# Patient Record
Sex: FEMALE | Race: WHITE | Employment: OTHER | URBAN - METROPOLITAN AREA
[De-identification: names, ages, dates, MRNs, and addresses within clinical notes are randomized per-mention and may not be internally consistent; named-entity substitution may affect disease eponyms.]

---

## 2017-04-25 ENCOUNTER — NURSE ONLY (OUTPATIENT)
Dept: CARDIOLOGY CLINIC | Age: 77
End: 2017-04-25

## 2017-04-25 ENCOUNTER — OFFICE VISIT (OUTPATIENT)
Dept: CARDIOLOGY CLINIC | Age: 77
End: 2017-04-25

## 2017-04-25 VITALS
BODY MASS INDEX: 29.14 KG/M2 | DIASTOLIC BLOOD PRESSURE: 62 MMHG | WEIGHT: 174.9 LBS | SYSTOLIC BLOOD PRESSURE: 120 MMHG | HEART RATE: 66 BPM | HEIGHT: 65 IN

## 2017-04-25 DIAGNOSIS — I08.0 MITRAL AND AORTIC VALVE DISEASE: ICD-10-CM

## 2017-04-25 DIAGNOSIS — I48.0 PAROXYSMAL ATRIAL FIBRILLATION (HCC): Primary | ICD-10-CM

## 2017-04-25 DIAGNOSIS — I25.10 ATHEROSCLEROSIS OF NATIVE CORONARY ARTERY OF NATIVE HEART WITHOUT ANGINA PECTORIS: ICD-10-CM

## 2017-04-25 DIAGNOSIS — I48.0 AF (PAROXYSMAL ATRIAL FIBRILLATION) (HCC): ICD-10-CM

## 2017-04-25 DIAGNOSIS — I10 ESSENTIAL HYPERTENSION, BENIGN: Primary | ICD-10-CM

## 2017-04-25 PROCEDURE — 99214 OFFICE O/P EST MOD 30 MIN: CPT | Performed by: NURSE PRACTITIONER

## 2017-04-25 PROCEDURE — 93000 ELECTROCARDIOGRAM COMPLETE: CPT | Performed by: NURSE PRACTITIONER

## 2017-04-25 RX ORDER — CARVEDILOL 12.5 MG/1
25 TABLET ORAL 2 TIMES DAILY WITH MEALS
COMMUNITY
End: 2017-04-25

## 2017-04-25 RX ORDER — CARVEDILOL 25 MG/1
25 TABLET ORAL 2 TIMES DAILY WITH MEALS
Qty: 60 TABLET | Refills: 3 | Status: SHIPPED | OUTPATIENT
Start: 2017-04-25 | End: 2017-05-17

## 2017-04-25 RX ORDER — OXYBUTYNIN CHLORIDE 5 MG/1
5 TABLET ORAL 3 TIMES DAILY
COMMUNITY
End: 2018-11-30

## 2017-05-16 ENCOUNTER — OFFICE VISIT (OUTPATIENT)
Dept: CARDIOLOGY CLINIC | Age: 77
End: 2017-05-16

## 2017-05-16 VITALS
DIASTOLIC BLOOD PRESSURE: 80 MMHG | HEIGHT: 65 IN | BODY MASS INDEX: 29.16 KG/M2 | HEART RATE: 64 BPM | WEIGHT: 175 LBS | SYSTOLIC BLOOD PRESSURE: 150 MMHG

## 2017-05-16 DIAGNOSIS — I10 ESSENTIAL HYPERTENSION, BENIGN: ICD-10-CM

## 2017-05-16 DIAGNOSIS — I08.0 MITRAL AND AORTIC VALVE DISEASE: ICD-10-CM

## 2017-05-16 DIAGNOSIS — I48.0 AF (PAROXYSMAL ATRIAL FIBRILLATION) (HCC): Primary | ICD-10-CM

## 2017-05-16 DIAGNOSIS — I25.10 ATHEROSCLEROSIS OF NATIVE CORONARY ARTERY OF NATIVE HEART WITHOUT ANGINA PECTORIS: ICD-10-CM

## 2017-05-16 PROCEDURE — 99214 OFFICE O/P EST MOD 30 MIN: CPT | Performed by: NURSE PRACTITIONER

## 2017-05-16 RX ORDER — LOSARTAN POTASSIUM 100 MG/1
50 TABLET ORAL DAILY
Qty: 30 TABLET | Status: ON HOLD | COMMUNITY
Start: 2017-05-16 | End: 2019-08-04 | Stop reason: ALTCHOICE

## 2017-05-19 PROCEDURE — 93228 REMOTE 30 DAY ECG REV/REPORT: CPT | Performed by: INTERNAL MEDICINE

## 2017-05-22 ENCOUNTER — TELEPHONE (OUTPATIENT)
Dept: CARDIOLOGY CLINIC | Age: 77
End: 2017-05-22

## 2017-06-09 DIAGNOSIS — I10 ESSENTIAL HYPERTENSION, BENIGN: Primary | ICD-10-CM

## 2017-06-09 RX ORDER — CARVEDILOL 25 MG/1
25 TABLET ORAL 2 TIMES DAILY
Qty: 60 TABLET | Refills: 3 | Status: SHIPPED | OUTPATIENT
Start: 2017-06-09 | End: 2018-04-07 | Stop reason: SDUPTHER

## 2017-06-14 ENCOUNTER — OFFICE VISIT (OUTPATIENT)
Dept: PULMONOLOGY | Age: 77
End: 2017-06-14

## 2017-06-14 VITALS
SYSTOLIC BLOOD PRESSURE: 148 MMHG | DIASTOLIC BLOOD PRESSURE: 71 MMHG | HEART RATE: 72 BPM | OXYGEN SATURATION: 93 % | WEIGHT: 178 LBS | BODY MASS INDEX: 29.62 KG/M2

## 2017-06-14 DIAGNOSIS — I10 ESSENTIAL HYPERTENSION, BENIGN: ICD-10-CM

## 2017-06-14 DIAGNOSIS — J44.9 COPD, SEVERE (HCC): ICD-10-CM

## 2017-06-14 DIAGNOSIS — R06.02 SOB (SHORTNESS OF BREATH): Primary | ICD-10-CM

## 2017-06-14 DIAGNOSIS — R05.3 CHRONIC COUGH: ICD-10-CM

## 2017-06-14 PROCEDURE — 99214 OFFICE O/P EST MOD 30 MIN: CPT | Performed by: INTERNAL MEDICINE

## 2017-06-23 ENCOUNTER — TELEPHONE (OUTPATIENT)
Dept: PULMONOLOGY | Age: 77
End: 2017-06-23

## 2017-06-23 ENCOUNTER — HOSPITAL ENCOUNTER (OUTPATIENT)
Dept: CT IMAGING | Age: 77
Discharge: HOME | End: 2017-06-23
Admitting: INTERNAL MEDICINE

## 2017-06-23 VITALS — OXYGEN SATURATION: 95 % | HEART RATE: 64 BPM

## 2017-06-23 DIAGNOSIS — R05.3 CHRONIC COUGH: ICD-10-CM

## 2017-06-23 RX ORDER — ALBUTEROL SULFATE 90 UG/1
4 AEROSOL, METERED RESPIRATORY (INHALATION) ONCE
Status: COMPLETED | OUTPATIENT
Start: 2017-06-23 | End: 2017-06-23

## 2017-06-23 RX ADMIN — ALBUTEROL SULFATE 4 PUFF: 90 AEROSOL, METERED RESPIRATORY (INHALATION) at 08:21

## 2017-07-18 ENCOUNTER — OFFICE VISIT (OUTPATIENT)
Dept: PULMONOLOGY | Age: 77
End: 2017-07-18

## 2017-07-18 VITALS — HEART RATE: 72 BPM | SYSTOLIC BLOOD PRESSURE: 133 MMHG | DIASTOLIC BLOOD PRESSURE: 72 MMHG

## 2017-07-18 DIAGNOSIS — R06.02 SOB (SHORTNESS OF BREATH): Primary | ICD-10-CM

## 2017-07-18 DIAGNOSIS — R91.1 PULMONARY NODULE: ICD-10-CM

## 2017-07-18 DIAGNOSIS — R05.3 CHRONIC COUGH: ICD-10-CM

## 2017-07-18 DIAGNOSIS — J44.9 COPD, SEVERE (HCC): ICD-10-CM

## 2017-07-18 PROCEDURE — 99214 OFFICE O/P EST MOD 30 MIN: CPT | Performed by: INTERNAL MEDICINE

## 2017-08-16 ENCOUNTER — HOSPITAL ENCOUNTER (OUTPATIENT)
Dept: NON INVASIVE DIAGNOSTICS | Age: 77
Discharge: HOME | End: 2017-08-16
Attending: INTERNAL MEDICINE | Admitting: INTERNAL MEDICINE

## 2017-08-16 DIAGNOSIS — I25.10 ATHEROSCLEROTIC HEART DISEASE OF NATIVE CORONARY ARTERY WITHOUT ANGINA PECTORIS: ICD-10-CM

## 2017-08-16 LAB
LEFT VENTRICULAR EJECTION FRACTION HIGH VALUE: 60 %
LEFT VENTRICULAR EJECTION FRACTION MODE: NORMAL
LV EF: 55 %
LV EF: 58 %
LVEF MODALITY: NORMAL

## 2017-11-16 ENCOUNTER — OFFICE VISIT (OUTPATIENT)
Dept: CARDIOLOGY CLINIC | Age: 77
End: 2017-11-16

## 2017-11-16 VITALS
BODY MASS INDEX: 28.82 KG/M2 | SYSTOLIC BLOOD PRESSURE: 160 MMHG | HEIGHT: 65 IN | DIASTOLIC BLOOD PRESSURE: 58 MMHG | HEART RATE: 77 BPM | WEIGHT: 173 LBS

## 2017-11-16 DIAGNOSIS — I25.10 ATHEROSCLEROSIS OF NATIVE CORONARY ARTERY OF NATIVE HEART WITHOUT ANGINA PECTORIS: ICD-10-CM

## 2017-11-16 DIAGNOSIS — I48.0 AF (PAROXYSMAL ATRIAL FIBRILLATION) (HCC): ICD-10-CM

## 2017-11-16 DIAGNOSIS — I10 ESSENTIAL HYPERTENSION, BENIGN: Primary | ICD-10-CM

## 2017-11-16 PROCEDURE — 3017F COLORECTAL CA SCREEN DOC REV: CPT | Performed by: INTERNAL MEDICINE

## 2017-11-16 PROCEDURE — G8598 ASA/ANTIPLAT THER USED: HCPCS | Performed by: INTERNAL MEDICINE

## 2017-11-16 PROCEDURE — 1036F TOBACCO NON-USER: CPT | Performed by: INTERNAL MEDICINE

## 2017-11-16 PROCEDURE — 1123F ACP DISCUSS/DSCN MKR DOCD: CPT | Performed by: INTERNAL MEDICINE

## 2017-11-16 PROCEDURE — 4040F PNEUMOC VAC/ADMIN/RCVD: CPT | Performed by: INTERNAL MEDICINE

## 2017-11-16 PROCEDURE — G8484 FLU IMMUNIZE NO ADMIN: HCPCS | Performed by: INTERNAL MEDICINE

## 2017-11-16 PROCEDURE — 1090F PRES/ABSN URINE INCON ASSESS: CPT | Performed by: INTERNAL MEDICINE

## 2017-11-16 PROCEDURE — G8417 CALC BMI ABV UP PARAM F/U: HCPCS | Performed by: INTERNAL MEDICINE

## 2017-11-16 PROCEDURE — 3014F SCREEN MAMMO DOC REV: CPT | Performed by: INTERNAL MEDICINE

## 2017-11-16 PROCEDURE — G8399 PT W/DXA RESULTS DOCUMENT: HCPCS | Performed by: INTERNAL MEDICINE

## 2017-11-16 PROCEDURE — 99214 OFFICE O/P EST MOD 30 MIN: CPT | Performed by: INTERNAL MEDICINE

## 2017-11-16 PROCEDURE — G8427 DOCREV CUR MEDS BY ELIG CLIN: HCPCS | Performed by: INTERNAL MEDICINE

## 2017-11-16 RX ORDER — HYDROCHLOROTHIAZIDE 25 MG/1
12.5 TABLET ORAL DAILY
Qty: 30 TABLET | Refills: 11 | Status: SHIPPED | OUTPATIENT
Start: 2017-11-16 | End: 2018-12-18 | Stop reason: DRUGHIGH

## 2017-11-16 NOTE — PROGRESS NOTES
Jacob Joel     Outpatient Follow Up Note    Subjective:   CHIEF COMPLAINT / HPI:      Marleny Caro is 78 y.o. female is seen today for a 6 month follow up. She has a history of CAD, PAF, htn, hchol, MR, copd. She has venous insuff bilaterally, leading to varicosities. Today, she is doing about the same. She is under stress caring for her , whom has spinal stenosis/parkinsons  She has no chest pain, sob, palpitations or edema. When her blood pressure is 125/70, she feels as if she could \"pass out\" which she has never done. Past Medical History:    Past Medical History:   Diagnosis Date    Arthritis     CAD (coronary artery disease)     Carpal tunnel syndrome     Coronary stent     depression     Diabetes mellitus (HCC)     GERD (gastroesophageal reflux disease)     Hyperlipidemia     Hypertension     MI (myocardial infarction)     LIZETT (obstructive sleep apnea)     stress incontinence      Social History:    History   Smoking Status    Former Smoker    Quit date: 10/28/2001   Smokeless Tobacco    Never Used     Current Medications:  Current Outpatient Prescriptions on File Prior to Visit   Medication Sig Dispense Refill    umeclidinium-vilanterol (ANORO ELLIPTA) 62.5-25 MCG/INH AEPB inhaler Inhale 1 puff into the lungs daily 1 each 11    carvedilol (COREG) 25 MG tablet Take 1 tablet by mouth 2 times daily 60 tablet 3    losartan (COZAAR) 100 MG tablet Take 0.5 tablets by mouth daily 30 tablet     amLODIPine (NORVASC) 10 MG tablet Take 10 mg by mouth daily      b complex vitamins capsule Take 1 capsule by mouth daily      Cinnamon 500 MG CAPS Take  by mouth.  acetaminophen 650 MG TABS Take 650 mg by mouth every 4 hours as needed for Pain (For mild pain level 1-3 or for fever > 100.5). 120 tablet 0    hydrochlorothiazide (HYDRODIURIL) 25 MG tablet Take 25 mg by mouth daily.  calcium carbonate (OSCAL) 500 MG TABS tablet Take 500 mg by mouth daily.         displaced  JVP less than 8 cm H2O  Heart tones are crisp and normal  Cervical veins are not engorged  The carotid upstroke is normal in amplitude and contour without delay , positive  bruits  JVP is not elevated  ABDOMEN:  Normal bowel sounds, non-distended and non-tender to palpation  EXT: No edema, no calf tenderness. Pulses are present bilaterally. DATA:    Lab Results   Component Value Date    ALT 17 05/19/2016     Lab Results   Component Value Date    CREATININE 0.6 05/19/2016     Lab Results   Component Value Date    TSH 2.97 07/05/2013    E4YJIES 6.4 02/22/2011     Lab Results   Component Value Date    WBC 11.4 (H) 05/01/2015    HGB 11.8 (L) 05/01/2015    HCT 36.7 05/01/2015    MCV 93.0 05/01/2015     05/01/2015 8/16/17  Echo  -Normal left ventricle size and systolic function with an estimated ejection   fraction of 55-60%.  -No regional wall motion abnormalities are seen.   -Mild concentric left ventricular hypertrophy is present.   -There is reversal of E/A inflow velocities across the mitral valve.   -Diastolic filling parameters suggests grade I diastolic dysfunction . E/e'=   13.65 .   -Mild to moderate mitral regurgitation is present.   -Mitral annular calcification is present.   -Aortic valve appears sclerotic but opens adequately. Mild aortic   regurgitation is present.   -There is trivial tricuspid regurgitation with RVSP estimated at 28 mmHg. Assessment:     1. Coronary atherosclerosis of native coronary artery  --2006  RCA and circ stent patent, EF 45%,8/13/15  Stress echo--no ischemia-- -intolerant to ace due to cough, on cozaar   2. PAF--10/14  Event monitor showed 11 minutes of AF--less frequent AF since last visit, will continue asa daily   3. Essential hypertension, benign stable--BP (!) 160/58   Pulse 77   Ht 5' 5\" (1.651 m)   Wt 173 lb (78.5 kg)   Breastfeeding?  No   BMI 28.79 kg/m² mean blood pressure is 92, she is having dizziness with lower blood pressure, will cut hctz in 1/2   4. Other and unspecified hyperlipidemia   Managed and followed by Dr Marcos Levy to statins due to muscle aches, on red yeast rice   9/16  ldl 102  Tri 175   5. Unspecified mitral and aortic valve diseases 8/17-- Mild to moderate MR, nl EF   6. SOB--multifactorial including,  severe COPD --stable  7. Occlusion and stenosis of carotid arteries    Plan:   1. Cut hctz in 1/2 12.5 mg daily  2   Call for increasing symptoms or frequency of dizziness  3.   Follow up in six months        James Fitzgerald MD

## 2017-11-16 NOTE — LETTER
[address] [facility]  [address]  [address] [address]  Phone: [phone]  Fax: [phone]    Marlyn Fabian MD        November 16, 2017     Camacho Ramirez, [address] [facility] [place] [address] [facility] [address]    Patient: Trip Paulino  MR Number: [id number]  YOB: 1940  Date of Visit: 11/16/2017    Dear Dr. Camacho Ramirez: Thank you for the request for consultation for Jayme Ackerman to me for the evaluation of cad. Below are the relevant portions of my assessment and plan of care. [address]     Outpatient Follow Up Note    Subjective:   CHIEF COMPLAINT / HPI:      Trip Paulino is 78 y.o. female is seen today for a 6 month follow up. She has a history of CAD, PAF, htn, hchol, MR, copd. She has venous insuff bilaterally, leading to varicosities. Today, she is doing about the same. She is under stress caring for her , whom has spinal stenosis/parkinsons  She has no chest pain, sob, palpitations or edema. When her blood pressure is 125/70, she feels as if she could \"pass out\" which she has never done.       Past Medical History:    Past Medical History:   Diagnosis Date    Arthritis     CAD (coronary artery disease)     Carpal tunnel syndrome     Coronary stent     depression     Diabetes mellitus (HCC)     GERD (gastroesophageal reflux disease)     Hyperlipidemia     Hypertension     MI (myocardial infarction)     LIZETT (obstructive sleep apnea)     stress incontinence      Social History:    History   Smoking Status    Former Smoker    Quit date: 10/28/2001   Smokeless Tobacco    Never Used     Current Medications:  Current Outpatient Prescriptions on File Prior to Visit   Medication Sig Dispense Refill    umeclidinium-vilanterol (ANORO ELLIPTA) 62.5-25 MCG/INH AEPB inhaler Inhale 1 puff into the lungs daily 1 each 11    carvedilol (COREG) 25 MG tablet Take 1 tablet by mouth 2 times daily 60 tablet 3  -There is trivial tricuspid regurgitation with RVSP estimated at 28 mmHg. Assessment:     1. Coronary atherosclerosis of native coronary artery  --2006  RCA and circ stent patent, EF 45%,8/13/15  Stress echo--no ischemia-- -intolerant to ace due to cough, on cozaar   2. PAF--10/14  Event monitor showed 11 minutes of AF--less frequent AF since last visit, will continue asa daily   3. Essential hypertension, benign stable--BP (!) 160/58   Pulse 77   Ht 5' 5\" (1.651 m)   Wt 173 lb (78.5 kg)   Breastfeeding? No   BMI 28.79 kg/m²  mean blood pressure is 92, she is having dizziness with lower blood pressure, will cut hctz in 1/2   4. Other and unspecified hyperlipidemia   Managed and followed by Dr Fely Craft to statins due to muscle aches, on red yeast rice   9/16  ldl 102  Tri 175   5. Unspecified mitral and aortic valve diseases 8/17-- Mild to moderate MR, nl EF   6. SOB--multifactorial including,  severe COPD --stable  7. Occlusion and stenosis of carotid arteries    Plan:   1. Cut hctz in 1/2 12.5 mg daily  2   Call for increasing symptoms or frequency of dizziness  3. Follow up in six months        Divine Jean MD                  If you have questions, please do not hesitate to call me. I look forward to following Kamilla Davis along with you.     Sincerely,        Casandra Taylor MD

## 2017-11-16 NOTE — COMMUNICATION BODY
[address]     Outpatient Follow Up Note    Subjective:   CHIEF COMPLAINT / HPI:      Trip Paulino is 78 y.o. female is seen today for a 6 month follow up. She has a history of CAD, PAF, htn, hchol, MR, copd. She has venous insuff bilaterally, leading to varicosities. Today, she is doing about the same. She is under stress caring for her , whom has spinal stenosis/parkinsons  She has no chest pain, sob, palpitations or edema. When her blood pressure is 125/70, she feels as if she could \"pass out\" which she has never done. Past Medical History:    Past Medical History:   Diagnosis Date    Arthritis     CAD (coronary artery disease)     Carpal tunnel syndrome     Coronary stent     depression     Diabetes mellitus (HCC)     GERD (gastroesophageal reflux disease)     Hyperlipidemia     Hypertension     MI (myocardial infarction)     LIZETT (obstructive sleep apnea)     stress incontinence      Social History:    History   Smoking Status    Former Smoker    Quit date: 10/28/2001   Smokeless Tobacco    Never Used     Current Medications:  Current Outpatient Prescriptions on File Prior to Visit   Medication Sig Dispense Refill    umeclidinium-vilanterol (ANORO ELLIPTA) 62.5-25 MCG/INH AEPB inhaler Inhale 1 puff into the lungs daily 1 each 11    carvedilol (COREG) 25 MG tablet Take 1 tablet by mouth 2 times daily 60 tablet 3    losartan (COZAAR) 100 MG tablet Take 0.5 tablets by mouth daily 30 tablet     amLODIPine (NORVASC) 10 MG tablet Take 10 mg by mouth daily      b complex vitamins capsule Take 1 capsule by mouth daily      Cinnamon 500 MG CAPS Take  by mouth.  acetaminophen 650 MG TABS Take 650 mg by mouth every 4 hours as needed for Pain (For mild pain level 1-3 or for fever > 100.5). 120 tablet 0    hydrochlorothiazide (HYDRODIURIL) 25 MG tablet Take 25 mg by mouth daily.  calcium carbonate (OSCAL) 500 MG TABS tablet Take 500 mg by mouth daily.         Ascorbic Acid (VITAMIN C) 500 MG tablet Take 500 mg by mouth daily.  citalopram (CELEXA) 40 MG tablet Take 40 mg by mouth daily.  aspirin 81 MG tablet Take 81 mg by mouth daily.  metformin (GLUCOPHAGE) 500 MG tablet Take 500 mg by mouth 1 tab qm,2 tabs qpm      oxybutynin (DITROPAN) 5 MG tablet Take 5 mg by mouth 3 times daily      gabapentin (NEURONTIN) 100 MG capsule Take 100 mg by mouth 2 tabs qam,1tab afternoon,2 tabs qpm       No current facility-administered medications on file prior to visit. REVIEW OF SYSTEMS:    CONSTITUTIONAL: No major weight gain or loss, fatigue, weakness, night sweats or fever. HEENT: No new vision difficulties or ringing in the ears. RESPIRATORY: No new SOB, PND, orthopnea or cough. CARDIOVASCULAR: See HPI  GI: No nausea, vomiting, diarrhea, constipation, abdominal pain or changes in bowel habits. : No urinary frequency, urgency, incontinence hematuria or dysuria. SKIN: No cyanosis or skin lesions. MUSCULOSKELETAL: No new muscle or joint pain. NEUROLOGICAL: No syncope or TIA-like symptoms. PSYCHIATRIC: No anxiety, pain, insomnia or depression--positive for dizziness    Objective:   PHYSICAL EXAM:        VITALS:    /70 (Site: Left Arm, Position: Sitting, Cuff Size: Large Adult)   Pulse 77   Ht 5' 5\" (1.651 m)   Wt 173 lb (78.5 kg)   Breastfeeding? No   BMI 28.79 kg/m²    CONSTITUTIONAL: Cooperative, no apparent distress, and appears well nourished / developed  NEUROLOGIC:  Awake and orientated to person, place and time. PSYCH: Calm affect. SKIN: Warm and dry. HEENT: Sclera non-icteric, normocephalic, neck supple, no elevation of JVP, normal carotid pulses with  bruits and thyroid normal size. LUNGS:  No increased work of breathing and clear to auscultation, no crackles or wheezing  CARDIOVASCULAR:  Regular rate and rhythm with II/VI sys ej  murmur, gallops, rubs, or abnormal heart sounds, normal PMI. The apical impulses not displaced  JVP less than 8 cm H2O  Heart tones are crisp and normal  Cervical veins are not engorged  The carotid upstroke is normal in amplitude and contour without delay , positive  bruits  JVP is not elevated  ABDOMEN:  Normal bowel sounds, non-distended and non-tender to palpation  EXT: No edema, no calf tenderness. Pulses are present bilaterally. DATA:    Lab Results   Component Value Date    ALT 17 05/19/2016     Lab Results   Component Value Date    CREATININE 0.6 05/19/2016     Lab Results   Component Value Date    TSH 2.97 07/05/2013    [id number] 6.4 02/22/2011     Lab Results   Component Value Date    WBC 11.4 (H) 05/01/2015    HGB 11.8 (L) 05/01/2015    HCT 36.7 05/01/2015    MCV 93.0 05/01/2015     05/01/2015 8/16/17  Echo  -Normal left ventricle size and systolic function with an estimated ejection   fraction of 55-60%.  -No regional wall motion abnormalities are seen.   -Mild concentric left ventricular hypertrophy is present.   -There is reversal of E/A inflow velocities across the mitral valve.   -Diastolic filling parameters suggests grade I diastolic dysfunction . E/e'=   13.65 .   -Mild to moderate mitral regurgitation is present.   -Mitral annular calcification is present.   -Aortic valve appears sclerotic but opens adequately. Mild aortic   regurgitation is present.   -There is trivial tricuspid regurgitation with RVSP estimated at 28 mmHg. Assessment:     1. Coronary atherosclerosis of native coronary artery  --2006  RCA and circ stent patent, EF 45%,8/13/15  Stress echo--no ischemia-- -intolerant to ace due to cough, on cozaar   2. PAF--10/14  Event monitor showed 11 minutes of AF--less frequent AF since last visit, will continue asa daily   3. Essential hypertension, benign stable--BP (!) 160/58   Pulse 77   Ht 5' 5\" (1.651 m)   Wt 173 lb (78.5 kg)   Breastfeeding?  No   BMI 28.79 kg/m²  mean blood pressure is 92, she is having dizziness with lower blood pressure, will cut hctz in 1/2   4. Other and unspecified hyperlipidemia   Managed and followed by Dr Carroll Her to statins due to muscle aches, on red yeast rice   9/16  ldl 102  Tri 175   5. Unspecified mitral and aortic valve diseases 8/17-- Mild to moderate MR, nl EF   6. SOB--multifactorial including,  severe COPD --stable  7. Occlusion and stenosis of carotid arteries    Plan:   1. Cut hctz in 1/2 12.5 mg daily  2   Call for increasing symptoms or frequency of dizziness  3.   Follow up in six months        Jordin Tomlin MD

## 2017-12-21 ENCOUNTER — TELEPHONE (OUTPATIENT)
Dept: PULMONOLOGY | Age: 77
End: 2017-12-21

## 2017-12-21 DIAGNOSIS — R91.8 PULMONARY NODULES: Primary | ICD-10-CM

## 2017-12-29 ENCOUNTER — HOSPITAL ENCOUNTER (OUTPATIENT)
Dept: CT IMAGING | Age: 77
Discharge: HOME | End: 2017-12-29
Attending: INTERNAL MEDICINE | Admitting: INTERNAL MEDICINE

## 2017-12-29 DIAGNOSIS — R91.8 PULMONARY NODULES: ICD-10-CM

## 2017-12-29 DIAGNOSIS — R91.8 OTHER NONSPECIFIC ABNORMAL FINDING OF LUNG FIELD (CODE): ICD-10-CM

## 2018-01-19 ENCOUNTER — OFFICE VISIT (OUTPATIENT)
Dept: PULMONOLOGY | Age: 78
End: 2018-01-19

## 2018-01-19 VITALS
HEART RATE: 77 BPM | WEIGHT: 176 LBS | BODY MASS INDEX: 29.29 KG/M2 | SYSTOLIC BLOOD PRESSURE: 125 MMHG | DIASTOLIC BLOOD PRESSURE: 62 MMHG | OXYGEN SATURATION: 95 %

## 2018-01-19 DIAGNOSIS — J44.9 COPD, SEVERE (HCC): ICD-10-CM

## 2018-01-19 DIAGNOSIS — R91.1 PULMONARY NODULE: ICD-10-CM

## 2018-01-19 DIAGNOSIS — R05.3 CHRONIC COUGH: ICD-10-CM

## 2018-01-19 DIAGNOSIS — R06.02 SOB (SHORTNESS OF BREATH): Primary | ICD-10-CM

## 2018-01-19 PROCEDURE — 3017F COLORECTAL CA SCREEN DOC REV: CPT | Performed by: INTERNAL MEDICINE

## 2018-01-19 PROCEDURE — G8484 FLU IMMUNIZE NO ADMIN: HCPCS | Performed by: INTERNAL MEDICINE

## 2018-01-19 PROCEDURE — 3023F SPIROM DOC REV: CPT | Performed by: INTERNAL MEDICINE

## 2018-01-19 PROCEDURE — 1036F TOBACCO NON-USER: CPT | Performed by: INTERNAL MEDICINE

## 2018-01-19 PROCEDURE — G8399 PT W/DXA RESULTS DOCUMENT: HCPCS | Performed by: INTERNAL MEDICINE

## 2018-01-19 PROCEDURE — 4040F PNEUMOC VAC/ADMIN/RCVD: CPT | Performed by: INTERNAL MEDICINE

## 2018-01-19 PROCEDURE — G8417 CALC BMI ABV UP PARAM F/U: HCPCS | Performed by: INTERNAL MEDICINE

## 2018-01-19 PROCEDURE — G8926 SPIRO NO PERF OR DOC: HCPCS | Performed by: INTERNAL MEDICINE

## 2018-01-19 PROCEDURE — G8598 ASA/ANTIPLAT THER USED: HCPCS | Performed by: INTERNAL MEDICINE

## 2018-01-19 PROCEDURE — G8427 DOCREV CUR MEDS BY ELIG CLIN: HCPCS | Performed by: INTERNAL MEDICINE

## 2018-01-19 PROCEDURE — 1090F PRES/ABSN URINE INCON ASSESS: CPT | Performed by: INTERNAL MEDICINE

## 2018-01-19 PROCEDURE — 99214 OFFICE O/P EST MOD 30 MIN: CPT | Performed by: INTERNAL MEDICINE

## 2018-01-19 PROCEDURE — 1123F ACP DISCUSS/DSCN MKR DOCD: CPT | Performed by: INTERNAL MEDICINE

## 2018-01-19 NOTE — PROGRESS NOTES
documented in HPI     Physical Exam:  Well developed, well nourished  Alert and oriented  Sclera is clear  No cervical adenopathy  No JVD. Chest examination is clear. Cardiac examination reveals regular rate and rhythm without murmur, gallop or rub. The abdomen is soft, nontender and nondistended. There is no clubbing, cyanosis or edema of the extremities. There is no obvious skin rash. No focal neuro deficicts  Normal mood and affect    Allergies   Allergen Reactions    Cipro Xr     Lyrica [Pregabalin]      shaking    Penicillins     Sulfa Antibiotics      Prior to Visit Medications    Medication Sig Taking? Authorizing Provider   hydrochlorothiazide (HYDRODIURIL) 25 MG tablet Take 0.5 tablets by mouth daily  Abhijit Caldwell MD   umeclidinium-vilanterol (ANORO ELLIPTA) 62.5-25 MCG/INH AEPB inhaler Inhale 1 puff into the lungs daily  Stacey Johnson MD   carvedilol (COREG) 25 MG tablet Take 1 tablet by mouth 2 times daily  Aneta Hogue NP   losartan (COZAAR) 100 MG tablet Take 0.5 tablets by mouth daily  Aneta Hogue NP   oxybutynin (DITROPAN) 5 MG tablet Take 5 mg by mouth 3 times daily  Historical Provider, MD   gabapentin (NEURONTIN) 100 MG capsule Take 100 mg by mouth 2 tabs qam,1tab afternoon,2 tabs qpm  Historical Provider, MD   amLODIPine (NORVASC) 10 MG tablet Take 10 mg by mouth daily  Historical Provider, MD   b complex vitamins capsule Take 1 capsule by mouth daily  Historical Provider, MD   Cinnamon 500 MG CAPS Take  by mouth. Historical Provider, MD   acetaminophen 650 MG TABS Take 650 mg by mouth every 4 hours as needed for Pain (For mild pain level 1-3 or for fever > 100.5). Elias Mckeon MD   calcium carbonate (OSCAL) 500 MG TABS tablet Take 500 mg by mouth daily. Historical Provider, MD   Ascorbic Acid (VITAMIN C) 500 MG tablet Take 500 mg by mouth daily. Historical Provider, MD   citalopram (CELEXA) 40 MG tablet Take 40 mg by mouth daily.     Historical

## 2018-04-04 ENCOUNTER — HOSPITAL ENCOUNTER (OUTPATIENT)
Dept: ULTRASOUND IMAGING | Age: 78
Discharge: HOME | End: 2018-04-04
Attending: INTERNAL MEDICINE | Admitting: INTERNAL MEDICINE

## 2018-04-04 DIAGNOSIS — R11.0 NAUSEA: ICD-10-CM

## 2018-04-07 DIAGNOSIS — I10 ESSENTIAL HYPERTENSION, BENIGN: ICD-10-CM

## 2018-04-09 RX ORDER — CARVEDILOL 25 MG/1
TABLET ORAL
Qty: 60 TABLET | Refills: 2 | Status: SHIPPED | OUTPATIENT
Start: 2018-04-09 | End: 2018-06-11 | Stop reason: SDUPTHER

## 2018-04-16 ENCOUNTER — HOSPITAL ENCOUNTER (OUTPATIENT)
Dept: ENDOSCOPY | Age: 78
Discharge: HOME | End: 2018-04-16
Attending: INTERNAL MEDICINE | Admitting: INTERNAL MEDICINE

## 2018-04-16 LAB
GLUCOSE BLD-MCNC: 152 MG/DL (ref 70–99)
GLUCOSE BLD-MCNC: 156 MG/DL (ref 70–99)
PERFORMED ON: ABNORMAL
PERFORMED ON: ABNORMAL

## 2018-04-16 ASSESSMENT — COPD QUESTIONNAIRES: CAT_SEVERITY: MILD

## 2018-04-16 ASSESSMENT — ENCOUNTER SYMPTOMS: SHORTNESS OF BREATH: 1

## 2018-06-11 ENCOUNTER — OFFICE VISIT (OUTPATIENT)
Dept: CARDIOLOGY CLINIC | Age: 78
End: 2018-06-11

## 2018-06-11 VITALS
DIASTOLIC BLOOD PRESSURE: 68 MMHG | WEIGHT: 175.8 LBS | HEART RATE: 76 BPM | SYSTOLIC BLOOD PRESSURE: 130 MMHG | BODY MASS INDEX: 28.25 KG/M2 | HEIGHT: 66 IN

## 2018-06-11 DIAGNOSIS — I48.0 AF (PAROXYSMAL ATRIAL FIBRILLATION) (HCC): ICD-10-CM

## 2018-06-11 DIAGNOSIS — I25.10 ATHEROSCLEROSIS OF NATIVE CORONARY ARTERY OF NATIVE HEART WITHOUT ANGINA PECTORIS: Primary | ICD-10-CM

## 2018-06-11 DIAGNOSIS — I10 ESSENTIAL HYPERTENSION, BENIGN: ICD-10-CM

## 2018-06-11 DIAGNOSIS — E78.5 DYSLIPIDEMIA: ICD-10-CM

## 2018-06-11 DIAGNOSIS — I08.0 MITRAL AND AORTIC VALVE DISEASE: ICD-10-CM

## 2018-06-11 PROCEDURE — G8417 CALC BMI ABV UP PARAM F/U: HCPCS | Performed by: INTERNAL MEDICINE

## 2018-06-11 PROCEDURE — G8399 PT W/DXA RESULTS DOCUMENT: HCPCS | Performed by: INTERNAL MEDICINE

## 2018-06-11 PROCEDURE — 1123F ACP DISCUSS/DSCN MKR DOCD: CPT | Performed by: INTERNAL MEDICINE

## 2018-06-11 PROCEDURE — G8427 DOCREV CUR MEDS BY ELIG CLIN: HCPCS | Performed by: INTERNAL MEDICINE

## 2018-06-11 PROCEDURE — 4040F PNEUMOC VAC/ADMIN/RCVD: CPT | Performed by: INTERNAL MEDICINE

## 2018-06-11 PROCEDURE — 99214 OFFICE O/P EST MOD 30 MIN: CPT | Performed by: INTERNAL MEDICINE

## 2018-06-11 PROCEDURE — 1036F TOBACCO NON-USER: CPT | Performed by: INTERNAL MEDICINE

## 2018-06-11 PROCEDURE — 3017F COLORECTAL CA SCREEN DOC REV: CPT | Performed by: INTERNAL MEDICINE

## 2018-06-11 PROCEDURE — G8598 ASA/ANTIPLAT THER USED: HCPCS | Performed by: INTERNAL MEDICINE

## 2018-06-11 PROCEDURE — 1090F PRES/ABSN URINE INCON ASSESS: CPT | Performed by: INTERNAL MEDICINE

## 2018-06-12 RX ORDER — CARVEDILOL 25 MG/1
25 TABLET ORAL 2 TIMES DAILY
Qty: 60 TABLET | Refills: 11 | Status: SHIPPED | OUTPATIENT
Start: 2018-06-12 | End: 2018-12-13 | Stop reason: SDUPTHER

## 2018-07-23 ENCOUNTER — OFFICE VISIT (OUTPATIENT)
Dept: PULMONOLOGY | Age: 78
End: 2018-07-23

## 2018-07-23 VITALS
DIASTOLIC BLOOD PRESSURE: 63 MMHG | HEART RATE: 76 BPM | HEIGHT: 64 IN | WEIGHT: 176 LBS | BODY MASS INDEX: 30.05 KG/M2 | OXYGEN SATURATION: 95 % | SYSTOLIC BLOOD PRESSURE: 119 MMHG

## 2018-07-23 DIAGNOSIS — R91.1 PULMONARY NODULE: ICD-10-CM

## 2018-07-23 DIAGNOSIS — J44.9 COPD, SEVERE (HCC): ICD-10-CM

## 2018-07-23 DIAGNOSIS — R05.3 CHRONIC COUGH: ICD-10-CM

## 2018-07-23 DIAGNOSIS — R06.02 SOB (SHORTNESS OF BREATH): Primary | ICD-10-CM

## 2018-07-23 PROCEDURE — 1101F PT FALLS ASSESS-DOCD LE1/YR: CPT | Performed by: INTERNAL MEDICINE

## 2018-07-23 PROCEDURE — G8427 DOCREV CUR MEDS BY ELIG CLIN: HCPCS | Performed by: INTERNAL MEDICINE

## 2018-07-23 PROCEDURE — 1123F ACP DISCUSS/DSCN MKR DOCD: CPT | Performed by: INTERNAL MEDICINE

## 2018-07-23 PROCEDURE — 1090F PRES/ABSN URINE INCON ASSESS: CPT | Performed by: INTERNAL MEDICINE

## 2018-07-23 PROCEDURE — 99214 OFFICE O/P EST MOD 30 MIN: CPT | Performed by: INTERNAL MEDICINE

## 2018-07-23 PROCEDURE — 1036F TOBACCO NON-USER: CPT | Performed by: INTERNAL MEDICINE

## 2018-07-23 PROCEDURE — 3023F SPIROM DOC REV: CPT | Performed by: INTERNAL MEDICINE

## 2018-07-23 PROCEDURE — G8598 ASA/ANTIPLAT THER USED: HCPCS | Performed by: INTERNAL MEDICINE

## 2018-07-23 PROCEDURE — G8399 PT W/DXA RESULTS DOCUMENT: HCPCS | Performed by: INTERNAL MEDICINE

## 2018-07-23 PROCEDURE — 4040F PNEUMOC VAC/ADMIN/RCVD: CPT | Performed by: INTERNAL MEDICINE

## 2018-07-23 PROCEDURE — G8417 CALC BMI ABV UP PARAM F/U: HCPCS | Performed by: INTERNAL MEDICINE

## 2018-07-23 PROCEDURE — G8926 SPIRO NO PERF OR DOC: HCPCS | Performed by: INTERNAL MEDICINE

## 2018-07-23 PROCEDURE — 3017F COLORECTAL CA SCREEN DOC REV: CPT | Performed by: INTERNAL MEDICINE

## 2018-07-23 RX ORDER — BENZONATATE 100 MG/1
100 CAPSULE ORAL 3 TIMES DAILY PRN
Qty: 270 CAPSULE | Refills: 3 | Status: SHIPPED | OUTPATIENT
Start: 2018-07-23 | End: 2018-07-30

## 2018-07-23 NOTE — PROGRESS NOTES
and she believes this is helping her. She feels better. She continues to have exertional dyspnea with moderate activity. There is no complaint of chest pain, nausea or vomiting. Is also no complaint of fever, chills or night sweats. Review of Systems  As documented in HPI     Physical Exam:  Well developed, well nourished  Alert and oriented  Sclera is clear  No cervical adenopathy  No JVD. Chest examination is clear. Cardiac examination reveals regular rate and rhythm without murmur, gallop or rub. The abdomen is soft, nontender and nondistended. There is no clubbing, cyanosis or edema of the extremities. There is no obvious skin rash. No focal neuro deficicts  Normal mood and affect    Allergies   Allergen Reactions    Cipro Xr     Lyrica [Pregabalin]      shaking    Penicillins     Sulfa Antibiotics      Prior to Visit Medications    Medication Sig Taking? Authorizing Provider   carvedilol (COREG) 25 MG tablet Take 1 tablet by mouth 2 times daily Yes Omar Stevens MD   hydrochlorothiazide (HYDRODIURIL) 25 MG tablet Take 0.5 tablets by mouth daily  Patient taking differently: Take 25 mg by mouth daily  Yes Omar Stevens MD   umeclidinium-vilanterol (ANORO ELLIPTA) 62.5-25 MCG/INH AEPB inhaler Inhale 1 puff into the lungs daily Yes Scar Chen MD   losartan (COZAAR) 100 MG tablet Take 0.5 tablets by mouth daily Yes MARIA C Torres - CNP   oxybutynin (DITROPAN) 5 MG tablet Take 5 mg by mouth 3 times daily Yes Historical Provider, MD   gabapentin (NEURONTIN) 100 MG capsule Take 100 mg by mouth 2 tabs qam,1tab afternoon,2 tabs qpm Yes Historical Provider, MD   amLODIPine (NORVASC) 10 MG tablet Take 10 mg by mouth nightly  Yes Historical Provider, MD   b complex vitamins capsule Take 1 capsule by mouth daily Yes Historical Provider, MD   Cinnamon 500 MG CAPS Take  by mouth.  Yes Historical Provider, MD   acetaminophen 650 MG TABS Take 650 mg by mouth every 4 hours as needed for Pain

## 2018-07-26 ENCOUNTER — HOSPITAL ENCOUNTER (OUTPATIENT)
Dept: VASCULAR LAB | Age: 78
Discharge: HOME | End: 2018-07-26
Attending: INTERNAL MEDICINE | Admitting: INTERNAL MEDICINE

## 2018-07-26 DIAGNOSIS — R60.9 EDEMA, UNSPECIFIED TYPE: ICD-10-CM

## 2018-07-26 DIAGNOSIS — R60.9 EDEMA: ICD-10-CM

## 2018-08-24 ENCOUNTER — HOSPITAL ENCOUNTER (OUTPATIENT)
Dept: CT IMAGING | Age: 78
Discharge: HOME | End: 2018-08-24
Attending: INTERNAL MEDICINE | Admitting: INTERNAL MEDICINE

## 2018-08-24 DIAGNOSIS — R91.1 SOLITARY PULMONARY NODULE: ICD-10-CM

## 2018-08-24 DIAGNOSIS — R91.1 PULMONARY NODULE: ICD-10-CM

## 2018-09-07 ENCOUNTER — OFFICE VISIT (OUTPATIENT)
Dept: PULMONOLOGY | Age: 78
End: 2018-09-07

## 2018-09-07 VITALS
DIASTOLIC BLOOD PRESSURE: 59 MMHG | SYSTOLIC BLOOD PRESSURE: 103 MMHG | WEIGHT: 176 LBS | HEART RATE: 71 BPM | BODY MASS INDEX: 30.21 KG/M2

## 2018-09-07 DIAGNOSIS — R91.1 PULMONARY NODULE: ICD-10-CM

## 2018-09-07 DIAGNOSIS — R06.02 SOB (SHORTNESS OF BREATH): Primary | ICD-10-CM

## 2018-09-07 DIAGNOSIS — J11.1 INFLUENZA: ICD-10-CM

## 2018-09-07 DIAGNOSIS — R05.3 CHRONIC COUGH: ICD-10-CM

## 2018-09-07 PROCEDURE — 99214 OFFICE O/P EST MOD 30 MIN: CPT | Performed by: INTERNAL MEDICINE

## 2018-09-07 PROCEDURE — G8427 DOCREV CUR MEDS BY ELIG CLIN: HCPCS | Performed by: INTERNAL MEDICINE

## 2018-09-07 PROCEDURE — 1036F TOBACCO NON-USER: CPT | Performed by: INTERNAL MEDICINE

## 2018-09-07 PROCEDURE — G8417 CALC BMI ABV UP PARAM F/U: HCPCS | Performed by: INTERNAL MEDICINE

## 2018-09-07 PROCEDURE — 1101F PT FALLS ASSESS-DOCD LE1/YR: CPT | Performed by: INTERNAL MEDICINE

## 2018-09-07 PROCEDURE — G8399 PT W/DXA RESULTS DOCUMENT: HCPCS | Performed by: INTERNAL MEDICINE

## 2018-09-07 PROCEDURE — 1123F ACP DISCUSS/DSCN MKR DOCD: CPT | Performed by: INTERNAL MEDICINE

## 2018-09-07 PROCEDURE — 1090F PRES/ABSN URINE INCON ASSESS: CPT | Performed by: INTERNAL MEDICINE

## 2018-09-07 PROCEDURE — 4040F PNEUMOC VAC/ADMIN/RCVD: CPT | Performed by: INTERNAL MEDICINE

## 2018-09-07 PROCEDURE — G8598 ASA/ANTIPLAT THER USED: HCPCS | Performed by: INTERNAL MEDICINE

## 2018-09-07 PROCEDURE — 3017F COLORECTAL CA SCREEN DOC REV: CPT | Performed by: INTERNAL MEDICINE

## 2018-09-07 NOTE — PROGRESS NOTES
[facility]  Progress Note  MD Rosetta Auguste Sidhenry   YOB: 1940    Date of Visit:  9/7/2018    Assessment/Plan:  1. SOB (shortness of breath) & 2. Chronic cough  Actually better  Still some cough mostly at night  Tessalom Perles have helped the cough. Breathing is better on Anoro. I have independently reviewed radiographic images for this patient as part of this visit. CT is about the same as 12/17  Atypical infection/NTM is not excluded but I don't think she needs bronch at this time because she feels better. If symptoms worsen, we can adjust the plan. 3. COPD, severe (HCC)/Emphysema  PFT 6/17:  INTERPRETATION:  Spirometry showed decreased FVC of 1.85 liters, 62% predicted  and decreased FEV1 of 1.13 liters, 50% predicted. FEV1/FVC ratio was  decreased at 61%. No response to bronchodilators demonstrated on spirometry. Lung volumes showed normal total lung capacity of 86% predicted. Diffusion  capacity showed decreased DLCO of 65% of predicted.     IMPRESSION:  Moderate to severe obstructive defect on spirometry with no  response to bronchodilators. Normal lung volumes and moderate decrease in  diffusion capacity. In comparison to the test that was done in July 2013 no  significant change noted. Anoro  Albuterol    4. Pulmonary nodule  CT Chest 12/17:  Impression   No significant interval change in distribution of bilateral pulmonary nodules   as compared to the prior examination.       Tree-in-bud type nodular pattern is again demonstrated within the lingula and   right middle lobe, for which atypical infection such as NETTIE could be   considered.       Atherosclerosis, including coronary artery calcification.       Emphysema.      Repeat CT chest 8/19    FOLLOW UP: 6 months      Chief Complaint   Patient presents with    1 Month Follow-Up     had her CT done       HPI  The patient presents with a chief complaint of every 4 hours as needed for Pain (For mild pain level 1-3 or for fever > 100.5). Fabi Ramírez MD   calcium carbonate (OSCAL) 500 MG TABS tablet Take 500 mg by mouth daily. Historical Provider, MD   Ascorbic Acid (VITAMIN C) 500 MG tablet Take 500 mg by mouth daily. Historical Provider, MD   citalopram (CELEXA) 40 MG tablet Take 40 mg by mouth daily. Historical Provider, MD   aspirin 81 MG tablet Take 81 mg by mouth daily. Historical Provider, MD   metformin (GLUCOPHAGE) 500 MG tablet Take 500 mg by mouth 1 tab qm,2 tabs qpm  Historical Provider, MD       Vitals:    09/07/18 1353   BP: (!) 103/59   Pulse: 71   Weight: 176 lb (79.8 kg)     Body mass index is 30.21 kg/m².      Wt Readings from Last 3 Encounters:   09/07/18 176 lb (79.8 kg)   07/23/18 176 lb (79.8 kg)   06/11/18 175 lb 12.8 oz (79.7 kg)     BP Readings from Last 3 Encounters:   09/07/18 (!) 103/59   07/23/18 119/63   06/11/18 130/68        History   Smoking Status    Former Smoker    Quit date: 10/28/2001   Smokeless Tobacco    Never Used

## 2018-11-26 ENCOUNTER — OFFICE VISIT (OUTPATIENT)
Dept: PULMONOLOGY | Age: 78
End: 2018-11-26
Payer: MEDICARE

## 2018-11-26 ENCOUNTER — TELEPHONE (OUTPATIENT)
Dept: PULMONOLOGY | Age: 78
End: 2018-11-26

## 2018-11-26 VITALS
SYSTOLIC BLOOD PRESSURE: 125 MMHG | HEART RATE: 77 BPM | HEIGHT: 66 IN | WEIGHT: 177 LBS | BODY MASS INDEX: 28.45 KG/M2 | DIASTOLIC BLOOD PRESSURE: 66 MMHG | OXYGEN SATURATION: 94 %

## 2018-11-26 DIAGNOSIS — J44.9 COPD, SEVERE (HCC): ICD-10-CM

## 2018-11-26 DIAGNOSIS — R05.3 CHRONIC COUGH: ICD-10-CM

## 2018-11-26 DIAGNOSIS — R06.02 SOB (SHORTNESS OF BREATH): Primary | ICD-10-CM

## 2018-11-26 DIAGNOSIS — R91.1 PULMONARY NODULE: ICD-10-CM

## 2018-11-26 PROCEDURE — 1101F PT FALLS ASSESS-DOCD LE1/YR: CPT | Performed by: INTERNAL MEDICINE

## 2018-11-26 PROCEDURE — 1123F ACP DISCUSS/DSCN MKR DOCD: CPT | Performed by: INTERNAL MEDICINE

## 2018-11-26 PROCEDURE — G8926 SPIRO NO PERF OR DOC: HCPCS | Performed by: INTERNAL MEDICINE

## 2018-11-26 PROCEDURE — G8598 ASA/ANTIPLAT THER USED: HCPCS | Performed by: INTERNAL MEDICINE

## 2018-11-26 PROCEDURE — G8417 CALC BMI ABV UP PARAM F/U: HCPCS | Performed by: INTERNAL MEDICINE

## 2018-11-26 PROCEDURE — 4040F PNEUMOC VAC/ADMIN/RCVD: CPT | Performed by: INTERNAL MEDICINE

## 2018-11-26 PROCEDURE — G8399 PT W/DXA RESULTS DOCUMENT: HCPCS | Performed by: INTERNAL MEDICINE

## 2018-11-26 PROCEDURE — G8427 DOCREV CUR MEDS BY ELIG CLIN: HCPCS | Performed by: INTERNAL MEDICINE

## 2018-11-26 PROCEDURE — 3023F SPIROM DOC REV: CPT | Performed by: INTERNAL MEDICINE

## 2018-11-26 PROCEDURE — 1090F PRES/ABSN URINE INCON ASSESS: CPT | Performed by: INTERNAL MEDICINE

## 2018-11-26 PROCEDURE — 3017F COLORECTAL CA SCREEN DOC REV: CPT | Performed by: INTERNAL MEDICINE

## 2018-11-26 PROCEDURE — G8484 FLU IMMUNIZE NO ADMIN: HCPCS | Performed by: INTERNAL MEDICINE

## 2018-11-26 PROCEDURE — 1036F TOBACCO NON-USER: CPT | Performed by: INTERNAL MEDICINE

## 2018-11-26 PROCEDURE — 99214 OFFICE O/P EST MOD 30 MIN: CPT | Performed by: INTERNAL MEDICINE

## 2018-11-30 ENCOUNTER — ANESTHESIA EVENT (OUTPATIENT)
Dept: ENDOSCOPY | Age: 78
End: 2018-11-30
Payer: MEDICARE

## 2018-12-04 ENCOUNTER — ANESTHESIA (OUTPATIENT)
Dept: ENDOSCOPY | Age: 78
End: 2018-12-04
Payer: MEDICARE

## 2018-12-04 ENCOUNTER — HOSPITAL ENCOUNTER (OUTPATIENT)
Age: 78
Setting detail: OUTPATIENT SURGERY
Discharge: HOME | End: 2018-12-04
Attending: INTERNAL MEDICINE | Admitting: INTERNAL MEDICINE
Payer: MEDICARE

## 2018-12-04 VITALS
RESPIRATION RATE: 16 BRPM | SYSTOLIC BLOOD PRESSURE: 121 MMHG | WEIGHT: 174 LBS | DIASTOLIC BLOOD PRESSURE: 59 MMHG | BODY MASS INDEX: 27.97 KG/M2 | OXYGEN SATURATION: 93 % | HEART RATE: 75 BPM | HEIGHT: 66 IN | TEMPERATURE: 97.3 F

## 2018-12-04 VITALS — DIASTOLIC BLOOD PRESSURE: 56 MMHG | SYSTOLIC BLOOD PRESSURE: 118 MMHG | OXYGEN SATURATION: 90 %

## 2018-12-04 LAB
APPEARANCE BAL (LAVAGE): ABNORMAL
APTT: 30.5 SEC (ref 26–36)
BASOPHILS ABSOLUTE: 0.1 K/UL (ref 0–0.2)
BASOPHILS RELATIVE PERCENT: 0.9 %
CLOT EVALUATION BAL: ABNORMAL
COLOR LAVAGE: COLORLESS
EOSINOPHILS ABSOLUTE: 0.4 K/UL (ref 0–0.6)
EOSINOPHILS RELATIVE PERCENT: 4 %
GLUCOSE BLD-MCNC: 164 MG/DL (ref 70–99)
HCT VFR BLD CALC: 30.4 % (ref 36–48)
HEMOGLOBIN: 9.6 G/DL (ref 12–16)
INR BLD: 1.01 (ref 0.86–1.14)
LYMPHOCYTES ABSOLUTE: 1.4 K/UL (ref 1–5.1)
LYMPHOCYTES RELATIVE PERCENT: 14.9 %
LYMPHOCYTES, BAL: 20 % (ref 5–10)
MACROPHAGES, BAL: 68 % (ref 90–95)
MCH RBC QN AUTO: 27 PG (ref 26–34)
MCHC RBC AUTO-ENTMCNC: 31.6 G/DL (ref 31–36)
MCV RBC AUTO: 85.4 FL (ref 80–100)
MONOCYTES ABSOLUTE: 0.7 K/UL (ref 0–1.3)
MONOCYTES RELATIVE PERCENT: 7.8 %
MONOCYTES, BAL: 3 %
NEUTROPHILS ABSOLUTE: 6.8 K/UL (ref 1.7–7.7)
NEUTROPHILS RELATIVE PERCENT: 72.4 %
NUMBER OF CELLS COUNTED BAL (LAVAGE): 200
PATH REVIEW BAL (LAVAGE): NO
PDW BLD-RTO: 15.3 % (ref 12.4–15.4)
PERFORMED ON: ABNORMAL
PLATELET # BLD: 366 K/UL (ref 135–450)
PMV BLD AUTO: 7.4 FL (ref 5–10.5)
PROTHROMBIN TIME: 11.5 SEC (ref 9.8–13)
RBC # BLD: 3.56 M/UL (ref 4–5.2)
RBC, BAL: 1600 /CUMM
SEGMENTED NEUTROPHILS, BAL: 9 % (ref 5–10)
WBC # BLD: 9.4 K/UL (ref 4–11)
WBC/EPI CELLS BAL: 155 /CUMM

## 2018-12-04 PROCEDURE — 87116 MYCOBACTERIA CULTURE: CPT

## 2018-12-04 PROCEDURE — 7100000010 HC PHASE II RECOVERY - FIRST 15 MIN: Performed by: INTERNAL MEDICINE

## 2018-12-04 PROCEDURE — 3700000000 HC ANESTHESIA ATTENDED CARE: Performed by: INTERNAL MEDICINE

## 2018-12-04 PROCEDURE — 3609027000 HC BRONCHOSCOPY: Performed by: INTERNAL MEDICINE

## 2018-12-04 PROCEDURE — 31624 DX BRONCHOSCOPE/LAVAGE: CPT | Performed by: INTERNAL MEDICINE

## 2018-12-04 PROCEDURE — 2500000003 HC RX 250 WO HCPCS: Performed by: INTERNAL MEDICINE

## 2018-12-04 PROCEDURE — 2709999900 HC NON-CHARGEABLE SUPPLY: Performed by: INTERNAL MEDICINE

## 2018-12-04 PROCEDURE — 87015 SPECIMEN INFECT AGNT CONCNTJ: CPT

## 2018-12-04 PROCEDURE — 7100000001 HC PACU RECOVERY - ADDTL 15 MIN: Performed by: INTERNAL MEDICINE

## 2018-12-04 PROCEDURE — 87102 FUNGUS ISOLATION CULTURE: CPT

## 2018-12-04 PROCEDURE — 6360000002 HC RX W HCPCS: Performed by: NURSE ANESTHETIST, CERTIFIED REGISTERED

## 2018-12-04 PROCEDURE — 89051 BODY FLUID CELL COUNT: CPT

## 2018-12-04 PROCEDURE — 6360000002 HC RX W HCPCS: Performed by: ANESTHESIOLOGY

## 2018-12-04 PROCEDURE — 87070 CULTURE OTHR SPECIMN AEROBIC: CPT

## 2018-12-04 PROCEDURE — 6370000000 HC RX 637 (ALT 250 FOR IP): Performed by: ANESTHESIOLOGY

## 2018-12-04 PROCEDURE — 2580000003 HC RX 258: Performed by: FAMILY MEDICINE

## 2018-12-04 PROCEDURE — 87106 FUNGI IDENTIFICATION YEAST: CPT

## 2018-12-04 PROCEDURE — 7100000011 HC PHASE II RECOVERY - ADDTL 15 MIN: Performed by: INTERNAL MEDICINE

## 2018-12-04 PROCEDURE — 87205 SMEAR GRAM STAIN: CPT

## 2018-12-04 PROCEDURE — 87206 SMEAR FLUORESCENT/ACID STAI: CPT

## 2018-12-04 PROCEDURE — 7100000000 HC PACU RECOVERY - FIRST 15 MIN: Performed by: INTERNAL MEDICINE

## 2018-12-04 PROCEDURE — 2500000003 HC RX 250 WO HCPCS: Performed by: NURSE ANESTHETIST, CERTIFIED REGISTERED

## 2018-12-04 PROCEDURE — 85730 THROMBOPLASTIN TIME PARTIAL: CPT

## 2018-12-04 PROCEDURE — 88112 CYTOPATH CELL ENHANCE TECH: CPT

## 2018-12-04 PROCEDURE — 85610 PROTHROMBIN TIME: CPT

## 2018-12-04 PROCEDURE — 85025 COMPLETE CBC W/AUTO DIFF WBC: CPT

## 2018-12-04 PROCEDURE — 88305 TISSUE EXAM BY PATHOLOGIST: CPT

## 2018-12-04 RX ORDER — SODIUM CHLORIDE 0.9 % (FLUSH) 0.9 %
10 SYRINGE (ML) INJECTION EVERY 12 HOURS SCHEDULED
Status: DISCONTINUED | OUTPATIENT
Start: 2018-12-04 | End: 2018-12-04 | Stop reason: HOSPADM

## 2018-12-04 RX ORDER — PROPOFOL 10 MG/ML
INJECTION, EMULSION INTRAVENOUS PRN
Status: DISCONTINUED | OUTPATIENT
Start: 2018-12-04 | End: 2018-12-04 | Stop reason: SDUPTHER

## 2018-12-04 RX ORDER — IPRATROPIUM BROMIDE AND ALBUTEROL SULFATE 2.5; .5 MG/3ML; MG/3ML
1 SOLUTION RESPIRATORY (INHALATION) ONCE
Status: COMPLETED | OUTPATIENT
Start: 2018-12-04 | End: 2018-12-04

## 2018-12-04 RX ORDER — LIDOCAINE HYDROCHLORIDE 20 MG/ML
INJECTION, SOLUTION INFILTRATION; PERINEURAL PRN
Status: DISCONTINUED | OUTPATIENT
Start: 2018-12-04 | End: 2018-12-04 | Stop reason: SDUPTHER

## 2018-12-04 RX ORDER — SODIUM CHLORIDE 9 MG/ML
INJECTION, SOLUTION INTRAVENOUS CONTINUOUS
Status: DISCONTINUED | OUTPATIENT
Start: 2018-12-04 | End: 2018-12-04 | Stop reason: HOSPADM

## 2018-12-04 RX ORDER — LIDOCAINE HYDROCHLORIDE 40 MG/ML
INJECTION, SOLUTION RETROBULBAR; TOPICAL PRN
Status: DISCONTINUED | OUTPATIENT
Start: 2018-12-04 | End: 2018-12-04 | Stop reason: HOSPADM

## 2018-12-04 RX ORDER — FERROUS SULFATE 325(65) MG
325 TABLET ORAL
COMMUNITY
End: 2018-12-18 | Stop reason: DRUGHIGH

## 2018-12-04 RX ORDER — ONDANSETRON 2 MG/ML
4 INJECTION INTRAMUSCULAR; INTRAVENOUS ONCE
Status: COMPLETED | OUTPATIENT
Start: 2018-12-04 | End: 2018-12-04

## 2018-12-04 RX ORDER — SODIUM CHLORIDE 0.9 % (FLUSH) 0.9 %
10 SYRINGE (ML) INJECTION PRN
Status: DISCONTINUED | OUTPATIENT
Start: 2018-12-04 | End: 2018-12-04 | Stop reason: HOSPADM

## 2018-12-04 RX ORDER — EZETIMIBE 10 MG/1
10 TABLET ORAL DAILY
COMMUNITY
End: 2018-12-13

## 2018-12-04 RX ORDER — FENTANYL CITRATE 50 UG/ML
INJECTION, SOLUTION INTRAMUSCULAR; INTRAVENOUS PRN
Status: DISCONTINUED | OUTPATIENT
Start: 2018-12-04 | End: 2018-12-04 | Stop reason: SDUPTHER

## 2018-12-04 RX ADMIN — PROPOFOL 50 MG: 10 INJECTION, EMULSION INTRAVENOUS at 07:36

## 2018-12-04 RX ADMIN — ONDANSETRON 4 MG: 2 INJECTION INTRAMUSCULAR; INTRAVENOUS at 07:07

## 2018-12-04 RX ADMIN — IPRATROPIUM BROMIDE AND ALBUTEROL SULFATE 1 AMPULE: .5; 3 SOLUTION RESPIRATORY (INHALATION) at 07:07

## 2018-12-04 RX ADMIN — PROPOFOL 30 MG: 10 INJECTION, EMULSION INTRAVENOUS at 07:40

## 2018-12-04 RX ADMIN — SODIUM CHLORIDE: 9 INJECTION, SOLUTION INTRAVENOUS at 07:01

## 2018-12-04 RX ADMIN — LIDOCAINE HYDROCHLORIDE 100 MG: 20 INJECTION, SOLUTION INFILTRATION; PERINEURAL at 07:36

## 2018-12-04 RX ADMIN — FENTANYL CITRATE 25 MCG: 50 INJECTION, SOLUTION INTRAMUSCULAR; INTRAVENOUS at 07:36

## 2018-12-04 RX ADMIN — PROPOFOL 20 MG: 10 INJECTION, EMULSION INTRAVENOUS at 07:37

## 2018-12-04 ASSESSMENT — PULMONARY FUNCTION TESTS
PIF_VALUE: 11
PIF_VALUE: 1
PIF_VALUE: 1
PIF_VALUE: 15
PIF_VALUE: 1
PIF_VALUE: 1
PIF_VALUE: 0

## 2018-12-04 ASSESSMENT — PAIN - FUNCTIONAL ASSESSMENT: PAIN_FUNCTIONAL_ASSESSMENT: 0-10

## 2018-12-04 ASSESSMENT — COPD QUESTIONNAIRES: CAT_SEVERITY: MODERATE

## 2018-12-04 ASSESSMENT — LIFESTYLE VARIABLES: SMOKING_STATUS: 0

## 2018-12-04 ASSESSMENT — ENCOUNTER SYMPTOMS: SHORTNESS OF BREATH: 1

## 2018-12-04 NOTE — ANESTHESIA PRE PROCEDURE
Smoking status: Former Smoker     Quit date: 10/28/2001    Smokeless tobacco: Never Used    Alcohol use No                                Counseling given: Not Answered      Vital Signs (Current): There were no vitals filed for this visit. BP Readings from Last 3 Encounters:   11/26/18 125/66   09/07/18 (!) 103/59   07/23/18 119/63       NPO Status:                                                                                 BMI:   Wt Readings from Last 3 Encounters:   11/26/18 177 lb (80.3 kg)   09/07/18 176 lb (79.8 kg)   07/23/18 176 lb (79.8 kg)     There is no height or weight on file to calculate BMI.    CBC:   Lab Results   Component Value Date    WBC 11.4 05/01/2015    RBC 3.94 05/01/2015    HGB 11.8 05/01/2015    HCT 36.7 05/01/2015    MCV 93.0 05/01/2015    RDW 13.9 05/01/2015     05/01/2015       CMP:   Lab Results   Component Value Date     05/19/2016    K 3.6 05/19/2016    CL 98 05/19/2016    CO2 27 05/19/2016    BUN 15 05/19/2016    CREATININE 0.6 05/19/2016    GFRAA >60 05/19/2016    GFRAA >60 10/29/2012    AGRATIO 1.3 05/19/2016    LABGLOM >60 05/19/2016    GLUCOSE 138 05/19/2016    PROT 7.6 05/19/2016    CALCIUM 9.5 05/19/2016    BILITOT <0.2 05/19/2016    ALKPHOS 90 05/19/2016    AST 17 05/19/2016    ALT 17 05/19/2016       POC Tests: No results for input(s): POCGLU, POCNA, POCK, POCCL, POCBUN, POCHEMO, POCHCT in the last 72 hours. Coags: No results found for: PROTIME, INR, APTT    HCG (If Applicable): No results found for: PREGTESTUR, PREGSERUM, HCG, HCGQUANT     ABGs: No results found for: PHART, PO2ART, XRK6JRF, HCU6FFY, BEART, T8KSUABT     Type & Screen (If Applicable):  No results found for: LABABO, [address]    Anesthesia Evaluation  Patient summary reviewed and Nursing notes reviewed   history of anesthetic complications: PONV.   Airway: Mallampati: III  TM distance: >3 FB   Neck ROM: full  Mouth opening: > = 3 FB Dental:    (+) upper

## 2018-12-04 NOTE — PROGRESS NOTES
Patient present for bronchoscopy. Patient complaints of feeling dizzy this morning and very nauseous   with headache. Vital signs stable, patient has history of COPD and AFib. Dr. Kami Singh at bedside to speak with patient. Teaching / education initiated regarding perioperative experience, expectations, and pain management during stay. Patient verbalized understanding.

## 2018-12-04 NOTE — OP NOTE
Bronchoscopy Procedure Note    Date of Operation: 12/4/18  Pre-op Diagnosis: RML infiltrate  Post-op Diagnosis: same  Surgeon: Naldo Stewart  Anesthesia: Monitored Local Anesthesia with Sedation  Estimate Blood Loss: Minimal   Complications: None    Indications and History:  The patient is 78 y.o. female with RML infiltrate concerning for atypical infection. The risks, benefits, complications, treatment options and expected outcomes were discussed with the patient. The possibilities of reaction to medication, pulmonary aspiration, perforation of a viscus, bleeding, failure to diagnose a condition and creating a complication requiring transfusion or operation were discussed with the patient who freely signed the consent. Description of Procedure: The patient was taken to endoscopy suite, identified as Carmen Raymundo and the procedure verified as flexible fiberoptic bronchoscopy. A time out was held and the above information confirmed. After the induction of topical nasopharyngeal anesthesia, the patient was placed in appropriate position and the bronchoscope was passed through the OP. The vocal cords were visualized and total of 3 ml of 2% Lidocaine was topically placed onto the cords. The cords were normal. The scope was then passed into the trachea. Lidocaine 2% 3 ml was used topically on the madhu. Careful inspection of the tracheal lumen was accomplished. The scope was sequentially passed into the left main and then left upper and lower bronchi and segmental bronchi. The scope was then withdrawn and advanced into the right main bronchus and then into the RUL, RML, and RLL bronchi and segmental bronchi.      Endobronchial findings:   Trachea: Normal mucosa   Madhu: Normal mucosa   Right main bronchus: Normal mucosa   Right upper lobe bronchus: Normal mucosa   Right middle lobe bronchus: Normal mucosa   Right lower lobe bronchus: Normal mucosa   Left main bronchus: Normal mucosa   Left upper lobe

## 2018-12-04 NOTE — ANESTHESIA POSTPROCEDURE EVALUATION
Department of Anesthesiology  Postprocedure Note    Patient: Rajiv Willingham  MRN: [id number]  YOB: 1940  Date of evaluation: 12/4/2018  Time:  8:02 AM     Procedure Summary     Date:  12/04/18 Room / Location:  [facility] ENDO 05 / [facility] ENDOSCOPY    Anesthesia Start:  0730 Anesthesia Stop:  0992    Procedure:  BRONCHOSCOPY WITH LAVAGE (N/A ) Diagnosis:  (.)    Surgeon:  Rosalia Dover MD Responsible Provider:  Graham Busby MD    Anesthesia Type:  MAC ASA Status:  3          Anesthesia Type: MAC    Davon Phase I: Davon Score: 9    Davon Phase II:      Last vitals: Reviewed and per EMR flowsheets.        Anesthesia Post Evaluation    Patient location during evaluation: PACU  Patient participation: complete - patient participated  Level of consciousness: awake and alert  Airway patency: patent  Nausea & Vomiting: no vomiting and no nausea  Complications: no  Cardiovascular status: hemodynamically stable  Respiratory status: acceptable  Hydration status: stable

## 2018-12-06 LAB
CULTURE, RESPIRATORY: NORMAL
CULTURE, RESPIRATORY: NORMAL
GRAM STAIN RESULT: NORMAL
GRAM STAIN RESULT: NORMAL

## 2018-12-10 ENCOUNTER — TELEPHONE (OUTPATIENT)
Dept: PULMONOLOGY | Age: 78
End: 2018-12-10

## 2018-12-13 ENCOUNTER — OFFICE VISIT (OUTPATIENT)
Dept: CARDIOLOGY CLINIC | Age: 78
End: 2018-12-13
Payer: MEDICARE

## 2018-12-13 VITALS
DIASTOLIC BLOOD PRESSURE: 70 MMHG | SYSTOLIC BLOOD PRESSURE: 130 MMHG | BODY MASS INDEX: 27.82 KG/M2 | HEIGHT: 66 IN | WEIGHT: 173.1 LBS | HEART RATE: 76 BPM

## 2018-12-13 DIAGNOSIS — I10 ESSENTIAL HYPERTENSION, BENIGN: ICD-10-CM

## 2018-12-13 DIAGNOSIS — I08.0 MITRAL AND AORTIC VALVE DISEASE: ICD-10-CM

## 2018-12-13 DIAGNOSIS — E78.5 DYSLIPIDEMIA: ICD-10-CM

## 2018-12-13 DIAGNOSIS — I48.0 AF (PAROXYSMAL ATRIAL FIBRILLATION) (HCC): ICD-10-CM

## 2018-12-13 DIAGNOSIS — I25.10 ATHEROSCLEROSIS OF NATIVE CORONARY ARTERY OF NATIVE HEART WITHOUT ANGINA PECTORIS: Primary | ICD-10-CM

## 2018-12-13 PROCEDURE — 1123F ACP DISCUSS/DSCN MKR DOCD: CPT | Performed by: INTERNAL MEDICINE

## 2018-12-13 PROCEDURE — G8598 ASA/ANTIPLAT THER USED: HCPCS | Performed by: INTERNAL MEDICINE

## 2018-12-13 PROCEDURE — 1090F PRES/ABSN URINE INCON ASSESS: CPT | Performed by: INTERNAL MEDICINE

## 2018-12-13 PROCEDURE — G8417 CALC BMI ABV UP PARAM F/U: HCPCS | Performed by: INTERNAL MEDICINE

## 2018-12-13 PROCEDURE — 1101F PT FALLS ASSESS-DOCD LE1/YR: CPT | Performed by: INTERNAL MEDICINE

## 2018-12-13 PROCEDURE — 4040F PNEUMOC VAC/ADMIN/RCVD: CPT | Performed by: INTERNAL MEDICINE

## 2018-12-13 PROCEDURE — G8484 FLU IMMUNIZE NO ADMIN: HCPCS | Performed by: INTERNAL MEDICINE

## 2018-12-13 PROCEDURE — G8399 PT W/DXA RESULTS DOCUMENT: HCPCS | Performed by: INTERNAL MEDICINE

## 2018-12-13 PROCEDURE — 99214 OFFICE O/P EST MOD 30 MIN: CPT | Performed by: INTERNAL MEDICINE

## 2018-12-13 PROCEDURE — G8427 DOCREV CUR MEDS BY ELIG CLIN: HCPCS | Performed by: INTERNAL MEDICINE

## 2018-12-13 PROCEDURE — 1036F TOBACCO NON-USER: CPT | Performed by: INTERNAL MEDICINE

## 2018-12-13 RX ORDER — FLECAINIDE ACETATE 100 MG/1
100 TABLET ORAL 2 TIMES DAILY
Qty: 60 TABLET | Refills: 5 | Status: SHIPPED | OUTPATIENT
Start: 2018-12-13 | End: 2019-04-26 | Stop reason: SDUPTHER

## 2018-12-13 RX ORDER — CARVEDILOL 25 MG/1
25 TABLET ORAL 2 TIMES DAILY
Qty: 180 TABLET | Refills: 3 | Status: ON HOLD
Start: 2018-12-13 | End: 2020-06-09 | Stop reason: HOSPADM

## 2018-12-13 RX ORDER — HYDROCHLOROTHIAZIDE 25 MG/1
12.5 TABLET ORAL DAILY
Qty: 90 TABLET | Refills: 3 | Status: CANCELLED | OUTPATIENT
Start: 2018-12-13

## 2018-12-13 RX ORDER — EZETIMIBE 10 MG/1
10 TABLET ORAL DAILY
Status: ON HOLD | COMMUNITY
End: 2019-08-04 | Stop reason: ALTCHOICE

## 2018-12-13 NOTE — PROGRESS NOTES
and mother. Home Medications:  Prior to Admission medications    Medication Sig Start Date End Date Taking? Authorizing Provider   Fluticasone-Umeclidin-Vilant (TRELEGY ELLIPTA IN) Inhale 1 puff into the lungs daily   Yes Historical Provider, MD   Benzonatate (TESSALON PERLES PO) Take 100 mg by mouth 3 times daily   Yes Historical Provider, MD   ferrous sulfate 325 (65 Fe) MG tablet Take 324 mg by mouth daily (with breakfast)   Yes Historical Provider, MD   Mirabegron ER 25 MG TB24 Take by mouth daily   Yes Historical Provider, MD   umeclidinium-vilanterol (ANORO ELLIPTA) 62.5-25 MCG/INH AEPB inhaler Inhale 1 puff into the lungs daily 7/23/18  Yes Harmeet Brito MD   carvedilol (COREG) 25 MG tablet Take 1 tablet by mouth 2 times daily 6/12/18  Yes Deysi Seay MD   hydrochlorothiazide (HYDRODIURIL) 25 MG tablet Take 0.5 tablets by mouth daily  Patient taking differently: Take 25 mg by mouth daily  11/16/17  Yes Deysi Seay MD   losartan (COZAAR) 100 MG tablet Take 0.5 tablets by mouth daily 5/16/17  Yes Shaina Wyman APRN - CNP   gabapentin (NEURONTIN) 100 MG capsule Take 100 mg by mouth 2 tabs qam,1tab afternoon,2 tabs qpm   Yes Historical Provider, MD   amLODIPine (NORVASC) 10 MG tablet Take 10 mg by mouth daily    Yes Historical Provider, MD   b complex vitamins capsule Take 1 capsule by mouth daily   Yes Historical Provider, MD   Cinnamon 500 MG CAPS Take  by mouth. Yes Historical Provider, MD   acetaminophen 650 MG TABS Take 650 mg by mouth every 4 hours as needed for Pain (For mild pain level 1-3 or for fever > 100.5). 10/29/12  Yes Ren Epstein MD   calcium carbonate (OSCAL) 500 MG TABS tablet Take 500 mg by mouth daily. Yes Historical Provider, MD   Ascorbic Acid (VITAMIN C) 500 MG tablet Take 500 mg by mouth daily. Yes Historical Provider, MD   citalopram (CELEXA) 40 MG tablet Take 40 mg by mouth daily.      Yes Historical Provider, MD   aspirin 81 MG tablet Take 81 mg by dullness  Cardiovascular:  · The apical impulses not displaced  · Heart tones are crisp and normal  · Cervical veins are not engorged  · The carotid upstroke is normal in amplitude and contour without delay or bruit  · Regular rate and rhythm. S1S2 without murmur, gallop, or rub  · Peripheral pulses are symmetrical and full  · There is no clubbing, cyanosis of the extremities. · No edema  · Femoral Arteries: 2+ and equal  · Pedal Pulses: 2+ and equal   Abdomen:  · No masses or tenderness  · Liver/Spleen: No Abnormalities Noted  Neurological/Psychiatric:  · Alert and oriented in all spheres  · Moves all extremities well  · Exhibits normal gait balance and coordination  · No abnormalities of mood, affect, memory, mentation, or behavior are noted      8/16/17  Echo  -Normal left ventricle size and systolic function with an estimated ejection   fraction of 55-60%.  -No regional wall motion abnormalities are seen.   -Mild concentric left ventricular hypertrophy is present.   -There is reversal of E/A inflow velocities across the mitral valve.   -Diastolic filling parameters suggests grade I diastolic dysfunction . E/e'=   13.65 .   -Mild to moderate mitral regurgitation is present.   -Mitral annular calcification is present.   -Aortic valve appears sclerotic but opens adequately. Mild aortic regurgitation is present.   -There is trivial tricuspid regurgitation with RVSP estimated at 28 mmHg. Assessment/Plan:    1. Atherosclerosis of native coronary artery of native heart without angina pectoris    2. AF (paroxysmal atrial fibrillation) ([address]    3. Essential hypertension, benign    4. Dyslipidemia    5. Mitral and aortic valve disease        1. Coronary atherosclerosis of native coronary artery:  2006  LHC > RCA and circ stent patent, EF 45%,  10/31/16: Myoview GXT - normal perfusion, EF 49%.       Intolerant to ace-i due to cough; is on cozaar, BB  No exertional chest pain, but has chronic shortness of

## 2018-12-18 ENCOUNTER — ANESTHESIA EVENT (OUTPATIENT)
Dept: OPERATING ROOM | Age: 78
DRG: 417 | End: 2018-12-18
Payer: MEDICARE

## 2018-12-18 ENCOUNTER — APPOINTMENT (OUTPATIENT)
Dept: CT IMAGING | Age: 78
DRG: 417 | End: 2018-12-18
Payer: MEDICARE

## 2018-12-18 ENCOUNTER — APPOINTMENT (OUTPATIENT)
Dept: GENERAL RADIOLOGY | Age: 78
DRG: 417 | End: 2018-12-18
Payer: MEDICARE

## 2018-12-18 ENCOUNTER — APPOINTMENT (OUTPATIENT)
Dept: ULTRASOUND IMAGING | Age: 78
DRG: 417 | End: 2018-12-18
Payer: MEDICARE

## 2018-12-18 ENCOUNTER — HOSPITAL ENCOUNTER (INPATIENT)
Age: 78
LOS: 8 days | Discharge: HOME | DRG: 417 | End: 2018-12-26
Attending: EMERGENCY MEDICINE | Admitting: INTERNAL MEDICINE
Payer: MEDICARE

## 2018-12-18 DIAGNOSIS — K81.0 ACUTE CHOLECYSTITIS: ICD-10-CM

## 2018-12-18 DIAGNOSIS — R07.9 CHEST PAIN, UNSPECIFIED TYPE: ICD-10-CM

## 2018-12-18 DIAGNOSIS — I48.91 ATRIAL FIBRILLATION WITH RAPID VENTRICULAR RESPONSE (HCC): ICD-10-CM

## 2018-12-18 DIAGNOSIS — R91.8 HILAR MASS: ICD-10-CM

## 2018-12-18 DIAGNOSIS — K85.90 ACUTE PANCREATITIS, UNSPECIFIED COMPLICATION STATUS, UNSPECIFIED PANCREATITIS TYPE: ICD-10-CM

## 2018-12-18 DIAGNOSIS — M54.50 ACUTE RIGHT-SIDED LOW BACK PAIN WITHOUT SCIATICA: Primary | ICD-10-CM

## 2018-12-18 PROBLEM — K80.00 GALLSTONES AND INFLAMMATION OF GALLBLADDER WITHOUT OBSTRUCTION: Status: ACTIVE | Noted: 2018-12-18

## 2018-12-18 LAB
A/G RATIO: 1.1 (ref 1.1–2.2)
ALBUMIN SERPL-MCNC: 4.1 G/DL (ref 3.4–5)
ALP BLD-CCNC: 107 U/L (ref 40–129)
ALT SERPL-CCNC: 12 U/L (ref 10–40)
ANION GAP SERPL CALCULATED.3IONS-SCNC: 15 MMOL/L (ref 3–16)
AST SERPL-CCNC: 10 U/L (ref 15–37)
BASOPHILS ABSOLUTE: 0.1 K/UL (ref 0–0.2)
BASOPHILS RELATIVE PERCENT: 0.9 %
BILIRUB SERPL-MCNC: 0.3 MG/DL (ref 0–1)
BILIRUBIN URINE: NEGATIVE
BLOOD, URINE: NEGATIVE
BUN BLDV-MCNC: 13 MG/DL (ref 7–20)
CALCIUM SERPL-MCNC: 9.4 MG/DL (ref 8.3–10.6)
CHLORIDE BLD-SCNC: 95 MMOL/L (ref 99–110)
CLARITY: CLEAR
CO2: 24 MMOL/L (ref 21–32)
COLOR: YELLOW
CREAT SERPL-MCNC: 0.6 MG/DL (ref 0.6–1.2)
EKG ATRIAL RATE: 197 BPM
EKG DIAGNOSIS: NORMAL
EKG Q-T INTERVAL: 284 MS
EKG QRS DURATION: 90 MS
EKG QTC CALCULATION (BAZETT): 412 MS
EKG R AXIS: -2 DEGREES
EKG T AXIS: 37 DEGREES
EKG VENTRICULAR RATE: 127 BPM
EOSINOPHILS ABSOLUTE: 0.5 K/UL (ref 0–0.6)
EOSINOPHILS RELATIVE PERCENT: 3.3 %
GFR AFRICAN AMERICAN: >60
GFR NON-AFRICAN AMERICAN: >60
GLOBULIN: 3.8 G/DL
GLUCOSE BLD-MCNC: 167 MG/DL (ref 70–99)
GLUCOSE BLD-MCNC: 172 MG/DL (ref 70–99)
GLUCOSE BLD-MCNC: 175 MG/DL (ref 70–99)
GLUCOSE BLD-MCNC: 234 MG/DL (ref 70–99)
GLUCOSE URINE: 100 MG/DL
HCT VFR BLD CALC: 35 % (ref 36–48)
HEMOGLOBIN: 11 G/DL (ref 12–16)
INR BLD: 1.02 (ref 0.86–1.14)
KETONES, URINE: NEGATIVE MG/DL
LACTIC ACID, SEPSIS: 1.6 MMOL/L (ref 0.4–1.9)
LEUKOCYTE ESTERASE, URINE: NEGATIVE
LIPASE: 157 U/L (ref 13–60)
LYMPHOCYTES ABSOLUTE: 2 K/UL (ref 1–5.1)
LYMPHOCYTES RELATIVE PERCENT: 14.1 %
MCH RBC QN AUTO: 26.6 PG (ref 26–34)
MCHC RBC AUTO-ENTMCNC: 31.4 G/DL (ref 31–36)
MCV RBC AUTO: 84.7 FL (ref 80–100)
MICROSCOPIC EXAMINATION: ABNORMAL
MONOCYTES ABSOLUTE: 0.9 K/UL (ref 0–1.3)
MONOCYTES RELATIVE PERCENT: 6.4 %
NEUTROPHILS ABSOLUTE: 10.5 K/UL (ref 1.7–7.7)
NEUTROPHILS RELATIVE PERCENT: 75.3 %
NITRITE, URINE: NEGATIVE
PDW BLD-RTO: 15.2 % (ref 12.4–15.4)
PERFORMED ON: ABNORMAL
PH UA: 6.5
PLATELET # BLD: 519 K/UL (ref 135–450)
PMV BLD AUTO: 7.6 FL (ref 5–10.5)
POTASSIUM REFLEX MAGNESIUM: 3.9 MMOL/L (ref 3.5–5.1)
PROTEIN UA: NEGATIVE MG/DL
PROTHROMBIN TIME: 11.6 SEC (ref 9.8–13)
RBC # BLD: 4.13 M/UL (ref 4–5.2)
SODIUM BLD-SCNC: 134 MMOL/L (ref 136–145)
SPECIFIC GRAVITY UA: >1.03
TOTAL PROTEIN: 7.9 G/DL (ref 6.4–8.2)
TROPONIN: <0.01 NG/ML
URINE REFLEX TO CULTURE: ABNORMAL
URINE TYPE: ABNORMAL
UROBILINOGEN, URINE: 0.2 E.U./DL
WBC # BLD: 14 K/UL (ref 4–11)

## 2018-12-18 PROCEDURE — 96361 HYDRATE IV INFUSION ADD-ON: CPT

## 2018-12-18 PROCEDURE — 71045 X-RAY EXAM CHEST 1 VIEW: CPT

## 2018-12-18 PROCEDURE — 93010 ELECTROCARDIOGRAM REPORT: CPT | Performed by: INTERNAL MEDICINE

## 2018-12-18 PROCEDURE — 96375 TX/PRO/DX INJ NEW DRUG ADDON: CPT

## 2018-12-18 PROCEDURE — 71260 CT THORAX DX C+: CPT

## 2018-12-18 PROCEDURE — 6370000000 HC RX 637 (ALT 250 FOR IP): Performed by: INTERNAL MEDICINE

## 2018-12-18 PROCEDURE — APPNB30 APP NON BILLABLE TIME 0-30 MINS: Performed by: NURSE PRACTITIONER

## 2018-12-18 PROCEDURE — 2580000003 HC RX 258: Performed by: PHYSICIAN ASSISTANT

## 2018-12-18 PROCEDURE — 6360000002 HC RX W HCPCS: Performed by: INTERNAL MEDICINE

## 2018-12-18 PROCEDURE — 76705 ECHO EXAM OF ABDOMEN: CPT

## 2018-12-18 PROCEDURE — 6360000002 HC RX W HCPCS: Performed by: PHYSICIAN ASSISTANT

## 2018-12-18 PROCEDURE — 83690 ASSAY OF LIPASE: CPT

## 2018-12-18 PROCEDURE — 96374 THER/PROPH/DIAG INJ IV PUSH: CPT

## 2018-12-18 PROCEDURE — 83605 ASSAY OF LACTIC ACID: CPT

## 2018-12-18 PROCEDURE — 80053 COMPREHEN METABOLIC PANEL: CPT

## 2018-12-18 PROCEDURE — 93005 ELECTROCARDIOGRAM TRACING: CPT | Performed by: EMERGENCY MEDICINE

## 2018-12-18 PROCEDURE — 84484 ASSAY OF TROPONIN QUANT: CPT

## 2018-12-18 PROCEDURE — 2580000003 HC RX 258: Performed by: INTERNAL MEDICINE

## 2018-12-18 PROCEDURE — 87040 BLOOD CULTURE FOR BACTERIA: CPT

## 2018-12-18 PROCEDURE — 6360000004 HC RX CONTRAST MEDICATION: Performed by: EMERGENCY MEDICINE

## 2018-12-18 PROCEDURE — 99222 1ST HOSP IP/OBS MODERATE 55: CPT | Performed by: SURGERY

## 2018-12-18 PROCEDURE — 85025 COMPLETE CBC W/AUTO DIFF WBC: CPT

## 2018-12-18 PROCEDURE — 2580000003 HC RX 258: Performed by: EMERGENCY MEDICINE

## 2018-12-18 PROCEDURE — 74177 CT ABD & PELVIS W/CONTRAST: CPT

## 2018-12-18 PROCEDURE — 81003 URINALYSIS AUTO W/O SCOPE: CPT

## 2018-12-18 PROCEDURE — 6360000002 HC RX W HCPCS: Performed by: EMERGENCY MEDICINE

## 2018-12-18 PROCEDURE — 2700000000 HC OXYGEN THERAPY PER DAY

## 2018-12-18 PROCEDURE — 1200000000 HC SEMI PRIVATE

## 2018-12-18 PROCEDURE — 99285 EMERGENCY DEPT VISIT HI MDM: CPT

## 2018-12-18 PROCEDURE — 85610 PROTHROMBIN TIME: CPT

## 2018-12-18 RX ORDER — ONDANSETRON 2 MG/ML
4 INJECTION INTRAMUSCULAR; INTRAVENOUS EVERY 6 HOURS PRN
Status: DISCONTINUED | OUTPATIENT
Start: 2018-12-18 | End: 2018-12-26 | Stop reason: HOSPADM

## 2018-12-18 RX ORDER — FLECAINIDE ACETATE 50 MG/1
100 TABLET ORAL 2 TIMES DAILY
Status: DISCONTINUED | OUTPATIENT
Start: 2018-12-18 | End: 2018-12-26 | Stop reason: HOSPADM

## 2018-12-18 RX ORDER — CARVEDILOL 25 MG/1
25 TABLET ORAL 2 TIMES DAILY WITH MEALS
Status: DISCONTINUED | OUTPATIENT
Start: 2018-12-18 | End: 2018-12-26 | Stop reason: HOSPADM

## 2018-12-18 RX ORDER — ASCORBIC ACID 500 MG
500 TABLET ORAL DAILY
Status: DISCONTINUED | OUTPATIENT
Start: 2018-12-18 | End: 2018-12-26 | Stop reason: HOSPADM

## 2018-12-18 RX ORDER — DEXTROSE MONOHYDRATE 25 G/50ML
12.5 INJECTION, SOLUTION INTRAVENOUS PRN
Status: DISCONTINUED | OUTPATIENT
Start: 2018-12-18 | End: 2018-12-26 | Stop reason: HOSPADM

## 2018-12-18 RX ORDER — AMLODIPINE BESYLATE 5 MG/1
10 TABLET ORAL DAILY
Status: DISCONTINUED | OUTPATIENT
Start: 2018-12-18 | End: 2018-12-26 | Stop reason: HOSPADM

## 2018-12-18 RX ORDER — FAMOTIDINE 20 MG/1
20 TABLET, FILM COATED ORAL 2 TIMES DAILY
Status: DISCONTINUED | OUTPATIENT
Start: 2018-12-18 | End: 2018-12-26 | Stop reason: HOSPADM

## 2018-12-18 RX ORDER — FERROUS GLUCONATE 324(37.5)
324 TABLET ORAL
Status: DISCONTINUED | OUTPATIENT
Start: 2018-12-19 | End: 2018-12-23

## 2018-12-18 RX ORDER — MORPHINE SULFATE 2 MG/ML
2 INJECTION, SOLUTION INTRAMUSCULAR; INTRAVENOUS ONCE
Status: COMPLETED | OUTPATIENT
Start: 2018-12-18 | End: 2018-12-18

## 2018-12-18 RX ORDER — ASPIRIN 81 MG/1
81 TABLET ORAL DAILY
Status: DISCONTINUED | OUTPATIENT
Start: 2018-12-18 | End: 2018-12-18

## 2018-12-18 RX ORDER — DEXTROSE MONOHYDRATE 50 MG/ML
100 INJECTION, SOLUTION INTRAVENOUS PRN
Status: DISCONTINUED | OUTPATIENT
Start: 2018-12-18 | End: 2018-12-26 | Stop reason: HOSPADM

## 2018-12-18 RX ORDER — ONDANSETRON 2 MG/ML
4 INJECTION INTRAMUSCULAR; INTRAVENOUS ONCE
Status: COMPLETED | OUTPATIENT
Start: 2018-12-18 | End: 2018-12-18

## 2018-12-18 RX ORDER — SODIUM CHLORIDE 0.9 % (FLUSH) 0.9 %
10 SYRINGE (ML) INJECTION EVERY 12 HOURS SCHEDULED
Status: DISCONTINUED | OUTPATIENT
Start: 2018-12-18 | End: 2018-12-26 | Stop reason: HOSPADM

## 2018-12-18 RX ORDER — MORPHINE SULFATE 4 MG/ML
4 INJECTION, SOLUTION INTRAMUSCULAR; INTRAVENOUS EVERY 30 MIN PRN
Status: DISCONTINUED | OUTPATIENT
Start: 2018-12-18 | End: 2018-12-19 | Stop reason: HOSPADM

## 2018-12-18 RX ORDER — ACETAMINOPHEN 325 MG/1
650 TABLET ORAL EVERY 4 HOURS PRN
Status: DISCONTINUED | OUTPATIENT
Start: 2018-12-18 | End: 2018-12-26 | Stop reason: HOSPADM

## 2018-12-18 RX ORDER — ONDANSETRON 2 MG/ML
4 INJECTION INTRAMUSCULAR; INTRAVENOUS EVERY 30 MIN PRN
Status: DISCONTINUED | OUTPATIENT
Start: 2018-12-18 | End: 2018-12-26 | Stop reason: HOSPADM

## 2018-12-18 RX ORDER — HYDROCHLOROTHIAZIDE 25 MG/1
25 TABLET ORAL DAILY
Status: DISCONTINUED | OUTPATIENT
Start: 2018-12-18 | End: 2018-12-18

## 2018-12-18 RX ORDER — CITALOPRAM 20 MG/1
20 TABLET ORAL DAILY
Status: DISCONTINUED | OUTPATIENT
Start: 2018-12-19 | End: 2018-12-26 | Stop reason: HOSPADM

## 2018-12-18 RX ORDER — EZETIMIBE 10 MG/1
10 TABLET ORAL NIGHTLY
Status: DISCONTINUED | OUTPATIENT
Start: 2018-12-18 | End: 2018-12-18 | Stop reason: RX

## 2018-12-18 RX ORDER — SODIUM CHLORIDE 0.9 % (FLUSH) 0.9 %
10 SYRINGE (ML) INJECTION PRN
Status: DISCONTINUED | OUTPATIENT
Start: 2018-12-18 | End: 2018-12-26 | Stop reason: HOSPADM

## 2018-12-18 RX ORDER — BENZONATATE 100 MG/1
100 CAPSULE ORAL 3 TIMES DAILY PRN
Status: ON HOLD | COMMUNITY
End: 2018-12-23 | Stop reason: HOSPADM

## 2018-12-18 RX ORDER — DOXYCYCLINE HYCLATE 50 MG/1
324 CAPSULE, GELATIN COATED ORAL
Status: ON HOLD | COMMUNITY
End: 2018-12-23 | Stop reason: HOSPADM

## 2018-12-18 RX ORDER — HYDROCHLOROTHIAZIDE 25 MG/1
25 TABLET ORAL DAILY
Status: ON HOLD | COMMUNITY
End: 2018-12-23

## 2018-12-18 RX ORDER — NICOTINE POLACRILEX 4 MG
15 LOZENGE BUCCAL PRN
Status: DISCONTINUED | OUTPATIENT
Start: 2018-12-18 | End: 2018-12-26 | Stop reason: HOSPADM

## 2018-12-18 RX ORDER — LOSARTAN POTASSIUM 25 MG/1
50 TABLET ORAL DAILY
Status: DISCONTINUED | OUTPATIENT
Start: 2018-12-18 | End: 2018-12-26 | Stop reason: HOSPADM

## 2018-12-18 RX ORDER — CITALOPRAM 20 MG/1
40 TABLET ORAL DAILY
Status: DISCONTINUED | OUTPATIENT
Start: 2018-12-18 | End: 2018-12-18

## 2018-12-18 RX ORDER — 0.9 % SODIUM CHLORIDE 0.9 %
30 INTRAVENOUS SOLUTION INTRAVENOUS ONCE
Status: COMPLETED | OUTPATIENT
Start: 2018-12-18 | End: 2018-12-18

## 2018-12-18 RX ADMIN — CITALOPRAM HYDROBROMIDE 40 MG: 20 TABLET ORAL at 16:16

## 2018-12-18 RX ADMIN — INSULIN LISPRO 2 UNITS: 100 INJECTION, SOLUTION INTRAVENOUS; SUBCUTANEOUS at 18:24

## 2018-12-18 RX ADMIN — MORPHINE SULFATE 2 MG: 2 INJECTION, SOLUTION INTRAMUSCULAR; INTRAVENOUS at 06:02

## 2018-12-18 RX ADMIN — SODIUM CHLORIDE 1710 ML: 9 INJECTION, SOLUTION INTRAVENOUS at 05:59

## 2018-12-18 RX ADMIN — IOPAMIDOL 75 ML: 755 INJECTION, SOLUTION INTRAVENOUS at 06:44

## 2018-12-18 RX ADMIN — MORPHINE SULFATE 4 MG: 4 INJECTION INTRAVENOUS at 08:58

## 2018-12-18 RX ADMIN — Medication 10 ML: at 22:40

## 2018-12-18 RX ADMIN — ENOXAPARIN SODIUM 40 MG: 40 INJECTION SUBCUTANEOUS at 22:37

## 2018-12-18 RX ADMIN — MORPHINE SULFATE 2 MG: 2 INJECTION, SOLUTION INTRAMUSCULAR; INTRAVENOUS at 06:30

## 2018-12-18 RX ADMIN — AMLODIPINE BESYLATE 10 MG: 5 TABLET ORAL at 16:16

## 2018-12-18 RX ADMIN — HYDROCHLOROTHIAZIDE 25 MG: 25 TABLET ORAL at 16:17

## 2018-12-18 RX ADMIN — OXYCODONE HYDROCHLORIDE AND ACETAMINOPHEN 500 MG: 500 TABLET ORAL at 16:16

## 2018-12-18 RX ADMIN — CARVEDILOL 25 MG: 25 TABLET, FILM COATED ORAL at 16:17

## 2018-12-18 RX ADMIN — MEROPENEM 1 G: 1 INJECTION, POWDER, FOR SOLUTION INTRAVENOUS at 07:47

## 2018-12-18 RX ADMIN — ONDANSETRON 4 MG: 2 INJECTION INTRAMUSCULAR; INTRAVENOUS at 06:02

## 2018-12-18 RX ADMIN — MORPHINE SULFATE 4 MG: 4 INJECTION INTRAVENOUS at 12:08

## 2018-12-18 RX ADMIN — INSULIN LISPRO 1 UNITS: 100 INJECTION, SOLUTION INTRAVENOUS; SUBCUTANEOUS at 22:40

## 2018-12-18 RX ADMIN — POTASSIUM CHLORIDE: 2 INJECTION, SOLUTION, CONCENTRATE INTRAVENOUS at 16:17

## 2018-12-18 RX ADMIN — ONDANSETRON 4 MG: 2 INJECTION INTRAMUSCULAR; INTRAVENOUS at 16:16

## 2018-12-18 RX ADMIN — ONDANSETRON 4 MG: 2 INJECTION INTRAMUSCULAR; INTRAVENOUS at 08:57

## 2018-12-18 RX ADMIN — FLECAINIDE ACETATE 100 MG: 50 TABLET ORAL at 22:57

## 2018-12-18 RX ADMIN — LOSARTAN POTASSIUM 50 MG: 25 TABLET ORAL at 16:16

## 2018-12-18 ASSESSMENT — PAIN SCALES - GENERAL
PAINLEVEL_OUTOF10: 3
PAINLEVEL_OUTOF10: 3
PAINLEVEL_OUTOF10: 8
PAINLEVEL_OUTOF10: 8
PAINLEVEL_OUTOF10: 5
PAINLEVEL_OUTOF10: 8

## 2018-12-18 ASSESSMENT — ENCOUNTER SYMPTOMS
ABDOMINAL PAIN: 1
SORE THROAT: 0
DIARRHEA: 0
CONSTIPATION: 0
SHORTNESS OF BREATH: 0
WHEEZING: 0
SHORTNESS OF BREATH: 1
NAUSEA: 0
COUGH: 0
BACK PAIN: 0

## 2018-12-18 ASSESSMENT — PAIN DESCRIPTION - PAIN TYPE: TYPE: ACUTE PAIN

## 2018-12-18 ASSESSMENT — HEART SCORE: ECG: 1

## 2018-12-18 ASSESSMENT — PAIN SCALES - WONG BAKER: WONGBAKER_NUMERICALRESPONSE: 0

## 2018-12-18 NOTE — ED PROVIDER NOTES
[address] [facility]  e[facility]ENCY dEPARTMENT eNCOUnter        Pt Name: Milena Levin  MRN: [id number]  [place] 1940  Date of evaluation: 12/18/2018  Provider: CLOVIS Montesinos  PCP: Taran Lindsey MD    This patient was seen and evaluated by the attending physician Nora Posada       Chief Complaint   Patient presents with    Abdominal Pain     pt brought to ed by [place] [facility] for abd pain pt states has had N/V x2 wks started with pain in right flank than radiates up under right breast rates pain 8/10        HISTORY OF PRESENT ILLNESS   (Location/Symptom, Timing/Onset, Context/Setting, Quality, Duration, Modifying Factors, Severity)  Note limiting factors. Milena Levin is a 70 y.o. female who presents here to the emergency department, she states that she's been nauseated for the past 2 weeks, and today she had several episodes of vomiting right before she came to the emergency department. Over the last several days she's had pain to her right upper quadrant radiating around to her breast and into her back. Nothing seems to make her feel better or worse, she has had food aversion. She does state that she's had a little bit of some pressure-like symptoms to her chest.  Also she has a history of atrial fibrillation, and she's having a few runs of atrial fibrillation. Nothing seems to make her feel completely better or worse. She has a history of coronary artery disease, but states this feels very different. She rates pain level is 8/10. Nursing Notes were all reviewed and agreed with or any disagreements were addressed  in the HPI. REVIEW OF SYSTEMS    (2-9 systems for level 4, 10 or more for level 5)     Review of Systems    Positives and Pertinent negatives as per HPI. Except as noted abovein the ROS, all other systems were reviewed and negative.        PAST MEDICAL HISTORY     Past Medical History:   Diagnosis Date    ED Triage Vitals [12/18/18 0538]   BP Temp Temp Source Pulse Resp SpO2 Height Weight   (!) 146/71 98.2 °F (36.8 °C) Oral 132 20 95 % 5' 5\" (1.651 m) 172 lb (78 kg)       Physical Exam   Constitutional: She is oriented to person, place, and time. She appears well-developed and well-nourished. HENT:   Head: Normocephalic and atraumatic. Right Ear: External ear normal.   Left Ear: External ear normal.   Nose: Nose normal.   Eyes: Right eye exhibits no discharge. Left eye exhibits no discharge. Neck: Normal range of motion. Neck supple. Cardiovascular: Normal heart sounds and intact distal pulses. An irregularly irregular rhythm present. Tachycardia present. Exam reveals no gallop and no friction rub. No murmur heard. Pulmonary/Chest: Effort normal and breath sounds normal. No stridor. No respiratory distress. She has no wheezes. She has no rales. She exhibits no tenderness. Abdominal: Normal appearance. She exhibits no shifting dullness, no fluid wave, no abdominal bruit and no mass. Bowel sounds are increased. There is tenderness in the right upper quadrant. There is guarding and positive Real's sign. Musculoskeletal: Normal range of motion. Neurological: She is alert and oriented to person, place, and time. Skin: Skin is warm and dry. She is not diaphoretic. No pallor. Psychiatric: She has a normal mood and affect. Her behavior is normal.   Nursing note and vitals reviewed.       DIAGNOSTIC RESULTS   LABS:    Labs Reviewed   CBC WITH AUTO DIFFERENTIAL - Abnormal; Notable for the following:        Result Value    WBC 14.0 (*)     Hemoglobin 11.0 (*)     Hematocrit 35.0 (*)     Platelets 473 (*)     Neutrophils # 10.5 (*)     All other components within normal limits    Narrative:     Performed at:  [facility] [address], [place], [address]   Phone [phone]   COMPREHENSIVE METABOLIC PANEL W/ REFLEX TO MG FOR LOW K - Abnormal; Notable for the following:     Sodium 134 (*)     Chloride 95 (*)     Glucose 234 (*)     AST 10 (*)     All other components within normal limits    Narrative:     Performed at:  [facility] [address], [facility], [address]   Phone [phone]   LIPASE - Abnormal; Notable for the following:     Lipase 157.0 (*)     All other components within normal limits    Narrative:     Performed at:  [facility] [address], [facility], [address]   Phone [phone]   URINE RT REFLEX TO CULTURE - Abnormal; Notable for the following:     Glucose, Ur 100 (*)     All other components within normal limits    Narrative:     Performed at:  [facility] [address], [facility], [facility] [address]   Phone [phone]   CULTURE BLOOD #1   CULTURE BLOOD #2   TROPONIN    Narrative:     Performed at:  [facility] [address], [facility], [facility] [address]   Phone [phone]   PROTIME-INR    Narrative:     Performed at:  [facility] [address], [facility], [facility] [address]   Phone [phone]   LACTATE, SEPSIS    Narrative:     Performed at:  [facility] [address], [facility], [facility] [address] [employer]   Phone [phone]   LACTATE, SEPSIS       All other labs were within normal range or not returned as of this dictation. EKG: All EKG's are interpreted by the Emergency Department Physician who either signs orCo-signs this chart in the absence of a cardiologist.  Please see their note for interpretation of EKG. RADIOLOGY:   Non-plain film images such as CT, Ultrasound and MRI are read by the radiologist. Plain radiographic images are visualized andpreliminarily interpreted by the  ED Provider with the below findings:        Interpretation perthe Radiologist below, if available at the time of this note:    [address]   Final Result   1. Cholelithiasis with pericholecystic fluid and the positive sonographic   Real's sign. This is concerning for acute cholecystitis. 2. Gallbladder polyps. 3. Simple cyst at the midpole of the right kidney measuring 1.4 cm. 4. Common bile duct normal in caliber measuring 6 mm. CT CHEST PULMONARY EMBOLISM W CONTRAST   Final Result   Left hilar mass, with adjacent adenopathy in the mediastinum, suspicious for   central lung carcinoma until proven otherwise. There is postobstructive   change in the lingula. No central pulmonary embolus identified      Moderate inflammatory change surrounding the gallbladder suggesting early   cholecystitis         CT ABDOMEN PELVIS W IV CONTRAST Additional Contrast? None   Final Result   Left hilar mass, with adjacent adenopathy in the mediastinum, suspicious for   central lung carcinoma until proven otherwise. There is postobstructive   change in the lingula. No central pulmonary embolus identified      Moderate inflammatory change surrounding the gallbladder suggesting early   cholecystitis         XR CHEST PORTABLE   Final Result   Linear opacities at the lung bases, increased compared to prior, favored to   represent atelectasis or scarring rather than pneumonia given the bandlike   morphology      Mild cardiomegaly           Ct Abdomen Pelvis W Iv Contrast Additional Contrast? None    Result Date: 12/18/2018  EXAMINATION: CT OF THE ABDOMEN AND PELVIS WITH CONTRAST; CTA OF THE CHEST 12/18/2018 6:54 am TECHNIQUE: CT of the abdomen and pelvis was performed with the administration of intravenous contrast. Multiplanar reformatted images are provided for review. Dose modulation, iterative reconstruction, and/or weight based adjustment of the mA/kV was utilized to reduce the radiation dose to as low as reasonably achievable.; CTA of the chest was performed after the administration of intravenous contrast.  Multiplanar reformatted images are provided for review. marginating the esophagus measuring 2.4 x 1.2 cm. Left hilar mass mildly narrows the left upper lobe, and lingular airways. There is postobstructive collapse of the lingula. Spurring is seen in the spine. Remote left-sided rib fractures are seen Focal fatty nodule is seen in the musculature of the left axillary region, measuring 2.7 x 4.7 cm. Abdomen and pelvis: Bandlike opacity seen in the lingula and right middle lobe. There is subtle mosaic attenuation in the right lower lobe, suggesting small airways disease or air trapping. Coronary artery calcification is seen. No perisplenic fluid. Right adrenal gland is normal.  Left adrenal gland is normal.  Low attenuation seen throughout the liver, compatible with fatty infiltration. Circumscribed hypodense nodule seen in the right hepatic lobe measuring 10 mm, likely cyst.  There is moderate injection of the fat surrounding the gallbladder No peripancreatic inflammatory change There is lobular contour to left kidney. No hydro nephrosis. Rim calcified cyst projects superiorly off the left kidney measuring 1.5 cm There is lobular contour the right kidney. No hydronephrosis. Scattered hypodense nodule seen the right kidney measuring 1.4 cm in size or less No peripancreatic inflammatory change GI/Bowel: Scattered colonic diverticula are seen. Appendix is identified and normal in caliber. Pelvis: No free fluid in pelvis. No pelvic adenopathy Peritoneum/Retroperitoneum: Atherosclerotic change seen in the aorta. Moderate atherosclerotic changes seen in the SMA, similar to prior. Bones/Soft Tissues: Degenerative changes are seen in the spine and hips     Left hilar mass, with adjacent adenopathy in the mediastinum, suspicious for central lung carcinoma until proven otherwise. There is postobstructive change in the lingula.  No central pulmonary embolus identified Moderate inflammatory change surrounding the gallbladder suggesting early cholecystitis         PROCEDURES

## 2018-12-18 NOTE — H&P
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

## 2018-12-18 NOTE — ED PROVIDER NOTES
I personally evaluated and examined the patient in conjunction with the APC and agree with the assessment, treatment plan and disposition of the patient has recorded by the APC. I reviewed pertinent nurse's notes, triage notes, vital signs, past medical history, family and social history, medications, and allergies. Complete review of systems was conducted by the mid-level provider and/or myself. Review of systems is negative except as documented in the history of present illness. EKG: EKG interpretation by ED physician: Normal axis, atrial fibrillation with rapid ventricular response at 127 bpm with PVCs present. No obvious ischemic changes appreciated. 72-year-old female presents to the emergency Department chief complaint of right flank pain radiating around to the right abdomen. Earlier she reported it was radiating up to the center of the chest.  Described as sharp, constant abdominal/flank pain without alleviating or aggravating factors. No hematuria or dysuria. No fever. No history of similar. History of previous MI. No history of DVT, PE, calf pain or leg swelling or recent travel. She has had 2 periods MI since her previous cardiac stents remotely. She does have a history of paroxysmal atrial fibrillation. General: Patient is in no acute distress   Head: Normocephalic, atraumatic, pupils are equal and reactive to light. EOMI. Neck: Neck is supple. No JVD noted. Heart: Irregularly irregular rhythm with tachycardia. no murmurs, rubs, or gallops   Lungs: CTA BL   Abdomen: soft, non-tender, non-distended   Extremities: no lower extremity edema. Capillary refill is less than 2 seconds   Skin: no cyanosis or pallor; no rashes noted   Neuro: CN's 2-12 are grossly intact. No focal neurologic deficit appreciated. Sepsis panel including cardiac workup and abdominal labs, 30/kg of IV fluids, CT chest done and pelvis was ordered.   Gallbladder ultrasound was also ordered because of the lipase was up. CRITICAL CARE TIME: 35 minutes excluding billable procedure time: Aggressive fluid resuscitation in a patient with possible sepsis, complex MDM, multiple re-evaluations, potential for deterioration. Workup is currently pending at the time of this note. Advanced practitioner to follow-up on the results and final disposition per the advanced practitioner. Dr. Thiago Rutherford was notified of the pt if she has to intervene. FINAL IMPRESSION     1. Acute right-sided low back pain without sciatica    2. Acute pancreatitis, unspecified complication status, unspecified pancreatitis type    3. Chest pain, unspecified type    4. Atrial fibrillation with rapid ventricular response [facility])            Electronically signed by:   Bonnie Ordoñez,   12/18/18 1172

## 2018-12-18 NOTE — CONSULTS
Onset    Cancer Sister     Diabetes Sister     Diabetes Brother     Heart Disease Father     Heart Disease Mother     Cancer Maternal Uncle        REVIEW OF SYSTEMS:  CONSTITUTIONAL:  positive for  fatigue and malaise  HEENT:  negative  RESPIRATORY:  negative  CARDIOVASCULAR:  negative  GASTROINTESTINAL:  negative except for nausea, vomiting and abdominal pain  GENITOURINARY:  negative  HEMATOLOGIC/LYMPHATIC:  negative  NEUROLOGICAL:  negative    PHYSICAL EXAM:  VITALS:  BP (!) 130/46   Pulse 76   Temp 98.2 °F (36.8 °C) (Oral)   Resp 20   Ht 5' 5\" (1.651 m)   Wt 172 lb (78 kg)   SpO2 95%   BMI 28.62 kg/m²     CONSTITUTIONAL:  alert, mild distress and normal weight  EYES:  sclera clear  ENT:  normocepalic, without obvious abnormality  NECK:  supple, symmetrical, trachea midline  LUNGS:  clear to auscultation  CARDIOVASCULAR:  irregularly irregular rhythm  ABDOMEN:  no scars, hypoactive bowel sounds, soft, non-distended, tenderness noted in the right upper quadrant Real's sign is absent, voluntary guarding absent, no masses palpated, no hepatosplenomegally and hernia absent  MUSCULOSKELETAL:  0+ pitting edema lower extremities  NEUROLOGIC:  Mental Status Exam:  Level of Alertness:   awake  Orientation:   person, place, time  SKIN:  no bruising or bleeding, normal skin color, texture, turgor and no jaundice    DATA:    CBC:   Recent Labs      12/18/18   0548   WBC  14.0*   HGB  11.0*   HCT  35.0*   PLT  519*     BMP:    Recent Labs      12/18/18   0548   NA  134*   K  3.9   CL  95*   CO2  24   BUN  13   CREATININE  0.6   GLUCOSE  234*     Hepatic:   Recent Labs      12/18/18   0548   AST  10*   ALT  12   BILITOT  0.3   ALKPHOS  107     Mag:    No results for input(s): MG in the last 72 hours. Phos:   No results for input(s): PHOS in the last 72 hours.    INR:   Recent Labs      12/18/18   0548   INR  1.02     EXAMINATION:   RIGHT UPPER QUADRANT ULTRASOUND       12/18/2018 7:09 am       COMPARISON:   CT abdomen pelvis from 12/18/2018, gallbladder ultrasound from 04/04/2018       HISTORY:   ORDERING SYSTEM PROVIDED HISTORY: RUQ abdominal pain   TECHNOLOGIST PROVIDED HISTORY:   Ordering Physician Provided Reason for Exam: ruq pain f/u ct   Acuity: Acute   Type of Exam: Subsequent/Follow-up       53-year-old female with right upper quadrant abdominal pain; follow-up CT       FINDINGS:   LIVER:  The liver demonstrates normal echogenicity without evidence of   intrahepatic biliary ductal dilatation. BILIARY SYSTEM:  Gallstones are seen the gallbladder. There is   pericholecystic fluid. Gallbladder wall thickness measures 6 mm. Sonographic Real sign is positive. The hyperechoic foci without posterior   acoustic shadowing along the wall of the gallbladder consistent with   gallbladder polyps. Common bile duct is within normal limits measuring 6 mm. RIGHT KIDNEY: Right kidney measures 11.7 x 5.7 x 5.7 cm. Right renal   cortical thickness measures 1.9 cm. No gross right-sided hydronephrosis. Color flow projects over the right renal parenchyma. Anechoic lesion with   posterior through transmission at the mid pole of the right kidney measures   1.3 x 1.4 x 1.2 cm. PANCREAS:  Pancreas is suboptimally visualized. OTHER: No evidence of right upper quadrant ascites. Impression   1. Cholelithiasis with pericholecystic fluid and the positive sonographic   Real's sign. This is concerning for acute cholecystitis. 2. Gallbladder polyps. 3. Simple cyst at the midpole of the right kidney measuring 1.4 cm. 4. Common bile duct normal in caliber measuring 6 mm. EXAMINATION:   CT OF THE ABDOMEN AND PELVIS WITH CONTRAST; CTA OF THE CHEST 12/18/2018 6:54   am       TECHNIQUE:   CT of the abdomen and pelvis was performed with the administration of   intravenous contrast. Multiplanar reformatted images are provided for review.    Dose modulation, iterative

## 2018-12-18 NOTE — ED NOTES
Dr. Garrick Mohan and Edelmira Camacho., PA in room with patient. Family member x 1 at the bedside.      Edy Warren RN  12/18/18 [id number]

## 2018-12-18 NOTE — ANESTHESIA PRE PROCEDURE
Procedure Laterality Date    BACK SURGERY  2000, 2001    lumbar laminectomy    BRONCHOSCOPY  12/04/2018    CORONARY ANGIOPLASTY WITH STENT PLACEMENT      [place] [address] INCL FLUOR GDNCE DX W/CELL WASHG SPX N/A 12/4/2018    BRONCHOSCOPY WITH LAVAGE performed by Maeve Carey MD at [address]       Social History:    Social History   Substance Use Topics    Smoking status: Former Smoker     Quit date: 10/28/2001    Smokeless tobacco: Never Used    Alcohol use No                                Counseling given: Not Answered      Vital Signs (Current):   Vitals:    12/18/18 1130 12/18/18 1145 12/18/18 1200 12/18/18 1212   BP: (!) 136/50 (!) 135/54 134/61    Pulse: 79 71 75    Resp: 22 19 18    Temp:    97.8 °F (36.6 °C)   TempSrc:    Oral   SpO2: 98% 96%     Weight:       Height:                                                  BP Readings from Last 3 Encounters:   12/18/18 134/61   12/13/18 130/70   12/04/18 (!) 118/56       NPO Status:                                                                                 BMI:   Wt Readings from Last 3 Encounters:   12/18/18 172 lb (78 kg)   12/13/18 173 lb 1.6 oz (78.5 kg)   12/04/18 174 lb (78.9 kg)     Body mass index is 28.62 kg/m².     CBC:   Lab Results   Component Value Date    WBC 14.0 12/18/2018    RBC 4.13 12/18/2018    HGB 11.0 12/18/2018    HCT 35.0 12/18/2018    MCV 84.7 12/18/2018    RDW 15.2 12/18/2018     12/18/2018       CMP:   Lab Results   Component Value Date     12/18/2018    K 3.9 12/18/2018    CL 95 12/18/2018    CO2 24 12/18/2018    BUN 13 12/18/2018    CREATININE 0.6 12/18/2018    GFRAA >60 12/18/2018    GFRAA >60 10/29/2012    AGRATIO 1.1 12/18/2018    LABGLOM >60 12/18/2018    GLUCOSE 234 12/18/2018    PROT 7.9 12/18/2018    CALCIUM 9.4 12/18/2018    BILITOT 0.3 12/18/2018    ALKPHOS 107 12/18/2018    AST 10 12/18/2018    ALT 12 12/18/2018       POC Tests: No results for input(s): POCGLU, POCNA, POCK, POCCL, POCBUN, comment: Admitted w A Fib RVR. Rate Conrolled. Neuro/Psych:   (+) neuromuscular disease:,             GI/Hepatic/Renal:   (+) GERD:,           Endo/Other:    (+) Diabetes, . Abdominal:           Vascular:                                      Anesthesia Plan      general     ASA 3       Induction: intravenous and rapid sequence. Anesthetic plan and risks discussed with patient. Plan discussed with CRNA.           MEDICATIONS:  Current Facility-Administered Medications   Medication Dose Route Frequency Provider Last Rate Last Dose    sodium chloride 0.9 % infusion             ipratropium-albuterol (DUONEB) 0.5-2.5 (3) MG/3ML nebulizer solution             ipratropium-albuterol (DUONEB) nebulizer solution 1 ampule  1 ampule Inhalation Q4H WA Brittany Orlando MD        morphine (PF) injection 4 mg  4 mg Intravenous Q30 Min PRN Laney Wiggins   4 mg at 12/18/18 1208    ondansetron (ZOFRAN) injection 4 mg  4 mg Intravenous Q30 Min PRN Laney Wiggins   4 mg at 12/18/18 0857    acetaminophen (TYLENOL) tablet 650 mg  650 mg Oral Q4H PRN Kaci Vargas MD        carvedilol (COREG) tablet 25 mg  25 mg Oral BID WC Kaci Vargas MD   25 mg at 12/19/18 0914    flecainide (TAMBOCOR) tablet 100 mg  100 mg Oral BID Kaci Vargas MD   100 mg at 12/19/18 0914    amLODIPine (NORVASC) tablet 10 mg  10 mg Oral Daily Kaci Vargas MD   10 mg at 12/19/18 0914    vitamin C (ASCORBIC ACID) tablet 500 mg  500 mg Oral Daily Kaci Vargas MD   500 mg at 12/19/18 0914    [START ON 12/21/2018] metFORMIN (GLUCOPHAGE) tablet 500 mg  500 mg Oral Daily with breakfast Kaci Vargas MD        losartan (COZAAR) tablet 50 mg  50 mg Oral Daily Kaci Vargas MD   50 mg at 12/19/18 0915    ferrous gluconate 324 (37.5 Fe) MG tablet 324 mg  324 mg Oral Daily with breakfast Kaci Vargas MD        Fluticasone-Umeclidin-Vilant 100-62.5-25 MCG/INH AEPB 1 puff PATIENT SUPPLIED  1

## 2018-12-18 NOTE — ED NOTES
Patient placed on 2 L O2 due to sats dropping to 87% after morphine      Steve Figueroa RN  12/18/18 9938

## 2018-12-18 NOTE — PROGRESS NOTES
Admission assessment complete. VSS. Patient resting in bed. Respirations even and easy. Call light in reach. No needs expressed at this time. Will continue to monitor.

## 2018-12-19 ENCOUNTER — APPOINTMENT (OUTPATIENT)
Dept: GENERAL RADIOLOGY | Age: 78
DRG: 417 | End: 2018-12-19
Payer: MEDICARE

## 2018-12-19 ENCOUNTER — ANESTHESIA (OUTPATIENT)
Dept: OPERATING ROOM | Age: 78
DRG: 417 | End: 2018-12-19
Payer: MEDICARE

## 2018-12-19 VITALS
RESPIRATION RATE: 16 BRPM | DIASTOLIC BLOOD PRESSURE: 56 MMHG | SYSTOLIC BLOOD PRESSURE: 104 MMHG | TEMPERATURE: 96.8 F | OXYGEN SATURATION: 100 %

## 2018-12-19 LAB
A/G RATIO: 1 (ref 1.1–2.2)
ABO/RH: NORMAL
ALBUMIN SERPL-MCNC: 3.4 G/DL (ref 3.4–5)
ALP BLD-CCNC: 181 U/L (ref 40–129)
ALT SERPL-CCNC: 282 U/L (ref 10–40)
ANION GAP SERPL CALCULATED.3IONS-SCNC: 9 MMOL/L (ref 3–16)
ANTIBODY SCREEN: NORMAL
AST SERPL-CCNC: 367 U/L (ref 15–37)
BASOPHILS ABSOLUTE: 0.1 K/UL (ref 0–0.2)
BASOPHILS RELATIVE PERCENT: 1 %
BILIRUB SERPL-MCNC: 2.1 MG/DL (ref 0–1)
BUN BLDV-MCNC: 18 MG/DL (ref 7–20)
CALCIUM SERPL-MCNC: 8.7 MG/DL (ref 8.3–10.6)
CHLORIDE BLD-SCNC: 104 MMOL/L (ref 99–110)
CO2: 27 MMOL/L (ref 21–32)
CREAT SERPL-MCNC: 0.6 MG/DL (ref 0.6–1.2)
EOSINOPHILS ABSOLUTE: 0.3 K/UL (ref 0–0.6)
EOSINOPHILS RELATIVE PERCENT: 3.8 %
GFR AFRICAN AMERICAN: >60
GFR NON-AFRICAN AMERICAN: >60
GLOBULIN: 3.4 G/DL
GLUCOSE BLD-MCNC: 137 MG/DL (ref 70–99)
GLUCOSE BLD-MCNC: 142 MG/DL (ref 70–99)
GLUCOSE BLD-MCNC: 147 MG/DL (ref 70–99)
GLUCOSE BLD-MCNC: 156 MG/DL (ref 70–99)
GLUCOSE BLD-MCNC: 159 MG/DL (ref 70–99)
GLUCOSE BLD-MCNC: 167 MG/DL (ref 70–99)
HAV IGM SER IA-ACNC: NORMAL
HCT VFR BLD CALC: 28.1 % (ref 36–48)
HCT VFR BLD CALC: 28.3 % (ref 36–48)
HEMOGLOBIN: 8.7 G/DL (ref 12–16)
HEMOGLOBIN: 8.8 G/DL (ref 12–16)
HEPATITIS B CORE IGM ANTIBODY: NORMAL
HEPATITIS B SURFACE ANTIGEN INTERPRETATION: NORMAL
HEPATITIS C ANTIBODY INTERPRETATION: NORMAL
LYMPHOCYTES ABSOLUTE: 1.7 K/UL (ref 1–5.1)
LYMPHOCYTES RELATIVE PERCENT: 23.4 %
MCH RBC QN AUTO: 26.6 PG (ref 26–34)
MCHC RBC AUTO-ENTMCNC: 31 G/DL (ref 31–36)
MCV RBC AUTO: 85.6 FL (ref 80–100)
MONOCYTES ABSOLUTE: 0.6 K/UL (ref 0–1.3)
MONOCYTES RELATIVE PERCENT: 8.3 %
NEUTROPHILS ABSOLUTE: 4.7 K/UL (ref 1.7–7.7)
NEUTROPHILS RELATIVE PERCENT: 63.5 %
PDW BLD-RTO: 15.6 % (ref 12.4–15.4)
PERFORMED ON: ABNORMAL
PLATELET # BLD: 392 K/UL (ref 135–450)
PMV BLD AUTO: 7.4 FL (ref 5–10.5)
POTASSIUM REFLEX MAGNESIUM: 3.9 MMOL/L (ref 3.5–5.1)
RBC # BLD: 3.28 M/UL (ref 4–5.2)
SODIUM BLD-SCNC: 140 MMOL/L (ref 136–145)
TOTAL PROTEIN: 6.8 G/DL (ref 6.4–8.2)
WBC # BLD: 7.4 K/UL (ref 4–11)

## 2018-12-19 PROCEDURE — 2500000003 HC RX 250 WO HCPCS: Performed by: SURGERY

## 2018-12-19 PROCEDURE — 6360000002 HC RX W HCPCS: Performed by: NURSE ANESTHETIST, CERTIFIED REGISTERED

## 2018-12-19 PROCEDURE — APPSS15 APP SPLIT SHARED TIME 0-15 MINUTES: Performed by: NURSE PRACTITIONER

## 2018-12-19 PROCEDURE — 94760 N-INVAS EAR/PLS OXIMETRY 1: CPT

## 2018-12-19 PROCEDURE — 74300 X-RAY BILE DUCTS/PANCREAS: CPT

## 2018-12-19 PROCEDURE — 6360000004 HC RX CONTRAST MEDICATION: Performed by: SURGERY

## 2018-12-19 PROCEDURE — 6370000000 HC RX 637 (ALT 250 FOR IP): Performed by: INTERNAL MEDICINE

## 2018-12-19 PROCEDURE — APPNB30 APP NON BILLABLE TIME 0-30 MINS: Performed by: NURSE PRACTITIONER

## 2018-12-19 PROCEDURE — 94640 AIRWAY INHALATION TREATMENT: CPT

## 2018-12-19 PROCEDURE — 99232 SBSQ HOSP IP/OBS MODERATE 35: CPT | Performed by: SURGERY

## 2018-12-19 PROCEDURE — 6360000002 HC RX W HCPCS: Performed by: ANESTHESIOLOGY

## 2018-12-19 PROCEDURE — 80074 ACUTE HEPATITIS PANEL: CPT

## 2018-12-19 PROCEDURE — 2580000003 HC RX 258: Performed by: SURGERY

## 2018-12-19 PROCEDURE — BF131ZZ FLUOROSCOPY OF GALLBLADDER AND BILE DUCTS USING LOW OSMOLAR CONTRAST: ICD-10-PCS | Performed by: SURGERY

## 2018-12-19 PROCEDURE — 2580000003 HC RX 258: Performed by: INTERNAL MEDICINE

## 2018-12-19 PROCEDURE — 7100000000 HC PACU RECOVERY - FIRST 15 MIN: Performed by: SURGERY

## 2018-12-19 PROCEDURE — 85025 COMPLETE CBC W/AUTO DIFF WBC: CPT

## 2018-12-19 PROCEDURE — 6360000002 HC RX W HCPCS: Performed by: SURGERY

## 2018-12-19 PROCEDURE — 7100000001 HC PACU RECOVERY - ADDTL 15 MIN: Performed by: SURGERY

## 2018-12-19 PROCEDURE — 2700000000 HC OXYGEN THERAPY PER DAY

## 2018-12-19 PROCEDURE — 6360000002 HC RX W HCPCS: Performed by: INTERNAL MEDICINE

## 2018-12-19 PROCEDURE — 36415 COLL VENOUS BLD VENIPUNCTURE: CPT

## 2018-12-19 PROCEDURE — 2500000003 HC RX 250 WO HCPCS: Performed by: NURSE ANESTHETIST, CERTIFIED REGISTERED

## 2018-12-19 PROCEDURE — 3600000014 HC SURGERY LEVEL 4 ADDTL 15MIN: Performed by: SURGERY

## 2018-12-19 PROCEDURE — 2580000003 HC RX 258: Performed by: NURSE ANESTHETIST, CERTIFIED REGISTERED

## 2018-12-19 PROCEDURE — 3700000001 HC ADD 15 MINUTES (ANESTHESIA): Performed by: SURGERY

## 2018-12-19 PROCEDURE — 2720000010 HC SURG SUPPLY STERILE: Performed by: SURGERY

## 2018-12-19 PROCEDURE — 0FT44ZZ RESECTION OF GALLBLADDER, PERCUTANEOUS ENDOSCOPIC APPROACH: ICD-10-PCS | Performed by: SURGERY

## 2018-12-19 PROCEDURE — 6370000000 HC RX 637 (ALT 250 FOR IP): Performed by: ANESTHESIOLOGY

## 2018-12-19 PROCEDURE — 47563 LAPARO CHOLECYSTECTOMY/GRAPH: CPT | Performed by: SURGERY

## 2018-12-19 PROCEDURE — 99223 1ST HOSP IP/OBS HIGH 75: CPT | Performed by: INTERNAL MEDICINE

## 2018-12-19 PROCEDURE — 85018 HEMOGLOBIN: CPT

## 2018-12-19 PROCEDURE — 2709999900 HC NON-CHARGEABLE SUPPLY: Performed by: SURGERY

## 2018-12-19 PROCEDURE — 86900 BLOOD TYPING SEROLOGIC ABO: CPT

## 2018-12-19 PROCEDURE — 88304 TISSUE EXAM BY PATHOLOGIST: CPT

## 2018-12-19 PROCEDURE — 85014 HEMATOCRIT: CPT

## 2018-12-19 PROCEDURE — 1200000000 HC SEMI PRIVATE

## 2018-12-19 PROCEDURE — 94664 DEMO&/EVAL PT USE INHALER: CPT

## 2018-12-19 PROCEDURE — 86850 RBC ANTIBODY SCREEN: CPT

## 2018-12-19 PROCEDURE — 86901 BLOOD TYPING SEROLOGIC RH(D): CPT

## 2018-12-19 PROCEDURE — 80053 COMPREHEN METABOLIC PANEL: CPT

## 2018-12-19 PROCEDURE — 2580000003 HC RX 258

## 2018-12-19 PROCEDURE — 3700000000 HC ANESTHESIA ATTENDED CARE: Performed by: SURGERY

## 2018-12-19 PROCEDURE — 3600000004 HC SURGERY LEVEL 4 BASE: Performed by: SURGERY

## 2018-12-19 RX ORDER — ONDANSETRON 2 MG/ML
4 INJECTION INTRAMUSCULAR; INTRAVENOUS
Status: COMPLETED | OUTPATIENT
Start: 2018-12-19 | End: 2018-12-19

## 2018-12-19 RX ORDER — IPRATROPIUM BROMIDE AND ALBUTEROL SULFATE 2.5; .5 MG/3ML; MG/3ML
1 SOLUTION RESPIRATORY (INHALATION)
Status: DISCONTINUED | OUTPATIENT
Start: 2018-12-19 | End: 2018-12-19

## 2018-12-19 RX ORDER — IPRATROPIUM BROMIDE AND ALBUTEROL SULFATE 2.5; .5 MG/3ML; MG/3ML
1 SOLUTION RESPIRATORY (INHALATION) 2 TIMES DAILY
Status: DISCONTINUED | OUTPATIENT
Start: 2018-12-20 | End: 2018-12-21

## 2018-12-19 RX ORDER — SODIUM CHLORIDE 9 MG/ML
INJECTION, SOLUTION INTRAVENOUS CONTINUOUS
Status: DISCONTINUED | OUTPATIENT
Start: 2018-12-19 | End: 2018-12-19

## 2018-12-19 RX ORDER — SODIUM CHLORIDE 0.9 % (FLUSH) 0.9 %
10 SYRINGE (ML) INJECTION PRN
Status: DISCONTINUED | OUTPATIENT
Start: 2018-12-19 | End: 2018-12-19 | Stop reason: HOSPADM

## 2018-12-19 RX ORDER — LIDOCAINE HYDROCHLORIDE 20 MG/ML
INJECTION, SOLUTION INFILTRATION; PERINEURAL PRN
Status: DISCONTINUED | OUTPATIENT
Start: 2018-12-19 | End: 2018-12-19 | Stop reason: SDUPTHER

## 2018-12-19 RX ORDER — HYDROMORPHONE HCL 110MG/55ML
0.5 PATIENT CONTROLLED ANALGESIA SYRINGE INTRAVENOUS EVERY 5 MIN PRN
Status: DISCONTINUED | OUTPATIENT
Start: 2018-12-19 | End: 2018-12-19 | Stop reason: HOSPADM

## 2018-12-19 RX ORDER — HYDROCODONE BITARTRATE AND ACETAMINOPHEN 5; 325 MG/1; MG/1
2 TABLET ORAL EVERY 4 HOURS PRN
Status: DISCONTINUED | OUTPATIENT
Start: 2018-12-19 | End: 2018-12-21

## 2018-12-19 RX ORDER — FENTANYL CITRATE 50 UG/ML
25 INJECTION, SOLUTION INTRAMUSCULAR; INTRAVENOUS EVERY 5 MIN PRN
Status: DISCONTINUED | OUTPATIENT
Start: 2018-12-19 | End: 2018-12-19 | Stop reason: HOSPADM

## 2018-12-19 RX ORDER — HYDROCODONE BITARTRATE AND ACETAMINOPHEN 5; 325 MG/1; MG/1
1 TABLET ORAL EVERY 4 HOURS PRN
Status: DISCONTINUED | OUTPATIENT
Start: 2018-12-19 | End: 2018-12-21

## 2018-12-19 RX ORDER — HYDROMORPHONE HCL 110MG/55ML
0.25 PATIENT CONTROLLED ANALGESIA SYRINGE INTRAVENOUS EVERY 5 MIN PRN
Status: DISCONTINUED | OUTPATIENT
Start: 2018-12-19 | End: 2018-12-19 | Stop reason: HOSPADM

## 2018-12-19 RX ORDER — SODIUM CHLORIDE 0.9 % (FLUSH) 0.9 %
10 SYRINGE (ML) INJECTION EVERY 12 HOURS SCHEDULED
Status: DISCONTINUED | OUTPATIENT
Start: 2018-12-19 | End: 2018-12-19 | Stop reason: HOSPADM

## 2018-12-19 RX ORDER — FENTANYL CITRATE 50 UG/ML
INJECTION, SOLUTION INTRAMUSCULAR; INTRAVENOUS PRN
Status: DISCONTINUED | OUTPATIENT
Start: 2018-12-19 | End: 2018-12-19 | Stop reason: SDUPTHER

## 2018-12-19 RX ORDER — MAGNESIUM HYDROXIDE 1200 MG/15ML
LIQUID ORAL CONTINUOUS PRN
Status: COMPLETED | OUTPATIENT
Start: 2018-12-19 | End: 2018-12-19

## 2018-12-19 RX ORDER — SODIUM CHLORIDE 9 MG/ML
INJECTION, SOLUTION INTRAVENOUS
Status: COMPLETED
Start: 2018-12-19 | End: 2018-12-19

## 2018-12-19 RX ORDER — METOCLOPRAMIDE HYDROCHLORIDE 5 MG/ML
10 INJECTION INTRAMUSCULAR; INTRAVENOUS EVERY 6 HOURS
Status: COMPLETED | OUTPATIENT
Start: 2018-12-19 | End: 2018-12-20

## 2018-12-19 RX ORDER — ONDANSETRON 2 MG/ML
INJECTION INTRAMUSCULAR; INTRAVENOUS
Status: DISPENSED
Start: 2018-12-19 | End: 2018-12-20

## 2018-12-19 RX ORDER — PROPOFOL 10 MG/ML
INJECTION, EMULSION INTRAVENOUS PRN
Status: DISCONTINUED | OUTPATIENT
Start: 2018-12-19 | End: 2018-12-19 | Stop reason: SDUPTHER

## 2018-12-19 RX ORDER — ONDANSETRON 2 MG/ML
INJECTION INTRAMUSCULAR; INTRAVENOUS PRN
Status: DISCONTINUED | OUTPATIENT
Start: 2018-12-19 | End: 2018-12-19 | Stop reason: SDUPTHER

## 2018-12-19 RX ORDER — SODIUM CHLORIDE, SODIUM LACTATE, POTASSIUM CHLORIDE, AND CALCIUM CHLORIDE .6; .31; .03; .02 G/100ML; G/100ML; G/100ML; G/100ML
IRRIGANT IRRIGATION
Status: COMPLETED | OUTPATIENT
Start: 2018-12-19 | End: 2018-12-19

## 2018-12-19 RX ORDER — ROCURONIUM BROMIDE 10 MG/ML
INJECTION, SOLUTION INTRAVENOUS PRN
Status: DISCONTINUED | OUTPATIENT
Start: 2018-12-19 | End: 2018-12-19 | Stop reason: SDUPTHER

## 2018-12-19 RX ORDER — SODIUM CHLORIDE, SODIUM LACTATE, POTASSIUM CHLORIDE, CALCIUM CHLORIDE 600; 310; 30; 20 MG/100ML; MG/100ML; MG/100ML; MG/100ML
INJECTION, SOLUTION INTRAVENOUS CONTINUOUS
Status: DISCONTINUED | OUTPATIENT
Start: 2018-12-19 | End: 2018-12-19

## 2018-12-19 RX ORDER — SODIUM CHLORIDE 9 MG/ML
INJECTION, SOLUTION INTRAVENOUS CONTINUOUS PRN
Status: DISCONTINUED | OUTPATIENT
Start: 2018-12-19 | End: 2018-12-19 | Stop reason: SDUPTHER

## 2018-12-19 RX ORDER — IPRATROPIUM BROMIDE AND ALBUTEROL SULFATE 2.5; .5 MG/3ML; MG/3ML
SOLUTION RESPIRATORY (INHALATION)
Status: DISPENSED
Start: 2018-12-19 | End: 2018-12-20

## 2018-12-19 RX ORDER — BUPIVACAINE HYDROCHLORIDE AND EPINEPHRINE 5; 5 MG/ML; UG/ML
INJECTION, SOLUTION EPIDURAL; INTRACAUDAL; PERINEURAL
Status: COMPLETED | OUTPATIENT
Start: 2018-12-19 | End: 2018-12-19

## 2018-12-19 RX ORDER — SUCCINYLCHOLINE CHLORIDE 20 MG/ML
INJECTION INTRAMUSCULAR; INTRAVENOUS PRN
Status: DISCONTINUED | OUTPATIENT
Start: 2018-12-19 | End: 2018-12-19 | Stop reason: SDUPTHER

## 2018-12-19 RX ORDER — LABETALOL HYDROCHLORIDE 5 MG/ML
5 INJECTION, SOLUTION INTRAVENOUS EVERY 10 MIN PRN
Status: DISCONTINUED | OUTPATIENT
Start: 2018-12-19 | End: 2018-12-19 | Stop reason: HOSPADM

## 2018-12-19 RX ORDER — GLYCOPYRROLATE 0.2 MG/ML
INJECTION INTRAMUSCULAR; INTRAVENOUS PRN
Status: DISCONTINUED | OUTPATIENT
Start: 2018-12-19 | End: 2018-12-19 | Stop reason: SDUPTHER

## 2018-12-19 RX ORDER — NEOSTIGMINE METHYLSULFATE 5 MG/5 ML
SYRINGE (ML) INTRAVENOUS PRN
Status: DISCONTINUED | OUTPATIENT
Start: 2018-12-19 | End: 2018-12-19 | Stop reason: SDUPTHER

## 2018-12-19 RX ORDER — FENTANYL CITRATE 50 UG/ML
50 INJECTION, SOLUTION INTRAMUSCULAR; INTRAVENOUS EVERY 5 MIN PRN
Status: DISCONTINUED | OUTPATIENT
Start: 2018-12-19 | End: 2018-12-19 | Stop reason: HOSPADM

## 2018-12-19 RX ADMIN — ROCURONIUM BROMIDE 5 MG: 10 INJECTION, SOLUTION INTRAVENOUS at 14:50

## 2018-12-19 RX ADMIN — FAMOTIDINE 20 MG: 20 TABLET ORAL at 21:10

## 2018-12-19 RX ADMIN — INSULIN LISPRO 1 UNITS: 100 INJECTION, SOLUTION INTRAVENOUS; SUBCUTANEOUS at 21:09

## 2018-12-19 RX ADMIN — FLECAINIDE ACETATE 100 MG: 50 TABLET ORAL at 09:14

## 2018-12-19 RX ADMIN — CARVEDILOL 25 MG: 25 TABLET, FILM COATED ORAL at 17:25

## 2018-12-19 RX ADMIN — FLECAINIDE ACETATE 100 MG: 50 TABLET ORAL at 21:09

## 2018-12-19 RX ADMIN — SODIUM CHLORIDE: 9 INJECTION, SOLUTION INTRAVENOUS at 14:39

## 2018-12-19 RX ADMIN — ENOXAPARIN SODIUM 40 MG: 40 INJECTION SUBCUTANEOUS at 21:10

## 2018-12-19 RX ADMIN — IPRATROPIUM BROMIDE AND ALBUTEROL SULFATE 1 AMPULE: .5; 3 SOLUTION RESPIRATORY (INHALATION) at 14:30

## 2018-12-19 RX ADMIN — GLYCOPYRROLATE 0.4 MG: 0.2 INJECTION, SOLUTION INTRAMUSCULAR; INTRAVENOUS at 15:43

## 2018-12-19 RX ADMIN — HYDROMORPHONE HYDROCHLORIDE 0.5 MG: 1 INJECTION, SOLUTION INTRAMUSCULAR; INTRAVENOUS; SUBCUTANEOUS at 21:17

## 2018-12-19 RX ADMIN — PROPOFOL 120 MG: 10 INJECTION, EMULSION INTRAVENOUS at 14:50

## 2018-12-19 RX ADMIN — CITALOPRAM HYDROBROMIDE 20 MG: 20 TABLET ORAL at 09:15

## 2018-12-19 RX ADMIN — ROCURONIUM BROMIDE 15 MG: 10 INJECTION, SOLUTION INTRAVENOUS at 15:01

## 2018-12-19 RX ADMIN — OXYCODONE HYDROCHLORIDE AND ACETAMINOPHEN 500 MG: 500 TABLET ORAL at 09:14

## 2018-12-19 RX ADMIN — FAMOTIDINE 20 MG: 20 TABLET ORAL at 09:14

## 2018-12-19 RX ADMIN — FENTANYL CITRATE 25 MCG: 50 INJECTION, SOLUTION INTRAMUSCULAR; INTRAVENOUS at 14:50

## 2018-12-19 RX ADMIN — LIDOCAINE HYDROCHLORIDE 50 MG: 20 INJECTION, SOLUTION INFILTRATION; PERINEURAL at 14:50

## 2018-12-19 RX ADMIN — IPRATROPIUM BROMIDE AND ALBUTEROL SULFATE 1 AMPULE: .5; 3 SOLUTION RESPIRATORY (INHALATION) at 20:04

## 2018-12-19 RX ADMIN — SODIUM CHLORIDE: 9 INJECTION, SOLUTION INTRAVENOUS at 14:45

## 2018-12-19 RX ADMIN — ONDANSETRON 4 MG: 2 INJECTION INTRAMUSCULAR; INTRAVENOUS at 15:04

## 2018-12-19 RX ADMIN — Medication 10 ML: at 21:10

## 2018-12-19 RX ADMIN — Medication 2 MG: at 15:43

## 2018-12-19 RX ADMIN — AMLODIPINE BESYLATE 10 MG: 5 TABLET ORAL at 09:14

## 2018-12-19 RX ADMIN — SUCCINYLCHOLINE CHLORIDE 100 MG: 20 INJECTION, SOLUTION INTRAMUSCULAR; INTRAVENOUS at 14:50

## 2018-12-19 RX ADMIN — Medication 10 ML: at 09:15

## 2018-12-19 RX ADMIN — POTASSIUM CHLORIDE: 2 INJECTION, SOLUTION, CONCENTRATE INTRAVENOUS at 18:55

## 2018-12-19 RX ADMIN — FENTANYL CITRATE 25 MCG: 50 INJECTION, SOLUTION INTRAMUSCULAR; INTRAVENOUS at 15:39

## 2018-12-19 RX ADMIN — ONDANSETRON HYDROCHLORIDE 4 MG: 2 INJECTION, SOLUTION INTRAMUSCULAR; INTRAVENOUS at 16:34

## 2018-12-19 RX ADMIN — LOSARTAN POTASSIUM 50 MG: 25 TABLET ORAL at 09:15

## 2018-12-19 RX ADMIN — HYDROMORPHONE HYDROCHLORIDE 0.5 MG: 1 INJECTION, SOLUTION INTRAMUSCULAR; INTRAVENOUS; SUBCUTANEOUS at 17:35

## 2018-12-19 RX ADMIN — CARVEDILOL 25 MG: 25 TABLET, FILM COATED ORAL at 09:14

## 2018-12-19 RX ADMIN — INSULIN LISPRO 2 UNITS: 100 INJECTION, SOLUTION INTRAVENOUS; SUBCUTANEOUS at 17:25

## 2018-12-19 RX ADMIN — METOCLOPRAMIDE 10 MG: 5 INJECTION, SOLUTION INTRAMUSCULAR; INTRAVENOUS at 17:35

## 2018-12-19 RX ADMIN — POTASSIUM CHLORIDE: 2 INJECTION, SOLUTION, CONCENTRATE INTRAVENOUS at 02:31

## 2018-12-19 RX ADMIN — MEROPENEM 1 G: 1 INJECTION, POWDER, FOR SOLUTION INTRAVENOUS at 09:27

## 2018-12-19 ASSESSMENT — PULMONARY FUNCTION TESTS
PIF_VALUE: 22
PIF_VALUE: 21
PIF_VALUE: 1
PIF_VALUE: 25
PIF_VALUE: 29
PIF_VALUE: 21
PIF_VALUE: 23
PIF_VALUE: 21
PIF_VALUE: 31
PIF_VALUE: 22
PIF_VALUE: 23
PIF_VALUE: 23
PIF_VALUE: 29
PIF_VALUE: 30
PIF_VALUE: 24
PIF_VALUE: 24
PIF_VALUE: 28
PIF_VALUE: 24
PIF_VALUE: 25
PIF_VALUE: 7
PIF_VALUE: 28
PIF_VALUE: 24
PIF_VALUE: 24
PIF_VALUE: 30
PIF_VALUE: 29
PIF_VALUE: 1
PIF_VALUE: 30
PIF_VALUE: 24
PIF_VALUE: 1
PIF_VALUE: 22
PIF_VALUE: 1
PIF_VALUE: 30
PIF_VALUE: 24
PIF_VALUE: 29
PIF_VALUE: 29
PIF_VALUE: 24
PIF_VALUE: 23
PIF_VALUE: 26
PIF_VALUE: 23
PIF_VALUE: 28
PIF_VALUE: 22
PIF_VALUE: 29
PIF_VALUE: 19
PIF_VALUE: 1
PIF_VALUE: 24
PIF_VALUE: 29
PIF_VALUE: 1
PIF_VALUE: 24
PIF_VALUE: 29
PIF_VALUE: 21
PIF_VALUE: 30
PIF_VALUE: 23
PIF_VALUE: 30
PIF_VALUE: 24
PIF_VALUE: 19
PIF_VALUE: 23
PIF_VALUE: 29
PIF_VALUE: 24
PIF_VALUE: 28
PIF_VALUE: 24
PIF_VALUE: 29
PIF_VALUE: 24
PIF_VALUE: 22
PIF_VALUE: 23
PIF_VALUE: 25
PIF_VALUE: 23
PIF_VALUE: 29
PIF_VALUE: 29
PIF_VALUE: 1
PIF_VALUE: 23
PIF_VALUE: 1
PIF_VALUE: 32
PIF_VALUE: 24
PIF_VALUE: 21

## 2018-12-19 ASSESSMENT — PAIN SCALES - GENERAL
PAINLEVEL_OUTOF10: 4
PAINLEVEL_OUTOF10: 0
PAINLEVEL_OUTOF10: 9
PAINLEVEL_OUTOF10: 2
PAINLEVEL_OUTOF10: 5

## 2018-12-19 NOTE — PROGRESS NOTES
Ordering Physician Provided Reason for Exam: ruq pain f/u ct Acuity: Acute Type of Exam: Subsequent/Follow-up 72-year-old female with right upper quadrant abdominal pain; follow-up CT FINDINGS: LIVER:  The liver demonstrates normal echogenicity without evidence of intrahepatic biliary ductal dilatation. BILIARY SYSTEM:  Gallstones are seen the gallbladder. There is pericholecystic fluid. Gallbladder wall thickness measures 6 mm. Sonographic Real sign is positive. The hyperechoic foci without posterior acoustic shadowing along the wall of the gallbladder consistent with gallbladder polyps. Common bile duct is within normal limits measuring 6 mm. RIGHT KIDNEY: Right kidney measures 11.7 x 5.7 x 5.7 cm. Right renal cortical thickness measures 1.9 cm. No gross right-sided hydronephrosis. Color flow projects over the right renal parenchyma. Anechoic lesion with posterior through transmission at the mid pole of the right kidney measures 1.3 x 1.4 x 1.2 cm. PANCREAS:  Pancreas is suboptimally visualized. OTHER: No evidence of right upper quadrant ascites. 1. Cholelithiasis with pericholecystic fluid and the positive sonographic Real's sign. This is concerning for acute cholecystitis. 2. Gallbladder polyps. 3. Simple cyst at the midpole of the right kidney measuring 1.4 cm. 4. Common bile duct normal in caliber measuring 6 mm. Xr Chest Portable    Result Date: 12/18/2018  EXAMINATION: SINGLE XRAY VIEW OF THE CHEST 12/18/2018 6:14 am COMPARISON: 10/28/2012 HISTORY: ORDERING SYSTEM PROVIDED HISTORY: cp TECHNOLOGIST PROVIDED HISTORY: Reason for exam:->cp Ordering Physician Provided Reason for Exam: Abdominal Pain (pt brought to ed by [place] [facility] for abd pain pt states has had N/V x2 wks started with pain in right flank than radiates up under right breast rates pain 8/10 Acuity: Acute Type of Exam: Initial FINDINGS: Lung volumes are low accentuating heart size and bronchovascular markings at the lung bases. Heart is enlarged. Atherosclerotic change is seen in the aorta. Bandlike opacities are seen at the lung bases bilaterally, right greater than left. Linear opacities at the lung bases, increased compared to prior, favored to represent atelectasis or scarring rather than pneumonia given the bandlike morphology Mild cardiomegaly     Ct Chest Pulmonary Embolism W Contrast    Result Date: 12/18/2018  EXAMINATION: CT OF THE ABDOMEN AND PELVIS WITH CONTRAST; CTA OF THE CHEST 12/18/2018 6:54 am TECHNIQUE: CT of the abdomen and pelvis was performed with the administration of intravenous contrast. Multiplanar reformatted images are provided for review. Dose modulation, iterative reconstruction, and/or weight based adjustment of the mA/kV was utilized to reduce the radiation dose to as low as reasonably achievable.; CTA of the chest was performed after the administration of intravenous contrast.  Multiplanar reformatted images are provided for review. MIP images are provided for review. Dose modulation, iterative reconstruction, and/or weight based adjustment of the mA/kV was utilized to reduce the radiation dose to as low as reasonably achievable. COMPARISON: 05/07/2015 chest CT December 2017 HISTORY: ORDERING SYSTEM PROVIDED HISTORY: ABDOMINAL PAIN TECHNOLOGIST PROVIDED HISTORY: Additional Contrast?->None Ordering Physician Provided Reason for Exam: abdominal pain Acuity: Unknown Type of Exam: Unknown Relevant Medical/Surgical History: (pt brought to ed by [place] [facility] for abd pain pt states has had N/V x2 wks started with pain in right flank than radiates up under right breast rates pain 8/10 ) FINDINGS: Chest: Thyroid gland appears normal.  Atherosclerotic change is seen in the aorta. No intimal flap. Coronary artery calcification is seen. Caliber of ascending aorta is unchanged No embolus is seen in the central, right, or left main pulmonary artery.   No embolus is seen on the right or left, at least to the subsegmental level. There is mild emphysema. Small noncalcified pulmonary nodule is seen in the right middle lobe measuring 6 mm in size. There is mild volume loss and traction bronchiectasis in the right middle lobe. Appearance is similar. There is a focal left hilar mass seen, measuring 3.6 cm x 2.8 cm. There is adjacent adenopathy seen marginating the esophagus measuring 2.4 x 1.2 cm. Left hilar mass mildly narrows the left upper lobe, and lingular airways. There is postobstructive collapse of the lingula. Spurring is seen in the spine. Remote left-sided rib fractures are seen Focal fatty nodule is seen in the musculature of the left axillary region, measuring 2.7 x 4.7 cm. Abdomen and pelvis: Bandlike opacity seen in the lingula and right middle lobe. There is subtle mosaic attenuation in the right lower lobe, suggesting small airways disease or air trapping. Coronary artery calcification is seen. No perisplenic fluid. Right adrenal gland is normal.  Left adrenal gland is normal.  Low attenuation seen throughout the liver, compatible with fatty infiltration. Circumscribed hypodense nodule seen in the right hepatic lobe measuring 10 mm, likely cyst.  There is moderate injection of the fat surrounding the gallbladder No peripancreatic inflammatory change There is lobular contour to left kidney. No hydro nephrosis. Rim calcified cyst projects superiorly off the left kidney measuring 1.5 cm There is lobular contour the right kidney. No hydronephrosis. Scattered hypodense nodule seen the right kidney measuring 1.4 cm in size or less No peripancreatic inflammatory change GI/Bowel: Scattered colonic diverticula are seen. Appendix is identified and normal in caliber. Pelvis: No free fluid in pelvis. No pelvic adenopathy Peritoneum/Retroperitoneum: Atherosclerotic change seen in the aorta. Moderate atherosclerotic changes seen in the SMA, similar to prior.  Bones/Soft Tissues: Degenerative changes are seen in

## 2018-12-19 NOTE — PROGRESS NOTES
No cardiac contraindication to surgery deemed indicated per Dr Gisselle Garza    She has stable CAD and stable paroxysmal a. Fib.  She has been opposed to anticoagulation so she is on aspirin for paroxysmal a. fib

## 2018-12-19 NOTE — CONSULTS
daily for stroke prevention. The  patient is active. She is under a considerable stress with her . She has not been feeling well lately and now she presents with this  acute medical illness. She denies having any chest discomfort. She  denies any significant shortness of breath. She does feel  lightheadedness on occasion and she attributes that to return to atrial  fibrillation. PAST MEDICAL HISTORY:  Notable for longstanding coronary artery disease,  remote circumflex and right coronary artery stenting, most recent stress  testing in 2017 showed no evidence for stress-induced ischemia. Ejection fraction is known to be 50-55% by echocardiography. MEDICATIONS:  Include Tessalon p.r.n., iron daily, hydrochlorothiazide,  metformin, Trelegy Ellipta, carvedilol 25 mg b.i.d., Zetia 10 mg daily,  Tambocor was prescribed last week but she has not yet filled the  medication, Zetia 10 mg daily, losartan, Norvasc, cinnamon daily,  Tylenol as needed, Os-Caleb, ascorbic-acid, Celexa, and aspirin. SOCIAL HISTORY:  She lives independently. 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 FAMILY HISTORY:  Negative for premature coronary artery disease. She  had the onset of coronary artery disease in her early 62s. REVIEW OF SYSTEMS:  A 14-system review of systems is notable for the  findings in the current medical illness. PHYSICAL EXAMINATION:  GENERAL:  Currently, she was in atrial fibrillation on arrival to the  hospital.  She attributes that to pain she was in, now she is converted  to normal sinus rhythm. VITAL SIGNS:  Blood pressure is 123/54, pulse 74. HEENT:  Sclerae are clear and anicteric. Posterior pharynx is clear. NECK:  There are no carotid bruits. No jugular venous distention. LUNGS:  Clear. CARDIAC:  Sounds are regular. No murmur. ABDOMEN:  Tender right upper quadrant. EXTREMITIES:  Without edema. She has good peripheral pulses.     EKG on admission shows atrial fibrillation. Telemetry now shows normal  sinus rhythm. LABORATORY DATA:  Reviewed. IMPRESSION:  Acute cholecystitis with lipase slightly elevated, liver  enzymes elevated, paroxysmal atrial fibrillation, known coronary artery  disease with stable disease for quite some time, normal left ventricular  ejection fracture. Chest CT is reviewed and concerning about a new lung  nodule, but also of note is that the patient just had bronchoscopy with  negative cytology. RECOMMENDATIONS:  Certainly, the patient has stable cardiac status for  surgery if indicated. Certainly, if she has acute cholecystitis,  surgery is definitely indicated at some time. She is optimally managed  for this. Can use IV Cardizem should she have recurrent atrial  Fibrillation. Hope that tambocor helps her maintain sinus rhythm. Tambocor was prescribed at recent visit but not yet obtained by the patient        Bolivar Proctor MD    D: 12/19/2018 9:23:58       T: 12/19/2018 9:55:06     [email or web address]  Job#: [id number]     Doc#: [id number]    CC:

## 2018-12-19 NOTE — BRIEF OP NOTE
Brief Postoperative Note  ______________________________________________________________    Patient: Rommel Stearns  YOB: 1940  MRN: [id number]  Date of Procedure: 12/19/2018    Pre-Op Diagnosis: COLECYSTITIS     Post-Op Diagnosis: Same       Procedure(s):  LAPAROSCOPIC CHOLECYSTECTOMY WITH CHOLANGIOGRAM    Anesthesia: General    Surgeon(s):  Cliff Nolen MD    Assistant: NIK Mora    Estimated Blood Loss (mL): less than 50     Complications: None    Specimens:   ID Type Source Tests Collected by Time Destination   A : A) GALL BLADDER Tissue Gallbladder SURGICAL PATHOLOGY Cliff Nolen MD 12/19/2018 1517        Implants:  * No implants in log *      Drains:   [REMOVED] NG/OG/NJ/NE Tube Orogastric 18 fr Center mouth (Removed)       Findings: Severe cholecystitis with gangrenous change of the GB. IOC appeared clear.  Will ildefonso LFT tomorrow    Cliff Nolen MD  Date: 12/19/2018  Time: 4:12 PM

## 2018-12-20 LAB
A/G RATIO: 1.1 (ref 1.1–2.2)
ALBUMIN SERPL-MCNC: 3.4 G/DL (ref 3.4–5)
ALP BLD-CCNC: 182 U/L (ref 40–129)
ALT SERPL-CCNC: 278 U/L (ref 10–40)
ANION GAP SERPL CALCULATED.3IONS-SCNC: 12 MMOL/L (ref 3–16)
AST SERPL-CCNC: 192 U/L (ref 15–37)
BASOPHILS ABSOLUTE: 0.1 K/UL (ref 0–0.2)
BASOPHILS RELATIVE PERCENT: 1.1 %
BILIRUB SERPL-MCNC: 0.6 MG/DL (ref 0–1)
BUN BLDV-MCNC: 20 MG/DL (ref 7–20)
CALCIUM SERPL-MCNC: 8.5 MG/DL (ref 8.3–10.6)
CHLORIDE BLD-SCNC: 104 MMOL/L (ref 99–110)
CO2: 24 MMOL/L (ref 21–32)
CREAT SERPL-MCNC: 0.7 MG/DL (ref 0.6–1.2)
EOSINOPHILS ABSOLUTE: 0.2 K/UL (ref 0–0.6)
EOSINOPHILS RELATIVE PERCENT: 2.1 %
GFR AFRICAN AMERICAN: >60
GFR NON-AFRICAN AMERICAN: >60
GLOBULIN: 3.1 G/DL
GLUCOSE BLD-MCNC: 122 MG/DL (ref 70–99)
GLUCOSE BLD-MCNC: 130 MG/DL (ref 70–99)
GLUCOSE BLD-MCNC: 138 MG/DL (ref 70–99)
GLUCOSE BLD-MCNC: 153 MG/DL (ref 70–99)
GLUCOSE BLD-MCNC: 191 MG/DL (ref 70–99)
HCT VFR BLD CALC: 26.9 % (ref 36–48)
HCT VFR BLD CALC: 27.7 % (ref 36–48)
HEMOGLOBIN: 8.4 G/DL (ref 12–16)
IMMATURE RETIC FRACT: 0.41 (ref 0.21–0.37)
LIPASE: 26 U/L (ref 13–60)
LYMPHOCYTES ABSOLUTE: 1.6 K/UL (ref 1–5.1)
LYMPHOCYTES RELATIVE PERCENT: 18.3 %
MCH RBC QN AUTO: 27.1 PG (ref 26–34)
MCHC RBC AUTO-ENTMCNC: 31.2 G/DL (ref 31–36)
MCV RBC AUTO: 86.8 FL (ref 80–100)
MONOCYTES ABSOLUTE: 0.7 K/UL (ref 0–1.3)
MONOCYTES RELATIVE PERCENT: 7.6 %
NEUTROPHILS ABSOLUTE: 6.4 K/UL (ref 1.7–7.7)
NEUTROPHILS RELATIVE PERCENT: 70.9 %
PDW BLD-RTO: 15.7 % (ref 12.4–15.4)
PERFORMED ON: ABNORMAL
PLATELET # BLD: 383 K/UL (ref 135–450)
PMV BLD AUTO: 7.7 FL (ref 5–10.5)
POTASSIUM SERPL-SCNC: 4 MMOL/L (ref 3.5–5.1)
RBC # BLD: 3.09 M/UL (ref 4–5.2)
RETICULOCYTE ABSOLUTE COUNT: 0.06 M/UL (ref 0.02–0.1)
RETICULOCYTE COUNT PCT: 1.87 % (ref 0.5–2.18)
SODIUM BLD-SCNC: 140 MMOL/L (ref 136–145)
TOTAL PROTEIN: 6.5 G/DL (ref 6.4–8.2)
WBC # BLD: 9 K/UL (ref 4–11)

## 2018-12-20 PROCEDURE — APPNB30 APP NON BILLABLE TIME 0-30 MINS: Performed by: NURSE PRACTITIONER

## 2018-12-20 PROCEDURE — 6370000000 HC RX 637 (ALT 250 FOR IP): Performed by: INTERNAL MEDICINE

## 2018-12-20 PROCEDURE — 36415 COLL VENOUS BLD VENIPUNCTURE: CPT

## 2018-12-20 PROCEDURE — 1200000000 HC SEMI PRIVATE

## 2018-12-20 PROCEDURE — 85025 COMPLETE CBC W/AUTO DIFF WBC: CPT

## 2018-12-20 PROCEDURE — 83690 ASSAY OF LIPASE: CPT

## 2018-12-20 PROCEDURE — 6360000002 HC RX W HCPCS: Performed by: SURGERY

## 2018-12-20 PROCEDURE — 2580000003 HC RX 258: Performed by: INTERNAL MEDICINE

## 2018-12-20 PROCEDURE — 2700000000 HC OXYGEN THERAPY PER DAY

## 2018-12-20 PROCEDURE — 6360000002 HC RX W HCPCS: Performed by: INTERNAL MEDICINE

## 2018-12-20 PROCEDURE — 6370000000 HC RX 637 (ALT 250 FOR IP): Performed by: SURGERY

## 2018-12-20 PROCEDURE — 83540 ASSAY OF IRON: CPT

## 2018-12-20 PROCEDURE — 82728 ASSAY OF FERRITIN: CPT

## 2018-12-20 PROCEDURE — 80053 COMPREHEN METABOLIC PANEL: CPT

## 2018-12-20 PROCEDURE — 85045 AUTOMATED RETICULOCYTE COUNT: CPT

## 2018-12-20 PROCEDURE — APPSS15 APP SPLIT SHARED TIME 0-15 MINUTES: Performed by: NURSE PRACTITIONER

## 2018-12-20 PROCEDURE — 99233 SBSQ HOSP IP/OBS HIGH 50: CPT | Performed by: INTERNAL MEDICINE

## 2018-12-20 PROCEDURE — 94760 N-INVAS EAR/PLS OXIMETRY 1: CPT

## 2018-12-20 PROCEDURE — 83550 IRON BINDING TEST: CPT

## 2018-12-20 PROCEDURE — 99024 POSTOP FOLLOW-UP VISIT: CPT | Performed by: SURGERY

## 2018-12-20 PROCEDURE — 94640 AIRWAY INHALATION TREATMENT: CPT

## 2018-12-20 RX ORDER — HYDROCODONE BITARTRATE AND ACETAMINOPHEN 5; 325 MG/1; MG/1
1 TABLET ORAL EVERY 6 HOURS PRN
Qty: 20 TABLET | Refills: 0 | Status: SHIPPED | OUTPATIENT
Start: 2018-12-20 | End: 2018-12-25

## 2018-12-20 RX ORDER — IPRATROPIUM BROMIDE AND ALBUTEROL SULFATE 2.5; .5 MG/3ML; MG/3ML
1 SOLUTION RESPIRATORY (INHALATION) 4 TIMES DAILY
Status: ACTIVE | OUTPATIENT
Start: 2018-12-20 | End: 2018-12-21

## 2018-12-20 RX ADMIN — CITALOPRAM HYDROBROMIDE 20 MG: 20 TABLET ORAL at 09:49

## 2018-12-20 RX ADMIN — MEROPENEM 1 G: 1 INJECTION, POWDER, FOR SOLUTION INTRAVENOUS at 10:25

## 2018-12-20 RX ADMIN — Medication 10 ML: at 20:45

## 2018-12-20 RX ADMIN — FLECAINIDE ACETATE 100 MG: 50 TABLET ORAL at 09:49

## 2018-12-20 RX ADMIN — FAMOTIDINE 20 MG: 20 TABLET ORAL at 20:45

## 2018-12-20 RX ADMIN — Medication 10 ML: at 09:49

## 2018-12-20 RX ADMIN — FLECAINIDE ACETATE 100 MG: 50 TABLET ORAL at 20:45

## 2018-12-20 RX ADMIN — HYDROCODONE BITARTRATE AND ACETAMINOPHEN 2 TABLET: 5; 325 TABLET ORAL at 14:22

## 2018-12-20 RX ADMIN — IPRATROPIUM BROMIDE AND ALBUTEROL SULFATE 1 AMPULE: .5; 3 SOLUTION RESPIRATORY (INHALATION) at 20:16

## 2018-12-20 RX ADMIN — CARVEDILOL 25 MG: 25 TABLET, FILM COATED ORAL at 16:42

## 2018-12-20 RX ADMIN — FAMOTIDINE 20 MG: 20 TABLET ORAL at 09:49

## 2018-12-20 RX ADMIN — HYDROCODONE BITARTRATE AND ACETAMINOPHEN 2 TABLET: 5; 325 TABLET ORAL at 09:48

## 2018-12-20 RX ADMIN — AMLODIPINE BESYLATE 10 MG: 5 TABLET ORAL at 09:49

## 2018-12-20 RX ADMIN — INSULIN LISPRO 1 UNITS: 100 INJECTION, SOLUTION INTRAVENOUS; SUBCUTANEOUS at 20:46

## 2018-12-20 RX ADMIN — INSULIN LISPRO 2 UNITS: 100 INJECTION, SOLUTION INTRAVENOUS; SUBCUTANEOUS at 13:09

## 2018-12-20 RX ADMIN — LOSARTAN POTASSIUM 50 MG: 25 TABLET ORAL at 09:49

## 2018-12-20 RX ADMIN — FERROUS GLUCONATE TAB 324 MG (37.5 MG ELEMENTAL IRON) 324 MG: 324 (37.5 FE) TAB at 09:49

## 2018-12-20 RX ADMIN — METOCLOPRAMIDE 10 MG: 5 INJECTION, SOLUTION INTRAMUSCULAR; INTRAVENOUS at 01:08

## 2018-12-20 RX ADMIN — POTASSIUM CHLORIDE: 2 INJECTION, SOLUTION, CONCENTRATE INTRAVENOUS at 09:49

## 2018-12-20 RX ADMIN — OXYCODONE HYDROCHLORIDE AND ACETAMINOPHEN 500 MG: 500 TABLET ORAL at 09:49

## 2018-12-20 RX ADMIN — CARVEDILOL 25 MG: 25 TABLET, FILM COATED ORAL at 09:49

## 2018-12-20 RX ADMIN — IPRATROPIUM BROMIDE AND ALBUTEROL SULFATE 1 AMPULE: .5; 3 SOLUTION RESPIRATORY (INHALATION) at 08:00

## 2018-12-20 RX ADMIN — HYDROCODONE BITARTRATE AND ACETAMINOPHEN 1 TABLET: 5; 325 TABLET ORAL at 01:22

## 2018-12-20 RX ADMIN — ENOXAPARIN SODIUM 40 MG: 40 INJECTION SUBCUTANEOUS at 20:45

## 2018-12-20 ASSESSMENT — PAIN DESCRIPTION - ORIENTATION: ORIENTATION: LOWER;RIGHT

## 2018-12-20 ASSESSMENT — PAIN SCALES - GENERAL
PAINLEVEL_OUTOF10: 8
PAINLEVEL_OUTOF10: 0
PAINLEVEL_OUTOF10: 4
PAINLEVEL_OUTOF10: 4
PAINLEVEL_OUTOF10: 0
PAINLEVEL_OUTOF10: 7
PAINLEVEL_OUTOF10: 5
PAINLEVEL_OUTOF10: 5

## 2018-12-20 ASSESSMENT — PAIN DESCRIPTION - LOCATION: LOCATION: BACK

## 2018-12-20 ASSESSMENT — PAIN DESCRIPTION - PAIN TYPE: TYPE: ACUTE PAIN

## 2018-12-20 NOTE — OP NOTE
[facility]                     [address] [facility], [address] [facility]                                OPERATIVE REPORT    PATIENT NAME: Ann Harvey                     :        1940  MED REC NO:   [id number]                          ROOM:       [id number]  ACCOUNT NO:   [de-identified]                           ADMIT DATE: 2018  PROVIDER:     Kelvin Bhakta MD    DATE OF PROCEDURE:  2018    PREOPERATIVE DIAGNOSIS:  Acute cholecystitis. POSTOPERATIVE DIAGNOSIS:  Acute cholecystitis with hydrops and gangrene  of the gallbladder. OPERATION PERFORMED:  Laparoscopic cholecystectomy with intraoperative  cholangiogram.    SURGEON:  Kelvin Bhakta MD    ANESTHESIA:  General plus local at port sites as well. ESTIMATED BLOOD LOSS:  Minimal.    COMPLICATIONS:  None. SPECIMENS:  Gallbladder. INDICATIONS:  The patient is a 79-year-old female presenting with severe  right upper quadrant pain, nausea and concerns of cholecystitis on  imaging and clinically. The patient's white count is elevated. She is  given antibiotics and also had followup liver enzymes today. Bilirubin  and transaminases were elevated today. She is taken to the operating  room for cholecystectomy. FINDINGS:  In terms of findings of surgery, the patient had a markedly  distended, thickened and inflamed appearing gallbladder. It did have  gangrenous changes visible. The cystic duct appeared viable where it  was closed and clipped. The patient had a cholangiogram performed and  this appeared clear. Very likely elevated LFTs is from a stone which  passed or compression of the bile duct system from the distended  gallbladder consistent with the Mirizzi syndrome. We will repeat the  liver function test tomorrow and continue her antibiotics  postoperatively. ADDITIONAL DETAILS OF SURGERY:  The patient was seen in preoperative  holding bay along with her family.   All

## 2018-12-20 NOTE — PROGRESS NOTES
INTERNAL MEDICINE    SUBJECTIVE: less pain and nausea    OBJECTIVE  Looks more comfortable      Physical    VITALS:  /66   Pulse 65   Temp 97.6 °F (36.4 °C) (Oral)   Resp 16   Ht 5' 5\" (1.651 m)   Wt 172 lb (78 kg)   SpO2 90%   BMI 28.62 kg/m²   CONSTITUTIONAL:  awake, alert, cooperative, no apparent distress, and appears stated age  EYES:  Lids and lashes normal, pupils equal, round and reactive to light, extra ocular muscles intact, sclera clear, conjunctiva normal  ENT:  Normocephalic, without obvious abnormality, atraumatic, sinuses nontender on palpation, external ears without lesions, oral pharynx with moist mucus membranes, tonsils without erythema or exudates, gums normal and good dentition. NECK:  Supple, symmetrical, trachea midline, no adenopathy, thyroid symmetric, not enlarged and no tenderness, skin normal  BACK:  Symmetric, no curvature, spinous processes are non-tender on palpation, paraspinous muscles are non-tender on palpation, no costal vertebral tenderness  LUNGS:  No increased work of breathing, good air exchange, clear to auscultation bilaterally, no crackles or wheezing  CARDIOVASCULAR:  Normal apical impulse, regular rate and rhythm, normal S1 and S2, no S3 or S4, and no murmur noted  ABDOMEN:  ,decreased normal bowel sounds, mildly tender, post op abd no masses palpated, no hepatosplenomegally  MUSCULOSKELETAL:  there is no redness, warmth, or swelling of the joints  NEUROLOGIC:  Awake, alert, oriented to name, place and time.   Cranial nerves II-XII are grossly intact  SKIN:  no bruising or bleeding  Data    CBC with Differential:    Lab Results   Component Value Date    WBC 9.0 12/20/2018    RBC 3.09 12/20/2018    HGB 8.4 12/20/2018    HCT 26.9 12/20/2018     12/20/2018    MCV 86.8 12/20/2018    MCH 27.1 12/20/2018    MCHC 31.2 12/20/2018    RDW 15.7 12/20/2018    SEGSPCT 50.2 10/29/2012    LYMPHOPCT 18.3 12/20/2018    MONOPCT 7.6 12/20/2018    BASOPCT 1.1 12/20/2018

## 2018-12-20 NOTE — PROGRESS NOTES
Respiratory:  · Resp Assessment: Normal respiratory effort  · Resp Auscultation: Clear to auscultation bilaterally   Cardiovascular:  · Auscultation: regular rate and rhythm, normal S1S2, no murmur, rub or gallop  · Palpation:  Nl PMI  · JVP:  normal  · Extremities: No Edema  Abdomen:  · Soft, non-tender  · Normal bowel sounds  Extremities:  ·  No Cyanosis or Clubbing  Neurological/Psychiatric:  · Oriented to time, place, and person  · Non-anxious  Skin Warm and dry    Assessment:    Patient Active Hospital Problem List:   Gallstones and inflammation of gallbladder without obstruction (12/18/2018)    CAD-stable  Paroxysmal a. Fib -remains in sinus. Have previously had conversation with patient about anticoagulation and she did not want it.  Daughter with her in room has more concern about using anticoagulation        Plan:  1.  agree with excellent care

## 2018-12-20 NOTE — PROGRESS NOTES
Bedside report received from [facility]. Pt lying in bed with no s/s pain or distress. No needs at this time. Call light within reach and bed alarm engaged.   .Electronically signed by Carmen Cardona RN on 12/19/2018 at 7:42 PM

## 2018-12-20 NOTE — CONSULTS
rate 16, height 5' 5\" (1.651 m), weight 172 lb (78 kg), SpO2 90 %, not currently breastfeeding. General appearance: alert, cooperative, no distress, appears stated age  Eyes: Anicteric  Head: Normocephalic, without obvious abnormality  Lungs: clear to auscultation bilaterally, Normal Effort  Heart: regular rate and rhythm, normal S1 and S2, no murmurs or rubs  Abdomen: soft, non-tender. Bowel sounds normal. No masses,  no organomegaly. Extremities: atraumatic, no cyanosis or edema  Skin: warm and dry, no jaundice  Neuro: Grossly intact, A&OX3      Data Review:    Recent Labs      12/18/18   0548  12/19/18   0533  12/19/18   1009  12/20/18   0531   WBC  14.0*  7.4   --   9.0   HGB  11.0*  8.7*  8.8*  8.4*   HCT  35.0*  28.1*  28.3*  26.9*   MCV  84.7  85.6   --   86.8   PLT  519*  392   --   383     Recent Labs      12/18/18   0548  12/19/18   0533  12/20/18   0531   NA  134*  140  140   K  3.9  3.9  4.0   CL  95*  104  104   CO2  24  27  24   BUN  13  18  20   CREATININE  0.6  0.6  0.7     Recent Labs      12/18/18   0548  12/19/18   0533  12/20/18   0531   AST  10*  367*  192*   ALT  12  282*  278*   BILITOT  0.3  2.1*  0.6   ALKPHOS  107  181*  182*     Recent Labs      12/18/18   0548  12/20/18   0531   LIPASE  157.0*  26.0     Recent Labs      12/18/18   0548   PROTIME  11.6   INR  1.02     No results for input(s): PTT in the last 72 hours. No results for input(s): OCCULTBLD in the last 72 hours.     Imaging Studies:                CT-scan of abdomen and pelvis  Left hilar mass, with adjacent adenopathy in the mediastinum, suspicious for   central lung carcinoma until proven otherwise.  There is postobstructive   change in the lingula.       No central pulmonary embolus identified       Moderate inflammatory change surrounding the gallbladder suggesting early   cholecystitis                 Assessment:     Active Problems:    Gallstones and inflammation of gallbladder without obstruction  Resolved

## 2018-12-20 NOTE — PROGRESS NOTES
Pt VSS; pt O2 per NC decreased from 3L to 2L w/ O2 saturation holding at 94%. Pt. Denies other needs at this time. Fall precautions in place. Call light/table in reach. Will continue to monitor.

## 2018-12-21 ENCOUNTER — APPOINTMENT (OUTPATIENT)
Dept: GENERAL RADIOLOGY | Age: 78
DRG: 417 | End: 2018-12-21
Payer: MEDICARE

## 2018-12-21 LAB
A/G RATIO: 0.9 (ref 1.1–2.2)
ALBUMIN SERPL-MCNC: 3.5 G/DL (ref 3.4–5)
ALP BLD-CCNC: 187 U/L (ref 40–129)
ALT SERPL-CCNC: 206 U/L (ref 10–40)
ANION GAP SERPL CALCULATED.3IONS-SCNC: 9 MMOL/L (ref 3–16)
AST SERPL-CCNC: 77 U/L (ref 15–37)
BASOPHILS ABSOLUTE: 0.1 K/UL (ref 0–0.2)
BASOPHILS RELATIVE PERCENT: 0.7 %
BILIRUB SERPL-MCNC: 0.4 MG/DL (ref 0–1)
BUN BLDV-MCNC: 20 MG/DL (ref 7–20)
CALCIUM SERPL-MCNC: 9.1 MG/DL (ref 8.3–10.6)
CHLORIDE BLD-SCNC: 107 MMOL/L (ref 99–110)
CO2: 25 MMOL/L (ref 21–32)
CREAT SERPL-MCNC: 0.7 MG/DL (ref 0.6–1.2)
EKG ATRIAL RATE: 101 BPM
EKG DIAGNOSIS: NORMAL
EKG Q-T INTERVAL: 354 MS
EKG QRS DURATION: 108 MS
EKG QTC CALCULATION (BAZETT): 449 MS
EKG R AXIS: 37 DEGREES
EKG T AXIS: 4 DEGREES
EKG VENTRICULAR RATE: 97 BPM
EOSINOPHILS ABSOLUTE: 0.2 K/UL (ref 0–0.6)
EOSINOPHILS RELATIVE PERCENT: 2.4 %
FERRITIN: 60.7 NG/ML (ref 15–150)
FOLATE: 19.35 NG/ML (ref 4.78–24.2)
GFR AFRICAN AMERICAN: >60
GFR NON-AFRICAN AMERICAN: >60
GLOBULIN: 3.8 G/DL
GLUCOSE BLD-MCNC: 123 MG/DL (ref 70–99)
GLUCOSE BLD-MCNC: 128 MG/DL (ref 70–99)
GLUCOSE BLD-MCNC: 131 MG/DL (ref 70–99)
GLUCOSE BLD-MCNC: 163 MG/DL (ref 70–99)
GLUCOSE BLD-MCNC: 215 MG/DL (ref 70–99)
HCT VFR BLD CALC: 30 % (ref 36–48)
HEMOGLOBIN: 9.2 G/DL (ref 12–16)
IRON SATURATION: 9 % (ref 15–50)
IRON: 24 UG/DL (ref 37–145)
LEFT VENTRICULAR EJECTION FRACTION HIGH VALUE: 60 %
LEFT VENTRICULAR EJECTION FRACTION MODE: NORMAL
LV EF: 55 %
LV EF: 58 %
LVEF MODALITY: NORMAL
LYMPHOCYTES ABSOLUTE: 1.6 K/UL (ref 1–5.1)
LYMPHOCYTES RELATIVE PERCENT: 17.8 %
MAGNESIUM: 1.8 MG/DL (ref 1.8–2.4)
MCH RBC QN AUTO: 26.6 PG (ref 26–34)
MCHC RBC AUTO-ENTMCNC: 30.6 G/DL (ref 31–36)
MCV RBC AUTO: 86.6 FL (ref 80–100)
MONOCYTES ABSOLUTE: 0.7 K/UL (ref 0–1.3)
MONOCYTES RELATIVE PERCENT: 8.2 %
NEUTROPHILS ABSOLUTE: 6.3 K/UL (ref 1.7–7.7)
NEUTROPHILS RELATIVE PERCENT: 70.9 %
PDW BLD-RTO: 15.7 % (ref 12.4–15.4)
PERFORMED ON: ABNORMAL
PLATELET # BLD: 441 K/UL (ref 135–450)
PMV BLD AUTO: 7.6 FL (ref 5–10.5)
POTASSIUM SERPL-SCNC: 4 MMOL/L (ref 3.5–5.1)
RBC # BLD: 3.46 M/UL (ref 4–5.2)
SODIUM BLD-SCNC: 141 MMOL/L (ref 136–145)
TOTAL IRON BINDING CAPACITY: 278 UG/DL (ref 260–445)
TOTAL PROTEIN: 7.3 G/DL (ref 6.4–8.2)
VITAMIN B-12: >2000 PG/ML (ref 211–911)
WBC # BLD: 8.8 K/UL (ref 4–11)

## 2018-12-21 PROCEDURE — 94660 CPAP INITIATION&MGMT: CPT

## 2018-12-21 PROCEDURE — 85025 COMPLETE CBC W/AUTO DIFF WBC: CPT

## 2018-12-21 PROCEDURE — 99223 1ST HOSP IP/OBS HIGH 75: CPT | Performed by: INTERNAL MEDICINE

## 2018-12-21 PROCEDURE — 94762 N-INVAS EAR/PLS OXIMTRY CONT: CPT

## 2018-12-21 PROCEDURE — 51702 INSERT TEMP BLADDER CATH: CPT

## 2018-12-21 PROCEDURE — 6360000002 HC RX W HCPCS: Performed by: INTERNAL MEDICINE

## 2018-12-21 PROCEDURE — APPNB30 APP NON BILLABLE TIME 0-30 MINS: Performed by: NURSE PRACTITIONER

## 2018-12-21 PROCEDURE — 82607 VITAMIN B-12: CPT

## 2018-12-21 PROCEDURE — 2000000000 HC ICU R&B

## 2018-12-21 PROCEDURE — 82746 ASSAY OF FOLIC ACID SERUM: CPT

## 2018-12-21 PROCEDURE — 36415 COLL VENOUS BLD VENIPUNCTURE: CPT

## 2018-12-21 PROCEDURE — 99024 POSTOP FOLLOW-UP VISIT: CPT | Performed by: SURGERY

## 2018-12-21 PROCEDURE — 93010 ELECTROCARDIOGRAM REPORT: CPT | Performed by: INTERNAL MEDICINE

## 2018-12-21 PROCEDURE — 80053 COMPREHEN METABOLIC PANEL: CPT

## 2018-12-21 PROCEDURE — 6370000000 HC RX 637 (ALT 250 FOR IP): Performed by: INTERNAL MEDICINE

## 2018-12-21 PROCEDURE — 94760 N-INVAS EAR/PLS OXIMETRY 1: CPT

## 2018-12-21 PROCEDURE — 2580000003 HC RX 258: Performed by: INTERNAL MEDICINE

## 2018-12-21 PROCEDURE — 74018 RADEX ABDOMEN 1 VIEW: CPT

## 2018-12-21 PROCEDURE — 94640 AIRWAY INHALATION TREATMENT: CPT

## 2018-12-21 PROCEDURE — 93306 TTE W/DOPPLER COMPLETE: CPT

## 2018-12-21 PROCEDURE — 83735 ASSAY OF MAGNESIUM: CPT

## 2018-12-21 PROCEDURE — 93005 ELECTROCARDIOGRAM TRACING: CPT | Performed by: INTERNAL MEDICINE

## 2018-12-21 PROCEDURE — 71045 X-RAY EXAM CHEST 1 VIEW: CPT

## 2018-12-21 PROCEDURE — 2700000000 HC OXYGEN THERAPY PER DAY

## 2018-12-21 PROCEDURE — APPSS15 APP SPLIT SHARED TIME 0-15 MINUTES: Performed by: NURSE PRACTITIONER

## 2018-12-21 PROCEDURE — 99233 SBSQ HOSP IP/OBS HIGH 50: CPT | Performed by: INTERNAL MEDICINE

## 2018-12-21 RX ORDER — FUROSEMIDE 10 MG/ML
40 INJECTION INTRAMUSCULAR; INTRAVENOUS ONCE
Status: COMPLETED | OUTPATIENT
Start: 2018-12-21 | End: 2018-12-21

## 2018-12-21 RX ORDER — DIGOXIN 0.25 MG/ML
250 INJECTION INTRAMUSCULAR; INTRAVENOUS EVERY 6 HOURS
Status: COMPLETED | OUTPATIENT
Start: 2018-12-21 | End: 2018-12-21

## 2018-12-21 RX ORDER — DOCUSATE SODIUM 100 MG/1
100 CAPSULE, LIQUID FILLED ORAL DAILY
Status: DISCONTINUED | OUTPATIENT
Start: 2018-12-21 | End: 2018-12-23

## 2018-12-21 RX ORDER — DIGOXIN 0.25 MG/ML
250 INJECTION INTRAMUSCULAR; INTRAVENOUS DAILY
Status: DISCONTINUED | OUTPATIENT
Start: 2018-12-22 | End: 2018-12-22

## 2018-12-21 RX ORDER — FUROSEMIDE 10 MG/ML
20 INJECTION INTRAMUSCULAR; INTRAVENOUS 2 TIMES DAILY
Status: DISCONTINUED | OUTPATIENT
Start: 2018-12-21 | End: 2018-12-23

## 2018-12-21 RX ORDER — IPRATROPIUM BROMIDE AND ALBUTEROL SULFATE 2.5; .5 MG/3ML; MG/3ML
1 SOLUTION RESPIRATORY (INHALATION) 4 TIMES DAILY
Status: DISCONTINUED | OUTPATIENT
Start: 2018-12-21 | End: 2018-12-24

## 2018-12-21 RX ORDER — LACTULOSE 10 G/15ML
20 SOLUTION ORAL 2 TIMES DAILY
Status: DISCONTINUED | OUTPATIENT
Start: 2018-12-21 | End: 2018-12-23

## 2018-12-21 RX ORDER — OXYMETAZOLINE HYDROCHLORIDE 0.05 G/100ML
1 SPRAY NASAL ONCE
Status: COMPLETED | OUTPATIENT
Start: 2018-12-21 | End: 2018-12-21

## 2018-12-21 RX ADMIN — LIDOCAINE HYDROCHLORIDE: 20 JELLY TOPICAL at 16:52

## 2018-12-21 RX ADMIN — CARVEDILOL 25 MG: 25 TABLET, FILM COATED ORAL at 16:50

## 2018-12-21 RX ADMIN — MEROPENEM 1 G: 1 INJECTION, POWDER, FOR SOLUTION INTRAVENOUS at 11:02

## 2018-12-21 RX ADMIN — CARVEDILOL 25 MG: 25 TABLET, FILM COATED ORAL at 10:29

## 2018-12-21 RX ADMIN — LOSARTAN POTASSIUM 50 MG: 25 TABLET ORAL at 10:29

## 2018-12-21 RX ADMIN — OXYMETAZOLINE HYDROCHLORIDE 1 SPRAY: 5 SPRAY NASAL at 16:52

## 2018-12-21 RX ADMIN — FUROSEMIDE 40 MG: 10 INJECTION, SOLUTION INTRAMUSCULAR; INTRAVENOUS at 12:30

## 2018-12-21 RX ADMIN — FAMOTIDINE 20 MG: 20 TABLET ORAL at 10:28

## 2018-12-21 RX ADMIN — Medication 10 ML: at 20:21

## 2018-12-21 RX ADMIN — FLECAINIDE ACETATE 100 MG: 50 TABLET ORAL at 20:19

## 2018-12-21 RX ADMIN — POTASSIUM CHLORIDE: 2 INJECTION, SOLUTION, CONCENTRATE INTRAVENOUS at 10:29

## 2018-12-21 RX ADMIN — LACTULOSE 20 G: 20 SOLUTION ORAL at 20:21

## 2018-12-21 RX ADMIN — DOCUSATE SODIUM 100 MG: 100 CAPSULE, LIQUID FILLED ORAL at 16:51

## 2018-12-21 RX ADMIN — INSULIN LISPRO 1 UNITS: 100 INJECTION, SOLUTION INTRAVENOUS; SUBCUTANEOUS at 23:09

## 2018-12-21 RX ADMIN — IPRATROPIUM BROMIDE AND ALBUTEROL SULFATE 1 AMPULE: .5; 3 SOLUTION RESPIRATORY (INHALATION) at 07:39

## 2018-12-21 RX ADMIN — FAMOTIDINE 20 MG: 20 TABLET ORAL at 20:19

## 2018-12-21 RX ADMIN — BENZOCAINE, BUTAMBEN, AND TETRACAINE HYDROCHLORIDE 1 SPRAY: .028; .004; .004 AEROSOL, SPRAY TOPICAL at 17:23

## 2018-12-21 RX ADMIN — ENOXAPARIN SODIUM 40 MG: 40 INJECTION SUBCUTANEOUS at 20:19

## 2018-12-21 RX ADMIN — IPRATROPIUM BROMIDE AND ALBUTEROL SULFATE 1 AMPULE: .5; 3 SOLUTION RESPIRATORY (INHALATION) at 20:55

## 2018-12-21 RX ADMIN — FLECAINIDE ACETATE 100 MG: 50 TABLET ORAL at 10:28

## 2018-12-21 RX ADMIN — METFORMIN HYDROCHLORIDE 500 MG: 500 TABLET ORAL at 10:28

## 2018-12-21 RX ADMIN — FERROUS GLUCONATE TAB 324 MG (37.5 MG ELEMENTAL IRON) 324 MG: 324 (37.5 FE) TAB at 11:04

## 2018-12-21 RX ADMIN — POTASSIUM CHLORIDE: 2 INJECTION, SOLUTION, CONCENTRATE INTRAVENOUS at 01:53

## 2018-12-21 RX ADMIN — FUROSEMIDE 20 MG: 10 INJECTION, SOLUTION INTRAMUSCULAR; INTRAVENOUS at 18:11

## 2018-12-21 RX ADMIN — LACTULOSE 20 G: 20 SOLUTION ORAL at 16:51

## 2018-12-21 RX ADMIN — AMLODIPINE BESYLATE 10 MG: 5 TABLET ORAL at 10:28

## 2018-12-21 RX ADMIN — OXYCODONE HYDROCHLORIDE AND ACETAMINOPHEN 500 MG: 500 TABLET ORAL at 10:29

## 2018-12-21 RX ADMIN — Medication 10 ML: at 10:29

## 2018-12-21 RX ADMIN — DIGOXIN 250 MCG: 0.25 INJECTION INTRAMUSCULAR; INTRAVENOUS at 20:20

## 2018-12-21 RX ADMIN — IPRATROPIUM BROMIDE AND ALBUTEROL SULFATE 1 AMPULE: .5; 3 SOLUTION RESPIRATORY (INHALATION) at 15:39

## 2018-12-21 RX ADMIN — DIGOXIN 250 MCG: 0.25 INJECTION INTRAMUSCULAR; INTRAVENOUS at 16:51

## 2018-12-21 RX ADMIN — ACETAMINOPHEN 650 MG: 325 TABLET, FILM COATED ORAL at 11:04

## 2018-12-21 ASSESSMENT — PAIN SCALES - GENERAL
PAINLEVEL_OUTOF10: 0
PAINLEVEL_OUTOF10: 5
PAINLEVEL_OUTOF10: 4
PAINLEVEL_OUTOF10: 0

## 2018-12-21 NOTE — PROGRESS NOTES
Resp 20   Ht 5' 5\" (1.651 m)   Wt 172 lb (78 kg)   SpO2 90%   BMI 28.62 kg/m²     General appearance: No apparent distress, appears stated age and cooperative. HEENT: Pupils equal, round, and reactive to light. Conjunctivae/corneas clear. Neck: Supple, with full range of motion. No jugular venous distention. Trachea midline. Respiratory:  Rales at the bases wheeze noted. No rhonchi. Cardiovascular: Regular rate and rhythm with normal S1/S2 without murmurs, rubs or gallops. Abdomen: distended, mildly tender to palpation. Musculoskeletal: No clubbing, cyanosis or edema bilaterally. Full range of motion without deformity. Skin: Skin color, texture, turgor normal.  No rashes or lesions. Neurologic:  Neurovascularly intact without any focal sensory/motor deficits. Cranial nerves: II-XII intact, grossly non-focal.  Psychiatric: Alert and oriented, thought content appropriate, normal insight  Capillary Refill: Brisk,< 3 seconds   Peripheral Pulses: +2 palpable, equal bilaterally       Labs:   Recent Labs      12/19/18   0533  12/19/18   1009  12/20/18   0531  12/21/18   0601   WBC  7.4   --   9.0  8.8   HGB  8.7*  8.8*  8.4*  9.2*   HCT  28.1*  28.3*  27.7*  26.9*  30.0*   PLT  392   --   383  441     Recent Labs      12/19/18   0533  12/20/18   0531  12/21/18   0601   NA  140  140  141   K  3.9  4.0  4.0   CL  104  104  107   CO2  27  24  25   BUN  18  20  20   CREATININE  0.6  0.7  0.7   CALCIUM  8.7  8.5  9.1     Recent Labs      12/19/18   0533  12/20/18   0531  12/21/18   0601   AST  367*  192*  77*   ALT  282*  278*  206*   BILITOT  2.1*  0.6  0.4   ALKPHOS  181*  182*  187*     No results for input(s): INR in the last 72 hours. No results for input(s): Tw[place]lla Force in the last 72 hours.     Urinalysis:    Lab Results   Component Value Date    NITRU Negative 12/18/2018    BLOODU Negative 12/18/2018    SPECGRAV >1.030 12/18/2018    GLUCOSEU 100 12/18/2018       Radiology:  [place] CHOLANGIOGRAM OR

## 2018-12-21 NOTE — PROGRESS NOTES
RESPIRATORY THERAPY ASSESSMENT    Name:  [address] [place] Record Number:  [id number]  Age: 70 y.o. Gender: female  : 1940  Today's Date:  2018  Room:  [place] [id number]    Assessment   Patient Admission Diagnosis      Allergies  Allergies   Allergen Reactions    Cipro Xr     Lyrica [Pregabalin]      shaking    Penicillins     Sulfa Antibiotics        Minimum Predicted Vital Capacity:     ashleigh          Actual Vital Capacity:      ashleigh          Pulmonary History: COPD   Home Oxygen Therapy:  room air  Home Respiratory Therapy: disha minaya  Current Respiratory Therapy:  QID Duoneb   Treatment Type: HHN  Medications: Albuterol/Ipratropium    Respiratory Severity Index(RSI)   Patients with orders for inhalation medications, oxygen, or any therapeutic treatment modality will be placed on Respiratory Protocol. They will be assessed with the first treatment and at least every 72 hours thereafter. The following severity scale will be used to determine frequency of treatment intervention.     Smoking History: Severe Exacerbation = 4    Social History  Social History   Substance Use Topics    Smoking status: Former Smoker     Quit date: 10/28/2001    Smokeless tobacco: Never Used    Alcohol use No       Recent Surgical History: None = 0  Past Surgical History  Past Surgical History:   Procedure Laterality Date    BACK SURGERY  ,     lumbar laminectomy    BRONCHOSCOPY  2018    CHOLECYSTECTOMY, LAPAROSCOPIC N/A 2018    LAPAROSCOPIC CHOLECYSTECTOMY WITH CHOLANGIOGRAM performed by Rocio Alvarado MD at [facility]      [place] [address] INCL FLUOR GDNCE DX W/CELL WASHG [address] [facility] N/A 2018    BRONCHOSCOPY WITH LAVAGE performed by Alva Oliva MD at [address] [place] [address]       Level of Consciousness: Alert, Oriented, and Cooperative = 0    Level of Activity: Walking with assistance = 1    Respiratory Pattern: Use of accessory

## 2018-12-21 NOTE — CONSULTS
[facility] Pulmonary and Critical Care   Consult Note      Reason for Consult: Acute respiratory failure requiring high flow O2  Requesting Physician: Elzbieta Reason    Subjective:   CHIEF COMPLAINT:  Respiratory distress and hypoxia, abdominal distention/ileus     HPI: Patient was originally admitted with abdominal pain and vomiting, noted to have cholecystitis. Patient is status post lap cholecystectomy on 12/19 which revealed a gangrenous gallbladder. As per daughter patient has had abdominal pain/distention for over 2 weeks prior to presentation. Had severe constipation for several days. Patient noted to be increasingly dyspneic this a.m.-with increasing O2 requirements and hence transferred over to for use of Vapotherm for hypoxia. Is increased work of breathing and dyspnea, denies any chest pain. Has cough with mucoid phlegm. No fevers of note. Has abdominal distention and nausea, poor appetite. History of coronary artery disease it is post stenting, history of COPD, severe with FEV1 of 1.13 L [50% predicted]. Former smoker. Previously noted to have left hilar mass-slowly progressive over the last year, recent bronchoscopy done by Dr. Richmond Olivares on 12/4, BAL was negative for malignancy.        The patient is a 70 y.o. female with significant past medical history of:      Diagnosis Date    Arthritis     CAD (coronary artery disease)     Carpal tunnel syndrome     COPD (chronic obstructive pulmonary disease) ([address]     Coronary stent     depression     Diabetes mellitus ([address]     GERD (gastroesophageal reflux disease)     Hyperlipidemia     Hypertension     MI (myocardial infarction) ([address]     LIZETT (obstructive sleep apnea)     does not use CPAP    PONV (postoperative nausea and vomiting)     stress incontinence         Past Surgical History:        Procedure Laterality Date    BACK SURGERY  2000, 2001    lumbar laminectomy    BRONCHOSCOPY  12/04/2018    CHOLECYSTECTOMY, LAPAROSCOPIC N/A 12/19/2018    LAPAROSCOPIC CHOLECYSTECTOMY WITH CHOLANGIOGRAM performed by Mirella Baer MD at [address] [place] [address] [address] INCL FLUOR GDNCE DX W/CELL WASHG 100 [facility] N/A 12/4/2018    BRONCHOSCOPY WITH LAVAGE performed by Brayden Joshua MD at [address] [place] [address]     Current Medications:    Current Facility-Administered Medications: oxymetazoline (AFRIN) 0.05 % nasal spray 1 spray, 1 spray, Each Nare, Once  lidocaine (XYLOCAINE) 2 % jelly, , Topical, Once  docusate sodium (COLACE) capsule 100 mg, 100 mg, Oral, Daily  lactulose (CHRONULAC) 10 GM/15ML solution 20 g, 20 g, Oral, BID  ipratropium-albuterol (DUONEB) nebulizer solution 1 ampule, 1 ampule, Inhalation, BID  ondansetron (ZOFRAN) injection 4 mg, 4 mg, Intravenous, Q30 Min PRN  acetaminophen (TYLENOL) tablet 650 mg, 650 mg, Oral, Q4H PRN  carvedilol (COREG) tablet 25 mg, 25 mg, Oral, BID WC  flecainide (TAMBOCOR) tablet 100 mg, 100 mg, Oral, BID  amLODIPine (NORVASC) tablet 10 mg, 10 mg, Oral, Daily  vitamin C (ASCORBIC ACID) tablet 500 mg, 500 mg, Oral, Daily  metFORMIN (GLUCOPHAGE) tablet 500 mg, 500 mg, Oral, Daily with breakfast  losartan (COZAAR) tablet 50 mg, 50 mg, Oral, Daily  ferrous gluconate 324 (37.5 Fe) MG tablet 324 mg, 324 mg, Oral, Daily with breakfast  Fluticasone-Umeclidin-Vilant 100-62.5-25 MCG/INH AEPB 1 puff PATIENT SUPPLIED, 1 puff, Inhalation, Daily  sodium chloride flush 0.9 % injection 10 mL, 10 mL, Intravenous, 2 times per day  sodium chloride flush 0.9 % injection 10 mL, 10 mL, Intravenous, PRN  magnesium hydroxide (MILK OF MAGNESIA) 400 MG/5ML suspension 30 mL, 30 mL, Oral, Daily PRN  ondansetron (ZOFRAN) injection 4 mg, 4 mg, Intravenous, Q6H PRN  famotidine (PEPCID) tablet 20 mg, 20 mg, Oral, BID  enoxaparin (LOVENOX) injection 40 mg, 40 mg, Subcutaneous, Nightly  meropenem (MERREM) 1 g in sodium chloride 0.9 % 100 mL IVPB (mini-bag), 1 g, Intravenous, Q24H  citalopram (CELEXA)

## 2018-12-21 NOTE — PROGRESS NOTES
12/21/2018    EOSABS 0.2 12/21/2018    BASOSABS 0.1 12/21/2018    DIFFTYPE Auto 10/29/2012     CMP:    Lab Results   Component Value Date     12/21/2018    K 4.0 12/21/2018    K 3.9 12/19/2018     12/21/2018    CO2 25 12/21/2018    BUN 20 12/21/2018    CREATININE 0.7 12/21/2018    GFRAA >60 12/21/2018    GFRAA >60 10/29/2012    AGRATIO 0.9 12/21/2018    LABGLOM >60 12/21/2018    GLUCOSE 128 12/21/2018    PROT 7.3 12/21/2018    LABALBU 3.5 12/21/2018    CALCIUM 9.1 12/21/2018    BILITOT 0.4 12/21/2018    ALKPHOS 187 12/21/2018    AST 77 12/21/2018     12/21/2018     Hepatic Function Panel:    Lab Results   Component Value Date    ALKPHOS 187 12/21/2018     12/21/2018    AST 77 12/21/2018    PROT 7.3 12/21/2018    BILITOT 0.4 12/21/2018    LABALBU 3.5 12/21/2018       ASSESSMENT AND PLAN    Acute respiratory failure  ? ileus  afib with RVR  Acute cholecyctitis  Anemia- H/H down to 8.8 from 11 on admission    Possible lung CA with left hilar mass  Patient Active Problem List   Diagnosis Code    Diabetes mellitus ([address] E11.9    Dyslipidemia E78.5    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    Essential hypertension, benign I10    Coronary atherosclerosis of native coronary artery I25.10    Sleep apnea G47.30    Diabetes mellitus (HCC) E11.9    Chest pain R07.9    Carpal tunnel syndrome G56.00    SOB (shortness of breath) R06.02    COPD, 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    Influenza J11.1    Nausea R11.0    Paroxysmal atrial fibrillation (HCC) I48.0    Chronic cough R05    Pulmonary nodule R91.1    Acute cholecystitis K81.0    Gallstones and inflammation of gallbladder without obstruction K80.00     transfer to ICU      Dieudonne Morgan M.D.

## 2018-12-21 NOTE — PROGRESS NOTES
[facility] GI  Gastroenterology Progress Note    Carlos Lombardo is a 70 y.o. female patient. 1. Acute right-sided low back pain without sciatica    2. Acute pancreatitis, unspecified complication status, unspecified pancreatitis type    3. Chest pain, unspecified type    4. Atrial fibrillation with rapid ventricular response ([address]    5. Acute cholecystitis    6. Hilar mass        SUBJECTIVE:  Pt was seen earlier this morning and did not appear SOB at that time. She has since been transferred to the ICU for SOB    ROS:  Cardiovascular ROS: no chest pain or dyspnea on exertion  Gastrointestinal ROS: no abdominal pain, change in bowel habits, or black or bloody stools  Respiratory ROS: no cough, shortness of breath, or wheezing    Physical    VITALS:  BP (!) 123/57   Pulse 120   Temp 98.1 °F (36.7 °C) (Temporal)   Resp 30   Ht 5' 5\" (1.651 m)   Wt 182 lb 1.6 oz (82.6 kg)   SpO2 97%   BMI 30.30 kg/m²   TEMPERATURE:  Current - Temp: 98.1 °F (36.7 °C); Max - Temp  Av.7 °F (36.5 °C)  Min: 97.4 °F (36.3 °C)  Max: 98.1 °F (36.7 °C)    NAD  RRR, Nl s1s2  Lungs CTA Bilaterally, normal effort  Abdomen soft, mild distention, postop tenderness, no HSM, Bowel sounds normal  AAOx3, No asterixis     Data    CBC:   Lab Results   Component Value Date    WBC 8.8 2018    RBC 3.46 2018    HGB 9.2 2018    HCT 30.0 2018    MCV 86.6 2018    MCH 26.6 2018    MCHC 30.6 2018    RDW 15.7 2018     2018    MPV 7.6 2018     Hepatic Function Panel:    Lab Results   Component Value Date    ALKPHOS 187 2018     2018    AST 77 2018    PROT 7.3 2018    BILITOT 0.4 2018         ASSESSMENT     Anemia: current level is the same as earlier this month. Normocytic indices. No signs of GI blood loss. Fe studies more c/w anemia of chronic disease. Nl retic. B12 normal. EGD earlier this year unremarkable. Remote colon.      Abnl LFT: improving s/p surgery. IOC normal      PLAN    Nothing further to add this admission regarding the anemia. Could consider a colonoscopy at some point later once current med issues resolved.    Dr Nuno Schulte will be rounding this weekend if needed further    Teresita Mcwilliams MD

## 2018-12-22 LAB
ANION GAP SERPL CALCULATED.3IONS-SCNC: 12 MMOL/L (ref 3–16)
ANION GAP SERPL CALCULATED.3IONS-SCNC: 16 MMOL/L (ref 3–16)
BASOPHILS ABSOLUTE: 0 K/UL (ref 0–0.2)
BASOPHILS RELATIVE PERCENT: 0.2 %
BILIRUBIN URINE: NEGATIVE
BLOOD, URINE: ABNORMAL
BUN BLDV-MCNC: 13 MG/DL (ref 7–20)
BUN BLDV-MCNC: 13 MG/DL (ref 7–20)
CALCIUM SERPL-MCNC: 9.1 MG/DL (ref 8.3–10.6)
CALCIUM SERPL-MCNC: 9.2 MG/DL (ref 8.3–10.6)
CHLORIDE BLD-SCNC: 93 MMOL/L (ref 99–110)
CHLORIDE BLD-SCNC: 94 MMOL/L (ref 99–110)
CLARITY: CLEAR
CO2: 29 MMOL/L (ref 21–32)
CO2: 32 MMOL/L (ref 21–32)
COLOR: YELLOW
CREAT SERPL-MCNC: 0.7 MG/DL (ref 0.6–1.2)
CREAT SERPL-MCNC: <0.5 MG/DL (ref 0.6–1.2)
EOSINOPHILS ABSOLUTE: 0.1 K/UL (ref 0–0.6)
EOSINOPHILS RELATIVE PERCENT: 1.4 %
EPITHELIAL CELLS, UA: 0 /HPF (ref 0–5)
GFR AFRICAN AMERICAN: >60
GFR AFRICAN AMERICAN: >60
GFR NON-AFRICAN AMERICAN: >60
GFR NON-AFRICAN AMERICAN: >60
GLUCOSE BLD-MCNC: 138 MG/DL (ref 70–99)
GLUCOSE BLD-MCNC: 138 MG/DL (ref 70–99)
GLUCOSE BLD-MCNC: 147 MG/DL (ref 70–99)
GLUCOSE BLD-MCNC: 152 MG/DL (ref 70–99)
GLUCOSE BLD-MCNC: 176 MG/DL (ref 70–99)
GLUCOSE BLD-MCNC: 183 MG/DL (ref 70–99)
GLUCOSE BLD-MCNC: 298 MG/DL (ref 70–99)
GLUCOSE URINE: NEGATIVE MG/DL
HCT VFR BLD CALC: 32.6 % (ref 36–48)
HEMOGLOBIN: 10.2 G/DL (ref 12–16)
HYALINE CASTS: 2 /LPF (ref 0–8)
KETONES, URINE: 40 MG/DL
LEUKOCYTE ESTERASE, URINE: NEGATIVE
LYMPHOCYTES ABSOLUTE: 1.5 K/UL (ref 1–5.1)
LYMPHOCYTES RELATIVE PERCENT: 14.3 %
MAGNESIUM: 1.4 MG/DL (ref 1.8–2.4)
MAGNESIUM: 1.8 MG/DL (ref 1.8–2.4)
MCH RBC QN AUTO: 26.4 PG (ref 26–34)
MCHC RBC AUTO-ENTMCNC: 31.4 G/DL (ref 31–36)
MCV RBC AUTO: 84.3 FL (ref 80–100)
MICROSCOPIC EXAMINATION: YES
MONOCYTES ABSOLUTE: 0.7 K/UL (ref 0–1.3)
MONOCYTES RELATIVE PERCENT: 7.1 %
NEUTROPHILS ABSOLUTE: 8 K/UL (ref 1.7–7.7)
NEUTROPHILS RELATIVE PERCENT: 77 %
NITRITE, URINE: NEGATIVE
PDW BLD-RTO: 15.4 % (ref 12.4–15.4)
PERFORMED ON: ABNORMAL
PH UA: 6.5
PLATELET # BLD: 468 K/UL (ref 135–450)
PMV BLD AUTO: 8.6 FL (ref 5–10.5)
POTASSIUM REFLEX MAGNESIUM: 3.3 MMOL/L (ref 3.5–5.1)
POTASSIUM REFLEX MAGNESIUM: 3.3 MMOL/L (ref 3.5–5.1)
PROTEIN UA: NEGATIVE MG/DL
RBC # BLD: 3.87 M/UL (ref 4–5.2)
RBC UA: 4 /HPF (ref 0–4)
SODIUM BLD-SCNC: 137 MMOL/L (ref 136–145)
SODIUM BLD-SCNC: 139 MMOL/L (ref 136–145)
SPECIFIC GRAVITY UA: 1.01
TROPONIN: <0.01 NG/ML
URINE REFLEX TO CULTURE: ABNORMAL
URINE TYPE: ABNORMAL
UROBILINOGEN, URINE: 0.2 E.U./DL
WBC # BLD: 10.4 K/UL (ref 4–11)
WBC UA: 2 /HPF (ref 0–5)

## 2018-12-22 PROCEDURE — 6370000000 HC RX 637 (ALT 250 FOR IP): Performed by: SURGERY

## 2018-12-22 PROCEDURE — 6370000000 HC RX 637 (ALT 250 FOR IP): Performed by: INTERNAL MEDICINE

## 2018-12-22 PROCEDURE — 2580000003 HC RX 258: Performed by: INTERNAL MEDICINE

## 2018-12-22 PROCEDURE — 83735 ASSAY OF MAGNESIUM: CPT

## 2018-12-22 PROCEDURE — 85025 COMPLETE CBC W/AUTO DIFF WBC: CPT

## 2018-12-22 PROCEDURE — 36415 COLL VENOUS BLD VENIPUNCTURE: CPT

## 2018-12-22 PROCEDURE — 6360000002 HC RX W HCPCS: Performed by: INTERNAL MEDICINE

## 2018-12-22 PROCEDURE — 99024 POSTOP FOLLOW-UP VISIT: CPT | Performed by: SURGERY

## 2018-12-22 PROCEDURE — 2700000000 HC OXYGEN THERAPY PER DAY

## 2018-12-22 PROCEDURE — 94762 N-INVAS EAR/PLS OXIMTRY CONT: CPT

## 2018-12-22 PROCEDURE — 1200000000 HC SEMI PRIVATE

## 2018-12-22 PROCEDURE — 84484 ASSAY OF TROPONIN QUANT: CPT

## 2018-12-22 PROCEDURE — 80048 BASIC METABOLIC PNL TOTAL CA: CPT

## 2018-12-22 PROCEDURE — 94640 AIRWAY INHALATION TREATMENT: CPT

## 2018-12-22 PROCEDURE — 99233 SBSQ HOSP IP/OBS HIGH 50: CPT | Performed by: INTERNAL MEDICINE

## 2018-12-22 PROCEDURE — 99232 SBSQ HOSP IP/OBS MODERATE 35: CPT | Performed by: INTERNAL MEDICINE

## 2018-12-22 PROCEDURE — 6360000002 HC RX W HCPCS: Performed by: SURGERY

## 2018-12-22 PROCEDURE — 81001 URINALYSIS AUTO W/SCOPE: CPT

## 2018-12-22 RX ORDER — BISACODYL 10 MG
10 SUPPOSITORY, RECTAL RECTAL ONCE
Status: COMPLETED | OUTPATIENT
Start: 2018-12-22 | End: 2018-12-22

## 2018-12-22 RX ORDER — POTASSIUM CHLORIDE 7.45 MG/ML
10 INJECTION INTRAVENOUS PRN
Status: DISCONTINUED | OUTPATIENT
Start: 2018-12-22 | End: 2018-12-26 | Stop reason: HOSPADM

## 2018-12-22 RX ORDER — METOCLOPRAMIDE HYDROCHLORIDE 5 MG/ML
10 INJECTION INTRAMUSCULAR; INTRAVENOUS EVERY 6 HOURS
Status: COMPLETED | OUTPATIENT
Start: 2018-12-22 | End: 2018-12-23

## 2018-12-22 RX ORDER — DIGOXIN 125 MCG
125 TABLET ORAL DAILY
Status: DISCONTINUED | OUTPATIENT
Start: 2018-12-23 | End: 2018-12-26 | Stop reason: HOSPADM

## 2018-12-22 RX ORDER — HYDRALAZINE HYDROCHLORIDE 20 MG/ML
10 INJECTION INTRAMUSCULAR; INTRAVENOUS EVERY 8 HOURS PRN
Status: DISCONTINUED | OUTPATIENT
Start: 2018-12-22 | End: 2018-12-26 | Stop reason: HOSPADM

## 2018-12-22 RX ORDER — MAGNESIUM SULFATE IN WATER 40 MG/ML
2 INJECTION, SOLUTION INTRAVENOUS ONCE
Status: COMPLETED | OUTPATIENT
Start: 2018-12-22 | End: 2018-12-22

## 2018-12-22 RX ORDER — POTASSIUM CHLORIDE 20 MEQ/1
40 TABLET, EXTENDED RELEASE ORAL PRN
Status: DISCONTINUED | OUTPATIENT
Start: 2018-12-22 | End: 2018-12-26 | Stop reason: HOSPADM

## 2018-12-22 RX ADMIN — DIGOXIN 250 MCG: 0.25 INJECTION INTRAMUSCULAR; INTRAVENOUS at 09:08

## 2018-12-22 RX ADMIN — DOCUSATE SODIUM 100 MG: 100 CAPSULE, LIQUID FILLED ORAL at 09:07

## 2018-12-22 RX ADMIN — CARVEDILOL 25 MG: 25 TABLET, FILM COATED ORAL at 09:08

## 2018-12-22 RX ADMIN — FAMOTIDINE 20 MG: 20 TABLET ORAL at 09:08

## 2018-12-22 RX ADMIN — Medication 10 ML: at 22:40

## 2018-12-22 RX ADMIN — OXYCODONE HYDROCHLORIDE AND ACETAMINOPHEN 500 MG: 500 TABLET ORAL at 09:08

## 2018-12-22 RX ADMIN — MAGNESIUM SULFATE HEPTAHYDRATE 2 G: 40 INJECTION, SOLUTION INTRAVENOUS at 10:14

## 2018-12-22 RX ADMIN — IPRATROPIUM BROMIDE AND ALBUTEROL SULFATE 1 AMPULE: .5; 3 SOLUTION RESPIRATORY (INHALATION) at 08:49

## 2018-12-22 RX ADMIN — FAMOTIDINE 20 MG: 20 TABLET ORAL at 20:56

## 2018-12-22 RX ADMIN — FUROSEMIDE 20 MG: 10 INJECTION, SOLUTION INTRAMUSCULAR; INTRAVENOUS at 10:30

## 2018-12-22 RX ADMIN — MEROPENEM 1 G: 1 INJECTION, POWDER, FOR SOLUTION INTRAVENOUS at 09:17

## 2018-12-22 RX ADMIN — FLECAINIDE ACETATE 100 MG: 50 TABLET ORAL at 21:04

## 2018-12-22 RX ADMIN — IPRATROPIUM BROMIDE AND ALBUTEROL SULFATE 1 AMPULE: .5; 3 SOLUTION RESPIRATORY (INHALATION) at 13:27

## 2018-12-22 RX ADMIN — INSULIN LISPRO 1 UNITS: 100 INJECTION, SOLUTION INTRAVENOUS; SUBCUTANEOUS at 20:57

## 2018-12-22 RX ADMIN — BISACODYL 10 MG: 10 SUPPOSITORY RECTAL at 12:06

## 2018-12-22 RX ADMIN — Medication 10 ML: at 09:07

## 2018-12-22 RX ADMIN — FLECAINIDE ACETATE 100 MG: 50 TABLET ORAL at 09:07

## 2018-12-22 RX ADMIN — ENOXAPARIN SODIUM 40 MG: 40 INJECTION SUBCUTANEOUS at 20:56

## 2018-12-22 RX ADMIN — FERROUS GLUCONATE TAB 324 MG (37.5 MG ELEMENTAL IRON) 324 MG: 324 (37.5 FE) TAB at 09:08

## 2018-12-22 RX ADMIN — POTASSIUM CHLORIDE 40 MEQ: 1500 TABLET, EXTENDED RELEASE ORAL at 17:41

## 2018-12-22 RX ADMIN — IPRATROPIUM BROMIDE AND ALBUTEROL SULFATE 1 AMPULE: .5; 3 SOLUTION RESPIRATORY (INHALATION) at 19:52

## 2018-12-22 RX ADMIN — METOCLOPRAMIDE 10 MG: 5 INJECTION, SOLUTION INTRAMUSCULAR; INTRAVENOUS at 12:06

## 2018-12-22 RX ADMIN — IPRATROPIUM BROMIDE AND ALBUTEROL SULFATE 1 AMPULE: .5; 3 SOLUTION RESPIRATORY (INHALATION) at 16:48

## 2018-12-22 RX ADMIN — METOCLOPRAMIDE 10 MG: 5 INJECTION, SOLUTION INTRAMUSCULAR; INTRAVENOUS at 17:41

## 2018-12-22 RX ADMIN — LOSARTAN POTASSIUM 50 MG: 25 TABLET ORAL at 12:06

## 2018-12-22 RX ADMIN — CARVEDILOL 25 MG: 25 TABLET, FILM COATED ORAL at 17:41

## 2018-12-22 RX ADMIN — INSULIN LISPRO 2 UNITS: 100 INJECTION, SOLUTION INTRAVENOUS; SUBCUTANEOUS at 17:43

## 2018-12-22 RX ADMIN — CITALOPRAM HYDROBROMIDE 20 MG: 20 TABLET ORAL at 09:07

## 2018-12-22 RX ADMIN — POTASSIUM CHLORIDE 40 MEQ: 1500 TABLET, EXTENDED RELEASE ORAL at 10:30

## 2018-12-22 RX ADMIN — FUROSEMIDE 20 MG: 10 INJECTION, SOLUTION INTRAMUSCULAR; INTRAVENOUS at 17:41

## 2018-12-22 RX ADMIN — AMLODIPINE BESYLATE 10 MG: 5 TABLET ORAL at 09:08

## 2018-12-22 ASSESSMENT — PAIN SCALES - GENERAL
PAINLEVEL_OUTOF10: 0
PAINLEVEL_OUTOF10: 0
PAINLEVEL_OUTOF10: 3
PAINLEVEL_OUTOF10: 0
PAINLEVEL_OUTOF10: 3
PAINLEVEL_OUTOF10: 0
PAINLEVEL_OUTOF10: 3

## 2018-12-22 ASSESSMENT — PAIN DESCRIPTION - DESCRIPTORS
DESCRIPTORS: ACHING;SORE
DESCRIPTORS: ACHING;SORE
DESCRIPTORS: ACHING

## 2018-12-22 ASSESSMENT — PAIN DESCRIPTION - LOCATION
LOCATION: ABDOMEN

## 2018-12-22 ASSESSMENT — PAIN DESCRIPTION - PAIN TYPE
TYPE: SURGICAL PAIN

## 2018-12-22 ASSESSMENT — PAIN DESCRIPTION - ORIENTATION: ORIENTATION: UPPER

## 2018-12-22 NOTE — PROGRESS NOTES
for abdominal pain, constipation, diarrhea, jaundice, melena, odynophagia, reflux symptoms and vomiting  Hematologic/lymphatic: negative for bleeding, easy bruising, lymphadenopathy and petechiae  Musculoskeletal:negative for arthralgias, bone pain, muscle weakness, neck pain and stiff joints  Neurological: negative for dizziness, gait problems, headaches, seizures, speech problems, tremors and weakness  Behavioral/Psych: negative for anxiety, behavior problems, depression, fatigue and sleep disturbance  Endocrine: negative for diabetic symptoms including none, neuropathy, polyphagia, polyuria, polydipsia, vomiting and diarrhea and temperature intolerance  Allergic/Immunologic: negative for anaphylaxis, angioedema, hay fever and urticaria    Objective:     Patient Vitals for the past 8 hrs:   BP Temp Temp src Pulse Resp SpO2   12/22/18 1000 (!) 142/47 - - 91 17 96 %   12/22/18 0906 (!) 156/55 - - 81 23 96 %   12/22/18 0849 - - - - 20 99 %   12/22/18 0800 139/75 98.9 °F (37.2 °C) Temporal 108 22 98 %   12/22/18 0616 129/67 - - 98 22 100 %   12/22/18 0601 (!) 146/99 - - 99 21 98 %   12/22/18 0433 (!) 154/65 97.7 °F (36.5 °C) Temporal 79 21 99 %   12/22/18 0407 (!) 174/59 - - 104 23 99 %   12/22/18 0404 (!) 164/62 - - 85 25 99 %   12/22/18 0401 (!) 185/62 - - 101 25 98 %     I/O last 3 completed shifts: In: 240 [P.O.:240]  Out: 4085 [Urine:4085]  No intake/output data recorded.     General Appearance: alert and oriented to person, place and time, well developed and well- nourished, in no acute distress  Skin: warm and dry, no rash or erythema  Head: normocephalic and atraumatic  Eyes: pupils equal, round, and reactive to light, extraocular eye movements intact, conjunctivae normal  ENT: external ear and ear canal normal bilaterally, nose without deformity, nasal mucosa and turbinates normal  Neck: supple and non-tender without mass, no cervical lymphadenopathy  Pulmonary/Chest: clear to auscultation bilaterally- no wheezes, rales or rhonchi, normal air movement, no respiratory distress  Cardiovascular: normal rate, regular rhythm,  no murmurs, rubs, distal pulses intact, no carotid bruits  Abdomen: soft, non-tender, non-distended, normal bowel sounds, no masses or organomegaly  Lymph Nodes: Cervical, supraclavicular normal  Extremities: no cyanosis, clubbing or edema  Musculoskeletal: normal range of motion, no joint swelling, deformity or tenderness  Neurologic: alert, no focal neurologic deficits    Data Review:  CBC: Lab Results   Component Value Date    WBC 10.4 12/22/2018    RBC 3.87 12/22/2018     BMP: Lab Results   Component Value Date    GLUCOSE 152 12/22/2018    CO2 29 12/22/2018    BUN 13 12/22/2018    CREATININE <0.5 12/22/2018    CALCIUM 9.2 12/22/2018     ABG: No results found for: LHS2SCM, BEART, F9IVJTSJ, PHART, THGBART, INK5BNO, PO2ART, WHO2WAZ    Radiology: All pertinent images / reports were reviewed as a part of this visit. Problem List:     Acute gangrenous cholecystitis status post laparoscopic cholecystectomy  Constipation/ileus  Volume overload  Severe COPD    Assessment/Plan:     Acute hypoxic respiratory failure, improved. Possibly related to a combination of volume overload + abdominal distention. Had nearly 4 L of urine output yesterday, agree with continuing Lasix for 1 more day. Monitor electrolytes, replace potassium. Abdominal distention related to constipation/ileus. Patient now passing gas and had some bowel movement, laxatives started yesterday. NG tube output of 1.1 L, patient apparently drank a can of Coke after NG was placed! May be able to remove the NG tube today, since abdomen is soft, will await surgical review. Hopefully we should be able to start feeding the patient orally. Atrial fibrillation with [id number] episodes. Currently on IV digitoxin and Coreg. Left hilar mass, will need further evaluation with bronchoscopy/EBUS at a later date.     Discussed with

## 2018-12-22 NOTE — PLAN OF CARE
Problem: Falls - Risk of:  Goal: Will remain free from falls  Will remain free from falls    Outcome: Ongoing  Pt remains free of falls. Fall risk protocol in place. Bed locked in lowest position. Call light in reach. Pt instructed to call for assistance, verbalizes understanding. Will continue to monitor. Problem: Breathing Pattern - Ineffective: Intervention: Assess signs and symptoms of ineffective breathing pattern  Respiratory rate and effort will be within normal limits for the patient. Pt respiratory rate, effort and breathing pattern assessed every shift and with vitals. Activity tolerance is assessed and monitored throughout shift and when out of bed. Oxygen needs are assessed and adjusted accordingly. Problem: Bowel/Gastric:  Intervention: Assist to bedside commode  Pt able to communicate needs. Call light within reach. Pt up to the beside commode with minimal assistance. Pt able to pass stool effectively without difficulty. Two bowel movements noted this shift thus far. Will continue to monitor. Problem: Tissue Perfusion - Cardiopulmonary, Altered:  Goal: Hemodynamic stability will improve  Hemodynamic stability will improve  Outcome: Ongoing  Pulse rate and rhythm, peripheral pulses, and capillary refill assessed every shift with assessment. General color and body temperature monitored throughout shift and with vitals. Assess for edema with head to toe assessment. Administer treatments and medications as ordered. Monitor patient's weight.

## 2018-12-22 NOTE — PROGRESS NOTES
[facility]        Progress Note    Patient Name: Rajiv Willingham  MRN: [id number]  YOB: 1940  Date of Evaluation: 2018    Chief Complaint: Abdominal pain      Subjective:  No acute events overnight  Pain well controlled  Up and awake in bed      Post-Operative Day #3    Vital Signs:  Patient Vitals for the past 24 hrs:   BP Temp Temp src Pulse Resp SpO2 Height Weight   18 1000 (!) 142/47 - - 91 17 96 % - -   18 0906 (!) 156/55 - - 81 23 96 % - -   18 0849 - - - - 20 99 % - -   18 0800 139/75 98.9 °F (37.2 °C) Temporal 108 22 98 % - -   18 0616 129/67 - - 98 22 100 % - -   18 0601 (!) 146/99 - - 99 21 98 % - -   18 0433 (!) 154/65 97.7 °F (36.5 °C) Temporal 79 21 99 % - -   18 0407 (!) 174/59 - - 104 23 99 % - -   18 0404 (!) 164/62 - - 85 25 99 % - -   18 0401 (!) 185/62 - - 101 25 98 % - -   18 0200 (!) 161/61 - - 83 23 99 % - 172 lb 6.4 oz (78.2 kg)   18 2321 (!) 150/55 98.5 °F (36.9 °C) Temporal 79 20 97 % - -   18 2100 - - - - - 98 % - -   18 2004 (!) 158/68 98.7 °F (37.1 °C) Temporal 100 23 97 % - -   18 1900 - - - - - 90 % - -   18 1800 (!) 142/70 - - 91 23 95 % - -   18 1700 132/69 - - 105 22 94 % - -   18 1650 132/82 - - 104 - - - -   18 1544 - - - - 22 96 % - -   18 1543 - - - - 16 95 % - -   18 1400 121/66 - - 108 21 98 % - -   18 1324 (!) 123/57 - - - - 97 % 5' 5\" (1.651 m) 182 lb 1.6 oz (82.6 kg)   18 1300 (!) 146/84 98.1 °F (36.7 °C) Temporal 120 30 94 % - -      TEMPERATURE HISTORY 24H: Temp (24hrs), Av.4 °F (36.9 °C), Min:97.7 °F (36.5 °C), Max:98.9 °F (37.2 °C)    BLOOD PRESSURE HISTORY: Systolic (76AEQ), FHJ:166 , Min:121 , YCY:680    Diastolic (48UVG), TGT:33, Min:47, Max:99      Intake/Output:  I/O last 3 completed shifts:   In: 240 [P.O.:240]  Out: 4085 [Urine:4085]  No intake/output data recorded. Drain/tube Output:       Physical Exam:  General: awake, alert, oriented to  person, place, time, NG in place  Lungs: RRR  Abdomen: soft, bowel sounds present   Skin/Wound: healing well, no drainage, no erythema, well approximated    Labs:  CBC:    Recent Labs      12/20/18   0531  12/21/18   0601  12/22/18   0439   WBC  9.0  8.8  10.4   HGB  8.4*  9.2*  10.2*   HCT  27.7*  26.9*  30.0*  32.6*   PLT  383  441  468*     BMP:    Recent Labs      12/20/18   0531  12/21/18   0601  12/22/18   0439   NA  140  141  139   K  4.0  4.0  3.3*   CL  104  107  94*   CO2  24  25  29   BUN  20  20  13   CREATININE  0.7  0.7  <0.5*   GLUCOSE  138*  128*  152*     Hepatic:   Recent Labs      12/20/18   0531  12/21/18   0601   AST  192*  77*   ALT  278*  206*   BILITOT  0.6  0.4   ALKPHOS  182*  187*     Amylase: No results for input(s): AMYLASE in the last 72 hours. Lipase:   Recent Labs      12/20/18   0531   LIPASE  26.0     Mag:      Recent Labs      12/21/18   0601  12/22/18   0439   MG  1.80  1.40*      Phos:   No results for input(s): PHOS in the last 72 hours. Coags:   No results for input(s): INR, APTT in the last 72 hours. Cultures:  Anaerobic culture  No results for input(s): LABANAE in the last 72 hours. Blood culture  No results for input(s): BC in the last 72 hours. Blood culture 2  No results for input(s): BLOODCULT2 in the last 72 hours. Body fluid culture  No results for input(s): BLOODCULT2 in the last 72 hours. Surgical culture  No results for input(s): CXSURG in the last 72 hours. Fecal occult  No results for input(s): OCCULTBLDFEC in the last 72 hours. Gram stain  No results for input(s): LABGRAM in the last 72 hours. Stool culture 1  No results for input(s): CXST in the last 72 hours. Stool culture 2  No results for input(s): STOOLCULT2 in the last 72 hours. Urine culture  No results for input(s): LABURIN in the last 72 hours.     Wound abscess  No results for ondansetron, acetaminophen, sodium chloride flush, magnesium hydroxide, ondansetron, glucose, dextrose, glucagon (rDNA), dextrose      Assessment:  70 y.o. female admitted with   1. Acute right-sided low back pain without sciatica    2. Acute pancreatitis, unspecified complication status, unspecified pancreatitis type    3. Chest pain, unspecified type    4. Atrial fibrillation with rapid ventricular response ([address]    5. Acute cholecystitis    6. Hilar mass        Status-post laparoscopic cholecystectomy with intraoperative cholangiogram on 12/19/2018 for acute cholecystitis with hydrops and gangrene of the gallbladder  CAD with prior stents  Paroxysmal atrial fibrillation  COPD  Left hilar mass      Plan:    Cont CP care  DC NG, clears, Reglan and Dulcolax  Hope bernstein out soon  Check FU lft tomorrow    EDUCATION:  Educated patient on plan of care and disease process--all questions answered. Plans discussed with patient and nursing.   .      Signed:  Victoria Mendez

## 2018-12-22 NOTE — PLAN OF CARE
Problem: Falls - Risk of:  Goal: Will remain free from falls  Will remain free from falls    Outcome: Ongoing      Problem: Pain:  Goal: Pain level will decrease  Pain level will decrease   Outcome: Ongoing      Problem: Tissue Perfusion - Cardiopulmonary, Altered:  Goal: Hemodynamic stability will improve  Hemodynamic stability will improve   Outcome: Ongoing      Comments: Monitor blood sugars. Monitor bowel sounds, I/o'. Monitor daily weights. Monitor vital signs, rhythm, and heart rate.

## 2018-12-22 NOTE — PROGRESS NOTES
12/22/18   0439   TROPONINI  <0.01       Urinalysis:      Lab Results   Component Value Date    NITRU Negative 12/18/2018    BLOODU Negative 12/18/2018    SPECGRAV >1.030 12/18/2018    GLUCOSEU 100 12/18/2018       Radiology:  XR ABDOMEN (KUB) (SINGLE AP VIEW)   Final Result   Negative supine abdominal radiograph         XR CHEST PORTABLE   Final Result   Vascular congestion with suggestion of mild interstitial edema. Unchanged bibasilar atelectasis versus pneumonia. FL CHOLANGIOGRAM OR   Final Result   Unremarkable intraoperative cholangiogram.         US GALLBLADDER RUQ   Final Result   1. Cholelithiasis with pericholecystic fluid and the positive sonographic   Real's sign. This is concerning for acute cholecystitis. 2. Gallbladder polyps. 3. Simple cyst at the midpole of the right kidney measuring 1.4 cm. 4. Common bile duct normal in caliber measuring 6 mm. CT CHEST PULMONARY EMBOLISM W CONTRAST   Final Result   Left hilar mass, with adjacent adenopathy in the mediastinum, suspicious for   central lung carcinoma until proven otherwise. There is postobstructive   change in the lingula. No central pulmonary embolus identified      Moderate inflammatory change surrounding the gallbladder suggesting early   cholecystitis         CT ABDOMEN PELVIS W IV CONTRAST Additional Contrast? None   Final Result   Left hilar mass, with adjacent adenopathy in the mediastinum, suspicious for   central lung carcinoma until proven otherwise. There is postobstructive   change in the lingula.       No central pulmonary embolus identified      Moderate inflammatory change surrounding the gallbladder suggesting early   cholecystitis         XR CHEST PORTABLE   Final Result   Linear opacities at the lung bases, increased compared to prior, favored to   represent atelectasis or scarring rather than pneumonia given the bandlike   morphology      Mild cardiomegaly         XR CHEST PORTABLE    (Results

## 2018-12-23 ENCOUNTER — APPOINTMENT (OUTPATIENT)
Dept: GENERAL RADIOLOGY | Age: 78
DRG: 417 | End: 2018-12-23
Payer: MEDICARE

## 2018-12-23 LAB
A/G RATIO: 1 (ref 1.1–2.2)
ALBUMIN SERPL-MCNC: 3.4 G/DL (ref 3.4–5)
ALP BLD-CCNC: 147 U/L (ref 40–129)
ALT SERPL-CCNC: 78 U/L (ref 10–40)
ANION GAP SERPL CALCULATED.3IONS-SCNC: 10 MMOL/L (ref 3–16)
AST SERPL-CCNC: 14 U/L (ref 15–37)
BASOPHILS ABSOLUTE: 0.1 K/UL (ref 0–0.2)
BASOPHILS RELATIVE PERCENT: 0.6 %
BILIRUB SERPL-MCNC: 0.4 MG/DL (ref 0–1)
BILIRUBIN DIRECT: <0.2 MG/DL (ref 0–0.3)
BILIRUBIN, INDIRECT: NORMAL MG/DL (ref 0–1)
BLOOD CULTURE, ROUTINE: NORMAL
BUN BLDV-MCNC: 19 MG/DL (ref 7–20)
CALCIUM SERPL-MCNC: 9 MG/DL (ref 8.3–10.6)
CHLORIDE BLD-SCNC: 97 MMOL/L (ref 99–110)
CO2: 33 MMOL/L (ref 21–32)
CREAT SERPL-MCNC: 0.6 MG/DL (ref 0.6–1.2)
CULTURE, BLOOD 2: NORMAL
EOSINOPHILS ABSOLUTE: 0.2 K/UL (ref 0–0.6)
EOSINOPHILS RELATIVE PERCENT: 2.7 %
GFR AFRICAN AMERICAN: >60
GFR NON-AFRICAN AMERICAN: >60
GLOBULIN: 3.5 G/DL
GLUCOSE BLD-MCNC: 139 MG/DL (ref 70–99)
GLUCOSE BLD-MCNC: 151 MG/DL (ref 70–99)
GLUCOSE BLD-MCNC: 157 MG/DL (ref 70–99)
GLUCOSE BLD-MCNC: 173 MG/DL (ref 70–99)
HCT VFR BLD CALC: 30.4 % (ref 36–48)
HEMOGLOBIN: 9.8 G/DL (ref 12–16)
LYMPHOCYTES ABSOLUTE: 1.9 K/UL (ref 1–5.1)
LYMPHOCYTES RELATIVE PERCENT: 20.8 %
MAGNESIUM: 1.9 MG/DL (ref 1.8–2.4)
MCH RBC QN AUTO: 27.3 PG (ref 26–34)
MCHC RBC AUTO-ENTMCNC: 32.3 G/DL (ref 31–36)
MCV RBC AUTO: 84.7 FL (ref 80–100)
MONOCYTES ABSOLUTE: 0.9 K/UL (ref 0–1.3)
MONOCYTES RELATIVE PERCENT: 9.5 %
NEUTROPHILS ABSOLUTE: 6 K/UL (ref 1.7–7.7)
NEUTROPHILS RELATIVE PERCENT: 66.4 %
PDW BLD-RTO: 15.4 % (ref 12.4–15.4)
PERFORMED ON: ABNORMAL
PLATELET # BLD: 446 K/UL (ref 135–450)
PMV BLD AUTO: 8 FL (ref 5–10.5)
POTASSIUM SERPL-SCNC: 3.8 MMOL/L (ref 3.5–5.1)
RBC # BLD: 3.58 M/UL (ref 4–5.2)
SODIUM BLD-SCNC: 140 MMOL/L (ref 136–145)
TOTAL PROTEIN: 6.9 G/DL (ref 6.4–8.2)
WBC # BLD: 9.1 K/UL (ref 4–11)

## 2018-12-23 PROCEDURE — 94640 AIRWAY INHALATION TREATMENT: CPT

## 2018-12-23 PROCEDURE — 2580000003 HC RX 258

## 2018-12-23 PROCEDURE — 82248 BILIRUBIN DIRECT: CPT

## 2018-12-23 PROCEDURE — 6370000000 HC RX 637 (ALT 250 FOR IP): Performed by: INTERNAL MEDICINE

## 2018-12-23 PROCEDURE — 99232 SBSQ HOSP IP/OBS MODERATE 35: CPT | Performed by: INTERNAL MEDICINE

## 2018-12-23 PROCEDURE — 99233 SBSQ HOSP IP/OBS HIGH 50: CPT | Performed by: INTERNAL MEDICINE

## 2018-12-23 PROCEDURE — 80053 COMPREHEN METABOLIC PANEL: CPT

## 2018-12-23 PROCEDURE — 99024 POSTOP FOLLOW-UP VISIT: CPT | Performed by: SURGERY

## 2018-12-23 PROCEDURE — 94664 DEMO&/EVAL PT USE INHALER: CPT

## 2018-12-23 PROCEDURE — 6360000002 HC RX W HCPCS: Performed by: INTERNAL MEDICINE

## 2018-12-23 PROCEDURE — 36415 COLL VENOUS BLD VENIPUNCTURE: CPT

## 2018-12-23 PROCEDURE — 94762 N-INVAS EAR/PLS OXIMTRY CONT: CPT

## 2018-12-23 PROCEDURE — 71045 X-RAY EXAM CHEST 1 VIEW: CPT

## 2018-12-23 PROCEDURE — 2580000003 HC RX 258: Performed by: INTERNAL MEDICINE

## 2018-12-23 PROCEDURE — 85025 COMPLETE CBC W/AUTO DIFF WBC: CPT

## 2018-12-23 PROCEDURE — 6360000002 HC RX W HCPCS: Performed by: SURGERY

## 2018-12-23 PROCEDURE — 83735 ASSAY OF MAGNESIUM: CPT

## 2018-12-23 PROCEDURE — 1200000000 HC SEMI PRIVATE

## 2018-12-23 RX ORDER — CITALOPRAM 40 MG/1
20 TABLET ORAL EVERY MORNING
Qty: 30 TABLET | Refills: 3 | Status: SHIPPED | OUTPATIENT
Start: 2018-12-23 | End: 2019-09-27 | Stop reason: ALTCHOICE

## 2018-12-23 RX ORDER — SODIUM CHLORIDE 9 MG/ML
INJECTION, SOLUTION INTRAVENOUS
Status: COMPLETED
Start: 2018-12-23 | End: 2018-12-23

## 2018-12-23 RX ORDER — DIGOXIN 125 MCG
125 TABLET ORAL DAILY
Qty: 30 TABLET | Refills: 3 | Status: SHIPPED | OUTPATIENT
Start: 2018-12-24 | End: 2019-04-26 | Stop reason: SDUPTHER

## 2018-12-23 RX ORDER — HYDROCHLOROTHIAZIDE 25 MG/1
12.5 TABLET ORAL DAILY
Qty: 30 TABLET | Refills: 3 | Status: ON HOLD | OUTPATIENT
Start: 2018-12-23 | End: 2019-08-09 | Stop reason: HOSPADM

## 2018-12-23 RX ADMIN — FUROSEMIDE 20 MG: 10 INJECTION, SOLUTION INTRAMUSCULAR; INTRAVENOUS at 09:17

## 2018-12-23 RX ADMIN — CARVEDILOL 25 MG: 25 TABLET, FILM COATED ORAL at 17:00

## 2018-12-23 RX ADMIN — DIGOXIN 125 MCG: 125 TABLET ORAL at 09:17

## 2018-12-23 RX ADMIN — FLECAINIDE ACETATE 100 MG: 50 TABLET ORAL at 20:10

## 2018-12-23 RX ADMIN — OXYCODONE HYDROCHLORIDE AND ACETAMINOPHEN 500 MG: 500 TABLET ORAL at 09:17

## 2018-12-23 RX ADMIN — MEROPENEM 1 G: 1 INJECTION, POWDER, FOR SOLUTION INTRAVENOUS at 09:19

## 2018-12-23 RX ADMIN — IPRATROPIUM BROMIDE AND ALBUTEROL SULFATE 1 AMPULE: .5; 3 SOLUTION RESPIRATORY (INHALATION) at 12:39

## 2018-12-23 RX ADMIN — IPRATROPIUM BROMIDE AND ALBUTEROL SULFATE 1 AMPULE: .5; 3 SOLUTION RESPIRATORY (INHALATION) at 21:12

## 2018-12-23 RX ADMIN — Medication 2 PUFF: at 21:13

## 2018-12-23 RX ADMIN — FAMOTIDINE 20 MG: 20 TABLET ORAL at 09:17

## 2018-12-23 RX ADMIN — INSULIN LISPRO 1 UNITS: 100 INJECTION, SOLUTION INTRAVENOUS; SUBCUTANEOUS at 20:14

## 2018-12-23 RX ADMIN — Medication 10 ML: at 09:18

## 2018-12-23 RX ADMIN — INSULIN LISPRO 2 UNITS: 100 INJECTION, SOLUTION INTRAVENOUS; SUBCUTANEOUS at 16:59

## 2018-12-23 RX ADMIN — FERROUS GLUCONATE TAB 324 MG (37.5 MG ELEMENTAL IRON) 324 MG: 324 (37.5 FE) TAB at 09:17

## 2018-12-23 RX ADMIN — LOSARTAN POTASSIUM 50 MG: 25 TABLET ORAL at 09:17

## 2018-12-23 RX ADMIN — IPRATROPIUM BROMIDE AND ALBUTEROL SULFATE 1 AMPULE: .5; 3 SOLUTION RESPIRATORY (INHALATION) at 16:52

## 2018-12-23 RX ADMIN — FLECAINIDE ACETATE 100 MG: 50 TABLET ORAL at 09:16

## 2018-12-23 RX ADMIN — SODIUM CHLORIDE 250 ML: 9 INJECTION, SOLUTION INTRAVENOUS at 09:30

## 2018-12-23 RX ADMIN — METOCLOPRAMIDE 10 MG: 5 INJECTION, SOLUTION INTRAMUSCULAR; INTRAVENOUS at 06:25

## 2018-12-23 RX ADMIN — FAMOTIDINE 20 MG: 20 TABLET ORAL at 20:10

## 2018-12-23 RX ADMIN — AMLODIPINE BESYLATE 10 MG: 5 TABLET ORAL at 09:17

## 2018-12-23 RX ADMIN — TIOTROPIUM BROMIDE 18 MCG: 18 CAPSULE ORAL; RESPIRATORY (INHALATION) at 12:50

## 2018-12-23 RX ADMIN — CARVEDILOL 25 MG: 25 TABLET, FILM COATED ORAL at 09:17

## 2018-12-23 RX ADMIN — Medication 2 PUFF: at 10:02

## 2018-12-23 RX ADMIN — DOCUSATE SODIUM 100 MG: 100 CAPSULE, LIQUID FILLED ORAL at 09:17

## 2018-12-23 RX ADMIN — METFORMIN HYDROCHLORIDE 500 MG: 500 TABLET ORAL at 09:17

## 2018-12-23 RX ADMIN — METOCLOPRAMIDE 10 MG: 5 INJECTION, SOLUTION INTRAMUSCULAR; INTRAVENOUS at 00:48

## 2018-12-23 RX ADMIN — CITALOPRAM HYDROBROMIDE 20 MG: 20 TABLET ORAL at 09:29

## 2018-12-23 RX ADMIN — ENOXAPARIN SODIUM 40 MG: 40 INJECTION SUBCUTANEOUS at 20:10

## 2018-12-23 RX ADMIN — Medication 10 ML: at 20:10

## 2018-12-23 ASSESSMENT — PAIN SCALES - GENERAL
PAINLEVEL_OUTOF10: 0
PAINLEVEL_OUTOF10: 3
PAINLEVEL_OUTOF10: 0
PAINLEVEL_OUTOF10: 0

## 2018-12-23 ASSESSMENT — PAIN DESCRIPTION - LOCATION: LOCATION: HEAD

## 2018-12-23 ASSESSMENT — PAIN DESCRIPTION - PAIN TYPE: TYPE: ACUTE PAIN

## 2018-12-23 ASSESSMENT — PAIN DESCRIPTION - DESCRIPTORS: DESCRIPTORS: HEADACHE

## 2018-12-23 NOTE — PROGRESS NOTES
Shift assessment completed, see doc flowsheet for details. VSS. A&oX4. Denies pain. Fall and safety precautions in place.  Lance Aguirre, RN, BSN

## 2018-12-23 NOTE — PROGRESS NOTES
Acute pancreatitis, unspecified complication status, unspecified pancreatitis type    3. Chest pain, unspecified type    4. Atrial fibrillation with rapid ventricular response ([address]    5. Acute cholecystitis    6. Hilar mass        Status-post laparoscopic cholecystectomy with intraoperative cholangiogram on 12/19/2018 for acute cholecystitis with hydrops and gangrene of the gallbladder  CAD with prior stents  Paroxysmal atrial fibrillation  COPD  Left hilar mass      Plan:    Cont CP care  DC bernstein  Adv diet  FU lft look fine    EDUCATION:  Educated patient on plan of care and disease process--all questions answered. Plans discussed with patient and nursing.   .      Signed:  Kelvin Bhakta

## 2018-12-23 NOTE — PLAN OF CARE
Problem: Falls - Risk of:  Goal: Will remain free from falls  Will remain free from falls    Outcome: Ongoing    Goal: Absence of physical injury  Absence of physical injury    Patient armband verified and correct. Patient remained free from falls, bed in locked and low position, bed alarm armed, call light and bedside table in reach, lift sheet in place and utilized. Patient acknowledges the need to call before getting out of bed. Q2 monitoring, and toileting performed.

## 2018-12-23 NOTE — PROGRESS NOTES
Reassessment completed, see doc flowsheet for details. VSS. NSR. Up to chair. Family at bedside visiting. Pt stated needs met at this time. Fall and safety precautions in place.  Daniel Patton RN, BSN

## 2018-12-23 NOTE — PROGRESS NOTES
vomiting  Hematologic/lymphatic: negative for bleeding, easy bruising, lymphadenopathy and petechiae  Musculoskeletal:negative for arthralgias, bone pain, muscle weakness, neck pain and stiff joints  Neurological: negative for dizziness, gait problems, headaches, seizures, speech problems, tremors and weakness  Behavioral/Psych: negative for anxiety, behavior problems, depression, fatigue and sleep disturbance  Endocrine: negative for diabetic symptoms including none, neuropathy, polyphagia, polyuria, polydipsia, vomiting and diarrhea and temperature intolerance  Allergic/Immunologic: negative for anaphylaxis, angioedema, hay fever and urticaria    Objective:     Patient Vitals for the past 8 hrs:   BP Temp Temp src Pulse Resp SpO2 Weight   12/23/18 0800 (!) 161/60 97.2 °F (36.2 °C) Temporal 70 23 100 % -   12/23/18 0600 (!) 151/57 98 °F (36.7 °C) Temporal 68 21 99 % -   12/23/18 0400 (!) 151/59 98.4 °F (36.9 °C) Temporal 63 19 100 % 178 lb 5.6 oz (80.9 kg)     I/O last 3 completed shifts: In: 80 [P.O.:600; I.V.:150; NG/GT:30]  Out: 1500 [Urine:1500]  No intake/output data recorded.     General Appearance: alert and oriented to person, place and time, well developed and well- nourished, in no acute distress  Skin: warm and dry, no rash or erythema  Head: normocephalic and atraumatic  Eyes: pupils equal, round, and reactive to light, extraocular eye movements intact, conjunctivae normal  ENT: external ear and ear canal normal bilaterally, nose without deformity, nasal mucosa and turbinates normal  Neck: supple and non-tender without mass, no cervical lymphadenopathy  Pulmonary/Chest: clear to auscultation bilaterally- no wheezes, rales or rhonchi, normal air movement, no respiratory distress  Cardiovascular: normal rate, regular rhythm,  no murmurs, rubs, distal pulses intact, no carotid bruits  Abdomen: soft, non-tender, non-distended, normal bowel sounds, no masses or organomegaly  Lymph Nodes: Cervical,

## 2018-12-23 NOTE — PROGRESS NOTES
Hospitalist Progress Note      PCP: Dami Morales MD    Date of Admission: 12/18/2018    Chief Complaint: rapid response. Hospital Course: Patient has been having difficulty breathing and dropped oxygen sats. She was set to move to the ICU when rapid response was called, apparently to expedite the transfer. She recently had a lap jonathan due to gangrenous gallbladder. She is post op day 2. She was packed with stool on her previous CT scan and I suspect that she has an ileus. When her sats dropped she was transferred to ICU and placed on vapotherm. She has a suspicious lung mass. Subjective:   She is doing great today  Off of oxygen. Hasn't really moved much though.     Medications:  Reviewed    Infusion Medications    dextrose       Scheduled Medications    digoxin  125 mcg Oral Daily    mometasone-formoterol  2 puff Inhalation BID    tiotropium  18 mcg Inhalation Daily    ipratropium-albuterol  1 ampule Inhalation 4x daily    carvedilol  25 mg Oral BID WC    flecainide  100 mg Oral BID    amLODIPine  10 mg Oral Daily    vitamin C  500 mg Oral Daily    metFORMIN  500 mg Oral Daily with breakfast    losartan  50 mg Oral Daily    ferrous gluconate  324 mg Oral Daily with breakfast    Fluticasone-Umeclidin-Vilant  1 puff Inhalation Daily    sodium chloride flush  10 mL Intravenous 2 times per day    famotidine  20 mg Oral BID    enoxaparin  40 mg Subcutaneous Nightly    meropenem  1 g Intravenous Q24H    citalopram  20 mg Oral Daily    insulin lispro  0-12 Units Subcutaneous TID WC    insulin lispro  0-6 Units Subcutaneous Nightly     PRN Meds: hydrALAZINE, potassium chloride **OR** potassium bicarb-citric acid **OR** potassium chloride, butamben-tetracaine-benzocaine, ondansetron, acetaminophen, sodium chloride flush, magnesium hydroxide, ondansetron, glucose, dextrose, glucagon (rDNA), dextrose      Intake/Output Summary (Last 24 hours) at 12/23/18 110  Last data filed at

## 2018-12-24 ENCOUNTER — TELEPHONE (OUTPATIENT)
Dept: PULMONOLOGY | Age: 78
End: 2018-12-24

## 2018-12-24 ENCOUNTER — APPOINTMENT (OUTPATIENT)
Dept: GENERAL RADIOLOGY | Age: 78
DRG: 417 | End: 2018-12-24
Payer: MEDICARE

## 2018-12-24 LAB
GLUCOSE BLD-MCNC: 150 MG/DL (ref 70–99)
GLUCOSE BLD-MCNC: 166 MG/DL (ref 70–99)
GLUCOSE BLD-MCNC: 182 MG/DL (ref 70–99)
GLUCOSE BLD-MCNC: 188 MG/DL (ref 70–99)
PERFORMED ON: ABNORMAL

## 2018-12-24 PROCEDURE — 94760 N-INVAS EAR/PLS OXIMETRY 1: CPT

## 2018-12-24 PROCEDURE — 97116 GAIT TRAINING THERAPY: CPT

## 2018-12-24 PROCEDURE — G8978 MOBILITY CURRENT STATUS: HCPCS

## 2018-12-24 PROCEDURE — 6370000000 HC RX 637 (ALT 250 FOR IP): Performed by: INTERNAL MEDICINE

## 2018-12-24 PROCEDURE — G8988 SELF CARE GOAL STATUS: HCPCS

## 2018-12-24 PROCEDURE — G8987 SELF CARE CURRENT STATUS: HCPCS

## 2018-12-24 PROCEDURE — 97535 SELF CARE MNGMENT TRAINING: CPT

## 2018-12-24 PROCEDURE — 2580000003 HC RX 258: Performed by: INTERNAL MEDICINE

## 2018-12-24 PROCEDURE — 73630 X-RAY EXAM OF FOOT: CPT

## 2018-12-24 PROCEDURE — 6360000002 HC RX W HCPCS: Performed by: INTERNAL MEDICINE

## 2018-12-24 PROCEDURE — G8979 MOBILITY GOAL STATUS: HCPCS

## 2018-12-24 PROCEDURE — 97162 PT EVAL MOD COMPLEX 30 MIN: CPT

## 2018-12-24 PROCEDURE — 94640 AIRWAY INHALATION TREATMENT: CPT

## 2018-12-24 PROCEDURE — 97166 OT EVAL MOD COMPLEX 45 MIN: CPT

## 2018-12-24 PROCEDURE — 97530 THERAPEUTIC ACTIVITIES: CPT

## 2018-12-24 PROCEDURE — 1200000000 HC SEMI PRIVATE

## 2018-12-24 PROCEDURE — 99024 POSTOP FOLLOW-UP VISIT: CPT | Performed by: SURGERY

## 2018-12-24 RX ORDER — IPRATROPIUM BROMIDE AND ALBUTEROL SULFATE 2.5; .5 MG/3ML; MG/3ML
1 SOLUTION RESPIRATORY (INHALATION) EVERY 4 HOURS PRN
Status: DISCONTINUED | OUTPATIENT
Start: 2018-12-24 | End: 2018-12-26 | Stop reason: HOSPADM

## 2018-12-24 RX ADMIN — IPRATROPIUM BROMIDE AND ALBUTEROL SULFATE 1 AMPULE: .5; 3 SOLUTION RESPIRATORY (INHALATION) at 11:06

## 2018-12-24 RX ADMIN — INSULIN LISPRO 1 UNITS: 100 INJECTION, SOLUTION INTRAVENOUS; SUBCUTANEOUS at 22:38

## 2018-12-24 RX ADMIN — DIGOXIN 125 MCG: 125 TABLET ORAL at 09:12

## 2018-12-24 RX ADMIN — OXYCODONE HYDROCHLORIDE AND ACETAMINOPHEN 500 MG: 500 TABLET ORAL at 09:12

## 2018-12-24 RX ADMIN — AMLODIPINE BESYLATE 10 MG: 5 TABLET ORAL at 09:12

## 2018-12-24 RX ADMIN — LOSARTAN POTASSIUM 50 MG: 25 TABLET ORAL at 09:12

## 2018-12-24 RX ADMIN — INSULIN LISPRO 2 UNITS: 100 INJECTION, SOLUTION INTRAVENOUS; SUBCUTANEOUS at 14:06

## 2018-12-24 RX ADMIN — INSULIN LISPRO 2 UNITS: 100 INJECTION, SOLUTION INTRAVENOUS; SUBCUTANEOUS at 09:13

## 2018-12-24 RX ADMIN — MEROPENEM 1 G: 1 INJECTION, POWDER, FOR SOLUTION INTRAVENOUS at 09:07

## 2018-12-24 RX ADMIN — IPRATROPIUM BROMIDE AND ALBUTEROL SULFATE 1 AMPULE: .5; 3 SOLUTION RESPIRATORY (INHALATION) at 07:34

## 2018-12-24 RX ADMIN — FAMOTIDINE 20 MG: 20 TABLET ORAL at 22:37

## 2018-12-24 RX ADMIN — Medication 10 ML: at 09:12

## 2018-12-24 RX ADMIN — ACETAMINOPHEN 650 MG: 325 TABLET, FILM COATED ORAL at 09:37

## 2018-12-24 RX ADMIN — CARVEDILOL 25 MG: 25 TABLET, FILM COATED ORAL at 17:23

## 2018-12-24 RX ADMIN — Medication 10 ML: at 22:38

## 2018-12-24 RX ADMIN — TIOTROPIUM BROMIDE 18 MCG: 18 CAPSULE ORAL; RESPIRATORY (INHALATION) at 07:34

## 2018-12-24 RX ADMIN — FERRIC CARBOXYMALTOSE INJECTION 750 MG: 50 INJECTION, SOLUTION INTRAVENOUS at 09:14

## 2018-12-24 RX ADMIN — Medication 2 PUFF: at 07:34

## 2018-12-24 RX ADMIN — Medication 2 PUFF: at 20:58

## 2018-12-24 RX ADMIN — CITALOPRAM HYDROBROMIDE 20 MG: 20 TABLET ORAL at 09:12

## 2018-12-24 RX ADMIN — INSULIN LISPRO 2 UNITS: 100 INJECTION, SOLUTION INTRAVENOUS; SUBCUTANEOUS at 17:23

## 2018-12-24 RX ADMIN — CARVEDILOL 25 MG: 25 TABLET, FILM COATED ORAL at 09:12

## 2018-12-24 RX ADMIN — FLECAINIDE ACETATE 100 MG: 50 TABLET ORAL at 09:12

## 2018-12-24 RX ADMIN — ENOXAPARIN SODIUM 40 MG: 40 INJECTION SUBCUTANEOUS at 22:37

## 2018-12-24 RX ADMIN — FLECAINIDE ACETATE 100 MG: 50 TABLET ORAL at 22:37

## 2018-12-24 RX ADMIN — METFORMIN HYDROCHLORIDE 500 MG: 500 TABLET ORAL at 09:12

## 2018-12-24 RX ADMIN — FAMOTIDINE 20 MG: 20 TABLET ORAL at 09:12

## 2018-12-24 ASSESSMENT — PAIN DESCRIPTION - PAIN TYPE
TYPE: ACUTE PAIN
TYPE: ACUTE PAIN

## 2018-12-24 ASSESSMENT — PAIN DESCRIPTION - FREQUENCY
FREQUENCY: CONTINUOUS
FREQUENCY: CONTINUOUS

## 2018-12-24 ASSESSMENT — PAIN SCALES - GENERAL
PAINLEVEL_OUTOF10: 9
PAINLEVEL_OUTOF10: 0
PAINLEVEL_OUTOF10: 0
PAINLEVEL_OUTOF10: 5
PAINLEVEL_OUTOF10: 6
PAINLEVEL_OUTOF10: 0
PAINLEVEL_OUTOF10: 0

## 2018-12-24 ASSESSMENT — PAIN DESCRIPTION - DESCRIPTORS
DESCRIPTORS: SHARP
DESCRIPTORS: SHARP

## 2018-12-24 ASSESSMENT — PAIN DESCRIPTION - ORIENTATION
ORIENTATION: LEFT
ORIENTATION: LEFT

## 2018-12-24 ASSESSMENT — PAIN DESCRIPTION - LOCATION
LOCATION: FOOT
LOCATION: FOOT

## 2018-12-24 NOTE — PROGRESS NOTES
Complexity  History: medical co-morbidities. recent onset of severe L-foot pain of unknown etiology (? gout)  REQUIRES PT FOLLOW UP: Yes  Activity Tolerance  Activity Tolerance: Patient limited by pain;Treatment limited secondary to medical complications (free text) (pt nauseous with standing)         Plan   Plan  Times per week: 3-5  Times per day: Daily  Current Treatment Recommendations: Strengthening, Balance Training, Gait Training, Stair training, Safety Education & Training, Patient/Caregiver Education & Training  Safety Devices  Type of devices: All fall risk precautions in place, Call light within reach, Chair alarm in place, Gait belt, Nurse notified, Left in chair    G-Code  PT G-Codes  Functional Assessment Tool Used: PT AM-PAC  Score: 14  Functional Limitation: Mobility: Walking and moving around  Mobility: Walking and Moving Around Current Status (): At least 60 percent but less than 80 percent impaired, limited or restricted  Mobility: Walking and Moving Around Goal Status ():  At least 40 percent but less than 60 percent impaired, limited or restricted  OutComes Score                                                  AM-PAC Score  AM-PAC Inpatient Mobility Raw Score : 14  AM-PAC Inpatient T-Scale Score : 38.1  Mobility Inpatient CMS 0-100% Score: 61.29  Mobility Inpatient CMS G-Code Modifier : CL          Goals  Short term goals  Time Frame for Short term goals: by D/C  Short term goal 1: Bed mobility with supervision  Short term goal 2: Sit to/from stand with CGA with AAD  Short term goal 3: Pt able to ambulate at least 10 ft with AAD and min A and tolerate wt bearing thru LLE with pain </= to 4/10       Therapy Time   Individual Concurrent Group Co-treatment   Time In 1238         Time Out 1322         Minutes 44         Timed Code Treatment Minutes: [address], PT

## 2018-12-24 NOTE — PROGRESS NOTES
Occupational Therapy   Occupational Therapy Initial Assessment  Date: 2018   Patient Name: Wes Giraldo  MRN: [id number]     : 1940    Date of Service: 2018    Discharge Recommendations:Kenya Alvarez scored a 16/24 on the  [address] form. Current research shows that an AM-PAC score of 17 or less is typically not associated with a discharge to the patient's home setting. Based on the patients AM-PAC score and their current ADL deficits, it is recommended that the patient have 3-5 sessions per week of Occupational Therapy at d/c to increase the patients independence. OT Equipment Recommendations  Equipment Needed: No      Patient Diagnosis(es): The primary encounter diagnosis was Acute right-sided low back pain without sciatica. Diagnoses of Acute pancreatitis, unspecified complication status, unspecified pancreatitis type, Chest pain, unspecified type, Atrial fibrillation with rapid ventricular response ([address] Acute cholecystitis, and Hilar mass were also pertinent to this visit. has a past medical history of Arthritis; CAD (coronary artery disease); Carpal tunnel syndrome; COPD (chronic obstructive pulmonary disease) ([address] Coronary stent; depression; Diabetes mellitus ([address] GERD (gastroesophageal reflux disease); Hyperlipidemia; Hypertension; MI (myocardial infarction) ([address] LIZETT (obstructive sleep apnea); PONV (postoperative nausea and vomiting); and stress incontinence. has a past surgical history that includes Coronary angioplasty with stent; back surgery (, ); bronchoscopy (2018); pr [facility] incl fluor gdnce dx w/cell washg spx (N/A, 2018); and Cholecystectomy, laparoscopic (N/A, 2018).     Treatment Diagnosis: Decreased functional mobility, ADLs, endurance, safey, high level I ADLs associated with lap jonathan' and L foot pain (x-ray pending)      Restrictions  Restrictions/Precautions  Restrictions/Precautions: Fall Risk, Up as Tolerated (High fall risk)  Position Activity Restriction  Other position/activity restrictions: Itzel Zaragoza is a 70 y.o. female who presents here to the emergency department, she states that she's been nauseated for the past 2 weeks, and today she had several episodes of vomiting right before she came to the emergency department. Over the last several days she's had pain to her right upper quadrant radiating around to her breast and into her back. Mikie Leavitt' 12/19.  12/21 transfered to ICU with decreased O2 sats. Onset of L foot pain 12/23 without trauma per RN    Subjective   General  Chart Reviewed: Yes  Additional Pertinent Hx: COPD, CAD, HLD, HTN, MI  Family / Caregiver Present: Yes (RN Alina Hobson, Daughter Yesenia Rosenberg)  Diagnosis: s/p lap jonathan, L foot pain  Subjective  Subjective:  In chair, agreeable to therapy  Pain Assessment  Patient Currently in Pain: Yes  Pain Assessment: 0-10  Copeland-Baker Pain Rating: No hurt  Pain Level: 9  Pain Type: Acute pain  Pain Location: Foot  Pain Orientation: Left  Pain Descriptors: Sharp  Pain Frequency: Continuous  Patient's Stated Pain Goal: No pain  Pain Intervention(s): Environmental changes (Denied want/need for acetaminophen)  Response to Pain Intervention: Patient Satisfied     Social/Functional History  Social/Functional History  Lives With: Spouse (Pt is primary caregiver for her  who is currently in rehab elsewhere for a CVA)  Type of Home: House (Pt states  sleeps on hospital bed on main level and she sleeps on couch near him. )  Home Layout: Two level (laundry on upper level)  Home Access: Stairs to enter with rails (2 KATI thru garage)  Bathroom Shower/Tub: Walk-in shower  Bathroom Toilet: Handicap height  Bathroom Equipment: Grab bars in shower, Built-in shower seat, Hand-held shower  Home Equipment: Rolling walker, 4 wheeled walker, Cane (These are 's AD's)  ADL Assistance: Independent (pt helps her  with his ADLs)  Homemaking Assistance:

## 2018-12-25 LAB
GLUCOSE BLD-MCNC: 151 MG/DL (ref 70–99)
GLUCOSE BLD-MCNC: 169 MG/DL (ref 70–99)
GLUCOSE BLD-MCNC: 188 MG/DL (ref 70–99)
GLUCOSE BLD-MCNC: 214 MG/DL (ref 70–99)
PERFORMED ON: ABNORMAL
URIC ACID, SERUM: 4.1 MG/DL (ref 2.6–6)

## 2018-12-25 PROCEDURE — 94640 AIRWAY INHALATION TREATMENT: CPT

## 2018-12-25 PROCEDURE — 6360000002 HC RX W HCPCS: Performed by: INTERNAL MEDICINE

## 2018-12-25 PROCEDURE — 6370000000 HC RX 637 (ALT 250 FOR IP): Performed by: INTERNAL MEDICINE

## 2018-12-25 PROCEDURE — 84550 ASSAY OF BLOOD/URIC ACID: CPT

## 2018-12-25 PROCEDURE — 1200000000 HC SEMI PRIVATE

## 2018-12-25 PROCEDURE — 94760 N-INVAS EAR/PLS OXIMETRY 1: CPT

## 2018-12-25 PROCEDURE — 2580000003 HC RX 258: Performed by: INTERNAL MEDICINE

## 2018-12-25 PROCEDURE — 36415 COLL VENOUS BLD VENIPUNCTURE: CPT

## 2018-12-25 PROCEDURE — 2700000000 HC OXYGEN THERAPY PER DAY

## 2018-12-25 RX ORDER — PROMETHAZINE HYDROCHLORIDE 25 MG/ML
12.5 INJECTION, SOLUTION INTRAMUSCULAR; INTRAVENOUS EVERY 4 HOURS PRN
Status: DISCONTINUED | OUTPATIENT
Start: 2018-12-25 | End: 2018-12-26 | Stop reason: HOSPADM

## 2018-12-25 RX ADMIN — INSULIN LISPRO 2 UNITS: 100 INJECTION, SOLUTION INTRAVENOUS; SUBCUTANEOUS at 17:25

## 2018-12-25 RX ADMIN — ENOXAPARIN SODIUM 40 MG: 40 INJECTION SUBCUTANEOUS at 23:29

## 2018-12-25 RX ADMIN — DIGOXIN 125 MCG: 125 TABLET ORAL at 08:55

## 2018-12-25 RX ADMIN — FAMOTIDINE 20 MG: 20 TABLET ORAL at 08:55

## 2018-12-25 RX ADMIN — FLECAINIDE ACETATE 100 MG: 50 TABLET ORAL at 23:28

## 2018-12-25 RX ADMIN — TIOTROPIUM BROMIDE 18 MCG: 18 CAPSULE ORAL; RESPIRATORY (INHALATION) at 08:52

## 2018-12-25 RX ADMIN — OXYCODONE HYDROCHLORIDE AND ACETAMINOPHEN 500 MG: 500 TABLET ORAL at 08:55

## 2018-12-25 RX ADMIN — CITALOPRAM HYDROBROMIDE 20 MG: 20 TABLET ORAL at 08:55

## 2018-12-25 RX ADMIN — CARVEDILOL 25 MG: 25 TABLET, FILM COATED ORAL at 08:55

## 2018-12-25 RX ADMIN — LOSARTAN POTASSIUM 50 MG: 25 TABLET ORAL at 08:56

## 2018-12-25 RX ADMIN — FLECAINIDE ACETATE 100 MG: 50 TABLET ORAL at 09:58

## 2018-12-25 RX ADMIN — FAMOTIDINE 20 MG: 20 TABLET ORAL at 23:29

## 2018-12-25 RX ADMIN — INSULIN LISPRO 2 UNITS: 100 INJECTION, SOLUTION INTRAVENOUS; SUBCUTANEOUS at 12:15

## 2018-12-25 RX ADMIN — ACETAMINOPHEN 650 MG: 325 TABLET, FILM COATED ORAL at 10:01

## 2018-12-25 RX ADMIN — AMLODIPINE BESYLATE 10 MG: 5 TABLET ORAL at 08:55

## 2018-12-25 RX ADMIN — Medication 2 PUFF: at 08:52

## 2018-12-25 RX ADMIN — CARVEDILOL 25 MG: 25 TABLET, FILM COATED ORAL at 17:24

## 2018-12-25 RX ADMIN — Medication 10 ML: at 08:57

## 2018-12-25 RX ADMIN — METFORMIN HYDROCHLORIDE 500 MG: 500 TABLET ORAL at 08:56

## 2018-12-25 RX ADMIN — Medication 2 PUFF: at 21:39

## 2018-12-25 RX ADMIN — INSULIN LISPRO 2 UNITS: 100 INJECTION, SOLUTION INTRAVENOUS; SUBCUTANEOUS at 23:30

## 2018-12-25 ASSESSMENT — PAIN SCALES - GENERAL
PAINLEVEL_OUTOF10: 3
PAINLEVEL_OUTOF10: 4
PAINLEVEL_OUTOF10: 0
PAINLEVEL_OUTOF10: 3
PAINLEVEL_OUTOF10: 5
PAINLEVEL_OUTOF10: 3

## 2018-12-25 NOTE — PROGRESS NOTES
Shift assessment complete, see flowsheet. Patient in bed, no s/s of distress, respirations even and unlabored, VSS, denies pain. The care plan and education has been reviewed and mutually agreed upon with the patient. All needs attended. Fall precautions in place, call light within reach. Will continue to monitor.

## 2018-12-25 NOTE — PLAN OF CARE
Problem: Serum Glucose Level - Abnormal:  Goal: Ability to maintain appropriate glucose levels will improve  Ability to maintain appropriate glucose levels will improve  Outcome: Ongoing      Problem: Falls - Risk of:  Goal: Will remain free from falls  Will remain free from falls    Outcome: Ongoing      Problem: Pain:  Goal: Pain level will decrease  Pain level will decrease   Outcome: Ongoing      Problem: Breathing Pattern - Ineffective:  Goal: Ability to achieve and maintain a regular respiratory rate will improve  Ability to achieve and maintain a regular respiratory rate will improve  Outcome: Ongoing      Problem: Tissue Perfusion - Cardiopulmonary, Altered:  Goal: Hemodynamic stability will improve  Hemodynamic stability will improve   Outcome: Ongoing      Comments: Patient remains free from falls. All fall precautions in place. Bed and chair alarms being used. Bed in lowest position. Patient aware of medications, side effects and PRN schedule. Patient encouraged to reposition every 2 hours and is assisted to do so when unable to reposition independently to maintain skin integrity. IS use, coughing and deep breathing encouraged. Monitoring vitals and blood glucose levels and treating per orders, diet adherence encouraged. The care plan and education has been reviewed and mutually agreed upon with the patient.

## 2018-12-25 NOTE — PROGRESS NOTES
Shift assessment completed and documented at this time. Assisted up to BR to void and returned to bed. Pt able to bear full weight on left foot today with no c/o pain. Pt reports that the foot feels much better today than yesterday. Denies pain or needs at this time. Call light in reach. The care plan and education has been reviewed and mutually agreed upon with the patient. Will continue to monitor.

## 2018-12-26 VITALS
TEMPERATURE: 98 F | RESPIRATION RATE: 16 BRPM | HEART RATE: 66 BPM | HEIGHT: 65 IN | OXYGEN SATURATION: 93 % | DIASTOLIC BLOOD PRESSURE: 64 MMHG | SYSTOLIC BLOOD PRESSURE: 138 MMHG | WEIGHT: 177.47 LBS | BODY MASS INDEX: 29.57 KG/M2

## 2018-12-26 LAB
ANION GAP SERPL CALCULATED.3IONS-SCNC: 11 MMOL/L (ref 3–16)
BUN BLDV-MCNC: 12 MG/DL (ref 7–20)
CALCIUM SERPL-MCNC: 8.8 MG/DL (ref 8.3–10.6)
CHLORIDE BLD-SCNC: 99 MMOL/L (ref 99–110)
CO2: 31 MMOL/L (ref 21–32)
CREAT SERPL-MCNC: 0.5 MG/DL (ref 0.6–1.2)
GFR AFRICAN AMERICAN: >60
GFR NON-AFRICAN AMERICAN: >60
GLUCOSE BLD-MCNC: 140 MG/DL (ref 70–99)
GLUCOSE BLD-MCNC: 150 MG/DL (ref 70–99)
GLUCOSE BLD-MCNC: 184 MG/DL (ref 70–99)
HCT VFR BLD CALC: 28.6 % (ref 36–48)
HEMOGLOBIN: 9.1 G/DL (ref 12–16)
MCH RBC QN AUTO: 26.9 PG (ref 26–34)
MCHC RBC AUTO-ENTMCNC: 31.7 G/DL (ref 31–36)
MCV RBC AUTO: 84.7 FL (ref 80–100)
PDW BLD-RTO: 15.4 % (ref 12.4–15.4)
PERFORMED ON: ABNORMAL
PERFORMED ON: ABNORMAL
PLATELET # BLD: 410 K/UL (ref 135–450)
PMV BLD AUTO: 7.7 FL (ref 5–10.5)
POTASSIUM SERPL-SCNC: 3.7 MMOL/L (ref 3.5–5.1)
RBC # BLD: 3.38 M/UL (ref 4–5.2)
SODIUM BLD-SCNC: 141 MMOL/L (ref 136–145)
WBC # BLD: 9.1 K/UL (ref 4–11)

## 2018-12-26 PROCEDURE — 2580000003 HC RX 258: Performed by: INTERNAL MEDICINE

## 2018-12-26 PROCEDURE — 6370000000 HC RX 637 (ALT 250 FOR IP): Performed by: INTERNAL MEDICINE

## 2018-12-26 PROCEDURE — 97116 GAIT TRAINING THERAPY: CPT

## 2018-12-26 PROCEDURE — 36415 COLL VENOUS BLD VENIPUNCTURE: CPT

## 2018-12-26 PROCEDURE — 97530 THERAPEUTIC ACTIVITIES: CPT

## 2018-12-26 PROCEDURE — 2700000000 HC OXYGEN THERAPY PER DAY

## 2018-12-26 PROCEDURE — 97535 SELF CARE MNGMENT TRAINING: CPT

## 2018-12-26 PROCEDURE — 94640 AIRWAY INHALATION TREATMENT: CPT

## 2018-12-26 PROCEDURE — 85027 COMPLETE CBC AUTOMATED: CPT

## 2018-12-26 PROCEDURE — 94760 N-INVAS EAR/PLS OXIMETRY 1: CPT

## 2018-12-26 PROCEDURE — 80048 BASIC METABOLIC PNL TOTAL CA: CPT

## 2018-12-26 RX ADMIN — CITALOPRAM HYDROBROMIDE 20 MG: 20 TABLET ORAL at 08:55

## 2018-12-26 RX ADMIN — AMLODIPINE BESYLATE 10 MG: 5 TABLET ORAL at 08:54

## 2018-12-26 RX ADMIN — LOSARTAN POTASSIUM 50 MG: 25 TABLET ORAL at 08:54

## 2018-12-26 RX ADMIN — INSULIN LISPRO 2 UNITS: 100 INJECTION, SOLUTION INTRAVENOUS; SUBCUTANEOUS at 12:47

## 2018-12-26 RX ADMIN — METFORMIN HYDROCHLORIDE 500 MG: 500 TABLET ORAL at 08:25

## 2018-12-26 RX ADMIN — DIGOXIN 125 MCG: 125 TABLET ORAL at 08:55

## 2018-12-26 RX ADMIN — TIOTROPIUM BROMIDE 18 MCG: 18 CAPSULE ORAL; RESPIRATORY (INHALATION) at 08:49

## 2018-12-26 RX ADMIN — OXYCODONE HYDROCHLORIDE AND ACETAMINOPHEN 500 MG: 500 TABLET ORAL at 08:55

## 2018-12-26 RX ADMIN — CARVEDILOL 25 MG: 25 TABLET, FILM COATED ORAL at 08:26

## 2018-12-26 RX ADMIN — Medication 2 PUFF: at 08:49

## 2018-12-26 RX ADMIN — INSULIN LISPRO 2 UNITS: 100 INJECTION, SOLUTION INTRAVENOUS; SUBCUTANEOUS at 08:26

## 2018-12-26 RX ADMIN — Medication 10 ML: at 08:55

## 2018-12-26 RX ADMIN — FLECAINIDE ACETATE 100 MG: 50 TABLET ORAL at 08:55

## 2018-12-26 RX ADMIN — FAMOTIDINE 20 MG: 20 TABLET ORAL at 08:55

## 2018-12-26 ASSESSMENT — PAIN SCALES - GENERAL
PAINLEVEL_OUTOF10: 3

## 2018-12-26 NOTE — PROGRESS NOTES
PRN  ondansetron (ZOFRAN) injection 4 mg, 4 mg, Intravenous, Q30 Min PRN  acetaminophen (TYLENOL) tablet 650 mg, 650 mg, Oral, Q4H PRN  carvedilol (COREG) tablet 25 mg, 25 mg, Oral, BID WC  flecainide (TAMBOCOR) tablet 100 mg, 100 mg, Oral, BID  amLODIPine (NORVASC) tablet 10 mg, 10 mg, Oral, Daily  vitamin C (ASCORBIC ACID) tablet 500 mg, 500 mg, Oral, Daily  metFORMIN (GLUCOPHAGE) tablet 500 mg, 500 mg, Oral, Daily with breakfast  losartan (COZAAR) tablet 50 mg, 50 mg, Oral, Daily  Fluticasone-Umeclidin-Vilant 100-62.5-25 MCG/INH AEPB 1 puff PATIENT SUPPLIED, 1 puff, Inhalation, Daily  sodium chloride flush 0.9 % injection 10 mL, 10 mL, Intravenous, 2 times per day  sodium chloride flush 0.9 % injection 10 mL, 10 mL, Intravenous, PRN  magnesium hydroxide (MILK OF MAGNESIA) 400 MG/5ML suspension 30 mL, 30 mL, Oral, Daily PRN  ondansetron (ZOFRAN) injection 4 mg, 4 mg, Intravenous, Q6H PRN  famotidine (PEPCID) tablet 20 mg, 20 mg, Oral, BID  enoxaparin (LOVENOX) injection 40 mg, 40 mg, Subcutaneous, Nightly  citalopram (CELEXA) tablet 20 mg, 20 mg, Oral, Daily  insulin lispro (HUMALOG) injection pen 0-12 Units, 0-12 Units, Subcutaneous, TID WC  insulin lispro (HUMALOG) injection pen 0-6 Units, 0-6 Units, Subcutaneous, Nightly  glucose (GLUTOSE) 40 % oral gel 15 g, 15 g, Oral, PRN  dextrose 50 % solution 12.5 g, 12.5 g, Intravenous, PRN  glucagon (rDNA) injection 1 mg, 1 mg, Intramuscular, PRN  dextrose 5 % solution, 100 mL/hr, Intravenous, PRN    Physical      VITALS:  BP (!) 145/65   Pulse 68   Temp 97.8 °F (36.6 °C) (Oral)   Resp 16   Ht 5' 5\" (1.651 m)   Wt 177 lb 7.5 oz (80.5 kg)   SpO2 95%   BMI 29.53 kg/m²   TEMPERATURE:  Current - Temp: 97.8 °F (36.6 °C);  Max - Temp  Av.8 °F (36.6 °C)  Min: 97.6 °F (36.4 °C)  Max: 98.2 °F (36.8 °C)  RESPIRATIONS RANGE: Resp  Av.8  Min: 16  Max: 18  PULSE RANGE: Pulse  Av.8  Min: 68  Max: 74  BLOOD PRESSURE RANGE:  Systolic (13WLB), LSO:977 , Min:135 ,

## 2018-12-26 NOTE — PROGRESS NOTES
reach;Chair alarm in place; Left in chair;Patient at risk for falls;Gait belt;Nurse notified          Plan   Plan  Times per week: 2-5x  Times per day: Daily  Current Treatment Recommendations: Strengthening, Functional Mobility Training, Endurance Training, Self-Care / ADL, Home Management Training, Patient/Caregiver Education & Training, Equipment Evaluation, Education, & procurement  G-Code     OutComes Score                                                  AM-PAC Score        AM-PAC Inpatient Daily Activity Raw Score: 19  AM-PAC Inpatient ADL T-Scale Score : 40.22  ADL Inpatient CMS 0-100% Score: 42.8  ADL Inpatient CMS G-Code Modifier : CK    Goals  Short term goals  Time Frame for Short term goals: Discharge  Short term goal 1: Supervision with functional ADL transfers - CGA/SBA (12/26)  Short term goal 2: Supervision with functional ADL mobility - SBA (12/26)  Short term goal 3: Supervision with LB Dressing - Supervision (12/26) Goal Met  Short term goal 4: Supervision with bathing - Not addressed 12/26  Short term goal 5: Functional stance for ADL safety 5-10 min - ~2 minutes total 12/26  Long term goals  Time Frame for Long term goals : LTG=STG  Patient Goals   Patient goals : Go home       Therapy Time   Individual Concurrent Group Co-treatment   Time In 1311         Time Out 1349         Minutes 38            Timed Code Treatment Minutes:  23 minutes    Total Treatment Minutes:  38 minutes    Faythe Gosselin, MOT OTR/L [id number]    George Boothe OT

## 2018-12-26 NOTE — PROGRESS NOTES
Nutrition Assessment (Low Risk)    Type and Reason for Visit: Initial (LOS assessment )    Nutrition Recommendations:   No nutrition related recommendations at this time    Nutrition Assessment:  Pt is adequately nourished as evidenced by PO intake mostly % of meals on general diet. Pt s/p lap jonathan on 12/19. Pt denies any significant pain, nausea or vomiting with PO intake. Pt reports fairly normal PO intake prior to admission. Pt with no weight loss. Pt is not at risk for nutrition compromise at this time.     Malnutrition Assessment:  · Malnutrition Status: No malnutrition    Nutrition Risk Level   Risk Level: Low    Nutrition Diagnosis:   · Problem: No nutrition diagnosis at this time    Nutrition Intervention:  Food and/or Delivery: Continue current diet  Nutrition Education/Counseling/Coordination of Care:  Continued Inpatient Monitoring, Education Not Indicated      Electronically signed by Jazmine Thapa RD, LD on 12/26/18 at 9:28 AM    Contact Number: [phone]

## 2018-12-26 NOTE — PROGRESS NOTES
scissoring gait and mild neglet of L side despite no neuro diagnosis. 4 steps B HR SBA. Jene notified of neuro signs and symptoms. Prognosis: Good;Fair  REQUIRES PT FOLLOW UP: Yes  Activity Tolerance  Activity Tolerance: Treatment limited secondary to medical complications (free text); Patient Tolerated treatment well;Patient limited by endurance; Other  Activity Tolerance: signs and symptoms of neuro deficits, RN notified. O2 high 80's to low 90's     G-Code     OutComes Score                                                  AM-PAC Score  AM-PAC Inpatient Mobility Raw Score : 18  AM-PAC Inpatient T-Scale Score : 43.63  Mobility Inpatient CMS 0-100% Score: 46.58  Mobility Inpatient CMS G-Code Modifier : CK          Goals  Short term goals  Time Frame for Short term goals: by D/C  Short term goal 1: Bed mobility with supervision  Short term goal 2: Sit to/from stand with CGA with AAD  Short term goal 3: Pt able to ambulate at least 10 ft with AAD and min A and tolerate wt bearing thru LLE with pain </= to 4/10    Plan    Plan  Times per week: 2-3  Times per day: Daily  Current Treatment Recommendations: Strengthening, Balance Training, Gait Training, Stair training, Safety Education & Training, Patient/Caregiver Education & Training  Safety Devices  Type of devices: All fall risk precautions in place, Call light within reach, Chair alarm in place, Gait belt, Nurse notified, Left in chair, Patient at risk for falls     Therapy Time   Individual Concurrent Group Co-treatment   Time In 1357         Time Out 1435         Minutes 38         Timed Code Treatment Minutes: [address] [facility], [address]  I agree with the above note and have addressed this patient's goals.   Walter Hallman, PT, DPT, [id number]

## 2018-12-26 NOTE — PROGRESS NOTES
Discharge instructions and medications reviewed with patient. Patient voices understanding and denies further questions/ concerns at this time. Patient will make follow up appointment with Dr. Taylor Anaya. Patient discharged home per wheelchair with prescription, belongings and family.

## 2018-12-26 NOTE — PROGRESS NOTES
INTERNAL MEDICINE    SUBJECTIVE:  Wants to go home w/o home care    OBJECTIVE        Physical    VITALS:  BP (!) 149/68   Pulse 73   Temp 98.4 °F (36.9 °C) (Oral)   Resp 16   Ht 5' 5\" (1.651 m)   Wt 177 lb 7.5 oz (80.5 kg)   SpO2 94%   BMI 29.53 kg/m²   CONSTITUTIONAL:  awake, alert, cooperative, no apparent distress, and appears stated age  EYES:  Lids and lashes normal, pupils equal, round and reactive to light, extra ocular muscles intact, sclera clear, conjunctiva normal  ENT:  Normocephalic, without obvious abnormality, atraumatic, sinuses nontender on palpation, external ears without lesions, oral pharynx with moist mucus membranes, tonsils without erythema or exudates, gums normal and good dentition. NECK:  Supple, symmetrical, trachea midline, no adenopathy, thyroid symmetric, not enlarged and no tenderness, skin normal  BACK:  Symmetric, no curvature, spinous processes are non-tender on palpation, paraspinous muscles are non-tender on palpation, no costal vertebral tenderness  LUNGS:  No increased work of breathing,  air exchange, clear to auscultation bilaterally, no crackles or wheezing  CARDIOVASCULAR:  Normal apical impulse, regular rate and rhythm, normal S1 and S2, no S3 or S4, and no murmur noted  ABDOMEN:  + scars, normal bowel sounds, soft, non-distended, non-tender, no masses palpated, no hepatosplenomegally  MUSCULOSKELETAL:  there is no redness, warmth, or swelling of the joints  NEUROLOGIC:  Awake, alert, oriented to name, place and time. Cranial nerves II-XII are grossly intact. Motor is 5 out of 5 bilaterally. Cerebellar finger to nose, heel to shin intact. Sensory is intact.   Babinski down going, Romberg negative, and gait is normal.  SKIN:  no bruising or bleeding  Data    CBC with Differential:    Lab Results   Component Value Date    WBC 9.1 12/26/2018    RBC 3.38 12/26/2018    HGB 9.1 12/26/2018    HCT 28.6 12/26/2018     12/26/2018    MCV 84.7 12/26/2018    MCH 26.9

## 2018-12-28 NOTE — PROGRESS NOTES
Physical Therapy  Physical Therapy Discharge Summary    Name: Milena Levin  : 1940    The pt was evaluated by PT on  and seen for 1 treatment session prior to DC to home on  per MD order. The pt's acute therapy goals were:  Short term goals  Time Frame for Short term goals: by D/C  Short term goal 1: Bed mobility with supervision  Short term goal 2: Sit to/from stand with CGA with AAD  Short term goal 3: Pt able to ambulate at least 10 ft with AAD and min A and tolerate wt bearing thru LLE with pain </= to 4/10     Patient met 0 goals during stay. Number of Refusals:0  Number of Holds: 0  During this hospitalization, the patient was educated on:   POC, role of acute PT, discharge recommendations    DC pt from PT caseload at this time.   Thank you, Enrico Granados, PT, DPT, [id number]

## 2018-12-30 PROBLEM — K56.7 ILEUS FOLLOWING GASTROINTESTINAL SURGERY (HCC): Status: ACTIVE | Noted: 2018-12-30

## 2018-12-30 PROBLEM — K91.89 ILEUS FOLLOWING GASTROINTESTINAL SURGERY (HCC): Status: ACTIVE | Noted: 2018-12-30

## 2018-12-30 PROBLEM — R91.8 MASS OF LEFT LUNG: Status: ACTIVE | Noted: 2017-07-18

## 2018-12-30 NOTE — DISCHARGE SUMMARY
drained 1L and 40 mg IV lasix produced 3 L of urine. Seen by Cardiology who started digoxin and she converted to NSRas her acute CHF from fluid overload resolved. Developed left foot pain dorsum, no trauma, uric acid 4 and xray neg. Improved the next day and [facility]    Consults: cardiology, pulmonary/intensive care and general surgery    Significant Diagnostic Studies: radiology: CT scan:     Outstanding Order Results     Date and Time Order Name Status Description    12/4/2018 1052 Acid Fast Culture With Smear Preliminary     12/4/2018 0740 Acid Fast Culture With Smear Preliminary     12/4/2018 0740 Fungus Culture Preliminary     12/4/2018 0740 Fungus Culture Preliminary           Treatments: IV hydration, antibiotics: Meropenem, analgesia: Morphine, cardiac meds: digoxin and furosemide, anticoagulation: LMW heparin, insulin: Lantus, respiratory therapy: O2 and albuterol/atropine nebulizer Vapotherm, therapies: PT, OT and ST and surgery: lap choly    Discharge Exam  Healing wounds from lap choly/diminished ad pain, NSR    Disposition: [facility]   Tristan Jennie, [address] [place] [address] Medication Instructions LZY:[id number]    Printed on:12/30/18 0430   Medication Information                      acetaminophen 650 MG TABS  Take 650 mg by mouth every 4 hours as needed for Pain (For mild pain level 1-3 or for fever > 100.5). amLODIPine (NORVASC) 10 MG tablet  Take 10 mg by mouth daily              Ascorbic Acid (VITAMIN C) 500 MG tablet  Take 500 mg by mouth daily. aspirin 81 MG tablet  Take 1 tablet by mouth daily             calcium carbonate (OSCAL) 500 MG TABS tablet  Take 500 mg by mouth daily.                carvedilol (COREG) 25 MG tablet  Take 1 tablet by mouth 2 times daily             Cinnamon 500 MG CAPS  Take 1 capsule by mouth daily              citalopram (CELEXA) 40 MG tablet  Take 0.5 tablets by mouth every morning             digoxin (LANOXIN) 125 MCG tablet  Take 1 tablet by

## 2019-01-02 ENCOUNTER — OFFICE VISIT (OUTPATIENT)
Dept: PULMONOLOGY | Age: 79
End: 2019-01-02
Payer: MEDICARE

## 2019-01-02 VITALS — DIASTOLIC BLOOD PRESSURE: 58 MMHG | SYSTOLIC BLOOD PRESSURE: 106 MMHG | OXYGEN SATURATION: 95 % | HEART RATE: 68 BPM

## 2019-01-02 DIAGNOSIS — R91.8 HILAR MASS: Primary | ICD-10-CM

## 2019-01-02 DIAGNOSIS — J44.9 COPD, SEVERE (HCC): ICD-10-CM

## 2019-01-02 DIAGNOSIS — R59.0 MEDIASTINAL ADENOPATHY: ICD-10-CM

## 2019-01-02 DIAGNOSIS — R05.3 CHRONIC COUGH: ICD-10-CM

## 2019-01-02 PROCEDURE — G8399 PT W/DXA RESULTS DOCUMENT: HCPCS | Performed by: INTERNAL MEDICINE

## 2019-01-02 PROCEDURE — 1123F ACP DISCUSS/DSCN MKR DOCD: CPT | Performed by: INTERNAL MEDICINE

## 2019-01-02 PROCEDURE — G8417 CALC BMI ABV UP PARAM F/U: HCPCS | Performed by: INTERNAL MEDICINE

## 2019-01-02 PROCEDURE — 1036F TOBACCO NON-USER: CPT | Performed by: INTERNAL MEDICINE

## 2019-01-02 PROCEDURE — G8926 SPIRO NO PERF OR DOC: HCPCS | Performed by: INTERNAL MEDICINE

## 2019-01-02 PROCEDURE — 4040F PNEUMOC VAC/ADMIN/RCVD: CPT | Performed by: INTERNAL MEDICINE

## 2019-01-02 PROCEDURE — G8484 FLU IMMUNIZE NO ADMIN: HCPCS | Performed by: INTERNAL MEDICINE

## 2019-01-02 PROCEDURE — G8598 ASA/ANTIPLAT THER USED: HCPCS | Performed by: INTERNAL MEDICINE

## 2019-01-02 PROCEDURE — 1101F PT FALLS ASSESS-DOCD LE1/YR: CPT | Performed by: INTERNAL MEDICINE

## 2019-01-02 PROCEDURE — 1090F PRES/ABSN URINE INCON ASSESS: CPT | Performed by: INTERNAL MEDICINE

## 2019-01-02 PROCEDURE — 1111F DSCHRG MED/CURRENT MED MERGE: CPT | Performed by: INTERNAL MEDICINE

## 2019-01-02 PROCEDURE — G8427 DOCREV CUR MEDS BY ELIG CLIN: HCPCS | Performed by: INTERNAL MEDICINE

## 2019-01-02 PROCEDURE — 3023F SPIROM DOC REV: CPT | Performed by: INTERNAL MEDICINE

## 2019-01-02 PROCEDURE — 99214 OFFICE O/P EST MOD 30 MIN: CPT | Performed by: INTERNAL MEDICINE

## 2019-01-07 LAB
FUNGUS (MYCOLOGY) CULTURE: ABNORMAL
FUNGUS STAIN: ABNORMAL
FUNGUS STAIN: ABNORMAL
ORGANISM: ABNORMAL
ORGANISM: ABNORMAL

## 2019-01-10 ENCOUNTER — OFFICE VISIT (OUTPATIENT)
Dept: SURGERY | Age: 79
End: 2019-01-10

## 2019-01-10 VITALS — DIASTOLIC BLOOD PRESSURE: 54 MMHG | SYSTOLIC BLOOD PRESSURE: 140 MMHG | WEIGHT: 168 LBS | BODY MASS INDEX: 27.96 KG/M2

## 2019-01-10 DIAGNOSIS — K81.0 ACUTE CHOLECYSTITIS: Primary | ICD-10-CM

## 2019-01-10 PROCEDURE — 99024 POSTOP FOLLOW-UP VISIT: CPT | Performed by: SURGERY

## 2019-01-16 ENCOUNTER — HOSPITAL ENCOUNTER (OUTPATIENT)
Dept: PET IMAGING | Age: 79
Discharge: HOME | End: 2019-01-16
Payer: MEDICARE

## 2019-01-16 VITALS — BODY MASS INDEX: 27.32 KG/M2 | WEIGHT: 164 LBS | HEIGHT: 65 IN

## 2019-01-16 DIAGNOSIS — R91.8 LUNG MASS: ICD-10-CM

## 2019-01-16 PROCEDURE — 3430000000 HC RX DIAGNOSTIC RADIOPHARMACEUTICAL: Performed by: INTERNAL MEDICINE

## 2019-01-16 PROCEDURE — A9552 F18 FDG: HCPCS | Performed by: INTERNAL MEDICINE

## 2019-01-16 PROCEDURE — 78815 PET IMAGE W/CT SKULL-THIGH: CPT

## 2019-01-16 RX ORDER — FLUDEOXYGLUCOSE F 18 200 MCI/ML
12.01 INJECTION, SOLUTION INTRAVENOUS
Status: COMPLETED | OUTPATIENT
Start: 2019-01-16 | End: 2019-01-16

## 2019-01-16 RX ADMIN — FLUDEOXYGLUCOSE F 18 12.01 MILLICURIE: 200 INJECTION, SOLUTION INTRAVENOUS at 12:01

## 2019-01-18 ENCOUNTER — TELEPHONE (OUTPATIENT)
Dept: PULMONOLOGY | Age: 79
End: 2019-01-18

## 2019-01-22 ENCOUNTER — TELEPHONE (OUTPATIENT)
Dept: PULMONOLOGY | Age: 79
End: 2019-01-22

## 2019-01-22 LAB
AFB CULTURE (MYCOBACTERIA): NORMAL
AFB CULTURE (MYCOBACTERIA): NORMAL
AFB SMEAR: NORMAL
AFB SMEAR: NORMAL

## 2019-01-28 ENCOUNTER — HOSPITAL ENCOUNTER (OUTPATIENT)
Dept: GENERAL RADIOLOGY | Age: 79
Discharge: HOME | End: 2019-01-28
Payer: MEDICARE

## 2019-01-28 ENCOUNTER — HOSPITAL ENCOUNTER (OUTPATIENT)
Age: 79
Discharge: HOME | End: 2019-01-28
Payer: MEDICARE

## 2019-01-28 ENCOUNTER — TELEPHONE (OUTPATIENT)
Dept: PULMONOLOGY | Age: 79
End: 2019-01-28

## 2019-01-28 DIAGNOSIS — R06.02 SHORTNESS OF BREATH: ICD-10-CM

## 2019-01-28 DIAGNOSIS — R06.02 SHORTNESS OF BREATH: Primary | ICD-10-CM

## 2019-01-28 PROCEDURE — 71046 X-RAY EXAM CHEST 2 VIEWS: CPT

## 2019-01-29 ENCOUNTER — TELEPHONE (OUTPATIENT)
Dept: PULMONOLOGY | Age: 79
End: 2019-01-29

## 2019-02-22 ENCOUNTER — APPOINTMENT (OUTPATIENT)
Dept: CT IMAGING | Age: 79
DRG: 445 | End: 2019-02-22
Payer: MEDICARE

## 2019-02-22 ENCOUNTER — HOSPITAL ENCOUNTER (INPATIENT)
Age: 79
LOS: 7 days | Discharge: HOME | DRG: 445 | End: 2019-03-01
Attending: EMERGENCY MEDICINE | Admitting: INTERNAL MEDICINE
Payer: MEDICARE

## 2019-02-22 ENCOUNTER — APPOINTMENT (OUTPATIENT)
Dept: GENERAL RADIOLOGY | Age: 79
DRG: 445 | End: 2019-02-22
Payer: MEDICARE

## 2019-02-22 DIAGNOSIS — E87.6 ACUTE HYPOKALEMIA: Primary | ICD-10-CM

## 2019-02-22 DIAGNOSIS — I10 ESSENTIAL HYPERTENSION, BENIGN: ICD-10-CM

## 2019-02-22 DIAGNOSIS — J40 BRONCHITIS: ICD-10-CM

## 2019-02-22 DIAGNOSIS — R94.31 PROLONGED Q-T INTERVAL ON ECG: ICD-10-CM

## 2019-02-22 DIAGNOSIS — R11.2 NAUSEA AND VOMITING, INTRACTABILITY OF VOMITING NOT SPECIFIED, UNSPECIFIED VOMITING TYPE: ICD-10-CM

## 2019-02-22 DIAGNOSIS — N17.9 ACUTE KIDNEY INJURY (HCC): ICD-10-CM

## 2019-02-22 DIAGNOSIS — E11.65 TYPE 2 DIABETES MELLITUS WITH HYPERGLYCEMIA, WITHOUT LONG-TERM CURRENT USE OF INSULIN (HCC): ICD-10-CM

## 2019-02-22 DIAGNOSIS — E83.42 HYPOMAGNESEMIA: ICD-10-CM

## 2019-02-22 LAB
A/G RATIO: 1.3 (ref 1.1–2.2)
ALBUMIN SERPL-MCNC: 3.9 G/DL (ref 3.4–5)
ALP BLD-CCNC: 90 U/L (ref 40–129)
ALT SERPL-CCNC: 29 U/L (ref 10–40)
ANION GAP SERPL CALCULATED.3IONS-SCNC: 15 MMOL/L (ref 3–16)
AST SERPL-CCNC: 34 U/L (ref 15–37)
BASE EXCESS VENOUS: 9.7 MMOL/L (ref -3–3)
BASOPHILS ABSOLUTE: 0.1 K/UL (ref 0–0.2)
BASOPHILS RELATIVE PERCENT: 0.7 %
BILIRUB SERPL-MCNC: 0.3 MG/DL (ref 0–1)
BILIRUBIN URINE: NEGATIVE
BLOOD, URINE: NEGATIVE
BUN BLDV-MCNC: 12 MG/DL (ref 7–20)
CALCIUM SERPL-MCNC: 9.1 MG/DL (ref 8.3–10.6)
CARBOXYHEMOGLOBIN: 2.9 % (ref 0–1.5)
CHLORIDE BLD-SCNC: 89 MMOL/L (ref 99–110)
CLARITY: CLEAR
CO2: 33 MMOL/L (ref 21–32)
COLOR: YELLOW
CREAT SERPL-MCNC: 1.2 MG/DL (ref 0.6–1.2)
DIGOXIN LEVEL: 0.6 NG/ML (ref 0.8–2)
EKG ATRIAL RATE: 69 BPM
EKG DIAGNOSIS: NORMAL
EKG P AXIS: 48 DEGREES
EKG P-R INTERVAL: 208 MS
EKG Q-T INTERVAL: 548 MS
EKG QRS DURATION: 106 MS
EKG QTC CALCULATION (BAZETT): 587 MS
EKG R AXIS: 17 DEGREES
EKG T AXIS: 39 DEGREES
EKG VENTRICULAR RATE: 69 BPM
EOSINOPHILS ABSOLUTE: 0.2 K/UL (ref 0–0.6)
EOSINOPHILS RELATIVE PERCENT: 2 %
GFR AFRICAN AMERICAN: 53
GFR NON-AFRICAN AMERICAN: 44
GLOBULIN: 3 G/DL
GLUCOSE BLD-MCNC: 150 MG/DL (ref 70–99)
GLUCOSE BLD-MCNC: 164 MG/DL (ref 70–99)
GLUCOSE BLD-MCNC: 292 MG/DL (ref 70–99)
GLUCOSE URINE: 100 MG/DL
HCO3 VENOUS: 35.6 MMOL/L (ref 23–29)
HCT VFR BLD CALC: 38.2 % (ref 36–48)
HEMOGLOBIN, VEN, REDUCED: 6 %
HEMOGLOBIN: 12.4 G/DL (ref 12–16)
KETONES, URINE: NEGATIVE MG/DL
LACTIC ACID: 1.6 MMOL/L (ref 0.4–2)
LACTIC ACID: 3.7 MMOL/L (ref 0.4–2)
LEUKOCYTE ESTERASE, URINE: NEGATIVE
LIPASE: 26 U/L (ref 13–60)
LYMPHOCYTES ABSOLUTE: 2.1 K/UL (ref 1–5.1)
LYMPHOCYTES RELATIVE PERCENT: 21.2 %
MAGNESIUM: 1.5 MG/DL (ref 1.8–2.4)
MCH RBC QN AUTO: 29.3 PG (ref 26–34)
MCHC RBC AUTO-ENTMCNC: 32.5 G/DL (ref 31–36)
MCV RBC AUTO: 90.1 FL (ref 80–100)
METHEMOGLOBIN VENOUS: 0.2 %
MICROSCOPIC EXAMINATION: ABNORMAL
MONOCYTES ABSOLUTE: 0.7 K/UL (ref 0–1.3)
MONOCYTES RELATIVE PERCENT: 7 %
NEUTROPHILS ABSOLUTE: 6.8 K/UL (ref 1.7–7.7)
NEUTROPHILS RELATIVE PERCENT: 69.1 %
NITRITE, URINE: NEGATIVE
O2 CONTENT, VEN: 16 VOL %
O2 SAT, VEN: 94 %
O2 THERAPY: ABNORMAL
PCO2, VEN: 52.1 MMHG (ref 40–50)
PDW BLD-RTO: 18.9 % (ref 12.4–15.4)
PERFORMED ON: ABNORMAL
PERFORMED ON: ABNORMAL
PH UA: 7
PH VENOUS: 7.44 (ref 7.35–7.45)
PLATELET # BLD: 343 K/UL (ref 135–450)
PMV BLD AUTO: 7.6 FL (ref 5–10.5)
PO2, VEN: 65.1 MMHG (ref 25–40)
POTASSIUM SERPL-SCNC: 2.7 MMOL/L (ref 3.5–5.1)
PRO-BNP: 642 PG/ML (ref 0–449)
PROTEIN UA: NEGATIVE MG/DL
RBC # BLD: 4.24 M/UL (ref 4–5.2)
SODIUM BLD-SCNC: 137 MMOL/L (ref 136–145)
SPECIFIC GRAVITY UA: 1.01
TCO2 CALC VENOUS: 83 MMOL/L
TOTAL PROTEIN: 6.9 G/DL (ref 6.4–8.2)
TROPONIN: <0.01 NG/ML
URINE REFLEX TO CULTURE: ABNORMAL
URINE TYPE: ABNORMAL
UROBILINOGEN, URINE: 0.2 E.U./DL
WBC # BLD: 9.9 K/UL (ref 4–11)

## 2019-02-22 PROCEDURE — 83036 HEMOGLOBIN GLYCOSYLATED A1C: CPT

## 2019-02-22 PROCEDURE — 80053 COMPREHEN METABOLIC PANEL: CPT

## 2019-02-22 PROCEDURE — 93010 ELECTROCARDIOGRAM REPORT: CPT | Performed by: INTERNAL MEDICINE

## 2019-02-22 PROCEDURE — 82803 BLOOD GASES ANY COMBINATION: CPT

## 2019-02-22 PROCEDURE — 96366 THER/PROPH/DIAG IV INF ADDON: CPT

## 2019-02-22 PROCEDURE — 99285 EMERGENCY DEPT VISIT HI MDM: CPT

## 2019-02-22 PROCEDURE — 6370000000 HC RX 637 (ALT 250 FOR IP): Performed by: INTERNAL MEDICINE

## 2019-02-22 PROCEDURE — 85025 COMPLETE CBC W/AUTO DIFF WBC: CPT

## 2019-02-22 PROCEDURE — 1200000000 HC SEMI PRIVATE

## 2019-02-22 PROCEDURE — 71046 X-RAY EXAM CHEST 2 VIEWS: CPT

## 2019-02-22 PROCEDURE — 83690 ASSAY OF LIPASE: CPT

## 2019-02-22 PROCEDURE — 83880 ASSAY OF NATRIURETIC PEPTIDE: CPT

## 2019-02-22 PROCEDURE — 6360000002 HC RX W HCPCS: Performed by: EMERGENCY MEDICINE

## 2019-02-22 PROCEDURE — 6370000000 HC RX 637 (ALT 250 FOR IP): Performed by: EMERGENCY MEDICINE

## 2019-02-22 PROCEDURE — 81003 URINALYSIS AUTO W/O SCOPE: CPT

## 2019-02-22 PROCEDURE — 84484 ASSAY OF TROPONIN QUANT: CPT

## 2019-02-22 PROCEDURE — 83605 ASSAY OF LACTIC ACID: CPT

## 2019-02-22 PROCEDURE — 70450 CT HEAD/BRAIN W/O DYE: CPT

## 2019-02-22 PROCEDURE — 83735 ASSAY OF MAGNESIUM: CPT

## 2019-02-22 PROCEDURE — 96367 TX/PROPH/DG ADDL SEQ IV INF: CPT

## 2019-02-22 PROCEDURE — 2580000003 HC RX 258: Performed by: EMERGENCY MEDICINE

## 2019-02-22 PROCEDURE — 6360000002 HC RX W HCPCS: Performed by: INTERNAL MEDICINE

## 2019-02-22 PROCEDURE — 94760 N-INVAS EAR/PLS OXIMETRY 1: CPT

## 2019-02-22 PROCEDURE — 96365 THER/PROPH/DIAG IV INF INIT: CPT

## 2019-02-22 PROCEDURE — 80162 ASSAY OF DIGOXIN TOTAL: CPT

## 2019-02-22 PROCEDURE — 93005 ELECTROCARDIOGRAM TRACING: CPT | Performed by: EMERGENCY MEDICINE

## 2019-02-22 RX ORDER — AMLODIPINE BESYLATE 5 MG/1
10 TABLET ORAL DAILY
Status: DISCONTINUED | OUTPATIENT
Start: 2019-02-22 | End: 2019-02-23

## 2019-02-22 RX ORDER — SODIUM CHLORIDE 0.9 % (FLUSH) 0.9 %
10 SYRINGE (ML) INJECTION EVERY 12 HOURS SCHEDULED
Status: DISCONTINUED | OUTPATIENT
Start: 2019-02-22 | End: 2019-03-01 | Stop reason: HOSPADM

## 2019-02-22 RX ORDER — FAMOTIDINE 20 MG/1
20 TABLET, FILM COATED ORAL 2 TIMES DAILY
Status: DISCONTINUED | OUTPATIENT
Start: 2019-02-22 | End: 2019-03-01

## 2019-02-22 RX ORDER — ASCORBIC ACID 500 MG
500 TABLET ORAL DAILY
Status: DISCONTINUED | OUTPATIENT
Start: 2019-02-23 | End: 2019-03-01 | Stop reason: HOSPADM

## 2019-02-22 RX ORDER — POTASSIUM CHLORIDE 7.45 MG/ML
10 INJECTION INTRAVENOUS ONCE
Status: COMPLETED | OUTPATIENT
Start: 2019-02-22 | End: 2019-02-22

## 2019-02-22 RX ORDER — DIGOXIN 125 MCG
125 TABLET ORAL DAILY
Status: DISCONTINUED | OUTPATIENT
Start: 2019-02-22 | End: 2019-03-01 | Stop reason: HOSPADM

## 2019-02-22 RX ORDER — ONDANSETRON 2 MG/ML
4 INJECTION INTRAMUSCULAR; INTRAVENOUS ONCE
Status: DISCONTINUED | OUTPATIENT
Start: 2019-02-22 | End: 2019-02-22

## 2019-02-22 RX ORDER — NICOTINE POLACRILEX 4 MG
15 LOZENGE BUCCAL PRN
Status: DISCONTINUED | OUTPATIENT
Start: 2019-02-22 | End: 2019-03-01 | Stop reason: HOSPADM

## 2019-02-22 RX ORDER — 0.9 % SODIUM CHLORIDE 0.9 %
1000 INTRAVENOUS SOLUTION INTRAVENOUS ONCE
Status: COMPLETED | OUTPATIENT
Start: 2019-02-22 | End: 2019-02-22

## 2019-02-22 RX ORDER — SODIUM CHLORIDE AND POTASSIUM CHLORIDE .9; .15 G/100ML; G/100ML
SOLUTION INTRAVENOUS CONTINUOUS
Status: DISCONTINUED | OUTPATIENT
Start: 2019-02-22 | End: 2019-02-23

## 2019-02-22 RX ORDER — POTASSIUM CHLORIDE 20 MEQ/1
20 TABLET, EXTENDED RELEASE ORAL ONCE
Status: COMPLETED | OUTPATIENT
Start: 2019-02-22 | End: 2019-02-22

## 2019-02-22 RX ORDER — DEXTROSE MONOHYDRATE 25 G/50ML
12.5 INJECTION, SOLUTION INTRAVENOUS PRN
Status: DISCONTINUED | OUTPATIENT
Start: 2019-02-22 | End: 2019-03-01 | Stop reason: HOSPADM

## 2019-02-22 RX ORDER — CARVEDILOL 25 MG/1
25 TABLET ORAL 2 TIMES DAILY WITH MEALS
Status: DISCONTINUED | OUTPATIENT
Start: 2019-02-22 | End: 2019-03-01 | Stop reason: HOSPADM

## 2019-02-22 RX ORDER — ONDANSETRON 2 MG/ML
4 INJECTION INTRAMUSCULAR; INTRAVENOUS EVERY 6 HOURS PRN
Status: DISCONTINUED | OUTPATIENT
Start: 2019-02-22 | End: 2019-03-01 | Stop reason: HOSPADM

## 2019-02-22 RX ORDER — MAGNESIUM SULFATE 1 G/100ML
1 INJECTION INTRAVENOUS ONCE
Status: COMPLETED | OUTPATIENT
Start: 2019-02-22 | End: 2019-02-22

## 2019-02-22 RX ORDER — CALCIUM CARBONATE 500(1250)
500 TABLET ORAL DAILY
Status: DISCONTINUED | OUTPATIENT
Start: 2019-02-23 | End: 2019-03-01 | Stop reason: HOSPADM

## 2019-02-22 RX ORDER — DEXTROSE MONOHYDRATE 50 MG/ML
100 INJECTION, SOLUTION INTRAVENOUS PRN
Status: DISCONTINUED | OUTPATIENT
Start: 2019-02-22 | End: 2019-03-01 | Stop reason: HOSPADM

## 2019-02-22 RX ORDER — CITALOPRAM 20 MG/1
20 TABLET ORAL EVERY MORNING
Status: DISCONTINUED | OUTPATIENT
Start: 2019-02-23 | End: 2019-03-01 | Stop reason: HOSPADM

## 2019-02-22 RX ORDER — FLECAINIDE ACETATE 50 MG/1
100 TABLET ORAL 2 TIMES DAILY
Status: DISCONTINUED | OUTPATIENT
Start: 2019-02-22 | End: 2019-03-01 | Stop reason: HOSPADM

## 2019-02-22 RX ORDER — POTASSIUM CHLORIDE 20 MEQ/1
20 TABLET, EXTENDED RELEASE ORAL
Status: DISCONTINUED | OUTPATIENT
Start: 2019-02-22 | End: 2019-02-23

## 2019-02-22 RX ORDER — SODIUM CHLORIDE 0.9 % (FLUSH) 0.9 %
10 SYRINGE (ML) INJECTION PRN
Status: DISCONTINUED | OUTPATIENT
Start: 2019-02-22 | End: 2019-03-01 | Stop reason: HOSPADM

## 2019-02-22 RX ORDER — MAGNESIUM SULFATE IN WATER 40 MG/ML
4 INJECTION, SOLUTION INTRAVENOUS ONCE
Status: DISCONTINUED | OUTPATIENT
Start: 2019-02-22 | End: 2019-02-24 | Stop reason: SDUPTHER

## 2019-02-22 RX ORDER — ASPIRIN 81 MG/1
81 TABLET ORAL DAILY
Status: DISCONTINUED | OUTPATIENT
Start: 2019-02-22 | End: 2019-03-01 | Stop reason: HOSPADM

## 2019-02-22 RX ADMIN — POTASSIUM CHLORIDE AND SODIUM CHLORIDE: 900; 150 INJECTION, SOLUTION INTRAVENOUS at 18:00

## 2019-02-22 RX ADMIN — CARVEDILOL 25 MG: 25 TABLET, FILM COATED ORAL at 17:58

## 2019-02-22 RX ADMIN — ENOXAPARIN SODIUM 30 MG: 30 INJECTION SUBCUTANEOUS at 20:50

## 2019-02-22 RX ADMIN — FLECAINIDE ACETATE 100 MG: 50 TABLET ORAL at 20:49

## 2019-02-22 RX ADMIN — INSULIN LISPRO 2 UNITS: 100 INJECTION, SOLUTION INTRAVENOUS; SUBCUTANEOUS at 20:52

## 2019-02-22 RX ADMIN — ASPIRIN 81 MG: 81 TABLET, COATED ORAL at 17:58

## 2019-02-22 RX ADMIN — INSULIN LISPRO 3 UNITS: 100 INJECTION, SOLUTION INTRAVENOUS; SUBCUTANEOUS at 17:59

## 2019-02-22 RX ADMIN — AMLODIPINE BESYLATE 10 MG: 5 TABLET ORAL at 17:58

## 2019-02-22 RX ADMIN — SODIUM CHLORIDE 1000 ML: 9 INJECTION, SOLUTION INTRAVENOUS at 12:43

## 2019-02-22 RX ADMIN — POTASSIUM CHLORIDE 20 MEQ: 1500 TABLET, EXTENDED RELEASE ORAL at 17:58

## 2019-02-22 RX ADMIN — MAGNESIUM SULFATE HEPTAHYDRATE 1 G: 1 INJECTION, SOLUTION INTRAVENOUS at 15:11

## 2019-02-22 RX ADMIN — FAMOTIDINE 20 MG: 20 TABLET, FILM COATED ORAL at 20:49

## 2019-02-22 RX ADMIN — POTASSIUM CHLORIDE 10 MEQ: 7.46 INJECTION, SOLUTION INTRAVENOUS at 13:48

## 2019-02-22 RX ADMIN — DIGOXIN 125 MCG: 125 TABLET ORAL at 17:58

## 2019-02-22 RX ADMIN — POTASSIUM CHLORIDE 20 MEQ: 1500 TABLET, EXTENDED RELEASE ORAL at 13:38

## 2019-02-22 ASSESSMENT — ENCOUNTER SYMPTOMS
ABDOMINAL PAIN: 1
BACK PAIN: 0
CONSTIPATION: 0
DIARRHEA: 0
NAUSEA: 1
SORE THROAT: 0
COUGH: 0
WHEEZING: 0
SHORTNESS OF BREATH: 0

## 2019-02-22 ASSESSMENT — PAIN SCALES - GENERAL
PAINLEVEL_OUTOF10: 0
PAINLEVEL_OUTOF10: 0

## 2019-02-23 ENCOUNTER — APPOINTMENT (OUTPATIENT)
Dept: CT IMAGING | Age: 79
DRG: 445 | End: 2019-02-23
Payer: MEDICARE

## 2019-02-23 LAB
A/G RATIO: 1.2 (ref 1.1–2.2)
ALBUMIN SERPL-MCNC: 3.4 G/DL (ref 3.4–5)
ALP BLD-CCNC: 75 U/L (ref 40–129)
ALT SERPL-CCNC: 25 U/L (ref 10–40)
ANION GAP SERPL CALCULATED.3IONS-SCNC: 11 MMOL/L (ref 3–16)
AST SERPL-CCNC: 30 U/L (ref 15–37)
BASOPHILS ABSOLUTE: 0.1 K/UL (ref 0–0.2)
BASOPHILS RELATIVE PERCENT: 0.7 %
BILIRUB SERPL-MCNC: <0.2 MG/DL (ref 0–1)
BUN BLDV-MCNC: 8 MG/DL (ref 7–20)
C DIFFICILE TOXIN, EIA: NORMAL
CALCIUM SERPL-MCNC: 8.6 MG/DL (ref 8.3–10.6)
CHLORIDE BLD-SCNC: 99 MMOL/L (ref 99–110)
CO2: 31 MMOL/L (ref 21–32)
CREAT SERPL-MCNC: 0.9 MG/DL (ref 0.6–1.2)
EOSINOPHILS ABSOLUTE: 0.3 K/UL (ref 0–0.6)
EOSINOPHILS RELATIVE PERCENT: 3.8 %
ESTIMATED AVERAGE GLUCOSE: 231.7 MG/DL
GFR AFRICAN AMERICAN: >60
GFR NON-AFRICAN AMERICAN: >60
GLOBULIN: 2.8 G/DL
GLUCOSE BLD-MCNC: 149 MG/DL (ref 70–99)
GLUCOSE BLD-MCNC: 163 MG/DL (ref 70–99)
GLUCOSE BLD-MCNC: 178 MG/DL (ref 70–99)
GLUCOSE BLD-MCNC: 184 MG/DL (ref 70–99)
GLUCOSE BLD-MCNC: 201 MG/DL (ref 70–99)
HBA1C MFR BLD: 9.7 %
HCT VFR BLD CALC: 33.5 % (ref 36–48)
HEMOGLOBIN: 11 G/DL (ref 12–16)
LYMPHOCYTES ABSOLUTE: 3.3 K/UL (ref 1–5.1)
LYMPHOCYTES RELATIVE PERCENT: 37.5 %
MAGNESIUM: 1.8 MG/DL (ref 1.8–2.4)
MCH RBC QN AUTO: 29.7 PG (ref 26–34)
MCHC RBC AUTO-ENTMCNC: 32.7 G/DL (ref 31–36)
MCV RBC AUTO: 90.7 FL (ref 80–100)
MONOCYTES ABSOLUTE: 0.7 K/UL (ref 0–1.3)
MONOCYTES RELATIVE PERCENT: 8.3 %
NEUTROPHILS ABSOLUTE: 4.4 K/UL (ref 1.7–7.7)
NEUTROPHILS RELATIVE PERCENT: 49.7 %
PDW BLD-RTO: 19.4 % (ref 12.4–15.4)
PERFORMED ON: ABNORMAL
PLATELET # BLD: 326 K/UL (ref 135–450)
PMV BLD AUTO: 7.2 FL (ref 5–10.5)
POTASSIUM REFLEX MAGNESIUM: 3.5 MMOL/L (ref 3.5–5.1)
RBC # BLD: 3.69 M/UL (ref 4–5.2)
SODIUM BLD-SCNC: 141 MMOL/L (ref 136–145)
TOTAL PROTEIN: 6.2 G/DL (ref 6.4–8.2)
WBC # BLD: 8.8 K/UL (ref 4–11)

## 2019-02-23 PROCEDURE — 6370000000 HC RX 637 (ALT 250 FOR IP): Performed by: INTERNAL MEDICINE

## 2019-02-23 PROCEDURE — 87324 CLOSTRIDIUM AG IA: CPT

## 2019-02-23 PROCEDURE — 85025 COMPLETE CBC W/AUTO DIFF WBC: CPT

## 2019-02-23 PROCEDURE — 6360000004 HC RX CONTRAST MEDICATION: Performed by: INTERNAL MEDICINE

## 2019-02-23 PROCEDURE — 94664 DEMO&/EVAL PT USE INHALER: CPT

## 2019-02-23 PROCEDURE — 87449 NOS EACH ORGANISM AG IA: CPT

## 2019-02-23 PROCEDURE — 6360000004 HC RX CONTRAST MEDICATION: Performed by: SURGERY

## 2019-02-23 PROCEDURE — 74177 CT ABD & PELVIS W/CONTRAST: CPT

## 2019-02-23 PROCEDURE — 6360000002 HC RX W HCPCS: Performed by: INTERNAL MEDICINE

## 2019-02-23 PROCEDURE — 2700000000 HC OXYGEN THERAPY PER DAY

## 2019-02-23 PROCEDURE — 80053 COMPREHEN METABOLIC PANEL: CPT

## 2019-02-23 PROCEDURE — 1200000000 HC SEMI PRIVATE

## 2019-02-23 PROCEDURE — 2580000003 HC RX 258: Performed by: INTERNAL MEDICINE

## 2019-02-23 PROCEDURE — 94640 AIRWAY INHALATION TREATMENT: CPT

## 2019-02-23 PROCEDURE — 83735 ASSAY OF MAGNESIUM: CPT

## 2019-02-23 PROCEDURE — 36415 COLL VENOUS BLD VENIPUNCTURE: CPT

## 2019-02-23 PROCEDURE — 99233 SBSQ HOSP IP/OBS HIGH 50: CPT | Performed by: SURGERY

## 2019-02-23 PROCEDURE — 94760 N-INVAS EAR/PLS OXIMETRY 1: CPT

## 2019-02-23 RX ORDER — AMLODIPINE BESYLATE 5 MG/1
5 TABLET ORAL DAILY
Status: DISCONTINUED | OUTPATIENT
Start: 2019-02-24 | End: 2019-03-01 | Stop reason: HOSPADM

## 2019-02-23 RX ORDER — LOSARTAN POTASSIUM 25 MG/1
50 TABLET ORAL 2 TIMES DAILY
Status: DISCONTINUED | OUTPATIENT
Start: 2019-02-23 | End: 2019-03-01 | Stop reason: HOSPADM

## 2019-02-23 RX ORDER — IPRATROPIUM BROMIDE AND ALBUTEROL SULFATE 2.5; .5 MG/3ML; MG/3ML
1 SOLUTION RESPIRATORY (INHALATION)
Status: DISCONTINUED | OUTPATIENT
Start: 2019-02-23 | End: 2019-03-01 | Stop reason: HOSPADM

## 2019-02-23 RX ORDER — POTASSIUM CHLORIDE 20 MEQ/1
20 TABLET, EXTENDED RELEASE ORAL 2 TIMES DAILY WITH MEALS
Status: DISCONTINUED | OUTPATIENT
Start: 2019-02-23 | End: 2019-02-23

## 2019-02-23 RX ORDER — POTASSIUM CHLORIDE 20 MEQ/1
10 TABLET, EXTENDED RELEASE ORAL 2 TIMES DAILY WITH MEALS
Status: DISCONTINUED | OUTPATIENT
Start: 2019-02-23 | End: 2019-02-24

## 2019-02-23 RX ADMIN — CALCIUM 500 MG: 500 TABLET ORAL at 08:50

## 2019-02-23 RX ADMIN — ASPIRIN 81 MG: 81 TABLET, COATED ORAL at 08:51

## 2019-02-23 RX ADMIN — LOSARTAN POTASSIUM 50 MG: 25 TABLET, FILM COATED ORAL at 22:08

## 2019-02-23 RX ADMIN — INSULIN LISPRO 3 UNITS: 100 INJECTION, SOLUTION INTRAVENOUS; SUBCUTANEOUS at 12:50

## 2019-02-23 RX ADMIN — IPRATROPIUM BROMIDE AND ALBUTEROL SULFATE 1 AMPULE: .5; 3 SOLUTION RESPIRATORY (INHALATION) at 21:58

## 2019-02-23 RX ADMIN — FLECAINIDE ACETATE 100 MG: 50 TABLET ORAL at 22:09

## 2019-02-23 RX ADMIN — ENOXAPARIN SODIUM 30 MG: 30 INJECTION SUBCUTANEOUS at 22:10

## 2019-02-23 RX ADMIN — IOPAMIDOL 75 ML: 755 INJECTION, SOLUTION INTRAVENOUS at 13:38

## 2019-02-23 RX ADMIN — AMLODIPINE BESYLATE 10 MG: 5 TABLET ORAL at 08:51

## 2019-02-23 RX ADMIN — POTASSIUM CHLORIDE AND SODIUM CHLORIDE: 900; 150 INJECTION, SOLUTION INTRAVENOUS at 06:15

## 2019-02-23 RX ADMIN — FAMOTIDINE 20 MG: 20 TABLET, FILM COATED ORAL at 08:51

## 2019-02-23 RX ADMIN — ONDANSETRON 4 MG: 2 INJECTION INTRAMUSCULAR; INTRAVENOUS at 06:51

## 2019-02-23 RX ADMIN — Medication 10 ML: at 06:53

## 2019-02-23 RX ADMIN — IOHEXOL 50 ML: 240 INJECTION, SOLUTION INTRATHECAL; INTRAVASCULAR; INTRAVENOUS; ORAL at 11:25

## 2019-02-23 RX ADMIN — IPRATROPIUM BROMIDE AND ALBUTEROL SULFATE 1 AMPULE: .5; 3 SOLUTION RESPIRATORY (INHALATION) at 16:47

## 2019-02-23 RX ADMIN — CITALOPRAM HYDROBROMIDE 20 MG: 20 TABLET ORAL at 08:50

## 2019-02-23 RX ADMIN — INSULIN LISPRO 2 UNITS: 100 INJECTION, SOLUTION INTRAVENOUS; SUBCUTANEOUS at 22:10

## 2019-02-23 RX ADMIN — FAMOTIDINE 20 MG: 20 TABLET, FILM COATED ORAL at 22:08

## 2019-02-23 RX ADMIN — IPRATROPIUM BROMIDE AND ALBUTEROL SULFATE 1 AMPULE: .5; 3 SOLUTION RESPIRATORY (INHALATION) at 12:16

## 2019-02-23 RX ADMIN — INSULIN LISPRO 3 UNITS: 100 INJECTION, SOLUTION INTRAVENOUS; SUBCUTANEOUS at 08:51

## 2019-02-23 RX ADMIN — FLECAINIDE ACETATE 100 MG: 50 TABLET ORAL at 08:50

## 2019-02-23 RX ADMIN — CARVEDILOL 25 MG: 25 TABLET, FILM COATED ORAL at 08:49

## 2019-02-23 RX ADMIN — INSULIN LISPRO 6 UNITS: 100 INJECTION, SOLUTION INTRAVENOUS; SUBCUTANEOUS at 17:37

## 2019-02-23 RX ADMIN — OXYCODONE HYDROCHLORIDE AND ACETAMINOPHEN 500 MG: 500 TABLET ORAL at 08:50

## 2019-02-23 RX ADMIN — LINAGLIPTIN 5 MG: 5 TABLET, FILM COATED ORAL at 11:25

## 2019-02-23 RX ADMIN — DIGOXIN 125 MCG: 125 TABLET ORAL at 08:50

## 2019-02-23 RX ADMIN — POTASSIUM CHLORIDE 20 MEQ: 1500 TABLET, EXTENDED RELEASE ORAL at 08:50

## 2019-02-23 RX ADMIN — Medication 10 ML: at 22:10

## 2019-02-23 RX ADMIN — POTASSIUM CHLORIDE 10 MEQ: 1500 TABLET, EXTENDED RELEASE ORAL at 17:41

## 2019-02-23 RX ADMIN — CARVEDILOL 25 MG: 25 TABLET, FILM COATED ORAL at 17:41

## 2019-02-23 ASSESSMENT — PAIN SCALES - GENERAL
PAINLEVEL_OUTOF10: 0
PAINLEVEL_OUTOF10: 4

## 2019-02-23 ASSESSMENT — PAIN DESCRIPTION - PAIN TYPE: TYPE: ACUTE PAIN

## 2019-02-23 ASSESSMENT — PAIN DESCRIPTION - LOCATION: LOCATION: BACK;LEG

## 2019-02-24 LAB
ANION GAP SERPL CALCULATED.3IONS-SCNC: 10 MMOL/L (ref 3–16)
BASOPHILS ABSOLUTE: 0.1 K/UL (ref 0–0.2)
BASOPHILS RELATIVE PERCENT: 0.8 %
BUN BLDV-MCNC: 9 MG/DL (ref 7–20)
CALCIUM SERPL-MCNC: 8.3 MG/DL (ref 8.3–10.6)
CHLORIDE BLD-SCNC: 105 MMOL/L (ref 99–110)
CO2: 28 MMOL/L (ref 21–32)
CREAT SERPL-MCNC: 0.8 MG/DL (ref 0.6–1.2)
EOSINOPHILS ABSOLUTE: 0.3 K/UL (ref 0–0.6)
EOSINOPHILS RELATIVE PERCENT: 4.1 %
GFR AFRICAN AMERICAN: >60
GFR NON-AFRICAN AMERICAN: >60
GLUCOSE BLD-MCNC: 171 MG/DL (ref 70–99)
GLUCOSE BLD-MCNC: 172 MG/DL (ref 70–99)
GLUCOSE BLD-MCNC: 192 MG/DL (ref 70–99)
GLUCOSE BLD-MCNC: 286 MG/DL (ref 70–99)
GLUCOSE BLD-MCNC: 397 MG/DL (ref 70–99)
HCT VFR BLD CALC: 32.7 % (ref 36–48)
HEMOGLOBIN: 10.5 G/DL (ref 12–16)
LYMPHOCYTES ABSOLUTE: 2.8 K/UL (ref 1–5.1)
LYMPHOCYTES RELATIVE PERCENT: 33.1 %
MAGNESIUM: 1.7 MG/DL (ref 1.8–2.4)
MCH RBC QN AUTO: 29.6 PG (ref 26–34)
MCHC RBC AUTO-ENTMCNC: 32.1 G/DL (ref 31–36)
MCV RBC AUTO: 92 FL (ref 80–100)
MONOCYTES ABSOLUTE: 0.7 K/UL (ref 0–1.3)
MONOCYTES RELATIVE PERCENT: 8.6 %
NEUTROPHILS ABSOLUTE: 4.5 K/UL (ref 1.7–7.7)
NEUTROPHILS RELATIVE PERCENT: 53.4 %
PDW BLD-RTO: 19.2 % (ref 12.4–15.4)
PERFORMED ON: ABNORMAL
PLATELET # BLD: 285 K/UL (ref 135–450)
PMV BLD AUTO: 7.3 FL (ref 5–10.5)
POTASSIUM SERPL-SCNC: 3.6 MMOL/L (ref 3.5–5.1)
PRO-BNP: 590 PG/ML (ref 0–449)
RBC # BLD: 3.56 M/UL (ref 4–5.2)
SODIUM BLD-SCNC: 143 MMOL/L (ref 136–145)
WBC # BLD: 8.3 K/UL (ref 4–11)

## 2019-02-24 PROCEDURE — 6370000000 HC RX 637 (ALT 250 FOR IP): Performed by: INTERNAL MEDICINE

## 2019-02-24 PROCEDURE — 36415 COLL VENOUS BLD VENIPUNCTURE: CPT

## 2019-02-24 PROCEDURE — 88112 CYTOPATH CELL ENHANCE TECH: CPT

## 2019-02-24 PROCEDURE — 99222 1ST HOSP IP/OBS MODERATE 55: CPT | Performed by: INTERNAL MEDICINE

## 2019-02-24 PROCEDURE — 83735 ASSAY OF MAGNESIUM: CPT

## 2019-02-24 PROCEDURE — 6360000002 HC RX W HCPCS: Performed by: INTERNAL MEDICINE

## 2019-02-24 PROCEDURE — 94640 AIRWAY INHALATION TREATMENT: CPT

## 2019-02-24 PROCEDURE — 1200000000 HC SEMI PRIVATE

## 2019-02-24 PROCEDURE — 88177 CYTP FNA EVAL EA ADDL: CPT

## 2019-02-24 PROCEDURE — 94664 DEMO&/EVAL PT USE INHALER: CPT

## 2019-02-24 PROCEDURE — 88172 CYTP DX EVAL FNA 1ST EA SITE: CPT

## 2019-02-24 PROCEDURE — 2580000003 HC RX 258

## 2019-02-24 PROCEDURE — 83880 ASSAY OF NATRIURETIC PEPTIDE: CPT

## 2019-02-24 PROCEDURE — 2700000000 HC OXYGEN THERAPY PER DAY

## 2019-02-24 PROCEDURE — 92610 EVALUATE SWALLOWING FUNCTION: CPT

## 2019-02-24 PROCEDURE — 80048 BASIC METABOLIC PNL TOTAL CA: CPT

## 2019-02-24 PROCEDURE — 92526 ORAL FUNCTION THERAPY: CPT

## 2019-02-24 PROCEDURE — 88305 TISSUE EXAM BY PATHOLOGIST: CPT

## 2019-02-24 PROCEDURE — 99024 POSTOP FOLLOW-UP VISIT: CPT | Performed by: SURGERY

## 2019-02-24 PROCEDURE — 94760 N-INVAS EAR/PLS OXIMETRY 1: CPT

## 2019-02-24 PROCEDURE — 2580000003 HC RX 258: Performed by: INTERNAL MEDICINE

## 2019-02-24 PROCEDURE — 88173 CYTOPATH EVAL FNA REPORT: CPT

## 2019-02-24 PROCEDURE — 85025 COMPLETE CBC W/AUTO DIFF WBC: CPT

## 2019-02-24 RX ORDER — POTASSIUM CHLORIDE 20 MEQ/1
20 TABLET, EXTENDED RELEASE ORAL 2 TIMES DAILY WITH MEALS
Status: DISCONTINUED | OUTPATIENT
Start: 2019-02-24 | End: 2019-03-01 | Stop reason: HOSPADM

## 2019-02-24 RX ORDER — SODIUM CHLORIDE 9 MG/ML
INJECTION, SOLUTION INTRAVENOUS
Status: COMPLETED
Start: 2019-02-24 | End: 2019-02-24

## 2019-02-24 RX ORDER — MAGNESIUM SULFATE IN WATER 40 MG/ML
4 INJECTION, SOLUTION INTRAVENOUS ONCE
Status: COMPLETED | OUTPATIENT
Start: 2019-02-24 | End: 2019-02-24

## 2019-02-24 RX ORDER — METHYLPREDNISOLONE SODIUM SUCCINATE 40 MG/ML
40 INJECTION, POWDER, LYOPHILIZED, FOR SOLUTION INTRAMUSCULAR; INTRAVENOUS EVERY 6 HOURS
Status: DISCONTINUED | OUTPATIENT
Start: 2019-02-24 | End: 2019-02-26

## 2019-02-24 RX ADMIN — MAGNESIUM SULFATE HEPTAHYDRATE 4 G: 40 INJECTION, SOLUTION INTRAVENOUS at 10:19

## 2019-02-24 RX ADMIN — FAMOTIDINE 20 MG: 20 TABLET, FILM COATED ORAL at 21:08

## 2019-02-24 RX ADMIN — METHYLPREDNISOLONE SODIUM SUCCINATE 40 MG: 40 INJECTION, POWDER, FOR SOLUTION INTRAMUSCULAR; INTRAVENOUS at 21:08

## 2019-02-24 RX ADMIN — FAMOTIDINE 20 MG: 20 TABLET, FILM COATED ORAL at 08:11

## 2019-02-24 RX ADMIN — POLYETHYLENE GLYCOL-3350 AND ELECTROLYTES 4000 ML: 236; 6.74; 5.86; 2.97; 22.74 POWDER, FOR SOLUTION ORAL at 16:55

## 2019-02-24 RX ADMIN — METHYLPREDNISOLONE SODIUM SUCCINATE 40 MG: 40 INJECTION, POWDER, FOR SOLUTION INTRAMUSCULAR; INTRAVENOUS at 15:20

## 2019-02-24 RX ADMIN — IPRATROPIUM BROMIDE AND ALBUTEROL SULFATE 1 AMPULE: .5; 3 SOLUTION RESPIRATORY (INHALATION) at 13:38

## 2019-02-24 RX ADMIN — GLYCOPYRROLATE AND FORMOTEROL FUMARATE 2 PUFF: 9; 4.8 AEROSOL, METERED RESPIRATORY (INHALATION) at 22:07

## 2019-02-24 RX ADMIN — FLECAINIDE ACETATE 100 MG: 50 TABLET ORAL at 08:10

## 2019-02-24 RX ADMIN — INSULIN LISPRO 3 UNITS: 100 INJECTION, SOLUTION INTRAVENOUS; SUBCUTANEOUS at 08:12

## 2019-02-24 RX ADMIN — OXYCODONE HYDROCHLORIDE AND ACETAMINOPHEN 500 MG: 500 TABLET ORAL at 08:12

## 2019-02-24 RX ADMIN — INSULIN LISPRO 5 UNITS: 100 INJECTION, SOLUTION INTRAVENOUS; SUBCUTANEOUS at 21:14

## 2019-02-24 RX ADMIN — POTASSIUM CHLORIDE 20 MEQ: 1500 TABLET, EXTENDED RELEASE ORAL at 16:50

## 2019-02-24 RX ADMIN — LINAGLIPTIN 5 MG: 5 TABLET, FILM COATED ORAL at 08:09

## 2019-02-24 RX ADMIN — ASPIRIN 81 MG: 81 TABLET, COATED ORAL at 08:10

## 2019-02-24 RX ADMIN — DIGOXIN 125 MCG: 125 TABLET ORAL at 08:10

## 2019-02-24 RX ADMIN — SODIUM CHLORIDE: 9 INJECTION, SOLUTION INTRAVENOUS at 12:43

## 2019-02-24 RX ADMIN — NYSTATIN 500000 UNITS: 500000 SUSPENSION ORAL at 21:08

## 2019-02-24 RX ADMIN — CITALOPRAM HYDROBROMIDE 20 MG: 20 TABLET ORAL at 08:12

## 2019-02-24 RX ADMIN — CARVEDILOL 25 MG: 25 TABLET, FILM COATED ORAL at 16:50

## 2019-02-24 RX ADMIN — IPRATROPIUM BROMIDE AND ALBUTEROL SULFATE 1 AMPULE: .5; 3 SOLUTION RESPIRATORY (INHALATION) at 22:07

## 2019-02-24 RX ADMIN — CALCIUM 500 MG: 500 TABLET ORAL at 08:10

## 2019-02-24 RX ADMIN — AMLODIPINE BESYLATE 5 MG: 5 TABLET ORAL at 08:10

## 2019-02-24 RX ADMIN — INSULIN GLARGINE 10 UNITS: 100 INJECTION, SOLUTION SUBCUTANEOUS at 10:20

## 2019-02-24 RX ADMIN — INSULIN LISPRO 3 UNITS: 100 INJECTION, SOLUTION INTRAVENOUS; SUBCUTANEOUS at 12:44

## 2019-02-24 RX ADMIN — NYSTATIN 500000 UNITS: 500000 SUSPENSION ORAL at 16:50

## 2019-02-24 RX ADMIN — CARVEDILOL 25 MG: 25 TABLET, FILM COATED ORAL at 08:11

## 2019-02-24 RX ADMIN — METHYLPREDNISOLONE SODIUM SUCCINATE 40 MG: 40 INJECTION, POWDER, FOR SOLUTION INTRAMUSCULAR; INTRAVENOUS at 10:19

## 2019-02-24 RX ADMIN — FLECAINIDE ACETATE 100 MG: 50 TABLET ORAL at 21:13

## 2019-02-24 RX ADMIN — Medication 10 ML: at 21:25

## 2019-02-24 RX ADMIN — Medication 10 ML: at 08:22

## 2019-02-24 RX ADMIN — ENOXAPARIN SODIUM 30 MG: 30 INJECTION SUBCUTANEOUS at 21:08

## 2019-02-24 RX ADMIN — Medication 10 ML: at 15:20

## 2019-02-24 RX ADMIN — LOSARTAN POTASSIUM 50 MG: 25 TABLET, FILM COATED ORAL at 08:11

## 2019-02-24 RX ADMIN — NYSTATIN 500000 UNITS: 500000 SUSPENSION ORAL at 14:20

## 2019-02-24 RX ADMIN — IPRATROPIUM BROMIDE AND ALBUTEROL SULFATE 1 AMPULE: .5; 3 SOLUTION RESPIRATORY (INHALATION) at 09:24

## 2019-02-24 RX ADMIN — LOSARTAN POTASSIUM 50 MG: 25 TABLET, FILM COATED ORAL at 21:08

## 2019-02-24 RX ADMIN — INSULIN LISPRO 15 UNITS: 100 INJECTION, SOLUTION INTRAVENOUS; SUBCUTANEOUS at 16:54

## 2019-02-24 RX ADMIN — POTASSIUM CHLORIDE 10 MEQ: 1500 TABLET, EXTENDED RELEASE ORAL at 08:10

## 2019-02-24 ASSESSMENT — PAIN SCALES - GENERAL
PAINLEVEL_OUTOF10: 0

## 2019-02-25 ENCOUNTER — ANESTHESIA (OUTPATIENT)
Dept: ENDOSCOPY | Age: 79
DRG: 445 | End: 2019-02-25
Payer: MEDICARE

## 2019-02-25 ENCOUNTER — ANESTHESIA EVENT (OUTPATIENT)
Dept: ENDOSCOPY | Age: 79
DRG: 445 | End: 2019-02-25
Payer: MEDICARE

## 2019-02-25 VITALS
RESPIRATION RATE: 16 BRPM | SYSTOLIC BLOOD PRESSURE: 170 MMHG | OXYGEN SATURATION: 100 % | DIASTOLIC BLOOD PRESSURE: 77 MMHG

## 2019-02-25 LAB
GLUCOSE BLD-MCNC: 258 MG/DL (ref 70–99)
GLUCOSE BLD-MCNC: 279 MG/DL (ref 70–99)
GLUCOSE BLD-MCNC: 292 MG/DL (ref 70–99)
GLUCOSE BLD-MCNC: 294 MG/DL (ref 70–99)
GLUCOSE BLD-MCNC: 300 MG/DL (ref 70–99)
GLUCOSE BLD-MCNC: 381 MG/DL (ref 70–99)
GLUCOSE BLD-MCNC: 470 MG/DL (ref 70–99)
PERFORMED ON: ABNORMAL

## 2019-02-25 PROCEDURE — 3700000000 HC ANESTHESIA ATTENDED CARE: Performed by: INTERNAL MEDICINE

## 2019-02-25 PROCEDURE — 2700000000 HC OXYGEN THERAPY PER DAY

## 2019-02-25 PROCEDURE — 94760 N-INVAS EAR/PLS OXIMETRY 1: CPT

## 2019-02-25 PROCEDURE — 0DBL8ZX EXCISION OF TRANSVERSE COLON, VIA NATURAL OR ARTIFICIAL OPENING ENDOSCOPIC, DIAGNOSTIC: ICD-10-PCS | Performed by: INTERNAL MEDICINE

## 2019-02-25 PROCEDURE — 3609010600 HC COLONOSCOPY POLYPECTOMY SNARE/COLD BIOPSY: Performed by: INTERNAL MEDICINE

## 2019-02-25 PROCEDURE — 1200000000 HC SEMI PRIVATE

## 2019-02-25 PROCEDURE — 94640 AIRWAY INHALATION TREATMENT: CPT

## 2019-02-25 PROCEDURE — 99232 SBSQ HOSP IP/OBS MODERATE 35: CPT | Performed by: INTERNAL MEDICINE

## 2019-02-25 PROCEDURE — 99024 POSTOP FOLLOW-UP VISIT: CPT | Performed by: SURGERY

## 2019-02-25 PROCEDURE — 88305 TISSUE EXAM BY PATHOLOGIST: CPT

## 2019-02-25 PROCEDURE — 2580000003 HC RX 258: Performed by: ANESTHESIOLOGY

## 2019-02-25 PROCEDURE — 6370000000 HC RX 637 (ALT 250 FOR IP): Performed by: INTERNAL MEDICINE

## 2019-02-25 PROCEDURE — 0DB68ZX EXCISION OF STOMACH, VIA NATURAL OR ARTIFICIAL OPENING ENDOSCOPIC, DIAGNOSTIC: ICD-10-PCS | Performed by: INTERNAL MEDICINE

## 2019-02-25 PROCEDURE — 0DB98ZX EXCISION OF DUODENUM, VIA NATURAL OR ARTIFICIAL OPENING ENDOSCOPIC, DIAGNOSTIC: ICD-10-PCS | Performed by: INTERNAL MEDICINE

## 2019-02-25 PROCEDURE — 3700000001 HC ADD 15 MINUTES (ANESTHESIA): Performed by: INTERNAL MEDICINE

## 2019-02-25 PROCEDURE — 2580000003 HC RX 258: Performed by: NURSE ANESTHETIST, CERTIFIED REGISTERED

## 2019-02-25 PROCEDURE — 0DBE8ZX EXCISION OF LARGE INTESTINE, VIA NATURAL OR ARTIFICIAL OPENING ENDOSCOPIC, DIAGNOSTIC: ICD-10-PCS | Performed by: INTERNAL MEDICINE

## 2019-02-25 PROCEDURE — 6370000000 HC RX 637 (ALT 250 FOR IP): Performed by: ANESTHESIOLOGY

## 2019-02-25 PROCEDURE — 3609010300 HC COLONOSCOPY W/BIOPSY SINGLE/MULTIPLE: Performed by: INTERNAL MEDICINE

## 2019-02-25 PROCEDURE — 2500000003 HC RX 250 WO HCPCS: Performed by: NURSE ANESTHETIST, CERTIFIED REGISTERED

## 2019-02-25 PROCEDURE — 7100000001 HC PACU RECOVERY - ADDTL 15 MIN: Performed by: INTERNAL MEDICINE

## 2019-02-25 PROCEDURE — 7100000000 HC PACU RECOVERY - FIRST 15 MIN: Performed by: INTERNAL MEDICINE

## 2019-02-25 PROCEDURE — 2709999900 HC NON-CHARGEABLE SUPPLY: Performed by: INTERNAL MEDICINE

## 2019-02-25 PROCEDURE — APPNB30 APP NON BILLABLE TIME 0-30 MINS: Performed by: NURSE PRACTITIONER

## 2019-02-25 PROCEDURE — APPSS15 APP SPLIT SHARED TIME 0-15 MINUTES: Performed by: NURSE PRACTITIONER

## 2019-02-25 PROCEDURE — 6360000002 HC RX W HCPCS: Performed by: INTERNAL MEDICINE

## 2019-02-25 PROCEDURE — 6360000002 HC RX W HCPCS: Performed by: NURSE ANESTHETIST, CERTIFIED REGISTERED

## 2019-02-25 PROCEDURE — 2580000003 HC RX 258: Performed by: INTERNAL MEDICINE

## 2019-02-25 PROCEDURE — 3609012400 HC EGD TRANSORAL BIOPSY SINGLE/MULTIPLE: Performed by: INTERNAL MEDICINE

## 2019-02-25 RX ORDER — PROPOFOL 10 MG/ML
INJECTION, EMULSION INTRAVENOUS PRN
Status: DISCONTINUED | OUTPATIENT
Start: 2019-02-25 | End: 2019-02-25 | Stop reason: SDUPTHER

## 2019-02-25 RX ORDER — SODIUM CHLORIDE 9 MG/ML
INJECTION, SOLUTION INTRAVENOUS CONTINUOUS
Status: DISCONTINUED | OUTPATIENT
Start: 2019-02-25 | End: 2019-02-25

## 2019-02-25 RX ORDER — SODIUM CHLORIDE 9 MG/ML
INJECTION, SOLUTION INTRAVENOUS CONTINUOUS PRN
Status: DISCONTINUED | OUTPATIENT
Start: 2019-02-25 | End: 2019-02-25 | Stop reason: SDUPTHER

## 2019-02-25 RX ORDER — LIDOCAINE HYDROCHLORIDE 20 MG/ML
INJECTION, SOLUTION INFILTRATION; PERINEURAL PRN
Status: DISCONTINUED | OUTPATIENT
Start: 2019-02-25 | End: 2019-02-25 | Stop reason: SDUPTHER

## 2019-02-25 RX ADMIN — METHYLPREDNISOLONE SODIUM SUCCINATE 40 MG: 40 INJECTION, POWDER, FOR SOLUTION INTRAMUSCULAR; INTRAVENOUS at 21:30

## 2019-02-25 RX ADMIN — FAMOTIDINE 20 MG: 20 TABLET, FILM COATED ORAL at 10:59

## 2019-02-25 RX ADMIN — METHYLPREDNISOLONE SODIUM SUCCINATE 40 MG: 40 INJECTION, POWDER, FOR SOLUTION INTRAMUSCULAR; INTRAVENOUS at 08:12

## 2019-02-25 RX ADMIN — NYSTATIN 500000 UNITS: 500000 SUSPENSION ORAL at 17:14

## 2019-02-25 RX ADMIN — LINAGLIPTIN 5 MG: 5 TABLET, FILM COATED ORAL at 11:09

## 2019-02-25 RX ADMIN — CARVEDILOL 25 MG: 25 TABLET, FILM COATED ORAL at 17:14

## 2019-02-25 RX ADMIN — CARVEDILOL 25 MG: 25 TABLET, FILM COATED ORAL at 08:12

## 2019-02-25 RX ADMIN — CITALOPRAM HYDROBROMIDE 20 MG: 20 TABLET ORAL at 10:59

## 2019-02-25 RX ADMIN — ENOXAPARIN SODIUM 30 MG: 30 INJECTION SUBCUTANEOUS at 21:31

## 2019-02-25 RX ADMIN — LOSARTAN POTASSIUM 50 MG: 25 TABLET, FILM COATED ORAL at 21:30

## 2019-02-25 RX ADMIN — ASPIRIN 81 MG: 81 TABLET, COATED ORAL at 10:59

## 2019-02-25 RX ADMIN — LOSARTAN POTASSIUM 50 MG: 25 TABLET, FILM COATED ORAL at 10:58

## 2019-02-25 RX ADMIN — OXYCODONE HYDROCHLORIDE AND ACETAMINOPHEN 500 MG: 500 TABLET ORAL at 10:59

## 2019-02-25 RX ADMIN — INSULIN HUMAN 5 UNITS: 100 INJECTION, SOLUTION PARENTERAL at 09:11

## 2019-02-25 RX ADMIN — AMLODIPINE BESYLATE 5 MG: 5 TABLET ORAL at 10:58

## 2019-02-25 RX ADMIN — FLECAINIDE ACETATE 100 MG: 50 TABLET ORAL at 10:59

## 2019-02-25 RX ADMIN — POTASSIUM CHLORIDE 20 MEQ: 1500 TABLET, EXTENDED RELEASE ORAL at 17:14

## 2019-02-25 RX ADMIN — NYSTATIN 500000 UNITS: 500000 SUSPENSION ORAL at 21:30

## 2019-02-25 RX ADMIN — SODIUM CHLORIDE: 9 INJECTION, SOLUTION INTRAVENOUS at 09:15

## 2019-02-25 RX ADMIN — IPRATROPIUM BROMIDE AND ALBUTEROL SULFATE 1 AMPULE: .5; 3 SOLUTION RESPIRATORY (INHALATION) at 11:53

## 2019-02-25 RX ADMIN — INSULIN LISPRO 7 UNITS: 100 INJECTION, SOLUTION INTRAVENOUS; SUBCUTANEOUS at 21:32

## 2019-02-25 RX ADMIN — POTASSIUM CHLORIDE 20 MEQ: 1500 TABLET, EXTENDED RELEASE ORAL at 10:58

## 2019-02-25 RX ADMIN — INSULIN GLARGINE 10 UNITS: 100 INJECTION, SOLUTION SUBCUTANEOUS at 11:00

## 2019-02-25 RX ADMIN — NYSTATIN 500000 UNITS: 500000 SUSPENSION ORAL at 10:59

## 2019-02-25 RX ADMIN — METHYLPREDNISOLONE SODIUM SUCCINATE 40 MG: 40 INJECTION, POWDER, FOR SOLUTION INTRAMUSCULAR; INTRAVENOUS at 05:51

## 2019-02-25 RX ADMIN — DIGOXIN 125 MCG: 125 TABLET ORAL at 10:58

## 2019-02-25 RX ADMIN — IPRATROPIUM BROMIDE AND ALBUTEROL SULFATE 1 AMPULE: .5; 3 SOLUTION RESPIRATORY (INHALATION) at 08:17

## 2019-02-25 RX ADMIN — INSULIN LISPRO 9 UNITS: 100 INJECTION, SOLUTION INTRAVENOUS; SUBCUTANEOUS at 11:02

## 2019-02-25 RX ADMIN — LIDOCAINE HYDROCHLORIDE 100 MG: 20 INJECTION, SOLUTION INFILTRATION; PERINEURAL at 09:20

## 2019-02-25 RX ADMIN — PROPOFOL 60 MG: 10 INJECTION, EMULSION INTRAVENOUS at 09:20

## 2019-02-25 RX ADMIN — IPRATROPIUM BROMIDE AND ALBUTEROL SULFATE 1 AMPULE: .5; 3 SOLUTION RESPIRATORY (INHALATION) at 19:59

## 2019-02-25 RX ADMIN — FAMOTIDINE 20 MG: 20 TABLET, FILM COATED ORAL at 21:30

## 2019-02-25 RX ADMIN — FLECAINIDE ACETATE 100 MG: 50 TABLET ORAL at 21:31

## 2019-02-25 RX ADMIN — IPRATROPIUM BROMIDE AND ALBUTEROL SULFATE 1 AMPULE: .5; 3 SOLUTION RESPIRATORY (INHALATION) at 16:16

## 2019-02-25 RX ADMIN — PROPOFOL 60 MG: 10 INJECTION, EMULSION INTRAVENOUS at 09:35

## 2019-02-25 RX ADMIN — CALCIUM 500 MG: 500 TABLET ORAL at 10:59

## 2019-02-25 RX ADMIN — GLYCOPYRROLATE AND FORMOTEROL FUMARATE 2 PUFF: 9; 4.8 AEROSOL, METERED RESPIRATORY (INHALATION) at 19:59

## 2019-02-25 RX ADMIN — SODIUM CHLORIDE: 9 INJECTION, SOLUTION INTRAVENOUS at 09:00

## 2019-02-25 RX ADMIN — INSULIN LISPRO 18 UNITS: 100 INJECTION, SOLUTION INTRAVENOUS; SUBCUTANEOUS at 17:15

## 2019-02-25 RX ADMIN — PROPOFOL 40 MG: 10 INJECTION, EMULSION INTRAVENOUS at 09:40

## 2019-02-25 RX ADMIN — PROPOFOL 40 MG: 10 INJECTION, EMULSION INTRAVENOUS at 09:25

## 2019-02-25 RX ADMIN — PROPOFOL 40 MG: 10 INJECTION, EMULSION INTRAVENOUS at 09:30

## 2019-02-25 RX ADMIN — Medication 10 ML: at 08:18

## 2019-02-25 RX ADMIN — METHYLPREDNISOLONE SODIUM SUCCINATE 40 MG: 40 INJECTION, POWDER, FOR SOLUTION INTRAMUSCULAR; INTRAVENOUS at 15:24

## 2019-02-25 RX ADMIN — Medication 10 ML: at 21:30

## 2019-02-25 RX ADMIN — GLYCOPYRROLATE AND FORMOTEROL FUMARATE 2 PUFF: 9; 4.8 AEROSOL, METERED RESPIRATORY (INHALATION) at 08:19

## 2019-02-25 ASSESSMENT — PAIN SCALES - GENERAL
PAINLEVEL_OUTOF10: 0

## 2019-02-25 ASSESSMENT — ENCOUNTER SYMPTOMS: SHORTNESS OF BREATH: 1

## 2019-02-26 ENCOUNTER — ANESTHESIA EVENT (OUTPATIENT)
Dept: ENDOSCOPY | Age: 79
DRG: 445 | End: 2019-02-26
Payer: MEDICARE

## 2019-02-26 LAB
FOLATE: 14.41 NG/ML (ref 4.78–24.2)
GLUCOSE BLD-MCNC: 261 MG/DL (ref 70–99)
GLUCOSE BLD-MCNC: 270 MG/DL (ref 70–99)
GLUCOSE BLD-MCNC: 347 MG/DL (ref 70–99)
GLUCOSE BLD-MCNC: 360 MG/DL (ref 70–99)
PERFORMED ON: ABNORMAL
VITAMIN B-12: 1650 PG/ML (ref 211–911)

## 2019-02-26 PROCEDURE — 97530 THERAPEUTIC ACTIVITIES: CPT

## 2019-02-26 PROCEDURE — 36415 COLL VENOUS BLD VENIPUNCTURE: CPT

## 2019-02-26 PROCEDURE — 6360000002 HC RX W HCPCS: Performed by: INTERNAL MEDICINE

## 2019-02-26 PROCEDURE — 99232 SBSQ HOSP IP/OBS MODERATE 35: CPT | Performed by: INTERNAL MEDICINE

## 2019-02-26 PROCEDURE — 2700000000 HC OXYGEN THERAPY PER DAY

## 2019-02-26 PROCEDURE — 6370000000 HC RX 637 (ALT 250 FOR IP): Performed by: INTERNAL MEDICINE

## 2019-02-26 PROCEDURE — 2580000003 HC RX 258: Performed by: INTERNAL MEDICINE

## 2019-02-26 PROCEDURE — 97165 OT EVAL LOW COMPLEX 30 MIN: CPT

## 2019-02-26 PROCEDURE — 82746 ASSAY OF FOLIC ACID SERUM: CPT

## 2019-02-26 PROCEDURE — 94640 AIRWAY INHALATION TREATMENT: CPT

## 2019-02-26 PROCEDURE — 82607 VITAMIN B-12: CPT

## 2019-02-26 PROCEDURE — 97161 PT EVAL LOW COMPLEX 20 MIN: CPT

## 2019-02-26 PROCEDURE — 92526 ORAL FUNCTION THERAPY: CPT

## 2019-02-26 PROCEDURE — 94760 N-INVAS EAR/PLS OXIMETRY 1: CPT

## 2019-02-26 PROCEDURE — 1200000000 HC SEMI PRIVATE

## 2019-02-26 RX ORDER — METHYLPREDNISOLONE SODIUM SUCCINATE 40 MG/ML
40 INJECTION, POWDER, LYOPHILIZED, FOR SOLUTION INTRAMUSCULAR; INTRAVENOUS EVERY 8 HOURS
Status: DISCONTINUED | OUTPATIENT
Start: 2019-02-26 | End: 2019-02-27

## 2019-02-26 RX ADMIN — METHYLPREDNISOLONE SODIUM SUCCINATE 40 MG: 40 INJECTION, POWDER, FOR SOLUTION INTRAMUSCULAR; INTRAVENOUS at 03:01

## 2019-02-26 RX ADMIN — IPRATROPIUM BROMIDE AND ALBUTEROL SULFATE 1 AMPULE: .5; 3 SOLUTION RESPIRATORY (INHALATION) at 08:55

## 2019-02-26 RX ADMIN — Medication 10 ML: at 20:06

## 2019-02-26 RX ADMIN — CALCIUM 500 MG: 500 TABLET ORAL at 09:15

## 2019-02-26 RX ADMIN — CARVEDILOL 25 MG: 25 TABLET, FILM COATED ORAL at 18:31

## 2019-02-26 RX ADMIN — ASPIRIN 81 MG: 81 TABLET, COATED ORAL at 09:14

## 2019-02-26 RX ADMIN — FAMOTIDINE 20 MG: 20 TABLET, FILM COATED ORAL at 09:15

## 2019-02-26 RX ADMIN — POTASSIUM CHLORIDE 20 MEQ: 1500 TABLET, EXTENDED RELEASE ORAL at 18:31

## 2019-02-26 RX ADMIN — GLYCOPYRROLATE AND FORMOTEROL FUMARATE 2 PUFF: 9; 4.8 AEROSOL, METERED RESPIRATORY (INHALATION) at 08:55

## 2019-02-26 RX ADMIN — NYSTATIN 500000 UNITS: 500000 SUSPENSION ORAL at 18:31

## 2019-02-26 RX ADMIN — FAMOTIDINE 20 MG: 20 TABLET, FILM COATED ORAL at 20:04

## 2019-02-26 RX ADMIN — AMLODIPINE BESYLATE 5 MG: 5 TABLET ORAL at 09:14

## 2019-02-26 RX ADMIN — LOSARTAN POTASSIUM 50 MG: 25 TABLET, FILM COATED ORAL at 20:05

## 2019-02-26 RX ADMIN — INSULIN LISPRO 15 UNITS: 100 INJECTION, SOLUTION INTRAVENOUS; SUBCUTANEOUS at 09:16

## 2019-02-26 RX ADMIN — INSULIN LISPRO 9 UNITS: 100 INJECTION, SOLUTION INTRAVENOUS; SUBCUTANEOUS at 18:32

## 2019-02-26 RX ADMIN — NYSTATIN 500000 UNITS: 500000 SUSPENSION ORAL at 20:04

## 2019-02-26 RX ADMIN — METHYLPREDNISOLONE SODIUM SUCCINATE 40 MG: 40 INJECTION, POWDER, FOR SOLUTION INTRAMUSCULAR; INTRAVENOUS at 18:36

## 2019-02-26 RX ADMIN — INSULIN LISPRO 12 UNITS: 100 INJECTION, SOLUTION INTRAVENOUS; SUBCUTANEOUS at 12:19

## 2019-02-26 RX ADMIN — POTASSIUM CHLORIDE 20 MEQ: 1500 TABLET, EXTENDED RELEASE ORAL at 09:13

## 2019-02-26 RX ADMIN — METHYLPREDNISOLONE SODIUM SUCCINATE 40 MG: 40 INJECTION, POWDER, FOR SOLUTION INTRAMUSCULAR; INTRAVENOUS at 09:14

## 2019-02-26 RX ADMIN — IPRATROPIUM BROMIDE AND ALBUTEROL SULFATE 1 AMPULE: .5; 3 SOLUTION RESPIRATORY (INHALATION) at 12:14

## 2019-02-26 RX ADMIN — GLYCOPYRROLATE AND FORMOTEROL FUMARATE 2 PUFF: 9; 4.8 AEROSOL, METERED RESPIRATORY (INHALATION) at 20:37

## 2019-02-26 RX ADMIN — IPRATROPIUM BROMIDE AND ALBUTEROL SULFATE 1 AMPULE: .5; 3 SOLUTION RESPIRATORY (INHALATION) at 20:37

## 2019-02-26 RX ADMIN — INSULIN LISPRO 5 UNITS: 100 INJECTION, SOLUTION INTRAVENOUS; SUBCUTANEOUS at 20:16

## 2019-02-26 RX ADMIN — LOSARTAN POTASSIUM 50 MG: 25 TABLET, FILM COATED ORAL at 09:14

## 2019-02-26 RX ADMIN — CITALOPRAM HYDROBROMIDE 20 MG: 20 TABLET ORAL at 09:14

## 2019-02-26 RX ADMIN — LINAGLIPTIN 5 MG: 5 TABLET, FILM COATED ORAL at 09:15

## 2019-02-26 RX ADMIN — OXYCODONE HYDROCHLORIDE AND ACETAMINOPHEN 500 MG: 500 TABLET ORAL at 09:15

## 2019-02-26 RX ADMIN — CARVEDILOL 25 MG: 25 TABLET, FILM COATED ORAL at 09:14

## 2019-02-26 RX ADMIN — ENOXAPARIN SODIUM 40 MG: 40 INJECTION SUBCUTANEOUS at 20:05

## 2019-02-26 RX ADMIN — NYSTATIN 500000 UNITS: 500000 SUSPENSION ORAL at 12:22

## 2019-02-26 RX ADMIN — IPRATROPIUM BROMIDE AND ALBUTEROL SULFATE 1 AMPULE: .5; 3 SOLUTION RESPIRATORY (INHALATION) at 15:56

## 2019-02-26 RX ADMIN — FLECAINIDE ACETATE 100 MG: 50 TABLET ORAL at 09:12

## 2019-02-26 RX ADMIN — DIGOXIN 125 MCG: 125 TABLET ORAL at 09:13

## 2019-02-26 RX ADMIN — FLECAINIDE ACETATE 100 MG: 50 TABLET ORAL at 20:04

## 2019-02-26 RX ADMIN — NYSTATIN 500000 UNITS: 500000 SUSPENSION ORAL at 09:13

## 2019-02-26 RX ADMIN — Medication 10 ML: at 09:19

## 2019-02-26 ASSESSMENT — PAIN SCALES - GENERAL
PAINLEVEL_OUTOF10: 0
PAINLEVEL_OUTOF10: 4
PAINLEVEL_OUTOF10: 3

## 2019-02-26 ASSESSMENT — PAIN DESCRIPTION - PAIN TYPE
TYPE: ACUTE PAIN

## 2019-02-26 ASSESSMENT — PAIN DESCRIPTION - LOCATION
LOCATION: FOOT
LOCATION: CHEST

## 2019-02-26 ASSESSMENT — ENCOUNTER SYMPTOMS: SHORTNESS OF BREATH: 1

## 2019-02-27 ENCOUNTER — APPOINTMENT (OUTPATIENT)
Dept: GENERAL RADIOLOGY | Age: 79
DRG: 445 | End: 2019-02-27
Payer: MEDICARE

## 2019-02-27 ENCOUNTER — ANESTHESIA (OUTPATIENT)
Dept: ENDOSCOPY | Age: 79
DRG: 445 | End: 2019-02-27
Payer: MEDICARE

## 2019-02-27 VITALS
OXYGEN SATURATION: 98 % | TEMPERATURE: 98.6 F | DIASTOLIC BLOOD PRESSURE: 60 MMHG | SYSTOLIC BLOOD PRESSURE: 120 MMHG | RESPIRATION RATE: 10 BRPM

## 2019-02-27 LAB
ANION GAP SERPL CALCULATED.3IONS-SCNC: 9 MMOL/L (ref 3–16)
ANISOCYTOSIS: ABNORMAL
BASOPHILS ABSOLUTE: 0 K/UL (ref 0–0.2)
BASOPHILS RELATIVE PERCENT: 0 %
BUN BLDV-MCNC: 26 MG/DL (ref 7–20)
CALCIUM SERPL-MCNC: 9.5 MG/DL (ref 8.3–10.6)
CHLORIDE BLD-SCNC: 100 MMOL/L (ref 99–110)
CO2: 28 MMOL/L (ref 21–32)
CREAT SERPL-MCNC: 0.7 MG/DL (ref 0.6–1.2)
EOSINOPHILS ABSOLUTE: 0 K/UL (ref 0–0.6)
EOSINOPHILS RELATIVE PERCENT: 0 %
FERRITIN: 185.7 NG/ML (ref 15–150)
GFR AFRICAN AMERICAN: >60
GFR NON-AFRICAN AMERICAN: >60
GLUCOSE BLD-MCNC: 285 MG/DL (ref 70–99)
GLUCOSE BLD-MCNC: 286 MG/DL (ref 70–99)
GLUCOSE BLD-MCNC: 286 MG/DL (ref 70–99)
GLUCOSE BLD-MCNC: 300 MG/DL (ref 70–99)
GLUCOSE BLD-MCNC: 300 MG/DL (ref 70–99)
GLUCOSE BLD-MCNC: 308 MG/DL (ref 70–99)
GLUCOSE BLD-MCNC: 311 MG/DL (ref 70–99)
HCT VFR BLD CALC: 33.1 % (ref 36–48)
HEMOGLOBIN: 10.8 G/DL (ref 12–16)
INR BLD: 0.95 (ref 0.86–1.14)
IRON SATURATION: 34 % (ref 15–50)
IRON: 73 UG/DL (ref 37–145)
LYMPHOCYTES ABSOLUTE: 1.6 K/UL (ref 1–5.1)
LYMPHOCYTES RELATIVE PERCENT: 13 %
MACROCYTES: ABNORMAL
MAGNESIUM: 2 MG/DL (ref 1.8–2.4)
MCH RBC QN AUTO: 29.6 PG (ref 26–34)
MCHC RBC AUTO-ENTMCNC: 32.5 G/DL (ref 31–36)
MCV RBC AUTO: 90.9 FL (ref 80–100)
MICROCYTES: ABNORMAL
MONOCYTES ABSOLUTE: 0.2 K/UL (ref 0–1.3)
MONOCYTES RELATIVE PERCENT: 2 %
NEUTROPHILS ABSOLUTE: 10.4 K/UL (ref 1.7–7.7)
NEUTROPHILS RELATIVE PERCENT: 85 %
PDW BLD-RTO: 20.1 % (ref 12.4–15.4)
PERFORMED ON: ABNORMAL
PLATELET # BLD: 332 K/UL (ref 135–450)
PLATELET SLIDE REVIEW: ADEQUATE
PMV BLD AUTO: 7.5 FL (ref 5–10.5)
POTASSIUM SERPL-SCNC: 4.9 MMOL/L (ref 3.5–5.1)
PROTHROMBIN TIME: 10.8 SEC (ref 9.8–13)
RBC # BLD: 3.64 M/UL (ref 4–5.2)
SLIDE REVIEW: ABNORMAL
SODIUM BLD-SCNC: 137 MMOL/L (ref 136–145)
TOTAL IRON BINDING CAPACITY: 217 UG/DL (ref 260–445)
WBC # BLD: 12.2 K/UL (ref 4–11)

## 2019-02-27 PROCEDURE — 97535 SELF CARE MNGMENT TRAINING: CPT

## 2019-02-27 PROCEDURE — 85610 PROTHROMBIN TIME: CPT

## 2019-02-27 PROCEDURE — 6370000000 HC RX 637 (ALT 250 FOR IP): Performed by: INTERNAL MEDICINE

## 2019-02-27 PROCEDURE — 3700000000 HC ANESTHESIA ATTENDED CARE: Performed by: INTERNAL MEDICINE

## 2019-02-27 PROCEDURE — 83735 ASSAY OF MAGNESIUM: CPT

## 2019-02-27 PROCEDURE — 88305 TISSUE EXAM BY PATHOLOGIST: CPT

## 2019-02-27 PROCEDURE — 3609011900 HC BRONCHOSCOPY NEEDLE BX TRACHEA MAIN STEM&/BRON: Performed by: INTERNAL MEDICINE

## 2019-02-27 PROCEDURE — 80048 BASIC METABOLIC PNL TOTAL CA: CPT

## 2019-02-27 PROCEDURE — 31625 BRONCHOSCOPY W/BIOPSY(S): CPT | Performed by: INTERNAL MEDICINE

## 2019-02-27 PROCEDURE — 6360000002 HC RX W HCPCS: Performed by: INTERNAL MEDICINE

## 2019-02-27 PROCEDURE — 88342 IMHCHEM/IMCYTCHM 1ST ANTB: CPT

## 2019-02-27 PROCEDURE — 85025 COMPLETE CBC W/AUTO DIFF WBC: CPT

## 2019-02-27 PROCEDURE — 7100000001 HC PACU RECOVERY - ADDTL 15 MIN: Performed by: INTERNAL MEDICINE

## 2019-02-27 PROCEDURE — 87205 SMEAR GRAM STAIN: CPT

## 2019-02-27 PROCEDURE — 2580000003 HC RX 258: Performed by: INTERNAL MEDICINE

## 2019-02-27 PROCEDURE — 83540 ASSAY OF IRON: CPT

## 2019-02-27 PROCEDURE — 87070 CULTURE OTHR SPECIMN AEROBIC: CPT

## 2019-02-27 PROCEDURE — 83550 IRON BINDING TEST: CPT

## 2019-02-27 PROCEDURE — 31652 BRONCH EBUS SAMPLNG 1/2 NODE: CPT | Performed by: INTERNAL MEDICINE

## 2019-02-27 PROCEDURE — 36415 COLL VENOUS BLD VENIPUNCTURE: CPT

## 2019-02-27 PROCEDURE — 3700000001 HC ADD 15 MINUTES (ANESTHESIA): Performed by: INTERNAL MEDICINE

## 2019-02-27 PROCEDURE — 6360000002 HC RX W HCPCS: Performed by: NURSE ANESTHETIST, CERTIFIED REGISTERED

## 2019-02-27 PROCEDURE — 0BB88ZX EXCISION OF LEFT UPPER LOBE BRONCHUS, VIA NATURAL OR ARTIFICIAL OPENING ENDOSCOPIC, DIAGNOSTIC: ICD-10-PCS | Performed by: INTERNAL MEDICINE

## 2019-02-27 PROCEDURE — 2580000003 HC RX 258: Performed by: ANESTHESIOLOGY

## 2019-02-27 PROCEDURE — 99232 SBSQ HOSP IP/OBS MODERATE 35: CPT | Performed by: INTERNAL MEDICINE

## 2019-02-27 PROCEDURE — 94760 N-INVAS EAR/PLS OXIMETRY 1: CPT

## 2019-02-27 PROCEDURE — 71045 X-RAY EXAM CHEST 1 VIEW: CPT

## 2019-02-27 PROCEDURE — 94640 AIRWAY INHALATION TREATMENT: CPT

## 2019-02-27 PROCEDURE — 2500000003 HC RX 250 WO HCPCS: Performed by: NURSE ANESTHETIST, CERTIFIED REGISTERED

## 2019-02-27 PROCEDURE — 2700000000 HC OXYGEN THERAPY PER DAY

## 2019-02-27 PROCEDURE — 3609011300 HC BRONCHOSCOPY BRONCHIAL/ENDOBRNCL BX 1+ SITES: Performed by: INTERNAL MEDICINE

## 2019-02-27 PROCEDURE — 82728 ASSAY OF FERRITIN: CPT

## 2019-02-27 PROCEDURE — 1200000000 HC SEMI PRIVATE

## 2019-02-27 PROCEDURE — 2709999900 HC NON-CHARGEABLE SUPPLY: Performed by: INTERNAL MEDICINE

## 2019-02-27 PROCEDURE — 7100000000 HC PACU RECOVERY - FIRST 15 MIN: Performed by: INTERNAL MEDICINE

## 2019-02-27 PROCEDURE — C1725 CATH, TRANSLUMIN NON-LASER: HCPCS | Performed by: INTERNAL MEDICINE

## 2019-02-27 PROCEDURE — 2580000003 HC RX 258: Performed by: NURSE ANESTHETIST, CERTIFIED REGISTERED

## 2019-02-27 PROCEDURE — 88341 IMHCHEM/IMCYTCHM EA ADD ANTB: CPT

## 2019-02-27 PROCEDURE — 3603165200 HC BRNCHSC EBUS GUIDED SAMPL 1/2 NODE STATION/STRUX: Performed by: INTERNAL MEDICINE

## 2019-02-27 RX ORDER — TRAMADOL HYDROCHLORIDE 50 MG/1
50 TABLET ORAL
Status: ACTIVE | OUTPATIENT
Start: 2019-02-27 | End: 2019-02-27

## 2019-02-27 RX ORDER — LIDOCAINE HYDROCHLORIDE 40 MG/ML
SOLUTION TOPICAL PRN
Status: DISCONTINUED | OUTPATIENT
Start: 2019-02-27 | End: 2019-02-27 | Stop reason: ALTCHOICE

## 2019-02-27 RX ORDER — HYDRALAZINE HYDROCHLORIDE 25 MG/1
25 TABLET, FILM COATED ORAL 3 TIMES DAILY
Status: DISCONTINUED | OUTPATIENT
Start: 2019-02-28 | End: 2019-02-28

## 2019-02-27 RX ORDER — ACETAMINOPHEN 325 MG/1
650 TABLET ORAL EVERY 4 HOURS PRN
Status: DISCONTINUED | OUTPATIENT
Start: 2019-02-27 | End: 2019-03-01 | Stop reason: HOSPADM

## 2019-02-27 RX ORDER — PROPOFOL 10 MG/ML
INJECTION, EMULSION INTRAVENOUS PRN
Status: DISCONTINUED | OUTPATIENT
Start: 2019-02-27 | End: 2019-02-27 | Stop reason: SDUPTHER

## 2019-02-27 RX ORDER — FENTANYL CITRATE 50 UG/ML
INJECTION, SOLUTION INTRAMUSCULAR; INTRAVENOUS PRN
Status: DISCONTINUED | OUTPATIENT
Start: 2019-02-27 | End: 2019-02-27 | Stop reason: SDUPTHER

## 2019-02-27 RX ORDER — DEXAMETHASONE SODIUM PHOSPHATE 4 MG/ML
INJECTION, SOLUTION INTRA-ARTICULAR; INTRALESIONAL; INTRAMUSCULAR; INTRAVENOUS; SOFT TISSUE PRN
Status: DISCONTINUED | OUTPATIENT
Start: 2019-02-27 | End: 2019-02-27 | Stop reason: SDUPTHER

## 2019-02-27 RX ORDER — GLYCOPYRROLATE 0.2 MG/ML
INJECTION INTRAMUSCULAR; INTRAVENOUS PRN
Status: DISCONTINUED | OUTPATIENT
Start: 2019-02-27 | End: 2019-02-27 | Stop reason: SDUPTHER

## 2019-02-27 RX ORDER — METHYLPREDNISOLONE SODIUM SUCCINATE 125 MG/2ML
60 INJECTION, POWDER, LYOPHILIZED, FOR SOLUTION INTRAMUSCULAR; INTRAVENOUS EVERY 6 HOURS
Status: DISCONTINUED | OUTPATIENT
Start: 2019-02-27 | End: 2019-02-28

## 2019-02-27 RX ORDER — LIDOCAINE HYDROCHLORIDE 20 MG/ML
INJECTION, SOLUTION INFILTRATION; PERINEURAL PRN
Status: DISCONTINUED | OUTPATIENT
Start: 2019-02-27 | End: 2019-02-27 | Stop reason: SDUPTHER

## 2019-02-27 RX ORDER — SODIUM CHLORIDE 9 MG/ML
INJECTION, SOLUTION INTRAVENOUS CONTINUOUS
Status: DISCONTINUED | OUTPATIENT
Start: 2019-02-27 | End: 2019-02-27

## 2019-02-27 RX ORDER — HYDRALAZINE HYDROCHLORIDE 25 MG/1
25 TABLET, FILM COATED ORAL 2 TIMES DAILY
Status: DISCONTINUED | OUTPATIENT
Start: 2019-02-27 | End: 2019-02-27

## 2019-02-27 RX ORDER — SODIUM CHLORIDE 9 MG/ML
INJECTION, SOLUTION INTRAVENOUS CONTINUOUS PRN
Status: DISCONTINUED | OUTPATIENT
Start: 2019-02-27 | End: 2019-02-27 | Stop reason: SDUPTHER

## 2019-02-27 RX ADMIN — LOSARTAN POTASSIUM 50 MG: 25 TABLET, FILM COATED ORAL at 20:15

## 2019-02-27 RX ADMIN — POTASSIUM CHLORIDE 20 MEQ: 1500 TABLET, EXTENDED RELEASE ORAL at 17:24

## 2019-02-27 RX ADMIN — DIGOXIN 125 MCG: 125 TABLET ORAL at 10:21

## 2019-02-27 RX ADMIN — NYSTATIN 500000 UNITS: 500000 SUSPENSION ORAL at 20:15

## 2019-02-27 RX ADMIN — FAMOTIDINE 20 MG: 20 TABLET, FILM COATED ORAL at 10:21

## 2019-02-27 RX ADMIN — CARVEDILOL 25 MG: 25 TABLET, FILM COATED ORAL at 06:54

## 2019-02-27 RX ADMIN — GLYCOPYRROLATE AND FORMOTEROL FUMARATE 2 PUFF: 9; 4.8 AEROSOL, METERED RESPIRATORY (INHALATION) at 20:29

## 2019-02-27 RX ADMIN — PROPOFOL 25 MG: 10 INJECTION, EMULSION INTRAVENOUS at 07:50

## 2019-02-27 RX ADMIN — CALCIUM 500 MG: 500 TABLET ORAL at 10:21

## 2019-02-27 RX ADMIN — IPRATROPIUM BROMIDE AND ALBUTEROL SULFATE 1 AMPULE: .5; 3 SOLUTION RESPIRATORY (INHALATION) at 09:03

## 2019-02-27 RX ADMIN — DEXAMETHASONE SODIUM PHOSPHATE 4 MG: 4 INJECTION, SOLUTION INTRAMUSCULAR; INTRAVENOUS at 08:10

## 2019-02-27 RX ADMIN — LINAGLIPTIN 5 MG: 5 TABLET, FILM COATED ORAL at 10:21

## 2019-02-27 RX ADMIN — CITALOPRAM HYDROBROMIDE 20 MG: 20 TABLET ORAL at 10:21

## 2019-02-27 RX ADMIN — SODIUM CHLORIDE: 9 INJECTION, SOLUTION INTRAVENOUS at 07:35

## 2019-02-27 RX ADMIN — FAMOTIDINE 20 MG: 20 TABLET, FILM COATED ORAL at 20:15

## 2019-02-27 RX ADMIN — FLECAINIDE ACETATE 100 MG: 50 TABLET ORAL at 10:22

## 2019-02-27 RX ADMIN — OXYCODONE HYDROCHLORIDE AND ACETAMINOPHEN 500 MG: 500 TABLET ORAL at 10:22

## 2019-02-27 RX ADMIN — HYDRALAZINE HYDROCHLORIDE 25 MG: 25 TABLET, FILM COATED ORAL at 07:02

## 2019-02-27 RX ADMIN — ASPIRIN 81 MG: 81 TABLET, COATED ORAL at 10:21

## 2019-02-27 RX ADMIN — GLYCOPYRROLATE AND FORMOTEROL FUMARATE 2 PUFF: 9; 4.8 AEROSOL, METERED RESPIRATORY (INHALATION) at 13:25

## 2019-02-27 RX ADMIN — IPRATROPIUM BROMIDE AND ALBUTEROL SULFATE 1 AMPULE: .5; 3 SOLUTION RESPIRATORY (INHALATION) at 16:03

## 2019-02-27 RX ADMIN — INSULIN LISPRO 6 UNITS: 100 INJECTION, SOLUTION INTRAVENOUS; SUBCUTANEOUS at 20:28

## 2019-02-27 RX ADMIN — METHYLPREDNISOLONE SODIUM SUCCINATE 40 MG: 40 INJECTION, POWDER, FOR SOLUTION INTRAMUSCULAR; INTRAVENOUS at 10:22

## 2019-02-27 RX ADMIN — ONDANSETRON 4 MG: 2 INJECTION INTRAMUSCULAR; INTRAVENOUS at 07:55

## 2019-02-27 RX ADMIN — INSULIN LISPRO 12 UNITS: 100 INJECTION, SOLUTION INTRAVENOUS; SUBCUTANEOUS at 10:04

## 2019-02-27 RX ADMIN — NYSTATIN 500000 UNITS: 500000 SUSPENSION ORAL at 15:16

## 2019-02-27 RX ADMIN — LIDOCAINE HYDROCHLORIDE 60 MG: 20 INJECTION, SOLUTION INFILTRATION; PERINEURAL at 07:45

## 2019-02-27 RX ADMIN — PROPOFOL 25 MG: 10 INJECTION, EMULSION INTRAVENOUS at 07:55

## 2019-02-27 RX ADMIN — IPRATROPIUM BROMIDE AND ALBUTEROL SULFATE 1 AMPULE: .5; 3 SOLUTION RESPIRATORY (INHALATION) at 07:27

## 2019-02-27 RX ADMIN — ENOXAPARIN SODIUM 40 MG: 40 INJECTION SUBCUTANEOUS at 20:14

## 2019-02-27 RX ADMIN — HYDRALAZINE HYDROCHLORIDE 25 MG: 25 TABLET, FILM COATED ORAL at 20:19

## 2019-02-27 RX ADMIN — CARVEDILOL 25 MG: 25 TABLET, FILM COATED ORAL at 17:24

## 2019-02-27 RX ADMIN — Medication 10 ML: at 20:16

## 2019-02-27 RX ADMIN — POTASSIUM CHLORIDE 20 MEQ: 1500 TABLET, EXTENDED RELEASE ORAL at 10:21

## 2019-02-27 RX ADMIN — METHYLPREDNISOLONE SODIUM SUCCINATE 60 MG: 125 INJECTION, POWDER, FOR SOLUTION INTRAMUSCULAR; INTRAVENOUS at 23:39

## 2019-02-27 RX ADMIN — FENTANYL CITRATE 25 MCG: 50 INJECTION, SOLUTION INTRAMUSCULAR; INTRAVENOUS at 07:50

## 2019-02-27 RX ADMIN — PROPOFOL 100 MG: 10 INJECTION, EMULSION INTRAVENOUS at 07:45

## 2019-02-27 RX ADMIN — LOSARTAN POTASSIUM 50 MG: 25 TABLET, FILM COATED ORAL at 10:21

## 2019-02-27 RX ADMIN — PROPOFOL 25 MG: 10 INJECTION, EMULSION INTRAVENOUS at 08:35

## 2019-02-27 RX ADMIN — AMLODIPINE BESYLATE 5 MG: 5 TABLET ORAL at 10:21

## 2019-02-27 RX ADMIN — IPRATROPIUM BROMIDE AND ALBUTEROL SULFATE 1 AMPULE: .5; 3 SOLUTION RESPIRATORY (INHALATION) at 20:29

## 2019-02-27 RX ADMIN — NYSTATIN 500000 UNITS: 500000 SUSPENSION ORAL at 17:24

## 2019-02-27 RX ADMIN — METHYLPREDNISOLONE SODIUM SUCCINATE 60 MG: 125 INJECTION, POWDER, FOR SOLUTION INTRAMUSCULAR; INTRAVENOUS at 17:23

## 2019-02-27 RX ADMIN — NYSTATIN 500000 UNITS: 500000 SUSPENSION ORAL at 10:23

## 2019-02-27 RX ADMIN — Medication 10 ML: at 10:22

## 2019-02-27 RX ADMIN — FLECAINIDE ACETATE 100 MG: 50 TABLET ORAL at 20:15

## 2019-02-27 RX ADMIN — INSULIN LISPRO 12 UNITS: 100 INJECTION, SOLUTION INTRAVENOUS; SUBCUTANEOUS at 17:23

## 2019-02-27 RX ADMIN — GLYCOPYRROLATE 0.1 MG: 0.2 INJECTION, SOLUTION INTRAMUSCULAR; INTRAVENOUS at 07:50

## 2019-02-27 RX ADMIN — PROPOFOL 25 MG: 10 INJECTION, EMULSION INTRAVENOUS at 08:25

## 2019-02-27 RX ADMIN — PROPOFOL 50 MG: 10 INJECTION, EMULSION INTRAVENOUS at 08:18

## 2019-02-27 RX ADMIN — IPRATROPIUM BROMIDE AND ALBUTEROL SULFATE 1 AMPULE: .5; 3 SOLUTION RESPIRATORY (INHALATION) at 13:25

## 2019-02-27 RX ADMIN — METHYLPREDNISOLONE SODIUM SUCCINATE 40 MG: 40 INJECTION, POWDER, FOR SOLUTION INTRAMUSCULAR; INTRAVENOUS at 01:52

## 2019-02-27 ASSESSMENT — PULMONARY FUNCTION TESTS
PIF_VALUE: 23
PIF_VALUE: 23
PIF_VALUE: 21
PIF_VALUE: 1
PIF_VALUE: 11
PIF_VALUE: 26
PIF_VALUE: 2
PIF_VALUE: 23
PIF_VALUE: 10
PIF_VALUE: 23
PIF_VALUE: 21
PIF_VALUE: 22
PIF_VALUE: 10
PIF_VALUE: 21
PIF_VALUE: 23
PIF_VALUE: 18
PIF_VALUE: 22
PIF_VALUE: 1
PIF_VALUE: 5
PIF_VALUE: 25
PIF_VALUE: 11
PIF_VALUE: 4
PIF_VALUE: 20
PIF_VALUE: 22
PIF_VALUE: 21
PIF_VALUE: 24
PIF_VALUE: 23
PIF_VALUE: 23
PIF_VALUE: 17
PIF_VALUE: 21
PIF_VALUE: 20
PIF_VALUE: 5
PIF_VALUE: 21
PIF_VALUE: 22
PIF_VALUE: 26
PIF_VALUE: 11
PIF_VALUE: 20
PIF_VALUE: 23
PIF_VALUE: 22
PIF_VALUE: 5
PIF_VALUE: 27
PIF_VALUE: 22
PIF_VALUE: 11
PIF_VALUE: 4
PIF_VALUE: 20
PIF_VALUE: 21
PIF_VALUE: 2
PIF_VALUE: 23
PIF_VALUE: 10
PIF_VALUE: 22
PIF_VALUE: 22
PIF_VALUE: 8
PIF_VALUE: 5
PIF_VALUE: 19
PIF_VALUE: 6
PIF_VALUE: 17
PIF_VALUE: 24
PIF_VALUE: 22
PIF_VALUE: 21
PIF_VALUE: 2
PIF_VALUE: 23
PIF_VALUE: 20
PIF_VALUE: 21
PIF_VALUE: 28
PIF_VALUE: 22
PIF_VALUE: 7
PIF_VALUE: 23
PIF_VALUE: 25
PIF_VALUE: 23
PIF_VALUE: 24

## 2019-02-27 ASSESSMENT — PAIN SCALES - GENERAL
PAINLEVEL_OUTOF10: 0
PAINLEVEL_OUTOF10: 8
PAINLEVEL_OUTOF10: 5
PAINLEVEL_OUTOF10: 0

## 2019-02-27 ASSESSMENT — PAIN - FUNCTIONAL ASSESSMENT: PAIN_FUNCTIONAL_ASSESSMENT: 0-10

## 2019-02-27 ASSESSMENT — PAIN DESCRIPTION - PAIN TYPE: TYPE: ACUTE PAIN

## 2019-02-27 ASSESSMENT — ENCOUNTER SYMPTOMS: SHORTNESS OF BREATH: 1

## 2019-02-27 ASSESSMENT — PAIN DESCRIPTION - LOCATION: LOCATION: NECK

## 2019-02-28 ENCOUNTER — TELEPHONE (OUTPATIENT)
Dept: PULMONOLOGY | Age: 79
End: 2019-02-28

## 2019-02-28 LAB
GLUCOSE BLD-MCNC: 216 MG/DL (ref 70–99)
GLUCOSE BLD-MCNC: 221 MG/DL (ref 70–99)
GLUCOSE BLD-MCNC: 246 MG/DL (ref 70–99)
GLUCOSE BLD-MCNC: 270 MG/DL (ref 70–99)
GLUCOSE BLD-MCNC: 274 MG/DL (ref 70–99)
GLUCOSE BLD-MCNC: 283 MG/DL (ref 70–99)
GLUCOSE BLD-MCNC: 356 MG/DL (ref 70–99)
PERFORMED ON: ABNORMAL

## 2019-02-28 PROCEDURE — 94760 N-INVAS EAR/PLS OXIMETRY 1: CPT

## 2019-02-28 PROCEDURE — 85025 COMPLETE CBC W/AUTO DIFF WBC: CPT

## 2019-02-28 PROCEDURE — 6360000002 HC RX W HCPCS: Performed by: INTERNAL MEDICINE

## 2019-02-28 PROCEDURE — 6370000000 HC RX 637 (ALT 250 FOR IP): Performed by: INTERNAL MEDICINE

## 2019-02-28 PROCEDURE — 2700000000 HC OXYGEN THERAPY PER DAY

## 2019-02-28 PROCEDURE — 97116 GAIT TRAINING THERAPY: CPT

## 2019-02-28 PROCEDURE — 2580000003 HC RX 258: Performed by: INTERNAL MEDICINE

## 2019-02-28 PROCEDURE — 36415 COLL VENOUS BLD VENIPUNCTURE: CPT

## 2019-02-28 PROCEDURE — 80048 BASIC METABOLIC PNL TOTAL CA: CPT

## 2019-02-28 PROCEDURE — 1200000000 HC SEMI PRIVATE

## 2019-02-28 PROCEDURE — 94640 AIRWAY INHALATION TREATMENT: CPT

## 2019-02-28 PROCEDURE — 97530 THERAPEUTIC ACTIVITIES: CPT

## 2019-02-28 PROCEDURE — 99232 SBSQ HOSP IP/OBS MODERATE 35: CPT | Performed by: INTERNAL MEDICINE

## 2019-02-28 RX ORDER — HYDRALAZINE HYDROCHLORIDE 25 MG/1
25 TABLET, FILM COATED ORAL 3 TIMES DAILY
Qty: 90 TABLET | Refills: 1 | Status: SHIPPED | OUTPATIENT
Start: 2019-02-28 | End: 2019-03-28

## 2019-02-28 RX ORDER — METHYLPREDNISOLONE SODIUM SUCCINATE 40 MG/ML
40 INJECTION, POWDER, LYOPHILIZED, FOR SOLUTION INTRAMUSCULAR; INTRAVENOUS EVERY 8 HOURS
Status: DISCONTINUED | OUTPATIENT
Start: 2019-02-28 | End: 2019-03-01 | Stop reason: HOSPADM

## 2019-02-28 RX ORDER — PREDNISONE 20 MG/1
TABLET ORAL
Qty: 30 TABLET | Refills: 0 | Status: SHIPPED | OUTPATIENT
Start: 2019-02-28 | End: 2019-03-28 | Stop reason: ALTCHOICE

## 2019-02-28 RX ORDER — LORAZEPAM 0.5 MG/1
0.5 TABLET ORAL EVERY 8 HOURS PRN
Status: DISCONTINUED | OUTPATIENT
Start: 2019-02-28 | End: 2019-03-01 | Stop reason: HOSPADM

## 2019-02-28 RX ORDER — LORAZEPAM 0.5 MG/1
0.5 TABLET ORAL EVERY 8 HOURS PRN
Qty: 30 TABLET | Refills: 0 | Status: SHIPPED | OUTPATIENT
Start: 2019-02-28 | End: 2019-03-30

## 2019-02-28 RX ORDER — HYDRALAZINE HYDROCHLORIDE 25 MG/1
25 TABLET, FILM COATED ORAL EVERY 6 HOURS SCHEDULED
Status: DISCONTINUED | OUTPATIENT
Start: 2019-02-28 | End: 2019-03-01 | Stop reason: HOSPADM

## 2019-02-28 RX ORDER — POTASSIUM CHLORIDE 20 MEQ/1
20 TABLET, EXTENDED RELEASE ORAL DAILY
Qty: 60 TABLET | Refills: 1 | Status: SHIPPED | OUTPATIENT
Start: 2019-02-28 | End: 2019-09-27 | Stop reason: DRUGHIGH

## 2019-02-28 RX ORDER — IPRATROPIUM BROMIDE AND ALBUTEROL SULFATE 2.5; .5 MG/3ML; MG/3ML
3 SOLUTION RESPIRATORY (INHALATION)
Qty: 360 ML | Refills: 0
Start: 2019-02-28 | End: 2019-03-08 | Stop reason: SDUPTHER

## 2019-02-28 RX ADMIN — NYSTATIN 500000 UNITS: 500000 SUSPENSION ORAL at 21:04

## 2019-02-28 RX ADMIN — CALCIUM 500 MG: 500 TABLET ORAL at 10:14

## 2019-02-28 RX ADMIN — CARVEDILOL 25 MG: 25 TABLET, FILM COATED ORAL at 10:13

## 2019-02-28 RX ADMIN — DIGOXIN 125 MCG: 125 TABLET ORAL at 10:14

## 2019-02-28 RX ADMIN — CARVEDILOL 25 MG: 25 TABLET, FILM COATED ORAL at 17:22

## 2019-02-28 RX ADMIN — ENOXAPARIN SODIUM 40 MG: 40 INJECTION SUBCUTANEOUS at 21:03

## 2019-02-28 RX ADMIN — FLECAINIDE ACETATE 50 MG: 50 TABLET ORAL at 21:04

## 2019-02-28 RX ADMIN — ASPIRIN 81 MG: 81 TABLET, COATED ORAL at 10:14

## 2019-02-28 RX ADMIN — METHYLPREDNISOLONE SODIUM SUCCINATE 40 MG: 40 INJECTION, POWDER, FOR SOLUTION INTRAMUSCULAR; INTRAVENOUS at 17:23

## 2019-02-28 RX ADMIN — IPRATROPIUM BROMIDE AND ALBUTEROL SULFATE 1 AMPULE: .5; 3 SOLUTION RESPIRATORY (INHALATION) at 15:20

## 2019-02-28 RX ADMIN — FLECAINIDE ACETATE 100 MG: 50 TABLET ORAL at 10:13

## 2019-02-28 RX ADMIN — Medication 10 ML: at 10:12

## 2019-02-28 RX ADMIN — IPRATROPIUM BROMIDE AND ALBUTEROL SULFATE 1 AMPULE: .5; 3 SOLUTION RESPIRATORY (INHALATION) at 12:06

## 2019-02-28 RX ADMIN — INSULIN LISPRO 3 UNITS: 100 INJECTION, SOLUTION INTRAVENOUS; SUBCUTANEOUS at 21:06

## 2019-02-28 RX ADMIN — FAMOTIDINE 20 MG: 20 TABLET, FILM COATED ORAL at 21:04

## 2019-02-28 RX ADMIN — METHYLPREDNISOLONE SODIUM SUCCINATE 60 MG: 125 INJECTION, POWDER, FOR SOLUTION INTRAMUSCULAR; INTRAVENOUS at 10:12

## 2019-02-28 RX ADMIN — Medication 10 ML: at 21:06

## 2019-02-28 RX ADMIN — LOSARTAN POTASSIUM 50 MG: 25 TABLET, FILM COATED ORAL at 10:15

## 2019-02-28 RX ADMIN — CITALOPRAM HYDROBROMIDE 20 MG: 20 TABLET ORAL at 10:15

## 2019-02-28 RX ADMIN — POTASSIUM CHLORIDE 20 MEQ: 1500 TABLET, EXTENDED RELEASE ORAL at 17:23

## 2019-02-28 RX ADMIN — GLYCOPYRROLATE AND FORMOTEROL FUMARATE 2 PUFF: 9; 4.8 AEROSOL, METERED RESPIRATORY (INHALATION) at 20:29

## 2019-02-28 RX ADMIN — INSULIN LISPRO 9 UNITS: 100 INJECTION, SOLUTION INTRAVENOUS; SUBCUTANEOUS at 18:38

## 2019-02-28 RX ADMIN — AMLODIPINE BESYLATE 5 MG: 5 TABLET ORAL at 10:15

## 2019-02-28 RX ADMIN — LINAGLIPTIN 5 MG: 5 TABLET, FILM COATED ORAL at 10:13

## 2019-02-28 RX ADMIN — HYDRALAZINE HYDROCHLORIDE 25 MG: 25 TABLET, FILM COATED ORAL at 12:28

## 2019-02-28 RX ADMIN — METHYLPREDNISOLONE SODIUM SUCCINATE 60 MG: 125 INJECTION, POWDER, FOR SOLUTION INTRAMUSCULAR; INTRAVENOUS at 04:07

## 2019-02-28 RX ADMIN — NYSTATIN 500000 UNITS: 500000 SUSPENSION ORAL at 10:12

## 2019-02-28 RX ADMIN — LOSARTAN POTASSIUM 50 MG: 25 TABLET, FILM COATED ORAL at 21:05

## 2019-02-28 RX ADMIN — INSULIN LISPRO 9 UNITS: 100 INJECTION, SOLUTION INTRAVENOUS; SUBCUTANEOUS at 12:28

## 2019-02-28 RX ADMIN — OXYCODONE HYDROCHLORIDE AND ACETAMINOPHEN 500 MG: 500 TABLET ORAL at 10:15

## 2019-02-28 RX ADMIN — HYDRALAZINE HYDROCHLORIDE 25 MG: 25 TABLET, FILM COATED ORAL at 06:38

## 2019-02-28 RX ADMIN — NYSTATIN 500000 UNITS: 500000 SUSPENSION ORAL at 13:22

## 2019-02-28 RX ADMIN — NYSTATIN 500000 UNITS: 500000 SUSPENSION ORAL at 17:23

## 2019-02-28 RX ADMIN — HYDRALAZINE HYDROCHLORIDE 25 MG: 25 TABLET, FILM COATED ORAL at 00:50

## 2019-02-28 RX ADMIN — POTASSIUM CHLORIDE 20 MEQ: 1500 TABLET, EXTENDED RELEASE ORAL at 10:14

## 2019-02-28 RX ADMIN — IPRATROPIUM BROMIDE AND ALBUTEROL SULFATE 1 AMPULE: .5; 3 SOLUTION RESPIRATORY (INHALATION) at 20:29

## 2019-02-28 RX ADMIN — HYDRALAZINE HYDROCHLORIDE 25 MG: 25 TABLET, FILM COATED ORAL at 17:24

## 2019-02-28 RX ADMIN — FAMOTIDINE 20 MG: 20 TABLET, FILM COATED ORAL at 10:14

## 2019-02-28 RX ADMIN — INSULIN LISPRO 15 UNITS: 100 INJECTION, SOLUTION INTRAVENOUS; SUBCUTANEOUS at 10:23

## 2019-02-28 RX ADMIN — IPRATROPIUM BROMIDE AND ALBUTEROL SULFATE 1 AMPULE: .5; 3 SOLUTION RESPIRATORY (INHALATION) at 08:36

## 2019-02-28 RX ADMIN — GLYCOPYRROLATE AND FORMOTEROL FUMARATE 2 PUFF: 9; 4.8 AEROSOL, METERED RESPIRATORY (INHALATION) at 08:36

## 2019-02-28 ASSESSMENT — PAIN SCALES - GENERAL
PAINLEVEL_OUTOF10: 0

## 2019-03-01 VITALS
OXYGEN SATURATION: 97 % | BODY MASS INDEX: 27.84 KG/M2 | HEIGHT: 65 IN | SYSTOLIC BLOOD PRESSURE: 170 MMHG | HEART RATE: 59 BPM | TEMPERATURE: 97.4 F | RESPIRATION RATE: 18 BRPM | DIASTOLIC BLOOD PRESSURE: 72 MMHG | WEIGHT: 167.1 LBS

## 2019-03-01 LAB
ANION GAP SERPL CALCULATED.3IONS-SCNC: 9 MMOL/L (ref 3–16)
ANISOCYTOSIS: ABNORMAL
BANDED NEUTROPHILS RELATIVE PERCENT: 1 % (ref 0–7)
BASOPHILS ABSOLUTE: 0 K/UL (ref 0–0.2)
BASOPHILS RELATIVE PERCENT: 0 %
BUN BLDV-MCNC: 27 MG/DL (ref 7–20)
CALCIUM SERPL-MCNC: 9.1 MG/DL (ref 8.3–10.6)
CHLORIDE BLD-SCNC: 95 MMOL/L (ref 99–110)
CO2: 30 MMOL/L (ref 21–32)
CREAT SERPL-MCNC: 1 MG/DL (ref 0.6–1.2)
CULTURE, RESPIRATORY: NORMAL
EOSINOPHILS ABSOLUTE: 0 K/UL (ref 0–0.6)
EOSINOPHILS RELATIVE PERCENT: 0 %
GFR AFRICAN AMERICAN: >60
GFR NON-AFRICAN AMERICAN: 54
GLUCOSE BLD-MCNC: 221 MG/DL (ref 70–99)
GLUCOSE BLD-MCNC: 257 MG/DL (ref 70–99)
GLUCOSE BLD-MCNC: 265 MG/DL (ref 70–99)
GLUCOSE BLD-MCNC: 266 MG/DL (ref 70–99)
GRAM STAIN RESULT: NORMAL
HCT VFR BLD CALC: 35.7 % (ref 36–48)
HEMOGLOBIN: 11.4 G/DL (ref 12–16)
LYMPHOCYTES ABSOLUTE: 0.8 K/UL (ref 1–5.1)
LYMPHOCYTES RELATIVE PERCENT: 9 %
MCH RBC QN AUTO: 28.9 PG (ref 26–34)
MCHC RBC AUTO-ENTMCNC: 32 G/DL (ref 31–36)
MCV RBC AUTO: 90.5 FL (ref 80–100)
METAMYELOCYTES RELATIVE PERCENT: 2 %
MONOCYTES ABSOLUTE: 0.3 K/UL (ref 0–1.3)
MONOCYTES RELATIVE PERCENT: 3 %
NEUTROPHILS ABSOLUTE: 8.2 K/UL (ref 1.7–7.7)
NEUTROPHILS RELATIVE PERCENT: 85 %
PDW BLD-RTO: 19.6 % (ref 12.4–15.4)
PERFORMED ON: ABNORMAL
PLATELET # BLD: 331 K/UL (ref 135–450)
PLATELET SLIDE REVIEW: ADEQUATE
PMV BLD AUTO: 7.4 FL (ref 5–10.5)
POTASSIUM SERPL-SCNC: 4.8 MMOL/L (ref 3.5–5.1)
RBC # BLD: 3.94 M/UL (ref 4–5.2)
SLIDE REVIEW: ABNORMAL
SODIUM BLD-SCNC: 134 MMOL/L (ref 136–145)
WBC # BLD: 9.3 K/UL (ref 4–11)

## 2019-03-01 PROCEDURE — 94680 O2 UPTK RST&XERS DIR SIMPLE: CPT

## 2019-03-01 PROCEDURE — 2580000003 HC RX 258: Performed by: INTERNAL MEDICINE

## 2019-03-01 PROCEDURE — 6370000000 HC RX 637 (ALT 250 FOR IP): Performed by: INTERNAL MEDICINE

## 2019-03-01 PROCEDURE — 6360000002 HC RX W HCPCS: Performed by: INTERNAL MEDICINE

## 2019-03-01 PROCEDURE — 94640 AIRWAY INHALATION TREATMENT: CPT

## 2019-03-01 PROCEDURE — 94760 N-INVAS EAR/PLS OXIMETRY 1: CPT

## 2019-03-01 PROCEDURE — 2700000000 HC OXYGEN THERAPY PER DAY

## 2019-03-01 RX ORDER — FAMOTIDINE 20 MG/1
20 TABLET, FILM COATED ORAL DAILY
Status: DISCONTINUED | OUTPATIENT
Start: 2019-03-02 | End: 2019-03-01 | Stop reason: HOSPADM

## 2019-03-01 RX ADMIN — Medication 10 ML: at 08:39

## 2019-03-01 RX ADMIN — FLECAINIDE ACETATE 100 MG: 50 TABLET ORAL at 08:35

## 2019-03-01 RX ADMIN — ASPIRIN 81 MG: 81 TABLET, COATED ORAL at 08:35

## 2019-03-01 RX ADMIN — NYSTATIN 500000 UNITS: 500000 SUSPENSION ORAL at 08:34

## 2019-03-01 RX ADMIN — INSULIN LISPRO 9 UNITS: 100 INJECTION, SOLUTION INTRAVENOUS; SUBCUTANEOUS at 13:00

## 2019-03-01 RX ADMIN — HYDRALAZINE HYDROCHLORIDE 25 MG: 25 TABLET, FILM COATED ORAL at 00:28

## 2019-03-01 RX ADMIN — POTASSIUM CHLORIDE 20 MEQ: 1500 TABLET, EXTENDED RELEASE ORAL at 08:35

## 2019-03-01 RX ADMIN — LINAGLIPTIN 5 MG: 5 TABLET, FILM COATED ORAL at 08:35

## 2019-03-01 RX ADMIN — OXYCODONE HYDROCHLORIDE AND ACETAMINOPHEN 500 MG: 500 TABLET ORAL at 08:35

## 2019-03-01 RX ADMIN — NYSTATIN 500000 UNITS: 500000 SUSPENSION ORAL at 14:22

## 2019-03-01 RX ADMIN — HYDRALAZINE HYDROCHLORIDE 25 MG: 25 TABLET, FILM COATED ORAL at 06:55

## 2019-03-01 RX ADMIN — CALCIUM 500 MG: 500 TABLET ORAL at 08:35

## 2019-03-01 RX ADMIN — HYDRALAZINE HYDROCHLORIDE 25 MG: 25 TABLET, FILM COATED ORAL at 14:23

## 2019-03-01 RX ADMIN — METHYLPREDNISOLONE SODIUM SUCCINATE 40 MG: 40 INJECTION, POWDER, FOR SOLUTION INTRAMUSCULAR; INTRAVENOUS at 10:42

## 2019-03-01 RX ADMIN — FAMOTIDINE 20 MG: 20 TABLET, FILM COATED ORAL at 08:34

## 2019-03-01 RX ADMIN — DIGOXIN 125 MCG: 125 TABLET ORAL at 08:35

## 2019-03-01 RX ADMIN — GLYCOPYRROLATE AND FORMOTEROL FUMARATE 2 PUFF: 9; 4.8 AEROSOL, METERED RESPIRATORY (INHALATION) at 10:00

## 2019-03-01 RX ADMIN — METHYLPREDNISOLONE SODIUM SUCCINATE 40 MG: 40 INJECTION, POWDER, FOR SOLUTION INTRAMUSCULAR; INTRAVENOUS at 02:46

## 2019-03-01 RX ADMIN — AMLODIPINE BESYLATE 5 MG: 5 TABLET ORAL at 08:35

## 2019-03-01 RX ADMIN — IPRATROPIUM BROMIDE AND ALBUTEROL SULFATE 1 AMPULE: .5; 3 SOLUTION RESPIRATORY (INHALATION) at 09:13

## 2019-03-01 RX ADMIN — IPRATROPIUM BROMIDE AND ALBUTEROL SULFATE 1 AMPULE: .5; 3 SOLUTION RESPIRATORY (INHALATION) at 12:39

## 2019-03-01 RX ADMIN — CARVEDILOL 25 MG: 25 TABLET, FILM COATED ORAL at 08:35

## 2019-03-01 RX ADMIN — CITALOPRAM HYDROBROMIDE 20 MG: 20 TABLET ORAL at 08:35

## 2019-03-01 RX ADMIN — LOSARTAN POTASSIUM 50 MG: 25 TABLET, FILM COATED ORAL at 08:35

## 2019-03-01 RX ADMIN — INSULIN LISPRO 6 UNITS: 100 INJECTION, SOLUTION INTRAVENOUS; SUBCUTANEOUS at 08:36

## 2019-03-01 ASSESSMENT — PAIN SCALES - GENERAL
PAINLEVEL_OUTOF10: 0

## 2019-03-08 ENCOUNTER — OFFICE VISIT (OUTPATIENT)
Dept: PULMONOLOGY | Age: 79
End: 2019-03-08
Payer: MEDICARE

## 2019-03-08 VITALS
OXYGEN SATURATION: 97 % | HEART RATE: 71 BPM | WEIGHT: 172 LBS | SYSTOLIC BLOOD PRESSURE: 123 MMHG | BODY MASS INDEX: 28.62 KG/M2 | DIASTOLIC BLOOD PRESSURE: 65 MMHG

## 2019-03-08 DIAGNOSIS — J43.2 CENTRILOBULAR EMPHYSEMA (HCC): ICD-10-CM

## 2019-03-08 DIAGNOSIS — C34.91 ADENOCARCINOMA OF RIGHT LUNG (HCC): ICD-10-CM

## 2019-03-08 DIAGNOSIS — J44.9 COPD, SEVERE (HCC): Primary | ICD-10-CM

## 2019-03-08 PROCEDURE — 3023F SPIROM DOC REV: CPT | Performed by: INTERNAL MEDICINE

## 2019-03-08 PROCEDURE — 1111F DSCHRG MED/CURRENT MED MERGE: CPT | Performed by: INTERNAL MEDICINE

## 2019-03-08 PROCEDURE — 1123F ACP DISCUSS/DSCN MKR DOCD: CPT | Performed by: INTERNAL MEDICINE

## 2019-03-08 PROCEDURE — G8427 DOCREV CUR MEDS BY ELIG CLIN: HCPCS | Performed by: INTERNAL MEDICINE

## 2019-03-08 PROCEDURE — G8417 CALC BMI ABV UP PARAM F/U: HCPCS | Performed by: INTERNAL MEDICINE

## 2019-03-08 PROCEDURE — 1036F TOBACCO NON-USER: CPT | Performed by: INTERNAL MEDICINE

## 2019-03-08 PROCEDURE — 99214 OFFICE O/P EST MOD 30 MIN: CPT | Performed by: INTERNAL MEDICINE

## 2019-03-08 PROCEDURE — G8484 FLU IMMUNIZE NO ADMIN: HCPCS | Performed by: INTERNAL MEDICINE

## 2019-03-08 PROCEDURE — 4040F PNEUMOC VAC/ADMIN/RCVD: CPT | Performed by: INTERNAL MEDICINE

## 2019-03-08 PROCEDURE — G8399 PT W/DXA RESULTS DOCUMENT: HCPCS | Performed by: INTERNAL MEDICINE

## 2019-03-08 PROCEDURE — G8598 ASA/ANTIPLAT THER USED: HCPCS | Performed by: INTERNAL MEDICINE

## 2019-03-08 PROCEDURE — 1090F PRES/ABSN URINE INCON ASSESS: CPT | Performed by: INTERNAL MEDICINE

## 2019-03-08 PROCEDURE — 1101F PT FALLS ASSESS-DOCD LE1/YR: CPT | Performed by: INTERNAL MEDICINE

## 2019-03-08 PROCEDURE — G8926 SPIRO NO PERF OR DOC: HCPCS | Performed by: INTERNAL MEDICINE

## 2019-03-08 RX ORDER — IPRATROPIUM BROMIDE AND ALBUTEROL SULFATE 2.5; .5 MG/3ML; MG/3ML
3 SOLUTION RESPIRATORY (INHALATION)
Qty: 360 ML | Refills: 11 | Status: SHIPPED | OUTPATIENT
Start: 2019-03-08 | End: 2021-04-29 | Stop reason: SDUPTHER

## 2019-03-28 ENCOUNTER — HOSPITAL ENCOUNTER (OUTPATIENT)
Age: 79
Discharge: HOME | End: 2019-03-28
Payer: MEDICARE

## 2019-03-28 ENCOUNTER — OFFICE VISIT (OUTPATIENT)
Dept: CARDIOLOGY CLINIC | Age: 79
End: 2019-03-28
Payer: MEDICARE

## 2019-03-28 VITALS
HEIGHT: 65 IN | DIASTOLIC BLOOD PRESSURE: 58 MMHG | HEART RATE: 88 BPM | SYSTOLIC BLOOD PRESSURE: 100 MMHG | OXYGEN SATURATION: 97 % | WEIGHT: 168 LBS | BODY MASS INDEX: 27.99 KG/M2

## 2019-03-28 DIAGNOSIS — E78.5 DYSLIPIDEMIA: ICD-10-CM

## 2019-03-28 DIAGNOSIS — I48.91 ATRIAL FIBRILLATION WITH RAPID VENTRICULAR RESPONSE (HCC): ICD-10-CM

## 2019-03-28 DIAGNOSIS — I10 ESSENTIAL HYPERTENSION, BENIGN: ICD-10-CM

## 2019-03-28 DIAGNOSIS — I48.91 ATRIAL FIBRILLATION WITH RAPID VENTRICULAR RESPONSE (HCC): Primary | ICD-10-CM

## 2019-03-28 DIAGNOSIS — I08.0 MITRAL AND AORTIC VALVE DISEASE: ICD-10-CM

## 2019-03-28 LAB
A/G RATIO: 1.3 (ref 1.1–2.2)
ALBUMIN SERPL-MCNC: 3.9 G/DL (ref 3.4–5)
ALP BLD-CCNC: 86 U/L (ref 40–129)
ALT SERPL-CCNC: 22 U/L (ref 10–40)
ANION GAP SERPL CALCULATED.3IONS-SCNC: 16 MMOL/L (ref 3–16)
AST SERPL-CCNC: 16 U/L (ref 15–37)
BILIRUB SERPL-MCNC: 0.3 MG/DL (ref 0–1)
BUN BLDV-MCNC: 16 MG/DL (ref 7–20)
CALCIUM SERPL-MCNC: 9.6 MG/DL (ref 8.3–10.6)
CHLORIDE BLD-SCNC: 97 MMOL/L (ref 99–110)
CO2: 27 MMOL/L (ref 21–32)
CREAT SERPL-MCNC: 0.8 MG/DL (ref 0.6–1.2)
DIGOXIN LEVEL: 0.7 NG/ML (ref 0.8–2)
GFR AFRICAN AMERICAN: >60
GFR NON-AFRICAN AMERICAN: >60
GLOBULIN: 3 G/DL
GLUCOSE BLD-MCNC: 253 MG/DL (ref 70–99)
HCT VFR BLD CALC: 36.3 % (ref 36–48)
HEMOGLOBIN: 11.8 G/DL (ref 12–16)
MCH RBC QN AUTO: 30.6 PG (ref 26–34)
MCHC RBC AUTO-ENTMCNC: 32.3 G/DL (ref 31–36)
MCV RBC AUTO: 94.7 FL (ref 80–100)
PDW BLD-RTO: 16.8 % (ref 12.4–15.4)
PLATELET # BLD: 474 K/UL (ref 135–450)
PMV BLD AUTO: 8.1 FL (ref 5–10.5)
POTASSIUM SERPL-SCNC: 4.4 MMOL/L (ref 3.5–5.1)
RBC # BLD: 3.84 M/UL (ref 4–5.2)
SODIUM BLD-SCNC: 140 MMOL/L (ref 136–145)
TOTAL PROTEIN: 6.9 G/DL (ref 6.4–8.2)
WBC # BLD: 6.5 K/UL (ref 4–11)

## 2019-03-28 PROCEDURE — 1036F TOBACCO NON-USER: CPT | Performed by: NURSE PRACTITIONER

## 2019-03-28 PROCEDURE — 85027 COMPLETE CBC AUTOMATED: CPT

## 2019-03-28 PROCEDURE — G8427 DOCREV CUR MEDS BY ELIG CLIN: HCPCS | Performed by: NURSE PRACTITIONER

## 2019-03-28 PROCEDURE — 1090F PRES/ABSN URINE INCON ASSESS: CPT | Performed by: NURSE PRACTITIONER

## 2019-03-28 PROCEDURE — G8399 PT W/DXA RESULTS DOCUMENT: HCPCS | Performed by: NURSE PRACTITIONER

## 2019-03-28 PROCEDURE — 99214 OFFICE O/P EST MOD 30 MIN: CPT | Performed by: NURSE PRACTITIONER

## 2019-03-28 PROCEDURE — G8598 ASA/ANTIPLAT THER USED: HCPCS | Performed by: NURSE PRACTITIONER

## 2019-03-28 PROCEDURE — 4040F PNEUMOC VAC/ADMIN/RCVD: CPT | Performed by: NURSE PRACTITIONER

## 2019-03-28 PROCEDURE — G8484 FLU IMMUNIZE NO ADMIN: HCPCS | Performed by: NURSE PRACTITIONER

## 2019-03-28 PROCEDURE — 36415 COLL VENOUS BLD VENIPUNCTURE: CPT

## 2019-03-28 PROCEDURE — 80053 COMPREHEN METABOLIC PANEL: CPT

## 2019-03-28 PROCEDURE — 1123F ACP DISCUSS/DSCN MKR DOCD: CPT | Performed by: NURSE PRACTITIONER

## 2019-03-28 PROCEDURE — 80162 ASSAY OF DIGOXIN TOTAL: CPT

## 2019-03-28 PROCEDURE — 1111F DSCHRG MED/CURRENT MED MERGE: CPT | Performed by: NURSE PRACTITIONER

## 2019-03-28 PROCEDURE — 93000 ELECTROCARDIOGRAM COMPLETE: CPT | Performed by: NURSE PRACTITIONER

## 2019-03-28 PROCEDURE — G8417 CALC BMI ABV UP PARAM F/U: HCPCS | Performed by: NURSE PRACTITIONER

## 2019-03-29 ENCOUNTER — TELEPHONE (OUTPATIENT)
Dept: CARDIOLOGY CLINIC | Age: 79
End: 2019-03-29

## 2019-03-29 NOTE — TELEPHONE ENCOUNTER
Notified pt of lab results per Carl Yeung. The pt is asking if she should be taking a diuretic? She states she is still noticing edema in her ankles and feet. Pt also states, that her f/u OV with LES was made 6/12/19. Per your last OV note it states to f/u in 3 weeks. When would you like th OV moved to with LES? Notes recorded by MARIA C Hamm CNP on 3/28/2019 at 8:36 PM EDT  Lab results are fine.

## 2019-03-29 NOTE — TELEPHONE ENCOUNTER
She is taking HCTZ, with her low B/P's and weakness, I would not add Lasix. 6/2/19  OV with LES is ok.

## 2019-04-26 ENCOUNTER — OFFICE VISIT (OUTPATIENT)
Dept: CARDIOLOGY CLINIC | Age: 79
End: 2019-04-26
Payer: MEDICARE

## 2019-04-26 VITALS
BODY MASS INDEX: 27.46 KG/M2 | HEART RATE: 80 BPM | HEIGHT: 65 IN | WEIGHT: 164.8 LBS | DIASTOLIC BLOOD PRESSURE: 60 MMHG | SYSTOLIC BLOOD PRESSURE: 116 MMHG

## 2019-04-26 DIAGNOSIS — I08.0 MITRAL AND AORTIC VALVE DISEASE: ICD-10-CM

## 2019-04-26 DIAGNOSIS — I48.0 PAROXYSMAL ATRIAL FIBRILLATION (HCC): ICD-10-CM

## 2019-04-26 DIAGNOSIS — I48.91 ATRIAL FIBRILLATION WITH RAPID VENTRICULAR RESPONSE (HCC): ICD-10-CM

## 2019-04-26 DIAGNOSIS — I48.0 AF (PAROXYSMAL ATRIAL FIBRILLATION) (HCC): ICD-10-CM

## 2019-04-26 DIAGNOSIS — I10 ESSENTIAL HYPERTENSION, BENIGN: ICD-10-CM

## 2019-04-26 DIAGNOSIS — I25.10 ATHEROSCLEROSIS OF NATIVE CORONARY ARTERY OF NATIVE HEART WITHOUT ANGINA PECTORIS: Primary | ICD-10-CM

## 2019-04-26 DIAGNOSIS — E78.5 DYSLIPIDEMIA: ICD-10-CM

## 2019-04-26 PROCEDURE — 1036F TOBACCO NON-USER: CPT | Performed by: INTERNAL MEDICINE

## 2019-04-26 PROCEDURE — 1123F ACP DISCUSS/DSCN MKR DOCD: CPT | Performed by: INTERNAL MEDICINE

## 2019-04-26 PROCEDURE — 99214 OFFICE O/P EST MOD 30 MIN: CPT | Performed by: INTERNAL MEDICINE

## 2019-04-26 PROCEDURE — 93000 ELECTROCARDIOGRAM COMPLETE: CPT | Performed by: INTERNAL MEDICINE

## 2019-04-26 PROCEDURE — 1090F PRES/ABSN URINE INCON ASSESS: CPT | Performed by: INTERNAL MEDICINE

## 2019-04-26 PROCEDURE — 4040F PNEUMOC VAC/ADMIN/RCVD: CPT | Performed by: INTERNAL MEDICINE

## 2019-04-26 PROCEDURE — G8427 DOCREV CUR MEDS BY ELIG CLIN: HCPCS | Performed by: INTERNAL MEDICINE

## 2019-04-26 PROCEDURE — G8598 ASA/ANTIPLAT THER USED: HCPCS | Performed by: INTERNAL MEDICINE

## 2019-04-26 PROCEDURE — G8399 PT W/DXA RESULTS DOCUMENT: HCPCS | Performed by: INTERNAL MEDICINE

## 2019-04-26 PROCEDURE — G8417 CALC BMI ABV UP PARAM F/U: HCPCS | Performed by: INTERNAL MEDICINE

## 2019-04-26 RX ORDER — CARVEDILOL 25 MG/1
25 TABLET ORAL 2 TIMES DAILY
Qty: 180 TABLET | Refills: 3 | Status: CANCELLED | OUTPATIENT
Start: 2019-04-26

## 2019-04-26 RX ORDER — DIGOXIN 125 MCG
125 TABLET ORAL DAILY
Qty: 30 TABLET | Refills: 11 | Status: ON HOLD | OUTPATIENT
Start: 2019-04-26 | End: 2019-08-04 | Stop reason: ALTCHOICE

## 2019-04-26 RX ORDER — FLECAINIDE ACETATE 100 MG/1
100 TABLET ORAL 2 TIMES DAILY
Qty: 60 TABLET | Refills: 11 | Status: ON HOLD | OUTPATIENT
Start: 2019-04-26 | End: 2019-10-08 | Stop reason: HOSPADM

## 2019-04-26 RX ORDER — POTASSIUM CHLORIDE 20 MEQ/1
20 TABLET, EXTENDED RELEASE ORAL DAILY
Qty: 60 TABLET | Refills: 11 | Status: CANCELLED | OUTPATIENT
Start: 2019-04-26

## 2019-04-26 RX ORDER — HYDROCHLOROTHIAZIDE 25 MG/1
25 TABLET ORAL DAILY
Qty: 30 TABLET | Refills: 11 | Status: CANCELLED | OUTPATIENT
Start: 2019-04-26

## 2019-04-26 NOTE — PROGRESS NOTES
(myocardial infarction) ([address] LIZETT (obstructive sleep apnea), PONV (postoperative nausea and vomiting), and stress incontinence. Surgical History:   has a past surgical history that includes Coronary angioplasty with stent (2000, 2001); back surgery (2000, 2001); bronchoscopy (12/04/2018); pr [facility] incl fluor gdnce dx w/cell washg spx (N/A, 12/4/2018); Cholecystectomy, laparoscopic (N/A, 12/19/2018); Upper gastrointestinal endoscopy (N/A, 2/25/2019); Colonoscopy (N/A, 2/25/2019); Colonoscopy (2/25/2019); bronchoscopy (N/A, 2/27/2019); bronchoscopy (2/27/2019); and bronchoscopy (2/27/2019). Social History:   reports that she quit smoking about 17 years ago. She has never used smokeless tobacco. She reports that she does not drink alcohol or use drugs. Family History:  family history includes Cancer in her maternal uncle and sister; Diabetes in her brother and sister; Heart Disease in her father and mother. Home Medications:  Prior to Admission medications    Medication Sig Start Date End Date Taking?  Authorizing Provider   digoxin (LANOXIN) 125 MCG tablet Take 1 tablet by mouth daily 4/26/19  Yes Calhoun Mcardle, MD   flecainide (TAMBOCOR) 100 MG tablet Take 1 tablet by mouth 2 times daily 4/26/19  Yes Calhoun Mcardle, MD   ipratropium-albuterol (DUONEB) 0.5-2.5 (3) MG/3ML SOLN nebulizer solution Inhale 3 mLs into the lungs every 4 hours (while awake) DX:COPD J44.9 3/8/19  Yes Barrett Liu MD   insulin glargine (LANTUS) 100 UNIT/ML injection pen Inject 20 Units into the skin 2 times daily 2/28/19  Yes Genia Teran MD   potassium chloride (KLOR-CON M) 20 MEQ extended release tablet Take 1 tablet by mouth daily 2/28/19  Yes Genia Teran MD   omeprazole (PRILOSEC) 20 MG delayed release capsule Take 20 mg by mouth daily   Yes Historical Provider, MD   aspirin 81 MG tablet Take 1 tablet by mouth daily 12/23/18  Yes Genia Teran MD   citalopram (CELEXA) 40 MG tablet Take 0.5 tablets by mouth every morning 12/23/18  Yes Reinaldo Hall MD   metFORMIN (GLUCOPHAGE) 1000 MG tablet Take 0.5 tablets by mouth 2 times daily (with meals) 12/23/18  Yes Reinaldo Hall MD   hydrochlorothiazide (HYDRODIURIL) 25 MG tablet Take 0.5 tablets by mouth daily  Patient taking differently: Take 25 mg by mouth daily  12/23/18  Yes Reinaldo Hall MD   Fluticasone-Umeclidin-Vilant (TRELEGY ELLIPTA) 100-62.5-25 MCG/INH AEPB Inhale 1 puff into the lungs daily Currently not taking   Yes Historical Provider, MD   carvedilol (COREG) 25 MG tablet Take 1 tablet by mouth 2 times daily 12/13/18  Yes Karina Sauceda MD   ezetimibe (ZETIA) 10 MG tablet Take 10 mg by mouth daily   Yes Historical Provider, MD   amLODIPine (NORVASC) 10 MG tablet Take 10 mg by mouth daily    Yes Historical Provider, MD   acetaminophen 650 MG TABS Take 650 mg by mouth every 4 hours as needed for Pain (For mild pain level 1-3 or for fever > 100.5). 10/29/12  Yes Reinaldo Hall MD   calcium carbonate (OSCAL) 500 MG TABS tablet Take 500 mg by mouth daily. Yes Historical Provider, MD   Ascorbic Acid (VITAMIN C) 500 MG tablet Take 500 mg by mouth daily. Yes Historical Provider, MD   losartan (COZAAR) 100 MG tablet Take 0.5 tablets by mouth daily 5/16/17   MARIA C Arroyo - CNP        Allergies:  Cipro xr; Lyrica [pregabalin]; Penicillins; and Sulfa antibiotics     Review of Systems:   · Constitutional: there has been no unanticipated weight loss. There's been no change in , sleep pattern, or activity level.   +fatique   · Eyes: No visual changes or diplopia. No scleral icterus. · ENT: No Headaches, hearing loss or vertigo. No mouth sores or sore throat. · Cardiovascular: Reviewed in HPI  · Respiratory: No cough or wheezing, no sputum production. No hematemesis. Chronic shortness of breath  · Gastrointestinal: No abdominal pain, appetite loss, blood in stools. No change in bowel or bladder habits.   · Genitourinary: No dysuria, trouble voiding, or hematuria. · Musculoskeletal:  No gait disturbance, weakness or joint complaints. · Integumentary: No rash or pruritis. · Neurological: No headache, diplopia, change in muscle strength, numbness or tingling. No change in gait, balance, coordination, mood, affect, memory, mentation, behavior. · Psychiatric: No anxiety, no depression. + stress  · Endocrine: No malaise, fatigue or temperature intolerance. No excessive thirst, fluid intake, or urination. No tremor. · Hematologic/Lymphatic: No abnormal bruising or bleeding, blood clots or swollen lymph nodes. · Allergic/Immunologic: No nasal congestion or hives. Physical Examination:    Vitals:    04/26/19 1245   BP: 116/60   Pulse: 80        Constitutional and General Appearance: NAD, appears stated age, well developed. Skin:good turgor,intact without lesions  HEENT: EOMI ,normal  Neck:no JVD     Respiratory:  · Normal excursion and expansion without use of accessory muscles  · Resp Auscultation: Normal breath sounds without dullness  Cardiovascular:  · The apical impulses not displaced  · Heart tones are crisp and normal  · Cervical veins are not engorged  · The carotid upstroke is normal in amplitude and contour without delay or bruit  · Regular rate and rhythm. S1S2 without murmur, gallop, or rub  · Peripheral pulses are symmetrical and full  · There is no clubbing, cyanosis of the extremities. · No edema  · Femoral Arteries: 2+ and equal  · Pedal Pulses: 2+ and equal   Abdomen:  · No masses or tenderness  · Liver/Spleen: No Abnormalities Noted  Neurological/Psychiatric:  · Alert and oriented in all spheres  · Moves all extremities well  · Exhibits normal gait balance and coordination  · No abnormalities of mood, affect, memory, mentation, or behavior are noted  ECHO: 12/18  Summary   -Normal left ventricle size and systolic function with an estimated ejection   fraction of 55-60%. No regional wall motion abnormalities are seen. Mild   concentric left ventricular hypertrophy is present.   -Indeterminate diastolic function.   -Aortic valve appears sclerotic but opens adequately. Mild aortic   regurgitation is present.   -There is trivial tricuspid regurgitation with RVSP estimated at 37 mmHg.   -Mild to moderate mitral regurgitation is present. 8/16/17  Echo  -Normal left ventricle size and systolic function with an estimated ejection   fraction of 55-60%.  -No regional wall motion abnormalities are seen.   -Mild concentric left ventricular hypertrophy is present.   -There is reversal of E/A inflow velocities across the mitral valve.   -Diastolic filling parameters suggests grade I diastolic dysfunction . E/e'=   13.65 .   -Mild to moderate mitral regurgitation is present.   -Mitral annular calcification is present.   -Aortic valve appears sclerotic but opens adequately. Mild aortic regurgitation is present.   -There is trivial tricuspid regurgitation with RVSP estimated at 28 mmHg. Assessment/Plan:    1. Atherosclerosis of native coronary artery of native heart without angina pectoris    2. Paroxysmal atrial fibrillation (HCC)    3. Essential hypertension, benign    4. Atrial fibrillation with rapid ventricular response ([address]    5. Mitral and aortic valve disease    6. Dyslipidemia    7. AF (paroxysmal atrial fibrillation) ([address]        1. Coronary atherosclerosis of native coronary artery:  2006  LHC > RCA and circ stent patent, EF 45%,  10/31/16: Myoview GXT - normal perfusion, EF 49%. Intolerant to ace-i due to cough; is on cozaar, BB  No exertional chest pain, but has chronic shortness of breath  stable   2. PAF--  10/14  Event monitor showed 11 minutes of AF  April-May 2017 MCOT showed AF burden < 1% with longest episode lasting 9 minutes. On ASA 81 mg daily. Discussed starting stronger medication for anticoagulation as her risk of stroke is 4.8% per year. Flecainide 100 mg twice a day.  She has declined

## 2019-05-01 ENCOUNTER — HOSPITAL ENCOUNTER (OUTPATIENT)
Age: 79
Setting detail: OBSERVATION
Discharge: HOME | End: 2019-05-05
Attending: INTERNAL MEDICINE | Admitting: INTERNAL MEDICINE
Payer: MEDICARE

## 2019-05-01 PROBLEM — E86.0 DEHYDRATION: Status: ACTIVE | Noted: 2019-05-01

## 2019-05-01 LAB
ANION GAP SERPL CALCULATED.3IONS-SCNC: 14 MMOL/L (ref 3–16)
BUN BLDV-MCNC: 16 MG/DL (ref 7–20)
CALCIUM SERPL-MCNC: 8.8 MG/DL (ref 8.3–10.6)
CHLORIDE BLD-SCNC: 94 MMOL/L (ref 99–110)
CO2: 28 MMOL/L (ref 21–32)
CREAT SERPL-MCNC: 0.8 MG/DL (ref 0.6–1.2)
GFR AFRICAN AMERICAN: >60
GFR NON-AFRICAN AMERICAN: >60
GLUCOSE BLD-MCNC: 154 MG/DL (ref 70–99)
GLUCOSE BLD-MCNC: 206 MG/DL (ref 70–99)
PERFORMED ON: ABNORMAL
POTASSIUM REFLEX MAGNESIUM: 4 MMOL/L (ref 3.5–5.1)
SODIUM BLD-SCNC: 136 MMOL/L (ref 136–145)

## 2019-05-01 PROCEDURE — 6370000000 HC RX 637 (ALT 250 FOR IP): Performed by: NURSE PRACTITIONER

## 2019-05-01 PROCEDURE — G0378 HOSPITAL OBSERVATION PER HR: HCPCS

## 2019-05-01 PROCEDURE — 87449 NOS EACH ORGANISM AG IA: CPT

## 2019-05-01 PROCEDURE — 36415 COLL VENOUS BLD VENIPUNCTURE: CPT

## 2019-05-01 PROCEDURE — 96375 TX/PRO/DX INJ NEW DRUG ADDON: CPT

## 2019-05-01 PROCEDURE — 6360000002 HC RX W HCPCS: Performed by: NURSE PRACTITIONER

## 2019-05-01 PROCEDURE — 2580000003 HC RX 258: Performed by: NURSE PRACTITIONER

## 2019-05-01 PROCEDURE — 96372 THER/PROPH/DIAG INJ SC/IM: CPT

## 2019-05-01 PROCEDURE — 80048 BASIC METABOLIC PNL TOTAL CA: CPT

## 2019-05-01 PROCEDURE — 94760 N-INVAS EAR/PLS OXIMETRY 1: CPT

## 2019-05-01 PROCEDURE — 94640 AIRWAY INHALATION TREATMENT: CPT

## 2019-05-01 PROCEDURE — 1200000000 HC SEMI PRIVATE

## 2019-05-01 PROCEDURE — 87324 CLOSTRIDIUM AG IA: CPT

## 2019-05-01 PROCEDURE — 6360000002 HC RX W HCPCS: Performed by: INTERNAL MEDICINE

## 2019-05-01 RX ORDER — FLECAINIDE ACETATE 50 MG/1
100 TABLET ORAL 2 TIMES DAILY
Status: DISCONTINUED | OUTPATIENT
Start: 2019-05-01 | End: 2019-05-05 | Stop reason: HOSPADM

## 2019-05-01 RX ORDER — DEXTROSE MONOHYDRATE 50 MG/ML
100 INJECTION, SOLUTION INTRAVENOUS PRN
Status: DISCONTINUED | OUTPATIENT
Start: 2019-05-01 | End: 2019-05-05 | Stop reason: HOSPADM

## 2019-05-01 RX ORDER — POTASSIUM CHLORIDE 20 MEQ/1
40 TABLET, EXTENDED RELEASE ORAL PRN
Status: DISCONTINUED | OUTPATIENT
Start: 2019-05-01 | End: 2019-05-05 | Stop reason: HOSPADM

## 2019-05-01 RX ORDER — ASCORBIC ACID 500 MG
500 TABLET ORAL DAILY
Status: DISCONTINUED | OUTPATIENT
Start: 2019-05-01 | End: 2019-05-05 | Stop reason: HOSPADM

## 2019-05-01 RX ORDER — MAGNESIUM SULFATE 1 G/100ML
1 INJECTION INTRAVENOUS PRN
Status: DISCONTINUED | OUTPATIENT
Start: 2019-05-01 | End: 2019-05-05 | Stop reason: HOSPADM

## 2019-05-01 RX ORDER — IPRATROPIUM BROMIDE AND ALBUTEROL SULFATE 2.5; .5 MG/3ML; MG/3ML
3 SOLUTION RESPIRATORY (INHALATION)
Status: DISCONTINUED | OUTPATIENT
Start: 2019-05-01 | End: 2019-05-05 | Stop reason: HOSPADM

## 2019-05-01 RX ORDER — LOSARTAN POTASSIUM 25 MG/1
50 TABLET ORAL DAILY
Status: DISCONTINUED | OUTPATIENT
Start: 2019-05-01 | End: 2019-05-01

## 2019-05-01 RX ORDER — POTASSIUM CHLORIDE 7.45 MG/ML
10 INJECTION INTRAVENOUS PRN
Status: DISCONTINUED | OUTPATIENT
Start: 2019-05-01 | End: 2019-05-05 | Stop reason: HOSPADM

## 2019-05-01 RX ORDER — POTASSIUM CHLORIDE 20 MEQ/1
20 TABLET, EXTENDED RELEASE ORAL DAILY
Status: DISCONTINUED | OUTPATIENT
Start: 2019-05-02 | End: 2019-05-05 | Stop reason: HOSPADM

## 2019-05-01 RX ORDER — NICOTINE POLACRILEX 4 MG
15 LOZENGE BUCCAL PRN
Status: DISCONTINUED | OUTPATIENT
Start: 2019-05-01 | End: 2019-05-05 | Stop reason: HOSPADM

## 2019-05-01 RX ORDER — ONDANSETRON 2 MG/ML
4 INJECTION INTRAMUSCULAR; INTRAVENOUS EVERY 6 HOURS PRN
Status: DISCONTINUED | OUTPATIENT
Start: 2019-05-01 | End: 2019-05-05 | Stop reason: HOSPADM

## 2019-05-01 RX ORDER — HYDROCHLOROTHIAZIDE 25 MG/1
12.5 TABLET ORAL DAILY
Status: DISCONTINUED | OUTPATIENT
Start: 2019-05-02 | End: 2019-05-04

## 2019-05-01 RX ORDER — SODIUM CHLORIDE 0.9 % (FLUSH) 0.9 %
10 SYRINGE (ML) INJECTION EVERY 12 HOURS SCHEDULED
Status: DISCONTINUED | OUTPATIENT
Start: 2019-05-01 | End: 2019-05-05 | Stop reason: HOSPADM

## 2019-05-01 RX ORDER — DIGOXIN 125 MCG
125 TABLET ORAL DAILY
Status: DISCONTINUED | OUTPATIENT
Start: 2019-05-02 | End: 2019-05-05 | Stop reason: HOSPADM

## 2019-05-01 RX ORDER — AMLODIPINE BESYLATE 5 MG/1
10 TABLET ORAL DAILY
Status: DISCONTINUED | OUTPATIENT
Start: 2019-05-02 | End: 2019-05-05 | Stop reason: HOSPADM

## 2019-05-01 RX ORDER — PANTOPRAZOLE SODIUM 40 MG/1
40 TABLET, DELAYED RELEASE ORAL
Status: DISCONTINUED | OUTPATIENT
Start: 2019-05-02 | End: 2019-05-05 | Stop reason: HOSPADM

## 2019-05-01 RX ORDER — ASPIRIN 81 MG/1
81 TABLET ORAL DAILY
Status: DISCONTINUED | OUTPATIENT
Start: 2019-05-01 | End: 2019-05-05 | Stop reason: HOSPADM

## 2019-05-01 RX ORDER — ACETAMINOPHEN 325 MG/1
650 TABLET ORAL EVERY 4 HOURS PRN
Status: DISCONTINUED | OUTPATIENT
Start: 2019-05-01 | End: 2019-05-05 | Stop reason: HOSPADM

## 2019-05-01 RX ORDER — CITALOPRAM 20 MG/1
20 TABLET ORAL EVERY MORNING
Status: DISCONTINUED | OUTPATIENT
Start: 2019-05-02 | End: 2019-05-05 | Stop reason: HOSPADM

## 2019-05-01 RX ORDER — CALCIUM CARBONATE 500(1250)
500 TABLET ORAL DAILY
Status: DISCONTINUED | OUTPATIENT
Start: 2019-05-01 | End: 2019-05-01 | Stop reason: CLARIF

## 2019-05-01 RX ORDER — SODIUM CHLORIDE 9 MG/ML
INJECTION, SOLUTION INTRAVENOUS CONTINUOUS
Status: DISCONTINUED | OUTPATIENT
Start: 2019-05-01 | End: 2019-05-05

## 2019-05-01 RX ORDER — DEXTROSE MONOHYDRATE 25 G/50ML
12.5 INJECTION, SOLUTION INTRAVENOUS PRN
Status: DISCONTINUED | OUTPATIENT
Start: 2019-05-01 | End: 2019-05-05 | Stop reason: HOSPADM

## 2019-05-01 RX ORDER — SODIUM CHLORIDE 0.9 % (FLUSH) 0.9 %
10 SYRINGE (ML) INJECTION PRN
Status: DISCONTINUED | OUTPATIENT
Start: 2019-05-01 | End: 2019-05-05 | Stop reason: HOSPADM

## 2019-05-01 RX ORDER — CARVEDILOL 25 MG/1
25 TABLET ORAL 2 TIMES DAILY
Status: DISCONTINUED | OUTPATIENT
Start: 2019-05-01 | End: 2019-05-05 | Stop reason: HOSPADM

## 2019-05-01 RX ADMIN — SODIUM CHLORIDE: 9 INJECTION, SOLUTION INTRAVENOUS at 17:08

## 2019-05-01 RX ADMIN — Medication 10 ML: at 20:53

## 2019-05-01 RX ADMIN — IPRATROPIUM BROMIDE AND ALBUTEROL SULFATE 3 ML: .5; 3 SOLUTION RESPIRATORY (INHALATION) at 20:15

## 2019-05-01 RX ADMIN — ASPIRIN 81 MG: 81 TABLET, COATED ORAL at 17:08

## 2019-05-01 RX ADMIN — METFORMIN HYDROCHLORIDE 500 MG: 500 TABLET ORAL at 17:08

## 2019-05-01 RX ADMIN — CARVEDILOL 25 MG: 25 TABLET, FILM COATED ORAL at 20:52

## 2019-05-01 RX ADMIN — ONDANSETRON 8 MG: 2 INJECTION INTRAMUSCULAR; INTRAVENOUS at 17:08

## 2019-05-01 RX ADMIN — FLECAINIDE ACETATE 100 MG: 50 TABLET ORAL at 20:51

## 2019-05-01 RX ADMIN — INSULIN GLARGINE 20 UNITS: 100 INJECTION, SOLUTION SUBCUTANEOUS at 20:59

## 2019-05-01 RX ADMIN — ENOXAPARIN SODIUM 40 MG: 40 INJECTION SUBCUTANEOUS at 20:52

## 2019-05-01 ASSESSMENT — PAIN SCALES - GENERAL: PAINLEVEL_OUTOF10: 0

## 2019-05-01 NOTE — H&P
[facility]   History & Physical      5/1/2019    CHIEF COMPLAINT:  Weakness    History Obtained From:  The patient    HISTORY OF PRESENT ILLNESS:       Patient well-known to me. 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 Patient completed concurrent radiation (6000 CG Y (from 3/18/2019 to 4/29/2019 along with weekly carboplatin and Taxol x6. Patient tolerated treatments well. She has been having troubles with her poor p.o. intake, nausea and weakness. Patient received IV fluids and antiemetics in the office for last couple of days. Today she had come to the office with worsening symptoms. Therefore she is admitted in the hospital for dehydration. There is no history of fever chills night sweats. She has occasional cough no expectoration or hemoptysis. Her appetite is not that great. No abdominal pain. No diarrhea. She does have some swelling in the legs.     Past Medical History:    Past Medical History:   Diagnosis Date    Arthritis     CAD (coronary artery disease)     Carpal tunnel syndrome     COPD (chronic obstructive pulmonary disease) (HCC)     Coronary stent     depression     Diabetes mellitus (HCC)     GERD (gastroesophageal reflux disease)     Hyperlipidemia     Hypertension     MI (myocardial infarction) ([facility] [address]     LIZETT (obstructive sleep apnea)     does not use CPAP    PONV (postoperative nausea and vomiting)     stress incontinence       Past Surgical History:    Past Surgical History:   Procedure Laterality Date    BACK SURGERY  2000, 2001    lumbar laminectomy    BRONCHOSCOPY  12/04/2018    BRONCHOSCOPY N/A 2/27/2019    BRONCHOSCOPY BIOPSY BRONCHUS performed by Ritesh Mayorga MD at [address] [place] [address]  2/27/2019    BRONCHOSCOPY/TRANSBRONCHIAL NEEDLE BIOPSY performed by Ritesh Mayorga MD at [address] [place] [address]  2/27/2019    BRONCHOSCOPY ULTRASOUND performed by Jef Keene MD at [address] [facility], LAPAROSCOPIC N/A 12/19/2018    LAPAROSCOPIC CHOLECYSTECTOMY WITH CHOLANGIOGRAM performed by Steven Lennon MD at [address] COLONOSCOPY N/A 2/25/2019    COLONOSCOPY WITH BIOPSY performed by Jonatan Hayes MD at [address] [facility] COLONOSCOPY  2/25/2019    COLONOSCOPY POLYPECTOMY SNARE/COLD BIOPSY performed by Jonatan Hayes MD at [place]  2000, 2001    [place] [address] INCL FLUOR GDNCE DX W[facility]LL [facility] N/A 12/4/2018    BRONCHOSCOPY WITH LAVAGE performed by Mayo Iyer MD at [address] [facility] [facility] N/A 2/25/2019    EGD BIOPSY performed by Jonatan Hayes MD at [address] [facility] ENDOSCOPY      Current Medications:    Current Facility-Administered Medications   Medication Dose Route Frequency Provider Last Rate Last Dose    acetaminophen (TYLENOL) tablet 650 mg  650 mg Oral Q4H PRN MARIA C Whittaker CNP        [START ON 5/2/2019] amLODIPine (NORVASC) tablet 10 mg  10 mg Oral Daily MARIA C Whittaker - CNP        vitamin C (ASCORBIC ACID) tablet 500 mg  500 mg Oral Daily MARIA C Whittaker - CNP        aspirin EC tablet 81 mg  81 mg Oral Daily MARIA C Whittaker CNP   81 mg at 05/01/19 1708    carvedilol (COREG) tablet 25 mg  25 mg Oral BID MARIA C Whittaker CNP        [START ON 5/2/2019] citalopram (CELEXA) tablet 20 mg  20 mg Oral QAM MARIA C Whittaker CNP        [START ON 5/2/2019] digoxin (LANOXIN) tablet 125 mcg  125 mcg Oral Daily Lolita Jaramillo APRN - CNP        flecainide (TAMBOCOR) tablet 100 mg  100 mg Oral BID MARIA C Whittaker CNP        [START ON 5/2/2019] hydrochlorothiazide (HYDRODIURIL) tablet 12.5 mg  12.5 mg Oral Daily Lolita Jaramillo APRN - CNP        insulin glargine (LANTUS) injection pen 20 Units  20 Units Subcutaneous BID MARIA C Whittaker - CNP        ipratropium-albuterol (DUONEB) nebulizer solution 3 mL  3 mL Inhalation Q4H WA Jaison Labor, APRN - CNP        metFORMIN (GLUCOPHAGE) tablet 500 mg  500 mg Oral BID WC Jaison Labor, APRN - CNP   500 mg at 05/01/19 1708    [START ON 5/2/2019] pantoprazole (PROTONIX) tablet 40 mg  40 mg Oral QAM AC Jaison Labor, APRN - CNP        [START ON 5/2/2019] potassium chloride (KLOR-CON M) extended release tablet 20 mEq  20 mEq Oral Daily Jaison Labor, APRN - CNP        sodium chloride flush 0.9 % injection 10 mL  10 mL Intravenous 2 times per day Jaison Labor, APRN - CNP        sodium chloride flush 0.9 % injection 10 mL  10 mL Intravenous PRN Jaison Labor, APRN - CNP        magnesium hydroxide (MILK OF MAGNESIA) 400 MG/5ML suspension 30 mL  30 mL Oral Daily PRN Jaison Labor, APRN - CNP        ondansetron [facility]) injection 4 mg  4 mg Intravenous Q6H PRN Jaison Labor, APRN - CNP        0.9 % sodium chloride infusion   Intravenous Continuous Jaison Labor, APRN -  mL/hr at 05/01/19 1708      magnesium sulfate 1 g in dextrose 5% 100 mL IVPB  1 g Intravenous PRN Jaison Labor, APRN - CNP        potassium chloride (KLOR-CON M) extended release tablet 40 mEq  40 mEq Oral PRN Jaison Labor, APRN - CNP        Or    potassium bicarb-citric acid (EFFER-K) effervescent tablet 40 mEq  40 mEq Oral PRN Jaison Labor, APRN - CNP        Or    potassium chloride 10 mEq/100 mL IVPB (Peripheral Line)  10 mEq Intravenous PRN Jaison Labor, APRN - CNP        glucose (GLUTOSE) 40 % oral gel 15 g  15 g Oral PRN Jaison Labor, APRN - CNP        dextrose 50 % solution 12.5 g  12.5 g Intravenous PRN Jaison Labor, APRN - CNP        glucagon (rDNA) injection 1 mg  1 mg Intramuscular PRN Jaison Labor, APRN - CNP        dextrose 5 % solution  100 mL/hr Intravenous PRN Jaison Labor, APRN - CNP        enoxaparin (LOVENOX) injection 40 mg  40 mg Subcutaneous Daily Bess Perkins MD         Allergies:     Allergies   Allergen Reactions    Cipro Xr Swelling, rash    Lyrica [Pregabalin]      Shaking, swelling, rash    Penicillins      Swelling, rash    Sulfa Antibiotics      Swelling, rash      Social History:   Social History     Socioeconomic History    Marital status:      Spouse name: Not on file    Number of children: Not on file    Years of education: Not on file    Highest education level: Not on file   Occupational History    Not on file   Social Needs    Financial resource strain: Not on file    Food insecurity:     Worry: Not on file     Inability: Not on file    Transportation needs:     Medical: Not on file     Non-medical: Not on file   Tobacco Use    Smoking status: Former Smoker     Last attempt to quit: 10/28/2001     Years since quittin.5    Smokeless tobacco: Never Used   Substance and Sexual Activity    Alcohol use: No    Drug use: No    Sexual activity: Yes     Partners: Male   Lifestyle    Physical activity:     Days per week: Not on file     Minutes per session: Not on file    Stress: Not on file   Relationships    Social connections:     Talks on phone: Not on file     Gets together: Not on file     Attends Yazidism service: Not on file     Active member of club or organization: Not on file     Attends meetings of clubs or organizations: Not on file     Relationship status: Not on file    Intimate partner violence:     Fear of current or ex partner: Not on file     Emotionally abused: Not on file     Physically abused: Not on file     Forced sexual activity: Not on file   Other Topics Concern    Not on file   Social History Narrative    Not on file      Family History:     Family History   Problem Relation Age of Onset    Cancer Sister     Diabetes Sister     Diabetes Brother     Heart Disease Father     Heart Disease Mother     Cancer Maternal Uncle         ·       PHYSICAL EXAM:    Vitals:  BP (!) 161/74   Pulse 103   Temp 98 °F (36.7 °C) (Oral)   Resp 18   Ht 5' 5\" (1.651 m)   Wt 164 lb

## 2019-05-01 NOTE — PROGRESS NOTES
Direct admit received. Notified JAYMIE Richelle for orders/med review. Orientation to room performed, call light within reach, patient explained fall risk measures. IV inserted by charge MEGAN Tsang. Requested Telemetry monitoring as patient stated she has a history of Afib.

## 2019-05-02 PROBLEM — E44.1 MILD MALNUTRITION (HCC): Chronic | Status: ACTIVE | Noted: 2019-05-02

## 2019-05-02 LAB
ANION GAP SERPL CALCULATED.3IONS-SCNC: 11 MMOL/L (ref 3–16)
BANDED NEUTROPHILS RELATIVE PERCENT: 3 % (ref 0–7)
BASOPHILS ABSOLUTE: 0 K/UL (ref 0–0.2)
BASOPHILS RELATIVE PERCENT: 0 %
BUN BLDV-MCNC: 15 MG/DL (ref 7–20)
C DIFFICILE TOXIN, EIA: NORMAL
CALCIUM SERPL-MCNC: 8.5 MG/DL (ref 8.3–10.6)
CHLORIDE BLD-SCNC: 96 MMOL/L (ref 99–110)
CO2: 29 MMOL/L (ref 21–32)
CREAT SERPL-MCNC: 0.7 MG/DL (ref 0.6–1.2)
EOSINOPHILS ABSOLUTE: 0 K/UL (ref 0–0.6)
EOSINOPHILS RELATIVE PERCENT: 0 %
GFR AFRICAN AMERICAN: >60
GFR NON-AFRICAN AMERICAN: >60
GLUCOSE BLD-MCNC: 104 MG/DL (ref 70–99)
GLUCOSE BLD-MCNC: 146 MG/DL (ref 70–99)
GLUCOSE BLD-MCNC: 164 MG/DL (ref 70–99)
GLUCOSE BLD-MCNC: 179 MG/DL (ref 70–99)
GLUCOSE BLD-MCNC: 231 MG/DL (ref 70–99)
GLUCOSE BLD-MCNC: 98 MG/DL (ref 70–99)
HCT VFR BLD CALC: 26.6 % (ref 36–48)
HEMOGLOBIN: 8.9 G/DL (ref 12–16)
LYMPHOCYTES ABSOLUTE: 0.9 K/UL (ref 1–5.1)
LYMPHOCYTES RELATIVE PERCENT: 27 %
MACROCYTES: ABNORMAL
MAGNESIUM: 1.3 MG/DL (ref 1.8–2.4)
MCH RBC QN AUTO: 33.2 PG (ref 26–34)
MCHC RBC AUTO-ENTMCNC: 33.2 G/DL (ref 31–36)
MCV RBC AUTO: 99.9 FL (ref 80–100)
METAMYELOCYTES RELATIVE PERCENT: 1 %
MONOCYTES ABSOLUTE: 0.6 K/UL (ref 0–1.3)
MONOCYTES RELATIVE PERCENT: 19 %
MYELOCYTE PERCENT: 4 %
NEUTROPHILS ABSOLUTE: 1.8 K/UL (ref 1.7–7.7)
NEUTROPHILS RELATIVE PERCENT: 46 %
PDW BLD-RTO: 17.7 % (ref 12.4–15.4)
PERFORMED ON: ABNORMAL
PLATELET # BLD: 353 K/UL (ref 135–450)
PMV BLD AUTO: 6.9 FL (ref 5–10.5)
POLYCHROMASIA: ABNORMAL
POTASSIUM SERPL-SCNC: 3.9 MMOL/L (ref 3.5–5.1)
RBC # BLD: 2.67 M/UL (ref 4–5.2)
SODIUM BLD-SCNC: 136 MMOL/L (ref 136–145)
WBC # BLD: 3.4 K/UL (ref 4–11)

## 2019-05-02 PROCEDURE — 96372 THER/PROPH/DIAG INJ SC/IM: CPT

## 2019-05-02 PROCEDURE — G0378 HOSPITAL OBSERVATION PER HR: HCPCS

## 2019-05-02 PROCEDURE — 6370000000 HC RX 637 (ALT 250 FOR IP): Performed by: NURSE PRACTITIONER

## 2019-05-02 PROCEDURE — 2580000003 HC RX 258: Performed by: NURSE PRACTITIONER

## 2019-05-02 PROCEDURE — 94640 AIRWAY INHALATION TREATMENT: CPT

## 2019-05-02 PROCEDURE — 36415 COLL VENOUS BLD VENIPUNCTURE: CPT

## 2019-05-02 PROCEDURE — 97530 THERAPEUTIC ACTIVITIES: CPT

## 2019-05-02 PROCEDURE — 80048 BASIC METABOLIC PNL TOTAL CA: CPT

## 2019-05-02 PROCEDURE — 85025 COMPLETE CBC W/AUTO DIFF WBC: CPT

## 2019-05-02 PROCEDURE — 83735 ASSAY OF MAGNESIUM: CPT

## 2019-05-02 PROCEDURE — 97162 PT EVAL MOD COMPLEX 30 MIN: CPT

## 2019-05-02 PROCEDURE — 6370000000 HC RX 637 (ALT 250 FOR IP): Performed by: INTERNAL MEDICINE

## 2019-05-02 PROCEDURE — 6360000002 HC RX W HCPCS: Performed by: INTERNAL MEDICINE

## 2019-05-02 PROCEDURE — 94760 N-INVAS EAR/PLS OXIMETRY 1: CPT

## 2019-05-02 PROCEDURE — 96366 THER/PROPH/DIAG IV INF ADDON: CPT

## 2019-05-02 PROCEDURE — 97116 GAIT TRAINING THERAPY: CPT

## 2019-05-02 RX ORDER — MAGNESIUM SULFATE IN WATER 40 MG/ML
2 INJECTION, SOLUTION INTRAVENOUS ONCE
Status: COMPLETED | OUTPATIENT
Start: 2019-05-02 | End: 2019-05-02

## 2019-05-02 RX ADMIN — HYDROCHLOROTHIAZIDE 12.5 MG: 25 TABLET ORAL at 09:18

## 2019-05-02 RX ADMIN — Medication 10 ML: at 09:07

## 2019-05-02 RX ADMIN — METFORMIN HYDROCHLORIDE 500 MG: 500 TABLET ORAL at 09:17

## 2019-05-02 RX ADMIN — POTASSIUM CHLORIDE 20 MEQ: 1500 TABLET, EXTENDED RELEASE ORAL at 09:00

## 2019-05-02 RX ADMIN — MAGNESIUM SULFATE HEPTAHYDRATE 2 G: 40 INJECTION, SOLUTION INTRAVENOUS at 10:35

## 2019-05-02 RX ADMIN — IPRATROPIUM BROMIDE AND ALBUTEROL SULFATE 3 ML: .5; 3 SOLUTION RESPIRATORY (INHALATION) at 21:44

## 2019-05-02 RX ADMIN — ASPIRIN 81 MG: 81 TABLET, COATED ORAL at 09:07

## 2019-05-02 RX ADMIN — PANTOPRAZOLE SODIUM 40 MG: 40 TABLET, DELAYED RELEASE ORAL at 06:34

## 2019-05-02 RX ADMIN — CARVEDILOL 25 MG: 25 TABLET, FILM COATED ORAL at 09:06

## 2019-05-02 RX ADMIN — CARVEDILOL 25 MG: 25 TABLET, FILM COATED ORAL at 20:35

## 2019-05-02 RX ADMIN — FLECAINIDE ACETATE 100 MG: 50 TABLET ORAL at 20:35

## 2019-05-02 RX ADMIN — Medication 10 ML: at 20:35

## 2019-05-02 RX ADMIN — Medication 5 ML: at 13:04

## 2019-05-02 RX ADMIN — DIGOXIN 125 MCG: 125 TABLET ORAL at 09:06

## 2019-05-02 RX ADMIN — SODIUM CHLORIDE: 9 INJECTION, SOLUTION INTRAVENOUS at 05:08

## 2019-05-02 RX ADMIN — INSULIN GLARGINE 20 UNITS: 100 INJECTION, SOLUTION SUBCUTANEOUS at 20:35

## 2019-05-02 RX ADMIN — FLECAINIDE ACETATE 100 MG: 50 TABLET ORAL at 09:06

## 2019-05-02 RX ADMIN — CITALOPRAM HYDROBROMIDE 20 MG: 20 TABLET ORAL at 09:06

## 2019-05-02 RX ADMIN — INSULIN LISPRO 1 UNITS: 100 INJECTION, SOLUTION INTRAVENOUS; SUBCUTANEOUS at 23:10

## 2019-05-02 RX ADMIN — AMLODIPINE BESYLATE 10 MG: 5 TABLET ORAL at 09:06

## 2019-05-02 RX ADMIN — IPRATROPIUM BROMIDE AND ALBUTEROL SULFATE 3 ML: .5; 3 SOLUTION RESPIRATORY (INHALATION) at 07:55

## 2019-05-02 RX ADMIN — ENOXAPARIN SODIUM 40 MG: 40 INJECTION SUBCUTANEOUS at 09:07

## 2019-05-02 RX ADMIN — METFORMIN HYDROCHLORIDE 500 MG: 500 TABLET ORAL at 17:32

## 2019-05-02 RX ADMIN — Medication 5 ML: at 17:30

## 2019-05-02 RX ADMIN — IPRATROPIUM BROMIDE AND ALBUTEROL SULFATE 3 ML: .5; 3 SOLUTION RESPIRATORY (INHALATION) at 15:47

## 2019-05-02 RX ADMIN — INSULIN GLARGINE 20 UNITS: 100 INJECTION, SOLUTION SUBCUTANEOUS at 11:09

## 2019-05-02 RX ADMIN — OXYCODONE HYDROCHLORIDE AND ACETAMINOPHEN 500 MG: 500 TABLET ORAL at 09:06

## 2019-05-02 RX ADMIN — IPRATROPIUM BROMIDE AND ALBUTEROL SULFATE 3 ML: .5; 3 SOLUTION RESPIRATORY (INHALATION) at 12:00

## 2019-05-02 ASSESSMENT — PAIN SCALES - GENERAL: PAINLEVEL_OUTOF10: 0

## 2019-05-02 NOTE — CARE COORDINATION
Discharge Planning Assessment  RN/SW discharge planner met with patient/(and family member) to discuss reason for admission, current living situation, and potential needs at the time of discharge    Demographics/Insurance verified Yes    Current type of dwellin storyhome    Living arrangements: stated daughters check in on her    Level of function/Support: stated independent w/ADL's    PCP: Arely Joe    Last Visit to PCP:seeing pt on admission    DME: stated uses a cane    Active with any community resources/agencies/skilled home care: stated no services    Medication compliance issues: stated compliant    Financial issues that could impact healthcare: stated no issues    Transportation at the time of discharge: stated family    Tentative discharge plan: pt stated she does not anticipate any d/c needs at this time. Stated that she has used [facility] in the past, but does not feel she needs home care at this time. Sw following for any d/c needs.     Electronically signed by MADIE Roberts on 2019 at 9:53 AM

## 2019-05-02 NOTE — PROGRESS NOTES
Physical Therapy    Facility/Department: [address] ONCOLOGY  Initial Assessment    NAME: Marta Goodson  : 1940  MRN: [id number]    Date of Service: 2019    Discharge Recommendations:Kenya Castillo scored a 17/24 on the AM-PAC short mobility form. Current research shows that an AM-PAC score of 17 or less is typically not associated with a discharge to the patient's home setting. Based on the patients AM-PAC score and their current functional mobility deficits, it is recommended that the patient have 3-5 sessions per week of Physical Therapy at d/c to increase the patients independence. PT Equipment Recommendations  Equipment Needed: No    Assessment   Body structures, Functions, Activity limitations: Decreased functional mobility ; Decreased ADL status; Decreased ROM; Decreased strength;Decreased endurance;Decreased balance  Assessment: Pt presents as below her baseline function and would benefit from skilled PT services to promote safe return to PLOF. Prognosis: Good  Decision Making: Medium Complexity  Patient Education: POC, role of acute PT, discharge recommendations  REQUIRES PT FOLLOW UP: Yes  Activity Tolerance  Activity Tolerance: Patient Tolerated treatment well       Patient Diagnosis(es): There were no encounter diagnoses. has a past medical history of Arthritis, CAD (coronary artery disease), Carpal tunnel syndrome, COPD (chronic obstructive pulmonary disease) ([facility] [address] Coronary stent, depression, Diabetes mellitus ([facility] [address] GERD (gastroesophageal reflux disease), Hyperlipidemia, Hypertension, MI (myocardial infarction) ([facility] [address] LIZETT (obstructive sleep apnea), PONV (postoperative nausea and vomiting), and stress incontinence. has a past surgical history that includes Coronary angioplasty with stent (, ); back surgery (, ); bronchoscopy (2018); pr [facility] incl fluor gdnce dx w/cell washg spx (N/A, 2018); Cholecystectomy, laparoscopic (N/A, 2018);  Upper gastrointestinal endoscopy (N/A, 2/25/2019); Colonoscopy (N/A, 2/25/2019); Colonoscopy (2/25/2019); bronchoscopy (N/A, 2/27/2019); bronchoscopy (2/27/2019); and bronchoscopy (2/27/2019). Restrictions  Restrictions/Precautions  Restrictions/Precautions: Fall Risk(High)  Required Braces or Orthoses?: No  Vision/Hearing  Vision: Impaired  Vision Exceptions: Wears glasses for reading  Hearing: Within functional limits     Subjective  General  Chart Reviewed: Yes  Response To Previous Treatment: Not applicable  Family / Caregiver Present: Yes(Daughter arriving during evaluation)  Diagnosis: Dehydration  Follows Commands: Within Functional Limits  General Comment  Comments: Pt supine in bed upon arrival.  Subjective  Subjective: Pt agreeable to PT eval.  Pain Screening  Patient Currently in Pain: Denies  Vital Signs  Patient Currently in Pain: Denies       Orientation  Orientation  Overall Orientation Status: Within Functional Limits  Social/Functional History  Social/Functional History  Lives With: Alone  Type of Home: House  Home Layout: Two level, Bed/Bath upstairs, 1/2 bath on main level  Home Access: Stairs to enter with rails  Entrance Stairs - Number of Steps: 3 KATI; wide steps  Entrance Stairs - Rails: Both  Bathroom Shower/Tub: Walk-in shower, Tub/Shower unit, Shower chair without back(Pt uses walk-in shower)  Bathroom Toilet: Handicap height  Bathroom Equipment: Built-in shower seat, Shower chair, Grab bars in shower, Hand-held shower  Home Equipment: Cane, 4 wheeled walker, Rolling walker, Standard walker, Reacher, Crutches  ADL Assistance: Independent  Homemaking Assistance: Independent  Homemaking Responsibilities: Yes  Ambulation Assistance: Independent  Transfer Assistance: Independent  Active : Yes  Mode of Transportation: SUV  Leisure & Hobbies: Used to like to shop and play tennis  Additional Comments: Pt reports 3-4 falls per week since [employer].  Pt reports having a gallbladder surgery at that time and since, balance has been a concern for pt, limiting her ability to move well. Pt reports that last time, pt went home with HHPT but pt reports therapy and she agreed that she did not need therapy services at that time. Cognition        Objective     Observation/Palpation  Posture: Good    AROM RLE (degrees)  RLE AROM: WFL  AROM LLE (degrees)  LLE AROM : WFL  Strength RLE  Strength RLE: WFL  Strength LLE  Strength LLE: WFL  Tone RLE  RLE Tone: Normotonic  Tone LLE  LLE Tone: Normotonic  Motor Control  Gross Motor?: WFL  Sensation  Overall Sensation Status: Impaired(Pt reports neuropathy to BLE, primarily dorsum of feet up to mid-shin)  Bed mobility  Supine to Sit: Stand by assistance  Scooting: Stand by assistance  Comment: HOB elevated, use of bedrail  Transfers  Sit to Stand: Contact guard assistance  Stand to sit: Contact guard assistance  Ambulation  Ambulation?: Yes  Ambulation 1  Surface: level tile  Device: Rolling Walker  Assistance: Contact guard assistance  Quality of Gait: Pt ambulates with mildly variable FERDINAND, mild deviation from straight to L, decreased step length on L compared to R, narrow FERDINAND, no LOB. Distance: ~80 ft  Stairs/Curb  Stairs?: No     Balance  Posture: Good  Sitting - Static: Good  Sitting - Dynamic: Good  Standing - Static: Fair;+  Standing - Dynamic: [address]  Times per week: 3-5x  Times per day: Daily  Current Treatment Recommendations: Strengthening, ROM, Balance Training, Functional Mobility Training, Transfer Training, Endurance Training, Gait Training, Safety Education & Training, Patient/Caregiver Education & Training  Safety Devices  Type of devices:  All fall risk precautions in place, Call light within reach, Chair alarm in place, Gait belt, Patient at risk for falls, Left in chair, Nurse notified  Restraints  Initially in place: No    G-Code       OutComes Score                                                  AM-PAC Score  AM-PAC Inpatient Mobility Raw Score : 17  AM-PAC Inpatient T-Scale Score : 42.13  Mobility Inpatient CMS 0-100% Score: 50.57  Mobility Inpatient CMS G-Code Modifier : CK          Goals  Short term goals  Time Frame for Short term goals:  To be met prior to discharge  Short term goal 1: Pt will complete bed mobility with mod I  Short term goal 2: Pt will complete sit to/from stand with supervision  Short term goal 3: Pt will ambulate 100 ft with LRAD and CGA       Therapy Time   Individual Concurrent Group Co-treatment   Time In 1433         Time Out 1518         Minutes 45         Timed Code Treatment Minutes: 27 Minutes     Tate Small, PT   Tate Small, PT, DPT, [id number]

## 2019-05-02 NOTE — PROGRESS NOTES
Pt weaned off 02 sat 94 RA standby assist to restroom up to chair. C/o L hip allittle sore able to bear weight ambulated well with no complaints at time. Stated \"its just sore I didn't have far to fall yesterday\". Pain 4 on scale refused interventions. Up in chair eating breakfast call light in reach. Alarm on for pt safety.

## 2019-05-02 NOTE — PROGRESS NOTES
Nutrition Assessment    Type and Reason for Visit: Initial, Consult(unplanned wt loss/ MST-0)    Nutrition Recommendations:   1. Glucerna BID, latasha  2. Monitor for tolerance of solids/liquids d/t sore throat from XRT   3. Add CCC modifier as appropriate      Nutrition Assessment: Pt nutritionally compromised AEB decreased po intake related to radiation tx for lung CA. Pt states it is painful to swallow food & liquids d/t the pain from xrt. States wt has been stable recently, but hx reveals 13 lb wt loss in 4 months. Agrees to try Glucerna BID to help avoid further nutrition compromise. Malnutrition Assessment:  · Malnutrition Status: Mild Malnutrition  · Context: Acute illness or injury  · Findings of the 6 clinical characteristics of malnutrition (Minimum of 2 out of 6 clinical characteristics is required to make the diagnosis of moderate or severe Protein Calorie Malnutrition based on AND/ASPEN Guidelines):  1. Energy Intake-Less than or equal to 50% of estimated energy requirement, Greater than or equal to 5 days    2. Weight Loss-5% loss or greater, (4 months)  3. Fat Loss-No significant subcutaneous fat loss,    4. Muscle Loss-No significant muscle mass loss,    5. Fluid Accumulation-No significant fluid accumulation,    6.   Strength-Not measured    Nutrition Risk Level: High    Nutrient Needs:  · Estimated Daily Total Kcal: 1480- 1850  · Estimated Daily Protein (g): 74- 86(1.3-1.5)  · Estimated Daily Total Fluid (ml/day): 1 ml/kcal    Nutrition Diagnosis:   · Problem: Inadequate oral intake  · Etiology: related to Insufficient energy/nutrient consumption     Signs and symptoms:  as evidenced by Weight loss, Diet history of poor intake    Objective Information:  · Nutrition-Focused Physical Findings: N, Diarrhea & weakness upon admission, per MD.  No edema noted  · Wound Type: None  · Current Nutrition Therapies:  · Oral Diet Orders: General   · Oral Diet intake: Unable to assess  · Oral Nutrition Supplement (ONS) Orders: None  · ONS intake:  NA  · Anthropometric Measures:  · Ht: 5' 5\" (165.1 cm)   · Current Body Wt: 164 lb (74.4 kg)  · Usual Body Wt: 177 lb (80.3 kg)  · % Weight Change:  ,  13 lb/ 7% in 4 months  · Ideal Body Wt: 125 lb (56.7 kg), % Ideal Body 131%  · BMI Classification: BMI 25.0 - 29.9 Overweight    Nutrition Interventions:   Continue current diet, Start ONS  Continued Inpatient Monitoring, Education Not Indicated    Nutrition Evaluation:   · Evaluation: Goals set   · Goals: po intake greater than 50% of meals & supplements    · Monitoring: Meal Intake, Supplement Intake, Diet Tolerance, Chewing/Swallowing, Nausea or Vomiting, Diarrhea      Electronically signed by Jaime Shaikh RD, LD on 5/2/19 at 1:26 PM    Contact Number: [phone]

## 2019-05-02 NOTE — PROGRESS NOTES
>60 05/02/2019    GFRAA >60 10/29/2012    LABGLOM >60 05/02/2019    GLUCOSE 98 05/02/2019     Hepatic Function Panel:    Lab Results   Component Value Date    ALKPHOS 86 03/28/2019    ALT 22 03/28/2019    AST 16 03/28/2019    PROT 6.9 03/28/2019    BILITOT 0.3 03/28/2019    BILIDIR <0.2 12/23/2018    IBILI see below 12/23/2018    LABALBU 3.9 03/28/2019     LDH:  No results found for: LDH  PT/INR:    Lab Results   Component Value Date    PROTIME 10.8 02/27/2019    INR 0.95 02/27/2019     PTT:    Lab Results   Component Value Date    APTT 30.5 12/04/2018   [APTT    Current Medications  Current Facility-Administered Medications: miles mixture 5 mL, 5 mL, Swish & Swallow, 4x Daily PRN  acetaminophen (TYLENOL) tablet 650 mg, 650 mg, Oral, Q4H PRN  amLODIPine (NORVASC) tablet 10 mg, 10 mg, Oral, Daily  vitamin C (ASCORBIC ACID) tablet 500 mg, 500 mg, Oral, Daily  aspirin EC tablet 81 mg, 81 mg, Oral, Daily  carvedilol (COREG) tablet 25 mg, 25 mg, Oral, BID  citalopram (CELEXA) tablet 20 mg, 20 mg, Oral, QAM  digoxin (LANOXIN) tablet 125 mcg, 125 mcg, Oral, Daily  flecainide (TAMBOCOR) tablet 100 mg, 100 mg, Oral, BID  hydrochlorothiazide (HYDRODIURIL) tablet 12.5 mg, 12.5 mg, Oral, Daily  insulin glargine (LANTUS) injection pen 20 Units, 20 Units, Subcutaneous, BID  ipratropium-albuterol (DUONEB) nebulizer solution 3 mL, 3 mL, Inhalation, Q4H WA  metFORMIN (GLUCOPHAGE) tablet 500 mg, 500 mg, Oral, BID WC  pantoprazole (PROTONIX) tablet 40 mg, 40 mg, Oral, QAM AC  potassium chloride (KLOR-CON M) extended release tablet 20 mEq, 20 mEq, Oral, Daily  sodium chloride flush 0.9 % injection 10 mL, 10 mL, Intravenous, 2 times per day  sodium chloride flush 0.9 % injection 10 mL, 10 mL, Intravenous, PRN  magnesium hydroxide (MILK OF MAGNESIA) 400 MG/5ML suspension 30 mL, 30 mL, Oral, Daily PRN  ondansetron (ZOFRAN) injection 4 mg, 4 mg, Intravenous, Q6H PRN  0.9 % sodium chloride infusion, , Intravenous, Continuous  magnesium sulfate 1 g in dextrose 5% 100 mL IVPB, 1 g, Intravenous, PRN  potassium chloride (KLOR-CON M) extended release tablet 40 mEq, 40 mEq, Oral, PRN **OR** potassium bicarb-citric acid (EFFER-K) effervescent tablet 40 mEq, 40 mEq, Oral, PRN **OR** potassium chloride 10 mEq/100 mL IVPB (Peripheral Line), 10 mEq, Intravenous, PRN  glucose (GLUTOSE) 40 % oral gel 15 g, 15 g, Oral, PRN  dextrose 50 % solution 12.5 g, 12.5 g, Intravenous, PRN  glucagon (rDNA) injection 1 mg, 1 mg, Intramuscular, PRN  dextrose 5 % solution, 100 mL/hr, Intravenous, PRN  enoxaparin (LOVENOX) injection 40 mg, 40 mg, Subcutaneous, Daily    ASSESSMENT AND PLAN    19-year-old lady has     1. 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 Radiation was completed on 4/29/2019. Last chemotherapy with weekly carboplatin and Taxol was done on 4/22/2019.     2.  Dehydration, weakness, failure to thrive:     Feels better.       3. Diabetes insulin will be continued. Next     4. Coronary artery disease: Coreg, aspirin will be continued.     5. Hypertension Norvasc will be continued.     6. Diarrhea: better     7. DVT prophylaxis with Lovenox    Hopefully home tomorrow.     Marva Mcclendon MD

## 2019-05-02 NOTE — PLAN OF CARE
Nutrition Problem: Inadequate oral intake  Intervention: Food and/or Nutrient Delivery: Continue current diet, Start ONS  Nutritional Goals: po intake greater than 50% of meals & supplements

## 2019-05-02 NOTE — CARE COORDINATION
Aware of PT/OT recommendation for SNF. Provided pt and daughter w/[employer] SNF list and COA information.   Electronically signed by MADIE Franco on 5/2/2019 at 4:21 PM

## 2019-05-03 LAB
ANION GAP SERPL CALCULATED.3IONS-SCNC: 11 MMOL/L (ref 3–16)
ANISOCYTOSIS: ABNORMAL
BANDED NEUTROPHILS RELATIVE PERCENT: 2 % (ref 0–7)
BASOPHILS ABSOLUTE: 0 K/UL (ref 0–0.2)
BASOPHILS RELATIVE PERCENT: 1 %
BUN BLDV-MCNC: 13 MG/DL (ref 7–20)
CALCIUM SERPL-MCNC: 7.9 MG/DL (ref 8.3–10.6)
CHLORIDE BLD-SCNC: 100 MMOL/L (ref 99–110)
CO2: 28 MMOL/L (ref 21–32)
CREAT SERPL-MCNC: 0.8 MG/DL (ref 0.6–1.2)
EOSINOPHILS ABSOLUTE: 0 K/UL (ref 0–0.6)
EOSINOPHILS RELATIVE PERCENT: 0 %
GFR AFRICAN AMERICAN: >60
GFR NON-AFRICAN AMERICAN: >60
GLUCOSE BLD-MCNC: 141 MG/DL (ref 70–99)
GLUCOSE BLD-MCNC: 150 MG/DL (ref 70–99)
GLUCOSE BLD-MCNC: 163 MG/DL (ref 70–99)
GLUCOSE BLD-MCNC: 179 MG/DL (ref 70–99)
GLUCOSE BLD-MCNC: 90 MG/DL (ref 70–99)
GLUCOSE BLD-MCNC: 91 MG/DL (ref 70–99)
HCT VFR BLD CALC: 25.9 % (ref 36–48)
HEMOGLOBIN: 8.5 G/DL (ref 12–16)
LYMPHOCYTES ABSOLUTE: 0.5 K/UL (ref 1–5.1)
LYMPHOCYTES RELATIVE PERCENT: 14 %
MACROCYTES: ABNORMAL
MAGNESIUM: 1.7 MG/DL (ref 1.8–2.4)
MCH RBC QN AUTO: 32.3 PG (ref 26–34)
MCHC RBC AUTO-ENTMCNC: 32.7 G/DL (ref 31–36)
MCV RBC AUTO: 98.7 FL (ref 80–100)
MONOCYTES ABSOLUTE: 1.1 K/UL (ref 0–1.3)
MONOCYTES RELATIVE PERCENT: 29 %
NEUTROPHILS ABSOLUTE: 2.1 K/UL (ref 1.7–7.7)
NEUTROPHILS RELATIVE PERCENT: 54 %
PDW BLD-RTO: 19.6 % (ref 12.4–15.4)
PERFORMED ON: ABNORMAL
PERFORMED ON: NORMAL
PLATELET # BLD: 354 K/UL (ref 135–450)
PLATELET SLIDE REVIEW: ADEQUATE
PMV BLD AUTO: 6.7 FL (ref 5–10.5)
POLYCHROMASIA: ABNORMAL
POTASSIUM SERPL-SCNC: 3.7 MMOL/L (ref 3.5–5.1)
RBC # BLD: 2.63 M/UL (ref 4–5.2)
SLIDE REVIEW: ABNORMAL
SODIUM BLD-SCNC: 139 MMOL/L (ref 136–145)
WBC # BLD: 3.7 K/UL (ref 4–11)

## 2019-05-03 PROCEDURE — 2580000003 HC RX 258: Performed by: NURSE PRACTITIONER

## 2019-05-03 PROCEDURE — 97535 SELF CARE MNGMENT TRAINING: CPT

## 2019-05-03 PROCEDURE — 94760 N-INVAS EAR/PLS OXIMETRY 1: CPT

## 2019-05-03 PROCEDURE — 6370000000 HC RX 637 (ALT 250 FOR IP): Performed by: NURSE PRACTITIONER

## 2019-05-03 PROCEDURE — 96372 THER/PROPH/DIAG INJ SC/IM: CPT

## 2019-05-03 PROCEDURE — 85025 COMPLETE CBC W/AUTO DIFF WBC: CPT

## 2019-05-03 PROCEDURE — 36415 COLL VENOUS BLD VENIPUNCTURE: CPT

## 2019-05-03 PROCEDURE — 97530 THERAPEUTIC ACTIVITIES: CPT

## 2019-05-03 PROCEDURE — 6360000002 HC RX W HCPCS: Performed by: INTERNAL MEDICINE

## 2019-05-03 PROCEDURE — 97116 GAIT TRAINING THERAPY: CPT

## 2019-05-03 PROCEDURE — 94640 AIRWAY INHALATION TREATMENT: CPT

## 2019-05-03 PROCEDURE — 80048 BASIC METABOLIC PNL TOTAL CA: CPT

## 2019-05-03 PROCEDURE — G0378 HOSPITAL OBSERVATION PER HR: HCPCS

## 2019-05-03 PROCEDURE — 6370000000 HC RX 637 (ALT 250 FOR IP): Performed by: INTERNAL MEDICINE

## 2019-05-03 PROCEDURE — 83735 ASSAY OF MAGNESIUM: CPT

## 2019-05-03 PROCEDURE — 97165 OT EVAL LOW COMPLEX 30 MIN: CPT

## 2019-05-03 RX ADMIN — SODIUM CHLORIDE: 9 INJECTION, SOLUTION INTRAVENOUS at 15:55

## 2019-05-03 RX ADMIN — IPRATROPIUM BROMIDE AND ALBUTEROL SULFATE 3 ML: .5; 3 SOLUTION RESPIRATORY (INHALATION) at 11:20

## 2019-05-03 RX ADMIN — INSULIN LISPRO 2 UNITS: 100 INJECTION, SOLUTION INTRAVENOUS; SUBCUTANEOUS at 17:35

## 2019-05-03 RX ADMIN — ENOXAPARIN SODIUM 40 MG: 40 INJECTION SUBCUTANEOUS at 09:09

## 2019-05-03 RX ADMIN — Medication 5 ML: at 19:27

## 2019-05-03 RX ADMIN — METFORMIN HYDROCHLORIDE 500 MG: 500 TABLET ORAL at 17:36

## 2019-05-03 RX ADMIN — OXYCODONE HYDROCHLORIDE AND ACETAMINOPHEN 500 MG: 500 TABLET ORAL at 09:11

## 2019-05-03 RX ADMIN — DIGOXIN 125 MCG: 125 TABLET ORAL at 09:10

## 2019-05-03 RX ADMIN — INSULIN LISPRO 1 UNITS: 100 INJECTION, SOLUTION INTRAVENOUS; SUBCUTANEOUS at 22:45

## 2019-05-03 RX ADMIN — ASPIRIN 81 MG: 81 TABLET, COATED ORAL at 09:10

## 2019-05-03 RX ADMIN — ACETAMINOPHEN 650 MG: 325 TABLET, FILM COATED ORAL at 09:19

## 2019-05-03 RX ADMIN — Medication 5 ML: at 15:57

## 2019-05-03 RX ADMIN — FLECAINIDE ACETATE 100 MG: 50 TABLET ORAL at 09:10

## 2019-05-03 RX ADMIN — IPRATROPIUM BROMIDE AND ALBUTEROL SULFATE 3 ML: .5; 3 SOLUTION RESPIRATORY (INHALATION) at 07:53

## 2019-05-03 RX ADMIN — ACETAMINOPHEN 650 MG: 325 TABLET, FILM COATED ORAL at 19:27

## 2019-05-03 RX ADMIN — HYDROCHLOROTHIAZIDE 12.5 MG: 25 TABLET ORAL at 09:10

## 2019-05-03 RX ADMIN — Medication 10 ML: at 09:10

## 2019-05-03 RX ADMIN — Medication 5 ML: at 09:21

## 2019-05-03 RX ADMIN — CARVEDILOL 25 MG: 25 TABLET, FILM COATED ORAL at 23:01

## 2019-05-03 RX ADMIN — INSULIN LISPRO 2 UNITS: 100 INJECTION, SOLUTION INTRAVENOUS; SUBCUTANEOUS at 12:19

## 2019-05-03 RX ADMIN — CITALOPRAM HYDROBROMIDE 20 MG: 20 TABLET ORAL at 09:11

## 2019-05-03 RX ADMIN — IPRATROPIUM BROMIDE AND ALBUTEROL SULFATE 3 ML: .5; 3 SOLUTION RESPIRATORY (INHALATION) at 20:15

## 2019-05-03 RX ADMIN — METFORMIN HYDROCHLORIDE 500 MG: 500 TABLET ORAL at 09:11

## 2019-05-03 RX ADMIN — IPRATROPIUM BROMIDE AND ALBUTEROL SULFATE 3 ML: .5; 3 SOLUTION RESPIRATORY (INHALATION) at 15:46

## 2019-05-03 RX ADMIN — SODIUM CHLORIDE: 9 INJECTION, SOLUTION INTRAVENOUS at 05:52

## 2019-05-03 RX ADMIN — AMLODIPINE BESYLATE 10 MG: 5 TABLET ORAL at 09:10

## 2019-05-03 RX ADMIN — INSULIN GLARGINE 20 UNITS: 100 INJECTION, SOLUTION SUBCUTANEOUS at 09:27

## 2019-05-03 RX ADMIN — FLECAINIDE ACETATE 100 MG: 50 TABLET ORAL at 23:01

## 2019-05-03 RX ADMIN — INSULIN GLARGINE 20 UNITS: 100 INJECTION, SOLUTION SUBCUTANEOUS at 22:46

## 2019-05-03 RX ADMIN — POTASSIUM CHLORIDE 20 MEQ: 1500 TABLET, EXTENDED RELEASE ORAL at 09:10

## 2019-05-03 RX ADMIN — Medication 10 ML: at 23:06

## 2019-05-03 RX ADMIN — CARVEDILOL 25 MG: 25 TABLET, FILM COATED ORAL at 09:11

## 2019-05-03 RX ADMIN — PANTOPRAZOLE SODIUM 40 MG: 40 TABLET, DELAYED RELEASE ORAL at 06:09

## 2019-05-03 ASSESSMENT — PAIN DESCRIPTION - LOCATION
LOCATION: HEAD

## 2019-05-03 ASSESSMENT — PAIN DESCRIPTION - DIRECTION
RADIATING_TOWARDS: NECK
RADIATING_TOWARDS: NECK

## 2019-05-03 ASSESSMENT — PAIN SCALES - GENERAL
PAINLEVEL_OUTOF10: 8
PAINLEVEL_OUTOF10: 5
PAINLEVEL_OUTOF10: 2
PAINLEVEL_OUTOF10: 0
PAINLEVEL_OUTOF10: 2

## 2019-05-03 ASSESSMENT — PAIN DESCRIPTION - DESCRIPTORS
DESCRIPTORS: HEADACHE;THROBBING
DESCRIPTORS: THROBBING

## 2019-05-03 ASSESSMENT — PAIN DESCRIPTION - ORIENTATION: ORIENTATION: OTHER (COMMENT)

## 2019-05-03 ASSESSMENT — PAIN DESCRIPTION - PAIN TYPE
TYPE: ACUTE PAIN

## 2019-05-03 ASSESSMENT — PAIN DESCRIPTION - FREQUENCY
FREQUENCY: CONTINUOUS
FREQUENCY: CONTINUOUS

## 2019-05-03 NOTE — PROGRESS NOTES
Tylenol given for 5/10 headache pain. Will continue to monitor.  Miles mixture given at patient request.

## 2019-05-03 NOTE — PROGRESS NOTES
Physical Therapy  Facility/Department: [address] ONCOLOGY  Daily Treatment Note  NAME: Sedrick Parikh  : 1940  MRN: [id number]    Date of Service: 5/3/2019    Discharge Recommendations: Sedrick Parikh scored a 17/24 on the AM-PAC short mobility form. Current research shows that an AM-PAC score of 17 or less is typically not associated with a discharge to the patient's home setting. Based on the patients AM-PAC score and their current functional mobility deficits, it is recommended that the patient have 3-5 sessions per week of Physical Therapy at d/c to increase the patients independence. PT Equipment Recommendations  Equipment Needed: No    Patient Diagnosis(es): There were no encounter diagnoses. has a past medical history of Arthritis, CAD (coronary artery disease), Carpal tunnel syndrome, COPD (chronic obstructive pulmonary disease) ([facility] [address] Coronary stent, depression, Diabetes mellitus ([facility] [address] GERD (gastroesophageal reflux disease), Hyperlipidemia, Hypertension, MI (myocardial infarction) ([facility] [address] LIZETT (obstructive sleep apnea), PONV (postoperative nausea and vomiting), and stress incontinence. has a past surgical history that includes Coronary angioplasty with stent (, ); back surgery (, ); bronchoscopy (2018); pr [facility] incl fluor gdnce dx w/cell washg spx (N/A, 2018); Cholecystectomy, laparoscopic (N/A, 2018); Upper gastrointestinal endoscopy (N/A, 2019); Colonoscopy (N/A, 2019); Colonoscopy (2019); bronchoscopy (N/A, 2019); bronchoscopy (2019); and bronchoscopy (2019). Restrictions  Restrictions/Precautions  Restrictions/Precautions: Fall Risk(high fall risk )  Required Braces or Orthoses?: No  Position Activity Restriction  Other position/activity restrictions: Pt with h/o stage IIb adenocarcinoma of the left lung and patient is not a surgical candidate. Has completed concurrent radiation.  She has been having troubles with her poor p.o. intake, nausea and weakness. Has been admitted for dehydration  Subjective   General  Chart Reviewed: Yes  Response To Previous Treatment: Patient with no complaints from previous session. Family / Caregiver Present: Yes(daughter present throughout treatment)  Subjective  Subjective: Patient agreeable to PT tx. General Comment  Comments: Patient upright in chair upon arrival    Objective      Transfers  Sit to Stand: Stand by assistance  Stand to sit: Stand by assistance  Ambulation  Ambulation?: Yes  Ambulation 1  Surface: level tile  Device: Rolling Walker  Assistance: Contact guard assistance  Quality of Gait: Pt ambulates with mildly variable FERDINAND, mild deviation from straight to L, decreased step length on L compared to R, narrow FERDINAND, no LOB. Distance: 40ft + 50 ft + 30 ft   Comments: Patient required frequent seated restbreaks secondary to c/o dizziness. /84 upon return to room. The first rest break was not planned at patient sat in the [place] on a chair. Second break was planned. Stairs/Curb  Stairs?: No     Balance  Posture: Good  Sitting - Static: Good  Sitting - Dynamic: Good  Standing - Static: Fair;+  Standing - Dynamic: Fair  Exercises  Comments: 5 x sit<>stand; no c/o dizziness, functional speed, UE support. Strength RLE  Strength RLE: WFL  Strength LLE  Strength LLE: WFL          Assessment   Body structures, Functions, Activity limitations: Decreased functional mobility ; Decreased ADL status; Decreased ROM; Decreased strength;Decreased endurance;Decreased balance  Assessment: Pt presents as below her baseline function and would benefit from skilled PT services to promote safe return to PLOF. Extensive conversation with patient and daughter regarding PT recommendation of SNF due to frequent falls, two story home layout, lack of support at home, and decreased endurance but patient is declining SNF at this time. Patient is also currently declining home services.    Patient Education: POC, discharge recommendations  REQUIRES PT FOLLOW UP: Yes  Activity Tolerance  Activity Tolerance: Patient Tolerated treatment well;Patient limited by endurance     AM-PAC Score  AM-PAC Inpatient Mobility Raw Score : 17  AM-PAC Inpatient T-Scale Score : 42.13  Mobility Inpatient CMS 0-100% Score: 50.57  Mobility Inpatient CMS G-Code Modifier : CK          Goals  Short term goals  Time Frame for Short term goals: To be met prior to discharge  Short term goal 1: Pt will complete bed mobility with mod I  Short term goal 2: Pt will complete sit to/from stand with supervision  Short term goal 3: Pt will ambulate 100 ft with LRAD and CGA  *no goals met    Plan    Plan  Times per week: 3-5x  Times per day: Daily  Current Treatment Recommendations: Strengthening, ROM, Balance Training, Functional Mobility Training, Transfer Training, Endurance Training, Gait Training, Safety Education & Training, Patient/Caregiver Education & Training  Safety Devices  Type of devices: All fall risk precautions in place, Call light within reach, Chair alarm in place, Gait belt, Patient at risk for falls, Left in chair  Restraints  Initially in place: No     Therapy Time   Individual Concurrent Group Co-treatment   Time In 1333         Time Out 1400         Minutes 27          Timed minutes: 27  Total minutes: [facility], [facility]   Hobbs Console, PT   I agree with the above note. PT directly observed the SPT with the patient.   Earl Matias DPT [id number]

## 2019-05-03 NOTE — PROGRESS NOTES
Occupational Therapy   Occupational Therapy Initial Assessment  Date: 5/3/2019   Patient Name: Ian Rajput  MRN: [id number]     : 1940    Date of Service: 5/3/2019    Discharge Recommendations: Ian Rajput scored a 17/24 on the AM-PAC ADL Inpatient form. Current research shows that an AM-PAC score of 17 or less is typically not associated with a discharge to the patient's home setting. Based on the patients AM-PAC score and their current ADL deficits, it is recommended that the patient have 3-5 sessions per week of Occupational Therapy at d/c to increase the patients independence. OT Equipment Recommendations  Equipment Needed: Yes  Mobility Devices: ADL Assistive Devices  ADL Assistive Devices: Emergency Alert System    Assessment   Performance deficits / Impairments: Decreased functional mobility ; Decreased ADL status; Decreased endurance;Decreased balance;Decreased high-level IADLs  Assessment: Pt is not at baseline level of function and is currently limited d/t the above deficits. Recommend continued OT to address these above deficits. Treatment Diagnosis: Decreased functional mobility, ADL/IADL status, functional activity tolerance, balance related to dehydration  Prognosis: Good  Decision Making: Low Complexity  Patient Education: Role of OT, OT evaluation, safe functional mobility, POC, d/c recommendations, concerns for safety if d/c'd home  REQUIRES OT FOLLOW UP: Yes  Activity Tolerance  Activity Tolerance: Patient Tolerated treatment well;Patient limited by fatigue  Safety Devices  Safety Devices in place: Yes  Type of devices: All fall risk precautions in place; Patient at risk for falls; Left in chair;Chair alarm in place;Nurse notified;Call light within reach           Patient Diagnosis(es): There were no encounter diagnoses.      has a past medical history of Arthritis, CAD (coronary artery disease), Carpal tunnel syndrome, COPD (chronic obstructive pulmonary disease) ([facility] [address] Coronary stent, depression, Diabetes mellitus ([facility] [address] GERD (gastroesophageal reflux disease), Hyperlipidemia, Hypertension, MI (myocardial infarction) ([facility] [address] LIZETT (obstructive sleep apnea), PONV (postoperative nausea and vomiting), and stress incontinence. has a past surgical history that includes Coronary angioplasty with stent (2000, 2001); back surgery (2000, 2001); bronchoscopy (12/04/2018); pr brncc incl fluor gdnce dx w/cell washg spx (N/A, 12/4/2018); Cholecystectomy, laparoscopic (N/A, 12/19/2018); Upper gastrointestinal endoscopy (N/A, 2/25/2019); Colonoscopy (N/A, 2/25/2019); Colonoscopy (2/25/2019); bronchoscopy (N/A, 2/27/2019); bronchoscopy (2/27/2019); and bronchoscopy (2/27/2019). Treatment Diagnosis: Decreased functional mobility, ADL/IADL status, functional activity tolerance, balance related to dehydration      Restrictions  Restrictions/Precautions  Restrictions/Precautions: Fall Risk(High)  Required Braces or Orthoses?: No  Position Activity Restriction  Other position/activity restrictions: Pt with h/o stage IIb adenocarcinoma of the left lung and patient is not a surgical candidate. Has completed concurrent radiation. She has been having troubles with her poor p.o. intake, nausea and weakness. Has been admitted for dehydration    Subjective   General  Chart Reviewed: Yes  Additional Pertinent Hx: COPD, DM, MI, CAD  Family / Caregiver Present: No  Diagnosis: dehydration  Subjective  Subjective: Pt seated on toilet with PCA upon arrival, agreeable to OT evaluation. Pain Assessment  Pain Assessment: 0-10  Pain Level: 2  Pain Type: Acute pain  Pain Location: Head  Pain Orientation: Other (Comment)(top of head)  Pain Radiating Towards: neck  Pain Descriptors: Headache; Throbbing  Pt received pain medication prior to tx.      Social/Functional History  Social/Functional History  Lives With: Alone  Type of Home: House  Home Layout: Two level, Bed/Bath upstairs, 1/2 bath on main level  Home Access: Stairs to enter with rails  Entrance Stairs - Number of Steps: 3 KATI; wide steps  Entrance Stairs - Rails: Both  Bathroom Shower/Tub: Walk-in shower, Tub/Shower unit, Shower chair without back(Pt uses walk-in shower)  Bathroom Toilet: Handicap height  Bathroom Equipment: Built-in shower seat, Shower chair, Grab bars in shower, Hand-held shower  Bathroom Accessibility: Walker accessible  Home Equipment: Cane, 4 wheeled walker, Rolling walker, Standard walker, Reacher, Crutches  ADL Assistance: Independent  Homemaking Assistance: Independent  Homemaking Responsibilities: Yes  Ambulation Assistance: Independent  Transfer Assistance: Independent  Active : Yes  Mode of Transportation: SUV  Leisure & Hobbies: Used to like to shop and play tennis  Additional Comments: Pt reports 3-4 falls per week since [employer]. Pt reports having a gallbladder surgery at that time and since, balance has been a concern for pt, limiting her ability to move well. Pt reports that last time, pt went home with HHPT but pt reports therapy and she agreed that she did not need therapy services at that time.        Objective   Vision: Impaired  Vision Exceptions: Wears glasses for reading  Hearing: Within functional limits    Orientation  Overall Orientation Status: Within Normal Limits  Observation/Palpation  Posture: Good  Balance  Sitting Balance: Supervision  Standing Balance: Contact guard assistance  Standing Balance  Time: x5-6 min  Activity: LB bathing/dressing, toileting, grooming, mobility  Comment: with RW, no LOB  Functional Mobility  Functional - Mobility Device: Rolling Walker  Activity: To/from bathroom(from bathroom (pt already seated on toilet with PCA present upon arrival))  Assist Level: Contact guard assistance  Functional Mobility Comments: x~5'  Toilet Transfers  Toilet - Technique: Ambulating  Equipment Used: Standard toilet(R grab bar)  Toilet Transfer: Contact guard assistance  Toilet Transfers Comments: CGA for sit>stand  ADL  Grooming: Contact guard assistance(in stance at sink to wash hands, complete oral care, and comb hair)  UE Bathing: Stand by assistance;Setup(seated on toilet)  LE Bathing: Contact guard assistance(seated on toilet and in stance; pt washed to above ankles)  UE Dressing: Setup(gown, IV management)  LE Dressing: Moderate assistance(to thread depends)  Toileting: Contact guard assistance  Additional Comments: Pt complete sponge bath seated on toilet and in stance using RW for unilateral support. Pt stepped over to sink using RW with cues for safe RW management. Pt reporting fatigue during ADL tasks - ambulated from sink to recliner (~5') with RW, CGA. Tone RUE  RUE Tone: Normotonic  Tone LUE  LUE Tone: Normotonic  Coordination  Movements Are Fluid And Coordinated: Yes     Bed mobility  Comment: REBA - pt already in bathroom upon arrival  Transfers  Sit to stand: Contact guard assistance  Stand to sit: Contact guard assistance  Vision - Basic Assessment  Prior Vision: Wears glasses only for reading  Visual History: No significant visual history  Patient Visual Report: No visual complaint reported.   Cognition  Overall Cognitive Status: WFL        Sensation  Overall Sensation Status: Impaired(Pt reports neuropathy to BLE, primarily dorsum of feet up to mid-shin; UEs WFL)        LUE AROM (degrees)  LUE AROM : WFL  RUE AROM (degrees)  RUE AROM : WFL  LUE Strength  Gross LUE Strength: WFL  LUE Strength Comment: 4/5 shoulder flexion  RUE Strength  Gross RUE Strength: WFL  RUE Strength Comment: 4/5 shoulder flexion                   Plan   Plan  Times per week: 3-5x  Times per day: Daily  Current Treatment Recommendations: Balance Training, Functional Mobility Training, Endurance Training, Safety Education & Training, Self-Care / ADL, Equipment Evaluation, Education, & procurement    AM-PAC Score        AM-PAC Inpatient Daily Activity Raw Score: 17  AM-PAC Inpatient ADL T-Scale Score : 37.26  ADL Inpatient

## 2019-05-03 NOTE — PROGRESS NOTES
[facility]   Progress Note      SUBJECTIVE:  She states she is feeling better, remains weak. Appetite improving. OBJECTIVE    Physical  VITALS:  BP (!) 198/65   Pulse 83   Temp 98 °F (36.7 °C) (Oral)   Resp 16   Ht 5' 5\" (1.651 m)   Wt 164 lb (74.4 kg)   SpO2 90%   BMI 27.29 kg/m²   TEMPERATURE:  Current - Temp: 98 °F (36.7 °C); Max - Temp  Av.1 °F (36.7 °C)  Min: 97.9 °F (36.6 °C)  Max: 98.3 °F (36.8 °C)  BLOOD PRESSURE RANGE:  Systolic (20XHU), PPF:994 , Min:132 , UFC:666   ; Diastolic (42MHI), HFZ:10, Min:63, Max:71    24HR INTAKE/OUTPUT:      Intake/Output Summary (Last 24 hours) at 5/3/2019 0917  Last data filed at 5/3/2019 0552  Gross per 24 hour   Intake 1053.47 ml   Output --   Net 1053.47 ml       Conscious alert oriented  HEENT: Mild Pallor   Neck: Supple. No lymphadenopathy  Lungs: Left basal crackles + Respiratory efforts normal.  CVS: S1S2 normal. No murmurs or gallops. Abdomen: Soft BS +. No organomegaly. Extremities: No edema  Neuro: No focal deficits.   Skin: No Rash Petechiae      Data  Labs:  General Labs:  CBC with Differential:    Lab Results   Component Value Date    WBC 3.7 2019    RBC 2.63 2019    HGB 8.5 2019    HCT 25.9 2019     2019    MCV 98.7 2019    MCH 32.3 2019    MCHC 32.7 2019    RDW 19.6 2019    SEGSPCT 50.2 10/29/2012    BANDSPCT 2 2019    METASPCT 1 2019    LYMPHOPCT 14.0 2019    MONOPCT 29.0 2019    MYELOPCT 4 2019    BASOPCT 1.0 2019    MONOSABS 1.1 2019    LYMPHSABS 0.5 2019    EOSABS 0.0 2019    BASOSABS 0.0 2019    DIFFTYPE Auto 10/29/2012     BMP:    Lab Results   Component Value Date     2019    K 3.7 2019    K 4.0 2019     2019    CO2 28 2019    BUN 13 2019    LABALBU 3.9 2019    CREATININE 0.8 2019    CALCIUM 7.9 2019    GFRAA >60 2019    GFRAA >60 10/29/2012    LABGLOM >60 05/03/2019    GLUCOSE 90 05/03/2019     Hepatic Function Panel:    Lab Results   Component Value Date    ALKPHOS 86 03/28/2019    ALT 22 03/28/2019    AST 16 03/28/2019    PROT 6.9 03/28/2019    BILITOT 0.3 03/28/2019    BILIDIR <0.2 12/23/2018    IBILI see below 12/23/2018    LABALBU 3.9 03/28/2019     LDH:  No results found for: LDH  PT/INR:    Lab Results   Component Value Date    PROTIME 10.8 02/27/2019    INR 0.95 02/27/2019     PTT:    Lab Results   Component Value Date    APTT 30.5 12/04/2018   [APTT    Current Medications  Current Facility-Administered Medications: miles mixture 5 mL, 5 mL, Swish & Swallow, 4x Daily PRN  insulin lispro (HUMALOG) injection pen 0-12 Units, 0-12 Units, Subcutaneous, TID WC  insulin lispro (HUMALOG) injection pen 0-6 Units, 0-6 Units, Subcutaneous, Nightly  acetaminophen (TYLENOL) tablet 650 mg, 650 mg, Oral, Q4H PRN  amLODIPine (NORVASC) tablet 10 mg, 10 mg, Oral, Daily  vitamin C (ASCORBIC ACID) tablet 500 mg, 500 mg, Oral, Daily  aspirin EC tablet 81 mg, 81 mg, Oral, Daily  carvedilol (COREG) tablet 25 mg, 25 mg, Oral, BID  citalopram (CELEXA) tablet 20 mg, 20 mg, Oral, QAM  digoxin (LANOXIN) tablet 125 mcg, 125 mcg, Oral, Daily  flecainide (TAMBOCOR) tablet 100 mg, 100 mg, Oral, BID  hydrochlorothiazide (HYDRODIURIL) tablet 12.5 mg, 12.5 mg, Oral, Daily  insulin glargine (LANTUS) injection pen 20 Units, 20 Units, Subcutaneous, BID  ipratropium-albuterol (DUONEB) nebulizer solution 3 mL, 3 mL, Inhalation, Q4H WA  metFORMIN (GLUCOPHAGE) tablet 500 mg, 500 mg, Oral, BID WC  pantoprazole (PROTONIX) tablet 40 mg, 40 mg, Oral, QAM AC  potassium chloride (KLOR-CON M) extended release tablet 20 mEq, 20 mEq, Oral, Daily  sodium chloride flush 0.9 % injection 10 mL, 10 mL, Intravenous, 2 times per day  sodium chloride flush 0.9 % injection 10 mL, 10 mL, Intravenous, PRN  magnesium hydroxide (MILK OF MAGNESIA) 400 MG/5ML suspension 30 mL, 30 mL, Oral, Daily

## 2019-05-03 NOTE — PROGRESS NOTES
Patient given tylenol for 8/10 headache pain. Night shift RN will continue to monitor.  Miles mixture given per patient request.

## 2019-05-04 ENCOUNTER — APPOINTMENT (OUTPATIENT)
Dept: GENERAL RADIOLOGY | Age: 79
End: 2019-05-04
Attending: INTERNAL MEDICINE
Payer: MEDICARE

## 2019-05-04 LAB
EKG ATRIAL RATE: 277 BPM
EKG DIAGNOSIS: NORMAL
EKG Q-T INTERVAL: 300 MS
EKG QRS DURATION: 106 MS
EKG QTC CALCULATION (BAZETT): 391 MS
EKG R AXIS: 35 DEGREES
EKG T AXIS: -50 DEGREES
EKG VENTRICULAR RATE: 102 BPM
GLUCOSE BLD-MCNC: 108 MG/DL (ref 70–99)
GLUCOSE BLD-MCNC: 121 MG/DL (ref 70–99)
GLUCOSE BLD-MCNC: 174 MG/DL (ref 70–99)
GLUCOSE BLD-MCNC: 211 MG/DL (ref 70–99)
GLUCOSE BLD-MCNC: 67 MG/DL (ref 70–99)
PERFORMED ON: ABNORMAL

## 2019-05-04 PROCEDURE — 93005 ELECTROCARDIOGRAM TRACING: CPT | Performed by: INTERNAL MEDICINE

## 2019-05-04 PROCEDURE — 6370000000 HC RX 637 (ALT 250 FOR IP): Performed by: INTERNAL MEDICINE

## 2019-05-04 PROCEDURE — 6360000002 HC RX W HCPCS: Performed by: INTERNAL MEDICINE

## 2019-05-04 PROCEDURE — 96372 THER/PROPH/DIAG INJ SC/IM: CPT

## 2019-05-04 PROCEDURE — 6370000000 HC RX 637 (ALT 250 FOR IP): Performed by: NURSE PRACTITIONER

## 2019-05-04 PROCEDURE — 2580000003 HC RX 258: Performed by: NURSE PRACTITIONER

## 2019-05-04 PROCEDURE — G0378 HOSPITAL OBSERVATION PER HR: HCPCS

## 2019-05-04 PROCEDURE — 71045 X-RAY EXAM CHEST 1 VIEW: CPT

## 2019-05-04 PROCEDURE — 94640 AIRWAY INHALATION TREATMENT: CPT

## 2019-05-04 PROCEDURE — 94760 N-INVAS EAR/PLS OXIMETRY 1: CPT

## 2019-05-04 RX ORDER — HYDROCHLOROTHIAZIDE 25 MG/1
25 TABLET ORAL DAILY
Status: DISCONTINUED | OUTPATIENT
Start: 2019-05-05 | End: 2019-05-05 | Stop reason: HOSPADM

## 2019-05-04 RX ORDER — LORAZEPAM 0.5 MG/1
0.5 TABLET ORAL ONCE
Status: COMPLETED | OUTPATIENT
Start: 2019-05-04 | End: 2019-05-04

## 2019-05-04 RX ADMIN — AMLODIPINE BESYLATE 10 MG: 5 TABLET ORAL at 09:38

## 2019-05-04 RX ADMIN — IPRATROPIUM BROMIDE AND ALBUTEROL SULFATE 3 ML: .5; 3 SOLUTION RESPIRATORY (INHALATION) at 08:44

## 2019-05-04 RX ADMIN — IPRATROPIUM BROMIDE AND ALBUTEROL SULFATE 3 ML: .5; 3 SOLUTION RESPIRATORY (INHALATION) at 12:36

## 2019-05-04 RX ADMIN — HYDROCHLOROTHIAZIDE 12.5 MG: 25 TABLET ORAL at 09:39

## 2019-05-04 RX ADMIN — PANTOPRAZOLE SODIUM 40 MG: 40 TABLET, DELAYED RELEASE ORAL at 06:30

## 2019-05-04 RX ADMIN — METFORMIN HYDROCHLORIDE 500 MG: 500 TABLET ORAL at 09:39

## 2019-05-04 RX ADMIN — Medication 5 ML: at 09:39

## 2019-05-04 RX ADMIN — POTASSIUM CHLORIDE 20 MEQ: 1500 TABLET, EXTENDED RELEASE ORAL at 09:38

## 2019-05-04 RX ADMIN — IPRATROPIUM BROMIDE AND ALBUTEROL SULFATE 3 ML: .5; 3 SOLUTION RESPIRATORY (INHALATION) at 16:36

## 2019-05-04 RX ADMIN — FLECAINIDE ACETATE 100 MG: 50 TABLET ORAL at 09:38

## 2019-05-04 RX ADMIN — METFORMIN HYDROCHLORIDE 500 MG: 500 TABLET ORAL at 17:12

## 2019-05-04 RX ADMIN — CARVEDILOL 25 MG: 25 TABLET, FILM COATED ORAL at 20:43

## 2019-05-04 RX ADMIN — CARVEDILOL 25 MG: 25 TABLET, FILM COATED ORAL at 09:39

## 2019-05-04 RX ADMIN — ASPIRIN 81 MG: 81 TABLET, COATED ORAL at 09:39

## 2019-05-04 RX ADMIN — ENOXAPARIN SODIUM 40 MG: 40 INJECTION SUBCUTANEOUS at 09:38

## 2019-05-04 RX ADMIN — INSULIN GLARGINE 20 UNITS: 100 INJECTION, SOLUTION SUBCUTANEOUS at 20:39

## 2019-05-04 RX ADMIN — LORAZEPAM 0.5 MG: 0.5 TABLET ORAL at 03:51

## 2019-05-04 RX ADMIN — Medication 5 ML: at 17:07

## 2019-05-04 RX ADMIN — SODIUM CHLORIDE: 9 INJECTION, SOLUTION INTRAVENOUS at 04:55

## 2019-05-04 RX ADMIN — OXYCODONE HYDROCHLORIDE AND ACETAMINOPHEN 500 MG: 500 TABLET ORAL at 09:39

## 2019-05-04 RX ADMIN — CITALOPRAM HYDROBROMIDE 20 MG: 20 TABLET ORAL at 09:39

## 2019-05-04 RX ADMIN — INSULIN LISPRO 2 UNITS: 100 INJECTION, SOLUTION INTRAVENOUS; SUBCUTANEOUS at 17:13

## 2019-05-04 RX ADMIN — Medication 10 ML: at 09:39

## 2019-05-04 RX ADMIN — DIGOXIN 125 MCG: 125 TABLET ORAL at 09:39

## 2019-05-04 RX ADMIN — INSULIN LISPRO 2 UNITS: 100 INJECTION, SOLUTION INTRAVENOUS; SUBCUTANEOUS at 20:40

## 2019-05-04 RX ADMIN — IPRATROPIUM BROMIDE AND ALBUTEROL SULFATE 3 ML: .5; 3 SOLUTION RESPIRATORY (INHALATION) at 20:58

## 2019-05-04 RX ADMIN — SODIUM CHLORIDE: 9 INJECTION, SOLUTION INTRAVENOUS at 17:12

## 2019-05-04 RX ADMIN — Medication 10 ML: at 20:44

## 2019-05-04 RX ADMIN — FLECAINIDE ACETATE 100 MG: 50 TABLET ORAL at 20:43

## 2019-05-04 ASSESSMENT — PAIN DESCRIPTION - FREQUENCY
FREQUENCY: CONTINUOUS
FREQUENCY: INTERMITTENT
FREQUENCY: CONTINUOUS

## 2019-05-04 ASSESSMENT — PAIN SCALES - GENERAL
PAINLEVEL_OUTOF10: 0

## 2019-05-04 ASSESSMENT — PAIN DESCRIPTION - PAIN TYPE
TYPE: ACUTE PAIN

## 2019-05-04 ASSESSMENT — PAIN DESCRIPTION - DESCRIPTORS
DESCRIPTORS: THROBBING

## 2019-05-04 ASSESSMENT — PAIN DESCRIPTION - DIRECTION
RADIATING_TOWARDS: NECK

## 2019-05-04 ASSESSMENT — PAIN DESCRIPTION - LOCATION
LOCATION: HEAD

## 2019-05-04 NOTE — PROGRESS NOTES
4.0 05/01/2019     05/03/2019    CO2 28 05/03/2019    BUN 13 05/03/2019    LABALBU 3.9 03/28/2019    CREATININE 0.8 05/03/2019    CALCIUM 7.9 05/03/2019    GFRAA >60 05/03/2019    GFRAA >60 10/29/2012    LABGLOM >60 05/03/2019    GLUCOSE 90 05/03/2019     Hepatic Function Panel:    Lab Results   Component Value Date    ALKPHOS 86 03/28/2019    ALT 22 03/28/2019    AST 16 03/28/2019    PROT 6.9 03/28/2019    BILITOT 0.3 03/28/2019    BILIDIR <0.2 12/23/2018    IBILI see below 12/23/2018    LABALBU 3.9 03/28/2019     LDH:  No results found for: LDH  PT/INR:    Lab Results   Component Value Date    PROTIME 10.8 02/27/2019    INR 0.95 02/27/2019     PTT:    Lab Results   Component Value Date    APTT 30.5 12/04/2018   [APTT    Current Medications  Current Facility-Administered Medications: [START ON 5/5/2019] hydrochlorothiazide (HYDRODIURIL) tablet 25 mg, 25 mg, Oral, Daily  miles mixture 5 mL, 5 mL, Swish & Swallow, 4x Daily PRN  insulin lispro (HUMALOG) injection pen 0-12 Units, 0-12 Units, Subcutaneous, TID WC  insulin lispro (HUMALOG) injection pen 0-6 Units, 0-6 Units, Subcutaneous, Nightly  acetaminophen (TYLENOL) tablet 650 mg, 650 mg, Oral, Q4H PRN  amLODIPine (NORVASC) tablet 10 mg, 10 mg, Oral, Daily  vitamin C (ASCORBIC ACID) tablet 500 mg, 500 mg, Oral, Daily  aspirin EC tablet 81 mg, 81 mg, Oral, Daily  carvedilol (COREG) tablet 25 mg, 25 mg, Oral, BID  citalopram (CELEXA) tablet 20 mg, 20 mg, Oral, QAM  digoxin (LANOXIN) tablet 125 mcg, 125 mcg, Oral, Daily  flecainide (TAMBOCOR) tablet 100 mg, 100 mg, Oral, BID  insulin glargine (LANTUS) injection pen 20 Units, 20 Units, Subcutaneous, BID  ipratropium-albuterol (DUONEB) nebulizer solution 3 mL, 3 mL, Inhalation, Q4H WA  metFORMIN (GLUCOPHAGE) tablet 500 mg, 500 mg, Oral, BID WC  pantoprazole (PROTONIX) tablet 40 mg, 40 mg, Oral, QAM AC  potassium chloride (KLOR-CON M) extended release tablet 20 mEq, 20 mEq, Oral, Daily  sodium chloride flush 0.9 % injection 10 mL, 10 mL, Intravenous, 2 times per day  sodium chloride flush 0.9 % injection 10 mL, 10 mL, Intravenous, PRN  magnesium hydroxide (MILK OF MAGNESIA) 400 MG/5ML suspension 30 mL, 30 mL, Oral, Daily PRN  ondansetron (ZOFRAN) injection 4 mg, 4 mg, Intravenous, Q6H PRN  0.9 % sodium chloride infusion, , Intravenous, Continuous  magnesium sulfate 1 g in dextrose 5% 100 mL IVPB, 1 g, Intravenous, PRN  potassium chloride (KLOR-CON M) extended release tablet 40 mEq, 40 mEq, Oral, PRN **OR** potassium bicarb-citric acid (EFFER-K) effervescent tablet 40 mEq, 40 mEq, Oral, PRN **OR** potassium chloride 10 mEq/100 mL IVPB (Peripheral Line), 10 mEq, Intravenous, PRN  glucose (GLUTOSE) 40 % oral gel 15 g, 15 g, Oral, PRN  dextrose 50 % solution 12.5 g, 12.5 g, Intravenous, PRN  glucagon (rDNA) injection 1 mg, 1 mg, Intramuscular, PRN  dextrose 5 % solution, 100 mL/hr, Intravenous, PRN  enoxaparin (LOVENOX) injection 40 mg, 40 mg, Subcutaneous, Daily    ASSESSMENT AND PLAN    56-year-old lady has     1. 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 Radiation was completed on 4/29/2019. Last chemotherapy with weekly carboplatin and Taxol was done on 4/22/2019.     2.  Dehydration, weakness, failure to thrive  - improving     3. Diabetes insulin will be continued.       4.  Coronary artery disease: Coreg, aspirin will be continued.     5. Hypertension Norvasc will be continued.     6. Diarrhea: better     7. DVT prophylaxis with Lovenox    PT evaluation suggests rehab facility.  working with family for SNF placement. Patient wants to go home.   Possible discharge in [place] Pablo MD

## 2019-05-04 NOTE — PLAN OF CARE
Problem: Falls - Risk of:  Goal: Will remain free from falls  Description Pt will remain free from falls, bed alarm engaged, anisa light within reach.   Will remain free from falls  Outcome: Ongoing  Goal: Absence of physical injury  Description  Absence of physical injury  Outcome: Ongoing

## 2019-05-04 NOTE — PROGRESS NOTES
Ash Simental regarding pt waking up very confused, pulled out IV, refused to allow me to place another IV.     Per Dr. Tiera Hoover, give pt a one time dose of Ativan 0.5 mg.

## 2019-05-04 NOTE — PROGRESS NOTES
Sent message to Dr. Gera Armenta about patient's EKG that showed normal sinus rhythm with an inferior infarct, however this was on a prior EKG. Also recheck of patient's blood sugar was 121 after orange juice.

## 2019-05-05 VITALS
TEMPERATURE: 98.3 F | OXYGEN SATURATION: 91 % | DIASTOLIC BLOOD PRESSURE: 74 MMHG | HEIGHT: 65 IN | BODY MASS INDEX: 28.29 KG/M2 | SYSTOLIC BLOOD PRESSURE: 149 MMHG | HEART RATE: 70 BPM | WEIGHT: 169.8 LBS | RESPIRATION RATE: 18 BRPM

## 2019-05-05 LAB
ANION GAP SERPL CALCULATED.3IONS-SCNC: 13 MMOL/L (ref 3–16)
BUN BLDV-MCNC: 9 MG/DL (ref 7–20)
CALCIUM SERPL-MCNC: 9.1 MG/DL (ref 8.3–10.6)
CHLORIDE BLD-SCNC: 98 MMOL/L (ref 99–110)
CO2: 28 MMOL/L (ref 21–32)
CREAT SERPL-MCNC: 0.7 MG/DL (ref 0.6–1.2)
EKG ATRIAL RATE: 83 BPM
EKG DIAGNOSIS: NORMAL
EKG P AXIS: 52 DEGREES
EKG P-R INTERVAL: 208 MS
EKG Q-T INTERVAL: 346 MS
EKG QRS DURATION: 108 MS
EKG QTC CALCULATION (BAZETT): 406 MS
EKG R AXIS: 30 DEGREES
EKG T AXIS: 37 DEGREES
EKG VENTRICULAR RATE: 83 BPM
GFR AFRICAN AMERICAN: >60
GFR NON-AFRICAN AMERICAN: >60
GLUCOSE BLD-MCNC: 133 MG/DL (ref 70–99)
GLUCOSE BLD-MCNC: 171 MG/DL (ref 70–99)
GLUCOSE BLD-MCNC: 202 MG/DL (ref 70–99)
GLUCOSE BLD-MCNC: 239 MG/DL (ref 70–99)
HCT VFR BLD CALC: 28.8 % (ref 36–48)
HEMOGLOBIN: 9.6 G/DL (ref 12–16)
MAGNESIUM: 1.4 MG/DL (ref 1.8–2.4)
MCH RBC QN AUTO: 33.1 PG (ref 26–34)
MCHC RBC AUTO-ENTMCNC: 33.4 G/DL (ref 31–36)
MCV RBC AUTO: 99.2 FL (ref 80–100)
PDW BLD-RTO: 20.6 % (ref 12.4–15.4)
PERFORMED ON: ABNORMAL
PLATELET # BLD: 369 K/UL (ref 135–450)
PMV BLD AUTO: 7 FL (ref 5–10.5)
POTASSIUM SERPL-SCNC: 4.4 MMOL/L (ref 3.5–5.1)
RBC # BLD: 2.9 M/UL (ref 4–5.2)
SODIUM BLD-SCNC: 139 MMOL/L (ref 136–145)
WBC # BLD: 4.8 K/UL (ref 4–11)

## 2019-05-05 PROCEDURE — 85027 COMPLETE CBC AUTOMATED: CPT

## 2019-05-05 PROCEDURE — 6370000000 HC RX 637 (ALT 250 FOR IP): Performed by: INTERNAL MEDICINE

## 2019-05-05 PROCEDURE — 96366 THER/PROPH/DIAG IV INF ADDON: CPT

## 2019-05-05 PROCEDURE — 6360000002 HC RX W HCPCS: Performed by: NURSE PRACTITIONER

## 2019-05-05 PROCEDURE — 36415 COLL VENOUS BLD VENIPUNCTURE: CPT

## 2019-05-05 PROCEDURE — 83735 ASSAY OF MAGNESIUM: CPT

## 2019-05-05 PROCEDURE — 6370000000 HC RX 637 (ALT 250 FOR IP): Performed by: NURSE PRACTITIONER

## 2019-05-05 PROCEDURE — 2580000003 HC RX 258: Performed by: NURSE PRACTITIONER

## 2019-05-05 PROCEDURE — 94760 N-INVAS EAR/PLS OXIMETRY 1: CPT

## 2019-05-05 PROCEDURE — 80048 BASIC METABOLIC PNL TOTAL CA: CPT

## 2019-05-05 PROCEDURE — 96372 THER/PROPH/DIAG INJ SC/IM: CPT

## 2019-05-05 PROCEDURE — 94640 AIRWAY INHALATION TREATMENT: CPT

## 2019-05-05 PROCEDURE — 93010 ELECTROCARDIOGRAM REPORT: CPT | Performed by: INTERNAL MEDICINE

## 2019-05-05 PROCEDURE — G0378 HOSPITAL OBSERVATION PER HR: HCPCS

## 2019-05-05 PROCEDURE — 96375 TX/PRO/DX INJ NEW DRUG ADDON: CPT

## 2019-05-05 PROCEDURE — 96365 THER/PROPH/DIAG IV INF INIT: CPT

## 2019-05-05 PROCEDURE — 6360000002 HC RX W HCPCS: Performed by: INTERNAL MEDICINE

## 2019-05-05 RX ORDER — FUROSEMIDE 10 MG/ML
20 INJECTION INTRAMUSCULAR; INTRAVENOUS ONCE
Status: COMPLETED | OUTPATIENT
Start: 2019-05-05 | End: 2019-05-05

## 2019-05-05 RX ADMIN — POTASSIUM CHLORIDE 20 MEQ: 1500 TABLET, EXTENDED RELEASE ORAL at 09:07

## 2019-05-05 RX ADMIN — MAGNESIUM SULFATE HEPTAHYDRATE 1 G: 1 INJECTION, SOLUTION INTRAVENOUS at 09:05

## 2019-05-05 RX ADMIN — IPRATROPIUM BROMIDE AND ALBUTEROL SULFATE 3 ML: .5; 3 SOLUTION RESPIRATORY (INHALATION) at 07:58

## 2019-05-05 RX ADMIN — MAGNESIUM SULFATE HEPTAHYDRATE 1 G: 1 INJECTION, SOLUTION INTRAVENOUS at 10:22

## 2019-05-05 RX ADMIN — ASPIRIN 81 MG: 81 TABLET, COATED ORAL at 09:07

## 2019-05-05 RX ADMIN — HYDROCHLOROTHIAZIDE 25 MG: 25 TABLET ORAL at 09:06

## 2019-05-05 RX ADMIN — METFORMIN HYDROCHLORIDE 500 MG: 500 TABLET ORAL at 09:06

## 2019-05-05 RX ADMIN — FUROSEMIDE 20 MG: 10 INJECTION, SOLUTION INTRAMUSCULAR; INTRAVENOUS at 05:21

## 2019-05-05 RX ADMIN — CITALOPRAM HYDROBROMIDE 20 MG: 20 TABLET ORAL at 09:07

## 2019-05-05 RX ADMIN — Medication 10 ML: at 09:07

## 2019-05-05 RX ADMIN — IPRATROPIUM BROMIDE AND ALBUTEROL SULFATE 3 ML: .5; 3 SOLUTION RESPIRATORY (INHALATION) at 11:42

## 2019-05-05 RX ADMIN — INSULIN GLARGINE 20 UNITS: 100 INJECTION, SOLUTION SUBCUTANEOUS at 09:05

## 2019-05-05 RX ADMIN — AMLODIPINE BESYLATE 10 MG: 5 TABLET ORAL at 09:06

## 2019-05-05 RX ADMIN — PANTOPRAZOLE SODIUM 40 MG: 40 TABLET, DELAYED RELEASE ORAL at 07:04

## 2019-05-05 RX ADMIN — DIGOXIN 125 MCG: 125 TABLET ORAL at 09:07

## 2019-05-05 RX ADMIN — INSULIN LISPRO 4 UNITS: 100 INJECTION, SOLUTION INTRAVENOUS; SUBCUTANEOUS at 12:46

## 2019-05-05 RX ADMIN — OXYCODONE HYDROCHLORIDE AND ACETAMINOPHEN 500 MG: 500 TABLET ORAL at 09:07

## 2019-05-05 RX ADMIN — ENOXAPARIN SODIUM 40 MG: 40 INJECTION SUBCUTANEOUS at 09:06

## 2019-05-05 RX ADMIN — CARVEDILOL 25 MG: 25 TABLET, FILM COATED ORAL at 09:07

## 2019-05-05 RX ADMIN — FLECAINIDE ACETATE 100 MG: 50 TABLET ORAL at 09:06

## 2019-05-05 ASSESSMENT — PAIN DESCRIPTION - DESCRIPTORS
DESCRIPTORS: THROBBING
DESCRIPTORS: THROBBING

## 2019-05-05 ASSESSMENT — PAIN DESCRIPTION - DIRECTION
RADIATING_TOWARDS: NECK
RADIATING_TOWARDS: NECK

## 2019-05-05 ASSESSMENT — PAIN DESCRIPTION - LOCATION
LOCATION: HEAD
LOCATION: HEAD

## 2019-05-05 ASSESSMENT — PAIN DESCRIPTION - PAIN TYPE
TYPE: ACUTE PAIN
TYPE: ACUTE PAIN

## 2019-05-05 ASSESSMENT — PAIN DESCRIPTION - FREQUENCY
FREQUENCY: INTERMITTENT
FREQUENCY: INTERMITTENT

## 2019-05-05 ASSESSMENT — PAIN SCALES - GENERAL
PAINLEVEL_OUTOF10: 0
PAINLEVEL_OUTOF10: 0

## 2019-05-05 NOTE — PROGRESS NOTES
Sent a Message to Dr. Bertina Apgar regarding pt's elevated BP. Orders to monitor pt's BP after she received her evening Coreg, and D/C IVF.

## 2019-05-05 NOTE — PROGRESS NOTES
Vital signs taken. Scheduled medications given. Patient up to bathroom and then to chair. Fall precautions in place. Filled patient's water jug with fresh ice water. Call light within reach. Patient eating breakfast. 20 units of Lantus given. First bag of mag hung and is infusing.

## 2019-05-05 NOTE — PROGRESS NOTES
Discharge instructions reviewed with patient, who verbalized understanding. IV removed, no complications. Belongings in hand.

## 2019-05-05 NOTE — PROGRESS NOTES
Messaged Dr. Deisy Lloyd again regarding pt's BP remaining elevated. received orders to give a one time dose of IV Lasix 20 mg.

## 2019-05-05 NOTE — PROGRESS NOTES
Sent message to Dr. Boss Number with Mag level and that mag was already requested from pharmacy per patient's mag protocol.

## 2019-05-05 NOTE — PROGRESS NOTES
Tele monitor removed from patient. 3A called to notify of discharge. Tele monitor given to  and checked back in at the .

## 2019-05-05 NOTE — DISCHARGE SUMMARY
DISCHARGE SUMMARY NOTE    Patient ID: Audnrea Holcomb 70 y.o. 1940     Admission Date: 5/1/2019     Discharge Date: 5/5/2019     Admission Diagnoses: Dehydration [E86.0]  Dehydration [E86.0]    Discharge Diagnoses:   Patient Active Problem List   Diagnosis    Diabetes mellitus ([facility] [address]    Dyslipidemia    Mitral and aortic valve disease    Essential hypertension, benign    Coronary atherosclerosis of native coronary artery    Sleep apnea    Chest pain    Carpal tunnel syndrome    SOB (shortness of breath)    COPD, severe (HCC)    Influenza    Paroxysmal atrial fibrillation (HCC)    Chronic cough    Hilar mass    Acute cholecystitis    Gallstones and inflammation of gallbladder without obstruction    Atrial fibrillation with rapid ventricular response (HCC)    Acute respiratory failure with hypoxia (HCC)    Centrilobular emphysema (HCC)    Ileus following gastrointestinal surgery    Mediastinal adenopathy    COPD exacerbation (HCC)    Adenocarcinoma of right lung (HCC)    Dehydration    Mild malnutrition ([facility] [address]        Procedures:  None    Consultations: None    Allergies: Cipro xr; Lyrica [pregabalin]; Penicillins; and Sulfa antibiotics    Discharge Medications:     Vital Signs: BP (!) 149/74   Pulse 70   Temp 98.3 °F (36.8 °C) (Oral)   Resp 18   Ht 5' 5\" (1.651 m)   Wt 169 lb 12.8 oz (77 kg)   SpO2 91%   BMI 28.26 kg/m²   CONSTITUTIONAL:  Awake, alert & oriented x3  HEENT: PERRL, Neck: soft, supple, no cervical, supraclavicular or axillary adenopathy  RESPIRATORY:  No increased work of breathing, breath sounds decreased.   CARDIOVASCULAR:  Cardiac S1/S2, RRR  GASTROINTESTINAL:  abdomen soft +BS x4, no hepatosplenomegaly  SKIN:  negative for rash and skin lesion(s)  MUSCULOSKELETAL:  negative for pain and muscle weakness  EXTREMITIES: Negative for Lower extremity edema    Brief Summary of Patient's Course:    Patient with history of stage II non-small cell lung cancer who received concurrent radiation and chemotherapy with weekly carboplatin and Taxol. Lat radiation was done on 4/29/19  She had come to the office on 5/1/2019 with weakness, inability to walk, poor p.o. intake. She was admitted in the hospital for dehydration and failure to thrive. She was given IV fluids, antiemetics. Physical therapy was consulted. There was improvement in patient's overall condition. Physical therapy had recommended inpatient rehab but patient declined it. Her blood pressure was running on the higher side and antihypertensives were adjusted. Overall patient was doing well and therefore was discharged home on 5/5/2019. Discharged Condition: good    Disposition: home    Discharge Instructions:    Activity: activity as tolerated  Diet: diabetic diet  Wound Care: none needed  Follow up in the office in [facility] [address], [place], 5/5/2019, 4:47 PM

## 2019-05-06 NOTE — PROGRESS NOTES
Physical Therapy Discharge Summary    Name: Ramy Kang YOB: 1940    The pt was evaluated by PT on 19 and seen for 1 treatment sessions prior to DC to home on 19 per MD order. The pt's acute therapy goals were:  Short term goals  Time Frame for Short term goals: To be met prior to discharge  Short term goal 1: Pt will complete bed mobility with mod I  Short term goal 2: Pt will complete sit to/from stand with supervision  Short term goal 3: Pt will ambulate 100 ft with LRAD and CGA       Patient met 0 goals during stay. Number of Refusals:0  Number of Holds: 0  During this hospitalization, the patient was educated on:  Patient Education: POC, discharge recommendations    DC pt from PT caseload at this time. Thank you!     [address] [place], DPT [id number]

## 2019-05-16 ENCOUNTER — APPOINTMENT (OUTPATIENT)
Dept: CT IMAGING | Age: 79
DRG: 312 | End: 2019-05-16
Payer: MEDICARE

## 2019-05-16 ENCOUNTER — HOSPITAL ENCOUNTER (INPATIENT)
Age: 79
LOS: 7 days | Discharge: NURSING, REHAB OR LONG-TERM CARE | DRG: 312 | End: 2019-05-23
Attending: EMERGENCY MEDICINE | Admitting: INTERNAL MEDICINE
Payer: MEDICARE

## 2019-05-16 ENCOUNTER — APPOINTMENT (OUTPATIENT)
Dept: GENERAL RADIOLOGY | Age: 79
DRG: 312 | End: 2019-05-16
Payer: MEDICARE

## 2019-05-16 DIAGNOSIS — S22.019A CLOSED FRACTURE OF FIRST THORACIC VERTEBRA, UNSPECIFIED FRACTURE MORPHOLOGY, INITIAL ENCOUNTER (HCC): ICD-10-CM

## 2019-05-16 DIAGNOSIS — R73.9 HYPERGLYCEMIA: ICD-10-CM

## 2019-05-16 DIAGNOSIS — R55 SYNCOPE AND COLLAPSE: Primary | ICD-10-CM

## 2019-05-16 DIAGNOSIS — S00.83XA CONTUSION OF FOREHEAD, INITIAL ENCOUNTER: ICD-10-CM

## 2019-05-16 DIAGNOSIS — I26.99 SUBACUTE PULMONARY EMBOLISM (HCC): ICD-10-CM

## 2019-05-16 PROBLEM — R40.20 LOSS OF CONSCIOUSNESS (HCC): Status: ACTIVE | Noted: 2019-05-16

## 2019-05-16 LAB
A/G RATIO: 1.2 (ref 1.1–2.2)
ALBUMIN SERPL-MCNC: 3.6 G/DL (ref 3.4–5)
ALP BLD-CCNC: 88 U/L (ref 40–129)
ALT SERPL-CCNC: 16 U/L (ref 10–40)
ANION GAP SERPL CALCULATED.3IONS-SCNC: 13 MMOL/L (ref 3–16)
AST SERPL-CCNC: 15 U/L (ref 15–37)
BASOPHILS ABSOLUTE: 0.1 K/UL (ref 0–0.2)
BASOPHILS RELATIVE PERCENT: 0.9 %
BILIRUB SERPL-MCNC: 0.3 MG/DL (ref 0–1)
BILIRUBIN URINE: NEGATIVE
BLOOD, URINE: NEGATIVE
BUN BLDV-MCNC: 25 MG/DL (ref 7–20)
CALCIUM SERPL-MCNC: 8.7 MG/DL (ref 8.3–10.6)
CHLORIDE BLD-SCNC: 92 MMOL/L (ref 99–110)
CLARITY: CLEAR
CO2: 29 MMOL/L (ref 21–32)
COLOR: YELLOW
CREAT SERPL-MCNC: 1 MG/DL (ref 0.6–1.2)
EOSINOPHILS ABSOLUTE: 0.2 K/UL (ref 0–0.6)
EOSINOPHILS RELATIVE PERCENT: 1.9 %
GFR AFRICAN AMERICAN: >60
GFR NON-AFRICAN AMERICAN: 54
GLOBULIN: 2.9 G/DL
GLUCOSE BLD-MCNC: 346 MG/DL (ref 70–99)
GLUCOSE URINE: 500 MG/DL
HCT VFR BLD CALC: 30 % (ref 36–48)
HEMOGLOBIN: 9.9 G/DL (ref 12–16)
KETONES, URINE: NEGATIVE MG/DL
LEUKOCYTE ESTERASE, URINE: NEGATIVE
LYMPHOCYTES ABSOLUTE: 1.2 K/UL (ref 1–5.1)
LYMPHOCYTES RELATIVE PERCENT: 13.9 %
MCH RBC QN AUTO: 34 PG (ref 26–34)
MCHC RBC AUTO-ENTMCNC: 33 G/DL (ref 31–36)
MCV RBC AUTO: 103.1 FL (ref 80–100)
MICROSCOPIC EXAMINATION: ABNORMAL
MONOCYTES ABSOLUTE: 0.9 K/UL (ref 0–1.3)
MONOCYTES RELATIVE PERCENT: 11.1 %
NEUTROPHILS ABSOLUTE: 6.2 K/UL (ref 1.7–7.7)
NEUTROPHILS RELATIVE PERCENT: 72.2 %
NITRITE, URINE: NEGATIVE
PDW BLD-RTO: 20.5 % (ref 12.4–15.4)
PH UA: 7 (ref 5–8)
PLATELET # BLD: 213 K/UL (ref 135–450)
PMV BLD AUTO: 7 FL (ref 5–10.5)
POTASSIUM REFLEX MAGNESIUM: 3.9 MMOL/L (ref 3.5–5.1)
PROTEIN UA: NEGATIVE MG/DL
RBC # BLD: 2.91 M/UL (ref 4–5.2)
SODIUM BLD-SCNC: 134 MMOL/L (ref 136–145)
SPECIFIC GRAVITY UA: >1.03 (ref 1–1.03)
TOTAL CK: 26 U/L (ref 26–192)
TOTAL PROTEIN: 6.5 G/DL (ref 6.4–8.2)
TROPONIN: <0.01 NG/ML
URINE REFLEX TO CULTURE: ABNORMAL
URINE TYPE: ABNORMAL
UROBILINOGEN, URINE: 0.2 E.U./DL
WBC # BLD: 8.5 K/UL (ref 4–11)

## 2019-05-16 PROCEDURE — 82550 ASSAY OF CK (CPK): CPT

## 2019-05-16 PROCEDURE — 72125 CT NECK SPINE W/O DYE: CPT

## 2019-05-16 PROCEDURE — 96360 HYDRATION IV INFUSION INIT: CPT

## 2019-05-16 PROCEDURE — 2580000003 HC RX 258: Performed by: PHYSICIAN ASSISTANT

## 2019-05-16 PROCEDURE — 80053 COMPREHEN METABOLIC PANEL: CPT

## 2019-05-16 PROCEDURE — 1200000000 HC SEMI PRIVATE

## 2019-05-16 PROCEDURE — 85025 COMPLETE CBC W/AUTO DIFF WBC: CPT

## 2019-05-16 PROCEDURE — 70450 CT HEAD/BRAIN W/O DYE: CPT

## 2019-05-16 PROCEDURE — 36415 COLL VENOUS BLD VENIPUNCTURE: CPT

## 2019-05-16 PROCEDURE — 84484 ASSAY OF TROPONIN QUANT: CPT

## 2019-05-16 PROCEDURE — 81003 URINALYSIS AUTO W/O SCOPE: CPT

## 2019-05-16 PROCEDURE — 71260 CT THORAX DX C+: CPT

## 2019-05-16 PROCEDURE — 96361 HYDRATE IV INFUSION ADD-ON: CPT

## 2019-05-16 PROCEDURE — 93005 ELECTROCARDIOGRAM TRACING: CPT | Performed by: PHYSICIAN ASSISTANT

## 2019-05-16 PROCEDURE — 71045 X-RAY EXAM CHEST 1 VIEW: CPT

## 2019-05-16 PROCEDURE — 6360000004 HC RX CONTRAST MEDICATION: Performed by: EMERGENCY MEDICINE

## 2019-05-16 PROCEDURE — 99285 EMERGENCY DEPT VISIT HI MDM: CPT

## 2019-05-16 RX ORDER — EZETIMIBE 10 MG/1
10 TABLET ORAL DAILY
Status: DISCONTINUED | OUTPATIENT
Start: 2019-05-17 | End: 2019-05-23 | Stop reason: HOSPADM

## 2019-05-16 RX ORDER — IPRATROPIUM BROMIDE AND ALBUTEROL SULFATE 2.5; .5 MG/3ML; MG/3ML
3 SOLUTION RESPIRATORY (INHALATION)
Status: DISCONTINUED | OUTPATIENT
Start: 2019-05-17 | End: 2019-05-23 | Stop reason: HOSPADM

## 2019-05-16 RX ORDER — AMLODIPINE BESYLATE 5 MG/1
10 TABLET ORAL DAILY
Status: DISCONTINUED | OUTPATIENT
Start: 2019-05-17 | End: 2019-05-23 | Stop reason: HOSPADM

## 2019-05-16 RX ORDER — CITALOPRAM 20 MG/1
20 TABLET ORAL EVERY MORNING
Status: DISCONTINUED | OUTPATIENT
Start: 2019-05-17 | End: 2019-05-22

## 2019-05-16 RX ORDER — CARVEDILOL 25 MG/1
25 TABLET ORAL 2 TIMES DAILY WITH MEALS
Status: DISCONTINUED | OUTPATIENT
Start: 2019-05-17 | End: 2019-05-23 | Stop reason: HOSPADM

## 2019-05-16 RX ORDER — HYDROCHLOROTHIAZIDE 25 MG/1
12.5 TABLET ORAL DAILY
Status: DISCONTINUED | OUTPATIENT
Start: 2019-05-17 | End: 2019-05-21

## 2019-05-16 RX ORDER — DIGOXIN 125 MCG
125 TABLET ORAL DAILY
Status: DISCONTINUED | OUTPATIENT
Start: 2019-05-17 | End: 2019-05-23 | Stop reason: HOSPADM

## 2019-05-16 RX ORDER — ASCORBIC ACID 500 MG
500 TABLET ORAL DAILY
Status: DISCONTINUED | OUTPATIENT
Start: 2019-05-17 | End: 2019-05-23 | Stop reason: HOSPADM

## 2019-05-16 RX ORDER — LOSARTAN POTASSIUM 100 MG/1
50 TABLET ORAL DAILY
Status: DISCONTINUED | OUTPATIENT
Start: 2019-05-17 | End: 2019-05-23 | Stop reason: HOSPADM

## 2019-05-16 RX ORDER — PANTOPRAZOLE SODIUM 40 MG/1
40 TABLET, DELAYED RELEASE ORAL
Status: DISCONTINUED | OUTPATIENT
Start: 2019-05-17 | End: 2019-05-23 | Stop reason: HOSPADM

## 2019-05-16 RX ORDER — ASPIRIN 81 MG/1
81 TABLET ORAL DAILY
Status: DISCONTINUED | OUTPATIENT
Start: 2019-05-17 | End: 2019-05-20

## 2019-05-16 RX ORDER — POTASSIUM CHLORIDE 20 MEQ/1
20 TABLET, EXTENDED RELEASE ORAL DAILY
Status: DISCONTINUED | OUTPATIENT
Start: 2019-05-17 | End: 2019-05-21

## 2019-05-16 RX ORDER — ACETAMINOPHEN 325 MG/1
650 TABLET ORAL EVERY 4 HOURS PRN
Status: DISCONTINUED | OUTPATIENT
Start: 2019-05-16 | End: 2019-05-23 | Stop reason: HOSPADM

## 2019-05-16 RX ORDER — FLECAINIDE ACETATE 100 MG/1
100 TABLET ORAL 2 TIMES DAILY
Status: DISCONTINUED | OUTPATIENT
Start: 2019-05-17 | End: 2019-05-23 | Stop reason: HOSPADM

## 2019-05-16 RX ORDER — CALCIUM CARBONATE 500(1250)
500 TABLET ORAL DAILY
Status: DISCONTINUED | OUTPATIENT
Start: 2019-05-17 | End: 2019-05-23 | Stop reason: HOSPADM

## 2019-05-16 RX ORDER — CARVEDILOL 25 MG/1
25 TABLET ORAL 2 TIMES DAILY
Status: DISCONTINUED | OUTPATIENT
Start: 2019-05-17 | End: 2019-05-16 | Stop reason: SDUPTHER

## 2019-05-16 RX ORDER — 0.9 % SODIUM CHLORIDE 0.9 %
1000 INTRAVENOUS SOLUTION INTRAVENOUS ONCE
Status: COMPLETED | OUTPATIENT
Start: 2019-05-16 | End: 2019-05-16

## 2019-05-16 RX ADMIN — SODIUM CHLORIDE 1000 ML: 9 INJECTION, SOLUTION INTRAVENOUS at 17:15

## 2019-05-16 RX ADMIN — IOPAMIDOL 75 ML: 755 INJECTION, SOLUTION INTRAVENOUS at 17:33

## 2019-05-16 ASSESSMENT — ENCOUNTER SYMPTOMS
ABDOMINAL PAIN: 0
VOMITING: 0
DIARRHEA: 0
BACK PAIN: 0
WHEEZING: 0
SHORTNESS OF BREATH: 0
NAUSEA: 0
COLOR CHANGE: 0
SORE THROAT: 1

## 2019-05-16 NOTE — ED NOTES
Report received from [facility]. Pt resting in bed. Pt denies any needs at this time. Pt A/Ox4 hematoma above right eye noted. Pt with no obvious neuro deficits. Per prior RN pt was able to safely ambulate to BR with steady gait. Pt SR on tele, lungs clear skin warm dry intact trace BLE edema noted- per pt this is not new. Pt reports she feels OK to go home and has a daughter near by should she need assistance. Water provided to patient and patient assisted to sitting position in bed. Call light in reach bed in low position side rails in place x 2 for safety bed alarm on. Will monitor.       Salina Georges RN  05/16/19 0827

## 2019-05-16 NOTE — ED PROVIDER NOTES
[facility]        Pt Name: Marlena Mcghee  MRN: [id number]  [place] 1940  Date of evaluation: 5/16/2019  Provider: Helena Tsang PA-C  PCP: Blair Monsivais MD    This patient was seen and evaluated by the attending physician No att. providers found. CHIEF COMPLAINT       Chief Complaint   Patient presents with    Fall     Patient arrived via FF squad for c/o fall at home around noon. Woke up at 3 pm on the floor. Contusion noted to right forehead, unsure if LOC.  per squad. HISTORY OF PRESENT ILLNESS   (Location/Symptom, Timing/Onset, Context/Setting, Quality, Duration, Modifying Factors, Severity)  Note limiting factors. Marlena Mcghee is a 70 y.o. female patient presents emergency department for evaluation of possible syncope. Patient was watching the news on her couch at home around noon. Patient then woke up on the floor around 3 PM. Patient has no knowledge of falling or passing out. Patient has developing bruising to the right sided forehead. Patient does not take any blood thinners. Patient has a history of A. fib that she does not take any medications for. Patient had a blood sugar of 406 per squad. She states she felt well this morning. Patient recently completed chemo and radiation therapy for lung cancer 2 weeks ago. Patient states she still feels tired and has a sore throat. Patient has been eating and drinking as much as possible but has been drinking and eating less than normal due to her sore throat. Nursing Notes were all reviewed and agreed with or any disagreements were addressed  in the HPI. REVIEW OF SYSTEMS    (2-9 systems for level 4, 10 or more for level 5)     Review of Systems   Constitutional: Negative for fatigue and fever. HENT: Positive for sore throat. Eyes: Negative for visual disturbance. Respiratory: Negative for shortness of breath and wheezing. Cardiovascular: Negative for chest pain and palpitations. Gastrointestinal: Negative for abdominal pain, diarrhea, nausea and vomiting. Genitourinary: Negative for dysuria. Musculoskeletal: Negative for arthralgias, back pain, gait problem, joint swelling, myalgias, neck pain and neck stiffness. Skin: Negative for color change, pallor, rash and wound. Neurological: Positive for syncope. Negative for dizziness, light-headedness and headaches. Positives and Pertinent negatives as per HPI. Except as noted abovein the ROS, all other systems were reviewed and negative.        PAST MEDICAL HISTORY     Past Medical History:   Diagnosis Date    Arthritis     CAD (coronary artery disease)     Carpal tunnel syndrome     [facility]) [facility] [address] [facility]     Coronary stent     depression     Diabetes mellitus (HCC)     GERD (gastroesophageal reflux disease)     Hyperlipidemia     Hypertension     MI (myocardial infarction) ([facility] [address]     LIZETT (obstructive sleep apnea)     does not use CPAP    PONV (postoperative nausea and vomiting)     stress incontinence          SURGICAL HISTORY     Past Surgical History:   Procedure Laterality Date    BACK SURGERY  2000, 2001    lumbar laminectomy    BRONCHOSCOPY  12/04/2018    BRONCHOSCOPY N/A 2/27/2019    BRONCHOSCOPY BIOPSY BRONCHUS performed by Alyssa Jean MD at [address] [place] [address]  2/27/2019    BRONCHOSCOPY/TRANSBRONCHIAL NEEDLE BIOPSY performed by Alyssa Jean MD at [address] [place] [address]  2/27/2019    BRONCHOSCOPY ULTRASOUND performed by Alyssa Jean MD at [address] [facility] N/A 12/19/2018    LAPAROSCOPIC CHOLECYSTECTOMY WITH CHOLANGIOGRAM performed by Phylicia Martinez MD at [address] [address] COLONOSCOPY N/A 2/25/2019    COLONOSCOPY WITH BIOPSY performed by Raad Pineda MD at [address] COLONOSCOPY  2/25/2019    COLONOSCOPY daily    POTASSIUM CHLORIDE (KLOR-CON M) 20 MEQ EXTENDED RELEASE TABLET    Take 1 tablet by mouth daily         ALLERGIES     Cipro xr; Lyrica [pregabalin];  Penicillins; and Sulfa antibiotics    FAMILYHISTORY       Family History   Problem Relation Age of Onset    Cancer Sister     Diabetes Sister     Diabetes Brother     Heart Disease Father     Heart Disease Mother     Cancer Maternal Uncle           SOCIAL HISTORY       Social History     Socioeconomic History    Marital status:      Spouse name: None    Number of children: None    Years of education: None    Highest education level: None   Occupational History    None   Social Needs    Financial resource strain: None    Food insecurity:     Worry: None     Inability: None    Transportation needs:     Medical: None     Non-medical: None   Tobacco Use    Smoking status: Former Smoker     Last attempt to quit: 10/28/2001     Years since quittin.5    Smokeless tobacco: Never Used   Substance and Sexual Activity    Alcohol use: No    Drug use: No    Sexual activity: Yes     Partners: Male   Lifestyle    Physical activity:     Days per week: None     Minutes per session: None    Stress: None   Relationships    Social connections:     Talks on phone: None     Gets together: None     Attends Congregation service: None     Active member of club or organization: None     Attends meetings of clubs or organizations: None     Relationship status: None    Intimate partner violence:     Fear of current or ex partner: None     Emotionally abused: None     Physically abused: None     Forced sexual activity: None   Other Topics Concern    None   Social History Narrative    None       SCREENINGS             PHYSICAL EXAM    (up to 7 for level 4, 8 or more for level 5)     ED Triage Vitals [19 1607]   BP Temp Temp Source Pulse Resp SpO2 Height Weight   (!) 144/60 97.8 °F (36.6 °C) Oral 68 16 97 % 5' 5\" (1.651 m) 162 lb (73.5 kg) Physical Exam   Constitutional: She is oriented to person, place, and time. She appears well-developed and well-nourished. No distress. Patient appears tired. HENT:   Head: Normocephalic. Head is without raccoon's eyes, without Echeverria's sign, without right periorbital erythema and without left periorbital erythema. Nose: Nose normal.   Mouth/Throat: Oropharynx is clear and moist.   Eyes: Pupils are equal, round, and reactive to light. Conjunctivae and EOM are normal. Right eye exhibits no discharge. Left eye exhibits no discharge. Neck: Normal range of motion. Neck supple. Cardiovascular: Normal rate, regular rhythm and intact distal pulses. Murmur heard. 2+ systolic murmur. Pulmonary/Chest: Effort normal. No respiratory distress. Abdominal: Soft. There is no tenderness. Musculoskeletal: Normal range of motion. She exhibits no edema, tenderness or deformity. Neurological: She is alert and oriented to person, place, and time. Skin: Skin is warm and dry. Capillary refill takes less than 2 seconds. No rash noted. She is not diaphoretic. No erythema. No pallor. Psychiatric: She has a normal mood and affect. Her behavior is normal.   Nursing note and vitals reviewed.       DIAGNOSTIC RESULTS   LABS:    Labs Reviewed   CBC WITH AUTO DIFFERENTIAL - Abnormal; Notable for the following components:       Result Value    RBC 2.91 (*)     Hemoglobin 9.9 (*)     Hematocrit 30.0 (*)     .1 (*)     RDW 20.5 (*)     All other components within normal limits    Narrative:     Performed at:  [facility] [address], [place], [address]   Phone [phone]   COMPREHENSIVE METABOLIC PANEL W/ REFLEX TO MG FOR LOW K - Abnormal; Notable for the following components:    Sodium 134 (*)     Chloride 92 (*)     Glucose 346 (*)     BUN 25 (*)     GFR Non- 54 (*)     All other components within normal limits    Narrative:     Performed at:  [facility],  [facility], [address]   Phone [phone]   URINE RT REFLEX TO CULTURE - Abnormal; Notable for the following components:    Glucose, Ur 500 (*)     All other components within normal limits    Narrative:     Performed at:  [facility] [address],  [facility], [address]   Phone [phone]   TROPONIN    Narrative:     Performed at:  [facility] [address],  [facility], [address]   Phone [phone]   CK    Narrative:     Performed at:  [facility] [address],  [facility], [address]   Phone [phone]       All other labs were within normal range or not returned as of this dictation. EKG: All EKG's are interpreted by the Emergency Department Physician who either signs orCo-signs this chart in the absence of a cardiologist.  Please see their note for interpretation of EKG. RADIOLOGY:   Non-plain film images such as CT, Ultrasound and MRI are read by the radiologist. Shahida Ozuna radiographic images are visualized andpreliminarily interpreted by the  ED Provider with the below findings:        Interpretation [facility] Radiologist below, if available at the time of this note:    CT Chest Pulmonary Embolism W Contrast   Final Result   Linear intraluminal filling defect within a segmental branch to the right   middle lobe is compatible with embolism although possibly subacute/chronic   given its web-like appearance. No additional emboli. No right heart strain   or pulmonary infarction. Left hilar mass and adjacent periesophageal lymphadenopathy. New subtle compression fracture involving the superior endplate of T1. MRI   can be considered as clinically indicated. Stable appearing 5-6 mm right middle lobe pulmonary nodule.          CT Cervical Spine WO Contrast   Final Result   Subtle anterior wedging of T1 vertebral body with superior endplate   sclerosis. The finding can be due to progressive degenerative change or in   the appropriate clinical setting, post-traumatic compression. Correlate with   point tenderness. If patient is focally symptomatic, MR could be considered   for further characterization. Multifocal degenerative changes are otherwise favored as outlined above. CT Head WO Contrast   Preliminary Result   No acute intracranial abnormality. Cerebral atrophy. Chronic small vessel ischemic change. XR CHEST PORTABLE   Final Result   Left lower lobe subsegmental atelectasis. No results found. PROCEDURES   Unless otherwise noted below, none     Procedures    CRITICAL CARE TIME   N/A    CONSULTS:  IP CONSULT TO INTERNAL MEDICINE      EMERGENCY DEPARTMENT COURSE and DIFFERENTIALDIAGNOSIS/MDM:   Vitals:    Vitals:    05/16/19 1900 05/16/19 1930 05/16/19 2000 05/16/19 2030   BP: (!) 173/65 (!) 161/75 (!) 161/63 (!) 157/60   Pulse: 74 73 70 71   Resp: 19 22 22 23   Temp:       TempSrc:       SpO2: 96% 92% 91% 93%   Weight:       Height:           Patient was given thefollowing medications:  Medications   0.9 % sodium chloride bolus (0 mLs Intravenous Stopped 5/16/19 1853)   iopamidol (ISOVUE-370) 76 % injection 75 mL (75 mLs Intravenous Given 5/16/19 1733)       Patient presents emergency department for evaluation of syncope and collapse. Patient was unconscious for approximately 3 hours. Patient has no memory of this event and does not remember passing out. On evaluation patient is alert and oriented but appears sleepy. Vitals are stable and she is afebrile. Patient's blood sugar was 406 by squad the patient was given 1 L of normal saline. Lungs are clear to auscultation bilaterally abdomen is nontender to palpation. Heart rate is regular she has a 2+ murmur. Patient has developing hematoma to the left frontal scalp. CT of the patient's head and neck are unremarkable. Laboratory evaluation.  Patient has questionable PE on CT evaluation. Patient also has a questionable T1 compression fracture of unknown age. Troponin was negative. Patient has some chronic anemia. Patient will be admitted to hospitalist [facility] for further management of her questionable PE, questionable T1 compression fracture and syncope. FINAL IMPRESSION      1. Syncope and collapse    2. Subacute pulmonary embolism (HCC)    3. Closed fracture of first thoracic vertebra, unspecified fracture morphology, initial encounter ([facility] [address]    4. Hyperglycemia    5. Contusion of forehead, initial encounter          DISPOSITION/PLAN   DISPOSITION Decision To Admit 05/16/2019 08:27:06 PM      PATIENT REFERREDTO:  No follow-up provider specified.     DISCHARGE MEDICATIONS:  New Prescriptions    No medications on file       DISCONTINUED MEDICATIONS:  Discontinued Medications    No medications on file              (Please note that portions ofthis note were completed with a voice recognition program.  Efforts were made to edit the dictations but occasionally words are mis-transcribed.)    Carl uLis PA-C (electronically signed)            Carl Luis PA-C  05/16/19 [id number]

## 2019-05-17 ENCOUNTER — APPOINTMENT (OUTPATIENT)
Dept: MRI IMAGING | Age: 79
DRG: 312 | End: 2019-05-17
Payer: MEDICARE

## 2019-05-17 LAB
EKG ATRIAL RATE: 67 BPM
EKG DIAGNOSIS: NORMAL
EKG P AXIS: 44 DEGREES
EKG P-R INTERVAL: 226 MS
EKG Q-T INTERVAL: 410 MS
EKG QRS DURATION: 112 MS
EKG QTC CALCULATION (BAZETT): 433 MS
EKG R AXIS: 33 DEGREES
EKG T AXIS: 35 DEGREES
EKG VENTRICULAR RATE: 67 BPM
GLUCOSE BLD-MCNC: 186 MG/DL (ref 70–99)
GLUCOSE BLD-MCNC: 191 MG/DL (ref 70–99)
GLUCOSE BLD-MCNC: 208 MG/DL (ref 70–99)
GLUCOSE BLD-MCNC: 214 MG/DL (ref 70–99)
GLUCOSE BLD-MCNC: 328 MG/DL (ref 70–99)
LEFT VENTRICULAR EJECTION FRACTION HIGH VALUE: 60 %
LEFT VENTRICULAR EJECTION FRACTION MODE: NORMAL
LV EF: 60 %
LVEF MODALITY: NORMAL
PERFORMED ON: ABNORMAL

## 2019-05-17 PROCEDURE — 93306 TTE W/DOPPLER COMPLETE: CPT

## 2019-05-17 PROCEDURE — A9579 GAD-BASE MR CONTRAST NOS,1ML: HCPCS | Performed by: NURSE PRACTITIONER

## 2019-05-17 PROCEDURE — 2580000003 HC RX 258: Performed by: NURSE PRACTITIONER

## 2019-05-17 PROCEDURE — 94760 N-INVAS EAR/PLS OXIMETRY 1: CPT

## 2019-05-17 PROCEDURE — 6370000000 HC RX 637 (ALT 250 FOR IP): Performed by: INTERNAL MEDICINE

## 2019-05-17 PROCEDURE — 93010 ELECTROCARDIOGRAM REPORT: CPT | Performed by: INTERNAL MEDICINE

## 2019-05-17 PROCEDURE — 94640 AIRWAY INHALATION TREATMENT: CPT

## 2019-05-17 PROCEDURE — 72157 MRI CHEST SPINE W/O & W/DYE: CPT

## 2019-05-17 PROCEDURE — 93970 EXTREMITY STUDY: CPT

## 2019-05-17 PROCEDURE — 2580000003 HC RX 258

## 2019-05-17 PROCEDURE — 2700000000 HC OXYGEN THERAPY PER DAY

## 2019-05-17 PROCEDURE — 6360000004 HC RX CONTRAST MEDICATION: Performed by: NURSE PRACTITIONER

## 2019-05-17 PROCEDURE — 1200000000 HC SEMI PRIVATE

## 2019-05-17 RX ORDER — DEXTROSE MONOHYDRATE 50 MG/ML
100 INJECTION, SOLUTION INTRAVENOUS PRN
Status: DISCONTINUED | OUTPATIENT
Start: 2019-05-17 | End: 2019-05-23 | Stop reason: HOSPADM

## 2019-05-17 RX ORDER — SODIUM CHLORIDE 0.9 % (FLUSH) 0.9 %
10 SYRINGE (ML) INJECTION ONCE
Status: COMPLETED | OUTPATIENT
Start: 2019-05-17 | End: 2019-05-17

## 2019-05-17 RX ORDER — ACETAMINOPHEN 80 MG
TABLET,CHEWABLE ORAL
Status: COMPLETED
Start: 2019-05-17 | End: 2019-05-17

## 2019-05-17 RX ORDER — NICOTINE POLACRILEX 4 MG
15 LOZENGE BUCCAL PRN
Status: DISCONTINUED | OUTPATIENT
Start: 2019-05-17 | End: 2019-05-23 | Stop reason: HOSPADM

## 2019-05-17 RX ORDER — TRAMADOL HYDROCHLORIDE 50 MG/1
50 TABLET ORAL EVERY 4 HOURS PRN
Status: DISCONTINUED | OUTPATIENT
Start: 2019-05-17 | End: 2019-05-23 | Stop reason: HOSPADM

## 2019-05-17 RX ORDER — DEXTROSE MONOHYDRATE 25 G/50ML
12.5 INJECTION, SOLUTION INTRAVENOUS PRN
Status: DISCONTINUED | OUTPATIENT
Start: 2019-05-17 | End: 2019-05-23 | Stop reason: HOSPADM

## 2019-05-17 RX ORDER — SODIUM CHLORIDE 0.9 % (FLUSH) 0.9 %
SYRINGE (ML) INJECTION
Status: COMPLETED
Start: 2019-05-17 | End: 2019-05-17

## 2019-05-17 RX ADMIN — HYDROCHLOROTHIAZIDE 12.5 MG: 25 TABLET ORAL at 11:08

## 2019-05-17 RX ADMIN — FLECAINIDE ACETATE 100 MG: 100 TABLET ORAL at 20:37

## 2019-05-17 RX ADMIN — FLECAINIDE ACETATE 100 MG: 100 TABLET ORAL at 11:09

## 2019-05-17 RX ADMIN — INSULIN LISPRO 2 UNITS: 100 INJECTION, SOLUTION INTRAVENOUS; SUBCUTANEOUS at 21:38

## 2019-05-17 RX ADMIN — GADOTERIDOL 15 ML: 279.3 INJECTION, SOLUTION INTRAVENOUS at 17:55

## 2019-05-17 RX ADMIN — Medication 10 ML: at 20:37

## 2019-05-17 RX ADMIN — INSULIN GLARGINE 25 UNITS: 100 INJECTION, SOLUTION SUBCUTANEOUS at 21:39

## 2019-05-17 RX ADMIN — ASPIRIN 81 MG: 81 TABLET, COATED ORAL at 11:09

## 2019-05-17 RX ADMIN — CITALOPRAM HYDROBROMIDE 20 MG: 20 TABLET ORAL at 11:08

## 2019-05-17 RX ADMIN — Medication: at 11:52

## 2019-05-17 RX ADMIN — DIGOXIN 125 MCG: 125 TABLET ORAL at 11:09

## 2019-05-17 RX ADMIN — AMLODIPINE BESYLATE 10 MG: 5 TABLET ORAL at 11:09

## 2019-05-17 RX ADMIN — IPRATROPIUM BROMIDE AND ALBUTEROL SULFATE 3 ML: .5; 3 SOLUTION RESPIRATORY (INHALATION) at 12:33

## 2019-05-17 RX ADMIN — APIXABAN 5 MG: 5 TABLET, FILM COATED ORAL at 20:37

## 2019-05-17 RX ADMIN — CARVEDILOL 25 MG: 25 TABLET, FILM COATED ORAL at 11:08

## 2019-05-17 RX ADMIN — ACETAMINOPHEN 650 MG: 325 TABLET, FILM COATED ORAL at 20:48

## 2019-05-17 RX ADMIN — PANTOPRAZOLE SODIUM 40 MG: 40 TABLET, DELAYED RELEASE ORAL at 11:10

## 2019-05-17 RX ADMIN — IPRATROPIUM BROMIDE AND ALBUTEROL SULFATE 3 ML: .5; 3 SOLUTION RESPIRATORY (INHALATION) at 08:29

## 2019-05-17 RX ADMIN — INSULIN LISPRO 6 UNITS: 100 INJECTION, SOLUTION INTRAVENOUS; SUBCUTANEOUS at 18:51

## 2019-05-17 RX ADMIN — LOSARTAN POTASSIUM 50 MG: 100 TABLET, FILM COATED ORAL at 11:08

## 2019-05-17 RX ADMIN — INSULIN LISPRO 12 UNITS: 100 INJECTION, SOLUTION INTRAVENOUS; SUBCUTANEOUS at 12:39

## 2019-05-17 RX ADMIN — INSULIN LISPRO 2 UNITS: 100 INJECTION, SOLUTION INTRAVENOUS; SUBCUTANEOUS at 01:49

## 2019-05-17 RX ADMIN — CALCIUM 500 MG: 500 TABLET ORAL at 11:09

## 2019-05-17 RX ADMIN — ACETAMINOPHEN 650 MG: 325 TABLET, FILM COATED ORAL at 11:08

## 2019-05-17 RX ADMIN — Medication 10 ML: at 17:55

## 2019-05-17 RX ADMIN — CARVEDILOL 25 MG: 25 TABLET, FILM COATED ORAL at 18:09

## 2019-05-17 RX ADMIN — OXYCODONE HYDROCHLORIDE AND ACETAMINOPHEN 500 MG: 500 TABLET ORAL at 11:10

## 2019-05-17 RX ADMIN — POTASSIUM CHLORIDE 20 MEQ: 1500 TABLET, EXTENDED RELEASE ORAL at 11:09

## 2019-05-17 RX ADMIN — INSULIN LISPRO 6 UNITS: 100 INJECTION, SOLUTION INTRAVENOUS; SUBCUTANEOUS at 11:10

## 2019-05-17 RX ADMIN — IPRATROPIUM BROMIDE AND ALBUTEROL SULFATE 3 ML: .5; 3 SOLUTION RESPIRATORY (INHALATION) at 20:00

## 2019-05-17 ASSESSMENT — PAIN DESCRIPTION - LOCATION
LOCATION: HEAD
LOCATION: HEAD

## 2019-05-17 ASSESSMENT — ENCOUNTER SYMPTOMS
DIARRHEA: 0
BACK PAIN: 0
SHORTNESS OF BREATH: 0
WHEEZING: 0
CONSTIPATION: 0
COUGH: 0
ABDOMINAL PAIN: 0
SORE THROAT: 0
NAUSEA: 0

## 2019-05-17 ASSESSMENT — PAIN DESCRIPTION - PAIN TYPE
TYPE: ACUTE PAIN
TYPE: ACUTE PAIN

## 2019-05-17 ASSESSMENT — PAIN DESCRIPTION - PROGRESSION: CLINICAL_PROGRESSION: GRADUALLY WORSENING

## 2019-05-17 ASSESSMENT — PAIN SCALES - GENERAL
PAINLEVEL_OUTOF10: 7
PAINLEVEL_OUTOF10: 2
PAINLEVEL_OUTOF10: 4
PAINLEVEL_OUTOF10: 3
PAINLEVEL_OUTOF10: 7

## 2019-05-17 ASSESSMENT — PAIN DESCRIPTION - DESCRIPTORS
DESCRIPTORS: HEADACHE
DESCRIPTORS: HEADACHE

## 2019-05-17 ASSESSMENT — PAIN DESCRIPTION - ORIENTATION: ORIENTATION: ANTERIOR;MID

## 2019-05-17 ASSESSMENT — PAIN - FUNCTIONAL ASSESSMENT: PAIN_FUNCTIONAL_ASSESSMENT: ACTIVITIES ARE NOT PREVENTED

## 2019-05-17 ASSESSMENT — PAIN DESCRIPTION - FREQUENCY: FREQUENCY: CONTINUOUS

## 2019-05-17 ASSESSMENT — PAIN DESCRIPTION - ONSET: ONSET: PROGRESSIVE

## 2019-05-17 NOTE — H&P
Osman Mak is an 70 y.o.  female. She was recently hospitalized about 6-8 weeks ago for severe cough with pneumonia, a fib (PAF), and diagnosed with non small cell lung CA. On the day of admission she fell at home and had limited consciousness for 3 hr then called the EMS. She had no palpitations, chest pain, seizure behavior on the day of event and had not been acutely ill. She is admitted for syncope    Past Medical History:   Diagnosis Date    Arthritis     CAD (coronary artery disease)     Carpal tunnel syndrome     COPD (chronic obstructive pulmonary disease) (HCC)     Coronary stent     depression     Diabetes mellitus ([facility] [address]     GERD (gastroesophageal reflux disease)     Hyperlipidemia     Hypertension     MI (myocardial infarction) ([facility] [address]     LIZETT (obstructive sleep apnea)     does not use CPAP    PONV (postoperative nausea and vomiting)     stress incontinence        Allergies:    Allergies   Allergen Reactions    Cipro Xr      Swelling, rash    Lyrica [Pregabalin]      Shaking, swelling, rash    Penicillins      Swelling, rash    Sulfa Antibiotics      Swelling, rash         Past Surgical History:   Procedure Laterality Date    BACK SURGERY  2000, 2001    lumbar laminectomy    BRONCHOSCOPY  12/04/2018    BRONCHOSCOPY N/A 2/27/2019    BRONCHOSCOPY BIOPSY BRONCHUS performed by Nathalie Ortega MD at [place] [address]  2/27/2019    BRONCHOSCOPY/TRANSBRONCHIAL NEEDLE BIOPSY performed by Nathalie Ortega MD at [facility]  2/27/2019    BRONCHOSCOPY ULTRASOUND performed by Nathalie Ortega MD at [facility], LAPAROSCOPIC N/A 12/19/2018    LAPAROSCOPIC CHOLECYSTECTOMY WITH CHOLANGIOGRAM performed by Chloe Rao MD at [address] [address] COLONOSCOPY N/A 2/25/2019    COLONOSCOPY WITH BIOPSY performed by Mery Spangler MD at [address] [place] [address] COLONOSCOPY  2/25/2019    COLONOSCOPY POLYPECTOMY SNARE/COLD BIOPSY performed by Eppie Cogan, MD at [facility]  ,     [place] [address] INCL FLUOR GDNCE DX W/CELL [facility] N/A 2018    BRONCHOSCOPY WITH LAVAGE performed by Bianka Fabian MD at [address] [facility] N/A 2019    EGD BIOPSY performed by Eppie Cogan, MD at [address] Marital status:      Spouse name: Not on file    Number of children: Not on file    Years of education: Not on file    Highest education level: Not on file   Occupational History    Not on file   Social Needs    Financial resource strain: Not on file    Food insecurity:     Worry: Not on file     Inability: Not on file    Transportation needs:     Medical: Not on file     Non-medical: Not on file   Tobacco Use    Smoking status: Former Smoker     Last attempt to quit: 10/28/2001     Years since quittin.5    Smokeless tobacco: Never Used   Substance and Sexual Activity    Alcohol use: No    Drug use: No    Sexual activity: Yes     Partners: Male   Lifestyle    Physical activity:     Days per week: Not on file     Minutes per session: Not on file    Stress: Not on file   Relationships    Social connections:     Talks on phone: Not on file     Gets together: Not on file     Attends Yazidism service: Not on file     Active member of club or organization: Not on file     Attends meetings of clubs or organizations: Not on file     Relationship status: Not on file    Intimate partner violence:     Fear of current or ex partner: Not on file     Emotionally abused: Not on file     Physically abused: Not on file     Forced sexual activity: Not on file   Other Topics Concern    Not on file   Social History Narrative    Not on file       Family History   Problem Relation Age of Onset    Cancer Sister     Diabetes Sister     Diabetes Brother     Heart Disease Father     Heart Disease Mother     Cancer Maternal Uncle      Prior to Admission medications    Medication Sig Start Date End Date Taking? Authorizing Provider   digoxin (LANOXIN) 125 MCG tablet Take 1 tablet by mouth daily 4/26/19  Yes Maynor Garcia MD   flecainide (TAMBOCOR) 100 MG tablet Take 1 tablet by mouth 2 times daily 4/26/19  Yes Maynor Garcia MD   insulin glargine (LANTUS) 100 UNIT/ML injection pen Inject 20 Units into the skin 2 times daily 2/28/19  Yes Silvina Lewis MD   potassium chloride (KLOR-CON M) 20 MEQ extended release tablet Take 1 tablet by mouth daily 2/28/19  Yes Silvina Lewis MD   omeprazole (PRILOSEC) 20 MG delayed release capsule Take 20 mg by mouth daily   Yes Historical Provider, MD   aspirin 81 MG tablet Take 1 tablet by mouth daily 12/23/18  Yes Silvina Lewis MD   citalopram (CELEXA) 40 MG tablet Take 0.5 tablets by mouth every morning 12/23/18  Yes Silvina Lewis MD   metFORMIN (GLUCOPHAGE) 1000 MG tablet Take 0.5 tablets by mouth 2 times daily (with meals) 12/23/18  Yes Silvina Lewis MD   hydrochlorothiazide (HYDRODIURIL) 25 MG tablet Take 0.5 tablets by mouth daily  Patient taking differently: Take 25 mg by mouth daily  12/23/18  Yes Silvina Lewis MD   carvedilol (COREG) 25 MG tablet Take 1 tablet by mouth 2 times daily 12/13/18  Yes Maynor Garcia MD   ezetimibe (ZETIA) 10 MG tablet Take 10 mg by mouth daily   Yes Historical Provider, MD   losartan (COZAAR) 100 MG tablet Take 0.5 tablets by mouth daily 5/16/17  Yes MARIA C Brannon CNP   amLODIPine (NORVASC) 10 MG tablet Take 10 mg by mouth daily    Yes Historical Provider, MD   acetaminophen 650 MG TABS Take 650 mg by mouth every 4 hours as needed for Pain (For mild pain level 1-3 or for fever > 100.5). 10/29/12  Yes Silvina Lewis MD   calcium carbonate (OSCAL) 500 MG TABS tablet Take 500 mg by mouth daily.      Yes Historical Provider, MD   Ascorbic Acid (VITAMIN C) 500 MG tablet Take 500 mg by mouth daily. Yes Historical Provider, MD   ipratropium-albuterol (DUONEB) 0.5-2.5 (3) MG/3ML SOLN nebulizer solution Inhale 3 mLs into the lungs every 4 hours (while awake) DX:COPD J44.9 3/8/19   Adrianna Brothers MD   Fluticasone-Umeclidin-Vilant (TRELEGY ELLIPTA) 269-07.7-82 MCG/INH AEPB Inhale 1 puff into the lungs daily Currently not taking    Historical Provider, MD         Review of Systems   Constitutional: Negative for chills and fever. HENT: Negative for congestion and sore throat. Respiratory: Negative for cough, shortness of breath and wheezing. Cardiovascular: Negative for chest pain, palpitations and leg swelling. Gastrointestinal: Negative for abdominal pain, constipation, diarrhea and nausea. Genitourinary: Negative for dysuria and frequency. Musculoskeletal: Negative for back pain. Skin: Negative for rash. Allergic/Immunologic: Negative for environmental allergies. Neurological: Positive for weakness. Negative for dizziness and seizures. Hematological: Does not bruise/bleed easily. Psychiatric/Behavioral: The patient is not nervous/anxious. BP (!) 153/60   Pulse 88   Temp 98.5 °F (36.9 °C) (Tympanic)   Resp 18   Ht 5' 5\" (1.651 m)   Wt 164 lb 12.8 oz (74.8 kg)   SpO2 92%   BMI 27.42 kg/m²      Physical Exam   Constitutional: She is oriented to person, place, and time. She appears well-developed and well-nourished. No distress. HENT:   Head: Normocephalic and atraumatic. Nose: Nose normal.   Mouth/Throat: Oropharynx is clear and moist. No oropharyngeal exudate. Eyes: Pupils are equal, round, and reactive to light. Conjunctivae are normal. Right eye exhibits no discharge. Left eye exhibits no discharge. No scleral icterus. Neck: Normal range of motion. Neck supple. No tracheal deviation present. No thyromegaly present. Cardiovascular: Regular rhythm and normal heart sounds. No murmur heard.   Increased HR   Pulmonary/Chest: Effort

## 2019-05-17 NOTE — CONSULTS
[facility]   CONSULT NOTE    5/17/2019 1:22 PM    Patient Information: Scott Palacios   Date of Admit:  5/16/2019  Primary Care Physician:  Alberta Quezada MD  Requesting Physician:  Alberta Quezada MD    Reason for consult:   Evaluation and recommendations for NSCLC    Chief complaint:    Chief Complaint   Patient presents with    Fall     Patient arrived via [employer] squad for c/o fall at home around noon. Woke up at 3 pm on the floor. Contusion noted to right forehead, unsure if LOC.  per squad. History of Present Illness:  Scott Palacios is a 70 y.o. female on Alberta Quezada MD service who was admitted on 5/16/2019 for syncope. She is a patient of Ruddy Bowieon with stage II non-small cell lung cancer. She completed 6 cycles chemotherapy with carboplatin and taxol on 4/22/2019. Radiation was completed on 4/29/2019. Plan is to start her on immunotherapy with Imfinzi if insurance authorizes it. Patient was hospitalized in early May 2019 with dehydration and weakness. Was treated with IV fluids and IV antiemetics. CTA chest showed stable disease in the lungs, possible subacute/chronic pulmonary embolism. She has hypoxia and is requiring oxygen at 2 LPM. She reports mid upper back pain. Past Medical History:     has a past medical history of Arthritis, CAD (coronary artery disease), Carpal tunnel syndrome, COPD (chronic obstructive pulmonary disease) ([address] Coronary stent, depression, Diabetes mellitus ([address] GERD (gastroesophageal reflux disease), Hyperlipidemia, Hypertension, MI (myocardial infarction) ([address] LIZETT (obstructive sleep apnea), PONV (postoperative nausea and vomiting), and stress incontinence.      Past Surgical History:    Past Surgical History:   Procedure Laterality Date    BACK SURGERY  2000, 2001    lumbar laminectomy    BRONCHOSCOPY  12/04/2018    BRONCHOSCOPY N/A 2/27/2019    BRONCHOSCOPY BIOPSY BRONCHUS performed by Chuck Crowder MD at [facility] ENDOSCOPY    BRONCHOSCOPY  2/27/2019    BRONCHOSCOPY/[facility]    BRONCHOSCOPY ULTRASOUND performed by Patrick Lamb MD at [facility], LAPAROSCOPIC N/A 12/19/2018    LAPAROSCOPIC CHOLECYSTECTOMY WITH CHOLANGIOGRAM performed by Migdalia Burger MD at [address] COLONOSCOPY N/A 2/25/2019    COLONOSCOPY WITH BIOPSY performed by Maria Luisa Laguna MD at [address] COLONOSCOPY  2/25/2019    COLONOSCOPY POLYPECTOMY SNARE/COLD BIOPSY performed by Maria Luisa Laguna MD at [place]  2000, 2001    [place] [address] INCL FLUOR GDNCE DX W/CELL WASHG SPX N/A 12/4/2018    BRONCHOSCOPY WITH LAVAGE performed by Leslie Washington MD at [address] [facility] N/A 2/25/2019    EGD BIOPSY performed by Maria Luisa Laguna MD at [address] ENDOSCOPY        Current Medications:     insulin glargine  25 Units Subcutaneous BID    insulin lispro  0-18 Units Subcutaneous TID WC    insulin lispro  0-9 Units Subcutaneous Nightly    amLODIPine  10 mg Oral Daily    vitamin C  500 mg Oral Daily    aspirin  81 mg Oral Daily    calcium elemental  500 mg Oral Daily    citalopram  20 mg Oral QAM    digoxin  125 mcg Oral Daily    ezetimibe  10 mg Oral Daily    flecainide  100 mg Oral BID    Fluticasone-Umeclidin-Vilant  1 puff Inhalation Daily    hydrochlorothiazide  12.5 mg Oral Daily    ipratropium-albuterol  3 mL Inhalation Q4H WA    losartan  50 mg Oral Daily    [START ON 5/18/2019] metFORMIN  500 mg Oral BID WC    pantoprazole  40 mg Oral QAM AC    potassium chloride  20 mEq Oral Daily    carvedilol  25 mg Oral BID WC       Allergies:     Allergies   Allergen Reactions    Cipro Xr      Swelling, rash    Lyrica [Pregabalin]      Shaking, swelling, rash    Penicillins      Swelling, rash    Sulfa Antibiotics      Swelling, rash        Social History:    reports that she quit smoking about 17 years ago. She has never used smokeless tobacco. She reports that she does not drink alcohol or use drugs. Family History:     family history includes Cancer in her maternal uncle and sister; Diabetes in her brother and sister; Heart Disease in her father and mother. ROS:      Per HPI; otherwise 10 point ROS is negative    PHYSICAL EXAM:    Vitals:  Vitals:    05/17/19 0834   BP:    Pulse:    Resp:    Temp:    SpO2: 92%      No intake or output data in the 24 hours ending 05/17/19 1322   Wt Readings from Last 3 Encounters:   05/17/19 164 lb 12.8 oz (74.8 kg)   05/05/19 169 lb 12.8 oz (77 kg)   04/26/19 164 lb 12.8 oz (74.8 kg)        General appearance: Appears comfortable. Eyes: Sclera clear, pupils equal  ENT: Moist mucus membranes, no thrush  Neck: Trachea midline, symmetrical  Cardiovascular: Regular rhythm, normal S1, S2. No murmur, gallop, rub. No edema in  lower extremities. Respiratory: Clear to auscultation bilaterally. No wheeze. Good inspiratory effort  Gastrointestinal: Abdomen soft, not tender, not distended, normal bowel sounds  Musculoskeletal: No cyanosis in digits, warm extremities  Neurologic: Cranial nerves grossly intact, no motor or speech deficits. Psychiatric: Normal affect. Alert and oriented to time, place and person. Skin: Warm, dry, normal turgor, no rash.  Has     DATA:  CBC:   Lab Results   Component Value Date    WBC 8.5 05/16/2019    RBC 2.91 05/16/2019    HGB 9.9 05/16/2019    HCT 30.0 05/16/2019    .1 05/16/2019    MCH 34.0 05/16/2019    MCHC 33.0 05/16/2019    RDW 20.5 05/16/2019     05/16/2019    MPV 7.0 05/16/2019     BMP:  Lab Results   Component Value Date     05/16/2019    K 3.9 05/16/2019    CL 92 05/16/2019    CO2 29 05/16/2019    BUN 25 05/16/2019    CREATININE 1.0 05/16/2019    CALCIUM 8.7 05/16/2019    GFRAA >60 05/16/2019    GFRAA >60 10/29/2012    LABGLOM 54 05/16/2019    GLUCOSE 346 05/16/2019     Magnesium:   Lab Results   Component Value Date    MG 1.40 05/05/2019    MG 1.70 05/03/2019    MG 1.30 05/02/2019     LIVER PROFILE:   Recent Labs     05/16/19  1647   AST 15   ALT 16   BILITOT 0.3   ALKPHOS 88     PT/INR:    Lab Results   Component Value Date    PROTIME 10.8 02/27/2019    PROTIME 11.6 12/18/2018    PROTIME 11.5 12/04/2018    INR 0.95 02/27/2019    INR 1.02 12/18/2018    INR 1.01 12/04/2018     Narrative   EXAMINATION:   CTA OF THE CHEST 5/16/2019 5:23 pm       TECHNIQUE:   CTA of the chest was performed after the administration of intravenous   contrast.  Multiplanar reformatted images are provided for review.  MIP   images are provided for review. Dose modulation, iterative reconstruction,   and/or weight based adjustment of the mA/kV was utilized to reduce the   radiation dose to as low as reasonably achievable.       COMPARISON:   None.       HISTORY:   ORDERING SYSTEM PROVIDED HISTORY: SHORTNESS OF BREATH   TECHNOLOGIST PROVIDED HISTORY:   Ordering Physician Provided Reason for Exam: Shortness of breath; suspect PE   Acuity: Acute   Type of Exam: Initial       FINDINGS:   Pulmonary Arteries: Pulmonary arteries are adequately opacified for   evaluation. Esperanza Dine is a subtle intraluminal filling defect within a segmental   branch to the right middle lobe best seen on series 5 images 168-172,   compatible with embolism although possibly subacute/chronic.  No additional   embolism identified.  No evidence for right heart strain or pulmonary   infarction.       Mediastinum: No evidence of mediastinal lymphadenopathy.  The heart and   pericardium demonstrate no acute abnormality.  There is no acute abnormality   of the thoracic aorta.       Lungs/pleura: Left hilar mass with postobstructive atelectasis in the   lingula.  Findings are similar as compared to multiple previous studies.    Parasagittal enlarged lymph node measures 1.2 cm in short axis stable or   slightly decreased as compared to previous study. .  5 mm right middle lobe   pulmonary nodule unchanged from previous study. Caty Cloud scarring within the   left lower lobe is also similar to previous examination.       Upper Abdomen: Peripherally calcified cystic lesion arising from the upper   pole the left kidney, similar to previous examination.       Soft Tissues/Bones: New subtle compression deformity involving the superior   endplate of T1.  Vertebral body hemangioma at T8.           Impression   Linear intraluminal filling defect within a segmental branch to the right   middle lobe is compatible with embolism although possibly subacute/chronic   given its web-like appearance.  No additional emboli.  No right heart strain   or pulmonary infarction.       Left hilar mass and adjacent periesophageal lymphadenopathy.       New subtle compression fracture involving the superior endplate of T1.  MRI   can be considered as clinically indicated.       Stable appearing 5-6 mm right middle lobe pulmonary nodule. Narrative   EXAMINATION:   CT OF THE HEAD WITHOUT CONTRAST  5/16/2019 5:19 pm       TECHNIQUE:   CT of the head was performed without the administration of intravenous   contrast. Dose modulation, iterative reconstruction, and/or weight based   adjustment of the mA/kV was utilized to reduce the radiation dose to as low   as reasonably achievable.       COMPARISON:   2/22/2019       HISTORY:   ORDERING SYSTEM PROVIDED HISTORY: syncope/fall   TECHNOLOGIST PROVIDED HISTORY:   Has a \"code stroke\" or \"stroke alert\" been called? ->No   Ordering Physician Provided Reason for Exam: syncope/fall   Acuity: Acute   Type of Exam: Initial       Initial evaluation.       FINDINGS:   BRAIN/VENTRICLES: The ventricles and cisternal spaces are prominent   consistent with cerebral atrophy.  There is atherosclerotic calcification of   the cavernous carotids.   There are  areas of hypoattenuation in

## 2019-05-17 NOTE — ED PROVIDER NOTES
I independently performed a history and physical on Arya Michaels. All diagnostic, treatment, and disposition decisions were made by myself in conjunction with the advanced practice provider. I have participated in the medical decision making and directed the treatment plan and disposition of the patient. For further details of Chandana DECKER [facility] emergency department encounter, please see the advanced practice provider's documentation. CHIEF COMPLAINT  Chief Complaint   Patient presents with    Fall     Patient arrived via [employer] squad for c/o fall at home around noon. Woke up at 3 pm on the floor. Contusion noted to right forehead, unsure if LOC.  per squad. Briefly, Arya Michaels is a 70 y.o. female  who presents to the ED complaining of frequent falls. She reports feeling worse in general since starting chemo and radiation. She is also hyperglycemic. She reports LOC for maybe a few hours. She says she woke up around 3pm after last seeing the clock at noon. She also c/o some R sided upper neck pain. No fevers or recent illnesses. She is not acutely SOB or having CP. FOCUSED PHYSICAL EXAMINATION  BP (!) 157/60   Pulse 71   Temp 97.8 °F (36.6 °C) (Oral)   Resp 23   Ht 5' 5\" (1.651 m)   Wt 162 lb (73.5 kg)   SpO2 93%   BMI 26.96 kg/m²    Focused physical examination notable for no acute distress, well-appearing, well-nourished, normal speech and mentation without obvious facial droop, no obvious rash. No obvious cranial nerve deficits on my initial exam. RRR CTAB. Some ttp noted over the R paraspinal and trapezius musculature but no midline C/T/L spine ttp anywhere at all. No stepoff or deformity, no ttp in extremities. Small hematoma to R forehead noted, no abrasion/laceration.     The 12 lead EKG was interpreted by me as follows:  Rate: normal with a rate of 67  Rhythm: sinus  Axis: normal  Intervals: first degree AVB, narrow QRS  ST segments: no ST elevations or depressions  T waves: no abnormal inversions  Non-specific T wave changes: present  Prior EKG comparison: EKG dated 5/4/19 is not significantly different    MDM:  Diagnostic considerations included seizure, stroke, TIA, intracranial bleed, trauma, vasovagal reaction, orthostatic reaction/dehydration, medication side effect, intoxication, metabolic/electrolyte disturbance, blood sugar disturbance, cardiac dysrhythmia    ED course was notable for syncopal events, etiology unclear, she has a forehead contusion but her head CT is otherwise negative. She has a questionable T1 fracture regarding its acuity, which is not focally tender in this area so I suspected subacute or chronic. I consulted and spoke with Dr. Elif Durant from the patient's primary care office about the patient's ED history, physical, workup, and course so far. Recommendations from this consultation included OK with admission. CAT scan also questions chronic versus subacute pulmonary embolism. She has no active chest pain or shortness of breath, no signs of infarct or heart strain, no hypoxia and a history of lung cancer. Holding off on anticoagulation for now after my discussion with Dr. Elif Durant. CK is normal to r/o rhabdo. Found to have high blood sugar. During the patient's ED course, the patient was given:  Medications   0.9 % sodium chloride bolus (0 mLs Intravenous Stopped 5/16/19 1853)   iopamidol (ISOVUE-370) 76 % injection 75 mL (75 mLs Intravenous Given 5/16/19 1733)        CLINICAL IMPRESSION  1. Syncope and collapse    2. Subacute pulmonary embolism (HCC)    3. Closed fracture of first thoracic vertebra, unspecified fracture morphology, initial encounter ([facility]    4. Hyperglycemia    5. Contusion of forehead, initial encounter        Haja Almodovar was admitted in fair condition. I have discussed the findings of today's workup with the patient and addressed the patient's questions and concerns.   The plan is to admit to the hospital at this time under the PCP's service. I spoke with Dr. Zoe Quinteros who accepted the patient and will take over the patient's care. The patient is agreeable with this plan. The total critical care time spent while evaluating and treating this patient was at least 40 minutes. This excludes time spent doing separately billable procedures. This includes time at the bedside, data interpretation, medication management, obtaining critical history from collateral sources if the patient is unable to provide it directly, and physician consultation. Specifics of interventions taken and potentially life-threatening diagnostic considerations are listed above in the medical decision making. This chart was created using Dragon dictation software. Efforts were made by me to ensure accuracy, however some errors may be present due to limitations of this technology.             Jocelin Gaston MD  05/16/19 7834

## 2019-05-18 LAB
GLUCOSE BLD-MCNC: 111 MG/DL (ref 70–99)
GLUCOSE BLD-MCNC: 197 MG/DL (ref 70–99)
GLUCOSE BLD-MCNC: 273 MG/DL (ref 70–99)
GLUCOSE BLD-MCNC: 93 MG/DL (ref 70–99)
PERFORMED ON: ABNORMAL
PERFORMED ON: NORMAL

## 2019-05-18 PROCEDURE — 6370000000 HC RX 637 (ALT 250 FOR IP): Performed by: INTERNAL MEDICINE

## 2019-05-18 PROCEDURE — 2580000003 HC RX 258

## 2019-05-18 PROCEDURE — 94760 N-INVAS EAR/PLS OXIMETRY 1: CPT

## 2019-05-18 PROCEDURE — 2700000000 HC OXYGEN THERAPY PER DAY

## 2019-05-18 PROCEDURE — 94645 CONT INHLJ TX EACH ADDL HOUR: CPT

## 2019-05-18 PROCEDURE — 1200000000 HC SEMI PRIVATE

## 2019-05-18 PROCEDURE — 94640 AIRWAY INHALATION TREATMENT: CPT

## 2019-05-18 PROCEDURE — 99223 1ST HOSP IP/OBS HIGH 75: CPT | Performed by: INTERNAL MEDICINE

## 2019-05-18 RX ORDER — SODIUM CHLORIDE 0.9 % (FLUSH) 0.9 %
SYRINGE (ML) INJECTION
Status: COMPLETED
Start: 2019-05-18 | End: 2019-05-18

## 2019-05-18 RX ADMIN — IPRATROPIUM BROMIDE AND ALBUTEROL SULFATE 3 ML: .5; 3 SOLUTION RESPIRATORY (INHALATION) at 09:06

## 2019-05-18 RX ADMIN — APIXABAN 5 MG: 5 TABLET, FILM COATED ORAL at 20:10

## 2019-05-18 RX ADMIN — INSULIN GLARGINE 25 UNITS: 100 INJECTION, SOLUTION SUBCUTANEOUS at 09:48

## 2019-05-18 RX ADMIN — LOSARTAN POTASSIUM 50 MG: 100 TABLET, FILM COATED ORAL at 09:46

## 2019-05-18 RX ADMIN — PANTOPRAZOLE SODIUM 40 MG: 40 TABLET, DELAYED RELEASE ORAL at 06:24

## 2019-05-18 RX ADMIN — APIXABAN 5 MG: 5 TABLET, FILM COATED ORAL at 09:48

## 2019-05-18 RX ADMIN — IPRATROPIUM BROMIDE AND ALBUTEROL SULFATE 3 ML: .5; 3 SOLUTION RESPIRATORY (INHALATION) at 16:07

## 2019-05-18 RX ADMIN — IPRATROPIUM BROMIDE AND ALBUTEROL SULFATE 3 ML: .5; 3 SOLUTION RESPIRATORY (INHALATION) at 21:24

## 2019-05-18 RX ADMIN — TRAMADOL HYDROCHLORIDE 50 MG: 50 TABLET, FILM COATED ORAL at 09:46

## 2019-05-18 RX ADMIN — CITALOPRAM HYDROBROMIDE 20 MG: 20 TABLET ORAL at 09:48

## 2019-05-18 RX ADMIN — INSULIN LISPRO 9 UNITS: 100 INJECTION, SOLUTION INTRAVENOUS; SUBCUTANEOUS at 16:40

## 2019-05-18 RX ADMIN — ASPIRIN 81 MG: 81 TABLET, COATED ORAL at 09:48

## 2019-05-18 RX ADMIN — CALCIUM 500 MG: 500 TABLET ORAL at 09:48

## 2019-05-18 RX ADMIN — Medication 10 ML: at 20:11

## 2019-05-18 RX ADMIN — ACETAMINOPHEN 650 MG: 325 TABLET, FILM COATED ORAL at 16:41

## 2019-05-18 RX ADMIN — FLECAINIDE ACETATE 100 MG: 100 TABLET ORAL at 20:11

## 2019-05-18 RX ADMIN — CARVEDILOL 25 MG: 25 TABLET, FILM COATED ORAL at 09:47

## 2019-05-18 RX ADMIN — OXYCODONE HYDROCHLORIDE AND ACETAMINOPHEN 500 MG: 500 TABLET ORAL at 09:48

## 2019-05-18 RX ADMIN — DIGOXIN 125 MCG: 125 TABLET ORAL at 09:46

## 2019-05-18 RX ADMIN — CARVEDILOL 25 MG: 25 TABLET, FILM COATED ORAL at 16:40

## 2019-05-18 RX ADMIN — AMLODIPINE BESYLATE 10 MG: 5 TABLET ORAL at 09:48

## 2019-05-18 RX ADMIN — POTASSIUM CHLORIDE 20 MEQ: 1500 TABLET, EXTENDED RELEASE ORAL at 09:47

## 2019-05-18 RX ADMIN — HYDROCHLOROTHIAZIDE 12.5 MG: 25 TABLET ORAL at 09:47

## 2019-05-18 RX ADMIN — FLECAINIDE ACETATE 100 MG: 100 TABLET ORAL at 09:48

## 2019-05-18 RX ADMIN — INSULIN GLARGINE 25 UNITS: 100 INJECTION, SOLUTION SUBCUTANEOUS at 21:10

## 2019-05-18 RX ADMIN — METFORMIN HYDROCHLORIDE 500 MG: 500 TABLET ORAL at 16:40

## 2019-05-18 RX ADMIN — IPRATROPIUM BROMIDE AND ALBUTEROL SULFATE 3 ML: .5; 3 SOLUTION RESPIRATORY (INHALATION) at 12:27

## 2019-05-18 RX ADMIN — INSULIN LISPRO 3 UNITS: 100 INJECTION, SOLUTION INTRAVENOUS; SUBCUTANEOUS at 13:06

## 2019-05-18 ASSESSMENT — PAIN SCALES - GENERAL
PAINLEVEL_OUTOF10: 0
PAINLEVEL_OUTOF10: 8
PAINLEVEL_OUTOF10: 5
PAINLEVEL_OUTOF10: 7

## 2019-05-18 NOTE — CONSULTS
[facility]   Progress Note    5/18/2019 9:33 AM    Patient Information: Yi Hernandez   Date of Admit:  5/16/2019  Primary Care Physician:  Reinaldo Hall MD  Requesting Physician:  Reinaldo Hall MD    Reason for consult:   Evaluation and recommendations for NSCLC    Chief complaint:    Chief Complaint   Patient presents with    Fall     Patient arrived via [facility] squad for c/o fall at home around noon. Woke up at 3 pm on the floor. Contusion noted to right forehead, unsure if LOC.  per squad. History of Present Illness:  Yi Hernandez is a 70 y.o. female on Reinaldo Hall MD service who was admitted on 5/16/2019 for syncope. She is a patient of Halley Landeros with stage II non-small cell lung cancer. She completed 6 cycles chemotherapy with carboplatin and taxol on 4/22/2019. Radiation was completed on 4/29/2019. Plan is to start her on immunotherapy with Imfinzi if insurance authorizes it. Patient was hospitalized in early May 2019 with dehydration and weakness. Was treated with IV fluids and IV antiemetics. CTA chest showed stable disease in the lungs, possible subacute/chronic pulmonary embolism. She has hypoxia and is requiring oxygen at 2 LPM. She reports mid upper back pain. Doppler shows chronic superficial and deep venous thrombosis. She has been switched to Eliquis. MRI T-spine does not suggest vertebral mets    She is experiencing cough with clear sputum this morning. Past Medical History:     has a past medical history of Arthritis, CAD (coronary artery disease), Carpal tunnel syndrome, COPD (chronic obstructive pulmonary disease) ([address] Coronary stent, depression, Diabetes mellitus ([address] GERD (gastroesophageal reflux disease), Hyperlipidemia, Hypertension, MI (myocardial infarction) ([address] LIZETT (obstructive sleep apnea), PONV (postoperative nausea and vomiting), and stress incontinence.      Past Surgical History:    Past Surgical History:   Procedure Laterality Date    BACK SURGERY  2000, 2001    lumbar laminectomy    BRONCHOSCOPY  12/04/2018    BRONCHOSCOPY N/A 2/27/2019    BRONCHOSCOPY BIOPSY [facility]ON[facility]US [facility]  2/27/2019    BRONCHOSCOPY/TRANSBRON[facility]IAL NEEDLE [facility]  2/27[facility]    BRONCHOSCOPY ULTRASOUND performed by Bolivar Stevenson MD at [address] [facility], LAPAROSCOPIC N/A 12/19/2018    LAPAROSCOPIC CHOLECYSTECTOMY WITH CHOLANGIOGRAM performed by Christin Beaulieu MD at [address] COLONOSCOPY N/A 2/25/2019    COLONOSCOPY WITH BIOPSY [facility]ON[facility]  2/25/2019    COLONOSCOPY POLYPECTOMY SNARE/COLD BIOPSY performed by Damian Goode MD at [facility]  2000, 2001    [place] [address] INCL FLUOR GDNCE DX W/CELL WASHG SPX N/A 12/4/2018    BRONCHOSCOPY WITH LAVAGE performed by Marifer Vázquez MD at [place] [address] N/A 2/25/2019    EGD BIOPSY performed by Damian Goode MD at [address] ENDOSCOPY        Current Medications:     insulin glargine  25 Units Subcutaneous BID    apixaban  5 mg Oral BID    insulin lispro  0-18 Units Subcutaneous TID WC    insulin lispro  0-9 Units Subcutaneous Nightly    amLODIPine  10 mg Oral Daily    vitamin C  500 mg Oral Daily    aspirin  81 mg Oral Daily    calcium elemental  500 mg Oral Daily    citalopram  20 mg Oral QAM    digoxin  125 mcg Oral Daily    ezetimibe  10 mg Oral Daily    flecainide  100 mg Oral BID    Fluticasone-Umeclidin-Vilant  1 puff Inhalation Daily    hydrochlorothiazide  12.5 mg Oral Daily    ipratropium-albuterol  3 mL Inhalation Q4H WA    losartan  50 mg Oral Daily    metFORMIN  500 mg Oral BID WC    pantoprazole  40 mg Oral QAM AC    potassium chloride  20 mEq Oral Daily    carvedilol  25 mg Oral BID WC       Allergies: Allergies   Allergen Reactions    Cipro Xr      Swelling, rash    Lyrica [Pregabalin]      Shaking, swelling, rash    Penicillins      Swelling, rash    Sulfa Antibiotics      Swelling, rash        Social History:    reports that she quit smoking about 17 years ago. She has never used smokeless tobacco. She reports that she does not drink alcohol or use drugs. Family History:     family history includes Cancer in her maternal uncle and sister; Diabetes in her brother and sister; Heart Disease in her father and mother. ROS:      Per HPI; otherwise 10 point ROS is negative    PHYSICAL EXAM:    Vitals:  Vitals:    05/18/19 0909   BP:    Pulse:    Resp: 16   Temp:    SpO2: 96%      No intake or output data in the 24 hours ending 05/18/19 0933   Wt Readings from Last 3 Encounters:   05/18/19 161 lb 11.2 oz (73.3 kg)   05/05/19 169 lb 12.8 oz (77 kg)   04/26/19 164 lb 12.8 oz (74.8 kg)        General appearance: Appears comfortable. Eyes: Sclera clear, pupils equal  ENT: Moist mucus membranes, no thrush  Neck: Trachea midline, symmetrical  Cardiovascular: Regular rhythm, normal S1, S2. No murmur, gallop, rub. No edema in  lower extremities. Respiratory: Clear to auscultation bilaterally. No wheeze. Good inspiratory effort  Gastrointestinal: Abdomen soft, not tender, not distended, normal bowel sounds  Musculoskeletal: No cyanosis in digits, warm extremities  Neurologic: Cranial nerves grossly intact, no motor or speech deficits. Psychiatric: Normal affect. Alert and oriented to time, place and person. Skin: Warm, dry, normal turgor, no rash.  Has     DATA:  CBC:   Lab Results   Component Value Date    WBC 8.5 05/16/2019    RBC 2.91 05/16/2019    HGB 9.9 05/16/2019    HCT 30.0 05/16/2019    .1 05/16/2019    MCH 34.0 05/16/2019    MCHC 33.0 05/16/2019    RDW 20.5 05/16/2019     05/16/2019    MPV 7.0 05/16/2019     BMP:  Lab Results   Component     Lungs/pleura: Left hilar mass with postobstructive atelectasis in the   lingula.  Findings are similar as compared to multiple previous studies. Parasagittal enlarged lymph node measures 1.2 cm in short axis stable or   slightly decreased as compared to previous study. .  5 mm right middle lobe   pulmonary nodule unchanged from previous study. Alannah Human scarring within the   left lower lobe is also similar to previous examination.       Upper Abdomen: Peripherally calcified cystic lesion arising from the upper   pole the left kidney, similar to previous examination.       Soft Tissues/Bones: New subtle compression deformity involving the superior   endplate of T1.  Vertebral body hemangioma at T8.           Impression   Linear intraluminal filling defect within a segmental branch to the right   middle lobe is compatible with embolism although possibly subacute/chronic   given its web-like appearance.  No additional emboli.  No right heart strain   or pulmonary infarction.       Left hilar mass and adjacent periesophageal lymphadenopathy.       New subtle compression fracture involving the superior endplate of T1.  MRI   can be considered as clinically indicated.       Stable appearing 5-6 mm right middle lobe pulmonary nodule. Narrative   EXAMINATION:   CT OF THE HEAD WITHOUT CONTRAST  5/16/2019 5:19 pm       TECHNIQUE:   CT of the head was performed without the administration of intravenous   contrast. Dose modulation, iterative reconstruction, and/or weight based   adjustment of the mA/kV was utilized to reduce the radiation dose to as low   as reasonably achievable.       COMPARISON:   2/22/2019       HISTORY:   ORDERING SYSTEM PROVIDED HISTORY: syncope/fall   TECHNOLOGIST PROVIDED HISTORY:   Has a \"code stroke\" or \"stroke alert\" been called? ->No   Ordering Physician Provided Reason for Exam: syncope/fall   Acuity: Acute   Type of Exam: Initial       Initial evaluation.       FINDINGS: BRAIN/VENTRICLES: The ventricles and cisternal spaces are prominent   consistent with cerebral atrophy.  There is atherosclerotic calcification of   the cavernous carotids.   There are  areas of hypoattenuation in the   periventricular white matter and centrum semiovale that are likely related to   chronic small vessel ischemic disease.  There is no midline shift or mass   effect. No hemorrhage is identified in the brain parenchyma.       ORBITS: The visualized portion of the orbits demonstrate no acute abnormality.       SINUSES:  The visualized paranasal sinuses and mastoid air cells are clear.       SOFT TISSUES/SKULL:  No acute abnormality of the visualized skull or soft   tissues.           Impression   No acute intracranial abnormality.       Cerebral atrophy.  Chronic small vessel ischemic change.           IMPRESSION/RECOMMENDATIONS:    Active Problems:    Loss of consciousness (HCC)  Resolved Problems:    * No resolved hospital problems. *       1. Stage IIb left-sided adenocarcinoma of the lung:  Status post concurrent radiation and chemotherapy. Radiation was completed on 4/29/2019. Last chemotherapy with weekly carboplatin and Taxol was done on 4/22/2019. Planning to start maintenance Imfinzi soon as an outpt. 2. Syncope:  Management per primary team. CT head yesterday normal. ECHO stable with EF 60%. Has history of A. Fib. May need loop or event monitor to check rhythm. 3. Subacute vs. Chronic PE  Chronic thrombus by BLE dopplers. Switched from Lovenox to Eliquis. 4. Back pain  Has new T1 compression fracture. MRI thoracic spine does not show vertebral metastasis.        Kwasi Cox MD

## 2019-05-18 NOTE — PROGRESS NOTES
Clinical Pharmacy Note    Patient started on Eliquis for thrombus. Counseled patient on new medication including (but not limited to) dosing, what to look out for, what to do about missed doses, and when to call the doctor. Patient curious about copay - unable to check today with [facility] pharmacy since they are closed. If patient going home tomorrow, will call her pharmacy and if not going home until Monday, will follow up then. Patient had no further questions at this time.     Jory Santo, PharmD, [address]  PGY1 Pharmacy Resident  [id number]

## 2019-05-18 NOTE — PROGRESS NOTES
Assessment and med pass complete. Resting in bed watching TV. Offered assistance to bathroom but didn't need to go. Denies needs, call light in reach, bed alarm engaged.

## 2019-05-18 NOTE — CONSULTS
daily 4/26/19  Yes Ruth Farrar MD   flecainide (TAMBOCOR) 100 MG tablet Take 1 tablet by mouth 2 times daily 4/26/19  Yes Ruth Farrar MD   insulin glargine (LANTUS) 100 UNIT/ML injection pen Inject 20 Units into the skin 2 times daily 2/28/19  Yes Pepito Gomez MD   potassium chloride (KLOR-CON M) 20 MEQ extended release tablet Take 1 tablet by mouth daily 2/28/19  Yes Pepito Gomez MD   omeprazole (PRILOSEC) 20 MG delayed release capsule Take 20 mg by mouth daily   Yes Historical Provider, MD   aspirin 81 MG tablet Take 1 tablet by mouth daily 12/23/18  Yes Pepito Gomez MD   citalopram (CELEXA) 40 MG tablet Take 0.5 tablets by mouth every morning 12/23/18  Yes Pepito Gomez MD   metFORMIN (GLUCOPHAGE) 1000 MG tablet Take 0.5 tablets by mouth 2 times daily (with meals) 12/23/18  Yes Pepito Goemz MD   hydrochlorothiazide (HYDRODIURIL) 25 MG tablet Take 0.5 tablets by mouth daily  Patient taking differently: Take 25 mg by mouth daily  12/23/18  Yes Pepito Gomez MD   carvedilol (COREG) 25 MG tablet Take 1 tablet by mouth 2 times daily 12/13/18  Yes Ruth Farrar MD   ezetimibe (ZETIA) 10 MG tablet Take 10 mg by mouth daily   Yes Historical Provider, MD   losartan (COZAAR) 100 MG tablet Take 0.5 tablets by mouth daily 5/16/17  Yes MARIA C Moreno - CNP   amLODIPine (NORVASC) 10 MG tablet Take 10 mg by mouth daily    Yes Historical Provider, MD   acetaminophen 650 MG TABS Take 650 mg by mouth every 4 hours as needed for Pain (For mild pain level 1-3 or for fever > 100.5). 10/29/12  Yes Pepito Gomez MD   calcium carbonate (OSCAL) 500 MG TABS tablet Take 500 mg by mouth daily. Yes Historical Provider, MD   Ascorbic Acid (VITAMIN C) 500 MG tablet Take 500 mg by mouth daily.      Yes Historical Provider, MD   ipratropium-albuterol (DUONEB) 0.5-2.5 (3) MG/3ML SOLN nebulizer solution Inhale 3 mLs into the lungs every 4 hours (while awake) DX:COPD J44.9 3/8/19   [place] Relic MD Mimi   Fluticasone-Umeclidin-Vilant (TRELEGY ELLIPTA) 100-62.5-25 MCG/INH AEPB Inhale 1 puff into the lungs daily Currently not taking    Historical Provider, MD       PHYSICAL EXAM        Vitals:    05/18/19 0909   BP:    Pulse:    Resp: 16   Temp:    SpO2: 96%    Weight: 161 lb 11.2 oz (73.3 kg)     Gen Alert, coop, no distress Heart  RRR, no MRG, nl apical impulse   Head NC, AT, no abnorm Abd  Soft, NT, +BS, no mass, no OM   Eyes PERRLA, conj/corn clear Ext  Ext nl, AT, no C/C/E   Nose Nares nl, no drain, NT Pulse 2+ and symmetric   Throat Lips, mucosa, tongue nl Skin Color/text/turg nl, no rash/lesions   Neck S/S, TM, NT, no bruit/JVD Psych Nl mood and affect   Lung CTA-B, unlabored, no DTP Lymph   No cervical or axillary LA   Ch wall NT, no deform Neuro  Nl gross M/S exam     LABS     CBC:   Lab Results   Component Value Date    WBC 8.5 05/16/2019    RBC 2.91 05/16/2019    HGB 9.9 05/16/2019    HCT 30.0 05/16/2019    .1 05/16/2019    RDW 20.5 05/16/2019     05/16/2019     CMP:  Lab Results   Component Value Date     05/16/2019    K 3.9 05/16/2019    CL 92 05/16/2019    CO2 29 05/16/2019    BUN 25 05/16/2019    CREATININE 1.0 05/16/2019    GFRAA >60 05/16/2019    GFRAA >60 10/29/2012    AGRATIO 1.2 05/16/2019    LABGLOM 54 05/16/2019    GLUCOSE 346 05/16/2019    PROT 6.5 05/16/2019    CALCIUM 8.7 05/16/2019    BILITOT 0.3 05/16/2019    ALKPHOS 88 05/16/2019    AST 15 05/16/2019    ALT 16 05/16/2019     PT/INR:  No results found for: PTINR  Lab Results   Component Value Date    CKTOTAL 26 05/16/2019    TROPONINI <0.01 05/16/2019       ASSESSMENT AND PLAN     ~Syncope  Etiology unclear, related to PE?   Echo with normal EF, no significant valve pathology  EKG sinus with 1st AVB, no ischemic changes  Plan naayaan myoview   Outpatient monitor  ~CAD   Date EF Results   Sx   Syncope, no cp, sob   Hx   MV PCI   LHC 06  Patent RCA and Cx stent   MPI 10/16 49% Normal perfusion   TTE 5/19 60%

## 2019-05-19 LAB
EKG ATRIAL RATE: 250 BPM
EKG DIAGNOSIS: NORMAL
EKG Q-T INTERVAL: 398 MS
EKG QRS DURATION: 110 MS
EKG QTC CALCULATION (BAZETT): 464 MS
EKG R AXIS: 48 DEGREES
EKG T AXIS: 50 DEGREES
EKG VENTRICULAR RATE: 82 BPM
GLUCOSE BLD-MCNC: 117 MG/DL (ref 70–99)
GLUCOSE BLD-MCNC: 138 MG/DL (ref 70–99)
GLUCOSE BLD-MCNC: 145 MG/DL (ref 70–99)
GLUCOSE BLD-MCNC: 177 MG/DL (ref 70–99)
GLUCOSE BLD-MCNC: 179 MG/DL (ref 70–99)
PERFORMED ON: ABNORMAL

## 2019-05-19 PROCEDURE — 99221 1ST HOSP IP/OBS SF/LOW 40: CPT | Performed by: INTERNAL MEDICINE

## 2019-05-19 PROCEDURE — 97166 OT EVAL MOD COMPLEX 45 MIN: CPT

## 2019-05-19 PROCEDURE — 97116 GAIT TRAINING THERAPY: CPT

## 2019-05-19 PROCEDURE — 6370000000 HC RX 637 (ALT 250 FOR IP): Performed by: INTERNAL MEDICINE

## 2019-05-19 PROCEDURE — 94760 N-INVAS EAR/PLS OXIMETRY 1: CPT

## 2019-05-19 PROCEDURE — 93005 ELECTROCARDIOGRAM TRACING: CPT | Performed by: INTERNAL MEDICINE

## 2019-05-19 PROCEDURE — 97530 THERAPEUTIC ACTIVITIES: CPT

## 2019-05-19 PROCEDURE — 93010 ELECTROCARDIOGRAM REPORT: CPT | Performed by: INTERNAL MEDICINE

## 2019-05-19 PROCEDURE — 94640 AIRWAY INHALATION TREATMENT: CPT

## 2019-05-19 PROCEDURE — 1200000000 HC SEMI PRIVATE

## 2019-05-19 PROCEDURE — 2700000000 HC OXYGEN THERAPY PER DAY

## 2019-05-19 PROCEDURE — 99232 SBSQ HOSP IP/OBS MODERATE 35: CPT | Performed by: INTERNAL MEDICINE

## 2019-05-19 PROCEDURE — 97162 PT EVAL MOD COMPLEX 30 MIN: CPT

## 2019-05-19 RX ADMIN — APIXABAN 5 MG: 5 TABLET, FILM COATED ORAL at 10:18

## 2019-05-19 RX ADMIN — INSULIN LISPRO 3 UNITS: 100 INJECTION, SOLUTION INTRAVENOUS; SUBCUTANEOUS at 11:38

## 2019-05-19 RX ADMIN — CITALOPRAM HYDROBROMIDE 20 MG: 20 TABLET ORAL at 10:18

## 2019-05-19 RX ADMIN — IPRATROPIUM BROMIDE AND ALBUTEROL SULFATE 3 ML: .5; 3 SOLUTION RESPIRATORY (INHALATION) at 20:25

## 2019-05-19 RX ADMIN — POTASSIUM CHLORIDE 20 MEQ: 1500 TABLET, EXTENDED RELEASE ORAL at 10:18

## 2019-05-19 RX ADMIN — IPRATROPIUM BROMIDE AND ALBUTEROL SULFATE 3 ML: .5; 3 SOLUTION RESPIRATORY (INHALATION) at 07:24

## 2019-05-19 RX ADMIN — METFORMIN HYDROCHLORIDE 500 MG: 500 TABLET ORAL at 16:28

## 2019-05-19 RX ADMIN — TRAMADOL HYDROCHLORIDE 50 MG: 50 TABLET, FILM COATED ORAL at 16:29

## 2019-05-19 RX ADMIN — IPRATROPIUM BROMIDE AND ALBUTEROL SULFATE 3 ML: .5; 3 SOLUTION RESPIRATORY (INHALATION) at 15:13

## 2019-05-19 RX ADMIN — DIGOXIN 125 MCG: 125 TABLET ORAL at 10:17

## 2019-05-19 RX ADMIN — INSULIN GLARGINE 25 UNITS: 100 INJECTION, SOLUTION SUBCUTANEOUS at 20:04

## 2019-05-19 RX ADMIN — HYDROCHLOROTHIAZIDE 12.5 MG: 25 TABLET ORAL at 10:17

## 2019-05-19 RX ADMIN — IPRATROPIUM BROMIDE AND ALBUTEROL SULFATE 3 ML: .5; 3 SOLUTION RESPIRATORY (INHALATION) at 11:46

## 2019-05-19 RX ADMIN — INSULIN LISPRO 3 UNITS: 100 INJECTION, SOLUTION INTRAVENOUS; SUBCUTANEOUS at 16:29

## 2019-05-19 RX ADMIN — FLECAINIDE ACETATE 100 MG: 100 TABLET ORAL at 20:03

## 2019-05-19 RX ADMIN — PANTOPRAZOLE SODIUM 40 MG: 40 TABLET, DELAYED RELEASE ORAL at 06:16

## 2019-05-19 RX ADMIN — ASPIRIN 81 MG: 81 TABLET, COATED ORAL at 10:18

## 2019-05-19 RX ADMIN — CALCIUM 500 MG: 500 TABLET ORAL at 10:17

## 2019-05-19 RX ADMIN — OXYCODONE HYDROCHLORIDE AND ACETAMINOPHEN 500 MG: 500 TABLET ORAL at 10:18

## 2019-05-19 RX ADMIN — INSULIN GLARGINE 25 UNITS: 100 INJECTION, SOLUTION SUBCUTANEOUS at 10:16

## 2019-05-19 RX ADMIN — CARVEDILOL 25 MG: 25 TABLET, FILM COATED ORAL at 10:18

## 2019-05-19 RX ADMIN — CARVEDILOL 25 MG: 25 TABLET, FILM COATED ORAL at 16:28

## 2019-05-19 RX ADMIN — METFORMIN HYDROCHLORIDE 500 MG: 500 TABLET ORAL at 10:17

## 2019-05-19 RX ADMIN — FLECAINIDE ACETATE 100 MG: 100 TABLET ORAL at 10:17

## 2019-05-19 RX ADMIN — AMLODIPINE BESYLATE 10 MG: 5 TABLET ORAL at 10:17

## 2019-05-19 RX ADMIN — ACETAMINOPHEN 650 MG: 325 TABLET, FILM COATED ORAL at 10:17

## 2019-05-19 RX ADMIN — APIXABAN 10 MG: 5 TABLET, FILM COATED ORAL at 20:03

## 2019-05-19 ASSESSMENT — PAIN SCALES - GENERAL
PAINLEVEL_OUTOF10: 0
PAINLEVEL_OUTOF10: 4
PAINLEVEL_OUTOF10: 4
PAINLEVEL_OUTOF10: 0
PAINLEVEL_OUTOF10: 4
PAINLEVEL_OUTOF10: 0
PAINLEVEL_OUTOF10: 6

## 2019-05-19 ASSESSMENT — PAIN DESCRIPTION - PAIN TYPE
TYPE: ACUTE PAIN
TYPE: ACUTE PAIN

## 2019-05-19 ASSESSMENT — PAIN DESCRIPTION - LOCATION
LOCATION: HEAD
LOCATION: HEAD

## 2019-05-19 NOTE — CONSULTS
Pulmonary & Critical Care Medicine Consultation    ASSESSMENT:  · Small Right Middle Lobe Pulmonary Embolism  · Source was right lower leg DVT  · Related to lung cancer  · Too small to have caused syncope especially with normal RV and PA pressure on Echo  · Right Lower Extremity Deep Vein Thrombosis - due to lung cancer  · Left Lower Extremity Superficial Vein Thrombosis  · Left Lung Adenocarcinoma - treating with chemotherapy and radiation therapy  · COPD    PLAN:  · Anticoagulation is indicated  · Placement of IVC filter without anticoagulation would increase risk of IVC thrombosis with cancer  · Recommend Oncology consult to determine best anticoagulant in patient with lung cancer  · Continue Trelegy and Duoneb for COPD     Will sign off. REASON FOR CONSULTATION/CC: pulmonary embolism    CONSULTING PHYSICIAN: Dr. Joya Dance:  shortness of breath     HISTORY OF PRESENT ILLNESS:  She has persistent dyspnea. She was diagnosed with left lung adenocarcinoma recently. She has been treated with chemotherapy and radiation therapy. She was admitted after a syncopal episode. She was diagnosed with PE and DVT. She now is on Eliquis.      PAST MEDICAL HISTORY:  Past Medical History:   Diagnosis Date    Arthritis     CAD (coronary artery disease)     Carpal tunnel syndrome     COPD (chronic obstructive pulmonary disease) ([address]     Coronary stent     depression     Diabetes mellitus ([address]     GERD (gastroesophageal reflux disease)     Hyperlipidemia     Hypertension     Lung cancer ([address]     Adenocarcinoma of Left Lung    MI (myocardial infarction) ([address]     LIZETT (obstructive sleep apnea)     does not use CPAP    PONV (postoperative nausea and vomiting)     stress incontinence      PAST SURGICAL HISTORY:  Past Surgical History:   Procedure Laterality Date    BACK SURGERY  2000, 2001    lumbar laminectomy    BRONCHOSCOPY  12/04/2018    BRONCHOSCOPY N/A 2/27/2019    BRONCHOSCOPY BIOPSY BRONCHUS performed by Jef Keene MD at [facility]  2/27/2019    BRONCHOSCOPY/TRANSBRONCHIAL NEEDLE BIOPSY performed by Jef Keene MD at [facility]  2/27/2019    BRONCHOSCOPY ULTRASOUND performed by Jef Keene MD at [address] [facility], LAPAROSCOPIC N/A 12/19/2018    LAPAROSCOPIC CHOLECYSTECTOMY WITH CHOLANGIOGRAM performed by Steven Lennon MD at [facility] COLONOSCOPY N/A 2/25/2019    COLONOSCOPY WITH BIOPSY performed by Jonatan Hayes MD at [facility] COLONOSCOPY  2/25/2019    COLONOSCOPY POLYPECTOMY SNARE/COLD BIOPSY performed by Jonatan Hayes MD at [facility]  2000, 2001    [place] [address] INCL FLUOR GDNCE DX W/CELL WASHG SPX N/A 12/4/2018    BRONCHOSCOPY WITH LAVAGE performed by Mayo Iyer MD at [facility] ENDOSCOPY N/A 2/25/2019    EGD BIOPSY performed by Jonatan Hayes MD at [facility]:  family history includes Cancer in her maternal uncle and sister; Diabetes in her brother and sister; Heart Disease in her father and mother. SOCIAL HISTORY:   reports that she quit smoking about 17 years ago.  She has never used smokeless tobacco.    MEDICATIONS:  Scheduled Meds:   apixaban  10 mg Oral BID    insulin glargine  25 Units Subcutaneous BID    insulin lispro  0-18 Units Subcutaneous TID WC    insulin lispro  0-9 Units Subcutaneous Nightly    amLODIPine  10 mg Oral Daily    vitamin C  500 mg Oral Daily    aspirin  81 mg Oral Daily    calcium elemental  500 mg Oral Daily    citalopram  20 mg Oral QAM    digoxin  125 mcg Oral Daily    ezetimibe  10 mg Oral Daily    flecainide  100 mg Oral BID    Fluticasone-Umeclidin-Vilant  1 puff Inhalation Daily    hydrochlorothiazide  12.5 mg Oral Daily    ipratropium-albuterol  3 mL Inhalation Q4H WA    losartan  50 mg

## 2019-05-19 NOTE — PLAN OF CARE
Problem: Cardiac:  Goal: Cardiovascular alteration will improve  Description  Cardiovascular alteration will improve  Outcome: Ongoing  Intervention: Monitor cardiac arrhythmias  Note:   Night shift nurse stated pt had EKG showing afib over night. Pt in NSR today. Orthostatic BP appears positive. Fall precautions in place, bed alarm in place.

## 2019-05-19 NOTE — PROGRESS NOTES
INTERNAL MEDICINE    SUBJECTIVE:  Alert no complaints but mild swelling of ankles and feet right more than left, no back pain, but still c/o general LE weakness    OBJECTIVE alert       Physical    VITALS:  BP (!) 153/62   Pulse 71   Temp 97.7 °F (36.5 °C) (Temporal)   Resp 16   Ht 5' 5\" (1.651 m)   Wt 161 lb 11.2 oz (73.3 kg)   SpO2 96%   BMI 26.91 kg/m²   CONSTITUTIONAL:  awake, alert, cooperative, no distress, and appears stated age/alopecia starting from chemo- seems depressed  EYES:  Lids and lashes normal, pupils equal, round and reactive to light, extra ocular muscles intact, sclera clear, conjunctiva Pale  ENT:  Normocephalic, without obvious abnormality, atraumatic, sinuses nontender on palpation, external ears without lesions, oral pharynx with moist mucus membranes, tonsils without erythema or exudates, gums normal and good dentition. NECK:  Supple, symmetrical, trachea midline, no adenopathy, thyroid symmetric, not enlarged and no tenderness, skin normal  BACK:  Symmetric, no curvature, spinous processes are non-tender on palpation, paraspinous muscles are non-tender on palpation, no costal vertebral tenderness  LUNGS:  No increased work of breathing, fair air exchange, clear to auscultation bilaterally, no crackles or wheezing  CARDIOVASCULAR:  Normal apical impulse, regular rate and rhythm, normal S1 and S2, no S3 or S4, and no murmur noted  ABDOMEN:  + scars, normal bowel sounds, soft, non-distended, non-tender, no masses palpated, no hepatosplenomegally  MUSCULOSKELETAL:  there is no redness, warmth, or swelling of the joints. Mild swelling of LE  NEUROLOGIC:  Awake, alert, oriented to name, place and time. Cranial nerves II-XII are grossly intact.      Lab Results   Component Value Date    WBC 8.5 05/16/2019    RBC 2.91 05/16/2019    HGB 9.9 05/16/2019    HCT 30.0 05/16/2019     05/16/2019    .1 05/16/2019    MCH 34.0 05/16/2019    MCHC 33.0 05/16/2019    RDW 20.5 05/16/2019 SEGSPCT 50.2 10/29/2012    BANDSPCT 2 05/03/2019    METASPCT 1 05/02/2019    LYMPHOPCT 13.9 05/16/2019    MONOPCT 11.1 05/16/2019    MYELOPCT 4 05/02/2019    BASOPCT 0.9 05/16/2019    MONOSABS 0.9 05/16/2019    LYMPHSABS 1.2 05/16/2019    EOSABS 0.2 05/16/2019    BASOSABS 0.1 05/16/2019    DIFFTYPE Auto 10/29/2012     BMP:    Lab Results   Component Value Date     05/16/2019    K 3.9 05/16/2019    CL 92 05/16/2019    CO2 29 05/16/2019    BUN 25 05/16/2019    LABALBU 3.6 05/16/2019    CREATININE 1.0 05/16/2019    CALCIUM 8.7 05/16/2019    GFRAA >60 05/16/2019    GFRAA >60 10/29/2012    LABGLOM 54 05/16/2019    GLUCOSE 346 05/16/2019       ASSESSMENT AND PLAN  Syncope etiology unclear  Hx PAF but no arrythmia since admission  doppler show subacute to  chronic  DVT  lung ca    Patient Active Problem List   Diagnosis    [facility] mellitus [facility]    Dyslipidemia    Mitral and aortic valve disease    Essential hypertension, benign    Coronary atherosclerosis of native coronary artery    Sleep apnea    Chest pain    Carpal tunnel syndrome    SOB (shortness of breath)    COPD, severe (HCC)    Influenza    Paroxysmal atrial fibrillation (HCC)    Chronic cough    Hilar mass    Acute cholecystitis    Gallstones and inflammation of gallbladder without obstruction    Atrial fibrillation with rapid ventricular response (HCC)    Acute respiratory failure with hypoxia (HCC)    Centrilobular emphysema (HCC)    Ileus following gastrointestinal surgery    Mediastinal adenopathy    COPD exacerbation (HCC)    Adenocarcinoma of right lung (HCC)    Dehydration    Mild malnutrition (HCC)    Loss of consciousness ([address]    Syncope and collapse    Subacute pulmonary embolism (HCC)       Will need anticoagulation but I have SERIOUS CONCERNS in this woman who has had multiple falls in the past few months, lives alone with limited help undergoing tx for lung ca    Possible that rhythm caused syncope but seems that constellation of other factors from poorly controlled sugar, effects of ca tx, depression from her own illness and spouse currently in Hospice with resultant weakness are real culprit.  She cannot safely stay alone but will not consider other options due to cost and wish to be in her home    Have discussed this with daughter in recent past      Theanson Landa M.D.

## 2019-05-19 NOTE — PROGRESS NOTES
gait  Prognosis: Fair  Decision Making: Medium Complexity  Clinical Presentation: evolving  Patient Education: educated on role of acute PT, POC, d/c recommendations  Barriers to Learning: None  REQUIRES PT FOLLOW UP: Yes  Activity Tolerance  Activity Tolerance: Patient limited by fatigue  Activity Tolerance: limited by dizziness       Patient Diagnosis(es): The primary encounter diagnosis was Syncope and collapse. Diagnoses of Subacute pulmonary embolism ([address] Closed fracture of first thoracic vertebra, unspecified fracture morphology, initial encounter ([facility] Hyperglycemia, and Contusion of forehead, initial encounter were also pertinent to this visit. has a past medical history of Arthritis, CAD (coronary artery disease), Carpal tunnel syndrome, COPD (chronic obstructive pulmonary disease) ([address] Coronary stent, depression, Diabetes mellitus ([address] GERD (gastroesophageal reflux disease), Hyperlipidemia, Hypertension, MI (myocardial infarction) ([address] LIZETT (obstructive sleep apnea), PONV (postoperative nausea and vomiting), and stress incontinence. has a past surgical history that includes Coronary angioplasty with stent (2000, 2001); back surgery (2000, 2001); bronchoscopy (12/04/2018); pr ncc incl fluor gdnce dx w/cell washg spx (N/A, 12/4/2018); Cholecystectomy, laparoscopic (N/A, 12/19/2018); Upper gastrointestinal endoscopy (N/A, 2/25/2019); Colonoscopy (N/A, 2/25/2019); Colonoscopy (2/25/2019); bronchoscopy (N/A, 2/27/2019); bronchoscopy (2/27/2019); and bronchoscopy (2/27/2019). Restrictions  Restrictions/Precautions  Restrictions/Precautions: (HIGH FALL RISK)  Required Braces or Orthoses?: No  Position Activity Restriction  Other position/activity restrictions: Briefly, Marta Goodson is a 70 y.o. female  who presents to the ED complaining of frequent falls. She reports feeling worse in general since starting chemo and radiation. She is also hyperglycemic.  She reports LOC for maybe a few hours.  She says she woke up around 3pm after last seeing the clock at noon. She also c/o some R sided upper neck pain. No fevers or recent illnesses. She is not acutely SOB or having CP. Found to have acute PE and indeterminate age DVT in RLE  Vision/Hearing  Vision: Impaired  Vision Exceptions: Wears glasses for reading  Hearing: Within functional limits     Subjective  General  Chart Reviewed: Yes  Family / Caregiver Present: No  Diagnosis: LOC  Follows Commands: Within Functional Limits  General Comment  Comments: supine in bed upon arrival with bed alarm on  Subjective  Subjective: Reported headache following session and requesting medication - alerted nursing. Agreeable to therapy. Pain Screening  Patient Currently in Pain: Denies          Orientation  Orientation  Overall Orientation Status: Within Functional Limits  Social/Functional History  Social/Functional History  Lives With: Alone  Type of Home: House  Home Layout: Two level, Bed/Bath upstairs(13 steps between levels with HR)  Home Access: Stairs to enter with rails  Entrance Stairs - Number of Steps: 3 KATI  Bathroom Shower/Tub: Tub/Shower unit, Walk-in shower(prefers walkin shower)  Bathroom Equipment: Grab bars in shower, Built-in shower seat  Home Equipment: Cane, Crutches, Standard walker, 4 wheeled walker  Receives Help From: Family  ADL Assistance: Independent  Homemaking Assistance: Independent  Ambulation Assistance: Independent(uses 4WW occasionally)  Transfer Assistance: Independent  Active : Yes  Mode of Transportation: Car  Additional Comments: PLOF per pt report: Independent with ADL's, ambulates using 4WW as needed, Independent with IADL's, reports 2 falls/week in last 6 months. No home O2.      Objective          AROM RLE (degrees)  RLE AROM: WFL  AROM LLE (degrees)  LLE AROM : WFL  Strength RLE  Strength RLE: WFL  Strength LLE  Strength LLE: WFL        Bed mobility  Supine to Sit: Supervision  Sit to Supine: Supervision  Transfers  Sit to Stand: Stand by assistance(from bed and chair)  Stand to sit: Stand by assistance  Ambulation 1  Surface: level tile  Other Apparatus: O2(2L O2)  Assistance: Contact guard assistance  Quality of Gait: steady gait, decreased catalina, CGA provided due to report of dizziness  Distance: 100 feet x 2  Comments: reported dizziness with ambulation. 1 seated rest break needed. BP following ambulation 107/51 - discussed with nursing     Balance  Sitting - Static: Good  Sitting - Dynamic: Good  Standing - Static: Fair  Standing - Dynamic: [address]  Times per week: 3-5  Times per day: Daily  Current Treatment Recommendations: Strengthening, Balance Training, Functional Mobility Training, Transfer Training, Gait Training, Stair training, Endurance Training, Home Exercise Program, Safety Education & Training  Safety Devices  Type of devices: All fall risk precautions in place, Call light within reach, Bed alarm in place, Nurse notified, Patient at risk for falls, Left in bed, Gait belt  Restraints  Initially in place: No                        AM-PAC Score  AM-PAC Inpatient Mobility Raw Score : 21  AM-PAC Inpatient T-Scale Score : 50.25  Mobility Inpatient CMS 0-100% Score: 28.97  Mobility Inpatient CMS G-Code Modifier : CJ          Goals  Short term goals  Time Frame for Short term goals: To be met prior to discharge  Short term goal 1: Bed mobility with mod I  Short term goal 2: Sit to/from stand with mod I  Short term goal 3: Ambulate 200 feet with AAD and mod I  Short term goal 4: Navigate up/down 1 flight of steps with rail and mod I  Patient Goals   Patient goals :  To go home       Therapy Time   Individual Concurrent Group Co-treatment   Time In 3197         Time Out 1329         Minutes 23         Timed Code Treatment Minutes: 8 Minutes       Moody Sellers, PT    Thanks, Moody Sellers, PT, DPT [id number]

## 2019-05-19 NOTE — PROGRESS NOTES
Assessment and med pass complete. Takes meds whole with water. Orthostatic vitals charted. Assisted to bathroom and back to bed with standby assist. Denies other needs, call light in reach, bed alarm engaged.

## 2019-05-19 NOTE — CONSULTS
results for input(s): BNP in the last 72 hours. Plan:  ~Syncope  Etiology unclear, related to PE?   Echo with normal EF, no significant valve pathology  EKG sinus with 1st AVB, no ischemic changes  Plan     Reba Summers tomorrow              Outpatient monitor  ~CAD    Date EF Results   Sx     Syncope, no cp, sob   Hx     MV PCI   [facility] 06   Patent RCA and Cx stent   MPI 10/16 49% Normal perfusion   TTE 5/19 60%     Plan     No evidence for ACS  Lexiscan MPI    ~Afib    Date Detail   Type   paroxysmal   R/R   regular   R/RM   Reviewed   CVS   >1   AC/AP   None due to patient declining per Dr. River Rizvi notes   Plan   · Patient historically refused AC  · On eliquis and agreeable now  · While patient does possess fall risk, she has afib and malignancy associated SVT/DVT/PE and benefits likely outweigh the risks   ~HTN  Today BP Controlled   Counseling Counseled on diet/salt, exercise and ideal body weight   Plan Continue current meds at doses above   ~NSCLC  S/p radiation and chemo  Per hemonc  ~DVT/SVT  Eliquis 10bid  Currently on 5bid, consider 10bid for 7 days given acute SVT/DVT/PE

## 2019-05-19 NOTE — PROGRESS NOTES
Attempted to wean oxygen from 2 Lpm with SpO2  97%. Patient SpO2 dropped to 89%  On 1 Lpm. Turned back up to 2 Lpm with SpO2 94%.

## 2019-05-19 NOTE — PROGRESS NOTES
Occupational Therapy   Occupational Therapy Initial Assessment  Date: 2019   Patient Name: Deangelo Guo  MRN: [id number]     : 1940    Date of Service: 2019    Discharge Recommendations:Kenya Bolaños scored a 18/24 on the AM-PAC ADL Inpatient form. Current research shows that an AM-PAC score of 18 or greater is typically associated with a discharge to the patient's home setting. Based on the patients AM-PAC score and their current ADL deficits, it is recommended that the patient have 2-3 sessions per week of Occupational Therapy at d/c to increase the patients independence. HOME HEALTH CARE: LEVEL 2 SOCIAL     - Initial home health evaluation to occur within 24-48 hours, in patient home   - Therapy to evaluate with goal of regaining prior level of functioning   - Therapy to evaluate if patient has [address] needs for personal care   -  evaluation within 24-48 hours, includes evaluation of resources and insurance to determine AL/IL/LTC/Medicaid options   - [place] [facility] Referral   - Delfino Marie to discuss home support and care needs post discharge within two weeks of discharge. OT Equipment Recommendations  Equipment Needed: Yes  Mobility Devices: ADL Assistive Devices  ADL Assistive Devices: Emergency Alert System    Assessment   Performance deficits / Impairments: Decreased functional mobility ; Decreased balance;Decreased ADL status; Decreased endurance;Decreased high-level IADLs  Assessment: Pt presents with the above deficits impacting daily occupational performance and would benefit from continued skilled OT services. Pt does not seem open to idea of continued skilled OT services. Pt's report of multiple falls concerns OT. Pt resistant to idea of DC to SNF. Per chart review,  in hospice care. Treatment Diagnosis: Decreased mobility, ADL status, ADL transfers, balance, endurance, high level IADL's associated with LOC.    Prognosis: Good  Decision Making: Low Complexity  Patient Education: OT Eval, POC, safe transfers, safe mobility, fall precautions, DC plans/recommendations  REQUIRES OT FOLLOW UP: Yes  Activity Tolerance  Activity Tolerance: Patient Tolerated treatment well  Safety Devices  Safety Devices in place: Yes  Type of devices: All fall risk precautions in place;Gait belt;Bed alarm in place; Patient at risk for falls; Left in bed;Call light within reach;Nurse notified  Restraints  Initially in place: No           Patient Diagnosis(es): The primary encounter diagnosis was Syncope and collapse. Diagnoses of Subacute pulmonary embolism ([facility] Closed fracture of first thoracic vertebra, unspecified fracture morphology, initial encounter ([facility] Hyperglycemia, and Contusion of forehead, initial encounter were also pertinent to this visit. has a past medical history of Arthritis, CAD (coronary artery disease), Carpal tunnel syndrome, COPD (chronic obstructive pulmonary disease) ([facility] Coronary stent, depression, Diabetes mellitus ([facility] GERD (gastroesophageal reflux disease), Hyperlipidemia, Hypertension, MI (myocardial infarction) ([facility] LIZETT (obstructive sleep apnea), PONV (postoperative nausea and vomiting), and stress incontinence. has a past surgical history that includes Coronary angioplasty with stent (2000, 2001); back surgery (2000, 2001); bronchoscopy (12/04/2018); pr [facility] incl fluor gdnce dx w/cell washg spx (N/A, 12/4/2018); Cholecystectomy, laparoscopic (N/A, 12/19/2018); Upper gastrointestinal endoscopy (N/A, 2/25/2019); Colonoscopy (N/A, 2/25/2019); Colonoscopy (2/25/2019); bronchoscopy (N/A, 2/27/2019); bronchoscopy (2/27/2019); and bronchoscopy (2/27/2019). Treatment Diagnosis: Decreased mobility, ADL status, ADL transfers, balance, endurance, high level IADL's associated with LOC.        Restrictions  Restrictions/Precautions  Restrictions/Precautions: (HIGH FALL RISK)  Required Braces or Orthoses?: No  Position Activity Restriction  Other position/activity restrictions: Briefly, Marta Goodson is a 70 y.o. female  who presents to the ED complaining of frequent falls. She reports feeling worse in general since starting chemo and radiation. She is also hyperglycemic. She reports LOC for maybe a few hours. She says she woke up around 3pm after last seeing the clock at noon. She also c/o some R sided upper neck pain. No fevers or recent illnesses. She is not acutely SOB or having CP. Found to have acute PE and indeterminate age DVT in RLE    Subjective   General  Chart Reviewed: Yes  Family / Caregiver Present: No  Diagnosis: Loss of Consciousness  Subjective  Subjective: Pt supine in bed on arrival and agreeable for session. Pain Report: Denied     Social/Functional History  Social/Functional History  Lives With: Alone  Type of Home: House  Home Layout: Two level, Bed/Bath upstairs(13 steps between levels with HR)  Home Access: Stairs to enter with rails  Entrance Stairs - Number of Steps: 3 KATI  Bathroom Shower/Tub: Tub/Shower unit, Walk-in shower(prefers walkin shower)  Bathroom Equipment: Grab bars in shower, Built-in shower seat  Home Equipment: Cane, Crutches, Standard walker, 4 wheeled walker  Receives Help From: Family  ADL Assistance: Independent  Homemaking Assistance: Independent  Ambulation Assistance: Independent(uses 4WW occasionally)  Transfer Assistance: Independent  Active : Yes  Mode of Transportation: Car  Additional Comments: PLOF per pt report: Independent with ADL's, ambulates using 4WW as needed, Independent with IADL's, reports 2 falls/week in last 6 months. No home O2.         Objective   Vision: Impaired  Vision Exceptions: Wears glasses for reading  Hearing: Within functional limits    Orientation  Overall Orientation Status: Within Normal Limits  Observation/Palpation  Posture: Good  Balance  Sitting Balance: Supervision  Standing Balance: Stand by assistance  ADL  Feeding: using RW for ADL's/functional mobility   Short term goal 5: Mod Independent UB/LB ADL's  Long term goals  Time Frame for Long term goals : LTG=STG       Therapy Time   Individual Concurrent Group Co-treatment   Time In 1305         Time Out 1328         Minutes 23              Timed Code Treatment Minutes:   8 minutes    Total Treatment Minutes:  23 minutes      HAL JESSICA [address] , [address] [place] Sam Yung, OTR/L [id number]

## 2019-05-19 NOTE — PROGRESS NOTES
Patient appears to be going in and out of NS 1st degree AVB and A-flutter/fib, stat EKG ordered. Patient asymptomatic, already on Eliquis.

## 2019-05-20 PROBLEM — S22.000A CLOSED COMPRESSION FRACTURE OF THORACIC VERTEBRA (HCC): Status: ACTIVE | Noted: 2019-05-20

## 2019-05-20 PROBLEM — I82.503 CHRONIC DEEP VEIN THROMBOSIS (DVT) OF BOTH LOWER EXTREMITIES (HCC): Status: ACTIVE | Noted: 2019-05-20

## 2019-05-20 LAB
GLUCOSE BLD-MCNC: 103 MG/DL (ref 70–99)
GLUCOSE BLD-MCNC: 123 MG/DL (ref 70–99)
GLUCOSE BLD-MCNC: 158 MG/DL (ref 70–99)
GLUCOSE BLD-MCNC: 170 MG/DL (ref 70–99)
LV EF: 58 %
LVEF MODALITY: NORMAL
PERFORMED ON: ABNORMAL

## 2019-05-20 PROCEDURE — 97535 SELF CARE MNGMENT TRAINING: CPT

## 2019-05-20 PROCEDURE — 97530 THERAPEUTIC ACTIVITIES: CPT

## 2019-05-20 PROCEDURE — 93017 CV STRESS TEST TRACING ONLY: CPT | Performed by: INTERNAL MEDICINE

## 2019-05-20 PROCEDURE — 6370000000 HC RX 637 (ALT 250 FOR IP): Performed by: INTERNAL MEDICINE

## 2019-05-20 PROCEDURE — 3430000000 HC RX DIAGNOSTIC RADIOPHARMACEUTICAL: Performed by: INTERNAL MEDICINE

## 2019-05-20 PROCEDURE — 94760 N-INVAS EAR/PLS OXIMETRY 1: CPT

## 2019-05-20 PROCEDURE — 99232 SBSQ HOSP IP/OBS MODERATE 35: CPT | Performed by: INTERNAL MEDICINE

## 2019-05-20 PROCEDURE — 6360000002 HC RX W HCPCS: Performed by: INTERNAL MEDICINE

## 2019-05-20 PROCEDURE — 99223 1ST HOSP IP/OBS HIGH 75: CPT | Performed by: PSYCHIATRY & NEUROLOGY

## 2019-05-20 PROCEDURE — 1200000000 HC SEMI PRIVATE

## 2019-05-20 PROCEDURE — A9502 TC99M TETROFOSMIN: HCPCS | Performed by: INTERNAL MEDICINE

## 2019-05-20 PROCEDURE — 94640 AIRWAY INHALATION TREATMENT: CPT

## 2019-05-20 PROCEDURE — 78452 HT MUSCLE IMAGE SPECT MULT: CPT | Performed by: INTERNAL MEDICINE

## 2019-05-20 RX ORDER — AMINOPHYLLINE DIHYDRATE 25 MG/ML
100 INJECTION, SOLUTION INTRAVENOUS ONCE
Status: COMPLETED | OUTPATIENT
Start: 2019-05-20 | End: 2019-05-20

## 2019-05-20 RX ADMIN — INSULIN GLARGINE 25 UNITS: 100 INJECTION, SOLUTION SUBCUTANEOUS at 11:01

## 2019-05-20 RX ADMIN — TETROFOSMIN 10 MILLICURIE: 1.38 INJECTION, POWDER, LYOPHILIZED, FOR SOLUTION INTRAVENOUS at 07:33

## 2019-05-20 RX ADMIN — FLECAINIDE ACETATE 100 MG: 100 TABLET ORAL at 10:58

## 2019-05-20 RX ADMIN — CARVEDILOL 25 MG: 25 TABLET, FILM COATED ORAL at 17:49

## 2019-05-20 RX ADMIN — INSULIN GLARGINE 25 UNITS: 100 INJECTION, SOLUTION SUBCUTANEOUS at 20:03

## 2019-05-20 RX ADMIN — CITALOPRAM HYDROBROMIDE 20 MG: 20 TABLET ORAL at 10:59

## 2019-05-20 RX ADMIN — IPRATROPIUM BROMIDE AND ALBUTEROL SULFATE 3 ML: .5; 3 SOLUTION RESPIRATORY (INHALATION) at 12:41

## 2019-05-20 RX ADMIN — INSULIN LISPRO 3 UNITS: 100 INJECTION, SOLUTION INTRAVENOUS; SUBCUTANEOUS at 11:01

## 2019-05-20 RX ADMIN — IPRATROPIUM BROMIDE AND ALBUTEROL SULFATE 3 ML: .5; 3 SOLUTION RESPIRATORY (INHALATION) at 20:24

## 2019-05-20 RX ADMIN — AMLODIPINE BESYLATE 10 MG: 5 TABLET ORAL at 10:58

## 2019-05-20 RX ADMIN — ACETAMINOPHEN 650 MG: 325 TABLET, FILM COATED ORAL at 10:57

## 2019-05-20 RX ADMIN — POTASSIUM CHLORIDE 20 MEQ: 1500 TABLET, EXTENDED RELEASE ORAL at 12:52

## 2019-05-20 RX ADMIN — APIXABAN 10 MG: 5 TABLET, FILM COATED ORAL at 20:03

## 2019-05-20 RX ADMIN — METFORMIN HYDROCHLORIDE 500 MG: 500 TABLET ORAL at 17:49

## 2019-05-20 RX ADMIN — AMINOPHYLLINE 100 MG: 25 INJECTION, SOLUTION INTRAVENOUS at 08:57

## 2019-05-20 RX ADMIN — TETROFOSMIN 30 MILLICURIE: 1.38 INJECTION, POWDER, LYOPHILIZED, FOR SOLUTION INTRAVENOUS at 09:00

## 2019-05-20 RX ADMIN — REGADENOSON 0.4 MG: 0.08 INJECTION, SOLUTION INTRAVENOUS at 08:54

## 2019-05-20 RX ADMIN — FLECAINIDE ACETATE 100 MG: 100 TABLET ORAL at 20:02

## 2019-05-20 RX ADMIN — IPRATROPIUM BROMIDE AND ALBUTEROL SULFATE 3 ML: .5; 3 SOLUTION RESPIRATORY (INHALATION) at 16:18

## 2019-05-20 RX ADMIN — OXYCODONE HYDROCHLORIDE AND ACETAMINOPHEN 500 MG: 500 TABLET ORAL at 10:59

## 2019-05-20 RX ADMIN — CALCIUM 500 MG: 500 TABLET ORAL at 10:58

## 2019-05-20 RX ADMIN — CARVEDILOL 25 MG: 25 TABLET, FILM COATED ORAL at 10:58

## 2019-05-20 RX ADMIN — APIXABAN 10 MG: 5 TABLET, FILM COATED ORAL at 12:52

## 2019-05-20 RX ADMIN — METFORMIN HYDROCHLORIDE 500 MG: 500 TABLET ORAL at 10:57

## 2019-05-20 RX ADMIN — DIGOXIN 125 MCG: 125 TABLET ORAL at 10:58

## 2019-05-20 RX ADMIN — INSULIN LISPRO 3 UNITS: 100 INJECTION, SOLUTION INTRAVENOUS; SUBCUTANEOUS at 12:39

## 2019-05-20 RX ADMIN — PANTOPRAZOLE SODIUM 40 MG: 40 TABLET, DELAYED RELEASE ORAL at 05:22

## 2019-05-20 ASSESSMENT — PAIN DESCRIPTION - PAIN TYPE
TYPE: ACUTE PAIN
TYPE: ACUTE PAIN

## 2019-05-20 ASSESSMENT — PAIN SCALES - GENERAL
PAINLEVEL_OUTOF10: 0
PAINLEVEL_OUTOF10: 3
PAINLEVEL_OUTOF10: 0
PAINLEVEL_OUTOF10: 3
PAINLEVEL_OUTOF10: 0
PAINLEVEL_OUTOF10: 3
PAINLEVEL_OUTOF10: 0
PAINLEVEL_OUTOF10: 3

## 2019-05-20 ASSESSMENT — PAIN DESCRIPTION - DESCRIPTORS: DESCRIPTORS: HEADACHE

## 2019-05-20 ASSESSMENT — PAIN DESCRIPTION - LOCATION
LOCATION: HEAD
LOCATION: HEAD

## 2019-05-20 NOTE — CONSULTS
OF SYSTEMS:   CONSTITUTIONAL: negative for fevers, chills, diaphoresis, appetite change, fatigue, night sweats and unexpected weight change. HEENT: negative for hearing loss, tinnitus, ear drainage, sinus pressure, nasal congestion, epistaxis and snoring. RESPIRATORY: Negative for hemoptysis, cough, sputum production. CARDIOVASCULAR: negative for chest pain, palpitations, exertional chest pressure/discomfort, edema, syncope. GASTROINTESTINAL: negative for nausea, vomiting, diarrhea, constipation, blood in stool and abdominal pain. GENITOURINARY: negative for frequency, dysuria, urinary incontinence, decreased urine volume, and hematuria. HEMATOLOGIC/LYMPHATIC: negative for easy bruising, bleeding and lymphadenopathy. ALLERGIC/IMMUNOLOGIC: negative for recurrent infections, angioedema, anaphylaxis and drug reactions. ENDOCRINE: negative for weight changes and diabetic symptoms including polyuria, polydipsia and polyphagia. MUSCULOSKELETAL: negative for pain, joint swelling, decreased range of motion and muscle weakness. NEUROLOGICAL: negative for headaches, slurred speech, unilateral weakness. PSYCHIATRIC/BEHAVIORAL: negative for hallucinations, behavioral problems, confusion and agitation. All pertinent positives are noted in the HPI. Physical Examination:  Vitals:   Patient Vitals for the past 24 hrs:   BP Temp Temp src Pulse Resp SpO2 Weight   05/20/19 1030 (!) 173/67 96.8 °F (36 °C) Temporal 79 18 97 % --   05/20/19 0315 (!) 124/51 96.8 °F (36 °C) Temporal 69 18 97 % 163 lb 3.2 oz (74 kg)   05/20/19 0000 (!) 144/66 98.4 °F (36.9 °C) Oral 71 18 95 % --   05/19/19 2230 -- -- -- 68 -- -- --   05/19/19 2028 -- -- -- -- 20 93 % --   05/19/19 1945 131/68 98 °F (36.7 °C) Temporal 68 18 94 % --   05/19/19 1600 (!) 139/53 98 °F (36.7 °C) Oral 71 18 95 % --   05/19/19 1516 -- -- -- -- 20 94 % --   05/19/19 1149 -- -- -- -- 18 95 % --     Psych: Stable mood, normal judgement, normal affect. Const: No distress  Eyes: Conjunctiva noninjected, no icterus noted; pupils equal, round. HENT: Atraumatic, normocephalic; Oral mucosa moist  Neck: Trachea midline, neck supple. No thyromegaly noted. CV: No audible murmurs  Resp: No increased WOB, no audible wheezing   GI: Nondistended   Neuro: Alert, oriented, appropriate. Skin: No visible abnormalities  MSK: No joint abnormalities noted. Ext: No significant edema appreciated. No varicosities. Lab Results   Component Value Date    WBC 8.5 05/16/2019    HGB 9.9 (L) 05/16/2019    HCT 30.0 (L) 05/16/2019    .1 (H) 05/16/2019     05/16/2019     Lab Results   Component Value Date    INR 0.95 02/27/2019    INR 1.02 12/18/2018    INR 1.01 12/04/2018    PROTIME 10.8 02/27/2019    PROTIME 11.6 12/18/2018    PROTIME 11.5 12/04/2018     Lab Results   Component Value Date    CREATININE 1.0 05/16/2019    BUN 25 (H) 05/16/2019     (L) 05/16/2019    K 3.9 05/16/2019    CL 92 (L) 05/16/2019    CO2 29 05/16/2019     Lab Results   Component Value Date    ALT 16 05/16/2019    AST 15 05/16/2019    ALKPHOS 88 05/16/2019    BILITOT 0.3 05/16/2019       Most recent imaging studies revealed   EXAMINATION:   CTA OF THE CHEST 5/16/2019 5:23 pm       TECHNIQUE:   CTA of the chest was performed after the administration of intravenous   contrast.  Multiplanar reformatted images are provided for review.  MIP   images are provided for review.  Dose modulation, iterative reconstruction,   and/or weight based adjustment of the mA/kV was utilized to reduce the   radiation dose to as low as reasonably achievable.       COMPARISON:   None.       HISTORY:   ORDERING SYSTEM PROVIDED HISTORY: SHORTNESS OF BREATH   TECHNOLOGIST PROVIDED HISTORY:   Ordering Physician Provided Reason for Exam: Shortness of breath; suspect PE   Acuity: Acute   Type of Exam: Initial       FINDINGS:   Pulmonary Arteries: Pulmonary arteries are adequately opacified for   evaluation. Juana lewis a Indeterminate age totally occluding deep vein thrombosis involving the right   posterior tibial vein zone 6. No other evidence of deep vein or superficial vein thrombosis involving the   right lower extremity. Spoke with Elissa GUZMAN on 3A. Left Impression   Indeterminate partially occluding superficial venous thrombosis involving the   left sapheno-femoral junction. Indeterminate totally occluding superficial venous thrombosis involving the   left GSV zone 1-5. No other evidence of deep vein or superficial vein thrombosis involving the   left lower extremity.       Conclusions        Summary        -Indeterminate age totally occluding deep vein thrombosis involving the    right posterior tibial vein zone 6.    -Indeterminate partially occluding superficial venous thrombosis involving    the left sapheno-femoral junction.    -Indeterminate totally occluding superficial venous thrombosis involving the    left GSV zone 1-5.             The above laboratory data have been reviewed. The above imaging data have been reviewed. The above medical testing have been reviewed. Body mass index is 27.16 kg/m². Assessment and Plan:  Ataxia: PT/OT  Syncope: Cardiac workup  Paroxysmal A. Fib  Bilateral DVT and PE  Non-small cell lung cancer  CAD  COPD    Dispo: Patient is too functional to anticipate [facility] approval for ARU. May be appropriate for SNF but would require insurance approval for that as well. Discussed with patient's nurse. We will sign off. Thank you for the consultation. Crissy Lea MD 5/20/2019, 11:21 AM     * This document was created using dictation software. While all precautions were taken to ensure accuracy, errors may have occurred. Please disregard any typographical errors.

## 2019-05-20 NOTE — DISCHARGE INSTR - COC
Continuity of Care Form    Patient Name: Letha Mann   :  1940  MRN:  [id number]    Admit date:  2019  Discharge date:  19    Code Status Order: Full Code   Advance Directives:   [facility] Documentation     Date/Time Healthcare Directive Type of Healthcare Directive Copy in [address] Agent's Name Healthcare Agent's Phone Number    19 0040  Yes, patient has an advance directive for healthcare treatment  Durable power of  for health care  No, copy requested from medical records  Adult Children  melly/ otilio  [phone]          Admitting Physician:  Kostas Love MD  PCP: Kostas Love MD    Discharging Nurse: [facility] Unit/Room#: 8KI-5621/1207-84  Discharging Unit Phone Number: [phone]    Emergency Contact:   Extended Emergency Contact Information  Primary Emergency Contact: David Sood [address] Phone: [phone]  Relation: Child  Secondary Emergency Contact: Vincenzo Soto [address] Phone: [phone]  Relation: Child    Past Surgical History:  Past Surgical History:   Procedure Laterality Date    BACK SURGERY  ,     lumbar laminectomy    BRONCHOSCOPY  2018    BRONCHOSCOPY N/A 2019    BRONCHOSCOPY BIOPSY BRONCHUS performed by Jericho Molina MD at [place] [address]  2019    BRONCHOSCOPY/TRANSBRONCHIAL NEEDLE BIOPSY performed by Jericho Molina MD at [place] [address]  2019    BRONCHOSCOPY ULTRASOUND performed by Jericho Molina MD at [facility] N/A 2018    LAPAROSCOPIC CHOLECYSTECTOMY WITH CHOLANGIOGRAM performed by Andrea Laguna MD at [facility] COLONOSCOPY N/A 2019    COLONOSCOPY WITH BIOPSY performed by Brian Barragan MD at [facility]  2019 COLONOSCOPY POLYPECTOMY SNARE/COLD BIOPSY performed by Ada Bhakta MD at [facility]  2000, 2001    SD [address] INCL FLUOR GDNCE DX W/CELL [facility] N/A 12/4/2018    BRONCHOSCOPY WITH LAVAGE performed by Ana Lopez MD at [facility] N/A 2/25/2019    EGD BIOPSY performed by Ada Bhakta MD at [address] [facility] ENDOSCOPY       Immunization History:   Immunization History   Administered Date(s) Administered    Influenza Vaccine, unspecified formulation 10/02/2018    Influenza Virus Vaccine 10/30/2013, 10/15/2014, 04/20/2017    Pneumococcal 13-valent Conjugate ([id number]) 06/05/2015    Pneumococcal Polysaccharide ([id number]) 08/30/2013       Active Problems:  Patient Active Problem List   Diagnosis Code    Diabetes mellitus ([facility] E11.9    Dyslipidemia E78.5    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    Essential hypertension, benign I10    Coronary atherosclerosis of native coronary artery I25.10    Sleep apnea G47.30    Chest pain R07.9    Carpal tunnel syndrome G56.00    SOB (shortness of breath) R06.02    COPD, 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    Influenza J11.1    Paroxysmal atrial fibrillation (HCC) I48.0    Chronic cough R05    Hilar mass R91.8    Acute cholecystitis K81.0    Gallstones and inflammation of gallbladder without obstruction K80.00    Atrial fibrillation with rapid ventricular response (HCC) I48.91    Acute respiratory failure with hypoxia (HCC) J96.01    Centrilobular emphysema (HCC) J43.2    Ileus following gastrointestinal surgery K91.30    Mediastinal adenopathy R59.0    COPD exacerbation (HCC) J44.1    Adenocarcinoma of right lung (HCC) C34.91    Dehydration E86.0    Mild malnutrition (HCC) E44.1    Loss of consciousness ([facility]) R40.20    Syncope and collapse R55    Subacute pulmonary embolism (HCC) I26.99    Other pulmonary embolism without acute cor pulmonale (HCC) I26.99    Closed compression fracture of thoracic vertebra (HCC) S22.000A    Chronic deep vein thrombosis (DVT) of both lower extremities ([facility]) I82.503       Isolation/Infection:   Isolation          No Isolation            Nurse Assessment:  Last Vital Signs: BP (!) 97/55   Pulse 68   Temp 96.8 °F (36 °C) (Temporal)   Resp 18   Ht 5' 5\" (1.651 m)   Wt 163 lb 3.2 oz (74 kg)   SpO2 90%   BMI 27.16 kg/m²     Last documented pain score (0-10 scale): Pain Level: 3  Last Weight:   Wt Readings from Last 1 Encounters:   05/20/19 163 lb 3.2 oz (74 kg)     Mental Status:  oriented    IV Access:  - None    Nursing Mobility/ADLs:  Walking   Assisted  Transfer  Independent  Bathing  Assisted  Dressing  Independent  Toileting  Assisted  Feeding  [address]  Independent  Med Delivery   whole    Wound Care Documentation and Therapy:        Elimination:  Continence:   · Bowel: Yes  · Bladder: Yes  Urinary Catheter: None   Colostomy/Ileostomy/Ileal Conduit: No       Date of Last BM: 5/22/19  No intake or output data in the 24 hours ending 05/20/19 1314  I/O last 3 completed shifts: In: 480 [P.O.:480]  Out: 200 [Urine:200]    Safety Concerns: At Risk for Falls    Impairments/Disabilities:      None    Nutrition Therapy:  Current Nutrition Therapy:         Routes of Feeding: Oral  Liquids: No Restrictions  Daily Fluid Restriction: no  Last Modified Barium Swallow with Video (Video Swallowing Test): not done    Treatments at the Time of Hospital Discharge:   Respiratory Treatments:   Oxygen Therapy:  is on oxygen at 2 L/min per nasal cannula.   Ventilator:    - No ventilator support    Rehab Therapies:SN,PT,OT,HHA,MSW  Weight Bearing Status/Restrictions: No weight bearing restirctions  Other Medical Equipment (for information only, NOT a DME order):  walker  Other Treatments: HOME HEALTH CARE: LEVEL 3 SAFETY        -Initial home health evaluation to occur within 24-48 hours, in patient home    -Home health agency to establish plan of care for patient over 60 day period    -Medication Reconciliation    -PT/OT/Speech evaluations in home within 24-48 hours of discharge; including  -DME and home safety    -Frontload therapy 5 days, then 3x a week    -OT to evaluate if patient has [address] needs for personal care    - evaluation within 24-48 hours, includes evaluation of resources   and insurance to determine AL, [place], LTC, and Medicaid options    -PCP Visit scheduled within three to seven days of discharge               Patient's personal belongings (please select all that are sent with patient):  Glasses, Hearing Aides bilateral, Dentures upper and lower    RN SIGNATURE:  Electronically signed by Heather Moody RN on 5/23/19 at 9:40 AM    CASE MANAGEMENT/SOCIAL WORK SECTION    Inpatient Status Date:       Readmission Risk Assessment Score:  Readmission Risk              Risk of Unplanned Readmission:        23           Discharging to Facility/ Agency   Name: Sara Branch will call for Appointment  Phone: [phone]  Fax: [phone]    Discharging to Facility/ [address] [place] [address] [phone]  [phone]      / signature: Diane RAMACHANDRAN, [address]    PHYSICIAN SECTION    Prognosis: Guarded    Condition at Discharge: Stable    Rehab Potential (if transferring to Rehab): Guarded    Recommended Labs or Other Treatments After Discharge: ***    Physician Certification: I certify the above information and transfer of Letha Mann  is necessary for the continuing treatment of the diagnosis listed and that she requires Home Care for greater 30 days.      Update Admission H&P: alert orient BS controlled    PHYSICIAN SIGNATURE:  Kostas Love MD on 05/22/19

## 2019-05-20 NOTE — PROGRESS NOTES
Physical Therapy  Facility/Department: [facility] 3A NURSING  Daily Treatment Note  NAME: Patti Horan  : 1940  MRN: [id number]    Date of Service: 2019    Discharge Recommendations: Patti Horan scored a 20/24 on the AM-PAC short mobility form. Current research shows that an AM-PAC score of 18 or greater is typically associated with a discharge to the patient's home setting. Based on the patients AM-PAC score and their current functional mobility deficits, it is recommended that the patient have 2-3 sessions per week of Physical Therapy at d/c to increase the patients independence. HOME HEALTH CARE: LEVEL 3 SAFETY     - Initial home health evaluation to occur within 24-48 hours, in patient home   - Therapy evaluations in home within 24-48 hours of discharge; including DME and home safety   - Frontload therapy 5 days, then 3x a week   - Therapy to evaluate if patient has [address] needs for personal care   -  evaluation within 24-48 hours, includes evaluation of resources and insurance to determine [place], [place], LTC, and Medicaid options       PT Equipment Recommendations  Equipment Needed: No    Patient Diagnosis(es): The primary encounter diagnosis was Syncope and collapse. Diagnoses of Subacute pulmonary embolism ([address] Closed fracture of first thoracic vertebra, unspecified fracture morphology, initial encounter ([address] Hyperglycemia, and Contusion of forehead, initial encounter were also pertinent to this visit. has a past medical history of Arthritis, CAD (coronary artery disease), Carpal tunnel syndrome, COPD (chronic obstructive pulmonary disease) ([address] Coronary stent, depression, Diabetes mellitus ([address] GERD (gastroesophageal reflux disease), Hyperlipidemia, Hypertension, Lung cancer ([address] MI (myocardial infarction) ([address] LIZETT (obstructive sleep apnea), PONV (postoperative nausea and vomiting), and stress incontinence.    has a past surgical history that includes Coronary angioplasty with stent (2000, 2001); back surgery (2000, 2001); bronchoscopy (12/04/2018); pr [facility] incl fluor gdnce dx w/cell washg spx (N/A, 12/4/2018); Cholecystectomy, laparoscopic (N/A, 12/19/2018); Upper gastrointestinal endoscopy (N/A, 2/25/2019); Colonoscopy (N/A, 2/25/2019); Colonoscopy (2/25/2019); bronchoscopy (N/A, 2/27/2019); bronchoscopy (2/27/2019); and bronchoscopy (2/27/2019). Restrictions  Restrictions/Precautions  Restrictions/Precautions: Fall Risk(high )  Required Braces or Orthoses?: No  Position Activity Restriction  Other position/activity restrictions: Briefly, Olive Pacheco is a 70 y.o. female  who presents to the ED complaining of frequent falls. She reports feeling worse in general since starting chemo and radiation. She is also hyperglycemic. She reports LOC for maybe a few hours. She says she woke up around 3pm after last seeing the clock at noon. She also c/o some R sided upper neck pain. No fevers or recent illnesses. She is not acutely SOB or having CP. Found to have acute PE and indeterminate age DVT in RLE  Subjective   General  Chart Reviewed: Yes  Response To Previous Treatment: Patient with no complaints from previous session.   Family / Caregiver Present: Yes(daughter at beginning of session )  Subjective  Subjective: pt. agreeable to PT on this date, originally apprehensive because of nausea   General Comment  Comments: pt. supine in bed upon arrival   Pain Screening  Patient Currently in Pain: No  Pain Assessment  Pain Assessment: 0-10  Pain Level: 0  Vital Signs  Patient Currently in Pain: No       Orientation  Orientation  Overall Orientation Status: Within Normal Limits  Objective   Bed mobility  Supine to Sit: Stand by assistance  Sit to Supine: Supervision  Scooting: Supervision  Comment: HOB slightly raised   Transfers  Sit to Stand: Supervision  Stand to sit: Supervision  Ambulation  Ambulation?: Yes  Ambulation 1  Surface: level tile  Device: [address]: Contact guard assistance;Stand by assistance(CGA progressing to SBA )  Quality of Gait: steady gait, decreased catalina, CGA provided due to report of dizziness/ nausea   Distance: 100ft   Comments: reported nausea with ambulation and requested to turn back-- experiencing the last few days, nursing notified   Stairs/Curb  Stairs?: No     Balance  Posture: Good  Sitting - Static: Good  Sitting - Dynamic: Good  Standing - Static: Fair  Standing - Dynamic: Fair      AROM RLE (degrees)  RLE AROM: WFL  AROM LLE (degrees)  LLE AROM : WFL  Strength RLE  Strength RLE: WFL  Strength LLE  Strength LLE: WFL                 Assessment   Body structures, Functions, Activity limitations: Decreased functional mobility ; Decreased endurance;Decreased balance  Assessment: Patient not at baseline function and would benefit from skilled PT to address above deficits and facilitate return to baseline function. Concern regarding patient's safety alone at home as she has a history of multiple falls and full flight of steps to get to bedroom. However, patient resistant to skilled care. Ideally she would have 24 hour superivsion, however this is not available to her at this time and patient does not require SNF level care as she is physically mobile.  Unsure of cause of falls  Treatment Diagnosis: decreased functional mobility, impaired gait  Prognosis: Good  Decision Making: Medium Complexity  History: CAD, COPD, lives alone   Exam: functional mobility, balance, AMPAC   Clinical Presentation: evolving   Patient Education: educated on role of acute PT, POC, d/c recommendations  Barriers to Learning: none  REQUIRES PT FOLLOW UP: Yes  Activity Tolerance  Activity Tolerance: Other  Activity Tolerance: limited by nausea/ dizziness      AM-PAC Score  AM-PAC Inpatient Mobility Raw Score : 20  AM-PAC Inpatient T-Scale Score : 47.67  Mobility Inpatient CMS 0-100% Score: 35.83  Mobility Inpatient CMS G-Code Modifier : CJ

## 2019-05-20 NOTE — CARE COORDINATION
Discharge Planning Assessment  RN/SW discharge planner met with patient to discuss reason for admission, current living situation, and potential needs at the time of discharge    Demographics/Insurance verified Yes    Current type of dwellin story home, second floor BR w/ 3 step entry    Patient from ECF/SW confirmed with: N/A    Living arrangements: lives alone    Level of function/Support: Independent with ADL    PCP: yes    Last Visit to PCP:1-2 weeks ago    DME: cane, rollator    Active with any community resources/agencies/skilled home care: Pt is unclear if she has any HHC, per chart review pt declined services 5/3/19. Medication compliance issues: none    Financial issues that could impact healthcare: none    Transportation at the time of discharge: daughter    Tentative discharge plan: Home. Patient inquired about SNF, discussed admission criteria and therapy recommendations. Discussed [place] on aging, is not active w/ [facility] states does not meet for services with [facility]. Referral to [facility].     GATO Mejía, CCM, RN

## 2019-05-20 NOTE — CONSULTS
NEUROLOGY CONSULTATION     Patient's Name :   Elena Damon        YOB: 1940                    Date of Consultation : 5/20/2019 4:41 PM    Referring Physician :  Pepito Gomez MD    Reason for Consultation :    Syncope, history of recurrent falls    HISTORY OF PRESENT ILLNESS :    Kimberly Tabares is a 70 y.o. female   History was obtained from patient and from dictations in the chart. Additional history was obtained from the patient's daughter at the bedside. Patient states that she has had recurrent falls in the last several months. She states that many times she feels like she is going to fall and then ends up on the floor. On the day of admission she actually lost consciousness and thinks that she was unconscious for a couple of hours. The physical therapist on her on the floor and called the paramedics and she was brought to the hospital.  Patient has history of paroxysmal atrial fibrillation as well as deep vein thrombosis. Patient has been diagnosed with non-small cell cancer of the lung for which she had chemotherapy as well as radiation therapy. Other comorbidities include coronary active disease COPD and diabetes mellitus    REVIEW OF SYSTEMS  Denies any chest pain palpitations recklessness abdominal pain fever or weight loss.   Rest of the 14 system review was reviewed and was negative except for the symptoms noted above    Past Medical History:   Diagnosis Date    Arthritis     CAD (coronary artery disease)     Carpal tunnel syndrome     COPD (chronic obstructive pulmonary disease) ([address]     Coronary stent     depression     Diabetes mellitus ([address]     GERD (gastroesophageal reflux disease)     Hyperlipidemia     Hypertension     Lung cancer ([address]     Adenocarcinoma of Left Lung    MI (myocardial infarction) ([address]     LIZETT (obstructive sleep apnea)     does not use CPAP    PONV (postoperative nausea and vomiting)     stress incontinence      Family History   Problem Relation Age of Onset    Cancer Sister     Diabetes Sister     Diabetes Brother     Heart Disease Father     Heart Disease Mother     Cancer Maternal Uncle      Social History     Socioeconomic History    Marital status:      Spouse name: None    Number of children: None    Years of education: None    Highest education level: None   Occupational History    None   Social Needs    Financial resource strain: None    Food insecurity:     Worry: None     Inability: None    Transportation needs:     Medical: None     Non-medical: None   Tobacco Use    Smoking status: Former Smoker     Last attempt to quit: 10/28/2001     Years since quittin.5    Smokeless tobacco: Never Used   Substance and Sexual Activity    Alcohol use: No    Drug use: No    Sexual activity: Yes     Partners: Male   Lifestyle    Physical activity:     Days per week: None     Minutes per session: None    Stress: None   Relationships    Social connections:     Talks on phone: None     Gets together: None     Attends [employer] service: None     Active member of club or organization: None     Attends meetings of clubs or organizations: None     Relationship status: None    Intimate partner violence:     Fear of current or ex partner: None     Emotionally abused: None     Physically abused: None     Forced sexual activity: None   Other Topics Concern    None   Social History Narrative    None     Current Facility-Administered Medications   Medication Dose Route Frequency Provider Last Rate Last Dose    apixaban (ELIQUIS) tablet 10 mg  10 mg Oral BID Theresa Vogt MD   10 mg at 19 1252    insulin glargine (LANTUS) injection pen 25 Units  25 Units Subcutaneous BID Theresa Vogt MD   25 Units at 19 1101    glucose (GLUTOSE) 40 % oral gel 15 g  15 g Oral PRN Theresa Vogt MD        dextrose 50 % solution 12.5 g  12.5 g Intravenous PRN Nelsy Heath MD        glucagon (rDNA) injection 1 mg  1 mg Intramuscular PRN Nelsy Heath MD        dextrose 5 % solution  100 mL/hr Intravenous PRN Nelsy Heath MD        traMADol Alisa Leatha) tablet 50 mg  50 mg Oral Q4H PRN eNlsy Heath MD   50 mg at 05/19/19 1629    perflutren lipid microspheres (DEFINITY) injection 1.65 mg  1.5 mL Intravenous ONCE PRN Nelsy Heath MD        insulin lispro (HUMALOG) injection pen 0-18 Units  0-18 Units Subcutaneous TID WC Nelsy Heath MD   3 Units at 05/20/19 1239    insulin lispro (HUMALOG) injection pen 0-9 Units  0-9 Units Subcutaneous Nightly Nelsy Heath MD   2 Units at 05/17/19 2138    acetaminophen (TYLENOL) tablet 650 mg  650 mg Oral Q4H PRN Nelsy Heath MD   650 mg at 05/20/19 1057    amLODIPine (NORVASC) tablet 10 mg  10 mg Oral Daily Nelsy Heath MD   10 mg at 05/20/19 1058    vitamin C (ASCORBIC ACID) tablet 500 mg  500 mg Oral Daily Nelsy Heath MD   500 mg at 05/20/19 1059    calcium elemental (OSCAL) tablet 500 mg  500 mg Oral Daily Nelsy Heath MD   500 mg at 05/20/19 1058    citalopram (CELEXA) tablet 20 mg  20 mg Oral QAM Nelsy Heath MD   20 mg at 05/20/19 1059    digoxin (LANOXIN) tablet 125 mcg  125 mcg Oral Daily Nelsy Heath MD   125 mcg at 05/20/19 1058    ezetimibe (ZETIA) tablet 10 mg - PATIENT SUPPLIED  10 mg Oral Daily Nelsy Heath MD        flecainide (TAMBOCOR) tablet 100 mg  100 mg Oral BID Nelsy Heath MD   100 mg at 05/20/19 1058    Fluticasone-Umeclidin-Vilant (TRELEGY ELLIPTA) 100-62.5-25 MCG/INH AEPB - PATIENT SUPPLIED  1 puff Inhalation Daily Nelsy Heath MD   Stopped at 05/18/19 0907    hydrochlorothiazide (HYDRODIURIL) tablet 12.5 mg  12.5 mg Oral Daily Nelsy Heath MD   Stopped at 05/20/19 1257    ipratropium-albuterol (DUONEB) nebulizer solution 3 mL  3 mL Inhalation Q4H WA Nelsy Heath MD   3 mL at 05/20/19 1618    losartan (COZAAR) tablet 50 mg  50 mg Oral Daily Lavonne De La Rosa MD   50 mg at 05/18/19 0946    metFORMIN (GLUCOPHAGE) tablet 500 mg  500 mg Oral BID  Lavonne De La Rosa MD   500 mg at 05/20/19 1057    pantoprazole (PROTONIX) tablet 40 mg  40 mg Oral QAM  Lavonne De La Rosa MD   40 mg at 05/20/19 0522    potassium chloride (KLOR-CON M) extended release tablet 20 mEq  20 mEq Oral Daily Lavonne De La Rosa MD   20 mEq at 05/20/19 1252    carvedilol (COREG) tablet 25 mg  25 mg Oral BID  Lavonne De La Rosa MD   25 mg at 05/20/19 1058     Allergies   Allergen Reactions    Cipro Xr      Swelling, rash    Lyrica [Pregabalin]      Shaking, swelling, rash    Penicillins      Swelling, rash    Sulfa Antibiotics      Swelling, rash       PHYSICAL EXAMINATION:  BP (!) 128/59   Pulse 76   Temp 96.9 °F (36.1 °C) (Temporal)   Resp 18   Ht 5' 5\" (1.651 m)   Wt 163 lb 3.2 oz (74 kg)   SpO2 90%   BMI 27.16 kg/m²   Appearance: Well appearing, well nourished and in no distress  Mental Status Exam: Patient is alert, oriented to person, place and time. Recent and remote memory is normal  Fund of Knowledge is normal  Attention/concentration is normal.   Speech : No dysarthria  Language : No aphasia  Funduscopic Exam: sharp disc margins  Cranial Nerves:   II: Visual fields:  Full to confrontation  III: Pupils:  equal, round, reactive to light  III,IV,VI: Extra Ocular Movements are intact. No nystagmus  V: Facial sensation is intact to pin prick and light touch  VII: Facial strength and movements: intact and symmetric smile,cheek puffing and eyebrow elevation  VIII: Hearing:  Intact to finger rub bilaterally  IX: Palate  elevation is symmetric  XI: Shoulder shrug is intact  XII: Tongue movements are normal  Motor:  Muscle tone and bulk are normal.   Strength is symmetrical 4+ /5 in all four extremities.   Sensory: Decreased to light touch and  pin prick in bilateral distal lower extremities  Coordination:  Normal  Finger to Nose and Heel to Ernesto Del bilaterally    . Reflexes:  DTR 1 in the upper extremities 1 at the knees and absent at the ankles  Plantar response: Flexor bilaterally  Gait:  Unsteady  Romberg: negative  Vascular: No carotid bruit bilaterally        DATA:  LABS:  General Labs:    CBC:   Lab Results   Component Value Date    WBC 8.5 05/16/2019    RBC 2.91 05/16/2019    HGB 9.9 05/16/2019    HCT 30.0 05/16/2019    .1 05/16/2019    MCH 34.0 05/16/2019    MCHC 33.0 05/16/2019    RDW 20.5 05/16/2019     05/16/2019    MPV 7.0 05/16/2019     BMP:    Lab Results   Component Value Date     05/16/2019    K 3.9 05/16/2019    CL 92 05/16/2019    CO2 29 05/16/2019    BUN 25 05/16/2019    LABALBU 3.6 05/16/2019    CREATININE 1.0 05/16/2019    CALCIUM 8.7 05/16/2019    GFRAA >60 05/16/2019    GFRAA >60 10/29/2012    LABGLOM 54 05/16/2019    GLUCOSE 346 05/16/2019     RADIOLOGY REVIEW:  I have reviewed radiology image(s) and reports(s) of:  CT scan of the head    IMPRESSION :  Ataxia  Recurrent falls  This is probably due to a combination of diabetic peripheral neuropathy as well as chronic white matter disease in the brain seen on the CT head. Syncope, unclear etiology.   Consider and rule out seizures although felt to be less likely  Non-small cell cancer of the lung  Patient Active Problem List   Diagnosis    Diabetes mellitus ([facility]    Dyslipidemia    Mitral and aortic valve disease    Essential hypertension, benign    Coronary atherosclerosis of native coronary artery    Sleep apnea    Chest pain    Carpal tunnel syndrome    SOB (shortness of breath)    COPD, severe (HCC)    Influenza    Paroxysmal atrial fibrillation (HCC)    Chronic cough    Hilar mass    Acute cholecystitis    Gallstones and inflammation of gallbladder without obstruction    Atrial fibrillation with rapid ventricular response (HCC)    Acute respiratory failure with hypoxia (HCC)    Centrilobular emphysema (HCC)    Ileus following gastrointestinal surgery    Mediastinal adenopathy    COPD exacerbation (HCC)    Adenocarcinoma of right lung (HCC)    Dehydration    Mild malnutrition (HCC)    Loss of consciousness (HCC)    Syncope and collapse    Subacute pulmonary embolism (HCC)    Other pulmonary embolism without acute cor pulmonale (HCC)    Closed compression fracture of thoracic vertebra (HCC)    Chronic deep vein thrombosis (DVT) of both lower extremities (HCC)     RECOMMENDATIONS :  Discussed with patient  Discussed with patient's daughter  Strongly recommended the patient uses a walker at all times to prevent falls and to help with ambulation  Agree with plans to start patient on anticoagulation with Eliquis. Patient is aware of the increased risk because of falls  I will get an EEG to complete the neurological workup  Continue with strict control of diabetes  Thank you for this consultation        Please note a portion of this chart was generated using dragon dictation software. Although every effort was made to ensure the accuracy of this automated transcription, some errors in transcription may have occurred.

## 2019-05-20 NOTE — PLAN OF CARE
Problem: Falls - Risk of:  Goal: Will remain free from falls  Description  Will remain free from falls  Outcome: Ongoing  Note:   Pt is wearing the fall bracelet, S.A.F.E. sign is posted outside door, and yellow blanket is on the bed. Pt informed of fall risks, verbalizes understanding, and agrees to ask for help to ambulate. Will monitor. Problem: Serum Glucose Level - Abnormal:  Goal: Ability to maintain appropriate glucose levels will improve  Description  Ability to maintain appropriate glucose levels will improve  Outcome: Ongoing  Note:   Checking blood sugars ACHS. Supplemental insulin being given PRN. Will monitor.

## 2019-05-20 NOTE — PROGRESS NOTES
Instructed on Lexiscan Stress Test Procedure including possible side effects/ adverse reactions. Patient verbalizes  understanding and denies having any questions . See [address] [place] [address] Cardiology

## 2019-05-20 NOTE — PROGRESS NOTES
Occupational Therapy  Facility/Department: [facility] 3A NURSING  Daily Treatment Note  NAME: Christopher Barrientos  : 1940  MRN: [id number]    Date of Service: 2019    Discharge Recommendations:  Christopher Barrientos scored a 19/24 on the AM-PAC ADL Inpatient form. Current research shows that an AM-PAC score of 18 or greater is typically associated with a discharge to the patient's home setting. Based on the patients AM-PAC score and their current ADL deficits, it is recommended that the patient have 2-3 sessions per week of Occupational Therapy at d/c to increase the patients independence. HOME HEALTH CARE: LEVEL 3 SAFETY     - Initial home health evaluation to occur within 24-48 hours, in patient home   - Therapy evaluations in home within 24-48 hours of discharge; including DME and home safety   - Frontload therapy 5 days, then 3x a week   - Therapy to evaluate if patient has [address] needs for personal care   -  evaluation within 24-48 hours, includes evaluation of resources and insurance to determine [place], [place], LTC, and Medicaid options     Pt will benefit from COA services upon discharge, above stated recommendations d/t to frequency of falls and education needed for use of RW     OT Equipment Recommendations  Equipment Needed: Yes  ADL Assistive Devices: Emergency Alert System    Assessment   Performance deficits / Impairments: Decreased functional mobility ; Decreased balance;Decreased ADL status; Decreased endurance;Decreased high-level IADLs  Assessment: Pt presents with the above deficits impacting daily occupational performance and would benefit from continued skilled OT services. Pt's report of multiple falls in the last 6 months. Falls occurred without use of RW. Pt reports some concerns with returning home. based on AMPAC score, pt likely to not be approved for SNF.  pt agreeable to OT home health   Treatment Diagnosis: Decreased mobility, ADL status, ADL transfers, balance, endurance, high level IADL's associated with LOC. Prognosis: Good  Patient Education: OT Eval, POC, safe transfers, safe mobility, fall precautions, DC plans/recommendations, RW use  REQUIRES OT FOLLOW UP: Yes  Activity Tolerance  Activity Tolerance: Patient Tolerated treatment well  Safety Devices  Safety Devices in place: Yes  Type of devices: All fall risk precautions in place;Gait belt;Patient at risk for falls; Left in bed;Call light within reach;Nurse notified; Bed alarm in place         Patient Diagnosis(es): The primary encounter diagnosis was Syncope and collapse. Diagnoses of Subacute pulmonary embolism ([address] Closed fracture of first thoracic vertebra, unspecified fracture morphology, initial encounter ([facility] Hyperglycemia, and Contusion of forehead, initial encounter were also pertinent to this visit. has a past medical history of Arthritis, CAD (coronary artery disease), Carpal tunnel syndrome, COPD (chronic obstructive pulmonary disease) ([address] Coronary stent, depression, Diabetes mellitus ([facility] GERD (gastroesophageal reflux disease), Hyperlipidemia, Hypertension, Lung cancer ([facility] MI (myocardial infarction) ([address] LIZETT (obstructive sleep apnea), PONV (postoperative nausea and vomiting), and stress incontinence. has a past surgical history that includes Coronary angioplasty with stent (2000, 2001); back surgery (2000, 2001); bronchoscopy (12/04/2018); pr [facility] incl fluor gdnce dx w/cell washg spx (N/A, 12/4/2018); Cholecystectomy, laparoscopic (N/A, 12/19/2018); Upper gastrointestinal endoscopy (N/A, 2/25/2019); Colonoscopy (N/A, 2/25/2019); Colonoscopy (2/25/2019); bronchoscopy (N/A, 2/27/2019); bronchoscopy (2/27/2019); and bronchoscopy (2/27/2019).     Restrictions  Restrictions/Precautions  Restrictions/Precautions: Fall Risk  Required Braces or Orthoses?: No  Position Activity Restriction  Other position/activity restrictions: Briefly, Christopher Barrientos is a 70 y.o. female  who presents to the ED complaining of frequent falls. She reports feeling worse in general since starting chemo and radiation. She is also hyperglycemic. She reports LOC for maybe a few hours. She says she woke up around 3pm after last seeing the clock at noon. She also c/o some R sided upper neck pain. No fevers or recent illnesses. She is not acutely SOB or having CP. Found to have acute PE and indeterminate age DVT in RLE  Subjective   General  Chart Reviewed: Yes  Patient assessed for rehabilitation services?: Yes  Response to previous treatment: Patient with no complaints from previous session  Family / Caregiver Present: No  Diagnosis: Loss of Consciousness  Subjective  Subjective: pt up in bathroom upon arrival and agreeable to OT treatment. pt had just returned from Cath lab and reporting some nausea. headache reported. pt had just administered pain meds  Pain Assessment  Pain Assessment: 0-10  Pain Level: 3  Pain Type: Acute pain  Pain Location: Head  Vital Signs  Patient Currently in Pain: Yes  Pt reports she had just had pain meds.  Reporting some nausea  Orientation  Orientation  Overall Orientation Status: Within Normal Limits  Objective    ADL  Grooming: Stand by assistance(at sink for hand washing, cues for positioning of RW at sink)  LE Dressing: Supervision(docaleb brief and donning new brief)  Toileting: Supervision(seated on toilet for gulshan care)        Balance  Sitting Balance: Supervision  Standing Balance: Stand by assistance  Standing Balance  Time: ~6 minutes total  Activity: from bathroom, mobility in room with RW, stance at sink for hand washing  Comment: RW used and cues for keeping RW with pt  Functional Mobility  Functional - Mobility Device: Rolling Walker  Activity: (from bathroom)  Assist Level: Stand by assistance  Functional Mobility Comments: CGA without RW, SBA with RW, pt reporting all her previous falls happened when she was not using her RW. pt educated on RW management for safety   Toilet Transfers  Toilet - Technique: Ambulating  Equipment Used: Raised toilet seat with rails  Toilet Transfer: Stand by assistance  Toilet Transfers Comments: grab bar used  Bed mobility  Sit to Supine: Supervision  Scooting: Supervision  Comment: HOB slightly raised  Transfers  Sit to stand: Supervision  Stand to sit: Supervision  Transfer Comments: no dizziness reported this date, pt negative for orthostatic hypotension this date.  toilet>EOB                       Cognition  Overall Cognitive Status: WFL     Perception  Overall Perceptual Status: WFL                                   Plan   Plan  Times per week: 3-5x/week  Times per day: Daily  Current Treatment Recommendations: Patient/Caregiver Education & Training, Equipment Evaluation, Education, & procurement, Self-Care / ADL, Pain Management, Safety Education & Training, Functional Mobility Training, Balance Training  G-Code     OutComes Score                                                  AM-PAC Score        AM-[facility] Inpatient Daily Activity Raw Score: 19  AM-PAC Inpatient ADL T-Scale Score : 40.22  ADL Inpatient CMS 0-100% Score: 42.8  ADL Inpatient CMS G-Code Modifier : CK    Goals  Short term goals  Time Frame for Short term goals: Discharge  Short term goal 1: Mod Independent bed mobility --supervision 5/20  Short term goal 2: Mod Independent toileting--supervision 5/20  Short term goal 3: Mod Independent ADL transfers to/from bed, chair and toilet---supervision 5/20  Short term goal 4: Mod Independent functional mobility using RW for ADL's/functional mobility --SBA with RW 5/20  Short term goal 5: Mod Independent UB/LB ADL's---pt declined 5/20  Long term goals  Time Frame for Long term goals : LTG=STG       Therapy Time   Individual Concurrent Group Co-treatment   Time In 1117         Time Out 1126         Minutes 9           Timed Code Treatment Minutes:  9 Minutes    Total Treatment Minutes:  [address], OTR/L [address] [facility] [address], OT

## 2019-05-20 NOTE — PROGRESS NOTES
[address]   Progress Note  Cardiology      Cristian Hull   Admission date:  5/16/2019  CC-fatigued  Subjective:  She feels ok. She denies any symptoms.  We discussed her fear of anticoagulation and now is willing to take eliquis    Objective:  Medications/Labs all Reviewed     apixaban  10 mg Oral BID    insulin glargine  25 Units Subcutaneous BID    insulin lispro  0-18 Units Subcutaneous TID WC    insulin lispro  0-9 Units Subcutaneous Nightly    amLODIPine  10 mg Oral Daily    vitamin C  500 mg Oral Daily    calcium elemental  500 mg Oral Daily    citalopram  20 mg Oral QAM    digoxin  125 mcg Oral Daily    ezetimibe  10 mg Oral Daily    flecainide  100 mg Oral BID    Fluticasone-Umeclidin-Vilant  1 puff Inhalation Daily    hydrochlorothiazide  12.5 mg Oral Daily    ipratropium-albuterol  3 mL Inhalation Q4H WA    losartan  50 mg Oral Daily    metFORMIN  500 mg Oral BID WC    pantoprazole  40 mg Oral QAM AC    potassium chloride  20 mEq Oral Daily    carvedilol  25 mg Oral BID WC       BMP: Lab Results   Component Value Date     05/16/2019    K 3.9 05/16/2019    CL 92 05/16/2019    CO2 29 05/16/2019    BUN 25 05/16/2019    CREATININE 1.0 05/16/2019    MG 1.40 05/05/2019     CBC:  Lab Results   Component Value Date    WBC 8.5 05/16/2019    RBC 2.91 05/16/2019    HGB 9.9 05/16/2019    HCT 30.0 05/16/2019    .1 05/16/2019    RDW 20.5 05/16/2019     05/16/2019      PT/INR:    Lab Results   Component Value Date    INR 0.95 02/27/2019    PROTIME 10.8 02/27/2019     Cardiac Enzymes:  Lab Results   Component Value Date    CKTOTAL 26 05/16/2019     Lab Results   Component Value Date    TROPONINI <0.01 05/16/2019    TROPONINI <0.01 02/22/2019    TROPONINI <0.01 12/22/2018     BNP:  No results found for: BNP  FASTING LIPID PANEL:  Lab Results   Component Value Date    CHOL 224 05/19/2016    HDL 69 05/19/2016    TRIG 123 05/19/2016       Physical Examination:    BP (!) 173/67   Pulse 79   Temp 96.8 °F (36 °C) (Temporal)   Resp 18   Ht 5' 5\" (1.651 m)   Wt 163 lb 3.2 oz (74 kg)   SpO2 97%   BMI 27.16 kg/m²    Not orthostatic checked by me  Respiratory:  · Resp Assessment: Normal respiratory effort  · Resp Auscultation: Clear to auscultation bilaterally   Cardiovascular:  · Auscultation: regular rate and rhythm, normal S1S2, no murmur, rub or gallop  · Palpation:  Nl PMI  · JVP:  normal  · Extremities: No Edema  Abdomen:  · Soft, non-tender  · Normal bowel sounds  Extremities:  ·  No Cyanosis or Clubbing  Neurological/Psychiatric:  · Oriented to time, place, and person  · Non-anxious  Skin Warm and dry    Assessment:    Patient Active Hospital Problem List:   [facility] (5/16/2019)    Assessment: exact etiology uncertain with numerous comorbidities that contribute     Syncope and collapse ()     Subacute pulmonary embolism (HCC) ()         Chronic deep vein thrombosis (DVT) of both lower extremities ([facility] (5/20/2019)          Plan:  1.  she has refused to take anticoagulation in spite of recommendations. Longstanding paroxysmal a. Fib. Numerous comorbities that contribute to vasovagal episode with newly diagnosed thromboembolic disease,inactivity with poor appetite with extreme stress with  in hospice, nonsmall cell lung CA, underlying CAD.   2. She is now willing to take eliquis,will review stress test but not a good candidate for invasive evaluation with DVT/PE in early treatment phase

## 2019-05-20 NOTE — PROGRESS NOTES
Shift assessment complete. Evening medications given, see MAR for details. Pt alert and oriented x4. Pt denies any pain or needs at this time. Bed alarm activated. Non-skid socks on. Pt resting in bed with no signs of distress. Will continue to monitor. Call light within reach.

## 2019-05-20 NOTE — PLAN OF CARE
Problem: Falls - Risk of:  Goal: Will remain free from falls  Description  Will remain free from falls  Note:   Pt has remained free from any falls so far this shift. Bed alarm activated. Non-skid socks on. Belongings within reach. Bed in lowest and locked position. Pt up x1 assist. Will continue to monitor. Call light within reach. Problem: Pain:  Description  Pain management should include both nonpharmacologic and pharmacologic interventions. Goal: Pain level will decrease  Description  Pain level will decrease  Note:   Pt has denied having any pain so far this shift. Instructed pt to call out if any pain arises. Pt V/U. Will continue to assess pain level. Call light within reach. Problem: Sensory Perception - Impaired:  Goal: Ability to maintain a stable neurologic state will improve  Description  Ability to maintain a stable neurologic state will improve  Note:   Pt blood glucose WNL. See results for details. Will continue to monitor. Call light within reach.

## 2019-05-20 NOTE — PROGRESS NOTES
INTERNAL MEDICINE    SUBJECTIVE:  Weak but alert    OBJECTIVE  Difficulty standing, some decrease in ortho BP to sys 100      Physical    VITALS:  /68   Pulse 68   Temp 98 °F (36.7 °C) (Temporal)   Resp 20   Ht 5' 5\" (1.651 m)   Wt 160 lb 12.8 oz (72.9 kg)   SpO2 93%   BMI 26.76 kg/m²   CONSTITUTIONAL:  awake, alert, cooperative, no apparent distress, and appears weak and depressed  EYES:  Lids and lashes normal, pupils equal, round and reactive to light, extra ocular muscles intact, sclera clear, conjunctiva normal  ENT:  Normocephalic, without obvious abnormality, atraumatic, sinuses nontender on palpation, external ears without lesions, oral pharynx with moist mucus membranes, tonsils without erythema or exudates, gums normal and good dentition. NECK:  Supple, symmetrical, trachea midline, no adenopathy, thyroid symmetric, not enlarged and no tenderness, skin normal  BACK:  Symmetric, no curvature, spinous processes are non-tender on palpation, paraspinous muscles are non-tender on palpation, NO vertebral tenderness  LUNGS:no increase work of breathing, decreased air movement  CARDIOVASCULAR:  Normal apical impulse, regular rate and rhythm, normal S1 and S2, no S3 or S4, and no murmur noted  ABDOMEN:  + scars, normal bowel sounds, soft, non-distended, non-tender, no masses palpated, no hepatosplenomegally  MUSCULOSKELETAL:  there is no redness, warmth, or swelling of the joints. Mild LE swelling non tender  NEUROLOGIC:  Awake, alert, oriented to name, place and time. Cranial nerves II-XII are grossly intact.     Lab Results   Component Value Date    WBC 8.5 05/16/2019    RBC 2.91 05/16/2019    HGB 9.9 05/16/2019    HCT 30.0 05/16/2019     05/16/2019    .1 05/16/2019    MCH 34.0 05/16/2019    MCHC 33.0 05/16/2019    RDW 20.5 05/16/2019    SEGSPCT 50.2 10/29/2012    BANDSPCT 2 05/03/2019    METASPCT 1 05/02/2019    LYMPHOPCT 13.9 05/16/2019    MONOPCT 11.1 05/16/2019    MYELOPCT 4 05/02/2019    BASOPCT 0.9 05/16/2019    MONOSABS 0.9 05/16/2019    LYMPHSABS 1.2 05/16/2019    EOSABS 0.2 05/16/2019    BASOSABS 0.1 05/16/2019    DIFFTYPE Auto 10/29/2012     BMP:    Lab Results   Component Value Date     05/16/2019    K 3.9 05/16/2019    CL 92 05/16/2019    CO2 29 05/16/2019    BUN 25 05/16/2019    LABALBU 3.6 05/16/2019    CREATININE 1.0 05/16/2019    CALCIUM 8.7 05/16/2019    GFRAA >60 05/16/2019    GFRAA >60 10/29/2012    LABGLOM 54 05/16/2019    GLUCOSE 346 05/16/2019       ASSESSMENT AND PLAN    Syncope  PAF  Subacute DVT with small PE of unknown age  Orthostatic BP sys drop  LE weakness  Headache  DM type 2 poorly controlled  Patient Active Problem List   Diagnosis Code    [facility] E11.9    Dyslipidemia E78.5    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    Essential hypertension, benign I10    Coronary atherosclerosis of native coronary artery I25.10    Sleep apnea G47.30    Chest pain R07.9    Carpal tunnel syndrome G56.00    SOB (shortness of breath) R06.02    COPD, 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    Influenza J11.1    Paroxysmal atrial fibrillation (HCC) I48.0    Chronic cough R05    Hilar mass R91.8    Acute cholecystitis K81.0    Gallstones and inflammation of gallbladder without obstruction K80.00    Atrial fibrillation with rapid ventricular response (HCC) I48.91    Acute respiratory failure with hypoxia (HCC) J96.01    Centrilobular emphysema (HCC) J43.2    Ileus following gastrointestinal surgery K91.30    Mediastinal adenopathy R59.0    COPD exacerbation (HCC) J44.1    Adenocarcinoma of right lung (HCC) C34.91    Dehydration E86.0    Mild malnutrition (HCC) E44.1    Loss of consciousness (HCC) R40.20    Syncope and collapse R55    Subacute pulmonary embolism (HCC) I26.99    Other pulmonary embolism without acute cor pulmonale (HCC) I26.99     Difficult medical and social situation. adenoca of lung with recent chemo & rx, COPD, CAD,PAF.  Poorly controlled BS withpoor nutrition, lives alone and will not consider other arrangements such as asst or full care as spouse currently inpt Hospice (past 2 mos)    Needs anticoagulation DVT PE & PAF) but is at sig risk due to multiple falls, lives alone, and concern for how accurately she takes meds and eats. She is to have Bernadene Cleveland tomorrow but I do not see how that will impact tx or prognosis. Family request Neurology consult.     Will see if she is a candidate for Breana Elena M.D.

## 2019-05-21 LAB
A/G RATIO: 1.1 (ref 1.1–2.2)
ALBUMIN SERPL-MCNC: 3.2 G/DL (ref 3.4–5)
ALP BLD-CCNC: 88 U/L (ref 40–129)
ALT SERPL-CCNC: 11 U/L (ref 10–40)
ANION GAP SERPL CALCULATED.3IONS-SCNC: 13 MMOL/L (ref 3–16)
AST SERPL-CCNC: 11 U/L (ref 15–37)
BASOPHILS ABSOLUTE: 0.1 K/UL (ref 0–0.2)
BASOPHILS RELATIVE PERCENT: 0.7 %
BILIRUB SERPL-MCNC: 0.3 MG/DL (ref 0–1)
BUN BLDV-MCNC: 16 MG/DL (ref 7–20)
CALCIUM SERPL-MCNC: 9.1 MG/DL (ref 8.3–10.6)
CHLORIDE BLD-SCNC: 96 MMOL/L (ref 99–110)
CO2: 27 MMOL/L (ref 21–32)
CREAT SERPL-MCNC: 0.6 MG/DL (ref 0.6–1.2)
EOSINOPHILS ABSOLUTE: 0.5 K/UL (ref 0–0.6)
EOSINOPHILS RELATIVE PERCENT: 6.2 %
GFR AFRICAN AMERICAN: >60
GFR NON-AFRICAN AMERICAN: >60
GLOBULIN: 3 G/DL
GLUCOSE BLD-MCNC: 116 MG/DL (ref 70–99)
GLUCOSE BLD-MCNC: 123 MG/DL (ref 70–99)
GLUCOSE BLD-MCNC: 133 MG/DL (ref 70–99)
GLUCOSE BLD-MCNC: 133 MG/DL (ref 70–99)
GLUCOSE BLD-MCNC: 54 MG/DL (ref 70–99)
GLUCOSE BLD-MCNC: 62 MG/DL (ref 70–99)
HCT VFR BLD CALC: 30.9 % (ref 36–48)
HEMOGLOBIN: 10.1 G/DL (ref 12–16)
LYMPHOCYTES ABSOLUTE: 1.6 K/UL (ref 1–5.1)
LYMPHOCYTES RELATIVE PERCENT: 19.2 %
MAGNESIUM: 1.5 MG/DL (ref 1.8–2.4)
MCH RBC QN AUTO: 33.2 PG (ref 26–34)
MCHC RBC AUTO-ENTMCNC: 32.6 G/DL (ref 31–36)
MCV RBC AUTO: 101.8 FL (ref 80–100)
MONOCYTES ABSOLUTE: 1 K/UL (ref 0–1.3)
MONOCYTES RELATIVE PERCENT: 12.3 %
NEUTROPHILS ABSOLUTE: 5.2 K/UL (ref 1.7–7.7)
NEUTROPHILS RELATIVE PERCENT: 61.6 %
PDW BLD-RTO: 18.8 % (ref 12.4–15.4)
PERFORMED ON: ABNORMAL
PLATELET # BLD: 158 K/UL (ref 135–450)
PMV BLD AUTO: 6.9 FL (ref 5–10.5)
POTASSIUM SERPL-SCNC: 3.7 MMOL/L (ref 3.5–5.1)
RBC # BLD: 3.04 M/UL (ref 4–5.2)
SODIUM BLD-SCNC: 136 MMOL/L (ref 136–145)
TOTAL PROTEIN: 6.2 G/DL (ref 6.4–8.2)
WBC # BLD: 8.5 K/UL (ref 4–11)

## 2019-05-21 PROCEDURE — 6370000000 HC RX 637 (ALT 250 FOR IP): Performed by: INTERNAL MEDICINE

## 2019-05-21 PROCEDURE — 2700000000 HC OXYGEN THERAPY PER DAY

## 2019-05-21 PROCEDURE — 1200000000 HC SEMI PRIVATE

## 2019-05-21 PROCEDURE — 80053 COMPREHEN METABOLIC PANEL: CPT

## 2019-05-21 PROCEDURE — 97535 SELF CARE MNGMENT TRAINING: CPT

## 2019-05-21 PROCEDURE — 6370000000 HC RX 637 (ALT 250 FOR IP): Performed by: FAMILY MEDICINE

## 2019-05-21 PROCEDURE — 95819 EEG AWAKE AND ASLEEP: CPT

## 2019-05-21 PROCEDURE — 36415 COLL VENOUS BLD VENIPUNCTURE: CPT

## 2019-05-21 PROCEDURE — 94760 N-INVAS EAR/PLS OXIMETRY 1: CPT

## 2019-05-21 PROCEDURE — 2580000003 HC RX 258

## 2019-05-21 PROCEDURE — 94640 AIRWAY INHALATION TREATMENT: CPT

## 2019-05-21 PROCEDURE — 2580000003 HC RX 258: Performed by: INTERNAL MEDICINE

## 2019-05-21 PROCEDURE — 97116 GAIT TRAINING THERAPY: CPT

## 2019-05-21 PROCEDURE — 99232 SBSQ HOSP IP/OBS MODERATE 35: CPT | Performed by: INTERNAL MEDICINE

## 2019-05-21 PROCEDURE — 6360000002 HC RX W HCPCS: Performed by: INTERNAL MEDICINE

## 2019-05-21 PROCEDURE — 83735 ASSAY OF MAGNESIUM: CPT

## 2019-05-21 PROCEDURE — 97530 THERAPEUTIC ACTIVITIES: CPT

## 2019-05-21 PROCEDURE — 95819 EEG AWAKE AND ASLEEP: CPT | Performed by: PSYCHIATRY & NEUROLOGY

## 2019-05-21 PROCEDURE — 85025 COMPLETE CBC W/AUTO DIFF WBC: CPT

## 2019-05-21 PROCEDURE — 94761 N-INVAS EAR/PLS OXIMETRY MLT: CPT

## 2019-05-21 PROCEDURE — 99232 SBSQ HOSP IP/OBS MODERATE 35: CPT | Performed by: PSYCHIATRY & NEUROLOGY

## 2019-05-21 RX ORDER — SODIUM CHLORIDE 0.9 % (FLUSH) 0.9 %
SYRINGE (ML) INJECTION
Status: COMPLETED
Start: 2019-05-21 | End: 2019-05-21

## 2019-05-21 RX ORDER — AMLODIPINE BESYLATE 10 MG/1
5 TABLET ORAL DAILY
Qty: 30 TABLET | Refills: 0 | Status: ON HOLD | OUTPATIENT
Start: 2019-05-21 | End: 2019-08-04 | Stop reason: ALTCHOICE

## 2019-05-21 RX ORDER — TRAMADOL HYDROCHLORIDE 50 MG/1
50 TABLET ORAL EVERY 6 HOURS PRN
Qty: 20 TABLET | Refills: 0 | Status: SHIPPED | OUTPATIENT
Start: 2019-05-21 | End: 2019-05-24

## 2019-05-21 RX ORDER — MAGNESIUM SULFATE IN WATER 40 MG/ML
2 INJECTION, SOLUTION INTRAVENOUS ONCE
Status: COMPLETED | OUTPATIENT
Start: 2019-05-21 | End: 2019-05-21

## 2019-05-21 RX ORDER — ONDANSETRON 4 MG/1
4 TABLET, ORALLY DISINTEGRATING ORAL ONCE
Status: COMPLETED | OUTPATIENT
Start: 2019-05-21 | End: 2019-05-21

## 2019-05-21 RX ADMIN — IPRATROPIUM BROMIDE AND ALBUTEROL SULFATE 3 ML: .5; 3 SOLUTION RESPIRATORY (INHALATION) at 15:44

## 2019-05-21 RX ADMIN — DIGOXIN 125 MCG: 125 TABLET ORAL at 08:09

## 2019-05-21 RX ADMIN — IPRATROPIUM BROMIDE AND ALBUTEROL SULFATE 3 ML: .5; 3 SOLUTION RESPIRATORY (INHALATION) at 08:32

## 2019-05-21 RX ADMIN — APIXABAN 10 MG: 5 TABLET, FILM COATED ORAL at 21:03

## 2019-05-21 RX ADMIN — INSULIN GLARGINE 25 UNITS: 100 INJECTION, SOLUTION SUBCUTANEOUS at 08:10

## 2019-05-21 RX ADMIN — MAGNESIUM SULFATE HEPTAHYDRATE 2 G: 40 INJECTION, SOLUTION INTRAVENOUS at 15:29

## 2019-05-21 RX ADMIN — METFORMIN HYDROCHLORIDE 500 MG: 500 TABLET ORAL at 16:36

## 2019-05-21 RX ADMIN — OXYCODONE HYDROCHLORIDE AND ACETAMINOPHEN 500 MG: 500 TABLET ORAL at 08:09

## 2019-05-21 RX ADMIN — POTASSIUM CHLORIDE 20 MEQ: 1500 TABLET, EXTENDED RELEASE ORAL at 08:09

## 2019-05-21 RX ADMIN — FLECAINIDE ACETATE 100 MG: 100 TABLET ORAL at 08:09

## 2019-05-21 RX ADMIN — FLECAINIDE ACETATE 100 MG: 100 TABLET ORAL at 21:03

## 2019-05-21 RX ADMIN — CARVEDILOL 25 MG: 25 TABLET, FILM COATED ORAL at 16:36

## 2019-05-21 RX ADMIN — APIXABAN 10 MG: 5 TABLET, FILM COATED ORAL at 08:09

## 2019-05-21 RX ADMIN — AMLODIPINE BESYLATE 10 MG: 5 TABLET ORAL at 08:09

## 2019-05-21 RX ADMIN — CALCIUM 500 MG: 500 TABLET ORAL at 08:09

## 2019-05-21 RX ADMIN — PANTOPRAZOLE SODIUM 40 MG: 40 TABLET, DELAYED RELEASE ORAL at 05:50

## 2019-05-21 RX ADMIN — Medication 10 ML: at 07:42

## 2019-05-21 RX ADMIN — CARVEDILOL 25 MG: 25 TABLET, FILM COATED ORAL at 08:10

## 2019-05-21 RX ADMIN — CITALOPRAM HYDROBROMIDE 20 MG: 20 TABLET ORAL at 08:09

## 2019-05-21 RX ADMIN — ONDANSETRON 4 MG: 4 TABLET, ORALLY DISINTEGRATING ORAL at 16:36

## 2019-05-21 RX ADMIN — METFORMIN HYDROCHLORIDE 500 MG: 500 TABLET ORAL at 08:10

## 2019-05-21 RX ADMIN — DEXTROSE MONOHYDRATE 12.5 G: 500 INJECTION PARENTERAL at 07:41

## 2019-05-21 RX ADMIN — IPRATROPIUM BROMIDE AND ALBUTEROL SULFATE 3 ML: .5; 3 SOLUTION RESPIRATORY (INHALATION) at 20:21

## 2019-05-21 ASSESSMENT — PAIN SCALES - GENERAL
PAINLEVEL_OUTOF10: 0

## 2019-05-21 NOTE — PROGRESS NOTES
Pt and family no longer want Dr. Camilla Kaplan on pts case. Hospitalist consult placed. Dr. Camilla Kaplan stated she would call on-call hospitalist and make them aware. This RN also paged on-call MD and let them know the situation.

## 2019-05-21 NOTE — PROGRESS NOTES
treatment well;Treatment limited secondary to medical complications (free text)  Activity Tolerance: Pt demo'd s/s of orthostatic hypotension; 120/63 seated on BSC, with drop to 94/55 once in stance; continued to remain at 91/52 after ~2 min in static stance, 97/58 after ~4 min in static stance. Once pt seated in recliner, BP read at 126/66. RN notified  Safety Devices  Safety Devices in place: Yes  Type of devices: All fall risk precautions in place;Gait belt;Patient at risk for falls;Call light within reach;Nurse notified; Left in chair(PT to go in room)         Patient Diagnosis(es): The primary encounter diagnosis was Syncope and collapse. Diagnoses of Subacute pulmonary embolism ([address] Closed fracture of first thoracic vertebra, unspecified fracture morphology, initial encounter ([address] Hyperglycemia, and Contusion of forehead, initial encounter were also pertinent to this visit. has a past medical history of Arthritis, CAD (coronary artery disease), Carpal tunnel syndrome, COPD (chronic obstructive pulmonary disease) ([address] Coronary stent, depression, Diabetes mellitus ([address] GERD (gastroesophageal reflux disease), Hyperlipidemia, Hypertension, Lung cancer ([address] MI (myocardial infarction) ([address] LIZETT (obstructive sleep apnea), PONV (postoperative nausea and vomiting), and stress incontinence. has a past surgical history that includes Coronary angioplasty with stent (2000, 2001); back surgery (2000, 2001); bronchoscopy (12/04/2018[facility]/cell washg spx (N/A, 12/4/2018); Cholecystectomy, laparoscopic (N/A, 12/19/2018); Upper gastrointestinal endoscopy (N/A, 2/25/2019); Colonoscopy (N/A, 2/25/2019); Colonoscopy (2/25/2019); bronchoscopy (N/A, 2/27/2019); bronchoscopy (2/27/2019); and bronchoscopy (2/27/2019).     Restrictions  Restrictions/Precautions  Restrictions/Precautions: Fall Risk(high )  Required Braces or Orthoses?: No  Position Activity Restriction  Other position/activity restrictions: Briefly, Allegra Stallings is a 70 y.o. female  who presents to the ED complaining of frequent falls. She reports feeling worse in general since starting chemo and radiation. She is also hyperglycemic. She reports LOC for maybe a few hours. She says she woke up around 3pm after last seeing the clock at noon. She also c/o some R sided upper neck pain. No fevers or recent illnesses. She is not acutely SOB or having CP. Found to have acute PE and indeterminate age DVT in RLE     Subjective   General  Chart Reviewed: Yes  Patient assessed for rehabilitation services?: Yes  Response to previous treatment: Patient with no complaints from previous session  Family / Caregiver Present: Yes(daughters)  Diagnosis: Loss of Consciousness  Subjective  Subjective: Pt in recliner at time of therapist arrival. Agreeable to tx. Vital Signs  Patient Currently in Pain: Denies     Orientation  Orientation  Overall Orientation Status: Within Normal Limits     Objective    ADL  Feeding: Setup  LE Dressing: Minimal assistance(Pt with decreased endurance, winded after doffing B socks. Pt able to thread pants seated, pull to waist CGA.  Pt able to don R sock, A to don L sock)  Additional Comments: Pt declined additional ADL        Balance  Sitting Balance: Supervision  Standing Balance: Contact guard assistance(pt c/o dizziness throughout)  Standing Balance  Time: ~5-6 min  Activity: fxl mob to/from bathroom, stance at RW for BP readings, LB clothing mgmt  Comment: RW used and cues for keeping RW with pt  Functional Mobility  Functional - Mobility Device: Rolling Walker  Activity: To/from bathroom  Assist Level: Contact guard assistance  Functional Mobility Comments: CGA with RW for stability, pt c/o nausea throughout  Toilet Transfers  Toilet - Technique: Ambulating  Equipment Used: Standard bedside commode(over toilet for increased height)  Toilet Transfer: Minimal assistance(SBA with BSC to raise toilet, mod A without BSC)  Toilet Transfers Comments: Completed x2 DRY toilet transfers this date. Patient able to complete t/f with use of BSC for elevated height SBA. However, once removed pt requires mod A to complete sit>stand transition of toilet transfer. Discussed with daughters height of toilet at home, as compared to height of toilet in hospital room. Per daughter report, pt has difficulty with toilet transfers at home where she lives alone. Transfers  Sit to stand: Stand by assistance(x1, from recliner)  Stand to sit: Stand by assistance(x1, to recliner)      Cognition  Overall Cognitive Status: Exceptions  Arousal/Alertness: Appropriate responses to stimuli  Following Commands: Follows one step commands with repetition  Attention Span: Difficulty dividing attention; Attends with cues to redirect  Memory: Decreased short term memory  Safety Judgement: Decreased awareness of need for safety  Initiation: Requires cues for some  Sequencing: Requires cues for some         Plan   Plan  Times per week: 3-5x/week  Times per day: Daily  Current Treatment Recommendations: Patient/Caregiver Education & Training, Equipment Evaluation, Education, & procurement, Self-Care / ADL, Pain Management, Safety Education & Training, Functional Mobility Training, Balance Training  G-Code     OutComes Score                                                  AM-PAC Score        AM-PAC Inpatient Daily Activity Raw Score: 18  AM-PAC Inpatient ADL T-Scale Score : 38.66  ADL Inpatient CMS 0-100% Score: 46.65  ADL Inpatient CMS G-Code Modifier : CK    Goals  Short term goals  Time Frame for Short term goals: Discharge  Short term goal 1: Mod Independent bed mobility --supervision 5/20; not addressed 5/21  Short term goal 2: Mod Independent toileting--supervision 5/20; not addressed 5/21  Short term goal 3: Mod Independent ADL transfers to/from bed, chair and toilet---SBA/mod A 5/21  Short term goal 4: Mod Independent functional mobility using RW for ADL's/functional mobility --CGA with RW 5/21  Short term goal 5: Mod Independent UB/LB ADL's---pt declined 5/21  Long term goals  Time Frame for Long term goals : LTG=STG       Therapy Time   Individual Concurrent Group Co-treatment   Time In 1344         Time Out 1426         Minutes 42            Timed Code Treatment Minutes:  42 minutes    Total Treatment Minutes:  42 minutes    HARLEY Epps OTR/L [id number]    Nicolasa Toledo OT

## 2019-05-21 NOTE — PROCEDURES
[facility] [place] [facility]                     [facility], [address]                          ELECTROENCEPHALOGRAM REPORT    PATIENT NAME: Magdalena Arellano                     :        1940  MED REC NO:   [id number]                          ROOM:       3317  ACCOUNT NO:   [de-identified]                           ADMIT DATE: 2019  PROVIDER:     Boubacar Car MD    DATE OF EE2019    CLINICAL INTERPRETATION:  This is a very limited EEG recording because  of the extensive muscle artifacts in bilateral frontotemporal leads. Only the end of the recording where the patient went to sleep was fairly  interpretable. During this time, no focal abnormalities, sharp waves,  or spikes were noted. The background rhythm was in the frequency range  of 8 to 9 Hz frequency. Hyperventilation was not performed. Photic  stimulation did not produce any photic drive. A limited sleep recording  at the end of the study was obtained and was normal.    IMPRESSION:  This limited EEG study obtained during the awake state and  sleep is within normal limits.         Efren Millan MD    D: 2019 14:36:39       T: 2019 16:08:48     [email or web address]  Job#: [id number]     Doc#: [id number]    CC:  <>

## 2019-05-21 NOTE — PROGRESS NOTES
Dr. Ventura Goodman paged again for orders for magnesium replacement. Awaiting new orders. Will monitor.

## 2019-05-21 NOTE — PROGRESS NOTES
Physical Therapy  Facility/Department: [facility] 3A NURSING  Daily Treatment Note  NAME: Vinnie Jean  : 1940  MRN: [id number]    Date of Service: 2019    Discharge Recommendations: Vinnie Jean scored a 16/24 on the AM-PAC short mobility form. Current research shows that an AM-PAC score of 17 or less is typically not associated with a discharge to the patient's home setting. Based on the patients AM-PAC score and their current functional mobility deficits, it is recommended that the patient have 3-5 sessions per week of Physical Therapy at d/c to increase the patients independence. PT Equipment Recommendations  Equipment Needed: No    Patient Diagnosis(es): The primary encounter diagnosis was Syncope and collapse. Diagnoses of Subacute pulmonary embolism ([address] Closed fracture of first thoracic vertebra, unspecified fracture morphology, initial encounter ([address] Hyperglycemia, and Contusion of forehead, initial encounter were also pertinent to this visit. has a past medical history of Arthritis, CAD (coronary artery disease), Carpal tunnel syndrome, COPD (chronic obstructive pulmonary disease) ([address] Coronary stent, depression, Diabetes mellitus ([address] GERD (gastroesophageal reflux disease), Hyperlipidemia, Hypertension, Lung cancer ([address] MI (myocardial infarction) ([address] LIZETT (obstructive sleep apnea), PONV (postoperative nausea and vomiting), and stress incontinence. has a past surgical history that includes Coronary angioplasty with stent (, ); back surgery (, ); bronchoscopy (2018); pr [facility] incl fluor gdnce dx w/cell washg spx (N/A, 2018); Cholecystectomy, laparoscopic (N/A, 2018); Upper gastrointestinal endoscopy (N/A, 2019); Colonoscopy (N/A, 2019); Colonoscopy (2019); bronchoscopy (N/A, 2019); bronchoscopy (2019); and bronchoscopy (2019).     Restrictions  Restrictions/Precautions  Restrictions/Precautions: Fall Dynamic: Good  Standing - Static: Fair  Standing - Dynamic: Fair  Comments: performed standing balance on one leg with 1 hand UE support for 30 seconds on R LE, was unable to keep one leg stance on LLE for more than a couple seconds       AROM RLE (degrees)  RLE AROM: WFL  AROM LLE (degrees)  LLE AROM : WFL  Strength RLE  Strength RLE: WFL  Strength LLE  Strength LLE: WFL                 Assessment   Body structures, Functions, Activity limitations: Decreased functional mobility ; Decreased endurance;Decreased balance;Decreased strength  Assessment: Patient not at baseline function and would benefit from skilled PT to address above deficits and facilitate return to baseline function. Concern regarding patient's safety alone at home as she has a history of multiple falls and full flight of steps to get to bedroom. However, patient resistant to skilled care. Ideally she would have 24 hour superivsion, however this is not available to her. Unsure of cause of falls  Treatment Diagnosis: decreased functional mobility, impaired gait  Prognosis: Good  Decision Making: Medium Complexity  History: CAD, COPD, lives alone   Exam: functional mobility, balance, AMPAC   Clinical Presentation: evolving   Patient Education: educated on role of acute PT, POC, d/c recommendations  Barriers to Learning: none   REQUIRES PT FOLLOW UP: Yes  Activity Tolerance  Activity Tolerance: Other;Patient limited by endurance; Patient limited by fatigue  Activity Tolerance: limited by nausea/ dizziness      AM-PAC Score  AM-PAC Inpatient Mobility Raw Score : 16  AM-PAC Inpatient T-Scale Score : 40.78  Mobility Inpatient CMS 0-100% Score: 54.16  Mobility Inpatient CMS G-Code Modifier : CK          Goals  Short term goals  Time Frame for Short term goals:  To be met prior to discharge  Short term goal 1: Bed mobility with mod I  Short term goal 2: Sit to/from stand with mod I  Short term goal 3: Ambulate 200 feet with AAD and mod I  Short term goal 4:

## 2019-05-21 NOTE — CONSULTS
Palliative Care:     70 her female with history of CAD, COPD, diabetes, non-small cell lung cancer, and recent pneumonia who was admitted on 5/17 after an episode of loss of consciousness. She reports having pain had multiple similar episodes in the past. Neurology is working up possible seizures and cardiology suggest initiating anticoagulation with Eliquis as she has bilateral DVT and PE. Patient has history of paroxysmal atrial fibrillation. Patient has been seen by Neurology for falls, felt to be multifactoral including dehydration, small vessel disease of brain, and diabetic neuropathy. EEG pending. Not accepted by ARU as patient is too high functioning. 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 Patient completed concurrent radiation (6000 CG Y (from 3/18/2019 to 4/29/2019 along with weekly carboplatin and Taxol x6. EEG 5/21/19: IMPRESSION:  This limited EEG study obtained during the awake state and  sleep is within normal limits.         CTA OF THE CHEST 5/16/2019 5:23 pm   FINDINGS:   Pulmonary Arteries: Pulmonary arteries are adequately opacified for   evaluation. Armando Skeens is a subtle intraluminal filling defect within a segmental   branch to the right middle lobe best seen on series 5 images 168-172,   compatible with embolism although possibly subacute/chronic.  No additional   embolism identified.  No evidence for right heart strain or pulmonary   infarction.       Mediastinum: No evidence of mediastinal lymphadenopathy.  The heart and   pericardium demonstrate no acute abnormality.  There is no acute abnormality   of the thoracic aorta.       Lungs/pleura: Left hilar mass with postobstructive atelectasis in the   lingula.  Findings are similar as compared to multiple previous studies. Parasagittal enlarged lymph node measures 1.2 cm in short axis stable or   slightly decreased as compared to previous study. .  5 mm right middle lobe   pulmonary nodule unchanged from previous study. Aarti Jordan scarring within the   left lower lobe is also similar to previous examination.       Upper Abdomen: Peripherally calcified cystic lesion arising from the upper   pole the left kidney, similar to previous examination.       Soft Tissues/Bones: New subtle compression deformity involving the superior   endplate of T1.  Vertebral body hemangioma at T8.        Impression   Linear intraluminal filling defect within a segmental branch to the right   middle lobe is compatible with embolism although possibly subacute/chronic   given its web-like appearance.  No additional emboli.  No right heart strain   or pulmonary infarction.       Left hilar mass and adjacent periesophageal lymphadenopathy.       New subtle compression fracture involving the superior endplate of T1.  MRI   can be considered as clinically indicated.       Stable appearing 5-6 mm right middle lobe pulmonary nodule.           Veins:Lower Extremities DVT Study, VASC EXTREMITY VENOUS DUPLEX BILATERAL.        Vascular Sonographer Report       Additional Indications:PE       Impressions   Right Impression   Indeterminate age totally occluding deep vein thrombosis involving the right   posterior tibial vein zone 6. No other evidence of deep vein or superficial vein thrombosis involving the   right lower extremity. Spoke with Elissa GUZMAN on 3A. Left Impression   Indeterminate partially occluding superficial venous thrombosis involving the   left sapheno-femoral junction. Indeterminate totally occluding superficial venous thrombosis involving the   left GSV zone 1-5.    No other evidence of deep vein or superficial vein thrombosis involving the   left lower extremity.       Conclusions        Summary        -Indeterminate age totally occluding deep vein thrombosis involving the    right posterior tibial vein zone 6.    -Indeterminate partially occluding superficial venous thrombosis involving    the left sapheno-femoral junction.    -Indeterminate totally occluding superficial venous thrombosis involving the    left GSV zone 1-5. Echocardiogram 5/17/19:   Summary   -Normal left ventricle size and systolic function with an estimated ejection fraction of 60%. No regional wall motion abnormalities are seen. Mild   concentric left ventricular hypertrophy is present.   -Indeterminate diastolic function. E/e\"=11.35   -Aortic valve appears sclerotic but opens adequately. Mild aortic regurgitation is present.   -Mild mitral regurgitation is present.   -There is trivial tricuspid regurgitation with RVSP estimated at 33 mmHg. Past Medical History:   has a past medical history of Arthritis, CAD (coronary artery disease), Carpal tunnel syndrome, COPD (chronic obstructive pulmonary disease) ([facility] Coronary stent, depression, Diabetes mellitus ([facility] GERD (gastroesophageal reflux disease), Hyperlipidemia, Hypertension, Lung cancer ([facility] MI (myocardial infarction) ([facility] LIZETT (obstructive sleep apnea), PONV (postoperative nausea and vomiting), and stress incontinence. Past Surgical History:   has a past surgical history that includes Coronary angioplasty with stent (2000, 2001); back surgery (2000, 2001); bronchoscopy (12/04/2018); pr [facility] incl fluor gdnce dx w/cell washg spx (N/A, 12/4/2018); Cholecystectomy, laparoscopic (N/A, 12/19/2018); Upper gastrointestinal endoscopy (N/A, 2/25/2019); Colonoscopy (N/A, 2/25/2019); Colonoscopy (2/25/2019); bronchoscopy (N/A, 2/27/2019); bronchoscopy (2/27/2019); and bronchoscopy (2/27/2019). Advance Directives:      Code status is full    Problem Severity: Pain/Other Symptoms:     Patient has Ultram available for pain as needed     Bed Mobility/Toileting/Transfer:      Patient has a Home health aide, nurse and Physical Therapy with speech therapy. Performance Status:      Patient states that she has had recurrent falls in the last several months.   She states that many times she feels like she is going to fall and then ends up on the floor. Has been using walker. Symptom Assessment: Appetite/Nausea/Bowels/Fatigue:      Has occasional nausea. BMI is 26.9. Patient has meals on wheels and takes Glucerna at home. She has been on Megace at home. Takes Zofran at home as needed for nausea. Has Zofran available for persistent nausea. Social History:   reports that she quit smoking about 17 years ago. She has never used smokeless tobacco. She reports that she does not drink alcohol or use drugs. Family History:  family history includes Cancer in her maternal uncle and sister; Diabetes in her brother and sister; Heart Disease in her father and mother. Psychological/Spiritual:       Patient lives alone. She has two  Daughters living locally. Spouse is in residential care at [facility] following a CVA. He also has Parkinson's Disease and Lewy Body Dementia. Family Discussion:      Met with patient and her daughter Maile Mcgowan who is a nurse. Patient states she has had persistent nausea since her gallbladder was removed in December 2018. Patient had a cardiac arrest during this surgery. Daughter states she has had some cognitive impairment since this time. Spouse is in residential care at [facility] at this time. Patient has had persistent nausea since cholecystectomy and orthostatic hypotension. Patient has been reassessed by PT and now plans on [facility] for rehab. Daughter states family has been overwhelmed with both parents being ill at the same time. Patient states she misses spouse and wants to see him. Daughters are very supportive. Patient has fatigue since completing radiation treatments. Patient states \"I am just hoping for the best and hope to make it back home. \" Offered support.

## 2019-05-21 NOTE — PROGRESS NOTES
NEUROLOGY FOLLOWUP    HISTORY OF PRESENT ILLNESS :    Isak Watson is a 70 y.o. female   History was obtained from the patient and dictations in the chart. Patient states that she has had some nausea and has not been able to eat much. Her blood sugar was in the low 50s this morning. She feels somewhat dizzy and lightheaded. Detailed history:  Patient states that she has had recurrent falls in the last several months. She states that many times she feels like she is going to fall and then ends up on the floor. On the day of admission she actually lost consciousness and thinks that she was unconscious for a couple of hours. The physical therapist on her on the floor and called the paramedics and she was brought to the hospital.  Patient has history of paroxysmal atrial fibrillation as well as deep vein thrombosis. Patient has been diagnosed with non-small cell cancer of the lung for which she had chemotherapy as well as radiation therapy.   Other comorbidities include coronary active disease COPD and diabetes mellitus      REVIEW OF SYSTEMS    Constitutional:  []   Chills   [x]  Fatigue   []  Fevers   []  Malaise   []  Weight loss     [] Denies all of the above    Respiratory:   []  Cough    []  Shortness of breath         [x] Denies all of the above     Cardiovascular:   []  Chest pain    []  Exertional chest pressure/discomfort           [] Palpitations    []  Syncope     [x] Denies all of the above    Past Medical History:   Diagnosis Date    Arthritis     CAD (coronary artery disease)     Carpal tunnel syndrome     COPD (chronic obstructive [facility] disease) [facility]     Coronary stent     depression     Diabetes mellitus ([facility]     GERD (gastroesophageal reflux disease)     Hyperlipidemia     Hypertension     Lung cancer ([facility]     Adenocarcinoma of Left Lung    MI (myocardial infarction) ([facility]     LIZETT (obstructive sleep apnea)     does not use CPAP    PONV (postoperative nausea and vomiting)     stress incontinence      Family History   Problem Relation Age of Onset    Cancer Sister     Diabetes Sister     Diabetes Brother     Heart Disease Father     Heart Disease Mother     Cancer Maternal Uncle      Social History     Socioeconomic History    Marital status:      Spouse name: None    Number of children: None    Years of education: None    Highest education level: None   Occupational History    None   Social Needs    Financial resource strain: None    Food insecurity:     Worry: None     Inability: None    Transportation needs:     Medical: None     Non-medical: None   Tobacco Use    Smoking status: Former Smoker     Last attempt to quit: 10/28/2001     Years since quittin.5    Smokeless tobacco: Never Used   Substance and Sexual Activity    Alcohol use: No    Drug use: No    Sexual activity: Yes     Partners: Male   Lifestyle    Physical activity:     Days per week: None     Minutes per session: None    Stress: None   Relationships    Social connections:     Talks on phone: None     Gets together: None     Attends Jainism service: None     Active member of club or organization: None     Attends meetings of clubs or organizations: None     Relationship status: None    Intimate partner violence:     Fear of current or ex partner: None     Emotionally abused: None     Physically abused: None     Forced sexual activity: None   Other Topics Concern    None   Social History Narrative    None        PHYSICAL EXAMINATION     /62   Pulse 80   Temp 96.4 °F (35.8 °C) (Temporal)   Resp 16   Ht 5' 5\" (1.651 m)   Wt 161 lb 9.6 oz (73.3 kg)   SpO2 92%   BMI 26.89 kg/m²   This is a well-nourished patient in no acute distress  Awake alert and oriented ×3. Speech is normal.  Pupils equal round reacting to light. Visual fields full. External ocular movements intact.   Face symmetrical.  Tongue midline. Strength is 4+ over 5 all over. Sensory exam shows distal peripheral neuropathy. Deep tendon reflexes diminished in the legs. Gait unsteady. No carotid bruit. No neck stiffness.     DATA :  LABS:  General Labs:    CBC:   Lab Results   Component Value Date    WBC 8.5 05/21/2019    RBC 3.04 05/21/2019    HGB 10.1 05/21/2019    HCT 30.9 05/21/2019    .8 05/21/2019    MCH 33.2 05/21/2019    MCHC 32.6 05/21/2019    RDW 18.8 05/21/2019     05/21/2019    MPV 6.9 05/21/2019     BMP:    Lab Results   Component Value Date     05/21/2019    K 3.7 05/21/2019    K 3.9 05/16/2019    CL 96 05/21/2019    CO2 27 05/21/2019    BUN 16 05/21/2019    LABALBU 3.2 05/21/2019    CREATININE 0.6 05/21/2019    CALCIUM 9.1 05/21/2019    GFRAA >60 05/21/2019    GFRAA >60 10/29/2012    LABGLOM >60 05/21/2019    GLUCOSE 62 05/21/2019     RADIOLOGY REVIEW:  I have reviewed radiology image(s) and reports(s) of:  CT scan of the head      IMPRESSION :  Syncope, unclear etiology  Probable autonomic dysfunction and hypotension  Diabetic peripheral neuropathy  Chronic white matter disease on the brain seen on CT head  Ataxia  Recurrent falls  Atrial fibrillation  Non-small cell cancer of the lung  Patient Active Problem List   Diagnosis    Diabetes mellitus ([facility]    Dyslipidemia    Mitral and aortic valve disease    Essential hypertension, benign    Coronary atherosclerosis of native coronary artery    Sleep apnea    Chest pain    Carpal tunnel syndrome    SOB (shortness of breath)    COPD, severe (HCC)    Influenza    Paroxysmal atrial fibrillation (HCC)    Chronic cough    Hilar mass    Acute cholecystitis    Gallstones and inflammation of gallbladder without obstruction    Atrial fibrillation with rapid ventricular response (HCC)    Acute respiratory failure with hypoxia (HCC)    Centrilobular emphysema (HCC)    Ileus following gastrointestinal surgery    Mediastinal adenopathy    COPD exacerbation (HCC)    Adenocarcinoma of right lung (HCC)    Dehydration    Mild malnutrition (HCC)    Loss of consciousness (HCC)    Syncope and collapse    Subacute pulmonary embolism (HCC)    Other pulmonary embolism without acute cor pulmonale (HCC)    Closed compression fracture of thoracic vertebra (HCC)    Chronic deep vein thrombosis (DVT) of both lower extremities (HCC)    Ataxia    Recurrent falls    Diabetic peripheral neuropathy ([facility]       RECOMMENDATIONS :  Discussed with patient  Discussed with Dr. Davon Harden  EEG today  Continue to adjust diabetes medications to maintain stable blood sugar  Physical therapy  Recommended that she use a walker when she ambulates to prevent falls  Patient is now on anticoagulation with Eliquis          Please note a portion of this chart was generated using dragon dictation software. Although every effort was made to ensure the accuracy of this automated transcription, some errors in transcription may have occurred.          Natalia Morris M.D.

## 2019-05-21 NOTE — PROGRESS NOTES
Attempted to do orthostatics on pt and get pt up to chair but pt refused at this time stating she \"is dizzy and just wants to go back to sleep\". Will attempt later.

## 2019-05-21 NOTE — CARE COORDINATION
PT/OT recs indicate SNF LOC, now. [facility] made aware of updated notes for pre-cert. SW discussed that pt may need to consider AL for long term planning. Pt states she could not afford AL. SW explained how to spend money down for LTC or AL waiver. SW explained private pay services. SW facilitated phone call to [employer] with pt's daughter to contact intake.     Tori Sun MSW, [address]

## 2019-05-21 NOTE — PROGRESS NOTES
05/16/2019    GLUCOSE 346 05/16/2019     Hepatic Function Panel:    Lab Results   Component Value Date    ALKPHOS 88 05/16/2019    ALT 16 05/16/2019    AST 15 05/16/2019    PROT 6.5 05/16/2019    BILITOT 0.3 05/16/2019    BILIDIR <0.2 12/23/2018    IBILI see below 12/23/2018    LABALBU 3.6 05/16/2019     LDH:  No results found for: LDH  PT/INR:    Lab Results   Component Value Date    PROTIME 10.8 02/27/2019    INR 0.95 02/27/2019     PTT:    Lab Results   Component Value Date    APTT 30.5 12/04/2018   [APTT    Current Medications  Current Facility-Administered Medications: apixaban (ELIQUIS) tablet 10 mg, 10 mg, Oral, BID  insulin glargine (LANTUS) injection pen 25 Units, 25 Units, Subcutaneous, BID  glucose (GLUTOSE) 40 % oral gel 15 g, 15 g, Oral, PRN  dextrose 50 % solution 12.5 g, 12.5 g, Intravenous, PRN  glucagon (rDNA) injection 1 mg, 1 mg, Intramuscular, PRN  dextrose 5 % solution, 100 mL/hr, Intravenous, PRN  traMADol (ULTRAM) tablet 50 mg, 50 mg, Oral, Q4H PRN  perflutren lipid microspheres (DEFINITY) injection 1.65 mg, 1.5 mL, Intravenous, ONCE PRN  insulin lispro (HUMALOG) injection pen 0-18 Units, 0-18 Units, Subcutaneous, TID WC  insulin lispro (HUMALOG) injection pen 0-9 Units, 0-9 Units, Subcutaneous, Nightly  acetaminophen (TYLENOL) tablet 650 mg, 650 mg, Oral, Q4H PRN  amLODIPine (NORVASC) tablet 10 mg, 10 mg, Oral, Daily  vitamin C (ASCORBIC ACID) tablet 500 mg, 500 mg, Oral, Daily  calcium elemental (OSCAL) tablet 500 mg, 500 mg, Oral, Daily  citalopram (CELEXA) tablet 20 mg, 20 mg, Oral, QAM  digoxin (LANOXIN) tablet 125 mcg, 125 mcg, Oral, Daily  ezetimibe (ZETIA) tablet 10 mg - PATIENT SUPPLIED, 10 mg, Oral, Daily  flecainide (TAMBOCOR) tablet 100 mg, 100 mg, Oral, BID  Fluticasone-Umeclidin-Vilant (TRELEGY ELLIPTA) 100-62.5-25 MCG/INH AEPB - PATIENT SUPPLIED, 1 puff, Inhalation, Daily  hydrochlorothiazide (HYDRODIURIL) tablet 12.5 mg, 12.5 mg, Oral, Daily  ipratropium-albuterol (DUONEB) nebulizer solution 3 mL, 3 mL, Inhalation, Q4H WA  losartan (COZAAR) tablet 50 mg, 50 mg, Oral, Daily  metFORMIN (GLUCOPHAGE) tablet 500 mg, 500 mg, Oral, BID WC  pantoprazole (PROTONIX) tablet 40 mg, 40 mg, Oral, QAM AC  potassium chloride (KLOR-CON M) extended release tablet 20 mEq, 20 mEq, Oral, Daily  carvedilol (COREG) tablet 25 mg, 25 mg, Oral, BID WC    ASSESSMENT AND PLAN    1. Stage II non-small cell lung cancer: Status post chemoradiation completed in late April 2019.    2.  Syncopal episode: Being evaluated by neurology and cardiology. Will be getting EEG tomorrow. 3. Pulmonary embolism: Eliquis will be continued.       Jonathan Torres MD

## 2019-05-21 NOTE — FLOWSHEET NOTE
05/21/19 0930   Vital Signs   Orthostatic B/P and Pulse? Yes   Blood Pressure Lying 139/68   Pulse Lying 74 PER MINUTE   Blood Pressure Sitting 112/54   Pulse Sitting 74 PER MINUTE   Blood Pressure Standing 106/59   Pulse Standing 76 PER MINUTE   Orthostatics done. Dr. Aayush Sheppard aware. Will monitor.

## 2019-05-21 NOTE — PROGRESS NOTES
[address]   Progress Note  Cardiology      Ian Rajput   Admission date:  5/16/2019  CC-fatigued  Subjective:  She remains fatigued. Blood sugar 54 this am. Complains of nausea and headache.  Everything she eats or drinks makes her nauseated    Objective:  Medications/Labs all Reviewed     apixaban  10 mg Oral BID    insulin glargine  25 Units Subcutaneous BID    insulin lispro  0-18 Units Subcutaneous TID WC    insulin lispro  0-9 Units Subcutaneous Nightly    amLODIPine  10 mg Oral Daily    vitamin C  500 mg Oral Daily    calcium elemental  500 mg Oral Daily    citalopram  20 mg Oral QAM    digoxin  125 mcg Oral Daily    ezetimibe  10 mg Oral Daily    flecainide  100 mg Oral BID    Fluticasone-Umeclidin-Vilant  1 puff Inhalation Daily    hydrochlorothiazide  12.5 mg Oral Daily    ipratropium-albuterol  3 mL Inhalation Q4H WA    losartan  50 mg Oral Daily    metFORMIN  500 mg Oral BID WC    pantoprazole  40 mg Oral QAM AC    potassium chloride  20 mEq Oral Daily    carvedilol  25 mg Oral BID WC       BMP:   Lab Results   Component Value Date     05/21/2019    K 3.7 05/21/2019    K 3.9 05/16/2019    CL 96 05/21/2019    CO2 27 05/21/2019    BUN 16 05/21/2019    CREATININE 0.6 05/21/2019    MG 1.50 05/21/2019     CBC:    Lab Results   Component Value Date    WBC 8.5 05/21/2019    RBC 3.04 05/21/2019    HGB 10.1 05/21/2019    HCT 30.9 05/21/2019    .8 05/21/2019    RDW 18.8 05/21/2019     05/21/2019      PT/INR:    Lab Results   Component Value Date    INR 0.95 02/27/2019    PROTIME 10.8 02/27/2019     Cardiac Enzymes:    Lab Results   Component Value Date    CKTOTAL 26 05/16/2019     Lab Results   Component Value Date    TROPONINI <0.01 05/16/2019    TROPONINI <0.01 02/22/2019    TROPONINI <0.01 12/22/2018     BNP:  No results found for: BNP  FASTING LIPID PANEL:    Lab Results   Component Value Date    CHOL 224 05/19/2016    HDL 69 05/19/2016    TRIG 123 05/19/2016   blood sugar only 54 this am  Physical Examination:    /62   Pulse 80   Temp 96.4 °F (35.8 °C) (Temporal)   Resp 16   Ht 5' 5\" (1.651 m)   Wt 161 lb 9.6 oz (73.3 kg)   SpO2 97%   BMI 26.89 kg/m²    Not orthostatic again today  Respiratory:  · Resp Assessment: Normal respiratory effort  · Resp Auscultation: Clear to auscultation bilaterally   Cardiovascular:  · Auscultation: regular rate and rhythm, normal S1S2, no murmur, rub or gallop  · Palpation:  Nl PMI  · JVP:  normal  · Extremities: No Edema  Abdomen:  · Soft, non-tender  · Normal bowel sounds  Extremities:  ·  No Cyanosis or Clubbing  Neurological/Psychiatric:  · Oriented to time, place, and person  · Non-anxious  Skin Warm and dry    Assessment:    Patient Active Hospital Problem List:   Loss of consciousness ([facility] (5/16/2019)    Assessment: exact etiology uncertain with numerous comorbidities that contribute     Syncope and collapse (), hypoglycemia likely playing a role. Symptoms have been present for quite some time. Also extremely depressed     Subacute pulmonary embolism (HCC) ()         Chronic deep vein thrombosis (DVT) of both lower extremities ([facility] [address] (5/20/2019)          Plan:  1.  she has refused to take anticoagulation in spite of recommendations in past. Longstanding paroxysmal a. Fib. Numerous comorbities that contribute to vasovagal episode with newly diagnosed thromboembolic disease,inactivity with poor appetite with extreme stress with  in hospice, nonsmall cell lung CA, underlying CAD. 2. She is now willing to take eliquis,will review stress test but not a good candidate for invasive evaluation with DVT/PE in early treatment phase  3. Will stop HCTZand KDur.  Dr Alexander Prudent to address [facility] management

## 2019-05-21 NOTE — PROGRESS NOTES
Pt with h/o Type 2 DM , HTN, adenocarcinoma of right lung, Afib , cOPD admitted s/p  Fall and hyperglycemia. Found to have DVT and PE now on Eliquis. PT / OT has evaluated pt and rehab is recommended. Social work is on the case for placement  Pt is requesting hospitalist service to care for her instead of Dr Elif Durant  See Dr. Miryam Oviedo summary datedd 05/21 for details.

## 2019-05-21 NOTE — PROGRESS NOTES
Occupational Therapy  Vinnie Jean    Attempted to see patient for OT tx this date. Pt ANDREW for ECHO. Daughter in room, discussed patient's hospital stay and potential need for rehab upon d/c. Emphasized therapy will follow up to assist with determining appropriate discharge disposition for pt, as time and schedule allows.     Matt Dimas [place] OTR/L [id number]

## 2019-05-21 NOTE — PROGRESS NOTES
CLINICAL PHARMACY NOTE: MEDS TO [address] Select Patient?: No  Total # of Prescriptions Filled: 1   The following medications were delivered to the patient:  · eliquis 5mg  Total # of Interventions Completed: 0  Time Spent (min): 30    Additional Documentation:  Informed patient of the $43.50 copay for refill. Added free trial eliquis card. Medication delivered- patient signed.   [employer] [facility]

## 2019-05-21 NOTE — DISCHARGE SUMMARY
Physician Discharge Summary     Patient ID:  Frandy Barnett  [id number]  77 y.o.  1940    Admit date: 5/16/2019    Discharge date and time: 5/21/2017    Admitting Physician: Theresa Vogt MD     Discharge Physician: Theresa Vogt     discharge Diagnoses:   Syncope  PAF  DM TYPE 2 uncontrolled  Subacute DVT bilat  Subacute PE  Hypomagnesemia  CAD  Frequent falls  DM type 2 with neuropathy   Patient Active Problem List   Diagnosis Code    Diabetes mellitus ([facility] [address] E11.9    Dyslipidemia E78.5    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    Essential hypertension, benign I10    Coronary atherosclerosis of native coronary artery I25.10    Sleep apnea G47.30    Chest pain R07.9    Carpal tunnel syndrome G56.00    SOB (shortness of breath) R06.02    COPD, severe ([facility]) J44.9    Influenza J11.1    Paroxysmal atrial fibrillation (HCC) I48.0    Chronic cough R05    Hilar mass R91.8    Acute cholecystitis K81.0    Gallstones and inflammation of gallbladder without obstruction K80.00    Atrial fibrillation with rapid ventricular response (HCC) I48.91    Acute respiratory failure with hypoxia ([facility]) J96.01    Centrilobular emphysema (HCC) J43.2    Ileus following gastrointestinal surgery K91.30    Mediastinal adenopathy R59.0    COPD exacerbation (HCC) J44.1    Adenocarcinoma of right lung (HCC) C34.91    Dehydration E86.0    Mild malnutrition ([facility]) E44.1    Loss of consciousness ([facility]) R40.20    Syncope and collapse R55    Subacute pulmonary embolism (HCC) I26.99    Other pulmonary embolism without acute cor pulmonale ([facility]) I26.99    Closed compression fracture of thoracic vertebra ([facility]) S22.000A    Chronic deep vein thrombosis (DVT) of both lower extremities ([facility]) I82.503    Ataxia R27.0    Recurrent falls R29.6    Diabetic peripheral neuropathy ([facility]) E11.42        Admission dx  syncope    Admission Condition: poor    Discharged Condition: fair    Indication for Admission: tablet  Take 1 tablet by mouth daily             ezetimibe (ZETIA) 10 MG tablet  Take 10 mg by mouth daily             flecainide (TAMBOCOR) 100 MG tablet  Take 1 tablet by mouth 2 times daily             Fluticasone-Umeclidin-Vilant (TRELEGY ELLIPTA) 100-62.5-25 MCG/INH AEPB  Inhale 1 puff into the lungs daily Currently not taking             hydrochlorothiazide (HYDRODIURIL) 25 MG tablet  Take 0.5 tablets by mouth daily             insulin glargine (LANTUS) 100 UNIT/ML injection pen  Inject 20 Units into the skin 2 times daily             ipratropium-albuterol (DUONEB) 0.5-2.5 (3) MG/3ML SOLN nebulizer solution  Inhale 3 mLs into the lungs every 4 hours (while awake) DX:COPD J44.9             losartan (COZAAR) 100 MG tablet  Take 0.5 tablets by mouth daily             metFORMIN (GLUCOPHAGE) 1000 MG tablet  Take 0.5 tablets by mouth 2 times daily (with meals)             omeprazole (PRILOSEC) 20 MG delayed release capsule  Take 20 mg by mouth daily             potassium chloride (KLOR-CON M) 20 MEQ extended release tablet  Take 1 tablet by mouth daily             traMADol (ULTRAM) 50 MG tablet  Take 1 tablet by mouth every 6 hours as needed for Pain for up to 3 days. Pt decided on day of DC to be admitted to SNF which will require pre cert. I will ask Oncology to cover her medical care if there is significant delay    Patient Instructions:   [unfilled]  Activity: activity as tolerated  Diet: diabetic diet  Wound Care: none needed    Follow-up with Dr Naomy Brian in 7 days.     Signed:  Maria Isabel Davalos  5/21/2019  1:30 PM

## 2019-05-21 NOTE — PROGRESS NOTES
BANDSPCT 2 05/03/2019    METASPCT 1 05/02/2019    LYMPHOPCT 19.2 05/21/2019    MONOPCT 12.3 05/21/2019    MYELOPCT 4 05/02/2019    BASOPCT 0.7 05/21/2019    MONOSABS 1.0 05/21/2019    LYMPHSABS 1.6 05/21/2019    EOSABS 0.5 05/21/2019    BASOSABS 0.1 05/21/2019    DIFFTYPE Auto 10/29/2012     BMP:    Lab Results   Component Value Date     05/21/2019    K 3.7 05/21/2019    K 3.9 05/16/2019    CL 96 05/21/2019    CO2 27 05/21/2019    BUN 16 05/21/2019    LABALBU 3.2 05/21/2019    CREATININE 0.6 05/21/2019    CALCIUM 9.1 05/21/2019    GFRAA >60 05/21/2019    GFRAA >60 10/29/2012    LABGLOM >60 05/21/2019    GLUCOSE 62 05/21/2019       ASSESSMENT AND PLAN    Syncope  Subacute PE and DVT  Diabetic neuropathy  DM uncontrolled  Small vessel disease of brain PAF  CAD    Patient Active Problem List   Diagnosis Code    Diabetes mellitus ([facility] [address] E11.9    Dyslipidemia E78.5    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    Essential hypertension, benign I10    Coronary atherosclerosis of native coronary artery I25.10    Sleep apnea G47.30    Chest pain R07.9    Carpal tunnel syndrome G56.00    SOB (shortness of breath) R06.02    COPD, 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    Influenza J11.1    Paroxysmal atrial fibrillation (HCC) I48.0    Chronic cough R05    Hilar mass R91.8    Acute cholecystitis K81.0    Gallstones and inflammation of gallbladder without obstruction K80.00    Atrial fibrillation with rapid ventricular response (HCC) I48.91    Acute respiratory failure with hypoxia ([facility]) J96.01    Centrilobular emphysema (HCC) J43.2    Ileus following gastrointestinal surgery K91.30    Mediastinal adenopathy R59.0    COPD exacerbation (HCC) J44.1    Adenocarcinoma of right lung (HCC) C34.91    Dehydration E86.0    Mild malnutrition (HCC) E44.1    Loss of consciousness (HCC) R40.20    Syncope and collapse R55    Subacute pulmonary embolism (HCC) I26.99    Other pulmonary embolism without acute cor pulmonale (HCC) I26.99  Closed compression fracture of thoracic vertebra ([facility]) S22.000A    Chronic deep vein thrombosis (DVT) of both lower extremities ([facility]) I82.503    Ataxia R27.0    Recurrent falls R29.6    Diabetic peripheral neuropathy ([facility]) E11.42       Discussed with Dr Francois Ballesteros dilemma of pt care. She has had many falls but has DVT with PE AND PAF so I feel she must be anticoagulated. Will start Eliquis at 5 mg BID (reduced) after DC to home. I have ordered Home Care. PT OT and have discussed family hiring private aide with daughter for safety. She will be DC to home today with Home Care. I have also recommended PILL PACK or pill minder filled by RN for medication safety.     She was given name of Dr Michelle Brar to f/u in 1 week as she has decided to leave my practice      Arnaldo Alston M.D.

## 2019-05-22 LAB
ANION GAP SERPL CALCULATED.3IONS-SCNC: 15 MMOL/L (ref 3–16)
BUN BLDV-MCNC: 18 MG/DL (ref 7–20)
CALCIUM SERPL-MCNC: 9.3 MG/DL (ref 8.3–10.6)
CHLORIDE BLD-SCNC: 97 MMOL/L (ref 99–110)
CO2: 23 MMOL/L (ref 21–32)
CREAT SERPL-MCNC: 0.8 MG/DL (ref 0.6–1.2)
GFR AFRICAN AMERICAN: >60
GFR NON-AFRICAN AMERICAN: >60
GLUCOSE BLD-MCNC: 116 MG/DL (ref 70–99)
GLUCOSE BLD-MCNC: 120 MG/DL (ref 70–99)
GLUCOSE BLD-MCNC: 176 MG/DL (ref 70–99)
GLUCOSE BLD-MCNC: 89 MG/DL (ref 70–99)
GLUCOSE BLD-MCNC: 93 MG/DL (ref 70–99)
HCT VFR BLD CALC: 33.1 % (ref 36–48)
HEMOGLOBIN: 10.9 G/DL (ref 12–16)
MAGNESIUM: 2.1 MG/DL (ref 1.8–2.4)
MCH RBC QN AUTO: 33.5 PG (ref 26–34)
MCHC RBC AUTO-ENTMCNC: 32.8 G/DL (ref 31–36)
MCV RBC AUTO: 102.1 FL (ref 80–100)
PDW BLD-RTO: 18.9 % (ref 12.4–15.4)
PERFORMED ON: ABNORMAL
PERFORMED ON: NORMAL
PLATELET # BLD: 177 K/UL (ref 135–450)
PMV BLD AUTO: 7.1 FL (ref 5–10.5)
POTASSIUM SERPL-SCNC: 4.2 MMOL/L (ref 3.5–5.1)
RBC # BLD: 3.24 M/UL (ref 4–5.2)
REASON FOR REJECTION: NORMAL
REJECTED TEST: NORMAL
SODIUM BLD-SCNC: 135 MMOL/L (ref 136–145)
WBC # BLD: 8.7 K/UL (ref 4–11)

## 2019-05-22 PROCEDURE — 6370000000 HC RX 637 (ALT 250 FOR IP): Performed by: INTERNAL MEDICINE

## 2019-05-22 PROCEDURE — 6370000000 HC RX 637 (ALT 250 FOR IP): Performed by: FAMILY MEDICINE

## 2019-05-22 PROCEDURE — 85027 COMPLETE CBC AUTOMATED: CPT

## 2019-05-22 PROCEDURE — 83735 ASSAY OF MAGNESIUM: CPT

## 2019-05-22 PROCEDURE — 99231 SBSQ HOSP IP/OBS SF/LOW 25: CPT | Performed by: INTERNAL MEDICINE

## 2019-05-22 PROCEDURE — 94761 N-INVAS EAR/PLS OXIMETRY MLT: CPT

## 2019-05-22 PROCEDURE — 1200000000 HC SEMI PRIVATE

## 2019-05-22 PROCEDURE — 36415 COLL VENOUS BLD VENIPUNCTURE: CPT

## 2019-05-22 PROCEDURE — 99232 SBSQ HOSP IP/OBS MODERATE 35: CPT | Performed by: PSYCHIATRY & NEUROLOGY

## 2019-05-22 PROCEDURE — 80048 BASIC METABOLIC PNL TOTAL CA: CPT

## 2019-05-22 PROCEDURE — 94640 AIRWAY INHALATION TREATMENT: CPT

## 2019-05-22 PROCEDURE — 2700000000 HC OXYGEN THERAPY PER DAY

## 2019-05-22 RX ORDER — CITALOPRAM 20 MG/1
30 TABLET ORAL EVERY MORNING
Status: DISCONTINUED | OUTPATIENT
Start: 2019-05-23 | End: 2019-05-23 | Stop reason: HOSPADM

## 2019-05-22 RX ADMIN — ACETAMINOPHEN 650 MG: 325 TABLET, FILM COATED ORAL at 11:44

## 2019-05-22 RX ADMIN — CARVEDILOL 25 MG: 25 TABLET, FILM COATED ORAL at 17:24

## 2019-05-22 RX ADMIN — APIXABAN 5 MG: 5 TABLET, FILM COATED ORAL at 21:33

## 2019-05-22 RX ADMIN — IPRATROPIUM BROMIDE AND ALBUTEROL SULFATE 3 ML: .5; 3 SOLUTION RESPIRATORY (INHALATION) at 07:46

## 2019-05-22 RX ADMIN — METFORMIN HYDROCHLORIDE 500 MG: 500 TABLET ORAL at 08:29

## 2019-05-22 RX ADMIN — INSULIN GLARGINE 12 UNITS: 100 INJECTION, SOLUTION SUBCUTANEOUS at 21:34

## 2019-05-22 RX ADMIN — CARVEDILOL 25 MG: 25 TABLET, FILM COATED ORAL at 08:29

## 2019-05-22 RX ADMIN — DIGOXIN 125 MCG: 125 TABLET ORAL at 08:29

## 2019-05-22 RX ADMIN — ACETAMINOPHEN 650 MG: 325 TABLET, FILM COATED ORAL at 23:54

## 2019-05-22 RX ADMIN — IPRATROPIUM BROMIDE AND ALBUTEROL SULFATE 3 ML: .5; 3 SOLUTION RESPIRATORY (INHALATION) at 11:58

## 2019-05-22 RX ADMIN — OXYCODONE HYDROCHLORIDE AND ACETAMINOPHEN 500 MG: 500 TABLET ORAL at 08:29

## 2019-05-22 RX ADMIN — AMLODIPINE BESYLATE 10 MG: 5 TABLET ORAL at 08:29

## 2019-05-22 RX ADMIN — PANTOPRAZOLE SODIUM 40 MG: 40 TABLET, DELAYED RELEASE ORAL at 06:37

## 2019-05-22 RX ADMIN — FLECAINIDE ACETATE 100 MG: 100 TABLET ORAL at 21:33

## 2019-05-22 RX ADMIN — APIXABAN 10 MG: 5 TABLET, FILM COATED ORAL at 08:29

## 2019-05-22 RX ADMIN — FLECAINIDE ACETATE 100 MG: 100 TABLET ORAL at 08:29

## 2019-05-22 RX ADMIN — IPRATROPIUM BROMIDE AND ALBUTEROL SULFATE 3 ML: .5; 3 SOLUTION RESPIRATORY (INHALATION) at 21:41

## 2019-05-22 RX ADMIN — CITALOPRAM HYDROBROMIDE 20 MG: 20 TABLET ORAL at 08:29

## 2019-05-22 RX ADMIN — IPRATROPIUM BROMIDE AND ALBUTEROL SULFATE 3 ML: .5; 3 SOLUTION RESPIRATORY (INHALATION) at 15:49

## 2019-05-22 RX ADMIN — CALCIUM 500 MG: 500 TABLET ORAL at 08:29

## 2019-05-22 ASSESSMENT — PAIN SCALES - GENERAL
PAINLEVEL_OUTOF10: 0
PAINLEVEL_OUTOF10: 0
PAINLEVEL_OUTOF10: 4
PAINLEVEL_OUTOF10: 0
PAINLEVEL_OUTOF10: 0
PAINLEVEL_OUTOF10: 3
PAINLEVEL_OUTOF10: 0
PAINLEVEL_OUTOF10: 3
PAINLEVEL_OUTOF10: 0

## 2019-05-22 ASSESSMENT — PAIN DESCRIPTION - LOCATION: LOCATION: GENERALIZED

## 2019-05-22 ASSESSMENT — PAIN DESCRIPTION - PAIN TYPE
TYPE: ACUTE PAIN
TYPE: CHRONIC PAIN

## 2019-05-22 ASSESSMENT — PAIN DESCRIPTION - DESCRIPTORS: DESCRIPTORS: HEADACHE

## 2019-05-22 NOTE — PROGRESS NOTES
NEUROLOGY FOLLOWUP    HISTORY OF PRESENT ILLNESS :    Hu Shepard is a 70 y.o. female   History was obtained from the patient and dictations in the chart. Patient states that she is feeling better today. She denies any significant nausea headaches or dizziness. Detailed history:  Patient states that she has had recurrent falls in the last several months. She states that many times she feels like she is going to fall and then ends up on the floor. On the day of admission she actually lost consciousness and thinks that she was unconscious for a couple of hours. The physical therapist on her on the floor and called the paramedics and she was brought to the hospital.  Patient has history of paroxysmal atrial fibrillation as well as deep vein thrombosis. Patient has been diagnosed with non-small cell cancer of the lung for which she had chemotherapy as well as radiation therapy.   Other comorbidities include coronary active disease COPD and diabetes mellitus      REVIEW OF SYSTEMS    Constitutional:  []   Chills   [x]  Fatigue   []  Fevers   []  Malaise   []  Weight loss     [] Denies all of the above    Respiratory:   []  Cough    []  Shortness of breath         [x] Denies all of the above     Cardiovascular:   []  Chest pain    []  Exertional chest pressure/discomfort           [] Palpitations    []  Syncope     [x] Denies all of the above    Past Medical History:   Diagnosis Date    Arthritis     CAD (coronary artery disease)     Carpal tunnel syndrome     COPD [facility]chronic obstructive pulmonary disease) ([facility] [address]     Coronary stent     depression     Diabetes mellitus ([facility] [address]     GERD (gastroesophageal reflux disease)     Hyperlipidemia     Hypertension     Lung cancer ([facility] [address]     Adenocarcinoma of Left Lung    MI (myocardial infarction) ([facility] [address]     LIZETT (obstructive sleep apnea)     does not use CPAP    PONV (postoperative Sensory exam shows distal peripheral neuropathy. Deep tendon reflexes diminished in the legs. Gait unsteady. No carotid bruit. No neck stiffness.     DATA :  LABS:  General Labs:    CBC:   Lab Results   Component Value Date    WBC 8.7 05/22/2019    RBC 3.24 05/22/2019    HGB 10.9 05/22/2019    HCT 33.1 05/22/2019    .1 05/22/2019    MCH 33.5 05/22/2019    MCHC 32.8 05/22/2019    RDW 18.9 05/22/2019     05/22/2019    MPV 7.1 05/22/2019     BMP:    Lab Results   Component Value Date     05/22/2019    K 4.2 05/22/2019    K 3.9 05/16/2019    CL 97 05/22/2019    CO2 23 05/22/2019    BUN 18 05/22/2019    LABALBU 3.2 05/21/2019    CREATININE 0.8 05/22/2019    CALCIUM 9.3 05/22/2019    GFRAA >60 05/22/2019    GFRAA >60 10/29/2012    LABGLOM >60 05/22/2019    GLUCOSE 89 05/22/2019     RADIOLOGY REVIEW:  I have reviewed radiology image(s) and reports(s) of:  CT scan of the head      IMPRESSION :  Syncope, unclear etiology  Probable autonomic dysfunction and hypotension  Diabetic peripheral neuropathy  Chronic white matter disease on the brain seen on CT head  Ataxia  Recurrent falls  Atrial fibrillation  Non-small cell cancer of the lung  EEG did not show any epileptiform abnormalities  Patient Active Problem List   Diagnosis    Diabetes mellitus ([facility] [address]    Dyslipidemia    Mitral and aortic valve disease    Essential hypertension, benign    Coronary atherosclerosis of native coronary artery    Sleep apnea    Chest pain    Carpal tunnel syndrome    SOB (shortness of breath)    COPD, severe (HCC)    Influenza    Paroxysmal atrial fibrillation (HCC)    Chronic cough    Hilar mass    Acute cholecystitis    Gallstones and inflammation of gallbladder without obstruction    Atrial fibrillation with rapid ventricular response (HCC)    Acute respiratory failure with hypoxia (HCC)    Centrilobular emphysema (HCC)    Ileus following gastrointestinal surgery    Mediastinal adenopathy    COPD exacerbation ([facility] [address]    Adenocarcinoma of right lung (HCC)    Dehydration    Mild malnutrition (HCC)    Loss of consciousness ([facility] [address]    Syncope and collapse    Subacute pulmonary embolism (HCC)    Other pulmonary embolism without acute cor pulmonale (HCC)    Closed compression fracture of thoracic vertebra (HCC)    Chronic deep vein thrombosis (DVT) of both lower extremities (HCC)    Ataxia    Recurrent falls    Diabetic peripheral neuropathy (HCC)       RECOMMENDATIONS :  Discussed with patient  Discussed with Dr. Renate Johnson yesterday over the telephone  Discussed with patient's nurse at the bedside  There was some question that the patient's family had requested an MRA to be done. I explained to the patient and the nurse that if we can look for a stroke MRI brain would be more appropriate but even if she had a small stroke it would not make any change in her treatment. Patient's nurse we will try to talk to the family when they arrive to the hospital   Recommended that she use a walker when she ambulates to prevent falls  Patient is now on anticoagulation with Eliquis          Please note a portion of this chart was generated using dragon dictation software. Although every effort was made to ensure the accuracy of this automated transcription, some errors in transcription may have occurred.          Radha Adams M.D.

## 2019-05-22 NOTE — PROGRESS NOTES
[address]   Progress Note  Cardiology      Frandy Barnett   Admission date:  5/16/2019  CC-feels better  Subjective: No longer with nausea.  Feels good today    Objective:  Medications/Labs all Reviewed     insulin glargine  15 Units Subcutaneous BID    [START ON 5/23/2019] citalopram  30 mg Oral QAM    apixaban  5 mg Oral BID    amLODIPine  10 mg Oral Daily    vitamin C  500 mg Oral Daily    calcium elemental  500 mg Oral Daily    digoxin  125 mcg Oral Daily    ezetimibe  10 mg Oral Daily    flecainide  100 mg Oral BID    Fluticasone-Umeclidin-Vilant  1 puff Inhalation Daily    ipratropium-albuterol  3 mL Inhalation Q4H WA    losartan  50 mg Oral Daily    metFORMIN  500 mg Oral BID WC    pantoprazole  40 mg Oral QAM AC    carvedilol  25 mg Oral BID WC       BMP:   Lab Results   Component Value Date     05/22/2019    K 4.2 05/22/2019    K 3.9 05/16/2019    CL 97 05/22/2019    CO2 23 05/22/2019    BUN 18 05/22/2019    CREATININE 0.8 05/22/2019    MG 2.10 05/22/2019     CBC:    Lab Results   Component Value Date    WBC 8.7 05/22/2019    RBC 3.24 05/22/2019    HGB 10.9 05/22/2019    HCT 33.1 05/22/2019    .1 05/22/2019    RDW 18.9 05/22/2019     05/22/2019      PT/INR:    Lab Results   Component Value Date    INR 0.95 02/27/2019    PROTIME 10.8 02/27/2019     Cardiac Enzymes:    Lab Results   Component Value Date    CKTOTAL 26 05/16/2019     Lab Results   Component Value Date    TROPONINI <0.01 05/16/2019    TROPONINI <0.01 02/22/2019    TROPONINI <0.01 12/22/2018     BNP:  No results found for: BNP  FASTING LIPID PANEL:    Lab Results   Component Value Date    CHOL 224 05/19/2016    HDL 69 05/19/2016    TRIG 123 05/19/2016   blood sugar only 54 this am  Physical Examination:    BP (!) 111/58   Pulse 79   Temp 96.9 °F (36.1 °C) (Temporal)   Resp 16   Ht 5' 5\" (1.651 m)   Wt 161 lb 9.6 oz (73.3 kg)   SpO2 92%   BMI 26.89 kg/m²    Not orthostatic again today  Respiratory:  · Resp Assessment: Normal respiratory effort  · Resp Auscultation: Clear to auscultation bilaterally   Cardiovascular:  · Auscultation: regular rate and rhythm, normal S1S2, no murmur, rub or gallop  · Palpation:  Nl PMI  · JVP:  normal  · Extremities: No Edema  Abdomen:  · Soft, non-tender  · Normal bowel sounds  Extremities:  ·  No Cyanosis or Clubbing  Neurological/Psychiatric:  · Oriented to time, place, and person  · Non-anxious  Skin Warm and dry    Assessment:    Patient Active Hospital Problem List:   [facility] (5/16/2019)    Assessment: exact etiology uncertain with numerous comorbidities that contribute     Syncope and collapse (), hypoglycemia likely playing a role. Symptoms have been present for quite some time. Also extremely depressed     Subacute pulmonary embolism (HCC) ()         Chronic deep vein thrombosis (DVT) of both lower extremities ([facility] (5/20/2019)          Plan:  1.  she has refused to take anticoagulation in spite of recommendations in past. Longstanding paroxysmal a. Fib. Numerous comorbities that contribute to vasovagal episode with newly diagnosed thromboembolic disease,inactivity with poor appetite with extreme stress with  in hospice, nonsmall cell lung CA, underlying CAD. 2. She is now willing to take eliquis,will review stress test but not a good candidate for invasive evaluation with DVT/PE in early treatment phase  3. Will stop HCTZand KDur. Blood sugars overly controlled  4. [id number] Michelle Lieberman for discharge to rehab. Will continue to follow. Eliquis reduced to 5 mg bid  5.  Will continue to follow in office

## 2019-05-22 NOTE — PROGRESS NOTES
BASOPCT 0.7 05/21/2019    MONOSABS 1.0 05/21/2019    LYMPHSABS 1.6 05/21/2019    EOSABS 0.5 05/21/2019    BASOSABS 0.1 05/21/2019    DIFFTYPE Auto 10/29/2012     BMP:    Lab Results   Component Value Date     05/22/2019    K 4.2 05/22/2019    K 3.9 05/16/2019    CL 97 05/22/2019    CO2 23 05/22/2019    BUN 18 05/22/2019    LABALBU 3.2 05/21/2019    CREATININE 0.8 05/22/2019    CALCIUM 9.3 05/22/2019    GFRAA >60 05/22/2019    GFRAA >60 10/29/2012    LABGLOM >60 05/22/2019    GLUCOSE 89 05/22/2019       ASSESSMENT AND PLAN      Patient Active Problem List   Diagnosis Code    Diabetes mellitus ([facility] [address] E11.9    Dyslipidemia E78.5    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    Essential hypertension, benign I10    Coronary atherosclerosis of native coronary artery I25.10    Sleep apnea G47.30    Chest pain R07.9    Carpal tunnel syndrome G56.00    SOB (shortness of breath) R06.02    COPD, severe ([facility]) J44.9    Influenza J11.1    Paroxysmal atrial fibrillation ([facility]) I48.0    Chronic cough R05    Hilar mass R91.8    Acute cholecystitis K81.0    Gallstones and inflammation of gallbladder without obstruction K80.00    Atrial fibrillation with rapid ventricular response ([facility]) I48.91    Acute respiratory failure with hypoxia ([facility]) J96.01    Centrilobular emphysema ([facility]) J43.2    Ileus following gastrointestinal surgery K91.30    Mediastinal adenopathy R59.0    COPD exacerbation (HCC) J44.1    Adenocarcinoma of right lung ([facility]) C34.91    Dehydration E86.0    Mild malnutrition ([facility]) E44.1    Loss of consciousness ([facility]) R40.20    Syncope and collapse R55    Subacute pulmonary embolism ([facility]) I26.99    Other pulmonary embolism without acute cor pulmonale ([facility]) I26.99    Closed compression fracture of thoracic vertebra ([facility]) S22.000A    Chronic deep vein thrombosis (DVT) of both lower extremities ([facility]) I82.503    Ataxia R27.0    Recurrent falls R29.6    Diabetic peripheral neuropathy ([facility]) E11.42 Re-evaluated by Phy Therapy and makes criteria to rec ECF which she says she is now willing to transfer to    As pt wishes to change physicians about 4-6 weeks ago, I will defer to Hospitalists for further tx pending ECF.   Prognosis guarded with so many serious diagnoses      Ammy Pichardo M.D.

## 2019-05-22 NOTE — PLAN OF CARE
Problem: Falls - Risk of:  Goal: Will remain free from falls  Description  Will remain free from falls  Outcome: Ongoing  Note:   Pt is wearing the fall bracelet, S.A.F.E. sign is posted outside door, and yellow blanket is on the bed. Pt informed of fall risks, verbalizes understanding, and agrees to ask for help to ambulate. Will monitor. Problem: SKIN INTEGRITY  Goal: Skin integrity is maintained or improved  Outcome: Ongoing  Note:   No evidence of skin breakdown on this shift. Will monitor. Problem: Serum Glucose Level - Abnormal:  Goal: Ability to maintain appropriate glucose levels will improve  Description  Ability to maintain appropriate glucose levels will improve  Outcome: Ongoing  Note:   Checking sugars ACHS. Pt receiving Lantus BID. Will monitor.

## 2019-05-22 NOTE — PROGRESS NOTES
on statin as on zetia    HTN  - stable, CPM, CTM      Dispo: Awaiting precert to SNF, DC orders already placed. Diet: DIET CARB CONTROL;     Activity: Up with assist  Prophylaxis: eliquis    Code status: Full Code     ----------        Lisandro Ortiz MD  -------------------------------  Rounding hospitalist

## 2019-05-23 VITALS
TEMPERATURE: 96.8 F | OXYGEN SATURATION: 96 % | BODY MASS INDEX: 26.59 KG/M2 | WEIGHT: 159.6 LBS | HEART RATE: 78 BPM | DIASTOLIC BLOOD PRESSURE: 54 MMHG | SYSTOLIC BLOOD PRESSURE: 123 MMHG | HEIGHT: 65 IN | RESPIRATION RATE: 16 BRPM

## 2019-05-23 LAB
GLUCOSE BLD-MCNC: 216 MG/DL (ref 70–99)
GLUCOSE BLD-MCNC: 75 MG/DL (ref 70–99)
PERFORMED ON: ABNORMAL
PERFORMED ON: NORMAL

## 2019-05-23 PROCEDURE — 94640 AIRWAY INHALATION TREATMENT: CPT

## 2019-05-23 PROCEDURE — 6370000000 HC RX 637 (ALT 250 FOR IP): Performed by: INTERNAL MEDICINE

## 2019-05-23 PROCEDURE — 94761 N-INVAS EAR/PLS OXIMETRY MLT: CPT

## 2019-05-23 PROCEDURE — 2700000000 HC OXYGEN THERAPY PER DAY

## 2019-05-23 RX ADMIN — LOSARTAN POTASSIUM 50 MG: 100 TABLET, FILM COATED ORAL at 08:03

## 2019-05-23 RX ADMIN — INSULIN GLARGINE 12 UNITS: 100 INJECTION, SOLUTION SUBCUTANEOUS at 08:01

## 2019-05-23 RX ADMIN — CARVEDILOL 25 MG: 25 TABLET, FILM COATED ORAL at 08:03

## 2019-05-23 RX ADMIN — OXYCODONE HYDROCHLORIDE AND ACETAMINOPHEN 500 MG: 500 TABLET ORAL at 08:01

## 2019-05-23 RX ADMIN — APIXABAN 5 MG: 5 TABLET, FILM COATED ORAL at 08:01

## 2019-05-23 RX ADMIN — IPRATROPIUM BROMIDE AND ALBUTEROL SULFATE 3 ML: .5; 3 SOLUTION RESPIRATORY (INHALATION) at 08:33

## 2019-05-23 RX ADMIN — FLECAINIDE ACETATE 100 MG: 100 TABLET ORAL at 08:01

## 2019-05-23 RX ADMIN — PANTOPRAZOLE SODIUM 40 MG: 40 TABLET, DELAYED RELEASE ORAL at 06:25

## 2019-05-23 RX ADMIN — CALCIUM 500 MG: 500 TABLET ORAL at 08:03

## 2019-05-23 RX ADMIN — AMLODIPINE BESYLATE 10 MG: 5 TABLET ORAL at 08:03

## 2019-05-23 RX ADMIN — CITALOPRAM HYDROBROMIDE 30 MG: 20 TABLET ORAL at 08:02

## 2019-05-23 RX ADMIN — DIGOXIN 125 MCG: 125 TABLET ORAL at 08:03

## 2019-05-23 ASSESSMENT — PAIN SCALES - GENERAL
PAINLEVEL_OUTOF10: 0

## 2019-05-23 NOTE — PLAN OF CARE
Problem: Falls - Risk of:  Goal: Will remain free from falls  Description  Will remain free from falls  5/22/2019 2325 by Hannah Smart RN  Outcome: Ongoing  Note:   Pt assessed for fall. Pt is a high fall risk. Fall precaution in place. Bed is locked. Bed is in lowest position. Nonskid socks applied. Pt has been calling RN appropriately in needs. Personal belongings and call light within reach. Will continue to monitor. Problem: Discharge Planning:  Goal: Discharged to appropriate level of care  Description  Discharged to appropriate level of care  Outcome: Ongoing  Note:   Pt verbalized she would like to go home rather then skilled nursing facility. Will pass this information to day shift nurse.

## 2019-05-23 NOTE — CARE COORDINATION
Discharge Plan:     Patient discharged to:  Discharging to Facility/ [address]  [phone]  SW/DC Planner faxed, [address] and AVS   Narcotic Prescriptions faxed were: tramadol  RN: Analilia Das will call report   Medical Transport with: [address] [facility]  [phone]   time: 1pm  Family advised of discharge?: yes  HENS Submitted?: yes   All discharge needs met per case management.     Makayla العلي MSW, [address]

## 2019-05-23 NOTE — PROGRESS NOTES
Data- discharge order received, pt verbalized agreement to discharge, disposition to [facility] #[phone], [address] reviewed and signed by physician. Action- AVS prepared, LUBNA completed/ reported faxed by case management/. Discharge instruction summary: Diet- General , Activity- As tolerated, Transfer code status: Full Code, LDAs to remain with discharge: non . Response- Bedside RN to call report to receiving facility. Pt belongings gathered, peripheral IV and cardiac monitoring removed. Disposition to Discharged via cart/stretcher to intermediate care facility by EMS transportation, no complications reported.

## 2019-05-23 NOTE — DISCHARGE SUMMARY
Hospital Discharge Summary    Patient's PCP: Juan Jimyovany Date: 5/16/2019   Discharge Date: 5/23/2019    Admitting Physician: Dr. Reinaldo Hall MD  Discharge Physician: Dr. Corrinne Husain:   IP CONSULT TO INTERNAL MEDICINE  IP CONSULT TO ONCOLOGY  IP CONSULT TO CARDIOLOGY  IP CONSULT TO PULMONOLOGY  IP CONSULT TO NEUROLOGY  IP CONSULT TO PHYSICAL MEDICINE REHAB  IP CONSULT TO PALLIATIVE CARE  IP CONSULT TO HOSPITALIST  IP CONSULT TO HOME CARE NEEDS    Brief HPI: and Brief hospital course:   Admitted after ED eval for 3 hr\" found down\" at home, called EMS. Admitted with high BS over 350. During evaluation found to have subacute ilat DVT with PE, PAF on monitor, fluctuating BS abd abnormal stress test. Seen by Cardiology ( no further w/u suggested due to many chronic illnesses, Pulmonary ( no acute decline and PE felt to be too small for syncope). Found to have bilat DVT with small PE- subacute, old T1 fracture,seen by Neurology for falls, felt to be multifactoral including dehydration, small vessel disease of brain, and diabetic neuropathy. Pt asked us to take over as she fired Dr. Parnell Led. Subacute Pulmonary embolism  - on eliquis        Chronic B/L LE DVT  - on eliquis        Syncope and Collapse  - cleared by CArdio, felt likely 2/2 weakness, depression        IDDM  - DC metformin, DC lantus to 12U BID, CTM        PAF  - on BB, digoxin, flexainide, eliquis for AC        NSCLC  - f/u w/ Hem/Onc outpt        CAD  - on BB, no asp as on eliquis, not on statin as on zetia     HTN  - stable, CPM, CTM             Discharge Diagnoses:    Active Problems:    Loss of consciousness (HCC)    Syncope and collapse    Subacute pulmonary embolism ([address]    Other pulmonary embolism without acute cor pulmonale (HCC)    Closed compression fracture of thoracic vertebra (HCC)    Chronic deep vein thrombosis (DVT) of both lower extremities (HCC)    Ataxia    Recurrent falls    Diabetic SINUSES:  The visualized paranasal sinuses and mastoid air cells are clear. SOFT TISSUES/SKULL:  No acute abnormality of the visualized skull or soft tissues. No acute intracranial abnormality. Cerebral atrophy. Chronic small vessel ischemic change. Ct Cervical Spine Wo Contrast    Result Date: 5/16/2019  EXAMINATION: CT OF THE CERVICAL SPINE WITHOUT CONTRAST 5/16/2019 5:19 pm TECHNIQUE: CT of the cervical spine was performed without the administration of intravenous contrast. Multiplanar reformatted images are provided for review. Dose modulation, iterative reconstruction, and/or weight based adjustment of the mA/kV was utilized to reduce the radiation dose to as low as reasonably achievable. COMPARISON: Limited CT from PET dated 01/16/2019, chest CT dated 12/18/2018 HISTORY: ORDERING SYSTEM PROVIDED HISTORY: syncope/fall TECHNOLOGIST PROVIDED HISTORY: If patient is on cardiac monitor and/or pulse ox, they may be taken off cardiac monitor and pulse ox, left on O2 if currently on. All monitors reattached when patient returns to room. Ordering Physician Provided Reason for Exam: syncope/fall Acuity: Acute Type of Exam: Initial FINDINGS: BONES/ALIGNMENT: Multiple contiguous axial images were obtained of the cervical spine from the skull base through superior endplate of T3. Sagittal and coronal reformats were obtained. Multilevel disc space narrowing is seen with endplate osteophytosis, compatible with degenerative disc disease. Air-filled cyst at the inferior endplate of C5 is again seen, likely degenerative. Sclerotic focus at the C2 spinous process is unchanged. There is subtle anterior wedging of T1 with sclerosis of the superior endplate as compared to prior exam dated 12/18/2018. The facets are aligned with multilevel facet spurring. Ossific densities adjacent to the C7 spinous process are similar to prior, likely reflecting sequela of remote trauma or degenerative change.  SOFT TISSUES: No airspace disease at the lung apices. Bilateral carotid artery calcification. Subtle anterior wedging of T1 vertebral body with superior endplate sclerosis. The finding can be due to progressive degenerative change or in the appropriate clinical setting, post-traumatic compression. Correlate with point tenderness. If patient is focally symptomatic, MR could be considered for further characterization. Multifocal degenerative changes are otherwise favored as outlined above. Mri Thoracic Spine W Wo Contrast    Result Date: 5/17/2019  EXAMINATION: MRI OF THE THORACIC SPINE WITHOUT AND WITH CONTRAST  5/17/2019 4:55 pm TECHNIQUE: Multiplanar multisequence MRI of the thoracic spine was performed without and with the administration of intravenous contrast. COMPARISON: None HISTORY: ORDERING SYSTEM PROVIDED HISTORY: T1 compression fracture TECHNOLOGIST PROVIDED HISTORY: Ordering Physician Provided Reason for Exam: Pt had abnormal CT. Recent fall. Headaches, dizziness, numbness in lower back. Acuity: Acute Type of Exam: Initial FINDINGS: BONES/ALIGNMENT: There is a normal thoracic curvature. A chronic superior endplate fracture of T1 is present. There is no acute fracture or traumatic subluxation. A T1 and T2 bright lesion is present within the T8 vertebral body demonstrating partial suppression on STIR as well as mild patchy enhancement postcontrast administration, consistent with an osseous hemangioma. SPINAL CORD: No abnormal cord signal is seen. SOFT TISSUES:  There is a partially visualized left hilar mass with bilateral peribronchial infiltrates. DEGENERATIVE CHANGES: No significant spinal canal stenosis or neural foraminal narrowing of the thoracic spine. Chronic superior endplate fracture of T1. Osseous hemangioma of T8. Partially visualized left hilar mass, worrisome for lung cancer, with bilateral peribronchial infiltrates.      Xr Chest Portable    Result Date: 5/16/2019  EXAMINATION: ONE XRAY VIEW OF THE CHEST 5/16/2019 4:20 pm COMPARISON: May 4, 2019 HISTORY: ORDERING SYSTEM PROVIDED HISTORY: syncope TECHNOLOGIST PROVIDED HISTORY: Reason for exam:->syncope Ordering Physician Provided Reason for Exam: Fall (Patient arrived via FF squad for c/o fall at home around noon. Woke up at 3 pm on the floor. Contusion noted to right forehead, unsure if LOC.  per squad. ) Acuity: Unknown Type of Exam: Unknown FINDINGS: Calcified plaque in the aortic arch. Heart and mediastinum otherwise unremarkable. Subsegmental atelectasis left lower lobe unchanged. Right lung is clear. Bony thorax intact. Left lower lobe subsegmental atelectasis. Xr Chest Portable    Result Date: 5/4/2019  EXAMINATION: SINGLE XRAY VIEW OF THE CHEST 5/4/2019 11:28 am COMPARISON: Chest 02/27/2018. HISTORY: ORDERING SYSTEM PROVIDED HISTORY: wheezes TECHNOLOGIST PROVIDED HISTORY: Reason for exam:->wheezes Ordering Physician Provided Reason for Exam: COUGH AND WHEEZING. FORMER SMOKER. HISTORY OF CAD, COPD, DIABETES, GERD, MI AND HTN Acuity: Acute Type of Exam: Initial FINDINGS: The cardiac silhouette is enlarged. Calcifications involving the aorta reflect atherosclerosis. The mediastinal and hilar silhouettes appear unremarkable. Lateral lingular infiltrate noted with partial obscuration left heart margin. There is blunting left lateral costophrenic sulcus. The right lung appears clear. Wires and cardiac leads overlying the chest could obscure an underlying finding. No pneumothorax is seen. No acute osseous abnormality is identified. 1. Lingula left upper lobe and lateral left basilar infiltrate or atelectasis; pneumonitis is a consideration. 2. Evidence of small volume left pleural effusion. 3. Calcific atherosclerosis aorta. 4. Cardiomegaly.      Ct Chest Pulmonary Embolism W Contrast    Result Date: 5/16/2019  EXAMINATION: CTA OF THE CHEST 5/16/2019 5:23 pm TECHNIQUE: CTA of the chest was performed after the administration of intravenous contrast.  Multiplanar reformatted images are provided for review. MIP images are provided for review. Dose modulation, iterative reconstruction, and/or weight based adjustment of the mA/kV was utilized to reduce the radiation dose to as low as reasonably achievable. COMPARISON: None. HISTORY: ORDERING SYSTEM PROVIDED HISTORY: SHORTNESS OF BREATH TECHNOLOGIST PROVIDED HISTORY: Ordering Physician Provided Reason for Exam: Shortness of breath; suspect PE Acuity: Acute Type of Exam: Initial FINDINGS: Pulmonary Arteries: Pulmonary arteries are adequately opacified for evaluation. There is a subtle intraluminal filling defect within a segmental branch to the right middle lobe best seen on series 5 images 168-172, compatible with embolism although possibly subacute/chronic. No additional embolism identified. No evidence for right heart strain or pulmonary infarction. Mediastinum: No evidence of mediastinal lymphadenopathy. The heart and pericardium demonstrate no acute abnormality. There is no acute abnormality of the thoracic aorta. Lungs/pleura: Left hilar mass with postobstructive atelectasis in the lingula. Findings are similar as compared to multiple previous studies. Parasagittal enlarged lymph node measures 1.2 cm in short axis stable or slightly decreased as compared to previous study. .  5 mm right middle lobe pulmonary nodule unchanged from previous study. .  Linear scarring within the left lower lobe is also similar to previous examination. Upper Abdomen: Peripherally calcified cystic lesion arising from the upper pole the left kidney, similar to previous examination. Soft Tissues/Bones: New subtle compression deformity involving the superior endplate of T1. Vertebral body hemangioma at T8. Linear intraluminal filling defect within a segmental branch to the right middle lobe is compatible with embolism although possibly subacute/chronic given its web-like appearance. No additional emboli. No right heart strain or pulmonary infarction. Left hilar mass and adjacent periesophageal lymphadenopathy. New subtle compression fracture involving the superior endplate of T1. MRI can be considered as clinically indicated. Stable appearing 5-6 mm right middle lobe pulmonary nodule. Vl Extremity Venous Bilateral    Result Date: 5/17/2019  Lower Extremities DVT Study  Demographics   Patient Name       Chante Harvey   Date of Study      05/17/2019         Gender              Female   Patient Number     [id number]         Date of Birth       1940   Visit Number       [id number]          Age                 70 year(s)   Accession Number   [id number]          Room Number         0626   Corporate ID       [id number]           Sonographer         Jay Talavera                                                            RVT, RDMS, AB,                                                            OB/GYN   Ordering Physician Gordon Desouza., Interpreting        [facility]                     CNP                Physician           Roosevelt Sauceda MD,                                                            [facility]  Procedure Type of Study:   Veins:Lower Extremities DVT Study, VASC EXTREMITY VENOUS DUPLEX BILATERAL. Vascular Sonographer Report  Additional Indications:PE Impressions Right Impression Indeterminate age totally occluding deep vein thrombosis involving the right posterior tibial vein zone 6. No other evidence of deep vein or superficial vein thrombosis involving the right lower extremity. Spoke with Elissa GUZMAN on 3A. Left Impression Indeterminate partially occluding superficial venous thrombosis involving the left sapheno-femoral junction. Indeterminate totally occluding superficial venous thrombosis involving the left GSV zone 1-5. No other evidence of deep vein or superficial vein thrombosis involving the left lower extremity.  Conclusions   Summary   -Indeterminate age totally occluding deep vein +------------------------+----------+---------------+----------+ ! Mid Femoral             !Yes       ! Yes            ! None      ! +------------------------+----------+---------------+----------+ ! Dist Femoral            !Yes       ! Yes            ! None      ! +------------------------+----------+---------------+----------+ ! Deep Femoral            !Yes       ! Yes            ! None      ! +------------------------+----------+---------------+----------+ ! Popliteal               !Yes       ! Yes            ! None      ! +------------------------+----------+---------------+----------+ ! GSV Below Knee          ! Yes       ! Yes            ! None      ! +------------------------+----------+---------------+----------+ ! Gastroc                 ! Yes       ! Yes            ! None      ! +------------------------+----------+---------------+----------+ ! PTV                     ! Yes       ! No             !AI        ! +------------------------+----------+---------------+----------+ ! Peroneal                !Yes       ! Yes            ! None      ! +------------------------+----------+---------------+----------+ ! GSV Calf                ! Yes       ! Yes            ! None      ! +------------------------+----------+---------------+----------+ ! SSV                     ! Yes       ! Yes            ! None      ! +------------------------+----------+---------------+----------+ Right Doppler Measurements +------------------------+------+------+------------+ ! Location                ! Signal!Reflux! Reflux (sec)! +------------------------+------+------+------------+ ! Sapheno Femoral Junction! Phasic!      !            ! +------------------------+------+------+------------+ ! Common Femoral          !Phasic!      !            ! +------------------------+------+------+------------+ ! Femoral                 !Phasic!      !            ! +------------------------+------+------+------------+ ! Deep Femoral            !Phasic!      !            ! +------------------------+----------+---------------+----------+ ! GSV Calf                ! Yes       ! Yes            ! None      ! +------------------------+----------+---------------+----------+ ! SSV                     ! Yes       ! Yes            ! None      ! +------------------------+----------+---------------+----------+ Left Doppler Measurements +------------------------+------+------+------------+ ! Location                ! Signal!Reflux! Reflux (sec)! +------------------------+------+------+------------+ ! Sapheno Femoral Junction! Phasic!      !            ! +------------------------+------+------+------------+ ! Common Femoral          !Phasic!      !            ! +------------------------+------+------+------------+ ! Femoral                 !Phasic!      !            ! +------------------------+------+------+------------+ ! Deep Femoral            !Phasic!      !            ! +------------------------+------+------+------------+ ! Popliteal               !Phasic!      !            ! +------------------------+------+------+------------+    Nm Myocardial Spect Rest Exercise Or Rx    Result Date: 5/20/2019  Cardiac Perfusion Imaging  Demographics   Patient Name      Eddy Dang   Date of Study     05/20/2019          Gender              Female   Patient Number    [id number]          Date of Birth       1940   Visit Number      [id number]           Age                 70 year(s)   Accession Number  [id number]           Room Number         7171   Corporate ID      [id number]            NM Technician       Idelle Lennox   Nurse             Brigette Osei RN    Interpreting        Meli Guallpa                                        Physician           Fred Mota DO, [facility]   Ordering          Amina Espinal MD  Physician   The procedure was explained in detail to the patient. Risks,  complications and alternative treatments were reviewed. Written consent  was obtained.   Procedure Procedure Type:   Nuclear Stress Isotope dose:30.9 mCi  Administration Route:I.V. Administration Route:I.V.  Date:05/20/2019 07:30         Date:05/20/2019 09:00                                 Technique:      Gated  Imaging Results    Stress ejection    Ejection fraction:58 %    EDV :115 ml    ESV :48 ml    Stroke volume :67 ml    LV mass :145 gr  Medical History   Additional Medical History   PCI 01/01/2000   Signatures   ------------------------------------------------------------------  Electronically signed by Tomasz Greer DO [facility]  (Interpreting physician) on 05/20/2019 at 12:04  ------------------------------------------------------------------              Treatments: As above. Discharge Medications:     Medication List      START taking these medications    apixaban 5 MG Tabs tablet  Commonly known as:  ELIQUIS  Take 1 tablet by mouth 2 times daily  Notes to patient:  Use:  Reduces risk of blood clots  Side effects:  Unusual bleeding, bruising, stomach irritation     traMADol 50 MG tablet  Commonly known as:  ULTRAM  Take 1 tablet by mouth every 6 hours as needed for Pain for up to 3 days. Notes to patient:  Use: treatment of pain  Side effects: constipation, fatigue, dry mouth, insomnia        CHANGE how you take these medications    amLODIPine 10 MG tablet  Commonly known as:  NORVASC  Take 0.5 tablets by mouth daily  What changed:  how much to take  Notes to patient:  Use:  Relax blood vessels and increase the supply of blood and oxygen to the heart while also reducing the heart's workload   Side effects:  Lightheadedness,low blood pressure,slower heart rate, drowsiness and  swelling of feet ankles and legs     hydrochlorothiazide 25 MG tablet  Commonly known as:  HYDRODIURIL  Take 0.5 tablets by mouth daily  What changed:  how much to take  Notes to patient:  Use: treat heart failure, fluid retention, lower blood pressure.       Side effects: frequent urination, weakness, muscle cramps, increased sensitivity to treats diabetes or high blood sugar.  Long acting insulin  Side effects: Low blood sugar, irritation at the injection site     ipratropium-albuterol 0.5-2.5 (3) MG/3ML Soln nebulizer solution  Commonly known as:  DUONEB  Inhale 3 mLs into the lungs every 4 hours (while awake) DX:COPD J44.9  Notes to patient:  Use: Treatment of asthma, bronchospasm  Side effects: Back pain, headache, nasal or throat irritation     metFORMIN 1000 MG tablet  Commonly known as:  GLUCOPHAGE  Take 0.5 tablets by mouth 2 times daily (with meals)  Notes to patient:  Use: treats diabetes or high blood sugar  Side effects: upset stomach, low blood sugar     omeprazole 20 MG delayed release capsule  Commonly known as:  PRILOSEC  Notes to patient:  Use: prevention and treatment of gastric ulcers and/or heartburn  Side effects: headache, fatigue, constipation, dry mouth      potassium chloride 20 MEQ extended release tablet  Commonly known as:  KLOR-CON M  Take 1 tablet by mouth daily  Notes to patient:  Use: increase potassium electrolytes  Side effects: high potassium     TRELEGY ELLIPTA 100-62.5-25 MCG/INH Aepb  Generic drug:  Fluticasone-Umeclidin-Vilant     vitamin C 500 MG tablet  Commonly known as:  ASCORBIC ACID  Notes to patient:  Use: dietary supplement  Side effects: diarrhea, nausea           Where to Get Your Medications      You can get these medications from any pharmacy    Bring a paper prescription for each of these medications  · amLODIPine 10 MG tablet  · apixaban 5 MG Tabs tablet  · traMADol 50 MG tablet         Activity: activity as tolerated  Diet: DIET CARB CONTROL;      Disposition: SNF  Discharged Condition: Stable  Follow Up:   Lavonne De La Rosa MD  [address]  [phone]              Code status:  Full Code         Total time spent on discharge, finalizing medications, referrals and arranging outpatient follow up was more than 1 hour      Thank you Dr. Janis Viera for the

## 2019-05-24 NOTE — PROGRESS NOTES
Physical Therapy Discharge Summary    Name: Fauzia Hudson  : 1940    The pt was evaluated by PT on 19 and seen for 2 treatment sessions prior to DC to ECF on 19 per MD order. The pt's acute therapy goals were:  Short term goals  Time Frame for Short term goals: To be met prior to discharge  Short term goal 1: Bed mobility with mod I  Short term goal 2: Sit to/from stand with mod I  Short term goal 3: Ambulate 200 feet with AAD and mod I  Short term goal 4: Navigate up/down 1 flight of steps with rail and mod I  Long term goals  Time Frame for Long term goals : STG=LTG    Patient met 0 goals during stay. Number of Refusals:0  Number of Holds: 0  During this hospitalization, the patient was educated on:  Patient Education: educated on role of acute PT, POC, d/c recommendations    DC pt from PT caseload at this time. Thank you!     [address], DPT [id number]

## 2019-05-24 NOTE — PROGRESS NOTES
Occupational Therapy  Occupational Therapy Discharge Summary    Name: Olive Pacheco  : 1940    The pt was evaluated by OT on  and seen for 1 treatment sessions prior to DC to [facility]    on  per MD order. The pt's acute therapy goals were:  Short term goals  Time Frame for Short term goals: Discharge  Short term goal 1: Mod Independent bed mobility --supervision ; not addressed   Short term goal 2: Mod Independent toileting--supervision ; not addressed   Short term goal 3: Mod Independent ADL transfers to/from bed, chair and toilet---SBA/mod A   Short term goal 4: Mod Independent functional mobility using RW for ADL's/functional mobility --CGA with RW   Short term goal 5: Mod Independent UB/LB ADL's---pt declined   Long term goals  Time Frame for Long term goals : LTG=STG     Patient met 0 goals during stay. Number of Refusals:0  Number of Holds: 0  During this hospitalization, the patient was educated on:  Patient Education: OT Eval, POC, safe transfers, safe mobility, fall precautions, DC plans/recommendations, RW use    DC pt from OT caseload at this time. Thank you!

## 2019-06-04 PROBLEM — E86.0 DEHYDRATION: Status: RESOLVED | Noted: 2019-05-01 | Resolved: 2019-06-04

## 2019-06-12 ENCOUNTER — TELEPHONE (OUTPATIENT)
Dept: CARDIOLOGY CLINIC | Age: 79
End: 2019-06-12

## 2019-06-12 NOTE — TELEPHONE ENCOUNTER
She thinks VIVEK told her to make an appt in the office but she doesn't remember when he told her to make the appt . She would prefer to see LES instead of NP. Please call patient with appt .

## 2019-06-12 NOTE — TELEPHONE ENCOUNTER
Medication Refill    When was your last appointment with cardiology?      (if  it's been more than 1 year, please go ahead and schedule an appointment with the physician while you have the patient on the phone)      Medication needing refilled:   eliquis     Doseage of the medication: 5mg     How are you taking this medication (QD, BID, TID, QID, PRN):  BID    Patient want a 30 or 90 day supply called in:    Which Pharmacy are we sending the medication to      [facility] [place]      Medication Question/Concern    (if  it's been more than 1 year, please go ahead and schedule an appointment with the physician while you have the patient on the phone)    What is the name of the medication you need to speak with someone about? Doseage of the medication:    How are you taking this medication:    What issues/concerns are you having with this medication:      Medication Samples    (if  it's been more than 1 year, please go ahead and schedule an appointment with the physician while you have the patient on the phone)    Medication:    Doseage of the medication:    How are you taking this medication (QD, BID, TID, QID, PRN):     in the office or Mail to your home?

## 2019-07-17 ENCOUNTER — HOSPITAL ENCOUNTER (OUTPATIENT)
Dept: PET IMAGING | Age: 79
Discharge: HOME | End: 2019-07-17
Payer: MEDICARE

## 2019-07-17 ENCOUNTER — HOSPITAL ENCOUNTER (OUTPATIENT)
Age: 79
End: 2019-07-17
Payer: MEDICARE

## 2019-07-17 VITALS — WEIGHT: 154 LBS | HEIGHT: 65 IN | BODY MASS INDEX: 25.66 KG/M2

## 2019-07-17 DIAGNOSIS — C34.12 CANCER OF BRONCHUS OF LEFT UPPER LOBE (HCC): ICD-10-CM

## 2019-07-17 PROCEDURE — A9552 F18 FDG: HCPCS | Performed by: INTERNAL MEDICINE

## 2019-07-17 PROCEDURE — 78815 PET IMAGE W/CT SKULL-THIGH: CPT

## 2019-07-17 PROCEDURE — 3430000000 HC RX DIAGNOSTIC RADIOPHARMACEUTICAL: Performed by: INTERNAL MEDICINE

## 2019-07-17 RX ORDER — FLUDEOXYGLUCOSE F 18 200 MCI/ML
14.48 INJECTION, SOLUTION INTRAVENOUS
Status: COMPLETED | OUTPATIENT
Start: 2019-07-17 | End: 2019-07-17

## 2019-07-17 RX ADMIN — FLUDEOXYGLUCOSE F 18 14.48 MILLICURIE: 200 INJECTION, SOLUTION INTRAVENOUS at 14:01

## 2019-07-19 ENCOUNTER — HOSPITAL ENCOUNTER (OUTPATIENT)
Dept: MRI IMAGING | Age: 79
Discharge: HOME | End: 2019-07-19
Payer: MEDICARE

## 2019-07-19 DIAGNOSIS — C34.12 SQUAMOUS CELL CARCINOMA OF BRONCHUS IN LEFT UPPER LOBE (HCC): ICD-10-CM

## 2019-07-19 PROCEDURE — 70553 MRI BRAIN STEM W/O & W/DYE: CPT

## 2019-07-19 PROCEDURE — 6360000004 HC RX CONTRAST MEDICATION: Performed by: INTERNAL MEDICINE

## 2019-07-19 PROCEDURE — 2580000003 HC RX 258: Performed by: INTERNAL MEDICINE

## 2019-07-19 PROCEDURE — A9579 GAD-BASE MR CONTRAST NOS,1ML: HCPCS | Performed by: INTERNAL MEDICINE

## 2019-07-19 RX ORDER — SODIUM CHLORIDE 0.9 % (FLUSH) 0.9 %
10 SYRINGE (ML) INJECTION ONCE
Status: COMPLETED | OUTPATIENT
Start: 2019-07-19 | End: 2019-07-19

## 2019-07-19 RX ADMIN — GADOTERIDOL 14 ML: 279.3 INJECTION, SOLUTION INTRAVENOUS at 14:35

## 2019-07-19 RX ADMIN — Medication 10 ML: at 14:37

## 2019-08-04 ENCOUNTER — APPOINTMENT (OUTPATIENT)
Dept: CT IMAGING | Age: 79
DRG: 205 | End: 2019-08-04
Payer: MEDICARE

## 2019-08-04 ENCOUNTER — HOSPITAL ENCOUNTER (OUTPATIENT)
Dept: GENERAL RADIOLOGY | Age: 79
Discharge: HOME | DRG: 205 | End: 2019-08-04
Payer: MEDICARE

## 2019-08-04 ENCOUNTER — APPOINTMENT (OUTPATIENT)
Dept: GENERAL RADIOLOGY | Age: 79
DRG: 205 | End: 2019-08-04
Payer: MEDICARE

## 2019-08-04 ENCOUNTER — HOSPITAL ENCOUNTER (INPATIENT)
Age: 79
LOS: 5 days | Discharge: HOME HEALTH | DRG: 205 | End: 2019-08-09
Attending: EMERGENCY MEDICINE | Admitting: FAMILY MEDICINE
Payer: MEDICARE

## 2019-08-04 ENCOUNTER — HOSPITAL ENCOUNTER (OUTPATIENT)
Age: 79
Discharge: HOME | DRG: 205 | End: 2019-08-04
Payer: MEDICARE

## 2019-08-04 DIAGNOSIS — J18.9 PNEUMONIA DUE TO ORGANISM: Primary | ICD-10-CM

## 2019-08-04 DIAGNOSIS — J18.1 UNRESOLVED LOBAR PNEUMONIA (HCC): ICD-10-CM

## 2019-08-04 DIAGNOSIS — R93.89 ABNORMAL CT OF THE CHEST: ICD-10-CM

## 2019-08-04 DIAGNOSIS — R53.83 OTHER FATIGUE: ICD-10-CM

## 2019-08-04 LAB
A/G RATIO: 0.9 (ref 1.1–2.2)
ALBUMIN SERPL-MCNC: 3.4 G/DL (ref 3.4–5)
ALP BLD-CCNC: 113 U/L (ref 40–129)
ALT SERPL-CCNC: 20 U/L (ref 10–40)
ANION GAP SERPL CALCULATED.3IONS-SCNC: 13 MMOL/L (ref 3–16)
AST SERPL-CCNC: 23 U/L (ref 15–37)
BASE EXCESS ARTERIAL: 6.1 MMOL/L (ref -3–3)
BASOPHILS ABSOLUTE: 0.1 K/UL (ref 0–0.2)
BASOPHILS RELATIVE PERCENT: 0.6 %
BILIRUB SERPL-MCNC: <0.2 MG/DL (ref 0–1)
BILIRUBIN URINE: NEGATIVE
BLOOD, URINE: NEGATIVE
BUN BLDV-MCNC: 14 MG/DL (ref 7–20)
CALCIUM SERPL-MCNC: 9.1 MG/DL (ref 8.3–10.6)
CARBOXYHEMOGLOBIN ARTERIAL: 1.6 % (ref 0–1.5)
CHLORIDE BLD-SCNC: 95 MMOL/L (ref 99–110)
CLARITY: ABNORMAL
CO2: 29 MMOL/L (ref 21–32)
COLOR: YELLOW
COMMENT UA: ABNORMAL
CREAT SERPL-MCNC: 0.7 MG/DL (ref 0.6–1.2)
EOSINOPHILS ABSOLUTE: 0.3 K/UL (ref 0–0.6)
EOSINOPHILS RELATIVE PERCENT: 3.2 %
EPITHELIAL CELLS, UA: 15 /HPF (ref 0–5)
GFR AFRICAN AMERICAN: >60
GFR NON-AFRICAN AMERICAN: >60
GLOBULIN: 3.7 G/DL
GLUCOSE BLD-MCNC: 136 MG/DL (ref 70–99)
GLUCOSE BLD-MCNC: 141 MG/DL (ref 70–99)
GLUCOSE URINE: NEGATIVE MG/DL
HCO3 ARTERIAL: 30.8 MMOL/L (ref 21–29)
HCT VFR BLD CALC: 28.8 % (ref 36–48)
HEMOGLOBIN, ART, EXTENDED: 8.5 G/DL (ref 12–16)
HEMOGLOBIN: 9.1 G/DL (ref 12–16)
HYALINE CASTS: 4 /LPF (ref 0–8)
INR BLD: 1.93 (ref 0.86–1.14)
KETONES, URINE: NEGATIVE MG/DL
LACTIC ACID, SEPSIS: 1.3 MMOL/L (ref 0.4–1.9)
LEUKOCYTE ESTERASE, URINE: ABNORMAL
LYMPHOCYTES ABSOLUTE: 1.3 K/UL (ref 1–5.1)
LYMPHOCYTES RELATIVE PERCENT: 11.5 %
MCH RBC QN AUTO: 30 PG (ref 26–34)
MCHC RBC AUTO-ENTMCNC: 31.6 G/DL (ref 31–36)
MCV RBC AUTO: 95 FL (ref 80–100)
METHEMOGLOBIN ARTERIAL: 0.3 %
MICROSCOPIC EXAMINATION: YES
MONOCYTES ABSOLUTE: 0.9 K/UL (ref 0–1.3)
MONOCYTES RELATIVE PERCENT: 8.5 %
NEUTROPHILS ABSOLUTE: 8.3 K/UL (ref 1.7–7.7)
NEUTROPHILS RELATIVE PERCENT: 76.2 %
NITRITE, URINE: NEGATIVE
O2 CONTENT ARTERIAL: 11 ML/DL
O2 SAT, ARTERIAL: 94.5 %
O2 THERAPY: ABNORMAL
PCO2 ARTERIAL: 44.9 MMHG (ref 35–45)
PDW BLD-RTO: 17.4 % (ref 12.4–15.4)
PERFORMED ON: ABNORMAL
PH ARTERIAL: 7.45 (ref 7.35–7.45)
PH UA: 6.5 (ref 5–8)
PLATELET # BLD: 307 K/UL (ref 135–450)
PMV BLD AUTO: 8 FL (ref 5–10.5)
PO2 ARTERIAL: 69.1 MMHG (ref 75–108)
POTASSIUM REFLEX MAGNESIUM: 4 MMOL/L (ref 3.5–5.1)
PRO-BNP: ABNORMAL PG/ML (ref 0–449)
PROCALCITONIN: 0.09 NG/ML (ref 0–0.15)
PROTEIN UA: NEGATIVE MG/DL
PROTHROMBIN TIME: 22 SEC (ref 9.8–13)
RBC # BLD: 3.04 M/UL (ref 4–5.2)
RBC UA: 3 /HPF (ref 0–4)
SODIUM BLD-SCNC: 137 MMOL/L (ref 136–145)
SPECIFIC GRAVITY UA: 1.03 (ref 1–1.03)
TCO2 ARTERIAL: 72.1 MMOL/L
TOTAL PROTEIN: 7.1 G/DL (ref 6.4–8.2)
TROPONIN: <0.01 NG/ML
TROPONIN: <0.01 NG/ML
URINE REFLEX TO CULTURE: YES
URINE TYPE: ABNORMAL
UROBILINOGEN, URINE: 0.2 E.U./DL
WBC # BLD: 10.8 K/UL (ref 4–11)
WBC UA: 10 /HPF (ref 0–5)
YEAST: PRESENT /HPF

## 2019-08-04 PROCEDURE — 6370000000 HC RX 637 (ALT 250 FOR IP): Performed by: FAMILY MEDICINE

## 2019-08-04 PROCEDURE — 83605 ASSAY OF LACTIC ACID: CPT

## 2019-08-04 PROCEDURE — 6360000004 HC RX CONTRAST MEDICATION: Performed by: PHYSICIAN ASSISTANT

## 2019-08-04 PROCEDURE — 71260 CT THORAX DX C+: CPT

## 2019-08-04 PROCEDURE — 85025 COMPLETE CBC W/AUTO DIFF WBC: CPT

## 2019-08-04 PROCEDURE — 70450 CT HEAD/BRAIN W/O DYE: CPT

## 2019-08-04 PROCEDURE — 96365 THER/PROPH/DIAG IV INF INIT: CPT

## 2019-08-04 PROCEDURE — 2580000003 HC RX 258: Performed by: EMERGENCY MEDICINE

## 2019-08-04 PROCEDURE — 2060000000 HC ICU INTERMEDIATE R&B

## 2019-08-04 PROCEDURE — 81001 URINALYSIS AUTO W/SCOPE: CPT

## 2019-08-04 PROCEDURE — 74177 CT ABD & PELVIS W/CONTRAST: CPT

## 2019-08-04 PROCEDURE — 36415 COLL VENOUS BLD VENIPUNCTURE: CPT

## 2019-08-04 PROCEDURE — 85610 PROTHROMBIN TIME: CPT

## 2019-08-04 PROCEDURE — 6370000000 HC RX 637 (ALT 250 FOR IP): Performed by: PHYSICIAN ASSISTANT

## 2019-08-04 PROCEDURE — 87449 NOS EACH ORGANISM AG IA: CPT

## 2019-08-04 PROCEDURE — 96367 TX/PROPH/DG ADDL SEQ IV INF: CPT

## 2019-08-04 PROCEDURE — 83880 ASSAY OF NATRIURETIC PEPTIDE: CPT

## 2019-08-04 PROCEDURE — 94640 AIRWAY INHALATION TREATMENT: CPT

## 2019-08-04 PROCEDURE — 96375 TX/PRO/DX INJ NEW DRUG ADDON: CPT

## 2019-08-04 PROCEDURE — 93005 ELECTROCARDIOGRAM TRACING: CPT | Performed by: PHYSICIAN ASSISTANT

## 2019-08-04 PROCEDURE — 6360000002 HC RX W HCPCS: Performed by: PHYSICIAN ASSISTANT

## 2019-08-04 PROCEDURE — 84145 PROCALCITONIN (PCT): CPT

## 2019-08-04 PROCEDURE — 96361 HYDRATE IV INFUSION ADD-ON: CPT

## 2019-08-04 PROCEDURE — 2580000003 HC RX 258: Performed by: PHYSICIAN ASSISTANT

## 2019-08-04 PROCEDURE — 2700000000 HC OXYGEN THERAPY PER DAY

## 2019-08-04 PROCEDURE — 84484 ASSAY OF TROPONIN QUANT: CPT

## 2019-08-04 PROCEDURE — 99285 EMERGENCY DEPT VISIT HI MDM: CPT

## 2019-08-04 PROCEDURE — 87040 BLOOD CULTURE FOR BACTERIA: CPT

## 2019-08-04 PROCEDURE — 87086 URINE CULTURE/COLONY COUNT: CPT

## 2019-08-04 PROCEDURE — 71046 X-RAY EXAM CHEST 2 VIEWS: CPT

## 2019-08-04 PROCEDURE — 2580000003 HC RX 258

## 2019-08-04 PROCEDURE — 82803 BLOOD GASES ANY COMBINATION: CPT

## 2019-08-04 PROCEDURE — 94761 N-INVAS EAR/PLS OXIMETRY MLT: CPT

## 2019-08-04 PROCEDURE — 0DJ08ZZ INSPECTION OF UPPER INTESTINAL TRACT, VIA NATURAL OR ARTIFICIAL OPENING ENDOSCOPIC: ICD-10-PCS | Performed by: INTERNAL MEDICINE

## 2019-08-04 PROCEDURE — 87081 CULTURE SCREEN ONLY: CPT

## 2019-08-04 PROCEDURE — 6360000002 HC RX W HCPCS: Performed by: EMERGENCY MEDICINE

## 2019-08-04 PROCEDURE — 80053 COMPREHEN METABOLIC PANEL: CPT

## 2019-08-04 RX ORDER — ASPIRIN 81 MG/1
81 TABLET ORAL DAILY
Status: DISCONTINUED | OUTPATIENT
Start: 2019-08-04 | End: 2019-08-09 | Stop reason: HOSPADM

## 2019-08-04 RX ORDER — NICOTINE POLACRILEX 4 MG
15 LOZENGE BUCCAL PRN
Status: DISCONTINUED | OUTPATIENT
Start: 2019-08-04 | End: 2019-08-09 | Stop reason: HOSPADM

## 2019-08-04 RX ORDER — PROMETHAZINE HYDROCHLORIDE 25 MG/ML
25 INJECTION, SOLUTION INTRAMUSCULAR; INTRAVENOUS ONCE
Status: COMPLETED | OUTPATIENT
Start: 2019-08-04 | End: 2019-08-04

## 2019-08-04 RX ORDER — METOCLOPRAMIDE 10 MG/1
10 TABLET ORAL
Status: DISCONTINUED | OUTPATIENT
Start: 2019-08-04 | End: 2019-08-04

## 2019-08-04 RX ORDER — FLECAINIDE ACETATE 50 MG/1
50 TABLET ORAL 2 TIMES DAILY
Status: DISCONTINUED | OUTPATIENT
Start: 2019-08-04 | End: 2019-08-09 | Stop reason: HOSPADM

## 2019-08-04 RX ORDER — FLECAINIDE ACETATE 50 MG/1
50 TABLET ORAL 2 TIMES DAILY
Status: DISCONTINUED | OUTPATIENT
Start: 2019-08-05 | End: 2019-08-04

## 2019-08-04 RX ORDER — SODIUM CHLORIDE 0.9 % (FLUSH) 0.9 %
10 SYRINGE (ML) INJECTION EVERY 12 HOURS SCHEDULED
Status: DISCONTINUED | OUTPATIENT
Start: 2019-08-04 | End: 2019-08-09 | Stop reason: HOSPADM

## 2019-08-04 RX ORDER — 0.9 % SODIUM CHLORIDE 0.9 %
500 INTRAVENOUS SOLUTION INTRAVENOUS ONCE
Status: COMPLETED | OUTPATIENT
Start: 2019-08-04 | End: 2019-08-04

## 2019-08-04 RX ORDER — PANTOPRAZOLE SODIUM 40 MG/1
40 TABLET, DELAYED RELEASE ORAL
Status: DISCONTINUED | OUTPATIENT
Start: 2019-08-05 | End: 2019-08-09 | Stop reason: HOSPADM

## 2019-08-04 RX ORDER — SODIUM CHLORIDE 0.9 % (FLUSH) 0.9 %
10 SYRINGE (ML) INJECTION PRN
Status: DISCONTINUED | OUTPATIENT
Start: 2019-08-04 | End: 2019-08-09 | Stop reason: HOSPADM

## 2019-08-04 RX ORDER — CARVEDILOL 6.25 MG/1
12.5 TABLET ORAL 2 TIMES DAILY
Status: DISCONTINUED | OUTPATIENT
Start: 2019-08-04 | End: 2019-08-09 | Stop reason: HOSPADM

## 2019-08-04 RX ORDER — CITALOPRAM 20 MG/1
20 TABLET ORAL EVERY MORNING
Status: DISCONTINUED | OUTPATIENT
Start: 2019-08-05 | End: 2019-08-04

## 2019-08-04 RX ORDER — ONDANSETRON 4 MG/1
8 TABLET, ORALLY DISINTEGRATING ORAL EVERY 8 HOURS PRN
Status: DISCONTINUED | OUTPATIENT
Start: 2019-08-04 | End: 2019-08-09 | Stop reason: HOSPADM

## 2019-08-04 RX ORDER — IPRATROPIUM BROMIDE AND ALBUTEROL SULFATE 2.5; .5 MG/3ML; MG/3ML
1 SOLUTION RESPIRATORY (INHALATION)
Status: DISCONTINUED | OUTPATIENT
Start: 2019-08-05 | End: 2019-08-09 | Stop reason: HOSPADM

## 2019-08-04 RX ORDER — METOCLOPRAMIDE 10 MG/1
5 TABLET ORAL 3 TIMES DAILY
Status: DISCONTINUED | OUTPATIENT
Start: 2019-08-05 | End: 2019-08-04

## 2019-08-04 RX ORDER — DIGOXIN 125 MCG
125 TABLET ORAL DAILY
Status: DISCONTINUED | OUTPATIENT
Start: 2019-08-04 | End: 2019-08-04 | Stop reason: ALTCHOICE

## 2019-08-04 RX ORDER — METOCLOPRAMIDE 5 MG/1
5 TABLET ORAL 3 TIMES DAILY
Status: ON HOLD | COMMUNITY
End: 2019-08-09 | Stop reason: SDUPTHER

## 2019-08-04 RX ORDER — VANCOMYCIN HYDROCHLORIDE 1 G/200ML
1000 INJECTION, SOLUTION INTRAVENOUS EVERY 12 HOURS
Status: DISCONTINUED | OUTPATIENT
Start: 2019-08-05 | End: 2019-08-07

## 2019-08-04 RX ORDER — DRONABINOL 2.5 MG/1
2.5 CAPSULE ORAL
Status: ON HOLD | COMMUNITY
End: 2020-05-31

## 2019-08-04 RX ORDER — FLECAINIDE ACETATE 100 MG/1
100 TABLET ORAL 2 TIMES DAILY
Status: DISCONTINUED | OUTPATIENT
Start: 2019-08-04 | End: 2019-08-04

## 2019-08-04 RX ORDER — DEXTROSE MONOHYDRATE 50 MG/ML
100 INJECTION, SOLUTION INTRAVENOUS PRN
Status: DISCONTINUED | OUTPATIENT
Start: 2019-08-04 | End: 2019-08-09 | Stop reason: HOSPADM

## 2019-08-04 RX ORDER — CARVEDILOL 6.25 MG/1
12.5 TABLET ORAL 2 TIMES DAILY
Status: DISCONTINUED | OUTPATIENT
Start: 2019-08-05 | End: 2019-08-04

## 2019-08-04 RX ORDER — LACTOBACILLUS RHAMNOSUS GG 10B CELL
1 CAPSULE ORAL 2 TIMES DAILY WITH MEALS
Status: DISCONTINUED | OUTPATIENT
Start: 2019-08-04 | End: 2019-08-04 | Stop reason: ALTCHOICE

## 2019-08-04 RX ORDER — ACETAMINOPHEN 325 MG/1
650 TABLET ORAL EVERY 4 HOURS PRN
Status: DISCONTINUED | OUTPATIENT
Start: 2019-08-04 | End: 2019-08-09 | Stop reason: HOSPADM

## 2019-08-04 RX ORDER — EZETIMIBE 10 MG/1
10 TABLET ORAL DAILY
Status: DISCONTINUED | OUTPATIENT
Start: 2019-08-04 | End: 2019-08-04 | Stop reason: CLARIF

## 2019-08-04 RX ORDER — CITALOPRAM 20 MG/1
40 TABLET ORAL EVERY MORNING
Status: DISCONTINUED | OUTPATIENT
Start: 2019-08-05 | End: 2019-08-09 | Stop reason: HOSPADM

## 2019-08-04 RX ORDER — PROMETHAZINE HYDROCHLORIDE 25 MG/ML
25 INJECTION, SOLUTION INTRAMUSCULAR; INTRAVENOUS EVERY 6 HOURS PRN
Status: DISCONTINUED | OUTPATIENT
Start: 2019-08-04 | End: 2019-08-05

## 2019-08-04 RX ORDER — AMLODIPINE BESYLATE 5 MG/1
5 TABLET ORAL DAILY
Status: DISCONTINUED | OUTPATIENT
Start: 2019-08-04 | End: 2019-08-04 | Stop reason: ALTCHOICE

## 2019-08-04 RX ORDER — IPRATROPIUM BROMIDE AND ALBUTEROL SULFATE 2.5; .5 MG/3ML; MG/3ML
1 SOLUTION RESPIRATORY (INHALATION) ONCE
Status: COMPLETED | OUTPATIENT
Start: 2019-08-04 | End: 2019-08-04

## 2019-08-04 RX ORDER — ONDANSETRON 4 MG/1
4 TABLET, FILM COATED ORAL EVERY 8 HOURS PRN
COMMUNITY
End: 2019-09-27 | Stop reason: ALTCHOICE

## 2019-08-04 RX ORDER — FUROSEMIDE 10 MG/ML
40 INJECTION INTRAMUSCULAR; INTRAVENOUS 2 TIMES DAILY
Status: DISCONTINUED | OUTPATIENT
Start: 2019-08-04 | End: 2019-08-07

## 2019-08-04 RX ORDER — METOCLOPRAMIDE 10 MG/1
5 TABLET ORAL 3 TIMES DAILY
Status: DISCONTINUED | OUTPATIENT
Start: 2019-08-04 | End: 2019-08-05

## 2019-08-04 RX ORDER — VANCOMYCIN HYDROCHLORIDE 1 G/200ML
15 INJECTION, SOLUTION INTRAVENOUS ONCE
Status: COMPLETED | OUTPATIENT
Start: 2019-08-04 | End: 2019-08-04

## 2019-08-04 RX ORDER — HYDROCHLOROTHIAZIDE 25 MG/1
25 TABLET ORAL DAILY
Status: CANCELLED | OUTPATIENT
Start: 2019-08-04

## 2019-08-04 RX ORDER — LOSARTAN POTASSIUM 25 MG/1
50 TABLET ORAL DAILY
Status: DISCONTINUED | OUTPATIENT
Start: 2019-08-04 | End: 2019-08-04 | Stop reason: ALTCHOICE

## 2019-08-04 RX ORDER — DEXTROSE MONOHYDRATE 25 G/50ML
12.5 INJECTION, SOLUTION INTRAVENOUS PRN
Status: DISCONTINUED | OUTPATIENT
Start: 2019-08-04 | End: 2019-08-09 | Stop reason: HOSPADM

## 2019-08-04 RX ORDER — CARVEDILOL 25 MG/1
25 TABLET ORAL 2 TIMES DAILY
Status: DISCONTINUED | OUTPATIENT
Start: 2019-08-04 | End: 2019-08-04

## 2019-08-04 RX ORDER — SODIUM CHLORIDE 9 MG/ML
INJECTION, SOLUTION INTRAVENOUS
Status: COMPLETED
Start: 2019-08-04 | End: 2019-08-04

## 2019-08-04 RX ORDER — ZINC GLUCONATE 50 MG
50 TABLET ORAL DAILY
COMMUNITY
End: 2019-09-27 | Stop reason: ALTCHOICE

## 2019-08-04 RX ORDER — UBIDECARENONE 75 MG
50 CAPSULE ORAL DAILY
COMMUNITY
End: 2019-09-27 | Stop reason: ALTCHOICE

## 2019-08-04 RX ORDER — ALBUTEROL SULFATE 2.5 MG/3ML
2.5 SOLUTION RESPIRATORY (INHALATION)
Status: DISCONTINUED | OUTPATIENT
Start: 2019-08-04 | End: 2019-08-09 | Stop reason: HOSPADM

## 2019-08-04 RX ADMIN — FUROSEMIDE 40 MG: 10 INJECTION, SOLUTION INTRAMUSCULAR; INTRAVENOUS at 22:12

## 2019-08-04 RX ADMIN — VANCOMYCIN HYDROCHLORIDE 1000 MG: 1 INJECTION, SOLUTION INTRAVENOUS at 19:04

## 2019-08-04 RX ADMIN — ACETAMINOPHEN 650 MG: 325 TABLET, FILM COATED ORAL at 22:18

## 2019-08-04 RX ADMIN — METOCLOPRAMIDE HYDROCHLORIDE 5 MG: 10 TABLET ORAL at 22:13

## 2019-08-04 RX ADMIN — APIXABAN 5 MG: 5 TABLET, FILM COATED ORAL at 22:13

## 2019-08-04 RX ADMIN — IPRATROPIUM BROMIDE AND ALBUTEROL SULFATE 1 AMPULE: .5; 3 SOLUTION RESPIRATORY (INHALATION) at 17:16

## 2019-08-04 RX ADMIN — CEFEPIME HYDROCHLORIDE 2 G: 2 INJECTION, POWDER, FOR SOLUTION INTRAVENOUS at 18:33

## 2019-08-04 RX ADMIN — CARVEDILOL 12.5 MG: 6.25 TABLET, FILM COATED ORAL at 22:13

## 2019-08-04 RX ADMIN — FLECAINIDE ACETATE 50 MG: 50 TABLET ORAL at 22:13

## 2019-08-04 RX ADMIN — IOPAMIDOL 75 ML: 755 INJECTION, SOLUTION INTRAVENOUS at 18:17

## 2019-08-04 RX ADMIN — SODIUM CHLORIDE 500 ML: 9 INJECTION, SOLUTION INTRAVENOUS at 17:14

## 2019-08-04 RX ADMIN — SODIUM CHLORIDE 250 ML: 9 INJECTION, SOLUTION INTRAVENOUS at 23:59

## 2019-08-04 RX ADMIN — PROMETHAZINE HYDROCHLORIDE 25 MG: 25 INJECTION INTRAMUSCULAR; INTRAVENOUS at 17:14

## 2019-08-04 RX ADMIN — AZITHROMYCIN MONOHYDRATE 500 MG: 500 INJECTION, POWDER, LYOPHILIZED, FOR SOLUTION INTRAVENOUS at 23:59

## 2019-08-04 RX ADMIN — Medication 10 ML: at 22:13

## 2019-08-04 ASSESSMENT — ENCOUNTER SYMPTOMS
DIARRHEA: 0
ABDOMINAL PAIN: 0
NAUSEA: 1
CONSTIPATION: 0
STRIDOR: 0
BACK PAIN: 0
SHORTNESS OF BREATH: 1
WHEEZING: 1
APNEA: 0
COLOR CHANGE: 0
CHEST TIGHTNESS: 1
CHOKING: 0
VOMITING: 0
COUGH: 1
PHOTOPHOBIA: 0

## 2019-08-04 ASSESSMENT — PAIN SCALES - GENERAL
PAINLEVEL_OUTOF10: 0
PAINLEVEL_OUTOF10: 7

## 2019-08-04 NOTE — ED PROVIDER NOTES
follow-up provider specified. Discharge Medications:  New Prescriptions    No medications on file       FINAL IMPRESSION  1. Pneumonia due to organism    2. Other fatigue        Blood pressure 133/69, pulse 107, temperature 98.5 °F (36.9 °C), temperature source Oral, resp. rate (!) 35, height 5' 5\" (1.651 m), weight 154 lb (69.9 kg), SpO2 94 %, not currently breastfeeding.      For further details of [facility] emergency department encounter, please see documentation by advanced practice provider, Michael Toscano MD  08/04/19 [id number]

## 2019-08-04 NOTE — ED PROVIDER NOTES
[place] [address]  [place], [facility] [employer]   Phone [phone]   BRAIN NATRIURETIC PEPTIDE - Abnormal; Notable for the following components:    Pro-BNP 13,110 (*)     All other components within normal limits    Narrative:     Performed at:  [facility] [address]  [place], [address]   Phone [phone]   PROTIME-INR - Abnormal; Notable for the following components:    Protime 22.0 (*)     INR 1.93 (*)     All other components within normal limits    Narrative:     Performed at:  [facility] [address],  [place], [facility] [employer]   Phone [phone]   BLOOD GAS, ARTERIAL - Abnormal; Notable for the following components:    pO2, Arterial 69.1 (*)     HCO3, Arterial 30.8 (*)     Base Excess, Arterial 6.1 (*)     Hemoglobin, Art, Extended 8.5 (*)     Carboxyhgb, Arterial 1.6 (*)     All other components within normal limits    Narrative:     Performed at:  [facility] [address],  [place], [facility] [employer]   Phone [phone]   URINE RT REFLEX TO CULTURE - Abnormal; Notable for the following components:    Clarity, UA CLOUDY (*)     Leukocyte Esterase, Urine MODERATE (*)     All other components within normal limits    Narrative:     Performed at:  [facility] [address],  [place], [facility] [employer]   Phone [phone]   MICROSCOPIC URINALYSIS - Abnormal; Notable for the following components:    Yeast, UA Present (*)     WBC, UA 10 (*)     Epi Cells 15 (*)     All other components within normal limits    Narrative:     Performed at:  [facility] [address],  [place], [facility] [employer]   Phone [phone]   CULTURE BLOOD #1   CULTURE BLOOD #2   URINE CULTURE   TROPONIN    Narrative:     Performed at:  [facility] [address]  [place], [facility] [employer]   Phone [phone] fluid. Atherosclerosis. Coronary   artery calcifications. CT ABDOMEN/PELVIS:      1.  No acute abnormality in the abdomen or pelvis. No evidence of metastatic   disease. 2.  No bowel obstruction. Normal appendix. Colonic diverticulosis without   acute diverticulitis. CT Head WO Contrast   Final Result   No acute intracranial abnormality. Xr Chest Standard (2 Vw)    Result Date: 8/4/2019  EXAMINATION: TWO XRAY VIEWS OF THE CHEST 8/4/2019 2:22 pm COMPARISON: 05/16/2019. HISTORY: ORDERING SYSTEM PROVIDED HISTORY: Unresolved lobar pneumonia [facility]) TECHNOLOGIST PROVIDED HISTORY: Reason for Exam: Low stats. Follow up xrays for pneumonia in left lung. History of left lung cancer. Acuity: Unknown Type of Exam: Unknown FINDINGS: Dense consolidation primarily within the superior segment of the left lower lobe. Given the history of malignancy, this may represent a postobstructive pneumonia. Mild dependent left basilar volume loss with possible small effusion. Right lung is clear. Cardiac silhouette is stable. Dense aortic vascular calcification. Dense consolidation primarily within the superior segment of the left lower lobe, possibly a postobstructive pneumonia. Continued plain film follow-up recommended.            PROCEDURES   Unless otherwise noted below, none     Procedures    CRITICAL CARE TIME   N/A    CONSULTS:  PHARMACY TO DOSE VANCOMYCIN  IP CONSULT TO HOSPITALIST  IP CONSULT TO INTERNAL MEDICINE  IP CONSULT TO CARDIOLOGY  IP CONSULT TO PULMONOLOGY  IP CONSULT TO PHARMACY      EMERGENCY DEPARTMENT COURSE and DIFFERENTIAL DIAGNOSIS/MDM:   Vitals:    Vitals:    08/04/19 1915 08/04/19 1930 08/04/19 1945 08/04/19 2000   BP: 86/64 (!) 135/58 (!) 150/64 139/75   Pulse: 110 108 104 106   Resp: (!) 32 25 (!) 31 28   Temp:       TempSrc:       SpO2: 92% 94% 95% 95%   Weight:       Height:           Patient was given thefollowing medications:  Medications   ipratropium-albuterol

## 2019-08-04 NOTE — ED NOTES
Bed: 16  Expected date:   Expected time:   Means of arrival:   Comments:  [address] Krish Poe RN  08/04/19 3893

## 2019-08-05 PROBLEM — E43 SEVERE MALNUTRITION (HCC): Status: ACTIVE | Noted: 2019-08-05

## 2019-08-05 PROBLEM — J96.21 ACUTE ON CHRONIC RESPIRATORY FAILURE WITH HYPOXIA (HCC): Status: ACTIVE | Noted: 2019-08-05

## 2019-08-05 PROBLEM — C34.92 ADENOCARCINOMA OF LEFT LUNG (HCC): Status: ACTIVE | Noted: 2019-03-08

## 2019-08-05 LAB
AMMONIA: 25 UMOL/L (ref 11–51)
ANION GAP SERPL CALCULATED.3IONS-SCNC: 12 MMOL/L (ref 3–16)
BUN BLDV-MCNC: 12 MG/DL (ref 7–20)
CALCIUM SERPL-MCNC: 8.8 MG/DL (ref 8.3–10.6)
CHLORIDE BLD-SCNC: 99 MMOL/L (ref 99–110)
CO2: 29 MMOL/L (ref 21–32)
CREAT SERPL-MCNC: 0.6 MG/DL (ref 0.6–1.2)
EKG ATRIAL RATE: 300 BPM
EKG ATRIAL RATE: 72 BPM
EKG DIAGNOSIS: NORMAL
EKG DIAGNOSIS: NORMAL
EKG P AXIS: 49 DEGREES
EKG P-R INTERVAL: 192 MS
EKG Q-T INTERVAL: 326 MS
EKG Q-T INTERVAL: 432 MS
EKG QRS DURATION: 106 MS
EKG QRS DURATION: 110 MS
EKG QTC CALCULATION (BAZETT): 441 MS
EKG QTC CALCULATION (BAZETT): 473 MS
EKG R AXIS: 42 DEGREES
EKG R AXIS: 86 DEGREES
EKG T AXIS: -74 DEGREES
EKG T AXIS: 121 DEGREES
EKG VENTRICULAR RATE: 110 BPM
EKG VENTRICULAR RATE: 72 BPM
GFR AFRICAN AMERICAN: >60
GFR NON-AFRICAN AMERICAN: >60
GLUCOSE BLD-MCNC: 179 MG/DL (ref 70–99)
GLUCOSE BLD-MCNC: 279 MG/DL (ref 70–99)
GLUCOSE BLD-MCNC: 312 MG/DL (ref 70–99)
GLUCOSE BLD-MCNC: 317 MG/DL (ref 70–99)
GLUCOSE BLD-MCNC: 342 MG/DL (ref 70–99)
HCT VFR BLD CALC: 28.5 % (ref 36–48)
HEMOGLOBIN: 8.9 G/DL (ref 12–16)
L. PNEUMOPHILA SEROGP 1 UR AG: NORMAL
MCH RBC QN AUTO: 30.1 PG (ref 26–34)
MCHC RBC AUTO-ENTMCNC: 31.4 G/DL (ref 31–36)
MCV RBC AUTO: 95.7 FL (ref 80–100)
PDW BLD-RTO: 17.4 % (ref 12.4–15.4)
PERFORMED ON: ABNORMAL
PLATELET # BLD: 285 K/UL (ref 135–450)
PMV BLD AUTO: 7.9 FL (ref 5–10.5)
POTASSIUM REFLEX MAGNESIUM: 3.7 MMOL/L (ref 3.5–5.1)
RBC # BLD: 2.97 M/UL (ref 4–5.2)
REPORT: NORMAL
RESPIRATORY PANEL PCR: NORMAL
SODIUM BLD-SCNC: 140 MMOL/L (ref 136–145)
STREP PNEUMONIAE ANTIGEN, URINE: NORMAL
TROPONIN: <0.01 NG/ML
TSH REFLEX: 1.27 UIU/ML (ref 0.27–4.2)
WBC # BLD: 9.1 K/UL (ref 4–11)

## 2019-08-05 PROCEDURE — 94640 AIRWAY INHALATION TREATMENT: CPT

## 2019-08-05 PROCEDURE — 97530 THERAPEUTIC ACTIVITIES: CPT

## 2019-08-05 PROCEDURE — 85027 COMPLETE CBC AUTOMATED: CPT

## 2019-08-05 PROCEDURE — 99223 1ST HOSP IP/OBS HIGH 75: CPT | Performed by: INTERNAL MEDICINE

## 2019-08-05 PROCEDURE — 80048 BASIC METABOLIC PNL TOTAL CA: CPT

## 2019-08-05 PROCEDURE — 84443 ASSAY THYROID STIM HORMONE: CPT

## 2019-08-05 PROCEDURE — 6370000000 HC RX 637 (ALT 250 FOR IP): Performed by: PHYSICIAN ASSISTANT

## 2019-08-05 PROCEDURE — 92526 ORAL FUNCTION THERAPY: CPT

## 2019-08-05 PROCEDURE — 93005 ELECTROCARDIOGRAM TRACING: CPT | Performed by: INTERNAL MEDICINE

## 2019-08-05 PROCEDURE — 2060000000 HC ICU INTERMEDIATE R&B

## 2019-08-05 PROCEDURE — 97161 PT EVAL LOW COMPLEX 20 MIN: CPT

## 2019-08-05 PROCEDURE — 87633 RESP VIRUS 12-25 TARGETS: CPT

## 2019-08-05 PROCEDURE — 93010 ELECTROCARDIOGRAM REPORT: CPT | Performed by: INTERNAL MEDICINE

## 2019-08-05 PROCEDURE — 6360000002 HC RX W HCPCS: Performed by: PHYSICIAN ASSISTANT

## 2019-08-05 PROCEDURE — 87486 CHLMYD PNEUM DNA AMP PROBE: CPT

## 2019-08-05 PROCEDURE — 87581 M.PNEUMON DNA AMP PROBE: CPT

## 2019-08-05 PROCEDURE — 97165 OT EVAL LOW COMPLEX 30 MIN: CPT

## 2019-08-05 PROCEDURE — 84484 ASSAY OF TROPONIN QUANT: CPT

## 2019-08-05 PROCEDURE — 87798 DETECT AGENT NOS DNA AMP: CPT

## 2019-08-05 PROCEDURE — 2580000003 HC RX 258: Performed by: PHYSICIAN ASSISTANT

## 2019-08-05 PROCEDURE — 97116 GAIT TRAINING THERAPY: CPT

## 2019-08-05 PROCEDURE — 82140 ASSAY OF AMMONIA: CPT

## 2019-08-05 PROCEDURE — 94761 N-INVAS EAR/PLS OXIMETRY MLT: CPT

## 2019-08-05 PROCEDURE — 92610 EVALUATE SWALLOWING FUNCTION: CPT

## 2019-08-05 PROCEDURE — 2700000000 HC OXYGEN THERAPY PER DAY

## 2019-08-05 PROCEDURE — 6370000000 HC RX 637 (ALT 250 FOR IP): Performed by: INTERNAL MEDICINE

## 2019-08-05 PROCEDURE — 36415 COLL VENOUS BLD VENIPUNCTURE: CPT

## 2019-08-05 PROCEDURE — 6360000002 HC RX W HCPCS: Performed by: FAMILY MEDICINE

## 2019-08-05 PROCEDURE — 99222 1ST HOSP IP/OBS MODERATE 55: CPT | Performed by: INTERNAL MEDICINE

## 2019-08-05 PROCEDURE — 6370000000 HC RX 637 (ALT 250 FOR IP): Performed by: FAMILY MEDICINE

## 2019-08-05 RX ORDER — LACTOBACILLUS RHAMNOSUS GG 10B CELL
1 CAPSULE ORAL 2 TIMES DAILY WITH MEALS
Status: DISCONTINUED | OUTPATIENT
Start: 2019-08-05 | End: 2019-08-09 | Stop reason: HOSPADM

## 2019-08-05 RX ORDER — METHYLPREDNISOLONE SODIUM SUCCINATE 40 MG/ML
40 INJECTION, POWDER, LYOPHILIZED, FOR SOLUTION INTRAMUSCULAR; INTRAVENOUS EVERY 8 HOURS
Status: COMPLETED | OUTPATIENT
Start: 2019-08-05 | End: 2019-08-06

## 2019-08-05 RX ORDER — PREDNISONE 20 MG/1
40 TABLET ORAL DAILY
Status: DISCONTINUED | OUTPATIENT
Start: 2019-08-07 | End: 2019-08-09 | Stop reason: HOSPADM

## 2019-08-05 RX ORDER — LISINOPRIL 10 MG/1
10 TABLET ORAL DAILY
Status: DISCONTINUED | OUTPATIENT
Start: 2019-08-05 | End: 2019-08-09 | Stop reason: HOSPADM

## 2019-08-05 RX ADMIN — FUROSEMIDE 40 MG: 10 INJECTION, SOLUTION INTRAMUSCULAR; INTRAVENOUS at 16:48

## 2019-08-05 RX ADMIN — CITALOPRAM HYDROBROMIDE 40 MG: 20 TABLET ORAL at 09:25

## 2019-08-05 RX ADMIN — CARVEDILOL 12.5 MG: 6.25 TABLET, FILM COATED ORAL at 20:32

## 2019-08-05 RX ADMIN — FUROSEMIDE 40 MG: 10 INJECTION, SOLUTION INTRAMUSCULAR; INTRAVENOUS at 09:26

## 2019-08-05 RX ADMIN — INSULIN LISPRO 8 UNITS: 100 INJECTION, SOLUTION INTRAVENOUS; SUBCUTANEOUS at 12:11

## 2019-08-05 RX ADMIN — Medication 10 ML: at 16:51

## 2019-08-05 RX ADMIN — METHYLPREDNISOLONE SODIUM SUCCINATE 40 MG: 40 INJECTION, POWDER, FOR SOLUTION INTRAMUSCULAR; INTRAVENOUS at 09:26

## 2019-08-05 RX ADMIN — IPRATROPIUM BROMIDE AND ALBUTEROL SULFATE 1 AMPULE: .5; 3 SOLUTION RESPIRATORY (INHALATION) at 17:18

## 2019-08-05 RX ADMIN — CEFEPIME HYDROCHLORIDE 2 G: 2 INJECTION, POWDER, FOR SOLUTION INTRAVENOUS at 09:51

## 2019-08-05 RX ADMIN — VANCOMYCIN HYDROCHLORIDE 1000 MG: 1 INJECTION, SOLUTION INTRAVENOUS at 06:30

## 2019-08-05 RX ADMIN — Medication 1 CAPSULE: at 09:25

## 2019-08-05 RX ADMIN — FLECAINIDE ACETATE 50 MG: 50 TABLET ORAL at 09:25

## 2019-08-05 RX ADMIN — CEFEPIME HYDROCHLORIDE 2 G: 2 INJECTION, POWDER, FOR SOLUTION INTRAVENOUS at 19:14

## 2019-08-05 RX ADMIN — FLECAINIDE ACETATE 50 MG: 50 TABLET ORAL at 20:31

## 2019-08-05 RX ADMIN — INSULIN LISPRO 4 UNITS: 100 INJECTION, SOLUTION INTRAVENOUS; SUBCUTANEOUS at 20:43

## 2019-08-05 RX ADMIN — PANTOPRAZOLE SODIUM 40 MG: 40 TABLET, DELAYED RELEASE ORAL at 06:42

## 2019-08-05 RX ADMIN — LISINOPRIL 10 MG: 10 TABLET ORAL at 17:47

## 2019-08-05 RX ADMIN — Medication 2 PUFF: at 20:45

## 2019-08-05 RX ADMIN — METHYLPREDNISOLONE SODIUM SUCCINATE 40 MG: 40 INJECTION, POWDER, FOR SOLUTION INTRAMUSCULAR; INTRAVENOUS at 16:48

## 2019-08-05 RX ADMIN — IPRATROPIUM BROMIDE AND ALBUTEROL SULFATE 1 AMPULE: .5; 3 SOLUTION RESPIRATORY (INHALATION) at 20:45

## 2019-08-05 RX ADMIN — CEFEPIME HYDROCHLORIDE 2 G: 2 INJECTION, POWDER, FOR SOLUTION INTRAVENOUS at 02:56

## 2019-08-05 RX ADMIN — IPRATROPIUM BROMIDE AND ALBUTEROL SULFATE 1 AMPULE: .5; 3 SOLUTION RESPIRATORY (INHALATION) at 09:03

## 2019-08-05 RX ADMIN — Medication 1 CAPSULE: at 16:47

## 2019-08-05 RX ADMIN — INSULIN LISPRO 6 UNITS: 100 INJECTION, SOLUTION INTRAVENOUS; SUBCUTANEOUS at 09:23

## 2019-08-05 RX ADMIN — METHYLPREDNISOLONE SODIUM SUCCINATE 40 MG: 40 INJECTION, POWDER, FOR SOLUTION INTRAMUSCULAR; INTRAVENOUS at 02:56

## 2019-08-05 RX ADMIN — ASPIRIN 81 MG: 81 TABLET ORAL at 09:25

## 2019-08-05 RX ADMIN — VANCOMYCIN HYDROCHLORIDE 1000 MG: 1 INJECTION, SOLUTION INTRAVENOUS at 20:58

## 2019-08-05 RX ADMIN — METOCLOPRAMIDE HYDROCHLORIDE 5 MG: 10 TABLET ORAL at 09:25

## 2019-08-05 RX ADMIN — INSULIN LISPRO 8 UNITS: 100 INJECTION, SOLUTION INTRAVENOUS; SUBCUTANEOUS at 17:48

## 2019-08-05 RX ADMIN — CARVEDILOL 12.5 MG: 6.25 TABLET, FILM COATED ORAL at 09:25

## 2019-08-05 RX ADMIN — APIXABAN 5 MG: 5 TABLET, FILM COATED ORAL at 20:32

## 2019-08-05 RX ADMIN — Medication 10 ML: at 19:15

## 2019-08-05 RX ADMIN — APIXABAN 5 MG: 5 TABLET, FILM COATED ORAL at 09:25

## 2019-08-05 RX ADMIN — Medication 2 PUFF: at 09:03

## 2019-08-05 ASSESSMENT — PAIN SCALES - GENERAL
PAINLEVEL_OUTOF10: 0

## 2019-08-05 NOTE — DISCHARGE INSTR - COC
POLYPECTOMY SNARE/COLD BIOPSY performed by Brandi Adair MD at [facility]  2000, 2001    ID [address] INCL FLUOR GDNCE DX W/CELL [address] N/A 12/4/2018    BRONCHOSCOPY WITH LAVAGE performed by Simon Orona MD at [place] [address] N/A 2/25/2019    EGD BIOPSY performed by Brandi Adair MD at [address] [facility] ENDOSCOPY       Immunization History:   Immunization History   Administered Date(s) Administered    Influenza Vaccine, unspecified formulation 10/02/2018    Influenza Virus Vaccine 10/30/2013, 10/15/2014, 04/20/2017    Pneumococcal Conjugate 13-valent (Cielo Noemy) 06/05/2015    Pneumococcal Polysaccharide ([id number]) 08/30/2013       Active Problems:  Patient Active Problem List   Diagnosis Code    Diabetes mellitus ([facility] [address] E11.9    Dyslipidemia E78.5    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    Essential hypertension, benign I10    Coronary atherosclerosis of native coronary artery I25.10    Sleep apnea G47.30    Chest pain R07.9    Carpal tunnel syndrome G56.00    SOB (shortness of breath) R06.02    COPD, 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    Influenza J11.1    Paroxysmal atrial fibrillation (HCC) I48.0    Chronic cough R05    Hilar mass R91.8    Acute cholecystitis K81.0    Gallstones and inflammation of gallbladder without obstruction K80.00    Atrial fibrillation with rapid ventricular response (HCC) I48.91    Acute respiratory failure with hypoxia (HCC) J96.01    Centrilobular emphysema (HCC) J43.2    Ileus following gastrointestinal surgery (HCC) K91.89, K56.7    Mediastinal adenopathy R59.0    COPD exacerbation (HCC) J44.1    Adenocarcinoma of right lung (HCC) C34.91    Mild malnutrition (HCC) E44.1    Loss of consciousness (HCC) R40.20    Syncope and collapse R55    Subacute pulmonary embolism (HCC) I26.99    Other pulmonary embolism without acute cor pulmonale (HCC) I26.99    Closed compression fracture of Fair    Condition at Discharge: Stable    Rehab Potential (if transferring to Rehab): Fair    Recommended Labs or Other Treatments After Discharge: PT, OT, Aide, RN, SW/CHW, Home palliative care    Physician Certification: I certify the above information and transfer of Krunal Marinelli  is necessary for the continuing treatment of the diagnosis listed and that she requires Home Care for greater 30 days.      Update Admission H&P: No change in H&P    PHYSICIAN SIGNATURE:  Electronically signed by Liliya Merchant MD on 8/9/19 at 10:57 AM

## 2019-08-05 NOTE — PROGRESS NOTES
Discussed with Dr Venancio Giraldo diagnosed with Diastolic HF. Heart failure nurse consulted. At this time due to patient not drinking a lot-no need to place patient on fluid restriction per .     If she does start to will reconsider adding to diet.

## 2019-08-05 NOTE — ED NOTES
Pt seen on monitor by Carmela Rendon in A-fib 120's. Pt name and box number confirmed. Report given to [facility] [employer]. Pt alert and oriented and shows no signs of distress at time of transfer to [facility].        Igor Rizvi RN  08/04/19 2022

## 2019-08-05 NOTE — CARE COORDINATION
Discharge Planning Assessment  RN/SW discharge planner met with patient to discuss reason for admission, current living situation, and potential needs at the time of discharge     Demographics/Insurance verified Yes     Current type of dwellin story home, second floor BR w/ 3 step entry     Living arrangements: lives alone     Level of function/Support: Independent with ADL     PCP: Garrett Benavides     DME: cane, rollator     Active with any community resources/agencies/skilled home care: Active with [facility] to resume pending a home care order. Medication compliance issues: none     Financial issues that could impact healthcare: none     Transportation at the time of discharge: daughter     Tentative discharge plan: Home. Patient had a rehab stay at [facility] on May 23rd. Active with [facility] to resume pending a home care order. Chip Adhikari, Case Management    .

## 2019-08-05 NOTE — PROGRESS NOTES
Advanced Care Planning Note. Purpose of Encounter: Advanced care planning in light of left lung adenocarcinoma  Parties In Attendance: Patient,  daughters  Decisional Capacity: Limited  Subjective: Patient with SOB and persistent nausea  Objective: Cr 0.6  Goals of Care Determination: Patient wants limited support (NO CPR, no vent, no surgery, no HD, no trach, no PEG)  Plan:  GI, Oncology, Pulmonary, Neurology, Cardiology consults. IV Lasix, IV Solumedrol, IV Abx, palliative care consult  Code Status: Full code (I recommended DNR-CCA)   Time spent on Advanced care Plannin minutes  Advanced Care Planning Documents: Completed advanced directives on chart, daughter is the POA.     Adela Quick MD  2019 12:20 PM

## 2019-08-05 NOTE — H&P
ondansetron (ZOFRAN) 4 MG tablet Take 4 mg by mouth every 8 hours as needed for Nausea or Vomiting   Yes Historical Provider, MD   vitamin B-12 (CYANOCOBALAMIN) 100 MCG tablet Take 50 mcg by mouth daily   Yes Historical Provider, MD   apixaban (ELIQUIS) 5 MG TABS tablet Take 1 tablet by mouth 2 times daily 6/12/19  Yes Sabas Abreu MD   flecainide (TAMBOCOR) 100 MG tablet Take 1 tablet by mouth 2 times daily 4/26/19  Yes Sabas Abreu MD   ipratropium-albuterol (DUONEB) 0.5-2.5 (3) MG/3ML SOLN nebulizer solution Inhale 3 mLs into the lungs every 4 hours (while awake) DX:COPD J44.9 3/8/19  Yes Amanda Wheeler MD   potassium chloride (KLOR-CON M) 20 MEQ extended release tablet Take 1 tablet by mouth daily 2/28/19  Yes Daniel Garcia MD   omeprazole (PRILOSEC) 20 MG delayed release capsule Take 20 mg by mouth daily   Yes Historical Provider, MD   aspirin 81 MG tablet Take 1 tablet by mouth daily 12/23/18  Yes Daniel Garcia MD   citalopram (CELEXA) 40 MG tablet Take 0.5 tablets by mouth every morning 12/23/18  Yes Daniel Garcia MD   metFORMIN (GLUCOPHAGE) 1000 MG tablet Take 0.5 tablets by mouth 2 times daily (with meals) 12/23/18  Yes Daniel Garcia MD   Fluticasone-Umeclidin-Vilant (TRELEGY ELLIPTA) 100-62.5-25 MCG/INH AEPB Inhale 1 puff into the lungs daily Currently not taking   Yes Historical Provider, MD   carvedilol (COREG) 25 MG tablet Take 1 tablet by mouth 2 times daily  Patient taking differently: Take 12.5 mg by mouth daily  12/13/18  Yes Sabas Abreu MD   calcium carbonate (OSCAL) 500 MG TABS tablet Take 500 mg by mouth daily. Yes Historical Provider, MD   Ascorbic Acid (VITAMIN C) 500 MG tablet Take 500 mg by mouth daily.      Yes Historical Provider, MD   hydrochlorothiazide (HYDRODIURIL) 25 MG tablet Take 0.5 tablets by mouth daily  Patient taking differently: Take 12.5 mg by mouth as needed  12/23/18   Daniel Garcia MD   acetaminophen 650 MG TABS Take 650 mg by mouth every HGB 9.1*   HCT 28.8*           RENAL  Recent Labs     08/04/19  1711      K 4.0   CL 95*   CO2 29   BUN 14   CREATININE 0.7       LFTS  Recent Labs     08/04/19  1711   AST 23   ALT 20   BILITOT <0.2   ALKPHOS 113       COAG  Recent Labs     08/04/19  1711   INR 1.93*       CARDIAC ENZYMES  Recent Labs     08/04/19  1711   TROPONINI <0.01       LIPIDS  Cholesterol, Total   Date/Time Value Ref Range Status   05/19/2016 10:53  (H) 0 - 199 mg/dL Final     Triglycerides   Date/Time Value Ref Range Status   05/19/2016 10:53  0 - 150 mg/dL Final     HDL   Date/Time Value Ref Range Status   05/19/2016 10:53 AM 69 (H) 40 - 60 mg/dL Final     LDL Calculated   Date/Time Value Ref Range Status   05/19/2016 10:53  (H) <100 mg/dL Final         Radiology:     CT Chest Pulmonary Embolism W Contrast   Final Result   CT CHEST:      1.  Suboptimal evaluation of the subsegmental and more peripheral pulmonary   arteries in the right lung due to motion artifact. Given this, no obvious   acute pulmonary thromboembolic disease. Mildly prominent main pulmonary   artery suggests pulmonary hypertension. No right ventricular strain. 2.  Increased large consolidation in the left upper and lower lobes. Previously noted left hilar/perihilar mass is not well distinguished from the   areas of consolidation. Findings may relate to postobstructive pneumonia   secondary to the left hilar/perihilar mass. Findings could also relate to   post radiation therapy changes. Small left pleural effusion. 3.  Focal 6.1 x 2.5 cm mixed fat and soft tissue attenuation mass in the   region of the left subscapularis muscle is increased in size from chest CT   done May 16, 2019. Underlying malignancy is not excluded. 4.  Cardiomegaly. Trace pericardial fluid. Atherosclerosis. Coronary   artery calcifications. CT ABDOMEN/PELVIS:      1.  No acute abnormality in the abdomen or pelvis.   No evidence of opportunity to be involved in this patient's care. If you have any questions or concerns please feel free to contact me at [phone].

## 2019-08-05 NOTE — PROGRESS NOTES
Everywhere and there is no mention in the discharge summary as to why it was not performed. She had an abnormal stress test while inpatient here in May and cath was not performed at that time because no evidence of reversible ischemia was seen. Patient had just been diagnosed with PE and it was felt that risk outweighed benefit of procedure. Patient did have an ECHO on 7/28/19 which noted an EF of 55-60%, moderate to severe MR, and findings suspicious for moderate pulmonary HTN. \"    CXR: 8/4/19  Impression   Dense consolidation primarily within the superior segment of the left lower   lobe, possibly a postobstructive pneumonia.  Continued plain film follow-up   recommended. \"SLP eval and treat\" and \"SLP video swallow\" orders received this date. Pt has previously been seen by this speech department in 2/2019. She was discharged on a regular texture diet/thin liquids. Pt currently seen sitting upright in chair with daughter present at bedside. She is wearing 2L O2 via nasal cannula, which she indicated is her baseline. Pt and daughter reported no difficulty with swallowing. Pt did report that the beef at lunch today was very tough and dry. Daughter reports pt has poor appetite. Treatment Diagnosis:  Dysphagia    Impressions: Pt presents with minimal oropharyngeal dysphagia. Oral phase is characterized by mildly prolonged time for mastication and occasional need for liquid rinse to clear oral residue with dry textures. Pharyngeal phase is characterized by very mildly delayed initiation of swallow and suspected decreased laryngeal elevation upon palpation. Pt wears upper/lower dentures. Lingual/labial strength/ROM/coordination are WFL at this time. Pt was given various texture trials to assess swallow function, including thin liquids (via cup and straw), pureed solids, soft solids, regular solids, and mixed consistencies.  Pt demonstrated good bolus manipulation with pureed and soft solids with complete oral

## 2019-08-05 NOTE — CONSULTS
Ryder Mariano MD at [place]  2/27/2019    BRONCHOSCOPY/TRANSBRONCHIAL NEEDLE BIOPSY performed by Ryder Mariano MD at [facility]  2/27/2019    BRONCHOSCOPY ULTRASOUND performed by Ryder Mariano MD at [facility], LAPAROSCOPIC N/A 12/19/2018    LAPAROSCOPIC CHOLECYSTECTOMY WITH CHOLANGIOGRAM performed by Jose Ramírez MD at [address] COLONOSCOPY N/A 2/25/2019    COLONOSCOPY WITH BIOPSY performed by Brandi Adair MD at [address] [facility] COLONOSCOPY  2/25/2019    COLONOSCOPY POLYPECTOMY SNARE/COLD BIOPSY performed by Brandi Adair MD at [place]  2000, 2001    [place] [address] INCL FLUOR GDNCE DX W/CELL WASHG [facility] N/A 12/4/2018    BRONCHOSCOPY WITH LAVAGE performed by Simon Orona MD at [facility] N/A 2/25/2019    EGD BIOPSY performed by Brandi Adair MD at [address] ENDOSCOPY     Current Medications:    Current Facility-Administered Medications: lactobacillus (CULTURELLE) capsule 1 capsule, 1 capsule, Oral, BID WC  methylPREDNISolone sodium (SOLU-MEDROL) injection 40 mg, 40 mg, Intravenous, Q8H **FOLLOWED BY** [START ON 8/7/2019] predniSONE (DELTASONE) tablet 40 mg, 40 mg, Oral, Daily  apixaban (ELIQUIS) tablet 5 mg, 5 mg, Oral, BID  aspirin EC tablet 81 mg, 81 mg, Oral, Daily  ondansetron (ZOFRAN-ODT) disintegrating tablet 8 mg, 8 mg, Oral, Q8H PRN  pantoprazole (PROTONIX) tablet 40 mg, 40 mg, Oral, QAM AC  sodium chloride flush 0.9 % injection 10 mL, 10 mL, Intravenous, 2 times per day  sodium chloride flush 0.9 % injection 10 mL, 10 mL, Intravenous, PRN  magnesium hydroxide (MILK OF MAGNESIA) 400 MG/5ML suspension 30 mL, 30 mL, Oral, Daily PRN  acetaminophen (TYLENOL) tablet 650 mg, 650 mg, Oral, Q4H PRN  albuterol (PROVENTIL) nebulizer solution 2.5 mg, 2.5 mg, Nebulization, Q2H hoarseness, nasal congestion, sore throat and voice change  Respiratory: negative except for shortness of breath  Cardiovascular: negative for chest pain, chest pressure/discomfort, irregular heart beat, lower extremity edema and palpitations  Gastrointestinal: negative for abdominal pain, constipation, diarrhea, jaundice, melena, odynophagia, reflux symptoms and vomiting  Hematologic/lymphatic: negative for bleeding, easy bruising, lymphadenopathy and petechiae  Musculoskeletal:negative for arthralgias, bone pain, muscle weakness, neck pain and stiff joints  Neurological: negative for dizziness, gait problems, headaches, seizures, speech problems, tremors and weakness  Behavioral/Psych: negative for anxiety, behavior problems, depression, fatigue and sleep disturbance  Endocrine: negative for diabetic symptoms including none, neuropathy, polyphagia, polyuria, polydipsia, vomiting and diarrhea and temperature intolerance  Allergic/Immunologic: negative for anaphylaxis, angioedema, hay fever and urticaria      Objective:     Patient Vitals for the past 8 hrs:   BP Temp Temp src Pulse Resp SpO2 Weight   08/05/19 0742 -- -- -- -- -- -- 156 lb 14.4 oz (71.2 kg)   08/05/19 0625 115/71 98.2 °F (36.8 °C) Oral 83 18 98 % --   08/05/19 0137 (!) 100/58 98 °F (36.7 °C) Oral 93 18 91 % --     No intake/output data recorded. No intake/output data recorded.     Physical Exam:  General Appearance: alert and oriented to person, place and time, well developed and well- nourished, in no acute distress  Skin: warm and dry, no rash or erythema  Head: normocephalic and atraumatic  Eyes: pupils equal, round, and reactive to light, extraocular eye movements intact, conjunctivae normal  ENT: external ear and ear canal normal bilaterally, nose without deformity, nasal mucosa and turbinates normal  Neck: supple and non-tender without mass, no cervical lymphadenopathy  Pulmonary/Chest: Scattered bilateral crackles with poor air entry at the subscapularis muscle is increased in size from chest CT done May 16,   2019.       CT ABDOMEN/PELVIS:       Liver: Normal.       Gallbladder and Bile Ducts: Prior cholecystectomy.       Spleen: Normal.       Adrenal Glands: Normal.       Pancreas: Normal.       Genitourinary: Small bilateral renal cortical hypodensities likely represent   benign cysts, some of which are too small to characterize.  No urinary   calculi or hydronephrosis.  The urinary bladder is unremarkable.       Bowel: No bowel obstruction.  Colonic diverticulosis without acute   diverticulitis.  No evidence of acute appendicitis.       Vasculature: Atherosclerosis.  No abdominal aortic aneurysm.  Patent portal   vein.       Bones and Soft Tissues: Multilevel degenerative disease in the visualized   spine and pelvis.  Postsurgical changes of L5-S1 posterior decompression with   laminectomies.       Retroperitoneum/Mesentery: No intraperitoneal free air, ascites or fluid   collection. No lymphadenopathy in the abdomen or pelvis.           Impression   CT CHEST:       1.  Suboptimal evaluation of the subsegmental and more peripheral pulmonary   arteries in the right lung due to motion artifact.  Given this, no obvious   acute pulmonary thromboembolic disease. Wilfred Pacer prominent main pulmonary   artery suggests pulmonary hypertension.  No right ventricular strain.       2.  Increased large consolidation in the left upper and lower lobes. Previously noted left hilar/perihilar mass is not well distinguished from the   areas of consolidation.  Findings may relate to postobstructive pneumonia   secondary to the left hilar/perihilar mass.  Findings could also relate to   post radiation therapy changes.  Small left pleural effusion.       3.  Focal 6.1 x 2.5 cm mixed fat and soft tissue attenuation mass in the   region of the left subscapularis muscle is increased in size from chest CT   done May 16, 2019.  Underlying malignancy is not excluded.       4.

## 2019-08-05 NOTE — CONSULTS
file   Occupational History    Not on file   Social Needs    Financial resource strain: Not on file    Food insecurity:     Worry: Not on file     Inability: Not on file    Transportation needs:     Medical: Not on file     Non-medical: Not on file   Tobacco Use    Smoking status: Former Smoker     Last attempt to quit: 10/28/2001     Years since quittin.7    Smokeless tobacco: Never Used   Substance and Sexual Activity    Alcohol use: No    Drug use: No    Sexual activity: Yes     Partners: Male   Lifestyle    Physical activity:     Days per week: Not on file     Minutes per session: Not on file    Stress: Not on file   Relationships    Social connections:     Talks on phone: Not on file     Gets together: Not on file     Attends Bahai service: Not on file     Active member of club or organization: Not on file     Attends meetings of clubs or organizations: Not on file     Relationship status: Not on file    Intimate partner violence:     Fear of current or ex partner: Not on file     Emotionally abused: Not on file     Physically abused: Not on file     Forced sexual activity: Not on file   Other Topics Concern    Not on file   Social History Narrative    Not on file       Review of Systems:   · Constitutional: there has been no unanticipated weight loss. There's been no change in energy level, sleep pattern, or activity level. · Eyes: No visual changes or diplopia. No scleral icterus. · ENT: No Headaches, hearing loss or vertigo. No mouth sores or sore throat. · Cardiovascular: Reviewed in HPI  · Respiratory: No cough or wheezing, no sputum production. No hematemesis. · Gastrointestinal: No abdominal pain, appetite loss, blood in stools. No change in bowel or bladder habits. · Genitourinary: No dysuria, trouble voiding, or hematuria. · Musculoskeletal:  No gait disturbance, weakness or joint complaints. · Integumentary: No rash or pruritis.   · Neurological: No headache,

## 2019-08-05 NOTE — CONSULTS
Palliative Care:    70 y.o. female with history of CAD, COPD, diabetes, GERD, hypertension, hyperlipidemia, previous lung cancer status post radiation and chemotherapy, obstructive sleep apnea who presents to the ED with complaint of shortness of breath and fatigue/weakness. Patient has been having falls at home. CT imaging suggestive of dense left lower lobe infiltrate more consistent with postradiation changes rather than acute pneumonia. Started on empiric course of antibiotics and steroids. Patient is on  Bronchodilators. Patient is followed by Dr Aliya Montero. GI and pulmonary are following. Uses 2 liters on oxygen at home. She desaturates with activity. 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 Treated with chemoradiation which patient completed in April. Patient had 6 treatments. Patient also gives prior history of severe COPD-follows with Dr. Lizz Garcia, on trilogy and DuoNeb breathing treatments. Uses 2 L O2 at home. Also has history of gangrenous cholecystitis status post surgery in December 2018. CT OF THE ABDOMEN AND PELVIS WITH CONTRAST; CTA OF THE CHEST 8/4/2019 6:16   FINDINGS:   CT CHEST:       Pulmonary Arteries: Suboptimal evaluation of the subsegmental and more   peripheral pulmonary arteries in the right lung secondary to motion artifact.    Given this, no obvious acute pulmonary thromboembolic disease.  Previously   noted linear intraluminal filling defect in the right lower lobe is not   definitely seen. Aldon Arthur prominent main pulmonary artery measures up to 3.3   cm in diameter, suggesting pulmonary hypertension.  No right ventricular   strain.       Mediastinum: Cardiomegaly.  Trace pericardial fluid.  Coronary artery   calcifications.  Atherosclerosis of the thoracic aorta.  No thoracic aortic   aneurysm.  Normal thyroid gland.  Normal esophagus.  No intrathoracic   lymphadenopathy.  Multiple small mediastinal lymph nodes are seen but do not   meet CT criteria for pathologic enlargement.       Lungs/pleura: Respiratory motion.  Progressed large consolidation in the left   upper and lower lobes.  Previously noted left perihilar mass is not well   seen.  Scattered areas of atelectasis in the right lung.  Stable 5 mm   pulmonary nodule in the right middle lobe.  No endoluminal masslike lesion. Small left pleural effusion.  No pneumothorax.  Mild centrilobular emphysema.       Soft Tissues/Bones: Stable mild T1 superior endplate height loss. Redemonstration of an intraosseous hemangioma in the T8 vertebral body. Focal   6.1 x 2.5 cm mixed fat and soft tissue attenuation mass in the region of the   left subscapularis muscle is increased in size from chest CT done May 16,   2019.       CT ABDOMEN/PELVIS:       Liver: Normal.       Gallbladder and Bile Ducts: Prior cholecystectomy.       Spleen: Normal.       Adrenal Glands: Normal.       Pancreas: Normal.       Genitourinary: Small bilateral renal cortical hypodensities likely represent   benign cysts, some of which are too small to characterize.  No urinary   calculi or hydronephrosis.  The urinary bladder is unremarkable.       Bowel: No bowel obstruction.  Colonic diverticulosis without acute   diverticulitis.  No evidence of acute appendicitis.       Vasculature: Atherosclerosis.  No abdominal aortic aneurysm.  Patent portal   vein.       Bones and Soft Tissues: Multilevel degenerative disease in the visualized   spine and pelvis.  Postsurgical changes of L5-S1 posterior decompression with   laminectomies.       Retroperitoneum/Mesentery: No intraperitoneal free air, ascites or fluid   collection.  No lymphadenopathy in the abdomen or pelvis.        Impression   CT CHEST:       1.  Suboptimal evaluation of the subsegmental and more peripheral pulmonary   arteries in the right lung due to motion artifact.  Given this, no obvious   acute pulmonary thromboembolic disease. Monico Camacho stress incontinence. Past Surgical History:   has a past surgical history that includes Coronary angioplasty with stent (2000, 2001); back surgery (2000, 2001); bronchoscopy (12/04/2018); pr [facility] incl fluor gdnce dx w/cell washg spx (N/A, 12/4/2018); Cholecystectomy, laparoscopic (N/A, 12/19/2018); Upper gastrointestinal endoscopy (N/A, 2/25/2019); Colonoscopy (N/A, 2/25/2019); Colonoscopy (2/25/2019); bronchoscopy (N/A, 2/27/2019); bronchoscopy (2/27/2019); and bronchoscopy (2/27/2019). Advance Directives:      Code status is full     Problem Severity: Pain/Other Symptoms:     Patient denies pain at this time. Bed Mobility/Toileting/Transfer:      Daughter assists with transportation. Patient has been independent with ADLs. Performance Status:  Using walker to ambulate. History of falls. Symptom Assessment: Appetite/Nausea/Bowels/Fatigue:      Has been on Marinol. Has occasional nausea and has Phenergan available. Social History:   reports that she quit smoking about 17 years ago. She has never used smokeless tobacco. She reports that she does not drink alcohol or use drugs. Family History:  family history includes Cancer in her maternal uncle and sister; Diabetes in her brother and sister; Heart Disease in her father and mother. Psychological/Spiritual:       Patient lives alone in a two story home. Patient has two daughters living locally. Spouse is a long term resident of [place] [address]. Family Discussion:   Met with patient and her two daughters. Reviewed clinical status. Patient has severe COPD and radiation pneumonitis and is being treated with steroids and antibiotics. Patient known to palliative care from May 2019 hospital admission. Patient states she has had persistent nausea and orthostatic hypotension since her gallbladder was removed in December 2018. Patient had a cardiac arrest during this surgery.  Daughter states she has had some cognitive impairment since

## 2019-08-05 NOTE — PROGRESS NOTES
with HR)  Home Access: Stairs to enter with rails(3 KATI)  Bathroom Shower/Tub: Tub/Shower unit, Walk-in shower(pt uses walk in )  [employer]: Handicap height  Bathroom Equipment: Grab bars around toilet, Grab bars in shower, Hand-held shower  Bathroom Accessibility: Accessible  Home Equipment: Rolling walker, 4 wheeled walker, [address] [facility] Help From: Family  ADL Assistance: Independent  Homemaking Assistance: (daughter assists with IADLs, brings meals, does laundry)  Ambulation Assistance: Independent(uses 9KB and RW)  Transfer Assistance: Independent  Active : No  Leisure & Hobbies: watching tv  Additional Comments: pt reports she has been staying with her daughter on the weekends. pt on 2 L O2 at home for the past several months. pt denies falls but per chart pt has had a few falls in the past 6 months  Cognition        Objective  AROM RLE (degrees)  RLE AROM: WFL  AROM LLE (degrees)  LLE AROM : WFL  Strength RLE  Strength RLE: WFL  Comment: grossly 4-/5  Strength LLE  Strength LLE: WFL  Comment: grossly 4-/5  Tone RLE  RLE Tone: Normotonic  Tone LLE  LLE Tone: Normotonic  Motor Control  Gross Motor?: WFL  Sensation  Overall Sensation Status: WFL  Bed mobility  Supine to Sit: Stand by assistance(HOB raised)  Scooting: Stand by assistance  Transfers  Sit to Stand: Stand by assistance(x1 EOB, x1 toilet, x1 recliner)  Stand to sit: Stand by assistance  Comment: VC for safe hand placement when transferring with RW. Ambulation  Ambulation?: Yes  Ambulation 1  Surface: level tile  Device: Rolling Walker  Other Apparatus: O2(2L)  Assistance: CGA>>SBA  Quality of Gait: narrow Magdy, decreased catalina  Distance: 10'+20'+125'+125'  Comments: Pt able to negotiate obstacles without assist. No LOB. Pt SOB long term through ambulation with SpO2 registering at 84%. Pt required ~2 minute standing rest break and educated on PLB to increase SpO2. SpO2 increasing to 94%.    Stairs/Curb  Stairs?: Yes  Stairs  #

## 2019-08-05 NOTE — PROGRESS NOTES
Occupational Therapy   Occupational Therapy Initial Assessment  Date: 2019   Patient Name: Yanely Stewart  MRN: [id number]     : 1940    Date of Service: 2019    Discharge Recommendations:  Yanely Stewart scored a 18/24 on the AM-PAC ADL Inpatient form. Current research shows that an AM-PAC score of 18 or greater is typically associated with a discharge to the patient's home setting. Based on the patients AM-PAC score and their current ADL deficits, it is recommended that the patient have 2-3 sessions per week of Occupational Therapy at d/c to increase the patients independence. HOME HEALTH CARE: LEVEL 1 STANDARD    - Initial home health evaluation to occur within 24-48 hours, in patient home   - Therapy to evaluate with goal of regaining prior level of functioning   - Therapy to evaluate if patient has [address] needs for personal care    Pt would benefit from COA referral      OT Equipment Recommendations  Equipment Needed: No  Other: pt has needed DME, would benefit from use of her BSC at nights    Assessment   Performance deficits / Impairments: Decreased functional mobility ; Decreased endurance;Decreased high-level IADLs;Decreased ADL status  Assessment: pt not at baseline and would benefit from skilled OT services at this time in order to return to PLOF  Treatment Diagnosis: PNA  Prognosis: Good  Decision Making: Low Complexity  History: pt lives alone in 2 story home. pt has assist with IADL tasks and completes IADL tasks with assist from daughter  Assistance / Modification: SBA with RW, rest breaks  OT Education: Energy Conservation;Plan of Care;ADL Adaptive Strategies  Patient Education: use of BSC at night, COA  REQUIRES OT FOLLOW UP: Yes  Activity Tolerance  Activity Tolerance: Patient Tolerated treatment well  Safety Devices  Safety Devices in place: Yes  Type of devices: All fall risk precautions in place; Left in chair;Nurse notified; Patient at risk for falls;Call light within

## 2019-08-05 NOTE — CONSULTS
would stop home dennis seltzer as this contains ASA    Discussed with Dr. Beltran Perdomo, PA-C  [facility]    I have personally performed a face to face diagnostic evaluation on this patient. I have interviewed and examined the patient and I agree with the findings and recommended plan of care. In summary, my findings and plan are the followin-year-old female who has non-small cell lung cancer status post chemo and radiation therapy. Patient started to have chronic nausea since developing gangrenous cholecystitis in 2018. This was treated with a cholecystectomy. I saw her in 2019 with persistent nausea and did an EGD and colonoscopy. The endoscopic evaluations were largely unremarkable (see details above). MRI of the brain was negative as well. Patient gets extremely dizzy on standing up. She is known to have severe orthostatic hypotension. She cannot stand up, move, take a bath without assist.  She gets dizzy, lightheaded and nauseous. Her nausea is not worsened by eating. She denies any abdominal pain, diarrhea or constipation. At home, patient would only take PPI as needed but will take [place] religiously. Impression and plan  Chronic nausea, associated with dizziness and orthostatic hypotension. Aside from gastroparesis, patient could have inner ear problems or autonomic dysfunction. Daily PPI. Agree with scopolamine patch, Zofran as needed  We will  order a gastric emptying study to rule out gastroparesis. Follow-up with inpatient neurology.    If negative, consider outpatient ENT evaluation    Fareed Etienne MD, MSc  [facility]

## 2019-08-06 ENCOUNTER — ANESTHESIA (OUTPATIENT)
Dept: ENDOSCOPY | Age: 79
DRG: 205 | End: 2019-08-06
Payer: MEDICARE

## 2019-08-06 ENCOUNTER — ANESTHESIA EVENT (OUTPATIENT)
Dept: ENDOSCOPY | Age: 79
DRG: 205 | End: 2019-08-06
Payer: MEDICARE

## 2019-08-06 VITALS
OXYGEN SATURATION: 97 % | DIASTOLIC BLOOD PRESSURE: 56 MMHG | RESPIRATION RATE: 18 BRPM | SYSTOLIC BLOOD PRESSURE: 126 MMHG

## 2019-08-06 LAB
ANION GAP SERPL CALCULATED.3IONS-SCNC: 11 MMOL/L (ref 3–16)
APPEARANCE BAL (LAVAGE): CLEAR
BUN BLDV-MCNC: 23 MG/DL (ref 7–20)
CALCIUM SERPL-MCNC: 9.1 MG/DL (ref 8.3–10.6)
CHLORIDE BLD-SCNC: 96 MMOL/L (ref 99–110)
CLOT EVALUATION BAL: NORMAL
CO2: 32 MMOL/L (ref 21–32)
COLOR LAVAGE: COLORLESS
CREAT SERPL-MCNC: 0.7 MG/DL (ref 0.6–1.2)
GFR AFRICAN AMERICAN: >60
GFR NON-AFRICAN AMERICAN: >60
GLUCOSE BLD-MCNC: 316 MG/DL (ref 70–99)
GLUCOSE BLD-MCNC: 321 MG/DL (ref 70–99)
GLUCOSE BLD-MCNC: 323 MG/DL (ref 70–99)
GLUCOSE BLD-MCNC: 336 MG/DL (ref 70–99)
GLUCOSE BLD-MCNC: 337 MG/DL (ref 70–99)
GLUCOSE BLD-MCNC: 361 MG/DL (ref 70–99)
INR BLD: 1.68 (ref 0.86–1.14)
NUMBER OF CELLS COUNTED BAL (LAVAGE): 200
PERFORMED ON: ABNORMAL
POTASSIUM REFLEX MAGNESIUM: 3.7 MMOL/L (ref 3.5–5.1)
PRO-BNP: 5885 PG/ML (ref 0–449)
PROTHROMBIN TIME: 19.1 SEC (ref 9.8–13)
RBC, BAL: 389 /CUMM
SODIUM BLD-SCNC: 139 MMOL/L (ref 136–145)
URINE CULTURE, ROUTINE: NORMAL
VANCOMYCIN TROUGH: 19.1 UG/ML (ref 10–20)
VOLUME LAVAGE: 3 ML
WBC/EPI CELLS BAL: 8 /CUMM

## 2019-08-06 PROCEDURE — 6360000002 HC RX W HCPCS: Performed by: NURSE ANESTHETIST, CERTIFIED REGISTERED

## 2019-08-06 PROCEDURE — 87106 FUNGI IDENTIFICATION YEAST: CPT

## 2019-08-06 PROCEDURE — 88305 TISSUE EXAM BY PATHOLOGIST: CPT

## 2019-08-06 PROCEDURE — 87102 FUNGUS ISOLATION CULTURE: CPT

## 2019-08-06 PROCEDURE — 36415 COLL VENOUS BLD VENIPUNCTURE: CPT

## 2019-08-06 PROCEDURE — 89051 BODY FLUID CELL COUNT: CPT

## 2019-08-06 PROCEDURE — 3700000001 HC ADD 15 MINUTES (ANESTHESIA): Performed by: INTERNAL MEDICINE

## 2019-08-06 PROCEDURE — 2700000000 HC OXYGEN THERAPY PER DAY

## 2019-08-06 PROCEDURE — 2500000003 HC RX 250 WO HCPCS: Performed by: NURSE ANESTHETIST, CERTIFIED REGISTERED

## 2019-08-06 PROCEDURE — 6370000000 HC RX 637 (ALT 250 FOR IP): Performed by: INTERNAL MEDICINE

## 2019-08-06 PROCEDURE — 94761 N-INVAS EAR/PLS OXIMETRY MLT: CPT

## 2019-08-06 PROCEDURE — 6370000000 HC RX 637 (ALT 250 FOR IP): Performed by: PHYSICIAN ASSISTANT

## 2019-08-06 PROCEDURE — 2580000003 HC RX 258: Performed by: ANESTHESIOLOGY

## 2019-08-06 PROCEDURE — 97110 THERAPEUTIC EXERCISES: CPT

## 2019-08-06 PROCEDURE — 6360000002 HC RX W HCPCS: Performed by: FAMILY MEDICINE

## 2019-08-06 PROCEDURE — 3700000000 HC ANESTHESIA ATTENDED CARE: Performed by: INTERNAL MEDICINE

## 2019-08-06 PROCEDURE — 87015 SPECIMEN INFECT AGNT CONCNTJ: CPT

## 2019-08-06 PROCEDURE — 2709999900 HC NON-CHARGEABLE SUPPLY: Performed by: INTERNAL MEDICINE

## 2019-08-06 PROCEDURE — 97116 GAIT TRAINING THERAPY: CPT

## 2019-08-06 PROCEDURE — 99233 SBSQ HOSP IP/OBS HIGH 50: CPT | Performed by: INTERNAL MEDICINE

## 2019-08-06 PROCEDURE — 99222 1ST HOSP IP/OBS MODERATE 55: CPT | Performed by: PSYCHIATRY & NEUROLOGY

## 2019-08-06 PROCEDURE — 83880 ASSAY OF NATRIURETIC PEPTIDE: CPT

## 2019-08-06 PROCEDURE — 87070 CULTURE OTHR SPECIMN AEROBIC: CPT

## 2019-08-06 PROCEDURE — 31624 DX BRONCHOSCOPE/LAVAGE: CPT | Performed by: INTERNAL MEDICINE

## 2019-08-06 PROCEDURE — 3609010800 HC BRONCHOSCOPY ALVEOLAR LAVAGE: Performed by: INTERNAL MEDICINE

## 2019-08-06 PROCEDURE — 80202 ASSAY OF VANCOMYCIN: CPT

## 2019-08-06 PROCEDURE — 6370000000 HC RX 637 (ALT 250 FOR IP): Performed by: FAMILY MEDICINE

## 2019-08-06 PROCEDURE — 2580000003 HC RX 258: Performed by: PHYSICIAN ASSISTANT

## 2019-08-06 PROCEDURE — 7100000001 HC PACU RECOVERY - ADDTL 15 MIN: Performed by: INTERNAL MEDICINE

## 2019-08-06 PROCEDURE — 2580000003 HC RX 258: Performed by: NURSE ANESTHETIST, CERTIFIED REGISTERED

## 2019-08-06 PROCEDURE — 0BDB8ZX EXTRACTION OF LEFT LOWER LOBE BRONCHUS, VIA NATURAL OR ARTIFICIAL OPENING ENDOSCOPIC, DIAGNOSTIC: ICD-10-PCS | Performed by: INTERNAL MEDICINE

## 2019-08-06 PROCEDURE — 87116 MYCOBACTERIA CULTURE: CPT

## 2019-08-06 PROCEDURE — 87205 SMEAR GRAM STAIN: CPT

## 2019-08-06 PROCEDURE — 6360000002 HC RX W HCPCS: Performed by: PHYSICIAN ASSISTANT

## 2019-08-06 PROCEDURE — 87206 SMEAR FLUORESCENT/ACID STAI: CPT

## 2019-08-06 PROCEDURE — 7100000000 HC PACU RECOVERY - FIRST 15 MIN: Performed by: INTERNAL MEDICINE

## 2019-08-06 PROCEDURE — 99232 SBSQ HOSP IP/OBS MODERATE 35: CPT | Performed by: INTERNAL MEDICINE

## 2019-08-06 PROCEDURE — 0B9J8ZX DRAINAGE OF LEFT LOWER LUNG LOBE, VIA NATURAL OR ARTIFICIAL OPENING ENDOSCOPIC, DIAGNOSTIC: ICD-10-PCS | Performed by: INTERNAL MEDICINE

## 2019-08-06 PROCEDURE — 94640 AIRWAY INHALATION TREATMENT: CPT

## 2019-08-06 PROCEDURE — 80048 BASIC METABOLIC PNL TOTAL CA: CPT

## 2019-08-06 PROCEDURE — 85610 PROTHROMBIN TIME: CPT

## 2019-08-06 PROCEDURE — 88185 FLOWCYTOMETRY/TC ADD-ON: CPT

## 2019-08-06 PROCEDURE — 2060000000 HC ICU INTERMEDIATE R&B

## 2019-08-06 PROCEDURE — 88112 CYTOPATH CELL ENHANCE TECH: CPT

## 2019-08-06 PROCEDURE — 88184 FLOWCYTOMETRY/ TC 1 MARKER: CPT

## 2019-08-06 RX ORDER — SODIUM CHLORIDE 9 MG/ML
INJECTION, SOLUTION INTRAVENOUS CONTINUOUS PRN
Status: DISCONTINUED | OUTPATIENT
Start: 2019-08-06 | End: 2019-08-06 | Stop reason: SDUPTHER

## 2019-08-06 RX ORDER — LIDOCAINE HYDROCHLORIDE 40 MG/ML
SOLUTION TOPICAL PRN
Status: DISCONTINUED | OUTPATIENT
Start: 2019-08-06 | End: 2019-08-06 | Stop reason: HOSPADM

## 2019-08-06 RX ORDER — KETAMINE HCL IN NACL, ISO-OSM 100MG/10ML
SYRINGE (ML) INJECTION PRN
Status: DISCONTINUED | OUTPATIENT
Start: 2019-08-06 | End: 2019-08-06 | Stop reason: SDUPTHER

## 2019-08-06 RX ORDER — SODIUM CHLORIDE 9 MG/ML
INJECTION, SOLUTION INTRAVENOUS CONTINUOUS
Status: DISCONTINUED | OUTPATIENT
Start: 2019-08-06 | End: 2019-08-06

## 2019-08-06 RX ORDER — LIDOCAINE HYDROCHLORIDE 20 MG/ML
INJECTION, SOLUTION EPIDURAL; INFILTRATION; INTRACAUDAL; PERINEURAL PRN
Status: DISCONTINUED | OUTPATIENT
Start: 2019-08-06 | End: 2019-08-06 | Stop reason: SDUPTHER

## 2019-08-06 RX ORDER — PROPOFOL 10 MG/ML
INJECTION, EMULSION INTRAVENOUS PRN
Status: DISCONTINUED | OUTPATIENT
Start: 2019-08-06 | End: 2019-08-06 | Stop reason: SDUPTHER

## 2019-08-06 RX ADMIN — INSULIN LISPRO 12 UNITS: 100 INJECTION, SOLUTION INTRAVENOUS; SUBCUTANEOUS at 15:43

## 2019-08-06 RX ADMIN — Medication 1 CAPSULE: at 18:54

## 2019-08-06 RX ADMIN — VANCOMYCIN HYDROCHLORIDE 1000 MG: 1 INJECTION, SOLUTION INTRAVENOUS at 18:54

## 2019-08-06 RX ADMIN — IPRATROPIUM BROMIDE AND ALBUTEROL SULFATE 1 AMPULE: .5; 3 SOLUTION RESPIRATORY (INHALATION) at 08:56

## 2019-08-06 RX ADMIN — Medication 10 ML: at 21:12

## 2019-08-06 RX ADMIN — INSULIN LISPRO 7 UNITS: 100 INJECTION, SOLUTION INTRAVENOUS; SUBCUTANEOUS at 21:15

## 2019-08-06 RX ADMIN — SODIUM CHLORIDE: 9 INJECTION, SOLUTION INTRAVENOUS at 12:21

## 2019-08-06 RX ADMIN — METHYLPREDNISOLONE SODIUM SUCCINATE 40 MG: 40 INJECTION, POWDER, FOR SOLUTION INTRAMUSCULAR; INTRAVENOUS at 18:54

## 2019-08-06 RX ADMIN — FLECAINIDE ACETATE 50 MG: 50 TABLET ORAL at 09:45

## 2019-08-06 RX ADMIN — METHYLPREDNISOLONE SODIUM SUCCINATE 40 MG: 40 INJECTION, POWDER, FOR SOLUTION INTRAMUSCULAR; INTRAVENOUS at 03:33

## 2019-08-06 RX ADMIN — SODIUM CHLORIDE: 9 INJECTION, SOLUTION INTRAVENOUS at 12:33

## 2019-08-06 RX ADMIN — CARVEDILOL 12.5 MG: 6.25 TABLET, FILM COATED ORAL at 21:11

## 2019-08-06 RX ADMIN — IPRATROPIUM BROMIDE AND ALBUTEROL SULFATE 1 AMPULE: .5; 3 SOLUTION RESPIRATORY (INHALATION) at 15:51

## 2019-08-06 RX ADMIN — SODIUM CHLORIDE: 9 INJECTION, SOLUTION INTRAVENOUS at 11:49

## 2019-08-06 RX ADMIN — Medication 5 MG: at 12:23

## 2019-08-06 RX ADMIN — FUROSEMIDE 40 MG: 10 INJECTION, SOLUTION INTRAMUSCULAR; INTRAVENOUS at 18:54

## 2019-08-06 RX ADMIN — Medication 2 PUFF: at 08:56

## 2019-08-06 RX ADMIN — LISINOPRIL 10 MG: 10 TABLET ORAL at 09:45

## 2019-08-06 RX ADMIN — FLECAINIDE ACETATE 50 MG: 50 TABLET ORAL at 21:11

## 2019-08-06 RX ADMIN — Medication 2 PUFF: at 20:56

## 2019-08-06 RX ADMIN — CARVEDILOL 12.5 MG: 6.25 TABLET, FILM COATED ORAL at 09:46

## 2019-08-06 RX ADMIN — IPRATROPIUM BROMIDE AND ALBUTEROL SULFATE 1 AMPULE: .5; 3 SOLUTION RESPIRATORY (INHALATION) at 20:56

## 2019-08-06 RX ADMIN — CEFEPIME HYDROCHLORIDE 2 G: 2 INJECTION, POWDER, FOR SOLUTION INTRAVENOUS at 03:34

## 2019-08-06 RX ADMIN — CITALOPRAM HYDROBROMIDE 40 MG: 20 TABLET ORAL at 09:45

## 2019-08-06 RX ADMIN — LIDOCAINE HYDROCHLORIDE 100 MG: 20 INJECTION, SOLUTION EPIDURAL; INFILTRATION; INTRACAUDAL; PERINEURAL at 12:22

## 2019-08-06 RX ADMIN — PROPOFOL 50 MG: 10 INJECTION, EMULSION INTRAVENOUS at 12:23

## 2019-08-06 RX ADMIN — Medication 10 ML: at 09:46

## 2019-08-06 RX ADMIN — CEFEPIME HYDROCHLORIDE 2 G: 2 INJECTION, POWDER, FOR SOLUTION INTRAVENOUS at 23:04

## 2019-08-06 RX ADMIN — PANTOPRAZOLE SODIUM 40 MG: 40 TABLET, DELAYED RELEASE ORAL at 06:57

## 2019-08-06 RX ADMIN — VANCOMYCIN HYDROCHLORIDE 1000 MG: 1 INJECTION, SOLUTION INTRAVENOUS at 06:57

## 2019-08-06 RX ADMIN — METHYLPREDNISOLONE SODIUM SUCCINATE 40 MG: 40 INJECTION, POWDER, FOR SOLUTION INTRAMUSCULAR; INTRAVENOUS at 09:46

## 2019-08-06 RX ADMIN — CEFEPIME HYDROCHLORIDE 2 G: 2 INJECTION, POWDER, FOR SOLUTION INTRAVENOUS at 15:42

## 2019-08-06 ASSESSMENT — PULMONARY FUNCTION TESTS
PIF_VALUE: 1
PIF_VALUE: 8
PIF_VALUE: 1
PIF_VALUE: 17
PIF_VALUE: 15
PIF_VALUE: 0
PIF_VALUE: 15
PIF_VALUE: 1
PIF_VALUE: 8
PIF_VALUE: 7
PIF_VALUE: 16
PIF_VALUE: 1

## 2019-08-06 ASSESSMENT — PAIN SCALES - GENERAL
PAINLEVEL_OUTOF10: 0

## 2019-08-06 ASSESSMENT — ENCOUNTER SYMPTOMS: SHORTNESS OF BREATH: 1

## 2019-08-06 ASSESSMENT — PAIN - FUNCTIONAL ASSESSMENT: PAIN_FUNCTIONAL_ASSESSMENT: 0-10

## 2019-08-06 NOTE — PROGRESS NOTES
Patient tolerated swallow eval post procedure-will plan for meal-ordered for her at this time. Discussed with vascular lab that pt has now an EGD scheduled for Morning. So if possible plan to have patient go and get gastric emptying study down by 11am. If not it will need to be done on Thurs. Discussed with Barry Paz plan of care (daughter POA)   Will get consent signed.

## 2019-08-06 NOTE — PROGRESS NOTES
[facility] Pulmonary/CCM Progress note      Admit Date: 8/4/2019    Chief Complaint: Shortness of breath and abnormal CT scan    Subjective: Interval History: Currently appears comfortable, no phlegm with the cough. On 2 L O2 which is her baseline. No wheezing. Discussed with daughter over the telephone about performing bronchoscopy in order to rule out pneumonia.     Scheduled Meds:   lactobacillus  1 capsule Oral BID WC    methylPREDNISolone  40 mg Intravenous Q8H    Followed by   Caridad Salcedo ON 8/7/2019] predniSONE  40 mg Oral Daily    lisinopril  10 mg Oral Daily    apixaban  5 mg Oral BID    aspirin  81 mg Oral Daily    pantoprazole  40 mg Oral QAM AC    sodium chloride flush  10 mL Intravenous 2 times per day    ipratropium-albuterol  1 ampule Inhalation Q4H WA    cefepime  2 g Intravenous Q8H    insulin lispro  0-12 Units Subcutaneous TID WC    insulin lispro  0-6 Units Subcutaneous Nightly    mometasone-formoterol  2 puff Inhalation BID    furosemide  40 mg Intravenous BID    vancomycin  1,000 mg Intravenous Q12H    citalopram  40 mg Oral QAM    carvedilol  12.5 mg Oral BID    flecainide  50 mg Oral BID     Continuous Infusions:   dextrose       PRN Meds:ondansetron, sodium chloride flush, magnesium hydroxide, acetaminophen, albuterol, glucose, dextrose, glucagon (rDNA), dextrose    Review of Systems  Constitutional: negative for fatigue, fevers, malaise and weight loss  Ears, nose, mouth, throat: negative for ear drainage, epistaxis, hoarseness, nasal congestion, sore throat and voice change  Respiratory: negative except for cough and shortness of breath  Cardiovascular: negative for chest pain, chest pressure/discomfort, irregular heart beat, lower extremity edema and palpitations  Gastrointestinal: negative for abdominal pain, constipation, diarrhea, jaundice, melena, odynophagia, reflux symptoms and vomiting  Hematologic/lymphatic: negative for bleeding, easy bruising, lymphadenopathy and

## 2019-08-06 NOTE — PROGRESS NOTES
Component Value Date    PROBNP 5,885 (H) 08/06/2019    PROBNP 13,110 (H) 08/04/2019    PROBNP 590 (H) 02/24/2019     Lab Results   Component Value Date    ALT 20 08/04/2019    ALT 11 05/21/2019    AST 23 08/04/2019    AST 11 (L) 05/21/2019     Lab Results   Component Value Date    HGB 8.9 08/05/2019    HGB 9.1 08/04/2019    HCT 28.5 08/05/2019    HCT 28.8 08/04/2019     08/05/2019     08/04/2019     Lab Results   Component Value Date    TRIG 123 05/19/2016    TRIG 94 05/01/2015    HDL 69 05/19/2016    HDL 77 05/01/2015    LDLCALC 130 05/19/2016    LDLCALC 131 05/01/2015     Labs were reviewed including labs from other hospital systems through [facility]. Cardiac testing was reviewed including echos, nuclear scans, cardiac catheterization, including from other hospital systems through [facility]. Assessment:    1. Pneumonia due to organism    2. Other fatigue    3. Abnormal CT of the chest     4. Chronic diastolic HF, elevated bnp level  5. Chronic nausea  6. Failure to thrive     Plan:  1.  continue treatment for chronic diastolic HF  2. Agree with continuing IV lasix  3. Getting bronchoscopy today to evaluate pneumonia  4.  continue lisinopril 10 mg po qd  5. Agree with gastric emptying study to evaluate chronic nausea  6. No reason for further cardiac testing. Echo and stress Myoview in May, 2019 were unremarkable except for chronic diastolic HF  7. CHF education reinforced.   ~salt restriction  ~fluid restriction  ~medication compliance  ~daily weights and notify of any significant weight gain/loss  ~establish with CHF nurse  ~outpatient follow-up with our CHF team         NYHA Class: 3    Susie Abbott MD, 8/6/2019 9:32 AM

## 2019-08-06 NOTE — ANESTHESIA PRE PROCEDURE
Department of Anesthesiology  Preprocedure Note       Name:  Tg Che   Age:  70 y.o.  :  1940                                          MRN:  [id number]         Date:  2019      Surgeon: Nesha Werner):  Sergey Rivera MD    Procedure: BRONCHOSCOPY DIAGNOSTIC OR CELL [address] ONLY (N/A )    Medications prior to admission:   Prior to Admission medications    Medication Sig Start Date End Date Taking? Authorizing Provider   metoclopramide (REGLAN) 5 MG tablet Take 5 mg by mouth 3 times daily    Historical Provider, MD   dronabinol (MARINOL) 2.5 MG capsule Take 2.5 mg by mouth 2 times daily (before meals).     Historical Provider, MD   zinc gluconate 50 MG tablet Take 50 mg by mouth daily    Historical Provider, MD   ondansetron (ZOFRAN) 4 MG tablet Take 4 mg by mouth every 8 hours as needed for Nausea or Vomiting    Historical Provider, MD   vitamin B-12 (CYANOCOBALAMIN) 100 MCG tablet Take 50 mcg by mouth daily    Historical Provider, MD   apixaban (ELIQUIS) 5 MG TABS tablet Take 1 tablet by mouth 2 times daily 19   Sabas Abreu MD   flecainide (TAMBOCOR) 100 MG tablet Take 1 tablet by mouth 2 times daily 19   Sabas Abreu MD   ipratropium-albuterol (DUONEB) 0.5-2.5 (3) MG/3ML SOLN nebulizer solution Inhale 3 mLs into the lungs every 4 hours (while awake) DX:COPD J44.9 3/8/19   Amanda Wheeler MD   potassium chloride (KLOR-CON M) 20 MEQ extended release tablet Take 1 tablet by mouth daily 19   Daniel Garcia MD   omeprazole (PRILOSEC) 20 MG delayed release capsule Take 20 mg by mouth daily    Historical Provider, MD   aspirin 81 MG tablet Take 1 tablet by mouth daily 18   Daniel Garcia MD   citalopram (CELEXA) 40 MG tablet Take 0.5 tablets by mouth every morning 18   Daniel Garcia MD   metFORMIN (GLUCOPHAGE) 1000 MG tablet Take 0.5 tablets by mouth 2 times daily (with meals) 18   Daniel Garcia MD   hydrochlorothiazide (HYDRODIURIL) 25 MG Heidy Graves MD   Stopped at 08/06/19 0757    citalopram (CELEXA) tablet 40 mg  40 mg Oral QAM Alessio Sheppard MD   40 mg at 08/06/19 0945    carvedilol (COREG) tablet 12.5 mg  12.5 mg Oral BID Alessio Sheppard MD   12.5 mg at 08/06/19 0946    flecainide (TAMBOCOR) tablet 50 mg  50 mg Oral BID Alessio Sheppard MD   50 mg at 08/06/19 0945       Allergies:     Allergies   Allergen Reactions    Statins Other (See Comments)     Other reaction(s): Myalgias (Muscle Pain)      Cipro Xr      Swelling, rash    Lyrica [Pregabalin]      Shaking, swelling, rash    Penicillins      Swelling, rash    Sulfa Antibiotics      Swelling, rash       Problem List:    Patient Active Problem List   Diagnosis Code    Diabetes mellitus ([facility] [address] E11.9    Dyslipidemia E78.5    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    Essential hypertension, benign I10    Coronary atherosclerosis of native coronary artery I25.10    Sleep apnea G47.30    Chest pain R07.9    Carpal tunnel syndrome G56.00    SOB (shortness of breath) R06.02    COPD, severe ([facility]) J44.9    Influenza J11.1    Paroxysmal atrial fibrillation ([facility]) I48.0    Chronic cough R05    Hilar mass R91.8    Acute cholecystitis K81.0    Gallstones and inflammation of gallbladder without obstruction K80.00    Atrial fibrillation with rapid ventricular response ([facility]) I48.91    Acute respiratory failure with hypoxia ([facility]) J96.01    Centrilobular emphysema (HCC) J43.2    Ileus following gastrointestinal surgery ([facility]) K91.89, K56.7    Mediastinal adenopathy R59.0    COPD exacerbation ([facility]) J44.1    Adenocarcinoma of left lung ([facility]) C34.92    Mild malnutrition ([facility]) E44.1    Loss of consciousness ([facility]) R40.20    Syncope and collapse R55    Subacute pulmonary embolism ([facility]) I26.99    Other pulmonary embolism without acute cor pulmonale ([facility]) I26.99    Closed compression fracture of thoracic vertebra ([facility]) S22.000A    Chronic deep vein thrombosis (DVT) of both lower extremities ([facility]) calcifications.     CT ABDOMEN/PELVIS:     1.  No acute abnormality in the abdomen or pelvis.  No evidence of metastatic  disease.     2.  No bowel obstruction.  Normal appendix.  Colonic diverticulosis without  acute diverticulitis. Cardiovascular:  Exercise tolerance: poor (<4 METS),   (+) hypertension:, past MI:, CAD:, dysrhythmias: atrial fibrillation, CHF:,         Rhythm: regular  Rate: normal                 ROS comment: 5/19    Summary   -Normal left ventricle size and systolic function with an estimated ejection   fraction of 60%. No regional wall motion abnormalities are seen. Mild   concentric left ventricular hypertrophy is present.   -Indeterminate diastolic function. E/e\"=11.35   -Aortic valve appears sclerotic but opens adequately. Mild aortic   regurgitation is present.   -Mild mitral regurgitation is present.   -There is trivial tricuspid regurgitation with RVSP estimated at 33 mmHg. Neuro/Psych:      (-) seizures, TIA and CVA           GI/Hepatic/Renal:   (+) GERD: well controlled,          ROS comment: No n/v today. Endo/Other:    (+) Diabetes, . Abdominal:           Vascular:                                          Anesthesia Plan      MAC     ASA 3       Induction: intravenous. Anesthetic plan and risks discussed with patient. Plan discussed with CRNA.                   Ponce Avila MD   8/6/2019

## 2019-08-06 NOTE — PROGRESS NOTES
Output 1025 ml   Net -1025 ml    Wt Readings from Last 3 Encounters:   08/06/19 152 lb 6.4 oz (69.1 kg)   07/17/19 154 lb (69.9 kg)   05/23/19 159 lb 9.6 oz (72.4 kg)       General appearance:  Appears comfortable  Eyes: Sclera clear. Pupils equal.  ENT: Moist oral mucosa. Trachea midline, no adenopathy. Cardiovascular: Regular rhythm, normal S1, S2. No murmur. No edema in lower extremities  Respiratory: Not using accessory muscles. Good inspiratory effort. + LLL rhonchi  GI: Abdomen soft, no tenderness, not distended  Musculoskeletal: No cyanosis in digits, neck supple  Neurology: CN 2-12 grossly intact. No speech or motor deficits  Psych: Normal affect.  Alert and oriented in time, place and person  Skin: Warm, dry, normal turgor    Labs and Tests:  CBC:   Recent Labs     08/04/19  1711 08/05/19  0541   WBC 10.8 9.1   HGB 9.1* 8.9*    285     BMP:  Recent Labs     08/04/19  1711 08/05/19  0541 08/06/19  0642    140 139   K 4.0 3.7 3.7   CL 95* 99 96*   CO2 29 29 32   BUN 14 12 23*   CREATININE 0.7 0.6 0.7   GLUCOSE 141* 179* 316*     Hepatic: Recent Labs     08/04/19  1711   AST 23   ALT 20   BILITOT <0.2   ALKPHOS 113       ASSESSMENT AND PLAN    Principal Problem:    Pneumonia due to organism  Active Problems:    Essential hypertension, benign    Coronary atherosclerosis of native coronary artery    Diabetes mellitus (HCC)    Sleep apnea    COPD, severe (HCC)    Centrilobular emphysema (HCC)    COPD exacerbation ([address]    Adenocarcinoma of left lung ([address]    Subacute pulmonary embolism (HCC)    Chronic deep vein thrombosis (DVT) of both lower extremities (HCC)    Recurrent falls    Diabetic peripheral neuropathy (HCC)    Pneumonia    Severe malnutrition (HCC)    Abnormal CT of the chest    Acute on chronic diastolic CHF (congestive heart failure), NYHA class 3 (HCC)    Failure to thrive (0-17)    Chronic atrial fibrillation (HCC)    Acute on chronic respiratory failure with hypoxia (HCC)    Chronic

## 2019-08-06 NOTE — PROGRESS NOTES
Palliative Care:     Patient having bronchoscopy today. Gastric emptying study planned for tomorrow. Patient has been accepted at [facility] where her spouse resides. Spoke with daughter Maxine Kenney. She states patient agreed to DNR CCA yesterday with Dr Jeff Templeton. States it is her mother's decision. Reports she would like to meet with Dr Jeff Templeton at palliative care at 1300 tomorrow.

## 2019-08-06 NOTE — PROGRESS NOTES
Pt awake in chair watching TV. VSS and pt denies any needs. Call light in reach and chair alarm engaged.

## 2019-08-06 NOTE — PROGRESS NOTES
This RN spoke to Dr. Artur Conde re: blood sugar of 323- no new orders    Electronically signed by Ibis Mock RN on 8/6/2019 at 12:55 PM

## 2019-08-06 NOTE — PROGRESS NOTES
Speech Language Pathology    Attempted to see patient for dysphagia therapy, Pt is currently off of floor for procedure. Will attempt to follow-up as schedule allows.     Rabia Simon M.A., [address] [place] [address]  Speech-Language Pathologist

## 2019-08-06 NOTE — PROGRESS NOTES
cefepime (MAXIPIME) 2 g IVPB minibag Q8H   glucose (GLUTOSE) 40 % oral gel 15 g PRN   dextrose 50 % IV solution PRN   glucagon (rDNA) injection 1 mg PRN   dextrose 5 % solution PRN   insulin lispro (HUMALOG) injection pen 0-12 Units TID WC   insulin lispro (HUMALOG) injection pen 0-6 Units Nightly   mometasone-formoterol (DULERA) 200-5 MCG/ACT inhaler 2 puff BID   furosemide (LASIX) injection 40 mg BID   vancomycin (VANCOCIN) 1000 mg in dextrose 5% 200 mL IVPB Q12H   citalopram (CELEXA) tablet 40 mg QAM   carvedilol (COREG) tablet 12.5 mg BID   flecainide (TAMBOCOR) tablet 50 mg BID       Objective:  /66   Pulse 77   Temp 97.8 °F (36.6 °C) (Oral)   Resp 16   Ht 5' 5\" (1.651 m)   Wt 152 lb 6.4 oz (69.1 kg)   SpO2 95%   BMI 25.36 kg/m²     Intake/Output Summary (Last 24 hours) at 8/6/2019 0859  Last data filed at 8/6/2019 0849  Gross per 24 hour   Intake 240 ml   Output 1025 ml   Net -785 ml      Wt Readings from Last 3 Encounters:   08/06/19 152 lb 6.4 oz (69.1 kg)   07/17/19 154 lb (69.9 kg)   05/23/19 159 lb 9.6 oz (72.4 kg)       General appearance:  Appears comfortable, sitting in bed, pleasant  Eyes: Sclera clear. Pupils equal.  ENT: Moist oral mucosa. Trachea midline, no adenopathy. Cardiovascular: Regular rhythm, normal S1, S2. No murmur. No edema in lower extremities  Respiratory: Very poor AE BL.  L sided rhonchi. No wheezing or rales appreciated  GI: Abdomen soft, no tenderness, not distended, normal bowel sounds  Musculoskeletal: No cyanosis in digits, neck supple  Neurology: Grossly intact. No speech or motor deficits  Psych: Depressed affect. Alert and oriented in time, place and person  Skin: Warm, dry, normal turgor  Extremity exam shows brisk capillary refill.   Peripheral pulses are palpable in lower extremities     Labs and Tests:  CBC:   Recent Labs     08/04/19  1711 08/05/19  0541   WBC 10.8 9.1   HGB 9.1* 8.9*    285     BMP:    Recent Labs     08/04/19  8035

## 2019-08-06 NOTE — PROGRESS NOTES
lower lung and was sent back to the ED for concerns for pneumonia. Daughter  has been receiving multiple breathing medications at home but still having shortness of breath, dyspnea upon exertion, fatigue and weakness. Had a couple falls today. Is anticoagulated on Eliquis for previous atrial fibrillation. Patient denies chest pain, hemoptysis, productive cough, fever/chills, rashes/lesions, abdominal pain, urinary symptoms or changes in bowel movements. Denies pedal edema, pleuritic pain, orthopnea or calf tenderness. Has chronic nausea and is on multiple antiemetics at home. Stacey gonzalez is been ongoing for greater than a year ever since she had gangrenous gallbladder and cholecystectomy. Is supposed to be following up with GI outpatient Jackie Hong but has not been able to follow-up due to other health problems with with shortness of breath and concern for pneumonia. Subjective   General  Chart Reviewed: Yes  Response To Previous Treatment: Patient with no complaints from previous session. Family / Caregiver Present: No  Subjective  Subjective: Pt denies pain at this time and willing to partipate. General Comment  Comments: Pt supine in bed upon arrival, per nursing just returned from test ok to see. Nursing states family wants to have a re-eval due to being able to go to SNF. Pain Screening  Patient Currently in Pain: Denies  Vital Signs  Patient Currently in Pain: Denies       Orientation  Orientation  Overall Orientation Status: Impaired  Orientation Level: Oriented to place;Oriented to situation;Oriented to person;Disoriented to time  Cognition      Objective   Bed mobility  Supine to Sit: Stand by assistance  Sit to Supine: Unable to assess  Scooting: Stand by assistance(in chair)  Transfers  Sit to Stand: Stand by assistance(x1 EOB)  Stand to sit: Stand by assistance  Comment: Pt used proper handplacement this session.   Ambulation  Ambulation?: Yes  Ambulation 1  Surface: level tile  Device:

## 2019-08-06 NOTE — PROGRESS NOTES
99 96*   CO2 29 29 32   BUN 14 12 23*   CREATININE 0.7 0.6 0.7     Recent Labs     08/04/19  1711   AST 23   ALT 20   BILITOT <0.2   ALKPHOS 113     No results for input(s): LIPASE, AMYLASE in the last 72 hours. Recent Labs     08/04/19  1711 08/06/19  0938   PROTIME 22.0* 19.1*   INR 1.93* 1.68*     No results for input(s): PTT in the last 72 hours. ASSESSMENT :    Chronic nausea - prior negative EGD, colonoscopy, CT, s/p jonathan. Consider gastroparesis although nausea is constant rather than all postprandial.  Nausea is associated with dizziness concerning for central cause or inner ear source. MRI brain 7/19/19 unrevealing. TSH normal. Not felt to be central per neurology eval.   NSCLC    PLAN :  - antiemetics, consider trial of scopolamine. States did not improve with OTC meclizine.   - NPO after midnight  - EGD tomorrow for nausea and prior PET scan findings  - GES  - consider outpatient ENT consult   - PPI  - would stop home dennis rochanina as this contains ASA     Discussed with Dr. Rylie Palacios, [address]    I have personally performed a face to face diagnostic evaluation on this patient. I have interviewed and examined the patient and I agree with the findings and recommended plan of care. In summary, my findings and plan are the following: less nausea, abd soft, had planned hold off egd since neg earlier this year but with newer pet scan will repeat egd, then plan GES.        Telma Wan MD  [address]

## 2019-08-06 NOTE — CONSULTS
BRONCHOSCOPY  2019    BRONCHOSCOPY/TRANSBRONCHIAL NEEDLE BIOPSY performed by Jennifer Small MD at [address]  2019    BRONCHOSCOPY ULTRASOUND performed by Jennifer Small MD at [address] [facility], LAPAROSCOPIC N/A 2018    LAPAROSCOPIC CHOLECYSTECTOMY WITH CHOLANGIOGRAM performed by Adam Mata MD at [address] [facility] [address] COLONOSCOPY N/A 2019    COLONOSCOPY WITH BIOPSY performed by Jayro Ortiz MD at [address] [facility] COLONOSCOPY  2019    COLONOSCOPY POLYPECTOMY SNARE/COLD BIOPSY performed by Jayro Ortiz MD at [facility]  ,     [place] [address] INCL FLUOR GDNCE DX W/CELL [facility] SPX N/A 2018    BRONCHOSCOPY WITH LAVAGE performed by Ady Fall MD at [address] N/A 2019    EGD BIOPSY performed by Jayro Ortiz MD at [address]     Family History   Problem Relation Age of Onset    Cancer Sister     Diabetes Sister     Diabetes Brother     Heart Disease Father     Heart Disease Mother     Cancer Maternal Uncle      Social History     Tobacco Use    Smoking status: Former Smoker     Last attempt to quit: 10/28/2001     Years since quittin.7    Smokeless tobacco: Never Used   Substance Use Topics    Alcohol use: No    Drug use: No     Allergies   Allergen Reactions    Statins Other (See Comments)     Other reaction(s): Myalgias (Muscle Pain)      Cipro Xr      Swelling, rash    Lyrica [Pregabalin]      Shaking, swelling, rash    Penicillins      Swelling, rash    Sulfa Antibiotics      Swelling, rash     Current Facility-Administered Medications   Medication Dose Route Frequency Provider Last Rate Last Dose    0.9 % sodium chloride infusion   Intravenous Continuous Jose Ferreira  mL/hr at 19 1149      lidocaine (XYLOCAINE) 4 % external solution    PRN Pierce H PA-PAUL        glucagon (rDNA) injection 1 mg  1 mg Intramuscular PRN Fredie Favorlulu, PA-PAUL        dextrose 5 % solution  100 mL/hr Intravenous PRN Chrisitz Marques, SHAVONNE        insulin lispro (HUMALOG) injection pen 0-12 Units  0-12 Units Subcutaneous TID WC Fredie Jerald, PA-C   Stopped at 08/06/19 7883    insulin lispro (HUMALOG) injection pen 0-6 Units  0-6 Units Subcutaneous Nightly Dylan Marques, SHAVONNE   4 Units at 08/05/19 2043    mometasone-formoterol (DULERA) 200-5 MCG/ACT inhaler 2 puff  2 puff Inhalation BID Chrisdie Favorlulu, SHAVONNE   2 puff at 08/06/19 0856    furosemide (LASIX) injection 40 mg  40 mg Intravenous BID [facility] F[facility], SHAVONNE   Stopped at 08/06/19 2075    vancomycin (VANCOCIN) 1000 mg in dextrose 5% 200 mL IVPB  1,000 mg Intravenous Q12H Kavin Almanzar MD   Stopped at 08/06/19 0757    citalopram (CELEXA) tablet 40 mg  40 mg Oral QAM Kavin Almanzar MD   40 mg at 08/06/19 0945    carvedilol (COREG) tablet 12.5 mg  12.5 mg Oral BID Kavin Almanzar MD   12.5 mg at 08/06/19 0946    flecainide (TAMBOCOR) tablet 50 mg  50 mg Oral BID Kavin Almanzar MD   50 mg at 08/06/19 0945       ROS : A 10-12 system review of constitutional, cardiovascular, respiratory, musculoskeletal, endocrine, skin, hematological, SHEENT, genitourinary, psychiatric and neurologic systems was obtained and updated today and is unremarkable except as mentioned in my HPI      Exam:     Constitutional:   Vitals:    08/06/19 1237 08/06/19 1240 08/06/19 1245 08/06/19 1250   BP: (!) 121/52 (!) 107/53 (!) 129/54 133/61   Pulse: 73 71 72 72   Resp: 25 18 23 26   Temp: 97.1 °F (36.2 °C)   97.3 °F (36.3 °C)   TempSrc: Temporal   Temporal   SpO2: 98% 97% 97% 98%   Weight:       Height:           General appearance:  Normal development and appear in no acute distress. Eye: No icterus. Fundus: No blurring of optic disc. Neck: supple  Cardiovascular: No carotid bruit. No lower leg edema with good pulsation. Mental Status:   Oriented to person, place, problem, and time. Memory: Aware of recent and remote event. Good immediate recall. Intact remote memory  Normal attention span and concentration. Language: intact naming, repeating and fluency   Good fund of Knowledge. Aware of current events and vocabulary   Cranial Nerves:   II: Visual fields: Full to confrontation and nl [facility]. Pupils: equal, round, reactive to light  III,IV,VI: Extra Ocular Movements are intact. No nystagmus  V: Facial sensation is intact to pin prick and light touch  VII: Facial strength and movements: intact and symmetric  VIII: Hearing: Intact to finger rub bilaterally  IX: Palate elevation is symmetric  XI: Shoulder shrug is intact  XII: Tongue movements are normal  Musculoskeletal: 5/5 in all 4 extremities. Tone: Normal tone. Reflexes: Bilateral biceps 2/4, triceps 2/4, brachial radialis 2/4, knee 2/4 and ankle 1/4. Planters: flexor bilaterally. Coordination: no pronator drift, no dysmetria with FNF in upper extremities. Normal REM. Sensation: normal to all modalities in both arms and less to vibration in her knees and legs. Gait/Posture: steady gait   Data:  LABS:   Lab Results   Component Value Date     08/06/2019    K 3.7 08/06/2019    CL 96 08/06/2019    CO2 32 08/06/2019    BUN 23 08/06/2019    CREATININE 0.7 08/06/2019    GFRAA >60 08/06/2019    GFRAA >60 10/29/2012    LABGLOM >60 08/06/2019    GLUCOSE 316 08/06/2019    MG 2.10 05/22/2019    CALCIUM 9.1 08/06/2019     Lab Results   Component Value Date    WBC 9.1 08/05/2019    RBC 2.97 08/05/2019    HGB 8.9 08/05/2019    HCT 28.5 08/05/2019    MCV 95.7 08/05/2019    RDW 17.4 08/05/2019     08/05/2019     Lab Results   Component Value Date    INR 1.68 (H) 08/06/2019    PROTIME 19.1 (H) 08/06/2019       Neuroimaging and/or  labs reviewed by me and discussed results with the patient    Impression:  Generalized weakness, fatigue and dizziness.   Exam today showed no

## 2019-08-07 ENCOUNTER — ANESTHESIA EVENT (OUTPATIENT)
Dept: ENDOSCOPY | Age: 79
DRG: 205 | End: 2019-08-07
Payer: MEDICARE

## 2019-08-07 ENCOUNTER — ANESTHESIA (OUTPATIENT)
Dept: ENDOSCOPY | Age: 79
DRG: 205 | End: 2019-08-07
Payer: MEDICARE

## 2019-08-07 VITALS
OXYGEN SATURATION: 95 % | DIASTOLIC BLOOD PRESSURE: 64 MMHG | RESPIRATION RATE: 1 BRPM | SYSTOLIC BLOOD PRESSURE: 140 MMHG

## 2019-08-07 LAB
ANION GAP SERPL CALCULATED.3IONS-SCNC: 11 MMOL/L (ref 3–16)
BUN BLDV-MCNC: 34 MG/DL (ref 7–20)
CALCIUM SERPL-MCNC: 9 MG/DL (ref 8.3–10.6)
CHLORIDE BLD-SCNC: 94 MMOL/L (ref 99–110)
CO2: 32 MMOL/L (ref 21–32)
CREAT SERPL-MCNC: 0.8 MG/DL (ref 0.6–1.2)
GFR AFRICAN AMERICAN: >60
GFR NON-AFRICAN AMERICAN: >60
GLUCOSE BLD-MCNC: 230 MG/DL (ref 70–99)
GLUCOSE BLD-MCNC: 313 MG/DL (ref 70–99)
GLUCOSE BLD-MCNC: 343 MG/DL (ref 70–99)
GLUCOSE BLD-MCNC: 352 MG/DL (ref 70–99)
GLUCOSE BLD-MCNC: 88 MG/DL (ref 70–99)
MRSA CULTURE ONLY: NORMAL
PERFORMED ON: ABNORMAL
PERFORMED ON: NORMAL
POTASSIUM REFLEX MAGNESIUM: 4 MMOL/L (ref 3.5–5.1)
SODIUM BLD-SCNC: 137 MMOL/L (ref 136–145)
VANCOMYCIN TROUGH: 21 UG/ML (ref 10–20)

## 2019-08-07 PROCEDURE — 6370000000 HC RX 637 (ALT 250 FOR IP): Performed by: FAMILY MEDICINE

## 2019-08-07 PROCEDURE — 2700000000 HC OXYGEN THERAPY PER DAY

## 2019-08-07 PROCEDURE — 2060000000 HC ICU INTERMEDIATE R&B

## 2019-08-07 PROCEDURE — 7100000001 HC PACU RECOVERY - ADDTL 15 MIN: Performed by: INTERNAL MEDICINE

## 2019-08-07 PROCEDURE — 6370000000 HC RX 637 (ALT 250 FOR IP): Performed by: INTERNAL MEDICINE

## 2019-08-07 PROCEDURE — 92526 ORAL FUNCTION THERAPY: CPT

## 2019-08-07 PROCEDURE — 3609012800 HC EGD DIAGNOSTIC ONLY: Performed by: INTERNAL MEDICINE

## 2019-08-07 PROCEDURE — 2709999900 HC NON-CHARGEABLE SUPPLY: Performed by: INTERNAL MEDICINE

## 2019-08-07 PROCEDURE — 6370000000 HC RX 637 (ALT 250 FOR IP): Performed by: PHYSICIAN ASSISTANT

## 2019-08-07 PROCEDURE — 7100000000 HC PACU RECOVERY - FIRST 15 MIN: Performed by: INTERNAL MEDICINE

## 2019-08-07 PROCEDURE — 6360000002 HC RX W HCPCS: Performed by: PHYSICIAN ASSISTANT

## 2019-08-07 PROCEDURE — 97530 THERAPEUTIC ACTIVITIES: CPT

## 2019-08-07 PROCEDURE — 2500000003 HC RX 250 WO HCPCS: Performed by: NURSE ANESTHETIST, CERTIFIED REGISTERED

## 2019-08-07 PROCEDURE — 94761 N-INVAS EAR/PLS OXIMETRY MLT: CPT

## 2019-08-07 PROCEDURE — 2580000003 HC RX 258: Performed by: FAMILY MEDICINE

## 2019-08-07 PROCEDURE — 99231 SBSQ HOSP IP/OBS SF/LOW 25: CPT | Performed by: INTERNAL MEDICINE

## 2019-08-07 PROCEDURE — 94640 AIRWAY INHALATION TREATMENT: CPT

## 2019-08-07 PROCEDURE — 80048 BASIC METABOLIC PNL TOTAL CA: CPT

## 2019-08-07 PROCEDURE — 80202 ASSAY OF VANCOMYCIN: CPT

## 2019-08-07 PROCEDURE — 97535 SELF CARE MNGMENT TRAINING: CPT

## 2019-08-07 PROCEDURE — 3700000000 HC ANESTHESIA ATTENDED CARE: Performed by: INTERNAL MEDICINE

## 2019-08-07 PROCEDURE — 3700000001 HC ADD 15 MINUTES (ANESTHESIA): Performed by: INTERNAL MEDICINE

## 2019-08-07 PROCEDURE — 2580000003 HC RX 258: Performed by: PHYSICIAN ASSISTANT

## 2019-08-07 PROCEDURE — 6360000002 HC RX W HCPCS: Performed by: NURSE ANESTHETIST, CERTIFIED REGISTERED

## 2019-08-07 PROCEDURE — 99233 SBSQ HOSP IP/OBS HIGH 50: CPT | Performed by: INTERNAL MEDICINE

## 2019-08-07 PROCEDURE — 36415 COLL VENOUS BLD VENIPUNCTURE: CPT

## 2019-08-07 RX ORDER — IPRATROPIUM BROMIDE AND ALBUTEROL SULFATE 2.5; .5 MG/3ML; MG/3ML
1 SOLUTION RESPIRATORY (INHALATION) ONCE
Status: COMPLETED | OUTPATIENT
Start: 2019-08-07 | End: 2019-08-07

## 2019-08-07 RX ORDER — KETAMINE HCL IN NACL, ISO-OSM 100MG/10ML
SYRINGE (ML) INJECTION PRN
Status: DISCONTINUED | OUTPATIENT
Start: 2019-08-07 | End: 2019-08-07 | Stop reason: SDUPTHER

## 2019-08-07 RX ORDER — SODIUM CHLORIDE 9 MG/ML
INJECTION, SOLUTION INTRAVENOUS CONTINUOUS
Status: DISCONTINUED | OUTPATIENT
Start: 2019-08-07 | End: 2019-08-07

## 2019-08-07 RX ORDER — SODIUM CHLORIDE 9 MG/ML
INJECTION, SOLUTION INTRAVENOUS CONTINUOUS
Status: CANCELLED | OUTPATIENT
Start: 2019-08-07

## 2019-08-07 RX ORDER — SODIUM CHLORIDE 0.9 % (FLUSH) 0.9 %
10 SYRINGE (ML) INJECTION PRN
Status: CANCELLED | OUTPATIENT
Start: 2019-08-07

## 2019-08-07 RX ORDER — FUROSEMIDE 40 MG/1
40 TABLET ORAL 2 TIMES DAILY
Status: DISCONTINUED | OUTPATIENT
Start: 2019-08-07 | End: 2019-08-09 | Stop reason: HOSPADM

## 2019-08-07 RX ORDER — PROPOFOL 10 MG/ML
INJECTION, EMULSION INTRAVENOUS PRN
Status: DISCONTINUED | OUTPATIENT
Start: 2019-08-07 | End: 2019-08-07 | Stop reason: SDUPTHER

## 2019-08-07 RX ORDER — SODIUM CHLORIDE 0.9 % (FLUSH) 0.9 %
10 SYRINGE (ML) INJECTION EVERY 12 HOURS SCHEDULED
Status: CANCELLED | OUTPATIENT
Start: 2019-08-07

## 2019-08-07 RX ORDER — LIDOCAINE HYDROCHLORIDE 20 MG/ML
INJECTION, SOLUTION EPIDURAL; INFILTRATION; INTRACAUDAL; PERINEURAL PRN
Status: DISCONTINUED | OUTPATIENT
Start: 2019-08-07 | End: 2019-08-07 | Stop reason: SDUPTHER

## 2019-08-07 RX ADMIN — Medication 10 ML: at 10:54

## 2019-08-07 RX ADMIN — IPRATROPIUM BROMIDE AND ALBUTEROL SULFATE 1 AMPULE: .5; 3 SOLUTION RESPIRATORY (INHALATION) at 07:47

## 2019-08-07 RX ADMIN — FUROSEMIDE 40 MG: 40 TABLET ORAL at 17:45

## 2019-08-07 RX ADMIN — INSULIN LISPRO 12 UNITS: 100 INJECTION, SOLUTION INTRAVENOUS; SUBCUTANEOUS at 12:41

## 2019-08-07 RX ADMIN — CITALOPRAM HYDROBROMIDE 40 MG: 20 TABLET ORAL at 10:52

## 2019-08-07 RX ADMIN — LIDOCAINE HYDROCHLORIDE 50 MG: 20 INJECTION, SOLUTION EPIDURAL; INFILTRATION; INTRACAUDAL; PERINEURAL at 09:22

## 2019-08-07 RX ADMIN — IPRATROPIUM BROMIDE AND ALBUTEROL SULFATE 1 AMPULE: .5; 3 SOLUTION RESPIRATORY (INHALATION) at 15:47

## 2019-08-07 RX ADMIN — APIXABAN 5 MG: 5 TABLET, FILM COATED ORAL at 10:52

## 2019-08-07 RX ADMIN — Medication 2 PUFF: at 20:32

## 2019-08-07 RX ADMIN — IPRATROPIUM BROMIDE AND ALBUTEROL SULFATE 1 AMPULE: .5; 3 SOLUTION RESPIRATORY (INHALATION) at 20:32

## 2019-08-07 RX ADMIN — Medication 5 MG: at 09:22

## 2019-08-07 RX ADMIN — FLECAINIDE ACETATE 50 MG: 50 TABLET ORAL at 10:53

## 2019-08-07 RX ADMIN — APIXABAN 5 MG: 5 TABLET, FILM COATED ORAL at 20:21

## 2019-08-07 RX ADMIN — INSULIN LISPRO 3 UNITS: 100 INJECTION, SOLUTION INTRAVENOUS; SUBCUTANEOUS at 20:25

## 2019-08-07 RX ADMIN — PANTOPRAZOLE SODIUM 40 MG: 40 TABLET, DELAYED RELEASE ORAL at 06:28

## 2019-08-07 RX ADMIN — CEFEPIME HYDROCHLORIDE 2 G: 2 INJECTION, POWDER, FOR SOLUTION INTRAVENOUS at 06:29

## 2019-08-07 RX ADMIN — FUROSEMIDE 40 MG: 10 INJECTION, SOLUTION INTRAMUSCULAR; INTRAVENOUS at 10:54

## 2019-08-07 RX ADMIN — CARVEDILOL 12.5 MG: 6.25 TABLET, FILM COATED ORAL at 20:21

## 2019-08-07 RX ADMIN — ASPIRIN 81 MG: 81 TABLET ORAL at 10:53

## 2019-08-07 RX ADMIN — FLECAINIDE ACETATE 50 MG: 50 TABLET ORAL at 20:21

## 2019-08-07 RX ADMIN — Medication 1 CAPSULE: at 10:53

## 2019-08-07 RX ADMIN — PROPOFOL 50 MG: 10 INJECTION, EMULSION INTRAVENOUS at 09:21

## 2019-08-07 RX ADMIN — INSULIN LISPRO 12 UNITS: 100 INJECTION, SOLUTION INTRAVENOUS; SUBCUTANEOUS at 10:57

## 2019-08-07 RX ADMIN — SODIUM CHLORIDE: 9 INJECTION, SOLUTION INTRAVENOUS at 07:46

## 2019-08-07 RX ADMIN — Medication 10 ML: at 20:26

## 2019-08-07 RX ADMIN — LISINOPRIL 10 MG: 10 TABLET ORAL at 10:52

## 2019-08-07 RX ADMIN — Medication 1 CAPSULE: at 17:45

## 2019-08-07 RX ADMIN — INSULIN GLARGINE 15 UNITS: 100 INJECTION, SOLUTION SUBCUTANEOUS at 20:24

## 2019-08-07 RX ADMIN — PREDNISONE 40 MG: 20 TABLET ORAL at 12:10

## 2019-08-07 RX ADMIN — CARVEDILOL 12.5 MG: 6.25 TABLET, FILM COATED ORAL at 10:53

## 2019-08-07 ASSESSMENT — PULMONARY FUNCTION TESTS
PIF_VALUE: 1
PIF_VALUE: 2
PIF_VALUE: 5
PIF_VALUE: 4
PIF_VALUE: 3
PIF_VALUE: 7
PIF_VALUE: 1
PIF_VALUE: 7
PIF_VALUE: 4
PIF_VALUE: 1
PIF_VALUE: 2
PIF_VALUE: 1
PIF_VALUE: 2
PIF_VALUE: 2
PIF_VALUE: 5

## 2019-08-07 ASSESSMENT — PAIN SCALES - GENERAL
PAINLEVEL_OUTOF10: 1
PAINLEVEL_OUTOF10: 0
PAINLEVEL_OUTOF10: 0

## 2019-08-07 ASSESSMENT — ENCOUNTER SYMPTOMS: SHORTNESS OF BREATH: 1

## 2019-08-07 ASSESSMENT — PAIN - FUNCTIONAL ASSESSMENT: PAIN_FUNCTIONAL_ASSESSMENT: 0-10

## 2019-08-07 NOTE — PROGRESS NOTES
adequately. Mild aortic   regurgitation is present.   -Mild mitral regurgitation is present.   -There is trivial tricuspid regurgitation with RVSP estimated at 33 mmHg.       ROS:  She has diuresed well. 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 Likely has issues with gastroparesis. Getting gastric emptying study tomorrow. Breathing better. Blood pressure up and down.   Had some shortness of breath this morning before the bronchoscopy    Medications/Labs all Reviewed    Lab Results   Component Value Date    WBC 9.1 08/05/2019    HGB 8.9 (L) 08/05/2019    HCT 28.5 (L) 08/05/2019    MCV 95.7 08/05/2019     08/05/2019     Lab Results   Component Value Date    CREATININE 0.8 08/07/2019    BUN 34 (H) 08/07/2019     08/07/2019    K 4.0 08/07/2019    CL 94 (L) 08/07/2019    CO2 32 08/07/2019     Lab Results   Component Value Date    INR 1.68 (H) 08/06/2019    PROTIME 19.1 (H) 08/06/2019        Physical Examination:    BP (!) 148/59   Pulse 82   Temp 97.2 °F (36.2 °C) (Temporal)   Resp 18   Ht 5' 5\" (1.651 m)   Wt 152 lb 6.4 oz (69.1 kg)   SpO2 100%   BMI 25.36 kg/m²      Chronically ill appearing  HEENT:  NC/AT  Respiratory:  · Resp Assessment: Normal respiratory effort  · Resp Auscultation: Clear to auscultation bilaterally   Cardiovascular:  · Auscultation: regular rate and rhythm, normal S1S2, no murmur, rub or gallop  · Palpation:  Nl PMI  · JVP:  normal  · Extremities: No Edema  Abdomen:  · Soft, non-tender  · Normal bowel sounds  Extremities:  ·  No Cyanosis or Clubbing  Neurological/Psychiatric:  · Oriented to time, place, and person  · Non-anxious  Skin Warm and dry    Lab Results   Component Value Date     08/07/2019     08/06/2019     08/05/2019    K 4.0 08/07/2019    K 3.7 08/06/2019    K 3.7 08/05/2019    BUN 34 08/07/2019    BUN 23 08/06/2019    BUN 12 08/05/2019    CREATININE 0.8 08/07/2019    CREATININE 0.7 08/06/2019    CREATININE 0.6 08/05/2019 GLUCOSE 352 08/07/2019    GLUCOSE 316 08/06/2019     Lab Results   Component Value Date    PROBNP 5,885 (H) 08/06/2019    PROBNP 13,110 (H) 08/04/2019    PROBNP 590 (H) 02/24/2019     Lab Results   Component Value Date    ALT 20 08/04/2019    ALT 11 05/21/2019    AST 23 08/04/2019    AST 11 (L) 05/21/2019     Lab Results   Component Value Date    HGB 8.9 08/05/2019    HGB 9.1 08/04/2019    HCT 28.5 08/05/2019    HCT 28.8 08/04/2019     08/05/2019     08/04/2019     Lab Results   Component Value Date    TRIG 123 05/19/2016    TRIG 94 05/01/2015    HDL 69 05/19/2016    HDL 77 05/01/2015    LDLCALC 130 05/19/2016    LDLCALC 131 05/01/2015     Labs were reviewed including labs from other hospital systems through [facility]. Cardiac testing was reviewed including echos, nuclear scans, cardiac catheterization, including from other hospital systems through [facility]. Assessment:    1. Pneumonia due to organism    2. Other fatigue    3. Abnormal CT of the chest     4. Chronic diastolic HF, elevated bnp level  5. Chronic nausea  6. Failure to thrive     Plan:  1.  continue treatment for chronic diastolic HF  2.  agree with po lasix  4.  continue lisinopril 10 mg po qd  5. Agree with gastric emptying study to evaluate chronic nausea  6. No reason for further cardiac testing. Echo and stress Myoview in May, 2019 were unremarkable except for chronic diastolic HF  7. CHF education reinforced. ~salt restriction  ~fluid restriction  ~medication compliance  ~daily weights and notify of any significant weight gain/loss  ~establish with CHF nurse  ~outpatient follow-up with our CHF team     Repeat bnp level tomorrow. Ready for discharge from our perspective.     NYHA Class: 3    Soham Giordano MD, 8/7/2019 3:27 PM

## 2019-08-07 NOTE — PROGRESS NOTES
diagnosis was Pneumonia due to organism. Diagnoses of Other fatigue and Abnormal CT of the chest were also pertinent to this visit. has a past medical history of Arthritis, CAD (coronary artery disease), Carpal tunnel syndrome, COPD (chronic obstructive pulmonary disease) ([address] Coronary stent, depression, Diabetes mellitus ([address] Diastolic heart failure ([address] GERD (gastroesophageal reflux disease), Hyperlipidemia, Hypertension, Lung cancer ([address] MI (myocardial infarction) ([address] LIZETT (obstructive sleep apnea), PONV (postoperative nausea and vomiting), and stress incontinence. has a past surgical history that includes Coronary angioplasty with stent (2000, 2001); back surgery (2000, 2001); bronchoscopy (12/04/2018); pr [facility] incl fluor gdnce dx w/cell washg spx (N/A, 12/4/2018); Cholecystectomy, laparoscopic (N/A, 12/19/2018); Upper gastrointestinal endoscopy (N/A, 2/25/2019); Colonoscopy (N/A, 2/25/2019); Colonoscopy (2/25/2019); bronchoscopy (N/A, 2/27/2019); bronchoscopy (2/27/2019); bronchoscopy (2/27/2019); and bronchoscopy (N/A, 8/6/2019). Restrictions  Restrictions/Precautions  Restrictions/Precautions: Fall Risk(HIGH FALL RISK)  Position Activity Restriction  Other position/activity restrictions: Emily Siegel is a 70 y.o. female with past medical history of CAD, COPD, diabetes, GERD, hypertension, hyperlipidemia, previous lung cancer status post radiation and chemotherapy, obstructive sleep apnea who presents to the ED with complaint of shortness of breath and fatigue/weakness. Daughter states patient had chemotherapy for small cell lung cancer to the left lower lobe 8 weeks ago. States that since had PET scan which has been unremarkable. Patient's daughter states no longer on chemotherapy or radiation. States for the past couple of weeks has had increasing fatigue, weakness, shortness of breath and cough.   Was seen at the [facility] and admitted due to elevated troponin and

## 2019-08-07 NOTE — PROGRESS NOTES
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 She is much better this afternoon. PULMONARY CHIEF COMPLAINT:  shortness of breath     HISTORY:  She has dyspnea on exertion. Her cough is better. REVIEW OF SYSTEMS:  No chest pain or palpitations. MEDICATIONS:  Scheduled Meds:   furosemide  40 mg Oral BID    insulin glargine  15 Units Subcutaneous Nightly    insulin lispro  0-18 Units Subcutaneous TID WC    insulin lispro  0-9 Units Subcutaneous Nightly    lactobacillus  1 capsule Oral BID WC    predniSONE  40 mg Oral Daily    lisinopril  10 mg Oral Daily    apixaban  5 mg Oral BID    aspirin  81 mg Oral Daily    pantoprazole  40 mg Oral QAM AC    sodium chloride flush  10 mL Intravenous 2 times per day    ipratropium-albuterol  1 ampule Inhalation Q4H WA    mometasone-formoterol  2 puff Inhalation BID    citalopram  40 mg Oral QAM    carvedilol  12.5 mg Oral BID    flecainide  50 mg Oral BID         PHYSICAL EXAM:   Vital Signs: BP (!) 144/62   Pulse 85   Temp 98.1 °F (36.7 °C) (Temporal)   Resp 14   Ht 5' 5\" (1.651 m)   Wt 152 lb 6.4 oz (69.1 kg)   SpO2 100%   BMI 25.36 kg/m²     Gen:   No distress. Breathing comfortably at rest. Nasal cannula in place. Resp:   Rales over left chest. No accessory muscle use.        LAB RESULTS:  CBC:   Recent Labs     08/04/19  1711 08/05/19  0541   WBC 10.8 9.1   HGB 9.1* 8.9*   HCT 28.8* 28.5*   MCV 95.0 95.7    285     BMP:   Recent Labs     08/05/19  0541 08/06/19  0642 08/07/19  0636    139 137   K 3.7 3.7 4.0   CL 99 96* 94*   CO2 29 32 32   BUN 12 23* 34*   CREATININE 0.6 0.7 0.8       ABG:   Recent Labs     08/04/19  1723   PHART 7.445   APS5IMC 44.9   PO2ART 69.1*

## 2019-08-07 NOTE — PROGRESS NOTES
08/04/19  1711 08/05/19  0541   WBC 10.8 9.1   HGB 9.1* 8.9*    285     BMP:    Recent Labs     08/05/19  0541 08/06/19  0642 08/07/19  0636    139 137   K 3.7 3.7 4.0   CL 99 96* 94*   CO2 29 32 32   BUN 12 23* 34*   CREATININE 0.6 0.7 0.8   GLUCOSE 179* 316* 352*     Hepatic:   Recent Labs     08/04/19  1711   AST 23   ALT 20   BILITOT <0.2   ALKPHOS 113     CT Chest Pulmonary Embolism W Contrast   Final Result   CT CHEST:      1.  Suboptimal evaluation of the subsegmental and more peripheral pulmonary   arteries in the right lung due to motion artifact. Given this, no obvious   acute pulmonary thromboembolic disease. Mildly prominent main pulmonary   artery suggests pulmonary hypertension. No right ventricular strain. 2.  Increased large consolidation in the left upper and lower lobes. Previously noted left hilar/perihilar mass is not well distinguished from the   areas of consolidation. Findings may relate to postobstructive pneumonia   secondary to the left hilar/perihilar mass. Findings could also relate to   post radiation therapy changes. Small left pleural effusion. 3.  Focal 6.1 x 2.5 cm mixed fat and soft tissue attenuation mass in the   region of the left subscapularis muscle is increased in size from chest CT   done May 16, 2019. Underlying malignancy is not excluded. 4.  Cardiomegaly. Trace pericardial fluid. Atherosclerosis. Coronary   artery calcifications. CT ABDOMEN/PELVIS:      1.  No acute abnormality in the abdomen or pelvis. No evidence of metastatic   disease. 2.  No bowel obstruction. Normal appendix. Colonic diverticulosis without   acute diverticulitis. CT ABDOMEN PELVIS W IV CONTRAST Additional Contrast? None   Final Result   CT CHEST:      1.  Suboptimal evaluation of the subsegmental and more peripheral pulmonary   arteries in the right lung due to motion artifact.   Given this, no obvious   acute pulmonary thromboembolic disease. Mildly prominent main pulmonary   artery suggests pulmonary hypertension. No right ventricular strain. 2.  Increased large consolidation in the left upper and lower lobes. Previously noted left hilar/perihilar mass is not well distinguished from the   areas of consolidation. Findings may relate to postobstructive pneumonia   secondary to the left hilar/perihilar mass. Findings could also relate to   post radiation therapy changes. Small left pleural effusion. 3.  Focal 6.1 x 2.5 cm mixed fat and soft tissue attenuation mass in the   region of the left subscapularis muscle is increased in size from chest CT   done May 16, 2019. Underlying malignancy is not excluded. 4.  Cardiomegaly. Trace pericardial fluid. Atherosclerosis. Coronary   artery calcifications. CT ABDOMEN/PELVIS:      1.  No acute abnormality in the abdomen or pelvis. No evidence of metastatic   disease. 2.  No bowel obstruction. Normal appendix. Colonic diverticulosis without   acute diverticulitis. CT Head WO Contrast   Final Result   No acute intracranial abnormality.          NM GASTRIC EMPTYING    (Results Pending)         Problem List  Principal Problem:    Pneumonia due to organism  Active Problems:    HTN (hypertension), benign    Coronary atherosclerosis of native coronary artery    DM (diabetes mellitus), secondary, uncontrolled, w/neurologic complic (HCC)    Sleep apnea    COPD, severe ([address]    Centrilobular emphysema (HCC)    COPD exacerbation ([address]    Adenocarcinoma of left lung ([address]    Subacute pulmonary embolism (HCC)    Chronic deep vein thrombosis (DVT) of both lower extremities (HCC)    Recurrent falls    Diabetic peripheral neuropathy (HCC)    Pneumonia    Severe malnutrition (HCC)    Abnormal CT of the chest    Acute on chronic diastolic CHF (congestive heart failure), NYHA class 3 (HCC)    Failure to thrive (0-17)    Chronic atrial fibrillation (HCC)    Acute on chronic respiratory failure with hypoxia (HCC)    Chronic respiratory failure with hypoxia (HCC)    Lung infiltrate on CT    Non-small cell cancer of left lung (HCC)    Acute encephalopathy    Weakness  Resolved Problems:    * No resolved hospital problems. *       Assessment & Plan:   1. Change Lasix 40 mg IV bid to 40 mg PO bid for A on C D CHF NYHA 3  2. S/P Solumedrol 40 mg IV q8h, now on Prednisone for AECOPD per Pulm  3. NPO p MN for GES  4. EGD with retained food on 8/7, suspect diabetic gastroparesis. Do not start Reglan yet for GES tomorrow. 5. Oncology consult for L lung adenocarcinoma appreciated  6. Pulmonary input appreciated  7. DC Vancomycin and Cefepime IV for PNA as procalcitonin negative. 8. Cardiology consult for A on C D CHF, recent elevated troponin appreciated  9. Palliative care consult appreciated  10. PT/OT/SLP eval appreciated  11. Neurology consult for recurrent falls, worsening confusion appreciated  12. Normal TSH, Ammonia (B12 was normal, HgA1C > 9% recently)    IV Access:  Peripheral  Means: No  Diet: DIET CLEAR LIQUID;  Diet NPO, After Midnight  Code:Full Code  DVT PPX Eliquis  Disposition SNF    Discussed with patient, CM and nursing. GES tomorrow. I think SNF is safest.  I think comfort care is indicated. Daughter to discuss with other daughter and patient. Family wants SNF upon DC. PT/OT to re-evaluate today.       Angelia Hemphill MD   8/7/2019 1:33 PM

## 2019-08-07 NOTE — PROGRESS NOTES
egd dictated. jordan peters. Stomach full of old food. Rec:  - clears today, gastric emtpying study tomorrow, avoid narcotics/etc if able, tight glycemic control.

## 2019-08-08 ENCOUNTER — APPOINTMENT (OUTPATIENT)
Dept: NUCLEAR MEDICINE | Age: 79
DRG: 205 | End: 2019-08-08
Payer: MEDICARE

## 2019-08-08 LAB
ANION GAP SERPL CALCULATED.3IONS-SCNC: 11 MMOL/L (ref 3–16)
BUN BLDV-MCNC: 32 MG/DL (ref 7–20)
CALCIUM SERPL-MCNC: 9.3 MG/DL (ref 8.3–10.6)
CHLORIDE BLD-SCNC: 97 MMOL/L (ref 99–110)
CO2: 36 MMOL/L (ref 21–32)
CREAT SERPL-MCNC: 0.7 MG/DL (ref 0.6–1.2)
CULTURE, RESPIRATORY: NORMAL
GFR AFRICAN AMERICAN: >60
GFR NON-AFRICAN AMERICAN: >60
GLUCOSE BLD-MCNC: 147 MG/DL (ref 70–99)
GLUCOSE BLD-MCNC: 157 MG/DL (ref 70–99)
GLUCOSE BLD-MCNC: 167 MG/DL (ref 70–99)
GLUCOSE BLD-MCNC: 227 MG/DL (ref 70–99)
GLUCOSE BLD-MCNC: 444 MG/DL (ref 70–99)
GRAM STAIN RESULT: NORMAL
PERFORMED ON: ABNORMAL
POTASSIUM REFLEX MAGNESIUM: 3.6 MMOL/L (ref 3.5–5.1)
PRO-BNP: ABNORMAL PG/ML (ref 0–449)
SODIUM BLD-SCNC: 144 MMOL/L (ref 136–145)

## 2019-08-08 PROCEDURE — A9541 TC99M SULFUR COLLOID: HCPCS | Performed by: PHYSICIAN ASSISTANT

## 2019-08-08 PROCEDURE — 2060000000 HC ICU INTERMEDIATE R&B

## 2019-08-08 PROCEDURE — 3430000000 HC RX DIAGNOSTIC RADIOPHARMACEUTICAL: Performed by: PHYSICIAN ASSISTANT

## 2019-08-08 PROCEDURE — 6370000000 HC RX 637 (ALT 250 FOR IP): Performed by: INTERNAL MEDICINE

## 2019-08-08 PROCEDURE — 2700000000 HC OXYGEN THERAPY PER DAY

## 2019-08-08 PROCEDURE — 6370000000 HC RX 637 (ALT 250 FOR IP): Performed by: PHYSICIAN ASSISTANT

## 2019-08-08 PROCEDURE — 2580000003 HC RX 258: Performed by: PHYSICIAN ASSISTANT

## 2019-08-08 PROCEDURE — 6370000000 HC RX 637 (ALT 250 FOR IP): Performed by: FAMILY MEDICINE

## 2019-08-08 PROCEDURE — 94640 AIRWAY INHALATION TREATMENT: CPT

## 2019-08-08 PROCEDURE — 94761 N-INVAS EAR/PLS OXIMETRY MLT: CPT

## 2019-08-08 PROCEDURE — 83880 ASSAY OF NATRIURETIC PEPTIDE: CPT

## 2019-08-08 PROCEDURE — 36415 COLL VENOUS BLD VENIPUNCTURE: CPT

## 2019-08-08 PROCEDURE — 78264 GASTRIC EMPTYING IMG STUDY: CPT

## 2019-08-08 PROCEDURE — 99233 SBSQ HOSP IP/OBS HIGH 50: CPT | Performed by: NURSE PRACTITIONER

## 2019-08-08 PROCEDURE — 97535 SELF CARE MNGMENT TRAINING: CPT

## 2019-08-08 PROCEDURE — 80048 BASIC METABOLIC PNL TOTAL CA: CPT

## 2019-08-08 RX ADMIN — INSULIN LISPRO 3 UNITS: 100 INJECTION, SOLUTION INTRAVENOUS; SUBCUTANEOUS at 12:55

## 2019-08-08 RX ADMIN — Medication 10 ML: at 12:59

## 2019-08-08 RX ADMIN — CARVEDILOL 12.5 MG: 6.25 TABLET, FILM COATED ORAL at 12:55

## 2019-08-08 RX ADMIN — IPRATROPIUM BROMIDE AND ALBUTEROL SULFATE 1 AMPULE: .5; 3 SOLUTION RESPIRATORY (INHALATION) at 20:48

## 2019-08-08 RX ADMIN — FUROSEMIDE 40 MG: 40 TABLET ORAL at 17:17

## 2019-08-08 RX ADMIN — Medication 1 CAPSULE: at 17:17

## 2019-08-08 RX ADMIN — Medication 2 PUFF: at 06:59

## 2019-08-08 RX ADMIN — ASPIRIN 81 MG: 81 TABLET ORAL at 12:55

## 2019-08-08 RX ADMIN — FLECAINIDE ACETATE 50 MG: 50 TABLET ORAL at 20:31

## 2019-08-08 RX ADMIN — Medication 1 CAPSULE: at 12:53

## 2019-08-08 RX ADMIN — Medication 10 ML: at 20:31

## 2019-08-08 RX ADMIN — INSULIN LISPRO 9 UNITS: 100 INJECTION, SOLUTION INTRAVENOUS; SUBCUTANEOUS at 20:23

## 2019-08-08 RX ADMIN — CITALOPRAM HYDROBROMIDE 40 MG: 20 TABLET ORAL at 12:54

## 2019-08-08 RX ADMIN — CARVEDILOL 12.5 MG: 6.25 TABLET, FILM COATED ORAL at 20:31

## 2019-08-08 RX ADMIN — Medication 2 PUFF: at 20:47

## 2019-08-08 RX ADMIN — INSULIN GLARGINE 15 UNITS: 100 INJECTION, SOLUTION SUBCUTANEOUS at 20:23

## 2019-08-08 RX ADMIN — FLECAINIDE ACETATE 50 MG: 50 TABLET ORAL at 12:54

## 2019-08-08 RX ADMIN — LISINOPRIL 10 MG: 10 TABLET ORAL at 12:54

## 2019-08-08 RX ADMIN — IPRATROPIUM BROMIDE AND ALBUTEROL SULFATE 1 AMPULE: .5; 3 SOLUTION RESPIRATORY (INHALATION) at 15:59

## 2019-08-08 RX ADMIN — Medication 0.8 MILLICURIE: at 07:29

## 2019-08-08 RX ADMIN — IPRATROPIUM BROMIDE AND ALBUTEROL SULFATE 1 AMPULE: .5; 3 SOLUTION RESPIRATORY (INHALATION) at 06:59

## 2019-08-08 RX ADMIN — PREDNISONE 40 MG: 20 TABLET ORAL at 12:54

## 2019-08-08 RX ADMIN — INSULIN LISPRO 6 UNITS: 100 INJECTION, SOLUTION INTRAVENOUS; SUBCUTANEOUS at 17:17

## 2019-08-08 RX ADMIN — APIXABAN 5 MG: 5 TABLET, FILM COATED ORAL at 20:31

## 2019-08-08 RX ADMIN — APIXABAN 5 MG: 5 TABLET, FILM COATED ORAL at 12:54

## 2019-08-08 RX ADMIN — FUROSEMIDE 40 MG: 40 TABLET ORAL at 12:55

## 2019-08-08 ASSESSMENT — PAIN SCALES - GENERAL
PAINLEVEL_OUTOF10: 0

## 2019-08-08 NOTE — PROGRESS NOTES
[address]  HEART FAILURE  Progress Note      Admit Date 8/4/2019         Reason for Consult:      Reason for Consultation/Chief Complaint: dyspnea    HPI:    Richelle Suero is a 70 y.o. female with PMH CAD, COPD, DM, GERD, HTN, HLD, lung ca, LIZETT, HFpEF, and AF admitted with fatigue and weakness. Patient Symptoms on Admission:  fatigue    Subjective:  Patient is being seen for chronic HFpEF. There were no acute overnight cardiac events. Today Ms. Aries Hernadez denies chest pain,  Palpitations, states SOB about the same    Chronic HF  EF: 60%     NYHA:  II  Readmission: No          Objective:   BP (!) 173/70   Pulse 75   Temp 98 °F (36.7 °C) (Temporal)   Resp 16   Ht 5' 5\" (1.651 m)   Wt 152 lb 6.4 oz (69.1 kg)   SpO2 94%   BMI 25.36 kg/m²       Intake/Output Summary (Last 24 hours) at 8/8/2019 1008  Last data filed at 8/7/2019 2222  Gross per 24 hour   Intake --   Output 1400 ml   Net -1400 ml      Wt Readings from Last 3 Encounters:   08/06/19 152 lb 6.4 oz (69.1 kg)   07/17/19 154 lb (69.9 kg)   05/23/19 159 lb 9.6 oz (72.4 kg)      In: -   Out: 1400       Physical Exam:  General Appearance:  Non-obese/Well Nourished  Respiratory:  · Resp Assessment: Rhonchi L base  · Resp Auscultation: Normal breath sounds without dullness  Cardiovascular:  · Auscultation: Regular rate and rhythm, normal S1S2, no m/g/r/c  · Palpation: Normal    · JVD: none  · Pedal Pulses: 2+ and equal   Abdomen:  · Soft, NT, ND, + bs  Extremities:  · No Cyanosis or Clubbing  · Extremities: negative  Neurological/Psychiatric:  · Oriented to time, place, and person  · Non-anxious    MEDICATIONS:   Scheduled Meds:   Scheduled Meds:   furosemide  40 mg Oral BID    insulin glargine  15 Units Subcutaneous Nightly    insulin lispro  0-18 Units Subcutaneous TID WC    insulin lispro  0-9 Units Subcutaneous Nightly    lactobacillus  1 capsule Oral BID WC    predniSONE  40 mg Oral Daily    lisinopril  10 mg Oral Daily    apixaban

## 2019-08-08 NOTE — PLAN OF CARE
Problem: Falls - Risk of:  Goal: Will remain free from falls  Description  Will remain free from falls  8/7/2019 2334 by Jonn Cardenas RN  Note:   Pt remains free from falls. Safety precautions in place. Bed in lowest position, bed wheels locked, call light with in reach, bed alarm on, yellow blanket in place, fall risk wrist band on, camera monitor in place. Responds to call/alarm promptly. Problem: Discharge Planning:  Goal: Participates in care planning  Description  Participates in care planning  8/7/2019 2334 by Abril Avalos RN  Outcome: Met This Shift  Note:   Pt actively participates with family, , Rns, and MD in her care after discharge. Currently pt plans to go to [facility]. Problem: Airway Clearance - Ineffective:  Goal: Clear lung sounds  Description  Clear lung sounds  8/7/2019 2334 by Jonn Cardenas RN  Outcome: Ongoing  Note:   Lung sounds are still not clear to auscultation. Crackles and expiratory wheezes present. Currently on [facility], her baseline oxygen level at home. Problem: Fluid Volume - Deficit:  Goal: Achieves intake and output within specified parameters  Description  Achieves intake and output within specified parameters  8/7/2019 2334 by Jonn Cardenas RN  Outcome: Met This Shift  Note:   Good urine output this shift. NPO after midnight for GES test tomorrow. Problem: Hyperthermia:  Goal: Ability to maintain a body temperature in the normal range will improve  Description  Ability to maintain a body temperature in the normal range will improve  8/7/2019 2334 by Jonn Cardenas RN  Outcome: Met This Shift  Note:   Pt is afebrile, body Tm is WNL and stable during the shift.

## 2019-08-08 NOTE — PROGRESS NOTES
my findings and plan are the following: ate solid lunch and denies nausea, abd soft, GES +gastroparesis. Agree small frequent meals/gastroparesis diet and tight glycemic control-- she wants try this first; if nausea recurs despite this then reasonable for trial reglan; dawna discussed risks/benefits reglan with pt including but not limited to neuro side effects. Otherwise tolerated solid po.   Will sign off, call if needed    Ashleigh Glover MD  [id number] [facility]

## 2019-08-08 NOTE — PROGRESS NOTES
07/17/19 154 lb (69.9 kg)   05/23/19 159 lb 9.6 oz (72.4 kg)       General appearance:  Appears comfortable  Eyes: Sclera clear. Pupils equal.  ENT: Moist oral mucosa. Trachea midline, no adenopathy. Cardiovascular: Regular rhythm, normal S1, S2. No murmur. No edema in lower extremities  Respiratory: Not using accessory muscles. Good inspiratory effort. Clear to auscultation bilaterally, no wheeze or crackles. GI: Abdomen soft, no tenderness, not distended  Musculoskeletal: No cyanosis in digits, neck supple  Neurology: CN 2-12 grossly intact. No speech or motor deficits  Psych: Normal affect. Alert and oriented in time, place and person  Skin: Warm, dry, normal turgor    Labs and Tests:  CBC: No results for input(s): WBC, HGB, PLT in the last 72 hours. BMP:  Recent Labs     08/06/19  0642 08/07/19  0636 08/08/19  0442    137 144   K 3.7 4.0 3.6   CL 96* 94* 97*   CO2 32 32 36*   BUN 23* 34* 32*   CREATININE 0.7 0.8 0.7   GLUCOSE 316* 352* 147*     Hepatic: No results for input(s): AST, ALT, ALB, BILITOT, ALKPHOS in the last 72 hours.     ASSESSMENT AND PLAN    Principal Problem:    Pneumonia due to organism  Active Problems:    HTN (hypertension), benign    Coronary atherosclerosis of native coronary artery    DM (diabetes mellitus), secondary, uncontrolled, w/neurologic complic (HCC)    Sleep apnea    COPD, severe (HCC)    Centrilobular emphysema (HCC)    COPD exacerbation ([address]    Adenocarcinoma of left lung ([address]    Subacute pulmonary embolism (HCC)    Chronic deep vein thrombosis (DVT) of both lower extremities (HCC)    Recurrent falls    Diabetic peripheral neuropathy (HCC)    Pneumonia    Severe malnutrition (HCC)    Abnormal CT of the chest    Acute on chronic diastolic CHF (congestive heart failure), NYHA class 3 (HCC)    Failure to thrive (0-17)    Chronic atrial fibrillation (HCC)    Acute on chronic respiratory failure with hypoxia (HCC)    Chronic respiratory failure with hypoxia ([address]

## 2019-08-08 NOTE — PROGRESS NOTES
Therapy Time   Individual Concurrent Group Co-treatment   Time In 7252         Time Out 1553         Minutes 31           Timed Code Treatment Minutes:   31 minutes    Total Treatment Minutes:  31 minutes      Leatha Styles, OTR/L [id number]      Leatha Styles, OT

## 2019-08-09 VITALS
OXYGEN SATURATION: 95 % | DIASTOLIC BLOOD PRESSURE: 65 MMHG | WEIGHT: 152.8 LBS | HEIGHT: 65 IN | HEART RATE: 76 BPM | RESPIRATION RATE: 16 BRPM | TEMPERATURE: 97.7 F | BODY MASS INDEX: 25.46 KG/M2 | SYSTOLIC BLOOD PRESSURE: 134 MMHG

## 2019-08-09 PROBLEM — K31.84 GASTROPARESIS: Status: ACTIVE | Noted: 2019-08-09

## 2019-08-09 PROBLEM — J18.9 PNEUMONIA: Status: RESOLVED | Noted: 2019-08-04 | Resolved: 2019-08-09

## 2019-08-09 PROBLEM — E87.6 HYPOKALEMIA: Status: ACTIVE | Noted: 2019-08-09

## 2019-08-09 LAB
ANION GAP SERPL CALCULATED.3IONS-SCNC: 12 MMOL/L (ref 3–16)
BLOOD CULTURE, ROUTINE: NORMAL
BUN BLDV-MCNC: 33 MG/DL (ref 7–20)
CALCIUM SERPL-MCNC: 9.1 MG/DL (ref 8.3–10.6)
CHLORIDE BLD-SCNC: 92 MMOL/L (ref 99–110)
CO2: 34 MMOL/L (ref 21–32)
CREAT SERPL-MCNC: 0.7 MG/DL (ref 0.6–1.2)
CULTURE, BLOOD 2: NORMAL
CULTURE, RESPIRATORY: NORMAL
GFR AFRICAN AMERICAN: >60
GFR NON-AFRICAN AMERICAN: >60
GLUCOSE BLD-MCNC: 133 MG/DL (ref 70–99)
GLUCOSE BLD-MCNC: 161 MG/DL (ref 70–99)
GLUCOSE BLD-MCNC: 204 MG/DL (ref 70–99)
GLUCOSE BLD-MCNC: 251 MG/DL (ref 70–99)
GRAM STAIN RESULT: NORMAL
MAGNESIUM: 1.9 MG/DL (ref 1.8–2.4)
PERFORMED ON: ABNORMAL
POTASSIUM REFLEX MAGNESIUM: 3.3 MMOL/L (ref 3.5–5.1)
SODIUM BLD-SCNC: 138 MMOL/L (ref 136–145)

## 2019-08-09 PROCEDURE — 83735 ASSAY OF MAGNESIUM: CPT

## 2019-08-09 PROCEDURE — 2700000000 HC OXYGEN THERAPY PER DAY

## 2019-08-09 PROCEDURE — 6370000000 HC RX 637 (ALT 250 FOR IP): Performed by: FAMILY MEDICINE

## 2019-08-09 PROCEDURE — 6370000000 HC RX 637 (ALT 250 FOR IP): Performed by: PHYSICIAN ASSISTANT

## 2019-08-09 PROCEDURE — 99233 SBSQ HOSP IP/OBS HIGH 50: CPT | Performed by: INTERNAL MEDICINE

## 2019-08-09 PROCEDURE — 97530 THERAPEUTIC ACTIVITIES: CPT

## 2019-08-09 PROCEDURE — 6370000000 HC RX 637 (ALT 250 FOR IP): Performed by: INTERNAL MEDICINE

## 2019-08-09 PROCEDURE — 97116 GAIT TRAINING THERAPY: CPT

## 2019-08-09 PROCEDURE — 99232 SBSQ HOSP IP/OBS MODERATE 35: CPT | Performed by: INTERNAL MEDICINE

## 2019-08-09 PROCEDURE — 94640 AIRWAY INHALATION TREATMENT: CPT

## 2019-08-09 PROCEDURE — 80048 BASIC METABOLIC PNL TOTAL CA: CPT

## 2019-08-09 PROCEDURE — 36415 COLL VENOUS BLD VENIPUNCTURE: CPT

## 2019-08-09 PROCEDURE — 94761 N-INVAS EAR/PLS OXIMETRY MLT: CPT

## 2019-08-09 RX ORDER — METOCLOPRAMIDE 5 MG/1
5 TABLET ORAL 4 TIMES DAILY
Qty: 120 TABLET | Refills: 3 | Status: SHIPPED | OUTPATIENT
Start: 2019-08-09 | End: 2019-09-27 | Stop reason: DRUGHIGH

## 2019-08-09 RX ORDER — LISINOPRIL 10 MG/1
10 TABLET ORAL DAILY
Qty: 30 TABLET | Refills: 3 | Status: SHIPPED | OUTPATIENT
Start: 2019-08-10 | End: 2019-09-27 | Stop reason: ALTCHOICE

## 2019-08-09 RX ORDER — FUROSEMIDE 40 MG/1
40 TABLET ORAL 2 TIMES DAILY
Qty: 60 TABLET | Refills: 3 | Status: SHIPPED | OUTPATIENT
Start: 2019-08-09 | End: 2019-09-27 | Stop reason: DRUGHIGH

## 2019-08-09 RX ORDER — METOCLOPRAMIDE 10 MG/1
5 TABLET ORAL
Status: DISCONTINUED | OUTPATIENT
Start: 2019-08-09 | End: 2019-08-09 | Stop reason: HOSPADM

## 2019-08-09 RX ORDER — PREDNISONE 20 MG/1
40 TABLET ORAL DAILY
Qty: 20 TABLET | Refills: 0 | Status: SHIPPED | OUTPATIENT
Start: 2019-08-10 | End: 2019-08-20

## 2019-08-09 RX ORDER — POTASSIUM CHLORIDE 20 MEQ/1
40 TABLET, EXTENDED RELEASE ORAL PRN
Status: DISCONTINUED | OUTPATIENT
Start: 2019-08-09 | End: 2019-08-09 | Stop reason: HOSPADM

## 2019-08-09 RX ORDER — POTASSIUM CHLORIDE 7.45 MG/ML
10 INJECTION INTRAVENOUS PRN
Status: DISCONTINUED | OUTPATIENT
Start: 2019-08-09 | End: 2019-08-09 | Stop reason: HOSPADM

## 2019-08-09 RX ADMIN — ASPIRIN 81 MG: 81 TABLET ORAL at 10:00

## 2019-08-09 RX ADMIN — APIXABAN 5 MG: 5 TABLET, FILM COATED ORAL at 09:59

## 2019-08-09 RX ADMIN — CARVEDILOL 12.5 MG: 6.25 TABLET, FILM COATED ORAL at 10:00

## 2019-08-09 RX ADMIN — IPRATROPIUM BROMIDE AND ALBUTEROL SULFATE 1 AMPULE: .5; 3 SOLUTION RESPIRATORY (INHALATION) at 08:18

## 2019-08-09 RX ADMIN — LISINOPRIL 10 MG: 10 TABLET ORAL at 09:59

## 2019-08-09 RX ADMIN — FLECAINIDE ACETATE 50 MG: 50 TABLET ORAL at 09:59

## 2019-08-09 RX ADMIN — PREDNISONE 40 MG: 20 TABLET ORAL at 09:59

## 2019-08-09 RX ADMIN — Medication 1 CAPSULE: at 09:59

## 2019-08-09 RX ADMIN — IPRATROPIUM BROMIDE AND ALBUTEROL SULFATE 1 AMPULE: .5; 3 SOLUTION RESPIRATORY (INHALATION) at 11:41

## 2019-08-09 RX ADMIN — Medication 2 PUFF: at 08:18

## 2019-08-09 RX ADMIN — METOCLOPRAMIDE HYDROCHLORIDE 5 MG: 10 TABLET ORAL at 13:19

## 2019-08-09 RX ADMIN — PANTOPRAZOLE SODIUM 40 MG: 40 TABLET, DELAYED RELEASE ORAL at 09:59

## 2019-08-09 RX ADMIN — CITALOPRAM HYDROBROMIDE 40 MG: 20 TABLET ORAL at 09:59

## 2019-08-09 RX ADMIN — FUROSEMIDE 40 MG: 40 TABLET ORAL at 09:58

## 2019-08-09 RX ADMIN — INSULIN LISPRO 9 UNITS: 100 INJECTION, SOLUTION INTRAVENOUS; SUBCUTANEOUS at 13:18

## 2019-08-09 ASSESSMENT — PAIN SCALES - GENERAL
PAINLEVEL_OUTOF10: 3
PAINLEVEL_OUTOF10: 0
PAINLEVEL_OUTOF10: 0

## 2019-08-09 NOTE — PROGRESS NOTES
Oncology and Hematology Care   Progress Note      8/9/2019 9:58 AM        Name: Brandi Brice . Admitted: 8/4/2019    SUBJECTIVE:  She is doing ok,  Breathing easy on 2L O2, denies SOB, has occasional cough, no chest pain.       Reviewed interval ancillary notes    Current Medications    potassium chloride (KLOR-CON M) extended release tablet 40 mEq PRN   Or    potassium bicarb-citric acid (EFFER-K) effervescent tablet 40 mEq PRN   Or    potassium chloride 10 mEq/100 mL IVPB (Peripheral Line) PRN   furosemide (LASIX) tablet 40 mg BID   insulin glargine (LANTUS) injection pen 15 Units Nightly   insulin lispro (HUMALOG) injection pen 0-18 Units TID WC   insulin lispro (HUMALOG) injection pen 0-9 Units Nightly   lactobacillus (CULTURELLE) capsule 1 capsule BID WC   predniSONE (DELTASONE) tablet 40 mg Daily   lisinopril (PRINIVIL;ZESTRIL) tablet 10 mg Daily   apixaban (ELIQUIS) tablet 5 mg BID   aspirin EC tablet 81 mg Daily   ondansetron (ZOFRAN-ODT) disintegrating tablet 8 mg Q8H PRN   pantoprazole (PROTONIX) tablet 40 mg QAM AC   sodium chloride flush 0.9 % injection 10 mL 2 times per day   sodium chloride flush 0.9 % injection 10 mL PRN   magnesium hydroxide (MILK OF MAGNESIA) 400 MG/5ML suspension 30 mL Daily PRN   acetaminophen (TYLENOL) tablet 650 mg Q4H PRN   albuterol (PROVENTIL) nebulizer solution 2.5 mg Q2H PRN   ipratropium-albuterol (DUONEB) nebulizer solution 1 ampule Q4H WA   glucose (GLUTOSE) 40 % oral gel 15 g PRN   dextrose 50 % IV solution PRN   glucagon (rDNA) injection 1 mg PRN   dextrose 5 % solution PRN   mometasone-formoterol (DULERA) 200-5 MCG/ACT inhaler 2 puff BID   citalopram (CELEXA) tablet 40 mg QAM   carvedilol (COREG) tablet 12.5 mg BID   flecainide (TAMBOCOR) tablet 50 mg BID       Objective:  /65   Pulse 66   Temp 97.8 °F (36.6 °C) (Oral)   Resp 18   Ht 5' 5\" (1.651 m)   Wt 152 lb 12.8 oz (69.3 kg)   SpO2 97%   BMI 25.43 kg/m²     Intake/Output Summary (Last 24 hours) at 8/9/2019 0958  Last data filed at 8/9/2019 0714  Gross per 24 hour   Intake 680 ml   Output 2975 ml   Net -2295 ml      Wt Readings from Last 3 Encounters:   08/09/19 152 lb 12.8 oz (69.3 kg)   07/17/19 154 lb (69.9 kg)   05/23/19 159 lb 9.6 oz (72.4 kg)       General appearance:  Appears comfortable  Eyes: Sclera clear. Pupils equal.  ENT: Moist oral mucosa. Trachea midline, no adenopathy. Cardiovascular: Regular rhythm, normal S1, S2. No murmur. No edema in lower extremities  Respiratory: Not using accessory muscles. Good inspiratory effort. Clear to auscultation bilaterally, no wheeze or crackles. GI: Abdomen soft, no tenderness, not distended  Musculoskeletal: No cyanosis in digits, neck supple  Neurology: CN 2-12 grossly intact. No speech or motor deficits  Psych: Normal affect. Alert and oriented in time, place and person  Skin: Warm, dry, normal turgor    Labs and Tests:  CBC: No results for input(s): WBC, HGB, PLT in the last 72 hours. BMP:    Recent Labs     08/07/19  0636 08/08/19  0442 08/09/19  0448    144 138   K 4.0 3.6 3.3*   CL 94* 97* 92*   CO2 32 36* 34*   BUN 34* 32* 33*   CREATININE 0.8 0.7 0.7   GLUCOSE 352* 147* 161*     Hepatic: No results for input(s): AST, ALT, ALB, BILITOT, ALKPHOS in the last 72 hours.     ASSESSMENT AND PLAN    Principal Problem:    Pneumonia due to organism  Active Problems:    HTN (hypertension), benign    Coronary atherosclerosis of native coronary artery    DM (diabetes mellitus), secondary, uncontrolled, w/neurologic complic (HCC)    Sleep apnea    COPD, severe (HCC)    Centrilobular emphysema (HCC)    COPD exacerbation ([address]    Adenocarcinoma of left lung ([address]    Subacute pulmonary embolism (HCC)    Chronic deep vein thrombosis (DVT) of both lower extremities (HCC)    Recurrent falls    Diabetic peripheral neuropathy (HCC)    Pneumonia    Severe malnutrition (HCC)    Abnormal CT of the chest    Acute on chronic diastolic CHF (congestive heart

## 2019-08-09 NOTE — PROGRESS NOTES
had repeat x-ray earlier today which showed increasing consolidation to the left lower lung and was sent back to the ED for concerns for pneumonia. Daughter  has been receiving multiple breathing medications at home but still having shortness of breath, dyspnea upon exertion, fatigue and weakness. Had a couple falls today. Is anticoagulated on Eliquis for previous atrial fibrillation. Patient denies chest pain, hemoptysis, productive cough, fever/chills, rashes/lesions, abdominal pain, urinary symptoms or changes in bowel movements. Denies pedal edema, pleuritic pain, orthopnea or calf tenderness. Has chronic nausea and is on multiple antiemetics at home. Daughter  is been ongoing for greater than a year ever since she had gangrenous gallbladder and cholecystectomy. Is supposed to be following up with GI outpatient Greg Washington but has not been able to follow-up due to other health problems with with shortness of breath and concern for pneumonia. concerns of gastroparesis. per oncology No evidence of recurrent or metastatic disease  Subjective   General  Chart Reviewed: Yes  Response To Previous Treatment: Patient with no complaints from previous session. Subjective  Subjective: Pt denies pain at this time and willing to partciipate. General Comment  Comments: Pt supine in bed upon arrival.  Pain Screening  Patient Currently in Pain: Denies  Vital Signs  Patient Currently in Pain: Denies       Orientation  Orientation  Overall Orientation Status: Within Functional Limits(initially difficult to remember year pt was born)  Cognition      Objective   Bed mobility  Supine to Sit: Supervision  Scooting: Supervision  Comment: HOB flat; use of bed rail  Transfers  Sit to Stand: Supervision(x3)  Stand to sit: Supervision(x3)  Comment: Pt used proper handplacement this session.   Ambulation  Ambulation?: Yes  Ambulation 1  Surface: level tile  Device: Rolling Walker  Other Apparatus: O2(2L)  Assistance:

## 2019-08-09 NOTE — PROGRESS NOTES
[address]   Progress Note  CHF/Pulmonary Hypertension Cardiology    Chief complaint: We are following this patient for chronic diastolic HF  HPI:  Lili Matos is a 69 yo female with a PMH of CAD, COPD, diabetes, GERD, hypertension, hyperlipidemia, previous lung cancer s/p radiation and chemotherapy, obstructive sleep apnea who presents to the ED with worsening SOB and fatigue/weakness. Daughter is at the bedside and gives most of the history. She had chemo for small cell lung cancer to the left lower lobe 8 weeks ago. PET scan subsequently has been unremarkable. She is no longer receiving chemo or radiation. For the past couple of weeks, she has had increasing fatigue, weakness, SOB, and cough. She was admitted to [facility] due to elevated troponin and concerns for pneumonia to the left lower lung. She was on antibiotics. She saw her PCP earlier this week and antibiotics were restarted with cephalosporins.       She is anticoagulated for atrial fib with Eliquis. She has had chronic nausea since gangrenous gallbladder and cholecystectomy about 6 months ago. She denies chest pain. At [facility], there was discussion about possible cardiac cath due to elevated troponin, but since stress test had been negative recently for ischemia, she was not cathed.     We are consulted for elevated BNP level and to rule out HF. She has seen Dr. Nemo Berg in the past.  She has not been diagnosed with HF. She has HCTZ at home, but does not take it with any regularity. Denies chest pain. Daughter states that she has not been herself mentally since the gallbladder surgery.     Echo:  5/17/19:  -Normal left ventricle size and systolic function with an estimated ejection   fraction of 60%. No regional wall motion abnormalities are seen. Mild   concentric left ventricular hypertrophy is present.   -Indeterminate diastolic function.  E/e\"=11.35   -Aortic valve appears sclerotic but opens Results   Component Value Date    PROBNP 11,412 (H) 08/08/2019    PROBNP 5,885 (H) 08/06/2019    PROBNP 13,110 (H) 08/04/2019     Lab Results   Component Value Date    ALT 20 08/04/2019    ALT 11 05/21/2019    AST 23 08/04/2019    AST 11 (L) 05/21/2019     Lab Results   Component Value Date    HGB 8.9 08/05/2019    HGB 9.1 08/04/2019    HCT 28.5 08/05/2019    HCT 28.8 08/04/2019     08/05/2019     08/04/2019     Lab Results   Component Value Date    TRIG 123 05/19/2016    TRIG 94 05/01/2015    HDL 69 05/19/2016    HDL 77 05/01/2015    LDLCALC 130 05/19/2016    LDLCALC 131 05/01/2015     Labs were reviewed including labs from other hospital systems through [facility]. Cardiac testing was reviewed including echos, nuclear scans, cardiac catheterization, including from other hospital systems through [facility]. Assessment:    1. Pneumonia due to organism    2. Other fatigue    3. Abnormal CT of the chest     4. Chronic diastolic HF, elevated bnp level  5. Chronic nausea  6. Failure to thrive     Plan:  1.  continue treatment for chronic diastolic HF  2.  agree with po lasix  4.  continue lisinopril 10 mg po qd    7. CHF education reinforced. ~salt restriction  ~fluid restriction  ~medication compliance  ~daily weights and notify of any significant weight gain/loss  ~establish with CHF nurse  ~outpatient follow-up with our CHF team    Okay for discharge home today on current regimen  Follow up has been arranged.     NYHA Class: 3    Will Shields MD, 8/9/2019 2:24 PM

## 2019-08-09 NOTE — PROGRESS NOTES
Lab Results   Component Value Date    WBC 9.1 08/05/2019    RBC 2.97 08/05/2019     BMP:   Lab Results   Component Value Date    GLUCOSE 161 08/09/2019    CO2 34 08/09/2019    BUN 33 08/09/2019    CREATININE 0.7 08/09/2019    CALCIUM 9.1 08/09/2019     ABG:   Lab Results   Component Value Date    JSA3EAI 30.8 08/04/2019    BEART 6.1 08/04/2019    G4OWCWRE 94.5 08/04/2019    PHART 7.445 08/04/2019    ZES3PJG 44.9 08/04/2019    PO2ART 69.1 08/04/2019    WZB5CQO 72.1 08/04/2019       Radiology: All pertinent images / reports were reviewed as a part of this visit. Narrative   EXAMINATION:   CT OF THE ABDOMEN AND PELVIS WITH CONTRAST; CTA OF THE CHEST 8/4/2019 6:16   pm; 8/4/2019 6:15 pm       TECHNIQUE:   CT of the abdomen and pelvis was performed with the administration of   intravenous contrast. Multiplanar reformatted images are provided for review. Dose modulation, iterative reconstruction, and/or weight based adjustment of   the mA/kV was utilized to reduce the radiation dose to as low as reasonably   achievable.; CTA of the chest was performed after the administration of   intravenous contrast.  Multiplanar reformatted images are provided for   review.  MIP images are provided for review. Dose modulation, iterative   reconstruction, and/or weight based adjustment of the mA/kV was utilized to   reduce the radiation dose to as low as reasonably achievable.       COMPARISON:   CT chest done may 16, 2019. CT abdomen pelvis done February 23, 2019.       HISTORY:   ORDERING SYSTEM PROVIDED HISTORY: NEOPLASM - OTHER ABDOMINAL PRIMARY   TECHNOLOGIST PROVIDED HISTORY:   Additional Contrast?->None; ORDERING SYSTEM PROVIDED HISTORY: ACUTE RESP   ILLNESS, <36YEARS OLD, NEGATIVE EXAM, NO OTHER SYMPTOMS OR RISK FACTORS   TECHNOLOGIST PROVIDED HISTORY:   Reason for Exam: Shortness of Breath (Pt from home. Pt daughter reports   recent chemo for lung cancer about 2 months ago. hx gallbladder surgery this   past december. failure  COPD, severe  Nausea related to gastroparesis    Assessment/Plan:     Radiation pneumonitis, bronchoscopy cultures negative. No indication for antibiotics. Continue with prednisone 40 mg daily upon discharge, will possibly require slow taper over weeks to months. Respiratory status has improved. Continue home inhalers at time of discharge. O2 requirements at baseline. Nausea and vomiting related to gastroparesis/diabetes and narcotic use. Pulmonary will sign off. Will make follow-up appointment to see Dr. Guy Winslow in 1 week.     Kishore Cedeño MD

## 2019-08-22 ENCOUNTER — FOLLOWUP TELEPHONE ENCOUNTER (OUTPATIENT)
Dept: INPATIENT UNIT | Age: 79
End: 2019-08-22

## 2019-09-08 LAB
FUNGUS (MYCOLOGY) CULTURE: ABNORMAL
FUNGUS (MYCOLOGY) CULTURE: ABNORMAL
FUNGUS STAIN: ABNORMAL
FUNGUS STAIN: ABNORMAL
ORGANISM: ABNORMAL
ORGANISM: ABNORMAL

## 2019-09-10 ENCOUNTER — TELEPHONE (OUTPATIENT)
Dept: PULMONOLOGY | Age: 79
End: 2019-09-10

## 2019-09-10 DIAGNOSIS — R05.3 CHRONIC COUGH: Primary | ICD-10-CM

## 2019-09-13 ENCOUNTER — TELEPHONE (OUTPATIENT)
Dept: PULMONOLOGY | Age: 79
End: 2019-09-13

## 2019-09-13 NOTE — TELEPHONE ENCOUNTER
Patient daughter called requesting a call back from [facility]. No other information was left.     Liza Kelley [phone]

## 2019-09-24 LAB
AFB CULTURE (MYCOBACTERIA): NORMAL
AFB SMEAR: NORMAL

## 2019-09-26 ENCOUNTER — TELEPHONE (OUTPATIENT)
Dept: INFECTIOUS DISEASES | Age: 79
End: 2019-09-26

## 2019-09-26 ENCOUNTER — HOSPITAL ENCOUNTER (OUTPATIENT)
Age: 79
Discharge: HOME | DRG: 853 | End: 2019-09-26
Payer: MEDICARE

## 2019-09-26 DIAGNOSIS — R05.3 CHRONIC COUGH: ICD-10-CM

## 2019-09-26 DIAGNOSIS — R05.3 CHRONIC COUGH: Primary | ICD-10-CM

## 2019-09-26 DIAGNOSIS — R59.9 ENLARGED LYMPH NODES: ICD-10-CM

## 2019-09-26 PROCEDURE — 86702 HIV-2 ANTIBODY: CPT

## 2019-09-26 PROCEDURE — 87390 HIV-1 AG IA: CPT

## 2019-09-26 PROCEDURE — 87327 CRYPTOCOCCUS NEOFORM AG IA: CPT

## 2019-09-26 PROCEDURE — 86612 BLASTOMYCES ANTIBODY: CPT

## 2019-09-26 PROCEDURE — 86606 ASPERGILLUS ANTIBODY: CPT

## 2019-09-26 PROCEDURE — 86635 COCCIDIOIDES ANTIBODY: CPT

## 2019-09-26 PROCEDURE — 36415 COLL VENOUS BLD VENIPUNCTURE: CPT

## 2019-09-26 PROCEDURE — 86480 TB TEST CELL IMMUN MEASURE: CPT

## 2019-09-26 PROCEDURE — 86701 HIV-1ANTIBODY: CPT

## 2019-09-26 PROCEDURE — 86698 HISTOPLASMA ANTIBODY: CPT

## 2019-09-26 PROCEDURE — 87385 HISTOPLASMA CAPSUL AG IA: CPT

## 2019-09-26 NOTE — TELEPHONE ENCOUNTER
Spoke with patient and patients daughter advising them per Dr. Milind Jimenez to go to hospital to get more lab work done then will reschedule after the results are back.

## 2019-09-27 ENCOUNTER — ANESTHESIA (OUTPATIENT)
Dept: ENDOSCOPY | Age: 79
DRG: 853 | End: 2019-09-27
Payer: MEDICARE

## 2019-09-27 ENCOUNTER — ANESTHESIA EVENT (OUTPATIENT)
Dept: ENDOSCOPY | Age: 79
DRG: 853 | End: 2019-09-27
Payer: MEDICARE

## 2019-09-27 ENCOUNTER — APPOINTMENT (OUTPATIENT)
Dept: CT IMAGING | Age: 79
DRG: 853 | End: 2019-09-27
Payer: MEDICARE

## 2019-09-27 ENCOUNTER — APPOINTMENT (OUTPATIENT)
Dept: GENERAL RADIOLOGY | Age: 79
DRG: 853 | End: 2019-09-27
Payer: MEDICARE

## 2019-09-27 ENCOUNTER — HOSPITAL ENCOUNTER (INPATIENT)
Age: 79
LOS: 11 days | Discharge: NURSING, REHAB OR LONG-TERM CARE | DRG: 853 | End: 2019-10-08
Attending: EMERGENCY MEDICINE | Admitting: INTERNAL MEDICINE
Payer: MEDICARE

## 2019-09-27 VITALS — SYSTOLIC BLOOD PRESSURE: 175 MMHG | OXYGEN SATURATION: 100 % | DIASTOLIC BLOOD PRESSURE: 78 MMHG

## 2019-09-27 DIAGNOSIS — J18.9 HCAP (HEALTHCARE-ASSOCIATED PNEUMONIA): Primary | ICD-10-CM

## 2019-09-27 DIAGNOSIS — C34.92 MALIGNANT NEOPLASM OF LEFT LUNG, UNSPECIFIED PART OF LUNG (HCC): ICD-10-CM

## 2019-09-27 DIAGNOSIS — J44.0 CHRONIC OBSTRUCTIVE PULMONARY DISEASE WITH ACUTE LOWER RESPIRATORY INFECTION (HCC): ICD-10-CM

## 2019-09-27 DIAGNOSIS — J96.21 ACUTE ON CHRONIC RESPIRATORY FAILURE WITH HYPOXIA (HCC): ICD-10-CM

## 2019-09-27 DIAGNOSIS — A41.9 SEPTICEMIA (HCC): ICD-10-CM

## 2019-09-27 PROBLEM — B99.9 INFECTION REQUIRING AIRBORNE ISOLATION PRECAUTIONS: Status: ACTIVE | Noted: 2019-09-27

## 2019-09-27 LAB
A/G RATIO: 0.8 (ref 1.1–2.2)
ALBUMIN SERPL-MCNC: 3.1 G/DL (ref 3.4–5)
ALP BLD-CCNC: 94 U/L (ref 40–129)
ALT SERPL-CCNC: 15 U/L (ref 10–40)
ANION GAP SERPL CALCULATED.3IONS-SCNC: 12 MMOL/L (ref 3–16)
AST SERPL-CCNC: 17 U/L (ref 15–37)
BASOPHILS ABSOLUTE: 0.1 K/UL (ref 0–0.2)
BASOPHILS RELATIVE PERCENT: 0.6 %
BILIRUB SERPL-MCNC: <0.2 MG/DL (ref 0–1)
BUN BLDV-MCNC: 20 MG/DL (ref 7–20)
CALCIUM SERPL-MCNC: 9.1 MG/DL (ref 8.3–10.6)
CHLORIDE BLD-SCNC: 97 MMOL/L (ref 99–110)
CO2: 28 MMOL/L (ref 21–32)
CREAT SERPL-MCNC: 0.7 MG/DL (ref 0.6–1.2)
EKG ATRIAL RATE: 104 BPM
EKG DIAGNOSIS: NORMAL
EKG P AXIS: 46 DEGREES
EKG P-R INTERVAL: 184 MS
EKG Q-T INTERVAL: 360 MS
EKG QRS DURATION: 104 MS
EKG QTC CALCULATION (BAZETT): 473 MS
EKG R AXIS: 101 DEGREES
EKG T AXIS: 40 DEGREES
EKG VENTRICULAR RATE: 104 BPM
EOSINOPHILS ABSOLUTE: 0.8 K/UL (ref 0–0.6)
EOSINOPHILS RELATIVE PERCENT: 6.4 %
GFR AFRICAN AMERICAN: >60
GFR NON-AFRICAN AMERICAN: >60
GLOBULIN: 3.7 G/DL
GLUCOSE BLD-MCNC: 283 MG/DL (ref 70–99)
GLUCOSE BLD-MCNC: 285 MG/DL (ref 70–99)
GLUCOSE BLD-MCNC: 296 MG/DL (ref 70–99)
GLUCOSE BLD-MCNC: 324 MG/DL (ref 70–99)
HCT VFR BLD CALC: 25.6 % (ref 36–48)
HEMOGLOBIN: 7.8 G/DL (ref 12–16)
HIV AG/AB: NORMAL
HIV ANTIGEN: NORMAL
HIV-1 ANTIBODY: NORMAL
HIV-2 AB: NORMAL
INR BLD: 1.58 (ref 0.86–1.14)
LACTIC ACID, SEPSIS: 1.3 MMOL/L (ref 0.4–1.9)
LACTIC ACID, SEPSIS: 1.7 MMOL/L (ref 0.4–1.9)
LACTIC ACID: 1 MMOL/L (ref 0.4–2)
LYMPHOCYTES ABSOLUTE: 1.2 K/UL (ref 1–5.1)
LYMPHOCYTES RELATIVE PERCENT: 9.8 %
MCH RBC QN AUTO: 27 PG (ref 26–34)
MCHC RBC AUTO-ENTMCNC: 30.4 G/DL (ref 31–36)
MCV RBC AUTO: 88.7 FL (ref 80–100)
MONOCYTES ABSOLUTE: 1.1 K/UL (ref 0–1.3)
MONOCYTES RELATIVE PERCENT: 8.9 %
NEUTROPHILS ABSOLUTE: 9.3 K/UL (ref 1.7–7.7)
NEUTROPHILS RELATIVE PERCENT: 74.3 %
PDW BLD-RTO: 18.1 % (ref 12.4–15.4)
PERFORMED ON: ABNORMAL
PLATELET # BLD: 417 K/UL (ref 135–450)
PMV BLD AUTO: 7.5 FL (ref 5–10.5)
POTASSIUM REFLEX MAGNESIUM: 3.7 MMOL/L (ref 3.5–5.1)
PRO-BNP: ABNORMAL PG/ML (ref 0–449)
PROCALCITONIN: 0.15 NG/ML (ref 0–0.15)
PROTHROMBIN TIME: 18 SEC (ref 9.8–13)
RBC # BLD: 2.89 M/UL (ref 4–5.2)
REPORT: NORMAL
RESPIRATORY PANEL PCR: NORMAL
SODIUM BLD-SCNC: 137 MMOL/L (ref 136–145)
TOTAL PROTEIN: 6.8 G/DL (ref 6.4–8.2)
TROPONIN: <0.01 NG/ML
WBC # BLD: 12.6 K/UL (ref 4–11)

## 2019-09-27 PROCEDURE — 87449 NOS EACH ORGANISM AG IA: CPT

## 2019-09-27 PROCEDURE — 6360000002 HC RX W HCPCS: Performed by: PHYSICIAN ASSISTANT

## 2019-09-27 PROCEDURE — 2500000003 HC RX 250 WO HCPCS: Performed by: NURSE ANESTHETIST, CERTIFIED REGISTERED

## 2019-09-27 PROCEDURE — 31624 DX BRONCHOSCOPE/LAVAGE: CPT | Performed by: INTERNAL MEDICINE

## 2019-09-27 PROCEDURE — 87798 DETECT AGENT NOS DNA AMP: CPT

## 2019-09-27 PROCEDURE — 7100000001 HC PACU RECOVERY - ADDTL 15 MIN: Performed by: INTERNAL MEDICINE

## 2019-09-27 PROCEDURE — 88305 TISSUE EXAM BY PATHOLOGIST: CPT

## 2019-09-27 PROCEDURE — 83880 ASSAY OF NATRIURETIC PEPTIDE: CPT

## 2019-09-27 PROCEDURE — 87556 M.TUBERCULO DNA AMP PROBE: CPT

## 2019-09-27 PROCEDURE — 99291 CRITICAL CARE FIRST HOUR: CPT | Performed by: INTERNAL MEDICINE

## 2019-09-27 PROCEDURE — 0B9H8ZX DRAINAGE OF LUNG LINGULA, VIA NATURAL OR ARTIFICIAL OPENING ENDOSCOPIC, DIAGNOSTIC: ICD-10-PCS | Performed by: INTERNAL MEDICINE

## 2019-09-27 PROCEDURE — 6370000000 HC RX 637 (ALT 250 FOR IP): Performed by: INTERNAL MEDICINE

## 2019-09-27 PROCEDURE — 3700000001 HC ADD 15 MINUTES (ANESTHESIA): Performed by: INTERNAL MEDICINE

## 2019-09-27 PROCEDURE — 87505 NFCT AGENT DETECTION GI: CPT

## 2019-09-27 PROCEDURE — 87206 SMEAR FLUORESCENT/ACID STAI: CPT

## 2019-09-27 PROCEDURE — 2580000003 HC RX 258: Performed by: INTERNAL MEDICINE

## 2019-09-27 PROCEDURE — 6360000002 HC RX W HCPCS: Performed by: INTERNAL MEDICINE

## 2019-09-27 PROCEDURE — 87581 M.PNEUMON DNA AMP PROBE: CPT

## 2019-09-27 PROCEDURE — 6360000002 HC RX W HCPCS: Performed by: NURSE ANESTHETIST, CERTIFIED REGISTERED

## 2019-09-27 PROCEDURE — 87299 ANTIBODY DETECTION NOS IF: CPT

## 2019-09-27 PROCEDURE — 88112 CYTOPATH CELL ENHANCE TECH: CPT

## 2019-09-27 PROCEDURE — 3609011100 HC BRONCHOSCOPY BRUSHINGS: Performed by: INTERNAL MEDICINE

## 2019-09-27 PROCEDURE — 87632 RESP VIRUS 6-11 TARGETS: CPT

## 2019-09-27 PROCEDURE — 85025 COMPLETE CBC W/AUTO DIFF WBC: CPT

## 2019-09-27 PROCEDURE — 87633 RESP VIRUS 12-25 TARGETS: CPT

## 2019-09-27 PROCEDURE — 87324 CLOSTRIDIUM AG IA: CPT

## 2019-09-27 PROCEDURE — 31645 BRNCHSC W/THER ASPIR 1ST: CPT | Performed by: INTERNAL MEDICINE

## 2019-09-27 PROCEDURE — 87040 BLOOD CULTURE FOR BACTERIA: CPT

## 2019-09-27 PROCEDURE — 94761 N-INVAS EAR/PLS OXIMETRY MLT: CPT

## 2019-09-27 PROCEDURE — 7100000000 HC PACU RECOVERY - FIRST 15 MIN: Performed by: INTERNAL MEDICINE

## 2019-09-27 PROCEDURE — 96365 THER/PROPH/DIAG IV INF INIT: CPT

## 2019-09-27 PROCEDURE — 93010 ELECTROCARDIOGRAM REPORT: CPT | Performed by: INTERNAL MEDICINE

## 2019-09-27 PROCEDURE — 2580000003 HC RX 258: Performed by: PHYSICIAN ASSISTANT

## 2019-09-27 PROCEDURE — 87046 STOOL CULTR AEROBIC BACT EA: CPT

## 2019-09-27 PROCEDURE — 87205 SMEAR GRAM STAIN: CPT

## 2019-09-27 PROCEDURE — 2580000003 HC RX 258: Performed by: NURSE ANESTHETIST, CERTIFIED REGISTERED

## 2019-09-27 PROCEDURE — 3609010800 HC BRONCHOSCOPY ALVEOLAR LAVAGE: Performed by: INTERNAL MEDICINE

## 2019-09-27 PROCEDURE — 94640 AIRWAY INHALATION TREATMENT: CPT

## 2019-09-27 PROCEDURE — 87102 FUNGUS ISOLATION CULTURE: CPT

## 2019-09-27 PROCEDURE — 94660 CPAP INITIATION&MGMT: CPT

## 2019-09-27 PROCEDURE — 71045 X-RAY EXAM CHEST 1 VIEW: CPT

## 2019-09-27 PROCEDURE — 88312 SPECIAL STAINS GROUP 1: CPT

## 2019-09-27 PROCEDURE — 31623 DX BRONCHOSCOPE/BRUSH: CPT | Performed by: INTERNAL MEDICINE

## 2019-09-27 PROCEDURE — 85610 PROTHROMBIN TIME: CPT

## 2019-09-27 PROCEDURE — 87116 MYCOBACTERIA CULTURE: CPT

## 2019-09-27 PROCEDURE — 2500000003 HC RX 250 WO HCPCS: Performed by: INTERNAL MEDICINE

## 2019-09-27 PROCEDURE — 87150 DNA/RNA AMPLIFIED PROBE: CPT

## 2019-09-27 PROCEDURE — 84484 ASSAY OF TROPONIN QUANT: CPT

## 2019-09-27 PROCEDURE — 87641 MR-STAPH DNA AMP PROBE: CPT

## 2019-09-27 PROCEDURE — 2709999900 HC NON-CHARGEABLE SUPPLY: Performed by: INTERNAL MEDICINE

## 2019-09-27 PROCEDURE — 87305 ASPERGILLUS AG IA: CPT

## 2019-09-27 PROCEDURE — 31628 BRONCHOSCOPY/LUNG BX EACH: CPT | Performed by: INTERNAL MEDICINE

## 2019-09-27 PROCEDURE — 3700000000 HC ANESTHESIA ATTENDED CARE: Performed by: INTERNAL MEDICINE

## 2019-09-27 PROCEDURE — 2060000000 HC ICU INTERMEDIATE R&B

## 2019-09-27 PROCEDURE — 96367 TX/PROPH/DG ADDL SEQ IV INF: CPT

## 2019-09-27 PROCEDURE — 0BBJ8ZX EXCISION OF LEFT LOWER LUNG LOBE, VIA NATURAL OR ARTIFICIAL OPENING ENDOSCOPIC, DIAGNOSTIC: ICD-10-PCS | Performed by: INTERNAL MEDICINE

## 2019-09-27 PROCEDURE — 87486 CHLMYD PNEUM DNA AMP PROBE: CPT

## 2019-09-27 PROCEDURE — 84145 PROCALCITONIN (PCT): CPT

## 2019-09-27 PROCEDURE — 93005 ELECTROCARDIOGRAM TRACING: CPT | Performed by: EMERGENCY MEDICINE

## 2019-09-27 PROCEDURE — 87070 CULTURE OTHR SPECIMN AEROBIC: CPT

## 2019-09-27 PROCEDURE — 99285 EMERGENCY DEPT VISIT HI MDM: CPT

## 2019-09-27 PROCEDURE — 96375 TX/PRO/DX INJ NEW DRUG ADDON: CPT

## 2019-09-27 PROCEDURE — 2700000000 HC OXYGEN THERAPY PER DAY

## 2019-09-27 PROCEDURE — 83605 ASSAY OF LACTIC ACID: CPT

## 2019-09-27 PROCEDURE — 3609011300 HC BRONCHOSCOPY BRONCHIAL/ENDOBRNCL BX 1+ SITES: Performed by: INTERNAL MEDICINE

## 2019-09-27 PROCEDURE — 80053 COMPREHEN METABOLIC PANEL: CPT

## 2019-09-27 PROCEDURE — 36415 COLL VENOUS BLD VENIPUNCTURE: CPT

## 2019-09-27 PROCEDURE — 87015 SPECIMEN INFECT AGNT CONCNTJ: CPT

## 2019-09-27 RX ORDER — HALOPERIDOL 2 MG/ML
1 SOLUTION ORAL EVERY 6 HOURS PRN
Status: ON HOLD | COMMUNITY
End: 2019-10-08 | Stop reason: HOSPADM

## 2019-09-27 RX ORDER — LABETALOL HYDROCHLORIDE 5 MG/ML
INJECTION, SOLUTION INTRAVENOUS PRN
Status: DISCONTINUED | OUTPATIENT
Start: 2019-09-27 | End: 2019-09-27 | Stop reason: SDUPTHER

## 2019-09-27 RX ORDER — FLECAINIDE ACETATE 50 MG/1
50 TABLET ORAL 2 TIMES DAILY
Status: DISCONTINUED | OUTPATIENT
Start: 2019-09-27 | End: 2019-09-27 | Stop reason: ALTCHOICE

## 2019-09-27 RX ORDER — CARVEDILOL 6.25 MG/1
12.5 TABLET ORAL 2 TIMES DAILY
Status: DISCONTINUED | OUTPATIENT
Start: 2019-09-27 | End: 2019-10-02

## 2019-09-27 RX ORDER — UBIDECARENONE 75 MG
50 CAPSULE ORAL DAILY
Status: DISCONTINUED | OUTPATIENT
Start: 2019-09-27 | End: 2019-09-27

## 2019-09-27 RX ORDER — METOCLOPRAMIDE 10 MG/1
10 TABLET ORAL 4 TIMES DAILY
Status: ON HOLD | COMMUNITY
End: 2020-05-31

## 2019-09-27 RX ORDER — FLECAINIDE ACETATE 100 MG/1
100 TABLET ORAL 2 TIMES DAILY
Status: DISCONTINUED | OUTPATIENT
Start: 2019-09-27 | End: 2019-09-27 | Stop reason: SDUPTHER

## 2019-09-27 RX ORDER — POTASSIUM CHLORIDE 20 MEQ/1
20 TABLET, EXTENDED RELEASE ORAL DAILY
Status: DISCONTINUED | OUTPATIENT
Start: 2019-09-27 | End: 2019-10-08 | Stop reason: HOSPADM

## 2019-09-27 RX ORDER — FUROSEMIDE 40 MG/1
40 TABLET ORAL 2 TIMES DAILY
Status: DISCONTINUED | OUTPATIENT
Start: 2019-09-27 | End: 2019-10-08 | Stop reason: HOSPADM

## 2019-09-27 RX ORDER — LORATADINE 10 MG/1
10 TABLET ORAL DAILY
Status: ON HOLD | COMMUNITY
End: 2020-05-31

## 2019-09-27 RX ORDER — LORAZEPAM 0.5 MG/1
.5-1 TABLET ORAL EVERY 6 HOURS PRN
Status: ON HOLD | COMMUNITY
End: 2019-10-08 | Stop reason: HOSPADM

## 2019-09-27 RX ORDER — CITALOPRAM 20 MG/1
20 TABLET ORAL EVERY MORNING
Status: DISCONTINUED | OUTPATIENT
Start: 2019-09-27 | End: 2019-09-27

## 2019-09-27 RX ORDER — MAGNESIUM SULFATE IN WATER 40 MG/ML
2 INJECTION, SOLUTION INTRAVENOUS ONCE
Status: COMPLETED | OUTPATIENT
Start: 2019-09-27 | End: 2019-09-27

## 2019-09-27 RX ORDER — ACETAMINOPHEN 325 MG/1
650 TABLET ORAL EVERY 4 HOURS PRN
Status: DISCONTINUED | OUTPATIENT
Start: 2019-09-27 | End: 2019-10-08 | Stop reason: HOSPADM

## 2019-09-27 RX ORDER — HYOSCYAMINE SULFATE 0.125 MG
125 TABLET ORAL EVERY 4 HOURS PRN
Status: ON HOLD | COMMUNITY
End: 2020-05-31

## 2019-09-27 RX ORDER — SODIUM CHLORIDE 0.9 % (FLUSH) 0.9 %
10 SYRINGE (ML) INJECTION PRN
Status: DISCONTINUED | OUTPATIENT
Start: 2019-09-27 | End: 2019-10-08 | Stop reason: HOSPADM

## 2019-09-27 RX ORDER — LOPERAMIDE HYDROCHLORIDE 2 MG/1
2 CAPSULE ORAL PRN
Status: ON HOLD | COMMUNITY
End: 2020-06-09 | Stop reason: HOSPADM

## 2019-09-27 RX ORDER — SODIUM CHLORIDE 0.9 % (FLUSH) 0.9 %
10 SYRINGE (ML) INJECTION EVERY 12 HOURS SCHEDULED
Status: DISCONTINUED | OUTPATIENT
Start: 2019-09-27 | End: 2019-10-08 | Stop reason: HOSPADM

## 2019-09-27 RX ORDER — PROPOFOL 10 MG/ML
INJECTION, EMULSION INTRAVENOUS PRN
Status: DISCONTINUED | OUTPATIENT
Start: 2019-09-27 | End: 2019-09-27 | Stop reason: SDUPTHER

## 2019-09-27 RX ORDER — SODIUM CHLORIDE 0.9 % (FLUSH) 0.9 %
10 SYRINGE (ML) INJECTION EVERY 12 HOURS SCHEDULED
Status: DISCONTINUED | OUTPATIENT
Start: 2019-09-27 | End: 2019-09-27

## 2019-09-27 RX ORDER — VANCOMYCIN HYDROCHLORIDE 1 G/200ML
15 INJECTION, SOLUTION INTRAVENOUS ONCE
Status: DISCONTINUED | OUTPATIENT
Start: 2019-09-27 | End: 2019-09-27

## 2019-09-27 RX ORDER — SODIUM CHLORIDE 9 MG/ML
INJECTION, SOLUTION INTRAVENOUS CONTINUOUS
Status: DISCONTINUED | OUTPATIENT
Start: 2019-09-27 | End: 2019-09-27

## 2019-09-27 RX ORDER — ALBUTEROL SULFATE 2.5 MG/3ML
5 SOLUTION RESPIRATORY (INHALATION) ONCE
Status: COMPLETED | OUTPATIENT
Start: 2019-09-27 | End: 2019-09-27

## 2019-09-27 RX ORDER — DEXTROSE MONOHYDRATE 50 MG/ML
100 INJECTION, SOLUTION INTRAVENOUS PRN
Status: DISCONTINUED | OUTPATIENT
Start: 2019-09-27 | End: 2019-10-08 | Stop reason: HOSPADM

## 2019-09-27 RX ORDER — METOCLOPRAMIDE 10 MG/1
10 TABLET ORAL 4 TIMES DAILY
Status: DISCONTINUED | OUTPATIENT
Start: 2019-09-27 | End: 2019-10-08 | Stop reason: HOSPADM

## 2019-09-27 RX ORDER — DEXTROSE MONOHYDRATE 25 G/50ML
12.5 INJECTION, SOLUTION INTRAVENOUS PRN
Status: DISCONTINUED | OUTPATIENT
Start: 2019-09-27 | End: 2019-10-08 | Stop reason: HOSPADM

## 2019-09-27 RX ORDER — LORAZEPAM 2 MG/ML
1 INJECTION INTRAMUSCULAR EVERY 4 HOURS PRN
Status: DISCONTINUED | OUTPATIENT
Start: 2019-09-27 | End: 2019-10-02

## 2019-09-27 RX ORDER — DRONABINOL 2.5 MG/1
2.5 CAPSULE ORAL
Status: DISCONTINUED | OUTPATIENT
Start: 2019-09-27 | End: 2019-10-08 | Stop reason: HOSPADM

## 2019-09-27 RX ORDER — LIDOCAINE HYDROCHLORIDE 20 MG/ML
INJECTION, SOLUTION EPIDURAL; INFILTRATION; INTRACAUDAL; PERINEURAL PRN
Status: DISCONTINUED | OUTPATIENT
Start: 2019-09-27 | End: 2019-09-27 | Stop reason: SDUPTHER

## 2019-09-27 RX ORDER — NICOTINE POLACRILEX 4 MG
15 LOZENGE BUCCAL PRN
Status: DISCONTINUED | OUTPATIENT
Start: 2019-09-27 | End: 2019-10-08 | Stop reason: HOSPADM

## 2019-09-27 RX ORDER — SODIUM CHLORIDE 9 MG/ML
INJECTION, SOLUTION INTRAVENOUS CONTINUOUS PRN
Status: DISCONTINUED | OUTPATIENT
Start: 2019-09-27 | End: 2019-09-27 | Stop reason: SDUPTHER

## 2019-09-27 RX ORDER — MORPHINE SULFATE 100 MG/5ML
5 SOLUTION ORAL
Status: ON HOLD | COMMUNITY
End: 2019-10-08 | Stop reason: HOSPADM

## 2019-09-27 RX ORDER — PROCHLORPERAZINE MALEATE 10 MG
10 TABLET ORAL EVERY 6 HOURS PRN
Status: ON HOLD | COMMUNITY
End: 2019-10-08 | Stop reason: HOSPADM

## 2019-09-27 RX ORDER — CALCIUM CARBONATE 200(500)MG
500 TABLET,CHEWABLE ORAL DAILY
Status: DISCONTINUED | OUTPATIENT
Start: 2019-09-27 | End: 2019-09-27

## 2019-09-27 RX ORDER — METHYLPREDNISOLONE SODIUM SUCCINATE 40 MG/ML
40 INJECTION, POWDER, LYOPHILIZED, FOR SOLUTION INTRAMUSCULAR; INTRAVENOUS EVERY 8 HOURS
Status: DISCONTINUED | OUTPATIENT
Start: 2019-09-27 | End: 2019-09-28

## 2019-09-27 RX ORDER — KETAMINE HCL IN NACL, ISO-OSM 100MG/10ML
SYRINGE (ML) INJECTION PRN
Status: DISCONTINUED | OUTPATIENT
Start: 2019-09-27 | End: 2019-09-27 | Stop reason: SDUPTHER

## 2019-09-27 RX ORDER — IPRATROPIUM BROMIDE AND ALBUTEROL SULFATE 2.5; .5 MG/3ML; MG/3ML
3 SOLUTION RESPIRATORY (INHALATION)
Status: DISCONTINUED | OUTPATIENT
Start: 2019-09-27 | End: 2019-10-08 | Stop reason: HOSPADM

## 2019-09-27 RX ORDER — ASCORBIC ACID 500 MG
500 TABLET ORAL DAILY
Status: DISCONTINUED | OUTPATIENT
Start: 2019-09-27 | End: 2019-09-27

## 2019-09-27 RX ORDER — VANCOMYCIN HYDROCHLORIDE 1 G/200ML
15 INJECTION, SOLUTION INTRAVENOUS EVERY 12 HOURS
Status: DISCONTINUED | OUTPATIENT
Start: 2019-09-27 | End: 2019-09-30

## 2019-09-27 RX ORDER — FLECAINIDE ACETATE 50 MG/1
50 TABLET ORAL 2 TIMES DAILY
Status: DISCONTINUED | OUTPATIENT
Start: 2019-09-27 | End: 2019-10-08 | Stop reason: HOSPADM

## 2019-09-27 RX ORDER — POTASSIUM CHLORIDE 20 MEQ/1
20 TABLET, EXTENDED RELEASE ORAL PRN
Status: ON HOLD | COMMUNITY
End: 2020-05-31

## 2019-09-27 RX ORDER — ONDANSETRON 4 MG/1
4 TABLET, ORALLY DISINTEGRATING ORAL EVERY 8 HOURS PRN
Status: DISCONTINUED | OUTPATIENT
Start: 2019-09-27 | End: 2019-10-08 | Stop reason: HOSPADM

## 2019-09-27 RX ORDER — ZINC GLUCONATE 50 MG
50 TABLET ORAL DAILY
Status: DISCONTINUED | OUTPATIENT
Start: 2019-09-27 | End: 2019-09-27

## 2019-09-27 RX ORDER — MECLIZINE HYDROCHLORIDE 25 MG/1
25 TABLET ORAL DAILY
Status: ON HOLD | COMMUNITY
End: 2020-05-31

## 2019-09-27 RX ORDER — VANCOMYCIN HYDROCHLORIDE 1 G/200ML
15 INJECTION, SOLUTION INTRAVENOUS ONCE
Status: COMPLETED | OUTPATIENT
Start: 2019-09-27 | End: 2019-09-27

## 2019-09-27 RX ORDER — PREDNISONE 10 MG/1
20 TABLET ORAL DAILY
Status: ON HOLD | COMMUNITY
Start: 2019-09-26 | End: 2019-10-08 | Stop reason: HOSPADM

## 2019-09-27 RX ORDER — PANTOPRAZOLE SODIUM 40 MG/1
40 TABLET, DELAYED RELEASE ORAL
Status: DISCONTINUED | OUTPATIENT
Start: 2019-09-28 | End: 2019-10-08 | Stop reason: HOSPADM

## 2019-09-27 RX ORDER — FUROSEMIDE 20 MG/1
20 TABLET ORAL PRN
Status: ON HOLD | COMMUNITY
End: 2019-10-08 | Stop reason: HOSPADM

## 2019-09-27 RX ORDER — ACETAMINOPHEN 650 MG/1
650 SUPPOSITORY RECTAL EVERY 6 HOURS PRN
Status: ON HOLD | COMMUNITY
End: 2020-06-09 | Stop reason: HOSPADM

## 2019-09-27 RX ORDER — METHYLPREDNISOLONE SODIUM SUCCINATE 125 MG/2ML
125 INJECTION, POWDER, LYOPHILIZED, FOR SOLUTION INTRAMUSCULAR; INTRAVENOUS ONCE
Status: COMPLETED | OUTPATIENT
Start: 2019-09-27 | End: 2019-09-27

## 2019-09-27 RX ORDER — ASPIRIN 81 MG/1
81 TABLET ORAL DAILY
Status: DISCONTINUED | OUTPATIENT
Start: 2019-09-27 | End: 2019-10-08 | Stop reason: HOSPADM

## 2019-09-27 RX ORDER — SODIUM CHLORIDE 0.9 % (FLUSH) 0.9 %
10 SYRINGE (ML) INJECTION PRN
Status: DISCONTINUED | OUTPATIENT
Start: 2019-09-27 | End: 2019-09-27

## 2019-09-27 RX ADMIN — Medication 5 MG: at 12:46

## 2019-09-27 RX ADMIN — INSULIN LISPRO 4 UNITS: 100 INJECTION, SOLUTION INTRAVENOUS; SUBCUTANEOUS at 21:45

## 2019-09-27 RX ADMIN — CARVEDILOL 12.5 MG: 6.25 TABLET, FILM COATED ORAL at 21:37

## 2019-09-27 RX ADMIN — IPRATROPIUM BROMIDE AND ALBUTEROL SULFATE 3 ML: .5; 3 SOLUTION RESPIRATORY (INHALATION) at 12:11

## 2019-09-27 RX ADMIN — METRONIDAZOLE 500 MG: 500 INJECTION, SOLUTION INTRAVENOUS at 14:45

## 2019-09-27 RX ADMIN — CEFEPIME HYDROCHLORIDE 2 G: 2 INJECTION, POWDER, FOR SOLUTION INTRAVENOUS at 08:44

## 2019-09-27 RX ADMIN — MAGNESIUM SULFATE HEPTAHYDRATE 2 G: 40 INJECTION, SOLUTION INTRAVENOUS at 07:11

## 2019-09-27 RX ADMIN — APIXABAN 5 MG: 5 TABLET, FILM COATED ORAL at 21:37

## 2019-09-27 RX ADMIN — SODIUM CHLORIDE: 9 INJECTION, SOLUTION INTRAVENOUS at 08:44

## 2019-09-27 RX ADMIN — Medication 10 ML: at 22:19

## 2019-09-27 RX ADMIN — FLECAINIDE ACETATE 50 MG: 50 TABLET ORAL at 21:37

## 2019-09-27 RX ADMIN — IPRATROPIUM BROMIDE AND ALBUTEROL SULFATE 3 ML: .5; 3 SOLUTION RESPIRATORY (INHALATION) at 16:34

## 2019-09-27 RX ADMIN — METHYLPREDNISOLONE SODIUM SUCCINATE 125 MG: 125 INJECTION, POWDER, FOR SOLUTION INTRAMUSCULAR; INTRAVENOUS at 07:08

## 2019-09-27 RX ADMIN — VANCOMYCIN HYDROCHLORIDE 1000 MG: 1 INJECTION, SOLUTION INTRAVENOUS at 22:18

## 2019-09-27 RX ADMIN — LIDOCAINE HYDROCHLORIDE 100 MG: 20 INJECTION, SOLUTION EPIDURAL; INFILTRATION; INTRACAUDAL; PERINEURAL at 12:41

## 2019-09-27 RX ADMIN — ALBUTEROL SULFATE 5 MG: 2.5 SOLUTION RESPIRATORY (INHALATION) at 06:47

## 2019-09-27 RX ADMIN — Medication 10 ML: at 14:46

## 2019-09-27 RX ADMIN — LABETALOL HYDROCHLORIDE 5 MG: 5 INJECTION, SOLUTION INTRAVENOUS at 13:05

## 2019-09-27 RX ADMIN — IPRATROPIUM BROMIDE AND ALBUTEROL SULFATE 3 ML: .5; 3 SOLUTION RESPIRATORY (INHALATION) at 20:18

## 2019-09-27 RX ADMIN — Medication 5 MG: at 12:41

## 2019-09-27 RX ADMIN — METOCLOPRAMIDE 10 MG: 10 TABLET ORAL at 21:37

## 2019-09-27 RX ADMIN — PROPOFOL 40 MG: 10 INJECTION, EMULSION INTRAVENOUS at 12:41

## 2019-09-27 RX ADMIN — LORAZEPAM 1 MG: 2 INJECTION INTRAMUSCULAR; INTRAVENOUS at 14:46

## 2019-09-27 RX ADMIN — PROPOFOL 10 MG: 10 INJECTION, EMULSION INTRAVENOUS at 12:45

## 2019-09-27 RX ADMIN — METHYLPREDNISOLONE SODIUM SUCCINATE 40 MG: 40 INJECTION, POWDER, FOR SOLUTION INTRAMUSCULAR; INTRAVENOUS at 14:45

## 2019-09-27 RX ADMIN — VANCOMYCIN HYDROCHLORIDE 1000 MG: 1 INJECTION, SOLUTION INTRAVENOUS at 07:43

## 2019-09-27 RX ADMIN — PROPOFOL 10 MG: 10 INJECTION, EMULSION INTRAVENOUS at 12:49

## 2019-09-27 RX ADMIN — SODIUM CHLORIDE: 9 INJECTION, SOLUTION INTRAVENOUS at 12:36

## 2019-09-27 RX ADMIN — CEFEPIME HYDROCHLORIDE 2 G: 2 INJECTION, POWDER, FOR SOLUTION INTRAVENOUS at 17:20

## 2019-09-27 ASSESSMENT — ENCOUNTER SYMPTOMS
VOICE CHANGE: 0
EYE ITCHING: 0
NAUSEA: 0
CONSTIPATION: 0
ABDOMINAL PAIN: 0
EYE DISCHARGE: 0
DIARRHEA: 0
COUGH: 1
CHOKING: 0
CHEST TIGHTNESS: 0
SORE THROAT: 0
EYE PAIN: 0
WHEEZING: 1
SHORTNESS OF BREATH: 1
SHORTNESS OF BREATH: 1
VOMITING: 0
STRIDOR: 0

## 2019-09-27 ASSESSMENT — PAIN SCALES - GENERAL
PAINLEVEL_OUTOF10: 0

## 2019-09-27 NOTE — ANESTHESIA PRE PROCEDURE
Department of Anesthesiology  Preprocedure Note       Name:  Salma Ji   Age:  70 y.o.  :  1940                                          MRN:  [id number]         Date:  2019      Surgeon: Danae Canchola):  Sebastián Jang MD    Procedure: BRONCHOSCOPY DIAGNOSTIC OR CELL [address] ONLY (N/A )    Medications prior to admission:   Prior to Admission medications    Medication Sig Start Date End Date Taking? Authorizing Provider   acetaminophen (TYLENOL) 650 MG suppository Place 650 mg rectally every 6 hours as needed for Fever    Historical Provider, MD   loratadine (LORADAMED) 10 MG tablet Take 10 mg by mouth daily    Historical Provider, MD   furosemide (LASIX) 20 MG tablet Take 20 mg by mouth as needed For weight increase of >5lbs in 3-4 days and feet/ankle edema    Historical Provider, MD   haloperidol (HALDOL) 2 MG/ML solution Take 1 mg by mouth every 6 hours as needed    Historical Provider, MD   hyoscyamine (ANASPAZ;LEVSIN) 125 MCG tablet Take 125 mcg by mouth every 4 hours as needed for Cramping    Historical Provider, MD   loperamide (IMODIUM) 2 MG capsule Take 2 mg by mouth as needed for Diarrhea    Historical Provider, MD   LORazepam (ATIVAN) 0.5 MG tablet Take 0.5-1 mg by mouth every 6 hours as needed for Anxiety. Historical Provider, MD   meclizine (ANTIVERT) 25 MG tablet Take 25 mg by mouth daily    Historical Provider, MD   metFORMIN (GLUCOPHAGE) 500 MG tablet Take 1,000 mg by mouth 2 times daily (with meals)    Historical Provider, MD   morphine sulfate 20 MG/ML concentrated oral solution Take 5 mg by mouth every 3 hours as needed for Pain.     Historical Provider, MD   potassium chloride (KLOR-CON M) 20 MEQ extended release tablet Take 20 mEq by mouth as needed When taking furosemide    Historical Provider, MD   predniSONE (DELTASONE) 10 MG tablet Take 20 mg by mouth daily 19  Historical Provider, MD   prochlorperazine (COMPAZINE) 10 MG tablet Take 10 mg by mouth every 6 hours as I25.10    Obstructive sleep apnea G47.33    Chest pain R07.9    Carpal tunnel syndrome G56.00    SOB (shortness of breath) R06.02    COPD, 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    Influenza J11.1    Paroxysmal atrial fibrillation (HCC) I48.0    Chronic cough R05    Hilar mass R91.8    Acute cholecystitis K81.0    Gallstones and inflammation of gallbladder without obstruction K80.00    Atrial fibrillation with rapid ventricular response ([facility]) I48.91    Acute respiratory failure with hypoxia (HCC) J96.01    Centrilobular emphysema (HCC) J43.2    Ileus following gastrointestinal surgery ([facility]) K91.89, K56.7    Mediastinal adenopathy R59.0    COPD exacerbation (HCC) J44.1    Adenocarcinoma of left lung (HCC) C34.92    Mild malnutrition (HCC) E44.1    Loss of consciousness (HCC) R40.20    Syncope and collapse R55    Subacute pulmonary embolism (HCC) I26.99    Other pulmonary embolism without acute cor pulmonale ([facility]) I26.99    Closed compression fracture of thoracic vertebra ([facility]) S22.000A    Chronic deep vein thrombosis (DVT) of both lower extremities ([facility]) I82.503    Ataxia R27.0    Recurrent falls R29.6    Diabetic peripheral neuropathy (HCC) E11.42    Severe malnutrition (HCC) E43    Pneumonia of left lower lobe due to infectious organism (HCC) J18.1    Abnormal CT of the chest R93.89    Chronic diastolic heart failure (HCC) I50.32    Failure to thrive (0-17) R62.51    Chronic atrial fibrillation (HCC) I48.2    Acute on chronic respiratory failure with hypoxia (HCC) J96.21    Chronic respiratory failure with hypoxia (HCC) J96.11    Lung infiltrate on CT R91.8    Non-small cell cancer of left lung (HCC) C34.92    Acute encephalopathy G93.40    Weakness R53.1    Radiation pneumonitis (HCC) J70.0    Hypokalemia E87.6    Gastroparesis K31.84    Infection requiring airborne isolation precautions B99.9    Malignant neoplasm of left lung (HCC) C34.92    Chronic obstructive pulmonary disease with Tashi Cobb, POCHCT in the last 72 hours. Coags:   Lab Results   Component Value Date    PROTIME 18.0 09/27/2019    INR 1.58 09/27/2019    APTT 30.5 12/04/2018       HCG (If Applicable): No results found for: PREGTESTUR, PREGSERUM, HCG, HCGQUANT     ABGs:   Lab Results   Component Value Date    PHART 7.445 08/04/2019    PO2ART 69.1 08/04/2019    TCG3NOZ 44.9 08/04/2019    BCK6QGX 30.8 08/04/2019    BEART 6.1 08/04/2019    A0KKGBXK 94.5 08/04/2019        Type & Screen (If Applicable):  No results found for: LABABO, LABRH    Anesthesia Evaluation   history of anesthetic complications:   Airway: Mallampati: II  TM distance: >3 FB   Neck ROM: full  Mouth opening: > = 3 FB Dental:          Pulmonary:   (+) COPD:  shortness of breath: new, recurrent and chronic,  sleep apnea:                             Cardiovascular:  Exercise tolerance: poor (<4 METS),   (+) hypertension:, past MI:, CAD:, CHF:,         Rhythm: regular  Rate: normal                    Neuro/Psych:   (+) neuromuscular disease:, psychiatric history:            GI/Hepatic/Renal:   (+) GERD:,           Endo/Other:    (+) DiabetesType II DM, , .                 Abdominal:           Vascular:                                          Anesthesia Plan      general     ASA 3       Induction: intravenous. Anesthetic plan and risks discussed with patient. Plan discussed with CRNA.                   Tati Yuen MD   9/27/2019

## 2019-09-28 ENCOUNTER — APPOINTMENT (OUTPATIENT)
Dept: GENERAL RADIOLOGY | Age: 79
DRG: 853 | End: 2019-09-28
Payer: MEDICARE

## 2019-09-28 LAB
ABO/RH: NORMAL
ANION GAP SERPL CALCULATED.3IONS-SCNC: 7 MMOL/L (ref 3–16)
ANTIBODY SCREEN: NORMAL
BASOPHILS ABSOLUTE: 0 K/UL (ref 0–0.2)
BASOPHILS RELATIVE PERCENT: 0 %
BLOOD BANK DISPENSE STATUS: NORMAL
BLOOD BANK PRODUCT CODE: NORMAL
BPU ID: NORMAL
BUN BLDV-MCNC: 20 MG/DL (ref 7–20)
C DIFF TOXIN/ANTIGEN: NORMAL
CALCIUM SERPL-MCNC: 8.5 MG/DL (ref 8.3–10.6)
CHLORIDE BLD-SCNC: 101 MMOL/L (ref 99–110)
CO2: 30 MMOL/L (ref 21–32)
CREAT SERPL-MCNC: 0.6 MG/DL (ref 0.6–1.2)
CRYPTOCOCCAL ANTIGEN: NEGATIVE
DESCRIPTION BLOOD BANK: NORMAL
EOSINOPHILS ABSOLUTE: 0 K/UL (ref 0–0.6)
EOSINOPHILS RELATIVE PERCENT: 0 %
FERRITIN: 251.7 NG/ML (ref 15–150)
GFR AFRICAN AMERICAN: >60
GFR NON-AFRICAN AMERICAN: >60
GLUCOSE BLD-MCNC: 144 MG/DL (ref 70–99)
GLUCOSE BLD-MCNC: 316 MG/DL (ref 70–99)
GLUCOSE BLD-MCNC: 318 MG/DL (ref 70–99)
GLUCOSE BLD-MCNC: 327 MG/DL (ref 70–99)
GLUCOSE BLD-MCNC: 328 MG/DL (ref 70–99)
HCT VFR BLD CALC: 21.5 % (ref 36–48)
HCT VFR BLD CALC: 21.6 % (ref 36–48)
HCT VFR BLD CALC: 25.8 % (ref 36–48)
HEMOGLOBIN: 6.7 G/DL (ref 12–16)
HEMOGLOBIN: 8 G/DL (ref 12–16)
HISTOPLASMA ANTIGEN URINE INTERP: NOT DETECTED
HISTOPLASMA ANTIGEN URINE: NOT DETECTED NG/ML
IMMATURE RETIC FRACT: 0.56 (ref 0.21–0.37)
IRON SATURATION: 15 % (ref 15–50)
IRON: 24 UG/DL (ref 37–145)
LYMPHOCYTES ABSOLUTE: 0.5 K/UL (ref 1–5.1)
LYMPHOCYTES RELATIVE PERCENT: 10.2 %
MCH RBC QN AUTO: 27.2 PG (ref 26–34)
MCHC RBC AUTO-ENTMCNC: 31 G/DL (ref 31–36)
MCV RBC AUTO: 87.5 FL (ref 80–100)
MONOCYTES ABSOLUTE: 0.3 K/UL (ref 0–1.3)
MONOCYTES RELATIVE PERCENT: 6.1 %
MRSA SCREEN RT-PCR: NORMAL
NEUTROPHILS ABSOLUTE: 4.4 K/UL (ref 1.7–7.7)
NEUTROPHILS RELATIVE PERCENT: 83.7 %
PDW BLD-RTO: 18 % (ref 12.4–15.4)
PERFORMED ON: ABNORMAL
PLATELET # BLD: 336 K/UL (ref 135–450)
PMV BLD AUTO: 7.3 FL (ref 5–10.5)
POTASSIUM REFLEX MAGNESIUM: 4.1 MMOL/L (ref 3.5–5.1)
RBC # BLD: 2.45 M/UL (ref 4–5.2)
REPORT: NORMAL
RETICULOCYTE ABSOLUTE COUNT: 0.06 M/UL (ref 0.02–0.1)
RETICULOCYTE COUNT PCT: 2.59 % (ref 0.5–2.18)
SODIUM BLD-SCNC: 138 MMOL/L (ref 136–145)
TOTAL IRON BINDING CAPACITY: 163 UG/DL (ref 260–445)
WBC # BLD: 5.3 K/UL (ref 4–11)

## 2019-09-28 PROCEDURE — 86901 BLOOD TYPING SEROLOGIC RH(D): CPT

## 2019-09-28 PROCEDURE — 83036 HEMOGLOBIN GLYCOSYLATED A1C: CPT

## 2019-09-28 PROCEDURE — P9016 RBC LEUKOCYTES REDUCED: HCPCS

## 2019-09-28 PROCEDURE — 6360000002 HC RX W HCPCS: Performed by: INTERNAL MEDICINE

## 2019-09-28 PROCEDURE — 71045 X-RAY EXAM CHEST 1 VIEW: CPT

## 2019-09-28 PROCEDURE — 94761 N-INVAS EAR/PLS OXIMETRY MLT: CPT

## 2019-09-28 PROCEDURE — 83540 ASSAY OF IRON: CPT

## 2019-09-28 PROCEDURE — 80048 BASIC METABOLIC PNL TOTAL CA: CPT

## 2019-09-28 PROCEDURE — 2060000000 HC ICU INTERMEDIATE R&B

## 2019-09-28 PROCEDURE — 86923 COMPATIBILITY TEST ELECTRIC: CPT

## 2019-09-28 PROCEDURE — 2580000003 HC RX 258: Performed by: INTERNAL MEDICINE

## 2019-09-28 PROCEDURE — 2500000003 HC RX 250 WO HCPCS: Performed by: INTERNAL MEDICINE

## 2019-09-28 PROCEDURE — 94660 CPAP INITIATION&MGMT: CPT

## 2019-09-28 PROCEDURE — 85045 AUTOMATED RETICULOCYTE COUNT: CPT

## 2019-09-28 PROCEDURE — 6370000000 HC RX 637 (ALT 250 FOR IP): Performed by: INTERNAL MEDICINE

## 2019-09-28 PROCEDURE — 99233 SBSQ HOSP IP/OBS HIGH 50: CPT | Performed by: INTERNAL MEDICINE

## 2019-09-28 PROCEDURE — 86850 RBC ANTIBODY SCREEN: CPT

## 2019-09-28 PROCEDURE — 86900 BLOOD TYPING SEROLOGIC ABO: CPT

## 2019-09-28 PROCEDURE — 92526 ORAL FUNCTION THERAPY: CPT

## 2019-09-28 PROCEDURE — 87449 NOS EACH ORGANISM AG IA: CPT

## 2019-09-28 PROCEDURE — 82728 ASSAY OF FERRITIN: CPT

## 2019-09-28 PROCEDURE — 85014 HEMATOCRIT: CPT

## 2019-09-28 PROCEDURE — 85025 COMPLETE CBC W/AUTO DIFF WBC: CPT

## 2019-09-28 PROCEDURE — 85018 HEMOGLOBIN: CPT

## 2019-09-28 PROCEDURE — 92610 EVALUATE SWALLOWING FUNCTION: CPT

## 2019-09-28 PROCEDURE — 36430 TRANSFUSION BLD/BLD COMPNT: CPT

## 2019-09-28 PROCEDURE — 83550 IRON BINDING TEST: CPT

## 2019-09-28 PROCEDURE — 94640 AIRWAY INHALATION TREATMENT: CPT

## 2019-09-28 PROCEDURE — 36415 COLL VENOUS BLD VENIPUNCTURE: CPT

## 2019-09-28 RX ORDER — 0.9 % SODIUM CHLORIDE 0.9 %
250 INTRAVENOUS SOLUTION INTRAVENOUS ONCE
Status: COMPLETED | OUTPATIENT
Start: 2019-09-28 | End: 2019-09-28

## 2019-09-28 RX ORDER — METHYLPREDNISOLONE SODIUM SUCCINATE 40 MG/ML
40 INJECTION, POWDER, LYOPHILIZED, FOR SOLUTION INTRAMUSCULAR; INTRAVENOUS DAILY
Status: DISCONTINUED | OUTPATIENT
Start: 2019-09-29 | End: 2019-09-29

## 2019-09-28 RX ADMIN — SODIUM CHLORIDE 250 ML: 9 INJECTION, SOLUTION INTRAVENOUS at 08:12

## 2019-09-28 RX ADMIN — VANCOMYCIN HYDROCHLORIDE 1000 MG: 1 INJECTION, SOLUTION INTRAVENOUS at 21:23

## 2019-09-28 RX ADMIN — METOCLOPRAMIDE 10 MG: 10 TABLET ORAL at 08:18

## 2019-09-28 RX ADMIN — METOCLOPRAMIDE 10 MG: 10 TABLET ORAL at 14:33

## 2019-09-28 RX ADMIN — METOCLOPRAMIDE 10 MG: 10 TABLET ORAL at 16:16

## 2019-09-28 RX ADMIN — INSULIN LISPRO 12 UNITS: 100 INJECTION, SOLUTION INTRAVENOUS; SUBCUTANEOUS at 08:31

## 2019-09-28 RX ADMIN — METRONIDAZOLE 500 MG: 500 INJECTION, SOLUTION INTRAVENOUS at 22:57

## 2019-09-28 RX ADMIN — Medication 10 ML: at 02:10

## 2019-09-28 RX ADMIN — Medication 10 ML: at 21:23

## 2019-09-28 RX ADMIN — FLECAINIDE ACETATE 50 MG: 50 TABLET ORAL at 21:23

## 2019-09-28 RX ADMIN — LORAZEPAM 1 MG: 2 INJECTION INTRAMUSCULAR; INTRAVENOUS at 21:23

## 2019-09-28 RX ADMIN — ASPIRIN 81 MG: 81 TABLET, COATED ORAL at 08:18

## 2019-09-28 RX ADMIN — METHYLPREDNISOLONE SODIUM SUCCINATE 40 MG: 40 INJECTION, POWDER, FOR SOLUTION INTRAMUSCULAR; INTRAVENOUS at 06:38

## 2019-09-28 RX ADMIN — VANCOMYCIN HYDROCHLORIDE 1000 MG: 1 INJECTION, SOLUTION INTRAVENOUS at 08:43

## 2019-09-28 RX ADMIN — IPRATROPIUM BROMIDE AND ALBUTEROL SULFATE 3 ML: .5; 3 SOLUTION RESPIRATORY (INHALATION) at 11:43

## 2019-09-28 RX ADMIN — INSULIN LISPRO 12 UNITS: 100 INJECTION, SOLUTION INTRAVENOUS; SUBCUTANEOUS at 17:24

## 2019-09-28 RX ADMIN — Medication 10 ML: at 08:24

## 2019-09-28 RX ADMIN — IPRATROPIUM BROMIDE AND ALBUTEROL SULFATE 3 ML: .5; 3 SOLUTION RESPIRATORY (INHALATION) at 15:16

## 2019-09-28 RX ADMIN — IPRATROPIUM BROMIDE AND ALBUTEROL SULFATE 3 ML: .5; 3 SOLUTION RESPIRATORY (INHALATION) at 07:48

## 2019-09-28 RX ADMIN — METOCLOPRAMIDE 10 MG: 10 TABLET ORAL at 21:23

## 2019-09-28 RX ADMIN — INSULIN LISPRO 12 UNITS: 100 INJECTION, SOLUTION INTRAVENOUS; SUBCUTANEOUS at 13:15

## 2019-09-28 RX ADMIN — APIXABAN 5 MG: 5 TABLET, FILM COATED ORAL at 21:23

## 2019-09-28 RX ADMIN — FUROSEMIDE 40 MG: 40 TABLET ORAL at 16:16

## 2019-09-28 RX ADMIN — CEFEPIME HYDROCHLORIDE 2 G: 2 INJECTION, POWDER, FOR SOLUTION INTRAVENOUS at 16:15

## 2019-09-28 RX ADMIN — LORAZEPAM 1 MG: 2 INJECTION INTRAMUSCULAR; INTRAVENOUS at 14:34

## 2019-09-28 RX ADMIN — METRONIDAZOLE 500 MG: 500 INJECTION, SOLUTION INTRAVENOUS at 00:06

## 2019-09-28 RX ADMIN — CARVEDILOL 12.5 MG: 6.25 TABLET, FILM COATED ORAL at 21:23

## 2019-09-28 RX ADMIN — METRONIDAZOLE 500 MG: 500 INJECTION, SOLUTION INTRAVENOUS at 14:47

## 2019-09-28 RX ADMIN — INSULIN LISPRO 2 UNITS: 100 INJECTION, SOLUTION INTRAVENOUS; SUBCUTANEOUS at 22:57

## 2019-09-28 RX ADMIN — PANTOPRAZOLE SODIUM 40 MG: 40 TABLET, DELAYED RELEASE ORAL at 06:37

## 2019-09-28 RX ADMIN — LORAZEPAM 1 MG: 2 INJECTION INTRAMUSCULAR; INTRAVENOUS at 09:42

## 2019-09-28 RX ADMIN — FUROSEMIDE 40 MG: 40 TABLET ORAL at 08:18

## 2019-09-28 RX ADMIN — CEFEPIME HYDROCHLORIDE 2 G: 2 INJECTION, POWDER, FOR SOLUTION INTRAVENOUS at 08:13

## 2019-09-28 RX ADMIN — FLECAINIDE ACETATE 50 MG: 50 TABLET ORAL at 08:18

## 2019-09-28 RX ADMIN — METRONIDAZOLE 500 MG: 500 INJECTION, SOLUTION INTRAVENOUS at 06:38

## 2019-09-28 RX ADMIN — POTASSIUM CHLORIDE 20 MEQ: 20 TABLET, EXTENDED RELEASE ORAL at 08:18

## 2019-09-28 RX ADMIN — CEFEPIME HYDROCHLORIDE 2 G: 2 INJECTION, POWDER, FOR SOLUTION INTRAVENOUS at 01:40

## 2019-09-28 RX ADMIN — DRONABINOL 2.5 MG: 2.5 CAPSULE ORAL at 06:38

## 2019-09-28 RX ADMIN — METHYLPREDNISOLONE SODIUM SUCCINATE 40 MG: 40 INJECTION, POWDER, FOR SOLUTION INTRAMUSCULAR; INTRAVENOUS at 00:06

## 2019-09-28 RX ADMIN — IPRATROPIUM BROMIDE AND ALBUTEROL SULFATE 3 ML: .5; 3 SOLUTION RESPIRATORY (INHALATION) at 21:41

## 2019-09-28 RX ADMIN — CARVEDILOL 12.5 MG: 6.25 TABLET, FILM COATED ORAL at 08:18

## 2019-09-28 RX ADMIN — DRONABINOL 2.5 MG: 2.5 CAPSULE ORAL at 16:16

## 2019-09-28 ASSESSMENT — PAIN SCALES - GENERAL
PAINLEVEL_OUTOF10: 0

## 2019-09-29 LAB
ANION GAP SERPL CALCULATED.3IONS-SCNC: 11 MMOL/L (ref 3–16)
ASPERGILLUS GALACTO AG: NEGATIVE
ASPERGILLUS GALACTO AG: NEGATIVE
ASPERGILLUS GALACTO INDEX: 0.05
ASPERGILLUS GALACTO INDEX: 0.06
BASE EXCESS ARTERIAL: 11.7 MMOL/L (ref -3–3)
BASOPHILS ABSOLUTE: 0 K/UL (ref 0–0.2)
BASOPHILS RELATIVE PERCENT: 0.3 %
BUN BLDV-MCNC: 20 MG/DL (ref 7–20)
CALCIUM SERPL-MCNC: 9.2 MG/DL (ref 8.3–10.6)
CHLORIDE BLD-SCNC: 92 MMOL/L (ref 99–110)
CO2: 35 MMOL/L (ref 21–32)
CREAT SERPL-MCNC: 0.7 MG/DL (ref 0.6–1.2)
CULTURE, RESPIRATORY: NORMAL
EOSINOPHILS ABSOLUTE: 0.1 K/UL (ref 0–0.6)
EOSINOPHILS RELATIVE PERCENT: 0.8 %
ESTIMATED AVERAGE GLUCOSE: 185.8 MG/DL
GFR AFRICAN AMERICAN: >60
GFR NON-AFRICAN AMERICAN: >60
GI BACTERIAL PATHOGENS BY PCR: NORMAL
GLUCOSE BLD-MCNC: 153 MG/DL (ref 70–99)
GLUCOSE BLD-MCNC: 236 MG/DL (ref 70–99)
GLUCOSE BLD-MCNC: 288 MG/DL (ref 70–99)
GLUCOSE BLD-MCNC: 301 MG/DL (ref 70–99)
GLUCOSE BLD-MCNC: 304 MG/DL (ref 70–99)
GRAM STAIN RESULT: NORMAL
HBA1C MFR BLD: 8.1 %
HCO3 ARTERIAL: 36.9 MMOL/L (ref 21–29)
HCT VFR BLD CALC: 30.1 % (ref 36–48)
HEMOGLOBIN, ART, EXTENDED: 9.4 G/DL (ref 12–16)
HEMOGLOBIN: 9.7 G/DL (ref 12–16)
L. PNEUMOPHILA SEROGP 1 UR AG: NORMAL
LYMPHOCYTES ABSOLUTE: 0.7 K/UL (ref 1–5.1)
LYMPHOCYTES RELATIVE PERCENT: 7.5 %
MCH RBC QN AUTO: 28.2 PG (ref 26–34)
MCHC RBC AUTO-ENTMCNC: 32.2 G/DL (ref 31–36)
MCV RBC AUTO: 87.6 FL (ref 80–100)
METHEMOGLOBIN ARTERIAL: 0.5 %
MONOCYTES ABSOLUTE: 0.4 K/UL (ref 0–1.3)
MONOCYTES RELATIVE PERCENT: 3.7 %
NEUTROPHILS ABSOLUTE: 8.5 K/UL (ref 1.7–7.7)
NEUTROPHILS RELATIVE PERCENT: 87.7 %
O2 CONTENT ARTERIAL: 13 ML/DL
O2 SAT, ARTERIAL: 95.5 %
O2 THERAPY: ABNORMAL
PCO2 ARTERIAL: 50.9 MMHG (ref 35–45)
PDW BLD-RTO: 17.6 % (ref 12.4–15.4)
PERFORMED ON: ABNORMAL
PH ARTERIAL: 7.47 (ref 7.35–7.45)
PLATELET # BLD: 390 K/UL (ref 135–450)
PMV BLD AUTO: 7.5 FL (ref 5–10.5)
PNEUMOCYSTIS JIROVECI DFA: NEGATIVE
PNEUMOCYSTIS SOURCE: NORMAL
PO2 ARTERIAL: 88.2 MMHG (ref 75–108)
POTASSIUM REFLEX MAGNESIUM: 3.9 MMOL/L (ref 3.5–5.1)
QUANTI TB GOLD PLUS: NORMAL
QUANTI TB1 MINUS NIL: 0 IU/ML (ref 0–0.34)
QUANTI TB2 MINUS NIL: 0 IU/ML (ref 0–0.34)
QUANTIFERON MITOGEN: 0.15 IU/ML
QUANTIFERON NIL: 0.01 IU/ML
RBC # BLD: 3.44 M/UL (ref 4–5.2)
SODIUM BLD-SCNC: 138 MMOL/L (ref 136–145)
STREP PNEUMONIAE ANTIGEN, URINE: NORMAL
TCO2 ARTERIAL: 86.1 MMOL/L
VANCOMYCIN TROUGH: 40.3 UG/ML (ref 10–20)
WBC # BLD: 9.7 K/UL (ref 4–11)

## 2019-09-29 PROCEDURE — 2060000000 HC ICU INTERMEDIATE R&B

## 2019-09-29 PROCEDURE — 80202 ASSAY OF VANCOMYCIN: CPT

## 2019-09-29 PROCEDURE — 36600 WITHDRAWAL OF ARTERIAL BLOOD: CPT

## 2019-09-29 PROCEDURE — 6370000000 HC RX 637 (ALT 250 FOR IP): Performed by: INTERNAL MEDICINE

## 2019-09-29 PROCEDURE — 99233 SBSQ HOSP IP/OBS HIGH 50: CPT | Performed by: INTERNAL MEDICINE

## 2019-09-29 PROCEDURE — 94761 N-INVAS EAR/PLS OXIMETRY MLT: CPT

## 2019-09-29 PROCEDURE — 6360000002 HC RX W HCPCS: Performed by: INTERNAL MEDICINE

## 2019-09-29 PROCEDURE — 85025 COMPLETE CBC W/AUTO DIFF WBC: CPT

## 2019-09-29 PROCEDURE — 2580000003 HC RX 258: Performed by: INTERNAL MEDICINE

## 2019-09-29 PROCEDURE — 80048 BASIC METABOLIC PNL TOTAL CA: CPT

## 2019-09-29 PROCEDURE — 2500000003 HC RX 250 WO HCPCS: Performed by: INTERNAL MEDICINE

## 2019-09-29 PROCEDURE — 94640 AIRWAY INHALATION TREATMENT: CPT

## 2019-09-29 PROCEDURE — 2700000000 HC OXYGEN THERAPY PER DAY

## 2019-09-29 PROCEDURE — 36415 COLL VENOUS BLD VENIPUNCTURE: CPT

## 2019-09-29 PROCEDURE — 82803 BLOOD GASES ANY COMBINATION: CPT

## 2019-09-29 PROCEDURE — 94660 CPAP INITIATION&MGMT: CPT

## 2019-09-29 RX ORDER — METOPROLOL TARTRATE 5 MG/5ML
5 INJECTION INTRAVENOUS ONCE
Status: COMPLETED | OUTPATIENT
Start: 2019-09-29 | End: 2019-09-29

## 2019-09-29 RX ORDER — DILTIAZEM HYDROCHLORIDE 5 MG/ML
10 INJECTION INTRAVENOUS ONCE
Status: COMPLETED | OUTPATIENT
Start: 2019-09-29 | End: 2019-09-29

## 2019-09-29 RX ADMIN — LORAZEPAM 1 MG: 2 INJECTION INTRAMUSCULAR; INTRAVENOUS at 08:35

## 2019-09-29 RX ADMIN — METOCLOPRAMIDE 10 MG: 10 TABLET ORAL at 08:31

## 2019-09-29 RX ADMIN — METOCLOPRAMIDE 10 MG: 10 TABLET ORAL at 16:07

## 2019-09-29 RX ADMIN — INSULIN LISPRO 3 UNITS: 100 INJECTION, SOLUTION INTRAVENOUS; SUBCUTANEOUS at 08:35

## 2019-09-29 RX ADMIN — APIXABAN 5 MG: 5 TABLET, FILM COATED ORAL at 20:50

## 2019-09-29 RX ADMIN — METRONIDAZOLE 500 MG: 500 INJECTION, SOLUTION INTRAVENOUS at 23:48

## 2019-09-29 RX ADMIN — VANCOMYCIN HYDROCHLORIDE 1000 MG: 1 INJECTION, SOLUTION INTRAVENOUS at 09:04

## 2019-09-29 RX ADMIN — CEFEPIME HYDROCHLORIDE 2 G: 2 INJECTION, POWDER, FOR SOLUTION INTRAVENOUS at 08:19

## 2019-09-29 RX ADMIN — ASPIRIN 81 MG: 81 TABLET, COATED ORAL at 08:31

## 2019-09-29 RX ADMIN — IPRATROPIUM BROMIDE AND ALBUTEROL SULFATE 3 ML: .5; 3 SOLUTION RESPIRATORY (INHALATION) at 19:37

## 2019-09-29 RX ADMIN — IPRATROPIUM BROMIDE AND ALBUTEROL SULFATE 3 ML: .5; 3 SOLUTION RESPIRATORY (INHALATION) at 11:35

## 2019-09-29 RX ADMIN — POTASSIUM CHLORIDE 20 MEQ: 20 TABLET, EXTENDED RELEASE ORAL at 08:31

## 2019-09-29 RX ADMIN — METRONIDAZOLE 500 MG: 500 INJECTION, SOLUTION INTRAVENOUS at 06:36

## 2019-09-29 RX ADMIN — METOCLOPRAMIDE 10 MG: 10 TABLET ORAL at 20:50

## 2019-09-29 RX ADMIN — CARVEDILOL 12.5 MG: 6.25 TABLET, FILM COATED ORAL at 08:31

## 2019-09-29 RX ADMIN — FUROSEMIDE 40 MG: 40 TABLET ORAL at 08:30

## 2019-09-29 RX ADMIN — DRONABINOL 2.5 MG: 2.5 CAPSULE ORAL at 16:07

## 2019-09-29 RX ADMIN — METOPROLOL TARTRATE 5 MG: 1 INJECTION, SOLUTION INTRAVENOUS at 04:59

## 2019-09-29 RX ADMIN — DILTIAZEM HYDROCHLORIDE 10 MG: 5 INJECTION INTRAVENOUS at 06:53

## 2019-09-29 RX ADMIN — FLECAINIDE ACETATE 50 MG: 50 TABLET ORAL at 08:31

## 2019-09-29 RX ADMIN — VANCOMYCIN HYDROCHLORIDE 1000 MG: 1 INJECTION, SOLUTION INTRAVENOUS at 20:51

## 2019-09-29 RX ADMIN — APIXABAN 5 MG: 5 TABLET, FILM COATED ORAL at 08:31

## 2019-09-29 RX ADMIN — CEFEPIME HYDROCHLORIDE 2 G: 2 INJECTION, POWDER, FOR SOLUTION INTRAVENOUS at 16:06

## 2019-09-29 RX ADMIN — IPRATROPIUM BROMIDE AND ALBUTEROL SULFATE 3 ML: .5; 3 SOLUTION RESPIRATORY (INHALATION) at 07:56

## 2019-09-29 RX ADMIN — DILTIAZEM HYDROCHLORIDE 5 MG/HR: 5 INJECTION INTRAVENOUS at 06:54

## 2019-09-29 RX ADMIN — INSULIN LISPRO 6 UNITS: 100 INJECTION, SOLUTION INTRAVENOUS; SUBCUTANEOUS at 17:11

## 2019-09-29 RX ADMIN — Medication 10 ML: at 08:31

## 2019-09-29 RX ADMIN — METHYLPREDNISOLONE SODIUM SUCCINATE 40 MG: 40 INJECTION, POWDER, FOR SOLUTION INTRAMUSCULAR; INTRAVENOUS at 08:21

## 2019-09-29 RX ADMIN — IPRATROPIUM BROMIDE AND ALBUTEROL SULFATE 3 ML: .5; 3 SOLUTION RESPIRATORY (INHALATION) at 16:10

## 2019-09-29 RX ADMIN — Medication 10 ML: at 20:51

## 2019-09-29 RX ADMIN — CEFEPIME HYDROCHLORIDE 2 G: 2 INJECTION, POWDER, FOR SOLUTION INTRAVENOUS at 01:24

## 2019-09-29 RX ADMIN — CARVEDILOL 12.5 MG: 6.25 TABLET, FILM COATED ORAL at 20:51

## 2019-09-29 RX ADMIN — INSULIN LISPRO 12 UNITS: 100 INJECTION, SOLUTION INTRAVENOUS; SUBCUTANEOUS at 12:43

## 2019-09-29 RX ADMIN — FLECAINIDE ACETATE 50 MG: 50 TABLET ORAL at 20:50

## 2019-09-29 RX ADMIN — LORAZEPAM 1 MG: 2 INJECTION INTRAMUSCULAR; INTRAVENOUS at 17:11

## 2019-09-29 RX ADMIN — METRONIDAZOLE 500 MG: 500 INJECTION, SOLUTION INTRAVENOUS at 15:22

## 2019-09-29 RX ADMIN — ACETAMINOPHEN 650 MG: 325 TABLET, FILM COATED ORAL at 16:06

## 2019-09-29 RX ADMIN — ACETAMINOPHEN 650 MG: 325 TABLET, FILM COATED ORAL at 20:49

## 2019-09-29 RX ADMIN — PANTOPRAZOLE SODIUM 40 MG: 40 TABLET, DELAYED RELEASE ORAL at 06:36

## 2019-09-29 RX ADMIN — INSULIN LISPRO 5 UNITS: 100 INJECTION, SOLUTION INTRAVENOUS; SUBCUTANEOUS at 22:41

## 2019-09-29 ASSESSMENT — PAIN DESCRIPTION - PAIN TYPE
TYPE: ACUTE PAIN

## 2019-09-29 ASSESSMENT — PAIN DESCRIPTION - LOCATION
LOCATION: HEAD

## 2019-09-29 ASSESSMENT — PAIN SCALES - GENERAL
PAINLEVEL_OUTOF10: 0
PAINLEVEL_OUTOF10: 7
PAINLEVEL_OUTOF10: 0
PAINLEVEL_OUTOF10: 2
PAINLEVEL_OUTOF10: 4
PAINLEVEL_OUTOF10: 0
PAINLEVEL_OUTOF10: 7

## 2019-09-29 ASSESSMENT — PAIN DESCRIPTION - DESCRIPTORS
DESCRIPTORS: HEADACHE

## 2019-09-30 ENCOUNTER — APPOINTMENT (OUTPATIENT)
Dept: GENERAL RADIOLOGY | Age: 79
DRG: 853 | End: 2019-09-30
Payer: MEDICARE

## 2019-09-30 LAB
(1,3)-BETA-D-GLUCAN (FUNGITELL) INTERPRETATION: NEGATIVE
(1,3)-BETA-D-GLUCAN (FUNGITELL): 36 PG/ML
ANION GAP SERPL CALCULATED.3IONS-SCNC: 12 MMOL/L (ref 3–16)
ASPERGILLUS ANTIBODY CF: NORMAL
BASOPHILS ABSOLUTE: 0 K/UL (ref 0–0.2)
BASOPHILS RELATIVE PERCENT: 0.5 %
BLASTOMYCES AB BY EIA, SERUM: 0.1 IV
BUN BLDV-MCNC: 20 MG/DL (ref 7–20)
CALCIUM SERPL-MCNC: 9.1 MG/DL (ref 8.3–10.6)
CHLORIDE BLD-SCNC: 92 MMOL/L (ref 99–110)
CO2: 35 MMOL/L (ref 21–32)
COCCIDIOIDES ANTIBODY CF: NORMAL
CREAT SERPL-MCNC: 0.7 MG/DL (ref 0.6–1.2)
CULTURE, BLOOD 2: ABNORMAL
CULTURE, BLOOD 2: ABNORMAL
EOSINOPHILS ABSOLUTE: 0.4 K/UL (ref 0–0.6)
EOSINOPHILS RELATIVE PERCENT: 4.9 %
GFR AFRICAN AMERICAN: >60
GFR NON-AFRICAN AMERICAN: >60
GLUCOSE BLD-MCNC: 151 MG/DL (ref 70–99)
GLUCOSE BLD-MCNC: 153 MG/DL (ref 70–99)
GLUCOSE BLD-MCNC: 158 MG/DL (ref 70–99)
GLUCOSE BLD-MCNC: 198 MG/DL (ref 70–99)
GLUCOSE BLD-MCNC: 253 MG/DL (ref 70–99)
HCT VFR BLD CALC: 30.2 % (ref 36–48)
HEMOGLOBIN: 9.5 G/DL (ref 12–16)
HISTOPLASMA ANTIBODY MYCELIAL CF: NORMAL
HISTOPLASMA ANTIBODY YEAST CF: NORMAL
HISTOPLASMA ANTIGEN, SERUM: NOT DETECTED
HISTOPLASMA INTERPETATION: NOT DETECTED
LYMPHOCYTES ABSOLUTE: 1.2 K/UL (ref 1–5.1)
LYMPHOCYTES RELATIVE PERCENT: 12.9 %
MAGNESIUM: 1.5 MG/DL (ref 1.8–2.4)
MCH RBC QN AUTO: 27.7 PG (ref 26–34)
MCHC RBC AUTO-ENTMCNC: 31.5 G/DL (ref 31–36)
MCV RBC AUTO: 88 FL (ref 80–100)
MONOCYTES ABSOLUTE: 0.7 K/UL (ref 0–1.3)
MONOCYTES RELATIVE PERCENT: 7.9 %
MTB COMPLEX INTERP: NOT DETECTED
MTB RIFAMPIN BY PCR: NORMAL
MTBRIF SOURCE: NORMAL
MYCOBACTERIUM TUBERCULOSIS PCR: NOT DETECTED
NEUTROPHILS ABSOLUTE: 6.6 K/UL (ref 1.7–7.7)
NEUTROPHILS RELATIVE PERCENT: 73.8 %
ORGANISM: ABNORMAL
ORGANISM: ABNORMAL
PDW BLD-RTO: 17.3 % (ref 12.4–15.4)
PERFORMED ON: ABNORMAL
PLATELET # BLD: 378 K/UL (ref 135–450)
PMV BLD AUTO: 7.6 FL (ref 5–10.5)
POTASSIUM REFLEX MAGNESIUM: 3.3 MMOL/L (ref 3.5–5.1)
RBC # BLD: 3.44 M/UL (ref 4–5.2)
SODIUM BLD-SCNC: 139 MMOL/L (ref 136–145)
VANCOMYCIN TROUGH: 24.4 UG/ML (ref 10–20)
WBC # BLD: 8.9 K/UL (ref 4–11)

## 2019-09-30 PROCEDURE — 2500000003 HC RX 250 WO HCPCS: Performed by: INTERNAL MEDICINE

## 2019-09-30 PROCEDURE — 85025 COMPLETE CBC W/AUTO DIFF WBC: CPT

## 2019-09-30 PROCEDURE — 94660 CPAP INITIATION&MGMT: CPT

## 2019-09-30 PROCEDURE — 6370000000 HC RX 637 (ALT 250 FOR IP): Performed by: INTERNAL MEDICINE

## 2019-09-30 PROCEDURE — 94761 N-INVAS EAR/PLS OXIMETRY MLT: CPT

## 2019-09-30 PROCEDURE — 2580000003 HC RX 258: Performed by: INTERNAL MEDICINE

## 2019-09-30 PROCEDURE — 99233 SBSQ HOSP IP/OBS HIGH 50: CPT | Performed by: INTERNAL MEDICINE

## 2019-09-30 PROCEDURE — 92526 ORAL FUNCTION THERAPY: CPT

## 2019-09-30 PROCEDURE — 80048 BASIC METABOLIC PNL TOTAL CA: CPT

## 2019-09-30 PROCEDURE — 2060000000 HC ICU INTERMEDIATE R&B

## 2019-09-30 PROCEDURE — 6360000002 HC RX W HCPCS: Performed by: INTERNAL MEDICINE

## 2019-09-30 PROCEDURE — 2700000000 HC OXYGEN THERAPY PER DAY

## 2019-09-30 PROCEDURE — 71045 X-RAY EXAM CHEST 1 VIEW: CPT

## 2019-09-30 PROCEDURE — 80202 ASSAY OF VANCOMYCIN: CPT

## 2019-09-30 PROCEDURE — 94640 AIRWAY INHALATION TREATMENT: CPT

## 2019-09-30 PROCEDURE — 83735 ASSAY OF MAGNESIUM: CPT

## 2019-09-30 PROCEDURE — 36415 COLL VENOUS BLD VENIPUNCTURE: CPT

## 2019-09-30 RX ORDER — POTASSIUM CHLORIDE 20 MEQ/1
40 TABLET, EXTENDED RELEASE ORAL PRN
Status: DISCONTINUED | OUTPATIENT
Start: 2019-09-30 | End: 2019-10-08 | Stop reason: HOSPADM

## 2019-09-30 RX ORDER — LACTOBACILLUS RHAMNOSUS GG 10B CELL
1 CAPSULE ORAL 2 TIMES DAILY WITH MEALS
Status: DISCONTINUED | OUTPATIENT
Start: 2019-09-30 | End: 2019-10-08 | Stop reason: HOSPADM

## 2019-09-30 RX ORDER — CLINDAMYCIN HYDROCHLORIDE 150 MG/1
150 CAPSULE ORAL EVERY 6 HOURS SCHEDULED
Status: DISCONTINUED | OUTPATIENT
Start: 2019-09-30 | End: 2019-10-03

## 2019-09-30 RX ORDER — MAGNESIUM SULFATE IN WATER 40 MG/ML
2 INJECTION, SOLUTION INTRAVENOUS ONCE
Status: COMPLETED | OUTPATIENT
Start: 2019-09-30 | End: 2019-09-30

## 2019-09-30 RX ORDER — MAGNESIUM SULFATE 1 G/100ML
1 INJECTION INTRAVENOUS PRN
Status: DISCONTINUED | OUTPATIENT
Start: 2019-09-30 | End: 2019-10-08 | Stop reason: HOSPADM

## 2019-09-30 RX ORDER — POTASSIUM CHLORIDE 7.45 MG/ML
10 INJECTION INTRAVENOUS PRN
Status: DISCONTINUED | OUTPATIENT
Start: 2019-09-30 | End: 2019-10-08 | Stop reason: HOSPADM

## 2019-09-30 RX ADMIN — METOCLOPRAMIDE 10 MG: 10 TABLET ORAL at 10:18

## 2019-09-30 RX ADMIN — LORAZEPAM 1 MG: 2 INJECTION INTRAMUSCULAR; INTRAVENOUS at 03:20

## 2019-09-30 RX ADMIN — INSULIN LISPRO 3 UNITS: 100 INJECTION, SOLUTION INTRAVENOUS; SUBCUTANEOUS at 10:21

## 2019-09-30 RX ADMIN — INSULIN LISPRO 3 UNITS: 100 INJECTION, SOLUTION INTRAVENOUS; SUBCUTANEOUS at 17:40

## 2019-09-30 RX ADMIN — APIXABAN 5 MG: 5 TABLET, FILM COATED ORAL at 22:07

## 2019-09-30 RX ADMIN — DRONABINOL 2.5 MG: 2.5 CAPSULE ORAL at 14:43

## 2019-09-30 RX ADMIN — FUROSEMIDE 40 MG: 40 TABLET ORAL at 17:39

## 2019-09-30 RX ADMIN — IPRATROPIUM BROMIDE AND ALBUTEROL SULFATE 3 ML: .5; 3 SOLUTION RESPIRATORY (INHALATION) at 16:36

## 2019-09-30 RX ADMIN — METOCLOPRAMIDE 10 MG: 10 TABLET ORAL at 17:39

## 2019-09-30 RX ADMIN — CEFEPIME HYDROCHLORIDE 2 G: 2 INJECTION, POWDER, FOR SOLUTION INTRAVENOUS at 01:19

## 2019-09-30 RX ADMIN — METOCLOPRAMIDE 10 MG: 10 TABLET ORAL at 22:07

## 2019-09-30 RX ADMIN — INSULIN LISPRO 2 UNITS: 100 INJECTION, SOLUTION INTRAVENOUS; SUBCUTANEOUS at 22:15

## 2019-09-30 RX ADMIN — CLINDAMYCIN HYDROCHLORIDE 150 MG: 150 CAPSULE ORAL at 17:39

## 2019-09-30 RX ADMIN — IPRATROPIUM BROMIDE AND ALBUTEROL SULFATE 3 ML: .5; 3 SOLUTION RESPIRATORY (INHALATION) at 08:30

## 2019-09-30 RX ADMIN — FLECAINIDE ACETATE 50 MG: 50 TABLET ORAL at 10:18

## 2019-09-30 RX ADMIN — LORAZEPAM 1 MG: 2 INJECTION INTRAMUSCULAR; INTRAVENOUS at 10:33

## 2019-09-30 RX ADMIN — DILTIAZEM HYDROCHLORIDE 30 MG: 30 TABLET, FILM COATED ORAL at 11:58

## 2019-09-30 RX ADMIN — DILTIAZEM HYDROCHLORIDE 5 MG/HR: 5 INJECTION INTRAVENOUS at 06:54

## 2019-09-30 RX ADMIN — METOCLOPRAMIDE 10 MG: 10 TABLET ORAL at 13:18

## 2019-09-30 RX ADMIN — CARVEDILOL 12.5 MG: 6.25 TABLET, FILM COATED ORAL at 10:18

## 2019-09-30 RX ADMIN — DRONABINOL 2.5 MG: 2.5 CAPSULE ORAL at 06:54

## 2019-09-30 RX ADMIN — Medication 1 CAPSULE: at 17:39

## 2019-09-30 RX ADMIN — Medication 10 ML: at 10:19

## 2019-09-30 RX ADMIN — IPRATROPIUM BROMIDE AND ALBUTEROL SULFATE 3 ML: .5; 3 SOLUTION RESPIRATORY (INHALATION) at 11:48

## 2019-09-30 RX ADMIN — LORAZEPAM 1 MG: 2 INJECTION INTRAMUSCULAR; INTRAVENOUS at 14:43

## 2019-09-30 RX ADMIN — APIXABAN 5 MG: 5 TABLET, FILM COATED ORAL at 10:19

## 2019-09-30 RX ADMIN — Medication 10 ML: at 22:09

## 2019-09-30 RX ADMIN — IPRATROPIUM BROMIDE AND ALBUTEROL SULFATE 3 ML: .5; 3 SOLUTION RESPIRATORY (INHALATION) at 21:27

## 2019-09-30 RX ADMIN — FLECAINIDE ACETATE 50 MG: 50 TABLET ORAL at 22:08

## 2019-09-30 RX ADMIN — INSULIN LISPRO 9 UNITS: 100 INJECTION, SOLUTION INTRAVENOUS; SUBCUTANEOUS at 12:02

## 2019-09-30 RX ADMIN — MAGNESIUM SULFATE HEPTAHYDRATE 2 G: 40 INJECTION, SOLUTION INTRAVENOUS at 10:21

## 2019-09-30 RX ADMIN — DILTIAZEM HYDROCHLORIDE 30 MG: 30 TABLET, FILM COATED ORAL at 17:39

## 2019-09-30 RX ADMIN — CLINDAMYCIN HYDROCHLORIDE 150 MG: 150 CAPSULE ORAL at 13:25

## 2019-09-30 RX ADMIN — CARVEDILOL 12.5 MG: 6.25 TABLET, FILM COATED ORAL at 22:07

## 2019-09-30 RX ADMIN — ASPIRIN 81 MG: 81 TABLET, COATED ORAL at 10:18

## 2019-09-30 RX ADMIN — METRONIDAZOLE 500 MG: 500 INJECTION, SOLUTION INTRAVENOUS at 06:56

## 2019-09-30 RX ADMIN — PANTOPRAZOLE SODIUM 40 MG: 40 TABLET, DELAYED RELEASE ORAL at 06:54

## 2019-09-30 RX ADMIN — ACETAMINOPHEN 650 MG: 325 TABLET, FILM COATED ORAL at 10:33

## 2019-09-30 RX ADMIN — POTASSIUM CHLORIDE 20 MEQ: 20 TABLET, EXTENDED RELEASE ORAL at 10:18

## 2019-09-30 RX ADMIN — VANCOMYCIN HYDROCHLORIDE 750 MG: 750 INJECTION, POWDER, LYOPHILIZED, FOR SOLUTION INTRAVENOUS at 11:31

## 2019-09-30 RX ADMIN — FUROSEMIDE 40 MG: 40 TABLET ORAL at 10:19

## 2019-09-30 ASSESSMENT — PAIN SCALES - GENERAL
PAINLEVEL_OUTOF10: 0
PAINLEVEL_OUTOF10: 2
PAINLEVEL_OUTOF10: 0
PAINLEVEL_OUTOF10: 8
PAINLEVEL_OUTOF10: 0

## 2019-09-30 ASSESSMENT — ENCOUNTER SYMPTOMS
APNEA: 0
COUGH: 0
EYE REDNESS: 0
EYE DISCHARGE: 0
RHINORRHEA: 0
FACIAL SWELLING: 0
NAUSEA: 0
SHORTNESS OF BREATH: 1
BLOOD IN STOOL: 0
PHOTOPHOBIA: 0
CHEST TIGHTNESS: 0
COLOR CHANGE: 0
CHOKING: 0
STRIDOR: 0
ABDOMINAL PAIN: 0
TROUBLE SWALLOWING: 0
DIARRHEA: 0

## 2019-09-30 ASSESSMENT — PAIN DESCRIPTION - LOCATION
LOCATION: HEAD
LOCATION: HEAD

## 2019-09-30 ASSESSMENT — PAIN DESCRIPTION - PAIN TYPE
TYPE: ACUTE PAIN
TYPE: ACUTE PAIN

## 2019-10-01 ENCOUNTER — APPOINTMENT (OUTPATIENT)
Dept: CT IMAGING | Age: 79
DRG: 853 | End: 2019-10-01
Payer: MEDICARE

## 2019-10-01 LAB
AFB CULTURE (MYCOBACTERIA): ABNORMAL
AFB SMEAR: ABNORMAL
ANION GAP SERPL CALCULATED.3IONS-SCNC: 9 MMOL/L (ref 3–16)
ANISOCYTOSIS: ABNORMAL
BASOPHILS ABSOLUTE: 0 K/UL (ref 0–0.2)
BASOPHILS RELATIVE PERCENT: 0 %
BUN BLDV-MCNC: 18 MG/DL (ref 7–20)
CALCIUM SERPL-MCNC: 9.1 MG/DL (ref 8.3–10.6)
CHLORIDE BLD-SCNC: 92 MMOL/L (ref 99–110)
CO2: 39 MMOL/L (ref 21–32)
CREAT SERPL-MCNC: 0.9 MG/DL (ref 0.6–1.2)
EOSINOPHILS ABSOLUTE: 0 K/UL (ref 0–0.6)
EOSINOPHILS RELATIVE PERCENT: 0 %
GFR AFRICAN AMERICAN: >60
GFR NON-AFRICAN AMERICAN: >60
GLUCOSE BLD-MCNC: 181 MG/DL (ref 70–99)
GLUCOSE BLD-MCNC: 195 MG/DL (ref 70–99)
GLUCOSE BLD-MCNC: 195 MG/DL (ref 70–99)
GLUCOSE BLD-MCNC: 211 MG/DL (ref 70–99)
GLUCOSE BLD-MCNC: 237 MG/DL (ref 70–99)
HCT VFR BLD CALC: 35 % (ref 36–48)
HEMOGLOBIN: 10.7 G/DL (ref 12–16)
LYMPHOCYTES ABSOLUTE: 1.1 K/UL (ref 1–5.1)
LYMPHOCYTES RELATIVE PERCENT: 9 %
MAGNESIUM: 1.6 MG/DL (ref 1.8–2.4)
MCH RBC QN AUTO: 27.3 PG (ref 26–34)
MCHC RBC AUTO-ENTMCNC: 30.6 G/DL (ref 31–36)
MCV RBC AUTO: 89.1 FL (ref 80–100)
MONOCYTES ABSOLUTE: 0.6 K/UL (ref 0–1.3)
MONOCYTES RELATIVE PERCENT: 5 %
MYELOCYTE PERCENT: 3 %
NEUTROPHILS ABSOLUTE: 10.8 K/UL (ref 1.7–7.7)
NEUTROPHILS RELATIVE PERCENT: 83 %
ORGANISM: ABNORMAL
OVALOCYTES: ABNORMAL
PDW BLD-RTO: 17.5 % (ref 12.4–15.4)
PERFORMED ON: ABNORMAL
PLATELET # BLD: 399 K/UL (ref 135–450)
PLATELET SLIDE REVIEW: ADEQUATE
PMV BLD AUTO: 7.5 FL (ref 5–10.5)
POLYCHROMASIA: ABNORMAL
POTASSIUM SERPL-SCNC: 3.8 MMOL/L (ref 3.5–5.1)
RBC # BLD: 3.92 M/UL (ref 4–5.2)
SLIDE REVIEW: ABNORMAL
SMUDGE CELLS: PRESENT
SODIUM BLD-SCNC: 140 MMOL/L (ref 136–145)
TOXIC GRANULATION: PRESENT
WBC # BLD: 12.6 K/UL (ref 4–11)

## 2019-10-01 PROCEDURE — 6370000000 HC RX 637 (ALT 250 FOR IP): Performed by: INTERNAL MEDICINE

## 2019-10-01 PROCEDURE — 85025 COMPLETE CBC W/AUTO DIFF WBC: CPT

## 2019-10-01 PROCEDURE — 99233 SBSQ HOSP IP/OBS HIGH 50: CPT | Performed by: INTERNAL MEDICINE

## 2019-10-01 PROCEDURE — 2700000000 HC OXYGEN THERAPY PER DAY

## 2019-10-01 PROCEDURE — 6360000002 HC RX W HCPCS: Performed by: INTERNAL MEDICINE

## 2019-10-01 PROCEDURE — 2060000000 HC ICU INTERMEDIATE R&B

## 2019-10-01 PROCEDURE — 71250 CT THORAX DX C-: CPT

## 2019-10-01 PROCEDURE — 94761 N-INVAS EAR/PLS OXIMETRY MLT: CPT

## 2019-10-01 PROCEDURE — 2580000003 HC RX 258: Performed by: INTERNAL MEDICINE

## 2019-10-01 PROCEDURE — 36415 COLL VENOUS BLD VENIPUNCTURE: CPT

## 2019-10-01 PROCEDURE — 80048 BASIC METABOLIC PNL TOTAL CA: CPT

## 2019-10-01 PROCEDURE — 94640 AIRWAY INHALATION TREATMENT: CPT

## 2019-10-01 PROCEDURE — 83735 ASSAY OF MAGNESIUM: CPT

## 2019-10-01 PROCEDURE — 90686 IIV4 VACC NO PRSV 0.5 ML IM: CPT | Performed by: INTERNAL MEDICINE

## 2019-10-01 PROCEDURE — G0008 ADMIN INFLUENZA VIRUS VAC: HCPCS | Performed by: INTERNAL MEDICINE

## 2019-10-01 PROCEDURE — 94660 CPAP INITIATION&MGMT: CPT

## 2019-10-01 RX ORDER — MAGNESIUM SULFATE IN WATER 40 MG/ML
2 INJECTION, SOLUTION INTRAVENOUS ONCE
Status: COMPLETED | OUTPATIENT
Start: 2019-10-01 | End: 2019-10-01

## 2019-10-01 RX ADMIN — CLINDAMYCIN HYDROCHLORIDE 150 MG: 150 CAPSULE ORAL at 05:47

## 2019-10-01 RX ADMIN — DILTIAZEM HYDROCHLORIDE 30 MG: 30 TABLET, FILM COATED ORAL at 05:47

## 2019-10-01 RX ADMIN — LORAZEPAM 1 MG: 2 INJECTION INTRAMUSCULAR; INTRAVENOUS at 15:57

## 2019-10-01 RX ADMIN — ASPIRIN 81 MG: 81 TABLET, COATED ORAL at 08:50

## 2019-10-01 RX ADMIN — METOCLOPRAMIDE 10 MG: 10 TABLET ORAL at 08:50

## 2019-10-01 RX ADMIN — INSULIN LISPRO 3 UNITS: 100 INJECTION, SOLUTION INTRAVENOUS; SUBCUTANEOUS at 17:08

## 2019-10-01 RX ADMIN — DILTIAZEM HYDROCHLORIDE 30 MG: 30 TABLET, FILM COATED ORAL at 23:58

## 2019-10-01 RX ADMIN — FLECAINIDE ACETATE 50 MG: 50 TABLET ORAL at 22:01

## 2019-10-01 RX ADMIN — FUROSEMIDE 40 MG: 40 TABLET ORAL at 17:06

## 2019-10-01 RX ADMIN — CLINDAMYCIN HYDROCHLORIDE 150 MG: 150 CAPSULE ORAL at 23:58

## 2019-10-01 RX ADMIN — CARVEDILOL 12.5 MG: 6.25 TABLET, FILM COATED ORAL at 08:54

## 2019-10-01 RX ADMIN — FUROSEMIDE 40 MG: 40 TABLET ORAL at 08:50

## 2019-10-01 RX ADMIN — MAGNESIUM SULFATE HEPTAHYDRATE 2 G: 40 INJECTION, SOLUTION INTRAVENOUS at 08:57

## 2019-10-01 RX ADMIN — LORAZEPAM 1 MG: 2 INJECTION INTRAMUSCULAR; INTRAVENOUS at 08:53

## 2019-10-01 RX ADMIN — IPRATROPIUM BROMIDE AND ALBUTEROL SULFATE 3 ML: .5; 3 SOLUTION RESPIRATORY (INHALATION) at 20:06

## 2019-10-01 RX ADMIN — Medication 1 CAPSULE: at 08:50

## 2019-10-01 RX ADMIN — APIXABAN 5 MG: 5 TABLET, FILM COATED ORAL at 21:38

## 2019-10-01 RX ADMIN — FLECAINIDE ACETATE 50 MG: 50 TABLET ORAL at 08:52

## 2019-10-01 RX ADMIN — IPRATROPIUM BROMIDE AND ALBUTEROL SULFATE 3 ML: .5; 3 SOLUTION RESPIRATORY (INHALATION) at 07:26

## 2019-10-01 RX ADMIN — METOCLOPRAMIDE 10 MG: 10 TABLET ORAL at 21:38

## 2019-10-01 RX ADMIN — CLINDAMYCIN HYDROCHLORIDE 150 MG: 150 CAPSULE ORAL at 17:06

## 2019-10-01 RX ADMIN — APIXABAN 5 MG: 5 TABLET, FILM COATED ORAL at 08:50

## 2019-10-01 RX ADMIN — CLINDAMYCIN HYDROCHLORIDE 150 MG: 150 CAPSULE ORAL at 13:36

## 2019-10-01 RX ADMIN — INSULIN LISPRO 3 UNITS: 100 INJECTION, SOLUTION INTRAVENOUS; SUBCUTANEOUS at 21:39

## 2019-10-01 RX ADMIN — METOCLOPRAMIDE 10 MG: 10 TABLET ORAL at 13:36

## 2019-10-01 RX ADMIN — INFLUENZA A VIRUS A/BRISBANE/02/2018 IVR-190 (H1N1) ANTIGEN (PROPIOLACTONE INACTIVATED), INFLUENZA A VIRUS A/KANSAS/14/2017 X-327 (H3N2) ANTIGEN (PROPIOLACTONE INACTIVATED), INFLUENZA B VIRUS B/MARYLAND/15/2016 ANTIGEN (PROPIOLACTONE INACTIVATED), INFLUENZA B VIRUS B/PHUKET/3073/2013 BVR-1B ANTIGEN (PROPIOLACTONE INACTIVATED) 0.5 ML: 15; 15; 15; 15 INJECTION, SUSPENSION INTRAMUSCULAR at 09:02

## 2019-10-01 RX ADMIN — METOCLOPRAMIDE 10 MG: 10 TABLET ORAL at 17:06

## 2019-10-01 RX ADMIN — DRONABINOL 2.5 MG: 2.5 CAPSULE ORAL at 17:06

## 2019-10-01 RX ADMIN — PANTOPRAZOLE SODIUM 40 MG: 40 TABLET, DELAYED RELEASE ORAL at 05:47

## 2019-10-01 RX ADMIN — POTASSIUM CHLORIDE 20 MEQ: 20 TABLET, EXTENDED RELEASE ORAL at 08:50

## 2019-10-01 RX ADMIN — Medication 1 CAPSULE: at 17:06

## 2019-10-01 RX ADMIN — DRONABINOL 2.5 MG: 2.5 CAPSULE ORAL at 05:47

## 2019-10-01 RX ADMIN — DILTIAZEM HYDROCHLORIDE 30 MG: 30 TABLET, FILM COATED ORAL at 17:06

## 2019-10-01 RX ADMIN — IPRATROPIUM BROMIDE AND ALBUTEROL SULFATE 3 ML: .5; 3 SOLUTION RESPIRATORY (INHALATION) at 16:33

## 2019-10-01 RX ADMIN — Medication 10 ML: at 08:55

## 2019-10-01 RX ADMIN — DILTIAZEM HYDROCHLORIDE 30 MG: 30 TABLET, FILM COATED ORAL at 00:54

## 2019-10-01 RX ADMIN — Medication 10 ML: at 21:39

## 2019-10-01 RX ADMIN — CLINDAMYCIN HYDROCHLORIDE 150 MG: 150 CAPSULE ORAL at 00:55

## 2019-10-01 RX ADMIN — CARVEDILOL 12.5 MG: 6.25 TABLET, FILM COATED ORAL at 21:38

## 2019-10-01 RX ADMIN — DILTIAZEM HYDROCHLORIDE 30 MG: 30 TABLET, FILM COATED ORAL at 13:36

## 2019-10-01 ASSESSMENT — ENCOUNTER SYMPTOMS
COLOR CHANGE: 0
NAUSEA: 0
BLOOD IN STOOL: 0
EYE DISCHARGE: 0
SHORTNESS OF BREATH: 0
TROUBLE SWALLOWING: 0
DIARRHEA: 0
PHOTOPHOBIA: 0
FACIAL SWELLING: 0
EYE REDNESS: 0
APNEA: 0
ABDOMINAL PAIN: 0
RHINORRHEA: 0
COUGH: 0
CHEST TIGHTNESS: 0
STRIDOR: 0
CHOKING: 0

## 2019-10-01 ASSESSMENT — PAIN SCALES - GENERAL: PAINLEVEL_OUTOF10: 0

## 2019-10-02 LAB
ADENOVIRUS PCR: NOT DETECTED
ANION GAP SERPL CALCULATED.3IONS-SCNC: 12 MMOL/L (ref 3–16)
BASOPHILS ABSOLUTE: 0 K/UL (ref 0–0.2)
BASOPHILS RELATIVE PERCENT: 0.3 %
BILIRUBIN URINE: NEGATIVE
BLOOD CULTURE, ROUTINE: NORMAL
BLOOD, URINE: NEGATIVE
BUN BLDV-MCNC: 26 MG/DL (ref 7–20)
CALCIUM SERPL-MCNC: 9.3 MG/DL (ref 8.3–10.6)
CHLORIDE BLD-SCNC: 89 MMOL/L (ref 99–110)
CLARITY: CLEAR
CO2: 37 MMOL/L (ref 21–32)
COLOR: YELLOW
CREAT SERPL-MCNC: 1 MG/DL (ref 0.6–1.2)
EOSINOPHILS ABSOLUTE: 0.8 K/UL (ref 0–0.6)
EOSINOPHILS RELATIVE PERCENT: 7 %
EPITHELIAL CELLS, UA: 2 /HPF (ref 0–5)
GFR AFRICAN AMERICAN: >60
GFR NON-AFRICAN AMERICAN: 54
GLUCOSE BLD-MCNC: 123 MG/DL (ref 70–99)
GLUCOSE BLD-MCNC: 185 MG/DL (ref 70–99)
GLUCOSE BLD-MCNC: 194 MG/DL (ref 70–99)
GLUCOSE BLD-MCNC: 245 MG/DL (ref 70–99)
GLUCOSE BLD-MCNC: 296 MG/DL (ref 70–99)
GLUCOSE URINE: NEGATIVE MG/DL
HCT VFR BLD CALC: 34.8 % (ref 36–48)
HEMOGLOBIN: 11 G/DL (ref 12–16)
HUMAN METAPNEUMOVIRUS PCR: NOT DETECTED
HYALINE CASTS: 4 /LPF (ref 0–8)
INFLUENZA A: NOT DETECTED
INFLUENZA B: NOT DETECTED
KETONES, URINE: NEGATIVE MG/DL
LEUKOCYTE ESTERASE, URINE: ABNORMAL
LYMPHOCYTES ABSOLUTE: 1.6 K/UL (ref 1–5.1)
LYMPHOCYTES RELATIVE PERCENT: 14.5 %
MAGNESIUM: 2 MG/DL (ref 1.8–2.4)
MCH RBC QN AUTO: 27.8 PG (ref 26–34)
MCHC RBC AUTO-ENTMCNC: 31.5 G/DL (ref 31–36)
MCV RBC AUTO: 88.3 FL (ref 80–100)
MICROSCOPIC EXAMINATION: YES
MONOCYTES ABSOLUTE: 0.7 K/UL (ref 0–1.3)
MONOCYTES RELATIVE PERCENT: 6.6 %
NEUTROPHILS ABSOLUTE: 7.7 K/UL (ref 1.7–7.7)
NEUTROPHILS RELATIVE PERCENT: 71.6 %
NITRITE, URINE: NEGATIVE
PARAINFLUENZA 1 PCR: NOT DETECTED
PARAINFLUENZA 2 PCR: NOT DETECTED
PARAINFLUENZA 3 PCR: NOT DETECTED
PARAINFLUENZA 4 PCR: NOT DETECTED
PDW BLD-RTO: 18.3 % (ref 12.4–15.4)
PERFORMED ON: ABNORMAL
PH UA: 5.5 (ref 5–8)
PLATELET # BLD: 412 K/UL (ref 135–450)
PMV BLD AUTO: 7.7 FL (ref 5–10.5)
POTASSIUM REFLEX MAGNESIUM: 3.4 MMOL/L (ref 3.5–5.1)
PROTEIN UA: NEGATIVE MG/DL
RBC # BLD: 3.95 M/UL (ref 4–5.2)
RBC UA: 1 /HPF (ref 0–4)
RHINO/ENTEROVIRUS PCR: NOT DETECTED
RSV BY PCR: NOT DETECTED
RSV SOURCE: NORMAL
SODIUM BLD-SCNC: 138 MMOL/L (ref 136–145)
SPECIFIC GRAVITY UA: 1.02 (ref 1–1.03)
URINE REFLEX TO CULTURE: YES
URINE TYPE: ABNORMAL
UROBILINOGEN, URINE: 0.2 E.U./DL
WBC # BLD: 10.8 K/UL (ref 4–11)
WBC UA: 8 /HPF (ref 0–5)

## 2019-10-02 PROCEDURE — 6370000000 HC RX 637 (ALT 250 FOR IP): Performed by: INTERNAL MEDICINE

## 2019-10-02 PROCEDURE — 6360000002 HC RX W HCPCS: Performed by: INTERNAL MEDICINE

## 2019-10-02 PROCEDURE — 2580000003 HC RX 258: Performed by: INTERNAL MEDICINE

## 2019-10-02 PROCEDURE — 83735 ASSAY OF MAGNESIUM: CPT

## 2019-10-02 PROCEDURE — 94761 N-INVAS EAR/PLS OXIMETRY MLT: CPT

## 2019-10-02 PROCEDURE — 2060000000 HC ICU INTERMEDIATE R&B

## 2019-10-02 PROCEDURE — 97161 PT EVAL LOW COMPLEX 20 MIN: CPT

## 2019-10-02 PROCEDURE — 94640 AIRWAY INHALATION TREATMENT: CPT

## 2019-10-02 PROCEDURE — 81001 URINALYSIS AUTO W/SCOPE: CPT

## 2019-10-02 PROCEDURE — 36415 COLL VENOUS BLD VENIPUNCTURE: CPT

## 2019-10-02 PROCEDURE — 99232 SBSQ HOSP IP/OBS MODERATE 35: CPT | Performed by: INTERNAL MEDICINE

## 2019-10-02 PROCEDURE — 99221 1ST HOSP IP/OBS SF/LOW 40: CPT | Performed by: PSYCHIATRY & NEUROLOGY

## 2019-10-02 PROCEDURE — 97530 THERAPEUTIC ACTIVITIES: CPT

## 2019-10-02 PROCEDURE — 85025 COMPLETE CBC W/AUTO DIFF WBC: CPT

## 2019-10-02 PROCEDURE — 87086 URINE CULTURE/COLONY COUNT: CPT

## 2019-10-02 PROCEDURE — 6370000000 HC RX 637 (ALT 250 FOR IP): Performed by: PSYCHIATRY & NEUROLOGY

## 2019-10-02 PROCEDURE — 2700000000 HC OXYGEN THERAPY PER DAY

## 2019-10-02 PROCEDURE — 80048 BASIC METABOLIC PNL TOTAL CA: CPT

## 2019-10-02 PROCEDURE — 97165 OT EVAL LOW COMPLEX 30 MIN: CPT

## 2019-10-02 RX ORDER — CARVEDILOL 25 MG/1
25 TABLET ORAL 2 TIMES DAILY
Status: DISCONTINUED | OUTPATIENT
Start: 2019-10-02 | End: 2019-10-08 | Stop reason: HOSPADM

## 2019-10-02 RX ORDER — MIRTAZAPINE 15 MG/1
7.5 TABLET, FILM COATED ORAL NIGHTLY
Status: DISCONTINUED | OUTPATIENT
Start: 2019-10-02 | End: 2019-10-04

## 2019-10-02 RX ORDER — LORAZEPAM 0.5 MG/1
0.5 TABLET ORAL 3 TIMES DAILY PRN
Status: DISCONTINUED | OUTPATIENT
Start: 2019-10-02 | End: 2019-10-04

## 2019-10-02 RX ORDER — LORAZEPAM 0.5 MG/1
0.5 TABLET ORAL 2 TIMES DAILY PRN
Status: DISCONTINUED | OUTPATIENT
Start: 2019-10-04 | End: 2019-10-04

## 2019-10-02 RX ORDER — BUSPIRONE HYDROCHLORIDE 5 MG/1
5 TABLET ORAL 2 TIMES DAILY
Status: DISCONTINUED | OUTPATIENT
Start: 2019-10-02 | End: 2019-10-08 | Stop reason: HOSPADM

## 2019-10-02 RX ADMIN — INSULIN LISPRO 3 UNITS: 100 INJECTION, SOLUTION INTRAVENOUS; SUBCUTANEOUS at 09:54

## 2019-10-02 RX ADMIN — Medication 1 CAPSULE: at 09:49

## 2019-10-02 RX ADMIN — INSULIN LISPRO 3 UNITS: 100 INJECTION, SOLUTION INTRAVENOUS; SUBCUTANEOUS at 20:48

## 2019-10-02 RX ADMIN — INSULIN LISPRO 9 UNITS: 100 INJECTION, SOLUTION INTRAVENOUS; SUBCUTANEOUS at 12:31

## 2019-10-02 RX ADMIN — LORAZEPAM 1 MG: 2 INJECTION INTRAMUSCULAR; INTRAVENOUS at 09:54

## 2019-10-02 RX ADMIN — Medication 10 ML: at 20:46

## 2019-10-02 RX ADMIN — POTASSIUM CHLORIDE 40 MEQ: 20 TABLET, EXTENDED RELEASE ORAL at 09:52

## 2019-10-02 RX ADMIN — CLINDAMYCIN HYDROCHLORIDE 150 MG: 150 CAPSULE ORAL at 17:55

## 2019-10-02 RX ADMIN — MIRTAZAPINE 7.5 MG: 15 TABLET, FILM COATED ORAL at 20:45

## 2019-10-02 RX ADMIN — Medication 1 CAPSULE: at 17:55

## 2019-10-02 RX ADMIN — CLINDAMYCIN HYDROCHLORIDE 150 MG: 150 CAPSULE ORAL at 12:31

## 2019-10-02 RX ADMIN — FLECAINIDE ACETATE 50 MG: 50 TABLET ORAL at 09:51

## 2019-10-02 RX ADMIN — Medication 10 ML: at 09:53

## 2019-10-02 RX ADMIN — APIXABAN 5 MG: 5 TABLET, FILM COATED ORAL at 20:45

## 2019-10-02 RX ADMIN — POTASSIUM CHLORIDE 20 MEQ: 20 TABLET, EXTENDED RELEASE ORAL at 09:52

## 2019-10-02 RX ADMIN — BUSPIRONE HYDROCHLORIDE 5 MG: 5 TABLET ORAL at 20:44

## 2019-10-02 RX ADMIN — CARVEDILOL 12.5 MG: 6.25 TABLET, FILM COATED ORAL at 09:51

## 2019-10-02 RX ADMIN — ASPIRIN 81 MG: 81 TABLET, COATED ORAL at 09:52

## 2019-10-02 RX ADMIN — IPRATROPIUM BROMIDE AND ALBUTEROL SULFATE 3 ML: .5; 3 SOLUTION RESPIRATORY (INHALATION) at 08:06

## 2019-10-02 RX ADMIN — DILTIAZEM HYDROCHLORIDE 30 MG: 30 TABLET, FILM COATED ORAL at 23:32

## 2019-10-02 RX ADMIN — CARVEDILOL 25 MG: 25 TABLET, FILM COATED ORAL at 20:44

## 2019-10-02 RX ADMIN — CLINDAMYCIN HYDROCHLORIDE 150 MG: 150 CAPSULE ORAL at 06:34

## 2019-10-02 RX ADMIN — FUROSEMIDE 40 MG: 40 TABLET ORAL at 09:51

## 2019-10-02 RX ADMIN — IPRATROPIUM BROMIDE AND ALBUTEROL SULFATE 3 ML: .5; 3 SOLUTION RESPIRATORY (INHALATION) at 19:54

## 2019-10-02 RX ADMIN — DRONABINOL 2.5 MG: 2.5 CAPSULE ORAL at 06:34

## 2019-10-02 RX ADMIN — METOCLOPRAMIDE 10 MG: 10 TABLET ORAL at 09:52

## 2019-10-02 RX ADMIN — METOCLOPRAMIDE 10 MG: 10 TABLET ORAL at 12:31

## 2019-10-02 RX ADMIN — IPRATROPIUM BROMIDE AND ALBUTEROL SULFATE 3 ML: .5; 3 SOLUTION RESPIRATORY (INHALATION) at 11:30

## 2019-10-02 RX ADMIN — METOCLOPRAMIDE 10 MG: 10 TABLET ORAL at 17:55

## 2019-10-02 RX ADMIN — APIXABAN 5 MG: 5 TABLET, FILM COATED ORAL at 09:51

## 2019-10-02 RX ADMIN — FLECAINIDE ACETATE 50 MG: 50 TABLET ORAL at 20:44

## 2019-10-02 RX ADMIN — PANTOPRAZOLE SODIUM 40 MG: 40 TABLET, DELAYED RELEASE ORAL at 06:34

## 2019-10-02 RX ADMIN — METOCLOPRAMIDE 10 MG: 10 TABLET ORAL at 20:45

## 2019-10-02 RX ADMIN — DRONABINOL 2.5 MG: 2.5 CAPSULE ORAL at 17:55

## 2019-10-02 RX ADMIN — DILTIAZEM HYDROCHLORIDE 30 MG: 30 TABLET, FILM COATED ORAL at 12:31

## 2019-10-02 RX ADMIN — CLINDAMYCIN HYDROCHLORIDE 150 MG: 150 CAPSULE ORAL at 23:32

## 2019-10-02 RX ADMIN — FUROSEMIDE 40 MG: 40 TABLET ORAL at 17:55

## 2019-10-02 RX ADMIN — IPRATROPIUM BROMIDE AND ALBUTEROL SULFATE 3 ML: .5; 3 SOLUTION RESPIRATORY (INHALATION) at 15:27

## 2019-10-02 RX ADMIN — DILTIAZEM HYDROCHLORIDE 30 MG: 30 TABLET, FILM COATED ORAL at 06:34

## 2019-10-02 RX ADMIN — DILTIAZEM HYDROCHLORIDE 30 MG: 30 TABLET, FILM COATED ORAL at 17:55

## 2019-10-02 ASSESSMENT — PAIN SCALES - GENERAL
PAINLEVEL_OUTOF10: 6
PAINLEVEL_OUTOF10: 0

## 2019-10-02 ASSESSMENT — ENCOUNTER SYMPTOMS
BLOOD IN STOOL: 0
ABDOMINAL PAIN: 0
PHOTOPHOBIA: 0
TROUBLE SWALLOWING: 0
APNEA: 0
EYE REDNESS: 0
COLOR CHANGE: 0
COUGH: 0
FACIAL SWELLING: 0
NAUSEA: 0
CHOKING: 0
DIARRHEA: 0
CHEST TIGHTNESS: 0
SHORTNESS OF BREATH: 0
RHINORRHEA: 0
STRIDOR: 0
EYE DISCHARGE: 0

## 2019-10-02 ASSESSMENT — PAIN DESCRIPTION - PAIN TYPE: TYPE: ACUTE PAIN

## 2019-10-02 ASSESSMENT — PAIN DESCRIPTION - LOCATION: LOCATION: NECK

## 2019-10-03 LAB
ANION GAP SERPL CALCULATED.3IONS-SCNC: 10 MMOL/L (ref 3–16)
ANISOCYTOSIS: ABNORMAL
ATYPICAL LYMPHOCYTE RELATIVE PERCENT: 1 % (ref 0–6)
BANDED NEUTROPHILS RELATIVE PERCENT: 2 % (ref 0–7)
BASOPHILS ABSOLUTE: 0 K/UL (ref 0–0.2)
BASOPHILS RELATIVE PERCENT: 0 %
BUN BLDV-MCNC: 20 MG/DL (ref 7–20)
CALCIUM SERPL-MCNC: 9.1 MG/DL (ref 8.3–10.6)
CHLORIDE BLD-SCNC: 92 MMOL/L (ref 99–110)
CO2: 37 MMOL/L (ref 21–32)
CREAT SERPL-MCNC: 0.9 MG/DL (ref 0.6–1.2)
EOSINOPHILS ABSOLUTE: 0.7 K/UL (ref 0–0.6)
EOSINOPHILS RELATIVE PERCENT: 8 %
GFR AFRICAN AMERICAN: >60
GFR NON-AFRICAN AMERICAN: >60
GLUCOSE BLD-MCNC: 124 MG/DL (ref 70–99)
GLUCOSE BLD-MCNC: 162 MG/DL (ref 70–99)
GLUCOSE BLD-MCNC: 186 MG/DL (ref 70–99)
GLUCOSE BLD-MCNC: 190 MG/DL (ref 70–99)
GLUCOSE BLD-MCNC: 199 MG/DL (ref 70–99)
HCT VFR BLD CALC: 31.9 % (ref 36–48)
HEMOGLOBIN: 10.2 G/DL (ref 12–16)
HYPOCHROMIA: ABNORMAL
LYMPHOCYTES ABSOLUTE: 1.1 K/UL (ref 1–5.1)
LYMPHOCYTES RELATIVE PERCENT: 11 %
MAGNESIUM: 1.6 MG/DL (ref 1.8–2.4)
MCH RBC QN AUTO: 28.1 PG (ref 26–34)
MCHC RBC AUTO-ENTMCNC: 32 G/DL (ref 31–36)
MCV RBC AUTO: 87.9 FL (ref 80–100)
MONOCYTES ABSOLUTE: 0.6 K/UL (ref 0–1.3)
MONOCYTES RELATIVE PERCENT: 7 %
NEUTROPHILS ABSOLUTE: 6.6 K/UL (ref 1.7–7.7)
NEUTROPHILS RELATIVE PERCENT: 71 %
OVALOCYTES: ABNORMAL
PDW BLD-RTO: 17.7 % (ref 12.4–15.4)
PERFORMED ON: ABNORMAL
PLATELET # BLD: 352 K/UL (ref 135–450)
PLATELET SLIDE REVIEW: ADEQUATE
PMV BLD AUTO: 7.3 FL (ref 5–10.5)
POLYCHROMASIA: ABNORMAL
POTASSIUM REFLEX MAGNESIUM: 3.4 MMOL/L (ref 3.5–5.1)
RBC # BLD: 3.63 M/UL (ref 4–5.2)
SLIDE REVIEW: ABNORMAL
SODIUM BLD-SCNC: 139 MMOL/L (ref 136–145)
TOXIC GRANULATION: PRESENT
WBC # BLD: 9.1 K/UL (ref 4–11)

## 2019-10-03 PROCEDURE — 99231 SBSQ HOSP IP/OBS SF/LOW 25: CPT | Performed by: INTERNAL MEDICINE

## 2019-10-03 PROCEDURE — 99231 SBSQ HOSP IP/OBS SF/LOW 25: CPT | Performed by: PSYCHIATRY & NEUROLOGY

## 2019-10-03 PROCEDURE — 2580000003 HC RX 258: Performed by: INTERNAL MEDICINE

## 2019-10-03 PROCEDURE — 94761 N-INVAS EAR/PLS OXIMETRY MLT: CPT

## 2019-10-03 PROCEDURE — 6370000000 HC RX 637 (ALT 250 FOR IP): Performed by: PSYCHIATRY & NEUROLOGY

## 2019-10-03 PROCEDURE — 94640 AIRWAY INHALATION TREATMENT: CPT

## 2019-10-03 PROCEDURE — 2700000000 HC OXYGEN THERAPY PER DAY

## 2019-10-03 PROCEDURE — 97530 THERAPEUTIC ACTIVITIES: CPT

## 2019-10-03 PROCEDURE — 6370000000 HC RX 637 (ALT 250 FOR IP): Performed by: INTERNAL MEDICINE

## 2019-10-03 PROCEDURE — 85025 COMPLETE CBC W/AUTO DIFF WBC: CPT

## 2019-10-03 PROCEDURE — 6360000002 HC RX W HCPCS: Performed by: INTERNAL MEDICINE

## 2019-10-03 PROCEDURE — 83735 ASSAY OF MAGNESIUM: CPT

## 2019-10-03 PROCEDURE — 2060000000 HC ICU INTERMEDIATE R&B

## 2019-10-03 PROCEDURE — 36415 COLL VENOUS BLD VENIPUNCTURE: CPT

## 2019-10-03 PROCEDURE — 80048 BASIC METABOLIC PNL TOTAL CA: CPT

## 2019-10-03 RX ORDER — CLINDAMYCIN HYDROCHLORIDE 150 MG/1
150 CAPSULE ORAL EVERY 6 HOURS SCHEDULED
Status: COMPLETED | OUTPATIENT
Start: 2019-10-03 | End: 2019-10-07

## 2019-10-03 RX ORDER — MAGNESIUM SULFATE IN WATER 40 MG/ML
2 INJECTION, SOLUTION INTRAVENOUS ONCE
Status: COMPLETED | OUTPATIENT
Start: 2019-10-03 | End: 2019-10-03

## 2019-10-03 RX ADMIN — IPRATROPIUM BROMIDE AND ALBUTEROL SULFATE 3 ML: .5; 3 SOLUTION RESPIRATORY (INHALATION) at 21:27

## 2019-10-03 RX ADMIN — BUSPIRONE HYDROCHLORIDE 5 MG: 5 TABLET ORAL at 08:10

## 2019-10-03 RX ADMIN — INSULIN LISPRO 3 UNITS: 100 INJECTION, SOLUTION INTRAVENOUS; SUBCUTANEOUS at 12:19

## 2019-10-03 RX ADMIN — POTASSIUM CHLORIDE 20 MEQ: 20 TABLET, EXTENDED RELEASE ORAL at 08:11

## 2019-10-03 RX ADMIN — DILTIAZEM HYDROCHLORIDE 30 MG: 30 TABLET, FILM COATED ORAL at 12:18

## 2019-10-03 RX ADMIN — METOCLOPRAMIDE 10 MG: 10 TABLET ORAL at 20:22

## 2019-10-03 RX ADMIN — FLECAINIDE ACETATE 50 MG: 50 TABLET ORAL at 20:21

## 2019-10-03 RX ADMIN — IPRATROPIUM BROMIDE AND ALBUTEROL SULFATE 3 ML: .5; 3 SOLUTION RESPIRATORY (INHALATION) at 12:20

## 2019-10-03 RX ADMIN — PANTOPRAZOLE SODIUM 40 MG: 40 TABLET, DELAYED RELEASE ORAL at 07:42

## 2019-10-03 RX ADMIN — DRONABINOL 2.5 MG: 2.5 CAPSULE ORAL at 17:13

## 2019-10-03 RX ADMIN — APIXABAN 5 MG: 5 TABLET, FILM COATED ORAL at 20:22

## 2019-10-03 RX ADMIN — FLECAINIDE ACETATE 50 MG: 50 TABLET ORAL at 08:10

## 2019-10-03 RX ADMIN — INSULIN LISPRO 3 UNITS: 100 INJECTION, SOLUTION INTRAVENOUS; SUBCUTANEOUS at 08:15

## 2019-10-03 RX ADMIN — IPRATROPIUM BROMIDE AND ALBUTEROL SULFATE 3 ML: .5; 3 SOLUTION RESPIRATORY (INHALATION) at 08:45

## 2019-10-03 RX ADMIN — CARVEDILOL 25 MG: 25 TABLET, FILM COATED ORAL at 20:23

## 2019-10-03 RX ADMIN — IPRATROPIUM BROMIDE AND ALBUTEROL SULFATE 3 ML: .5; 3 SOLUTION RESPIRATORY (INHALATION) at 16:48

## 2019-10-03 RX ADMIN — METOCLOPRAMIDE 10 MG: 10 TABLET ORAL at 17:13

## 2019-10-03 RX ADMIN — CLINDAMYCIN HYDROCHLORIDE 150 MG: 150 CAPSULE ORAL at 12:18

## 2019-10-03 RX ADMIN — POTASSIUM CHLORIDE 40 MEQ: 20 TABLET, EXTENDED RELEASE ORAL at 08:10

## 2019-10-03 RX ADMIN — Medication 1 CAPSULE: at 17:13

## 2019-10-03 RX ADMIN — LORAZEPAM 0.5 MG: 0.5 TABLET ORAL at 17:13

## 2019-10-03 RX ADMIN — DILTIAZEM HYDROCHLORIDE 30 MG: 30 TABLET, FILM COATED ORAL at 17:13

## 2019-10-03 RX ADMIN — Medication 1 CAPSULE: at 08:11

## 2019-10-03 RX ADMIN — FUROSEMIDE 40 MG: 40 TABLET ORAL at 08:10

## 2019-10-03 RX ADMIN — APIXABAN 5 MG: 5 TABLET, FILM COATED ORAL at 08:10

## 2019-10-03 RX ADMIN — METOCLOPRAMIDE 10 MG: 10 TABLET ORAL at 12:18

## 2019-10-03 RX ADMIN — INSULIN LISPRO 3 UNITS: 100 INJECTION, SOLUTION INTRAVENOUS; SUBCUTANEOUS at 17:16

## 2019-10-03 RX ADMIN — METOCLOPRAMIDE 10 MG: 10 TABLET ORAL at 08:11

## 2019-10-03 RX ADMIN — CLINDAMYCIN HYDROCHLORIDE 150 MG: 150 CAPSULE ORAL at 07:51

## 2019-10-03 RX ADMIN — DILTIAZEM HYDROCHLORIDE 30 MG: 30 TABLET, FILM COATED ORAL at 07:42

## 2019-10-03 RX ADMIN — FUROSEMIDE 40 MG: 40 TABLET ORAL at 17:13

## 2019-10-03 RX ADMIN — Medication 10 ML: at 20:23

## 2019-10-03 RX ADMIN — BUSPIRONE HYDROCHLORIDE 5 MG: 5 TABLET ORAL at 20:21

## 2019-10-03 RX ADMIN — MIRTAZAPINE 7.5 MG: 15 TABLET, FILM COATED ORAL at 20:23

## 2019-10-03 RX ADMIN — CARVEDILOL 25 MG: 25 TABLET, FILM COATED ORAL at 08:11

## 2019-10-03 RX ADMIN — CLINDAMYCIN HYDROCHLORIDE 150 MG: 150 CAPSULE ORAL at 17:14

## 2019-10-03 RX ADMIN — Medication 10 ML: at 08:16

## 2019-10-03 RX ADMIN — MAGNESIUM SULFATE HEPTAHYDRATE 2 G: 40 INJECTION, SOLUTION INTRAVENOUS at 08:16

## 2019-10-03 RX ADMIN — DRONABINOL 2.5 MG: 2.5 CAPSULE ORAL at 07:42

## 2019-10-03 RX ADMIN — ASPIRIN 81 MG: 81 TABLET, COATED ORAL at 08:10

## 2019-10-03 ASSESSMENT — ENCOUNTER SYMPTOMS
CHOKING: 0
PHOTOPHOBIA: 0
DIARRHEA: 0
COUGH: 0
BLOOD IN STOOL: 0
SHORTNESS OF BREATH: 0
COLOR CHANGE: 0
CHEST TIGHTNESS: 0
FACIAL SWELLING: 0
RHINORRHEA: 0
ABDOMINAL PAIN: 0
APNEA: 0
TROUBLE SWALLOWING: 0
EYE DISCHARGE: 0
NAUSEA: 0
EYE REDNESS: 0
STRIDOR: 0

## 2019-10-03 ASSESSMENT — PAIN SCALES - GENERAL
PAINLEVEL_OUTOF10: 3
PAINLEVEL_OUTOF10: 0

## 2019-10-03 ASSESSMENT — PAIN DESCRIPTION - LOCATION: LOCATION: GENERALIZED

## 2019-10-03 ASSESSMENT — PAIN DESCRIPTION - PAIN TYPE: TYPE: ACUTE PAIN

## 2019-10-04 LAB
ANION GAP SERPL CALCULATED.3IONS-SCNC: 14 MMOL/L (ref 3–16)
BASOPHILS ABSOLUTE: 0 K/UL (ref 0–0.2)
BASOPHILS RELATIVE PERCENT: 0.3 %
BUN BLDV-MCNC: 18 MG/DL (ref 7–20)
CALCIUM SERPL-MCNC: 9 MG/DL (ref 8.3–10.6)
CHLORIDE BLD-SCNC: 91 MMOL/L (ref 99–110)
CO2: 32 MMOL/L (ref 21–32)
CREAT SERPL-MCNC: 1 MG/DL (ref 0.6–1.2)
EOSINOPHILS ABSOLUTE: 0.7 K/UL (ref 0–0.6)
EOSINOPHILS RELATIVE PERCENT: 7.5 %
GFR AFRICAN AMERICAN: >60
GFR NON-AFRICAN AMERICAN: 54
GLUCOSE BLD-MCNC: 169 MG/DL (ref 70–99)
GLUCOSE BLD-MCNC: 179 MG/DL (ref 70–99)
GLUCOSE BLD-MCNC: 180 MG/DL (ref 70–99)
GLUCOSE BLD-MCNC: 198 MG/DL (ref 70–99)
GLUCOSE BLD-MCNC: 230 MG/DL (ref 70–99)
HCT VFR BLD CALC: 31.2 % (ref 36–48)
HEMOGLOBIN: 9.9 G/DL (ref 12–16)
LYMPHOCYTES ABSOLUTE: 1.3 K/UL (ref 1–5.1)
LYMPHOCYTES RELATIVE PERCENT: 13.8 %
MAGNESIUM: 1.9 MG/DL (ref 1.8–2.4)
MCH RBC QN AUTO: 27.9 PG (ref 26–34)
MCHC RBC AUTO-ENTMCNC: 31.8 G/DL (ref 31–36)
MCV RBC AUTO: 87.9 FL (ref 80–100)
MONOCYTES ABSOLUTE: 0.9 K/UL (ref 0–1.3)
MONOCYTES RELATIVE PERCENT: 9.6 %
NEUTROPHILS ABSOLUTE: 6.7 K/UL (ref 1.7–7.7)
NEUTROPHILS RELATIVE PERCENT: 68.8 %
ORGANISM: ABNORMAL
PDW BLD-RTO: 18.2 % (ref 12.4–15.4)
PERFORMED ON: ABNORMAL
PLATELET # BLD: 322 K/UL (ref 135–450)
PMV BLD AUTO: 7.5 FL (ref 5–10.5)
POTASSIUM REFLEX MAGNESIUM: 3.7 MMOL/L (ref 3.5–5.1)
POTASSIUM SERPL-SCNC: 3.7 MMOL/L (ref 3.5–5.1)
RBC # BLD: 3.55 M/UL (ref 4–5.2)
SODIUM BLD-SCNC: 137 MMOL/L (ref 136–145)
URINE CULTURE, ROUTINE: ABNORMAL
WBC # BLD: 9.7 K/UL (ref 4–11)

## 2019-10-04 PROCEDURE — 2580000003 HC RX 258: Performed by: INTERNAL MEDICINE

## 2019-10-04 PROCEDURE — 2060000000 HC ICU INTERMEDIATE R&B

## 2019-10-04 PROCEDURE — 6370000000 HC RX 637 (ALT 250 FOR IP): Performed by: INTERNAL MEDICINE

## 2019-10-04 PROCEDURE — 83735 ASSAY OF MAGNESIUM: CPT

## 2019-10-04 PROCEDURE — 94640 AIRWAY INHALATION TREATMENT: CPT

## 2019-10-04 PROCEDURE — 85025 COMPLETE CBC W/AUTO DIFF WBC: CPT

## 2019-10-04 PROCEDURE — 99231 SBSQ HOSP IP/OBS SF/LOW 25: CPT | Performed by: PSYCHIATRY & NEUROLOGY

## 2019-10-04 PROCEDURE — 6370000000 HC RX 637 (ALT 250 FOR IP): Performed by: PSYCHIATRY & NEUROLOGY

## 2019-10-04 PROCEDURE — 2700000000 HC OXYGEN THERAPY PER DAY

## 2019-10-04 PROCEDURE — 97530 THERAPEUTIC ACTIVITIES: CPT

## 2019-10-04 PROCEDURE — 80048 BASIC METABOLIC PNL TOTAL CA: CPT

## 2019-10-04 PROCEDURE — 94761 N-INVAS EAR/PLS OXIMETRY MLT: CPT

## 2019-10-04 PROCEDURE — 36415 COLL VENOUS BLD VENIPUNCTURE: CPT

## 2019-10-04 RX ORDER — MIRTAZAPINE 15 MG/1
15 TABLET, FILM COATED ORAL NIGHTLY
Status: DISCONTINUED | OUTPATIENT
Start: 2019-10-04 | End: 2019-10-08 | Stop reason: HOSPADM

## 2019-10-04 RX ADMIN — DILTIAZEM HYDROCHLORIDE 30 MG: 30 TABLET, FILM COATED ORAL at 16:14

## 2019-10-04 RX ADMIN — METOCLOPRAMIDE 10 MG: 10 TABLET ORAL at 08:55

## 2019-10-04 RX ADMIN — CLINDAMYCIN HYDROCHLORIDE 150 MG: 150 CAPSULE ORAL at 16:14

## 2019-10-04 RX ADMIN — FLECAINIDE ACETATE 50 MG: 50 TABLET ORAL at 20:55

## 2019-10-04 RX ADMIN — Medication 1 CAPSULE: at 16:15

## 2019-10-04 RX ADMIN — CLINDAMYCIN HYDROCHLORIDE 150 MG: 150 CAPSULE ORAL at 00:27

## 2019-10-04 RX ADMIN — DILTIAZEM HYDROCHLORIDE 30 MG: 30 TABLET, FILM COATED ORAL at 00:27

## 2019-10-04 RX ADMIN — APIXABAN 5 MG: 5 TABLET, FILM COATED ORAL at 08:55

## 2019-10-04 RX ADMIN — DILTIAZEM HYDROCHLORIDE 30 MG: 30 TABLET, FILM COATED ORAL at 06:03

## 2019-10-04 RX ADMIN — INSULIN LISPRO 3 UNITS: 100 INJECTION, SOLUTION INTRAVENOUS; SUBCUTANEOUS at 17:16

## 2019-10-04 RX ADMIN — PANTOPRAZOLE SODIUM 40 MG: 40 TABLET, DELAYED RELEASE ORAL at 06:03

## 2019-10-04 RX ADMIN — IPRATROPIUM BROMIDE AND ALBUTEROL SULFATE 3 ML: .5; 3 SOLUTION RESPIRATORY (INHALATION) at 20:48

## 2019-10-04 RX ADMIN — Medication 10 ML: at 21:04

## 2019-10-04 RX ADMIN — LORAZEPAM 0.5 MG: 0.5 TABLET ORAL at 00:34

## 2019-10-04 RX ADMIN — METOCLOPRAMIDE 10 MG: 10 TABLET ORAL at 20:55

## 2019-10-04 RX ADMIN — FUROSEMIDE 40 MG: 40 TABLET ORAL at 08:55

## 2019-10-04 RX ADMIN — Medication 10 ML: at 21:03

## 2019-10-04 RX ADMIN — LORAZEPAM 0.5 MG: 0.5 TABLET ORAL at 12:00

## 2019-10-04 RX ADMIN — INSULIN LISPRO 3 UNITS: 100 INJECTION, SOLUTION INTRAVENOUS; SUBCUTANEOUS at 08:57

## 2019-10-04 RX ADMIN — BUSPIRONE HYDROCHLORIDE 5 MG: 5 TABLET ORAL at 20:55

## 2019-10-04 RX ADMIN — FLECAINIDE ACETATE 50 MG: 50 TABLET ORAL at 08:55

## 2019-10-04 RX ADMIN — IPRATROPIUM BROMIDE AND ALBUTEROL SULFATE 3 ML: .5; 3 SOLUTION RESPIRATORY (INHALATION) at 15:09

## 2019-10-04 RX ADMIN — DRONABINOL 2.5 MG: 2.5 CAPSULE ORAL at 16:14

## 2019-10-04 RX ADMIN — MIRTAZAPINE 15 MG: 15 TABLET, FILM COATED ORAL at 20:56

## 2019-10-04 RX ADMIN — CARVEDILOL 25 MG: 25 TABLET, FILM COATED ORAL at 08:55

## 2019-10-04 RX ADMIN — DILTIAZEM HYDROCHLORIDE 30 MG: 30 TABLET, FILM COATED ORAL at 11:57

## 2019-10-04 RX ADMIN — DRONABINOL 2.5 MG: 2.5 CAPSULE ORAL at 06:03

## 2019-10-04 RX ADMIN — POTASSIUM CHLORIDE 20 MEQ: 20 TABLET, EXTENDED RELEASE ORAL at 08:55

## 2019-10-04 RX ADMIN — Medication 1 CAPSULE: at 08:55

## 2019-10-04 RX ADMIN — IPRATROPIUM BROMIDE AND ALBUTEROL SULFATE 3 ML: .5; 3 SOLUTION RESPIRATORY (INHALATION) at 12:35

## 2019-10-04 RX ADMIN — Medication 10 ML: at 08:56

## 2019-10-04 RX ADMIN — FUROSEMIDE 40 MG: 40 TABLET ORAL at 16:14

## 2019-10-04 RX ADMIN — CLINDAMYCIN HYDROCHLORIDE 150 MG: 150 CAPSULE ORAL at 06:03

## 2019-10-04 RX ADMIN — APIXABAN 5 MG: 5 TABLET, FILM COATED ORAL at 20:56

## 2019-10-04 RX ADMIN — INSULIN LISPRO 3 UNITS: 100 INJECTION, SOLUTION INTRAVENOUS; SUBCUTANEOUS at 12:22

## 2019-10-04 RX ADMIN — INSULIN LISPRO 3 UNITS: 100 INJECTION, SOLUTION INTRAVENOUS; SUBCUTANEOUS at 20:57

## 2019-10-04 RX ADMIN — BUSPIRONE HYDROCHLORIDE 5 MG: 5 TABLET ORAL at 08:55

## 2019-10-04 RX ADMIN — IPRATROPIUM BROMIDE AND ALBUTEROL SULFATE 3 ML: .5; 3 SOLUTION RESPIRATORY (INHALATION) at 07:19

## 2019-10-04 RX ADMIN — METOCLOPRAMIDE 10 MG: 10 TABLET ORAL at 16:15

## 2019-10-04 RX ADMIN — ASPIRIN 81 MG: 81 TABLET, COATED ORAL at 08:55

## 2019-10-04 RX ADMIN — CARVEDILOL 25 MG: 25 TABLET, FILM COATED ORAL at 20:56

## 2019-10-04 RX ADMIN — METOCLOPRAMIDE 10 MG: 10 TABLET ORAL at 11:56

## 2019-10-04 RX ADMIN — CLINDAMYCIN HYDROCHLORIDE 150 MG: 150 CAPSULE ORAL at 11:57

## 2019-10-04 ASSESSMENT — PAIN SCALES - GENERAL
PAINLEVEL_OUTOF10: 0

## 2019-10-05 LAB
ANION GAP SERPL CALCULATED.3IONS-SCNC: 13 MMOL/L (ref 3–16)
ASPERGILLUS ANTIBODY ID: NORMAL
BASOPHILS ABSOLUTE: 0 K/UL (ref 0–0.2)
BASOPHILS RELATIVE PERCENT: 0.4 %
BLASTOMYCES ANTIBODY ID: NORMAL
BUN BLDV-MCNC: 21 MG/DL (ref 7–20)
CALCIUM SERPL-MCNC: 9 MG/DL (ref 8.3–10.6)
CHLORIDE BLD-SCNC: 93 MMOL/L (ref 99–110)
CO2: 34 MMOL/L (ref 21–32)
COCCIDIOIDES ANTIBODY ID: NORMAL
CREAT SERPL-MCNC: 1.2 MG/DL (ref 0.6–1.2)
EOSINOPHILS ABSOLUTE: 0.8 K/UL (ref 0–0.6)
EOSINOPHILS RELATIVE PERCENT: 6.8 %
GFR AFRICAN AMERICAN: 53
GFR NON-AFRICAN AMERICAN: 44
GLUCOSE BLD-MCNC: 143 MG/DL (ref 70–99)
GLUCOSE BLD-MCNC: 152 MG/DL (ref 70–99)
GLUCOSE BLD-MCNC: 155 MG/DL (ref 70–99)
GLUCOSE BLD-MCNC: 202 MG/DL (ref 70–99)
GLUCOSE BLD-MCNC: 223 MG/DL (ref 70–99)
HCT VFR BLD CALC: 30.5 % (ref 36–48)
HEMOGLOBIN: 9.4 G/DL (ref 12–16)
HISTOPLASMA ABS, ID: NORMAL
LYMPHOCYTES ABSOLUTE: 1.6 K/UL (ref 1–5.1)
LYMPHOCYTES RELATIVE PERCENT: 13.8 %
MCH RBC QN AUTO: 27 PG (ref 26–34)
MCHC RBC AUTO-ENTMCNC: 30.7 G/DL (ref 31–36)
MCV RBC AUTO: 88 FL (ref 80–100)
MONOCYTES ABSOLUTE: 1.2 K/UL (ref 0–1.3)
MONOCYTES RELATIVE PERCENT: 10.4 %
NEUTROPHILS ABSOLUTE: 8.2 K/UL (ref 1.7–7.7)
NEUTROPHILS RELATIVE PERCENT: 68.6 %
PDW BLD-RTO: 18.2 % (ref 12.4–15.4)
PERFORMED ON: ABNORMAL
PLATELET # BLD: 332 K/UL (ref 135–450)
PMV BLD AUTO: 7.8 FL (ref 5–10.5)
POTASSIUM REFLEX MAGNESIUM: 3.7 MMOL/L (ref 3.5–5.1)
RBC # BLD: 3.47 M/UL (ref 4–5.2)
SODIUM BLD-SCNC: 140 MMOL/L (ref 136–145)
WBC # BLD: 11.9 K/UL (ref 4–11)

## 2019-10-05 PROCEDURE — 6370000000 HC RX 637 (ALT 250 FOR IP): Performed by: INTERNAL MEDICINE

## 2019-10-05 PROCEDURE — 2060000000 HC ICU INTERMEDIATE R&B

## 2019-10-05 PROCEDURE — 36415 COLL VENOUS BLD VENIPUNCTURE: CPT

## 2019-10-05 PROCEDURE — 94640 AIRWAY INHALATION TREATMENT: CPT

## 2019-10-05 PROCEDURE — 6370000000 HC RX 637 (ALT 250 FOR IP): Performed by: PSYCHIATRY & NEUROLOGY

## 2019-10-05 PROCEDURE — 2700000000 HC OXYGEN THERAPY PER DAY

## 2019-10-05 PROCEDURE — 2580000003 HC RX 258: Performed by: INTERNAL MEDICINE

## 2019-10-05 PROCEDURE — 85025 COMPLETE CBC W/AUTO DIFF WBC: CPT

## 2019-10-05 PROCEDURE — 94761 N-INVAS EAR/PLS OXIMETRY MLT: CPT

## 2019-10-05 PROCEDURE — 80048 BASIC METABOLIC PNL TOTAL CA: CPT

## 2019-10-05 RX ADMIN — CLINDAMYCIN HYDROCHLORIDE 150 MG: 150 CAPSULE ORAL at 00:39

## 2019-10-05 RX ADMIN — METOCLOPRAMIDE 10 MG: 10 TABLET ORAL at 16:33

## 2019-10-05 RX ADMIN — CLINDAMYCIN HYDROCHLORIDE 150 MG: 150 CAPSULE ORAL at 05:58

## 2019-10-05 RX ADMIN — IPRATROPIUM BROMIDE AND ALBUTEROL SULFATE 3 ML: .5; 3 SOLUTION RESPIRATORY (INHALATION) at 11:41

## 2019-10-05 RX ADMIN — CARVEDILOL 25 MG: 25 TABLET, FILM COATED ORAL at 09:00

## 2019-10-05 RX ADMIN — IPRATROPIUM BROMIDE AND ALBUTEROL SULFATE 3 ML: .5; 3 SOLUTION RESPIRATORY (INHALATION) at 15:58

## 2019-10-05 RX ADMIN — INSULIN LISPRO 3 UNITS: 100 INJECTION, SOLUTION INTRAVENOUS; SUBCUTANEOUS at 13:24

## 2019-10-05 RX ADMIN — APIXABAN 5 MG: 5 TABLET, FILM COATED ORAL at 21:21

## 2019-10-05 RX ADMIN — DILTIAZEM HYDROCHLORIDE 30 MG: 30 TABLET, FILM COATED ORAL at 00:39

## 2019-10-05 RX ADMIN — INSULIN LISPRO 2 UNITS: 100 INJECTION, SOLUTION INTRAVENOUS; SUBCUTANEOUS at 20:23

## 2019-10-05 RX ADMIN — DILTIAZEM HYDROCHLORIDE 30 MG: 30 TABLET, FILM COATED ORAL at 05:58

## 2019-10-05 RX ADMIN — BUSPIRONE HYDROCHLORIDE 5 MG: 5 TABLET ORAL at 21:21

## 2019-10-05 RX ADMIN — FLECAINIDE ACETATE 50 MG: 50 TABLET ORAL at 21:21

## 2019-10-05 RX ADMIN — METOCLOPRAMIDE 10 MG: 10 TABLET ORAL at 09:00

## 2019-10-05 RX ADMIN — PANTOPRAZOLE SODIUM 40 MG: 40 TABLET, DELAYED RELEASE ORAL at 05:58

## 2019-10-05 RX ADMIN — FUROSEMIDE 40 MG: 40 TABLET ORAL at 09:00

## 2019-10-05 RX ADMIN — Medication 1 CAPSULE: at 16:33

## 2019-10-05 RX ADMIN — ASPIRIN 81 MG: 81 TABLET, COATED ORAL at 09:00

## 2019-10-05 RX ADMIN — MIRTAZAPINE 15 MG: 15 TABLET, FILM COATED ORAL at 21:21

## 2019-10-05 RX ADMIN — APIXABAN 5 MG: 5 TABLET, FILM COATED ORAL at 09:00

## 2019-10-05 RX ADMIN — Medication 10 ML: at 09:01

## 2019-10-05 RX ADMIN — Medication 1 CAPSULE: at 09:00

## 2019-10-05 RX ADMIN — METOCLOPRAMIDE 10 MG: 10 TABLET ORAL at 21:21

## 2019-10-05 RX ADMIN — METOCLOPRAMIDE 10 MG: 10 TABLET ORAL at 14:33

## 2019-10-05 RX ADMIN — FLECAINIDE ACETATE 50 MG: 50 TABLET ORAL at 09:00

## 2019-10-05 RX ADMIN — IPRATROPIUM BROMIDE AND ALBUTEROL SULFATE 3 ML: .5; 3 SOLUTION RESPIRATORY (INHALATION) at 19:32

## 2019-10-05 RX ADMIN — IPRATROPIUM BROMIDE AND ALBUTEROL SULFATE 3 ML: .5; 3 SOLUTION RESPIRATORY (INHALATION) at 08:12

## 2019-10-05 RX ADMIN — INSULIN LISPRO 3 UNITS: 100 INJECTION, SOLUTION INTRAVENOUS; SUBCUTANEOUS at 09:03

## 2019-10-05 RX ADMIN — FUROSEMIDE 40 MG: 40 TABLET ORAL at 16:33

## 2019-10-05 RX ADMIN — BUSPIRONE HYDROCHLORIDE 5 MG: 5 TABLET ORAL at 09:00

## 2019-10-05 RX ADMIN — Medication 10 ML: at 21:21

## 2019-10-05 RX ADMIN — CARVEDILOL 25 MG: 25 TABLET, FILM COATED ORAL at 21:21

## 2019-10-05 RX ADMIN — CLINDAMYCIN HYDROCHLORIDE 150 MG: 150 CAPSULE ORAL at 14:32

## 2019-10-05 RX ADMIN — POTASSIUM CHLORIDE 20 MEQ: 20 TABLET, EXTENDED RELEASE ORAL at 09:00

## 2019-10-05 RX ADMIN — CLINDAMYCIN HYDROCHLORIDE 150 MG: 150 CAPSULE ORAL at 16:33

## 2019-10-05 RX ADMIN — DILTIAZEM HYDROCHLORIDE 30 MG: 30 TABLET, FILM COATED ORAL at 14:35

## 2019-10-05 RX ADMIN — INSULIN LISPRO 6 UNITS: 100 INJECTION, SOLUTION INTRAVENOUS; SUBCUTANEOUS at 16:41

## 2019-10-05 RX ADMIN — DILTIAZEM HYDROCHLORIDE 30 MG: 30 TABLET, FILM COATED ORAL at 16:39

## 2019-10-05 ASSESSMENT — PAIN SCALES - GENERAL
PAINLEVEL_OUTOF10: 0

## 2019-10-06 LAB
ANION GAP SERPL CALCULATED.3IONS-SCNC: 13 MMOL/L (ref 3–16)
BASOPHILS ABSOLUTE: 0.1 K/UL (ref 0–0.2)
BASOPHILS RELATIVE PERCENT: 0.5 %
BUN BLDV-MCNC: 22 MG/DL (ref 7–20)
CALCIUM SERPL-MCNC: 8.9 MG/DL (ref 8.3–10.6)
CHLORIDE BLD-SCNC: 93 MMOL/L (ref 99–110)
CO2: 33 MMOL/L (ref 21–32)
CREAT SERPL-MCNC: 1.2 MG/DL (ref 0.6–1.2)
EOSINOPHILS ABSOLUTE: 1 K/UL (ref 0–0.6)
EOSINOPHILS RELATIVE PERCENT: 9.7 %
GFR AFRICAN AMERICAN: 53
GFR NON-AFRICAN AMERICAN: 44
GLUCOSE BLD-MCNC: 186 MG/DL (ref 70–99)
GLUCOSE BLD-MCNC: 193 MG/DL (ref 70–99)
GLUCOSE BLD-MCNC: 196 MG/DL (ref 70–99)
GLUCOSE BLD-MCNC: 199 MG/DL (ref 70–99)
GLUCOSE BLD-MCNC: 210 MG/DL (ref 70–99)
GLUCOSE BLD-MCNC: 216 MG/DL (ref 70–99)
HCT VFR BLD CALC: 28.3 % (ref 36–48)
HEMOGLOBIN: 8.8 G/DL (ref 12–16)
LYMPHOCYTES ABSOLUTE: 1.3 K/UL (ref 1–5.1)
LYMPHOCYTES RELATIVE PERCENT: 13.5 %
MCH RBC QN AUTO: 27.5 PG (ref 26–34)
MCHC RBC AUTO-ENTMCNC: 31.2 G/DL (ref 31–36)
MCV RBC AUTO: 88 FL (ref 80–100)
MONOCYTES ABSOLUTE: 1 K/UL (ref 0–1.3)
MONOCYTES RELATIVE PERCENT: 10.4 %
NEUTROPHILS ABSOLUTE: 6.5 K/UL (ref 1.7–7.7)
NEUTROPHILS RELATIVE PERCENT: 65.9 %
PDW BLD-RTO: 18.1 % (ref 12.4–15.4)
PERFORMED ON: ABNORMAL
PLATELET # BLD: 309 K/UL (ref 135–450)
PMV BLD AUTO: 7.7 FL (ref 5–10.5)
POTASSIUM REFLEX MAGNESIUM: 3.7 MMOL/L (ref 3.5–5.1)
RBC # BLD: 3.22 M/UL (ref 4–5.2)
SODIUM BLD-SCNC: 139 MMOL/L (ref 136–145)
WBC # BLD: 9.8 K/UL (ref 4–11)

## 2019-10-06 PROCEDURE — 6370000000 HC RX 637 (ALT 250 FOR IP): Performed by: INTERNAL MEDICINE

## 2019-10-06 PROCEDURE — 6370000000 HC RX 637 (ALT 250 FOR IP): Performed by: PSYCHIATRY & NEUROLOGY

## 2019-10-06 PROCEDURE — 94640 AIRWAY INHALATION TREATMENT: CPT

## 2019-10-06 PROCEDURE — 2700000000 HC OXYGEN THERAPY PER DAY

## 2019-10-06 PROCEDURE — 2060000000 HC ICU INTERMEDIATE R&B

## 2019-10-06 PROCEDURE — 2580000003 HC RX 258: Performed by: INTERNAL MEDICINE

## 2019-10-06 PROCEDURE — 36415 COLL VENOUS BLD VENIPUNCTURE: CPT

## 2019-10-06 PROCEDURE — 94761 N-INVAS EAR/PLS OXIMETRY MLT: CPT

## 2019-10-06 PROCEDURE — 85025 COMPLETE CBC W/AUTO DIFF WBC: CPT

## 2019-10-06 PROCEDURE — 80048 BASIC METABOLIC PNL TOTAL CA: CPT

## 2019-10-06 RX ADMIN — CLINDAMYCIN HYDROCHLORIDE 150 MG: 150 CAPSULE ORAL at 23:50

## 2019-10-06 RX ADMIN — IPRATROPIUM BROMIDE AND ALBUTEROL SULFATE 3 ML: .5; 3 SOLUTION RESPIRATORY (INHALATION) at 08:29

## 2019-10-06 RX ADMIN — FUROSEMIDE 40 MG: 40 TABLET ORAL at 08:21

## 2019-10-06 RX ADMIN — POTASSIUM CHLORIDE 20 MEQ: 20 TABLET, EXTENDED RELEASE ORAL at 08:21

## 2019-10-06 RX ADMIN — CLINDAMYCIN HYDROCHLORIDE 150 MG: 150 CAPSULE ORAL at 13:07

## 2019-10-06 RX ADMIN — Medication 1 CAPSULE: at 17:18

## 2019-10-06 RX ADMIN — INSULIN LISPRO 3 UNITS: 100 INJECTION, SOLUTION INTRAVENOUS; SUBCUTANEOUS at 08:27

## 2019-10-06 RX ADMIN — INSULIN LISPRO 3 UNITS: 100 INJECTION, SOLUTION INTRAVENOUS; SUBCUTANEOUS at 17:07

## 2019-10-06 RX ADMIN — ASPIRIN 81 MG: 81 TABLET, COATED ORAL at 08:21

## 2019-10-06 RX ADMIN — Medication 10 ML: at 08:22

## 2019-10-06 RX ADMIN — DILTIAZEM HYDROCHLORIDE 30 MG: 30 TABLET, FILM COATED ORAL at 06:37

## 2019-10-06 RX ADMIN — APIXABAN 5 MG: 5 TABLET, FILM COATED ORAL at 08:21

## 2019-10-06 RX ADMIN — INSULIN LISPRO 6 UNITS: 100 INJECTION, SOLUTION INTRAVENOUS; SUBCUTANEOUS at 13:07

## 2019-10-06 RX ADMIN — METOCLOPRAMIDE 10 MG: 10 TABLET ORAL at 23:57

## 2019-10-06 RX ADMIN — PANTOPRAZOLE SODIUM 40 MG: 40 TABLET, DELAYED RELEASE ORAL at 06:37

## 2019-10-06 RX ADMIN — DILTIAZEM HYDROCHLORIDE 30 MG: 30 TABLET, FILM COATED ORAL at 00:30

## 2019-10-06 RX ADMIN — METOCLOPRAMIDE 10 MG: 10 TABLET ORAL at 08:21

## 2019-10-06 RX ADMIN — CLINDAMYCIN HYDROCHLORIDE 150 MG: 150 CAPSULE ORAL at 00:30

## 2019-10-06 RX ADMIN — BUSPIRONE HYDROCHLORIDE 5 MG: 5 TABLET ORAL at 08:21

## 2019-10-06 RX ADMIN — FLECAINIDE ACETATE 50 MG: 50 TABLET ORAL at 08:21

## 2019-10-06 RX ADMIN — IPRATROPIUM BROMIDE AND ALBUTEROL SULFATE 3 ML: .5; 3 SOLUTION RESPIRATORY (INHALATION) at 20:05

## 2019-10-06 RX ADMIN — BUSPIRONE HYDROCHLORIDE 5 MG: 5 TABLET ORAL at 23:50

## 2019-10-06 RX ADMIN — FLECAINIDE ACETATE 50 MG: 50 TABLET ORAL at 23:56

## 2019-10-06 RX ADMIN — MIRTAZAPINE 15 MG: 15 TABLET, FILM COATED ORAL at 23:50

## 2019-10-06 RX ADMIN — CLINDAMYCIN HYDROCHLORIDE 150 MG: 150 CAPSULE ORAL at 06:37

## 2019-10-06 RX ADMIN — DILTIAZEM HYDROCHLORIDE 30 MG: 30 TABLET, FILM COATED ORAL at 17:18

## 2019-10-06 RX ADMIN — DILTIAZEM HYDROCHLORIDE 30 MG: 30 TABLET, FILM COATED ORAL at 13:07

## 2019-10-06 RX ADMIN — INSULIN LISPRO 3 UNITS: 100 INJECTION, SOLUTION INTRAVENOUS; SUBCUTANEOUS at 23:52

## 2019-10-06 RX ADMIN — IPRATROPIUM BROMIDE AND ALBUTEROL SULFATE 3 ML: .5; 3 SOLUTION RESPIRATORY (INHALATION) at 16:10

## 2019-10-06 RX ADMIN — FUROSEMIDE 40 MG: 40 TABLET ORAL at 17:18

## 2019-10-06 RX ADMIN — METOCLOPRAMIDE 10 MG: 10 TABLET ORAL at 13:07

## 2019-10-06 RX ADMIN — APIXABAN 5 MG: 5 TABLET, FILM COATED ORAL at 23:50

## 2019-10-06 RX ADMIN — Medication 10 ML: at 23:53

## 2019-10-06 RX ADMIN — CARVEDILOL 25 MG: 25 TABLET, FILM COATED ORAL at 08:21

## 2019-10-06 RX ADMIN — CARVEDILOL 25 MG: 25 TABLET, FILM COATED ORAL at 23:51

## 2019-10-06 RX ADMIN — Medication 1 CAPSULE: at 08:21

## 2019-10-06 RX ADMIN — CLINDAMYCIN HYDROCHLORIDE 150 MG: 150 CAPSULE ORAL at 17:18

## 2019-10-06 RX ADMIN — IPRATROPIUM BROMIDE AND ALBUTEROL SULFATE 3 ML: .5; 3 SOLUTION RESPIRATORY (INHALATION) at 13:25

## 2019-10-06 RX ADMIN — METOCLOPRAMIDE 10 MG: 10 TABLET ORAL at 17:18

## 2019-10-06 ASSESSMENT — PAIN SCALES - GENERAL
PAINLEVEL_OUTOF10: 0

## 2019-10-07 LAB
ANION GAP SERPL CALCULATED.3IONS-SCNC: 14 MMOL/L (ref 3–16)
BASOPHILS ABSOLUTE: 0.1 K/UL (ref 0–0.2)
BASOPHILS RELATIVE PERCENT: 0.8 %
BUN BLDV-MCNC: 23 MG/DL (ref 7–20)
CALCIUM SERPL-MCNC: 8.7 MG/DL (ref 8.3–10.6)
CHLORIDE BLD-SCNC: 95 MMOL/L (ref 99–110)
CO2: 30 MMOL/L (ref 21–32)
CREAT SERPL-MCNC: 1.3 MG/DL (ref 0.6–1.2)
EOSINOPHILS ABSOLUTE: 1.1 K/UL (ref 0–0.6)
EOSINOPHILS RELATIVE PERCENT: 9.7 %
GFR AFRICAN AMERICAN: 48
GFR NON-AFRICAN AMERICAN: 40
GLUCOSE BLD-MCNC: 189 MG/DL (ref 70–99)
GLUCOSE BLD-MCNC: 193 MG/DL (ref 70–99)
GLUCOSE BLD-MCNC: 210 MG/DL (ref 70–99)
GLUCOSE BLD-MCNC: 223 MG/DL (ref 70–99)
GLUCOSE BLD-MCNC: 328 MG/DL (ref 70–99)
HCT VFR BLD CALC: 26.7 % (ref 36–48)
HEMOGLOBIN: 8.5 G/DL (ref 12–16)
LYMPHOCYTES ABSOLUTE: 1.3 K/UL (ref 1–5.1)
LYMPHOCYTES RELATIVE PERCENT: 11.5 %
MAGNESIUM: 1.6 MG/DL (ref 1.8–2.4)
MCH RBC QN AUTO: 27.8 PG (ref 26–34)
MCHC RBC AUTO-ENTMCNC: 31.8 G/DL (ref 31–36)
MCV RBC AUTO: 87.3 FL (ref 80–100)
MONOCYTES ABSOLUTE: 1 K/UL (ref 0–1.3)
MONOCYTES RELATIVE PERCENT: 8.9 %
NEUTROPHILS ABSOLUTE: 7.8 K/UL (ref 1.7–7.7)
NEUTROPHILS RELATIVE PERCENT: 69.1 %
PDW BLD-RTO: 18.2 % (ref 12.4–15.4)
PERFORMED ON: ABNORMAL
PLATELET # BLD: 313 K/UL (ref 135–450)
PMV BLD AUTO: 7.5 FL (ref 5–10.5)
POTASSIUM REFLEX MAGNESIUM: 3.5 MMOL/L (ref 3.5–5.1)
POTASSIUM SERPL-SCNC: 3.5 MMOL/L (ref 3.5–5.1)
RBC # BLD: 3.06 M/UL (ref 4–5.2)
SODIUM BLD-SCNC: 139 MMOL/L (ref 136–145)
WBC # BLD: 11.3 K/UL (ref 4–11)

## 2019-10-07 PROCEDURE — 6370000000 HC RX 637 (ALT 250 FOR IP): Performed by: INTERNAL MEDICINE

## 2019-10-07 PROCEDURE — 2060000000 HC ICU INTERMEDIATE R&B

## 2019-10-07 PROCEDURE — 97110 THERAPEUTIC EXERCISES: CPT

## 2019-10-07 PROCEDURE — 97535 SELF CARE MNGMENT TRAINING: CPT

## 2019-10-07 PROCEDURE — 2700000000 HC OXYGEN THERAPY PER DAY

## 2019-10-07 PROCEDURE — 36415 COLL VENOUS BLD VENIPUNCTURE: CPT

## 2019-10-07 PROCEDURE — 6370000000 HC RX 637 (ALT 250 FOR IP): Performed by: PSYCHIATRY & NEUROLOGY

## 2019-10-07 PROCEDURE — 94640 AIRWAY INHALATION TREATMENT: CPT

## 2019-10-07 PROCEDURE — 85025 COMPLETE CBC W/AUTO DIFF WBC: CPT

## 2019-10-07 PROCEDURE — 2580000003 HC RX 258: Performed by: INTERNAL MEDICINE

## 2019-10-07 PROCEDURE — 6360000002 HC RX W HCPCS: Performed by: INTERNAL MEDICINE

## 2019-10-07 PROCEDURE — 83735 ASSAY OF MAGNESIUM: CPT

## 2019-10-07 PROCEDURE — 80048 BASIC METABOLIC PNL TOTAL CA: CPT

## 2019-10-07 PROCEDURE — 94761 N-INVAS EAR/PLS OXIMETRY MLT: CPT

## 2019-10-07 RX ORDER — MAGNESIUM SULFATE IN WATER 40 MG/ML
2 INJECTION, SOLUTION INTRAVENOUS ONCE
Status: COMPLETED | OUTPATIENT
Start: 2019-10-07 | End: 2019-10-07

## 2019-10-07 RX ADMIN — FUROSEMIDE 40 MG: 40 TABLET ORAL at 16:43

## 2019-10-07 RX ADMIN — APIXABAN 5 MG: 5 TABLET, FILM COATED ORAL at 10:23

## 2019-10-07 RX ADMIN — DRONABINOL 2.5 MG: 2.5 CAPSULE ORAL at 10:23

## 2019-10-07 RX ADMIN — Medication 1 CAPSULE: at 16:43

## 2019-10-07 RX ADMIN — METOCLOPRAMIDE 10 MG: 10 TABLET ORAL at 16:43

## 2019-10-07 RX ADMIN — METOCLOPRAMIDE 10 MG: 10 TABLET ORAL at 21:12

## 2019-10-07 RX ADMIN — CLINDAMYCIN HYDROCHLORIDE 150 MG: 150 CAPSULE ORAL at 13:02

## 2019-10-07 RX ADMIN — IPRATROPIUM BROMIDE AND ALBUTEROL SULFATE 3 ML: .5; 3 SOLUTION RESPIRATORY (INHALATION) at 11:14

## 2019-10-07 RX ADMIN — CLINDAMYCIN HYDROCHLORIDE 150 MG: 150 CAPSULE ORAL at 17:57

## 2019-10-07 RX ADMIN — METOCLOPRAMIDE 10 MG: 10 TABLET ORAL at 10:22

## 2019-10-07 RX ADMIN — Medication 10 ML: at 05:40

## 2019-10-07 RX ADMIN — CARVEDILOL 25 MG: 25 TABLET, FILM COATED ORAL at 21:12

## 2019-10-07 RX ADMIN — INSULIN LISPRO 12 UNITS: 100 INJECTION, SOLUTION INTRAVENOUS; SUBCUTANEOUS at 13:03

## 2019-10-07 RX ADMIN — FLECAINIDE ACETATE 50 MG: 50 TABLET ORAL at 21:12

## 2019-10-07 RX ADMIN — DILTIAZEM HYDROCHLORIDE 30 MG: 30 TABLET, FILM COATED ORAL at 13:02

## 2019-10-07 RX ADMIN — APIXABAN 5 MG: 5 TABLET, FILM COATED ORAL at 21:12

## 2019-10-07 RX ADMIN — DILTIAZEM HYDROCHLORIDE 30 MG: 30 TABLET, FILM COATED ORAL at 01:28

## 2019-10-07 RX ADMIN — ASPIRIN 81 MG: 81 TABLET, COATED ORAL at 13:18

## 2019-10-07 RX ADMIN — CLINDAMYCIN HYDROCHLORIDE 150 MG: 150 CAPSULE ORAL at 05:38

## 2019-10-07 RX ADMIN — DRONABINOL 2.5 MG: 2.5 CAPSULE ORAL at 16:43

## 2019-10-07 RX ADMIN — PANTOPRAZOLE SODIUM 40 MG: 40 TABLET, DELAYED RELEASE ORAL at 05:38

## 2019-10-07 RX ADMIN — MAGNESIUM SULFATE HEPTAHYDRATE 2 G: 40 INJECTION, SOLUTION INTRAVENOUS at 05:39

## 2019-10-07 RX ADMIN — Medication 10 ML: at 21:13

## 2019-10-07 RX ADMIN — IPRATROPIUM BROMIDE AND ALBUTEROL SULFATE 3 ML: .5; 3 SOLUTION RESPIRATORY (INHALATION) at 16:32

## 2019-10-07 RX ADMIN — DILTIAZEM HYDROCHLORIDE 30 MG: 30 TABLET, FILM COATED ORAL at 06:54

## 2019-10-07 RX ADMIN — BUSPIRONE HYDROCHLORIDE 5 MG: 5 TABLET ORAL at 10:23

## 2019-10-07 RX ADMIN — METOCLOPRAMIDE 10 MG: 10 TABLET ORAL at 13:02

## 2019-10-07 RX ADMIN — DILTIAZEM HYDROCHLORIDE 30 MG: 30 TABLET, FILM COATED ORAL at 17:57

## 2019-10-07 RX ADMIN — Medication 10 ML: at 10:28

## 2019-10-07 RX ADMIN — CARVEDILOL 25 MG: 25 TABLET, FILM COATED ORAL at 10:23

## 2019-10-07 RX ADMIN — MIRTAZAPINE 15 MG: 15 TABLET, FILM COATED ORAL at 21:12

## 2019-10-07 RX ADMIN — Medication 1 CAPSULE: at 10:23

## 2019-10-07 RX ADMIN — FUROSEMIDE 40 MG: 40 TABLET ORAL at 10:23

## 2019-10-07 RX ADMIN — POTASSIUM CHLORIDE 20 MEQ: 20 TABLET, EXTENDED RELEASE ORAL at 10:22

## 2019-10-07 RX ADMIN — BUSPIRONE HYDROCHLORIDE 5 MG: 5 TABLET ORAL at 21:28

## 2019-10-07 RX ADMIN — IPRATROPIUM BROMIDE AND ALBUTEROL SULFATE 3 ML: .5; 3 SOLUTION RESPIRATORY (INHALATION) at 20:39

## 2019-10-07 RX ADMIN — INSULIN LISPRO 6 UNITS: 100 INJECTION, SOLUTION INTRAVENOUS; SUBCUTANEOUS at 16:53

## 2019-10-07 RX ADMIN — FLECAINIDE ACETATE 50 MG: 50 TABLET ORAL at 10:22

## 2019-10-07 RX ADMIN — INSULIN LISPRO 2 UNITS: 100 INJECTION, SOLUTION INTRAVENOUS; SUBCUTANEOUS at 21:14

## 2019-10-07 ASSESSMENT — PAIN SCALES - GENERAL
PAINLEVEL_OUTOF10: 0

## 2019-10-07 ASSESSMENT — PAIN SCALES - WONG BAKER: WONGBAKER_NUMERICALRESPONSE: 0

## 2019-10-08 VITALS
WEIGHT: 142 LBS | DIASTOLIC BLOOD PRESSURE: 72 MMHG | OXYGEN SATURATION: 95 % | TEMPERATURE: 99.3 F | RESPIRATION RATE: 16 BRPM | HEART RATE: 84 BPM | BODY MASS INDEX: 23.66 KG/M2 | SYSTOLIC BLOOD PRESSURE: 145 MMHG | HEIGHT: 65 IN

## 2019-10-08 LAB
GLUCOSE BLD-MCNC: 189 MG/DL (ref 70–99)
GLUCOSE BLD-MCNC: 213 MG/DL (ref 70–99)
GLUCOSE BLD-MCNC: 261 MG/DL (ref 70–99)
PERFORMED ON: ABNORMAL

## 2019-10-08 PROCEDURE — 94761 N-INVAS EAR/PLS OXIMETRY MLT: CPT

## 2019-10-08 PROCEDURE — 94640 AIRWAY INHALATION TREATMENT: CPT

## 2019-10-08 PROCEDURE — 2580000003 HC RX 258: Performed by: INTERNAL MEDICINE

## 2019-10-08 PROCEDURE — 6370000000 HC RX 637 (ALT 250 FOR IP): Performed by: INTERNAL MEDICINE

## 2019-10-08 PROCEDURE — 6370000000 HC RX 637 (ALT 250 FOR IP): Performed by: PSYCHIATRY & NEUROLOGY

## 2019-10-08 PROCEDURE — 97530 THERAPEUTIC ACTIVITIES: CPT

## 2019-10-08 PROCEDURE — 97116 GAIT TRAINING THERAPY: CPT

## 2019-10-08 PROCEDURE — 2700000000 HC OXYGEN THERAPY PER DAY

## 2019-10-08 RX ORDER — BUSPIRONE HYDROCHLORIDE 5 MG/1
5 TABLET ORAL 2 TIMES DAILY
Qty: 30 TABLET | Refills: 0 | Status: ON HOLD | OUTPATIENT
Start: 2019-10-08 | End: 2020-05-31

## 2019-10-08 RX ORDER — POTASSIUM CHLORIDE 20 MEQ/1
20 TABLET, EXTENDED RELEASE ORAL DAILY
Qty: 60 TABLET | Refills: 3 | Status: ON HOLD | OUTPATIENT
Start: 2019-10-09 | End: 2020-06-09 | Stop reason: HOSPADM

## 2019-10-08 RX ORDER — FUROSEMIDE 40 MG/1
40 TABLET ORAL 2 TIMES DAILY
Qty: 60 TABLET | Refills: 3 | Status: ON HOLD | OUTPATIENT
Start: 2019-10-08 | End: 2020-06-09 | Stop reason: HOSPADM

## 2019-10-08 RX ORDER — MIRTAZAPINE 15 MG/1
15 TABLET, FILM COATED ORAL NIGHTLY
Qty: 30 TABLET | Refills: 3 | Status: SHIPPED | OUTPATIENT
Start: 2019-10-08

## 2019-10-08 RX ORDER — ASPIRIN 81 MG/1
81 TABLET ORAL DAILY
Qty: 30 TABLET | Refills: 3 | Status: ON HOLD | OUTPATIENT
Start: 2019-10-09 | End: 2020-05-31

## 2019-10-08 RX ORDER — FLECAINIDE ACETATE 50 MG/1
50 TABLET ORAL 2 TIMES DAILY
Qty: 60 TABLET | Refills: 3 | Status: ON HOLD | OUTPATIENT
Start: 2019-10-08 | End: 2020-05-31

## 2019-10-08 RX ADMIN — Medication 10 ML: at 09:34

## 2019-10-08 RX ADMIN — DRONABINOL 2.5 MG: 2.5 CAPSULE ORAL at 17:16

## 2019-10-08 RX ADMIN — METOCLOPRAMIDE 10 MG: 10 TABLET ORAL at 09:33

## 2019-10-08 RX ADMIN — IPRATROPIUM BROMIDE AND ALBUTEROL SULFATE 3 ML: .5; 3 SOLUTION RESPIRATORY (INHALATION) at 12:57

## 2019-10-08 RX ADMIN — CARVEDILOL 25 MG: 25 TABLET, FILM COATED ORAL at 09:33

## 2019-10-08 RX ADMIN — INSULIN LISPRO 9 UNITS: 100 INJECTION, SOLUTION INTRAVENOUS; SUBCUTANEOUS at 12:49

## 2019-10-08 RX ADMIN — POTASSIUM CHLORIDE 20 MEQ: 20 TABLET, EXTENDED RELEASE ORAL at 09:32

## 2019-10-08 RX ADMIN — DILTIAZEM HYDROCHLORIDE 30 MG: 30 TABLET, FILM COATED ORAL at 06:29

## 2019-10-08 RX ADMIN — PANTOPRAZOLE SODIUM 40 MG: 40 TABLET, DELAYED RELEASE ORAL at 09:32

## 2019-10-08 RX ADMIN — FUROSEMIDE 40 MG: 40 TABLET ORAL at 17:13

## 2019-10-08 RX ADMIN — DILTIAZEM HYDROCHLORIDE 30 MG: 30 TABLET, FILM COATED ORAL at 17:13

## 2019-10-08 RX ADMIN — Medication 1 CAPSULE: at 17:13

## 2019-10-08 RX ADMIN — INSULIN LISPRO 3 UNITS: 100 INJECTION, SOLUTION INTRAVENOUS; SUBCUTANEOUS at 09:27

## 2019-10-08 RX ADMIN — FUROSEMIDE 40 MG: 40 TABLET ORAL at 09:33

## 2019-10-08 RX ADMIN — METOCLOPRAMIDE 10 MG: 10 TABLET ORAL at 12:45

## 2019-10-08 RX ADMIN — BUSPIRONE HYDROCHLORIDE 5 MG: 5 TABLET ORAL at 09:40

## 2019-10-08 RX ADMIN — METOCLOPRAMIDE 10 MG: 10 TABLET ORAL at 17:13

## 2019-10-08 RX ADMIN — Medication 1 CAPSULE: at 09:40

## 2019-10-08 RX ADMIN — APIXABAN 5 MG: 5 TABLET, FILM COATED ORAL at 09:33

## 2019-10-08 RX ADMIN — FLECAINIDE ACETATE 50 MG: 50 TABLET ORAL at 09:32

## 2019-10-08 RX ADMIN — DILTIAZEM HYDROCHLORIDE 30 MG: 30 TABLET, FILM COATED ORAL at 12:45

## 2019-10-08 RX ADMIN — ASPIRIN 81 MG: 81 TABLET, COATED ORAL at 09:32

## 2019-10-08 RX ADMIN — INSULIN LISPRO 6 UNITS: 100 INJECTION, SOLUTION INTRAVENOUS; SUBCUTANEOUS at 17:17

## 2019-10-08 RX ADMIN — IPRATROPIUM BROMIDE AND ALBUTEROL SULFATE 3 ML: .5; 3 SOLUTION RESPIRATORY (INHALATION) at 17:02

## 2019-10-08 ASSESSMENT — PAIN SCALES - WONG BAKER
WONGBAKER_NUMERICALRESPONSE: 0

## 2019-10-08 ASSESSMENT — PAIN SCALES - GENERAL: PAINLEVEL_OUTOF10: 0

## 2019-10-12 LAB — MISCELLANEOUS LAB TEST ORDER: NORMAL

## 2019-10-28 LAB
FUNGUS (MYCOLOGY) CULTURE: NORMAL
FUNGUS STAIN: NORMAL

## 2019-11-05 LAB
AFB CULTURE (MYCOBACTERIA): ABNORMAL
AFB SMEAR: ABNORMAL
ORGANISM: ABNORMAL

## 2019-11-12 LAB
AFB CULTURE (MYCOBACTERIA): NORMAL
AFB SMEAR: NORMAL

## 2020-05-31 ENCOUNTER — APPOINTMENT (OUTPATIENT)
Dept: CT IMAGING | Age: 80
DRG: 683 | End: 2020-05-31
Payer: MEDICARE

## 2020-05-31 ENCOUNTER — APPOINTMENT (OUTPATIENT)
Dept: GENERAL RADIOLOGY | Age: 80
DRG: 683 | End: 2020-05-31
Payer: MEDICARE

## 2020-05-31 ENCOUNTER — HOSPITAL ENCOUNTER (INPATIENT)
Age: 80
LOS: 15 days | Discharge: HOME HEALTH | DRG: 683 | End: 2020-06-15
Attending: EMERGENCY MEDICINE | Admitting: FAMILY MEDICINE
Payer: MEDICARE

## 2020-05-31 PROBLEM — N19 RENAL FAILURE: Status: ACTIVE | Noted: 2020-05-31

## 2020-05-31 LAB
A/G RATIO: 1.1 (ref 1.1–2.2)
ABO/RH: NORMAL
ALBUMIN SERPL-MCNC: 3.6 G/DL (ref 3.4–5)
ALP BLD-CCNC: 74 U/L (ref 40–129)
ALT SERPL-CCNC: 7 U/L (ref 10–40)
ANION GAP SERPL CALCULATED.3IONS-SCNC: 17 MMOL/L (ref 3–16)
ANION GAP SERPL CALCULATED.3IONS-SCNC: 19 MMOL/L (ref 3–16)
ANTIBODY SCREEN: NORMAL
AST SERPL-CCNC: 12 U/L (ref 15–37)
BACTERIA: ABNORMAL /HPF
BASOPHILS ABSOLUTE: 0.1 K/UL (ref 0–0.2)
BASOPHILS RELATIVE PERCENT: 0.6 %
BILIRUB SERPL-MCNC: <0.2 MG/DL (ref 0–1)
BILIRUBIN URINE: NEGATIVE
BLOOD BANK DISPENSE STATUS: NORMAL
BLOOD BANK PRODUCT CODE: NORMAL
BLOOD, URINE: NEGATIVE
BPU ID: NORMAL
BUN BLDV-MCNC: 66 MG/DL (ref 7–20)
BUN BLDV-MCNC: 75 MG/DL (ref 7–20)
CALCIUM SERPL-MCNC: 8.4 MG/DL (ref 8.3–10.6)
CALCIUM SERPL-MCNC: 9.4 MG/DL (ref 8.3–10.6)
CHLORIDE BLD-SCNC: 96 MMOL/L (ref 99–110)
CHLORIDE BLD-SCNC: 96 MMOL/L (ref 99–110)
CLARITY: ABNORMAL
CO2: 19 MMOL/L (ref 21–32)
CO2: 21 MMOL/L (ref 21–32)
COLOR: YELLOW
COMMENT UA: ABNORMAL
CREAT SERPL-MCNC: 5.4 MG/DL (ref 0.6–1.2)
CREAT SERPL-MCNC: 6.2 MG/DL (ref 0.6–1.2)
DESCRIPTION BLOOD BANK: NORMAL
EOSINOPHILS ABSOLUTE: 0.3 K/UL (ref 0–0.6)
EOSINOPHILS RELATIVE PERCENT: 2.8 %
EPITHELIAL CELLS, UA: 0 /HPF (ref 0–5)
GFR AFRICAN AMERICAN: 8
GFR AFRICAN AMERICAN: 9
GFR NON-AFRICAN AMERICAN: 7
GFR NON-AFRICAN AMERICAN: 8
GLOBULIN: 3.2 G/DL
GLUCOSE BLD-MCNC: 138 MG/DL (ref 70–99)
GLUCOSE BLD-MCNC: 198 MG/DL (ref 70–99)
GLUCOSE BLD-MCNC: 210 MG/DL (ref 70–99)
GLUCOSE BLD-MCNC: 216 MG/DL (ref 70–99)
GLUCOSE BLD-MCNC: 286 MG/DL (ref 70–99)
GLUCOSE URINE: NEGATIVE MG/DL
HCT VFR BLD CALC: 21.7 % (ref 36–48)
HCT VFR BLD CALC: 23.4 % (ref 36–48)
HEMOGLOBIN: 6.6 G/DL (ref 12–16)
HEMOGLOBIN: 7 G/DL (ref 12–16)
HYALINE CASTS: 0 /LPF (ref 0–8)
INR BLD: 1.72 (ref 0.86–1.14)
KETONES, URINE: NEGATIVE MG/DL
LACTIC ACID, SEPSIS: 3.2 MMOL/L (ref 0.4–1.9)
LACTIC ACID, SEPSIS: 4.3 MMOL/L (ref 0.4–1.9)
LEUKOCYTE ESTERASE, URINE: ABNORMAL
LYMPHOCYTES ABSOLUTE: 1.2 K/UL (ref 1–5.1)
LYMPHOCYTES RELATIVE PERCENT: 11.9 %
MCH RBC QN AUTO: 23.9 PG (ref 26–34)
MCHC RBC AUTO-ENTMCNC: 30.1 G/DL (ref 31–36)
MCV RBC AUTO: 79.4 FL (ref 80–100)
MICROSCOPIC EXAMINATION: YES
MONOCYTES ABSOLUTE: 0.3 K/UL (ref 0–1.3)
MONOCYTES RELATIVE PERCENT: 3.5 %
NEUTROPHILS ABSOLUTE: 8 K/UL (ref 1.7–7.7)
NEUTROPHILS RELATIVE PERCENT: 81.2 %
NITRITE, URINE: NEGATIVE
OCCULT BLOOD DIAGNOSTIC: ABNORMAL
PDW BLD-RTO: 18.6 % (ref 12.4–15.4)
PERFORMED ON: ABNORMAL
PH UA: 5 (ref 5–8)
PLATELET # BLD: 402 K/UL (ref 135–450)
PMV BLD AUTO: 6.8 FL (ref 5–10.5)
POTASSIUM REFLEX MAGNESIUM: 5.7 MMOL/L (ref 3.5–5.1)
POTASSIUM SERPL-SCNC: 5.3 MMOL/L (ref 3.5–5.1)
POTASSIUM SERPL-SCNC: 6 MMOL/L (ref 3.5–5.1)
PRO-BNP: 4098 PG/ML (ref 0–449)
PROTEIN UA: NEGATIVE MG/DL
PROTHROMBIN TIME: 20.1 SEC (ref 10–13.2)
RBC # BLD: 2.94 M/UL (ref 4–5.2)
RBC UA: 4 /HPF (ref 0–4)
SODIUM BLD-SCNC: 134 MMOL/L (ref 136–145)
SODIUM BLD-SCNC: 134 MMOL/L (ref 136–145)
SPECIFIC GRAVITY UA: 1.01 (ref 1–1.03)
TOTAL CK: 26 U/L (ref 26–192)
TOTAL PROTEIN: 6.8 G/DL (ref 6.4–8.2)
TROPONIN: <0.01 NG/ML
URINE REFLEX TO CULTURE: ABNORMAL
URINE TYPE: ABNORMAL
UROBILINOGEN, URINE: 0.2 E.U./DL
WBC # BLD: 9.9 K/UL (ref 4–11)
WBC UA: 3 /HPF (ref 0–5)

## 2020-05-31 PROCEDURE — 6370000000 HC RX 637 (ALT 250 FOR IP): Performed by: FAMILY MEDICINE

## 2020-05-31 PROCEDURE — 70450 CT HEAD/BRAIN W/O DYE: CPT

## 2020-05-31 PROCEDURE — 85018 HEMOGLOBIN: CPT

## 2020-05-31 PROCEDURE — 6370000000 HC RX 637 (ALT 250 FOR IP): Performed by: INTERNAL MEDICINE

## 2020-05-31 PROCEDURE — 96375 TX/PRO/DX INJ NEW DRUG ADDON: CPT

## 2020-05-31 PROCEDURE — 94761 N-INVAS EAR/PLS OXIMETRY MLT: CPT

## 2020-05-31 PROCEDURE — G0328 FECAL BLOOD SCRN IMMUNOASSAY: HCPCS

## 2020-05-31 PROCEDURE — 2580000003 HC RX 258: Performed by: INTERNAL MEDICINE

## 2020-05-31 PROCEDURE — 96365 THER/PROPH/DIAG IV INF INIT: CPT

## 2020-05-31 PROCEDURE — 99285 EMERGENCY DEPT VISIT HI MDM: CPT

## 2020-05-31 PROCEDURE — 93005 ELECTROCARDIOGRAM TRACING: CPT | Performed by: PHYSICIAN ASSISTANT

## 2020-05-31 PROCEDURE — 84484 ASSAY OF TROPONIN QUANT: CPT

## 2020-05-31 PROCEDURE — 36430 TRANSFUSION BLD/BLD COMPNT: CPT

## 2020-05-31 PROCEDURE — 80053 COMPREHEN METABOLIC PANEL: CPT

## 2020-05-31 PROCEDURE — 81001 URINALYSIS AUTO W/SCOPE: CPT

## 2020-05-31 PROCEDURE — 71045 X-RAY EXAM CHEST 1 VIEW: CPT

## 2020-05-31 PROCEDURE — 36415 COLL VENOUS BLD VENIPUNCTURE: CPT

## 2020-05-31 PROCEDURE — 2500000003 HC RX 250 WO HCPCS: Performed by: INTERNAL MEDICINE

## 2020-05-31 PROCEDURE — 83880 ASSAY OF NATRIURETIC PEPTIDE: CPT

## 2020-05-31 PROCEDURE — 70496 CT ANGIOGRAPHY HEAD: CPT

## 2020-05-31 PROCEDURE — 85025 COMPLETE CBC W/AUTO DIFF WBC: CPT

## 2020-05-31 PROCEDURE — 2580000003 HC RX 258: Performed by: FAMILY MEDICINE

## 2020-05-31 PROCEDURE — C9113 INJ PANTOPRAZOLE SODIUM, VIA: HCPCS | Performed by: FAMILY MEDICINE

## 2020-05-31 PROCEDURE — 6360000004 HC RX CONTRAST MEDICATION: Performed by: PHYSICIAN ASSISTANT

## 2020-05-31 PROCEDURE — 86901 BLOOD TYPING SEROLOGIC RH(D): CPT

## 2020-05-31 PROCEDURE — P9016 RBC LEUKOCYTES REDUCED: HCPCS

## 2020-05-31 PROCEDURE — 2060000000 HC ICU INTERMEDIATE R&B

## 2020-05-31 PROCEDURE — 85610 PROTHROMBIN TIME: CPT

## 2020-05-31 PROCEDURE — C9113 INJ PANTOPRAZOLE SODIUM, VIA: HCPCS | Performed by: PHYSICIAN ASSISTANT

## 2020-05-31 PROCEDURE — 82550 ASSAY OF CK (CPK): CPT

## 2020-05-31 PROCEDURE — 2580000003 HC RX 258: Performed by: PHYSICIAN ASSISTANT

## 2020-05-31 PROCEDURE — 2700000000 HC OXYGEN THERAPY PER DAY

## 2020-05-31 PROCEDURE — 86900 BLOOD TYPING SEROLOGIC ABO: CPT

## 2020-05-31 PROCEDURE — 2500000003 HC RX 250 WO HCPCS: Performed by: PHYSICIAN ASSISTANT

## 2020-05-31 PROCEDURE — 6360000002 HC RX W HCPCS: Performed by: FAMILY MEDICINE

## 2020-05-31 PROCEDURE — 6360000002 HC RX W HCPCS: Performed by: PHYSICIAN ASSISTANT

## 2020-05-31 PROCEDURE — 83605 ASSAY OF LACTIC ACID: CPT

## 2020-05-31 PROCEDURE — 84132 ASSAY OF SERUM POTASSIUM: CPT

## 2020-05-31 PROCEDURE — 83036 HEMOGLOBIN GLYCOSYLATED A1C: CPT

## 2020-05-31 PROCEDURE — 86923 COMPATIBILITY TEST ELECTRIC: CPT

## 2020-05-31 PROCEDURE — 86850 RBC ANTIBODY SCREEN: CPT

## 2020-05-31 PROCEDURE — 85014 HEMATOCRIT: CPT

## 2020-05-31 PROCEDURE — 87040 BLOOD CULTURE FOR BACTERIA: CPT

## 2020-05-31 PROCEDURE — 94640 AIRWAY INHALATION TREATMENT: CPT

## 2020-05-31 RX ORDER — PREDNISONE 20 MG/1
10 TABLET ORAL DAILY
Status: ON HOLD | COMMUNITY
End: 2020-06-09 | Stop reason: HOSPADM

## 2020-05-31 RX ORDER — ONDANSETRON 2 MG/ML
4 INJECTION INTRAMUSCULAR; INTRAVENOUS EVERY 6 HOURS PRN
Status: DISCONTINUED | OUTPATIENT
Start: 2020-05-31 | End: 2020-06-15 | Stop reason: HOSPADM

## 2020-05-31 RX ORDER — DEXTROSE AND SODIUM CHLORIDE 5; .9 G/100ML; G/100ML
INJECTION, SOLUTION INTRAVENOUS CONTINUOUS
Status: DISCONTINUED | OUTPATIENT
Start: 2020-05-31 | End: 2020-05-31

## 2020-05-31 RX ORDER — PROMETHAZINE HYDROCHLORIDE 25 MG/1
12.5 TABLET ORAL EVERY 6 HOURS PRN
Status: DISCONTINUED | OUTPATIENT
Start: 2020-05-31 | End: 2020-06-15 | Stop reason: HOSPADM

## 2020-05-31 RX ORDER — INSULIN LISPRO 100 [IU]/ML
0-6 INJECTION, SOLUTION INTRAVENOUS; SUBCUTANEOUS
Status: DISCONTINUED | OUTPATIENT
Start: 2020-05-31 | End: 2020-06-02

## 2020-05-31 RX ORDER — DEXTROSE MONOHYDRATE 25 G/50ML
12.5 INJECTION, SOLUTION INTRAVENOUS PRN
Status: DISCONTINUED | OUTPATIENT
Start: 2020-05-31 | End: 2020-06-04 | Stop reason: SDUPTHER

## 2020-05-31 RX ORDER — DEXTROSE MONOHYDRATE 25 G/50ML
12.5 INJECTION, SOLUTION INTRAVENOUS PRN
Status: DISCONTINUED | OUTPATIENT
Start: 2020-05-31 | End: 2020-06-15 | Stop reason: HOSPADM

## 2020-05-31 RX ORDER — SODIUM CHLORIDE 9 MG/ML
INJECTION, SOLUTION INTRAVENOUS CONTINUOUS
Status: DISCONTINUED | OUTPATIENT
Start: 2020-05-31 | End: 2020-05-31

## 2020-05-31 RX ORDER — 0.9 % SODIUM CHLORIDE 0.9 %
1000 INTRAVENOUS SOLUTION INTRAVENOUS ONCE
Status: COMPLETED | OUTPATIENT
Start: 2020-05-31 | End: 2020-05-31

## 2020-05-31 RX ORDER — ACETAMINOPHEN 650 MG/1
650 SUPPOSITORY RECTAL EVERY 6 HOURS PRN
Status: DISCONTINUED | OUTPATIENT
Start: 2020-05-31 | End: 2020-06-15 | Stop reason: HOSPADM

## 2020-05-31 RX ORDER — SODIUM CHLORIDE 0.9 % (FLUSH) 0.9 %
10 SYRINGE (ML) INJECTION PRN
Status: DISCONTINUED | OUTPATIENT
Start: 2020-05-31 | End: 2020-06-15 | Stop reason: HOSPADM

## 2020-05-31 RX ORDER — PREDNISONE 10 MG/1
10 TABLET ORAL DAILY
Status: DISCONTINUED | OUTPATIENT
Start: 2020-06-01 | End: 2020-06-15 | Stop reason: HOSPADM

## 2020-05-31 RX ORDER — POLYETHYLENE GLYCOL 3350 17 G/17G
17 POWDER, FOR SOLUTION ORAL DAILY PRN
Status: DISCONTINUED | OUTPATIENT
Start: 2020-05-31 | End: 2020-06-15 | Stop reason: HOSPADM

## 2020-05-31 RX ORDER — SODIUM CHLORIDE 0.9 % (FLUSH) 0.9 %
10 SYRINGE (ML) INJECTION EVERY 12 HOURS SCHEDULED
Status: DISCONTINUED | OUTPATIENT
Start: 2020-05-31 | End: 2020-06-15 | Stop reason: HOSPADM

## 2020-05-31 RX ORDER — INSULIN LISPRO 100 [IU]/ML
0-3 INJECTION, SOLUTION INTRAVENOUS; SUBCUTANEOUS NIGHTLY
Status: DISCONTINUED | OUTPATIENT
Start: 2020-05-31 | End: 2020-06-02

## 2020-05-31 RX ORDER — DEXTROSE MONOHYDRATE 50 MG/ML
100 INJECTION, SOLUTION INTRAVENOUS PRN
Status: DISCONTINUED | OUTPATIENT
Start: 2020-05-31 | End: 2020-06-15 | Stop reason: HOSPADM

## 2020-05-31 RX ORDER — 0.9 % SODIUM CHLORIDE 0.9 %
250 INTRAVENOUS SOLUTION INTRAVENOUS ONCE
Status: DISCONTINUED | OUTPATIENT
Start: 2020-05-31 | End: 2020-06-04 | Stop reason: ALTCHOICE

## 2020-05-31 RX ORDER — NICOTINE POLACRILEX 4 MG
15 LOZENGE BUCCAL PRN
Status: DISCONTINUED | OUTPATIENT
Start: 2020-05-31 | End: 2020-06-04 | Stop reason: SDUPTHER

## 2020-05-31 RX ORDER — MIRTAZAPINE 15 MG/1
15 TABLET, FILM COATED ORAL NIGHTLY
Status: DISCONTINUED | OUTPATIENT
Start: 2020-05-31 | End: 2020-06-15 | Stop reason: HOSPADM

## 2020-05-31 RX ORDER — DEXTROSE MONOHYDRATE 50 MG/ML
100 INJECTION, SOLUTION INTRAVENOUS PRN
Status: DISCONTINUED | OUTPATIENT
Start: 2020-05-31 | End: 2020-06-04 | Stop reason: SDUPTHER

## 2020-05-31 RX ORDER — IPRATROPIUM BROMIDE AND ALBUTEROL SULFATE 2.5; .5 MG/3ML; MG/3ML
3 SOLUTION RESPIRATORY (INHALATION)
Status: DISCONTINUED | OUTPATIENT
Start: 2020-05-31 | End: 2020-06-15 | Stop reason: HOSPADM

## 2020-05-31 RX ORDER — ACETAMINOPHEN 325 MG/1
650 TABLET ORAL EVERY 6 HOURS PRN
Status: DISCONTINUED | OUTPATIENT
Start: 2020-05-31 | End: 2020-06-15 | Stop reason: HOSPADM

## 2020-05-31 RX ORDER — SODIUM CHLORIDE 9 MG/ML
INJECTION, SOLUTION INTRAVENOUS
Status: DISPENSED
Start: 2020-05-31 | End: 2020-06-01

## 2020-05-31 RX ORDER — DEXTROSE MONOHYDRATE 25 G/50ML
25 INJECTION, SOLUTION INTRAVENOUS ONCE
Status: COMPLETED | OUTPATIENT
Start: 2020-05-31 | End: 2020-05-31

## 2020-05-31 RX ORDER — NICOTINE POLACRILEX 4 MG
15 LOZENGE BUCCAL PRN
Status: DISCONTINUED | OUTPATIENT
Start: 2020-05-31 | End: 2020-06-15 | Stop reason: HOSPADM

## 2020-05-31 RX ADMIN — IPRATROPIUM BROMIDE AND ALBUTEROL SULFATE 3 ML: .5; 3 SOLUTION RESPIRATORY (INHALATION) at 19:54

## 2020-05-31 RX ADMIN — Medication 1 G: at 16:35

## 2020-05-31 RX ADMIN — SODIUM CHLORIDE 1000 ML: 9 INJECTION, SOLUTION INTRAVENOUS at 16:05

## 2020-05-31 RX ADMIN — DEXTROSE MONOHYDRATE 25 G: 25 INJECTION, SOLUTION INTRAVENOUS at 20:48

## 2020-05-31 RX ADMIN — AZITHROMYCIN MONOHYDRATE 500 MG: 500 INJECTION, POWDER, LYOPHILIZED, FOR SOLUTION INTRAVENOUS at 16:39

## 2020-05-31 RX ADMIN — INSULIN HUMAN 10 UNITS: 100 INJECTION, SOLUTION PARENTERAL at 20:47

## 2020-05-31 RX ADMIN — SODIUM BICARBONATE 50 MEQ: 84 INJECTION, SOLUTION INTRAVENOUS at 17:45

## 2020-05-31 RX ADMIN — SODIUM BICARBONATE: 84 INJECTION, SOLUTION INTRAVENOUS at 17:47

## 2020-05-31 RX ADMIN — SODIUM BICARBONATE 50 MEQ: 84 INJECTION INTRAVENOUS at 22:30

## 2020-05-31 RX ADMIN — SODIUM CHLORIDE 8 MG/HR: 9 INJECTION, SOLUTION INTRAVENOUS at 19:14

## 2020-05-31 RX ADMIN — SODIUM CHLORIDE 80 MG: 9 INJECTION, SOLUTION INTRAVENOUS at 17:49

## 2020-05-31 RX ADMIN — SODIUM ZIRCONIUM CYCLOSILICATE 10 G: 10 POWDER, FOR SUSPENSION ORAL at 23:33

## 2020-05-31 RX ADMIN — IOPAMIDOL 75 ML: 755 INJECTION, SOLUTION INTRAVENOUS at 15:04

## 2020-05-31 RX ADMIN — Medication 10 ML: at 20:48

## 2020-05-31 ASSESSMENT — PAIN SCALES - GENERAL: PAINLEVEL_OUTOF10: 0

## 2020-05-31 ASSESSMENT — ENCOUNTER SYMPTOMS
COLOR CHANGE: 0
RESPIRATORY NEGATIVE: 1
BACK PAIN: 0
SHORTNESS OF BREATH: 0
WHEEZING: 0
NAUSEA: 0
VOMITING: 0
PHOTOPHOBIA: 0
ABDOMINAL PAIN: 0
CHEST TIGHTNESS: 0
CONSTIPATION: 0
COUGH: 0
DIARRHEA: 0
STRIDOR: 0

## 2020-05-31 NOTE — ED NOTES
Report given to [facility] at bedside. Pt alert and oriented and shows no signs of distress at time of transfer to [address] [place] [address]. Pt taken to room by [facility] and [facility] ED Tech in bed. Sodium Bicarb and Protonix infusing at time of transfer.        Martin Cuevas RN  05/31/20 0019

## 2020-05-31 NOTE — ED PROVIDER NOTES
[address] [facility]        Pt Name: Mauro Beck  MRN: [id number]  [place] 1940  Date of evaluation: 5/31/2020  Provider: CLOVIS Stafford  PCP: Anders Parks     I have seen and evaluated this patient with my supervising physician Jessica Olguin MD.    [facility]       Chief Complaint   Patient presents with    Fatigue     Pt in by EMS from daughter's house. Normal is a/ox4 and independent. Pt reports nausea and diarrhea x 2 days and currently feeling weak. Per EMS pt was in the garage when she became suddenly weak and collapased to the ground. U/a, right arm and leg drift noted. hx of afib +blood thinners       HISTORY OF PRESENT ILLNESS   (Location, Timing/Onset, Context/Setting, Quality, Duration, Modifying Factors, Severity, Associated Signs and Symptoms)  Note limiting factors. Mauro Beck is a 70 y.o. female with past medical history of CAD, COPD, depression, diabetes, CHF, hyperlipidemia, hypertension, lung cancer, previous MI, obstructive sleep apnea and atrial fibrillation on Eliquis and aspirin who presents to the ED with complaint of weakness and fatigue. Was brought in by EMS from daughter's house. Patient apparently independent and lives on her own been normally ANO x4. Apparently has had nausea and diarrhea for the past several days. Has been feeling weak today. Apparently she was in the garage when she became weak and apparently had to be lowered to the ground by family members. They brought her into the house and apparently improved with a called EMS and brought her to the ED for further evaluation and treatment. Patient denies any specific injury or trauma from the fall. Denies any complaints other than weakness. Denies chest pain, shortness of breath, cough, abdominal pain, nausea/vomiting, urinary symptoms or changes in bowel movements. Denies fever chills. Denies rashes or lesions. Denies headache, visual changes, speech disturbances or numbness/tingling. Nursing Notes were all reviewed and agreed with or any disagreements were addressed in the HPI. REVIEW OF SYSTEMS    (2-9 systems for level 4, 10 or more for level 5)     Review of Systems   Constitutional: Negative for activity change, appetite change, chills, diaphoresis, fatigue and fever. Eyes: Negative for photophobia and visual disturbance. Respiratory: Negative. Negative for cough, chest tightness, shortness of breath, wheezing and stridor. Cardiovascular: Negative. Negative for chest pain, palpitations and leg swelling. Gastrointestinal: Negative for abdominal pain, constipation, diarrhea, nausea and vomiting. Genitourinary: Negative for decreased urine volume, difficulty urinating, dysuria, flank pain, frequency, hematuria and urgency. Musculoskeletal: Negative for arthralgias, back pain, myalgias, neck pain and neck stiffness. Skin: Negative for color change, pallor, rash and wound. Neurological: Positive for weakness. Negative for dizziness, tremors, seizures, syncope, facial asymmetry, speech difficulty, light-headedness, numbness and headaches. Positives and Pertinent negatives as per HPI. Except as noted above in the ROS, all other systems were reviewed and negative.          PAST MEDICAL HISTORY     Past Medical History:   Diagnosis Date    Arthritis     CAD (coronary artery disease)     Carpal tunnel syndrome     COPD (chronic obstructive pulmonary disease) ([address]     Coronary stent     depression     Diabetes mellitus ([address]     Diastolic heart failure ([address]     Per Dr Ivory Petit 8/5/19    GERD (gastroesophageal reflux disease)     Hyperlipidemia     Hypertension     Lung cancer ([address]     Adenocarcinoma of Left Lung    MI (myocardial infarction) ([address]     LIZETT (obstructive sleep apnea)     does not use CPAP    PONV (postoperative nausea and vomiting)     stress incontinence SURGICAL HISTORY     Past Surgical History:   Procedure Laterality Date    BACK SURGERY  2000, 2001    lumbar laminectomy    BRONCHOSCOPY  12/04/2018    BRONCHOSCOPY N/A 2/27/2019    BRONCHOSCOPY BIOPSY BRONCHUS performed by Lonnie Manriquez MD at [facility]  2/27/2019    BRONCHOSCOPY/TRANSBRONCHIAL NEEDLE BIOPSY performed by Lonnie Manriquez MD at [facility]  2/27/2019    BRONCHOSCOPY ULTRASOUND performed by Lonnie Manriquez MD at [facility] N/A 8/6/2019    BRONCHOSCOPY ALVEOLAR LAVAGE performed by Lonnie Manriquez MD at [facility] N/A 9/27/2019    BRONCHOSCOPY ALVEOLAR LAVAGE performed by Eze Fried MD at [facility]  9/27/2019    BRONCHOSCOPY BIOPSY BRONCHUS performed by zEe Fried MD at [facility]  9/27/2019    BRONCHOSCOPY BRUSHINGS performed by Eze Fried MD at [facility], LAPAROSCOPIC N/A 12/19/2018    LAPAROSCOPIC CHOLECYSTECTOMY WITH CHOLANGIOGRAM performed by Kaci Levy MD at [facility] COLONOSCOPY N/A 2/25/2019    COLONOSCOPY WITH BIOPSY performed by Precious Ivey MD at [address] COLONOSCOPY  2/25/2019    COLONOSCOPY POLYPECTOMY SNARE/COLD BIOPSY performed by Precious Ivey MD at [place]  2000, 2001    [place] [address] INCL FLUOR GDNCE DX W/CELL [facility] SPX N/A 12/4/2018    BRONCHOSCOPY WITH LAVAGE performed by Alvis Severin, MD at [facility] 2/25/2019    EGD BIOPSY performed by Precious Ivey MD at [facility] N/A 8/7/2019    EGD DIAGNOSTIC ONLY performed by Jennifer Chicas MD at [address]       Previous Medications    ACETAMINOPHEN (TYLENOL) 650 MG SUPPOSITORY    Place 650 mg rectally every 6 hours as needed for Fever    ACETAMINOPHEN 650 MG TABS    Take 650 mg by mouth every 4 hours as needed for Pain (For mild pain level 1-3 or for fever > 100.5). APIXABAN (ELIQUIS) 5 MG TABS TABLET    Take 1 tablet by mouth 2 times daily    ASPIRIN 81 MG EC TABLET    Take 1 tablet by mouth daily    BUSPIRONE (BUSPAR) 5 MG TABLET    Take 1 tablet by mouth 2 times daily    CARVEDILOL (COREG) 25 MG TABLET    Take 1 tablet by mouth 2 times daily    DILTIAZEM (CARDIZEM) 30 MG TABLET    Take 1 tablet by mouth 4 times daily    DRONABINOL (MARINOL) 2.5 MG CAPSULE    Take 2.5 mg by mouth 2 times daily (before meals).     FLECAINIDE (TAMBOCOR) 50 MG TABLET    Take 1 tablet by mouth 2 times daily    FLUTICASONE-UMECLIDIN-VILANT (TRELEGY ELLIPTA) 100-62.5-25 MCG/INH AEPB    Inhale 1 puff into the lungs daily     FUROSEMIDE (LASIX) 40 MG TABLET    Take 1 tablet by mouth 2 times daily    HYOSCYAMINE (ANASPAZ;LEVSIN) 125 MCG TABLET    Take 125 mcg by mouth every 4 hours as needed for Cramping    IPRATROPIUM-ALBUTEROL (DUONEB) 0.5-2.5 (3) MG/3ML SOLN NEBULIZER SOLUTION    Inhale 3 mLs into the lungs every 4 hours (while awake) DX:COPD J44.9    LOPERAMIDE (IMODIUM) 2 MG CAPSULE    Take 2 mg by mouth as needed for Diarrhea    LORATADINE (LORADAMED) 10 MG TABLET    Take 10 mg by mouth daily    MECLIZINE (ANTIVERT) 25 MG TABLET    Take 25 mg by mouth daily    METFORMIN (GLUCOPHAGE) 500 MG TABLET    Take 1,000 mg by mouth 2 times daily (with meals)    METOCLOPRAMIDE (REGLAN) 10 MG TABLET    Take 10 mg by mouth 4 times daily    MIRTAZAPINE (REMERON) 15 MG TABLET    Take 1 tablet by mouth nightly    OMEPRAZOLE (PRILOSEC) 20 MG DELAYED RELEASE CAPSULE    Take 20 mg by mouth daily    POTASSIUM CHLORIDE (KLOR-CON M) 20 MEQ EXTENDED RELEASE TABLET    Take 20 mEq by mouth as needed When taking furosemide    POTASSIUM CHLORIDE (KLOR-CON M) 20 MEQ EXTENDED RELEASE TABLET    Take 1 tablet by mouth daily         ALLERGIES Statins; Lyrica [pregabalin]; Cipro xr; Penicillins; and Sulfa antibiotics    FAMILYHISTORY       Family History   Problem Relation Age of Onset    Cancer Sister     Diabetes Sister     Diabetes Brother     Heart Disease Father     Heart Disease Mother     Cancer Maternal Uncle           SOCIAL HISTORY       Social History     Tobacco Use    Smoking status: Former Smoker     Last attempt to quit: 10/28/2001     Years since quittin.6    Smokeless tobacco: Never Used   Substance Use Topics    Alcohol use: No    Drug use: No       SCREENINGS   NIH Stroke Scale  Interval: Baseline  Level of Consciousness (1a. ): Alert  LOC Questions (1b. ): Answers one correctly  LOC Commands (1c. ): Performs both tasks correctly  Motor Arm, Left (5a. ): No drift  Motor Arm, Right (5b. ): Drift, but does not hit bed  Motor Leg, Left (6a. ): No drift  Motor Leg, Right (6b. ): Some effort against gravity  Best Language (9. ): No aphasia  Dysarthria (10. ): NormalGlasgow Coma Scale  Eye Opening: Spontaneous  Best Verbal Response: Confused  Best Motor Response: Obeys commands  Jess Coma Scale Score: 14        PHYSICAL EXAM    (up to 7 for level 4, 8 or more for level 5)     ED Triage Vitals [20 1504]   BP Temp Temp Source Pulse Resp SpO2 Height Weight   (!) 137/47 97.4 °F (36.3 °C) Oral 59 16 97 % 5' 5\" (1.651 m) 120 lb (54.4 kg)       Physical Exam  Constitutional:       General: She is not in acute distress. Appearance: Normal appearance. She is well-developed. She is not ill-appearing, toxic-appearing or diaphoretic. HENT:      Head: Normocephalic and atraumatic. Comments: Atraumatic. No raccoon eyes or adams sign. Right Ear: External ear normal.      Left Ear: External ear normal.   Eyes:      General:         Right eye: No discharge. Left eye: No discharge. Extraocular Movements: Extraocular movements intact.       Conjunctiva/sclera: Conjunctivae normal.      Pupils: Pupils are equal, round, and reactive to light. Neck:      Musculoskeletal: Normal range of motion and neck supple. No neck rigidity or muscular tenderness. Cardiovascular:      Rate and Rhythm: Normal rate and regular rhythm. Pulses: Normal pulses. Heart sounds: Normal heart sounds. No murmur. No friction rub. No gallop. Comments: 2+ radial pulse bilaterally. No pedal edema. No calf tenderness. No JVD. Pulmonary:      Effort: Pulmonary effort is normal. No respiratory distress. Breath sounds: Normal breath sounds. No stridor. No wheezing, rhonchi or rales. Chest:      Chest wall: No tenderness. Abdominal:      General: Abdomen is flat. Bowel sounds are normal. There is no distension. Palpations: Abdomen is soft. There is no mass. Tenderness: There is no abdominal tenderness. There is no right CVA tenderness, left CVA tenderness, guarding or rebound. Hernia: No hernia is present. Genitourinary:     Comments: Rectal exam performed by myself with female chaperone at bedside. Brown non-melenic stool noted. No bright red blood. Musculoskeletal: Normal range of motion. Lymphadenopathy:      Cervical: No cervical adenopathy. Skin:     General: Skin is warm and dry. Coloration: Skin is not pale. Findings: No erythema or rash. Neurological:      General: No focal deficit present. Mental Status: She is alert. GCS: GCS eye subscore is 4. GCS verbal subscore is 5. GCS motor subscore is 6. Cranial Nerves: Cranial nerves are intact. No cranial nerve deficit. Sensory: No sensory deficit. Motor: Weakness present. Comments: Gait deferred at this time. Cranial nerves II through XII intact. Speech clear. No facial droop. Does have what appears to be a faint drift to the right upper extremity with comparison to the left. Patient complaint of decreased sensation to the right upper extremity with comparison to the left.   No weakness noted to the bilateral lower extremities. Alert and oriented x2 at this time. Patient not oriented to time. Alert to person and place.    Psychiatric:         Behavior: Behavior normal.         DIAGNOSTIC RESULTS   LABS:    Labs Reviewed   CBC WITH AUTO DIFFERENTIAL - Abnormal; Notable for the following components:       Result Value    RBC 2.94 (*)     Hemoglobin 7.0 (*)     Hematocrit 23.4 (*)     MCV 79.4 (*)     MCH 23.9 (*)     MCHC 30.1 (*)     RDW 18.6 (*)     Neutrophils Absolute 8.0 (*)     All other components within normal limits    Narrative:     Performed at:  [facility] [address], [facility], [address] [employer]   Phone [phone]   COMPREHENSIVE METABOLIC PANEL W/ REFLEX TO MG FOR LOW K - Abnormal; Notable for the following components:    Sodium 134 (*)     Potassium reflex Magnesium 5.7 (*)     Chloride 96 (*)     CO2 19 (*)     Anion Gap 19 (*)     Glucose 210 (*)     BUN 75 (*)     CREATININE 6.2 (*)     GFR Non- 7 (*)     GFR  8 (*)     ALT 7 (*)     AST 12 (*)     All other components within normal limits    Narrative:     CALL  [place]  [facility] tel. [phone],  Chemistry results called to and read back by MEGAN Cuevas, 05/31/2020  15:36, by [facility]  Performed at:  [facility] [address] [employer]   Phone [phone] - Abnormal; Notable for the following components:    Pro-BNP 4,098 (*)     All other components within normal limits    Narrative:     Leeanne Vega  [facility] tel. [phone],  Chemistry results called to and read back by MEGAN Cuevas, 05/31/2020  15:36, by [facility]  Performed at:  [facility] [address] [facility], [address] [employer]   Phone [phone]   PROTIME-INR - Abnormal; Notable for the following components:    Protime 20.1 (*)     INR 1.72 (*)     All other components within normal limits    Narrative:     Performed at:  [facility] [address], [place], [address] [employer]   Phone [phone]   LACTATE, SEPSIS - Abnormal; Notable for the following components:    Lactic Acid, Sepsis 4.3 (*)     All other components within normal limits    Narrative:     Marcello Wilson  [facility] tel. [phone],  Chemistry results called to and read back by MEGAN Mauricio, 05/31/2020  16:08, by [facility]  Performed at:  [facility] [address], [place], [address]   Phone [phone]   BLOOD OCCULT STOOL DIAGNOSTIC - Abnormal; Notable for the following components:    Occult Blood Diagnostic   (*)     Value: Result: POSITIVE  Normal range: Negative      All other components within normal limits    Narrative:     ORDER#: [id number]                          ORDERED BY: Rosalind Love  SOURCE: Stool                              COLLECTED:  05/31/20 16:20  ANTIBIOTICS AT MAX.:                      RECEIVED :  05/31/20 16:26  Performed at:  [facility] [address], [place], [facility] [employer]   Phone [phone]   POCT GLUCOSE - Abnormal; Notable for the following components:    POC Glucose 216 (*)     All other components within normal limits    Narrative:     Performed at:  [facility] [address], [place], [facility] [employer]   Phone [phone]   CULTURE, BLOOD 1   CULTURE, BLOOD 2   TROPONIN    Narrative:     Marcello Wilson  [facility] tel. [phone],  Chemistry results called to and read back by MEGAN Heller, 05/31/2020  15:36, by [facility]  Performed at:  [facility] [address], [place], [address]   Phone [phone]   CK    Narrative:     Performed at:  [facility] [address], [place], [facility] [employer]   Phone [phone]   URINE RT REFLEX TO CULTURE   LACTATE, SEPSIS   BASIC METABOLIC PANEL   TYPE AND SCREEN    Narrative:     Performed at:  [place] [address],  [place], [address]   Phone [phone]       All other labs were within normal range or not returned as of this dictation. EKG: All EKG's are interpreted by the Emergency Department Physician in the absence of a cardiologist.  Please see their note for interpretation of EKG. RADIOLOGY:   Non-plain film images such as CT, Ultrasound and MRI are read by the radiologist. Plain radiographic images are visualized and preliminarily interpreted by the ED Provider with the below findings:        Interpretation per the Radiologist below, if available at the time of this note:    XR CHEST PORTABLE   Final Result   1. Mass density left hilum similar appearance to previous exam   2. New pulmonary opacity right upper lobe could represent an infectious   process. However, follow-up suggested to ensure resolution   3. Previously noted left midlung opacity has organized into a linear density   most likely representing scarring. CTA HEAD NECK W CONTRAST   Final Result   50% stenosis of the proximal right cervical ICA. Otherwise, no flow limiting stenosis or large vessel occlusion visualized   within the head or neck. Incidentally noted chest findings as detailed above. Would consider   dedicated CT of the chest for further evaluation. CT Head WO Contrast   Final Result   No acute intracranial abnormality. Chronic microvascular ischemic changes and global cerebral atrophy. Findings were discussed with Diamond Rizzo at 3:23 pm on 5/31/2020. No results found.         PROCEDURES   Unless otherwise noted below, none     Procedures    CRITICAL CARE TIME   N/A    CONSULTS:  IP CONSULT TO NEPHROLOGY  IP CONSULT TO HOSPITALIST      EMERGENCY DEPARTMENT COURSE and DIFFERENTIAL DIAGNOSIS/MDM:   Vitals:    Vitals:    05/31/20 1504 05/31/20 1530 05/31/20 1545 05/31/20 1600   BP: (!) 137/47 (!) 132/39 (!) 125/40 (!) 116/43 Prescriptions    No medications on file       DISCONTINUED MEDICATIONS:  Discontinued Medications    No medications on file              (Please note that portions of this note were completed with a voice recognition program.  Efforts were made to edit the dictations but occasionally words are mis-transcribed.)    CLOVIS Pires (electronically signed)          CLOVIS Hernandez  05/31/20 [phone]

## 2020-05-31 NOTE — ED NOTES
Stroke alert called by Kellen BRANNON. Daughter at bedside at this time who is reporting pt has been lethargic for the last two days. Today states her neighbors came to help pick her up today and in the process reports pt went completely unresponsive, reports pt was gurgling at that time. Daughter also reported that she had trouble finding a pulse, but when she sternal rubbed her, pt was able to respond. Pt sitting in bed awake at this time, lethargy noted, pt denies any complaints.      Oc Aranda RN  05/31/20 [id number]

## 2020-05-31 NOTE — PROGRESS NOTES
Consult received    Notes and labs reviewed  Full note to follow  LORRAINE with metab acidosis and Hyperkalemia  1L NSS given  Use HCO3 as maintenance IVF  Place bernstein  Discussed with ED NP  Repeat BMP at 9pm    Thank you

## 2020-05-31 NOTE — ED NOTES
Bed: 25  Expected date:   Expected time:   Means of arrival: [facility]  Comments:  Dewey Copeland, [facility]  05/31/20 8317

## 2020-05-31 NOTE — ED NOTES
Pt refusing straight cath at this time. Purewick placed in proper position.      Inga Arellano RN  05/31/20 [id number]

## 2020-05-31 NOTE — ED NOTES
Pt more alert at this time, sitting upright in bed, looking around room and answering questions appropriately. Pt resting comfortably with no signs of distress. Denies any needs at this time. Bed locked and in lowest position with both side rails raise. Call light within reach. Daughter at bedside.      Jesus Styles RN  05/31/20 [phone]

## 2020-06-01 LAB
ALBUMIN SERPL-MCNC: 3.5 G/DL (ref 3.4–5)
ANION GAP SERPL CALCULATED.3IONS-SCNC: 15 MMOL/L (ref 3–16)
BUN BLDV-MCNC: 61 MG/DL (ref 7–20)
CALCIUM SERPL-MCNC: 9 MG/DL (ref 8.3–10.6)
CHLORIDE BLD-SCNC: 93 MMOL/L (ref 99–110)
CO2: 29 MMOL/L (ref 21–32)
CREAT SERPL-MCNC: 4.7 MG/DL (ref 0.6–1.2)
EKG ATRIAL RATE: 59 BPM
EKG DIAGNOSIS: NORMAL
EKG P AXIS: 69 DEGREES
EKG P-R INTERVAL: 280 MS
EKG Q-T INTERVAL: 486 MS
EKG QRS DURATION: 122 MS
EKG QTC CALCULATION (BAZETT): 481 MS
EKG R AXIS: 82 DEGREES
EKG T AXIS: 92 DEGREES
EKG VENTRICULAR RATE: 59 BPM
ESTIMATED AVERAGE GLUCOSE: 180 MG/DL
FERRITIN: 40 NG/ML (ref 15–150)
FOLATE: 15.18 NG/ML (ref 4.78–24.2)
GFR AFRICAN AMERICAN: 11
GFR NON-AFRICAN AMERICAN: 9
GLUCOSE BLD-MCNC: 114 MG/DL (ref 70–99)
GLUCOSE BLD-MCNC: 121 MG/DL (ref 70–99)
GLUCOSE BLD-MCNC: 127 MG/DL (ref 70–99)
GLUCOSE BLD-MCNC: 147 MG/DL (ref 70–99)
GLUCOSE BLD-MCNC: 304 MG/DL (ref 70–99)
GLUCOSE BLD-MCNC: 344 MG/DL (ref 70–99)
GLUCOSE BLD-MCNC: 363 MG/DL (ref 70–99)
HBA1C MFR BLD: 7.9 %
HCT VFR BLD CALC: 23.8 % (ref 36–48)
HCT VFR BLD CALC: 24.8 % (ref 36–48)
HCT VFR BLD CALC: 25.4 % (ref 36–48)
HEMOGLOBIN: 7.6 G/DL (ref 12–16)
HEMOGLOBIN: 8 G/DL (ref 12–16)
HEMOGLOBIN: 8.1 G/DL (ref 12–16)
IRON SATURATION: 31 % (ref 15–50)
IRON: 101 UG/DL (ref 37–145)
LACTIC ACID: 3.6 MMOL/L (ref 0.4–2)
PERFORMED ON: ABNORMAL
PHOSPHORUS: 4.5 MG/DL (ref 2.5–4.9)
POTASSIUM SERPL-SCNC: 4.7 MMOL/L (ref 3.5–5.1)
SODIUM BLD-SCNC: 137 MMOL/L (ref 136–145)
TOTAL IRON BINDING CAPACITY: 327 UG/DL (ref 260–445)
VITAMIN B-12: 711 PG/ML (ref 211–911)

## 2020-06-01 PROCEDURE — 83605 ASSAY OF LACTIC ACID: CPT

## 2020-06-01 PROCEDURE — 93010 ELECTROCARDIOGRAM REPORT: CPT | Performed by: INTERNAL MEDICINE

## 2020-06-01 PROCEDURE — 2060000000 HC ICU INTERMEDIATE R&B

## 2020-06-01 PROCEDURE — 2580000003 HC RX 258: Performed by: FAMILY MEDICINE

## 2020-06-01 PROCEDURE — 82728 ASSAY OF FERRITIN: CPT

## 2020-06-01 PROCEDURE — 82607 VITAMIN B-12: CPT

## 2020-06-01 PROCEDURE — 83540 ASSAY OF IRON: CPT

## 2020-06-01 PROCEDURE — U0003 INFECTIOUS AGENT DETECTION BY NUCLEIC ACID (DNA OR RNA); SEVERE ACUTE RESPIRATORY SYNDROME CORONAVIRUS 2 (SARS-COV-2) (CORONAVIRUS DISEASE [COVID-19]), AMPLIFIED PROBE TECHNIQUE, MAKING USE OF HIGH THROUGHPUT TECHNOLOGIES AS DESCRIBED BY CMS-2020-01-R: HCPCS

## 2020-06-01 PROCEDURE — 6370000000 HC RX 637 (ALT 250 FOR IP): Performed by: INTERNAL MEDICINE

## 2020-06-01 PROCEDURE — 6370000000 HC RX 637 (ALT 250 FOR IP): Performed by: FAMILY MEDICINE

## 2020-06-01 PROCEDURE — 94761 N-INVAS EAR/PLS OXIMETRY MLT: CPT

## 2020-06-01 PROCEDURE — 83550 IRON BINDING TEST: CPT

## 2020-06-01 PROCEDURE — 6360000002 HC RX W HCPCS: Performed by: INTERNAL MEDICINE

## 2020-06-01 PROCEDURE — 36415 COLL VENOUS BLD VENIPUNCTURE: CPT

## 2020-06-01 PROCEDURE — 85014 HEMATOCRIT: CPT

## 2020-06-01 PROCEDURE — 82746 ASSAY OF FOLIC ACID SERUM: CPT

## 2020-06-01 PROCEDURE — 80069 RENAL FUNCTION PANEL: CPT

## 2020-06-01 PROCEDURE — 85018 HEMOGLOBIN: CPT

## 2020-06-01 PROCEDURE — 94640 AIRWAY INHALATION TREATMENT: CPT

## 2020-06-01 PROCEDURE — 2700000000 HC OXYGEN THERAPY PER DAY

## 2020-06-01 RX ORDER — PANTOPRAZOLE SODIUM 40 MG/1
40 TABLET, DELAYED RELEASE ORAL
Status: DISCONTINUED | OUTPATIENT
Start: 2020-06-01 | End: 2020-06-15 | Stop reason: HOSPADM

## 2020-06-01 RX ORDER — FUROSEMIDE 10 MG/ML
40 INJECTION INTRAMUSCULAR; INTRAVENOUS ONCE
Status: COMPLETED | OUTPATIENT
Start: 2020-06-01 | End: 2020-06-01

## 2020-06-01 RX ADMIN — INSULIN LISPRO 4 UNITS: 100 INJECTION, SOLUTION INTRAVENOUS; SUBCUTANEOUS at 18:41

## 2020-06-01 RX ADMIN — IPRATROPIUM BROMIDE AND ALBUTEROL SULFATE 3 ML: .5; 3 SOLUTION RESPIRATORY (INHALATION) at 12:16

## 2020-06-01 RX ADMIN — MIRTAZAPINE 15 MG: 15 TABLET, FILM COATED ORAL at 20:55

## 2020-06-01 RX ADMIN — INSULIN LISPRO 3 UNITS: 100 INJECTION, SOLUTION INTRAVENOUS; SUBCUTANEOUS at 20:21

## 2020-06-01 RX ADMIN — SODIUM ZIRCONIUM CYCLOSILICATE 10 G: 10 POWDER, FOR SUSPENSION ORAL at 18:41

## 2020-06-01 RX ADMIN — FUROSEMIDE 40 MG: 10 INJECTION, SOLUTION INTRAMUSCULAR; INTRAVENOUS at 02:03

## 2020-06-01 RX ADMIN — SODIUM ZIRCONIUM CYCLOSILICATE 10 G: 10 POWDER, FOR SUSPENSION ORAL at 11:34

## 2020-06-01 RX ADMIN — Medication 10 ML: at 20:56

## 2020-06-01 RX ADMIN — IPRATROPIUM BROMIDE AND ALBUTEROL SULFATE 3 ML: .5; 3 SOLUTION RESPIRATORY (INHALATION) at 21:20

## 2020-06-01 RX ADMIN — IPRATROPIUM BROMIDE AND ALBUTEROL SULFATE 3 ML: .5; 3 SOLUTION RESPIRATORY (INHALATION) at 08:10

## 2020-06-01 RX ADMIN — PREDNISONE 10 MG: 10 TABLET ORAL at 11:33

## 2020-06-01 ASSESSMENT — PAIN SCALES - GENERAL
PAINLEVEL_OUTOF10: 0

## 2020-06-01 NOTE — PROGRESS NOTES
[facility] PROGRESS NOTE    6/1/2020 7:16 AM        Name: Cady Cortez . Admitted: 5/31/2020  Primary Care Provider: Jaky Zaldivar (Tel: [phone])      Subjective:  Patient is a 71 yo female with hx adenocarcinoma of the lung, CAD/stent, COPD, chronic anemia, chronic dCHF, DM, HTN, HLD, LIZETT (untreated), chronic AC (on Eliquis). She presented to hospital with ongoing fatigue and weakness. Found to have worsening anemia, Hgb 6.6 and she was transfused. Also evidence of acute renal failure with creatinine 6.2. Presently resting in bed, eyes closed, daughter visiting. Opens eyes to stimuli, states she is feeling better then closes eyes again. Daughter states that is pretty much all she does at home, rarely gets up out of bed. Patient lives in own home, has home health aide 2 hours a day and visiting nurse provided by [facility]. Patient denies shortness of breath, chest pain, nausea, abdominal pain. Daughter reports she believes her mom has given up since  passed away in March, he was in SNF. Per daughter, her mom has not followed up with oncologist and expressed wishes for non-aggressive treatment, she has had multiple falls at home. Patient has refused consideration of SNF. Her and sister are POAs, both work.      Reviewed interval ancillary notes    Current Medications  pantoprazole (PROTONIX) 80 mg in sodium chloride 0.9 % 100 mL infusion, Continuous  ipratropium-albuterol (DUONEB) nebulizer solution 3 mL, Q4H WA  mirtazapine (REMERON) tablet 15 mg, Nightly  sodium chloride flush 0.9 % injection 10 mL, 2 times per day  sodium chloride flush 0.9 % injection 10 mL, PRN  acetaminophen (TYLENOL) tablet 650 mg, Q6H PRN    Or  acetaminophen (TYLENOL) suppository 650 mg, Q6H PRN  polyethylene glycol (GLYCOLAX) packet 17 g, Daily PRN  promethazine (PHENERGAN) tablet 12.5 mg, Q6H PRN Or  ondansetron (ZOFRAN) injection 4 mg, Q6H PRN  glucose (GLUTOSE) 40 % oral gel 15 g, PRN  dextrose 50 % IV solution, PRN  glucagon (rDNA) injection 1 mg, PRN  dextrose 5 % solution, PRN  insulin lispro (1 Unit Dial) 0-6 Units, TID WC  insulin lispro (1 Unit Dial) 0-3 Units, Nightly  0.9 % sodium chloride infusion 250 mL, Once  glucose (GLUTOSE) 40 % oral gel 15 g, PRN  dextrose 50 % IV solution, PRN  glucagon (rDNA) injection 1 mg, PRN  dextrose 5 % solution, PRN  sodium zirconium cyclosilicate (LOKELMA) oral suspension 10 g, TID  predniSONE (DELTASONE) tablet 10 mg, Daily        Objective:  BP (!) 169/76   Pulse 64   Temp 96.8 °F (36 °C) (Temporal)   Resp 18   Ht 5' 5\" (1.651 m)   Wt 120 lb (54.4 kg)   SpO2 100%   BMI 19.97 kg/m²     Intake/Output Summary (Last 24 hours) at 6/1/2020 0716  Last data filed at 6/1/2020 0415  Gross per 24 hour   Intake 1656.13 ml   Output 1350 ml   Net 306.13 ml      Wt Readings from Last 3 Encounters:   05/31/20 120 lb (54.4 kg)   10/08/19 142 lb (64.4 kg)   08/09/19 152 lb 12.8 oz (69.3 kg)       General appearance:  Appears comfortable  Eyes: Sclera clear. Pupils equal.  ENT: Moist oral mucosa. Trachea midline, no adenopathy. Cardiovascular: Regular rhythm, normal S1, S2. No murmur. No edema in lower extremities  Respiratory: Not using accessory muscles. Good inspiratory effort. Clear to auscultation bilaterally, no wheeze or crackles. GI: Abdomen soft, no tenderness, not distended, normal bowel sounds  Musculoskeletal: No cyanosis in digits, neck supple  Neurology: CN 2-12 grossly intact. No speech or motor deficits  Psych: Normal affect.  Alert and oriented in time, place and person  Skin: Warm, dry, color pale    Labs and Tests:  CBC:   Recent Labs     05/31/20  1506 05/31/20  1835 06/01/20  0117   WBC 9.9  --   --    HGB 7.0* 6.6* 7.6*     --   --      BMP:    Recent Labs     05/31/20  1506 05/31/20  1835 05/31/20  2155   *  --  134*   K 5.7* 6.0* 5.3* CL 96*  --  96*   CO2 19*  --  21   BUN 75*  --  66*   CREATININE 6.2*  --  5.4*   GLUCOSE 210*  --  198*     Hepatic:   Recent Labs     05/31/20  1506   AST 12*   ALT 7*   BILITOT <0.2   ALKPHOS 74     Results for Cindy Ga (MRN [id number]) as of 6/1/2020 19:14   Ref. Range 6/1/2020 02:40 6/1/2020 07:11 6/1/2020 11:34   POC Glucose Latest Ref Range: 70 - 99 mg/dl 127 (H) 114 (H) 121 (H)       CT head 5/31/2020:  No acute intracranial abnormality.       Chronic microvascular ischemic changes and global cerebral atrophy.         CTA head/neck 5/31/2020:  CTA NECK:       AORTIC ARCH/ARCH VESSELS: No dissection or arterial injury.  No significant   stenosis of the brachiocephalic or subclavian arteries.       CAROTID ARTERIES: There is 50% stenosis of the proximal right cervical   internal carotid artery due to calcified and noncalcified plaque.       VERTEBRAL ARTERIES: No dissection, arterial injury, or significant stenosis.       SOFT TISSUES: There is pulmonary emphysema with scarring or atelectasis   within the left suprahilar region. Druscilla Jagdeep are nonspecific consolidative   changes within the right upper lobe, which may be due in part to atelectasis   and scar.  Thyroid nodules are noted.       BONES: No acute osseous abnormality.           CTA HEAD:       ANTERIOR CIRCULATION: No significant stenosis of the intracranial internal   carotid, anterior cerebral, or middle cerebral arteries. No aneurysm.       POSTERIOR CIRCULATION: No significant stenosis of the vertebral, basilar, or   posterior cerebral arteries.  No aneurysm.       OTHER: No dural venous sinus thrombosis on this non-dedicated study.       BRAIN: See separately dictated noncontrast head CT report.           Impression   50% stenosis of the proximal right cervical ICA.       Otherwise, no flow limiting stenosis or large vessel occlusion visualized   within the head or neck.       Incidentally noted chest findings as detailed above.  Would consider   dedicated CT of the chest for further evaluation.         CXR 5/31/2020:  1. Mass density left hilum similar appearance to previous exam   2. New pulmonary opacity right upper lobe could represent an infectious   process.  However, follow-up suggested to ensure resolution   3. Previously noted left midlung opacity has organized into a linear density   most likely representing scarring. Problem List  Active Problems:    Renal failure  Resolved Problems:    * No resolved hospital problems. *       Assessment & Plan:   1. Acute renal failure. Admission creatinine 6.2, baseline closer to 1.2. Likely secondary to poor po intake and diuretics. Trending down, creatinine 4.7 today. Nephrology on board. Continue to hold diuretics. 2. Chronic anemia. Hgb 6.6 on admission, she has been transfused, presently 8.0, iron studies WNL. Stool guaiac positive. Prior EGD and colonoscopy last year were unremarkable. GI has seen, no plan for scope unless active bleeding. Continue PPI. Continue to follow H&H.   3.  Non-small cell lung cancer. Status post chemo and radiation (completed 4/2019). Patient indicates she wants no further workup or treatment for lung cancer. 4. Hx COPD. No evidence of acute exacerbation. Entered hospice care secondary to COPD (7/2019). 5.  Abnormal CXR. New pulmonary opacity RUL on CXR. CT scan with nonspecific consolidative changes within RUL possibly secondary to atelectasis and scar. Patient afebrile, no leukocytosis. Blood cultures no growth to date. O2 sats stable on 1 liter for high 90s. Add on procalcitonin. WBC in am.   6. DM2. A1c 7.9. On metformin at home. Continue low dose correction mealtime and bedtime insulin. 7. HTN. Reasonably controlled. Continue to follow. 8. Chronic AC. On Eliquis at home. Hold secondary profound anemia. DC planning: Anticipate patient will likely require SNF on DC if agreeable. Will check for COVID-19.     Diet: DIET CARB CONTROL;  Code:Limited  DVT PPX: scds (anemia)      Agusto Davis, MARIA C - CNP   6/1/2020 3p

## 2020-06-01 NOTE — CONSULTS
Patient is currently active with [facility]. [facility] Rep,  Jasmin Avendano, [phone] to request an increase in  post acute care at home. Met with patient's daughter to provide additional community resources on non-medical home health care, [employer], and [facility] to supplement Hospice HC. Patient does not want a SNF/NH. Daughters could consider alternating a Leave of Absence ?

## 2020-06-01 NOTE — CONSULTS
colon  Random colon biopsies   Enlarged internal and external hemorrhoids      FINAL DIAGNOSIS:    A. Duodenum, biopsy:     - Small intestinal mucosa with no significant pathologic change.     -     No diagnostic features of celiac disease. B. Stomach, biopsy:     - Antral mucosa with mild reactive change.     - No Helicobacter-like organisms identified on H&E-stained sections. C. Colon, random, biopsy:     - Colonic mucosa with no significant pathologic change.     - No diagnostic features of microscopic colitis. D. Colon, transverse, polyp, biopsy:     - No colonic mucosa present.     -     Minute fragment of food.       Admission Meds  No current facility-administered medications on file prior to encounter.       Current Outpatient Medications on File Prior to Encounter   Medication Sig Dispense Refill    predniSONE (DELTASONE) 20 MG tablet Take 10 mg by mouth daily      mirtazapine (REMERON) 15 MG tablet Take 1 tablet by mouth nightly 30 tablet 3    diltiazem (CARDIZEM) 30 MG tablet Take 1 tablet by mouth 4 times daily (Patient taking differently: Take 30 mg by mouth 2 times daily ) 120 tablet 3    furosemide (LASIX) 40 MG tablet Take 1 tablet by mouth 2 times daily (Patient taking differently: Take 20 mg by mouth daily ) 60 tablet 3    potassium chloride (KLOR-CON M) 20 MEQ extended release tablet Take 1 tablet by mouth daily 60 tablet 3    loperamide (IMODIUM) 2 MG capsule Take 2 mg by mouth as needed for Diarrhea      metFORMIN (GLUCOPHAGE) 500 MG tablet Take 1,000 mg by mouth 2 times daily (with meals)      apixaban (ELIQUIS) 5 MG TABS tablet Take 1 tablet by mouth 2 times daily 180 tablet 1    omeprazole (PRILOSEC) 20 MG delayed release capsule Take 20 mg by mouth daily      carvedilol (COREG) 25 MG tablet Take 1 tablet by mouth 2 times daily 180 tablet 3    acetaminophen (TYLENOL) 650 MG suppository Place 650 mg rectally every 6 hours as needed for Fever      ipratropium-albuterol (DUONEB) 0.5-2.5 (3) MG/3ML SOLN nebulizer solution Inhale 3 mLs into the lungs every 4 hours (while awake) DX:COPD J44.9 (Patient taking differently: Inhale 3 mLs into the lungs every 4 hours as needed DX:COPD J44.9) 360 mL 11    Fluticasone-Umeclidin-Vilant (TRELEGY ELLIPTA) 100-62.5-25 MCG/INH AEPB Inhale 1 puff into the lungs daily as needed       acetaminophen 650 MG TABS Take 650 mg by mouth every 4 hours as needed for Pain (For mild pain level 1-3 or for fever > 100.5). 120 tablet 0            Allergies  Allergies   Allergen Reactions    Statins Other (See Comments)     Other reaction(s): Myalgias (Muscle Pain)      Lyrica [Pregabalin] Other (See Comments)     Shaking, swelling, rash    Cipro Xr Rash     Swelling, rash    Penicillins Rash     Swelling, rash    Sulfa Antibiotics Rash     Swelling, rash      Social   Social History     Tobacco Use    Smoking status: Former Smoker     Last attempt to quit: 10/28/2001     Years since quittin.6    Smokeless tobacco: Never Used   Substance Use Topics    Alcohol use: No        Family History   Problem Relation Age of Onset    Cancer Sister     Diabetes Sister     Diabetes Brother     Heart Disease Father     Heart Disease Mother     Cancer Maternal Uncle          Review of Systems  Constitutional: negative for fevers, chills, sweats    Ears, nose, mouth, throat, and face: negative for nasal congestion and sore throat   Respiratory: negative for cough and shortness of breath   Cardiovascular: negative for chest pain and dyspnea   Gastrointestinal: see hpi   Genitourinary:negative for dysuria and frequency   Integument/breast: negative for pruritus and rash   Hematologic/lymphatic: negative for bleeding and easy bruising   Musculoskeletal:negative for arthralgias and myalgias   Neurological: negative for dizziness + weakness         Physical Exam  Blood pressure (!) 169/76, pulse 64, temperature 96.8 °F (36 °C), temperature source Temporal, resp. PRBC. Stool grossly negative but guaiac positive. She is on PPI at home. EGD 8/2019 and colonoscopy 2/2019 negative. Diarrhea   LORRAINE - Cr 6.2 at admission. Hyperkalemia - improved. Elevated INR -due to eliquis     Recommendations:   - Check stool for C.diff, GI bacterial pathogens PCR, and EIA for parasites if has diarrhea  - monitor hgb  - PPI  - ok to resume diet from GI perspective  - given weakness, diarrhea and new infiltrate on CXR, consider covid19 testing. D/w hospitalist.     Discussed with Dr. Fidencio Feng, SHAVONNE  [address] [employer]  I have personally performed a face to face diagnostic evaluation on this patient. I have interviewed and examined the patient and I agree with the findings and recommended plan of care. In summary, my findings and plan are the following: As above, chronically ill elderly woman being treated for lung cancer, but was apparently in home Hospice prior to calling EMS for weakness and malaise. Found in ED to be anemic as above, and stool tested Guaiac (+). She has no gross GI bleeding and no new GI c/o. She has some nausea and some diarrhea prior to admission, but not presently -- she denies to me. She has new pulm infiltrate on CXR. On exam abd is soft and NT. Lungs reveal inspiratory rales and exp rhonchi anteriorly. At this point, I do not see advantage of repeat EGD or colonoscopy, especially in the chronically ill Hospice patient. Would treat with po Protonix, resume diet, and transfuse PRBC's to get Hb>8.5. If there is gross GI Hemorrhage, we may reconsider, but if she is Hospice, would be prudent to review goals of care with patient and family prior. I discussed above with patient who is in agreement. Given new lung infiltrate, would get COVID testing.     Lucero Motley MD  [address]  6/1/2020

## 2020-06-01 NOTE — PROGRESS NOTES
10 units of IV insulin given one time per [facility] and one time dose of D50% IV 25ml per MAR per protocol, Potassium ordered to be checked per Dr. Jessie Zelaya verbal order at 2150.

## 2020-06-01 NOTE — CONSULTS
[facility]   Consult Note      6/1/2020 9:19 AM        Name: Sanya Sweet . Admitted: 5/31/2020    HPI:  Patient of Dr. Marly Jamil, with history of stage III non small cell lung cancer, s/p 6 cycles of carboplatin/taxol and radiation therapy, treatment completed April 2019. In late July 2019 patient was enrolled in hospice for the diagnosis of COPD. CT scan of the chest done in October 2019 did not show any evidence of recurrence of her malignancy. Patient does not want any further work-up for cancer. States that if she has recurrence, she will not undergo any kind of treatment. She has been admitted with renal failure and was noted to be anemic. Creatinine on admission was 6.2  Hgb was 7.0 on admission, dropped to 6.6, she has received one unit of pRBC's. Stool for occult blood was positive.       Reviewed interval ancillary notes    Current Medications  pantoprazole (PROTONIX) tablet 40 mg, QAM AC  ipratropium-albuterol (DUONEB) nebulizer solution 3 mL, Q4H WA  mirtazapine (REMERON) tablet 15 mg, Nightly  sodium chloride flush 0.9 % injection 10 mL, 2 times per day  sodium chloride flush 0.9 % injection 10 mL, PRN  acetaminophen (TYLENOL) tablet 650 mg, Q6H PRN    Or  acetaminophen (TYLENOL) suppository 650 mg, Q6H PRN  polyethylene glycol (GLYCOLAX) packet 17 g, Daily PRN  promethazine (PHENERGAN) tablet 12.5 mg, Q6H PRN    Or  ondansetron (ZOFRAN) injection 4 mg, Q6H PRN  glucose (GLUTOSE) 40 % oral gel 15 g, PRN  dextrose 50 % IV solution, PRN  glucagon (rDNA) injection 1 mg, PRN  dextrose 5 % solution, PRN  insulin lispro (1 Unit Dial) 0-6 Units, TID WC  insulin lispro (1 Unit Dial) 0-3 Units, Nightly  0.9 % sodium chloride infusion 250 mL, Once  glucose (GLUTOSE) 40 % oral gel 15 g, PRN  dextrose 50 % IV solution, PRN  glucagon (rDNA) injection 1 mg, PRN  dextrose 5 % solution, PRN  sodium zirconium cyclosilicate (LOKELMA) oral suspension 10 g, TID  predniSONE (DELTASONE) tablet 10 mg, Daily        Objective:  BP (!) 155/65   Pulse 63   Temp 97.6 °F (36.4 °C) (Temporal)   Resp 17   Ht 5' 5\" (1.651 m)   Wt 128 lb 1.6 oz (58.1 kg)   SpO2 99%   BMI 21.32 kg/m²     Intake/Output Summary (Last 24 hours) at 6/1/2020 0919  Last data filed at 6/1/2020 0415  Gross per 24 hour   Intake 1656.13 ml   Output 1350 ml   Net 306.13 ml      Wt Readings from Last 3 Encounters:   06/01/20 128 lb 1.6 oz (58.1 kg)   10/08/19 142 lb (64.4 kg)   08/09/19 152 lb 12.8 oz (69.3 kg)       General appearance:  Appears comfortable  Eyes: Sclera clear. Pupils equal.  ENT: Moist oral mucosa. Trachea midline, no adenopathy. Cardiovascular: Regular rhythm, normal S1, S2. No murmur. No edema in lower extremities  Respiratory: Not using accessory muscles. Good inspiratory effort. Clear to auscultation bilaterally, no wheeze or crackles. GI: Abdomen soft, no tenderness, not distended  Musculoskeletal: No cyanosis in digits, neck supple  Neurology: CN 2-12 grossly intact. No speech or motor deficits  Psych: Normal affect. Alert and oriented in time, place and person  Skin: Warm, dry, normal turgor    Labs and Tests:  CBC:   Recent Labs     05/31/20  1506 05/31/20  1835 06/01/20  0117   WBC 9.9  --   --    HGB 7.0* 6.6* 7.6*     --   --      BMP:    Recent Labs     05/31/20  1506 05/31/20  1835 05/31/20  2155   *  --  134*   K 5.7* 6.0* 5.3*   CL 96*  --  96*   CO2 19*  --  21   BUN 75*  --  66*   CREATININE 6.2*  --  5.4*   GLUCOSE 210*  --  198*     Hepatic:   Recent Labs     05/31/20  1506   AST 12*   ALT 7*   BILITOT <0.2   ALKPHOS 74       ASSESSMENT AND PLAN    Active Problems:    Renal failure  Resolved Problems:    * No resolved hospital problems.  *      NSCLC  - s/p chemo and radiation, completed April 2019  - last scan in Oct 2019 showed no evidence of recurrent disease  - entered hospice care July 2019 d/t COPD  - she confirms that she does not want work up or treatment for her lung cancer. Anemia  - hgb 7.6 after transfusion 5/31/2020  - GI plans to treat conservatively, started PPI. - check iron studies, vit b12/folate      Kaylah Alston CNP  [facility]    Patient was seen and examined. Agree with above. Continue current care. GI is following.     Deloris Busch MD

## 2020-06-01 NOTE — PROGRESS NOTES
Pt's daughter Keven Jett gave verbal consent for blood. Sent a secure message to Nephrology about Potassium of 6.0; awaiting a response.

## 2020-06-01 NOTE — CONSULTS
MD Constantino Narayanan MD Georgana Counter, MD                                  Office: [phone]                 Fax: [phone]          [facility]                     NEPHROLOGY CONSULTATION NOTE:     PATIENT NAME: Durga Yusuf  : 1940  MRN: [id number]      Name:  Durga Yusuf Date/Time of Admission: 2020  2:39 PM    CSN: [id number] Attending Provider: Kimberly Solis MD   Room/Bed: [address] [place] [address] : 1940 Age: 70 y.o. Reason for Nephrology consult :  Evaluation of patient with worsening renal failure. History of Presenting complaint:       Durga Yusuf 70 y.o. and has been admitted to the hospital with generalized weakness and fatigue. Patient is found to have severe renal failure on admission. BUN is 66 and a creatinine of 5.4. Multiple electrolyte imbalance including serum sodium of 134, potassium of 5.3, chloride of 96. At the time of admission patient was given IV fluids. Was also noted to have a hemoglobin of 6.6. Was given blood transfusion. Patient became very short of breath. She was given IV Lasix. A Means catheter was placed. Increasing urine output. Patient has a history of lung cancer being treated by Dr Ermias Zarate  Apparently patient has discontinued her treatment for several months. Apparently patient is also enrolled in hospice. No family members present at bedside to verify. There is no history of nausea or vomiting. No history of diarrhea. Medications reviewed. Not on any ACE inhibitor's. Medications reviewed. Medical records reviewed.           Past Medical History :         Past Medical History:   Diagnosis Date    Arthritis     CAD (coronary artery disease)     Carpal tunnel syndrome     COPD (chronic obstructive pulmonary disease) (HCC)     Coronary stent     depression     Diabetes mellitus (HCC)     Diastolic heart failure ([facility] [address]     Per Dr Saintclair Blue 19    GERD (gastroesophageal reflux disease)     Hyperlipidemia     Hypertension     Lung cancer ([facility] [address]     Adenocarcinoma of Left Lung    MI (myocardial infarction) ([facility] [address]     LIZETT (obstructive sleep apnea)     does not use CPAP    PONV (postoperative nausea and vomiting)     stress incontinence        Medications  Which i have  Reviewed, also have reviewed home medications  Prior to Admission medications    Medication Sig Start Date End Date Taking?  Authorizing Provider   predniSONE (DELTASONE) 20 MG tablet Take 10 mg by mouth daily   Yes Historical Provider, MD   mirtazapine (REMERON) 15 MG tablet Take 1 tablet by mouth nightly 10/8/19  Yes Thiago Scott MD   diltiazem (CARDIZEM) 30 MG tablet Take 1 tablet by mouth 4 times daily  Patient taking differently: Take 30 mg by mouth 2 times daily  10/8/19  Yes Thiago Scott MD   furosemide (LASIX) 40 MG tablet Take 1 tablet by mouth 2 times daily  Patient taking differently: Take 20 mg by mouth daily  10/8/19  Yes Thiago Scott MD   potassium chloride (KLOR-CON M) 20 MEQ extended release tablet Take 1 tablet by mouth daily 10/9/19  Yes Thiago Scott MD   loperamide (IMODIUM) 2 MG capsule Take 2 mg by mouth as needed for Diarrhea   Yes Historical Provider, MD   metFORMIN (GLUCOPHAGE) 500 MG tablet Take 1,000 mg by mouth 2 times daily (with meals)   Yes Historical Provider, MD   apixaban (ELIQUIS) 5 MG TABS tablet Take 1 tablet by mouth 2 times daily 6/12/19  Yes Rick Knowles MD   omeprazole (PRILOSEC) 20 MG delayed release capsule Take 20 mg by mouth daily   Yes Historical Provider, MD   carvedilol (COREG) 25 MG tablet Take 1 tablet by mouth 2 times daily 12/13/18  Yes Rick Knowles MD   acetaminophen (TYLENOL) 650 MG suppository Place 650 mg rectally every 6 hours as needed for Fever    Historical Provider, MD   ipratropium-albuterol (DUONEB) 0.5-2.5 (3) MG/3ML SOLN nebulizer solution Inhale 3 mLs into the lungs every 4 hours (while awake) DX:COPD J44.9  Patient taking differently: Inhale 3 mLs into the lungs every 4 hours as needed DX:COPD J44.9 3/8/19   Cuba Ivory MD   Fluticasone-Umeclidin-Vilant (TRELEGY ELLIPTA) 100-62.5-25 MCG/INH AEPB Inhale 1 puff into the lungs daily as needed     Historical Provider, MD   acetaminophen 650 MG TABS Take 650 mg by mouth every 4 hours as needed for Pain (For mild pain level 1-3 or for fever > 100.5).  10/29/12   Jimmy Phalen, MD     Current Facility-Administered Medications: pantoprazole (PROTONIX) tablet 40 mg, 40 mg, Oral, QAM AC  ipratropium-albuterol (DUONEB) nebulizer solution 3 mL, 3 mL, Inhalation, Q4H WA  mirtazapine (REMERON) tablet 15 mg, 15 mg, Oral, Nightly  sodium chloride flush 0.9 % injection 10 mL, 10 mL, Intravenous, 2 times per day  sodium chloride flush 0.9 % injection 10 mL, 10 mL, Intravenous, PRN  acetaminophen (TYLENOL) tablet 650 mg, 650 mg, Oral, Q6H PRN **OR** acetaminophen (TYLENOL) suppository 650 mg, 650 mg, Rectal, Q6H PRN  polyethylene glycol (GLYCOLAX) packet 17 g, 17 g, Oral, Daily PRN  promethazine (PHENERGAN) tablet 12.5 mg, 12.5 mg, Oral, Q6H PRN **OR** ondansetron (ZOFRAN) injection 4 mg, 4 mg, Intravenous, Q6H PRN  glucose (GLUTOSE) 40 % oral gel 15 g, 15 g, Oral, PRN  dextrose 50 % IV solution, 12.5 g, Intravenous, PRN  glucagon (rDNA) injection 1 mg, 1 mg, Intramuscular, PRN  dextrose 5 % solution, 100 mL/hr, Intravenous, PRN  insulin lispro (1 Unit Dial) 0-6 Units, 0-6 Units, Subcutaneous, TID WC  insulin lispro (1 Unit Dial) 0-3 Units, 0-3 Units, Subcutaneous, Nightly  0.9 % sodium chloride infusion 250 mL, 250 mL, Intravenous, Once  glucose (GLUTOSE) 40 % oral gel 15 g, 15 g, Oral, PRN  dextrose 50 % IV solution, 12.5 g, Intravenous, PRN  glucagon (rDNA) injection 1 mg, 1 mg, Intramuscular, PRN  dextrose 5 % solution, 100 mL/hr, Intravenous, PRN  sodium zirconium cyclosilicate (LOKELMA) oral suspension 10 g, 10 g, Oral, TID  predniSONE (DELTASONE) tablet 10 mg, 10 66*   CREATININE 6.2*  --  5.4*     Magnesium:   Lab Results   Component Value Date    MG 1.60 10/07/2019    MG 1.90 10/04/2019    MG 1.60 10/03/2019                    ASSESSMENT         Acute renal failure-severe-worsening-oliguric. Multiple electrolyte imbalance. Hyponatremia. Hypokalemia. Lethargy. Worsening  metabolic acidosis. Severe anemia. History of lung cancer. COPD. History of coronary artery disease. PLAN       Patient has developed severe renal failure on admission. BUN is 66 and a creatinine of 5.4. Etiology of severe renal failure likely multifactorial.  Probable prerenal due to volume depletion (has been on diuretics). May have developed ATN    Need to exclude any bladder outlet obstruction. Obtain a renal ultrasound scan to rule out any hydronephrosis. Patient is found to have severe anemia. Endoscopy has been canceled, due to patient hospice evaluation. Hemoglobin is 6.6 on admission. The platelet count is stable. Patient has not had chemotherapy for several months. Her magnesium level is pending. CK level is also normal.      Repeat a chest x-ray today to exclude any fluid overload. Hold IV fluids for now. Patient is critically ill. No immediate indications for dialysis, however patient is very close to requiring dialysis treatments. Palliative/hospice care is appropriate at this time. Will await family decision on further level of care. Work up for acute renal failure has been ordered which include:  Renal Panel study  CBC  Urine Analysis   Urine Electrolytes   U Protein : creatinine ratio  Urine for eosinophil smear exam.    Renal Ultrasound Scan         Thanks for the consult             Electronically Signed:  Jesus Manuel Pamla MD 6/1/2020          Arterial Blood Gasses  No results for input(s): PH, PCO2, PO2 in the last 72 hours.     Invalid input(s): Karin Hoffman    UA:  Recent Labs     05/31/20  8 [facility] [employer] PHUR 5.0   WBCUA 3   RBCUA 4   BACTERIA 2+*   CLARITYU CLOUDY*   SPECGRAV 1.012   LEUKOCYTESUR TRACE*   UROBILINOGEN 0.2   BILIRUBINUR Negative   BLOODU Negative   GLUCOSEU Negative       LIVER PROFILE:   Recent Labs     05/31/20  1506   AST 12*   ALT 7*   BILITOT <0.2   ALKPHOS 74     PT/INR:    Lab Results   Component Value Date    PROTIME 20.1 05/31/2020    PROTIME 18.0 09/27/2019    PROTIME 19.1 08/06/2019    INR 1.72 05/31/2020    INR 1.58 09/27/2019    INR 1.68 08/06/2019     PTT:    Lab Results   Component Value Date    APTT 30.5 12/04/2018     ROXANNE:  No results found for: ANATITER, ROXANNE        RADIOLOGY:    Ct Head Wo Contrast    Result Date: 5/31/2020  EXAMINATION: CT OF THE HEAD WITHOUT CONTRAST  5/31/2020 3:04 pm TECHNIQUE: CT of the head was performed without the administration of intravenous contrast. Dose modulation, iterative reconstruction, and/or weight based adjustment of the mA/kV was utilized to reduce the radiation dose to as low as reasonably achievable. COMPARISON: CT head August 4, 2019 HISTORY: ORDERING SYSTEM PROVIDED HISTORY: weakness TECHNOLOGIST PROVIDED HISTORY: Has a \"code stroke\" or \"stroke alert\" been called? ->Yes Reason for exam:->weakness Reason for Exam: weakness Acuity: Acute Type of Exam: Initial FINDINGS: BRAIN/VENTRICLES: There is no acute intracranial hemorrhage, mass effect or midline shift. No abnormal extra-axial fluid collection. The gray-white differentiation is maintained without evidence of an acute infarct. There is no evidence of hydrocephalus. Atherosclerotic changes of the intracranial vasculature. There is moderate to severe periventricular and subcortical white matter hypoattenuation most consistent with microvascular ischemic changes. There is mild age appropriate global cerebral atrophy. ORBITS: The visualized portion of the orbits demonstrate no acute abnormality.  SINUSES: The visualized paranasal sinuses and mastoid air cells demonstrate no acute abnormality. SOFT TISSUES/SKULL:  No acute abnormality of the visualized skull or soft tissues. No acute intracranial abnormality. Chronic microvascular ischemic changes and global cerebral atrophy. Findings were discussed with Claudine Aleman at 3:23 pm on 5/31/2020. Xr Chest Portable    Result Date: 5/31/2020  EXAMINATION: ONE XRAY VIEW OF THE CHEST 5/31/2020 3:04 pm COMPARISON: 09/30/2019 HISTORY: ORDERING SYSTEM PROVIDED HISTORY: weakness TECHNOLOGIST PROVIDED HISTORY: Reason for exam:->weakness Reason for Exam: [address]. WEAKNESS, FATIGUE AND DIARRHEA FOR THE LAST FEW DAYS. PATIENT COLLAPSED IN HER GARAGE AND IS UNABLE TO USE RIGHT SIDE OF BODY. FORMER SMOKER. HISTORY OF LUNG CANCER, HTN, GERD, DIABETES, CAD, COPD AND DIASTOLIC HEART FAILURE. Acuity: Acute Type of Exam: Initial FINDINGS: The heart size is normal.  Mass density left hilum. New opacity right upper lobe. No pulmonary vascular congestion or edema. No pneumothorax. 1. Mass density left hilum similar appearance to previous exam 2. New pulmonary opacity right upper lobe could represent an infectious process. However, follow-up suggested to ensure resolution 3. Previously noted left midlung opacity has organized into a linear density most likely representing scarring. Cta Head Neck W Contrast    Addendum Date: 5/31/2020    ADDENDUM: 3D reconstructed images were performed on a separate workstation and provided for review. Result Date: 5/31/2020  EXAMINATION: CTA OF THE HEAD AND NECK WITH CONTRAST 5/31/2020 3:04 pm: TECHNIQUE: CTA of the head and neck was performed with the administration of intravenous contrast. Multiplanar reformatted images are provided for review. MIP images are provided for review. Stenosis of the internal carotid arteries measured using NASCET criteria.  Dose modulation, iterative reconstruction, and/or weight based adjustment of the mA/kV was utilized to reduce the radiation dose to as low as reasonably achievable. COMPARISON: None. HISTORY: ORDERING SYSTEM PROVIDED HISTORY: stroke TECHNOLOGIST PROVIDED HISTORY: Reason for exam:->stroke Reason for Exam: stroke Acuity: Acute Type of Exam: Initial FINDINGS: CTA NECK: AORTIC ARCH/ARCH VESSELS: No dissection or arterial injury. No significant stenosis of the brachiocephalic or subclavian arteries. CAROTID ARTERIES: There is 50% stenosis of the proximal right cervical internal carotid artery due to calcified and noncalcified plaque. VERTEBRAL ARTERIES: No dissection, arterial injury, or significant stenosis. SOFT TISSUES: There is pulmonary emphysema with scarring or atelectasis within the left suprahilar region. They are nonspecific consolidative changes within the right upper lobe, which may be due in part to atelectasis and scar. Thyroid nodules are noted. BONES: No acute osseous abnormality. CTA HEAD: ANTERIOR CIRCULATION: No significant stenosis of the intracranial internal carotid, anterior cerebral, or middle cerebral arteries. No aneurysm. POSTERIOR CIRCULATION: No significant stenosis of the vertebral, basilar, or posterior cerebral arteries. No aneurysm. OTHER: No dural venous sinus thrombosis on this non-dedicated study. BRAIN: See separately dictated noncontrast head CT report. 50% stenosis of the proximal right cervical ICA. Otherwise, no flow limiting stenosis or large vessel occlusion visualized within the head or neck. Incidentally noted chest findings as detailed above. Would consider dedicated CT of the chest for further evaluation. Imaging Results.   Chest X Ray reviwed by me          Electronically Signed:  Beth Agrawal MD 6/1/2020

## 2020-06-01 NOTE — H&P
HOSPITALISTS HISTORY AND PHYSICAL    5/31/2020 10:09 PM    Patient Information:  Alex Dodson is a 70 y.o. female [id number]  PCP:  Aileen Bennett (Tel: [phone] )    Chief complaint:    Chief Complaint   Patient presents with    Fatigue     Pt in by EMS from daughter's house. Normal is a/ox4 and independent. Pt reports nausea and diarrhea x 2 days and currently feeling weak. Per EMS pt was in the garage when she became suddenly weak and collapased to the ground. U/a, right arm and leg drift noted. hx of afib +blood thinners        History of Present Illness:  Yosi Mckee is a 70 y.o. female wth h/o  Adenocarcinoma of the lung ( on chemo last round in April), chronic resp failure. Chronic anemia, chronic anticoagulated. Presents with c/o fatigue and weakness on going for couple of days. The pt has worsening anemia with positive occult blood. Hb dropped from 8.5 to 7.0. She is also found to be in renal failure with cr  6.2 and K 5.7. She has no h/o renal failure. The pt underwent EGD and colonoscopy in 2019 after she was diagnosed with lung cancer. That did not show any gastrits or ulcer. The pt takes pepcid. Denies any melena or hematochezia , no chest pain , abdominal pain . No fever chills    REVIEW OF SYSTEMS:   Constitutional: Negative for fever,chills or night sweats  ENT: Negative for rhinorrhea, epistaxis, hoarseness, sore throat. Respiratory: Negative for shortness of breath,wheezing  Cardiovascular: Negative for chest pain, palpitations   Gastrointestinal: Negative for nausea, vomiting, diarrhea  Genitourinary: Negative for polyuria, dysuria   Hematologic/Lymphatic: Negative for bleeding tendency, easy bruising  Musculoskeletal: Negative for myalgias and arthralgias  Neurologic: Negative for confusion,dysarthria. Skin: Negative for itching,rash  Psychiatric: Negative for depression,anxiety, agitation.   Endocrine: Negative for polydipsia,polyuria,heat /cold intolerance. Past Medical History:   has a past medical history of Arthritis, CAD (coronary artery disease), Carpal tunnel syndrome, COPD (chronic obstructive pulmonary disease) ([facility] [address] Coronary stent, depression, Diabetes mellitus ([facility] [address] Diastolic heart failure ([facility] [address] GERD (gastroesophageal reflux disease), Hyperlipidemia, Hypertension, Lung cancer ([facility] [address] MI (myocardial infarction) ([facility] [address] LIZETT (obstructive sleep apnea), PONV (postoperative nausea and vomiting), and stress incontinence. Past Surgical History:   has a past surgical history that includes Coronary angioplasty with stent (2000, 2001); back surgery (2000, 2001); bronchoscopy (12/04/2018); pr [facility] incl fluor gdnce dx w/cell washg spx (N/A, 12/4/2018); Cholecystectomy, laparoscopic (N/A, 12/19/2018); Upper gastrointestinal endoscopy (N/A, 2/25/2019); Colonoscopy (N/A, 2/25/2019); Colonoscopy (2/25/2019); bronchoscopy (N/A, 2/27/2019); bronchoscopy (2/27/2019); bronchoscopy (2/27/2019); bronchoscopy (N/A, 8/6/2019); Upper gastrointestinal endoscopy (N/A, 8/7/2019); bronchoscopy (N/A, 9/27/2019); bronchoscopy (9/27/2019); and bronchoscopy (9/27/2019). Medications:  No current facility-administered medications on file prior to encounter.       Current Outpatient Medications on File Prior to Encounter   Medication Sig Dispense Refill    predniSONE (DELTASONE) 20 MG tablet Take 10 mg by mouth daily      mirtazapine (REMERON) 15 MG tablet Take 1 tablet by mouth nightly 30 tablet 3    diltiazem (CARDIZEM) 30 MG tablet Take 1 tablet by mouth 4 times daily (Patient taking differently: Take 30 mg by mouth 2 times daily ) 120 tablet 3    furosemide (LASIX) 40 MG tablet Take 1 tablet by mouth 2 times daily (Patient taking differently: Take 20 mg by mouth daily ) 60 tablet 3    potassium chloride (KLOR-CON M) 20 MEQ extended release tablet Take 1 tablet by mouth daily 60 tablet 3    loperamide (IMODIUM) 2 MG capsule Take 2 mg by mouth as needed for Diarrhea      metFORMIN (GLUCOPHAGE) 500 MG tablet Take 1,000 mg by mouth 2 times daily (with meals)      apixaban (ELIQUIS) 5 MG TABS tablet Take 1 tablet by mouth 2 times daily 180 tablet 1    omeprazole (PRILOSEC) 20 MG delayed release capsule Take 20 mg by mouth daily      carvedilol (COREG) 25 MG tablet Take 1 tablet by mouth 2 times daily 180 tablet 3    acetaminophen (TYLENOL) 650 MG suppository Place 650 mg rectally every 6 hours as needed for Fever      ipratropium-albuterol (DUONEB) 0.5-2.5 (3) MG/3ML SOLN nebulizer solution Inhale 3 mLs into the lungs every 4 hours (while awake) DX:COPD J44.9 (Patient taking differently: Inhale 3 mLs into the lungs every 4 hours as needed DX:COPD J44.9) 360 mL 11    Fluticasone-Umeclidin-Vilant (TRELEGY ELLIPTA) 100-62.5-25 MCG/INH AEPB Inhale 1 puff into the lungs daily as needed       acetaminophen 650 MG TABS Take 650 mg by mouth every 4 hours as needed for Pain (For mild pain level 1-3 or for fever > 100.5).  120 tablet 0     Current Facility-Administered Medications   Medication Dose Route Frequency Provider Last Rate Last Dose    pantoprazole (PROTONIX) 80 mg in sodium chloride 0.9 % 100 mL infusion  8 mg/hr Intravenous Continuous Cherie Lozano MD   Stopped at 05/31/20 2135    ipratropium-albuterol (DUONEB) nebulizer solution 3 mL  3 mL Inhalation Q4H WA Cherie Lozano MD   3 mL at 05/31/20 1954    mirtazapine (REMERON) tablet 15 mg  15 mg Oral Nightly Cherie Lozano MD        sodium chloride flush 0.9 % injection 10 mL  10 mL Intravenous 2 times per day Cherie Lozano MD   10 mL at 05/31/20 2048    sodium chloride flush 0.9 % injection 10 mL  10 mL Intravenous PRN Cherie Lozano MD        acetaminophen (TYLENOL) tablet 650 mg  650 mg Oral Q6H PRN Cherie Lozano MD        Or   [facility] acetaminophen (TYLENOL) suppository 650 mg  650 mg Rectal Q6H PRN Cherie Lozano MD        polyethylene glycol (GLYCOLAX) packet 17 g  17 g Oral Daily PRN Luis Martínez MD        promethazine (PHENERGAN) tablet 12.5 mg  12.5 mg Oral Q6H PRN Luis Martínez MD        Or    ondansetron [facility]) injection 4 mg  4 mg Intravenous Q6H PRN Luis Martínez MD        glucose (GLUTOSE) 40 % oral gel 15 g  15 g Oral PRN Luis Martínez MD        dextrose 50 % IV solution  12.5 g Intravenous PRN Luis Martínez MD        glucagon (rDNA) injection 1 mg  1 mg Intramuscular PRN Luis Martínez MD        dextrose 5 % solution  100 mL/hr Intravenous PRN Luis Martínez MD        insulin lispro (1 Unit Dial) 0-6 Units  0-6 Units Subcutaneous TID WC Luis Martínez MD        insulin lispro (1 Unit Dial) 0-3 Units  0-3 Units Subcutaneous Nightly Luis Martínez MD        sodium bicarbonate 150 mEq in dextrose 5 % 1,000 mL infusion   Intravenous Continuous Carola Dumas  mL/hr at 05/31/20 1747      0.9 % sodium chloride infusion 250 mL  250 mL Intravenous Once Luis Martínez MD        sodium chloride 0.9 % infusion             glucose (GLUTOSE) 40 % oral gel 15 g  15 g Oral PRN Luis Martínez MD        dextrose 50 % IV solution  12.5 g Intravenous PRN Luis Martínez MD        glucagon (rDNA) injection 1 mg  1 mg Intramuscular PRN Luis Martínez MD        dextrose 5 % solution  100 mL/hr Intravenous PRN Luis Martínez MD       Doss [START ON 6/1/2020] sodium zirconium cyclosilicate (LOKELMA) oral suspension 10 g  10 g Oral TID Carola Dumas MD        sodium zirconium cyclosilicate (LOKELMA) oral suspension 10 g  10 g Oral Once Carola Dumas MD        sodium bicarbonate 8.4 % injection 50 mEq  50 mEq Intravenous Q30 Min Carola Dumas MD   50 mEq at 05/31/20 2230    [START ON 6/1/2020] predniSONE (DELTASONE) tablet 10 mg  10 mg Oral Daily Luis Martínez MD         Allergies:   Allergies   Allergen Reactions    Statins Other (See Comments)     Other reaction(s): Myalgias (Muscle Pain)      Lyrica [Pregabalin] Other (See Comments)     Shaking, swelling, rash    Cipro Xr Rash     Swelling, rash    Penicillins Rash     Swelling, rash    Sulfa Antibiotics Rash     Swelling, rash        Social History:   reports that she quit smoking about 18 years ago. She has never used smokeless tobacco. She reports that she does not drink alcohol or use drugs. Family History:  family history includes Cancer in her maternal uncle and sister; Diabetes in her brother and sister; Heart Disease in her father and mother. , **    Physical Exam:  BP (!) 153/65   Pulse 63   Temp 96.4 °F (35.8 °C) (Temporal)   Resp 19   Ht 5' 5\" (1.651 m)   Wt 120 lb (54.4 kg)   SpO2 100%   BMI 19.97 kg/m²     General appearance:  Appears comfortable. Well nourished  Eyes: Sclera clear, pupils equal  ENT: Moist mucus membranes, no thrush. Trachea midline. Cardiovascular: Regular rhythm, normal S1, S2. No murmur, gallop, rub. No edema in lower extremities  Respiratory: Clear to auscultation bilaterally, no wheeze, good inspiratory effort  Gastrointestinal: Abdomen soft, non-tender, not distended, normal bowel sounds  Musculoskeletal: No cyanosis in digits, neck supple  Neurology: Cranial nerves grossly intact. Alert and oriented in time, place and person. No speech or motor deficits  Psychiatry: Appropriate affect.  Not agitated  Skin: Warm, dry, normal turgor, no rash    Labs:  CBC:   Lab Results   Component Value Date    WBC 9.9 05/31/2020    RBC 2.94 05/31/2020    HGB 6.6 05/31/2020    HCT 21.7 05/31/2020    MCV 79.4 05/31/2020    MCH 23.9 05/31/2020    MCHC 30.1 05/31/2020    RDW 18.6 05/31/2020     05/31/2020    MPV 6.8 05/31/2020     BMP:    Lab Results   Component Value Date     05/31/2020    K 6.0 05/31/2020    K 5.7 05/31/2020    CL 96 05/31/2020    CO2 19 05/31/2020    BUN 75 05/31/2020    CREATININE 6.2 05/31/2020    CALCIUM 9.4 05/31/2020    GFRAA 8 05/31/2020    GFRAA >60 10/29/2012    LABGLOM 7 05/31/2020    GLUCOSE 210 05/31/2020       Chest Xray:   EKG:        Problem List  Active Problems:    Renal failure  Resolved Problems:    * No resolved hospital problems. *        Assessment/Plan:         1. LORRAINE and hyperkalemia K 5.7 --> 6  Cr 6.2 baseline level is around 1.2  Bicarb infusion   Hydration  Holding lasix  Insulin and albuterol  Nephrology recommendations appreciated    GI hemorrhage  PP BID  Hb dropped from 8.5--> 7.0 --> 6.6  trasfuse one unit PRBC  Cont to monitor h/h  Holding anticoagulation  NPO after midnight  GI consulted    Lung cancer on chemo  Oncologist consulted  Admit as inpatient I anticipate hospitalization spanning more than two midnights for investigation and treatment of the above medically necessary diagnoses.       Fannie Marques MD    5/31/2020 10:09 PM

## 2020-06-02 ENCOUNTER — APPOINTMENT (OUTPATIENT)
Dept: ULTRASOUND IMAGING | Age: 80
DRG: 683 | End: 2020-06-02
Payer: MEDICARE

## 2020-06-02 LAB
ANION GAP SERPL CALCULATED.3IONS-SCNC: 15 MMOL/L (ref 3–16)
BUN BLDV-MCNC: 52 MG/DL (ref 7–20)
CALCIUM SERPL-MCNC: 8.6 MG/DL (ref 8.3–10.6)
CHLORIDE BLD-SCNC: 93 MMOL/L (ref 99–110)
CO2: 29 MMOL/L (ref 21–32)
CREAT SERPL-MCNC: 3.6 MG/DL (ref 0.6–1.2)
GFR AFRICAN AMERICAN: 15
GFR NON-AFRICAN AMERICAN: 12
GLUCOSE BLD-MCNC: 101 MG/DL (ref 70–99)
GLUCOSE BLD-MCNC: 114 MG/DL (ref 70–99)
GLUCOSE BLD-MCNC: 204 MG/DL (ref 70–99)
GLUCOSE BLD-MCNC: 290 MG/DL (ref 70–99)
GLUCOSE BLD-MCNC: 346 MG/DL (ref 70–99)
GLUCOSE BLD-MCNC: 94 MG/DL (ref 70–99)
HCT VFR BLD CALC: 24.8 % (ref 36–48)
HCT VFR BLD CALC: 24.9 % (ref 36–48)
HCT VFR BLD CALC: 25.1 % (ref 36–48)
HCT VFR BLD CALC: 29.7 % (ref 36–48)
HEMOGLOBIN: 7.8 G/DL (ref 12–16)
HEMOGLOBIN: 7.9 G/DL (ref 12–16)
HEMOGLOBIN: 7.9 G/DL (ref 12–16)
HEMOGLOBIN: 9.3 G/DL (ref 12–16)
MCH RBC QN AUTO: 24.8 PG (ref 26–34)
MCHC RBC AUTO-ENTMCNC: 31.6 G/DL (ref 31–36)
MCV RBC AUTO: 78.4 FL (ref 80–100)
PDW BLD-RTO: 18.2 % (ref 12.4–15.4)
PERFORMED ON: ABNORMAL
PERFORMED ON: NORMAL
PLATELET # BLD: 322 K/UL (ref 135–450)
PMV BLD AUTO: 6.7 FL (ref 5–10.5)
POTASSIUM SERPL-SCNC: 3.8 MMOL/L (ref 3.5–5.1)
PROCALCITONIN: 0.22 NG/ML (ref 0–0.15)
RBC # BLD: 3.18 M/UL (ref 4–5.2)
SODIUM BLD-SCNC: 137 MMOL/L (ref 136–145)
WBC # BLD: 7.6 K/UL (ref 4–11)

## 2020-06-02 PROCEDURE — 80048 BASIC METABOLIC PNL TOTAL CA: CPT

## 2020-06-02 PROCEDURE — 6370000000 HC RX 637 (ALT 250 FOR IP): Performed by: PHYSICIAN ASSISTANT

## 2020-06-02 PROCEDURE — 6370000000 HC RX 637 (ALT 250 FOR IP): Performed by: FAMILY MEDICINE

## 2020-06-02 PROCEDURE — 85014 HEMATOCRIT: CPT

## 2020-06-02 PROCEDURE — 6370000000 HC RX 637 (ALT 250 FOR IP): Performed by: INTERNAL MEDICINE

## 2020-06-02 PROCEDURE — 94640 AIRWAY INHALATION TREATMENT: CPT

## 2020-06-02 PROCEDURE — 2580000003 HC RX 258: Performed by: FAMILY MEDICINE

## 2020-06-02 PROCEDURE — 84145 PROCALCITONIN (PCT): CPT

## 2020-06-02 PROCEDURE — 85018 HEMOGLOBIN: CPT

## 2020-06-02 PROCEDURE — 2700000000 HC OXYGEN THERAPY PER DAY

## 2020-06-02 PROCEDURE — 2060000000 HC ICU INTERMEDIATE R&B

## 2020-06-02 PROCEDURE — 6370000000 HC RX 637 (ALT 250 FOR IP): Performed by: NURSE PRACTITIONER

## 2020-06-02 PROCEDURE — 94761 N-INVAS EAR/PLS OXIMETRY MLT: CPT

## 2020-06-02 PROCEDURE — 85027 COMPLETE CBC AUTOMATED: CPT

## 2020-06-02 PROCEDURE — 76770 US EXAM ABDO BACK WALL COMP: CPT

## 2020-06-02 PROCEDURE — 36415 COLL VENOUS BLD VENIPUNCTURE: CPT

## 2020-06-02 RX ORDER — INSULIN LISPRO 100 [IU]/ML
0-6 INJECTION, SOLUTION INTRAVENOUS; SUBCUTANEOUS NIGHTLY
Status: DISCONTINUED | OUTPATIENT
Start: 2020-06-02 | End: 2020-06-05

## 2020-06-02 RX ORDER — INSULIN LISPRO 100 [IU]/ML
0-12 INJECTION, SOLUTION INTRAVENOUS; SUBCUTANEOUS
Status: DISCONTINUED | OUTPATIENT
Start: 2020-06-02 | End: 2020-06-05

## 2020-06-02 RX ADMIN — IPRATROPIUM BROMIDE AND ALBUTEROL SULFATE 3 ML: .5; 3 SOLUTION RESPIRATORY (INHALATION) at 10:58

## 2020-06-02 RX ADMIN — Medication 10 ML: at 21:19

## 2020-06-02 RX ADMIN — IPRATROPIUM BROMIDE AND ALBUTEROL SULFATE 3 ML: .5; 3 SOLUTION RESPIRATORY (INHALATION) at 19:19

## 2020-06-02 RX ADMIN — IPRATROPIUM BROMIDE AND ALBUTEROL SULFATE 3 ML: .5; 3 SOLUTION RESPIRATORY (INHALATION) at 14:51

## 2020-06-02 RX ADMIN — SODIUM ZIRCONIUM CYCLOSILICATE 10 G: 10 POWDER, FOR SUSPENSION ORAL at 08:27

## 2020-06-02 RX ADMIN — INSULIN LISPRO 2 UNITS: 100 INJECTION, SOLUTION INTRAVENOUS; SUBCUTANEOUS at 21:19

## 2020-06-02 RX ADMIN — SODIUM ZIRCONIUM CYCLOSILICATE 10 G: 10 POWDER, FOR SUSPENSION ORAL at 13:14

## 2020-06-02 RX ADMIN — INSULIN LISPRO 3 UNITS: 100 INJECTION, SOLUTION INTRAVENOUS; SUBCUTANEOUS at 12:57

## 2020-06-02 RX ADMIN — MIRTAZAPINE 15 MG: 15 TABLET, FILM COATED ORAL at 21:19

## 2020-06-02 RX ADMIN — PANTOPRAZOLE SODIUM 40 MG: 40 TABLET, DELAYED RELEASE ORAL at 06:27

## 2020-06-02 RX ADMIN — SODIUM ZIRCONIUM CYCLOSILICATE 10 G: 10 POWDER, FOR SUSPENSION ORAL at 04:06

## 2020-06-02 RX ADMIN — IPRATROPIUM BROMIDE AND ALBUTEROL SULFATE 3 ML: .5; 3 SOLUTION RESPIRATORY (INHALATION) at 07:51

## 2020-06-02 RX ADMIN — INSULIN LISPRO 8 UNITS: 100 INJECTION, SOLUTION INTRAVENOUS; SUBCUTANEOUS at 17:30

## 2020-06-02 RX ADMIN — PREDNISONE 10 MG: 10 TABLET ORAL at 08:20

## 2020-06-02 RX ADMIN — Medication 10 ML: at 08:21

## 2020-06-02 ASSESSMENT — PAIN SCALES - GENERAL
PAINLEVEL_OUTOF10: 0
PAINLEVEL_OUTOF10: 1

## 2020-06-02 NOTE — PROGRESS NOTES
[facility]  Gastroenterology Progress Note  Charlie Ramirez is a 70 y.o. female patient. 1. General weakness    2. LORRAINE (acute kidney injury) ([address]    3. Hyperkalemia    4. Anemia, unspecified type    5. Pneumonia due to organism    6. Upper GI bleed        SUBJECTIVE:      Physical    VITALS:  BP (!) 150/67   Pulse 66   Temp 98.4 °F (36.9 °C) (Oral)   Resp 16   Ht 5' 5\" (1.651 m)   Wt 130 lb 3.2 oz (59.1 kg)   SpO2 95%   BMI 21.67 kg/m²   TEMPERATURE:  Current - Temp: 98.4 °F (36.9 °C); Max - Temp  Av °F (36.7 °C)  Min: 97.4 °F (36.3 °C)  Max: 98.4 °F (36.9 °C)    Abdomen soft, ND, NT, no HSM, Bowel sounds normal     Data      Recent Labs     20  1506  20  0119 20  0424 20  0907   WBC 9.9  --   --  7.6  --    HGB 7.0*   < > 7.9* 7.9* 9.3*   HCT 23.4*   < > 24.8* 24.9* 29.7*   MCV 79.4*  --   --  78.4*  --      --   --  322  --     < > = values in this interval not displayed. Recent Labs     20  2155 20  1310 20  0424   * 137 137   K 5.3* 4.7 3.8   CL 96* 93* 93*   CO2 21 29 29   PHOS  --  4.5  --    BUN 66* 61* 52*   CREATININE 5.4* 4.7* 3.6*     Recent Labs     20  1506   AST 12*   ALT 7*   BILITOT <0.2   ALKPHOS 74     No results for input(s): LIPASE, AMYLASE in the last 72 hours. ASSESSMENT :    Anemia - chronic anemia with prior labs c/w anemia of chronic disease. Hgb was 8.5 eight months ago. Hgb 6.6 here and incremented to 7.6 with 1 U PRBC. Now 7.9 to 9.2. Stool grossly negative but guaiac positive. She is on PPI at home. EGD 2019 and colonoscopy 2019 negative. Diarrhea   LORRAINE - Cr 6.2 at admission. Hyperkalemia - improved. Elevated INR -due to eliquis           PLAN   :  1) Cancel stool studies since no diarrhea  2) Continue empiric Protonix daily  3) Diet as tolerated.   4) Transfuse PRBC's as needed to keep Hb >8.  5) Given functional status, fact that anemia of chronic disease, and she is NOT interested in endoscopic evaluation, we will defer further GI eval.  6) Hospice appropriate. We will sign off. Please call with questions.     Prachi Craig MD  [address]  6/2/2020

## 2020-06-02 NOTE — PROGRESS NOTES
[facility] PROGRESS NOTE    6/2/2020 2:32 PM        Name: Yosi Mckee . Admitted: 5/31/2020  Primary Care Provider: Aileen Bennett (Tel: [phone])      Subjective:  Patient is a 71 yo female with hx adenocarcinoma of the lung, CAD/stent, COPD, chronic anemia, chronic dCHF, DM, HTN, HLD, LIZETT (untreated), chronic AC (on Eliquis). She presented to hospital with ongoing fatigue and weakness. Found to have worsening anemia, Hgb 6.6 and she was transfused. Also evidence of acute renal failure with creatinine 6.2. Presently resting in bed. States she is feeling a little better today. Reports she has been eating and drinking. Remains weak, PT/OT consults pending COVID results. Patient reports she would be willing to consider short stay at a facility for rehab. Patient lives in own home, has home health aide 2 hours a day and visiting nurse provided by [facility]. Patient denies shortness of breath, chest pain, nausea, abdominal pain.      Reviewed interval ancillary notes    Current Medications  pantoprazole (PROTONIX) tablet 40 mg, QAM AC  ipratropium-albuterol (DUONEB) nebulizer solution 3 mL, Q4H WA  mirtazapine (REMERON) tablet 15 mg, Nightly  sodium chloride flush 0.9 % injection 10 mL, 2 times per day  sodium chloride flush 0.9 % injection 10 mL, PRN  acetaminophen (TYLENOL) tablet 650 mg, Q6H PRN    Or  acetaminophen (TYLENOL) suppository 650 mg, Q6H PRN  polyethylene glycol (GLYCOLAX) packet 17 g, Daily PRN  promethazine (PHENERGAN) tablet 12.5 mg, Q6H PRN    Or  ondansetron (ZOFRAN) injection 4 mg, Q6H PRN  glucose (GLUTOSE) 40 % oral gel 15 g, PRN  dextrose 50 % IV solution, PRN  glucagon (rDNA) injection 1 mg, PRN  dextrose 5 % solution, PRN  insulin lispro (1 Unit Dial) 0-6 Units, TID WC  insulin lispro (1 Unit Dial) 0-3 Units, Nightly  0.9 % sodium chloride infusion 250 mL, Once  glucose (GLUTOSE) 40 % oral gel 15 g, PRN  dextrose 50 % IV solution, PRN  glucagon (rDNA) injection 1 mg, PRN  dextrose 5 % solution, PRN  predniSONE (DELTASONE) tablet 10 mg, Daily        Objective:  BP (!) 157/80   Pulse 71   Temp 98.2 °F (36.8 °C) (Oral)   Resp 18   Ht 5' 5\" (1.651 m)   Wt 130 lb 3.2 oz (59.1 kg)   SpO2 96%   BMI 21.67 kg/m²     Intake/Output Summary (Last 24 hours) at 6/2/2020 1432  Last data filed at 6/2/2020 1213  Gross per 24 hour   Intake --   Output 1350 ml   Net -1350 ml      Wt Readings from Last 3 Encounters:   06/02/20 130 lb 3.2 oz (59.1 kg)   10/08/19 142 lb (64.4 kg)   08/09/19 152 lb 12.8 oz (69.3 kg)     General:  Awake, alert, oriented in NAD  Skin:  Warm and dry. No unusual bruising or rash  Neck:  Supple. No JVD appreciated  Chest:  Normal effort. Clear to auscultation  Cardiovascular:  RRR, normal S1/S2, no murmur/gallop/rub  Abdomen:  Soft, nontender, +bowel sounds  Extremities:  No edema  Neurological: No focal deficits  Psychological: Normal mood and affect    Labs and Tests:  CBC:   Recent Labs     05/31/20  1506  06/02/20  0119 06/02/20  0424 06/02/20  0907   WBC 9.9  --   --  7.6  --    HGB 7.0*   < > 7.9* 7.9* 9.3*     --   --  322  --     < > = values in this interval not displayed. BMP:    Recent Labs     05/31/20  2155 06/01/20  1310 06/02/20  0424   * 137 137   K 5.3* 4.7 3.8   CL 96* 93* 93*   CO2 21 29 29   BUN 66* 61* 52*   CREATININE 5.4* 4.7* 3.6*   GLUCOSE 198* 147* 101*     Hepatic:   Recent Labs     05/31/20  1506   AST 12*   ALT 7*   BILITOT <0.2   ALKPHOS 74       Results for Fabi Deal (MRN [id number]) as of 6/2/2020 14:33   Ref.  Range 6/1/2020 18:44 6/1/2020 20:34 6/2/2020 02:12 6/2/2020 07:25 6/2/2020 11:28   POC Glucose Latest Ref Range: 70 - 99 mg/dl 344 (H) 304 (H) 94 114 (H) 290 (H)       CT head 5/31/2020:  No acute intracranial abnormality.       Chronic microvascular ischemic changes and global cerebral atrophy.     Acute renal failure. Admission creatinine 6.2, baseline closer to 1.2. Likely secondary to poor po intake and diuretics. Trending down, creatinine 3.6 today. Nephrology on board. Renal ultrasound pending. 2. Chronic anemia. Likely combination of chronic disease and possible GI blood loss considering positive stool guaiac. Hgb 6.6 on admission, she has been transfused, Hgb trending up, 9.3 today. Prior EGD and colonoscopy last year were unremarkable. GI has seen, no plan for scope. Iron studies (6/1) WNL. Recommend continue daily PPI. GI has signed off. 3.  Non-small cell lung cancer. Status post chemo and radiation (completed 4/2019). Patient indicates she wants no further workup or treatment for lung cancer. 4. Hx COPD with chronic respiratory failure. No evidence of acute exacerbation. Entered hospice care secondary to COPD (7/2019). O2 sats stable on 1 liter for mid 90s, typically uses 3 liters at home. 5.  Abnormal CXR. New pulmonary opacity RUL on CXR. CT scan with nonspecific consolidative changes within RUL possibly secondary to atelectasis and scar. Patient afebrile, no leukocytosis. Blood cultures no growth to date. Procalcitonin 0.22, low likelihood for bacterial infection, no leukocytosis. No indication for antibiotics. O2 sats stable on 1 liter for high 90s. 6. DM2. A1c 7.9. On metformin at home, discontinued secondary renal failure. Blood glucose variable, ranging . Increase to medium correction mealtime and bedtime insulin. 7. HTN. Controlled. Continue to hold carvedilol. 8. Chronic AC. On Eliquis at home. Hold secondary profound anemia. Attempted to call daughter Sedrick Miramontes to update without success. Will attempt to reach tomorrow. DC planning: Anticipate patient will likely require 24/7 care or short term SNF on DC if agreeable. Awaiting PT/OT consults.      Diet: DIET CARB CONTROL;  Code:DNR-CC  DVT PPX: scds (anemia)      Ricardo Leong, APRN - CNP   6/2/2020 2:12p

## 2020-06-02 NOTE — PROGRESS NOTES
Physical/Occupational Therapy  Coye Goltz      Orders received for PT/OT evaluation. Chart reviewed. Patient currently being ruled out for COVID-19. Will hold therapy at this time per infection control and PPE conservation effort and will follow up with pt pending results of testing.  Thanks, Tyra Lara, PT, DPT [id number], Kattie Castleman, [place], OTR/L [id number]

## 2020-06-02 NOTE — PROGRESS NOTES
Resting quietly in bed, eyes closed, respirations even, responded to tactile stimuli, voicing no complaints. VSS. Assessment completed, see flow charts. No distress noted. dejuan

## 2020-06-02 NOTE — CARE COORDINATION
Gordon Nelson is here now to give support and services to patient's daughters. When will she be ready for discharge?

## 2020-06-02 NOTE — PROGRESS NOTES
MD Clifton Portillo MD Jannett Lewandowsky, MD                                  Office: [phone]                 Fax: [phone]          [employer]                     NEPHROLOGY IN PATIENT PROGRESS NOTE:     PATIENT NAME: Willy Caro  : 1940  MRN: [id number]            Subjective:       Generalized weakness. Less hypotension. Less shortness of breath. Has Means catheter. Improving urine output after IV Lasix. Received blood transfusion for GI bleed      Assessment and Plan    Assessment:      acute kidney injury-severe-slow improvement-nonoliguric  Anemia of GI bleed. Hyperkalemia. Hypovolemia. Hypotension-improving. History of lung cancer. Generalized weakness. Plan:       Patient presented with multiple complaints of severe anemia from GI blood loss requiring blood transfusion, severe renal failure with a creatinine of 5.6 and a potassium of 5.7. Patient was hypotensive and hypovolemia but developed shortness of breath after giving IV fluids/blood transfusion requiring Lasix. Acute kidney injury: Severe-  Slow improvement. Repeat creatinine is 3.6. Improved urine output after IV Lasix. Potassium levels have improved. Acidosis improving. No indications for dialysis today. Etiology of acute kidney injury likely multifactorial.  Patient may have developed ATN due to hypovolemia/GI blood loss/nephrotoxic medication. Patient also received contrast imaging on admission. Hemoglobin levels have improved to 9.3. Continue to monitor urine output. Continue Means catheter in place and and monitoring. Difficult to accurately estimate fluid status. Patient has history of lung carcinoma, and chest x-ray findings are normal conclusive for fluid overload. May require low-dose Lasix IV 20 mg as needed if needed. Patient is very weak and deconditioned. She may not be an ideal candidate for dialysis.     Patient has been enrolled in 7. 9* 7.9* 9.3*   HCT 23.4*   < > 24.8* 24.9* 29.7*   MCV 79.4*  --   --  78.4*  --      --   --  322  --     < > = values in this interval not displayed. BMP:   Recent Labs     05/31/20  2155 06/01/20  1310 06/02/20  0424   * 137 137   K 5.3* 4.7 3.8   CL 96* 93* 93*   CO2 21 29 29   PHOS  --  4.5  --    BUN 66* 61* 52*   CREATININE 5.4* 4.7* 3.6*     Magnesium:   Lab Results   Component Value Date    MG 1.60 10/07/2019    MG 1.90 10/04/2019    MG 1.60 10/03/2019     Lab Results   Component Value Date    CREATININE 3.6 06/02/2020       Arterial Blood Gasses  No results for input(s): PH, PCO2, PO2 in the last 72 hours. Invalid input(s): [id number], INSPIREDO2    UA:  Recent Labs     05/31/20  1645   COLORU YELLOW   PHUR 5.0   WBCUA 3   RBCUA 4   BACTERIA 2+*   CLARITYU CLOUDY*   SPECGRAV 1.012   LEUKOCYTESUR TRACE*   UROBILINOGEN 0.2   BILIRUBINUR Negative   BLOODU Negative   GLUCOSEU Negative       LIVER PROFILE:   Recent Labs     05/31/20  1506   AST 12*   ALT 7*   BILITOT <0.2   ALKPHOS 74     PT/INR:    Lab Results   Component Value Date    PROTIME 20.1 05/31/2020    PROTIME 18.0 09/27/2019    PROTIME 19.1 08/06/2019    INR 1.72 05/31/2020    INR 1.58 09/27/2019    INR 1.68 08/06/2019     PTT:    Lab Results   Component Value Date    APTT 30.5 12/04/2018     ROXANNE:  No results found for: ANATITER, ROXANNE  CHEMISTRY COMMON GROUP :   Lab Results   Component Value Date    GLUCOSE 101 06/02/2020    TSH 2.97 07/05/2013     Recent Labs     05/31/20  1506 05/31/20  2155 06/01/20  1310 06/02/20  0424   GLUCOSE 210* 198* 147* 101*   CALCIUM 9.4 8.4 9.0 8.6         RADIOLOGY:        Imaging Results. Chest X Ray reviwed by me    Chest Xray Reviewed by me  Renal Ultrasound Reviewed by me    EKG reviewed by me.                 Electronically Signed: Carolyn Caballero MD 6/2/2020 10:45 AM

## 2020-06-02 NOTE — CONSULTS
Palliative Care:    70 y.o. female with history of CAD, COPD, diabetes, GERD, hypertension, hyperlipidemia, previous lung cancer status post radiation and chemotherapy, obstructive sleep apnea who presents to the ED with fatigue/weakness. Patient collapsed in the garage. Patient found to have worsening anemia with positive occult blood. Hb dropped from 8.5 to 7.0. She is also found to be in renal failure with cr  6.2 and K 5.7. She has no h/o renal failure. The pt underwent EGD and colonoscopy in 2019 after she was diagnosed with lung cancer. That did not show any gastritis or ulcer. She is on pepcid. Oncology is following. In late July 2019 patient was enrolled in hospice for the diagnosis of COPD. CT scan of the chest done in October 2019 did not show any evidence of recurrence of her malignancy. Patient does not want any further work-up for cancer. States that if she has recurrence, she will not undergo any kind of treatment.        She has been admitted with renal failure and was noted to be anemic. Creatinine on admission was 6.2  Hgb was 7.0 on admission, dropped to 6.6, she has received one unit of pRBC's. Stool for occult blood was positive. Patient is followed by Dr Hank Modi. GI and pulmonary are following. Neprhology is following. Uses 2 liters on oxygen at home. Patient has history of chronic nausea, orthostatic hypotension, gastoparesis.        Patient was diagnosed with stage IIb adenocarcinoma of the left lung-had bronchoscopy/EBUS on 2/27.  Treated with chemoradiation which patient completed in April. Patient had 6 treatments.   Patient also gives prior history of severe COPD-follows with Dr. Estephania Vera, on trilogy and DuoNeb breathing treatments.  Uses 1 L O2 at home. Zoë Shields has history of gangrenous cholecystitis status post surgery in December 2018. Patient has been active at home with [facility].  .        Imaging Studies: CXR: 5/31/20  Impression   1. Mass density left hilum similar appearance to previous exam   2. New pulmonary opacity right upper lobe could represent an infectious   process.  However, follow-up suggested to ensure resolution   3. Previously noted left midlung opacity has organized into a linear density   most likely representing scarring.                 Echocardiogram 5/17/19:   Summary   -Normal left ventricle size and systolic function with an estimated ejection fraction of 60%. No regional wall motion abnormalities are seen. Mild   concentric left ventricular hypertrophy is present.   -Indeterminate diastolic function. E/e\"=11.35   -Aortic valve appears sclerotic but opens adequately. Mild aortic regurgitation is present.   -Mild mitral regurgitation is present.   -There is trivial tricuspid regurgitation with RVSP estimated at 33 mmHg.        Past Medical History:   has a past medical history of Arthritis, CAD (coronary artery disease), Carpal tunnel syndrome, COPD (chronic obstructive pulmonary disease) ([facility] [address] Coronary stent, depression, Diabetes mellitus ([facility] [address] GERD (gastroesophageal reflux disease), Hyperlipidemia, Hypertension, Lung cancer ([facility] [address] MI (myocardial infarction) ([facility] [address] LIZETT (obstructive sleep apnea), PONV (postoperative nausea and vomiting), and stress incontinence.     Past Surgical History:   has a past surgical history that includes Coronary angioplasty with stent (2000, 2001); back surgery (2000, 2001); bronchoscopy (12/04/2018); pr [facility] incl fluor gdnce dx w/cell washg spx (N/A, 12/4/2018); Cholecystectomy, laparoscopic (N/A, 12/19/2018); Upper gastrointestinal endoscopy (N/A, 2/25/2019); Colonoscopy (N/A, 2/25/2019); Colonoscopy (2/25/2019); bronchoscopy (N/A, 2/27/2019); bronchoscopy (2/27/2019); and bronchoscopy (2/27/2019).    Advance Directives:      Code status is limited, CPR and Medications only.  Daughter Leonora Macdonald is DPOA.      Problem Severity: Pain/Other Symptoms:     Patient denies pain at this time. States she feels mild aches from her recent fall.      Bed Mobility/Toileting/Transfer:      Daughter assists with transportation. Patient has been independent with ADLs.    Performance Status:  Using walker to ambulate. History of falls.        Symptom Assessment: Appetite/Nausea/Bowels/Fatigue:  Appetite fair. Has had    Social History:   reports that she quit smoking about 17 years ago. She has never used smokeless tobacco. She reports that she does not drink alcohol or use drugs.     Family History:  family history includes Cancer in her maternal uncle and sister; Diabetes in her brother and sister; Heart Disease in her father and mother.     Psychological/Spiritual:       Patient lives alone in a two story home. Patient has two daughters living locally.   Patient lives in own home, has home health aide 2 hours a day and visiting nurse provided by [facility]. Spouse passed away in March 2020. Daughter is a nurse. Family Discussion:   Met with patient, she is known to palliative care from previous hospital admission. Reviewed clinical status. Patient acknowledges that she does plan home with hospice upon discharge. She continues to live alone. Her daughters are very supportive. Patient states she has had persistent nausea and orthostatic hypotension since her gallbladder was removed in December 2018. Patient had a cardiac arrest during this surgery. Daughter Gentry Isbell is a nurse. States she missed her spouse who passed away in March of this year. She states her goal of care is to be in her own home with caregivers. States she does not plan on any more cancer treatment. She affirms her code status was \"No code\" in hospice and states her wishes are for DNR CC. Call placed to [facility], RN does affirm this code status. Will send message to MD. Gogo Mauricio with daughter Bear Merritt at length. Reviewed clinical status at length.  Daughter states she has concerns as patient has frequent falls at home. She is asking for PT to evaluate patient. Spoke with Andrea Christopher RN --need negative COVID 19 before PT will see. Result may be back tomorrow.

## 2020-06-03 LAB
ANION GAP SERPL CALCULATED.3IONS-SCNC: 13 MMOL/L (ref 3–16)
BUN BLDV-MCNC: 42 MG/DL (ref 7–20)
CALCIUM SERPL-MCNC: 8.7 MG/DL (ref 8.3–10.6)
CHLORIDE BLD-SCNC: 95 MMOL/L (ref 99–110)
CO2: 30 MMOL/L (ref 21–32)
CREAT SERPL-MCNC: 2.3 MG/DL (ref 0.6–1.2)
GFR AFRICAN AMERICAN: 25
GFR NON-AFRICAN AMERICAN: 21
GLUCOSE BLD-MCNC: 143 MG/DL (ref 70–99)
GLUCOSE BLD-MCNC: 153 MG/DL (ref 70–99)
GLUCOSE BLD-MCNC: 205 MG/DL (ref 70–99)
GLUCOSE BLD-MCNC: 261 MG/DL (ref 70–99)
GLUCOSE BLD-MCNC: 329 MG/DL (ref 70–99)
HCT VFR BLD CALC: 23.7 % (ref 36–48)
HCT VFR BLD CALC: 26.1 % (ref 36–48)
HCT VFR BLD CALC: 27 % (ref 36–48)
HEMOGLOBIN: 7.6 G/DL (ref 12–16)
HEMOGLOBIN: 8.2 G/DL (ref 12–16)
HEMOGLOBIN: 8.5 G/DL (ref 12–16)
MAGNESIUM: 1.5 MG/DL (ref 1.8–2.4)
PERFORMED ON: ABNORMAL
POTASSIUM SERPL-SCNC: 3.3 MMOL/L (ref 3.5–5.1)
REPORT: NORMAL
SARS-COV-2: NOT DETECTED
SODIUM BLD-SCNC: 138 MMOL/L (ref 136–145)
THIS TEST SENT TO: NORMAL

## 2020-06-03 PROCEDURE — 6370000000 HC RX 637 (ALT 250 FOR IP): Performed by: FAMILY MEDICINE

## 2020-06-03 PROCEDURE — 97530 THERAPEUTIC ACTIVITIES: CPT

## 2020-06-03 PROCEDURE — 97535 SELF CARE MNGMENT TRAINING: CPT

## 2020-06-03 PROCEDURE — 94761 N-INVAS EAR/PLS OXIMETRY MLT: CPT

## 2020-06-03 PROCEDURE — 83735 ASSAY OF MAGNESIUM: CPT

## 2020-06-03 PROCEDURE — 80048 BASIC METABOLIC PNL TOTAL CA: CPT

## 2020-06-03 PROCEDURE — 94640 AIRWAY INHALATION TREATMENT: CPT

## 2020-06-03 PROCEDURE — 97162 PT EVAL MOD COMPLEX 30 MIN: CPT

## 2020-06-03 PROCEDURE — 36415 COLL VENOUS BLD VENIPUNCTURE: CPT

## 2020-06-03 PROCEDURE — 2580000003 HC RX 258: Performed by: FAMILY MEDICINE

## 2020-06-03 PROCEDURE — 85018 HEMOGLOBIN: CPT

## 2020-06-03 PROCEDURE — 85014 HEMATOCRIT: CPT

## 2020-06-03 PROCEDURE — 6370000000 HC RX 637 (ALT 250 FOR IP): Performed by: NURSE PRACTITIONER

## 2020-06-03 PROCEDURE — 97166 OT EVAL MOD COMPLEX 45 MIN: CPT

## 2020-06-03 PROCEDURE — 6370000000 HC RX 637 (ALT 250 FOR IP): Performed by: PHYSICIAN ASSISTANT

## 2020-06-03 PROCEDURE — 2060000000 HC ICU INTERMEDIATE R&B

## 2020-06-03 PROCEDURE — 2700000000 HC OXYGEN THERAPY PER DAY

## 2020-06-03 PROCEDURE — 6360000002 HC RX W HCPCS: Performed by: NURSE PRACTITIONER

## 2020-06-03 RX ORDER — POTASSIUM CHLORIDE 20 MEQ/1
40 TABLET, EXTENDED RELEASE ORAL ONCE
Status: COMPLETED | OUTPATIENT
Start: 2020-06-03 | End: 2020-06-03

## 2020-06-03 RX ORDER — MAGNESIUM SULFATE IN WATER 40 MG/ML
2 INJECTION, SOLUTION INTRAVENOUS ONCE
Status: COMPLETED | OUTPATIENT
Start: 2020-06-03 | End: 2020-06-03

## 2020-06-03 RX ADMIN — PREDNISONE 10 MG: 10 TABLET ORAL at 09:47

## 2020-06-03 RX ADMIN — INSULIN LISPRO 4 UNITS: 100 INJECTION, SOLUTION INTRAVENOUS; SUBCUTANEOUS at 15:02

## 2020-06-03 RX ADMIN — MAGNESIUM SULFATE HEPTAHYDRATE 2 G: 40 INJECTION, SOLUTION INTRAVENOUS at 09:47

## 2020-06-03 RX ADMIN — Medication 10 ML: at 09:32

## 2020-06-03 RX ADMIN — INSULIN LISPRO 3 UNITS: 100 INJECTION, SOLUTION INTRAVENOUS; SUBCUTANEOUS at 21:56

## 2020-06-03 RX ADMIN — PANTOPRAZOLE SODIUM 40 MG: 40 TABLET, DELAYED RELEASE ORAL at 05:18

## 2020-06-03 RX ADMIN — MIRTAZAPINE 15 MG: 15 TABLET, FILM COATED ORAL at 22:06

## 2020-06-03 RX ADMIN — POTASSIUM CHLORIDE 40 MEQ: 1500 TABLET, EXTENDED RELEASE ORAL at 09:47

## 2020-06-03 RX ADMIN — IPRATROPIUM BROMIDE AND ALBUTEROL SULFATE 3 ML: .5; 3 SOLUTION RESPIRATORY (INHALATION) at 19:56

## 2020-06-03 RX ADMIN — Medication 10 ML: at 22:07

## 2020-06-03 RX ADMIN — IPRATROPIUM BROMIDE AND ALBUTEROL SULFATE 3 ML: .5; 3 SOLUTION RESPIRATORY (INHALATION) at 16:20

## 2020-06-03 RX ADMIN — IPRATROPIUM BROMIDE AND ALBUTEROL SULFATE 3 ML: .5; 3 SOLUTION RESPIRATORY (INHALATION) at 07:47

## 2020-06-03 RX ADMIN — IPRATROPIUM BROMIDE AND ALBUTEROL SULFATE 3 ML: .5; 3 SOLUTION RESPIRATORY (INHALATION) at 11:49

## 2020-06-03 RX ADMIN — INSULIN LISPRO 8 UNITS: 100 INJECTION, SOLUTION INTRAVENOUS; SUBCUTANEOUS at 17:16

## 2020-06-03 ASSESSMENT — PAIN SCALES - GENERAL
PAINLEVEL_OUTOF10: 0

## 2020-06-03 NOTE — PROGRESS NOTES
Physical Therapy    Facility/Department: [address]  Initial Assessment    NAME: Swati West  : 1940  MRN: [id number]    Date of Service: 6/3/2020    Discharge Recommendations:  Swati West scored a 15/24 on the AM-PAC short mobility form. Current research shows that an AM-PAC score of 17 or less is typically not associated with a discharge to the patient's home setting. Based on the patient's AM-PAC score and their current functional mobility deficits, it is recommended that the patient have 3-5 sessions per week of Physical Therapy at d/c to increase the patient's independence. At this time, this patient lacks the endurance, and/or tolerance for 3 hours of therapy/day, 5-7x/wk and would benefit most from a follow up treatment frequency of 3-5x/wk. Please see assessment section for further patient specific details. If patient discharges prior to next session this note will serve as a discharge summary. Please see below for the latest assessment towards goals. 3-5 sessions per week   PT Equipment Recommendations  Equipment Needed: No    Assessment   Body structures, Functions, Activity limitations: Decreased functional mobility ; Decreased strength;Decreased endurance;Decreased safe awareness;Decreased balance  Assessment: Patient not at baseline function and would benefit from skilled PT to address above deficits and facilitate return to baseline function. Patient presents at significant risk of falling and decreased strength/endurance.  Not safe to return to current home environment at this time  Treatment Diagnosis: decreased functional mobility, impaired gait, decreased balance  Prognosis: Good  Decision Making: Medium Complexity  Clinical Presentation: evolving  PT Education: Goals;PT Role;Plan of Care  Patient Education: d/c recommendations - daughter and patient verbalized understanding  Barriers to Learning: none; cognitive  REQUIRES PT FOLLOW UP: Yes  Activity Tolerance  Activity Tolerance: Patient limited by fatigue  Activity Tolerance: limtied by nausea       Patient Diagnosis(es): The primary encounter diagnosis was General weakness. Diagnoses of LORRAINE (acute kidney injury) ([address] Hyperkalemia, Anemia, unspecified type, Pneumonia due to organism, and Upper GI bleed were also pertinent to this visit. has a past medical history of Arthritis, CAD (coronary artery disease), Carpal tunnel syndrome, COPD (chronic obstructive pulmonary disease) ([address] Coronary stent, depression, Diabetes mellitus ([address] Diastolic heart failure ([address] GERD (gastroesophageal reflux disease), Hyperlipidemia, Hypertension, Lung cancer ([address] MI (myocardial infarction) ([address] LIZETT (obstructive sleep apnea), PONV (postoperative nausea and vomiting), and stress incontinence. has a past surgical history that includes Coronary angioplasty with stent (2000, 2001); back surgery (2000, 2001); bronchoscopy (12/04/2018); pr [facility] incl fluor gdnce dx w/cell washg spx (N/A, 12/4/2018); Cholecystectomy, laparoscopic (N/A, 12/19/2018); Upper gastrointestinal endoscopy (N/A, 2/25/2019); Colonoscopy (N/A, 2/25/2019); Colonoscopy (2/25/2019); bronchoscopy (N/A, 2/27/2019); bronchoscopy (2/27/2019); bronchoscopy (2/27/2019); bronchoscopy (N/A, 8/6/2019); Upper gastrointestinal endoscopy (N/A, 8/7/2019); bronchoscopy (N/A, 9/27/2019); bronchoscopy (9/27/2019); and bronchoscopy (9/27/2019). Restrictions  Restrictions/Precautions  Restrictions/Precautions: Modified Diet, Fall Risk(high fall risk)  Required Braces or Orthoses?: No  Position Activity Restriction  Other position/activity restrictions: Willy Caro is a 70 y.o. female with past medical history of CAD, COPD, depression, diabetes, CHF, hyperlipidemia, hypertension, lung cancer, previous MI, obstructive sleep apnea and atrial fibrillation on Eliquis and aspirin who presents to the ED with complaint of weakness and fatigue.   Was brought in by EMS (RN and aide for a few hours a day 3days/week))  ADL Assistance: (aide assisting with sponge baths, independent with dressing )  Homemaking Responsibilities: No(daughter provides meals, pt does light cooking )  Ambulation Assistance: Independent(4WW in home, w/c in community (daughter states she hasn't wanted to leave house \"in a long time\")  Transfer Assistance: Independent  Leisure & Hobbies: \"nothing anymore\"  Additional Comments: daughter reports significant decline in 2-3 weeks, reports 6+ falls in last 6 months, daughter reports 4 falls last week. Daughter reports she has been spending most of her time in bed. Daughter reports patient wants family to do things for her even with hospice care is present. Objective          AROM RLE (degrees)  RLE AROM: WFL  AROM LLE (degrees)  LLE AROM : WFL  Strength RLE  Strength RLE: WFL  Strength LLE  Strength LLE: WFL        Bed mobility  Supine to Sit: Stand by assistance  Sit to Supine: Stand by assistance  Scooting: Stand by assistance  Transfers  Sit to Stand: Contact guard assistance(from bed x 2)  Stand to sit: Contact guard assistance  Ambulation  Ambulation?: No(patient reported she was unable to ambulate this date. Tolerated standing x 1-2 minutes)     Balance  Sitting - Static: Good  Sitting - Dynamic: Good  Standing - Static: Fair(CGA for static standing at walker x 1-2 minutes)  Standing - Dynamic: Fair  Comments: Sat EOB 10-12 minutes with report of progressive lightheadedness and nausea. /62. Plan   Plan  Times per week: 3-5  Times per day: Daily  Current Treatment Recommendations: Balance Training, Strengthening, Functional Mobility Training, Transfer Training, Gait Training, Stair training, Safety Education & Training  Safety Devices  Type of devices:  All fall risk precautions in place, Call light within reach, Bed alarm in place, Gait belt, Patient at risk for falls, Left in bed, Nurse notified  Restraints  Initially in place: No      AM-PAC Score  AM-PAC Inpatient Mobility Raw Score : 15 (06/03/20 1455)  AM-PAC Inpatient T-Scale Score : 39.45 (06/03/20 1455)  Mobility Inpatient CMS 0-100% Score: 57.7 (06/03/20 1455)  Mobility Inpatient CMS G-Code Modifier : CK (06/03/20 1455)          Goals  Short term goals  Time Frame for Short term goals:  To be met prior to discharge  Short term goal 1: Bed mobility with supervision  Short term goal 2: Sit to/from stand with supervision  Short term goal 3: Ambulate 50 feet with RW/4WW and CGA  Short term goal 4: Navigate up/down 4 steps with rail and min A  Patient Goals   Patient goals : to be able to walk       Therapy Time   Individual Concurrent Group Co-treatment   Time In 1352         Time Out 1445         Minutes 53         Timed Code Treatment Minutes: 821 Jamin Yates, PT    Thanks, Khushi Matthew, PT, DPT [id number]

## 2020-06-03 NOTE — CARE COORDINATION
Family requesting referral to [facility] and [facility]. Referrals sent and both facilities unable to accept ([facility] not taking residents and [facility] does not have bed availability.).     SW spoke with Daughter Epi Conway and left SNF list at pt's bedside per requst.    Raeann Stephen MSW, [address]

## 2020-06-03 NOTE — PROGRESS NOTES
in place: Yes  Type of devices: All fall risk precautions in place;Call light within reach; Bed alarm in place; Left in bed;Patient at risk for falls           Patient Diagnosis(es): The primary encounter diagnosis was General weakness. Diagnoses of LORRAINE (acute kidney injury) ([address] Hyperkalemia, Anemia, unspecified type, Pneumonia due to organism, and Upper GI bleed were also pertinent to this visit. has a past medical history of Arthritis, CAD (coronary artery disease), Carpal tunnel syndrome, COPD (chronic obstructive pulmonary disease) ([address] Coronary stent, depression, Diabetes mellitus ([address] Diastolic heart failure ([address] GERD (gastroesophageal reflux disease), Hyperlipidemia, Hypertension, Lung cancer ([address] MI (myocardial infarction) ([address] LIZETT (obstructive sleep apnea), PONV (postoperative nausea and vomiting), and stress incontinence. has a past surgical history that includes Coronary angioplasty with stent (2000, 2001); back surgery (2000, 2001); bronchoscopy (12/04/2018); pr [facility] incl fluor gdnce dx w/cell washg spx (N/A, 12/4/2018); Cholecystectomy, laparoscopic (N/A, 12/19/2018); Upper gastrointestinal endoscopy (N/A, 2/25/2019); Colonoscopy (N/A, 2/25/2019); Colonoscopy (2/25/2019); bronchoscopy (N/A, 2/27/2019); bronchoscopy (2/27/2019); bronchoscopy (2/27/2019); bronchoscopy (N/A, 8/6/2019); Upper gastrointestinal endoscopy (N/A, 8/7/2019); bronchoscopy (N/A, 9/27/2019); bronchoscopy (9/27/2019); and bronchoscopy (9/27/2019).            Restrictions  Restrictions/Precautions  Restrictions/Precautions: Modified Diet, Fall Risk(high fall risk)  Required Braces or Orthoses?: No  Position Activity Restriction  Other position/activity restrictions: Luan Ellington is a 70 y.o. female with past medical history of CAD, COPD, depression, diabetes, CHF, hyperlipidemia, hypertension, lung cancer, previous MI, obstructive sleep apnea and atrial fibrillation on Eliquis and aspirin who presents to the ED with complaint of weakness and fatigue. Was brought in by EMS from daughter's house. Patient apparently independent and lives on her own been normally ANO x4. Apparently has had nausea and diarrhea for the past several days. Has been feeling weak today. Apparently she was in the garage when she became weak and apparently had to be lowered to the ground by family members. They brought her into the house and apparently improved with a called EMS and brought her to the ED for further evaluation and treatment. Patient denies any specific injury or trauma from the fall. Subjective   General  Chart Reviewed: Yes, Orders, History and Physical  Patient assessed for rehabilitation services?: Yes  Additional Pertinent Hx: adenocarcinoma of the lung, CAD/stent, COPD, chronic anemia, chronic dCHF, DM, HTN, HLD, LIZETT (untreated), chronic AC (on Eliquis). Family / Caregiver Present: Yes(daughter)  Referring Practitioner: MARIA C Boggs CNP  Diagnosis: Fatigue  Subjective  Subjective: Patient pleasant and agreeable to OT.    General Comment  Comments: RN okay for therapy      Social/Functional History  Social/Functional History  Lives With: Alone(daughters check in a couple time per week)  Type of Home: House  Home Layout: Two level, Bed/Bath upstairs(has been living on main level recently due to weakness, has been home with hospice services))  Home Access: Stairs to enter with rails  Entrance Stairs - Number of Steps: 3 KATI  Entrance Stairs - Rails: Both  Bathroom Shower/Tub: Walk-in shower  Bathroom Toilet: Handicap height  Bathroom Equipment: Shower chair, Hand-held shower, Grab bars in [address]: 4 wheeled walker, Hospital bed, Wheelchair-manual(BSC next to bed)  Receives Help From: (Hospice care (RN and aide for a few hours a day 3days/week))  ADL Assistance: (aide assisting with sponge baths, independent with dressing )  Homemaking Responsibilities: No(daughter provides meals, pt does light cooking )  Ambulation Assistance: Independent(4WW in home, w/c in community (daughter states she hasn't wanted to leave house \"in a long time\")  Transfer Assistance: Independent  Leisure & Hobbies: \"nothing anymore\"  Additional Comments: daughter reports significant decline in 2-3 weeks, reports 6+ falls in last 6 months, daughter reports 4 falls last week. Daughter reports she has been spending most of her time in bed. Objective        Orientation  Overall Orientation Status: Within Functional Limits     Balance  Sitting Balance: Supervision  Standing Balance: Contact guard assistance  Standing Balance  Time: 1 min  Activity: Standing ADLs (managing brief)  Functional Mobility  Functional Mobility Comments: Defered;  Patient declining this date due to upset stomach   Toilet Transfers  Toilet Transfers Comments: declined the need   ADL  LE Dressing: Maximum assistance(Donning briefs)  Toileting: Other (Comment)([place] )  Tone RUE  RUE Tone: Normotonic  Tone LUE  LUE Tone: Normotonic  Coordination  Movements Are Fluid And Coordinated: Yes     Bed mobility  Supine to Sit: Stand by assistance  Sit to Supine: Stand by assistance  Scooting: Stand by assistance  Transfers  Sit to stand: Stand by assistance  Stand to sit: Stand by assistance     Cognition  Overall Cognitive Status: Exceptions(flat affect; Daughter noted to answer most questions for patient)  Arousal/Alertness: Delayed responses to stimuli  Initiation: Requires cues for some                 LUE AROM (degrees)  LUE AROM : WFL  RUE PROM (degrees)  RUE PROM: WFL  LUE Strength  Gross LUE Strength: Exceptions to [facility]  RUE Strength  Gross RUE Strength: Exceptions to [facility]     Hand Dominance  Hand Dominance: Right             Plan   Plan  Times per week: 3-5x/wk   Current Treatment Recommendations: Strengthening, Safety Education & Training, Balance Training, Self-Care / ADL, Patient/Caregiver Education & Training, Functional Mobility Training, Endurance

## 2020-06-03 NOTE — PROGRESS NOTES
MD Sree Acosta MD Gerhard Formica, MD                                  Office: [phone]                 Fax: [phone]          [facility]                     NEPHROLOGY IN PATIENT PROGRESS NOTE:     PATIENT NAME: Shanika Ty  : 1940  MRN: [id number]            Subjective:       More awake and alert. No hypotension. Improving urine output. Medications reviewed. Assessment and Plan    Assessment:     Acute kidney injury-slow improvement-nonoliguric  Anemia of GI bleed. Hyperkalemia.-Improved    Hypovolemia. Hypotension-improving. History of lung cancer. Generalized weakness. Plan:       Renal functions improving daily. Repeat creatinine is 2.3    Etiology of acute kidney injury likely multifactorial.  Patient may have developed ATN due to hypovolemia/GI blood loss/nephrotoxic medication. Patient also received contrast imaging on admission. Hemoglobin levels have improved And stable at 8.5    Continue to monitor urine output. Continue Means catheter in place and and monitoring. Hypovolemia seems improved. Medications reviewed. Electrolytes reviewed. Hyperkalemia has improved. Noted to have hypokalemia. Replace potassium. Check magnesium levels. No immediate indications for dialysis. Generalized weakness. Improved nutritional status. Likely discharge in the next 24-48 hours if continued improvement    High complexity        EXAM  Vitals:    20 1215   BP: 128/77   Pulse: 68   Resp: 18   Temp: 98.6 °F (37 °C)   SpO2: 97%       Intake/Output Summary (Last 24 hours) at 6/3/2020 1447  Last data filed at 6/3/2020 0500  Gross per 24 hour   Intake 240 ml   Output 1000 ml   Net -760 ml         External exam of the ears and nose are normal  HENT: exam is normal  Eyes: Pupils are equal, round, and reactive to light. Lymph Nodes. No axillary or cervical lymph nodes are palpable. Neck. JVD not visible.  No lymph nodes palpable. CVS.  Heart sounds are normal.Palpation of the heart is normal. No murmurs. No pericardial rub.  RS.dullness on percussion of the lower chest wall. Bilateral Basal rales. PA soft , bowel sounds are normal no distension and no tenderness to palpation. Skin No rash , No palpable nodules  Musculoskeletal: Normal range of motion. 1+ edema and no tenderness. CNS  No focal    Edematrace  More awake and alert. All pulses are well felt      Active Problems:    Renal failure  Resolved Problems:    * No resolved hospital problems. *        Medications Reviewed by me  Romario insulin lispro  0-12 Units Subcutaneous TID WC    insulin lispro  0-6 Units Subcutaneous Nightly    pantoprazole  40 mg Oral QAM AC    ipratropium-albuterol  3 mL Inhalation Q4H WA    mirtazapine  15 mg Oral Nightly    sodium chloride flush  10 mL Intravenous 2 times per day    0.9 % sodium chloride  250 mL Intravenous Once    predniSONE  10 mg Oral Daily      dextrose      dextrose         Data Review. Labs reviewed by me       CBC:   Recent Labs     05/31/20  1506  06/02/20  0424  06/02/20  1706 06/03/20  0101 06/03/20  0911   WBC 9.9  --  7.6  --   --   --   --    HGB 7.0*   < > 7.9*   < > 7.8* 7.6* 8.5*   HCT 23.4*   < > 24.9*   < > 25.1* 23.7* 27.0*   MCV 79.4*  --  78.4*  --   --   --   --      --  322  --   --   --   --     < > = values in this interval not displayed. BMP:   Recent Labs     06/01/20  1310 06/02/20  0424 06/03/20  0426    137 138   K 4.7 3.8 3.3*   CL 93* 93* 95*   CO2 29 29 30   PHOS 4.5  --   --    BUN 61* 52* 42*   CREATININE 4.7* 3.6* 2.3*     Magnesium:   Lab Results   Component Value Date    MG 1.50 06/03/2020    MG 1.60 10/07/2019    MG 1.90 10/04/2019     Lab Results   Component Value Date    CREATININE 2.3 06/03/2020       Arterial Blood Gasses  No results for input(s): PH, PCO2, PO2 in the last 72 hours.     Invalid input(s): [id number], INSPIREDO2    UA:  Recent Labs 05/31/20  1645   COLORU YELLOW   PHUR 5.0   WBCUA 3   RBCUA 4   BACTERIA 2+*   CLARITYU CLOUDY*   SPECGRAV 1.012   LEUKOCYTESUR TRACE*   UROBILINOGEN 0.2   BILIRUBINUR Negative   BLOODU Negative   GLUCOSEU Negative       LIVER PROFILE:   Recent Labs     05/31/20  1506   AST 12*   ALT 7*   BILITOT <0.2   ALKPHOS 74     PT/INR:    Lab Results   Component Value Date    PROTIME 20.1 05/31/2020    PROTIME 18.0 09/27/2019    PROTIME 19.1 08/06/2019    INR 1.72 05/31/2020    INR 1.58 09/27/2019    INR 1.68 08/06/2019     PTT:    Lab Results   Component Value Date    APTT 30.5 12/04/2018     ROXANNE:  No results found for: ANATITER, ROXANNE  CHEMISTRY COMMON GROUP :   Lab Results   Component Value Date    GLUCOSE 153 06/03/2020    TSH 2.97 07/05/2013     Recent Labs     05/31/20  1506 05/31/20  2155 06/01/20  1310 06/02/20  0424 06/03/20  0426   GLUCOSE 210* 198* 147* 101* 153*   CALCIUM 9.4 8.4 9.0 8.6 8.7         RADIOLOGY:        Imaging Results. Chest X Ray reviwed by me    Chest Xray Reviewed by me  Renal Ultrasound Reviewed by me    EKG reviewed by me.                 Electronically Signed: Dustin Shah MD 6/3/2020 2:47 PM

## 2020-06-03 NOTE — PROGRESS NOTES
adenopathy. Cardiovascular: Regular rhythm, normal S1, S2. No murmur. No edema in lower extremities  Respiratory: Not using accessory muscles. Good inspiratory effort. Clear to auscultation bilaterally, no wheeze or crackles. GI: Abdomen soft, no tenderness, not distended  Musculoskeletal: No cyanosis in digits, neck supple  Neurology: CN 2-12 grossly intact. No speech or motor deficits  Psych: Normal affect. Alert and oriented in time, place and person  Skin: Warm, dry, normal turgor         Labs and Tests:  CBC:   Recent Labs     05/31/20  1506  06/02/20  0424 06/02/20  0907 06/02/20  1706 06/03/20  0101   WBC 9.9  --  7.6  --   --   --    HGB 7.0*   < > 7.9* 9.3* 7.8* 7.6*     --  322  --   --   --     < > = values in this interval not displayed. BMP:    Recent Labs     06/01/20  1310 06/02/20  0424 06/03/20  0426    137 138   K 4.7 3.8 3.3*   CL 93* 93* 95*   CO2 29 29 30   BUN 61* 52* 42*   CREATININE 4.7* 3.6* 2.3*   GLUCOSE 147* 101* 153*     Hepatic:   Recent Labs     05/31/20  1506   AST 12*   ALT 7*   BILITOT <0.2   ALKPHOS 74       ASSESSMENT AND PLAN    Active Problems:    Renal failure  Resolved Problems:    * No resolved hospital problems. *      NSCLC  - s/p chemo and radiation, completed April 2019  - last scan in Oct 2019 showed no evidence of recurrent disease  - entered hospice care July 2019 d/t COPD  - she confirms that she does not want work up or treatment for her lung cancer.      Anemia  - hgb 7.6 after transfusion 5/31/2020  - GI plans to treat conservatively, started PPI. - Normal iron studies, vit b12/folate        [facility], [address] [facility]  Oncology Hematology [address] [facility].  [phone]    Patient was seen and examined. Agree with above. Continue supportive care.     Kimberly Mendoza MD

## 2020-06-03 NOTE — PROGRESS NOTES
[facility] PROGRESS NOTE    6/3/2020 1:19 PM        Name: Charlie Ramirez . Admitted: 5/31/2020  Primary Care Provider: Dmitry Power (Tel: [phone])      Subjective:  Patient is a 71 yo female with hx adenocarcinoma of the lung, CAD/stent, COPD, chronic anemia, chronic dCHF, DM, HTN, HLD, LIZETT (untreated), chronic AC (on Eliquis). She presented to hospital with ongoing fatigue and weakness. Found to have worsening anemia, Hgb 6.6 and she was transfused. Also evidence of acute renal failure with creatinine 6.2. Presently resting in bed, daughter Milvia Pike visiting. Patient more conversant today than she has been. Offers no new complaints. Denies pain, shortness of breath, nausea, diarrhea. Has been eating well and taking fluids.      Reviewed interval ancillary notes    Current Medications  insulin lispro (1 Unit Dial) 0-12 Units, TID WC  insulin lispro (1 Unit Dial) 0-6 Units, Nightly  pantoprazole (PROTONIX) tablet 40 mg, QAM AC  ipratropium-albuterol (DUONEB) nebulizer solution 3 mL, Q4H WA  mirtazapine (REMERON) tablet 15 mg, Nightly  sodium chloride flush 0.9 % injection 10 mL, 2 times per day  sodium chloride flush 0.9 % injection 10 mL, PRN  acetaminophen (TYLENOL) tablet 650 mg, Q6H PRN    Or  acetaminophen (TYLENOL) suppository 650 mg, Q6H PRN  polyethylene glycol (GLYCOLAX) packet 17 g, Daily PRN  promethazine (PHENERGAN) tablet 12.5 mg, Q6H PRN    Or  ondansetron (ZOFRAN) injection 4 mg, Q6H PRN  glucose (GLUTOSE) 40 % oral gel 15 g, PRN  dextrose 50 % IV solution, PRN  glucagon (rDNA) injection 1 mg, PRN  dextrose 5 % solution, PRN  0.9 % sodium chloride infusion 250 mL, Once  glucose (GLUTOSE) 40 % oral gel 15 g, PRN  dextrose 50 % IV solution, PRN  glucagon (rDNA) injection 1 mg, PRN  dextrose 5 % solution, PRN  predniSONE (DELTASONE) tablet 10 mg, Daily        Objective:  /72   Pulse 69 Temp 98.2 °F (36.8 °C) (Oral)   Resp 16   Ht 5' 5\" (1.651 m)   Wt 130 lb 3.2 oz (59.1 kg)   SpO2 96%   BMI 21.67 kg/m²     Intake/Output Summary (Last 24 hours) at 6/3/2020 1319  Last data filed at 6/3/2020 0500  Gross per 24 hour   Intake 240 ml   Output 1000 ml   Net -760 ml      Wt Readings from Last 3 Encounters:   06/02/20 130 lb 3.2 oz (59.1 kg)   10/08/19 142 lb (64.4 kg)   08/09/19 152 lb 12.8 oz (69.3 kg)     General:  Awake, alert, oriented in NAD  Skin:  Warm and dry. No unusual bruising or rash  Neck:  Supple. No JVD  appreciated  Chest:  Normal effort. Clear to auscultation  Cardiovascular:  RRR, normal S1/S2, no murmur/gallop/rub  Abdomen:  Soft, nontender, +bowel sounds  Extremities:  No edema  Neurological: No focal deficits  Psychological: Normal mood and affect      Labs and Tests:  CBC:   Recent Labs     05/31/20  1506  06/02/20  0424  06/02/20  1706 06/03/20  0101 06/03/20  0911   WBC 9.9  --  7.6  --   --   --   --    HGB 7.0*   < > 7.9*   < > 7.8* 7.6* 8.5*     --  322  --   --   --   --     < > = values in this interval not displayed. BMP:    Recent Labs     06/01/20  1310 06/02/20  0424 06/03/20  0426    137 138   K 4.7 3.8 3.3*   CL 93* 93* 95*   CO2 29 29 30   BUN 61* 52* 42*   CREATININE 4.7* 3.6* 2.3*   GLUCOSE 147* 101* 153*     Hepatic:   Recent Labs     05/31/20  1506   AST 12*   ALT 7*   BILITOT <0.2   ALKPHOS 74     Results for William Rucker (MRN [id number]) as of 6/3/2020 13:34   Ref.  Range 6/2/2020 11:28 6/2/2020 16:22 6/2/2020 20:33 6/3/2020 07:17 6/3/2020 11:21   POC Glucose Latest Ref Range: 70 - 99 mg/dl 290 (H) 346 (H) 204 (H) 143 (H) 205 (H)       CT head 5/31/2020:  No acute intracranial abnormality.       Chronic microvascular ischemic changes and global cerebral atrophy.         CTA head/neck 5/31/2020:  CTA NECK:       AORTIC ARCH/ARCH VESSELS: No dissection or arterial injury.  No significant   stenosis of the brachiocephalic or subclavian arteries.       CAROTID ARTERIES: There is 50% stenosis of the proximal right cervical   internal carotid artery due to calcified and noncalcified plaque.       VERTEBRAL ARTERIES: No dissection, arterial injury, or significant stenosis.       SOFT TISSUES: There is pulmonary emphysema with scarring or atelectasis   within the left suprahilar region. [place] are nonspecific consolidative   changes within the right upper lobe, which may be due in part to atelectasis   and scar.  Thyroid nodules are noted.       BONES: No acute osseous abnormality.           CTA HEAD:       ANTERIOR CIRCULATION: No significant stenosis of the intracranial internal   carotid, anterior cerebral, or middle cerebral arteries. No aneurysm.       POSTERIOR CIRCULATION: No significant stenosis of the vertebral, basilar, or   posterior cerebral arteries. No aneurysm.       OTHER: No dural venous sinus thrombosis on this non-dedicated study.       BRAIN: See separately dictated noncontrast head CT report.           Impression   50% stenosis of the proximal right cervical ICA.       Otherwise, no flow limiting stenosis or large vessel occlusion visualized   within the head or neck.       Incidentally noted chest findings as detailed above.  Would consider   dedicated CT of the chest for further evaluation.         CXR 5/31/2020:  1. Mass density left hilum similar appearance to previous exam   2. New pulmonary opacity right upper lobe could represent an infectious   process.  However, follow-up suggested to ensure resolution   3. Previously noted left midlung opacity has organized into a linear density   most likely representing scarring. Problem List  Active Problems:    Renal failure  Resolved Problems:    * No resolved hospital problems. *       Assessment & Plan:   1. Acute renal failure. Admission creatinine 6.2, baseline closer to 1.2. Renal ultrasound with simple bilateral cysts otherwise unremarkable.  Suspected secondary to hypovolemia/GI blood loss/nephrotoxic medication/contrast. Creatinine continues to improve. Nephrology on board. 2. Chronic anemia. Likely combination of chronic disease and possible GI blood loss considering positive stool guaiac. Hgb 6.6 on admission, she has been transfused, Hgb stable, 8.5 today. Prior EGD and colonoscopy last year were unremarkable. GI has seen, no plan for scope. Iron studies (6/1) WNL. Recommend continue daily PPI. GI has signed off. 3.  Non-small cell lung cancer. Status post chemo and radiation (completed 4/2019). Patient indicates she wants no further workup or treatment for lung cancer. Hem/onc on board. 4. Hx COPD with chronic respiratory failure. No evidence of acute exacerbation. Entered hospice care secondary to COPD (7/2019). O2 sats stable on 1 liter for mid 90s, typically uses 3 liters at home. 5.  Abnormal CXR. New pulmonary opacity RUL on CXR. CT scan with nonspecific consolidative changes within RUL possibly secondary to atelectasis and scar. Patient afebrile, no leukocytosis. Blood cultures no growth to date. Procalcitonin 0.22, low likelihood for bacterial infection, no leukocytosis. No indication for antibiotics. O2 sats stable on 1 liter for high 90s. 6. DM2. A1c 7.9. On metformin at home, discontinued secondary renal failure. Blood glucose variable, ranging 143-346. Increase to high correction mealtime and bedtime insulin. 7. HTN. Controlled. Continue to hold carvedilol. 8. Chronic AC for PAF. On Eliquis at home, held here. Patient has remained in sinus rhythm. Considered poor candidate to resume Eliquis given anemia, unknown source GI blood loss, and multiple fall risk. Daughter Derek Co at bedside and conferenced with daughter Teri Oakley per phone. Patient and daughters agreeable to stay in facility for rehab. Their first choice in [address], second choice [facility].  Daughters requesting one of them to be present for PT/OT evaluation, case management notified of SNF preferences and request for daughter to be present. She passed on to PT/OT.         Diet: DIET CARB CONTROL; Carb Control: 4 carb choices (60 gms)/meal  Code:[id number]-CC  DVT PPX: scds (anemia)      MARIA C Arciniega - CNP   6/3/2020 1:20p

## 2020-06-03 NOTE — CARE COORDINATION
Therapy to work with pt. PT is not in iso for COVID, as she had a negitive test.  Family would like referral to [facility] (Not taking new patients) and [facility].     Izabella Zacarias MSW, [address]

## 2020-06-03 NOTE — PROGRESS NOTES
Physical/Occupational Therapy  Lencho Sesay      Orders received for PT/OT evaluation. Chart reviewed. Patient with initial COVID-19 negative results, however remains in droplet plus precautions in chart. Discussed with nursing. Per nursing, patient requiring 2nd test and patient should remain in droplet plus precautions at this time. Will hold therapy at this time per infection control and PPE conservation effort and will follow up with pt pending results of 2nd testing.  Thanks, Khushi Matthew, [address] [facility], DPT [id number]

## 2020-06-04 LAB
ANION GAP SERPL CALCULATED.3IONS-SCNC: 12 MMOL/L (ref 3–16)
BLOOD CULTURE, ROUTINE: NORMAL
BUN BLDV-MCNC: 38 MG/DL (ref 7–20)
CALCIUM SERPL-MCNC: 8.9 MG/DL (ref 8.3–10.6)
CHLORIDE BLD-SCNC: 97 MMOL/L (ref 99–110)
CO2: 27 MMOL/L (ref 21–32)
CREAT SERPL-MCNC: 2.2 MG/DL (ref 0.6–1.2)
CULTURE, BLOOD 2: NORMAL
EKG ATRIAL RATE: 141 BPM
EKG DIAGNOSIS: NORMAL
EKG Q-T INTERVAL: 332 MS
EKG QRS DURATION: 102 MS
EKG QTC CALCULATION (BAZETT): 480 MS
EKG R AXIS: -8 DEGREES
EKG T AXIS: 170 DEGREES
EKG VENTRICULAR RATE: 126 BPM
GFR AFRICAN AMERICAN: 26
GFR NON-AFRICAN AMERICAN: 22
GLUCOSE BLD-MCNC: 137 MG/DL (ref 70–99)
GLUCOSE BLD-MCNC: 205 MG/DL (ref 70–99)
GLUCOSE BLD-MCNC: 233 MG/DL (ref 70–99)
GLUCOSE BLD-MCNC: 364 MG/DL (ref 70–99)
HCT VFR BLD CALC: 23.8 % (ref 36–48)
HCT VFR BLD CALC: 24.2 % (ref 36–48)
HCT VFR BLD CALC: 25.3 % (ref 36–48)
HEMOGLOBIN: 7.5 G/DL (ref 12–16)
HEMOGLOBIN: 7.6 G/DL (ref 12–16)
HEMOGLOBIN: 7.9 G/DL (ref 12–16)
PERFORMED ON: ABNORMAL
POTASSIUM SERPL-SCNC: 3.5 MMOL/L (ref 3.5–5.1)
SODIUM BLD-SCNC: 136 MMOL/L (ref 136–145)
TROPONIN: <0.01 NG/ML

## 2020-06-04 PROCEDURE — 93005 ELECTROCARDIOGRAM TRACING: CPT | Performed by: INTERNAL MEDICINE

## 2020-06-04 PROCEDURE — 80048 BASIC METABOLIC PNL TOTAL CA: CPT

## 2020-06-04 PROCEDURE — 6370000000 HC RX 637 (ALT 250 FOR IP): Performed by: NURSE PRACTITIONER

## 2020-06-04 PROCEDURE — 2060000000 HC ICU INTERMEDIATE R&B

## 2020-06-04 PROCEDURE — 6370000000 HC RX 637 (ALT 250 FOR IP): Performed by: FAMILY MEDICINE

## 2020-06-04 PROCEDURE — 84484 ASSAY OF TROPONIN QUANT: CPT

## 2020-06-04 PROCEDURE — 85018 HEMOGLOBIN: CPT

## 2020-06-04 PROCEDURE — 2500000003 HC RX 250 WO HCPCS: Performed by: NURSE PRACTITIONER

## 2020-06-04 PROCEDURE — 2700000000 HC OXYGEN THERAPY PER DAY

## 2020-06-04 PROCEDURE — 94640 AIRWAY INHALATION TREATMENT: CPT

## 2020-06-04 PROCEDURE — 36415 COLL VENOUS BLD VENIPUNCTURE: CPT

## 2020-06-04 PROCEDURE — 85014 HEMATOCRIT: CPT

## 2020-06-04 PROCEDURE — 6370000000 HC RX 637 (ALT 250 FOR IP): Performed by: PHYSICIAN ASSISTANT

## 2020-06-04 PROCEDURE — 99223 1ST HOSP IP/OBS HIGH 75: CPT | Performed by: NURSE PRACTITIONER

## 2020-06-04 PROCEDURE — 2580000003 HC RX 258: Performed by: FAMILY MEDICINE

## 2020-06-04 PROCEDURE — 94761 N-INVAS EAR/PLS OXIMETRY MLT: CPT

## 2020-06-04 PROCEDURE — 93010 ELECTROCARDIOGRAM REPORT: CPT | Performed by: INTERNAL MEDICINE

## 2020-06-04 RX ORDER — METOPROLOL SUCCINATE 25 MG/1
25 TABLET, EXTENDED RELEASE ORAL DAILY
Status: DISCONTINUED | OUTPATIENT
Start: 2020-06-04 | End: 2020-06-15 | Stop reason: HOSPADM

## 2020-06-04 RX ORDER — DILTIAZEM HYDROCHLORIDE 5 MG/ML
10 INJECTION INTRAVENOUS ONCE
Status: COMPLETED | OUTPATIENT
Start: 2020-06-04 | End: 2020-06-04

## 2020-06-04 RX ADMIN — INSULIN LISPRO 2 UNITS: 100 INJECTION, SOLUTION INTRAVENOUS; SUBCUTANEOUS at 22:50

## 2020-06-04 RX ADMIN — MIRTAZAPINE 15 MG: 15 TABLET, FILM COATED ORAL at 22:50

## 2020-06-04 RX ADMIN — PREDNISONE 10 MG: 10 TABLET ORAL at 10:06

## 2020-06-04 RX ADMIN — IPRATROPIUM BROMIDE AND ALBUTEROL SULFATE 3 ML: .5; 3 SOLUTION RESPIRATORY (INHALATION) at 15:48

## 2020-06-04 RX ADMIN — IPRATROPIUM BROMIDE AND ALBUTEROL SULFATE 3 ML: .5; 3 SOLUTION RESPIRATORY (INHALATION) at 11:51

## 2020-06-04 RX ADMIN — DILTIAZEM HYDROCHLORIDE 30 MG: 30 TABLET, FILM COATED ORAL at 18:29

## 2020-06-04 RX ADMIN — Medication 10 ML: at 22:49

## 2020-06-04 RX ADMIN — IPRATROPIUM BROMIDE AND ALBUTEROL SULFATE 3 ML: .5; 3 SOLUTION RESPIRATORY (INHALATION) at 22:00

## 2020-06-04 RX ADMIN — IPRATROPIUM BROMIDE AND ALBUTEROL SULFATE 3 ML: .5; 3 SOLUTION RESPIRATORY (INHALATION) at 08:10

## 2020-06-04 RX ADMIN — METOPROLOL SUCCINATE 25 MG: 25 TABLET, FILM COATED, EXTENDED RELEASE ORAL at 13:18

## 2020-06-04 RX ADMIN — INSULIN LISPRO 10 UNITS: 100 INJECTION, SOLUTION INTRAVENOUS; SUBCUTANEOUS at 18:31

## 2020-06-04 RX ADMIN — PANTOPRAZOLE SODIUM 40 MG: 40 TABLET, DELAYED RELEASE ORAL at 06:17

## 2020-06-04 RX ADMIN — INSULIN LISPRO 4 UNITS: 100 INJECTION, SOLUTION INTRAVENOUS; SUBCUTANEOUS at 12:47

## 2020-06-04 RX ADMIN — Medication 10 ML: at 10:05

## 2020-06-04 RX ADMIN — DILTIAZEM HYDROCHLORIDE 30 MG: 30 TABLET, FILM COATED ORAL at 11:21

## 2020-06-04 RX ADMIN — DILTIAZEM HYDROCHLORIDE 10 MG: 5 INJECTION INTRAVENOUS at 13:18

## 2020-06-04 RX ADMIN — APIXABAN 2.5 MG: 5 TABLET, FILM COATED ORAL at 18:30

## 2020-06-04 ASSESSMENT — PAIN SCALES - GENERAL
PAINLEVEL_OUTOF10: 0

## 2020-06-04 NOTE — CARE COORDINATION
MARLIN called pt's dtr Alex Olmos who gave more facilities to try. ..    [facility]  2. [place]  3. [facility]  4. [facility]    Dtr asked that whatever facility pt goes to that they do not have any COVID cases. MARLIN explained this would be very difficult to find since most facilities do have positive cases. SW stated I could speak with the facility regarding their PPE policy and how they are preventing the spread if they have cases. Dtr agreeable to this. MARLIN contacted [facility] who stated they are in network and have beds available. Provided pt information. Nataly De Santiago will get back to me soon.     Electronically signed by MADIE Granger, LSW on 6/4/2020 at 12:08 PM

## 2020-06-04 NOTE — PROGRESS NOTES
MD Varinder Stewart MD Christiana Haggard, MD                                  Office: [phone]                 Fax: [phone]          [facility]                     NEPHROLOGY IN PATIENT PROGRESS NOTE:     PATIENT NAME: Jana Garduno  : 1940  MRN: [id number]            Subjective:       More awake and alert. No hypotension. Improving urine output. Medications reviewed. Assessment and Plan    Assessment:     Acute kidney injury-slow improvement-nonoliguric  Anemia   Hyperkalemia.-Improved    Hypovolemia.-Improved  Hypotension-improving. History of lung cancer. Generalized weakness. Plan:       Repeat creatinine is 2.2. Good recovery from severe renal failure. Etiology of acute kidney injury likely multifactorial.  Patient may have developed ATN due to hypovolemia/GI blood loss/nephrotoxic medication. Patient also received contrast imaging on admission. Hemoglobin levels have improved And stable at 8.5    Continue to monitor urine output. Continue Means catheter in place and and monitoring. Hypovolemia seems improved. Medications reviewed. Electrolytes reviewed. Hyperkalemia has improved. Noted to have hypokalemia. Replace potassium. Check magnesium levels. No immediate indications for dialysis. Generalized weakness. Improved nutritional status. Likely discharge in the next 24-48 hours if continued improvement    High complexity        EXAM  Vitals:    20 1600   BP: 128/78   Pulse: 82   Resp: 16   Temp: 98.1 °F (36.7 °C)   SpO2: 96%       Intake/Output Summary (Last 24 hours) at 2020 1748  Last data filed at 2020 1632  Gross per 24 hour   Intake --   Output 1550 ml   Net -1550 ml         External exam of the ears and nose are normal  HENT: exam is normal  Eyes: Pupils are equal, round, and reactive to light. Lymph Nodes. No axillary or cervical lymph nodes are palpable. Neck. JVD not visible.  No lymph nodes palpable. CVS.  Heart sounds are normal.Palpation of the heart is normal. No murmurs. No pericardial rub.  RS.dullness on percussion of the lower chest wall. Bilateral Basal rales. PA soft , bowel sounds are normal no distension and no tenderness to palpation. Skin No rash , No palpable nodules  Musculoskeletal: Normal range of motion. 1+ edema and no tenderness. CNS  No focal    Edematrace  More awake and alert. All pulses are well felt      Active Problems:    Renal failure  Resolved Problems:    * No resolved hospital problems. *        Medications Reviewed by me   dilTIAZem  30 mg Oral 4 times per day    metoprolol succinate  25 mg Oral Daily    apixaban  2.5 mg Oral BID    insulin lispro  0-12 Units Subcutaneous TID WC    insulin lispro  0-6 Units Subcutaneous Nightly    pantoprazole  40 mg Oral QAM AC    ipratropium-albuterol  3 mL Inhalation Q4H WA    mirtazapine  15 mg Oral Nightly    sodium chloride flush  10 mL Intravenous 2 times per day    predniSONE  10 mg Oral Daily      dextrose         Data Review. Labs reviewed by me       CBC:   Recent Labs     06/02/20  0424  06/03/20  1705 06/04/20  0134 06/04/20  0915   WBC 7.6  --   --   --   --    HGB 7.9*   < > 8.2* 7.5* 7.9*   HCT 24.9*   < > 26.1* 23.8* 25.3*   MCV 78.4*  --   --   --   --      --   --   --   --     < > = values in this interval not displayed. BMP:   Recent Labs     06/02/20  0424 06/03/20  0426 06/04/20  0420    138 136   K 3.8 3.3* 3.5   CL 93* 95* 97*   CO2 29 30 27   BUN 52* 42* 38*   CREATININE 3.6* 2.3* 2.2*     Magnesium:   Lab Results   Component Value Date    MG 1.50 06/03/2020    MG 1.60 10/07/2019    MG 1.90 10/04/2019     Lab Results   Component Value Date    CREATININE 2.2 06/04/2020       Arterial Blood Gasses  No results for input(s): PH, PCO2, PO2 in the last 72 hours.     Invalid input(s): Amaya Wong    UA:  No results for input(s): NITRITE, [address], PHUR, LABCAST, [address],

## 2020-06-04 NOTE — PLAN OF CARE
Problem: Falls - Risk of:  Goal: Will remain free from falls  Description: Will remain free from falls  6/3/2020 1554 by Perri Sevilla RN  Outcome: Ongoing  Goal: Absence of physical injury  Description: Absence of physical injury  6/3/2020 1554 by Perri Sevilla RN  Outcome: Ongoing   Patient alert and oriented x4, denies pain, denies SOB, on 1LNC sats >90%, vss  No acute distress at this time, all safety precautions in place.   Vitals:    06/04/20 0035   BP: 134/77   Pulse: 58   Resp: 18   Temp: 96.4 °F (35.8 °C)   SpO2: 97%

## 2020-06-04 NOTE — PROGRESS NOTES
(DELTASONE) tablet 10 mg, Daily        Objective:  /77   Pulse 68   Temp 98.3 °F (36.8 °C) (Temporal)   Resp 18   Ht 5' 5\" (1.651 m)   Wt 130 lb 4.8 oz (59.1 kg)   SpO2 95%   BMI 21.68 kg/m²     Intake/Output Summary (Last 24 hours) at 6/4/2020 1206  Last data filed at 6/4/2020 0400  Gross per 24 hour   Intake --   Output 950 ml   Net -950 ml      Wt Readings from Last 3 Encounters:   06/04/20 130 lb 4.8 oz (59.1 kg)   10/08/19 142 lb (64.4 kg)   08/09/19 152 lb 12.8 oz (69.3 kg)     General:  Awake, alert, oriented in NAD  Skin:  Warm and dry. No unusual bruising or rash  Neck:  Supple. No JVD appreciated  Chest:  Normal effort. Diminished, no wheezes  Cardiovascular:  Irregular, normal S1/S2, no murmur/gallop/rub  Abdomen:  Soft, nontender, +bowel sounds  Extremities:  No edema  Neurological: No focal deficits  Psychological: Normal mood and affect      Labs and Tests:  CBC:   Recent Labs     06/02/20  0424  06/03/20  1705 06/04/20  0134 06/04/20  0915   WBC 7.6  --   --   --   --    HGB 7.9*   < > 8.2* 7.5* 7.9*     --   --   --   --     < > = values in this interval not displayed. BMP:    Recent Labs     06/02/20  0424 06/03/20  0426 06/04/20  0420    138 136   K 3.8 3.3* 3.5   CL 93* 95* 97*   CO2 29 30 27   BUN 52* 42* 38*   CREATININE 3.6* 2.3* 2.2*   GLUCOSE 101* 153* 137*     Hepatic:   No results for input(s): AST, ALT, ALB, BILITOT, ALKPHOS in the last 72 hours. Results for Fabi Lincoln (MRN [id number]) as of 6/4/2020 12:08   Ref.  Range 6/3/2020 07:17 6/3/2020 11:21 6/3/2020 16:09 6/3/2020 20:55 6/4/2020 11:52   POC Glucose Latest Ref Range: 70 - 99 mg/dl 143 (H) 205 (H) 329 (H) 261 (H) 233 (H)       CT head 5/31/2020:  No acute intracranial abnormality.       Chronic microvascular ischemic changes and global cerebral atrophy.         CTA head/neck 5/31/2020:  CTA NECK:       AORTIC ARCH/ARCH VESSELS: No dissection or arterial injury.  No significant   stenosis of the brachiocephalic or subclavian arteries.       CAROTID ARTERIES: There is 50% stenosis of the proximal right cervical   internal carotid artery due to calcified and noncalcified plaque.       VERTEBRAL ARTERIES: No dissection, arterial injury, or significant stenosis.       SOFT TISSUES: There is pulmonary emphysema with scarring or atelectasis   within the left suprahilar region. Naomie Daughters are nonspecific consolidative   changes within the right upper lobe, which may be due in part to atelectasis   and scar.  Thyroid nodules are noted.       BONES: No acute osseous abnormality.           CTA HEAD:       ANTERIOR CIRCULATION: No significant stenosis of the intracranial internal   carotid, anterior cerebral, or middle cerebral arteries. No aneurysm.       POSTERIOR CIRCULATION: No significant stenosis of the vertebral, basilar, or   posterior cerebral arteries. No aneurysm.       OTHER: No dural venous sinus thrombosis on this non-dedicated study.       BRAIN: See separately dictated noncontrast head CT report.           Impression   50% stenosis of the proximal right cervical ICA.       Otherwise, no flow limiting stenosis or large vessel occlusion visualized   within the head or neck.       Incidentally noted chest findings as detailed above.  Would consider   dedicated CT of the chest for further evaluation.         CXR 5/31/2020:  1. Mass density left hilum similar appearance to previous exam   2. New pulmonary opacity right upper lobe could represent an infectious   process.  However, follow-up suggested to ensure resolution   3. Previously noted left midlung opacity has organized into a linear density   most likely representing scarring. Problem List  Active Problems:    Renal failure  Resolved Problems:    * No resolved hospital problems. *       Assessment & Plan:   1. Acute renal failure. Admission creatinine 6.2, baseline closer to 1.2.  Renal ultrasound with simple bilateral cysts otherwise unremarkable. Suspected secondary to hypovolemia/GI blood loss/nephrotoxic medication/contrast. Creatinine continues to improve, 2.2 today. Nephrology on board. 2. Chronic anemia. Likely combination of chronic disease and possible GI blood loss considering positive stool guaiac. Hgb 6.6 on admission, she has been transfused, Hgb stable, 7.9 today. Prior EGD and colonoscopy last year were unremarkable. GI has seen, no plan for scope. Iron studies (6/1) WNL. Recommend continue daily PPI. GI has signed off. 3.  Non-small cell lung cancer. Status post chemo and radiation (completed 4/2019). Patient indicates she wants no further workup or treatment for lung cancer. Hem/onc on board. 4. Hx COPD with chronic respiratory failure. No evidence of acute exacerbation. Entered hospice care secondary to COPD (7/2019). O2 sats stable on 1 liter for mid 90s, typically uses 3 liters at home. 5.  Abnormal CXR. New pulmonary opacity RUL on CXR. CT scan with nonspecific consolidative changes within RUL possibly secondary to atelectasis and scar. Remains afebrile. Blood cultures no growth to date. Procalcitonin 0.22, low likelihood for bacterial infection, no leukocytosis. No indication for antibiotics. O2 sats stable on 1 liter for high 90s. 6. DM2. A1c 7.9. On metformin at home, discontinued secondary renal failure. Blood glucose improving. Continue high correction mealtime and bedtime insulin. 7. HTN. Controlled. Continue to hold carvedilol. 8. PAF. Eliquis on hold secondary anemia. Patient developed atrial fib this morning with RVR to 130s. She is mildly symptomatic. Was on diltiazem and cardizem at home but were held here for HR in 46s. Will resume low dose diltiazem. At one time she was on Flecainide but was not taking on admission. Will consult cardiology for atrial fib and recs regarding anticoagulation considering anemia, unknown source GI blood loss, and fall risk.       Diet: DIET CARB CONTROL; Carb Control: 4 carb choices (60 gms)/meal  Code:DNR-CC  DVT PPX: scds (anemia)      MARIA C Boggs - CNP   6/4/2020 12:20p

## 2020-06-04 NOTE — PROGRESS NOTES
SHANNON Guillaume Prim notified  afib rvr. Pt complaining of chest discomfort.   12 lead ordered Troponin ordered Diltiazem ordered and admin   Will continue to [place]

## 2020-06-04 NOTE — CONSULTS
[address]   Electrophysiology Nurse Practitioner  EP Consult    Date: 6/4/2020  Date of admission: 5/31/2020  2:39 PM  Reason for Admission: Renal failure [N19]  Renal failure [N19]    Consult Requesting Physician: Eleanor Bean MD    -Reason for Consultation: Atrial fibrillation    Chief Complaint   Patient presents with    Fatigue     Pt in by EMS from daughter's house. Normal is a/ox4 and independent. Pt reports nausea and diarrhea x 2 days and currently feeling weak. Per EMS pt was in the garage when she became suddenly weak and collapased to the ground. U/a, right arm and leg drift noted. hx of afib +blood thinners       HISTORY OF PRESENT ILLNESS: History obtained from patient and medical record. Kenya Flores is a 70 y.o. female with a past medical history of HTN, HLD, LIZETT, CAD, CHF, DM, COPD, and lung cancer. She is seen by Dr. Kayden Oliva from our office, last seen in April of 2019. Pt presented to the hospital with fatigue and weakness. She was found to have worsening anemia, Hgb 6.6, and acute renal failure with creatinine of 6.2. Her Eliquis was held. She was noted to be bradycardic with 1st degree AV block and her CCB/BB were initially stopped on admission. Unfortunately, they patient went into atrial fibrillation with RVR and EP was consulted. Her COVID testing was negative. She is a DNR-CC prior to admission due to her pulmonary issues. Interval Hx: Today, she is being seen for atrial fibrillation with RVR. Pt admits she does feel heart racing and chest fluttering with mild worsening in her SOB since she went into atrial fibrillation this morning. She was started on PO cardizem and has only received one dose thus far. Long discussion with her daughters regarding the plan of care. They are very concerned about her being off her eliquis and the risk of stroke    Patient seen and examined. Clinical notes reviewed. Telemetry reviewed.   Denies having chest pain, orthopnea, cough, or dizziness at the time of this visit. Allergies: Allergies   Allergen Reactions    Statins Other (See Comments)     Other reaction(s): Myalgias (Muscle Pain)      Lyrica [Pregabalin] Other (See Comments)     Shaking, swelling, rash    Cipro Xr Rash     Swelling, rash    Penicillins Rash     Swelling, rash    Sulfa Antibiotics Rash     Swelling, rash     Home Meds:  Prior to Visit Medications    Medication Sig Taking?  Authorizing Provider   predniSONE (DELTASONE) 20 MG tablet Take 10 mg by mouth daily Yes Historical Provider, MD   mirtazapine (REMERON) 15 MG tablet Take 1 tablet by mouth nightly Yes Aletha Rodriguez MD   diltiazem (CARDIZEM) 30 MG tablet Take 1 tablet by mouth 4 times daily  Patient taking differently: Take 30 mg by mouth 2 times daily  Yes Aletha Rodriguez MD   furosemide (LASIX) 40 MG tablet Take 1 tablet by mouth 2 times daily  Patient taking differently: Take 20 mg by mouth daily  Yes Aletha Rodriguez MD   potassium chloride (KLOR-CON M) 20 MEQ extended release tablet Take 1 tablet by mouth daily Yes Aletha Rodriguez MD   loperamide (IMODIUM) 2 MG capsule Take 2 mg by mouth as needed for Diarrhea Yes Historical Provider, MD   metFORMIN (GLUCOPHAGE) 500 MG tablet Take 1,000 mg by mouth 2 times daily (with meals) Yes Historical Provider, MD   apixaban (ELIQUIS) 5 MG TABS tablet Take 1 tablet by mouth 2 times daily Yes Gilmer Mandujano MD   omeprazole (PRILOSEC) 20 MG delayed release capsule Take 20 mg by mouth daily Yes Historical Provider, MD   carvedilol (COREG) 25 MG tablet Take 1 tablet by mouth 2 times daily Yes Gilmer Mandujano MD   acetaminophen (TYLENOL) 650 MG suppository Place 650 mg rectally every 6 hours as needed for Fever  Historical Provider, MD   ipratropium-albuterol (DUONEB) 0.5-2.5 (3) MG/3ML SOLN nebulizer solution Inhale 3 mLs into the lungs every 4 hours (while awake) DX:COPD J44.9  Patient taking differently: Inhale 3 mLs into the lungs every 4 hours as needed mother. Review of System:  · Constitutional: Positive for fatigue. Negative for fever, night sweats, chills, weight changes, or weakness  · Skin: Negative for rash, dry skin, pruritus, bruising, bleeding, blood clots, or changes in skin pigment  · HEENT: Negative for vision changes, ringing in the ears, sore throat, dysphagia, or swollen lymph nodes  · Respiratory: Reviewed in HPI  · Cardiovascular: Reviewed in HPI  · Gastrointestinal: Negative for abdominal pain, N/V/D, constipation, or black/tarry stools  · Genito-Urinary: Negative for dysuria, incontinence, urgency, or hematuria  · Musculoskeletal: Positive for weakness. Negative for joint swelling, muscle pain, or injuries  · Neurological/Psych: Negative for confusion, seizures, headaches, balance issues or TIA-like symptoms. No anxiety, depression, or insomnia    Physical Examination:  Vitals:    06/04/20 1151   BP:    Pulse:    Resp: 18   Temp:    SpO2: 95%      In: -   Out: 950    Wt Readings from Last 3 Encounters:   06/04/20 130 lb 4.8 oz (59.1 kg)   10/08/19 142 lb (64.4 kg)   08/09/19 152 lb 12.8 oz (69.3 kg)       Telemetry: Personally Reviewed  - Atrial fibrillation with RVR  · Constitutional: Cooperative and in no apparent distress, and appears chronically ill  · Skin: Warm and pink; no pallor, cyanosis, bruising, or clubbing  · HEENT: Symmetric and normocephalic. PERRL, EOM intact. Conjunctiva pink with clear sclera. Mucus membranes pink and moist. Teeth intact. Thyroid smooth without nodules or goiter. · Cardiovascular: Tachycardic rate and irregular rhythm. S1/S2 present without murmurs, rubs, or gallops. Peripheral pulses 2+, capillary refill < 3 seconds. Trace BLE edema  · Respiratory: Respirations symmetric and unlabored. Lungs diminished to auscultation bilaterally, no wheezing, crackles, or rhonchi. On 1L of oxygen  · Gastrointestinal: Abdomen soft and rotund. Bowel sounds normoactive in all quadrants without tenderness or masses.  + Means catheter in place  · Musculoskeletal: + Generalized weakness  · Neurologic/Psych: Awake and orientated to person, place and time. Calm affect, appropriate mood    Pertinent labs, diagnostic, device, and imaging results reviewed as a part of this visit    Labs:  BMP:   Recent Labs     20  1310 20  0424 20  0426 20  0420    137 138 136   K 4.7 3.8 3.3* 3.5   CL 93* 93* 95* 97*   CO2 29 29 30 27   PHOS 4.5  --   --   --    BUN 61* 52* 42* 38*   CREATININE 4.7* 3.6* 2.3* 2.2*   MG  --   --  1.50*  --      Estimated Creatinine Clearance: 19 mL/min (A) (based on SCr of 2.2 mg/dL (H)). CBC:   Recent Labs     20  0424  20  1705 20  0134 20  0915   WBC 7.6  --   --   --   --    HGB 7.9*   < > 8.2* 7.5* 7.9*   HCT 24.9*   < > 26.1* 23.8* 25.3*   MCV 78.4*  --   --   --   --      --   --   --   --     < > = values in this interval not displayed. Thyroid:   Lab Results   Component Value Date    TSH 2.97 2013    [id number] 6.4 2011     Lipids:  Lab Results   Component Value Date    CHOL 224 2016    HDL 69 2016    TRIG 123 2016     LFTS:   Lab Results   Component Value Date    ALT 7 2020    AST 12 2020    ALKPHOS 74 2020    PROT 6.8 2020    AGRATIO 1.1 2020    BILITOT <0.2 2020     Cardiac Enzymes:   Lab Results   Component Value Date    CKTOTAL 26 2020    TROPONINI <0.01 2020    TROPONINI <0.01 2020    TROPONINI <0.01 2019     Coags:   Lab Results   Component Value Date    PROTIME 20.1 2020    INR 1.72 2020     EK20  Sinus bradycardia with 1st degree AV block    EC20  Atrial fibrillation with RVR    ECHO:    -Normal left ventricle size and systolic function with an estimated ejection fraction of 60%. No regional wall motion abnormalities are seen. Mild concentric left ventricular hypertrophy is present.   -Indeterminate diastolic function. deep vein thrombosis (DVT) of both lower extremities ([address] 05/20/2019    Ataxia     Recurrent falls     Diabetic peripheral neuropathy ([address]     Other pulmonary embolism without acute cor pulmonale (HCC)     Syncope and collapse     Subacute pulmonary embolism (HCC)     Loss of consciousness ([address] 05/16/2019    Mild malnutrition ([address] 05/02/2019    Adenocarcinoma of left lung ([address] 03/08/2019    COPD exacerbation (HCC)     Mediastinal adenopathy 01/02/2019    Ileus following gastrointestinal surgery ([address] 12/30/2018    Atrial fibrillation with rapid ventricular response (HCC)     Acute respiratory failure with hypoxia (HCC)     Centrilobular emphysema ([address]     Gallstones and inflammation of gallbladder without obstruction 12/18/2018    Acute cholecystitis     Hilar mass 07/18/2017    Chronic cough 06/14/2017    Paroxysmal atrial fibrillation ([address] 10/17/2016    Influenza 11/18/2013    COPD, severe ([address] 08/30/2013    SOB (shortness of breath) 07/05/2013    Chest pain 10/28/2012    Carpal tunnel syndrome 10/28/2012    DM (diabetes mellitus), secondary, uncontrolled, w/neurologic complic ([address] 95/41/2917    Mitral and aortic valve disease 08/08/2011    Obstructive sleep apnea 08/08/2011        Assessment and Plan:     1. Paroxysmal Atrial Fibrillation  - Currently in atrial fibrillation with RVR   ~ Symptomatic    - Continue cardizem 30 mg QID (started by hospitalist this AM)   ~ Give 1x dose of IV cardizem now  - Will add BB: Toprol XL 25 mg QD  - Limited anti-arrhythmic options given her history   ~ Poor candidate for amiodarone due to her pulmonary issues/COPD    - [id number] score: 7 (Age x2, Gender, HTN, CAD, CHF, DM) ; [id number] Vasc score and anticoagulation discussed. High risk for stroke and thromboembolism. Anticoagulation is recommended. Risk of bleeding was discussed.  ~ Eliquis on hold due to anemia of unknown origin.  Difficult decision given anemia, fall risk, and pt's per oncology note   - Remains on oxygen (wears 2-3L at home)    All pertinent information and plan of care discussed with the EP physician. All questions and concerns were addressed to the patient/family. Alternatives to my treatment were discussed. I have discussed the above stated plan and the patient verbalized understanding and agreed with the plan. Discussed plan with patient and nurse. Thank you for allowing to us to participate in the care of Ryder Gilliam.     Abel Quigley, APRN-CNP  [address]   Office: [phone]

## 2020-06-04 NOTE — PROGRESS NOTES
[facility]  Gastroenterology Progress Note  Vasyl Nguyen is a 70 y.o. female patient. 1. General weakness    2. LORRAINE (acute kidney injury) ([address]    3. Hyperkalemia    4. Anemia, unspecified type    5. Pneumonia due to organism    6. Upper GI bleed        SUBJECTIVE:  Called back to see pt for thoughts on resuming eliquis in setting of anemia. Had a soft, brown stool today. Physical    VITALS:  /78   Pulse 133   Temp 98.3 °F (36.8 °C) (Oral)   Resp 18   Ht 5' 5\" (1.651 m)   Wt 130 lb 4.8 oz (59.1 kg)   SpO2 95%   BMI 21.68 kg/m²   TEMPERATURE:  Current - Temp: 98.3 °F (36.8 °C); Max - Temp  Av.8 °F (36.6 °C)  Min: 96.4 °F (35.8 °C)  Max: 98.3 °F (36.8 °C)    Abdomen soft, ND, NT, no HSM, Bowel sounds normal     Data      Recent Labs     20  0424  20  1705 20  0134 20  0915   WBC 7.6  --   --   --   --    HGB 7.9*   < > 8.2* 7.5* 7.9*   HCT 24.9*   < > 26.1* 23.8* 25.3*   MCV 78.4*  --   --   --   --      --   --   --   --     < > = values in this interval not displayed. Recent Labs     20  0424 20  0426 20  0420    138 136   K 3.8 3.3* 3.5   CL 93* 95* 97*   CO2 29 30 27   BUN 52* 42* 38*   CREATININE 3.6* 2.3* 2.2*     No results for input(s): AST, ALT, ALB, BILIDIR, BILITOT, ALKPHOS in the last 72 hours. No results for input(s): LIPASE, AMYLASE in the last 72 hours. ASSESSMENT :    Anemia - chronic anemia with prior labs c/w anemia of chronic disease. Hgb was 8.5 eight months ago. Hgb 6.6 here and incremented to 7.6 with 1 U PRBC. Now 7.9. Stool grossly negative but guaiac positive. She is on PPI at home. EGD 2019 and colonoscopy 2019 negative. Diarrhea - none here. LORRAINE - Cr 6.2 at admission. Improved. Hyperkalemia - improved. Elevated INR -due to eliquis which she was on for afib. PLAN   :  1)  protonix 40 mg daily - continue full dose daily PPI at discharge. 2) No gross GI bleeding.  ok to resume Eliquis from GI standpoint. If there is gross GI bleeding then would proceed with endoscopic workup.    3) rec checking h/h weekly x2 and then periodically after d/c    Discussed with Dr. Augusto Oneill, [address] and [address]

## 2020-06-05 LAB
ANION GAP SERPL CALCULATED.3IONS-SCNC: 12 MMOL/L (ref 3–16)
BUN BLDV-MCNC: 37 MG/DL (ref 7–20)
CALCIUM SERPL-MCNC: 9.1 MG/DL (ref 8.3–10.6)
CHLORIDE BLD-SCNC: 99 MMOL/L (ref 99–110)
CO2: 26 MMOL/L (ref 21–32)
CREAT SERPL-MCNC: 1.7 MG/DL (ref 0.6–1.2)
GFR AFRICAN AMERICAN: 35
GFR NON-AFRICAN AMERICAN: 29
GLUCOSE BLD-MCNC: 139 MG/DL (ref 70–99)
GLUCOSE BLD-MCNC: 159 MG/DL (ref 70–99)
GLUCOSE BLD-MCNC: 255 MG/DL (ref 70–99)
GLUCOSE BLD-MCNC: 284 MG/DL (ref 70–99)
HCT VFR BLD CALC: 23.8 % (ref 36–48)
HCT VFR BLD CALC: 25.9 % (ref 36–48)
HCT VFR BLD CALC: 26.6 % (ref 36–48)
HEMOGLOBIN: 7.7 G/DL (ref 12–16)
HEMOGLOBIN: 7.8 G/DL (ref 12–16)
HEMOGLOBIN: 8.3 G/DL (ref 12–16)
MCH RBC QN AUTO: 25.4 PG (ref 26–34)
MCHC RBC AUTO-ENTMCNC: 32.1 G/DL (ref 31–36)
MCV RBC AUTO: 79 FL (ref 80–100)
PDW BLD-RTO: 18.4 % (ref 12.4–15.4)
PERFORMED ON: ABNORMAL
PLATELET # BLD: 303 K/UL (ref 135–450)
PMV BLD AUTO: 6.8 FL (ref 5–10.5)
POTASSIUM SERPL-SCNC: 3.6 MMOL/L (ref 3.5–5.1)
RBC # BLD: 3.02 M/UL (ref 4–5.2)
SODIUM BLD-SCNC: 137 MMOL/L (ref 136–145)
WBC # BLD: 7.8 K/UL (ref 4–11)

## 2020-06-05 PROCEDURE — 36415 COLL VENOUS BLD VENIPUNCTURE: CPT

## 2020-06-05 PROCEDURE — 85018 HEMOGLOBIN: CPT

## 2020-06-05 PROCEDURE — 6370000000 HC RX 637 (ALT 250 FOR IP): Performed by: FAMILY MEDICINE

## 2020-06-05 PROCEDURE — 85027 COMPLETE CBC AUTOMATED: CPT

## 2020-06-05 PROCEDURE — 97530 THERAPEUTIC ACTIVITIES: CPT

## 2020-06-05 PROCEDURE — 2580000003 HC RX 258: Performed by: FAMILY MEDICINE

## 2020-06-05 PROCEDURE — 99233 SBSQ HOSP IP/OBS HIGH 50: CPT | Performed by: NURSE PRACTITIONER

## 2020-06-05 PROCEDURE — 6370000000 HC RX 637 (ALT 250 FOR IP): Performed by: NURSE PRACTITIONER

## 2020-06-05 PROCEDURE — 94761 N-INVAS EAR/PLS OXIMETRY MLT: CPT

## 2020-06-05 PROCEDURE — 2060000000 HC ICU INTERMEDIATE R&B

## 2020-06-05 PROCEDURE — 6370000000 HC RX 637 (ALT 250 FOR IP): Performed by: PHYSICIAN ASSISTANT

## 2020-06-05 PROCEDURE — 85014 HEMATOCRIT: CPT

## 2020-06-05 PROCEDURE — 2700000000 HC OXYGEN THERAPY PER DAY

## 2020-06-05 PROCEDURE — 94640 AIRWAY INHALATION TREATMENT: CPT

## 2020-06-05 PROCEDURE — 80048 BASIC METABOLIC PNL TOTAL CA: CPT

## 2020-06-05 PROCEDURE — 97116 GAIT TRAINING THERAPY: CPT

## 2020-06-05 RX ORDER — DILTIAZEM HYDROCHLORIDE 60 MG/1
60 CAPSULE, EXTENDED RELEASE ORAL 2 TIMES DAILY
Status: DISCONTINUED | OUTPATIENT
Start: 2020-06-05 | End: 2020-06-15 | Stop reason: HOSPADM

## 2020-06-05 RX ORDER — INSULIN LISPRO 100 [IU]/ML
0-3 INJECTION, SOLUTION INTRAVENOUS; SUBCUTANEOUS NIGHTLY
Status: DISCONTINUED | OUTPATIENT
Start: 2020-06-05 | End: 2020-06-14

## 2020-06-05 RX ORDER — INSULIN LISPRO 100 [IU]/ML
5 INJECTION, SOLUTION INTRAVENOUS; SUBCUTANEOUS
Status: DISCONTINUED | OUTPATIENT
Start: 2020-06-05 | End: 2020-06-06

## 2020-06-05 RX ORDER — INSULIN LISPRO 100 [IU]/ML
0-6 INJECTION, SOLUTION INTRAVENOUS; SUBCUTANEOUS
Status: DISCONTINUED | OUTPATIENT
Start: 2020-06-05 | End: 2020-06-14

## 2020-06-05 RX ADMIN — INSULIN LISPRO 5 UNITS: 100 INJECTION, SOLUTION INTRAVENOUS; SUBCUTANEOUS at 11:47

## 2020-06-05 RX ADMIN — Medication 10 ML: at 21:52

## 2020-06-05 RX ADMIN — PREDNISONE 10 MG: 10 TABLET ORAL at 08:41

## 2020-06-05 RX ADMIN — IPRATROPIUM BROMIDE AND ALBUTEROL SULFATE 3 ML: .5; 3 SOLUTION RESPIRATORY (INHALATION) at 19:50

## 2020-06-05 RX ADMIN — DILTIAZEM HYDROCHLORIDE 60 MG: 60 CAPSULE, EXTENDED RELEASE ORAL at 14:48

## 2020-06-05 RX ADMIN — INSULIN LISPRO 2 UNITS: 100 INJECTION, SOLUTION INTRAVENOUS; SUBCUTANEOUS at 21:43

## 2020-06-05 RX ADMIN — PANTOPRAZOLE SODIUM 40 MG: 40 TABLET, DELAYED RELEASE ORAL at 06:03

## 2020-06-05 RX ADMIN — IPRATROPIUM BROMIDE AND ALBUTEROL SULFATE 3 ML: .5; 3 SOLUTION RESPIRATORY (INHALATION) at 16:19

## 2020-06-05 RX ADMIN — IPRATROPIUM BROMIDE AND ALBUTEROL SULFATE 3 ML: .5; 3 SOLUTION RESPIRATORY (INHALATION) at 07:46

## 2020-06-05 RX ADMIN — APIXABAN 2.5 MG: 5 TABLET, FILM COATED ORAL at 08:41

## 2020-06-05 RX ADMIN — METOPROLOL SUCCINATE 25 MG: 25 TABLET, FILM COATED, EXTENDED RELEASE ORAL at 08:41

## 2020-06-05 RX ADMIN — DILTIAZEM HYDROCHLORIDE 60 MG: 60 CAPSULE, EXTENDED RELEASE ORAL at 21:52

## 2020-06-05 RX ADMIN — IPRATROPIUM BROMIDE AND ALBUTEROL SULFATE 3 ML: .5; 3 SOLUTION RESPIRATORY (INHALATION) at 12:21

## 2020-06-05 RX ADMIN — MIRTAZAPINE 15 MG: 15 TABLET, FILM COATED ORAL at 21:45

## 2020-06-05 RX ADMIN — Medication 10 ML: at 08:43

## 2020-06-05 RX ADMIN — APIXABAN 2.5 MG: 5 TABLET, FILM COATED ORAL at 21:45

## 2020-06-05 RX ADMIN — INSULIN LISPRO 6 UNITS: 100 INJECTION, SOLUTION INTRAVENOUS; SUBCUTANEOUS at 11:44

## 2020-06-05 RX ADMIN — DILTIAZEM HYDROCHLORIDE 30 MG: 30 TABLET, FILM COATED ORAL at 00:00

## 2020-06-05 RX ADMIN — DILTIAZEM HYDROCHLORIDE 30 MG: 30 TABLET, FILM COATED ORAL at 06:03

## 2020-06-05 ASSESSMENT — PAIN SCALES - GENERAL
PAINLEVEL_OUTOF10: 0

## 2020-06-05 NOTE — PROGRESS NOTES
[facility] PROGRESS NOTE    6/5/2020 10:59 AM        Name: Jameson Sen . Admitted: 5/31/2020  Primary Care Provider: Ailyn Borrero (Tel: [phone])      Subjective:  Patient is a 69 yo female with hx adenocarcinoma of the lung, CAD/stent, COPD, chronic anemia, chronic dCHF, DM, HTN, HLD, LIZETT (untreated), chronic AC (on Eliquis). She presented to hospital with ongoing fatigue and weakness. Found to have worsening anemia, Hgb 6.6 and she was transfused. Also evidence of acute renal failure with creatinine 6.2. Presently dozing, awakens easily. Offers no new complaints. Remains in sinus rhythm. Denies chest pain, shortness of breath, palpitations. No active bleeding. Remains weak.      Reviewed interval ancillary notes    Current Medications  dilTIAZem (CARDIZEM 12 HR) extended release capsule 60 mg, BID  metoprolol succinate (TOPROL XL) extended release tablet 25 mg, Daily  apixaban (ELIQUIS) tablet 2.5 mg, BID  insulin lispro (1 Unit Dial) 0-12 Units, TID WC  insulin lispro (1 Unit Dial) 0-6 Units, Nightly  pantoprazole (PROTONIX) tablet 40 mg, QAM AC  ipratropium-albuterol (DUONEB) nebulizer solution 3 mL, Q4H WA  mirtazapine (REMERON) tablet 15 mg, Nightly  sodium chloride flush 0.9 % injection 10 mL, 2 times per day  sodium chloride flush 0.9 % injection 10 mL, PRN  acetaminophen (TYLENOL) tablet 650 mg, Q6H PRN    Or  acetaminophen (TYLENOL) suppository 650 mg, Q6H PRN  polyethylene glycol (GLYCOLAX) packet 17 g, Daily PRN  promethazine (PHENERGAN) tablet 12.5 mg, Q6H PRN    Or  ondansetron (ZOFRAN) injection 4 mg, Q6H PRN  glucose (GLUTOSE) 40 % oral gel 15 g, PRN  dextrose 50 % IV solution, PRN  glucagon (rDNA) injection 1 mg, PRN  dextrose 5 % solution, PRN  predniSONE (DELTASONE) tablet 10 mg, Daily        Objective:  /79   Pulse 57   Temp 98.1 °F (36.7 °C) (Oral)   Resp 16   Ht 5' 5\" (1.651 m)   Wt 141 lb 6 oz (64.1 kg)   SpO2 95%   BMI 23.53 kg/m²     Intake/Output Summary (Last 24 hours) at 6/5/2020 1059  Last data filed at 6/5/2020 0605  Gross per 24 hour   Intake --   Output 1765 ml   Net -1765 ml      Wt Readings from Last 3 Encounters:   06/05/20 141 lb 6 oz (64.1 kg)   10/08/19 142 lb (64.4 kg)   08/09/19 152 lb 12.8 oz (69.3 kg)     General:  Awake, alert, oriented in NAD  Skin:  Warm and dry. No unusual bruising or rash  Neck:  Supple. No JVD appreciated  Chest:  Normal effort. Clear to auscultation  Cardiovascular:  RRR, normal S1/S2, no murmur/gallop/rub  Abdomen:  Soft, nontender, +bowel sounds  Extremities:  No edema  Neurological: No focal deficits  Psychological: Normal mood and affect    Labs and Tests:  CBC:   Recent Labs     06/04/20  2105 06/05/20  0425 06/05/20  0921   WBC  --  7.8  --    HGB 7.6* 7.7* 8.3*   PLT  --  303  --      BMP:    Recent Labs     06/03/20  0426 06/04/20  0420 06/05/20  0425    136 137   K 3.3* 3.5 3.6   CL 95* 97* 99   CO2 30 27 26   BUN 42* 38* 37*   CREATININE 2.3* 2.2* 1.7*   GLUCOSE 153* 137* 159*     Hepatic:   No results for input(s): AST, ALT, ALB, BILITOT, ALKPHOS in the last 72 hours. Results for Paresh Lind (MRN [id number]) as of 6/5/2020 11:25   Ref.  Range 6/3/2020 20:55 6/4/2020 11:52 6/4/2020 16:30 6/4/2020 21:57 6/5/2020 08:31   POC Glucose Latest Ref Range: 70 - 99 mg/dl 261 (H) 233 (H) 364 (H) 205 (H) 139 (H)       CT head 5/31/2020:  No acute intracranial abnormality.       Chronic microvascular ischemic changes and global cerebral atrophy.         CTA head/neck 5/31/2020:  CTA NECK:       AORTIC ARCH/ARCH VESSELS: No dissection or arterial injury.  No significant   stenosis of the brachiocephalic or subclavian arteries.       CAROTID ARTERIES: There is 50% stenosis of the proximal right cervical   internal carotid artery due to calcified and noncalcified plaque.       VERTEBRAL ARTERIES: No dissection, arterial stool guaiac. Hgb 6.6 on admission, she has been transfused, Hgb stable, 8.3 today despite resumption of AC. Iron studies (6/1) WNL. GI has signed off, recommend continue daily PPI. If gross GI bleeding will proceed with endoscopic workup. 3.  Non-small cell lung cancer. Status post chemo and radiation (completed 4/2019). Patient indicates she wants no further workup or treatment for lung cancer. Hem/onc on board. 4. Hx COPD with chronic respiratory failure. No evidence of acute exacerbation. Entered hospice care secondary to COPD (7/2019). O2 sats stable on 1 liter for mid 90s, typically uses 3 liters at home. 5.  Abnormal CXR (5/31). New pulmonary opacity RUL on CXR. CT scan with nonspecific consolidative changes within RUL possibly secondary to atelectasis and scar. Remains afebrile. Blood cultures no growth to date. Procalcitonin 0.22, low likelihood for bacterial infection, no leukocytosis. No indication for antibiotics. O2 sats stable on 1 liter for high 90s. 6. DM2. A1c 7.9. On metformin at home, discontinued secondary renal failure. Blood glucose still running high 200s-300s, fasting though 139. Will add mealtime insulin and decrease sliding scale to low dose correction. Continue to follow. 7. HTN. Controlled. Continue to hold carvedilol. 8. PAF. Patient developed atrial fib yesterday, converted after IV cardizem. Eliquis has been resumed. CCB and BB have been resumed. Continue to monitor HR.        Diet: DIET CARB CONTROL; Carb Control: 4 carb choices (60 gms)/meal  Code:DNR-CC  DVT PPX: apixaban      Lewis aHssan, APRN - CNP   6/5/2020 11:45am

## 2020-06-05 NOTE — CARE COORDINATION
[facility] Afshin Gaviria called  [phone] and feels they can meet pt's needs at SNF pending pre-cert. Additional clinicals faxed and Afshin Gaviria notified. Updated PT. OT requested.     Braxton Dewitt MSW, [address]

## 2020-06-05 NOTE — PROGRESS NOTES
Introduced self to patient. Initial shift assessment completed, see complex assessment in doc flowsheet. Questions answered. Call light within reach. Bed in low position with wheels locked. Encouraged to call for nurse as needed throughout the shift.

## 2020-06-05 NOTE — PROGRESS NOTES
Physical Therapy  Facility/Department: [address]  Daily Treatment Note  NAME: Meggan Romero  : 1940  MRN: [id number]    Date of Service: 2020    Discharge Recommendations: Meggan Romero scored a 20/24 on the AM-PAC short mobility form. Current research shows that an AM-PAC score of 18 or greater is typically associated with a discharge to the patient's home setting. Based on the patient's AM-PAC score and their current functional mobility deficits, it is recommended that the patient have 2-3 sessions per week of Physical Therapy at d/c to increase the patient's independence. At this time, this patient demonstrates the endurance and safety to discharge home with home health PT and a follow up treatment frequency of 2-3x/wk. Please see assessment section for further patient specific details. If patient discharges prior to next session this note will serve as a discharge summary. Please see below for the latest assessment towards goals. HOME HEALTH CARE: LEVEL 3 SAFETY  - Initial home health evaluation to occur within 24-48 hours, in patient home   - Therapy evaluations in home within 24-48 hours of discharge; including DME and home safety   - Frontload therapy 5 days, then 3x a week   - Therapy to evaluate if patient has [address] needs for personal care   -  evaluation within 24-48 hours, includes evaluation of resources and insurance to determine [place], [place], LTC, and Medicaid options     3-5 sessions per week   PT Equipment Recommendations  Equipment Needed: No    Assessment   Body structures, Functions, Activity limitations: Decreased functional mobility ; Decreased strength;Decreased endurance;Decreased safe awareness;Decreased balance  Assessment: Patient with improved mobility today, able to ambulate, but still somewhat self-limiting. Seems depressed. Recommend 24 hour assist w/ continued therapy at d/c.   Treatment Diagnosis: decreased functional mobility, Braces or Orthoses?: No  Position Activity Restriction  Other position/activity restrictions: Lencho Sesay is a 70 y.o. female with past medical history of CAD, COPD, depression, diabetes, CHF, hyperlipidemia, hypertension, lung cancer, previous MI, obstructive sleep apnea and atrial fibrillation on Eliquis and aspirin who presents to the ED with complaint of weakness and fatigue. Was brought in by EMS from daughter's house. Patient apparently independent and lives on her own been normally ANO x4. Apparently has had nausea and diarrhea for the past several days. Has been feeling weak today. Apparently she was in the garage when she became weak and apparently had to be lowered to the ground by family members. They brought her into the house and apparently improved with a called EMS and brought her to the ED for further evaluation and treatment. Patient denies any specific injury or trauma from the fall. Subjective   General  Chart Reviewed: Yes  Family / Caregiver Present: No  Subjective  Subjective: Patient agreeable to attempt ambulation, daughter states patient unable to ambulate yesterday. General Comment  Comments: Patient supine in bed w/ daughter present.   Pain Screening  Patient Currently in Pain: Denies  Vital Signs  Patient Currently in Pain: Denies       Orientation  Orientation  Overall Orientation Status: Within Functional Limits     Objective   Bed mobility  Supine to Sit: Modified independent  Scooting: Modified independent  Transfers  Sit to Stand: Contact guard assistance  Stand to sit: Contact guard assistance  Bed to Chair: Contact guard assistance  Stand Pivot Transfers: Contact guard assistance  Ambulation  Ambulation?: Yes  More Ambulation?: No  Ambulation 1  Surface: level tile  Device: Rolling Walker  Other Apparatus: O2  Assistance: Contact guard assistance  Gait Deviations: Slow Liliam;Decreased step length  Distance: 54'  Comments: Patient required encouragement to ambulate beyond 20'. Balance  Posture: Good  Sitting - Static: Good  Sitting - Dynamic: Good  Standing - Static: Good  Standing - Dynamic: Fair(requires rolling walker for ambulation)      AROM RLE (degrees)  RLE AROM: WNL  AROM LLE (degrees)  LLE AROM : WNL  Strength RLE  Strength RLE: WFL  Strength LLE  Strength LLE: [facility]     AM-PAC Score  AM-PAC Inpatient Mobility Raw Score : 20 (06/05/20 1536)  AM-PAC Inpatient T-Scale Score : 47.67 (06/05/20 1536)  Mobility Inpatient CMS 0-100% Score: 35.83 (06/05/20 1536)  Mobility Inpatient CMS G-Code Modifier : CJ (06/05/20 1536)     Goals  Short term goals  Time Frame for Short term goals: To be met prior to discharge  Short term goal 1: Bed mobility with supervision. (Goal met 6/5/2020)  Short term goal 2: Sit to/from stand with supervision. (Not met)  Short term goal 3: Ambulate 50 feet with RW/4WW and CGA.  (Not met)  Short term goal 4: Navigate up/down 4 steps with rail and min A.  (Not met)  Patient Goals   Patient goals : to be able to walk    Plan    Plan  Times per week: 3-5  Times per day: Daily  Current Treatment Recommendations: Balance Training, Strengthening, Functional Mobility Training, Transfer Training, Gait Training, Stair training, Safety Education & Training  Safety Devices  Type of devices:  All fall risk precautions in place, Call light within reach, Gait belt, Patient at risk for falls, Nurse notified, Chair alarm in place, Left in chair  Restraints  Initially in place: No     Therapy Time   Individual Concurrent Group Co-treatment   Time In 1301         Time Out 1345         Minutes 44         Timed Code Treatment Minutes: [address] [facility], [address] [facility], DPT, ATC-R [id number]

## 2020-06-05 NOTE — PROGRESS NOTES
focal deficits. Labs and Tests:  CBC:   Recent Labs     06/04/20  2105 06/05/20  0425 06/05/20  0921   WBC  --  7.8  --    HGB 7.6* 7.7* 8.3*   PLT  --  303  --      BMP:    Recent Labs     06/03/20  0426 06/04/20  0420 06/05/20  0425    136 137   K 3.3* 3.5 3.6   CL 95* 97* 99   CO2 30 27 26   BUN 42* 38* 37*   CREATININE 2.3* 2.2* 1.7*   GLUCOSE 153* 137* 159*     Hepatic:   No results for input(s): AST, ALT, ALB, BILITOT, ALKPHOS in the last 72 hours. ASSESSMENT AND PLAN    Active Problems:    Renal failure  Resolved Problems:    * No resolved hospital problems. *      NSCLC  - s/p chemo and radiation, completed April 2019  - last scan in Oct 2019 showed no evidence of recurrent disease  - entered hospice care July 2019 d/t COPD  - she confirms that she does not want work up or treatment for her lung cancer.      Anemia  - hgb 7.7   - after transfusion 5/31/2020  - GI plans to treat conservatively, started PPI.   - Normal iron studies, vit b12/folate    OK for anticoagulation    Okay for discharge     Madison Garay MD

## 2020-06-05 NOTE — PROGRESS NOTES
Assessment completed, alert and oriented x 4, denies complaints of pain or discomfort, oxygen on at 1L/NC, no distress present.

## 2020-06-05 NOTE — PROGRESS NOTES
Ultrasound Reviewed by me    EKG reviewed by me.                 Electronically Signed: Bhavik Vazquez MD 6/5/2020 3:05 PM

## 2020-06-05 NOTE — PROGRESS NOTES
[address] [place] [address] Dr CHRISTINA  Gastroenterology Progress Note    Desi Angulo is a 70 y.o. female patient. 1. General weakness    2. LORRAINE (acute kidney injury) ([address]    3. Hyperkalemia    4. Anemia, unspecified type    5. Pneumonia due to organism    6. Upper GI bleed        SUBJECTIVE:  Feels fine. No c/o. No melena, hematochezia nor hematemesis overnight. Physical    VITALS:  /77   Pulse 59   Temp 98.3 °F (36.8 °C) (Oral)   Resp 18   Ht 5' 5\" (1.651 m)   Wt 141 lb 6 oz (64.1 kg)   SpO2 95%   BMI 23.53 kg/m²   TEMPERATURE:  Current - Temp: 98.3 °F (36.8 °C); Max - Temp  Av.2 °F (36.8 °C)  Min: 98 °F (36.7 °C)  Max: 98.3 °F (36.8 °C)    Abdomen soft, ND, NT, no HSM, Bowel sounds normal     Data      Recent Labs     20  0915 20  2105 20  0425   WBC  --   --  7.8   HGB 7.9* 7.6* 7.7*   HCT 25.3* 24.2* 23.8*   MCV  --   --  79.0*   PLT  --   --  303     Recent Labs     20  0426 20  0420 20  0425    136 137   K 3.3* 3.5 3.6   CL 95* 97* 99   CO2 30 27 26   BUN 42* 38* 37*   CREATININE 2.3* 2.2* 1.7*     No results for input(s): AST, ALT, ALB, BILIDIR, BILITOT, ALKPHOS in the last 72 hours. No results for input(s): LIPASE, AMYLASE in the last 72 hours.         ASSESSMENT :     Anemia - chronic anemia with prior labs c/w anemia of chronic disease. Hgb was 8.5 eight months ago. Hgb 6.6 here and incremented to 7.6 with 1 U PRBC. Now 7.9. Stool grossly negative but guaiac positive. She is on PPI at home. EGD 2019 and colonoscopy 2019 negative. Diarrhea - none here. LORRAINE - Cr 6.2 at admission. Improved. Hyperkalemia - improved. Elevated INR -due to Eliquis which she was on for afib.             PLAN   :  1)  Protonix 40 mg daily - continue full dose daily PPI at discharge. 2) No gross GI bleeding. ok to resume Eliquis from GI standpoint. If there is gross GI bleeding then would proceed with endoscopic workup.    3) rec checking h/h weekly x2 and then periodically after d/c. Will sign off. Please call with questions.     Chaka Myers MD  [address]  6/5/2020

## 2020-06-05 NOTE — PROGRESS NOTES
Comments)     Other reaction(s): Myalgias (Muscle Pain)      Lyrica [Pregabalin] Other (See Comments)     Shaking, swelling, rash    Cipro Xr Rash     Swelling, rash    Penicillins Rash     Swelling, rash    Sulfa Antibiotics Rash     Swelling, rash     Home Meds:  Prior to Visit Medications    Medication Sig Taking?  Authorizing Provider   predniSONE (DELTASONE) 20 MG tablet Take 10 mg by mouth daily Yes Historical Provider, MD   mirtazapine (REMERON) 15 MG tablet Take 1 tablet by mouth nightly Yes Joanna Craig MD   diltiazem (CARDIZEM) 30 MG tablet Take 1 tablet by mouth 4 times daily  Patient taking differently: Take 30 mg by mouth 2 times daily  Yes Joanna Craig MD   furosemide (LASIX) 40 MG tablet Take 1 tablet by mouth 2 times daily  Patient taking differently: Take 20 mg by mouth daily  Yes Joanna Craig MD   potassium chloride (KLOR-CON M) 20 MEQ extended release tablet Take 1 tablet by mouth daily Yes Joanna Craig MD   loperamide (IMODIUM) 2 MG capsule Take 2 mg by mouth as needed for Diarrhea Yes Historical Provider, MD   metFORMIN (GLUCOPHAGE) 500 MG tablet Take 1,000 mg by mouth 2 times daily (with meals) Yes Historical Provider, MD   apixaban (ELIQUIS) 5 MG TABS tablet Take 1 tablet by mouth 2 times daily Yes Eddi Moreno MD   omeprazole (PRILOSEC) 20 MG delayed release capsule Take 20 mg by mouth daily Yes Historical Provider, MD   carvedilol (COREG) 25 MG tablet Take 1 tablet by mouth 2 times daily Yes Eddi Moreno MD   acetaminophen (TYLENOL) 650 MG suppository Place 650 mg rectally every 6 hours as needed for Fever  Historical Provider, MD   ipratropium-albuterol (DUONEB) 0.5-2.5 (3) MG/3ML SOLN nebulizer solution Inhale 3 mLs into the lungs every 4 hours (while awake) DX:COPD J44.9  Patient taking differently: Inhale 3 mLs into the lungs every 4 hours as needed DX:COPD J44.9  Buffy Kirkpatrick MD   Fluticasone-Umeclidin-Vilant ([place]) 151-55.3-77 MCG/INH AEPB weight changes, or weakness  · Skin: Negative for rash, dry skin, pruritus, bruising, bleeding, blood clots, or changes in skin pigment  · HEENT: Negative for vision changes, ringing in the ears, sore throat, dysphagia, or swollen lymph nodes  · Respiratory: Reviewed in HPI  · Cardiovascular: Reviewed in HPI  · Gastrointestinal: Negative for abdominal pain, N/V/D, constipation, or black/tarry stools  · Genito-Urinary: Negative for dysuria, incontinence, urgency, or hematuria  · Musculoskeletal: Positive for weakness. Negative for joint swelling, muscle pain, or injuries  · Neurological/Psych: Negative for confusion, seizures, headaches, balance issues or TIA-like symptoms. No anxiety, depression, or insomnia    Physical Examination:  Vitals:    06/05/20 0746   BP: 138/79   Pulse: 57   Resp: 16   Temp: 98.1 °F (36.7 °C)   SpO2: 95%      In: -   Out: 1765    Wt Readings from Last 3 Encounters:   06/05/20 141 lb 6 oz (64.1 kg)   10/08/19 142 lb (64.4 kg)   08/09/19 152 lb 12.8 oz (69.3 kg)       Telemetry: Personally Reviewed  - NSR  · Constitutional: Cooperative and in no apparent distress, and appears chronically ill  · Skin: Warm and pink; no pallor, cyanosis, bruising, or clubbing  · HEENT: Symmetric and normocephalic. PERRL, EOM intact. Conjunctiva pink with clear sclera. Mucus membranes pink and moist. Teeth intact. Thyroid smooth without nodules or goiter. · Cardiovascular: Regular rate and rhythm. S1/S2 present without murmurs, rubs, or gallops. Peripheral pulses 2+, capillary refill < 3 seconds. Trace BLE edema  · Respiratory: Respirations symmetric and unlabored. Lungs diminished to auscultation bilaterally, no wheezing, crackles, or rhonchi. On 1L of oxygen  · Gastrointestinal: Abdomen soft and rotund. Bowel sounds normoactive in all quadrants without tenderness or masses.  + Means catheter in place  · Musculoskeletal: + Generalized weakness  · Neurologic/Psych: Awake and orientated to person, place and time. Calm affect, appropriate mood    Pertinent labs, diagnostic, device, and imaging results reviewed as a part of this visit    Labs:  BMP:   Recent Labs     20  0426 20  0420 20  0425    136 137   K 3.3* 3.5 3.6   CL 95* 97* 99   CO2 30 27 26   BUN 42* 38* 37*   CREATININE 2.3* 2.2* 1.7*   MG 1.50*  --   --      Estimated Creatinine Clearance: 25 mL/min (A) (based on SCr of 1.7 mg/dL (H)). CBC:   Recent Labs     20  2105 20  04220  0921   WBC  --  7.8  --    HGB 7.6* 7.7* 8.3*   HCT 24.2* 23.8* 26.6*   MCV  --  79.0*  --    PLT  --  303  --      Thyroid:   Lab Results   Component Value Date    TSH 2.97 2013    K1KJGWV 6.4 2011     Lipids:  Lab Results   Component Value Date    CHOL 224 2016    HDL 69 2016    TRIG 123 2016     LFTS:   Lab Results   Component Value Date    ALT 7 2020    AST 12 2020    ALKPHOS 74 2020    PROT 6.8 2020    AGRATIO 1.1 2020    BILITOT <0.2 2020     Cardiac Enzymes:   Lab Results   Component Value Date    CKTOTAL 26 2020    TROPONINI <0.01 2020    TROPONINI <0.01 2020    TROPONINI <0.01 2019     Coags:   Lab Results   Component Value Date    PROTIME 20.1 2020    INR 1.72 2020     EK20  Sinus bradycardia with 1st degree AV block    EC20  Atrial fibrillation with RVR    ECHO:    -Normal left ventricle size and systolic function with an estimated ejection fraction of 60%. No regional wall motion abnormalities are seen. Mild concentric left ventricular hypertrophy is present.   -Indeterminate diastolic function. E/e\"=11.35   -Aortic valve appears sclerotic but opens adequately. Mild aortic regurgitation is present. -Mild mitral regurgitation is present.   -There is trivial tricuspid regurgitation with RVSP estimated at 33 mmHg.      Stress Test:    Normal LV size and global systolic function.    Left ventricular ejection fraction of 58%.   Bett[place] Lighter is a medium sized, moderate intensity, completely fixed defect in the    mid to basilar inferolateral segments, with associated hypokinesis-    consistent with scar.    There are no significant reversible coronary flow abnormalities.         Problem List:   Patient Active Problem List    Diagnosis Date Noted    Dyslipidemia 08/08/2011     Priority: High    HTN (hypertension), benign 08/08/2011     Priority: High    Coronary atherosclerosis of native coronary artery 08/08/2011     Priority: High    Renal failure 05/31/2020    Diabetes education, encounter for     Depression     Anxiety disorder     Delirium     SIRS (systemic inflammatory response syndrome) ([address]     Infection requiring airborne isolation precautions 09/27/2019    Malignant neoplasm of left lung (HCC)     Chronic obstructive pulmonary disease with acute lower respiratory infection (HCC)     Acid fast bacillus     Poorly controlled type 2 diabetes mellitus ([address]     History of depression     Hypokalemia 08/09/2019    Gastroparesis 08/09/2019    Radiation pneumonitis (HCC)     Chronic respiratory failure with hypoxia (HCC)     Lung infiltrate on CT     Non-small cell cancer of left lung (HCC)     Acute encephalopathy     Weakness     Severe malnutrition ([address] 08/05/2019    Acute on chronic respiratory failure with hypoxia ([address] 08/05/2019    Pneumonia of left lower lobe due to infectious organism ([address]     Abnormal CT of the chest     Chronic diastolic heart failure (HCC)     Failure to thrive (0-17)     Closed compression fracture of thoracic vertebra (HCC) 05/20/2019    Chronic deep vein thrombosis (DVT) of both lower extremities ([address] 05/20/2019    Ataxia     Recurrent falls     Diabetic peripheral neuropathy ([address]     Other pulmonary embolism without acute cor pulmonale (HCC)     Syncope and collapse     Subacute pulmonary embolism (HCC)     Loss of consciousness ([address] 05/16/2019    Mild malnutrition ([address] 05/02/2019    Adenocarcinoma of left lung ([address] 03/08/2019    COPD exacerbation (HCC)     Mediastinal adenopathy 01/02/2019    Ileus following gastrointestinal surgery ([address] 12/30/2018    Atrial fibrillation with rapid ventricular response (HCC)     Acute respiratory failure with hypoxia ([address]     Centrilobular emphysema ([address]     Gallstones and inflammation of gallbladder without obstruction 12/18/2018    Acute cholecystitis     Hilar mass 07/18/2017    Chronic cough 06/14/2017    Paroxysmal atrial fibrillation ([address] 10/17/2016    Influenza 11/18/2013    COPD, severe ([address] 08/30/2013    SOB (shortness of breath) 07/05/2013    Chest pain 10/28/2012    Carpal tunnel syndrome 10/28/2012    DM (diabetes mellitus), secondary, uncontrolled, w/neurologic complic ([facility] 39/74/7686    Mitral and aortic valve disease 08/08/2011    Obstructive sleep apnea 08/08/2011        Assessment and Plan:     1. Paroxysmal Atrial Fibrillation  - Currently in NSR   ~ Converted last evening    - Continue cardizem 12H ER 60 mg BID,  Toprol XL 25 mg QD  - Limited anti-arrhythmic options given her history   ~ Poor candidate for amiodarone due to her pulmonary issues/COPD    - [id number] score: 7 (Age x2, Gender, HTN, CAD, CHF, DM) ; [id number] Vasc score and anticoagulation discussed. High risk for stroke and thromboembolism. Anticoagulation is recommended. Risk of bleeding was discussed.  ~ Ok'd to resume per GI. Family/pt insistent on resuming despite risks of bleeding/worsening anemia. Resume on low dose eliquis 2.5 mg BID (creatinine >1.5 and weight ~ 60 kg)  ~ Will need close monitoring of H/H as outpatient    - Afib risk factors including age, HTN, obesity, inactivity and LIZETT were discussed with patient.  Risk factor modification recommended   ~ TSH 1.27 (8/19)      - Treatment options including cardioversion, rate control strategy, antiarrhythmics, anticoagulation and possible ablation were discussed with patient. Risks, benefits and alternative of each treatment options were explained. All questions answered    ~ Poor candidate for ablation given code status and co-morbidities     - Poor candidate for RUTHANN closure given her co-morbidities and code status    2. Bradycardia   - HR in 50s on admission    ~ CCB, BB initially held   - Resolved   - Now sinus rhythm in 60/70s    3. Acute on chronic Anemia   - S/p PRBC transfusion (5/31/20)   - Stable,     ~ 8.3/26.6 this AM   - Iron studies unremarkable; + blood in stool   - Will need close monitoring of H/H as outpatient     - On PPI   - No plans for scope per GI; ok to resume [address] per GI   - Pt/family aware to notify GI if gross GI bleeding occurs    4. LORRAINE   - Likely multifactorial given medications, hypovolemia, and anemia on admit   - Improving, creatinine down to 1.7    ~ Baseline 1.2   - Monitor I&Os   - Nephrology following    5. Chronic diastolic heart failure (NYHA Class III)  - Appears compensated   ~ EF 60% per echo; DD (5/19)  - Resume BB  ~ ACEi and lasix on hold due to LORRAINE  - Monitor I&Os, daily weight    6. CAD  - Stable  - No complaints of angina  - Continue ASA, BB and statin    7. Hx of Lung cancer, COPD   - S/p chemo and radiation (Completed in April of 2019)   - Declined further treatment per oncology note   - Remains on oxygen (wears 2-3L at home)    Will schedule for follow up in office in 1-2 weeks with general cardiology NP. EP will sign off. Please call if there are any questions. Thank you for consult. All pertinent information and plan of care discussed with the EP physician. All questions and concerns were addressed to the patient/family. Alternatives to my treatment were discussed. I have discussed the above stated plan and the patient verbalized understanding and agreed with the plan. Discussed plan with patient and nurse. The patient was seen for >35 minutes.  I reviewed interval history, physical exam, review of

## 2020-06-05 NOTE — PROGRESS NOTES
Reassessment completed. No changes from previous assessment, uneventful evening.  Call light within reach vss

## 2020-06-05 NOTE — CARE COORDINATION
VM to Siria at [facility], regarding ref, awaiting call back. Message left for Magaly at [facility], awaiting call back.     Lois  MSW, [address]

## 2020-06-05 NOTE — CARE COORDINATION
VM to admissions to confirm information on pt reviewed and precert is started, pending PT/OT updates   Joshua RAMACHANDRAN, [address]

## 2020-06-06 LAB
ANION GAP SERPL CALCULATED.3IONS-SCNC: 12 MMOL/L (ref 3–16)
BUN BLDV-MCNC: 33 MG/DL (ref 7–20)
CALCIUM SERPL-MCNC: 8.7 MG/DL (ref 8.3–10.6)
CHLORIDE BLD-SCNC: 101 MMOL/L (ref 99–110)
CO2: 25 MMOL/L (ref 21–32)
CREAT SERPL-MCNC: 1.4 MG/DL (ref 0.6–1.2)
GFR AFRICAN AMERICAN: 44
GFR NON-AFRICAN AMERICAN: 36
GLUCOSE BLD-MCNC: 132 MG/DL (ref 70–99)
GLUCOSE BLD-MCNC: 147 MG/DL (ref 70–99)
GLUCOSE BLD-MCNC: 154 MG/DL (ref 70–99)
GLUCOSE BLD-MCNC: 375 MG/DL (ref 70–99)
GLUCOSE BLD-MCNC: 414 MG/DL (ref 70–99)
HCT VFR BLD CALC: 26.1 % (ref 36–48)
HEMOGLOBIN: 7.9 G/DL (ref 12–16)
PERFORMED ON: ABNORMAL
POTASSIUM SERPL-SCNC: 4 MMOL/L (ref 3.5–5.1)
SODIUM BLD-SCNC: 138 MMOL/L (ref 136–145)

## 2020-06-06 PROCEDURE — 97530 THERAPEUTIC ACTIVITIES: CPT

## 2020-06-06 PROCEDURE — 97110 THERAPEUTIC EXERCISES: CPT

## 2020-06-06 PROCEDURE — 2060000000 HC ICU INTERMEDIATE R&B

## 2020-06-06 PROCEDURE — 6370000000 HC RX 637 (ALT 250 FOR IP): Performed by: NURSE PRACTITIONER

## 2020-06-06 PROCEDURE — 97535 SELF CARE MNGMENT TRAINING: CPT

## 2020-06-06 PROCEDURE — 2700000000 HC OXYGEN THERAPY PER DAY

## 2020-06-06 PROCEDURE — 2580000003 HC RX 258: Performed by: FAMILY MEDICINE

## 2020-06-06 PROCEDURE — 6370000000 HC RX 637 (ALT 250 FOR IP): Performed by: FAMILY MEDICINE

## 2020-06-06 PROCEDURE — 36415 COLL VENOUS BLD VENIPUNCTURE: CPT

## 2020-06-06 PROCEDURE — 94761 N-INVAS EAR/PLS OXIMETRY MLT: CPT

## 2020-06-06 PROCEDURE — 85014 HEMATOCRIT: CPT

## 2020-06-06 PROCEDURE — 80048 BASIC METABOLIC PNL TOTAL CA: CPT

## 2020-06-06 PROCEDURE — 94640 AIRWAY INHALATION TREATMENT: CPT

## 2020-06-06 PROCEDURE — 6370000000 HC RX 637 (ALT 250 FOR IP): Performed by: PHYSICIAN ASSISTANT

## 2020-06-06 PROCEDURE — 85018 HEMOGLOBIN: CPT

## 2020-06-06 RX ADMIN — INSULIN LISPRO 3 UNITS: 100 INJECTION, SOLUTION INTRAVENOUS; SUBCUTANEOUS at 21:41

## 2020-06-06 RX ADMIN — MIRTAZAPINE 15 MG: 15 TABLET, FILM COATED ORAL at 21:37

## 2020-06-06 RX ADMIN — Medication 10 ML: at 09:56

## 2020-06-06 RX ADMIN — IPRATROPIUM BROMIDE AND ALBUTEROL SULFATE 3 ML: .5; 3 SOLUTION RESPIRATORY (INHALATION) at 20:11

## 2020-06-06 RX ADMIN — INSULIN LISPRO 5 UNITS: 100 INJECTION, SOLUTION INTRAVENOUS; SUBCUTANEOUS at 17:24

## 2020-06-06 RX ADMIN — METOPROLOL SUCCINATE 25 MG: 25 TABLET, FILM COATED, EXTENDED RELEASE ORAL at 09:56

## 2020-06-06 RX ADMIN — DILTIAZEM HYDROCHLORIDE 60 MG: 60 CAPSULE, EXTENDED RELEASE ORAL at 21:37

## 2020-06-06 RX ADMIN — Medication 10 ML: at 21:38

## 2020-06-06 RX ADMIN — APIXABAN 2.5 MG: 5 TABLET, FILM COATED ORAL at 21:37

## 2020-06-06 RX ADMIN — LINAGLIPTIN 5 MG: 5 TABLET, FILM COATED ORAL at 13:45

## 2020-06-06 RX ADMIN — IPRATROPIUM BROMIDE AND ALBUTEROL SULFATE 3 ML: .5; 3 SOLUTION RESPIRATORY (INHALATION) at 16:10

## 2020-06-06 RX ADMIN — DILTIAZEM HYDROCHLORIDE 60 MG: 60 CAPSULE, EXTENDED RELEASE ORAL at 09:56

## 2020-06-06 RX ADMIN — PANTOPRAZOLE SODIUM 40 MG: 40 TABLET, DELAYED RELEASE ORAL at 05:04

## 2020-06-06 RX ADMIN — APIXABAN 2.5 MG: 5 TABLET, FILM COATED ORAL at 09:56

## 2020-06-06 RX ADMIN — INSULIN LISPRO 1 UNITS: 100 INJECTION, SOLUTION INTRAVENOUS; SUBCUTANEOUS at 13:46

## 2020-06-06 RX ADMIN — IPRATROPIUM BROMIDE AND ALBUTEROL SULFATE 3 ML: .5; 3 SOLUTION RESPIRATORY (INHALATION) at 11:12

## 2020-06-06 RX ADMIN — PREDNISONE 10 MG: 10 TABLET ORAL at 09:56

## 2020-06-06 RX ADMIN — IPRATROPIUM BROMIDE AND ALBUTEROL SULFATE 3 ML: .5; 3 SOLUTION RESPIRATORY (INHALATION) at 07:42

## 2020-06-06 RX ADMIN — INSULIN LISPRO 1 UNITS: 100 INJECTION, SOLUTION INTRAVENOUS; SUBCUTANEOUS at 09:58

## 2020-06-06 ASSESSMENT — PAIN SCALES - GENERAL: PAINLEVEL_OUTOF10: 0

## 2020-06-06 NOTE — CARE COORDINATION
Requested to have OT and PT work with patient while the daughter is present. Therapy met with patient's daughter today and therapy session was provided. The daughter is aware that therapy is recommending Discharge to home with home care today pending a discharge, and home care order. Updated PT/OT notes routed to [facility] for [employer] precert. Non-medical home care agency information  was provided to supplement family assistance. Patient/family given IMM.   MD, Please complete & sign the e-LUBNA under the discharge instructions, AVS/Prescriptions, and or medical necessity note for assistance with discharge planning, Thank you, Buddy Arrieta MSW, [phone]

## 2020-06-06 NOTE — PROGRESS NOTES
Physical Therapy  Facility/Department: [address]  Daily Treatment Note  NAME: John Adan  : 1940  MRN: [id number]    Date of Service: 2020    Discharge Recommendations:  John Adan scored a 20/24 on the AM-PAC short mobility form. Current research shows that an AM-PAC score of 18 or greater is typically associated with a discharge to the patient's home setting. Based on the patient's AM-PAC score and their current functional mobility deficits, it is recommended that the patient have 2-3 sessions per week of Physical Therapy at d/c to increase the patient's independence. At this time, this patient demonstrates the endurance and safety to discharge home with home health PT and a follow up treatment frequency of 2-3x/wk. Please see assessment section for further patient specific details. Pt's daughter reports she has fallen multiple times at home and lives alone. Pt was on hospice for 3 weeks prior to admission causing increased weakness. Family is concerned for pt's safety and would like her to go to SNF or rehab. If patient discharges prior to next session this note will serve as a discharge summary. Please see below for the latest assessment towards goals. HOME HEALTH CARE: LEVEL 3 SAFETY  - Initial home health evaluation to occur within 24-48 hours, in patient home   - Therapy evaluations in home within 24-48 hours of discharge; including DME and home safety   - Frontload therapy 5 days, then 3x a week   - Therapy to evaluate if patient has [address] needs for personal care   -  evaluation within 24-48 hours, includes evaluation of resources and insurance to determine AL, [place], LTC, and Medicaid options       3-5 sessions per week   PT Equipment Recommendations  Equipment Needed: No    Assessment   Body structures, Functions, Activity limitations: Decreased functional mobility ; Decreased strength;Decreased endurance;Decreased safe awareness;Decreased balance  Assessment: Pt agreeable to PT today and able to perform all therEx in supine and seated at EOB with mod VC's to continue with exercises. Pt able to ambulate slightly increased distance today but reports feeling fatigued and unsteady. Pt would benefit from 24hr assist and continued therapy at d/c. Treatment Diagnosis: decreased functional mobility, impaired gait, decreased balance  Prognosis: Good  Decision Making: Medium Complexity  PT Education: PT Role;Transfer Training;Gait Training;Home Exercise Program  Patient Education: Pt expressed understanding of therEx and demonstrated correctly  Barriers to Learning: None   REQUIRES PT FOLLOW UP: Yes  Activity Tolerance  Activity Tolerance: Patient limited by fatigue;Patient limited by endurance  Activity Tolerance: Pt limited by fatigue and states she feels unsteady during ambulation. Patient Diagnosis(es): The primary encounter diagnosis was General weakness. Diagnoses of LORRAINE (acute kidney injury) ([address] Hyperkalemia, Anemia, unspecified type, Pneumonia due to organism, and Upper GI bleed were also pertinent to this visit. has a past medical history of Arthritis, CAD (coronary artery disease), Carpal tunnel syndrome, COPD (chronic obstructive pulmonary disease) ([address] Coronary stent, depression, Diabetes mellitus ([address] Diastolic heart failure ([address] GERD (gastroesophageal reflux disease), Hyperlipidemia, Hypertension, Lung cancer ([address] MI (myocardial infarction) ([address] LIZETT (obstructive sleep apnea), PONV (postoperative nausea and vomiting), and stress incontinence. has a past surgical history that includes Coronary angioplasty with stent (2000, 2001); back surgery (2000, 2001); bronchoscopy (12/04/2018); pr [facility] incl fluor gdnce dx w/cell washg spx (N/A, 12/4/2018); Cholecystectomy, laparoscopic (N/A, 12/19/2018); Upper gastrointestinal endoscopy (N/A, 2/25/2019); Colonoscopy (N/A, 2/25/2019);  Colonoscopy (2/25/2019); bronchoscopy (N/A, 2/27/2019); bronchoscopy (2/27/2019); bronchoscopy (2/27/2019); bronchoscopy (N/A, 8/6/2019); Upper gastrointestinal endoscopy (N/A, 8/7/2019); bronchoscopy (N/A, 9/27/2019); bronchoscopy (9/27/2019); and bronchoscopy (9/27/2019). Restrictions  Restrictions/Precautions  Restrictions/Precautions: Fall Risk  Required Braces or Orthoses?: No  Position Activity Restriction  Other position/activity restrictions: Coye Goltz is a 70 y.o. female with past medical history of CAD, COPD, depression, diabetes, CHF, hyperlipidemia, hypertension, lung cancer, previous MI, obstructive sleep apnea and atrial fibrillation on Eliquis and aspirin who presents to the ED with complaint of weakness and fatigue. Was brought in by EMS from daughter's house. Patient apparently independent and lives on her own been normally ANO x4. Apparently has had nausea and diarrhea for the past several days. Has been feeling weak today. Apparently she was in the garage when she became weak and apparently had to be lowered to the ground by family members. They brought her into the house and apparently improved with a called EMS and brought her to the ED for further evaluation and treatment. Patient denies any specific injury or trauma from the fall. Subjective   General  Chart Reviewed: Yes  Family / Caregiver Present: No  Subjective  Subjective: Pt reports no pain or discomfort today. Pt is agreeable to PT.   General Comment  Comments: Patient supine in bed   Pain Screening  Patient Currently in Pain: Denies  Vital Signs  Patient Currently in Pain: Denies       Objective   Bed mobility  Rolling to Right: Modified independent  Supine to Sit: Modified independent  Sit to Supine: Stand by assistance  Transfers  Sit to Stand: Contact guard assistance  Stand to sit: Contact guard assistance  Bed to Chair: Contact guard assistance  Ambulation  Ambulation?: Yes  More Ambulation?: No  Ambulation 1  Surface: level tile  Device: Rolling Walker  Other Apparatus: O2  Assistance: Contact guard assistance  Gait Deviations: Slow Liliam;Decreased step length  Distance: 55ft  Comments: Pt reports fatigue by end of ambulation today. Pt also states she feels unsteady when ambulating. Pt's SaO2 was 97% after ambulation. Balance  Posture: Good  Sitting - Static: Good  Sitting - Dynamic: Good  Standing - Static: Good  Standing - Dynamic: Fair  Exercises  Heelslides: 2 x10 BLE supine  Hip Flexion: seated 2 x10 BLE  Hip Abduction: 2 x10 BLE supine  Knee Long Arc Quad: 2 x10 BLE seated  Knee Active Range of Motion: heel slides 2 x10 BLE supine  Ankle Pumps: 2 x10 supine                                                                     AM-PAC Score  AM-PAC Inpatient Mobility Raw Score : 20 (06/06/20 0955)  AM-PAC Inpatient T-Scale Score : 47.67 (06/06/20 0955)  Mobility Inpatient CMS 0-100% Score: 35.83 (06/06/20 0955)  Mobility Inpatient CMS G-Code Modifier : CJ (06/06/20 1049)          Goals  Short term goals  Time Frame for Short term goals: To be met prior to discharge  Short term goal 1: Bed mobility with supervision. (Goal met 6/5/2020)  Short term goal 2: Sit to/from stand with supervision. (Not met)  Short term goal 3: Ambulate 50 feet with RW/4WW and CGA. (Goal met with 2WW 6/6/20)  Short term goal 4: Navigate up/down 4 steps with rail and min A.  (Not assessed today)  Short term goal 5: Pt will ambulate 60ft with least restrictive device and CGA  Patient Goals   Patient goals : to be able to walk    Plan    Plan  Times per week: 3-5  Times per day: Daily  Current Treatment Recommendations: Balance Training, Strengthening, Functional Mobility Training, Transfer Training, Gait Training, Stair training, Safety Education & Training  Safety Devices  Type of devices:  All fall risk precautions in place, Call light within reach, Gait belt, Patient at risk for falls, Nurse notified, Left in chair  Restraints  Initially in place: No     Therapy Time Individual Concurrent Group Co-treatment   Time In 0920         Time Out 0945         Minutes 25         Timed Code Treatment Minutes: [facility], [place], [address] [place] [address]

## 2020-06-06 NOTE — PROGRESS NOTES
Pt's daughter at bedside and asked many questions on lab results, therapy results, discharge plan and pt's status. The daughter did not feel safe for her mother to go home and wanted her to go to a ECF. The daughter expressed same concern about PT/OT recommendations even after this staff called the therapist.  The daughter spoke with , Marilin Maradiaga NP, PT/OT.

## 2020-06-06 NOTE — PROGRESS NOTES
Occupational Therapy  Facility/Department: [address]  Daily Treatment Note  NAME: Katja Rossi  : 1940  MRN: [id number]    Date of Service: 2020    Discharge Recommendations:Kenya Gates scored a 18/24 on the AM-PAC ADL Inpatient form. Current research shows that an AM-PAC score of 18 or greater is typically associated with a discharge to the patient's home setting. Based on the patient's AM-PAC score, and their current ADL deficits, it is recommended that the patient have 2-3 sessions per week of Occupational Therapy at d/c to increase the patient's independence. At this time, this patient demonstrates the endurance and safety to discharge home with [facility] services and a follow up treatment frequency of 2-3x/wk. Please see assessment section for further patient specific details. If patient discharges prior to next session this note will serve as a discharge summary. Please see below for the latest assessment towards goals. HOME HEALTH CARE: LEVEL 3 SAFETY     - Initial home health evaluation to occur within 24-48 hours, in patient home   - Therapy evaluations in home within 24-48 hours of discharge; including DME and home safety   - Frontload therapy 5 days, then 3x a week   - Therapy to evaluate if patient has [address] needs for personal care   -  evaluation within 24-48 hours, includes evaluation of resources and insurance to determine AL, [place], LTC, and Medicaid options          Assessment   Performance deficits / Impairments: Decreased functional mobility ; Decreased endurance;Decreased ADL status; Decreased strength;Decreased balance  Assessment: Pt presents with the above deficits impacting daily occupational performance and would benefit from continued skilled OT services. Daughter is concerned about present DC recommendations for home.   Daughter present during OT tx session this date and witnessed pt's functional performance but still has concerns regarding pt's \"stamina\" and \"core strength\" and is concerned for pt's overall safety upon DC to home as pt has had multiple falls in recent months. It was noted during tx session that pt owns and wears an emergency alert bracelet; however, when OT inquired about this to the present daughter, she answered that the pt was wearing this bracelet during recent fall but did not know to activate it. It was also noted that pt appeared apathetic and did not participate in the conversation between OT, PT and daughter, despite being asked questions regarding if she was okay with the present POC. Prognosis: [address]  OT Education: OT Role;Energy Conservation;Plan of Care;Family Education;Precautions; ADL Adaptive Strategies;Transfer Training  REQUIRES OT FOLLOW UP: Yes  Activity Tolerance  Activity Tolerance: Patient Tolerated treatment well  Safety Devices  Safety Devices in place: Yes  Type of devices: All fall risk precautions in place;Call light within reach; Bed alarm in place; Left in bed;Patient at risk for falls;Gait belt;Nurse notified         Patient Diagnosis(es): The primary encounter diagnosis was General weakness. Diagnoses of LORRAINE (acute kidney injury) ([address] Hyperkalemia, Anemia, unspecified type, Pneumonia due to organism, and Upper GI bleed were also pertinent to this visit. has a past medical history of Arthritis, CAD (coronary artery disease), Carpal tunnel syndrome, COPD (chronic obstructive pulmonary disease) ([address] Coronary stent, depression, Diabetes mellitus ([address] Diastolic heart failure ([address] GERD (gastroesophageal reflux disease), Hyperlipidemia, Hypertension, Lung cancer ([address] MI (myocardial infarction) ([address] LIZETT (obstructive sleep apnea), PONV (postoperative nausea and vomiting), and stress incontinence.    has a past surgical history that includes Coronary angioplasty with stent (2000, 2001); back surgery (2000, 2001); bronchoscopy (12/04/2018); pr [facility] incl fluor gdnce dx w/cell washg spx concerns regarding DC recommendations to home. General Comment  Comments: RN okay for therapy   Vital Signs  Patient Currently in Pain: Denies   Orientation  Orientation  Overall Orientation Status: Within Functional Limits  Objective    ADL  Feeding: Setup  Grooming: Supervision  LE Dressing: Setup;Stand by assistance(don new brief)  Toileting: Supervision  Additional Comments: Pt ambulated in room using RW to/from BR with CGA. Pt completed toileting on standard toilet using grab bar on R side with Min Assist.  Setup/SBA for donning brief. No SOB noted. Balance  Sitting Balance: Supervision  Standing Balance: Contact guard assistance  Standing Balance  Time: 5 minutes  Activity: Functional mobility, stand tolerance, ADL transfers, bed mobility, grooming and toileting  Functional Mobility  Functional - Mobility Device: Rolling Walker  Activity: To/from bathroom  Assist Level: Contact guard assistance  Toilet Transfers  Toilet - Technique: Ambulating  Equipment Used: Standard toilet(grab bar on R side)  Toilet Transfer: Minimal assistance     Transfers  Stand Step Transfers: Contact guard assistance  Sit to stand: Contact guard assistance  Stand to sit: Contact guard assistance        Vision  Patient Visual Report: No visual complaint reported. Cognition  Overall Cognitive Status: WFL  Cognition Comment: Pt presents this date with flat affect but follows commands, shows improved initiation and arousal since evaluation.      Perception  Overall Perceptual Status: WFL                 LUE AROM (degrees)  LUE AROM : WFL  Left Hand AROM (degrees)  Left Hand AROM: WFL  RUE AROM (degrees)  RUE AROM : WFL  Right Hand AROM (degrees)  Right Hand AROM: WFL        Plan   Plan  Times per week: 3-5x/wk   Times per day: Daily  Current Treatment Recommendations: Strengthening, Safety Education & Training, Balance Training, Self-Care / ADL, Patient/Caregiver Education & Training, Functional Mobility Training, Endurance

## 2020-06-06 NOTE — PLAN OF CARE
Problem: Falls - Risk of:  Goal: Will remain free from falls  Description: Will remain free from falls  6/6/2020 1830 by Precious Ca RN  Outcome: Ongoing     Problem:  Activity:  Goal: Fatigue will decrease  Description: Fatigue will decrease  6/6/2020 1830 by Precious Ca RN  Outcome: Ongoing     Problem: Cardiac:  Goal: Ability to maintain an adequate cardiac output will improve  Description: Ability to maintain an adequate cardiac output will improve  6/6/2020 1830 by Precious Ca RN  Outcome: Ongoing

## 2020-06-06 NOTE — PROGRESS NOTES
[facility] PROGRESS NOTE    6/6/2020 9:42 AM        Name: Cory Lundberg . Admitted: 5/31/2020  Primary Care Provider: Ruthie Garrett (Tel: [phone])      Subjective:  Patient is a 69 yo female with hx adenocarcinoma of the lung, CAD/stent, COPD, chronic anemia, chronic dCHF, DM, HTN, HLD, LIZETT (untreated), chronic AC (on Eliquis). She presented to hospital with ongoing fatigue and weakness. Found to have worsening anemia, Hgb 6.6 and she was transfused. Also evidence of acute renal failure with creatinine 6.2. Presently up in bedside chair. States she feels better, still generally weak. Plan was for short term stay at SNF on DC for rehab but PT reassessed yesterday and now recommending home health, OT consult pending. Offers no new complaints. Remains in sinus rhythm. Denies chest pain, shortness of breath, palpitations. No active bleeding.       Reviewed interval ancillary notes    Current Medications  dilTIAZem (CARDIZEM 12 HR) extended release capsule 60 mg, BID  insulin lispro (1 Unit Dial) 5 Units, TID WC  insulin lispro (1 Unit Dial) 0-6 Units, TID WC  insulin lispro (1 Unit Dial) 0-3 Units, Nightly  metoprolol succinate (TOPROL XL) extended release tablet 25 mg, Daily  apixaban (ELIQUIS) tablet 2.5 mg, BID  pantoprazole (PROTONIX) tablet 40 mg, QAM AC  ipratropium-albuterol (DUONEB) nebulizer solution 3 mL, Q4H WA  mirtazapine (REMERON) tablet 15 mg, Nightly  sodium chloride flush 0.9 % injection 10 mL, 2 times per day  sodium chloride flush 0.9 % injection 10 mL, PRN  acetaminophen (TYLENOL) tablet 650 mg, Q6H PRN    Or  acetaminophen (TYLENOL) suppository 650 mg, Q6H PRN  polyethylene glycol (GLYCOLAX) packet 17 g, Daily PRN  promethazine (PHENERGAN) tablet 12.5 mg, Q6H PRN    Or  ondansetron (ZOFRAN) injection 4 mg, Q6H PRN  glucose (GLUTOSE) 40 % oral gel 15 g, PRN  dextrose 50 % IV solution, PRN  glucagon (rDNA) injection 1 mg, PRN  dextrose 5 % solution, PRN  predniSONE (DELTASONE) tablet 10 mg, Daily      Objective:  BP (!) 146/69   Pulse 70   Temp 97.2 °F (36.2 °C) (Oral)   Resp 16   Ht 5' 5\" (1.651 m)   Wt 141 lb 6 oz (64.1 kg)   SpO2 97%   BMI 23.53 kg/m²     Intake/Output Summary (Last 24 hours) at 6/6/2020 0942  Last data filed at 6/6/2020 0509  Gross per 24 hour   Intake --   Output 1100 ml   Net -1100 ml      Wt Readings from Last 3 Encounters:   06/05/20 141 lb 6 oz (64.1 kg)   10/08/19 142 lb (64.4 kg)   08/09/19 152 lb 12.8 oz (69.3 kg)     General:  Awake, alert, oriented in NAD  Skin:  Warm and dry. No unusual bruising or rash  Neck:  Supple. No JVD appreciated  Chest:  Normal effort. Clear to auscultation  Cardiovascular:  RRR, normal S1/S2, no murmur/gallop/rub  Abdomen:  Soft, nontender, +bowel sounds  : Means draining pale yellow urine  Extremities:  No edema  Neurological: No focal deficits  Psychological: Normal mood and affect    Labs and Tests:  CBC:   Recent Labs     06/05/20 0425 06/05/20  0921 06/05/20 2127 06/06/20  0920   WBC 7.8  --   --   --    HGB 7.7* 8.3* 7.8* 7.9*     --   --   --      BMP:    Recent Labs     06/04/20  0420 06/05/20  0425 06/06/20  0436    137 138   K 3.5 3.6 4.0   CL 97* 99 101   CO2 27 26 25   BUN 38* 37* 33*   CREATININE 2.2* 1.7* 1.4*   GLUCOSE 137* 159* 132*     Hepatic:   No results for input(s): AST, ALT, ALB, BILITOT, ALKPHOS in the last 72 hours. Results for Dino Giraldo (MRN [id number]) as of 6/6/2020 10:00   Ref.  Range 6/5/2020 08:31 6/5/2020 11:38 6/5/2020 20:14 6/6/2020 02:16 6/6/2020 07:14   POC Glucose Latest Ref Range: 70 - 99 mg/dl 139 (H) 284 (H) 255 (H) 154 (H) 147 (H)       CT head 5/31/2020:  No acute intracranial abnormality.       Chronic microvascular ischemic changes and global cerebral atrophy.         CTA head/neck 5/31/2020:  CTA NECK:       AORTIC ARCH/ARCH VESSELS: No dissection or arterial otherwise unremarkable. Suspected secondary to hypovolemia/GI blood loss/nephrotoxic medication/contrast. Creatinine continues to improve, 1.4 today. Nephrology on board, stable for DC. 2. Chronic anemia. Likely combination of chronic disease and possible GI blood loss considering positive stool guaiac. Hgb 6.6 on admission, she has been transfused, Hgb stable, 7.9 today, on AC. Iron studies (6/1) WNL. GI has signed off, recommend continue daily PPI. If gross GI bleeding will proceed with endoscopic workup. 3.  Non-small cell lung cancer. Status post chemo and radiation (completed 4/2019). Patient indicates she wants no further workup or treatment for lung cancer. Hem/onc on board. 4. Hx COPD with chronic respiratory failure. No evidence of acute exacerbation. Entered hospice care secondary to COPD (7/2019). O2 sats stable on 1 liter for mid 90s, on chronic O2 therapy at home. 5.  Abnormal CXR (5/31). New pulmonary opacity RUL on CXR. CT scan with nonspecific consolidative changes within RUL possibly secondary to atelectasis and scar. Remains afebrile. Blood cultures no growth to date. Procalcitonin 0.22, low likelihood for bacterial infection, no leukocytosis. No indication for antibiotics. O2 sats stable on 1 liter for high 90s. 6. DM2. A1c 7.9. On metformin at home, discontinued secondary renal failure. Blood glucose much improved on mealtime insulin but I doubt she will be able to manage this at home. Reluctant to use sulfonylurea over concerns for hypoglycemia with poor po intake prior to admission. Will discontinue mealtime insulin and transition to [facility]. Continue low dose correction and monitor blood sugar. 7. HTN. Controlled. Continue to hold carvedilol. 8. PAF. Patient developed atrial fib, converted after IV cardizem dose. On Eliquis, CCB and BB, HR 60s-70s. Discussed with nursing to remove bernstein, encourage activity. Up to chair for all meals.      DC planning: Initial plan was for SNF for rehab. PT now recommends home with home health. Awaiting OT input.      Diet: DIET CARB CONTROL; Carb Control: 4 carb choices (60 gms)/meal  Code:DNR-CC  DVT PPX: apixaban      MARIA C Acuña CNP   6/6/2020 9:47am

## 2020-06-06 NOTE — PROGRESS NOTES
Physical Therapy  Facility/Department: [address]  Daily Treatment Note  NAME: Shanika Ty  : 1940  MRN: [id number]    Date of Service: 2020    Discharge Recommendations:  Shanika Ty scored a 20/24 on the AM-PAC short mobility form. Current research shows that an AM-PAC score of 18 or greater is typically associated with a discharge to the patient's home setting. Based on the patient's AM-PAC score and their current functional mobility deficits, it is recommended that the patient have 2-3 sessions per week of Physical Therapy at d/c to increase the patient's independence. At this time, this patient demonstrates the endurance and safety to discharge home with home health PT and a follow up treatment frequency of 2-3x/wk. Please see assessment section for further patient specific details. If patient discharges prior to next session this note will serve as a discharge summary. Please see below for the latest assessment towards goals. HOME HEALTH CARE: LEVEL 3 SAFETY  - Initial home health evaluation to occur within 24-48 hours, in patient home   - Therapy evaluations in home within 24-48 hours of discharge; including DME and home safety   - Frontload therapy 5 days, then 3x a week   - Therapy to evaluate if patient has [address] needs for personal care   -  evaluation within 24-48 hours, includes evaluation of resources and insurance to determine [place], [place], LTC, and Medicaid options     Patient scored 20/24 on AMPAC indicating d/c home would be appropriate. However, daughter verbalizes concerns of patient returning home as patient lives independently during the day and has had multiple falls. Given this, it is not advised patient return home alone and would need 24 hour assist with ongoing therapy to address balance and safety issues. Patient will likely need long term care plan given ongoing functional decline.     24 hour supervision or assist   PT Equipment Recommendations  Equipment Needed: No  Other: Patient has a walker at home. Assessment   Body structures, Functions, Activity limitations: Decreased functional mobility ; Decreased strength;Decreased endurance;Decreased safe awareness;Decreased balance  Assessment: Daughter, Radha Arguello, requested therapy to return to discuss d/c plan. Radha Arguello states she is concerned about mother going home alone as patient was on hospice care and spent 3 weeks in bed causing resultant weakness and multiple falls at home prior to admission. Family has since rescinded hospice care and now would like patient to go to inpatient rehab or a SNF for ongoing therapy. Patient demonstrates the ability to perform all bed mobility independently. She is able to stand and ambulate w/ CGA using rolling walker. Yesterday she ambulated 54', today she declined to ambulate further than bathroom and back citing SOB, dizziness, and nausea. Radha Arguello also states patient has not wanted to do anything for herself at home and asks family to do everything for her. During this conversation, patient did not verbalize and questions or concerns, appeared apathetic to situation. Patient may be depressed given recent passing of her spouse which appears to have led to her immobility and functional decline. Recommend 24 hour assist w/ continued therapy at d/c to address balance and endurance deficits. Treatment Diagnosis: decreased functional mobility, impaired gait, decreased balance  Prognosis: Good  Decision Making: Medium Complexity  Clinical Presentation: evolving  PT Education: PT Role;Transfer Training;Gait Training  Patient Education: Patient verbalized understanding but likely needs reinforcement.   Barriers to Learning: None   REQUIRES PT FOLLOW UP: Yes  Activity Tolerance  Activity Tolerance: Patient limited by fatigue;Patient limited by endurance  Activity Tolerance: Patient declined further ambulation following 10' ambulation to bathroom, reports SOB and dizziness/nausea. Patient Diagnosis(es): The primary encounter diagnosis was General weakness. Diagnoses of LORRAINE (acute kidney injury) ([address] Hyperkalemia, Anemia, unspecified type, Pneumonia due to organism, and Upper GI bleed were also pertinent to this visit. has a past medical history of Arthritis, CAD (coronary artery disease), Carpal tunnel syndrome, COPD (chronic obstructive pulmonary disease) ([address] Coronary stent, depression, Diabetes mellitus ([address] Diastolic heart failure ([address] GERD (gastroesophageal reflux disease), Hyperlipidemia, Hypertension, Lung cancer ([address] MI (myocardial infarction) ([address] LIZETT (obstructive sleep apnea), PONV (postoperative nausea and vomiting), and stress incontinence. has a past surgical history that includes Coronary angioplasty with stent (2000, 2001); back surgery (2000, 2001); bronchoscopy (12/04/2018); pr [facility] incl fluor gdnce dx w/cell washg spx (N/A, 12/4/2018); Cholecystectomy, laparoscopic (N/A, 12/19/2018); Upper gastrointestinal endoscopy (N/A, 2/25/2019); Colonoscopy (N/A, 2/25/2019); Colonoscopy (2/25/2019); bronchoscopy (N/A, 2/27/2019); bronchoscopy (2/27/2019); bronchoscopy (2/27/2019); bronchoscopy (N/A, 8/6/2019); Upper gastrointestinal endoscopy (N/A, 8/7/2019); bronchoscopy (N/A, 9/27/2019); bronchoscopy (9/27/2019); and bronchoscopy (9/27/2019). Restrictions  Restrictions/Precautions  Restrictions/Precautions: Fall Risk(high fall risk)  Required Braces or Orthoses?: No  Position Activity Restriction  Other position/activity restrictions: John Adan is a 70 y.o. female with past medical history of CAD, COPD, depression, diabetes, CHF, hyperlipidemia, hypertension, lung cancer, previous MI, obstructive sleep apnea and atrial fibrillation on Eliquis and aspirin who presents to the ED with complaint of weakness and fatigue. Was brought in by EMS from daughter's house.   Patient apparently independent and lives on her own been normally [place] x4.  Apparently has had nausea and diarrhea for the past several days. Has been feeling weak today. Apparently she was in the garage when she became weak and apparently had to be lowered to the ground by family members. They brought her into the house and apparently improved with a called EMS and brought her to the ED for further evaluation and treatment. Patient denies any specific injury or trauma from the fall. Subjective   General  Chart Reviewed: Yes  Family / Caregiver Present: No  Subjective  Subjective: Patient states she is dizzy after walking to bathroom and back. She reports feeling nauseous. General Comment  Comments: Patient supine in bed w/ daughter, Ambreen Segal, present. Pain Screening  Patient Currently in Pain: Denies  Vital Signs  Patient Currently in Pain: Denies       Orientation  Orientation  Overall Orientation Status: Within Normal Limits     Objective   Bed mobility  Supine to Sit: Modified independent  Sit to Supine: Stand by assistance;Modified independent  Transfers  Sit to Stand: Minimal Assistance  Stand to sit: Contact guard assistance  Bed to Chair: Contact guard assistance  Stand Pivot Transfers: Contact guard assistance  Comment: MIN assist from low toilet height. Ambulation  Ambulation?: Yes  More Ambulation?: No  Ambulation 1  Surface: level tile  Device: Rolling Walker  Other Apparatus: O2(2L)  Assistance: Contact guard assistance  Gait Deviations: Slow Liliam;Decreased step length  Distance: 10' x 2  Comments: Patient ambulated to bathroom and back to bed. She declined to sit in chair and declined further ambulation. O2 94%.      Balance  Posture: Good  Sitting - Static: Good  Sitting - Dynamic: Good  Standing - Static: Good  Standing - Dynamic: Fair      AROM RLE (degrees)  RLE AROM: WNL  AROM LLE (degrees)  LLE AROM : WNL  Strength RLE  Strength RLE: WFL  Strength LLE  Strength LLE: WFL    AM-PAC Score  AM-PAC Inpatient Mobility Raw Score : 20 (06/06/20 6801)  AM-PAC Inpatient T-Scale Score : 47.67 (06/06/20 1241)  Mobility Inpatient CMS 0-100% Score: 35.83 (06/06/20 1241)  Mobility Inpatient CMS G-Code Modifier : CJ (06/06/20 1241)     Goals  Short term goals  Time Frame for Short term goals: To be met prior to discharge  Short term goal 1: Bed mobility with supervision. (Goal met 6/5/2020)  Short term goal 2: Sit to/from stand with supervision. (Not met)  Short term goal 3: Ambulate 50 feet with RW/4WW and CGA. (Goal met with 2WW 6/6/20)  Short term goal 4: Navigate up/down 4 steps with rail and min A.  (Not assessed today)  Short term goal 5: Pt will ambulate 50ft with least restrictive device MOD I for household ambulation. Patient Goals   Patient goals : to be able to walk    Plan    Plan  Times per week: 3-5  Times per day: Daily  Current Treatment Recommendations: Balance Training, Strengthening, Functional Mobility Training, Transfer Training, Gait Training, Stair training, Safety Education & Training  Safety Devices  Type of devices:  All fall risk precautions in place, Call light within reach, Gait belt, Patient at risk for falls, Nurse notified, Left in chair  Restraints  Initially in place: No     Therapy Time   Individual Concurrent Group Co-treatment   Time In 0920     1145   Time Out 0945     1230   Minutes 25     45   Timed Code Treatment Minutes: [facility], [address] [facility], DPT, ATC-R [id number]

## 2020-06-06 NOTE — PLAN OF CARE
Problem: Falls - Risk of:  Goal: Will remain free from falls  Description: Will remain free from falls  Outcome: Ongoing  Note: Patient free from harm. ID bands on, bed in lowest position, call light in reach. Patient instructed to call for help if needed. Patient educated on ambulation and safety. Goal: Absence of physical injury  Description: Absence of physical injury  Outcome: Ongoing     Problem:  Activity:  Goal: Fatigue will decrease  Description: Fatigue will decrease  Outcome: Ongoing

## 2020-06-07 LAB
ANION GAP SERPL CALCULATED.3IONS-SCNC: 11 MMOL/L (ref 3–16)
BUN BLDV-MCNC: 30 MG/DL (ref 7–20)
CALCIUM SERPL-MCNC: 8.9 MG/DL (ref 8.3–10.6)
CHLORIDE BLD-SCNC: 103 MMOL/L (ref 99–110)
CO2: 25 MMOL/L (ref 21–32)
CREAT SERPL-MCNC: 1.7 MG/DL (ref 0.6–1.2)
GFR AFRICAN AMERICAN: 35
GFR NON-AFRICAN AMERICAN: 29
GLUCOSE BLD-MCNC: 150 MG/DL (ref 70–99)
GLUCOSE BLD-MCNC: 161 MG/DL (ref 70–99)
GLUCOSE BLD-MCNC: 180 MG/DL (ref 70–99)
GLUCOSE BLD-MCNC: 245 MG/DL (ref 70–99)
GLUCOSE BLD-MCNC: 326 MG/DL (ref 70–99)
HCT VFR BLD CALC: 24.2 % (ref 36–48)
HEMOGLOBIN: 7.6 G/DL (ref 12–16)
MCH RBC QN AUTO: 25.2 PG (ref 26–34)
MCHC RBC AUTO-ENTMCNC: 31.4 G/DL (ref 31–36)
MCV RBC AUTO: 80.1 FL (ref 80–100)
PDW BLD-RTO: 18.4 % (ref 12.4–15.4)
PERFORMED ON: ABNORMAL
PLATELET # BLD: 298 K/UL (ref 135–450)
PMV BLD AUTO: 7.4 FL (ref 5–10.5)
POTASSIUM SERPL-SCNC: 4.2 MMOL/L (ref 3.5–5.1)
RBC # BLD: 3.02 M/UL (ref 4–5.2)
SODIUM BLD-SCNC: 139 MMOL/L (ref 136–145)
WBC # BLD: 6.9 K/UL (ref 4–11)

## 2020-06-07 PROCEDURE — 85027 COMPLETE CBC AUTOMATED: CPT

## 2020-06-07 PROCEDURE — 94761 N-INVAS EAR/PLS OXIMETRY MLT: CPT

## 2020-06-07 PROCEDURE — 36415 COLL VENOUS BLD VENIPUNCTURE: CPT

## 2020-06-07 PROCEDURE — 2700000000 HC OXYGEN THERAPY PER DAY

## 2020-06-07 PROCEDURE — 94640 AIRWAY INHALATION TREATMENT: CPT

## 2020-06-07 PROCEDURE — 2060000000 HC ICU INTERMEDIATE R&B

## 2020-06-07 PROCEDURE — 6370000000 HC RX 637 (ALT 250 FOR IP): Performed by: PHYSICIAN ASSISTANT

## 2020-06-07 PROCEDURE — 2580000003 HC RX 258: Performed by: FAMILY MEDICINE

## 2020-06-07 PROCEDURE — 6370000000 HC RX 637 (ALT 250 FOR IP): Performed by: NURSE PRACTITIONER

## 2020-06-07 PROCEDURE — 80048 BASIC METABOLIC PNL TOTAL CA: CPT

## 2020-06-07 PROCEDURE — 6370000000 HC RX 637 (ALT 250 FOR IP): Performed by: FAMILY MEDICINE

## 2020-06-07 RX ADMIN — MIRTAZAPINE 15 MG: 15 TABLET, FILM COATED ORAL at 20:30

## 2020-06-07 RX ADMIN — PREDNISONE 10 MG: 10 TABLET ORAL at 09:40

## 2020-06-07 RX ADMIN — LINAGLIPTIN 5 MG: 5 TABLET, FILM COATED ORAL at 09:40

## 2020-06-07 RX ADMIN — IPRATROPIUM BROMIDE AND ALBUTEROL SULFATE 3 ML: .5; 3 SOLUTION RESPIRATORY (INHALATION) at 08:02

## 2020-06-07 RX ADMIN — DILTIAZEM HYDROCHLORIDE 60 MG: 60 CAPSULE, EXTENDED RELEASE ORAL at 20:30

## 2020-06-07 RX ADMIN — PANTOPRAZOLE SODIUM 40 MG: 40 TABLET, DELAYED RELEASE ORAL at 06:07

## 2020-06-07 RX ADMIN — INSULIN LISPRO 2 UNITS: 100 INJECTION, SOLUTION INTRAVENOUS; SUBCUTANEOUS at 17:22

## 2020-06-07 RX ADMIN — INSULIN LISPRO 1 UNITS: 100 INJECTION, SOLUTION INTRAVENOUS; SUBCUTANEOUS at 09:40

## 2020-06-07 RX ADMIN — Medication 10 ML: at 20:35

## 2020-06-07 RX ADMIN — DILTIAZEM HYDROCHLORIDE 60 MG: 60 CAPSULE, EXTENDED RELEASE ORAL at 09:40

## 2020-06-07 RX ADMIN — IPRATROPIUM BROMIDE AND ALBUTEROL SULFATE 3 ML: .5; 3 SOLUTION RESPIRATORY (INHALATION) at 17:29

## 2020-06-07 RX ADMIN — APIXABAN 2.5 MG: 5 TABLET, FILM COATED ORAL at 20:30

## 2020-06-07 RX ADMIN — INSULIN LISPRO 2 UNITS: 100 INJECTION, SOLUTION INTRAVENOUS; SUBCUTANEOUS at 20:30

## 2020-06-07 RX ADMIN — IPRATROPIUM BROMIDE AND ALBUTEROL SULFATE 3 ML: .5; 3 SOLUTION RESPIRATORY (INHALATION) at 13:03

## 2020-06-07 RX ADMIN — APIXABAN 2.5 MG: 5 TABLET, FILM COATED ORAL at 09:40

## 2020-06-07 RX ADMIN — METOPROLOL SUCCINATE 25 MG: 25 TABLET, FILM COATED, EXTENDED RELEASE ORAL at 09:40

## 2020-06-07 RX ADMIN — INSULIN LISPRO 1 UNITS: 100 INJECTION, SOLUTION INTRAVENOUS; SUBCUTANEOUS at 12:27

## 2020-06-07 ASSESSMENT — PAIN SCALES - GENERAL: PAINLEVEL_OUTOF10: 0

## 2020-06-07 NOTE — PLAN OF CARE
Problem: Falls - Risk of:  Goal: Will remain free from falls  Description: Will remain free from falls  6/7/2020 0413 by Citlalli Dukes RN  Note: Patient free from harm. ID bands on, bed in lowest position, call light in reach. Patient instructed to call for help if needed. Patient educated on ambulation and safety. 6/7/2020 0108 by Citlalli Dukes RN  Note: Patient free from harm. ID bands on, bed in lowest position, call light in reach. Patient instructed to call for help if needed. Patient educated on ambulation and safety. 6/6/2020 1830 by Ivet Ca RN  Outcome: Ongoing     Problem: Cardiac:  Goal: Ability to maintain an adequate cardiac output will improve  Description: Ability to maintain an adequate cardiac output will improve  6/7/2020 0413 by Citlalli Dukes RN  Note: Pt VSS. Cap refil <3 sec.   6/6/2020 1830 by Ivet Ca RN  Outcome: Ongoing

## 2020-06-07 NOTE — PROGRESS NOTES
Celeste Schaumann, MD Annetta Eagles, MD Sueanne Partridge, MD                                  Office: [phone]                 Fax: [phone]          [employer]                     NEPHROLOGY IN PATIENT PROGRESS NOTE:     PATIENT NAME: Swati West  : 1940  MRN: [id number]            Subjective:       About the same. No hypotension. Good urine output. Assessment and Plan    Assessment:     Acute kidney injury-slow improvement-nonoliguric  Anemia   History of lung cancer. Generalized weakness. Plan:       Repeat creatinine is 1.7  Electrolytes are stable. Potassium levels improved. Anemia levels are stable. Repeat hemoglobin 7.8  Check iron levels    Medications reviewed. Not on diuretics. Blood pressure medications on hold. Stable from renal for discharge        EXAM  Vitals:    20 0802   BP:    Pulse:    Resp:    Temp:    SpO2: 98%       Intake/Output Summary (Last 24 hours) at 2020 1032  Last data filed at 2020 2138  Gross per 24 hour   Intake --   Output 175 ml   Net -175 ml         Neck. JVD not visible. No lymph nodes palpable. CVS.  Heart sounds are normal.Palpation of the heart is normal. No murmurs. No pericardial rub.  RS.dullness on percussion of the lower chest wall. Bilateral Basal rales. PA soft , bowel sounds are normal no distension and no tenderness to palpation. Skin No rash , No palpable nodules  Musculoskeletal: Normal range of motion. 1+ edema and no tenderness. Active Problems:    Renal failure  Resolved Problems:    * No resolved hospital problems.  *        Medications Reviewed by me   linagliptin  5 mg Oral Daily    dilTIAZem  60 mg Oral BID    insulin lispro  0-6 Units Subcutaneous TID WC    insulin lispro  0-3 Units Subcutaneous Nightly    metoprolol succinate  25 mg Oral Daily    apixaban  2.5 mg Oral BID    pantoprazole  40 mg Oral QAM AC    ipratropium-albuterol  3 mL Inhalation Q4H WA    mirtazapine  15 mg Oral Nightly    sodium chloride flush  10 mL Intravenous 2 times per day    predniSONE  10 mg Oral Daily      dextrose         Data Review. Labs reviewed by me       CBC:   Recent Labs     06/05/20 0425 06/05/20 2127 06/06/20 0920 06/07/20 0420   WBC 7.8  --   --   --  6.9   HGB 7.7*   < > 7.8* 7.9* 7.6*   HCT 23.8*   < > 25.9* 26.1* 24.2*   MCV 79.0*  --   --   --  80.1     --   --   --  298    < > = values in this interval not displayed. BMP:   Recent Labs     06/05/20 0425 06/06/20 0436 06/07/20 0420    138 139   K 3.6 4.0 4.2   CL 99 101 103   CO2 26 25 25   BUN 37* 33* 30*   CREATININE 1.7* 1.4* 1.7*     Magnesium:   Lab Results   Component Value Date    MG 1.50 06/03/2020    MG 1.60 10/07/2019    MG 1.90 10/04/2019     Lab Results   Component Value Date    CREATININE 1.7 06/07/2020       Arterial Blood Gasses  No results for input(s): PH, PCO2, PO2 in the last 72 hours. Invalid input(s): Doug Penag    UA:  No results for input(s): NITRITE, COLORU, PHUR, LABCAST, WBCUA, RBCUA, MUCUS, TRICHOMONAS, YEAST, BACTERIA, CLARITYU, SPECGRAV, LEUKOCYTESUR, UROBILINOGEN, BILIRUBINUR, BLOODU, GLUCOSEU, AMORPHOUS in the last 72 hours. Invalid input(s): [place]    LIVER PROFILE:   No results for input(s): AST, ALT, LIPASE, BILIDIR, BILITOT, ALKPHOS in the last 72 hours. Invalid input(s):   AMYLASE,  ALB  PT/INR:    Lab Results   Component Value Date    PROTIME 20.1 05/31/2020    PROTIME 18.0 09/27/2019    PROTIME 19.1 08/06/2019    INR 1.72 05/31/2020    INR 1.58 09/27/2019    INR 1.68 08/06/2019     PTT:    Lab Results   Component Value Date    APTT 30.5 12/04/2018     ROXANNE:  No results found for: ANATITER, ROXANNE  CHEMISTRY COMMON GROUP :   Lab Results   Component Value Date    GLUCOSE 150 06/07/2020    TSH 2.97 07/05/2013     Recent Labs     06/05/20  0425 06/06/20  0436 06/07/20  0420   GLUCOSE 159* 132* 150*   CALCIUM 9.1 8.7 8.9         RADIOLOGY:        Imaging Results. Chest X Ray reviwed by me    Chest Xray Reviewed by me  Renal Ultrasound Reviewed by me    EKG reviewed by me.                 Electronically Signed: Ketty Burgos MD 6/7/2020 10:32 AM

## 2020-06-07 NOTE — PROGRESS NOTES
The was incontinent with urine this am.  The pt was unaware of it even when awake. Provided gulshan care.

## 2020-06-07 NOTE — PROGRESS NOTES
Assessment complete, see doc flowsheet. Patient resting in bed, call light in reach, bed in lowest position, brake set. Patient denies any needs at this time. Patient encouraged to call if needs arise.   Mary Ellen Waters RN

## 2020-06-07 NOTE — PROGRESS NOTES
[facility] PROGRESS NOTE    6/7/2020 9:45 AM        Name: Coye Goltz . Admitted: 5/31/2020  Primary Care Provider: Riky Wang (Tel: [phone])      Subjective:  Patient is a 69 yo female with hx adenocarcinoma of the lung, CAD/stent, COPD, chronic anemia, chronic dCHF, DM, HTN, HLD, LIZETT (untreated), chronic AC (on Eliquis). She presented to hospital with ongoing fatigue and weakness. Found to have worsening anemia, Hgb 6.6 and she was transfused. Also evidence of acute renal failure with creatinine 6.2. Presently resting in bed, asleep, awakens easily. States she is tired today. Offers no new complaints. Denies chest pain, shortness of breath, O2 needs stable at 2 liters. Remains in sinus rhythm.      Reviewed interval ancillary notes    Current Medications  linagliptin (TRADJENTA) tablet 5 mg, Daily  dilTIAZem (CARDIZEM 12 HR) extended release capsule 60 mg, BID  insulin lispro (1 Unit Dial) 0-6 Units, TID WC  insulin lispro (1 Unit Dial) 0-3 Units, Nightly  metoprolol succinate (TOPROL XL) extended release tablet 25 mg, Daily  apixaban (ELIQUIS) tablet 2.5 mg, BID  pantoprazole (PROTONIX) tablet 40 mg, QAM AC  ipratropium-albuterol (DUONEB) nebulizer solution 3 mL, Q4H WA  mirtazapine (REMERON) tablet 15 mg, Nightly  sodium chloride flush 0.9 % injection 10 mL, 2 times per day  sodium chloride flush 0.9 % injection 10 mL, PRN  acetaminophen (TYLENOL) tablet 650 mg, Q6H PRN    Or  acetaminophen (TYLENOL) suppository 650 mg, Q6H PRN  polyethylene glycol (GLYCOLAX) packet 17 g, Daily PRN  promethazine (PHENERGAN) tablet 12.5 mg, Q6H PRN    Or  ondansetron (ZOFRAN) injection 4 mg, Q6H PRN  glucose (GLUTOSE) 40 % oral gel 15 g, PRN  dextrose 50 % IV solution, PRN  glucagon (rDNA) injection 1 mg, PRN  dextrose 5 % solution, PRN  predniSONE (DELTASONE) tablet 10 mg, Daily      Objective:  BP (!) 162/76 Pulse 60   Temp 97.6 °F (36.4 °C) (Oral)   Resp 16   Ht 5' 5\" (1.651 m)   Wt 146 lb 1.6 oz (66.3 kg)   SpO2 98%   BMI 24.31 kg/m²     Intake/Output Summary (Last 24 hours) at 6/7/2020 0945  Last data filed at 6/6/2020 2138  Gross per 24 hour   Intake --   Output 175 ml   Net -175 ml      Wt Readings from Last 3 Encounters:   06/07/20 146 lb 1.6 oz (66.3 kg)   10/08/19 142 lb (64.4 kg)   08/09/19 152 lb 12.8 oz (69.3 kg)     General:  Awake, alert, oriented in NAD  Skin:  Warm and dry. No unusual bruising or rash  Neck:  Supple. No JVD appreciated  Chest:  Normal effort. Clear to auscultation  Cardiovascular:  RRR, normal S1/S2, no murmur/gallop/rub  Abdomen:  Soft, nontender, +bowel sounds  Extremities:  No edema  Neurological: No focal deficits  Psychological: Normal mood and affect    Labs and Tests:  CBC:   Recent Labs     06/05/20  0425  06/05/20  2127 06/06/20  0920 06/07/20  0420   WBC 7.8  --   --   --  6.9   HGB 7.7*   < > 7.8* 7.9* 7.6*     --   --   --  298    < > = values in this interval not displayed. BMP:    Recent Labs     06/05/20  0425 06/06/20  0436 06/07/20  0420    138 139   K 3.6 4.0 4.2   CL 99 101 103   CO2 26 25 25   BUN 37* 33* 30*   CREATININE 1.7* 1.4* 1.7*   GLUCOSE 159* 132* 150*     Hepatic:   No results for input(s): AST, ALT, ALB, BILITOT, ALKPHOS in the last 72 hours. Results for Ashely Boo (MRN [id number]) as of 6/7/2020 10:38   Ref.  Range 6/6/2020 02:16 6/6/2020 07:14 6/6/2020 16:05 6/6/2020 20:13 6/7/2020 07:09   POC Glucose Latest Ref Range: 70 - 99 mg/dl 154 (H) 147 (H) 375 (H) 414 (H) 161 (H)       CT head 5/31/2020:  No acute intracranial abnormality.       Chronic microvascular ischemic changes and global cerebral atrophy.         CTA head/neck 5/31/2020:  CTA NECK:       AORTIC ARCH/ARCH VESSELS: No dissection or arterial injury.  No significant   stenosis of the brachiocephalic or subclavian arteries.       CAROTID ARTERIES: There is 50% stenosis of the proximal right cervical   internal carotid artery due to calcified and noncalcified plaque.       VERTEBRAL ARTERIES: No dissection, arterial injury, or significant stenosis.       SOFT TISSUES: There is pulmonary emphysema with scarring or atelectasis   within the left suprahilar region. Hailey Reaper are nonspecific consolidative   changes within the right upper lobe, which may be due in part to atelectasis   and scar.  Thyroid nodules are noted.       BONES: No acute osseous abnormality.           CTA HEAD:       ANTERIOR CIRCULATION: No significant stenosis of the intracranial internal   carotid, anterior cerebral, or middle cerebral arteries. No aneurysm.       POSTERIOR CIRCULATION: No significant stenosis of the vertebral, basilar, or   posterior cerebral arteries. No aneurysm.       OTHER: No dural venous sinus thrombosis on this non-dedicated study.       BRAIN: See separately dictated noncontrast head CT report.           Impression   50% stenosis of the proximal right cervical ICA.       Otherwise, no flow limiting stenosis or large vessel occlusion visualized   within the head or neck.       Incidentally noted chest findings as detailed above.  Would consider   dedicated CT of the chest for further evaluation.         CXR 5/31/2020:  1. Mass density left hilum similar appearance to previous exam   2. New pulmonary opacity right upper lobe could represent an infectious   process.  However, follow-up suggested to ensure resolution   3. Previously noted left midlung opacity has organized into a linear density   most likely representing scarring. Problem List  Active Problems:    Renal failure  Resolved Problems:    * No resolved hospital problems. *       Assessment & Plan:   1. Acute renal failure. Admission creatinine 6.2. Renal ultrasound with simple bilateral cysts otherwise unremarkable.  Suspected secondary to hypovolemia/GI blood loss/nephrotoxic medication/contrast. Creatinine trending up today, 1.7. Nephrology on board. BMP in am..     2. Chronic anemia. Likely combination of chronic disease and possible GI blood loss considering positive stool guaiac. Hgb 6.6 on admission, she has been transfused, Hgb stable, 7.6 today, on AC. Iron studies (6/1) WNL. GI has signed off, recommend continue daily PPI. If gross GI bleeding will proceed with endoscopic workup. 3.  Non-small cell lung cancer. Status post chemo and radiation (completed 4/2019). Patient indicates she wants no further workup or treatment for lung cancer. Hem/onc on board. 4. Hx COPD with chronic respiratory failure. No evidence of acute exacerbation. Entered hospice care secondary to COPD (7/2019). O2 sats stable on 1 liter for mid 90s, on chronic O2 therapy at home. 5.  Abnormal CXR (5/31). New pulmonary opacity RUL on CXR. CT scan with nonspecific consolidative changes within RUL possibly secondary to atelectasis and scar. Remains afebrile. Blood cultures no growth to date. Procalcitonin 0.22, low likelihood for bacterial infection, no leukocytosis. No indication for antibiotics. O2 sats stable on 1 liter for high 90s. 6. DM2. A1c 7.9. On metformin at home, discontinued secondary renal failure. Transitioned from mealtime insulin to linagliptin yesterday in anticipation for when she goes home, patient and daughter do not believe she would be able to manage insulin. Blood sugars high at mealtime, continue low dose correction, continue carb control diet. 7. HTN. Controlled. Continue to hold carvedilol. 8. PAF. Patient developed atrial fib, converted after IV cardizem dose. Remains in sinus rhythm. On Eliquis, CCB and BB, HR 60s-70s. Discussed with case management. Patient and daughters do not feel she is strong enough to be discharged to home despite assessment by PT/OT. They are interested in pursuing precert and consideration for ARU if that would be an option.  Updated notes have been faxed to [facility] yesterday for [employer].        Diet: DIET CARB CONTROL; Carb Control: 4 carb choices (60 gms)/meal  Code:ANCAR-CC  DVT PPX: apixaban      MARIA C Arechiga CNP   6/7/2020 9:47am

## 2020-06-08 LAB
ANION GAP SERPL CALCULATED.3IONS-SCNC: 11 MMOL/L (ref 3–16)
BUN BLDV-MCNC: 28 MG/DL (ref 7–20)
CALCIUM SERPL-MCNC: 9.1 MG/DL (ref 8.3–10.6)
CHLORIDE BLD-SCNC: 102 MMOL/L (ref 99–110)
CO2: 25 MMOL/L (ref 21–32)
CREAT SERPL-MCNC: 1.4 MG/DL (ref 0.6–1.2)
GFR AFRICAN AMERICAN: 44
GFR NON-AFRICAN AMERICAN: 36
GLUCOSE BLD-MCNC: 137 MG/DL (ref 70–99)
GLUCOSE BLD-MCNC: 139 MG/DL (ref 70–99)
GLUCOSE BLD-MCNC: 174 MG/DL (ref 70–99)
GLUCOSE BLD-MCNC: 183 MG/DL (ref 70–99)
GLUCOSE BLD-MCNC: 242 MG/DL (ref 70–99)
GLUCOSE BLD-MCNC: 320 MG/DL (ref 70–99)
HCT VFR BLD CALC: 22.3 % (ref 36–48)
HEMOGLOBIN: 7 G/DL (ref 12–16)
MCH RBC QN AUTO: 25.1 PG (ref 26–34)
MCHC RBC AUTO-ENTMCNC: 31.2 G/DL (ref 31–36)
MCV RBC AUTO: 80.4 FL (ref 80–100)
PDW BLD-RTO: 18.4 % (ref 12.4–15.4)
PERFORMED ON: ABNORMAL
PLATELET # BLD: 295 K/UL (ref 135–450)
PMV BLD AUTO: 7.4 FL (ref 5–10.5)
POTASSIUM SERPL-SCNC: 4.5 MMOL/L (ref 3.5–5.1)
RBC # BLD: 2.78 M/UL (ref 4–5.2)
SODIUM BLD-SCNC: 138 MMOL/L (ref 136–145)
WBC # BLD: 6.1 K/UL (ref 4–11)

## 2020-06-08 PROCEDURE — 6370000000 HC RX 637 (ALT 250 FOR IP): Performed by: NURSE PRACTITIONER

## 2020-06-08 PROCEDURE — 6370000000 HC RX 637 (ALT 250 FOR IP): Performed by: FAMILY MEDICINE

## 2020-06-08 PROCEDURE — 85027 COMPLETE CBC AUTOMATED: CPT

## 2020-06-08 PROCEDURE — 2700000000 HC OXYGEN THERAPY PER DAY

## 2020-06-08 PROCEDURE — 80048 BASIC METABOLIC PNL TOTAL CA: CPT

## 2020-06-08 PROCEDURE — 97535 SELF CARE MNGMENT TRAINING: CPT

## 2020-06-08 PROCEDURE — 2060000000 HC ICU INTERMEDIATE R&B

## 2020-06-08 PROCEDURE — 94640 AIRWAY INHALATION TREATMENT: CPT

## 2020-06-08 PROCEDURE — 6370000000 HC RX 637 (ALT 250 FOR IP): Performed by: PHYSICIAN ASSISTANT

## 2020-06-08 PROCEDURE — 94761 N-INVAS EAR/PLS OXIMETRY MLT: CPT

## 2020-06-08 PROCEDURE — 2580000003 HC RX 258: Performed by: FAMILY MEDICINE

## 2020-06-08 PROCEDURE — 36415 COLL VENOUS BLD VENIPUNCTURE: CPT

## 2020-06-08 RX ADMIN — PREDNISONE 10 MG: 10 TABLET ORAL at 10:44

## 2020-06-08 RX ADMIN — IPRATROPIUM BROMIDE AND ALBUTEROL SULFATE 3 ML: .5; 3 SOLUTION RESPIRATORY (INHALATION) at 08:10

## 2020-06-08 RX ADMIN — INSULIN LISPRO 1 UNITS: 100 INJECTION, SOLUTION INTRAVENOUS; SUBCUTANEOUS at 20:17

## 2020-06-08 RX ADMIN — IPRATROPIUM BROMIDE AND ALBUTEROL SULFATE 3 ML: .5; 3 SOLUTION RESPIRATORY (INHALATION) at 16:33

## 2020-06-08 RX ADMIN — DILTIAZEM HYDROCHLORIDE 60 MG: 60 CAPSULE, EXTENDED RELEASE ORAL at 20:16

## 2020-06-08 RX ADMIN — LINAGLIPTIN 5 MG: 5 TABLET, FILM COATED ORAL at 10:44

## 2020-06-08 RX ADMIN — INSULIN LISPRO 4 UNITS: 100 INJECTION, SOLUTION INTRAVENOUS; SUBCUTANEOUS at 17:12

## 2020-06-08 RX ADMIN — INSULIN LISPRO 1 UNITS: 100 INJECTION, SOLUTION INTRAVENOUS; SUBCUTANEOUS at 12:02

## 2020-06-08 RX ADMIN — APIXABAN 2.5 MG: 5 TABLET, FILM COATED ORAL at 10:45

## 2020-06-08 RX ADMIN — DILTIAZEM HYDROCHLORIDE 60 MG: 60 CAPSULE, EXTENDED RELEASE ORAL at 10:44

## 2020-06-08 RX ADMIN — IPRATROPIUM BROMIDE AND ALBUTEROL SULFATE 3 ML: .5; 3 SOLUTION RESPIRATORY (INHALATION) at 12:27

## 2020-06-08 RX ADMIN — APIXABAN 2.5 MG: 5 TABLET, FILM COATED ORAL at 20:16

## 2020-06-08 RX ADMIN — IPRATROPIUM BROMIDE AND ALBUTEROL SULFATE 3 ML: .5; 3 SOLUTION RESPIRATORY (INHALATION) at 19:31

## 2020-06-08 RX ADMIN — Medication 10 ML: at 20:17

## 2020-06-08 RX ADMIN — METOPROLOL SUCCINATE 25 MG: 25 TABLET, FILM COATED, EXTENDED RELEASE ORAL at 10:44

## 2020-06-08 RX ADMIN — PANTOPRAZOLE SODIUM 40 MG: 40 TABLET, DELAYED RELEASE ORAL at 10:44

## 2020-06-08 RX ADMIN — MIRTAZAPINE 15 MG: 15 TABLET, FILM COATED ORAL at 20:16

## 2020-06-08 ASSESSMENT — PAIN SCALES - GENERAL
PAINLEVEL_OUTOF10: 0

## 2020-06-08 NOTE — PROGRESS NOTES
Occupational Therapy  Facility/Department: [address]  Daily Treatment Note  NAME: Ryder Gilliam  : 1940  MRN: [id number]    Date of Service: 2020    Discharge Recommendations:      Ryder Gilliam scored a 18/24 on the AM-PAC ADL Inpatient form. Current research shows that an AM-PAC score of 18 or greater is typically associated with a discharge to the patient's home setting. Based on the patient's AM-PAC score, and their current ADL deficits, it is recommended that the patient have 2-3 sessions per week of Occupational Therapy at d/c to increase the patient's independence. At this time, this patient demonstrates the endurance and safety to discharge home with [facility] and a follow up treatment frequency of 2-3x/wk. Please see assessment section for further patient specific details. If patient discharges prior to next session this note will serve as a discharge summary. Please see below for the latest assessment towards goals. HOME HEALTH CARE: LEVEL 3 SAFETY     - Initial home health evaluation to occur within 24-48 hours, in patient home   - Therapy evaluations in home within 24-48 hours of discharge; including DME and home safety   - Frontload therapy 5 days, then 3x a week   - Therapy to evaluate if patient has [address] needs for personal care   -  evaluation within 24-48 hours, includes evaluation of resources and insurance to determine [place], [place], LTC, and Medicaid options     OT Equipment Recommendations  Equipment Needed: No  Other: defer to next level of care    Assessment   Performance deficits / Impairments: Decreased functional mobility ; Decreased endurance;Decreased ADL status; Decreased strength;Decreased balance  Assessment: Pt presents with the above deficits impacting daily occupational performance and would benefit from continued skilled OT services. Daughter is concerned about present DC recommendations for home.   Daughter present during OT tx session this date and witnessed pt's functional performance but still has concerns regarding pt's \"stamina\" and \"core strength\" and is concerned for pt's overall safety upon DC to home as pt has had multiple falls in recent months. It was noted during tx session that pt owns and wears an emergency alert bracelet; however, when OT inquired about this to the present daughter, she answered that the pt was wearing this bracelet during recent fall but did not know to activate it. It was also noted that pt appeared apathetic and did not participate in the conversation between OT, PT and daughter, despite being asked questions regarding if she was okay with the present POC. Prognosis: Fair  Decision Making: Medium Complexity  OT Education: OT Role;Energy Conservation;Plan of Care;Family Education;Precautions; ADL Adaptive Strategies;Transfer Training  Patient Education: Pt verbalized understanding but would benefit from continued reinforcement for carryover-noted poor carryover sequencing transfers-  REQUIRES OT FOLLOW UP: Yes  Activity Tolerance  Activity Tolerance: Patient Tolerated treatment well  Safety Devices  Safety Devices in place: Yes  Type of devices: All fall risk precautions in place;Call light within reach; Chair alarm in place;Gait belt;Patient at risk for falls; Left in chair;Nurse notified         Patient Diagnosis(es): The primary encounter diagnosis was General weakness. Diagnoses of LORRAINE (acute kidney injury) ([address] Hyperkalemia, Anemia, unspecified type, Pneumonia due to organism, and Upper GI bleed were also pertinent to this visit.       has a past medical history of Arthritis, CAD (coronary artery disease), Carpal tunnel syndrome, COPD (chronic obstructive pulmonary disease) ([address] Coronary stent, depression, Diabetes mellitus ([address] Diastolic heart failure ([address] GERD (gastroesophageal reflux disease), Hyperlipidemia, Hypertension, Lung cancer ([address] MI (myocardial infarction) ([address] LIZETT (obstructive sleep apnea), PONV (postoperative nausea and vomiting), and stress incontinence. has a past surgical history that includes Coronary angioplasty with stent (2000, 2001); back surgery (2000, 2001); bronchoscopy (12/04/2018); pr [facility] incl fluor gdnce dx w/cell washg spx (N/A, 12/4/2018); Cholecystectomy, laparoscopic (N/A, 12/19/2018); Upper gastrointestinal endoscopy (N/A, 2/25/2019); Colonoscopy (N/A, 2/25/2019); Colonoscopy (2/25/2019); bronchoscopy (N/A, 2/27/2019); bronchoscopy (2/27/2019); bronchoscopy (2/27/2019); bronchoscopy (N/A, 8/6/2019); Upper gastrointestinal endoscopy (N/A, 8/7/2019); bronchoscopy (N/A, 9/27/2019); bronchoscopy (9/27/2019); and bronchoscopy (9/27/2019). Restrictions  Restrictions/Precautions  Restrictions/Precautions: Fall Risk(high fall risk)  Required Braces or Orthoses?: No  Position Activity Restriction  Other position/activity restrictions: Coye Goltz is a 70 y.o. female with past medical history of CAD, COPD, depression, diabetes, CHF, hyperlipidemia, hypertension, lung cancer, previous MI, obstructive sleep apnea and atrial fibrillation on Eliquis and aspirin who presents to the ED with complaint of weakness and fatigue. Was brought in by EMS from daughter's house. Patient apparently independent and lives on her own been normally ANO x4. Apparently has had nausea and diarrhea for the past several days. Has been feeling weak today. Apparently she was in the garage when she became weak and apparently had to be lowered to the ground by family members. They brought her into the house and apparently improved with a called EMS and brought her to the ED for further evaluation and treatment. Patient denies any specific injury or trauma from the fall.     Subjective   General  Chart Reviewed: Yes  Patient assessed for rehabilitation services?: Yes  Additional Pertinent Hx: adenocarcinoma of the lung, CAD/stent, COPD, chronic anemia, chronic dCHF, DM, HTN, HLD, LIZETT (untreated), chronic AC (on Eliquis). Response to previous treatment: Patient with no complaints from previous session  Family / Caregiver Present: No  Referring Practitioner: MARIA C Poe CNP  Diagnosis: Fatigue  Subjective  Subjective: Pt supine in bed upon arrival and agreeable to therapy session. General Comment  Comments: RN okay for therapy   Vital Signs  Patient Currently in Pain: Denies   Orientation  Orientation  Overall Orientation Status: Within Functional Limits  Objective    ADL  Grooming: Setup;Contact guard assistance(pt performed hand hygiene/oral care in stance at sink)  UE Bathing: Setup;Stand by assistance  LE Bathing: Setup;Stand by assistance  UE Dressing: Setup;Stand by assistance(doff/don gown)  LE Dressing: Setup;Stand by assistance(doff/don depends)  Toileting: Stand by assistance  Additional Comments: Pt performed functional mobility to/from restroom and transferred to toilet.  Pt completed grooming tasks in stance at sink, UB/LB bathing and dressing completed intermittently in stance and seated on BSC        Balance  Sitting Balance: Supervision  Standing Balance: Contact guard assistance  Standing Balance  Time: ~10 minutes  Activity: functional mobility to/from restroom, stance for ADL completion, functional transfers  Comment: with use of rw, no LOB  Functional Mobility  Functional - Mobility Device: Rolling Walker  Activity: To/from bathroom  Assist Level: Contact guard assistance  Toilet Transfers  Toilet - Technique: Ambulating  Equipment Used: Standard toilet  Toilet Transfer: Stand by assistance  Toilet Transfers Comments: use of R grab bar--verbal cues for use  Bed mobility  Sit to Supine: Stand by assistance  Scooting: Stand by assistance  Comment: HOB elevated  Transfers  Sit to stand: Contact guard assistance  Stand to sit: Contact guard assistance  Transfer Comments: cues for hand placement-poor carryover         Cognition  Overall Cognitive Status:

## 2020-06-08 NOTE — PROGRESS NOTES
MD Diallo Arambula MD Imelda Read, MD                                  Office: [phone]                 Fax: [phone]          [employer]                     NEPHROLOGY IN PATIENT PROGRESS NOTE:     PATIENT NAME: Phi Moser  : 1940  MRN: [id number]      Assessment and Plan    Assessment:     Acute kidney injury-slow improvement-nonoliguric  Severe on admission Scr to 6.2  Anemia   History of lung cancer. Generalized weakness. HTN uncontrolled       Plan:     Repeat creatinine is Estimated Creatinine Clearance: 30 mL/min (A) (based on SCr of 1.4 mg/dL (H)). Electrolytes are stable. BP ok controlled. Anemia levels are stable. Check iron levels    Medications reviewed. Not on diuretics. Blood pressure medications on hold. Stable from renal for discharge. Will arrange for a f/u w/ Dr Wood Isaacs in 2-3 weeks       High complexity. Multiple medical problems. Discussed with patient and treatment team.    Thank you for allowing me to participate in this patient's care. Please do not hesitate to contact me for any questions/concerns. We will follow along with you. Itzel Willis MD  [facility] [address] [place] [address]   Phone: [phone] or [employer]  Fax: [phone]          Subjective:     Patient was seen comfortably resting in the bed,   Reported no active complaints,   Renal function improved . EXAM  Vitals:    20 0402   BP: (!) 170/72   Pulse: 60   Resp: 18   Temp: 98 °F (36.7 °C)   SpO2: 99%     No intake or output data in the 24 hours ending 20 0813    Awake and communicative   Neck. JVD not visible. No lymph nodes palpable. CVS.  Heart sounds are normal.Palpation of the heart is normal. No murmurs. No pericardial rub.  RS.dullness on percussion of the lower chest wall. Lung fields are clear   PA soft , bowel sounds are normal no distension and no tenderness to palpation.   Musculoskeletal: Normal range of motion. 1+ edema and no tenderness. Active Problems:    Renal failure  Resolved Problems:    * No resolved hospital problems. *        Medications Reviewed by me   linagliptin  5 mg Oral Daily    dilTIAZem  60 mg Oral BID    insulin lispro  0-6 Units Subcutaneous TID     insulin lispro  0-3 Units Subcutaneous Nightly    metoprolol succinate  25 mg Oral Daily    apixaban  2.5 mg Oral BID    pantoprazole  40 mg Oral QAM AC    ipratropium-albuterol  3 mL Inhalation Q4H WA    mirtazapine  15 mg Oral Nightly    sodium chloride flush  10 mL Intravenous 2 times per day    predniSONE  10 mg Oral Daily      dextrose         Data Review. Labs reviewed by me       CBC:   Recent Labs     06/06/20  0920 06/07/20  0420 06/08/20  0423   WBC  --  6.9 6.1   HGB 7.9* 7.6* 7.0*   HCT 26.1* 24.2* 22.3*   MCV  --  80.1 80.4   PLT  --  298 295     BMP:   Recent Labs     06/06/20  0436 06/07/20 0420 06/08/20  0423    139 138   K 4.0 4.2 4.5    103 102   CO2 25 25 25   BUN 33* 30* 28*   CREATININE 1.4* 1.7* 1.4*     Magnesium:   Lab Results   Component Value Date    MG 1.50 06/03/2020    MG 1.60 10/07/2019    MG 1.90 10/04/2019     Lab Results   Component Value Date    CREATININE 1.4 06/08/2020       Arterial Blood Gasses  No results for input(s): PH, PCO2, PO2 in the last 72 hours. Invalid input(s): Demetri Donahue    UA:  No results for input(s): NITRITE, COLORU, PHUR, LABCAST, WBCUA, RBCUA, MUCUS, TRICHOMONAS, YEAST, BACTERIA, CLARITYU, SPECGRAV, LEUKOCYTESUR, UROBILINOGEN, BILIRUBINUR, BLOODU, GLUCOSEU, AMORPHOUS in the last 72 hours. Invalid input(s): Sallie Auguste    LIVER PROFILE:   No results for input(s): AST, ALT, LIPASE, BILIDIR, BILITOT, ALKPHOS in the last 72 hours. Invalid input(s):   AMYLASE,  ALB  PT/INR:    Lab Results   Component Value Date    PROTIME 20.1 05/31/2020    PROTIME 18.0 09/27/2019    PROTIME 19.1 08/06/2019    INR 1.72 05/31/2020    INR 1.58 09/27/2019    INR 1.68 08/06/2019     PTT:    Lab Results   Component Value Date    APTT 30.5 12/04/2018     ROXANNE:  No results found for: ANATITER, ROXANNE  CHEMISTRY COMMON GROUP :   Lab Results   Component Value Date    GLUCOSE 137 06/08/2020    TSH 2.97 07/05/2013     Recent Labs     06/06/20  0436 06/07/20  0420 06/08/20  0423   GLUCOSE 132* 150* 137*   CALCIUM 8.7 8.9 9.1         RADIOLOGY:        Imaging Results. Chest X Ray reviwed by me    Chest Xray Reviewed by me  Renal Ultrasound Reviewed by me    EKG reviewed by me.                 Electronically Signed: Lynette Campa MD 6/8/2020 8:13 AM

## 2020-06-08 NOTE — CARE COORDINATION
VM to Chayo Cox in admissions at [facility] and updated PT/OT faxed for precert. kim  Second VM to admissions at [facility].     Kenrick Angulo MSW, [address]

## 2020-06-08 NOTE — PROGRESS NOTES
Nutrition Assessment (Low Risk)    Type and Reason for Visit: Initial    Nutrition Recommendations:   Continue current diet     Nutrition Assessment:  Pt is nutritionally stable AEB overall adequate PO intake, consuming 50% or greater at most meals and Pt states appetite is good. Pt does not drink ONS at home and denies need while hospitalized. No nutrition questions or concerns. Will continue to monitor for changes in status.      Malnutrition Assessment:  · Malnutrition Status: No malnutrition    Nutrition Risk Level   Risk Level: Low    Nutrition Diagnosis:   · Problem: No nutrition diagnosis at this time    Nutrition Intervention:  Food and/or Delivery: Continue current diet  Nutrition Education/Counseling/Coordination of Care:  Continued Inpatient Monitoring, Education Not Indicated      Electronically signed by Danita Ngo RD, LD on 6/8/20 at 3:14 PM EDT  Contact Number: [phone]

## 2020-06-08 NOTE — PROGRESS NOTES
Hospitalist Progress Note      PCP: Victoriano Arellano    Date of Admission: 5/31/2020    Chief Complaint: Weakness    Hospital Course: Mated with acute renal failure, anemia. Slow improvement noted. Renal function stabilized. Cleared for discharge home with home care. Patient and family wish to consider skilled nursing facility. Pre-CERT pending. Subjective: Weakness. No chest pain, no shortness of breath, no nausea, no vomiting      Medications:  Reviewed    Infusion Medications    dextrose       Scheduled Medications    linagliptin  5 mg Oral Daily    dilTIAZem  60 mg Oral BID    insulin lispro  0-6 Units Subcutaneous TID WC    insulin lispro  0-3 Units Subcutaneous Nightly    metoprolol succinate  25 mg Oral Daily    apixaban  2.5 mg Oral BID    pantoprazole  40 mg Oral QAM AC    ipratropium-albuterol  3 mL Inhalation Q4H WA    mirtazapine  15 mg Oral Nightly    sodium chloride flush  10 mL Intravenous 2 times per day    predniSONE  10 mg Oral Daily     PRN Meds: sodium chloride flush, acetaminophen **OR** acetaminophen, polyethylene glycol, promethazine **OR** ondansetron, glucose, dextrose, glucagon (rDNA), dextrose    No intake or output data in the 24 hours ending 06/08/20 1109    Physical Exam Performed:    /68   Pulse 65   Temp 98.1 °F (36.7 °C) (Oral)   Resp 18   Ht 5' 5\" (1.651 m)   Wt 146 lb 1.6 oz (66.3 kg)   SpO2 98%   BMI 24.31 kg/m²     General appearance: No apparent distress, appears stated age. Appears tired. HEENT: Pupils equal, round, and reactive to light. Conjunctivae/corneas clear. Neck: Supple, with full range of motion. No jugular venous distention. Trachea midline. Respiratory:  Normal respiratory effort. Clear to auscultation, bilaterally without Rales/Wheezes/Rhonchi. Cardiovascular: Regular rate and rhythm with normal S1/S2 without murmurs, rubs or gallops. Abdomen: Soft, non-tender, non-distended with normal bowel sounds.   Musculoskeletal: No clubbing, cyanosis or edema bilaterally. Full range of motion without deformity. Skin: Skin color, texture, turgor normal.  No rashes or lesions. Neurologic:  Neurovascularly intact without any focal sensory/motor deficits. Cranial nerves: II-XII intact, grossly non-focal.  Psychiatric: Alert  Capillary Refill: Brisk,< 3 seconds   Peripheral Pulses: +2 palpable, equal bilaterally       Labs:   Recent Labs     06/06/20  0920 06/07/20  0420 06/08/20  0423   WBC  --  6.9 6.1   HGB 7.9* 7.6* 7.0*   HCT 26.1* 24.2* 22.3*   PLT  --  298 295     Recent Labs     06/06/20  0436 06/07/20  0420 06/08/20  0423    139 138   K 4.0 4.2 4.5    103 102   CO2 25 25 25   BUN 33* 30* 28*   CREATININE 1.4* 1.7* 1.4*   CALCIUM 8.7 8.9 9.1     No results for input(s): AST, ALT, BILIDIR, BILITOT, ALKPHOS in the last 72 hours. No results for input(s): INR in the last 72 hours. No results for input(s): Glean Christian in the last 72 hours. Urinalysis:      Lab Results   Component Value Date    NITRU Negative 05/31/2020    WBCUA 3 05/31/2020    BACTERIA 2+ 05/31/2020    RBCUA 4 05/31/2020    BLOODU Negative 05/31/2020    SPECGRAV 1.012 05/31/2020    GLUCOSEU Negative 05/31/2020       Radiology:  US RENAL COMPLETE   Final Result   1. Simple bilateral renal cysts. Otherwise, unremarkable sonographic   appearance of the bilateral kidneys, without evidence of a renal calculus,   hydronephrosis, or solid renal mass. 2. Urinary bladder collapsed around a Means catheter, limiting its evaluation. XR CHEST PORTABLE   Final Result   1. Mass density left hilum similar appearance to previous exam   2. New pulmonary opacity right upper lobe could represent an infectious   process. However, follow-up suggested to ensure resolution   3. Previously noted left midlung opacity has organized into a linear density   most likely representing scarring.          CTA HEAD NECK W CONTRAST   Final Result   Addendum 1 of 1 ADDENDUM:   3D reconstructed images were performed on a separate workstation and    provided   for review. Final      CT Head WO Contrast   Final Result   No acute intracranial abnormality. Chronic microvascular ischemic changes and global cerebral atrophy. Findings were discussed with Michelle Fabian at 3:23 pm on 5/31/2020. Assessment/Plan:    Active Hospital Problems    Diagnosis    Renal failure [N19]     PLAN:    Acute renal failure  Admitted with creatinine of 6.2. Most likely multifactorial, mainly hypovolemia secondary to blood loss as well as IV contrast exposure. Creatinine back to 1.4 today. Nephrology following. Cleared for discharge    Acute on chronic anemia  Stabilized. Part of it could be caused by renal failure and malignancy combined. Iron studies pending  Will need endoscopic work-up if gross GI bleed occurs. Evaluated by gastroenterology. Non-small cell lung cancer  Patient is not interested in further treatment. Oncology following    Chronic respiratory failure, oxygen dependent, secondary to advanced COPD  Continue oxygen supplementation. No evidence of acute exacerbation    Diabetes mellitus type 2 with hyperglycemia  On oral antidiabetics, mainly because patient would not be capable of managing insulin at home. Otherwise, she would need maintenance insulin. Hypertension  Controlled blood pressure. Continue to monitor    Paroxysmal atrial fibrillation  On Eliquis, diltiazem and beta-blocker. Continue to monitor    DVT Prophylaxis: Eliquis  Diet: DIET CARB CONTROL; Carb Control: 4 carb choices (60 gms)/meal  Code Status: DNR-CC    PT/OT Eval Status: In process    Dispo -awaiting skilled nursing facility pre-CERT.     Zoey Velasco MD

## 2020-06-08 NOTE — CARE COORDINATION
Precert initiated, per Magaly at [facility], awaiting pre-cert.     Akbar Abrams MSW, [address]

## 2020-06-08 NOTE — PLAN OF CARE
Problem: Falls - Risk of:  Goal: Will remain free from falls  Description: Will remain free from falls  6/8/2020 0356 by Julio C Parsons RN  Outcome: Ongoing   Pt remains free from falls. Pt is asleep in bed with 2/4 side rails up, bed in lowest position with brakes locked and bedside table and call light within reach. Bed alarm on. Will continue to monitor pt. Problem: Activity:  Goal: Fatigue will decrease  Description: Fatigue will decrease  6/8/2020 0356 by Julio C Parsons RN  Outcome: Ongoing   Pt calls out appropriatley. Able to ambulate to bathroom independently on 1L O2 with no noted dyspnea. Problem: Cardiac:  Goal: Ability to maintain an adequate cardiac output will improve  Description: Ability to maintain an adequate cardiac output will improve  6/8/2020 0356 by Julio C Parsons RN  Outcome: Ongoing   Vitals are stable.  Will continue to monitor

## 2020-06-09 LAB
ANION GAP SERPL CALCULATED.3IONS-SCNC: 9 MMOL/L (ref 3–16)
BUN BLDV-MCNC: 32 MG/DL (ref 7–20)
CALCIUM SERPL-MCNC: 9 MG/DL (ref 8.3–10.6)
CHLORIDE BLD-SCNC: 106 MMOL/L (ref 99–110)
CO2: 25 MMOL/L (ref 21–32)
CREAT SERPL-MCNC: 1.4 MG/DL (ref 0.6–1.2)
FERRITIN: 42.7 NG/ML (ref 15–150)
GFR AFRICAN AMERICAN: 44
GFR NON-AFRICAN AMERICAN: 36
GLUCOSE BLD-MCNC: 162 MG/DL (ref 70–99)
GLUCOSE BLD-MCNC: 179 MG/DL (ref 70–99)
GLUCOSE BLD-MCNC: 215 MG/DL (ref 70–99)
GLUCOSE BLD-MCNC: 252 MG/DL (ref 70–99)
GLUCOSE BLD-MCNC: 307 MG/DL (ref 70–99)
HCT VFR BLD CALC: 22.9 % (ref 36–48)
HEMOGLOBIN: 7.1 G/DL (ref 12–16)
IRON SATURATION: 15 % (ref 15–50)
IRON: 36 UG/DL (ref 37–145)
MCH RBC QN AUTO: 24.7 PG (ref 26–34)
MCHC RBC AUTO-ENTMCNC: 30.8 G/DL (ref 31–36)
MCV RBC AUTO: 80.3 FL (ref 80–100)
PDW BLD-RTO: 18.6 % (ref 12.4–15.4)
PERFORMED ON: ABNORMAL
PLATELET # BLD: 304 K/UL (ref 135–450)
PMV BLD AUTO: 7.2 FL (ref 5–10.5)
POTASSIUM SERPL-SCNC: 4.7 MMOL/L (ref 3.5–5.1)
RBC # BLD: 2.85 M/UL (ref 4–5.2)
SODIUM BLD-SCNC: 140 MMOL/L (ref 136–145)
TOTAL IRON BINDING CAPACITY: 245 UG/DL (ref 260–445)
WBC # BLD: 6.7 K/UL (ref 4–11)

## 2020-06-09 PROCEDURE — 6370000000 HC RX 637 (ALT 250 FOR IP): Performed by: NURSE PRACTITIONER

## 2020-06-09 PROCEDURE — 83550 IRON BINDING TEST: CPT

## 2020-06-09 PROCEDURE — 82728 ASSAY OF FERRITIN: CPT

## 2020-06-09 PROCEDURE — 80048 BASIC METABOLIC PNL TOTAL CA: CPT

## 2020-06-09 PROCEDURE — 94640 AIRWAY INHALATION TREATMENT: CPT

## 2020-06-09 PROCEDURE — 6370000000 HC RX 637 (ALT 250 FOR IP): Performed by: FAMILY MEDICINE

## 2020-06-09 PROCEDURE — 85027 COMPLETE CBC AUTOMATED: CPT

## 2020-06-09 PROCEDURE — 94761 N-INVAS EAR/PLS OXIMETRY MLT: CPT

## 2020-06-09 PROCEDURE — 83540 ASSAY OF IRON: CPT

## 2020-06-09 PROCEDURE — 2060000000 HC ICU INTERMEDIATE R&B

## 2020-06-09 PROCEDURE — 2580000003 HC RX 258: Performed by: FAMILY MEDICINE

## 2020-06-09 PROCEDURE — 6370000000 HC RX 637 (ALT 250 FOR IP): Performed by: PHYSICIAN ASSISTANT

## 2020-06-09 PROCEDURE — 36415 COLL VENOUS BLD VENIPUNCTURE: CPT

## 2020-06-09 PROCEDURE — 2700000000 HC OXYGEN THERAPY PER DAY

## 2020-06-09 RX ORDER — METOPROLOL SUCCINATE 25 MG/1
25 TABLET, EXTENDED RELEASE ORAL DAILY
Qty: 30 TABLET | Refills: 3 | DISCHARGE
Start: 2020-06-10 | End: 2020-06-10

## 2020-06-09 RX ORDER — DILTIAZEM HYDROCHLORIDE 60 MG/1
60 CAPSULE, EXTENDED RELEASE ORAL 2 TIMES DAILY
Qty: 60 CAPSULE | Refills: 3 | DISCHARGE
Start: 2020-06-09 | End: 2020-06-10

## 2020-06-09 RX ORDER — PANTOPRAZOLE SODIUM 40 MG/1
40 TABLET, DELAYED RELEASE ORAL
Qty: 30 TABLET | Refills: 3 | DISCHARGE
Start: 2020-06-10 | End: 2020-06-10

## 2020-06-09 RX ADMIN — DILTIAZEM HYDROCHLORIDE 60 MG: 60 CAPSULE, EXTENDED RELEASE ORAL at 20:58

## 2020-06-09 RX ADMIN — INSULIN LISPRO 2 UNITS: 100 INJECTION, SOLUTION INTRAVENOUS; SUBCUTANEOUS at 11:53

## 2020-06-09 RX ADMIN — DILTIAZEM HYDROCHLORIDE 60 MG: 60 CAPSULE, EXTENDED RELEASE ORAL at 10:35

## 2020-06-09 RX ADMIN — APIXABAN 2.5 MG: 5 TABLET, FILM COATED ORAL at 20:58

## 2020-06-09 RX ADMIN — INSULIN LISPRO 1 UNITS: 100 INJECTION, SOLUTION INTRAVENOUS; SUBCUTANEOUS at 10:36

## 2020-06-09 RX ADMIN — PANTOPRAZOLE SODIUM 40 MG: 40 TABLET, DELAYED RELEASE ORAL at 05:16

## 2020-06-09 RX ADMIN — MIRTAZAPINE 15 MG: 15 TABLET, FILM COATED ORAL at 20:58

## 2020-06-09 RX ADMIN — IPRATROPIUM BROMIDE AND ALBUTEROL SULFATE 3 ML: .5; 3 SOLUTION RESPIRATORY (INHALATION) at 20:18

## 2020-06-09 RX ADMIN — IPRATROPIUM BROMIDE AND ALBUTEROL SULFATE 3 ML: .5; 3 SOLUTION RESPIRATORY (INHALATION) at 16:00

## 2020-06-09 RX ADMIN — APIXABAN 2.5 MG: 5 TABLET, FILM COATED ORAL at 10:34

## 2020-06-09 RX ADMIN — LINAGLIPTIN 5 MG: 5 TABLET, FILM COATED ORAL at 10:35

## 2020-06-09 RX ADMIN — PREDNISONE 10 MG: 10 TABLET ORAL at 10:35

## 2020-06-09 RX ADMIN — IPRATROPIUM BROMIDE AND ALBUTEROL SULFATE 3 ML: .5; 3 SOLUTION RESPIRATORY (INHALATION) at 08:30

## 2020-06-09 RX ADMIN — INSULIN LISPRO 4 UNITS: 100 INJECTION, SOLUTION INTRAVENOUS; SUBCUTANEOUS at 16:42

## 2020-06-09 RX ADMIN — IPRATROPIUM BROMIDE AND ALBUTEROL SULFATE 3 ML: .5; 3 SOLUTION RESPIRATORY (INHALATION) at 12:11

## 2020-06-09 RX ADMIN — METOPROLOL SUCCINATE 25 MG: 25 TABLET, FILM COATED, EXTENDED RELEASE ORAL at 10:35

## 2020-06-09 RX ADMIN — INSULIN LISPRO 3 UNITS: 100 INJECTION, SOLUTION INTRAVENOUS; SUBCUTANEOUS at 20:59

## 2020-06-09 RX ADMIN — Medication 10 ML: at 10:34

## 2020-06-09 NOTE — PROGRESS NOTES
without murmurs, rubs or gallops. Abdomen: Soft, non-tender, non-distended with normal bowel sounds. Musculoskeletal: No clubbing, cyanosis or edema bilaterally. Full range of motion without deformity. Skin: Skin color, texture, turgor normal.  No rashes or lesions. Neurologic:  Neurovascularly intact without any focal sensory/motor deficits. Cranial nerves: II-XII intact, grossly non-focal.  Psychiatric: Alert  Capillary Refill: Brisk,< 3 seconds   Peripheral Pulses: +2 palpable, equal bilaterally     I examined the patient today (06/09/20). Physical exam similar to yesterday (6/8). Labs:   Recent Labs     06/07/20  0420 06/08/20  0423 06/09/20  0424   WBC 6.9 6.1 6.7   HGB 7.6* 7.0* 7.1*   HCT 24.2* 22.3* 22.9*    295 304     Recent Labs     06/07/20  0420 06/08/20  0423 06/09/20  0424    138 140   K 4.2 4.5 4.7    102 106   CO2 25 25 25   BUN 30* 28* 32*   CREATININE 1.7* 1.4* 1.4*   CALCIUM 8.9 9.1 9.0     No results for input(s): AST, ALT, BILIDIR, BILITOT, ALKPHOS in the last 72 hours. No results for input(s): INR in the last 72 hours. No results for input(s): Lexi Formosa in the last 72 hours. Urinalysis:      Lab Results   Component Value Date    NITRU Negative 05/31/2020    WBCUA 3 05/31/2020    BACTERIA 2+ 05/31/2020    RBCUA 4 05/31/2020    BLOODU Negative 05/31/2020    SPECGRAV 1.012 05/31/2020    GLUCOSEU Negative 05/31/2020       Radiology:  US RENAL COMPLETE   Final Result   1. Simple bilateral renal cysts. Otherwise, unremarkable sonographic   appearance of the bilateral kidneys, without evidence of a renal calculus,   hydronephrosis, or solid renal mass. 2. Urinary bladder collapsed around a Means catheter, limiting its evaluation. XR CHEST PORTABLE   Final Result   1. Mass density left hilum similar appearance to previous exam   2. New pulmonary opacity right upper lobe could represent an infectious   process.   However, follow-up suggested to ensure resolution   3. Previously noted left midlung opacity has organized into a linear density   most likely representing scarring. CTA HEAD NECK W CONTRAST   Final Result   Addendum 1 of 1   ADDENDUM:   3D reconstructed images were performed on a separate workstation and    provided   for review. Final      CT Head WO Contrast   Final Result   No acute intracranial abnormality. Chronic microvascular ischemic changes and global cerebral atrophy. Findings were discussed with Michael Johansen at 3:23 pm on 5/31/2020. Assessment/Plan:    Active Hospital Problems    Diagnosis    Renal failure [N19]     PLAN:    Acute renal failure  Admitted with creatinine of 6.2. Most likely multifactorial, mainly hypovolemia secondary to blood loss as well as IV contrast exposure. Creatinine stable at 1.4. Nephrology following. Cleared for discharge    Acute on chronic anemia  Stabilized. Part of it could be caused by renal failure and malignancy combined. Iron studies not consistent with iron deficiency  Will need endoscopic work-up if gross GI bleed occurs. Evaluated by gastroenterology. Non-small cell lung cancer  Patient is not interested in further treatment. Oncology following    Chronic respiratory failure, oxygen dependent, secondary to advanced COPD  Continue oxygen supplementation. No evidence of acute exacerbation    Diabetes mellitus type 2 with hyperglycemia  On oral antidiabetics, mainly because patient would not be capable of managing insulin at home. Otherwise, she would need maintenance insulin. Blood sugar is acceptable, given underlying conditions and patient's personal circumstances. Hypertension  Controlled blood pressure. Continue to monitor    Paroxysmal atrial fibrillation  On Eliquis, diltiazem and beta-blocker.   Continue to monitor    DVT Prophylaxis: Eliquis  Diet: DIET CARB CONTROL; Carb Control: 4 carb choices (60 gms)/meal  Code Status: DNR-CC    PT/OT Eval Status: In process    Dispo -awaiting skilled nursing facility pre-CERT. Still pending.     Arabella Saunders MD

## 2020-06-09 NOTE — PROGRESS NOTES
MD Sree Acosta MD Gerhard Formica, MD                                  Office: [phone]                 Fax: [phone]          [facility]                     NEPHROLOGY IN PATIENT PROGRESS NOTE:     PATIENT NAME: Shanika Ty  : 1940  MRN: [id number]      Assessment and Plan    Assessment:     Acute kidney injury-slow improvement-nonoliguric  Severe on admission Scr to 6.2  Anemia   History of lung cancer. Generalized weakness. HTN uncontrolled       Plan:     Repeat creatinine is Estimated Creatinine Clearance: 30 mL/min (A) (based on SCr of 1.4 mg/dL (H)). Electrolytes are stable. BP ok controlled. Anemia levels are stable. Check iron levels    Medications reviewed. Not on diuretics. Blood pressure medications on hold. No major changes from my side. Stable from renal for discharge. Will arrange for a f/u w/ Dr Earnest Booth in 2-3 weeks       High complexity. Multiple medical problems. Discussed with patient and treatment team.    Thank you for allowing me to participate in this patient's care. Please do not hesitate to contact me for any questions/concerns. We will follow along with you. Giovany Chance MD  [facility] [address] [place] [address]   Phone: [phone] or Via [employer]  Fax: [phone]          Subjective:     Reported no active complaints,      Objective:   EXAM  Vitals:    20 0830   BP:    Pulse:    Resp:    Temp:    SpO2: 96%       Intake/Output Summary (Last 24 hours) at 2020 0836  Last data filed at 2020 1200  Gross per 24 hour   Intake 480 ml   Output --   Net 480 ml       Awake and communicative   Neck. JVD not visible. No lymph nodes palpable. CVS.  Heart sounds are normal.Palpation of the heart is normal. No murmurs. No pericardial rub.  RS.dullness on percussion of the lower chest wall.   Lung fields are clear   PA soft , bowel sounds are normal no distension and no tenderness to 05/31/2020    INR 1.58 09/27/2019    INR 1.68 08/06/2019     PTT:    Lab Results   Component Value Date    APTT 30.5 12/04/2018     ROXANNE:  No results found for: ANATITER, ROXANNE  CHEMISTRY COMMON GROUP :   Lab Results   Component Value Date    GLUCOSE 179 06/09/2020    TSH 2.97 07/05/2013     Recent Labs     06/07/20  0420 06/08/20  0423 06/09/20  0424   GLUCOSE 150* 137* 179*   CALCIUM 8.9 9.1 9.0         RADIOLOGY:        Imaging Results.   Chest X Ray reviwed by me    Chest Xray Reviewed by me           Electronically Signed: Pari Lai MD 6/9/2020 8:36 AM

## 2020-06-09 NOTE — DISCHARGE INSTR - COC
Continuity of Care Form    Patient Name: Radha Ruiz   :  1940  MRN:  [id number]    Admit date:  2020  Discharge date:  6/15/2020    Code Status Order: [facility]   Advance Directives:   [facility] Documentation     Date/Time Healthcare Directive Type of Healthcare Directive Copy in [address] Agent's Name Healthcare Agent's Phone Number    20 [phone]  Yes, patient has an advance directive for healthcare treatment  --  --  --  --  --          Admitting Physician:  Beulah Lagos MD  PCP: Meliton Quintana    Discharging Nurse: [facility] Unit/Room#: [id number]-[id number]/5211-73  Discharging Unit Phone Number:     Emergency Contact:   Extended Emergency Contact Information  Primary Emergency Contact: [facility] of [address] Phone: [phone]  Relation: Child  Secondary Emergency Contact: [facility] [address] Phone: [phone]  Relation: Child    Past Surgical History:  Past Surgical History:   Procedure Laterality Date    BACK SURGERY  ,     lumbar laminectomy    BRONCHOSCOPY  2018    BRONCHOSCOPY N/A 2019    BRONCHOSCOPY BIOPSY BRONCHUS performed by Alina Lucero MD at [place] [address]  2019    BRONCHOSCOPY/TRANSBRONCHIAL NEEDLE BIOPSY performed by Alina Lucero MD at [place] [address]  2019    BRONCHOSCOPY ULTRASOUND performed by Alina Lucero MD at [place] [address] N/A 2019    BRONCHOSCOPY ALVEOLAR LAVAGE performed by Alina Lucero MD at [address] [place] [address] 2019    BRONCHOSCOPY ALVEOLAR LAVAGE performed by Valeria White MD at [place] [address]  2019    BRONCHOSCOPY BIOPSY BRONCHUS performed by Valeria White MD at [place] [address]  2019    BRONCHOSCOPY BRUSHINGS performed by Radha Kinsey MD at [facility], LAPAROSCOPIC N/A 12/19/2018    LAPAROSCOPIC CHOLECYSTECTOMY WITH CHOLANGIOGRAM performed by Castro Oliver MD at [address] [facility] COLONOSCOPY N/A 2/25/2019    COLONOSCOPY WITH BIOPSY performed by Duane Beam, MD at [address] [facility] COLONOSCOPY  2/25/2019    COLONOSCOPY POLYPECTOMY SNARE/COLD BIOPSY performed by Duane Beam, MD at [place]  2000, 2001    [place] [address] INCL FLUOR GDNCE DX W/CELL [facility] SPX N/A 12/4/2018    BRONCHOSCOPY WITH LAVAGE performed by Radhika Alexis MD at [address] [facility] N/A 2/25/2019    EGD BIOPSY performed by Duane Beam, MD at [address] [facility] N/A 8/7/2019    EGD DIAGNOSTIC ONLY performed by Asa Bassett MD at [facility]       Immunization History:   Immunization History   Administered Date(s) Administered    Influenza Vaccine, unspecified formulation 10/02/2018    Influenza Virus Vaccine 10/30/2013, 10/15/2014, 04/20/2017    Influenza, Quadv, IM, PF (6 mo and older Fluzone, Flulaval, Fluarix, and 3 yrs and older Afluria) 10/01/2019    Pneumococcal Conjugate 13-valent (Amaya Sandie) 06/05/2015    Pneumococcal Polysaccharide ([id number]) 08/30/2013       Active Problems:  Patient Active Problem List   Diagnosis Code    DM (diabetes mellitus), secondary, uncontrolled, w/neurologic complic ([facility] [address] A90.09, T79.95    Dyslipidemia E78.5    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    HTN (hypertension), benign I10    Coronary atherosclerosis of native coronary artery I25.10    Obstructive sleep apnea G47.33    Chest pain R07.9    Carpal tunnel syndrome G56.00    SOB (shortness of breath) R06.02    COPD, 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    Influenza J11.1    Paroxysmal atrial fibrillation (HCC) I48.0    Chronic cough R05    Hilar mass R91.8    Acute cholecystitis K81.0    Gallstones and inflammation of gallbladder without obstruction K80.00    Atrial fibrillation with rapid ventricular response (HCC) I48.91    Acute respiratory failure with hypoxia (HCC) J96.01    Centrilobular emphysema (HCC) J43.2    Ileus following gastrointestinal surgery (HCC) K91.89, K56.7    Mediastinal adenopathy R59.0    COPD exacerbation (HCC) J44.1    Adenocarcinoma of left lung (HCC) C34.92    Mild malnutrition (HCC) E44.1    Loss of consciousness (HCC) R40.20    Syncope and collapse R55    Subacute pulmonary embolism (HCC) I26.99    Other pulmonary embolism without acute cor pulmonale (HCC) I26.99    Closed compression fracture of thoracic vertebra (HCC) S22.000A    Chronic deep vein thrombosis (DVT) of both lower extremities (HCC) I82.503    Ataxia R27.0    Recurrent falls R29.6    Diabetic peripheral neuropathy (HCC) E11.42    Severe malnutrition (HCC) E43    Pneumonia of left lower lobe due to infectious organism (HCC) J18.1    Abnormal CT of the chest R93.89    Chronic diastolic heart failure (HCC) I50.32    Failure to thrive (0-17) R62.51    Acute on chronic respiratory failure with hypoxia (HCC) J96.21    Chronic respiratory failure with hypoxia (HCC) J96.11    Lung infiltrate on CT R91.8    Non-small cell cancer of left lung (HCC) C34.92    Acute encephalopathy G93.40    Weakness R53.1    Radiation pneumonitis (HCC) J70.0    Hypokalemia E87.6    Gastroparesis K31.84    Infection requiring airborne isolation precautions B99.9    Malignant neoplasm of left lung (HCC) C34.92    Chronic obstructive pulmonary disease with acute lower respiratory infection (HCC) J44.0    Acid fast bacillus A31.9    Poorly controlled type 2 diabetes mellitus (HCC) E11.65    History of depression Z86.59    SIRS (systemic inflammatory response syndrome) (HCC) R65.10    Diabetes education, encounter for Z71.89    Depression F32.9    Anxiety disorder F41.9    Delirium R41.0    Renal failure N19       Isolation/Infection:   Isolation          No Isolation        Patient Infection Status     Infection Onset Added Last Indicated Last Indicated By Review Planned Expiration Resolved Resolved By    None active    Resolved    COVID-19 Rule Out 06/01/20 06/01/20 06/01/20 COVID-19 (Ordered)   06/03/20 Enrrique Ho RN    C-diff Rule Out 05/31/20 05/31/20 06/01/20 Gastrointestinal Panel, Molecular (Ordered)   06/04/20 Sallie Love RN    AFB 09/27/19 10/09/19 09/27/19 Acid Fast Culture With Smear   06/01/20 Sallie Love RN    AFB 08/06/19 09/10/19 08/06/19 Acid Fast Culture With Smear   09/30/19 Daniel Toro RN          Nurse Assessment:  Last Vital Signs: /63   Pulse 78   Temp 97.3 °F (36.3 °C) (Oral)   Resp 16   Ht 5' 5\" (1.651 m)   Wt 146 lb 1.6 oz (66.3 kg)   SpO2 96%   BMI 24.31 kg/m²     Last documented pain score (0-10 scale): Pain Level: 0  Last Weight:   Wt Readings from Last 1 Encounters:   06/07/20 146 lb 1.6 oz (66.3 kg)     Mental Status:  oriented    IV Access:  - None    Nursing Mobility/ADLs:  Walking   Assisted  Transfer  Assisted  Bathing  Assisted  Dressing  Assisted  Toileting  Assisted  Feeding  Assisted  Med Admin  Assisted  Med Delivery   whole    Wound Care Documentation and Therapy:        Elimination:  Continence:   · Bowel: Yes and No  · Bladder: Yes and No  Urinary Catheter: None   Colostomy/Ileostomy/Ileal Conduit: No       Date of Last BM: 6/15/2020    Intake/Output Summary (Last 24 hours) at 6/9/2020 1344  Last data filed at 6/9/2020 1026  Gross per 24 hour   Intake 320 ml   Output --   Net 320 ml     I/O last 3 completed shifts:   In: 480 [P.O.:480]  Out: -     Safety Concerns:     None    Impairments/Disabilities:      None    Nutrition Therapy:  Current Nutrition Therapy:   - Oral Diet:  General    Routes of Feeding: Oral  Liquids: No Restrictions  Daily Fluid Restriction: no  Last Modified Barium Swallow with Video (Video Swallowing Test): not

## 2020-06-09 NOTE — CARE COORDINATION
Chart reviewed for discharge planning. Barrier(s) to discharge- Family wants patient to be placed due to  due to Chronic microvascular ischemic changes and global cerebral atrophy. Tentative discharge plan-[place] pending alexsander precert. Tentative discharge date-  6-9-20    *Case management will continue to follow progress and update discharge plan as needed.       Called Mary Hooks at [facility] for precert update per family request.

## 2020-06-09 NOTE — CARE COORDINATION
Faxed updated OT notes to [facility] per [employer] request. Notified PT of need for updated notes as well.

## 2020-06-09 NOTE — PROGRESS NOTES
Pt stable and comfortable. IV flushed with resistance. On call made aware and received order to remove IV. Still waiting ECF placement.

## 2020-06-09 NOTE — PROGRESS NOTES
Shift assessment complete and night medications given. Patient is resting in bed at this time. Denies additional needs. Call light is in reach and bed alarm is engaged.  Will continue to monitor

## 2020-06-10 LAB
ANION GAP SERPL CALCULATED.3IONS-SCNC: 12 MMOL/L (ref 3–16)
BUN BLDV-MCNC: 26 MG/DL (ref 7–20)
CALCIUM SERPL-MCNC: 9.1 MG/DL (ref 8.3–10.6)
CHLORIDE BLD-SCNC: 104 MMOL/L (ref 99–110)
CO2: 22 MMOL/L (ref 21–32)
CREAT SERPL-MCNC: 1.4 MG/DL (ref 0.6–1.2)
GFR AFRICAN AMERICAN: 44
GFR NON-AFRICAN AMERICAN: 36
GLUCOSE BLD-MCNC: 134 MG/DL (ref 70–99)
GLUCOSE BLD-MCNC: 139 MG/DL (ref 70–99)
GLUCOSE BLD-MCNC: 205 MG/DL (ref 70–99)
GLUCOSE BLD-MCNC: 319 MG/DL (ref 70–99)
GLUCOSE BLD-MCNC: 322 MG/DL (ref 70–99)
HCT VFR BLD CALC: 24.9 % (ref 36–48)
HEMOGLOBIN: 7.7 G/DL (ref 12–16)
MCH RBC QN AUTO: 25.6 PG (ref 26–34)
MCHC RBC AUTO-ENTMCNC: 30.9 G/DL (ref 31–36)
MCV RBC AUTO: 83 FL (ref 80–100)
PDW BLD-RTO: 19.1 % (ref 12.4–15.4)
PERFORMED ON: ABNORMAL
PLATELET # BLD: 292 K/UL (ref 135–450)
PMV BLD AUTO: 7.6 FL (ref 5–10.5)
POTASSIUM SERPL-SCNC: 4.3 MMOL/L (ref 3.5–5.1)
RBC # BLD: 3 M/UL (ref 4–5.2)
SODIUM BLD-SCNC: 138 MMOL/L (ref 136–145)
WBC # BLD: 7.4 K/UL (ref 4–11)

## 2020-06-10 PROCEDURE — 6370000000 HC RX 637 (ALT 250 FOR IP): Performed by: PHYSICIAN ASSISTANT

## 2020-06-10 PROCEDURE — 6370000000 HC RX 637 (ALT 250 FOR IP): Performed by: NURSE PRACTITIONER

## 2020-06-10 PROCEDURE — 36415 COLL VENOUS BLD VENIPUNCTURE: CPT

## 2020-06-10 PROCEDURE — 2060000000 HC ICU INTERMEDIATE R&B

## 2020-06-10 PROCEDURE — 6370000000 HC RX 637 (ALT 250 FOR IP): Performed by: FAMILY MEDICINE

## 2020-06-10 PROCEDURE — 97116 GAIT TRAINING THERAPY: CPT

## 2020-06-10 PROCEDURE — 94761 N-INVAS EAR/PLS OXIMETRY MLT: CPT

## 2020-06-10 PROCEDURE — 2700000000 HC OXYGEN THERAPY PER DAY

## 2020-06-10 PROCEDURE — 97110 THERAPEUTIC EXERCISES: CPT

## 2020-06-10 PROCEDURE — 85027 COMPLETE CBC AUTOMATED: CPT

## 2020-06-10 PROCEDURE — 97535 SELF CARE MNGMENT TRAINING: CPT

## 2020-06-10 PROCEDURE — 2580000003 HC RX 258: Performed by: FAMILY MEDICINE

## 2020-06-10 PROCEDURE — 97530 THERAPEUTIC ACTIVITIES: CPT

## 2020-06-10 PROCEDURE — 80048 BASIC METABOLIC PNL TOTAL CA: CPT

## 2020-06-10 PROCEDURE — 94640 AIRWAY INHALATION TREATMENT: CPT

## 2020-06-10 RX ORDER — PANTOPRAZOLE SODIUM 40 MG/1
40 TABLET, DELAYED RELEASE ORAL
Qty: 30 TABLET | Refills: 3 | Status: SHIPPED | OUTPATIENT
Start: 2020-06-10 | End: 2020-06-15

## 2020-06-10 RX ORDER — DILTIAZEM HYDROCHLORIDE 60 MG/1
60 CAPSULE, EXTENDED RELEASE ORAL 2 TIMES DAILY
Qty: 60 CAPSULE | Refills: 3 | Status: SHIPPED | OUTPATIENT
Start: 2020-06-10 | End: 2020-06-15

## 2020-06-10 RX ORDER — METOPROLOL SUCCINATE 25 MG/1
25 TABLET, EXTENDED RELEASE ORAL DAILY
Qty: 30 TABLET | Refills: 3 | Status: SHIPPED | OUTPATIENT
Start: 2020-06-10 | End: 2020-06-15

## 2020-06-10 RX ADMIN — IPRATROPIUM BROMIDE AND ALBUTEROL SULFATE 3 ML: .5; 3 SOLUTION RESPIRATORY (INHALATION) at 07:54

## 2020-06-10 RX ADMIN — APIXABAN 2.5 MG: 5 TABLET, FILM COATED ORAL at 20:13

## 2020-06-10 RX ADMIN — PREDNISONE 10 MG: 10 TABLET ORAL at 10:01

## 2020-06-10 RX ADMIN — MIRTAZAPINE 15 MG: 15 TABLET, FILM COATED ORAL at 20:13

## 2020-06-10 RX ADMIN — Medication 10 ML: at 10:02

## 2020-06-10 RX ADMIN — IPRATROPIUM BROMIDE AND ALBUTEROL SULFATE 3 ML: .5; 3 SOLUTION RESPIRATORY (INHALATION) at 11:48

## 2020-06-10 RX ADMIN — APIXABAN 2.5 MG: 5 TABLET, FILM COATED ORAL at 10:01

## 2020-06-10 RX ADMIN — METOPROLOL SUCCINATE 25 MG: 25 TABLET, FILM COATED, EXTENDED RELEASE ORAL at 10:01

## 2020-06-10 RX ADMIN — IPRATROPIUM BROMIDE AND ALBUTEROL SULFATE 3 ML: .5; 3 SOLUTION RESPIRATORY (INHALATION) at 15:19

## 2020-06-10 RX ADMIN — INSULIN LISPRO 2 UNITS: 100 INJECTION, SOLUTION INTRAVENOUS; SUBCUTANEOUS at 14:23

## 2020-06-10 RX ADMIN — INSULIN LISPRO 2 UNITS: 100 INJECTION, SOLUTION INTRAVENOUS; SUBCUTANEOUS at 21:49

## 2020-06-10 RX ADMIN — PANTOPRAZOLE SODIUM 40 MG: 40 TABLET, DELAYED RELEASE ORAL at 05:55

## 2020-06-10 RX ADMIN — LINAGLIPTIN 5 MG: 5 TABLET, FILM COATED ORAL at 10:01

## 2020-06-10 RX ADMIN — DILTIAZEM HYDROCHLORIDE 60 MG: 60 CAPSULE, EXTENDED RELEASE ORAL at 20:13

## 2020-06-10 RX ADMIN — DILTIAZEM HYDROCHLORIDE 60 MG: 60 CAPSULE, EXTENDED RELEASE ORAL at 10:01

## 2020-06-10 RX ADMIN — INSULIN LISPRO 4 UNITS: 100 INJECTION, SOLUTION INTRAVENOUS; SUBCUTANEOUS at 18:14

## 2020-06-10 RX ADMIN — IPRATROPIUM BROMIDE AND ALBUTEROL SULFATE 3 ML: .5; 3 SOLUTION RESPIRATORY (INHALATION) at 20:57

## 2020-06-10 ASSESSMENT — PAIN SCALES - GENERAL
PAINLEVEL_OUTOF10: 0

## 2020-06-10 NOTE — PROGRESS NOTES
Hospitalist Progress Note      PCP: Octavia Yusuf    Date of Admission: 5/31/2020    Chief Complaint: Weakness    Hospital Course: Mated with acute renal failure, anemia. Slow improvement noted. Renal function stabilized. Cleared for discharge home with home care. Patient and family wish to consider skilled nursing facility. Insurance approval pending. If denied, will be discharged home with home care. Subjective: Weakness. No chest pain, no shortness of breath, no nausea, no vomiting. Remains stable. Medications:  Reviewed    Infusion Medications    dextrose       Scheduled Medications    linagliptin  5 mg Oral Daily    dilTIAZem  60 mg Oral BID    insulin lispro  0-6 Units Subcutaneous TID WC    insulin lispro  0-3 Units Subcutaneous Nightly    metoprolol succinate  25 mg Oral Daily    apixaban  2.5 mg Oral BID    pantoprazole  40 mg Oral QAM AC    ipratropium-albuterol  3 mL Inhalation Q4H WA    mirtazapine  15 mg Oral Nightly    sodium chloride flush  10 mL Intravenous 2 times per day    predniSONE  10 mg Oral Daily     PRN Meds: sodium chloride flush, acetaminophen **OR** acetaminophen, polyethylene glycol, promethazine **OR** ondansetron, glucose, dextrose, glucagon (rDNA), dextrose      Intake/Output Summary (Last 24 hours) at 6/10/2020 1006  Last data filed at 6/9/2020 1740  Gross per 24 hour   Intake 800 ml   Output --   Net 800 ml       Physical Exam Performed:    /75   Pulse 71   Temp 97.3 °F (36.3 °C) (Oral)   Resp 16   Ht 5' 5\" (1.651 m)   Wt 146 lb 1.6 oz (66.3 kg)   SpO2 99%   BMI 24.31 kg/m²     General appearance: No apparent distress, appears stated age. Appears tired. HEENT: Pupils equal, round, and reactive to light. Conjunctivae/corneas clear. Neck: Supple, with full range of motion. No jugular venous distention. Trachea midline. Respiratory:  Normal respiratory effort.  Clear to auscultation, bilaterally without Rales/Wheezes/Rhonchi. Cardiovascular: Regular rate and rhythm with normal S1/S2 without murmurs, rubs or gallops. Abdomen: Soft, non-tender, non-distended with normal bowel sounds. Musculoskeletal: No clubbing, cyanosis or edema bilaterally. Full range of motion without deformity. Skin: Skin color, texture, turgor normal.  No rashes or lesions. Neurologic:  Neurovascularly intact without any focal sensory/motor deficits. Cranial nerves: II-XII intact, grossly non-focal.  Psychiatric: Alert, cooperative. Capillary Refill: Brisk,< 3 seconds   Peripheral Pulses: +2 palpable, equal bilaterally     I examined the patient today (06/10/20). Physical exam similar to yesterday (6/9). Labs:   Recent Labs     06/08/20  0423 06/09/20  0424 06/10/20  0426   WBC 6.1 6.7 7.4   HGB 7.0* 7.1* 7.7*   HCT 22.3* 22.9* 24.9*    304 292     Recent Labs     06/08/20  0423 06/09/20  0424 06/10/20  0426    140 138   K 4.5 4.7 4.3    106 104   CO2 25 25 22   BUN 28* 32* 26*   CREATININE 1.4* 1.4* 1.4*   CALCIUM 9.1 9.0 9.1     No results for input(s): AST, ALT, BILIDIR, BILITOT, ALKPHOS in the last 72 hours. No results for input(s): INR in the last 72 hours. No results for input(s): Kelsi Lina in the last 72 hours. Urinalysis:      Lab Results   Component Value Date    NITRU Negative 05/31/2020    WBCUA 3 05/31/2020    BACTERIA 2+ 05/31/2020    RBCUA 4 05/31/2020    BLOODU Negative 05/31/2020    SPECGRAV 1.012 05/31/2020    GLUCOSEU Negative 05/31/2020       Radiology:  US RENAL COMPLETE   Final Result   1. Simple bilateral renal cysts. Otherwise, unremarkable sonographic   appearance of the bilateral kidneys, without evidence of a renal calculus,   hydronephrosis, or solid renal mass. 2. Urinary bladder collapsed around a Means catheter, limiting its evaluation. XR CHEST PORTABLE   Final Result   1. Mass density left hilum similar appearance to previous exam   2.  New pulmonary opacity right upper lobe could represent an infectious   process. However, follow-up suggested to ensure resolution   3. Previously noted left midlung opacity has organized into a linear density   most likely representing scarring. CTA HEAD NECK W CONTRAST   Final Result   Addendum 1 of 1   ADDENDUM:   3D reconstructed images were performed on a separate workstation and    provided   for review. Final      CT Head WO Contrast   Final Result   No acute intracranial abnormality. Chronic microvascular ischemic changes and global cerebral atrophy. Findings were discussed with Prem Fernando at 3:23 pm on 5/31/2020. Assessment/Plan:    Active Hospital Problems    Diagnosis    Renal failure [N19]     PLAN:    Acute renal failure on CKD stage III  Admitted with creatinine of 6.2. Most likely multifactorial, mainly hypovolemia secondary to blood loss as well as IV contrast exposure. Creatinine stable at 1.4, patient's baseline  Nephrology following. Cleared for discharge    Acute on chronic anemia  Slow improvement noted, after acute episode resolved. Part of it could be caused by renal failure and malignancy combined. Iron studies not consistent with iron deficiency  Will need endoscopic work-up if gross GI bleed occurs. Evaluated by gastroenterology. On hold for now. Non-small cell lung cancer  Patient is not interested in further treatment. Oncology following    Chronic respiratory failure, oxygen dependent, secondary to advanced COPD  Continue oxygen supplementation. No evidence of acute exacerbation    Diabetes mellitus type 2 with hyperglycemia  On oral antidiabetics, mainly because patient would not be capable of managing insulin at home. Otherwise, she would need maintenance insulin. Blood sugar is acceptable, given underlying conditions and patient's personal circumstances. Post-discharge medications are already planned and prescribed.     Hypertension  Controlled blood pressure. Continue to monitor    Paroxysmal atrial fibrillation  On Eliquis, diltiazem and beta-blocker. Controlled rate. Continue to monitor    DVT Prophylaxis: Eliquis  Diet: DIET CARB CONTROL; Carb Control: 4 carb choices (60 gms)/meal  Code Status: DNR-CC    PT/OT Eval Status: In process    Dispo -awaiting insurance approval for skilled nursing facility. If denied, we will proceed with home health care.     Robert Bird MD

## 2020-06-10 NOTE — PROGRESS NOTES
Occupational Therapy  Facility/Department: [address]  Daily Treatment Note  NAME: Chivo Joshua  : 1940  MRN: [id number]    Date of Service: 6/10/2020    Discharge Recommendations: Chivo Joshua scored a 18/24 on the AM-PAC ADL Inpatient form. Current research shows that an AM-PAC score of 17 or less is typically not associated with a discharge to the patient's home setting. Based on the patient's AM-PAC score and their current ADL deficits, it is recommended that the patient have 3-5 sessions per week of Occupational Therapy at d/c to increase the patient's independence. At this time, this patient lacks the endurance, and/or tolerance for 3 hours of therapy/day, 5-7x/wk and would benefit most from a follow up treatment frequency of 3-5x/wk. Please see assessment section for further patient specific details. If patient discharges prior to next session this note will serve as a discharge summary. Please see below for the latest assessment towards goals. OT Equipment Recommendations  Equipment Needed: No  Other: defer to next level of care    Assessment   Performance deficits / Impairments: Decreased functional mobility ; Decreased endurance;Decreased ADL status; Decreased strength;Decreased balance  Assessment: Pt presents with the above deficits impacting daily occupational performance and would benefit from continued skilled OT services. Pt has progressed with activity tolerance and ADL performance. Per prior OT notes daughter is concerned about present DC recommendations for home. Daughter present during prior OT tx session and witnessed pt's functional performance but still has concerns regarding pt's \"stamina\" and \"core strength\" and is concerned for pt's overall safety upon DC to home as pt has had multiple falls in recent months.   It was noted during tx session that pt owns and wears an emergency alert bracelet; however, when OT inquired about this to the present daughter, she answered that the pt was wearing this bracelet during recent fall but did not know to activate it. Prognosis: Good  OT Education: OT Role;Plan of Care;Transfer Training;ADL Adaptive Strategies  Patient Education: Pt verbalized understanding but would benefit from continued reinforcement for carryover  REQUIRES OT FOLLOW UP: Yes  Activity Tolerance  Activity Tolerance: Patient Tolerated treatment well;Patient limited by fatigue  Activity Tolerance: Pt on 1L O2, pt reported feeling light headed at times with and w/o O2 requiring seated break, Pt's O2 WFL (%) throughout session. Safety Devices  Safety Devices in place: Yes  Type of devices: Left in bed;Bed alarm in place;Call light within reach;Nurse notified         Patient Diagnosis(es): The primary encounter diagnosis was General weakness. Diagnoses of LORRAINE (acute kidney injury) ([address] Hyperkalemia, Anemia, unspecified type, Pneumonia due to organism, and Upper GI bleed were also pertinent to this visit. has a past medical history of Arthritis, CAD (coronary artery disease), Carpal tunnel syndrome, COPD (chronic obstructive pulmonary disease) ([address] Coronary stent, depression, Diabetes mellitus ([address] Diastolic heart failure ([address] GERD (gastroesophageal reflux disease), Hyperlipidemia, Hypertension, Lung cancer ([address] MI (myocardial infarction) ([address] LIZETT (obstructive sleep apnea), PONV (postoperative nausea and vomiting), and stress incontinence. has a past surgical history that includes Coronary angioplasty with stent (2000, 2001); back surgery (2000, 2001); bronchoscopy (12/04/2018); pr [facility] incl fluor gdnce dx w/cell washg spx (N/A, 12/4/2018); Cholecystectomy, laparoscopic (N/A, 12/19/2018); Upper gastrointestinal endoscopy (N/A, 2/25/2019); Colonoscopy (N/A, 2/25/2019); Colonoscopy (2/25/2019); bronchoscopy (N/A, 2/27/2019); bronchoscopy (2/27/2019); bronchoscopy (2/27/2019); bronchoscopy (N/A, 8/6/2019);  Upper gastrointestinal endoscopy (N/A, 8/7/2019); bronchoscopy (N/A, 9/27/2019); bronchoscopy (9/27/2019); and bronchoscopy (9/27/2019). Restrictions  Restrictions/Precautions  Restrictions/Precautions: Fall Risk(high fall risk)  Required Braces or Orthoses?: No  Position Activity Restriction  Other position/activity restrictions: John Adan is a 70 y.o. female with past medical history of CAD, COPD, depression, diabetes, CHF, hyperlipidemia, hypertension, lung cancer, previous MI, obstructive sleep apnea and atrial fibrillation on Eliquis and aspirin who presents to the ED with complaint of weakness and fatigue. Was brought in by EMS from daughter's house. Patient apparently independent and lives on her own been normally ANO x4. Apparently has had nausea and diarrhea for the past several days. Has been feeling weak today. Apparently she was in the garage when she became weak and apparently had to be lowered to the ground by family members. They brought her into the house and apparently improved with a called EMS and brought her to the ED for further evaluation and treatment. Patient denies any specific injury or trauma from the fall. Subjective   General  Chart Reviewed: Yes  Patient assessed for rehabilitation services?: Yes  Additional Pertinent Hx: adenocarcinoma of the lung, CAD/stent, COPD, chronic anemia, chronic dCHF, DM, HTN, HLD, LIZETT (untreated), chronic AC (on Eliquis). Response to previous treatment: Patient with no complaints from previous session  Family / Caregiver Present: No  Referring Practitioner: MARIA C Goldman CNP  Diagnosis: Fatigue  Subjective  Subjective: Pt supine in bed at entry, agreeable to OT session  General Comment  Comments: RN okay for therapy   Vital Signs  Patient Currently in Pain: Denies        Objective    ADL  Grooming: Stand by assistance;Setup; Increased time to complete(oral care and face washing in stance at sink)  UE Bathing: Setup;Supervision  UE Dressing: Supervision;Setup(to doff/don hospital gown)  LE Dressing: Setup;Minimal assistance(pt able to doff/don brief with SBA; pt required Lily to don L sock seated EOB)  Toileting: Stand by assistance  Additional Comments: pt ambulated to/from bathroom w/ CGA and no AD for ADL completion. brief changed seated on commode, UB clothing/bathing completed in stance at sink. increased time to complete ADLs. Balance  Sitting Balance: Supervision  Standing Balance: Contact guard assistance(CGA-SBA)  Standing Balance  Time: ~15min total  Activity: functional mobility/transfers, ADL completion, IADL task within room  Functional Mobility  Functional - Mobility Device: No device  Activity: To/from bathroom; Other  Assist Level: Contact guard assistance  Functional Mobility Comments: pt declined use of RW this date, CGA for mobility w/ no LOB noted. ~25ft total   Toilet Transfers  Toilet - Technique: Ambulating  Equipment Used: Standard toilet  Toilet Transfer: Contact guard assistance  Toilet Transfers Comments: use of grab bar as needed  Bed mobility  Supine to Sit: Modified independent  Sit to Supine: Modified independent  Comment: use of bed rail, HOB elevated  Transfers  Sit to stand: Stand by assistance  Stand to sit: Stand by assistance  Transfer Comments: to/from EOB and commode this session         Cognition  Overall Cognitive Status: Exceptions  Arousal/Alertness: Appropriate responses to stimuli  Following Commands:  Follows one step commands consistently  Attention Span: Appears intact  Problem Solving: Assistance required to implement solutions;Assistance required to generate solutions  Insights: Decreased awareness of deficits  Initiation: Requires cues for some  Sequencing: Requires cues for some              Additional Activities Comment  Additional Activities: pt ambulated around room w/ CGA and no AD to collect items in preparation for d/c per pt request, pt required cues to problem solve effective method of packing her bag and gather all items, when directed pt demonstrated variable ability to follow multi-step directives.                   Plan   Plan  Times per week: 3-5x/wk   Times per day: Daily  Current Treatment Recommendations: Strengthening, Safety Education & Training, Balance Training, Self-Care / ADL, Patient/Caregiver Education & Training, Functional Mobility Training, Endurance Training, Equipment Evaluation, Education, & procurement      AM-PAC Score        AM-PAC Inpatient Daily Activity Raw Score: 18 (06/10/20 1310)  AM-PAC Inpatient ADL T-Scale Score : 38.66 (06/10/20 1310)  ADL Inpatient CMS 0-100% Score: 46.65 (06/10/20 1310)  ADL Inpatient CMS G-Code Modifier : CK (06/10/20 1310)    Goals  Short term goals  Time Frame for Short term goals: By Discharge   Short term goal 1: Complete LB dressing with supervision -- ongoing 6/10  Short term goal 2: Complete toileting with supervision --SBA 6/10  Short term goal 3: Complete toilet transfers with supervision--CGA 6/10  Short term goal 4: Tolerate x10 of standing ADLs in order to increase activity tolerance -- continue goal for consistency 6/10       Therapy Time   Individual Concurrent Group Co-treatment   Time In 1100         Time Out 1134         Minutes 34               Timed Code Treatment Minutes:  34  Total Treatment Minutes:  34 min total    Magaly Sommer, [facility], OTR/L #[id number]

## 2020-06-10 NOTE — PROGRESS NOTES
prior living enviornment. Treatment Diagnosis: decreased functional mobility, impaired gait, decreased balance  PT Education: PT Role;Transfer Training;Gait Training;Goals;Plan of Care; Functional Mobility Training  Patient Education: D/c recommendations. Patient verbalized understanding but likely needs reinforcement. Barriers to Learning: None   REQUIRES PT FOLLOW UP: Yes  Activity Tolerance  Activity Tolerance: Patient limited by fatigue;Patient limited by endurance  Activity Tolerance: Patient declined stair mobility secondary to LE fatigue and mild dizziness     Patient Diagnosis(es): The primary encounter diagnosis was General weakness. Diagnoses of LORRAINE (acute kidney injury) ([facility] Hyperkalemia, Anemia, unspecified type, Pneumonia due to organism, and Upper GI bleed were also pertinent to this visit. has a past medical history of Arthritis, CAD (coronary artery disease), Carpal tunnel syndrome, COPD (chronic obstructive pulmonary disease) ([address] Coronary stent, depression, Diabetes mellitus ([address] Diastolic heart failure ([address] GERD (gastroesophageal reflux disease), Hyperlipidemia, Hypertension, Lung cancer ([address] MI (myocardial infarction) ([address] LIZETT (obstructive sleep apnea), PONV (postoperative nausea and vomiting), and stress incontinence. has a past surgical history that includes Coronary angioplasty with stent (2000, 2001); back surgery (2000, 2001); bronchoscopy (12/04/2018); pr [facility] incl fluor gdnce dx w/cell washg spx (N/A, 12/4/2018); Cholecystectomy, laparoscopic (N/A, 12/19/2018); Upper gastrointestinal endoscopy (N/A, 2/25/2019); Colonoscopy (N/A, 2/25/2019); Colonoscopy (2/25/2019); bronchoscopy (N/A, 2/27/2019); bronchoscopy (2/27/2019); bronchoscopy (2/27/2019); bronchoscopy (N/A, 8/6/2019);  Upper gastrointestinal endoscopy (N/A, 8/7/2019); bronchoscopy (N/A, 9/27/2019); bronchoscopy (9/27/2019); and bronchoscopy (9/27/2019). Restrictions  Restrictions/Precautions  Restrictions/Precautions: Fall Risk(high fall risk)  Required Braces or Orthoses?: No  Position Activity Restriction  Other position/activity restrictions: Cady Cortez is a 70 y.o. female with past medical history of CAD, COPD, depression, diabetes, CHF, hyperlipidemia, hypertension, lung cancer, previous MI, obstructive sleep apnea and atrial fibrillation on Eliquis and aspirin who presents to the ED with complaint of weakness and fatigue. Was brought in by EMS from daughter's house. Patient apparently independent and lives on her own been normally ANO x4. Apparently has had nausea and diarrhea for the past several days. Has been feeling weak today. Apparently she was in the garage when she became weak and apparently had to be lowered to the ground by family members. They brought her into the house and apparently improved with a called EMS and brought her to the ED for further evaluation and treatment. Patient denies any specific injury or trauma from the fall. Subjective   General  Chart Reviewed: Yes  Subjective  Subjective: Patient denies pain, agreeable to PT session. Patient reports that she does not feel comfortable with return to home alone, believes she needs to complete a rehab stay. General Comment  Comments: Patient seated in bedside chair upon arrival on 1 L O2. Pain Screening  Patient Currently in Pain: Denies  Vital Signs - Post ambulation  Pulse: 74  BP: (!) 144/72  Patient Currently in Pain: Denies  Oxygen Therapy  SpO2: 98 %  O2 Device: Nasal cannula  O2 Flow Rate (L/min): 1 L/min       Objective   Transfers  Sit to Stand: Stand by assistance  Stand to sit: Stand by assistance  Stand Pivot Transfers: Stand by assistance  Comment: VC for proper hand placement.    RW used for external support during transfers  Ambulation  Ambulation?: Yes  Ambulation 1  Surface: level tile  Device: Rolling Walker  Assistance: Stand by Chair alarm in place  Restraints  Initially in place: No     Therapy Time   Individual Concurrent Group Co-treatment   Time In 0856         Time Out 0928         Minutes 32         Timed Code Treatment Minutes: [address] PT, DPT - [id number]

## 2020-06-10 NOTE — CARE COORDINATION
MARLIN received a call from Isatu at [facility] stating she still has not received word from [facility]. Dina Person requested updated notes as of yesterday. MARLIN stated PT saw this morning. Pt was 19/24 but recommended SNF d/t pt living alone, frequent falls and inability to ambulate steps into the home. Isatu requested fax of PT's note. MARLIN faxed note to [phone].     Electronically signed by MADIE Benavidez, JOSHUA on 6/10/2020 at 10:34 AM

## 2020-06-10 NOTE — PROGRESS NOTES
MD Nile Lawrence MD Lizabeth Citizen, MD                                  Office: [phone]                 Fax: [phone]          [facility]                     NEPHROLOGY INPATIENT PROGRESS NOTE:     PATIENT NAME: Cory Lundberg  : 1940  MRN: [id number]    Assessment:     Acute kidney injury-improved  Severe on admission Scr to 6.2  Anemia - hgb better today, follow. History of lung cancer. Generalized weakness. HTN  Better controlled   DNR-CC    Plan:     Medications reviewed. Not on diuretics. Continue PPI for now  Awaiting placement    Stable from renal for discharge. F/U w/ Dr Cristin Preciado in 2-3 weeks       Lana López MD  [facility] [address]   Phone: [phone] or [employer]  Fax: [phone]          Subjective:     Reported no active complaints     Objective:   EXAM  Vitals:    06/10/20 1519   BP:    Pulse:    Resp: 13   Temp:    SpO2: 96%       Intake/Output Summary (Last 24 hours) at 6/10/2020 1557  Last data filed at 2020 1740  Gross per 24 hour   Intake 240 ml   Output --   Net 240 ml       Awake and communicative   Neck. JVD not visible. No lymph nodes palpable. CVS.  Heart sounds are normal.Palpation of the heart is normal. No murmurs. No pericardial rub.  RS.dullness on percussion of the lower chest wall. Lung fields are clear   PA soft , bowel sounds are normal no distension and no tenderness to palpation. Musculoskeletal: Normal range of motion. 1+ edema and no tenderness. Active Problems:    Renal failure  Resolved Problems:    * No resolved hospital problems.  *        Medications Reviewed by me   linagliptin  5 mg Oral Daily    dilTIAZem  60 mg Oral BID    insulin lispro  0-6 Units Subcutaneous TID WC    insulin lispro  0-3 Units Subcutaneous Nightly    metoprolol succinate  25 mg Oral Daily    apixaban  2.5 mg Oral BID    pantoprazole  40 mg Oral QAM AC    ipratropium-albuterol  3 mL Inhalation Q4H WA    mirtazapine  15 mg Oral Nightly    sodium chloride flush  10 mL Intravenous 2 times per day    predniSONE  10 mg Oral Daily      dextrose         Data Review. Labs reviewed by me       CBC:   Recent Labs     06/08/20  0423 06/09/20  0424 06/10/20  0426   WBC 6.1 6.7 7.4   HGB 7.0* 7.1* 7.7*   HCT 22.3* 22.9* 24.9*   MCV 80.4 80.3 83.0    304 292     BMP:   Recent Labs     06/08/20  0423 06/09/20  0424 06/10/20  0426    140 138   K 4.5 4.7 4.3    106 104   CO2 25 25 22   BUN 28* 32* 26*   CREATININE 1.4* 1.4* 1.4*     Magnesium:   Lab Results   Component Value Date    MG 1.50 06/03/2020    MG 1.60 10/07/2019    MG 1.90 10/04/2019     Lab Results   Component Value Date    CREATININE 1.4 06/10/2020       Arterial Blood Gasses  No results for input(s): PH, PCO2, PO2 in the last 72 hours. Invalid input(s): [employer] Sood    UA:  No results for input(s): NITRITE, COLORU, PHUR, LABCAST, WBCUA, RBCUA, MUCUS, TRICHOMONAS, YEAST, BACTERIA, CLARITYU, SPECGRAV, LEUKOCYTESUR, UROBILINOGEN, BILIRUBINUR, BLOODU, GLUCOSEU, AMORPHOUS in the last 72 hours. Invalid input(s): [facility]    LIVER PROFILE:   No results for input(s): AST, ALT, LIPASE, BILIDIR, BILITOT, ALKPHOS in the last 72 hours. Invalid input(s):   AMYLASE,  ALB  PT/INR:    Lab Results   Component Value Date    PROTIME 20.1 05/31/2020    PROTIME 18.0 09/27/2019    PROTIME 19.1 08/06/2019    INR 1.72 05/31/2020    INR 1.58 09/27/2019    INR 1.68 08/06/2019     PTT:    Lab Results   Component Value Date    APTT 30.5 12/04/2018     ROXANNE:  No results found for: ANATITER, ROXANNE  CHEMISTRY COMMON GROUP :   Lab Results   Component Value Date    GLUCOSE 139 06/10/2020    TSH 2.97 07/05/2013     Recent Labs     06/08/20 0423 06/09/20  0424 06/10/20  0426   GLUCOSE 137* 179* 139*   CALCIUM 9.1 9.0 9.1       Electronically Signed: Anna Hernandez MD 6/10/2020 3:57 PM

## 2020-06-11 LAB
ANION GAP SERPL CALCULATED.3IONS-SCNC: 10 MMOL/L (ref 3–16)
BUN BLDV-MCNC: 25 MG/DL (ref 7–20)
CALCIUM SERPL-MCNC: 9 MG/DL (ref 8.3–10.6)
CHLORIDE BLD-SCNC: 103 MMOL/L (ref 99–110)
CO2: 25 MMOL/L (ref 21–32)
CREAT SERPL-MCNC: 1.3 MG/DL (ref 0.6–1.2)
GFR AFRICAN AMERICAN: 48
GFR NON-AFRICAN AMERICAN: 40
GLUCOSE BLD-MCNC: 134 MG/DL (ref 70–99)
GLUCOSE BLD-MCNC: 139 MG/DL (ref 70–99)
GLUCOSE BLD-MCNC: 197 MG/DL (ref 70–99)
GLUCOSE BLD-MCNC: 231 MG/DL (ref 70–99)
GLUCOSE BLD-MCNC: 251 MG/DL (ref 70–99)
HCT VFR BLD CALC: 22.1 % (ref 36–48)
HEMOGLOBIN: 7 G/DL (ref 12–16)
MCH RBC QN AUTO: 25.1 PG (ref 26–34)
MCHC RBC AUTO-ENTMCNC: 31.6 G/DL (ref 31–36)
MCV RBC AUTO: 79.5 FL (ref 80–100)
PDW BLD-RTO: 18.8 % (ref 12.4–15.4)
PERFORMED ON: ABNORMAL
PLATELET # BLD: 317 K/UL (ref 135–450)
PMV BLD AUTO: 7.2 FL (ref 5–10.5)
POTASSIUM SERPL-SCNC: 4.1 MMOL/L (ref 3.5–5.1)
RBC # BLD: 2.78 M/UL (ref 4–5.2)
SODIUM BLD-SCNC: 138 MMOL/L (ref 136–145)
WBC # BLD: 7.4 K/UL (ref 4–11)

## 2020-06-11 PROCEDURE — 97110 THERAPEUTIC EXERCISES: CPT

## 2020-06-11 PROCEDURE — 6370000000 HC RX 637 (ALT 250 FOR IP): Performed by: FAMILY MEDICINE

## 2020-06-11 PROCEDURE — 6360000002 HC RX W HCPCS: Performed by: INTERNAL MEDICINE

## 2020-06-11 PROCEDURE — 6370000000 HC RX 637 (ALT 250 FOR IP): Performed by: NURSE PRACTITIONER

## 2020-06-11 PROCEDURE — 6370000000 HC RX 637 (ALT 250 FOR IP): Performed by: PHYSICIAN ASSISTANT

## 2020-06-11 PROCEDURE — 97530 THERAPEUTIC ACTIVITIES: CPT

## 2020-06-11 PROCEDURE — 2580000003 HC RX 258: Performed by: FAMILY MEDICINE

## 2020-06-11 PROCEDURE — 2580000003 HC RX 258: Performed by: INTERNAL MEDICINE

## 2020-06-11 PROCEDURE — 2700000000 HC OXYGEN THERAPY PER DAY

## 2020-06-11 PROCEDURE — 94640 AIRWAY INHALATION TREATMENT: CPT

## 2020-06-11 PROCEDURE — 6370000000 HC RX 637 (ALT 250 FOR IP): Performed by: INTERNAL MEDICINE

## 2020-06-11 PROCEDURE — 97116 GAIT TRAINING THERAPY: CPT

## 2020-06-11 PROCEDURE — 80048 BASIC METABOLIC PNL TOTAL CA: CPT

## 2020-06-11 PROCEDURE — 85027 COMPLETE CBC AUTOMATED: CPT

## 2020-06-11 PROCEDURE — 94761 N-INVAS EAR/PLS OXIMETRY MLT: CPT

## 2020-06-11 PROCEDURE — 2060000000 HC ICU INTERMEDIATE R&B

## 2020-06-11 RX ORDER — ACARBOSE 50 MG/1
100 TABLET ORAL
Status: DISCONTINUED | OUTPATIENT
Start: 2020-06-11 | End: 2020-06-11

## 2020-06-11 RX ORDER — ACARBOSE 50 MG/1
25 TABLET ORAL
Status: DISCONTINUED | OUTPATIENT
Start: 2020-06-11 | End: 2020-06-15 | Stop reason: HOSPADM

## 2020-06-11 RX ORDER — ACARBOSE 50 MG/1
25 TABLET ORAL
Status: DISCONTINUED | OUTPATIENT
Start: 2020-06-11 | End: 2020-06-11

## 2020-06-11 RX ORDER — ACARBOSE 100 MG/1
100 TABLET ORAL
Qty: 90 TABLET | Refills: 3 | Status: SHIPPED | OUTPATIENT
Start: 2020-06-11 | End: 2020-06-15 | Stop reason: HOSPADM

## 2020-06-11 RX ADMIN — LINAGLIPTIN 5 MG: 5 TABLET, FILM COATED ORAL at 10:55

## 2020-06-11 RX ADMIN — APIXABAN 2.5 MG: 5 TABLET, FILM COATED ORAL at 10:55

## 2020-06-11 RX ADMIN — METOPROLOL SUCCINATE 25 MG: 25 TABLET, FILM COATED, EXTENDED RELEASE ORAL at 10:54

## 2020-06-11 RX ADMIN — DILTIAZEM HYDROCHLORIDE 60 MG: 60 CAPSULE, EXTENDED RELEASE ORAL at 10:55

## 2020-06-11 RX ADMIN — Medication 10 ML: at 22:01

## 2020-06-11 RX ADMIN — PREDNISONE 10 MG: 10 TABLET ORAL at 10:55

## 2020-06-11 RX ADMIN — INSULIN LISPRO 3 UNITS: 100 INJECTION, SOLUTION INTRAVENOUS; SUBCUTANEOUS at 18:40

## 2020-06-11 RX ADMIN — SODIUM CHLORIDE 200 MG: 9 INJECTION, SOLUTION INTRAVENOUS at 13:30

## 2020-06-11 RX ADMIN — Medication 10 ML: at 13:31

## 2020-06-11 RX ADMIN — ACARBOSE 25 MG: 50 TABLET ORAL at 18:40

## 2020-06-11 RX ADMIN — IPRATROPIUM BROMIDE AND ALBUTEROL SULFATE 3 ML: .5; 3 SOLUTION RESPIRATORY (INHALATION) at 20:54

## 2020-06-11 RX ADMIN — IPRATROPIUM BROMIDE AND ALBUTEROL SULFATE 3 ML: .5; 3 SOLUTION RESPIRATORY (INHALATION) at 08:16

## 2020-06-11 RX ADMIN — MIRTAZAPINE 15 MG: 15 TABLET, FILM COATED ORAL at 21:59

## 2020-06-11 RX ADMIN — INSULIN LISPRO 2 UNITS: 100 INJECTION, SOLUTION INTRAVENOUS; SUBCUTANEOUS at 22:01

## 2020-06-11 RX ADMIN — APIXABAN 2.5 MG: 5 TABLET, FILM COATED ORAL at 21:59

## 2020-06-11 RX ADMIN — IPRATROPIUM BROMIDE AND ALBUTEROL SULFATE 3 ML: .5; 3 SOLUTION RESPIRATORY (INHALATION) at 11:57

## 2020-06-11 RX ADMIN — INSULIN LISPRO 1 UNITS: 100 INJECTION, SOLUTION INTRAVENOUS; SUBCUTANEOUS at 13:36

## 2020-06-11 RX ADMIN — DILTIAZEM HYDROCHLORIDE 60 MG: 60 CAPSULE, EXTENDED RELEASE ORAL at 21:59

## 2020-06-11 RX ADMIN — PANTOPRAZOLE SODIUM 40 MG: 40 TABLET, DELAYED RELEASE ORAL at 05:21

## 2020-06-11 ASSESSMENT — PAIN SCALES - GENERAL
PAINLEVEL_OUTOF10: 0

## 2020-06-11 NOTE — PROGRESS NOTES
Physical Therapy  Facility/Department: [address]  Daily Treatment Note  NAME: Isacc Patino  : 1940  MRN: [id number]    Date of Service: 2020    Discharge Recommendations:  Isacc Patino scored a 17/24 on the AM-PAC short mobility form. Current research shows that an AM-PAC score of 17 or less is typically not associated with a discharge to the patient's home setting. Based on the patient's AM-PAC score and their current functional mobility deficits, it is recommended that the patient have 3-5 sessions per week of Physical Therapy at d/c to increase the patient's independence. At this time, this patient lacks the endurance, and/or tolerance for 3 hours of therapy/day, 5-7x/wk and would benefit most from a follow up treatment frequency of 3-5x/wk. Please see assessment section for further patient specific details. Patient lives alone and at this time requires assistance with household mobility tasks. Pt has a history of falling, lives in a multilevel home with inability to complete steps, and has ongoing dizziness in standing position. If patient discharges prior to next session this note will serve as a discharge summary. Please see below for the latest assessment towards goals. PT Equipment Recommendations  Equipment Needed: No  Other: Patient has a walker and hospital bed at home. Assessment   Body structures, Functions, Activity limitations: Decreased functional mobility ; Decreased strength;Decreased endurance;Decreased safe awareness;Decreased balance;Decreased ADL status; Decreased posture  Assessment: Patient presenting with decreased ambulation distance on this date and continued inability to ascend/descend stairs at this time due to ongoing c/o dizziness and increased fatigue most likely due to low Hgb. Patient lives home alone and at this time would benefit from 24 hr assist and continued therapy services to improve safety and regain functional independence. Treatment Diagnosis: decreased functional mobility, impaired gait, decreased balance  Prognosis: Good  Clinical Presentation: evolving  PT Education: PT Role;Transfer Training;Gait Training;Goals;Plan of Care; Functional Mobility Training;Family Education  Patient Education: Extensive patient and family education regarding barriers to discharge, current functional level, and d/c recommendations. Patient and daughter verbalized understanding   Barriers to Learning: None   REQUIRES PT FOLLOW UP: Yes  Activity Tolerance  Activity Tolerance: Patient limited by fatigue;Patient limited by endurance; Other  Activity Tolerance: Patient presents with significant fatigue within therapy session limiting ambulation distance and ability to complete stairs. Ongoing c/o dizziness. Vitals WFL with use of 1 L supplemental O2 use. Patient Diagnosis(es): The primary encounter diagnosis was General weakness. Diagnoses of LORRAINE (acute kidney injury) ([address] Hyperkalemia, Anemia, unspecified type, Pneumonia due to organism, and Upper GI bleed were also pertinent to this visit. has a past medical history of Arthritis, CAD (coronary artery disease), Carpal tunnel syndrome, COPD (chronic obstructive pulmonary disease) ([address] Coronary stent, depression, Diabetes mellitus ([address] Diastolic heart failure ([address] GERD (gastroesophageal reflux disease), Hyperlipidemia, Hypertension, Lung cancer ([address] MI (myocardial infarction) ([address] LIZETT (obstructive sleep apnea), PONV (postoperative nausea and vomiting), and stress incontinence. has a past surgical history that includes Coronary angioplasty with stent (2000, 2001); back surgery (2000, 2001); bronchoscopy (12/04/2018); [facility]g spx (N/A, 12/4/2018); Cholecystectomy, laparoscopic (N/A, 12/19/2018); Upper gastrointestinal endoscopy (N/A, 2/25/2019); Colonoscopy (N/A, 2/25/2019);  Colonoscopy (2/25/2019); bronchoscopy (N/A, 2/27/2019); bronchoscopy (2/27/2019); bronchoscopy (2/27/2019); bronchoscopy (N/A, 8/6/2019); Upper gastrointestinal endoscopy (N/A, 8/7/2019); bronchoscopy (N/A, 9/27/2019); bronchoscopy (9/27/2019); and bronchoscopy (9/27/2019). Restrictions  Restrictions/Precautions  Restrictions/Precautions: Fall Risk(high fall risk)  Required Braces or Orthoses?: No  Position Activity Restriction  Other position/activity restrictions: Sanya Sweet is a 70 y.o. female with past medical history of CAD, COPD, depression, diabetes, CHF, hyperlipidemia, hypertension, lung cancer, previous MI, obstructive sleep apnea and atrial fibrillation on Eliquis and aspirin who presents to the ED with complaint of weakness and fatigue. Was brought in by EMS from daughter's house. Patient apparently independent and lives on her own been normally ANO x4. Apparently has had nausea and diarrhea for the past several days. Has been feeling weak today. Apparently she was in the garage when she became weak and apparently had to be lowered to the ground by family members. They brought her into the house and apparently improved with a called EMS and brought her to the ED for further evaluation and treatment. Patient denies any specific injury or trauma from the fall. Subjective   General  Chart Reviewed: Yes  Family / Caregiver Present: Yes(Daughter)  Subjective  Subjective: Patient denies pain. Agreeable to therapy session. Patient and family reporting that they do not believe she is safe to return home alone, ongoing goal for rehab stay. General Comment  Comments: Patient supine in bed upon arrival on 1 L O2. Patients Hgb: 7.0 - RN cleared for activity as tolerated. Vital Signs  Pulse: 79  BP: (!) 149/74  BP Location: Right upper arm  Oxygen Therapy  SpO2: 98 %  O2 Device: Nasal cannula       Cognition   Cognition  Overall Cognitive Status: Exceptions  Arousal/Alertness: Appropriate responses to stimuli  Following Commands:  Follows one step commands consistently  Attention Span: Appears intact  Problem Solving: Assistance required to implement solutions;Assistance required to generate solutions  Insights: Decreased awareness of deficits  Initiation: Requires cues for some  Sequencing: Requires cues for some    Objective   Bed mobility  Rolling to Left: Supervision  Rolling to Right: Supervision  Supine to Sit: Supervision  Sit to Supine: Supervision  Scooting: Supervision  Comment: HOB elevated ~ 30* with use of HR to simulate home enviornment. Patient requires VC for movement sequence, completes without physical assistance. Transfers  Sit to Stand: Stand by assistance  Stand to sit: Stand by assistance  Stand Pivot Transfers: Stand by assistance(recliner <=> bed with RW for external support)  Comment: RW used for external support. Reports dizziness sensation in standing position which resolves with seated rest breaks. Ambulation  Ambulation?: Yes  Ambulation 1  Surface: level tile  Device: Rolling Walker  Other Apparatus: O2(1 L)  Assistance: Contact guard assistance  Quality of Gait: Decreased catalina, reduced stride length (B) LE. Consistent FERDINAND with no evidence of balance loss but presents with increased fatigue on this date resulting in decreased ambulation distance. Distance: 60 ft   Comments: Reports of dizziness during ambulation, vitals WFL (see vital section). Stairs/Curb  Stairs?: No(held secondary to patient fatigue, c/o dizziness, and low hgb.)     Balance  Posture: Good  Sitting - Static: Good  Sitting - Dynamic: Good  Standing - Static: Fair;+  Standing - Dynamic: Fair;-(CGA/SBA with (B) UE support)      AM-PAC Score  AM-PAC Inpatient Mobility Raw Score : 17 (06/11/20 1448)  AM-PAC Inpatient T-Scale Score : 42.13 (06/11/20 1448)  Mobility Inpatient CMS 0-100% Score: 50.57 (06/11/20 1448)  Mobility Inpatient CMS G-Code Modifier : CK (06/11/20 1448)     Goals  Short term goals  Time Frame for Short term goals:  To be met prior to

## 2020-06-11 NOTE — PROGRESS NOTES
Physical Therapy    Shanika Ty  Attempted to see patient for PT treatment. Patient declines therapy session at this time. Requesting therapy session to be later in day when family is present. Will follow up with patient as schedule permits.     Thanks, Matteo Teran PT, DPT - [id number]

## 2020-06-11 NOTE — PROGRESS NOTES
Occupational Therapy  Facility/Department: [address]  Daily Treatment Note  NAME: Kyleigh Chow  : 1940  MRN: [id number]    Date of Service: 2020    Discharge Recommendations: Kyleigh Chow scored a 19/24 on the AM-PAC ADL Inpatient form. Current research shows that an AM-PAC score of 17 or less is typically not associated with a discharge to the patient's home setting. Based on the patient's AM-PAC score and their current ADL deficits, it is recommended that the patient have 3-5 sessions per week of Occupational Therapy at d/c to increase the patient's independence. At this time, this patient lacks the endurance, and/or tolerance for 3 hours of therapy/day, 5-7x/wk and would benefit most from a follow up treatment frequency of 3-5x/wk. Please see assessment section for further patient specific details. High AM-PAC score however Patient lives alone and at this time requires assistance with household mobility tasks. Pt has a history of falling, lives in a multilevel home, and has ongoing dizziness in standing position. Pt presents with low activity tolerance and has demonstrated some difficulty with higher level executive functioning/motor planning tasks. If patient discharges prior to next session this note will serve as a discharge summary. Please see below for the latest assessment towards goals. OT Equipment Recommendations  Equipment Needed: Yes  Other: pt reports owning shower chair and hand-held shower; ADL AE such as reacher may be beneficial for LB ADLs and for increased IADL safety. Assessment   Performance deficits / Impairments: Decreased functional mobility ; Decreased endurance;Decreased ADL status; Decreased strength;Decreased balance  Assessment: Pt presents with the above deficits impacting daily occupational performance and would benefit from continued skilled OT services.  Pt has progressed w/ ADL performance and functional mobility however required frequent rest breaks. Pt noted to have difficulty motor planning higher level movement sequences and higher level IADL tasks. Pt's family has concerns about pt's ability to safely return home, her \"stamina\" and expressed pt has history of multiple falls. Pt would benefit from continued OT services in order to maximize iindependence and safety. Prognosis: Good  OT Education: OT Role;Plan of Care  Patient Education: education on safety during mobility/hospital stafety rules, purpose of UE TE; Pt verbalized understanding but would benefit from continued reinforcement for carryover  REQUIRES OT FOLLOW UP: Yes  Activity Tolerance  Activity Tolerance: Patient limited by fatigue;Treatment limited secondary to medical complications (free text)  Activity Tolerance: Pt taken off 1L O2 for session, pt's O2 94-97% on room air. Rest breaks taken throughout session w/ pt reporting dizziness. /74 seated and 111/64 post 3 min stance. Safety Devices  Safety Devices in place: Yes  Type of devices: Nurse notified;Call light within reach; Left in bed;Bed alarm in place; All fall risk precautions in place         Patient Diagnosis(es): The primary encounter diagnosis was General weakness. Diagnoses of LORRAINE (acute kidney injury) ([address] Hyperkalemia, Anemia, unspecified type, Pneumonia due to organism, and Upper GI bleed were also pertinent to this visit. has a past medical history of Arthritis, CAD (coronary artery disease), Carpal tunnel syndrome, COPD (chronic obstructive pulmonary disease) ([address] Coronary stent, depression, Diabetes mellitus ([address] Diastolic heart failure ([address] GERD (gastroesophageal reflux disease), Hyperlipidemia, Hypertension, Lung cancer ([address] MI (myocardial infarction) ([address] LIZETT (obstructive sleep apnea), PONV (postoperative nausea and vomiting), and stress incontinence.    has a past surgical history that includes Coronary angioplasty with stent (2000, 2001); back surgery (2000, 2001); bronchoscopy L Helferich, APRN - CNP  Diagnosis: Fatigue  Subjective  Subjective: Pt seated in chair at entry, agreeable to OT session w/ encouragement  General Comment  Comments: RN okay for therapy   Vital Signs  Patient Currently in Pain: Denies        Objective    ADL  LE Dressing: Supervision(to doff/don socks EOB and seated in chair)  Additional Comments: Pt declined all other ADLs this date        Balance  Sitting Balance: Modified independent   Standing Balance: Contact guard assistance(SBA-CGA)  Standing Balance  Time: ~16min total  Activity: functional mobility, transfers, TE in stance  Comment: intermitten use of RW during session, minor LOB during narrow base of support activity in stance w/ pt able to self-correct  Functional Mobility  Activity: Other  Assist Level: Stand by assistance  Functional Mobility Comments: intermittent use of RW this date, ~6ft total this session chair<>EOB  Bed mobility  Sit to Supine: Supervision  Scooting: Supervision  Comment: cues for scooting hips when in supine, HOB flat  Transfers  Sit to stand: Supervision  Stand to sit: Supervision  Transfer Comments: to/from recliner x4 to/from EOB x2              Cognition  Overall Cognitive Status: Exceptions  Arousal/Alertness: Appropriate responses to stimuli  Following Commands: Follows one step commands consistently  Attention Span: Appears intact  Problem Solving: Assistance required to implement solutions;Assistance required to generate solutions  Insights: Decreased awareness of deficits  Initiation: Requires cues for some  Sequencing: Requires cues for some  Cognition Comment: difficulty w/ motor planning complex AROM sequences           Type of ROM/Therapeutic Exercise  Type of ROM/Therapeutic Exercise: AROM  Comment: pt completed B UE AROM in stance w/o RW support to address balance and activity tolerance.  Pt completed 15 reps x1 w/ feet hip width apart of shoulder flexion, forward punches, and elbow flexion, Pt completed 10 reps x1 of shoulder flexion and forward punches w/ feet together to challenge balance; Pt completed dynamic TE requiring pt to step forward w/ 1 LE, complete shoulder flexion w/ B UE, and then step back (alternating R and L LEs)- 10 reps w/ pt demonstrating difficulty sequencing complex movements. Frequent rest break required.                     Plan   Plan  Times per week: 3-5x/wk   Times per day: Daily  Current Treatment Recommendations: Strengthening, Safety Education & Training, Balance Training, Self-Care / ADL, Patient/Caregiver Education & Training, Functional Mobility Training, Endurance Training, Equipment Evaluation, Education, & procurement    AM-PAC Score        AM-PAC Inpatient Daily Activity Raw Score: 18 (06/10/20 1310)  AM-PAC Inpatient ADL T-Scale Score : 38.66 (06/10/20 1310)  ADL Inpatient CMS 0-100% Score: 46.65 (06/10/20 1310)  ADL Inpatient CMS G-Code Modifier : CK (06/10/20 1310)    Goals  Short term goals  Time Frame for Short term goals: By Discharge   Short term goal 1: Complete LB dressing with supervision -- goal met 6/11  Short term goal 2: Complete toileting with supervision --SBA 6/10, not addressed 6/11  Short term goal 3: Complete toilet transfers with supervision--CGA 6/10, not addressed 6/11  Short term goal 4: Tolerate x10 of standing ADLs in order to increase activity tolerance -- continue goal for consistency 6/10, not addressed 6/11       Therapy Time   Individual Concurrent Group Co-treatment   Time In [address] [place] [address] Place         Time Out 1516         Minutes 40              Timed Code Treatment Minutes:  40  Total Treatment Minutes:  [id number], [address], [facility] [address]

## 2020-06-11 NOTE — CARE COORDINATION
Notified MD via [employer] that [employer] precert was denied. MD P2P available until 4pm today, Call [phone],   Reference # [id number],   [employer] ID #[id number].  MD declined P2P option. Notified daughter/ family can Appeal by calling [phone].

## 2020-06-11 NOTE — CARE COORDINATION
Spoke with RN, and patient's daughters, Vicki Grey and  Irma Heart. They are  aware of the [employer] precert denial.  They do not plan to Appeal, but will take their mother home with [facility]. services at discharge after 1 dose of IV iron today if medically stable.

## 2020-06-11 NOTE — PROGRESS NOTES
Hospitalist Progress Note      PCP: Geo Escudero    Date of Admission: 5/31/2020    Chief Complaint: Weakness    Hospital Course: Mated with acute renal failure, anemia. Slow improvement noted. Renal function stabilized. Cleared for discharge home with home care. Patient and family wish to consider skilled nursing facility due to patient living alone at home and unable to take care of herself. Borderline low hemoglobin noted. Iron studies show low iron, low TIBC and borderline low saturation. Subjective: Weakness. No chest pain, no shortness of breath, no nausea, no vomiting. Remains stable with no new issues. Medications:  Reviewed    Infusion Medications    dextrose       Scheduled Medications    iron sucrose (VENOFER) iv piggyback 100 mL (Admin over 60 minutes)  200 mg Intravenous Once    linagliptin  5 mg Oral Daily    dilTIAZem  60 mg Oral BID    insulin lispro  0-6 Units Subcutaneous TID WC    insulin lispro  0-3 Units Subcutaneous Nightly    metoprolol succinate  25 mg Oral Daily    apixaban  2.5 mg Oral BID    pantoprazole  40 mg Oral QAM AC    ipratropium-albuterol  3 mL Inhalation Q4H WA    mirtazapine  15 mg Oral Nightly    sodium chloride flush  10 mL Intravenous 2 times per day    predniSONE  10 mg Oral Daily     PRN Meds: sodium chloride flush, acetaminophen **OR** acetaminophen, polyethylene glycol, promethazine **OR** ondansetron, glucose, dextrose, glucagon (rDNA), dextrose      Intake/Output Summary (Last 24 hours) at 6/11/2020 0929  Last data filed at 6/10/2020 1800  Gross per 24 hour   Intake 960 ml   Output --   Net 960 ml       Physical Exam Performed:    BP (!) 152/63   Pulse 69   Temp 97.8 °F (36.6 °C) (Oral)   Resp 16   Ht 5' 5\" (1.651 m)   Wt 146 lb 4.8 oz (66.4 kg)   SpO2 97%   BMI 24.35 kg/m²     General appearance: No apparent distress, appears stated age. Appears tired. HEENT: Pupils equal, round, and reactive to light. Conjunctivae/corneas clear. Neck: Supple, with full range of motion. No jugular venous distention. Trachea midline. Respiratory:  Normal respiratory effort. Clear to auscultation, bilaterally without Rales/Wheezes/Rhonchi. Cardiovascular: Regular rate and rhythm with normal S1/S2 without murmurs, rubs or gallops. Abdomen: Soft, non-tender, non-distended with normal bowel sounds. Musculoskeletal: No clubbing, cyanosis or edema bilaterally. Full range of motion without deformity. Skin: Skin color, texture, turgor normal.  No rashes or lesions. Neurologic:  Neurovascularly intact without any focal sensory/motor deficits. Cranial nerves: II-XII intact, grossly non-focal.  Psychiatric: Alert, cooperative. No new issues. Capillary Refill: Brisk,< 3 seconds   Peripheral Pulses: +2 palpable, equal bilaterally     I examined the patient today (06/11/20). Physical exam similar to yesterday (6/10). Labs:   Recent Labs     06/09/20  0424 06/10/20  0426 06/11/20 0426   WBC 6.7 7.4 7.4   HGB 7.1* 7.7* 7.0*   HCT 22.9* 24.9* 22.1*    292 317     Recent Labs     06/09/20  0424 06/10/20  0426 06/11/20  0426    138 138   K 4.7 4.3 4.1    104 103   CO2 25 22 25   BUN 32* 26* 25*   CREATININE 1.4* 1.4* 1.3*   CALCIUM 9.0 9.1 9.0     No results for input(s): AST, ALT, BILIDIR, BILITOT, ALKPHOS in the last 72 hours. No results for input(s): INR in the last 72 hours. No results for input(s): Harleen Spice in the last 72 hours. Urinalysis:      Lab Results   Component Value Date    NITRU Negative 05/31/2020    WBCUA 3 05/31/2020    BACTERIA 2+ 05/31/2020    RBCUA 4 05/31/2020    BLOODU Negative 05/31/2020    SPECGRAV 1.012 05/31/2020    GLUCOSEU Negative 05/31/2020       Radiology:  US RENAL COMPLETE   Final Result   1. Simple bilateral renal cysts.   Otherwise, unremarkable sonographic   appearance of the bilateral kidneys, without evidence of a renal calculus,   hydronephrosis, or solid renal mass.   2. Urinary bladder collapsed around a Means catheter, limiting its evaluation. XR CHEST PORTABLE   Final Result   1. Mass density left hilum similar appearance to previous exam   2. New pulmonary opacity right upper lobe could represent an infectious   process. However, follow-up suggested to ensure resolution   3. Previously noted left midlung opacity has organized into a linear density   most likely representing scarring. CTA HEAD NECK W CONTRAST   Final Result   Addendum 1 of 1   ADDENDUM:   3D reconstructed images were performed on a separate workstation and    provided   for review. Final      CT Head WO Contrast   Final Result   No acute intracranial abnormality. Chronic microvascular ischemic changes and global cerebral atrophy. Findings were discussed with Michael Johansen at 3:23 pm on 5/31/2020. Assessment/Plan:    Active Hospital Problems    Diagnosis    Renal failure [N19]     PLAN:    Acute renal failure on CKD stage III  Admitted with creatinine of 6.2. Most likely multifactorial, mainly hypovolemia secondary to blood loss as well as IV contrast exposure. Creatinine stable at 1.3-1.4, patient's baseline  Nephrology following. Cleared for discharge    Acute on chronic anemia  Part of it could be caused by renal failure and malignancy combined. Latest iron studies are suspicious for combination of chronic illness and iron deficiency. I will give 1 dose of IV iron today. May require transfusion in next day or two. Continue to monitor. Non-small cell lung cancer  New opacity in right upper lobe, in the absence of infection, is consistent with disease recurrence. Cannot do further work-up as patient declines work-up or treatment. Chronic respiratory failure, oxygen dependent, secondary to advanced COPD  Continue oxygen supplementation.   No evidence of acute exacerbation    Diabetes mellitus type 2 with hyperglycemia  On oral antidiabetics, mainly because patient would not be capable of managing insulin at home. Otherwise, she would need maintenance insulin. Difficult to control blood sugar. Noted progressive hyperglycemia towards evening, once patient starts eating during the day. I will add acarbose, hoping to blunt postprandial hyperglycemia. Blood sugar is acceptable, given underlying conditions and patient's personal circumstances. Post-discharge medications are already planned and prescribed. Hypertension  Controlled blood pressure. Continue to monitor    Paroxysmal atrial fibrillation  On Eliquis, diltiazem and beta-blocker. Controlled rate. Continue to monitor    Discussed with nursing. Updates appreciated    DVT Prophylaxis: Eliquis  Diet: DIET CARB CONTROL; Carb Control: 4 carb choices (60 gms)/meal  Code Status: DNR-CC    PT/OT Eval Status: In process    Dispo -awaiting insurance approval for skilled nursing facility. If denied, we will proceed with home health care. Krystina Chow MD         ADDENDUM  Received a denial notice from insurance  I agree that the patient is not medically appropriate for skilled nursing facility. Long-term care is appropriate, which seems to be outside insurance coverage. Discussed with patient's family members over the phone  No medical benefit from further discussions with insurance company. Was previously in hospice because of end-stage COPD. Going back to hospice, especially in the light of worsening chest x-ray findings indicative of advancing malignant disease, is reasonable.       Electronically signed by Krystina Chow MD on 6/11/2020 at 1:34 PM

## 2020-06-11 NOTE — PROGRESS NOTES
Edwina Rubinstein, MD Drake Aas, MD Denton Alm, MD                                  Office: [phone]                 Fax: [phone]          [facility]                     NEPHROLOGY INPATIENT PROGRESS NOTE:     PATIENT NAME: Cady Cortez  : 1940  MRN: [id number]    Assessment:     Acute kidney injury-improved  Severe on admission Scr to 6.2  Anemia - hgb lower today, follow. Per Medicine. History of Non-small Cell Lung CA. Generalized weakness. HTN  Better controlled   DNR-CC    Plan:     Medications reviewed. Not on diuretics. Continue PPI for now  Awaiting placement    Stable from renal perspective  F/U w/ Dr Meg Kelley in 2-3 weeks       Jalyn Antoine MD  [facility] [address]   Phone: [phone] or [employer]  Fax: [phone]          Subjective:     Reported no active complaints     Objective:   EXAM  Vitals:    20 1030   BP: (!) 155/65   Pulse: 74   Resp: 16   Temp: 97.5 °F (36.4 °C)   SpO2: 97%       Intake/Output Summary (Last 24 hours) at 2020 1116  Last data filed at 6/10/2020 1800  Gross per 24 hour   Intake 720 ml   Output --   Net 720 ml       Awake and communicative   Neck. JVD not visible. No lymph nodes palpable. CVS.  Heart sounds are normal.Palpation of the heart is normal. No murmurs. No pericardial rub.  RS.dullness on percussion of the lower chest wall. Lung fields are clear   PA soft , bowel sounds are normal no distension and no tenderness to palpation. Musculoskeletal: Normal range of motion. 1+ edema and no tenderness. Active Problems:    Renal failure  Resolved Problems:    * No resolved hospital problems.  *        Medications Reviewed by me   iron sucrose (VENOFER) iv piggyback 100 mL (Admin over 60 minutes)  200 mg Intravenous Once    acarbose  25 mg Oral TID     linagliptin  5 mg Oral Daily    dilTIAZem  60 mg Oral BID    insulin lispro  0-6 Units Subcutaneous TID     insulin lispro 0-3 Units Subcutaneous Nightly    metoprolol succinate  25 mg Oral Daily    apixaban  2.5 mg Oral BID    pantoprazole  40 mg Oral QAM AC    ipratropium-albuterol  3 mL Inhalation Q4H WA    mirtazapine  15 mg Oral Nightly    sodium chloride flush  10 mL Intravenous 2 times per day    predniSONE  10 mg Oral Daily      dextrose         Data Review. Labs reviewed by me       CBC:   Recent Labs     06/09/20  0424 06/10/20  0426 06/11/20  0426   WBC 6.7 7.4 7.4   HGB 7.1* 7.7* 7.0*   HCT 22.9* 24.9* 22.1*   MCV 80.3 83.0 79.5*    292 317     BMP:   Recent Labs     06/09/20  0424 06/10/20  0426 06/11/20  0426    138 138   K 4.7 4.3 4.1    104 103   CO2 25 22 25   BUN 32* 26* 25*   CREATININE 1.4* 1.4* 1.3*     Magnesium:   Lab Results   Component Value Date    MG 1.50 06/03/2020    MG 1.60 10/07/2019    MG 1.90 10/04/2019     Lab Results   Component Value Date    CREATININE 1.3 06/11/2020       Arterial Blood Gasses  No results for input(s): PH, PCO2, PO2 in the last 72 hours. Invalid input(s): Woodruff Deed    UA:  No results for input(s): NITRITE, COLORU, PHUR, LABCAST, WBCUA, RBCUA, MUCUS, TRICHOMONAS, YEAST, BACTERIA, CLARITYU, SPECGRAV, LEUKOCYTESUR, UROBILINOGEN, BILIRUBINUR, BLOODU, GLUCOSEU, AMORPHOUS in the last 72 hours. Invalid input(s): Swetha Kinza    LIVER PROFILE:   No results for input(s): AST, ALT, LIPASE, BILIDIR, BILITOT, ALKPHOS in the last 72 hours. Invalid input(s):   AMYLASE,  ALB  PT/INR:    Lab Results   Component Value Date    PROTIME 20.1 05/31/2020    PROTIME 18.0 09/27/2019    PROTIME 19.1 08/06/2019    INR 1.72 05/31/2020    INR 1.58 09/27/2019    INR 1.68 08/06/2019     PTT:    Lab Results   Component Value Date    APTT 30.5 12/04/2018     ROXANNE:  No results found for: ANATITERROXANNE  CHEMISTRY COMMON GROUP :   Lab Results   Component Value Date    GLUCOSE 139 06/11/2020    TSH 2.97 07/05/2013     Recent Labs     06/09/20  0424 06/10/20  0426 06/11/20  0426   GLUCOSE 179* 139* 139*   CALCIUM 9.0 9.1 9.0       Electronically Signed: Jalyn Antoine MD 6/11/2020 11:16 AM

## 2020-06-12 LAB
ABO/RH: NORMAL
ANION GAP SERPL CALCULATED.3IONS-SCNC: 10 MMOL/L (ref 3–16)
ANTIBODY SCREEN: NORMAL
BLOOD BANK DISPENSE STATUS: NORMAL
BLOOD BANK PRODUCT CODE: NORMAL
BPU ID: NORMAL
BUN BLDV-MCNC: 24 MG/DL (ref 7–20)
CALCIUM SERPL-MCNC: 8.9 MG/DL (ref 8.3–10.6)
CHLORIDE BLD-SCNC: 104 MMOL/L (ref 99–110)
CO2: 25 MMOL/L (ref 21–32)
CREAT SERPL-MCNC: 1.4 MG/DL (ref 0.6–1.2)
DESCRIPTION BLOOD BANK: NORMAL
GFR AFRICAN AMERICAN: 44
GFR NON-AFRICAN AMERICAN: 36
GLUCOSE BLD-MCNC: 130 MG/DL (ref 70–99)
GLUCOSE BLD-MCNC: 145 MG/DL (ref 70–99)
GLUCOSE BLD-MCNC: 151 MG/DL (ref 70–99)
GLUCOSE BLD-MCNC: 276 MG/DL (ref 70–99)
GLUCOSE BLD-MCNC: 292 MG/DL (ref 70–99)
HCT VFR BLD CALC: 21.5 % (ref 36–48)
HEMOGLOBIN: 6.8 G/DL (ref 12–16)
MCH RBC QN AUTO: 25.2 PG (ref 26–34)
MCHC RBC AUTO-ENTMCNC: 31.7 G/DL (ref 31–36)
MCV RBC AUTO: 79.5 FL (ref 80–100)
PDW BLD-RTO: 19 % (ref 12.4–15.4)
PERFORMED ON: ABNORMAL
PLATELET # BLD: 309 K/UL (ref 135–450)
PMV BLD AUTO: 7 FL (ref 5–10.5)
POTASSIUM SERPL-SCNC: 4.1 MMOL/L (ref 3.5–5.1)
PROCALCITONIN: 0.14 NG/ML (ref 0–0.15)
RBC # BLD: 2.71 M/UL (ref 4–5.2)
SODIUM BLD-SCNC: 139 MMOL/L (ref 136–145)
WBC # BLD: 6.9 K/UL (ref 4–11)

## 2020-06-12 PROCEDURE — 2580000003 HC RX 258: Performed by: FAMILY MEDICINE

## 2020-06-12 PROCEDURE — 97116 GAIT TRAINING THERAPY: CPT

## 2020-06-12 PROCEDURE — 2060000000 HC ICU INTERMEDIATE R&B

## 2020-06-12 PROCEDURE — 97530 THERAPEUTIC ACTIVITIES: CPT

## 2020-06-12 PROCEDURE — 86850 RBC ANTIBODY SCREEN: CPT

## 2020-06-12 PROCEDURE — 94761 N-INVAS EAR/PLS OXIMETRY MLT: CPT

## 2020-06-12 PROCEDURE — 86901 BLOOD TYPING SEROLOGIC RH(D): CPT

## 2020-06-12 PROCEDURE — 2700000000 HC OXYGEN THERAPY PER DAY

## 2020-06-12 PROCEDURE — 84145 PROCALCITONIN (PCT): CPT

## 2020-06-12 PROCEDURE — 85027 COMPLETE CBC AUTOMATED: CPT

## 2020-06-12 PROCEDURE — P9040 RBC LEUKOREDUCED IRRADIATED: HCPCS

## 2020-06-12 PROCEDURE — 86923 COMPATIBILITY TEST ELECTRIC: CPT

## 2020-06-12 PROCEDURE — 6370000000 HC RX 637 (ALT 250 FOR IP): Performed by: FAMILY MEDICINE

## 2020-06-12 PROCEDURE — 6370000000 HC RX 637 (ALT 250 FOR IP): Performed by: PHYSICIAN ASSISTANT

## 2020-06-12 PROCEDURE — 6370000000 HC RX 637 (ALT 250 FOR IP): Performed by: INTERNAL MEDICINE

## 2020-06-12 PROCEDURE — 80048 BASIC METABOLIC PNL TOTAL CA: CPT

## 2020-06-12 PROCEDURE — 6370000000 HC RX 637 (ALT 250 FOR IP): Performed by: NURSE PRACTITIONER

## 2020-06-12 PROCEDURE — 36430 TRANSFUSION BLD/BLD COMPNT: CPT

## 2020-06-12 PROCEDURE — 86900 BLOOD TYPING SEROLOGIC ABO: CPT

## 2020-06-12 PROCEDURE — 94640 AIRWAY INHALATION TREATMENT: CPT

## 2020-06-12 PROCEDURE — 36415 COLL VENOUS BLD VENIPUNCTURE: CPT

## 2020-06-12 RX ORDER — 0.9 % SODIUM CHLORIDE 0.9 %
20 INTRAVENOUS SOLUTION INTRAVENOUS ONCE
Status: DISCONTINUED | OUTPATIENT
Start: 2020-06-12 | End: 2020-06-15 | Stop reason: HOSPADM

## 2020-06-12 RX ADMIN — MIRTAZAPINE 15 MG: 15 TABLET, FILM COATED ORAL at 21:09

## 2020-06-12 RX ADMIN — Medication 10 ML: at 21:09

## 2020-06-12 RX ADMIN — APIXABAN 2.5 MG: 5 TABLET, FILM COATED ORAL at 21:09

## 2020-06-12 RX ADMIN — APIXABAN 2.5 MG: 5 TABLET, FILM COATED ORAL at 11:23

## 2020-06-12 RX ADMIN — INSULIN LISPRO 3 UNITS: 100 INJECTION, SOLUTION INTRAVENOUS; SUBCUTANEOUS at 18:13

## 2020-06-12 RX ADMIN — LINAGLIPTIN 5 MG: 5 TABLET, FILM COATED ORAL at 11:24

## 2020-06-12 RX ADMIN — METOPROLOL SUCCINATE 25 MG: 25 TABLET, FILM COATED, EXTENDED RELEASE ORAL at 11:24

## 2020-06-12 RX ADMIN — IPRATROPIUM BROMIDE AND ALBUTEROL SULFATE 3 ML: .5; 3 SOLUTION RESPIRATORY (INHALATION) at 20:00

## 2020-06-12 RX ADMIN — Medication 10 ML: at 11:25

## 2020-06-12 RX ADMIN — DILTIAZEM HYDROCHLORIDE 60 MG: 60 CAPSULE, EXTENDED RELEASE ORAL at 21:09

## 2020-06-12 RX ADMIN — INSULIN LISPRO 2 UNITS: 100 INJECTION, SOLUTION INTRAVENOUS; SUBCUTANEOUS at 21:09

## 2020-06-12 RX ADMIN — ACARBOSE 25 MG: 50 TABLET ORAL at 10:11

## 2020-06-12 RX ADMIN — ACARBOSE 25 MG: 50 TABLET ORAL at 18:13

## 2020-06-12 RX ADMIN — PANTOPRAZOLE SODIUM 40 MG: 40 TABLET, DELAYED RELEASE ORAL at 06:34

## 2020-06-12 RX ADMIN — IPRATROPIUM BROMIDE AND ALBUTEROL SULFATE 3 ML: .5; 3 SOLUTION RESPIRATORY (INHALATION) at 16:22

## 2020-06-12 RX ADMIN — PREDNISONE 10 MG: 10 TABLET ORAL at 11:23

## 2020-06-12 RX ADMIN — IPRATROPIUM BROMIDE AND ALBUTEROL SULFATE 3 ML: .5; 3 SOLUTION RESPIRATORY (INHALATION) at 08:03

## 2020-06-12 RX ADMIN — DILTIAZEM HYDROCHLORIDE 60 MG: 60 CAPSULE, EXTENDED RELEASE ORAL at 11:24

## 2020-06-12 RX ADMIN — IPRATROPIUM BROMIDE AND ALBUTEROL SULFATE 3 ML: .5; 3 SOLUTION RESPIRATORY (INHALATION) at 12:23

## 2020-06-12 ASSESSMENT — PAIN SCALES - GENERAL
PAINLEVEL_OUTOF10: 0

## 2020-06-12 NOTE — PROGRESS NOTES
Hospitalist Progress Note      PCP: Anders Parks    Date of Admission: 5/31/2020    Chief Complaint: Weakness    Hospital Course: Admitted 5/31/2020 with acute renal failure, anemia. Slow improvement noted. Renal function stabilized. Cleared for discharge home with home care. Patient and family wish to consider skilled nursing facility due to patient living alone at home and unable to take care of herself. Borderline low hemoglobin noted. Iron studies show low iron, low TIBC and borderline low saturation. Subjective: . Plan of care discussed with pt, and daughter Ezequiel Luna. P2P timed out yesterday, she will pursue appeal.   Pt is receiving blood this AM for hgb 6.8/21.5  She feels ok. Denies SOB, CP.      Medications:  Reviewed    Infusion Medications    dextrose       Scheduled Medications    sodium chloride  20 mL Intravenous Once    acarbose  25 mg Oral TID WC    linagliptin  5 mg Oral Daily    dilTIAZem  60 mg Oral BID    insulin lispro  0-6 Units Subcutaneous TID WC    insulin lispro  0-3 Units Subcutaneous Nightly    metoprolol succinate  25 mg Oral Daily    apixaban  2.5 mg Oral BID    pantoprazole  40 mg Oral QAM AC    ipratropium-albuterol  3 mL Inhalation Q4H WA    mirtazapine  15 mg Oral Nightly    sodium chloride flush  10 mL Intravenous 2 times per day    predniSONE  10 mg Oral Daily     PRN Meds: sodium chloride flush, acetaminophen **OR** acetaminophen, polyethylene glycol, promethazine **OR** ondansetron, glucose, dextrose, glucagon (rDNA), dextrose      Intake/Output Summary (Last 24 hours) at 6/12/2020 0908  Last data filed at 6/11/2020 1330  Gross per 24 hour   Intake 340 ml   Output --   Net 340 ml       Physical Exam Performed:    BP (!) 158/64   Pulse 69   Temp (P) 97.6 °F (36.4 °C) (Oral)   Resp 16   Ht 5' 5\" (1.651 m)   Wt 146 lb 8 oz (66.5 kg)   SpO2 (P) 98%   BMI 24.38 kg/m²     General appearance:Elderly female lying in bed No apparent distress, appears stated age. Appears tired. HEENT: Pupils equal, round, and reactive to light. Conjunctivae/corneas clear. Neck: Supple, with full range of motion. No jugular venous distention. Trachea midline. Respiratory:  Normal respiratory effort. Clear to auscultation, bilaterally without Rales/Wheezes/Rhonchi. Cardiovascular: Regular rate and rhythm with normal S1/S2 without murmurs, rubs or gallops. Abdomen: Soft, non-tender, non-distended with normal bowel sounds. Musculoskeletal: No clubbing, cyanosis or edema bilaterally. Full range of motion without deformity. Skin: Skin color,poor turgor, +ecchymosis LFA. No rashes or lesions. Neurologic:  Neurovascularly intact without any focal sensory/motor deficits. Cranial nerves: II-XII intact, grossly non-focal.  Psychiatric: Alert, cooperative. Limited insight. Capillary Refill: Brisk,< 3 seconds   Peripheral Pulses: +2 palpable, equal bilaterally     I examined the patient today (06/12/20). Labs:   Recent Labs     06/10/20  0426 06/11/20  0426 06/12/20  0426   WBC 7.4 7.4 6.9   HGB 7.7* 7.0* 6.8*   HCT 24.9* 22.1* 21.5*    317 309     Recent Labs     06/10/20  0426 06/11/20  0426 06/12/20 0426    138 139   K 4.3 4.1 4.1    103 104   CO2 22 25 25   BUN 26* 25* 24*   CREATININE 1.4* 1.3* 1.4*   CALCIUM 9.1 9.0 8.9     No results for input(s): AST, ALT, BILIDIR, BILITOT, ALKPHOS in the last 72 hours. No results for input(s): INR in the last 72 hours. No results for input(s): Valentino Bun in the last 72 hours. Urinalysis:      Lab Results   Component Value Date    NITRU Negative 05/31/2020    WBCUA 3 05/31/2020    BACTERIA 2+ 05/31/2020    RBCUA 4 05/31/2020    BLOODU Negative 05/31/2020    SPECGRAV 1.012 05/31/2020    GLUCOSEU Negative 05/31/2020       Radiology:  US RENAL COMPLETE   Final Result   1. Simple bilateral renal cysts.   Otherwise, unremarkable sonographic   appearance of the bilateral kidneys, without evidence of improving. Continue accuchecks ACHS   Precose added to regimen yesterday, HgA1c is 7.9. Hypertension  Controlled blood pressure. Continue to monitor    Paroxysmal atrial fibrillation  On Eliquis, diltiazem and beta-blocker. Controlled rate.   Continue to monitor        DVT Prophylaxis: Eliquis  Diet: DIET CARB CONTROL; Carb Control: 4 carb choices (60 gms)/meal  Code Status: DNR-CC    PT/OT Eval Status:active and ongoing      Dispo -Pending Appeal. Medically stable for DC    MARIA C Knight CNP           Electronically signed by MARIA C Knight CNP on 6/12/2020 at 9:08 AM

## 2020-06-12 NOTE — PROGRESS NOTES
Physical Therapy  Facility/Department: [address]  Daily Treatment Note  NAME: Gilda Montelongo  : 1940  MRN: [id number]    Date of Service: 2020    Discharge Recommendations:  Gilda Montelongo scored a 17/24 on the AM-PAC short mobility form. Current research shows that an AM-PAC score of 17 or less is typically not associated with a discharge to the patient's home setting. Based on the patient's AM-PAC score and their current functional mobility deficits, it is recommended that the patient have 3-5 sessions per week of Physical Therapy at d/c to increase the patient's independence. At this time, this patient lacks the endurance, and/or tolerance for 3 hours of therapy/day, 5-7x/wk and would benefit most from a follow up treatment frequency of 3-5x/wk. Please see assessment section for further patient specific details. If patient discharges prior to next session this note will serve as a discharge summary. Please see below for the latest assessment towards goals. 24 hour supervision or assist, 3-5 sessions per week   PT Equipment Recommendations  Equipment Needed: No  Other: Patient has a walker and hospital bed at home. Assessment   Body structures, Functions, Activity limitations: Decreased functional mobility ; Decreased strength;Decreased endurance;Decreased safe awareness;Decreased balance;Decreased ADL status; Decreased posture  Assessment: Patient progressively improving functional mobility and endurance, not yet safe to ambulate independently, however. Fall remain a conceren. Patient much more awake, alert, and conversational today compared to my past experiences with her. She appears motivated to improve. Recommend 24 hour assist and continued therapy at d/c.   Treatment Diagnosis: decreased functional mobility, impaired gait, decreased balance  Prognosis: Good  Decision Making: Medium Complexity  Clinical Presentation: evolving  PT Education: PT Role;Transfer Training;Gait Training;Goals;Plan of Care; Functional Mobility Training;Family Education  Patient Education: Patient verbalized understanding. Barriers to Learning: None   REQUIRES PT FOLLOW UP: Yes  Activity Tolerance  Activity Tolerance: Patient limited by fatigue;Patient limited by endurance; Other  Activity Tolerance: Improved endurance compared to past sessions in terms of ambulation distance and O2 sats. Patient Diagnosis(es): The primary encounter diagnosis was General weakness. Diagnoses of LORRAINE (acute kidney injury) ([facility] [address] [address] Hyperkalemia, Anemia, unspecified type, Pneumonia due to organism, and Upper GI bleed were also pertinent to this visit. has a past medical history of Arthritis, CAD (coronary artery disease), Carpal tunnel syndrome, COPD (chronic obstructive pulmonary disease) ([address] Coronary stent, depression, Diabetes mellitus ([address] Diastolic heart failure ([address] GERD (gastroesophageal reflux disease), Hyperlipidemia, Hypertension, Lung cancer ([facility] [address] MI (myocardial infarction) ([facility] [address] LIZETT (obstructive sleep apnea), PONV (postoperative nausea and vomiting), and stress incontinence. has a past surgical history that includes Coronary angioplasty with stent (2000, 2001); back surgery (2000, 2001); bronchoscopy (12/04/2018); [facility] (N/A, 12/4/2018); Cholecystectomy, laparoscopic (N/A, 12/19/2018); Upper gastrointestinal endoscopy (N/A, 2/25/2019); Colonoscopy (N/A, 2/25/2019); Colonoscopy (2/25/2019); bronchoscopy (N/A, 2/27/2019); bronchoscopy (2/27/2019); bronchoscopy (2/27/2019); bronchoscopy (N/A, 8/6/2019); Upper gastrointestinal endoscopy (N/A, 8/7/2019); bronchoscopy (N/A, 9/27/2019); bronchoscopy (9/27/2019); and bronchoscopy (9/27/2019). Restrictions  Restrictions/Precautions  Restrictions/Precautions: Fall Risk  Required Braces or Orthoses?: No  Position Activity Restriction  Other position/activity restrictions: Charlie Ramirez is a 70 y.o. female with past medical history of CAD, COPD, depression, diabetes, CHF, hyperlipidemia, hypertension, lung cancer, previous MI, obstructive sleep apnea and atrial fibrillation on Eliquis and aspirin who presents to the ED with complaint of weakness and fatigue. Was brought in by EMS from daughter's house. Patient apparently independent and lives on her own been normally ANO x4. Apparently has had nausea and diarrhea for the past several days. Has been feeling weak today. Apparently she was in the garage when she became weak and apparently had to be lowered to the ground by family members. They brought her into the house and apparently improved with a called EMS and brought her to the ED for further evaluation and treatment. Patient denies any specific injury or trauma from the fall. Subjective   General  Chart Reviewed: Yes  Response To Previous Treatment: Not applicable  Family / Caregiver Present: No  Subjective  Subjective: Patient offers no complaints. She states she would like to walk. General Comment  Comments: Patient supine in bed, 2L O2. Patient appears much more awake, alert, and conversational since last I saw her. Pain Screening  Patient Currently in Pain: Denies  Vital Signs  Patient Currently in Pain: Denies       Orientation  Orientation  Overall Orientation Status: Within Normal Limits     Objective   Bed mobility  Rolling to Left: Supervision  Supine to Sit: Supervision  Scooting: Supervision  Transfers  Sit to Stand: Stand by assistance  Stand to sit: Stand by assistance  Bed to Chair: Contact guard assistance  Stand Pivot Transfers: Stand by assistance  Ambulation  Ambulation?: Yes  More Ambulation?: No  Ambulation 1  Surface: level tile  Device: Rolling Walker  Other Apparatus: O2(2L)  Assistance: Contact guard assistance  Gait Deviations: Decreased step height;Decreased step length; Slow Liliam  Distance: 100'  Comments: O2 96% post ambulation.   Stairs/Curb  Stairs?: No Balance  Posture: Good  Sitting - Static: Good  Sitting - Dynamic: Good  Standing - Static: Fair;+  Standing - Dynamic: Fair      AROM RLE (degrees)  RLE AROM: WNL  AROM LLE (degrees)  LLE AROM : WNL  Strength RLE  Strength RLE: WFL  Strength LLE  Strength LLE: [facility] [address]     AM-PAC Score  AM-PAC Inpatient Mobility Raw Score : 17 (06/12/20 1717)  AM-PAC Inpatient T-Scale Score : 42.13 (06/12/20 1717)  Mobility Inpatient CMS 0-100% Score: 50.57 (06/12/20 1717)  Mobility Inpatient CMS G-Code Modifier : CK (06/12/20 1717)     Goals  Short term goals  Time Frame for Short term goals: To be met prior to discharge  Short term goal 1: Bed mobility with supervision. (Goal met 6/5/2020)  Short term goal 2: Sit to/from stand with supervision. (Not met)  Short term goal 3: Ambulate 50 feet with RW/4WW and CGA. (Goal met with 2WW 6/6/20)  Short term goal 4: Navigate up/down 4 steps with rail and min A.  (Goal not met)  Short term goal 5: Pt will ambulate 50ft with least restrictive device MOD I for household ambulation. (goal not met)  Patient Goals   Patient goals : to be able to walk    Plan    Plan  Times per week: 3-5  Times per day: Daily  Current Treatment Recommendations: Balance Training, Strengthening, Functional Mobility Training, Transfer Training, Gait Training, Stair training, Safety Education & Training, ADL/Self-care Training  Safety Devices  Type of devices:  All fall risk precautions in place, Call light within reach, Gait belt, Left in chair, Nurse notified, Chair alarm in place  Restraints  Initially in place: No     Therapy Time   Individual Concurrent Group Co-treatment   Time In 1417         Time Out 1441         Minutes 24         Timed Code Treatment Minutes: [address], DPT, [id number] [id number]

## 2020-06-12 NOTE — CARE COORDINATION
Chart reviewed for discharge planning. Barrier(s) to discharge-family appealing a denial  Notified Savanna at [facility] that patient's daughter is calling to appeal the [employer] denial, and faxed a referral update. Tentative discharge plan- Home with home care   Per NP, Dispo -Pending Appeal. Medically stable for DC  Tentative discharge date- 6-12-20  Left a message with both daughters to call and report Appeal status. Patient is walking herself to the bathroom without asking for assistance  *Case management will continue to follow progress and update discharge plan as needed.

## 2020-06-12 NOTE — PLAN OF CARE
Problem: Falls - Risk of:  Goal: Will remain free from falls  Description: Will remain free from falls  Outcome: Ongoing     Problem:  Activity:  Goal: Fatigue will decrease  Description: Fatigue will decrease  Outcome: Ongoing

## 2020-06-12 NOTE — PROGRESS NOTES
Hemoglobin 6.8, Hospitalist notified, new order obtained to transfuse 1 unit PRBC's.   Plan of care reviewed with pt, LOVE.[email or web address]

## 2020-06-13 LAB
ANION GAP SERPL CALCULATED.3IONS-SCNC: 11 MMOL/L (ref 3–16)
BUN BLDV-MCNC: 28 MG/DL (ref 7–20)
CALCIUM SERPL-MCNC: 9 MG/DL (ref 8.3–10.6)
CHLORIDE BLD-SCNC: 103 MMOL/L (ref 99–110)
CO2: 24 MMOL/L (ref 21–32)
CREAT SERPL-MCNC: 1.3 MG/DL (ref 0.6–1.2)
GFR AFRICAN AMERICAN: 48
GFR NON-AFRICAN AMERICAN: 40
GLUCOSE BLD-MCNC: 141 MG/DL (ref 70–99)
GLUCOSE BLD-MCNC: 161 MG/DL (ref 70–99)
GLUCOSE BLD-MCNC: 169 MG/DL (ref 70–99)
GLUCOSE BLD-MCNC: 234 MG/DL (ref 70–99)
GLUCOSE BLD-MCNC: 336 MG/DL (ref 70–99)
HCT VFR BLD CALC: 26.2 % (ref 36–48)
HEMOGLOBIN: 8.5 G/DL (ref 12–16)
MCH RBC QN AUTO: 26.2 PG (ref 26–34)
MCHC RBC AUTO-ENTMCNC: 32.5 G/DL (ref 31–36)
MCV RBC AUTO: 80.6 FL (ref 80–100)
PDW BLD-RTO: 17.9 % (ref 12.4–15.4)
PERFORMED ON: ABNORMAL
PLATELET # BLD: 321 K/UL (ref 135–450)
PMV BLD AUTO: 7.4 FL (ref 5–10.5)
POTASSIUM SERPL-SCNC: 4.3 MMOL/L (ref 3.5–5.1)
RBC # BLD: 3.25 M/UL (ref 4–5.2)
SODIUM BLD-SCNC: 138 MMOL/L (ref 136–145)
WBC # BLD: 8.1 K/UL (ref 4–11)

## 2020-06-13 PROCEDURE — 80048 BASIC METABOLIC PNL TOTAL CA: CPT

## 2020-06-13 PROCEDURE — 2700000000 HC OXYGEN THERAPY PER DAY

## 2020-06-13 PROCEDURE — 2060000000 HC ICU INTERMEDIATE R&B

## 2020-06-13 PROCEDURE — 6370000000 HC RX 637 (ALT 250 FOR IP): Performed by: FAMILY MEDICINE

## 2020-06-13 PROCEDURE — 94640 AIRWAY INHALATION TREATMENT: CPT

## 2020-06-13 PROCEDURE — 6370000000 HC RX 637 (ALT 250 FOR IP): Performed by: INTERNAL MEDICINE

## 2020-06-13 PROCEDURE — 6370000000 HC RX 637 (ALT 250 FOR IP): Performed by: NURSE PRACTITIONER

## 2020-06-13 PROCEDURE — 6370000000 HC RX 637 (ALT 250 FOR IP): Performed by: PHYSICIAN ASSISTANT

## 2020-06-13 PROCEDURE — 36415 COLL VENOUS BLD VENIPUNCTURE: CPT

## 2020-06-13 PROCEDURE — 94761 N-INVAS EAR/PLS OXIMETRY MLT: CPT

## 2020-06-13 PROCEDURE — 85027 COMPLETE CBC AUTOMATED: CPT

## 2020-06-13 PROCEDURE — 2580000003 HC RX 258: Performed by: FAMILY MEDICINE

## 2020-06-13 RX ADMIN — PANTOPRAZOLE SODIUM 40 MG: 40 TABLET, DELAYED RELEASE ORAL at 06:31

## 2020-06-13 RX ADMIN — INSULIN LISPRO 1 UNITS: 100 INJECTION, SOLUTION INTRAVENOUS; SUBCUTANEOUS at 12:47

## 2020-06-13 RX ADMIN — LINAGLIPTIN 5 MG: 5 TABLET, FILM COATED ORAL at 10:11

## 2020-06-13 RX ADMIN — IPRATROPIUM BROMIDE AND ALBUTEROL SULFATE 3 ML: .5; 3 SOLUTION RESPIRATORY (INHALATION) at 11:31

## 2020-06-13 RX ADMIN — INSULIN LISPRO 1 UNITS: 100 INJECTION, SOLUTION INTRAVENOUS; SUBCUTANEOUS at 21:01

## 2020-06-13 RX ADMIN — METOPROLOL SUCCINATE 25 MG: 25 TABLET, FILM COATED, EXTENDED RELEASE ORAL at 10:11

## 2020-06-13 RX ADMIN — IPRATROPIUM BROMIDE AND ALBUTEROL SULFATE 3 ML: .5; 3 SOLUTION RESPIRATORY (INHALATION) at 15:28

## 2020-06-13 RX ADMIN — ACARBOSE 25 MG: 50 TABLET ORAL at 17:24

## 2020-06-13 RX ADMIN — APIXABAN 2.5 MG: 5 TABLET, FILM COATED ORAL at 21:00

## 2020-06-13 RX ADMIN — Medication 10 ML: at 10:18

## 2020-06-13 RX ADMIN — INSULIN LISPRO 1 UNITS: 100 INJECTION, SOLUTION INTRAVENOUS; SUBCUTANEOUS at 08:10

## 2020-06-13 RX ADMIN — MIRTAZAPINE 15 MG: 15 TABLET, FILM COATED ORAL at 21:00

## 2020-06-13 RX ADMIN — DILTIAZEM HYDROCHLORIDE 60 MG: 60 CAPSULE, EXTENDED RELEASE ORAL at 10:11

## 2020-06-13 RX ADMIN — PREDNISONE 10 MG: 10 TABLET ORAL at 10:16

## 2020-06-13 RX ADMIN — IPRATROPIUM BROMIDE AND ALBUTEROL SULFATE 3 ML: .5; 3 SOLUTION RESPIRATORY (INHALATION) at 07:54

## 2020-06-13 RX ADMIN — ACARBOSE 25 MG: 50 TABLET ORAL at 10:18

## 2020-06-13 RX ADMIN — IPRATROPIUM BROMIDE AND ALBUTEROL SULFATE 3 ML: .5; 3 SOLUTION RESPIRATORY (INHALATION) at 21:02

## 2020-06-13 RX ADMIN — APIXABAN 2.5 MG: 5 TABLET, FILM COATED ORAL at 10:16

## 2020-06-13 RX ADMIN — Medication 10 ML: at 21:05

## 2020-06-13 RX ADMIN — ACARBOSE 25 MG: 50 TABLET ORAL at 12:48

## 2020-06-13 RX ADMIN — DILTIAZEM HYDROCHLORIDE 60 MG: 60 CAPSULE, EXTENDED RELEASE ORAL at 21:00

## 2020-06-13 RX ADMIN — INSULIN LISPRO 4 UNITS: 100 INJECTION, SOLUTION INTRAVENOUS; SUBCUTANEOUS at 17:12

## 2020-06-13 ASSESSMENT — PAIN SCALES - GENERAL
PAINLEVEL_OUTOF10: 0

## 2020-06-13 NOTE — PROGRESS NOTES
MD Mago Barker MD Edith Mussel, MD                                  Office: [phone]                 Fax: [phone]          [facility]                     NEPHROLOGY INPATIENT PROGRESS NOTE:     PATIENT NAME: Yosi Mckee  : 1940  MRN: [id number]    Assessment:     Acute kidney injury-improved  Severe on admission Scr to 6.2  Anemia - hgb better today, s/p prbc. With improved renal function, renal impairment would not be primary etiology for recurrent decrease Hgb. Stool OB+. Per Medicine. History of Non-small Cell Lung CA. Stable per Dr. Wendy Cornejo  Generalized weakness. HTN  Better controlled   DNR-CC    Plan:     Medications reviewed. Not on diuretics. Continue PPI for now  Stable from renal perspective. F/U w/ Dr Zaira Alvarez in 2-3 weeks       Anna Hernandez MD  [facility] [address] [place] [address] Road   Phone: [phone] or [employer]  Fax: [phone]          Subjective:     Reported no active complaints     Objective:   EXAM  Vitals:    20 1133   BP:    Pulse:    Resp: 16   Temp:    SpO2: 95%       Intake/Output Summary (Last 24 hours) at 2020 1212  Last data filed at 2020 2109  Gross per 24 hour   Intake 10 ml   Output --   Net 10 ml       Awake and communicative   Neck. JVD not visible. No lymph nodes palpable. CVS.  Heart sounds are normal.Palpation of the heart is normal. No murmurs. No pericardial rub.  RS.dullness on percussion of the lower chest wall. Lung fields are clear   PA soft , bowel sounds are normal no distension and no tenderness to palpation. Musculoskeletal: Normal range of motion. 1+ edema and no tenderness. Active Problems:    Renal failure  Resolved Problems:    * No resolved hospital problems.  *        Medications Reviewed by me   sodium chloride  20 mL Intravenous Once    acarbose  25 mg Oral TID WC    linagliptin  5 mg Oral Daily    dilTIAZem  60 mg Oral BID    insulin lispro  0-6 Units Subcutaneous TID     insulin lispro  0-3 Units Subcutaneous Nightly    metoprolol succinate  25 mg Oral Daily    apixaban  2.5 mg Oral BID    pantoprazole  40 mg Oral QAM AC    ipratropium-albuterol  3 mL Inhalation Q4H WA    mirtazapine  15 mg Oral Nightly    sodium chloride flush  10 mL Intravenous 2 times per day    predniSONE  10 mg Oral Daily      dextrose         Data Review. Labs reviewed by me       CBC:   Recent Labs     06/11/20 0426 06/12/20 0426 06/13/20 0418   WBC 7.4 6.9 8.1   HGB 7.0* 6.8* 8.5*   HCT 22.1* 21.5* 26.2*   MCV 79.5* 79.5* 80.6    309 321     BMP:   Recent Labs     06/11/20 0426 06/12/20 0426 06/13/20 0418    139 138   K 4.1 4.1 4.3    104 103   CO2 25 25 24   BUN 25* 24* 28*   CREATININE 1.3* 1.4* 1.3*     Magnesium:   Lab Results   Component Value Date    MG 1.50 06/03/2020    MG 1.60 10/07/2019    MG 1.90 10/04/2019     Lab Results   Component Value Date    CREATININE 1.3 06/13/2020       Arterial Blood Gasses  No results for input(s): PH, PCO2, PO2 in the last 72 hours. Invalid input(s): Bridget Pedro    UA:  No results for input(s): NITRITE, COLORU, PHUR, LABCAST, WBCUA, RBCUA, MUCUS, TRICHOMONAS, YEAST, BACTERIA, CLARITYU, SPECGRAV, LEUKOCYTESUR, UROBILINOGEN, BILIRUBINUR, BLOODU, GLUCOSEU, AMORPHOUS in the last 72 hours. Invalid input(s): [facility]    LIVER PROFILE:   No results for input(s): AST, ALT, LIPASE, BILIDIR, BILITOT, ALKPHOS in the last 72 hours. Invalid input(s):   AMYLASE,  ALB  PT/INR:    Lab Results   Component Value Date    PROTIME 20.1 05/31/2020    PROTIME 18.0 09/27/2019    PROTIME 19.1 08/06/2019    INR 1.72 05/31/2020    INR 1.58 09/27/2019    INR 1.68 08/06/2019     PTT:    Lab Results   Component Value Date    APTT 30.5 12/04/2018     ROXANNE:  No results found for: ANATITER, ROXANNE  CHEMISTRY COMMON GROUP :   Lab Results   Component Value Date    GLUCOSE 169 06/13/2020    TSH 2.97 07/05/2013     Recent

## 2020-06-13 NOTE — PROGRESS NOTES
gel 15 g, PRN  dextrose 50 % IV solution, PRN  glucagon (rDNA) injection 1 mg, PRN  dextrose 5 % solution, PRN  predniSONE (DELTASONE) tablet 10 mg, Daily      Objective:  BP (!) 142/77   Pulse 77   Temp 97.3 °F (36.3 °C) (Oral)   Resp 16   Ht 5' 5\" (1.651 m)   Wt 140 lb 12.8 oz (63.9 kg)   SpO2 96%   BMI 23.43 kg/m²     Intake/Output Summary (Last 24 hours) at 6/13/2020 0936  Last data filed at 6/12/2020 2109  Gross per 24 hour   Intake 327.5 ml   Output --   Net 327.5 ml      Wt Readings from Last 3 Encounters:   06/13/20 140 lb 12.8 oz (63.9 kg)   10/08/19 142 lb (64.4 kg)   08/09/19 152 lb 12.8 oz (69.3 kg)     General:  Awake, alert, oriented in NAD  Skin:  Warm and dry. No unusual bruising or rash  Neck:  Supple. No JVD appreciated  Chest:  Normal effort. Clear to auscultation  Cardiovascular:  RRR, normal S1/S2, no murmur/gallop/rub  Abdomen:  Soft, nontender, +bowel sounds  Extremities:  No edema  Neurological: No focal deficits  Psychological: Normal mood and affect    Labs and Tests:  CBC:   Recent Labs     06/11/20  0426 06/12/20  0426 06/13/20  0418   WBC 7.4 6.9 8.1   HGB 7.0* 6.8* 8.5*    309 321     BMP:    Recent Labs     06/11/20  0426 06/12/20  0426 06/13/20  0418    139 138   K 4.1 4.1 4.3    104 103   CO2 25 25 24   BUN 25* 24* 28*   CREATININE 1.3* 1.4* 1.3*   GLUCOSE 139* 145* 169*     Hepatic:   No results for input(s): AST, ALT, ALB, BILITOT, ALKPHOS in the last 72 hours. Results for Rosa Irwin (MRN [id number]) as of 6/7/2020 10:38   Ref.  Range 6/6/2020 02:16 6/6/2020 07:14 6/6/2020 16:05 6/6/2020 20:13 6/7/2020 07:09   POC Glucose Latest Ref Range: 70 - 99 mg/dl 154 (H) 147 (H) 375 (H) 414 (H) 161 (H)       CT head 5/31/2020:  No acute intracranial abnormality.       Chronic microvascular ischemic changes and global cerebral atrophy.         CTA head/neck 5/31/2020:  CTA NECK:       AORTIC ARCH/ARCH VESSELS: No dissection or arterial injury.  No significant   stenosis of the brachiocephalic or subclavian arteries.       CAROTID ARTERIES: There is 50% stenosis of the proximal right cervical   internal carotid artery due to calcified and noncalcified plaque.       VERTEBRAL ARTERIES: No dissection, arterial injury, or significant stenosis.       SOFT TISSUES: There is pulmonary emphysema with scarring or atelectasis   within the left suprahilar region. Estephania Fat are nonspecific consolidative   changes within the right upper lobe, which may be due in part to atelectasis   and scar.  Thyroid nodules are noted.       BONES: No acute osseous abnormality.           CTA HEAD:       ANTERIOR CIRCULATION: No significant stenosis of the intracranial internal   carotid, anterior cerebral, or middle cerebral arteries. No aneurysm.       POSTERIOR CIRCULATION: No significant stenosis of the vertebral, basilar, or   posterior cerebral arteries. No aneurysm.       OTHER: No dural venous sinus thrombosis on this non-dedicated study.       BRAIN: See separately dictated noncontrast head CT report.           Impression   50% stenosis of the proximal right cervical ICA.       Otherwise, no flow limiting stenosis or large vessel occlusion visualized   within the head or neck.       Incidentally noted chest findings as detailed above.  Would consider   dedicated CT of the chest for further evaluation.         CXR 5/31/2020:  1. Mass density left hilum similar appearance to previous exam   2. New pulmonary opacity right upper lobe could represent an infectious   process.  However, follow-up suggested to ensure resolution   3. Previously noted left midlung opacity has organized into a linear density   most likely representing scarring. Problem List  Active Problems:    Renal failure  Resolved Problems:    * No resolved hospital problems. *       Assessment & Plan:   1. Acute renal failure. Admission creatinine 6.2.  Renal ultrasound with simple bilateral cysts otherwise unremarkable. Suspected secondary to hypovolemia/GI blood loss/nephrotoxic medication/contrast. Creatinine 1.3 today. Stable from nephrology standpoint for dc. 2. Chronic anemia. Likely combination of chronic disease and possible GI blood loss considering positive stool guaiac. Hgb 6.6 on admission, she has been transfused two units since admission-most recently on 6/11. Hemoglobin up almost 2 grams since transfusion of one unit. No gross bleeding. GI has signed off. No sense to reinvolve GI as patient does not want EGD or colonoscopy and no fred blood being passed. 3.  Non-small cell lung cancer. Status post chemo and radiation (completed 4/2019). Patient indicates she wants no further workup or treatment for lung cancer. Hem/onc on board. 4. Hx COPD with chronic respiratory failure. No evidence of acute exacerbation. Entered hospice care secondary to COPD (7/2019). O2 sats stable on 1 liter for mid 90s, on chronic O2 therapy at home. 5.  Abnormal CXR (5/31). New pulmonary opacity RUL on CXR. CT scan with nonspecific consolidative changes within RUL possibly secondary to atelectasis and scar. Remains afebrile. Blood cultures no growth to date. Procalcitonin 0.22, low likelihood for bacterial infection, no leukocytosis. No indication for antibiotics. O2 sats stable on 1 liter for high 90s. 6. DM2. A1c 7.9. On metformin at home, discontinued secondary renal failure. Transitioned from mealtime insulin to linagliptin yesterday in anticipation for when she goes home, patient and daughter do not believe she would be able to manage insulin. Blood sugars high at mealtime, continue low dose correction, continue carb control diet. 7. Paroxymal afib- Patient developed atrial fib, converted after IV cardizem dose. Remains in sinus rhythm. On Eliquis, CCB and BB, HR 60s-70s. Discussed with case management. Daughters are appealing SNF denial. Attempted to contact daughter Daniel Ghotra. No answer.       Diet: DIET CARB CONTROL; Carb Control: 4 carb choices (60 gms)/meal  Code:DNR-CC  DVT PPX: apixaban      Harpreet Yeung PA-C   6/13/2020 9:47am

## 2020-06-13 NOTE — PROGRESS NOTES
[facility]   Progress Note      6/13/2020 6:57 PM        Name: Katja Rosen Admitted: 5/31/2020    SUBJECTIVE:      Doing okay. Received blood transfusions yesterday. Reviewed interval ancillary notes    Current Medications  0.9 % sodium chloride bolus, Once  acarbose (PRECOSE) tablet 25 mg, TID WC  linagliptin (TRADJENTA) tablet 5 mg, Daily  dilTIAZem (CARDIZEM 12 HR) extended release capsule 60 mg, BID  insulin lispro (1 Unit Dial) 0-6 Units, TID WC  insulin lispro (1 Unit Dial) 0-3 Units, Nightly  metoprolol succinate (TOPROL XL) extended release tablet 25 mg, Daily  apixaban (ELIQUIS) tablet 2.5 mg, BID  pantoprazole (PROTONIX) tablet 40 mg, QAM AC  ipratropium-albuterol (DUONEB) nebulizer solution 3 mL, Q4H WA  mirtazapine (REMERON) tablet 15 mg, Nightly  sodium chloride flush 0.9 % injection 10 mL, 2 times per day  sodium chloride flush 0.9 % injection 10 mL, PRN  acetaminophen (TYLENOL) tablet 650 mg, Q6H PRN    Or  acetaminophen (TYLENOL) suppository 650 mg, Q6H PRN  polyethylene glycol (GLYCOLAX) packet 17 g, Daily PRN  promethazine (PHENERGAN) tablet 12.5 mg, Q6H PRN    Or  ondansetron (ZOFRAN) injection 4 mg, Q6H PRN  glucose (GLUTOSE) 40 % oral gel 15 g, PRN  dextrose 50 % IV solution, PRN  glucagon (rDNA) injection 1 mg, PRN  dextrose 5 % solution, PRN  predniSONE (DELTASONE) tablet 10 mg, Daily        Objective:  BP (!) 149/77   Pulse 82   Temp 97.3 °F (36.3 °C) (Oral)   Resp 16   Ht 5' 5\" (1.651 m)   Wt 140 lb 12.8 oz (63.9 kg)   SpO2 96%   BMI 23.43 kg/m²     Intake/Output Summary (Last 24 hours) at 6/13/2020 7373  Last data filed at 6/12/2020 2106  Gross per 24 hour   Intake 10 ml   Output --   Net 10 ml      Wt Readings from Last 3 Encounters:   06/13/20 140 lb 12.8 oz (63.9 kg)   10/08/19 142 lb (64.4 kg)   08/09/19 152 lb 12.8 oz (69.3 kg)       Conscious alert oriented. Appears comfortable. No neck fullness.   Respiratory efforts are normal.  Abdomen is not distended. No leg edema  No focal deficits. Labs and Tests:  CBC:   Recent Labs     06/11/20  0426 06/12/20 0426 06/13/20 0418   WBC 7.4 6.9 8.1   HGB 7.0* 6.8* 8.5*    309 321     BMP:    Recent Labs     06/11/20  0426 06/12/20 0426 06/13/20 0418    139 138   K 4.1 4.1 4.3    104 103   CO2 25 25 24   BUN 25* 24* 28*   CREATININE 1.3* 1.4* 1.3*   GLUCOSE 139* 145* 169*     Hepatic:   No results for input(s): AST, ALT, ALB, BILITOT, ALKPHOS in the last 72 hours. ASSESSMENT AND PLAN    Active Problems:    Renal failure  Resolved Problems:    * No resolved hospital problems. *      NSCLC  - Stage II at the time of diagnosis  - s/p chemo and radiation, completed April 2019  - last scan in Oct 2019 showed no evidence of recurrent disease  - entered hospice care July 2019 d/t COPD  - Patient does not want to do any further scans.     Anemia    Hemoglobin was 7 yesterday. Received PRBCs  - GI plans to treat conservatively, started PPI.   - Normal iron studies, vit b12/folate        Bertha Bansal MD

## 2020-06-14 LAB
ANION GAP SERPL CALCULATED.3IONS-SCNC: 11 MMOL/L (ref 3–16)
BUN BLDV-MCNC: 27 MG/DL (ref 7–20)
CALCIUM SERPL-MCNC: 9.2 MG/DL (ref 8.3–10.6)
CHLORIDE BLD-SCNC: 104 MMOL/L (ref 99–110)
CO2: 25 MMOL/L (ref 21–32)
CREAT SERPL-MCNC: 1.2 MG/DL (ref 0.6–1.2)
GFR AFRICAN AMERICAN: 53
GFR NON-AFRICAN AMERICAN: 44
GLUCOSE BLD-MCNC: 133 MG/DL (ref 70–99)
GLUCOSE BLD-MCNC: 153 MG/DL (ref 70–99)
GLUCOSE BLD-MCNC: 157 MG/DL (ref 70–99)
GLUCOSE BLD-MCNC: 192 MG/DL (ref 70–99)
GLUCOSE BLD-MCNC: 365 MG/DL (ref 70–99)
HCT VFR BLD CALC: 28.1 % (ref 36–48)
HEMOGLOBIN: 8.7 G/DL (ref 12–16)
MCH RBC QN AUTO: 26.1 PG (ref 26–34)
MCHC RBC AUTO-ENTMCNC: 31 G/DL (ref 31–36)
MCV RBC AUTO: 84.2 FL (ref 80–100)
PDW BLD-RTO: 18.6 % (ref 12.4–15.4)
PERFORMED ON: ABNORMAL
PLATELET # BLD: 338 K/UL (ref 135–450)
PMV BLD AUTO: 7.4 FL (ref 5–10.5)
POTASSIUM SERPL-SCNC: 4.1 MMOL/L (ref 3.5–5.1)
RBC # BLD: 3.34 M/UL (ref 4–5.2)
REASON FOR REJECTION: NORMAL
REJECTED TEST: NORMAL
SODIUM BLD-SCNC: 140 MMOL/L (ref 136–145)
WBC # BLD: 9.4 K/UL (ref 4–11)

## 2020-06-14 PROCEDURE — 36415 COLL VENOUS BLD VENIPUNCTURE: CPT

## 2020-06-14 PROCEDURE — 94761 N-INVAS EAR/PLS OXIMETRY MLT: CPT

## 2020-06-14 PROCEDURE — 6370000000 HC RX 637 (ALT 250 FOR IP): Performed by: PHYSICIAN ASSISTANT

## 2020-06-14 PROCEDURE — 80048 BASIC METABOLIC PNL TOTAL CA: CPT

## 2020-06-14 PROCEDURE — 2580000003 HC RX 258: Performed by: FAMILY MEDICINE

## 2020-06-14 PROCEDURE — 85027 COMPLETE CBC AUTOMATED: CPT

## 2020-06-14 PROCEDURE — 6370000000 HC RX 637 (ALT 250 FOR IP): Performed by: NURSE PRACTITIONER

## 2020-06-14 PROCEDURE — 6370000000 HC RX 637 (ALT 250 FOR IP): Performed by: FAMILY MEDICINE

## 2020-06-14 PROCEDURE — 6370000000 HC RX 637 (ALT 250 FOR IP): Performed by: INTERNAL MEDICINE

## 2020-06-14 PROCEDURE — 2700000000 HC OXYGEN THERAPY PER DAY

## 2020-06-14 PROCEDURE — 2060000000 HC ICU INTERMEDIATE R&B

## 2020-06-14 PROCEDURE — 94640 AIRWAY INHALATION TREATMENT: CPT

## 2020-06-14 RX ORDER — INSULIN LISPRO 100 [IU]/ML
0-6 INJECTION, SOLUTION INTRAVENOUS; SUBCUTANEOUS NIGHTLY
Status: DISCONTINUED | OUTPATIENT
Start: 2020-06-14 | End: 2020-06-15 | Stop reason: HOSPADM

## 2020-06-14 RX ORDER — INSULIN LISPRO 100 [IU]/ML
0-12 INJECTION, SOLUTION INTRAVENOUS; SUBCUTANEOUS
Status: DISCONTINUED | OUTPATIENT
Start: 2020-06-14 | End: 2020-06-15 | Stop reason: HOSPADM

## 2020-06-14 RX ADMIN — LINAGLIPTIN 5 MG: 5 TABLET, FILM COATED ORAL at 09:44

## 2020-06-14 RX ADMIN — ACARBOSE 25 MG: 50 TABLET ORAL at 17:39

## 2020-06-14 RX ADMIN — ACARBOSE 25 MG: 50 TABLET ORAL at 12:46

## 2020-06-14 RX ADMIN — APIXABAN 2.5 MG: 5 TABLET, FILM COATED ORAL at 20:40

## 2020-06-14 RX ADMIN — APIXABAN 2.5 MG: 5 TABLET, FILM COATED ORAL at 09:44

## 2020-06-14 RX ADMIN — INSULIN LISPRO 1 UNITS: 100 INJECTION, SOLUTION INTRAVENOUS; SUBCUTANEOUS at 09:50

## 2020-06-14 RX ADMIN — IPRATROPIUM BROMIDE AND ALBUTEROL SULFATE 3 ML: .5; 3 SOLUTION RESPIRATORY (INHALATION) at 20:54

## 2020-06-14 RX ADMIN — DILTIAZEM HYDROCHLORIDE 60 MG: 60 CAPSULE, EXTENDED RELEASE ORAL at 20:40

## 2020-06-14 RX ADMIN — PANTOPRAZOLE SODIUM 40 MG: 40 TABLET, DELAYED RELEASE ORAL at 06:33

## 2020-06-14 RX ADMIN — INSULIN LISPRO 10 UNITS: 100 INJECTION, SOLUTION INTRAVENOUS; SUBCUTANEOUS at 17:39

## 2020-06-14 RX ADMIN — Medication 10 ML: at 09:55

## 2020-06-14 RX ADMIN — IPRATROPIUM BROMIDE AND ALBUTEROL SULFATE 3 ML: .5; 3 SOLUTION RESPIRATORY (INHALATION) at 15:38

## 2020-06-14 RX ADMIN — PREDNISONE 10 MG: 10 TABLET ORAL at 09:44

## 2020-06-14 RX ADMIN — INSULIN LISPRO 1 UNITS: 100 INJECTION, SOLUTION INTRAVENOUS; SUBCUTANEOUS at 12:47

## 2020-06-14 RX ADMIN — Medication 10 ML: at 20:41

## 2020-06-14 RX ADMIN — METOPROLOL SUCCINATE 25 MG: 25 TABLET, FILM COATED, EXTENDED RELEASE ORAL at 09:44

## 2020-06-14 RX ADMIN — MIRTAZAPINE 15 MG: 15 TABLET, FILM COATED ORAL at 20:40

## 2020-06-14 RX ADMIN — IPRATROPIUM BROMIDE AND ALBUTEROL SULFATE 3 ML: .5; 3 SOLUTION RESPIRATORY (INHALATION) at 08:32

## 2020-06-14 RX ADMIN — DILTIAZEM HYDROCHLORIDE 60 MG: 60 CAPSULE, EXTENDED RELEASE ORAL at 09:44

## 2020-06-14 RX ADMIN — IPRATROPIUM BROMIDE AND ALBUTEROL SULFATE 3 ML: .5; 3 SOLUTION RESPIRATORY (INHALATION) at 11:44

## 2020-06-14 RX ADMIN — ACARBOSE 25 MG: 50 TABLET ORAL at 09:49

## 2020-06-14 ASSESSMENT — PAIN SCALES - GENERAL
PAINLEVEL_OUTOF10: 0

## 2020-06-14 NOTE — PROGRESS NOTES
[facility] PROGRESS NOTE    6/14/2020 1:46 PM        Name: Coye Goltz . Admitted: 5/31/2020  Primary Care Provider: Riky Wang (Tel: [phone])      Subjective:  Patient is a 69 yo female with hx adenocarcinoma of the lung, CAD/stent, COPD, chronic anemia, chronic dCHF, DM, HTN, HLD, LIZETT (untreated), chronic AC (on Eliquis). She presented to hospital with ongoing fatigue and weakness. Found to have worsening anemia, Hgb 6.6 and she was transfused. Also evidence of acute renal failure with creatinine 6.2. Renal failure has improved. Creatinine 1.3. Hemoglobin 8.5. it was 6.8 Friday and she was transfused one unit of blood. Presently resting in bed, asleep, awakens easily. Feeling ok, has chronic mild SOB. O2 needs stable at 1 liters. Remains in sinus rhythm. She affirms she does not want EGD, colonoscopy.      Reviewed interval ancillary notes    Current Medications  0.9 % sodium chloride bolus, Once  acarbose (PRECOSE) tablet 25 mg, TID WC  linagliptin (TRADJENTA) tablet 5 mg, Daily  dilTIAZem (CARDIZEM 12 HR) extended release capsule 60 mg, BID  insulin lispro (1 Unit Dial) 0-6 Units, TID WC  insulin lispro (1 Unit Dial) 0-3 Units, Nightly  metoprolol succinate (TOPROL XL) extended release tablet 25 mg, Daily  apixaban (ELIQUIS) tablet 2.5 mg, BID  pantoprazole (PROTONIX) tablet 40 mg, QAM AC  ipratropium-albuterol (DUONEB) nebulizer solution 3 mL, Q4H WA  mirtazapine (REMERON) tablet 15 mg, Nightly  sodium chloride flush 0.9 % injection 10 mL, 2 times per day  sodium chloride flush 0.9 % injection 10 mL, PRN  acetaminophen (TYLENOL) tablet 650 mg, Q6H PRN    Or  acetaminophen (TYLENOL) suppository 650 mg, Q6H PRN  polyethylene glycol (GLYCOLAX) packet 17 g, Daily PRN  promethazine (PHENERGAN) tablet 12.5 mg, Q6H PRN    Or  ondansetron (ZOFRAN) injection 4 mg, Q6H PRN  glucose (GLUTOSE) 40 % oral gel 15 g, PRN  dextrose 50 % IV solution, PRN  glucagon (rDNA) injection 1 mg, PRN  dextrose 5 % solution, PRN  predniSONE (DELTASONE) tablet 10 mg, Daily      Objective:  BP (!) 146/79   Pulse 71   Temp 98.5 °F (36.9 °C) (Oral)   Resp 16   Ht 5' 5\" (1.651 m)   Wt 140 lb 8 oz (63.7 kg)   SpO2 98%   BMI 23.38 kg/m²     Intake/Output Summary (Last 24 hours) at 6/14/2020 1346  Last data filed at 6/13/2020 2105  Gross per 24 hour   Intake 10 ml   Output --   Net 10 ml      Wt Readings from Last 3 Encounters:   06/14/20 140 lb 8 oz (63.7 kg)   10/08/19 142 lb (64.4 kg)   08/09/19 152 lb 12.8 oz (69.3 kg)     General:  Awake, alert, oriented in NAD  Skin:  Warm and dry. No unusual bruising or rash  Neck:  Supple. No JVD appreciated  Chest:  Normal effort.   Clear to auscultation  Cardiovascular:  RRR, normal S1/S2, no murmur/gallop/rub  Abdomen:  Soft, nontender, +bowel sounds  Extremities:  No edema  Neurological: No focal deficits  Psychological: Normal mood and affect    Labs and Tests:  CBC:   Recent Labs     06/12/20  0426 06/13/20  0418 06/14/20  0657   WBC 6.9 8.1 9.4   HGB 6.8* 8.5* 8.7*    321 338     BMP:    Recent Labs     06/12/20  0426 06/13/20  0418 06/14/20  0429    138 140   K 4.1 4.3 4.1    103 104   CO2 25 24 25   BUN 24* 28* 27*   CREATININE 1.4* 1.3* 1.2   GLUCOSE 145* 169* 157*       CT head 5/31/2020:  No acute intracranial abnormality.       Chronic microvascular ischemic changes and global cerebral atrophy.         CTA head/neck 5/31/2020:  CTA NECK:       AORTIC ARCH/ARCH VESSELS: No dissection or arterial injury.  No significant   stenosis of the brachiocephalic or subclavian arteries.       CAROTID ARTERIES: There is 50% stenosis of the proximal right cervical   internal carotid artery due to calcified and noncalcified plaque.       VERTEBRAL ARTERIES: No dissection, arterial injury, or significant stenosis.       SOFT TISSUES: There is pulmonary emphysema with scarring or atelectasis   within the left suprahilar region. Asher Mech are nonspecific consolidative   changes within the right upper lobe, which may be due in part to atelectasis   and scar.  Thyroid nodules are noted.       BONES: No acute osseous abnormality.           CTA HEAD:       ANTERIOR CIRCULATION: No significant stenosis of the intracranial internal   carotid, anterior cerebral, or middle cerebral arteries. No aneurysm.       POSTERIOR CIRCULATION: No significant stenosis of the vertebral, basilar, or   posterior cerebral arteries. No aneurysm.       OTHER: No dural venous sinus thrombosis on this non-dedicated study.       BRAIN: See separately dictated noncontrast head CT report.           Impression   50% stenosis of the proximal right cervical ICA.       Otherwise, no flow limiting stenosis or large vessel occlusion visualized   within the head or neck.       Incidentally noted chest findings as detailed above.  Would consider   dedicated CT of the chest for further evaluation.         CXR 5/31/2020:  1. Mass density left hilum similar appearance to previous exam   2. New pulmonary opacity right upper lobe could represent an infectious   process.  However, follow-up suggested to ensure resolution   3. Previously noted left midlung opacity has organized into a linear density   most likely representing scarring. Problem List  Active Problems:    Renal failure  Resolved Problems:    * No resolved hospital problems. *       Assessment & Plan:   1. Acute renal failure. Admission creatinine 6.2. Renal ultrasound with simple bilateral cysts otherwise unremarkable. Suspected secondary to hypovolemia/GI blood loss/nephrotoxic medication/contrast. Creatinine 1.2 today. Stable from nephrology standpoint for dc. 2. Chronic anemia. Likely combination of chronic disease and possible GI blood loss considering positive stool guaiac.  Hgb 6.6 on admission, she has been transfused two units since admission-most recently on 6/11. Hemoglobin up almost 2 grams since transfusion of one unit. No gross bleeding. GI has signed off. No sense to reinvolve GI as patient does not want EGD or colonoscopy and no fred blood being passed. 3.  Non-small cell lung cancer. Status post chemo and radiation (completed 4/2019). Patient indicates she wants no further workup or treatment for lung cancer. Hem/onc on board. 4. Hx COPD with chronic respiratory failure. No evidence of acute exacerbation. Entered hospice care secondary to COPD (7/2019). O2 sats stable on 1 liter for mid 90s, on chronic O2 therapy at home. 5.  Abnormal CXR (5/31). New pulmonary opacity RUL on CXR. CT scan with nonspecific consolidative changes within RUL possibly secondary to atelectasis and scar. Remains afebrile. Blood cultures no growth to date. Procalcitonin 0.22, low likelihood for bacterial infection, no leukocytosis. No indication for antibiotics. O2 sats stable on 1 liter for high 90s. 6. DM2. A1c 7.9. On metformin at home, discontinued secondary renal failure. Transitioned from mealtime insulin to linagliptin yesterday in anticipation for when she goes home, patient and daughter do not believe she would be able to manage insulin. Blood sugars high at mealtime, change to medium dose correction, continue carb control diet. 7. Paroxymal afib- Patient developed atrial fib, converted after IV cardizem dose. Remains in sinus rhythm. On Eliquis, CCB and BB, HR 60s-70s. Daughter appealing SNF denial. I spoke to . Family has submitted expedited appeal. No peer to peer needed.        Diet: DIET CARB CONTROL; Carb Control: 4 carb choices (60 gms)/meal  Code:DNR-CC  DVT PPX: apixaban      Michelle Wilcox PA-C   6/14/2020 9:47am

## 2020-06-14 NOTE — PLAN OF CARE
Problem: Falls - Risk of:  Goal: Will remain free from falls  Description: Will remain free from falls  Outcome: Ongoing  Note: All fall precautions in place, bed in lowest position, frequent rounding to assist with toileting. Pt absent of fall this shift. Problem: Cardiac:  Goal: Ability to maintain an adequate cardiac output will improve  Description: Ability to maintain an adequate cardiac output will improve  Outcome: Ongoing  Note: VSS.

## 2020-06-14 NOTE — PLAN OF CARE
Problem: Falls - Risk of:  Goal: Absence of physical injury  Description: Absence of physical injury  6/14/2020 1203 by Yasmin Hobbs RN  Outcome: Ongoing  6/14/2020 0544 by Pauline Velazquez RN  Outcome: Ongoing     Problem: Cardiac:  Goal: Ability to maintain an adequate cardiac output will improve  Description: Ability to maintain an adequate cardiac output will improve  6/14/2020 1203 by Yasmin Hobbs RN  Outcome: Ongoing  6/14/2020 0544 by Pauline Velazquez RN  Outcome: Ongoing  Note: VSS.

## 2020-06-14 NOTE — PROGRESS NOTES
MD Tamanna Chaparro MD Idolina Magyar, MD                                  Office: [phone]                 Fax: [phone]          [facility]                     NEPHROLOGY INPATIENT PROGRESS NOTE:     PATIENT NAME: Kyleigh Chow  : 1940  MRN: [id number]    Assessment:     Acute kidney injury-improved  Severe on admission Scr to 6.2  Anemia - hgb better today, s/p prbc. With improved renal function, renal impairment would not be primary etiology for recurrent decrease Hgb. Stool OB+. Per Medicine. History of Non-small Cell Lung CA. Stable per Dr. Melba Fletcher  Generalized weakness. HTN  Better controlled   DNR-CC    Plan:     Medications reviewed. Not on diuretics. Continue PPI for now  Stable from renal perspective. F/U w/ Dr Geovany Canales in 2-3 weeks       Lani Malhotra MD  [facility] [address] [place] [address] Road   Phone: [phone] or [employer]  Fax: [phone]          Subjective:     Reported no active complaints     Objective:   EXAM  Vitals:    20 0832   BP:    Pulse:    Resp: 18   Temp:    SpO2: 98%       Intake/Output Summary (Last 24 hours) at 2020 1118  Last data filed at 2020 2105  Gross per 24 hour   Intake 10 ml   Output --   Net 10 ml       Awake and communicative   Neck. JVD not visible. No lymph nodes palpable. CVS.  Heart sounds are normal.Palpation of the heart is normal. No murmurs. No pericardial rub.  RS.dullness on percussion of the lower chest wall. Lung fields are clear   PA soft , bowel sounds are normal no distension and no tenderness to palpation. Musculoskeletal: Normal range of motion. 1+ edema and no tenderness. Active Problems:    Renal failure  Resolved Problems:    * No resolved hospital problems.  *        Medications Reviewed by me   sodium chloride  20 mL Intravenous Once    acarbose  25 mg Oral TID     linagliptin  5 mg Oral Daily    dilTIAZem  60 mg Oral BID    insulin lispro  0-6 Units Subcutaneous TID     insulin lispro  0-3 Units Subcutaneous Nightly    metoprolol succinate  25 mg Oral Daily    apixaban  2.5 mg Oral BID    pantoprazole  40 mg Oral QAM AC    ipratropium-albuterol  3 mL Inhalation Q4H WA    mirtazapine  15 mg Oral Nightly    sodium chloride flush  10 mL Intravenous 2 times per day    predniSONE  10 mg Oral Daily      dextrose         Data Review. Labs reviewed by me       CBC:   Recent Labs     06/12/20 0426 06/13/20 0418 06/14/20  0657   WBC 6.9 8.1 9.4   HGB 6.8* 8.5* 8.7*   HCT 21.5* 26.2* 28.1*   MCV 79.5* 80.6 84.2    321 338     BMP:   Recent Labs     06/12/20 0426 06/13/20 0418 06/14/20 0429    138 140   K 4.1 4.3 4.1    103 104   CO2 25 24 25   BUN 24* 28* 27*   CREATININE 1.4* 1.3* 1.2     Magnesium:   Lab Results   Component Value Date    MG 1.50 06/03/2020    MG 1.60 10/07/2019    MG 1.90 10/04/2019     Lab Results   Component Value Date    CREATININE 1.2 06/14/2020       Arterial Blood Gasses  No results for input(s): PH, PCO2, PO2 in the last 72 hours. Invalid input(s): Karin Hoffman    UA:  No results for input(s): NITRITE, COLORU, PHUR, LABCAST, WBCUA, RBCUA, MUCUS, TRICHOMONAS, YEAST, BACTERIA, CLARITYU, SPECGRAV, LEUKOCYTESUR, UROBILINOGEN, BILIRUBINUR, BLOODU, GLUCOSEU, AMORPHOUS in the last 72 hours. Invalid input(s): Gilda Cueva    LIVER PROFILE:   No results for input(s): AST, ALT, LIPASE, BILIDIR, BILITOT, ALKPHOS in the last 72 hours. Invalid input(s):   AMYLASE,  ALB  PT/INR:    Lab Results   Component Value Date    PROTIME 20.1 05/31/2020    PROTIME 18.0 09/27/2019    PROTIME 19.1 08/06/2019    INR 1.72 05/31/2020    INR 1.58 09/27/2019    INR 1.68 08/06/2019     PTT:    Lab Results   Component Value Date    APTT 30.5 12/04/2018     ROXANNE:  No results found for: ROXANNE AMIN  CHEMISTRY COMMON GROUP :   Lab Results   Component Value Date    GLUCOSE 157 06/14/2020    TSH 2.97 07/05/2013     Recent Labs     06/12/20  0426 06/13/20  0418 06/14/20  0429   GLUCOSE 145* 169* 157*   CALCIUM 8.9 9.0 9.2       Electronically Signed: Steffi Rain MD 6/14/2020 11:18 AM

## 2020-06-15 VITALS
DIASTOLIC BLOOD PRESSURE: 79 MMHG | BODY MASS INDEX: 23.41 KG/M2 | HEIGHT: 65 IN | HEART RATE: 82 BPM | TEMPERATURE: 97.2 F | OXYGEN SATURATION: 97 % | WEIGHT: 140.5 LBS | RESPIRATION RATE: 16 BRPM | SYSTOLIC BLOOD PRESSURE: 132 MMHG

## 2020-06-15 LAB
ANION GAP SERPL CALCULATED.3IONS-SCNC: 13 MMOL/L (ref 3–16)
BUN BLDV-MCNC: 28 MG/DL (ref 7–20)
CALCIUM SERPL-MCNC: 9.2 MG/DL (ref 8.3–10.6)
CHLORIDE BLD-SCNC: 101 MMOL/L (ref 99–110)
CO2: 25 MMOL/L (ref 21–32)
CREAT SERPL-MCNC: 1.4 MG/DL (ref 0.6–1.2)
GFR AFRICAN AMERICAN: 44
GFR NON-AFRICAN AMERICAN: 36
GLUCOSE BLD-MCNC: 141 MG/DL (ref 70–99)
GLUCOSE BLD-MCNC: 147 MG/DL (ref 70–99)
GLUCOSE BLD-MCNC: 163 MG/DL (ref 70–99)
GLUCOSE BLD-MCNC: 292 MG/DL (ref 70–99)
HCT VFR BLD CALC: 29.1 % (ref 36–48)
HEMOGLOBIN: 9.1 G/DL (ref 12–16)
MCH RBC QN AUTO: 26.4 PG (ref 26–34)
MCHC RBC AUTO-ENTMCNC: 31.4 G/DL (ref 31–36)
MCV RBC AUTO: 84 FL (ref 80–100)
PDW BLD-RTO: 18.8 % (ref 12.4–15.4)
PERFORMED ON: ABNORMAL
PLATELET # BLD: 335 K/UL (ref 135–450)
PMV BLD AUTO: 7.3 FL (ref 5–10.5)
POTASSIUM SERPL-SCNC: 4.2 MMOL/L (ref 3.5–5.1)
RBC # BLD: 3.46 M/UL (ref 4–5.2)
SODIUM BLD-SCNC: 139 MMOL/L (ref 136–145)
WBC # BLD: 8.2 K/UL (ref 4–11)

## 2020-06-15 PROCEDURE — 80048 BASIC METABOLIC PNL TOTAL CA: CPT

## 2020-06-15 PROCEDURE — 2580000003 HC RX 258: Performed by: FAMILY MEDICINE

## 2020-06-15 PROCEDURE — 97535 SELF CARE MNGMENT TRAINING: CPT

## 2020-06-15 PROCEDURE — 94761 N-INVAS EAR/PLS OXIMETRY MLT: CPT

## 2020-06-15 PROCEDURE — 94640 AIRWAY INHALATION TREATMENT: CPT

## 2020-06-15 PROCEDURE — 6370000000 HC RX 637 (ALT 250 FOR IP): Performed by: NURSE PRACTITIONER

## 2020-06-15 PROCEDURE — 6370000000 HC RX 637 (ALT 250 FOR IP): Performed by: INTERNAL MEDICINE

## 2020-06-15 PROCEDURE — 97530 THERAPEUTIC ACTIVITIES: CPT

## 2020-06-15 PROCEDURE — 6370000000 HC RX 637 (ALT 250 FOR IP): Performed by: FAMILY MEDICINE

## 2020-06-15 PROCEDURE — 2700000000 HC OXYGEN THERAPY PER DAY

## 2020-06-15 PROCEDURE — 6370000000 HC RX 637 (ALT 250 FOR IP): Performed by: PHYSICIAN ASSISTANT

## 2020-06-15 PROCEDURE — 36415 COLL VENOUS BLD VENIPUNCTURE: CPT

## 2020-06-15 PROCEDURE — 85027 COMPLETE CBC AUTOMATED: CPT

## 2020-06-15 RX ORDER — METOPROLOL SUCCINATE 25 MG/1
25 TABLET, EXTENDED RELEASE ORAL DAILY
Qty: 30 TABLET | Refills: 1 | Status: ON HOLD
Start: 2020-06-15 | End: 2021-06-09 | Stop reason: HOSPADM

## 2020-06-15 RX ORDER — PANTOPRAZOLE SODIUM 40 MG/1
40 TABLET, DELAYED RELEASE ORAL
Qty: 30 TABLET | Refills: 1 | Status: SHIPPED | OUTPATIENT
Start: 2020-06-15

## 2020-06-15 RX ORDER — DILTIAZEM HYDROCHLORIDE 60 MG/1
60 CAPSULE, EXTENDED RELEASE ORAL 2 TIMES DAILY
Qty: 60 CAPSULE | Refills: 1 | Status: ON HOLD
Start: 2020-06-15 | End: 2021-06-09 | Stop reason: HOSPADM

## 2020-06-15 RX ORDER — PREDNISONE 10 MG/1
10 TABLET ORAL DAILY
Qty: 30 TABLET | Refills: 0 | COMMUNITY
Start: 2020-06-15 | End: 2021-06-28 | Stop reason: SDUPTHER

## 2020-06-15 RX ADMIN — INSULIN LISPRO 2 UNITS: 100 INJECTION, SOLUTION INTRAVENOUS; SUBCUTANEOUS at 09:37

## 2020-06-15 RX ADMIN — PANTOPRAZOLE SODIUM 40 MG: 40 TABLET, DELAYED RELEASE ORAL at 06:03

## 2020-06-15 RX ADMIN — APIXABAN 2.5 MG: 5 TABLET, FILM COATED ORAL at 09:34

## 2020-06-15 RX ADMIN — IPRATROPIUM BROMIDE AND ALBUTEROL SULFATE 3 ML: .5; 3 SOLUTION RESPIRATORY (INHALATION) at 16:06

## 2020-06-15 RX ADMIN — LINAGLIPTIN 5 MG: 5 TABLET, FILM COATED ORAL at 09:35

## 2020-06-15 RX ADMIN — Medication 10 ML: at 09:00

## 2020-06-15 RX ADMIN — PREDNISONE 10 MG: 10 TABLET ORAL at 09:34

## 2020-06-15 RX ADMIN — DILTIAZEM HYDROCHLORIDE 60 MG: 60 CAPSULE, EXTENDED RELEASE ORAL at 09:35

## 2020-06-15 RX ADMIN — INSULIN LISPRO 2 UNITS: 100 INJECTION, SOLUTION INTRAVENOUS; SUBCUTANEOUS at 12:34

## 2020-06-15 RX ADMIN — IPRATROPIUM BROMIDE AND ALBUTEROL SULFATE 3 ML: .5; 3 SOLUTION RESPIRATORY (INHALATION) at 08:38

## 2020-06-15 RX ADMIN — METOPROLOL SUCCINATE 25 MG: 25 TABLET, FILM COATED, EXTENDED RELEASE ORAL at 09:34

## 2020-06-15 RX ADMIN — IPRATROPIUM BROMIDE AND ALBUTEROL SULFATE 3 ML: .5; 3 SOLUTION RESPIRATORY (INHALATION) at 11:43

## 2020-06-15 RX ADMIN — ACARBOSE 25 MG: 50 TABLET ORAL at 12:36

## 2020-06-15 RX ADMIN — ACARBOSE 25 MG: 50 TABLET ORAL at 09:36

## 2020-06-15 ASSESSMENT — PAIN SCALES - GENERAL
PAINLEVEL_OUTOF10: 0
PAINLEVEL_OUTOF10: 0

## 2020-06-15 NOTE — CARE COORDINATION
Notified daughter Abhijeet Monsalve, and Jalyn Barry, SHANNON of Confirmation  with Mima Sheridan from [facility] , [phone] and   Received a call from MELANY RODRIGUEZ,  That the  Denial was upheld,  it automatically went to [facility],  for an Independent review which could take another 3-5 days (72 hours). Notified Isabella via perfect serve, Daughter wants to know if patient is discharged and if she will have an order for home care today.

## 2020-06-15 NOTE — DISCHARGE SUMMARY
[address] [facility] DISCHARGE SUMMARY    Patient Demographics    Patient. Kayla Adrian  Date of Birth. 1940  MRN. [id number]     Primary care provider. Izabela Pineda  (Tel: [phone])    Admit date: 5/31/2020    Discharge date (blank if same as Note Date): Note Date: 6/15/2020     Reason for Hospitalization. Chief Complaint   Patient presents with    Fatigue     Pt in by EMS from daughter's house. Normal is a/ox4 and independent. Pt reports nausea and diarrhea x 2 days and currently feeling weak. Per EMS pt was in the garage when she became suddenly weak and collapased to the ground. U/a, right arm and leg drift noted. hx of afib +blood thinners         Significant Findings. Active Problems:    Renal failure  Resolved Problems:    * No resolved hospital problems. *       Problems and results from this hospitalization that need follow up. 1. Anemia. Unclear source. Recommend weekly CBC to monitor (home health to draw). 2. Severe LORRAINE. Recommend weekly BMP (home health to draw)  3. Pulmonary opacity RUL. Recommend follow up CXR to ensure resolution if patient agreeable. Significant test results and incidental findings.   CT head 5/31/2020:  No acute intracranial abnormality.       Chronic microvascular ischemic changes and global cerebral atrophy.          CTA head/neck 5/31/2020:  CTA NECK:       AORTIC ARCH/ARCH VESSELS: No dissection or arterial injury.  No significant   stenosis of the brachiocephalic or subclavian arteries.       CAROTID ARTERIES: There is 50% stenosis of the proximal right cervical   internal carotid artery due to calcified and noncalcified plaque.       VERTEBRAL ARTERIES: No dissection, arterial injury, or significant stenosis.       SOFT TISSUES: There is pulmonary emphysema with scarring or atelectasis   within the left suprahilar region. Shanna Childes are nonspecific consolidative   changes within the right upper lobe, which may be due in part to atelectasis   and scar.  Thyroid nodules are noted.       BONES: No acute osseous abnormality.           CTA HEAD:       ANTERIOR CIRCULATION: No significant stenosis of the intracranial internal   carotid, anterior cerebral, or middle cerebral arteries. No aneurysm.       POSTERIOR CIRCULATION: No significant stenosis of the vertebral, basilar, or   posterior cerebral arteries. No aneurysm.       OTHER: No dural venous sinus thrombosis on this non-dedicated study.       BRAIN: See separately dictated noncontrast head CT report.           Impression   50% stenosis of the proximal right cervical ICA.       Otherwise, no flow limiting stenosis or large vessel occlusion visualized   within the head or neck.       Incidentally noted chest findings as detailed above.  Would consider   dedicated CT of the chest for further evaluation.          CXR 5/31/2020:  1. Mass density left hilum similar appearance to previous exam   2. New pulmonary opacity right upper lobe could represent an infectious   process.  However, follow-up suggested to ensure resolution   3. Previously noted left midlung opacity has organized into a linear density   most likely representing scarring.          Invasive procedures and treatments. 1. None     Problem-based Hospital Course. Patient is a 71 yo female with hx adenocarcinoma of the lung, CAD/stent, COPD, chronic anemia, chronic dCHF, DM, HTN, HLD, LIZETT (untreated), chronic AC (on Eliquis). She presented to hospital with ongoing fatigue and weakness. Found to have worsening anemia, Hgb 6.6 and she was transfused. Also evidence of acute renal failure with creatinine 6.2.     1. Acute renal failure. Resolved. Admission creatinine 6.2. Renal ultrasound with simple bilateral cysts otherwise unremarkable. Suspected secondary to hypovolemia/GI blood loss/nephrotoxic medication/contrast. Creatinine stable, 1.4 today. Nephrology followed. 2. Chronic anemia. Likely combination of chronic disease and possible GI blood loss considering positive stool guaiac. Hgb 6.6 on admission, she has been transfused, 2 units since admission, last 6/11. Iron studies (6/1) WNL. GI evaluated, recommend continue daily PPI. Patient does not want EGD or colonoscopy, no fred bleeding. Hgb today 9.1. 3.  Non-small cell lung cancer. Status post chemo and radiation (completed 4/2019). Patient indicates she wants no further workup or treatment for lung cancer. Hem/onc consulted. 4. Hx COPD with chronic respiratory failure. No evidence of acute exacerbation. Entered hospice care secondary to COPD (7/2019). O2 sats stable on 1 liter for mid 90s, on chronic O2 therapy at home. 5.  Abnormal CXR (5/31). New pulmonary opacity RUL on CXR. CT scan with nonspecific consolidative changes within RUL possibly secondary to atelectasis and scar. Remained afebrile. Blood cultures no growth to date. Procalcitonin 0.22, low likelihood for bacterial infection, no leukocytosis. No indication for antibiotics. O2 sats stable on 1 liter for high 90s. 6. DM2. A1c 7.9. She was on metformin at home, this was discontinued secondary renal failure. Transitioned to linagliptin, patient and daughter do not believe she would be able to manage insulin at home. 7. HTN. Reasonably controlled considering hx of falls. Carvedilol has been discontinued, now on metoprolol. 8. PAF. Patient developed atrial fib, converted after IV cardizem dose. Remains in sinus rhythm. On Eliquis, CCB and BB, HR 60s-70s. Patient and daughters had wanted her to go to SNF for short stay for rehab but this was denied by her insurance company. Discharge delayed secondary to appeal process but ultimately still denied. patient was discharged to home with home health skilled nursing, PT/OT.           Consults.   IP CONSULT TO NEPHROLOGY  IP CONSULT TO HOSPITALIST  IP CONSULT breakfast)  Notes to patient:  Use: Prevention and treatment of gastric ulcers  Side Effects: Headache, fatigue, constipation, dry  mouth        CHANGE how you take these medications    Acetaminophen 650 MG Tabs  Take 650 mg by mouth every 4 hours as needed for Pain (For mild pain level 1-3 or for fever > 100.5). What changed:  Another medication with the same name was removed. Continue taking this medication, and follow the directions you see here. Notes to patient:  Use: treat pain  Side effects: constipation, nausea, headache, insomnia, dizziness, itching     apixaban 2.5 MG Tabs tablet  Commonly known as:  ELIQUIS  Take 1 tablet by mouth 2 times daily  What changed:    · medication strength  · how much to take  Notes to patient:  Use  Blood thinner to prevent clots   Side effects Bleeding, Bruising     ipratropium-albuterol 0.5-2.5 (3) MG/3ML Soln nebulizer solution  Commonly known as:  DUONEB  Inhale 3 mLs into the lungs every 4 hours (while awake) DX:COPD J44.9  What changed:    · when to take this  · reasons to take this  Notes to patient:  Use:bronchodilator, to open airways, rescue inhaler  Side effects: nervousness, jitteriness, shakiness, headache, fast heartbeat     predniSONE 10 MG tablet  Commonly known as:  DELTASONE  What changed:  Another medication with the same name was removed. Continue taking this medication, and follow the directions you see here.   Notes to patient:  Use: treat asthma and copd, inflammation  Side effects: upset stomach, high blood sugars, weight gain, mood changes, and increased chances of infection        CONTINUE taking these medications    mirtazapine 15 MG tablet  Commonly known as:  REMERON  Take 1 tablet by mouth nightly  Notes to patient:  Use: treatment of depression  Side effects: drowsiness, increased cholesterol, weight gain, constipation     Trelegy Ellipta 100-62.5-25 MCG/INH Aepb  Generic drug:  fluticasone-umeclidin-vilant  Notes to patient: Use:bronchodilator, to open airways, rescue inhaler  Side effects: nervousness, jitteriness, shakiness, headache, fast heartbeat        STOP taking these medications    carvedilol 25 MG tablet  Commonly known as:  COREG     dilTIAZem 30 MG tablet  Commonly known as:  CARDIZEM  Replaced by:  dilTIAZem 60 MG extended release capsule     dronabinol 2.5 MG capsule  Commonly known as:  MARINOL     furosemide 40 MG tablet  Commonly known as:  LASIX     loperamide 2 MG capsule  Commonly known as:  IMODIUM     metFORMIN 500 MG tablet  Commonly known as:  GLUCOPHAGE     omeprazole 20 MG delayed release capsule  Commonly known as:  PRILOSEC     potassium chloride 20 MEQ extended release tablet  Commonly known as:  KLOR-CON M           Where to Get Your Medications      [facility] [address] [facility] [phone] [facility] [phone]  [address] [facility] [address] [facility]    Phone:  [phone]   · apixaban 2.5 MG Tabs tablet  · dilTIAZem 60 MG extended release capsule  · linagliptin 5 MG tablet  · metoprolol succinate 25 MG extended release tablet  · pantoprazole 40 MG tablet         Discharge recommendations given to patient. Follow Up. PCP in 1 week   Disposition. Home with home health  Activity. As tolerated  Diet: DIET CARB CONTROL; Carb Control: 3 carb choices (45 gms)/meal      Spent 40 minutes in discharge process.     Signed:  Marylene Blander, APRN - CNP     6/15/2020 3:50 PM

## 2020-06-15 NOTE — PROGRESS NOTES
return home, her \"stamina\" and expressed pt has history of multiple falls. Pt would benefit from continued OT services in order to maximize iindependence and safety. Prognosis: Good  OT Education: OT Role;Plan of Care;Transfer Training;ADL Adaptive Strategies; Energy Conservation  Patient Education: Pt verbalized understanding but would benefit from continued reinforcement for carryover  REQUIRES OT FOLLOW UP: Yes  Activity Tolerance  Activity Tolerance: Patient Tolerated treatment well  Safety Devices  Safety Devices in place: Yes  Type of devices: All fall risk precautions in place;Call light within reach;Gait belt;Patient at risk for falls; Left in chair;Nurse notified(chair alarm not in use due to low fall risk)  Restraints  Initially in place: No         Patient Diagnosis(es): The primary encounter diagnosis was General weakness. Diagnoses of LORRAINE (acute kidney injury) ([address] Hyperkalemia, Anemia, unspecified type, Pneumonia due to organism, and Upper GI bleed were also pertinent to this visit. has a past medical history of Arthritis, CAD (coronary artery disease), Carpal tunnel syndrome, COPD (chronic obstructive pulmonary disease) ([address] Coronary stent, depression, Diabetes mellitus ([address] Diastolic heart failure ([address] GERD (gastroesophageal reflux disease), Hyperlipidemia, Hypertension, Lung cancer ([address] MI (myocardial infarction) ([address] LIZETT (obstructive sleep apnea), PONV (postoperative nausea and vomiting), and stress incontinence. has a past surgical history that includes Coronary angioplasty with stent (2000, 2001); back surgery (2000, 2001); bronchoscopy (12/04/2018); [facility]/cell washg spx (N/A, 12/4/2018); Cholecystectomy, laparoscopic (N/A, 12/19/2018); Upper gastrointestinal endoscopy (N/A, 2/25/2019); Colonoscopy (N/A, 2/25/2019);  Colonoscopy (2/25/2019); bronchoscopy (N/A, 2/27/2019); bronchoscopy (2/27/2019); bronchoscopy (2/27/2019); bronchoscopy (N/A, 8/6/2019); Upper gastrointestinal endoscopy (N/A, 8/7/2019); bronchoscopy (N/A, 9/27/2019); bronchoscopy (9/27/2019); and bronchoscopy (9/27/2019). Restrictions  Restrictions/Precautions  Restrictions/Precautions: Fall Risk  Required Braces or Orthoses?: No  Position Activity Restriction  Other position/activity restrictions: Melisa Spurling is a 70 y.o. female with past medical history of CAD, COPD, depression, diabetes, CHF, hyperlipidemia, hypertension, lung cancer, previous MI, obstructive sleep apnea and atrial fibrillation on Eliquis and aspirin who presents to the ED with complaint of weakness and fatigue. Was brought in by EMS from daughter's house. Patient apparently independent and lives on her own been normally ANO x4. Apparently has had nausea and diarrhea for the past several days. Has been feeling weak today. Apparently she was in the garage when she became weak and apparently had to be lowered to the ground by family members. They brought her into the house and apparently improved with a called EMS and brought her to the ED for further evaluation and treatment. Patient denies any specific injury or trauma from the fall. Subjective   General  Chart Reviewed: Yes  Patient assessed for rehabilitation services?: Yes  Additional Pertinent Hx: adenocarcinoma of the lung, CAD/stent, COPD, chronic anemia, chronic dCHF, DM, HTN, HLD, LIZETT (untreated), chronic AC (on Eliquis). Response to previous treatment: Patient with no complaints from previous session  Family / Caregiver Present: No  Referring Practitioner: MARIA C Adam CNP  Diagnosis: renal failure  Subjective  Subjective: Pt seated in chair upon entry; agreeable to tx.   General Comment  Comments: RN okay for therapy   Vital Signs  Patient Currently in Pain: Denies   Orientation  Orientation  Overall Orientation Status: Within Functional Limits  Objective    ADL  Feeding: Setup  UE Dressing: Supervision(doff gown, don bra and shirt)  EDMOND Dressing: Supervision(don pants, doff socks, don shoes)  Additional Comments: Pt declined further ADL participation. Balance  Sitting Balance: Modified independent   Standing Balance: Supervision  Standing Balance  Time: ~6 min  Activity: functional mobility, transfers, ADLs  Comment: no device  Functional Mobility  Functional - Mobility Device: No device  Activity: Retrieve items  Assist Level: Supervision  Functional Mobility Comments: walked from chair to window and back to retrieve clothing from suitcase  Bed mobility  Comment: Not assessed due to pt in chair upon entry and exit  Transfers  Sit to stand: Supervision  Stand to sit: Supervision  Transfer Comments: chair<>ambulating                       Cognition  Overall Cognitive Status: WFL  Arousal/Alertness: Appropriate responses to stimuli  Following Commands:  Follows one step commands consistently  Attention Span: Appears intact  Memory: Appears intact  Initiation: Does not require cues  Sequencing: Requires cues for some     Perception  Overall Perceptual Status: [facility]                                   Plan   Plan  Times per week: 3-5x/wk   Times per day: Daily  Current Treatment Recommendations: Strengthening, Safety Education & Training, Balance Training, Self-Care / ADL, Patient/Caregiver Education & Training, Functional Mobility Training, Endurance Training, Equipment Evaluation, Education, & procurement  G-Code     OutComes Score                                                  AM-PAC Score        AM-[facility] Inpatient Daily Activity Raw Score: 19 (06/15/20 1520)  AM-PAC Inpatient ADL T-Scale Score : 40.22 (06/15/20 1520)  ADL Inpatient CMS 0-100% Score: 42.8 (06/15/20 1520)  ADL Inpatient CMS G-Code Modifier : CK (06/15/20 1520)    Goals  Short term goals  Time Frame for Short term goals: By Discharge   Short term goal 1: Complete LB dressing with supervision -- goal met 6/11  Short term goal 2: Complete toileting with supervision --SBA 6/10, not addressed 6/15  Short term goal 3: Complete toilet transfers with supervision--CGA 6/10, not addressed 6/15  Short term goal 4: Tolerate x10 of standing ADLs in order to increase activity tolerance -- continue goal for consistency 6/10, ~6 min in stance with supervision       Therapy Time   Individual Concurrent Group Co-treatment   Time In 1444         Time Out 1508         Minutes 24         Timed Code Treatment Minutes: [address] [address]

## 2020-06-15 NOTE — CARE COORDINATION
Jose Paul is out of network for insurance. [facility] has accepted and will see patient on DC.       Cristi Fu  Work mobile: [phone]  [facility] office: [phone]

## 2020-06-15 NOTE — PLAN OF CARE
Problem: Cardiac:  Goal: Ability to maintain an adequate cardiac output will improve  Description: Ability to maintain an adequate cardiac output will improve  6/14/2020 2212 by Yee Duff RN  Outcome: Ongoing  Note: Patients vital signs are within normal limits. Hgb remains at an acceptable number for the patient. No signs or symptoms of bleeding at this time. Will continue to monitor.

## 2020-06-15 NOTE — PLAN OF CARE
Problem: Falls - Risk of:  Goal: Absence of physical injury  Description: Absence of physical injury  Outcome: Ongoing     Problem: Activity:  Goal: Fatigue will decrease  Description: Fatigue will decrease  Outcome: Ongoing     Problem: Cardiac:  Goal: Ability to maintain an adequate cardiac output will improve  Description: Ability to maintain an adequate cardiac output will improve  6/15/2020 0954 by Raymond Carlson RN  Outcome: Ongoing  6/14/2020 2212 by Kirsten Sanchez RN  Outcome: Ongoing  Note: Patients vital signs are within normal limits. Hgb remains at an acceptable number for the patient. No signs or symptoms of bleeding at this time. Will continue to monitor.

## 2020-06-15 NOTE — PROGRESS NOTES
[facility] Diabetes Education Nurse  Consult Note       NAME:  Batsheva Reardon RECORD NUMBER:  [id number]  AGE: 70 y.o. GENDER: female  : 1940  TODAY'S DATE:  6/15/2020    Subjective:     Reason for Educator consult ---  Evaluation and Assessment:    Durga Yusuf is a 70 y.o. female referred by:   [] Physician  [x] Nursing  [] Other:      Patient states diagnosed with diabetes \"a long time ago\". The patient does have a glucometer at home and states testing daily. Pt is aware of S/S of hyper- and hypoglycemia and the proper treatment of hyper- and hypoglycemia. Explained A1C values and goals. Patient states not counting carbohydrates. discussed briefly. States she thinks she is eating more here than at home. Consulted dietitian   Discussed exercise, sick day management, following dietary plan, following up with PCP. Discussed medications including insulin and Prednisone  Discussed health benefits of non smoking. Recommend maintaining a smoke-free lifestyle.        PAST MEDICAL HISTORY      Diagnosis Date    Arthritis     CAD (coronary artery disease)     Carpal tunnel syndrome     COPD (chronic obstructive pulmonary disease) ([address]     Coronary stent     depression     Diabetes mellitus ([address]     Diastolic heart failure ([address]     Per Dr Saintclair Blue 19    GERD (gastroesophageal reflux disease)     Hyperlipidemia     Hypertension     Lung cancer ([address]     Adenocarcinoma of Left Lung    MI (myocardial infarction) ([address]     LIZETT (obstructive sleep apnea)     does not use CPAP    PONV (postoperative nausea and vomiting)     stress incontinence        PAST SURGICAL HISTORY  Past Surgical History:   Procedure Laterality Date    BACK SURGERY  ,     lumbar laminectomy    BRONCHOSCOPY  2018    BRONCHOSCOPY N/A 2019    BRONCHOSCOPY BIOPSY BRONCHUS performed by Candy Stokes MD at [address] [place] [address]  2019    BRONCHOSCOPY/TRANSBRONCHIAL NEEDLE BIOPSY performed by Delvin Styles MD at [address] [place] [address]  2019    BRONCHOSCOPY ULTRASOUND performed by Delvin Styles MD at [address] [place] [address] N/A 2019    BRONCHOSCOPY ALVEOLAR LAVAGE performed by Delvin Styles MD at [address] [place] [address] N/A 2019    BRONCHOSCOPY ALVEOLAR LAVAGE performed by Hunter Gates MD at [address] [place] [address]  2019    BRONCHOSCOPY BIOPSY BRONCHUS performed by Hunter Gates MD at [address] [place] [address]  2019    BRONCHOSCOPY BRUSHINGS performed by Hunter Gates MD at [facility], LAPAROSCOPIC N/A 2018    LAPAROSCOPIC CHOLECYSTECTOMY WITH CHOLANGIOGRAM performed by Jerel Jo MD at [address] [address] COLONOSCOPY N/A 2019    COLONOSCOPY WITH BIOPSY performed by Emanuel Fu MD at [address] COLONOSCOPY  2019    COLONOSCOPY POLYPECTOMY SNARE/COLD BIOPSY performed by Emanuel Fu MD at [facility]  ,     NM [address] INCL FLUOR GDNCE DX W/CELL [facility] SPX N/A 2018    BRONCHOSCOPY WITH LAVAGE performed by Mary Garcia MD at [facility] 5 N/A 2019    EGD BIOPSY performed by Emanuel Fu MD at [facility] N/A 2019    EGD DIAGNOSTIC ONLY performed by Abel Gunn MD at [address] [facility] [address] HISTORY  Family History   Problem Relation Age of Onset    Cancer Sister     Diabetes Sister     Diabetes Brother     Heart Disease Father     Heart Disease Mother     Cancer Maternal Uncle        SOCIAL HISTORY  Social History     Tobacco Use    Smoking status: Former Smoker     Last attempt to quit: 10/28/2001     Years since quittin.6    Smokeless tobacco: Never Used   Substance Use Topics    Alcohol use: No    Drug use:  No ALLERGIES  Allergies   Allergen Reactions    Statins Other (See Comments)     Other reaction(s): Myalgias (Muscle Pain)      Lyrica [Pregabalin] Other (See Comments)     Shaking, swelling, rash    Cipro Xr Rash     Swelling, rash    Penicillins Rash     Swelling, rash    Sulfa Antibiotics Rash     Swelling, rash       MEDICATIONS  No current facility-administered medications on file prior to encounter. Current Outpatient Medications on File Prior to Encounter   Medication Sig Dispense Refill    mirtazapine (REMERON) 15 MG tablet Take 1 tablet by mouth nightly 30 tablet 3    ipratropium-albuterol (DUONEB) 0.5-2.5 (3) MG/3ML SOLN nebulizer solution Inhale 3 mLs into the lungs every 4 hours (while awake) DX:COPD J44.9 (Patient taking differently: Inhale 3 mLs into the lungs every 4 hours as needed DX:COPD J44.9) 360 mL 11    Fluticasone-Umeclidin-Vilant (TRELEGY ELLIPTA) 100-62.5-25 MCG/INH AEPB Inhale 1 puff into the lungs daily as needed       acetaminophen 650 MG TABS Take 650 mg by mouth every 4 hours as needed for Pain (For mild pain level 1-3 or for fever > 100.5).  120 tablet 0       Objective:     LABS    CBC:   Lab Results   Component Value Date    WBC 8.2 06/15/2020    RBC 3.46 06/15/2020    HGB 9.1 06/15/2020    HCT 29.1 06/15/2020    MCV 84.0 06/15/2020    MCH 26.4 06/15/2020    MCHC 31.4 06/15/2020    RDW 18.8 06/15/2020     06/15/2020    MPV 7.3 06/15/2020     CMP:    Lab Results   Component Value Date     06/15/2020    K 4.2 06/15/2020    K 5.7 05/31/2020     06/15/2020    CO2 25 06/15/2020    BUN 28 06/15/2020    CREATININE 1.4 06/15/2020    GFRAA 44 06/15/2020    GFRAA >60 10/29/2012    AGRATIO 1.1 05/31/2020    LABGLOM 36 06/15/2020    GLUCOSE 147 06/15/2020    PROT 6.8 05/31/2020    LABALBU 3.5 06/01/2020    CALCIUM 9.2 06/15/2020    BILITOT <0.2 05/31/2020    ALKPHOS 74 05/31/2020    AST 12 05/31/2020    ALT 7 05/31/2020       HgBA1c:    Lab Results   Component Value Date    LABA1C 7.9 05/31/2020       This patient's last creatinine was   Recent Labs     06/15/20  0422   CREATININE 1.4*        Recent Blood sugars have been   Lab Results   Component Value Date    POCGLU 141 06/15/2020    POCGLU 163 06/15/2020    POCGLU 133 06/14/2020    POCGLU 365 06/14/2020    POCGLU 192 06/14/2020    POCGLU 153 06/14/2020    POCGLU 234 06/13/2020    POCGLU 336 06/13/2020     Lab Results   Component Value Date    GLUCOSE 147 06/15/2020    GLUCOSE 157 06/14/2020    GLUCOSE 169 06/13/2020    GLUCOSE 145 06/12/2020    GLUCOSE 139 06/11/2020    GLUCOSE 139 06/10/2020            Assessment:     Patient Active Problem List   Diagnosis    DM (diabetes mellitus), secondary, uncontrolled, w/neurologic complic ([facility]    Dyslipidemia    Mitral and aortic valve disease    HTN (hypertension), benign    Coronary atherosclerosis of native coronary artery    Obstructive sleep apnea    Chest pain    Carpal tunnel syndrome    SOB (shortness of breath)    COPD, severe (HCC)    Influenza    Paroxysmal atrial fibrillation (HCC)    Chronic cough    Hilar mass    Acute cholecystitis    Gallstones and inflammation of gallbladder without obstruction    Atrial fibrillation with rapid ventricular response (HCC)    Acute respiratory failure with hypoxia (HCC)    Centrilobular emphysema (HCC)    Ileus following gastrointestinal surgery (HCC)    Mediastinal adenopathy    COPD exacerbation (HCC)    Adenocarcinoma of left lung (HCC)    Mild malnutrition (HCC)    Loss of consciousness ([facility]    Syncope and collapse    Subacute pulmonary embolism (HCC)    Other pulmonary embolism without acute cor pulmonale (HCC)    Closed compression fracture of thoracic vertebra (HCC)    Chronic deep vein thrombosis (DVT) of both lower extremities (HCC)    Ataxia    Recurrent falls    Diabetic peripheral neuropathy (HCC)    Severe malnutrition (HCC)    Pneumonia of left lower lobe due to infectious

## 2020-06-15 NOTE — PROGRESS NOTES
MD Irene Molina MD Ardie Casino, MD                                  Office: [phone]                 Fax: [phone]          [employer] [facility]                     NEPHROLOGY INPATIENT PROGRESS NOTE:     PATIENT NAME: Kayla Adrian  : 1940  MRN: [id number]    Assessment:     Acute kidney injury-improved  Severe on admission Scr to 6.2  Anemia - hgb better today, s/p prbc. With improved renal function, renal impairment would not be primary etiology for recurrent decrease Hgb. Stool OB+. Per Medicine. History of Non-small Cell Lung CA. Stable per Dr. Choe Free  Generalized weakness. HTN  Better controlled   DNR-CC    Plan:   Stable from renal POV. Medicationsreviewed. Not on diuretics. Continue PPI for now    Stable from renal perspective.     F/U w/ Dr Jb Grey in 2-3 weeks       Sudheer De La Rosa MD  [facility] [address] [place] [address]   Phone: [phone] or [address]  Fax: [phone]      Patient Active Problem List   Diagnosis    DM (diabetes mellitus), secondary, uncontrolled, w/neurologic complic ([address]    Dyslipidemia    Mitral and aortic valve disease    HTN (hypertension), benign    Coronary atherosclerosis of native coronary artery    Obstructive sleep apnea    Chest pain    Carpal tunnel syndrome    SOB (shortness of breath)    COPD, severe (HCC)    Influenza    Paroxysmal atrial fibrillation (HCC)    Chronic cough    Hilar mass    Acute cholecystitis    Gallstones and inflammation of gallbladder without obstruction    Atrial fibrillation with rapid ventricular response (HCC)    Acute respiratory failure with hypoxia ([address]    Centrilobular emphysema ([address]    Ileus following gastrointestinal surgery ([address]    Mediastinal adenopathy    COPD exacerbation ([address]    Adenocarcinoma of left lung ([address]    Mild malnutrition ([address]    Loss of consciousness ([address]    Syncope and collapse    Subacute pulmonary embolism ([address]    Other pulmonary embolism without acute cor pulmonale (HCC)    Closed compression fracture of thoracic vertebra (HCC)    Chronic deep vein thrombosis (DVT) of both lower extremities (HCC)    Ataxia    Recurrent falls    Diabetic peripheral neuropathy (HCC)    Severe malnutrition (HCC)    Pneumonia of left lower lobe due to infectious organism ([address]    Abnormal CT of the chest    Chronic diastolic heart failure (HCC)    Failure to thrive (0-17)    Acute on chronic respiratory failure with hypoxia (HCC)    Chronic respiratory failure with hypoxia (HCC)    Lung infiltrate on CT    Non-small cell cancer of left lung (HCC)    Acute encephalopathy    Weakness    Radiation pneumonitis (HCC)    Hypokalemia    Gastroparesis    Infection requiring airborne isolation precautions    Malignant neoplasm of left lung (HCC)    Chronic obstructive pulmonary disease with acute lower respiratory infection (HCC)    Acid fast bacillus    Poorly controlled type 2 diabetes mellitus ([address]    History of depression    SIRS (systemic inflammatory response syndrome) ([facility]    Diabetes education, encounter for    Depression    Anxiety disorder    Delirium    Renal failure           Subjective:      no new complaints     Objective:   EXAM  Vitals:    06/15/20 0839   BP:    Pulse:    Resp: 18   Temp:    SpO2: 96%     No intake or output data in the 24 hours ending 06/15/20 0923    Awake and communicative   CVS.  Heart sounds are normal.Palpation of the heart is normal. No murmurs. No pericardial rub.  RS.dullness on percussion of the lower chest wall. Lung fields are clear   PA soft , bowel sounds are normal no distension and no tenderness to palpation. Musculoskeletal: Normal range of motion. 1+ edema and no tenderness.           Medications Reviewed by me   insulin lispro  0-12 Units Subcutaneous TID     insulin lispro  0-6 Units Subcutaneous Nightly    sodium chloride  20 mL Intravenous Once    acarbose  25 mg Oral TID     linagliptin  5 mg Oral Daily    dilTIAZem  60 mg Oral BID    metoprolol succinate  25 mg Oral Daily    apixaban  2.5 mg Oral BID    pantoprazole  40 mg Oral QAM AC    ipratropium-albuterol  3 mL Inhalation Q4H WA    mirtazapine  15 mg Oral Nightly    sodium chloride flush  10 mL Intravenous 2 times per day    predniSONE  10 mg Oral Daily      dextrose         Data Review. Labs reviewed by me       CBC:   Recent Labs     06/13/20 0418 06/14/20 0657 06/15/20  0423   WBC 8.1 9.4 8.2   HGB 8.5* 8.7* 9.1*   HCT 26.2* 28.1* 29.1*   MCV 80.6 84.2 84.0    338 335     BMP:   Recent Labs     06/13/20 0418 06/14/20 0429 06/15/20  0422    140 139   K 4.3 4.1 4.2    104 101   CO2 24 25 25   BUN 28* 27* 28*   CREATININE 1.3* 1.2 1.4*     Magnesium:   Lab Results   Component Value Date    MG 1.50 06/03/2020    MG 1.60 10/07/2019    MG 1.90 10/04/2019     Lab Results   Component Value Date    CREATININE 1.4 06/15/2020       Arterial Blood Gasses  No results for input(s): PH, PCO2, PO2 in the last 72 hours. Invalid input(s): Hernan Faust    UA:  No results for input(s): NITRITE, COLORU, PHUR, LABCAST, WBCUA, RBCUA, MUCUS, TRICHOMONAS, YEAST, BACTERIA, CLARITYU, SPECGRAV, LEUKOCYTESUR, UROBILINOGEN, BILIRUBINUR, BLOODU, GLUCOSEU, AMORPHOUS in the last 72 hours. Invalid input(s): Roxanne Torrez    LIVER PROFILE:   No results for input(s): AST, ALT, LIPASE, BILIDIR, BILITOT, ALKPHOS in the last 72 hours. Invalid input(s):   AMYLASE,  ALB  PT/INR:    Lab Results   Component Value Date    PROTIME 20.1 05/31/2020    PROTIME 18.0 09/27/2019    PROTIME 19.1 08/06/2019    INR 1.72 05/31/2020    INR 1.58 09/27/2019    INR 1.68 08/06/2019     PTT:    Lab Results   Component Value Date    APTT 30.5 12/04/2018     ROXANNE:  No results found for: ANATITER, ROXANNE  CHEMISTRY COMMON GROUP :   Lab Results   Component Value Date    GLUCOSE 147 06/15/2020    TSH 2.97 07/05/2013     Recent Labs     06/13/20  0418 06/14/20  0429 06/15/20  0422   GLUCOSE 169* 157* 147*   CALCIUM 9.0 9.2 9.2       Electronically Signed: Albert Alcantara MD 6/15/2020 9:23 AM

## 2020-06-15 NOTE — PROGRESS NOTES
Data- discharge order received, pt verbalized agreement to discharge, needs for [facility] with [facility] for  and/or new Durable Medical Equipment through  for , LUBNA reviewed and signed by MD, to be completed by RN. Action- AVS prepared, discharge instructions prepared and given to PT, medication information packet given r/t NEW or CHANGED prescriptions, pt verbalized understanding further self-review. D/C instruction summary: Diet- CC3, Activity- as toleratede, follow up with Primary Care Physician Barbara Gifford [phone] appointment in one week, immunizations reviewed and refused, medications prescriptions to be filled by pt . I  Response- Case Management/ reported faxing completed LUBNA and AVS to needed HHC/DME services stated above. Pt belongings gathered, IV removed, pt dressed by PCA. Disposition is home with HHC/DME as stated above, transported with Daughter, taken to lobby via w/c with Rn, no complications.

## 2020-06-15 NOTE — PROGRESS NOTES
[facility] PROGRESS NOTE    6/15/2020 7:22 AM        Name: Isacc Patino . Admitted: 5/31/2020  Primary Care Provider: Maria De Jesus Nguyễn (Tel: [phone])      Subjective:  Patient is a 69 yo female with hx adenocarcinoma of the lung, CAD/stent, COPD, chronic anemia, chronic dCHF, DM, HTN, HLD, LIZETT (untreated), chronic AC (on Eliquis). She presented to hospital with ongoing fatigue and weakness. Found to have worsening anemia, Hgb 6.6 and she was transfused. Also evidence of acute renal failure with creatinine 6.2. Presently sitting on side of bed eating breakfast. No acute events overnight, offers no new complaints. Denies chest pain, shortness of breath, O2 needs stable at 1 liters. Remains in sinus rhythm.      Reviewed interval ancillary notes    Current Medications  insulin lispro (1 Unit Dial) 0-12 Units, TID WC  insulin lispro (1 Unit Dial) 0-6 Units, Nightly  0.9 % sodium chloride bolus, Once  acarbose (PRECOSE) tablet 25 mg, TID WC  linagliptin (TRADJENTA) tablet 5 mg, Daily  dilTIAZem (CARDIZEM 12 HR) extended release capsule 60 mg, BID  metoprolol succinate (TOPROL XL) extended release tablet 25 mg, Daily  apixaban (ELIQUIS) tablet 2.5 mg, BID  pantoprazole (PROTONIX) tablet 40 mg, QAM AC  ipratropium-albuterol (DUONEB) nebulizer solution 3 mL, Q4H WA  mirtazapine (REMERON) tablet 15 mg, Nightly  sodium chloride flush 0.9 % injection 10 mL, 2 times per day  sodium chloride flush 0.9 % injection 10 mL, PRN  acetaminophen (TYLENOL) tablet 650 mg, Q6H PRN    Or  acetaminophen (TYLENOL) suppository 650 mg, Q6H PRN  polyethylene glycol (GLYCOLAX) packet 17 g, Daily PRN  promethazine (PHENERGAN) tablet 12.5 mg, Q6H PRN    Or  ondansetron (ZOFRAN) injection 4 mg, Q6H PRN  glucose (GLUTOSE) 40 % oral gel 15 g, PRN  dextrose 50 % IV solution, PRN  glucagon (rDNA) injection 1 mg, PRN  dextrose 5 % solution, PRN  predniSONE (DELTASONE) tablet 10 mg, Daily      Objective:  BP (!) 156/83   Pulse 76   Temp 97.7 °F (36.5 °C) (Temporal)   Resp 16   Ht 5' 5\" (1.651 m)   Wt 140 lb 8 oz (63.7 kg)   SpO2 96%   BMI 23.38 kg/m²   No intake or output data in the 24 hours ending 06/15/20 0722   Wt Readings from Last 3 Encounters:   06/14/20 140 lb 8 oz (63.7 kg)   10/08/19 142 lb (64.4 kg)   08/09/19 152 lb 12.8 oz (69.3 kg)     General:  Awake, alert, oriented in NAD  Skin:  Warm and dry. No unusual bruising or rash  Neck:  Supple. No JVD appreciated  Chest:  Normal effort. Clear to auscultation  Cardiovascular:  RRR, normal S1/S2, no murmur/gallop/rub  Abdomen:  Soft, nontender, +bowel sounds  Extremities:  No edema  Neurological: No focal deficits  Psychological: Normal mood and affect      Labs and Tests:  CBC:   Recent Labs     06/13/20  0418 06/14/20  0657 06/15/20  0423   WBC 8.1 9.4 8.2   HGB 8.5* 8.7* 9.1*    338 335     BMP:    Recent Labs     06/13/20  0418 06/14/20  0429 06/15/20  0422    140 139   K 4.3 4.1 4.2    104 101   CO2 24 25 25   BUN 28* 27* 28*   CREATININE 1.3* 1.2 1.4*   GLUCOSE 169* 157* 147*     Hepatic:   No results for input(s): AST, ALT, ALB, BILITOT, ALKPHOS in the last 72 hours. Results for Robin Schumacher (MRN [id number]) as of 6/15/2020 08:23   Ref.  Range 6/14/2020 07:09 6/14/2020 11:20 6/14/2020 16:19 6/14/2020 20:35 6/15/2020 08:00   POC Glucose Latest Ref Range: 70 - 99 mg/dl 153 (H) 192 (H) 365 (H) 133 (H) 163 (H)       CT head 5/31/2020:  No acute intracranial abnormality.       Chronic microvascular ischemic changes and global cerebral atrophy.         CTA head/neck 5/31/2020:  CTA NECK:       AORTIC ARCH/ARCH VESSELS: No dissection or arterial injury.  No significant   stenosis of the brachiocephalic or subclavian arteries.       CAROTID ARTERIES: There is 50% stenosis of the proximal right cervical   internal carotid artery due to calcified and noncalcified plaque.       VERTEBRAL ARTERIES: No dissection, arterial injury, or significant stenosis.       SOFT TISSUES: There is pulmonary emphysema with scarring or atelectasis   within the left suprahilar region. Valentina Bob are nonspecific consolidative   changes within the right upper lobe, which may be due in part to atelectasis   and scar.  Thyroid nodules are noted.       BONES: No acute osseous abnormality.           CTA HEAD:       ANTERIOR CIRCULATION: No significant stenosis of the intracranial internal   carotid, anterior cerebral, or middle cerebral arteries. No aneurysm.       POSTERIOR CIRCULATION: No significant stenosis of the vertebral, basilar, or   posterior cerebral arteries. No aneurysm.       OTHER: No dural venous sinus thrombosis on this non-dedicated study.       BRAIN: See separately dictated noncontrast head CT report.           Impression   50% stenosis of the proximal right cervical ICA.       Otherwise, no flow limiting stenosis or large vessel occlusion visualized   within the head or neck.       Incidentally noted chest findings as detailed above.  Would consider   dedicated CT of the chest for further evaluation.         CXR 5/31/2020:  1. Mass density left hilum similar appearance to previous exam   2. New pulmonary opacity right upper lobe could represent an infectious   process.  However, follow-up suggested to ensure resolution   3. Previously noted left midlung opacity has organized into a linear density   most likely representing scarring. Problem List  Active Problems:    Renal failure  Resolved Problems:    * No resolved hospital problems. *       Assessment & Plan:   1. Acute renal failure. Admission creatinine 6.2. Renal ultrasound with simple bilateral cysts otherwise unremarkable. Suspected secondary to hypovolemia/GI blood loss/nephrotoxic medication/contrast. Creatinine stable, 1.4. Nephrology on board, stable for DC. Continue to follow. 2. Chronic anemia.  Likely combination of chronic disease and possible GI blood loss considering positive stool guaiac. Hgb 6.6 on admission, she has been transfused, 2 units since admission, last 6/11. Iron studies (6/1) WNL. GI has signed off, recommend continue daily PPI. Patient does not want EGD or colonoscopy, no fred bleeding. hgb today 9.1. 3.  Non-small cell lung cancer. Status post chemo and radiation (completed 4/2019). Patient indicates she wants no further workup or treatment for lung cancer. Hem/onc on board. 4. Hx COPD with chronic respiratory failure. No evidence of acute exacerbation. Entered hospice care secondary to COPD (7/2019). O2 sats stable on 1 liter for mid 90s, on chronic O2 therapy at home. 5.  Abnormal CXR (5/31). New pulmonary opacity RUL on CXR. CT scan with nonspecific consolidative changes within RUL possibly secondary to atelectasis and scar. Remains afebrile. Blood cultures no growth to date. Procalcitonin 0.22, low likelihood for bacterial infection, no leukocytosis. No indication for antibiotics. O2 sats stable on 1 liter for high 90s. 6. DM2. A1c 7.9. She was on metformin at home, discontinued secondary renal failure. Now on linagliptin, patient and daughter do not believe she would be able to manage insulin. Blood sugars improving. Continue low dose correction, continue carb control diet. 7. HTN. Reasonably controlled considering hx of falls. Carvedilol has been discontinued, now on metoprolol. 8. PAF. Patient developed atrial fib, converted after IV cardizem dose. Remains in sinus rhythm. On Eliquis, CCB and BB, HR 60s-70s. Patient is stable for DC.  Daughter has appealed SNF denial.          Diet: DIET CARB CONTROL; Carb Control: 4 carb choices (60 gms)/meal  Code:DNR-CC  DVT PPX: apixaban      MARIA C Claudio - CNP   6/15/2020 8:21 am

## 2020-06-16 ENCOUNTER — TELEPHONE (OUTPATIENT)
Dept: CARDIOLOGY CLINIC | Age: 80
End: 2020-06-16

## 2020-06-16 ENCOUNTER — CARE COORDINATION (OUTPATIENT)
Dept: CASE MANAGEMENT | Age: 80
End: 2020-06-16

## 2020-06-16 PROCEDURE — 1111F DSCHRG MED/CURRENT MED MERGE: CPT | Performed by: INTERNAL MEDICINE

## 2020-06-16 NOTE — CARE COORDINATION
[facility] Initial Follow Up [facility]    Call within 2 business days of discharge: Yes    Patient: Mis Peterson Patient : 1940   MRN: [id number]  Reason for Admission: Renal Failure  Discharge Date: 6/15/20 RARS: Readmission Risk Score: 35      Last Discharge [facility]       Complaint Diagnosis Description Type Department Provider    20 Fatigue General weakness . .. ED to Hosp-Admission (Discharged) (ADMITTED) Franca Cabezas MD; Darwin Lima. .. Spoke with: [address] [place] [address]: [facility]    Non-face-to-face services provided:  Obtained and reviewed discharge summary and/or continuity of care documents  Communication with home health agencies or other community services the patient is currently using-[employer] 24 Hour Call    Do you have any ongoing symptoms?:  No  Do you have a copy of your discharge instructions?:  Yes  Do you have all of your prescriptions and are they filled?:  Yes  Have you been contacted by a [address]?:  No  Have you scheduled your follow up appointment?:  No  Were you discharged with any Home Care or Post Acute Services:  Yes  Post Acute Services:  Home Health  Do you feel like you have everything you need to keep you well at home?:  Yes  Care Transitions Interventions         Follow Up: Patient reports that she is feeling weak and trying to rest.  CTN discussed discharge instructions and reviewed all medications. Patient reports that she cannot afford Trelegy Ellipta inhaler, CTN will route message to PCP.  1111F completed. Patient will call PCP to schedule follow up appointment. CTN contacted \"Kenyatta\" at [facility] and confirmed orders for [address] had been received. Non-[facility] PCP, no further outreach calls indicated. Patient contacted regarding recent discharge and COVID-19 risk. Discussed COVID-19 related testing which was available at this time. Test results were negative.  Patient informed of results, if available? No     Care Transition Nurse/ Ambulatory Care Manager contacted the patient by telephone to perform post discharge assessment. Verified name and  with patient as identifiers. Patient has following risk factors of: heart failure, COPD, acute respiratory failure, diabetes and chronic kidney disease. CTN/ACM reviewed discharge instructions, medical action plan and red flags related to discharge diagnosis. Reviewed and educated them on any new and changed medications related to discharge diagnosis. Advised obtaining a 90-day supply of all daily and as-needed medications. Education provided regarding infection prevention, and signs and symptoms of COVID-19 and when to seek medical attention with patient who verbalized understanding. Discussed exposure protocols and quarantine from [address] you at higher risk for severe illness  and given an opportunity for questions and concerns. The patient agrees to contact the COVID-19 hotline [phone] or PCP office for questions related to their healthcare. CTN/ACM provided contact information for future reference. From CDC: Are you at higher risk for severe illness?  Wash your hands often.  Avoid close contact (6 feet, which is about two arm lengths) with people who are sick.  Put distance between yourself and other people if COVID-19 is spreading in your community.  Clean and disinfect frequently touched surfaces.  Avoid all cruise travel and non-essential air travel.  Call your healthcare professional if you have concerns about COVID-19 and your underlying condition or if you are sick. For more information on steps you can take to protect yourself, see CDC's How to [address] [place] [address] for follow-up call in 5-7 days based on severity of symptoms and risk factors.       Future Appointments   Date Time Provider Ramo [place]   2020 11:00 AM MARIA C Pa CNP FF Cardio MMA       Crystal Trevizo, RN

## 2020-06-16 NOTE — TELEPHONE ENCOUNTER
Pt daughter calling pt was discharged last night from [facility] and she thought they made medication changes, however on the d/c summary it doesn't say anything about the flecainide . Pt was put on 2 Blood Pressure medications. She on carvedilol, she wants to know about the flecainide? She'd really like to talk to [facility] about what she should and shouldn't be on now.  Pls call to advise Thank you

## 2020-06-16 NOTE — CARE COORDINATION
SW received call from pt's dtr asking about HHC. SW informed that [facility] was not in network with pt's insurance so a referral was made to [facility]. SW provided dtr with number informing she can call today to talk about services. Dtr asked about home oxygen. Dtr stated that pt was getting home oxygen from [facility], but since she is pursuing HHC, they reported to her they will need to come and  all DME they have in the home including the hospital bed and home oxygen. Dtr was worried stating pt is normally on oxygen 24/7. SW strongly encouraged dtr to call pt's PCP to write a home oxygen order and make the referral today.       Electronically signed by MADIE Arcos, JOSHUA on 6/16/2020 at 11:31 AM

## 2020-06-19 NOTE — CARE COORDINATION
Per daughter request, checked the chart for Respiratory- 02 sat which didn't happen prior to discharge.

## 2020-07-11 ENCOUNTER — HOSPITAL ENCOUNTER (INPATIENT)
Age: 80
LOS: 9 days | Discharge: NURSING, REHAB OR LONG-TERM CARE | DRG: 477 | End: 2020-07-24
Attending: EMERGENCY MEDICINE | Admitting: INTERNAL MEDICINE
Payer: MEDICARE

## 2020-07-11 ENCOUNTER — APPOINTMENT (OUTPATIENT)
Dept: CT IMAGING | Age: 80
DRG: 477 | End: 2020-07-11
Payer: MEDICARE

## 2020-07-11 ENCOUNTER — APPOINTMENT (OUTPATIENT)
Dept: GENERAL RADIOLOGY | Age: 80
DRG: 477 | End: 2020-07-11
Payer: MEDICARE

## 2020-07-11 PROBLEM — M54.9 BACK PAIN: Status: ACTIVE | Noted: 2020-07-11

## 2020-07-11 LAB
ALBUMIN SERPL-MCNC: 3.3 G/DL (ref 3.4–5)
ALP BLD-CCNC: 104 U/L (ref 40–129)
ALT SERPL-CCNC: 18 U/L (ref 10–40)
ANION GAP SERPL CALCULATED.3IONS-SCNC: 13 MMOL/L (ref 3–16)
AST SERPL-CCNC: 46 U/L (ref 15–37)
BILIRUB SERPL-MCNC: <0.2 MG/DL (ref 0–1)
BILIRUBIN DIRECT: <0.2 MG/DL (ref 0–0.3)
BILIRUBIN, INDIRECT: ABNORMAL MG/DL (ref 0–1)
BUN BLDV-MCNC: 31 MG/DL (ref 7–20)
CALCIUM SERPL-MCNC: 9.3 MG/DL (ref 8.3–10.6)
CHLORIDE BLD-SCNC: 98 MMOL/L (ref 99–110)
CO2: 27 MMOL/L (ref 21–32)
CREAT SERPL-MCNC: 1.5 MG/DL (ref 0.6–1.2)
GFR AFRICAN AMERICAN: 41
GFR NON-AFRICAN AMERICAN: 34
GLUCOSE BLD-MCNC: 190 MG/DL (ref 70–99)
INR BLD: 1.12 (ref 0.86–1.14)
LIPASE: 46 U/L (ref 13–60)
POTASSIUM SERPL-SCNC: 5.3 MMOL/L (ref 3.5–5.1)
PRO-BNP: ABNORMAL PG/ML (ref 0–449)
PROTHROMBIN TIME: 13 SEC (ref 10–13.2)
SODIUM BLD-SCNC: 138 MMOL/L (ref 136–145)
TOTAL PROTEIN: 7.5 G/DL (ref 6.4–8.2)
TROPONIN: <0.01 NG/ML

## 2020-07-11 PROCEDURE — 85610 PROTHROMBIN TIME: CPT

## 2020-07-11 PROCEDURE — 96374 THER/PROPH/DIAG INJ IV PUSH: CPT

## 2020-07-11 PROCEDURE — 2700000000 HC OXYGEN THERAPY PER DAY

## 2020-07-11 PROCEDURE — 2580000003 HC RX 258: Performed by: EMERGENCY MEDICINE

## 2020-07-11 PROCEDURE — U0002 COVID-19 LAB TEST NON-CDC: HCPCS

## 2020-07-11 PROCEDURE — 71045 X-RAY EXAM CHEST 1 VIEW: CPT

## 2020-07-11 PROCEDURE — 80048 BASIC METABOLIC PNL TOTAL CA: CPT

## 2020-07-11 PROCEDURE — 96365 THER/PROPH/DIAG IV INF INIT: CPT

## 2020-07-11 PROCEDURE — 74176 CT ABD & PELVIS W/O CONTRAST: CPT

## 2020-07-11 PROCEDURE — 99285 EMERGENCY DEPT VISIT HI MDM: CPT

## 2020-07-11 PROCEDURE — G0378 HOSPITAL OBSERVATION PER HR: HCPCS

## 2020-07-11 PROCEDURE — 87040 BLOOD CULTURE FOR BACTERIA: CPT

## 2020-07-11 PROCEDURE — 83690 ASSAY OF LIPASE: CPT

## 2020-07-11 PROCEDURE — 84484 ASSAY OF TROPONIN QUANT: CPT

## 2020-07-11 PROCEDURE — 83880 ASSAY OF NATRIURETIC PEPTIDE: CPT

## 2020-07-11 PROCEDURE — 85025 COMPLETE CBC W/AUTO DIFF WBC: CPT

## 2020-07-11 PROCEDURE — U0003 INFECTIOUS AGENT DETECTION BY NUCLEIC ACID (DNA OR RNA); SEVERE ACUTE RESPIRATORY SYNDROME CORONAVIRUS 2 (SARS-COV-2) (CORONAVIRUS DISEASE [COVID-19]), AMPLIFIED PROBE TECHNIQUE, MAKING USE OF HIGH THROUGHPUT TECHNOLOGIES AS DESCRIBED BY CMS-2020-01-R: HCPCS

## 2020-07-11 PROCEDURE — 93005 ELECTROCARDIOGRAM TRACING: CPT | Performed by: EMERGENCY MEDICINE

## 2020-07-11 PROCEDURE — 36415 COLL VENOUS BLD VENIPUNCTURE: CPT

## 2020-07-11 PROCEDURE — 6360000002 HC RX W HCPCS: Performed by: EMERGENCY MEDICINE

## 2020-07-11 PROCEDURE — 96375 TX/PRO/DX INJ NEW DRUG ADDON: CPT

## 2020-07-11 PROCEDURE — 80076 HEPATIC FUNCTION PANEL: CPT

## 2020-07-11 PROCEDURE — 94760 N-INVAS EAR/PLS OXIMETRY 1: CPT

## 2020-07-11 RX ORDER — PANTOPRAZOLE SODIUM 40 MG/1
40 TABLET, DELAYED RELEASE ORAL
Status: DISCONTINUED | OUTPATIENT
Start: 2020-07-12 | End: 2020-07-24 | Stop reason: HOSPADM

## 2020-07-11 RX ORDER — METOPROLOL SUCCINATE 25 MG/1
25 TABLET, EXTENDED RELEASE ORAL DAILY
Status: DISCONTINUED | OUTPATIENT
Start: 2020-07-12 | End: 2020-07-24 | Stop reason: HOSPADM

## 2020-07-11 RX ORDER — PROMETHAZINE HYDROCHLORIDE 25 MG/1
12.5 TABLET ORAL EVERY 6 HOURS PRN
Status: DISCONTINUED | OUTPATIENT
Start: 2020-07-11 | End: 2020-07-24 | Stop reason: HOSPADM

## 2020-07-11 RX ORDER — DILTIAZEM HYDROCHLORIDE 60 MG/1
60 CAPSULE, EXTENDED RELEASE ORAL 2 TIMES DAILY
Status: DISCONTINUED | OUTPATIENT
Start: 2020-07-12 | End: 2020-07-24 | Stop reason: HOSPADM

## 2020-07-11 RX ORDER — HYDROCODONE BITARTRATE AND ACETAMINOPHEN 5; 325 MG/1; MG/1
1 TABLET ORAL EVERY 6 HOURS PRN
Status: ON HOLD | COMMUNITY
End: 2020-07-24 | Stop reason: SDUPTHER

## 2020-07-11 RX ORDER — MORPHINE SULFATE 2 MG/ML
2 INJECTION, SOLUTION INTRAMUSCULAR; INTRAVENOUS ONCE
Status: COMPLETED | OUTPATIENT
Start: 2020-07-11 | End: 2020-07-11

## 2020-07-11 RX ORDER — ACETAMINOPHEN 325 MG/1
650 TABLET ORAL EVERY 6 HOURS PRN
Status: DISCONTINUED | OUTPATIENT
Start: 2020-07-11 | End: 2020-07-24 | Stop reason: HOSPADM

## 2020-07-11 RX ORDER — CYCLOBENZAPRINE HCL 10 MG
10 TABLET ORAL 3 TIMES DAILY PRN
Status: DISCONTINUED | OUTPATIENT
Start: 2020-07-11 | End: 2020-07-23

## 2020-07-11 RX ORDER — ONDANSETRON 2 MG/ML
4 INJECTION INTRAMUSCULAR; INTRAVENOUS EVERY 6 HOURS PRN
Status: DISCONTINUED | OUTPATIENT
Start: 2020-07-11 | End: 2020-07-24 | Stop reason: HOSPADM

## 2020-07-11 RX ORDER — ACETAMINOPHEN 650 MG/1
650 SUPPOSITORY RECTAL EVERY 6 HOURS PRN
Status: DISCONTINUED | OUTPATIENT
Start: 2020-07-11 | End: 2020-07-24 | Stop reason: HOSPADM

## 2020-07-11 RX ORDER — SODIUM CHLORIDE 0.9 % (FLUSH) 0.9 %
10 SYRINGE (ML) INJECTION EVERY 12 HOURS SCHEDULED
Status: DISCONTINUED | OUTPATIENT
Start: 2020-07-12 | End: 2020-07-24 | Stop reason: HOSPADM

## 2020-07-11 RX ORDER — ONDANSETRON 2 MG/ML
4 INJECTION INTRAMUSCULAR; INTRAVENOUS ONCE
Status: COMPLETED | OUTPATIENT
Start: 2020-07-11 | End: 2020-07-11

## 2020-07-11 RX ORDER — DEXTROSE MONOHYDRATE 50 MG/ML
INJECTION, SOLUTION INTRAVENOUS
Status: DISPENSED
Start: 2020-07-11 | End: 2020-07-12

## 2020-07-11 RX ORDER — CEFEPIME HYDROCHLORIDE 2 G/1
1 INJECTION, POWDER, FOR SOLUTION INTRAVENOUS ONCE
Status: DISCONTINUED | OUTPATIENT
Start: 2020-07-11 | End: 2020-07-11 | Stop reason: SDUPTHER

## 2020-07-11 RX ORDER — HYDROMORPHONE HYDROCHLORIDE 1 MG/ML
0.5 INJECTION, SOLUTION INTRAMUSCULAR; INTRAVENOUS; SUBCUTANEOUS ONCE
Status: COMPLETED | OUTPATIENT
Start: 2020-07-12 | End: 2020-07-12

## 2020-07-11 RX ORDER — SODIUM CHLORIDE 0.9 % (FLUSH) 0.9 %
10 SYRINGE (ML) INJECTION PRN
Status: DISCONTINUED | OUTPATIENT
Start: 2020-07-11 | End: 2020-07-24 | Stop reason: HOSPADM

## 2020-07-11 RX ORDER — METHOCARBAMOL 500 MG/1
500 TABLET, FILM COATED ORAL 4 TIMES DAILY
Status: ON HOLD | COMMUNITY
End: 2020-07-24 | Stop reason: HOSPADM

## 2020-07-11 RX ADMIN — CEFEPIME HYDROCHLORIDE 1 G: 1 INJECTION, POWDER, FOR SOLUTION INTRAMUSCULAR; INTRAVENOUS at 22:14

## 2020-07-11 RX ADMIN — ONDANSETRON 4 MG: 2 INJECTION INTRAMUSCULAR; INTRAVENOUS at 15:25

## 2020-07-11 RX ADMIN — MORPHINE SULFATE 2 MG: 2 INJECTION, SOLUTION INTRAMUSCULAR; INTRAVENOUS at 15:25

## 2020-07-11 ASSESSMENT — PAIN DESCRIPTION - DESCRIPTORS
DESCRIPTORS: SHARP;SPASM
DESCRIPTORS: SHARP;SPASM

## 2020-07-11 ASSESSMENT — PAIN DESCRIPTION - ORIENTATION
ORIENTATION: LOWER
ORIENTATION: LOWER

## 2020-07-11 ASSESSMENT — PAIN DESCRIPTION - PAIN TYPE
TYPE: ACUTE PAIN
TYPE: ACUTE PAIN

## 2020-07-11 ASSESSMENT — PAIN DESCRIPTION - PROGRESSION: CLINICAL_PROGRESSION: GRADUALLY IMPROVING

## 2020-07-11 ASSESSMENT — PAIN DESCRIPTION - LOCATION
LOCATION: BACK
LOCATION: BACK

## 2020-07-11 ASSESSMENT — PAIN SCALES - GENERAL
PAINLEVEL_OUTOF10: 8
PAINLEVEL_OUTOF10: 9
PAINLEVEL_OUTOF10: 8

## 2020-07-11 NOTE — ED PROVIDER NOTES
[facility])     Adenocarcinoma of Left Lung    MI (myocardial infarction) ([facility] [address]     LIZETT (obstructive sleep apnea)     does not use CPAP    PONV (postoperative nausea and vomiting)     stress incontinence      SURGICAL HISTORY  Past Surgical History:   Procedure Laterality Date    BACK SURGERY  2000, 2001    lumbar laminectomy    BRONCHOSCOPY  12/04/2018    BRONCHOSCOPY N/A 2/27/2019    BRONCHOSCOPY BIOPSY BRONCHUS performed by Sydnee Mejias MD at [address]  2/27/2019    BRONCHOSCOPY/TRANSBRONCHIAL NEEDLE BIOPSY performed by Sydnee Mejias MD at [address]  2/27/2019    BRONCHOSCOPY ULTRASOUND performed by Sydnee Mejias MD at [address] N/A 8/6/2019    BRONCHOSCOPY ALVEOLAR LAVAGE performed by Sydnee Mejias MD at [address] N/A 9/27/2019    BRONCHOSCOPY ALVEOLAR LAVAGE performed by Racquel Ochoa MD at [address]  9/27/2019    BRONCHOSCOPY BIOPSY BRONCHUS performed by Racquel Ochoa MD at [address]  9/27/2019    BRONCHOSCOPY BRUSHINGS performed by Racquel Ochoa MD at [address] [place] [address], LAPAROSCOPIC N/A 12/19/2018    LAPAROSCOPIC CHOLECYSTECTOMY WITH CHOLANGIOGRAM performed by Sylvester Nunez MD at [facility] COLONOSCOPY N/A 2/25/2019    COLONOSCOPY WITH BIOPSY performed by Shashank Morris MD at [address]  COLONOSCOPY  2/25/2019    COLONOSCOPY POLYPECTOMY SNARE/COLD BIOPSY performed by Shashank Morris MD at [address] [place] [address], 2001    [place] [address] INCL FLUOR GDNCE DX W/CELL [facility] SPX N/A 12/4/2018    BRONCHOSCOPY WITH LAVAGE performed by Td Angelo MD at [facility] 2/25/2019    EGD BIOPSY performed by Shashank Morris MD at [facility] N/A 8/7/2019 SOCIAL HISTORY:    Social History     Tobacco Use    Smoking status: Former Smoker     Last attempt to quit: 10/28/2001     Years since quittin.7    Smokeless tobacco: Never Used   Substance Use Topics    Alcohol use: No    Drug use: No     IMMUNIZATIONS:  Noncontributory    PHYSICAL EXAM  VITAL SIGNS:  Temperature 98.6 °F (37 °C), temperature source Oral, height 5' 5\" (1.651 m), weight 145 lb (65.8 kg), not currently breastfeeding.   Constitutional:  70 y.o. female who does not appear toxic  HENT:  Atraumatic, mucous membranes moist  Eyes:   Conjunctiva clear, no icterus  Neck:  Supple, no visible JVD, no ML tenderness  Cardiovascular:  Irregular, no rubs, no discernible murmur  Thorax & Lungs:  No accessory muscle usage, bibasilar rales  Abdomen:  Soft, non distended, no pulsatility or bruits, bowel sounds present, mild epigastric tenderness  Back:  No deformity, no bruising, no CVA tenderness  Genitalia:  Deferred  Rectal:  Deferred  Extremities:  No cyanosis, no edema, pedal pulses symmetric  Skin:  Warm, dry  Neurologic:  Alert, no slurred speech, no focal deficits but generalized weakness, LT sensation intact  Psychiatric:  Affect appropriate    DIAGNOSTIC RESULTS:  Labs Reviewed   BASIC METABOLIC PANEL - Abnormal; Notable for the following components:       Result Value    Potassium 5.3 (*)     Chloride 98 (*)     Glucose 190 (*)     BUN 31 (*)     CREATININE 1.5 (*)     GFR Non- 34 (*)     GFR  41 (*)     All other components within normal limits    Narrative:     Performed at:  [facility] [address], [place], [facility] [place]   Phone [phone]   BRAIN NATRIURETIC PEPTIDE - Abnormal; Notable for the following components:    Pro-BNP 10,418 (*)     All other components within normal limits    Narrative:     Performed at:  [facility]  [address] [facility], [facility] [place]   Phone [phone] CBC WITH AUTO DIFFERENTIAL - Abnormal; Notable for the following components:    WBC 11.2 (*)     RBC 3.30 (*)     Hemoglobin 9.0 (*)     Hematocrit 28.3 (*)     RDW 21.2 (*)     Neutrophils Absolute 8.4 (*)     Myelocyte Percent 1 (*)     Macrocytes Occasional (*)     Poikilocytes Occasional (*)     All other components within normal limits    Narrative:     Performed at:  [facility] [address], [place]   Phone [phone]   HEPATIC FUNCTION PANEL - Abnormal; Notable for the following components:    Alb 3.3 (*)     AST 46 (*)     All other components within normal limits    Narrative:     Performed at:  [facility] [address], [place]   Phone [phone]   CULTURE, BLOOD 1   CULTURE, BLOOD 2   TROPONIN    Narrative:     Performed at:  [facility] [address] [address] [place]   Phone [phone]   PROTIME-INR    Narrative:     Performed at:  [facility] [address], [place]   Phone [phone]   LIPASE    Narrative:     Performed at:  [facility] [address], [place]   Phone [phone]     Previous BUN/crea:    BUN   Date/Time Value Ref Range Status   07/11/2020 03:09 PM 31 (H) 7 - 20 mg/dL Final   06/15/2020 04:22 AM 28 (H) 7 - 20 mg/dL Final   06/14/2020 04:29 AM 27 (H) 7 - 20 mg/dL Final     CREATININE   Date/Time Value Ref Range Status   07/11/2020 03:09 PM 1.5 (H) 0.6 - 1.2 mg/dL Final   06/15/2020 04:22 AM 1.4 (H) 0.6 - 1.2 mg/dL Final   06/14/2020 04:29 AM 1.2 0.6 - 1.2 mg/dL Final     RADIOLOGY:    Plain x-rays were viewed by me:   CT CHEST ABDOMEN PELVIS WO CONTRAST   Final Result   1. Extensive coronary and aortic atherosclerosis with no aortic aneurysm or   periaortic hemorrhage.   Stable mild cardiomegaly and stable pulmonary artery hypertension. 2. COPD. 3. Compared with the prior exam there is overall improved pulmonary   consolidation with perihilar and lower lobe findings which may represent   chronic fibrotic changes and trace left pleural effusion. New multilobar   multifocal right lung consolidative changes which may represent acute   pneumonia or possible chronic pulmonary process such as vasculitis. 4. Indeterminate anterior left lung base clustered 0.3 cm solid nodules,   attention on follow-up exams. 5. In the right lower quadrant there is new mesenteric fat induration and   mild lymphadenopathy. The appendix appears normal.  There is no colonic wall   thickening. This could relate to possible mild colitis, ascending colonic   diverticulitis, or mesenteric adenitis. A lymphoproliferative disorder such   as lymphoma cannot be excluded. 6. No obstruction, perforation, or abscess. 7. The T3 and T10 vertebral bodies demonstrate subtle new height loss or   superior endplate concavity which may represent insufficiency versus   compression fractures. If clinically indicated this could be further   evaluated with MRI of the thoracic spine. XR CHEST PORTABLE   Preliminary Result   New focal opacity in the right lower lung. Linear opacities in the right   upper lung and left perihilar region otherwise stable.   Given history of lung   cancer would recommend further evaluation with CT.           EKG:  Read by me in the absence of a cardiologist shows: Sinus rhythm, rate 97, frequent PVCs, QRS duration normal, axis normal, nonspecific ST-T wave abnormality, more ectopy than prior study from 13 March 2004 prior R wave progression was better    ED COURSE:  Stable with supplemental oxygen  Medications administered:  Medications   ceFEPIme (MAXIPIME) injection 1 g (has no administration in time range)   morphine (PF) injection 2 mg (2 mg Intravenous Given 7/11/20 1525)   ondansetron (ZOFRAN) injection 4 mg (4 mg Intravenous Given 7/11/20 1525)     PROCEDURES:  None    CRITICAL CARE:  Total critical care time of 31 minutes (excludes any time for procedures). This includes but not limited to vital sign monitoring, medication, clinical response to medications, interpretation of diagnostics, review of nursing notes, pertinent record review, discussions about patient condition, consultation time, documentation time. Critical care due to the patient's air space disease, increased oxygen demand    CONSULTATIONS:  Hospitalist     MEDICAL DECISION MAKING: Kyleigh Chow is a 70 y.o. female who presented because of back pain. She has findings of thoracic fractures, increased air space disease. Chronic kidney disease is stable from prior discharge. I have ordered abx to cover for HCAP with recent hospital admission. Abdominal findings are nonspecific. She is generally weak and will be admitted for further care. Differential diagnosis: AAA dissection, epidural abscess, cauda equina syndrome, discitis, renal colic, pyelonephritis, other    FINAL IMPRESSION:    1. Acute hypoxemic respiratory failure (HCC)    2. Pneumonia due to organism    3. Closed wedge fracture of lumbar vertebra, unspecified lumbar vertebral level, initial encounter ([facility] [address]    4. Chronic kidney disease, unspecified CKD stage    5. Generalized weakness        (Please note that I used voice recognition software to generate this note.   Occasionally words are mistranscribed despite my efforts to edit errors.)        Haris Klein MD  07/12/20 0030

## 2020-07-11 NOTE — ED NOTES
Bed: 08  Expected date:   Expected time:   Means of arrival: [facility]  Comments:  FF [facility], [facility]  07/11/20 9053

## 2020-07-11 NOTE — ED NOTES
Pt a&o x4. Skin WNL for pts ethnicity. Pt c/o back pain. On 4L NC at this time. Usually on 2L NC. EKG done upon arrival, & signed by ER MD. Pt rates pain 8/10. Sts comfortable at this time after pain meds. Pt has hx of COPD. IV established by squad without complications, blood work obtained by this RN and sent to lab. Pt tolerated well. Medicated per orders, see MAR. Pt family at bedside. Pt placed on appropriate monitors and cycling. SR with a HR of 80s. ST segment alarm enabled. Pt laying supine in bed in low position, call light in reach, all monitors in place, side rails up x2. Pt denies further needs at this time. Pt showing no signs of distress at this time. Breathing easy and unlabored. Will continue to monitor.         Edwardo Ortez RN  07/11/20 1591

## 2020-07-11 NOTE — ED NOTES
Pt placed on Purewick at this time for urination frequency and relief.      Pao Vaughan RN  07/11/20 7585

## 2020-07-12 LAB
BASOPHILS ABSOLUTE: 0.1 K/UL (ref 0–0.2)
BASOPHILS RELATIVE PERCENT: 1 %
EOSINOPHILS ABSOLUTE: 0.6 K/UL (ref 0–0.6)
EOSINOPHILS RELATIVE PERCENT: 5 %
HCT VFR BLD CALC: 28.3 % (ref 36–48)
HEMATOLOGY PATH CONSULT: NO
HEMOGLOBIN: 9 G/DL (ref 12–16)
LYMPHOCYTES ABSOLUTE: 1.6 K/UL (ref 1–5.1)
LYMPHOCYTES RELATIVE PERCENT: 14 %
MACROCYTES: ABNORMAL
MCH RBC QN AUTO: 27.4 PG (ref 26–34)
MCHC RBC AUTO-ENTMCNC: 31.9 G/DL (ref 31–36)
MCV RBC AUTO: 85.8 FL (ref 80–100)
MONOCYTES ABSOLUTE: 0.6 K/UL (ref 0–1.3)
MONOCYTES RELATIVE PERCENT: 5 %
MYELOCYTE PERCENT: 1 %
NEUTROPHILS ABSOLUTE: 8.4 K/UL (ref 1.7–7.7)
NEUTROPHILS RELATIVE PERCENT: 74 %
PDW BLD-RTO: 21.2 % (ref 12.4–15.4)
PLATELET # BLD: 382 K/UL (ref 135–450)
PLATELET SLIDE REVIEW: ADEQUATE
PMV BLD AUTO: 7.6 FL (ref 5–10.5)
POIKILOCYTES: ABNORMAL
RBC # BLD: 3.3 M/UL (ref 4–5.2)
SLIDE REVIEW: ABNORMAL
WBC # BLD: 11.2 K/UL (ref 4–11)

## 2020-07-12 PROCEDURE — 6360000002 HC RX W HCPCS: Performed by: INTERNAL MEDICINE

## 2020-07-12 PROCEDURE — 6370000000 HC RX 637 (ALT 250 FOR IP): Performed by: INTERNAL MEDICINE

## 2020-07-12 PROCEDURE — G0378 HOSPITAL OBSERVATION PER HR: HCPCS

## 2020-07-12 PROCEDURE — 96376 TX/PRO/DX INJ SAME DRUG ADON: CPT

## 2020-07-12 PROCEDURE — 96375 TX/PRO/DX INJ NEW DRUG ADDON: CPT

## 2020-07-12 PROCEDURE — 2580000003 HC RX 258: Performed by: INTERNAL MEDICINE

## 2020-07-12 RX ADMIN — METOPROLOL SUCCINATE 25 MG: 25 TABLET, FILM COATED, EXTENDED RELEASE ORAL at 10:27

## 2020-07-12 RX ADMIN — CYCLOBENZAPRINE 10 MG: 10 TABLET, FILM COATED ORAL at 10:27

## 2020-07-12 RX ADMIN — ONDANSETRON 4 MG: 2 INJECTION INTRAMUSCULAR; INTRAVENOUS at 00:47

## 2020-07-12 RX ADMIN — APIXABAN 2.5 MG: 2.5 TABLET, FILM COATED ORAL at 10:27

## 2020-07-12 RX ADMIN — CYCLOBENZAPRINE 10 MG: 10 TABLET, FILM COATED ORAL at 00:47

## 2020-07-12 RX ADMIN — DILTIAZEM HYDROCHLORIDE 60 MG: 60 CAPSULE, EXTENDED RELEASE ORAL at 00:55

## 2020-07-12 RX ADMIN — CYCLOBENZAPRINE 10 MG: 10 TABLET, FILM COATED ORAL at 21:34

## 2020-07-12 RX ADMIN — APIXABAN 2.5 MG: 2.5 TABLET, FILM COATED ORAL at 21:34

## 2020-07-12 RX ADMIN — Medication 10 ML: at 21:35

## 2020-07-12 RX ADMIN — PANTOPRAZOLE SODIUM 40 MG: 40 TABLET, DELAYED RELEASE ORAL at 06:04

## 2020-07-12 RX ADMIN — DILTIAZEM HYDROCHLORIDE 60 MG: 60 CAPSULE, EXTENDED RELEASE ORAL at 10:27

## 2020-07-12 RX ADMIN — ACETAMINOPHEN 650 MG: 325 TABLET, FILM COATED ORAL at 00:47

## 2020-07-12 RX ADMIN — HYDROMORPHONE HYDROCHLORIDE 0.5 MG: 1 INJECTION, SOLUTION INTRAMUSCULAR; INTRAVENOUS; SUBCUTANEOUS at 00:47

## 2020-07-12 RX ADMIN — DILTIAZEM HYDROCHLORIDE 60 MG: 60 CAPSULE, EXTENDED RELEASE ORAL at 21:34

## 2020-07-12 RX ADMIN — APIXABAN 2.5 MG: 2.5 TABLET, FILM COATED ORAL at 00:55

## 2020-07-12 RX ADMIN — ACETAMINOPHEN 650 MG: 325 TABLET, FILM COATED ORAL at 21:34

## 2020-07-12 ASSESSMENT — PAIN SCALES - GENERAL
PAINLEVEL_OUTOF10: 8
PAINLEVEL_OUTOF10: 5
PAINLEVEL_OUTOF10: 7
PAINLEVEL_OUTOF10: 7

## 2020-07-12 ASSESSMENT — PAIN DESCRIPTION - DESCRIPTORS: DESCRIPTORS: SHARP;SPASM

## 2020-07-12 ASSESSMENT — PAIN DESCRIPTION - ORIENTATION: ORIENTATION: LOWER

## 2020-07-12 ASSESSMENT — PAIN DESCRIPTION - LOCATION
LOCATION: BACK
LOCATION: BACK

## 2020-07-12 ASSESSMENT — PAIN DESCRIPTION - PROGRESSION
CLINICAL_PROGRESSION: GRADUALLY IMPROVING
CLINICAL_PROGRESSION: GRADUALLY IMPROVING

## 2020-07-12 ASSESSMENT — PAIN DESCRIPTION - PAIN TYPE
TYPE: ACUTE PAIN
TYPE: ACUTE PAIN

## 2020-07-12 NOTE — PROGRESS NOTES
H/p dictation id [id number]. Date of service 07/12/20. Vertebral compression fracture. Copd. CAD.  HTN. CKD III.

## 2020-07-12 NOTE — ED NOTES
Pt medicated per MAR. Updated pt and daughter on plan of care. Pt's daughter very upset that she is unable to visit because of the COVID testing and going to [facility]. \"She will die if she goes to a COVID floor, she has not been anywhere and she does not do well without family\". Reached out to clinical  per pt's daughter request. Report called to Joaquin Meza RN on [facility]. Spoke to hospitalist who states pt must go to the [facility] unit. Will  continue to monitor.      Mery Griffin RN  07/11/20 [address], RN  07/11/20 7214

## 2020-07-12 NOTE — PROGRESS NOTES
Shift assessment completed. Routine vitals stable. Pt is a poor historian of medications. Pt said the reason she is having so much back pain is because she has been sleeping on a couch. She said that she was supposed to get a bed delivered and it has not come. Pt says her daughters help her do almost everything but she lived on her own in a private residence. Scheduled medications given. Patient is awake, alert unable to recal recent events but mostly oriented. Respirations are easy and unlabored. Patient was in a lot of pain when she got here but medication given and patient is resting comfortably at this time. Call light within reach. Will continue to monitor.      Electronically signed by Suresh Huizar RN on 7/12/20 at 3:43 AM EDT

## 2020-07-12 NOTE — PROGRESS NOTES
4 Eyes Skin Assessment     The patient is being assess for  Admission    I agree that 2 RN's have performed a thorough Head to Toe Skin Assessment on the patient. ALL assessment sites listed below have been assessed. Areas assessed by both nurses: Carmen Almeida RN  [x]   Head, Face, and Ears   [x]   Shoulders, Back, and Chest  [x]   Arms, Elbows, and Hands   [x]   Coccyx, Sacrum, and IschIum  [x]   Legs, Feet, and Heels        Does the Patient have Skin Breakdown?   No         Navin Prevention initiated:  Yes   Wound Care Orders initiated:  No      [facility] nurse consulted for Pressure Injury (Stage 3,4, Unstageable, DTI, NWPT, and Complex wounds), New and Established Ostomies:  No      Nurse 1 eSignature: Electronically signed by Marta Nguyen RN on 7/12/20 at 4:27 AM EDT    **SHARE this note so that the co-signing nurse is able to place an eSignature**    Nurse 2 eSignature: Electronically signed by Kaden Olvera RN on 7/14/20 at 3:39 AM EDT

## 2020-07-12 NOTE — PROGRESS NOTES
[facility] PROGRESS NOTE    7/12/2020 8:26 AM        Name: Vasyl Nguyen . Admitted: 7/11/2020  Primary Care Provider: Geo Escudero (Tel: [phone])                        Hospital course:  Patient is a 71 yo female with hx adenocarcinoma of the lung, CAD/stent, COPD, chronic anemia, chronic dCHF, DM, HTN, HLD, LIZETT (untreated), chronic AC (on Eliquis). recently discharged from the hospital about a month ago after being admitted here for acute renal insufficiency, presents to the hospital again with chief complaints of 2-3 day history of what she describes as subacute onset of gradually progressive increasing back pain. Subjective: Patient denies fall, complaining of mid back pain since yesterday    No acute events overnight. Resting well. Pain control. Diet ok. Labs reviewed  Denies any chest pain sob.      Reviewed interval ancillary notes    Current Medications  vancomycin 1000 mg IVPB in 250 mL D5W addavial, Once  apixaban (ELIQUIS) tablet 2.5 mg, BID  dilTIAZem (CARDIZEM 12 HR) extended release capsule 60 mg, BID  fluticasone-umeclidin-vilant (TRELEGY ELLIPTA) 100-62.5-25 MCG/INH inhaler 1 puff - PATIENT SUPPLIED, Daily  metoprolol succinate (TOPROL XL) extended release tablet 25 mg, Daily  pantoprazole (PROTONIX) tablet 40 mg, QAM AC  sodium chloride flush 0.9 % injection 10 mL, 2 times per day  sodium chloride flush 0.9 % injection 10 mL, PRN  acetaminophen (TYLENOL) tablet 650 mg, Q6H PRN    Or  acetaminophen (TYLENOL) suppository 650 mg, Q6H PRN  promethazine (PHENERGAN) tablet 12.5 mg, Q6H PRN    Or  ondansetron (ZOFRAN) injection 4 mg, Q6H PRN  dextrose 5 % solution,   cyclobenzaprine (FLEXERIL) tablet 10 mg, TID PRN        Objective:  BP (!) 154/67   Pulse 93   Temp 97.8 °F (36.6 °C) (Oral)   Resp 22   Ht 5' 5\" (1.651 m)   Wt 146 lb 11.2 oz (66.5 kg)   SpO2 99%   BMI 24.41 kg/m²     Intake/Output Summary (Last 24 hours) at 7/12/2020 0826  Last data filed at 7/12/2020 0657  Gross per 24 hour   Intake --   Output 100 ml   Net -100 ml      Wt Readings from Last 3 Encounters:   07/11/20 146 lb 11.2 oz (66.5 kg)   06/14/20 140 lb 8 oz (63.7 kg)   10/08/19 142 lb (64.4 kg)       General appearance:  Appears comfortable  Eyes: Sclera clear. Pupils equal.  ENT: Moist oral mucosa. Trachea midline, no adenopathy. Cardiovascular: Regular rhythm, normal S1, S2. No murmur. No edema in lower extremities  Respiratory: Not using accessory muscles. Good inspiratory effort. Clear to auscultation bilaterally, no wheeze or crackles. GI: Abdomen soft, no tenderness, not distended, normal bowel sounds  Musculoskeletal: No cyanosis in digits, neck supple  Neurology: CN 2-12 grossly intact. No speech or motor deficits  Psych: Normal affect. Alert and oriented in time, place and person  Skin: Warm, dry, normal turgor    Labs and Tests:  CBC:   Recent Labs     07/11/20  1509   WBC 11.2*   HGB 9.0*   HCT 28.3*   MCV 85.8        BMP:    Recent Labs     07/11/20  1509      K 5.3*   CL 98*   CO2 27   BUN 31*   CREATININE 1.5*   GLUCOSE 190*     Hepatic:   Recent Labs     07/11/20  1527   AST 46*   ALT 18   BILITOT <0.2   ALKPHOS 104         Problem List  Active Problems:    Back pain  Resolved Problems:    * No resolved hospital problems. *        Assessment & Plan:   1. T3 and T10 compression fracture: c/w flexeril, consult PT/OT  2. COPD on home oxygen:. Hx COPD with chronic respiratory failure. No evidence of acute exacerbation. Entered hospice care secondary to COPD (7/2019). O2 sats stable c/w trelegy ellipta.     3. Coronary artery disease status post PCI and stenting. 4.Type 2 diabetes.  A1c 7.9. She was on metformin at home, this was discontinued secondary renal failure. Transitioned to linagliptin, patient and daughter do not believe she would be able to manage insulin at home. 5.Diastolic congestive heart failure. stable  6. Hypertension. C/w metoprolol  7. Chronic anemia:  stable hemoglobin  8. Non-small cell lung cancer. Status post chemo and radiation (completed 4/2019). Patient indicates she wants no further workup or treatment for lung cancer  9. PAF.currently on sinus rhythm with PVCs , c/w Eliquis      Diet: DIET GENERAL;  Code:DNR-CC  DVT PPX lovenox       Felton Post MD   7/12/2020 8:26 AM

## 2020-07-12 NOTE — ED NOTES
Pt's daughter updated on the phone. Pt updated on plan of care. Repositioned pt for comfort, provided with warm blankets. Will continue to monitor.       Raymond Iirzarry RN  07/11/20 2023

## 2020-07-12 NOTE — PROGRESS NOTES
Upon arrival pt states she is a DNR-CC. Media has a signed DNR-CC. Dr. Karson Montelongo and per orders from Dr. Le Alcantara status updated.

## 2020-07-12 NOTE — ED PROVIDER NOTES
[address]        Pt Name: Gilda Montelongo  MRN: [id number]  [place] 1940  Date of evaluation: 7/11/2020  Provider: MARIA C Waldrop - JAYMIE  PCP: Can Sidhu     I have seen and evaluated this patient with my supervising physician Carlos Hernandez       Chief Complaint   Patient presents with    Back Pain     pt brought in by [facility] from home. Pt c/o back pain x2 days. Denies injury. Hx of COPD, wears 2L NC. Pt moved up to 4L NC by squad due to sat 91%. 20g PIV in L AC by squad. Pt sts the pain is so bad shes not been able to get up to take her duonebs at home. Sts no SOB at this time. HISTORY OF PRESENT ILLNESS   (Location, Timing/Onset, Context/Setting, Quality, Duration, Modifying Factors, Severity, Associated Signs and Symptoms)  Note limiting factors. Gilda Montelongo is a 70 y.o. female presents to the emergency department complaining of back pain. She does have history of COPD, CAD, diabetes, heart failure, hypertension, lung cancer pneumonia. Pain is been ongoing since yesterday. There was no injury or trauma. It is worsened with movement. She does wear baseline oxygen at home at 1 L/min. Denies any headache, fever, lightheadedness, dizziness, visual disturbances. No chest pain or pressure. No neck pain. No shortness of breath, cough, or congestion. No abdominal pain, nausea, vomiting, diarrhea, constipation, or dysuria. No rash. Nursing Notes were all reviewed and agreed with or any disagreements were addressed in the HPI. REVIEW OF SYSTEMS    (2-9 systems for level 4, 10 or more for level 5)     Review of Systems   Constitutional: Negative for activity change, chills and fever. Respiratory: Negative for chest tightness and shortness of breath. Cardiovascular: Negative for chest pain. Gastrointestinal: Negative for abdominal pain, diarrhea, nausea and vomiting.    Genitourinary: Negative for dysuria. Musculoskeletal: Positive for back pain. All other systems reviewed and are negative. Positives and Pertinent negatives as per HPI. Except as noted above in the ROS, all other systems were reviewed and negative.        PAST MEDICAL HISTORY     Past Medical History:   Diagnosis Date    Arthritis     CAD (coronary artery disease)     Carpal tunnel syndrome     [facility]     Coronary stent     depression     Diabetes mellitus ([address]     Diastolic heart failure ([address]     Per Dr Michelle Yan 8/5/19    GERD (gastroesophageal reflux disease)     Hyperlipidemia     Hypertension     Lung cancer ([facility]     Adenocarcinoma of Left Lung    MI (myocardial infarction) ([facility]     LIZETT (obstructive sleep apnea)     does not use CPAP    PONV (postoperative nausea and vomiting)     stress incontinence          SURGICAL HISTORY     Past Surgical History:   Procedure Laterality Date    BACK SURGERY  2000, 2001    lumbar laminectomy    BRONCHOSCOPY  12/04/2018    BRONCHOSCOPY N/A 2/27/2019    BRONCHOSCOPY BIOPSY BRONCHUS performed by Carmen Almeida MD at [address] [place] [address]  2/27/2019    BRONCHOSCOPY/TRANSBRONCHIAL NEEDLE BIOPSY performed by Carmen Almeida MD at [address] [place] [address]  2/27/2019    BRONCHOSCOPY ULTRASOUND performed by Carmen Almeida MD at [address] [place] [address] N/A 8/6/2019    BRONCHOSCOPY ALVEOLAR LAVAGE performed by Carmen Almeida MD at [address] [place] [address] N/A 9/27/2019    BRONCHOSCOPY ALVEOLAR LAVAGE performed by Maylon Duverney, MD at [address] [place] [address]  9/27/2019    BRONCHOSCOPY BIOPSY BRONCHUS performed by Maylon Duverney, MD at [address] [place] [address]  9/27/2019    BRONCHOSCOPY BRUSHINGS performed by Maylon Duverney, MD at [address] [place] [address] N/A 12/19/2018    LAPAROSCOPIC CHOLECYSTECTOMY WITH [facility]OLANGIOGRAM [facility] COLONOSCOPY N/A 2/25/2019    COLONOSCOPY WITH BIOPSY performed by Cathleen Hopkins MD at [address] [facility] COLONOSCOPY  2/25/2019    COLONOSCOPY POLYPECTOMY SNARE/COLD BIOPSY performed by Cathleen Hopkins MD at [facility]  2000, 2001    CO [address] INCL FLUOR GDNCE DX W/CELL [facility] N/A 12/4/2018    BRONCHOSCOPY WITH LAVAGE performed by Portia Palomo MD at [address] 2/25/2019    EGD BIOPSY performed by Cathleen Hopkins MD at [address] N/A 8/7/2019    EGD DIAGNOSTIC ONLY performed by Mabel Fierro MD at [address]       Current Discharge Medication List      CONTINUE these medications which have NOT CHANGED    Details   methocarbamol (ROBAXIN) 500 MG tablet Take 500 mg by mouth 4 times daily      HYDROcodone-acetaminophen (NORCO) 5-325 MG per tablet Take 1 tablet by mouth every 6 hours as needed for Pain. predniSONE (DELTASONE) 10 MG tablet Take 1 tablet by mouth daily  Qty: 30 tablet, Refills: 0      dilTIAZem (CARDIZEM 12 HR) 60 MG extended release capsule Take 1 capsule by mouth 2 times daily  Qty: 60 capsule, Refills: 1      linagliptin (TRADJENTA) 5 MG tablet Take 1 tablet by mouth daily  Qty: 30 tablet, Refills: 1      mirtazapine (REMERON) 15 MG tablet Take 1 tablet by mouth nightly  Qty: 30 tablet, Refills: 3      ipratropium-albuterol (DUONEB) 0.5-2.5 (3) MG/3ML SOLN nebulizer solution Inhale 3 mLs into the lungs every 4 hours (while awake) DX:COPD J44.9  Qty: 360 mL, Refills: 11      Fluticasone-Umeclidin-Vilant (TRELEGY ELLIPTA) 100-62.5-25 MCG/INH AEPB Inhale 1 puff into the lungs daily as needed       acetaminophen 650 MG TABS Take 650 mg by mouth every 4 hours as needed for Pain (For mild pain level 1-3 or for fever > 100.5).   Qty: Appearance: She is well-developed. She is not diaphoretic. HENT:      Head: Normocephalic and atraumatic. Right Ear: External ear normal.      Left Ear: External ear normal.   Eyes:      General:         Right eye: No discharge. Left eye: No discharge. Neck:      Musculoskeletal: Normal range of motion and neck supple. Vascular: No JVD. Cardiovascular:      Rate and Rhythm: Normal rate and regular rhythm. Pulses: Normal pulses. Heart sounds: Normal heart sounds. Pulmonary:      Effort: Pulmonary effort is normal. No respiratory distress. Breath sounds: Normal breath sounds. Abdominal:      Palpations: Abdomen is soft. Musculoskeletal: Normal range of motion. Skin:     General: Skin is warm and dry. Coloration: Skin is not pale. Neurological:      Mental Status: She is alert and oriented to person, place, and time.    Psychiatric:         Behavior: Behavior normal.         DIAGNOSTIC RESULTS   LABS:    Labs Reviewed   BASIC METABOLIC PANEL - Abnormal; Notable for the following components:       Result Value    Potassium 5.3 (*)     Chloride 98 (*)     Glucose 190 (*)     BUN 31 (*)     CREATININE 1.5 (*)     GFR Non- 34 (*)     GFR  41 (*)     All other components within normal limits    Narrative:     Performed at:  [facility] [address], [place], [facility]   Phone [phone] - Abnormal; Notable for the following components:    Pro-BNP 10,418 (*)     All other components within normal limits    Narrative:     Performed at:  [facility] [address] [facility]   Phone [phone]   CBC WITH AUTO DIFFERENTIAL - Abnormal; Notable for the following components:    WBC 11.2 (*)     RBC 3.30 (*)     Hemoglobin 9.0 (*)     Hematocrit 28.3 (*)     RDW 21.2 (*)     Neutrophils Absolute 8.4 (*)     Myelocyte Percent 1 (*) Macrocytes Occasional (*)     Poikilocytes Occasional (*)     All other components within normal limits    Narrative:     Performed at:  [facility] [address], [place]   Phone [phone]   HEPATIC FUNCTION PANEL - Abnormal; Notable for the following components:    Alb 3.3 (*)     AST 46 (*)     All other components within normal limits    Narrative:     Performed at:  [facility] [address], [place]   Phone [phone]   CBC - Abnormal; Notable for the following components:    RBC 2.90 (*)     Hemoglobin 8.2 (*)     Hematocrit 25.0 (*)     RDW 21.3 (*)     All other components within normal limits    Narrative:     Performed at:  [facility] [address] [facility], [place]   Phone [phone]   BASIC METABOLIC PANEL - Abnormal; Notable for the following components:    Glucose 183 (*)     BUN 32 (*)     CREATININE 1.6 (*)     GFR Non- 31 (*)     GFR  38 (*)     All other components within normal limits    Narrative:     Performed at:  [facility] [address] [facility], [place]   Phone [phone]   CULTURE, BLOOD 2    Narrative:     ORDER#: [id number]                          ORDERED BY: Suresh Arteaga  SOURCE: Blood                              COLLECTED:  07/11/20 14:59  ANTIBIOTICS AT MAX.:                      RECEIVED :  07/12/20 06:30  If child <=2 yrs old please draw pediatric bottle. ~Blood Culture #2  Performed at:  [address] [address]   Phone [phone]   CULTURE, BLOOD 1   TROPONIN    Narrative:     Performed at:  [facility] [address], [place]   Phone [phone]   PROTIME-INR    Narrative:     Performed at:  [facility]  [address], [place]   Phone [phone]   LIPASE    Narrative:     Performed at:  [facility]  [address]   Phone [phone]   MAGNESIUM    Narrative:     Performed at:  [facility]  [address], [place]   Phone [phone]   PHOSPHORUS    Narrative:     Performed at:  [facility]  [address], [place]   Phone [phone]   MICROALBUMIN / CREATININE URINE RATIO   POCT GLUCOSE   POCT GLUCOSE   POCT GLUCOSE       All other labs were within normal range or not returned as of this dictation. EKG: All EKG's are interpreted by the Emergency Department Physician in the absence of a cardiologist.  Please see their note for interpretation of EKG. RADIOLOGY:   Non-plain film images such as CT, Ultrasound and MRI are read by the radiologist. Plain radiographic images are visualized and preliminarily interpreted by the ED Provider with the below findings:        Interpretation per the Radiologist below, if available at the time of this note:    CT CHEST [address]   Final Result   1. Extensive coronary and aortic atherosclerosis with no aortic aneurysm or   periaortic hemorrhage. Stable mild cardiomegaly and stable pulmonary artery   hypertension. 2. COPD. 3. Compared with the prior exam there is overall improved pulmonary   consolidation with perihilar and lower lobe findings which may represent   chronic fibrotic changes and trace left pleural effusion. New multilobar   multifocal right lung consolidative changes which may represent acute   pneumonia or possible chronic pulmonary process such as vasculitis. 4. Indeterminate anterior left lung base clustered 0.3 cm solid nodules,   attention on follow-up exams.    5. In the right lower quadrant there is new mesenteric fat induration and   mild lymphadenopathy. The appendix appears normal.  There is no colonic wall   thickening. This could relate to possible mild colitis, ascending colonic   diverticulitis, or mesenteric adenitis. A lymphoproliferative disorder such   as lymphoma cannot be excluded. 6. No obstruction, perforation, or abscess. 7. The T3 and T10 vertebral bodies demonstrate subtle new height loss or   superior endplate concavity which may represent insufficiency versus   compression fractures. If clinically indicated this could be further   evaluated with MRI of the thoracic spine. XR CHEST PORTABLE   Preliminary Result   New focal opacity in the right lower lung. Linear opacities in the right   upper lung and left perihilar region otherwise stable. Given history of lung   cancer would recommend further evaluation with CT. Xr Chest Portable    Result Date: 7/11/2020  EXAMINATION: ONE XRAY VIEW OF THE CHEST 7/11/2020 3:40 pm COMPARISON: 05/31/2020 HISTORY: ORDERING SYSTEM PROVIDED HISTORY: sob TECHNOLOGIST PROVIDED HISTORY: Reason for exam:->sob Reason for Exam: Low O2, back pain. Hx lung ca Acuity: Acute Type of Exam: Initial Initial encounter FINDINGS: Linear opacities in the upper lobes bilaterally are stable. Stable cardiac silhouette. No pneumothorax or definite pleural effusion. There is a new focal opacity in the right lower lung. No overt pulmonary edema. The osseous structures are stable. New focal opacity in the right lower lung. Linear opacities in the right upper lung and left perihilar region otherwise stable. Given history of lung cancer would recommend further evaluation with CT.      Ct Chest Abdomen Pelvis Wo Contrast    Result Date: 7/11/2020  EXAMINATION: CT OF THE CHEST, ABDOMEN, AND PELVIS WITHOUT CONTRAST 7/11/2020 4:15 pm TECHNIQUE: CT of the chest, abdomen and pelvis was performed without the administration of intravenous contrast. Multiplanar reformatted images are provided for review. Dose modulation, iterative reconstruction, and/or weight based adjustment of the mA/kV was utilized to reduce the radiation dose to as low as reasonably achievable. COMPARISON: CT chest without contrast 10/01/2019. CT abdomen/pelvis 08/04/2019. HISTORY: ORDERING SYSTEM PROVIDED HISTORY: back pain, hypoxia, abd tenderness TECHNOLOGIST PROVIDED HISTORY: Reason for exam:->back pain, hypoxia, abd tenderness Additional Contrast?->None Reason for Exam: Back Pain (pt brought in by [facility] from home. Pt c/o back pain x2 days. Denies injury. Hx of COPD, wears 2L NC. Pt moved up to 4L NC by squad due to sat 91%. 20g PIV in L AC by squad. Pt sts the pain is so bad shes not been able to get up to take her duonebs at home. Sts no SOB at this time. ) Acuity: Acute Type of Exam: Initial FINDINGS: CARDIOVASCULAR:  Mildly enlarged heart size with no significant pericardial effusion. There is extensive triple-vessel distribution coronary artery calcifications. Mild-moderate aortic valve annular calcifications. Extensive thoracic aortic atherosclerotic calcifications with no acute hyperdense wall thickening or aneurysmal dilatation. Diffuse pulmonary artery enlargement. Extensive abdominal aortic and iliac atherosclerotic calcifications with no aneurysm formation or periaortic hemorrhage. MEDIASTINUM:  The esophagus appears unremarkable. No significant enlarged mediastinal or hilar lymph nodes. LUNGS/AIRWAYS:  The trachea is patent. Emphysematous changes. In the right lung there is new multilobar multifocal subsegmental peribronchial consolidation with internal bronchiectasis. This is most intense in the posteromedial right lower lobe. There is no lobar consolidation. No effusion or pneumothorax. In the left perihilar and paramediastinal region there is consolidation and varicose bronchiectasis suggesting fibrotic changes and chronic process.   The left lower lobe demonstrates volume loss and consolidation with bronchiectasis which overall appears improved compared to the prior exam.  There is a trace posterior basilar effusion with mild pleural thickening. Improved aeration of the left lower lobe base compared to the prior exam with patchy subpleural opacities. In the anterior left lung base there are numerous clustered solid pulmonary nodular densities with a representative component measuring 0.3 cm. ABDOMEN:  Prior cholecystectomy. No intrahepatic mass or biliary dilatation. No evidence of portal venous gas. The common bile duct, pancreas, spleen, and adrenals are normal.  The kidneys demonstrate no hydronephrosis or nephrolithiasis. The bilateral ureters are normal.  There are numerous bilateral renal cysts. Left kidney superior pole rim-calcified hypodense mass which measures approximately 1.4 cm stable compared to the prior exam with non-contrasted average Hounsfield units of 33. The stomach, small bowel, and appendix are normal.  The colon demonstrates moderate diffuse diverticulosis. There is no significant wall thickening. In the right lower quadrant there is new pericolonic ground-glass fat induration. There are numerous new enlarged right lower quadrant mesenteric lymph nodes with a representative lymph node measuring 0.6 x 1.0 cm on axial image 167. There is no free peritoneal air or abscess. PELVIS:  The bladder, uterus, and rectum are normal.  No ascites or abscess. No significant enlarged iliac chain lymph nodes. MUSCULOSKELETAL STRUCTURES:  No significant enlarged axillary or inguinal lymph nodes. Normal thoracic and lumbar spine curvature and alignment. The T1 vertebral body demonstrates stable mild anterior wedge configuration. The T3 vertebral body demonstrates new mild anterior wedge configuration and superior endplate concavity with approximately 15% loss of height. No significant posterior cortical displacement. Redemonstration of T8 vertebral body hemangioma.   The T10 vertebral body demonstrates subtle superior endplate concavity new since the prior exam.  The lumbar bodies demonstrate normal height. Multilevel degenerative disc and joint disease. 1. Extensive coronary and aortic atherosclerosis with no aortic aneurysm or periaortic hemorrhage. Stable mild cardiomegaly and stable pulmonary artery hypertension. 2. COPD. 3. Compared with the prior exam there is overall improved pulmonary consolidation with perihilar and lower lobe findings which may represent chronic fibrotic changes and trace left pleural effusion. New multilobar multifocal right lung consolidative changes which may represent acute pneumonia or possible chronic pulmonary process such as vasculitis. 4. Indeterminate anterior left lung base clustered 0.3 cm solid nodules, attention on follow-up exams. 5. In the right lower quadrant there is new mesenteric fat induration and mild lymphadenopathy. The appendix appears normal.  There is no colonic wall thickening. This could relate to possible mild colitis, ascending colonic diverticulitis, or mesenteric adenitis. A lymphoproliferative disorder such as lymphoma cannot be excluded. 6. No obstruction, perforation, or abscess. 7. The T3 and T10 vertebral bodies demonstrate subtle new height loss or superior endplate concavity which may represent insufficiency versus compression fractures. If clinically indicated this could be further evaluated with MRI of the thoracic spine. PROCEDURES   Unless otherwise noted below, none     Procedures    CRITICAL CARE TIME   The total critical care time spent while evaluating and treating this patient was at least 31 minutes. This excludes time spent doing separately billable procedures. This includes time at the bedside, data interpretation, medication management, obtaining critical history from collateral sources if the patient is unable to provide it directly, and physician consultation.   Specifics of interventions taken and potentially life-threatening diagnostic considerations are listed above in the medical decision making.       CONSULTS:  IP CONSULT TO HOSPITALIST      EMERGENCY DEPARTMENT COURSE and DIFFERENTIAL DIAGNOSIS/MDM:   Vitals:    Vitals:    07/13/20 2725 07/13/20 0841 07/13/20 0843 07/13/20 0929   BP:    123/73   Pulse:    85   Resp:    18   Temp:    97.4 °F (36.3 °C)   TempSrc:    Axillary   SpO2:  100% 100% 100%   Weight: 148 lb (67.1 kg)      Height:           Patient was given the following medications:  Medications   apixaban (ELIQUIS) tablet 2.5 mg (2.5 mg Oral Given 7/13/20 0930)   dilTIAZem (CARDIZEM 12 HR) extended release capsule 60 mg (60 mg Oral Given 7/13/20 0930)   fluticasone-umeclidin-vilant (TRELEGY ELLIPTA) 100-62.5-25 MCG/INH inhaler 1 puff - PATIENT SUPPLIED (1 puff Inhalation Not Given 7/13/20 0840)   metoprolol succinate (TOPROL XL) extended release tablet 25 mg (25 mg Oral Given 7/13/20 0929)   pantoprazole (PROTONIX) tablet 40 mg (40 mg Oral Given 7/13/20 0636)   sodium chloride flush 0.9 % injection 10 mL ( Intravenous Canceled Entry 7/13/20 0933)   sodium chloride flush 0.9 % injection 10 mL (has no administration in time range)   acetaminophen (TYLENOL) tablet 650 mg (650 mg Oral Given 7/13/20 0636)     Or   acetaminophen (TYLENOL) suppository 650 mg ( Rectal See Alternative 7/13/20 0636)   promethazine (PHENERGAN) tablet 12.5 mg ( Oral See Alternative 7/12/20 0047)     Or   ondansetron (ZOFRAN) injection 4 mg (4 mg Intravenous Given 7/12/20 0047)   dextrose 5 % solution (  Not Given 7/11/20 2352)   cyclobenzaprine (FLEXERIL) tablet 10 mg (10 mg Oral Given 7/13/20 0635)   insulin glargine (LANTUS;BASAGLAR) injection pen 5 Units (has no administration in time range)   insulin lispro (1 Unit Dial) 2 Units (has no administration in time range)   insulin lispro (1 Unit Dial) 0-12 Units (has no administration in time range)   insulin lispro (1 Unit Dial) 0-6 Units (has no MEDICATIONS:  Current Discharge Medication List          DISCONTINUED MEDICATIONS:  Current Discharge Medication List                 (Please note that portions of this note were completed with a voice recognition program.  Efforts were made to edit the dictations but occasionally words are mis-transcribed.)    MARIA C Mejia CNP (electronically signed)            MARIA C Mejia CNP  07/13/20 1035

## 2020-07-12 NOTE — H&P
[place] [address]                     [facility], [address]                              HISTORY AND PHYSICAL    PATIENT NAME: Orlando Naranjo                     :        1940  MED REC NO:   [id number]                          ROOM:       [id number]  ACCOUNT NO:   [de-identified]                           ADMIT DATE: 2020  PROVIDER:     Franca Leyva MD    DATE OF SERVICE:  I obtained the history and performed physical exam on  the patient on the medical floor on 2020. CHIEF COMPLAINT:  Back pain. HISTORY OF PRESENT ILLNESS:  The patient is a 79-year-old   female who was recently discharged from the hospital a couple of days  ago after being admitted here for acute renal insufficiency, presents to  the hospital again with chief complaints of 2-3 day history of what she  describes as subacute onset of gradually progressive increasing back  pain that she states probably happened when she slept on her back wrong  and then she got up with excruciating pain, also states that she has  been having increasing shortness of breath as a consequence of what she  feels is the pain because of which she was not to get up and take her  DuoNeb. No nausea or vomiting. No fevers or chills. No other  constitutional symptoms. PAST MEDICAL HISTORY:  1. COPD on home oxygen. 2.  Coronary artery disease status post PCI and stenting. 3.  Type 2 diabetes. 4.  Diastolic congestive heart failure. 5.  Hypertension. 6.  Dyslipidemia. PAST SURGICAL HISTORY:  _____ surgery, bronchoscopy, colonoscopy,  cholecystectomy. ALLERGIC HISTORY:  STATIN, LYRICA, CIPRO, PENICILLIN, SULFA. FAMILY HISTORY:  Reviewed by me and is currently noncontributory. SOCIAL HISTORY:  Nonsmoker. No illicit substance use. MEDICATIONS:  Robaxin, Norco, Deltasone, Cardizem, Tradjenta, Remeron,  DuoNeb, Trelegy, Tylenol, Protonix, Toprol XL, Eliquis.     REVIEW OF SYSTEMS: The patient's review of systems is significant for  the back pain and the shortness of breath and per the history of present  illness. All other systems reviewed and are negative except for the  history of present illness. PHYSICAL EXAMINATION:  VITAL SIGNS:  Temperature 98.3, respiratory rate 22, pulse 100, blood  pressure 193/97, saturating 97% on 4 liters. CNS:  Alert, awake and oriented. PSYCH:  Cooperative. EYES:  Pupils are reactive to light. ENT:  No oral mucosal lesions. RESPIRATORY SYSTEM:  Non-labored respiration. CVS:  Non-tachycardic. ABDOMEN:  Nondistended. MUSCULOSKELETAL:  Any attempted movement in bed is causing the patient  to have excruciating back pain. SKIN:  No cutaneous rashes or lesions. DIAGNOSTIC DATA:  CT chest abdomen and pelvis shows COPD, improvement of  pulmonary consolidation with possibility of new T3 and T10 vertebral  body height loss suggestive of probable compression fractures. CBC  showed white count 11.2, neutrophils are 8.4, hemoglobin 9.0. Liver  function panel, albumin 3.3, AST 46, ALT 18. Sodium 138, potassium 5.3,  chloride 98, glucose 190, BUN 31, creatinine 1.5. INR 1.12. BNP  10,418. CONSULTATIONS CURRENTLY REQUESTED:  None. ASSESSMENT:  1.  Back pain secondary to compression fractures. 2.  Chronic diastolic congestive heart failure. 3.  COPD on home oxygen. 4.  Hypertension. 5.  Stage III CKD. 6.  Paroxysmal atrial fibrillation. PLAN OF CARE:  The patient is admitted to observation with telemetry. Supplemental oxygen will be continued. Adequate analgesia will be  continued. DVT prophylaxis will be continued with Eliquis. The  patient's home dose of her home medications will be continued including  Eliquis and Cardizem. CODE STATUS:  Full. EXPECTED LENGTH OF STAY:  Less than two midnights based on plan of care  above. DISPOSITION:  Observation.         Theresa Miner MD    D: 07/12/2020 6:02:01       T: 07/12/2020

## 2020-07-13 LAB
ANION GAP SERPL CALCULATED.3IONS-SCNC: 11 MMOL/L (ref 3–16)
BUN BLDV-MCNC: 32 MG/DL (ref 7–20)
CALCIUM SERPL-MCNC: 9.5 MG/DL (ref 8.3–10.6)
CHLORIDE BLD-SCNC: 100 MMOL/L (ref 99–110)
CO2: 28 MMOL/L (ref 21–32)
CREAT SERPL-MCNC: 1.6 MG/DL (ref 0.6–1.2)
GFR AFRICAN AMERICAN: 38
GFR NON-AFRICAN AMERICAN: 31
GLUCOSE BLD-MCNC: 111 MG/DL (ref 70–99)
GLUCOSE BLD-MCNC: 183 MG/DL (ref 70–99)
GLUCOSE BLD-MCNC: 215 MG/DL (ref 70–99)
GLUCOSE BLD-MCNC: 239 MG/DL (ref 70–99)
HCT VFR BLD CALC: 25 % (ref 36–48)
HEMOGLOBIN: 8.2 G/DL (ref 12–16)
MAGNESIUM: 1.9 MG/DL (ref 1.8–2.4)
MCH RBC QN AUTO: 28.2 PG (ref 26–34)
MCHC RBC AUTO-ENTMCNC: 32.7 G/DL (ref 31–36)
MCV RBC AUTO: 86.3 FL (ref 80–100)
PDW BLD-RTO: 21.3 % (ref 12.4–15.4)
PERFORMED ON: ABNORMAL
PHOSPHORUS: 4.8 MG/DL (ref 2.5–4.9)
PLATELET # BLD: 328 K/UL (ref 135–450)
PMV BLD AUTO: 6.8 FL (ref 5–10.5)
POTASSIUM SERPL-SCNC: 4.1 MMOL/L (ref 3.5–5.1)
RBC # BLD: 2.9 M/UL (ref 4–5.2)
SARS-COV-2, PCR: NOT DETECTED
SODIUM BLD-SCNC: 139 MMOL/L (ref 136–145)
WBC # BLD: 6.5 K/UL (ref 4–11)

## 2020-07-13 PROCEDURE — 82043 UR ALBUMIN QUANTITATIVE: CPT

## 2020-07-13 PROCEDURE — G0378 HOSPITAL OBSERVATION PER HR: HCPCS

## 2020-07-13 PROCEDURE — 6360000002 HC RX W HCPCS: Performed by: HOSPITALIST

## 2020-07-13 PROCEDURE — 6370000000 HC RX 637 (ALT 250 FOR IP): Performed by: INTERNAL MEDICINE

## 2020-07-13 PROCEDURE — 82570 ASSAY OF URINE CREATININE: CPT

## 2020-07-13 PROCEDURE — 94760 N-INVAS EAR/PLS OXIMETRY 1: CPT

## 2020-07-13 PROCEDURE — 85027 COMPLETE CBC AUTOMATED: CPT

## 2020-07-13 PROCEDURE — 84100 ASSAY OF PHOSPHORUS: CPT

## 2020-07-13 PROCEDURE — 36415 COLL VENOUS BLD VENIPUNCTURE: CPT

## 2020-07-13 PROCEDURE — 96375 TX/PRO/DX INJ NEW DRUG ADDON: CPT

## 2020-07-13 PROCEDURE — 80048 BASIC METABOLIC PNL TOTAL CA: CPT

## 2020-07-13 PROCEDURE — 6370000000 HC RX 637 (ALT 250 FOR IP): Performed by: HOSPITALIST

## 2020-07-13 PROCEDURE — 2580000003 HC RX 258: Performed by: INTERNAL MEDICINE

## 2020-07-13 PROCEDURE — 96376 TX/PRO/DX INJ SAME DRUG ADON: CPT

## 2020-07-13 PROCEDURE — 83735 ASSAY OF MAGNESIUM: CPT

## 2020-07-13 RX ORDER — INSULIN LISPRO 100 [IU]/ML
0-6 INJECTION, SOLUTION INTRAVENOUS; SUBCUTANEOUS NIGHTLY
Status: DISCONTINUED | OUTPATIENT
Start: 2020-07-13 | End: 2020-07-24 | Stop reason: HOSPADM

## 2020-07-13 RX ORDER — DEXTROSE MONOHYDRATE 50 MG/ML
100 INJECTION, SOLUTION INTRAVENOUS PRN
Status: DISCONTINUED | OUTPATIENT
Start: 2020-07-13 | End: 2020-07-24 | Stop reason: HOSPADM

## 2020-07-13 RX ORDER — INSULIN LISPRO 100 [IU]/ML
0-12 INJECTION, SOLUTION INTRAVENOUS; SUBCUTANEOUS
Status: DISCONTINUED | OUTPATIENT
Start: 2020-07-13 | End: 2020-07-24 | Stop reason: HOSPADM

## 2020-07-13 RX ORDER — NICOTINE POLACRILEX 4 MG
15 LOZENGE BUCCAL PRN
Status: DISCONTINUED | OUTPATIENT
Start: 2020-07-13 | End: 2020-07-24 | Stop reason: HOSPADM

## 2020-07-13 RX ORDER — INSULIN LISPRO 100 [IU]/ML
2 INJECTION, SOLUTION INTRAVENOUS; SUBCUTANEOUS
Status: DISCONTINUED | OUTPATIENT
Start: 2020-07-13 | End: 2020-07-22

## 2020-07-13 RX ORDER — KETOROLAC TROMETHAMINE 15 MG/ML
15 INJECTION, SOLUTION INTRAMUSCULAR; INTRAVENOUS EVERY 6 HOURS PRN
Status: DISCONTINUED | OUTPATIENT
Start: 2020-07-13 | End: 2020-07-14

## 2020-07-13 RX ORDER — OXYCODONE HYDROCHLORIDE 5 MG/1
5 TABLET ORAL EVERY 4 HOURS PRN
Status: DISCONTINUED | OUTPATIENT
Start: 2020-07-13 | End: 2020-07-17

## 2020-07-13 RX ORDER — DEXTROSE MONOHYDRATE 25 G/50ML
12.5 INJECTION, SOLUTION INTRAVENOUS PRN
Status: DISCONTINUED | OUTPATIENT
Start: 2020-07-13 | End: 2020-07-24 | Stop reason: HOSPADM

## 2020-07-13 RX ADMIN — Medication 10 ML: at 21:35

## 2020-07-13 RX ADMIN — INSULIN GLARGINE 5 UNITS: 100 INJECTION, SOLUTION SUBCUTANEOUS at 21:55

## 2020-07-13 RX ADMIN — INSULIN LISPRO 2 UNITS: 100 INJECTION, SOLUTION INTRAVENOUS; SUBCUTANEOUS at 14:04

## 2020-07-13 RX ADMIN — INSULIN LISPRO 2 UNITS: 100 INJECTION, SOLUTION INTRAVENOUS; SUBCUTANEOUS at 21:55

## 2020-07-13 RX ADMIN — ACETAMINOPHEN 650 MG: 325 TABLET, FILM COATED ORAL at 06:36

## 2020-07-13 RX ADMIN — CYCLOBENZAPRINE 10 MG: 10 TABLET, FILM COATED ORAL at 06:35

## 2020-07-13 RX ADMIN — DILTIAZEM HYDROCHLORIDE 60 MG: 60 CAPSULE, EXTENDED RELEASE ORAL at 09:30

## 2020-07-13 RX ADMIN — APIXABAN 2.5 MG: 2.5 TABLET, FILM COATED ORAL at 21:34

## 2020-07-13 RX ADMIN — KETOROLAC TROMETHAMINE 15 MG: 15 INJECTION, SOLUTION INTRAMUSCULAR; INTRAVENOUS at 11:39

## 2020-07-13 RX ADMIN — KETOROLAC TROMETHAMINE 15 MG: 15 INJECTION, SOLUTION INTRAMUSCULAR; INTRAVENOUS at 17:54

## 2020-07-13 RX ADMIN — PANTOPRAZOLE SODIUM 40 MG: 40 TABLET, DELAYED RELEASE ORAL at 06:36

## 2020-07-13 RX ADMIN — METOPROLOL SUCCINATE 25 MG: 25 TABLET, FILM COATED, EXTENDED RELEASE ORAL at 09:29

## 2020-07-13 RX ADMIN — INSULIN LISPRO 2 UNITS: 100 INJECTION, SOLUTION INTRAVENOUS; SUBCUTANEOUS at 17:57

## 2020-07-13 RX ADMIN — DILTIAZEM HYDROCHLORIDE 60 MG: 60 CAPSULE, EXTENDED RELEASE ORAL at 21:34

## 2020-07-13 RX ADMIN — INSULIN LISPRO 4 UNITS: 100 INJECTION, SOLUTION INTRAVENOUS; SUBCUTANEOUS at 14:04

## 2020-07-13 RX ADMIN — CYCLOBENZAPRINE 10 MG: 10 TABLET, FILM COATED ORAL at 17:14

## 2020-07-13 RX ADMIN — APIXABAN 2.5 MG: 2.5 TABLET, FILM COATED ORAL at 09:30

## 2020-07-13 RX ADMIN — KETOROLAC TROMETHAMINE 15 MG: 15 INJECTION, SOLUTION INTRAMUSCULAR; INTRAVENOUS at 11:45

## 2020-07-13 ASSESSMENT — PAIN SCALES - GENERAL
PAINLEVEL_OUTOF10: 7
PAINLEVEL_OUTOF10: 7
PAINLEVEL_OUTOF10: 8
PAINLEVEL_OUTOF10: 6
PAINLEVEL_OUTOF10: 7

## 2020-07-13 ASSESSMENT — ENCOUNTER SYMPTOMS
VOMITING: 0
CHEST TIGHTNESS: 0
BACK PAIN: 1
SHORTNESS OF BREATH: 0
NAUSEA: 0
ABDOMINAL PAIN: 0
DIARRHEA: 0

## 2020-07-13 ASSESSMENT — PAIN DESCRIPTION - PROGRESSION

## 2020-07-13 ASSESSMENT — PAIN DESCRIPTION - LOCATION: LOCATION: BACK

## 2020-07-13 ASSESSMENT — PAIN DESCRIPTION - PAIN TYPE: TYPE: ACUTE PAIN

## 2020-07-13 NOTE — PROGRESS NOTES
[facility]    7/13/2020 9:48 AM        Name: Vasyl Nguyen . Admitted: 7/11/2020  Primary Care Provider: Geo Escudero (Tel: [phone])                        Hospital course:  Patient is a 71 yo female with hx adenocarcinoma of the lung, CAD/stent, COPD, chronic anemia, chronic dCHF, DM, HTN, HLD, LIZETT (untreated), chronic AC (on Eliquis). recently discharged from the hospital about a month ago after being admitted here for acute renal insufficiency, presents to the hospital again with chief complaints of 2-3 day history of what she describes as subacute onset of gradually progressive increasing back pain. Subjective:  . No acute events overnight. Resting well. Pain control. Diet ok. Labs reviewed  Denies any chest pain sob.      Reviewed interval ancillary notes    Current Medications  apixaban (ELIQUIS) tablet 2.5 mg, BID  dilTIAZem (CARDIZEM 12 HR) extended release capsule 60 mg, BID  fluticasone-umeclidin-vilant (TRELEGY ELLIPTA) 100-62.5-25 MCG/INH inhaler 1 puff - PATIENT SUPPLIED, Daily  metoprolol succinate (TOPROL XL) extended release tablet 25 mg, Daily  pantoprazole (PROTONIX) tablet 40 mg, QAM AC  sodium chloride flush 0.9 % injection 10 mL, 2 times per day  sodium chloride flush 0.9 % injection 10 mL, PRN  acetaminophen (TYLENOL) tablet 650 mg, Q6H PRN    Or  acetaminophen (TYLENOL) suppository 650 mg, Q6H PRN  promethazine (PHENERGAN) tablet 12.5 mg, Q6H PRN    Or  ondansetron (ZOFRAN) injection 4 mg, Q6H PRN  cyclobenzaprine (FLEXERIL) tablet 10 mg, TID PRN        Objective:  /73   Pulse 85   Temp 97.4 °F (36.3 °C) (Axillary)   Resp 18   Ht 5' 5\" (1.651 m)   Wt 148 lb (67.1 kg)   SpO2 100%   BMI 24.63 kg/m²   No intake or output data in the 24 hours ending 07/13/20 0948   Wt Readings from Last 3 Encounters:   07/13/20 148 lb (67.1 kg)   06/14/20 140 lb 8 oz (63.7 kg)   10/08/19 142 lb (64.4 kg)       General appearance:  Appears comfortable  Eyes: Sclera clear. Pupils equal.  ENT: Moist oral mucosa. Trachea midline, no adenopathy. Cardiovascular: Regular rhythm, normal S1, S2. No murmur. No edema in lower extremities  Respiratory: Not using accessory muscles. Good inspiratory effort. Clear to auscultation bilaterally, no wheeze or crackles. GI: Abdomen soft, no tenderness, not distended, normal bowel sounds  Musculoskeletal: No cyanosis in digits, neck supple  Neurology: CN 2-12 grossly intact. No speech or motor deficits  Psych: Normal affect. Alert and oriented in time, place and person  Skin: Warm, dry, normal turgor    Labs and Tests:  CBC:   Recent Labs     07/11/20  1509 07/13/20  0454   WBC 11.2* 6.5   HGB 9.0* 8.2*   HCT 28.3* 25.0*   MCV 85.8 86.3    328     BMP:    Recent Labs     07/11/20  1509 07/13/20  0454    139   K 5.3* 4.1   CL 98* 100   CO2 27 28   BUN 31* 32*   CREATININE 1.5* 1.6*   GLUCOSE 190* 183*     Hepatic:   Recent Labs     07/11/20  1527   AST 46*   ALT 18   BILITOT <0.2   ALKPHOS 104         Problem List  Active Problems:    Back pain  Resolved Problems:    * No resolved hospital problems. *     Assessment & Plan:   1. T3 and T10 compression fracture: c/w flexeril, consult PT/OT, will consult neurosurgery as well. 2.COPD on home oxygen:. Hx COPD with chronic respiratory failure. No evidence of acute exacerbation. Entered hospice care secondary to COPD (7/2019). O2 sats stable c/w trelegy ellipta.     3. Coronary artery disease status post PCI and stenting. 4.Type 2 diabetes. A1c 7.9.  The patient taking metformin and linagliptin at home, we will start basal bolus correction protocol with 5 units of Lantus nightly and medium sliding scale insulin, hypoglycemia protocol orders will be placed also.   5.Diastolic congestive heart failure. stable  6. Hypertension. C/w metoprolol  7. Chronic anemia:  stable hemoglobin  8. Non-small cell lung cancer. Status post chemo and radiation (completed 4/2019). Patient indicates she wants no further workup or treatment for lung cancer  9. PAF.currently on sinus rhythm with PVCs , c/w Eliquis     Diet: DIET GENERAL;  Code:DNR-CC  DVT PPX lovenox       Gurwinder South MD   7/13/2020 9:48 AM

## 2020-07-13 NOTE — PROGRESS NOTES
Morning med pass and assessment completed. Pt alert and oriented, resting in bed at this time. Pt states she is having back pain, not due for tylenol or flexeril at this time. Repositioned for comfort. Care plan and education were reviewed with patient and mutually agreed upon. Reviewed potential for falls and safety measures. Patient verbalized understanding and denies any need at this time. Will continue to monitor. 11:33 AM  Neurosurgery consulted per order. 5:21 PM  Urine collected and sent per order.

## 2020-07-14 ENCOUNTER — APPOINTMENT (OUTPATIENT)
Dept: CT IMAGING | Age: 80
DRG: 477 | End: 2020-07-14
Payer: MEDICARE

## 2020-07-14 LAB
ABO/RH: NORMAL
ANION GAP SERPL CALCULATED.3IONS-SCNC: 14 MMOL/L (ref 3–16)
ANTIBODY SCREEN: NORMAL
BUN BLDV-MCNC: 36 MG/DL (ref 7–20)
CALCIUM SERPL-MCNC: 9.3 MG/DL (ref 8.3–10.6)
CHLORIDE BLD-SCNC: 100 MMOL/L (ref 99–110)
CO2: 26 MMOL/L (ref 21–32)
CREAT SERPL-MCNC: 1.6 MG/DL (ref 0.6–1.2)
CREATININE URINE: 205 MG/DL (ref 28–259)
EKG ATRIAL RATE: 97 BPM
EKG DIAGNOSIS: NORMAL
EKG P AXIS: 32 DEGREES
EKG P-R INTERVAL: 142 MS
EKG Q-T INTERVAL: 356 MS
EKG QRS DURATION: 84 MS
EKG QTC CALCULATION (BAZETT): 452 MS
EKG R AXIS: 16 DEGREES
EKG T AXIS: 49 DEGREES
EKG VENTRICULAR RATE: 97 BPM
GFR AFRICAN AMERICAN: 38
GFR NON-AFRICAN AMERICAN: 31
GLUCOSE BLD-MCNC: 146 MG/DL (ref 70–99)
GLUCOSE BLD-MCNC: 177 MG/DL (ref 70–99)
GLUCOSE BLD-MCNC: 191 MG/DL (ref 70–99)
GLUCOSE BLD-MCNC: 194 MG/DL (ref 70–99)
GLUCOSE BLD-MCNC: 194 MG/DL (ref 70–99)
GLUCOSE BLD-MCNC: 242 MG/DL (ref 70–99)
GLUCOSE BLD-MCNC: 242 MG/DL (ref 70–99)
HCT VFR BLD CALC: 23.3 % (ref 36–48)
HCT VFR BLD CALC: 23.7 % (ref 36–48)
HEMOGLOBIN: 7.4 G/DL (ref 12–16)
HEMOGLOBIN: 7.5 G/DL (ref 12–16)
MAGNESIUM: 1.9 MG/DL (ref 1.8–2.4)
MCH RBC QN AUTO: 26.9 PG (ref 26–34)
MCHC RBC AUTO-ENTMCNC: 31.6 G/DL (ref 31–36)
MCV RBC AUTO: 85.2 FL (ref 80–100)
MICROALBUMIN UR-MCNC: 2.1 MG/DL
MICROALBUMIN/CREAT UR-RTO: 10.2 MG/G (ref 0–30)
PDW BLD-RTO: 21.4 % (ref 12.4–15.4)
PERFORMED ON: ABNORMAL
PHOSPHORUS: 5.2 MG/DL (ref 2.5–4.9)
PLATELET # BLD: 343 K/UL (ref 135–450)
PMV BLD AUTO: 7.1 FL (ref 5–10.5)
POTASSIUM SERPL-SCNC: 3.8 MMOL/L (ref 3.5–5.1)
RBC # BLD: 2.78 M/UL (ref 4–5.2)
SODIUM BLD-SCNC: 140 MMOL/L (ref 136–145)
WBC # BLD: 7.2 K/UL (ref 4–11)

## 2020-07-14 PROCEDURE — 86850 RBC ANTIBODY SCREEN: CPT

## 2020-07-14 PROCEDURE — 70450 CT HEAD/BRAIN W/O DYE: CPT

## 2020-07-14 PROCEDURE — 80048 BASIC METABOLIC PNL TOTAL CA: CPT

## 2020-07-14 PROCEDURE — 86923 COMPATIBILITY TEST ELECTRIC: CPT

## 2020-07-14 PROCEDURE — P9016 RBC LEUKOCYTES REDUCED: HCPCS

## 2020-07-14 PROCEDURE — 6370000000 HC RX 637 (ALT 250 FOR IP): Performed by: INTERNAL MEDICINE

## 2020-07-14 PROCEDURE — 85018 HEMOGLOBIN: CPT

## 2020-07-14 PROCEDURE — 6370000000 HC RX 637 (ALT 250 FOR IP): Performed by: HOSPITALIST

## 2020-07-14 PROCEDURE — G0378 HOSPITAL OBSERVATION PER HR: HCPCS

## 2020-07-14 PROCEDURE — 2700000000 HC OXYGEN THERAPY PER DAY

## 2020-07-14 PROCEDURE — 36415 COLL VENOUS BLD VENIPUNCTURE: CPT

## 2020-07-14 PROCEDURE — 83735 ASSAY OF MAGNESIUM: CPT

## 2020-07-14 PROCEDURE — 96376 TX/PRO/DX INJ SAME DRUG ADON: CPT

## 2020-07-14 PROCEDURE — 94760 N-INVAS EAR/PLS OXIMETRY 1: CPT

## 2020-07-14 PROCEDURE — 85014 HEMATOCRIT: CPT

## 2020-07-14 PROCEDURE — 6360000002 HC RX W HCPCS: Performed by: HOSPITALIST

## 2020-07-14 PROCEDURE — 93010 ELECTROCARDIOGRAM REPORT: CPT | Performed by: INTERNAL MEDICINE

## 2020-07-14 PROCEDURE — 84100 ASSAY OF PHOSPHORUS: CPT

## 2020-07-14 PROCEDURE — 2580000003 HC RX 258: Performed by: INTERNAL MEDICINE

## 2020-07-14 PROCEDURE — 86900 BLOOD TYPING SEROLOGIC ABO: CPT

## 2020-07-14 PROCEDURE — 70486 CT MAXILLOFACIAL W/O DYE: CPT

## 2020-07-14 PROCEDURE — 85027 COMPLETE CBC AUTOMATED: CPT

## 2020-07-14 PROCEDURE — 86901 BLOOD TYPING SEROLOGIC RH(D): CPT

## 2020-07-14 RX ADMIN — INSULIN LISPRO 2 UNITS: 100 INJECTION, SOLUTION INTRAVENOUS; SUBCUTANEOUS at 08:55

## 2020-07-14 RX ADMIN — PROMETHAZINE HYDROCHLORIDE 12.5 MG: 25 TABLET ORAL at 03:29

## 2020-07-14 RX ADMIN — OXYCODONE 5 MG: 5 TABLET ORAL at 17:56

## 2020-07-14 RX ADMIN — INSULIN LISPRO 2 UNITS: 100 INJECTION, SOLUTION INTRAVENOUS; SUBCUTANEOUS at 13:00

## 2020-07-14 RX ADMIN — ACETAMINOPHEN 650 MG: 325 TABLET, FILM COATED ORAL at 20:34

## 2020-07-14 RX ADMIN — KETOROLAC TROMETHAMINE 15 MG: 15 INJECTION, SOLUTION INTRAMUSCULAR; INTRAVENOUS at 06:48

## 2020-07-14 RX ADMIN — CYCLOBENZAPRINE 10 MG: 10 TABLET, FILM COATED ORAL at 13:01

## 2020-07-14 RX ADMIN — INSULIN GLARGINE 5 UNITS: 100 INJECTION, SOLUTION SUBCUTANEOUS at 20:34

## 2020-07-14 RX ADMIN — METOPROLOL SUCCINATE 25 MG: 25 TABLET, FILM COATED, EXTENDED RELEASE ORAL at 08:53

## 2020-07-14 RX ADMIN — APIXABAN 2.5 MG: 2.5 TABLET, FILM COATED ORAL at 08:54

## 2020-07-14 RX ADMIN — CYCLOBENZAPRINE 10 MG: 10 TABLET, FILM COATED ORAL at 03:29

## 2020-07-14 RX ADMIN — INSULIN LISPRO 2 UNITS: 100 INJECTION, SOLUTION INTRAVENOUS; SUBCUTANEOUS at 17:59

## 2020-07-14 RX ADMIN — DILTIAZEM HYDROCHLORIDE 60 MG: 60 CAPSULE, EXTENDED RELEASE ORAL at 20:33

## 2020-07-14 RX ADMIN — Medication 10 ML: at 08:54

## 2020-07-14 RX ADMIN — INSULIN LISPRO 2 UNITS: 100 INJECTION, SOLUTION INTRAVENOUS; SUBCUTANEOUS at 18:00

## 2020-07-14 RX ADMIN — INSULIN LISPRO 1 UNITS: 100 INJECTION, SOLUTION INTRAVENOUS; SUBCUTANEOUS at 20:35

## 2020-07-14 RX ADMIN — Medication 10 ML: at 20:34

## 2020-07-14 RX ADMIN — DILTIAZEM HYDROCHLORIDE 60 MG: 60 CAPSULE, EXTENDED RELEASE ORAL at 08:54

## 2020-07-14 RX ADMIN — PANTOPRAZOLE SODIUM 40 MG: 40 TABLET, DELAYED RELEASE ORAL at 06:49

## 2020-07-14 ASSESSMENT — PAIN SCALES - GENERAL
PAINLEVEL_OUTOF10: 7
PAINLEVEL_OUTOF10: 9
PAINLEVEL_OUTOF10: 8
PAINLEVEL_OUTOF10: 8
PAINLEVEL_OUTOF10: 7

## 2020-07-14 ASSESSMENT — PAIN DESCRIPTION - ORIENTATION
ORIENTATION: RIGHT;MID;ANTERIOR
ORIENTATION: LOWER

## 2020-07-14 ASSESSMENT — PAIN DESCRIPTION - LOCATION
LOCATION: BACK
LOCATION: FACE

## 2020-07-14 ASSESSMENT — PAIN DESCRIPTION - DESCRIPTORS
DESCRIPTORS: ACHING
DESCRIPTORS: SHARP;SPASM

## 2020-07-14 ASSESSMENT — PAIN DESCRIPTION - PROGRESSION
CLINICAL_PROGRESSION: GRADUALLY IMPROVING

## 2020-07-14 ASSESSMENT — PAIN DESCRIPTION - PAIN TYPE
TYPE: ACUTE PAIN
TYPE: ACUTE PAIN

## 2020-07-14 ASSESSMENT — PAIN DESCRIPTION - FREQUENCY: FREQUENCY: CONTINUOUS

## 2020-07-14 ASSESSMENT — PAIN DESCRIPTION - ONSET: ONSET: ON-GOING

## 2020-07-14 NOTE — PROGRESS NOTES
[facility] PROGRESS NOTE    7/14/2020 4:46 PM        Name: Charlie Ramirez . Admitted: 7/11/2020  Primary Care Provider: Dmitry Power (Tel: [phone])                        Hospital course:  Patient is a 71 yo female with hx adenocarcinoma of the lung, CAD/stent, COPD, chronic anemia, chronic dCHF, DM, HTN, HLD, LIZETT (untreated), chronic AC (on Eliquis). recently discharged from the hospital about a month ago after being admitted here for acute renal insufficiency, presents to the hospital again with chief complaints of 2-3 day history of what she describes as subacute onset of gradually progressive increasing back pain. Subjective:     Overnight events noted. Patient had a fall and CT scans were done which showed right maxillary sinus mildly displaced oblique fracture. .  Has some back pain.       Reviewed interval ancillary notes    Current Medications  insulin glargine (LANTUS;BASAGLAR) injection pen 5 Units, Nightly  insulin lispro (1 Unit Dial) 2 Units, TID WC  insulin lispro (1 Unit Dial) 0-12 Units, TID WC  insulin lispro (1 Unit Dial) 0-6 Units, Nightly  glucose (GLUTOSE) 40 % oral gel 15 g, PRN  dextrose 50 % IV solution, PRN  glucagon (rDNA) injection 1 mg, PRN  dextrose 5 % solution, PRN  oxyCODONE (ROXICODONE) immediate release tablet 5 mg, Q4H PRN  dilTIAZem (CARDIZEM 12 HR) extended release capsule 60 mg, BID  fluticasone-umeclidin-vilant (TRELEGY ELLIPTA) 100-62.5-25 MCG/INH inhaler 1 puff - PATIENT SUPPLIED, Daily  metoprolol succinate (TOPROL XL) extended release tablet 25 mg, Daily  pantoprazole (PROTONIX) tablet 40 mg, QAM AC  sodium chloride flush 0.9 % injection 10 mL, 2 times per day  sodium chloride flush 0.9 % injection 10 mL, PRN  acetaminophen (TYLENOL) tablet 650 mg, Q6H PRN    Or  acetaminophen (TYLENOL) suppository 650 mg, Q6H PRN  promethazine (PHENERGAN) tablet 12.5 mg, Q6H PRN    Or  ondansetron (ZOFRAN) injection 4 mg, Q6H PRN  cyclobenzaprine (FLEXERIL) tablet 10 mg, TID PRN        Objective:  /77   Pulse 94   Temp 97.8 °F (36.6 °C) (Oral)   Resp 18   Ht 5' 5\" (1.651 m)   Wt 152 lb 9.6 oz (69.2 kg)   SpO2 94%   BMI 25.39 kg/m²     Intake/Output Summary (Last 24 hours) at 7/14/2020 1646  Last data filed at 7/14/2020 1444  Gross per 24 hour   Intake 240 ml   Output 100 ml   Net 140 ml      Wt Readings from Last 3 Encounters:   07/14/20 152 lb 9.6 oz (69.2 kg)   06/14/20 140 lb 8 oz (63.7 kg)   10/08/19 142 lb (64.4 kg)       General appearance:  Appears comfortable  Eyes: Sclera clear. Pupils equal.  ENT: Moist oral mucosa. Trachea midline, no adenopathy. Cardiovascular: Regular rhythm, normal S1, S2. No murmur. No edema in lower extremities  Respiratory: Not using accessory muscles. Good inspiratory effort. Clear to auscultation bilaterally, no wheeze or crackles. GI: Abdomen soft, no tenderness, not distended, normal bowel sounds  Musculoskeletal: No cyanosis in digits, neck supple; back tenderness on palpation. Neurology: CN 2-12 grossly intact. No speech or motor deficits  Psych: Normal affect. Alert and oriented in time, place and person  Skin: Warm, dry, normal turgor    Labs and Tests:  CBC:   Recent Labs     07/13/20  0454 07/14/20  0501   WBC 6.5 7.2   HGB 8.2* 7.5*   HCT 25.0* 23.7*   MCV 86.3 85.2    343     BMP:    Recent Labs     07/13/20  0454 07/14/20  0501    140   K 4.1 3.8    100   CO2 28 26   BUN 32* 36*   CREATININE 1.6* 1.6*   GLUCOSE 183* 194*     Hepatic:   No results for input(s): AST, ALT, ALB, BILITOT, ALKPHOS in the last 72 hours. Problem List  Active Problems:    Back pain  Resolved Problems:    * No resolved hospital problems. *     Assessment & Plan:     1. T3 and T10 compression fracture:  Neurosurgery consulted. Possibly needs brace. Consult PT OT.   Continue pain medications. 2.h/o Non-small cell lung cancer. Status post chemo and radiation (completed 4/2019). Patient indicates she wants no further workup or treatment for lung cancer . Oncology on board. 3.  Chronic iron deficiency anemia: Hemoglobin trending down. Patient had scopes in 2019. Hold Eliquis at this time. Continue to monitor H&H every 12. Type and screen. If needed, will transfuse. 4. COPD on home oxygen:. Hx COPD with chronic respiratory failure. No evidence of acute exacerbation. Entered hospice care secondary to COPD (7/2019) . O2 sats stable c/w trelegy ellipta.     5. H/o Coronary artery disease status post PCI and stenting. 6.Type 2 diabetes. A1c 7.9.  Sugars reasonably controlled. Continue current regimen. 7.Diastolic congestive heart failure. stable  8. Hypertension. C/w metoprolol   9. PAF.currently on sinus rhythm with PVCs , hold Eliquis for now. 10.  CKD stage III: Stable. 11.  Right maxillary sinus mildly displaced fracture: May need outpatient ENT follow-up     Diet: DIET GENERAL;  Code:DNR-CC  DVT PPX - SCD's    ptot consulted    Disp: inpt. Needs SNF placement.  Discussed with daughter      Ailin Mcnulty MD   7/14/2020 4:46 PM

## 2020-07-14 NOTE — PLAN OF CARE
Problem: Falls - Risk of:  Goal: Will remain free from falls  Description: Will remain free from falls  Outcome: Ongoing  Goal: Absence of physical injury  Description: Absence of physical injury  Outcome: Ongoing     Problem: Pain:  Goal: Pain level will decrease  Description: Pain level will decrease  Outcome: Ongoing  Goal: Control of acute pain  Description: Control of acute pain  Outcome: Ongoing  Goal: Control of chronic pain  Description: Control of chronic pain  Outcome: Ongoing     Problem: Skin Integrity:  Goal: Will show no infection signs and symptoms  Description: Will show no infection signs and symptoms  Outcome: Ongoing  Goal: Absence of new skin breakdown  Description: Absence of new skin breakdown  Outcome: Ongoing

## 2020-07-14 NOTE — CONSULTS
Neurosurgery Consult Note    Reason for Consult: T3, T10 compression fractures  Requesting Provider: Dr. Inessa Stevens:  [facility] / [facility] [place]:  Vasyl Nguyen is a 70 y.o. female patient being seen for complaints of increasing back pain that worsened several days prior to admission. Pain worse with movement. Pain located in upper back. Denies any arm or leg pain. Has generalized weakness. h/o Non-small cell lung cancer. Status post chemo and radiation (completed 4/2019).      Past Medical History:   Diagnosis Date    Arthritis     CAD (coronary artery disease)     Carpal tunnel syndrome     COPD (chronic obstructive pulmonary disease) (HCC)     Coronary stent     depression     Diabetes mellitus ([address]     Diastolic heart failure ([address]     Per Dr Jassi Dorsey 8/5/19    GERD (gastroesophageal reflux disease)     Hyperlipidemia     Hypertension     [facility]     Adenocarcinoma of Left Lung    MI (myocardial infarction) ([facility]     LIZETT (obstructive sleep apnea)     does not use CPAP    PONV (postoperative nausea and vomiting)     stress incontinence      Past Surgical History:   Procedure Laterality Date    BACK SURGERY  2000, 2001    lumbar laminectomy    BRONCHOSCOPY  12/04/2018    BRONCHOSCOPY N/A 2/27/2019    BRONCHOSCOPY BIOPSY BRONCHUS performed by Tariq Gomez MD at [address] [place] [address]  2/27/2019    BRONCHOSCOPY/TRANSBRONCHIAL NEEDLE BIOPSY performed by Tariq Gomez MD at [address] [place] [address]  2/27/2019    BRONCHOSCOPY ULTRASOUND performed by Tariq Gomez MD at [address] [place] [address] N/A 8/6/2019    BRONCHOSCOPY ALVEOLAR LAVAGE performed by Tariq Gomez MD at [address] [place] [address] N/A 9/27/2019    BRONCHOSCOPY ALVEOLAR LAVAGE performed by Nadine Newell MD at [address] [place] [address]  9/27/2019    BRONCHOSCOPY BIOPSY BRONCHUS performed by Nadine Newell MD at [facility]    BRONCHOSCOPY  9/27/2019    BRONCHOSCOPY BRUSHINGS performed by Racquel Ochoa MD at [facility], LAPAROSCOPIC N/A 12/19/2018    LAPAROSCOPIC CHOLECYSTECTOMY WITH CHOLANGIOGRAM performed by Sylvester Nunez MD at [address] COLONOSCOPY N/A 2/25/2019    COLONOSCOPY WITH BIOPSY performed by Shashank Morris MD at [address] COLONOSCOPY  2/25/2019    COLONOSCOPY POLYPECTOMY SNARE/COLD BIOPSY performed by Shashank Morris MD at [place]  2000, 2001    [place] [address] INCL FLUOR GDNCE DX W/CELL [facility] N/A 12/4/2018    BRONCHOSCOPY WITH LAVAGE performed by Td Angelo MD at [address] N/A 2/25/2019    EGD BIOPSY performed by Shashank Morris MD at [address] N/A 8/7/2019    EGD DIAGNOSTIC ONLY performed by Mary Silverman MD at [facility]     Statins;  Lyrica [pregabalin]; Cipro xr; Penicillins; and Sulfa antibiotics    Current Facility-Administered Medications:     insulin glargine (LANTUS;BASAGLAR) injection pen 5 Units, 5 Units, Subcutaneous, Nightly, Karen Payne MD, 5 Units at 07/13/20 2155    insulin lispro (1 Unit Dial) 2 Units, 2 Units, Subcutaneous, TID Karen MD, 2 Units at 07/13/20 1757    insulin lispro (1 Unit Dial) 0-12 Units, 0-12 Units, Subcutaneous, TID , Karen Payne MD, 4 Units at 07/13/20 1404    insulin lispro (1 Unit Dial) 0-6 Units, 0-6 Units, Subcutaneous, Nightly, Jeffery Parmar MD, 2 Units at 07/13/20 2155    glucose (GLUTOSE) 40 % oral gel 15 g, 15 g, Oral, PRN, Jeffery Parmar MD    dextrose 50 % IV solution, 12.5 g, Intravenous, PRN, Jeffery Parmar MD    glucagon (rDNA) injection 1 mg, 1 mg, Intramuscular, PRN, Jeffery Parmar MD    dextrose 5 % solution, 100 mL/hr, Intravenous, PRN, Jeffery Parmar MD    oxyCODONE (ROXICODONE) immediate release tablet 5 mg, 5 mg, Oral, Q4H PRN, Nian Moore MD    ketorolac (TORADOL) injection 15 mg, 15 mg, Intravenous, Q6H PRN, Nain Moore MD, 15 mg at 07/14/20 0648    apixaban (ELIQUIS) tablet 2.5 mg, 2.5 mg, Oral, BID, Dionicio Boyd MD, 2.5 mg at 07/13/20 2134    dilTIAZem (CARDIZEM 12 HR) extended release capsule 60 mg, 60 mg, Oral, BID, Dionicio Boyd MD, 60 mg at 07/13/20 2134    fluticasone-umeclidin-vilant (TRELEGY ELLIPTA) 100-62.5-25 MCG/INH inhaler 1 puff - PATIENT SUPPLIED, 1 puff, Inhalation, Daily, Dionicio Boyd MD    metoprolol succinate (TOPROL XL) extended release tablet 25 mg, 25 mg, Oral, Daily, Dionicio Boyd MD, 25 mg at 07/13/20 0929    pantoprazole (PROTONIX) tablet 40 mg, 40 mg, Oral, QAM AC, Dionicio Boyd MD, 40 mg at 07/14/20 3671    sodium chloride flush 0.9 % injection 10 mL, 10 mL, Intravenous, 2 times per day, Valentina Mcqueen MD, 10 mL at 07/13/20 2135    sodium chloride flush 0.9 % injection 10 mL, 10 mL, Intravenous, PRN, Valentina Mcqueen MD    acetaminophen (TYLENOL) tablet 650 mg, 650 mg, Oral, Q6H PRN, 650 mg at 07/13/20 0636 **OR** acetaminophen (TYLENOL) suppository 650 mg, 650 mg, Rectal, Q6H PRN, Dionicio Boyd MD    promethazine (PHENERGAN) tablet 12.5 mg, 12.5 mg, Oral, Q6H PRN, 12.5 mg at 07/14/20 0329 **OR** ondansetron (ZOFRAN) injection 4 mg, 4 mg, Intravenous, Q6H PRN, Dionicio Boyd MD, 4 mg at 07/12/20 0047    cyclobenzaprine (FLEXERIL) tablet 10 mg, 10 mg, Oral, TID PRN, Valentina Mcqueen MD, 10 mg at 07/14/20 0329  Social History     Socioeconomic History    Marital status:      Spouse name: Not on file    Number of children: Not on file    Years of education: Not on file    Highest education level: Not on file   Occupational History    Not on file   Social Needs    Financial resource strain: Not on file    Food insecurity     Worry: Not on file     Inability: Not on file   [employer] needs Medical: Not on file     Non-medical: Not on file   Tobacco Use    Smoking status: Former Smoker     Last attempt to quit: 10/28/2001     Years since quittin.7    Smokeless tobacco: Never Used   Substance and Sexual Activity    Alcohol use: No    Drug use: No    Sexual activity: Yes     Partners: Male   Lifestyle    Physical activity     Days per week: Not on file     Minutes per session: Not on file    Stress: Not on file   Relationships    Social connections     Talks on phone: Not on file     Gets together: Not on file     Attends Voodoo service: Not on file     Active member of club or organization: Not on file     Attends meetings of clubs or organizations: Not on file     Relationship status: Not on file    Intimate partner violence     Fear of current or ex partner: Not on file     Emotionally abused: Not on file     Physically abused: Not on file     Forced sexual activity: Not on file   Other Topics Concern    Not on file   Social History Narrative    Not on file     Family History   Problem Relation Age of Onset    Cancer Sister     Diabetes Sister     Diabetes Brother     Heart Disease Father     Heart Disease Mother     Cancer Maternal Uncle        ROS: Complete 10 point ROS was obtained with positives in HPI, otherwise:  Pt denies fevers, denies chills. Pt denies chest pain, complains of SOB, denies nausea, denies vomiting, denies headache. PHYSICAL EXAMINATION:  /70   Pulse 101   Temp 97.6 °F (36.4 °C) (Oral)   Resp 18   Ht 5' 5\" (1.651 m)   Wt 152 lb 9.6 oz (69.2 kg)   SpO2 95%   BMI 25.39 kg/m²   GENERAL:  alert and cooperative  HEENT:  sclera clear and neck supple  NEUROLOGIC:  Awake, alert, oriented to name, place and time. Cranial nerves II-XII are grossly intact. Motor is 4+ out of 5 bilaterally.   + tenderness upper thoracic region on palpation    LABORATORY DATA:   CBC with Differential:    Lab Results   Component Value Date    WBC 7.2 2020 RBC 2.78 07/14/2020    HGB 7.5 07/14/2020    HCT 23.7 07/14/2020     07/14/2020    MCV 85.2 07/14/2020    MCH 26.9 07/14/2020    MCHC 31.6 07/14/2020    RDW 21.4 07/14/2020    SEGSPCT 50.2 10/29/2012    BANDSPCT 2 10/03/2019    METASPCT 1 05/02/2019    LYMPHOPCT 14.0 07/11/2020    MONOPCT 5.0 07/11/2020    MYELOPCT 1 07/11/2020    BASOPCT 1.0 07/11/2020    MONOSABS 0.6 07/11/2020    LYMPHSABS 1.6 07/11/2020    EOSABS 0.6 07/11/2020    BASOSABS 0.1 07/11/2020    DIFFTYPE Auto 10/29/2012     CMP:    Lab Results   Component Value Date     07/14/2020    K 3.8 07/14/2020    K 5.7 05/31/2020     07/14/2020    CO2 26 07/14/2020    BUN 36 07/14/2020    CREATININE 1.6 07/14/2020    GFRAA 38 07/14/2020    GFRAA >60 10/29/2012    AGRATIO 1.1 05/31/2020    LABGLOM 31 07/14/2020    GLUCOSE 194 07/14/2020    PROT 7.5 07/11/2020    LABALBU 3.3 07/11/2020    CALCIUM 9.3 07/14/2020    BILITOT <0.2 07/11/2020    ALKPHOS 104 07/11/2020    AST 46 07/11/2020    ALT 18 07/11/2020        IMAGING STUDIES:   CT Chest, abd., pelvis: 7/11/20:   FINDINGS:    CARDIOVASCULAR:  Mildly enlarged heart size with no significant pericardial    effusion. Marilee Carota is extensive triple-vessel distribution coronary artery    calcifications.  Mild-moderate aortic valve annular calcifications.     Extensive thoracic aortic atherosclerotic calcifications with no acute    hyperdense wall thickening or aneurysmal dilatation.  Diffuse pulmonary    artery enlargement.  Extensive abdominal aortic and iliac atherosclerotic    calcifications with no aneurysm formation or periaortic hemorrhage.         MEDIASTINUM:  The esophagus appears unremarkable.  No significant enlarged    mediastinal or hilar lymph nodes.         LUNGS/AIRWAYS:  The trachea is patent.  Emphysematous changes.  In the right    lung there is new multilobar multifocal subsegmental peribronchial    consolidation with internal bronchiectasis.  This is most intense in the posteromedial right lower lobe.  There is no lobar consolidation.  No    effusion or pneumothorax.  In the left perihilar and paramediastinal region    there is consolidation and varicose bronchiectasis suggesting fibrotic    changes and chronic process.  The left lower lobe demonstrates volume loss    and consolidation with bronchiectasis which overall appears improved compared    to the prior exam. Andreea Knows is a trace posterior basilar effusion with mild    pleural thickening.  Improved aeration of the left lower lobe base compared    to the prior exam with patchy subpleural opacities.  In the anterior left    lung base there are numerous clustered solid pulmonary nodular densities with    a representative component measuring 0.3 cm.         ABDOMEN:  Prior cholecystectomy.  No intrahepatic mass or biliary dilatation. No evidence of portal venous gas.  The common bile duct, pancreas, spleen,    and adrenals are normal.  The kidneys demonstrate no hydronephrosis or    nephrolithiasis.  The bilateral ureters are normal.  There are numerous    bilateral renal cysts.  Left kidney superior pole rim-calcified hypodense    mass which measures approximately 1.4 cm stable compared to the prior exam    with non-contrasted average Hounsfield units of 33.         The stomach, small bowel, and appendix are normal.  The colon demonstrates    moderate diffuse diverticulosis. Andreea Knows is no significant wall thickening. In the right lower quadrant there is new pericolonic ground-glass fat    induration.  There are numerous new enlarged right lower quadrant mesenteric    lymph nodes with a representative lymph node measuring 0.6 x 1.0 cm on axial    image 167.  There is no free peritoneal air or abscess.         PELVIS:  The bladder, uterus, and rectum are normal.  No ascites or abscess.     No significant enlarged iliac chain lymph nodes.         MUSCULOSKELETAL STRUCTURES:  No significant enlarged axillary or inguinal    lymph nodes.         Normal thoracic and lumbar spine curvature and alignment.  The T1 vertebral    body demonstrates stable mild anterior wedge configuration.  The T3 vertebral    body demonstrates new mild anterior wedge configuration and superior endplate    concavity with approximately 15% loss of height.  No significant posterior    cortical displacement.  Redemonstration of T8 vertebral body hemangioma.  The    T10 vertebral body demonstrates subtle superior endplate concavity new since    the prior exam.  The lumbar bodies demonstrate normal height.  Multilevel    degenerative disc and joint disease.              Impression    1. Extensive coronary and aortic atherosclerosis with no aortic aneurysm or    periaortic hemorrhage.  Stable mild cardiomegaly and stable pulmonary artery    hypertension. 2. COPD. 3. Compared with the prior exam there is overall improved pulmonary    consolidation with perihilar and lower lobe findings which may represent    chronic fibrotic changes and trace left pleural effusion.  New multilobar    multifocal right lung consolidative changes which may represent acute    pneumonia or possible chronic pulmonary process such as vasculitis. 4. Indeterminate anterior left lung base clustered 0.3 cm solid nodules,    attention on follow-up exams. 5. In the right lower quadrant there is new mesenteric fat induration and    mild lymphadenopathy.  The appendix appears normal.  There is no colonic wall    thickening.  This could relate to possible mild colitis, ascending colonic    diverticulitis, or mesenteric adenitis. A lymphoproliferative disorder such    as lymphoma cannot be excluded. 6. No obstruction, perforation, or abscess.     7. The T3 and T10 vertebral bodies demonstrate subtle new height loss or    superior endplate concavity which may represent insufficiency versus    compression fractures.  If clinically indicated this could be further    evaluated with MRI of the thoracic spine. IMPRESSION/PLAN:  Active Problems:    Back pain    CT of the chest shows mild T3 greater than T10 compression fractures of unknown duration not seen on CT chest 10/2019. Patient with multiple comorbidities. I discussed with the patient treatment options which could include modification of activity to tolerate symptoms or TLSO brace as needed for comfort. Patient does not feel she will tolerate the brace and wishes to continue activity as tolerated. No surgical intervention recommended. Consider lidoderm patch. The patient's symptoms, exam, testing and plan of care have been discussed with Dr. Herber Schuster. Thank you again for this consultation. Will sign off.     Oni Philippe  7/14/2020

## 2020-07-14 NOTE — PROGRESS NOTES
Spoke with Shala Garcia. She is very angry regarding fall and no RN called. Shala Garcia is requesting for pt to a different hospital and/or unit. Charge nurse speaking with daughter.

## 2020-07-14 NOTE — PROGRESS NOTES
Physical/Occupational Therapy  Lilly Manuel    Hold therapy evaluations at this time, pt with fall last night and neurosurgery consult pending for new compression fractures. Will follow up with patient tomorrow.   Thanks,  Jimmy Salgado PT, DPT [id number]

## 2020-07-14 NOTE — CONSULTS
fluticasone-umeclidin-vilant  1 puff Inhalation Daily    metoprolol succinate  25 mg Oral Daily    pantoprazole  40 mg Oral QAM AC    sodium chloride flush  10 mL Intravenous 2 times per day       Allergies: Allergies   Allergen Reactions    Statins Other (See Comments)     Other reaction(s): Myalgias (Muscle Pain)      Lyrica [Pregabalin] Other (See Comments)     Shaking, swelling, rash    Cipro Xr Rash     Swelling, rash    Penicillins Rash     Swelling, rash    Sulfa Antibiotics Rash     Swelling, rash        Social History:    reports that she quit smoking about 18 years ago. She has never used smokeless tobacco. She reports that she does not drink alcohol or use drugs. Family History:     family history includes Cancer in her maternal uncle and sister; Diabetes in her brother and sister; Heart Disease in her father and mother. ROS:      Per HPI; otherwise 10 point ROS is negative    PHYSICAL EXAM:    Vitals:  Vitals:    07/14/20 0845   BP: (!) 142/66   Pulse: 82   Resp: 18   Temp: 97.6 °F (36.4 °C)   SpO2: 94%        Intake/Output Summary (Last 24 hours) at 7/14/2020 1231  Last data filed at 7/13/2020 1722  Gross per 24 hour   Intake --   Output 100 ml   Net -100 ml      Wt Readings from Last 3 Encounters:   07/14/20 152 lb 9.6 oz (69.2 kg)   06/14/20 140 lb 8 oz (63.7 kg)   10/08/19 142 lb (64.4 kg)        General appearance: Appears comfortable. Eyes: Sclera clear, pupils equal  ENT: Moist mucus membranes, no thrush  Neck: Trachea midline, symmetrical  Cardiovascular: Regular rhythm, normal S1, S2. No murmur, gallop, rub. No edema in  lower extremities  Respiratory: Clear to auscultation bilaterally. No wheeze. Good inspiratory effort  Gastrointestinal: Abdomen soft, not tender, not distended, normal bowel sounds  Musculoskeletal: No cyanosis in digits, warm extremities  Neurologic: Cranial nerves grossly intact, no motor or speech deficits. Psychiatric: Normal affect.  Alert and oriented to time, place and person. Skin: Warm, dry, normal turgor, no rash    DATA:  CBC:   Lab Results   Component Value Date    WBC 7.2 07/14/2020    RBC 2.78 07/14/2020    HGB 7.5 07/14/2020    HCT 23.7 07/14/2020    MCV 85.2 07/14/2020    MCH 26.9 07/14/2020    MCHC 31.6 07/14/2020    RDW 21.4 07/14/2020     07/14/2020    MPV 7.1 07/14/2020     BMP:  Lab Results   Component Value Date     07/14/2020    K 3.8 07/14/2020    K 5.7 05/31/2020     07/14/2020    CO2 26 07/14/2020    BUN 36 07/14/2020    CREATININE 1.6 07/14/2020    CALCIUM 9.3 07/14/2020    GFRAA 38 07/14/2020    GFRAA >60 10/29/2012    LABGLOM 31 07/14/2020    GLUCOSE 194 07/14/2020     Magnesium:   Lab Results   Component Value Date    MG 1.90 07/14/2020    MG 1.90 07/13/2020    MG 1.50 06/03/2020     LIVER PROFILE:   Recent Labs     07/11/20  1509 07/11/20  1527   AST  --  46*   ALT  --  18   LIPASE 46.0  --    BILIDIR  --  <0.2   BILITOT  --  <0.2   ALKPHOS  --  104     PT/INR:    Lab Results   Component Value Date    PROTIME 13.0 07/11/2020    PROTIME 20.1 05/31/2020    PROTIME 18.0 09/27/2019    INR 1.12 07/11/2020    INR 1.72 05/31/2020    INR 1.58 09/27/2019     IMAGING:    Narrative    EXAMINATION:    ONE XRAY VIEW OF THE CHEST         7/11/2020 3:40 pm         COMPARISON:    05/31/2020         HISTORY:    ORDERING SYSTEM PROVIDED HISTORY: sob    TECHNOLOGIST PROVIDED HISTORY:    Reason for exam:->sob    Reason for Exam: Low O2, back pain.  Hx lung ca    Acuity: Acute    Type of Exam: Initial         Initial encounter         FINDINGS:    Linear opacities in the upper lobes bilaterally are stable.  Stable cardiac    silhouette.  No pneumothorax or definite pleural effusion. Bryant Sos is a new    focal opacity in the right lower lung.  No overt pulmonary edema.  The    osseous structures are stable.              Impression    New focal opacity in the right lower lung.  Linear opacities in the right    upper lung and left perihilar region otherwise stable.  Given history of lung    cancer would recommend further evaluation with CT.           Narrative    EXAMINATION:    CT OF THE CHEST, ABDOMEN, AND PELVIS WITHOUT CONTRAST 7/11/2020 4:15 pm         TECHNIQUE:    CT of the chest, abdomen and pelvis was performed without the administration    of intravenous contrast. Multiplanar reformatted images are provided for    review. Dose modulation, iterative reconstruction, and/or weight based    adjustment of the mA/kV was utilized to reduce the radiation dose to as low    as reasonably achievable.         COMPARISON:    CT chest without contrast 10/01/2019.  CT abdomen/pelvis 08/04/2019.         HISTORY:    ORDERING SYSTEM PROVIDED HISTORY: back pain, hypoxia, abd tenderness    TECHNOLOGIST PROVIDED HISTORY:    Reason for exam:->back pain, hypoxia, abd tenderness    Additional Contrast?->None    Reason for Exam: Back Pain (pt brought in by [facility] from home. Pt c/o back    pain x2 days. Denies injury. Hx of COPD, wears 2L NC. Pt moved up to 4L NC by    squad due to sat 91%. 20g PIV in L AC by squad. Pt sts the pain is so bad    shes not been able to get up to take her duonebs at home. Sts no SOB at this    time. )    Acuity: Acute    Type of Exam: Initial         FINDINGS:    CARDIOVASCULAR:  Mildly enlarged heart size with no significant pericardial    effusion. Lauren Mickey is extensive triple-vessel distribution coronary artery    calcifications.  Mild-moderate aortic valve annular calcifications. Extensive thoracic aortic atherosclerotic calcifications with no acute    hyperdense wall thickening or aneurysmal dilatation.  Diffuse pulmonary    artery enlargement.  Extensive abdominal aortic and iliac atherosclerotic    calcifications with no aneurysm formation or periaortic hemorrhage.         MEDIASTINUM:  The esophagus appears unremarkable.  No significant enlarged    mediastinal or hilar lymph nodes.         LUNGS/AIRWAYS:  The trachea is patent.  Emphysematous changes.  In the right    lung there is new multilobar multifocal subsegmental peribronchial    consolidation with internal bronchiectasis.  This is most intense in the    posteromedial right lower lobe.  There is no lobar consolidation.  No    effusion or pneumothorax.  In the left perihilar and paramediastinal region    there is consolidation and varicose bronchiectasis suggesting fibrotic    changes and chronic process.  The left lower lobe demonstrates volume loss    and consolidation with bronchiectasis which overall appears improved compared    to the prior exam. Levada Valderrama is a trace posterior basilar effusion with mild    pleural thickening.  Improved aeration of the left lower lobe base compared    to the prior exam with patchy subpleural opacities.  In the anterior left    lung base there are numerous clustered solid pulmonary nodular densities with    a representative component measuring 0.3 cm.         ABDOMEN:  Prior cholecystectomy.  No intrahepatic mass or biliary dilatation. No evidence of portal venous gas.  The common bile duct, pancreas, spleen,    and adrenals are normal.  The kidneys demonstrate no hydronephrosis or    nephrolithiasis.  The bilateral ureters are normal.  There are numerous    bilateral renal cysts.  Left kidney superior pole rim-calcified hypodense    mass which measures approximately 1.4 cm stable compared to the prior exam    with non-contrasted average Hounsfield units of 33.         The stomach, small bowel, and appendix are normal.  The colon demonstrates    moderate diffuse diverticulosis. Levada Valderrama is no significant wall thickening.     In the right lower quadrant there is new pericolonic ground-glass fat    induration.  There are numerous new enlarged right lower quadrant mesenteric    lymph nodes with a representative lymph node measuring 0.6 x 1.0 cm on axial    image 167.  There is no free peritoneal air or abscess.         PELVIS:  The bladder, uterus, and rectum are normal.  No ascites or abscess. No significant enlarged iliac chain lymph nodes.         MUSCULOSKELETAL STRUCTURES:  No significant enlarged axillary or inguinal    lymph nodes.         Normal thoracic and lumbar spine curvature and alignment.  The T1 vertebral    body demonstrates stable mild anterior wedge configuration.  The T3 vertebral    body demonstrates new mild anterior wedge configuration and superior endplate    concavity with approximately 15% loss of height.  No significant posterior    cortical displacement.  Redemonstration of T8 vertebral body hemangioma.  The    T10 vertebral body demonstrates subtle superior endplate concavity new since    the prior exam.  The lumbar bodies demonstrate normal height.  Multilevel    degenerative disc and joint disease.              Impression    1. Extensive coronary and aortic atherosclerosis with no aortic aneurysm or    periaortic hemorrhage.  Stable mild cardiomegaly and stable pulmonary artery    hypertension. 2. COPD. 3. Compared with the prior exam there is overall improved pulmonary    consolidation with perihilar and lower lobe findings which may represent    chronic fibrotic changes and trace left pleural effusion.  New multilobar    multifocal right lung consolidative changes which may represent acute    pneumonia or possible chronic pulmonary process such as vasculitis. 4. Indeterminate anterior left lung base clustered 0.3 cm solid nodules,    attention on follow-up exams. 5. In the right lower quadrant there is new mesenteric fat induration and    mild lymphadenopathy.  The appendix appears normal.  There is no colonic wall    thickening.  This could relate to possible mild colitis, ascending colonic    diverticulitis, or mesenteric adenitis. A lymphoproliferative disorder such    as lymphoma cannot be excluded. 6. No obstruction, perforation, or abscess.     7. The T3 and T10 vertebral bodies demonstrate subtle new height loss or    superior endplate concavity which may represent insufficiency versus    compression fractures.  If clinically indicated this could be further    evaluated with MRI of the thoracic spine.             IMPRESSION/RECOMMENDATIONS:    Active Problems:    Back pain  Resolved Problems:    * No resolved hospital problems. *     71 year old female with a history of CHF, COPD, HTN, CKD and A. Fib. She is admitted with back pain. She has:      1. NSCLC  - s/p chemo and radiation, completed April 2019.  - No clear evidence of recurrent disease. On CT c/a/p 7/11/2020.  - entered hospice care July 2019 d/t COPD. - she does not want work up or treatment for her lung cancer. 2. Anemia  - hgb 7.5, transfuse if hgb less than 7.   - No B12 or folate deficiency. Recent iron studies consistent with AOCD. 3. T3 and T10 compression fracture  - Neurosurgery following      This plan was discussed with the patient and he/she verbalized understanding. Thank you for allowing us to participate in the care of this patient.      Noemi Mcbride, [facility]  [facility], [address] [place] [address].  [phone]

## 2020-07-14 NOTE — PROGRESS NOTES
Message sent to MD regarding fall and drop in hemoglobin.    4417: read by MD  6108: Hold Eliquis for now  4295: order to hold Eliquis  1156: told MD was given at 0830

## 2020-07-14 NOTE — PROGRESS NOTES
Received phone call from pts daughter Damian Gilman. Damian Gilman is upset about many things ranging from pt being on COVID floor to not being notified when the pt fell last night to not receiving enough communication from staff. I apologized to Damian Gilman about not being notified of the fall. Also let Damian Gilman know that unfortunately pts are not currently being moved to different floors d/t the hospital being full. Let Damian Mukul know that the majority of the pts on this floor are COVID negative and there are COVID pts on other floors as well. Also let Damian Gilman know she could visit at anytime, to speak to staff in person, she does not want to due to Ashlyn. Let Damian Gilman know I would ask Dr. Chance Flowers know to call her which I did when he was rounding on the unit. Damian Gilman also requested her phone number be written on pts board, I did write it up there. Let Damian Gilman know I would communicate concerns to pts primary nurse, Miguel Morgan and continue to follow up on the situation.

## 2020-07-14 NOTE — PROGRESS NOTES
Notified tonight by patient's nurse regarding patient fall, hitting right side of face. Patient is anticoagulated on eliquis. Patient current resting in bed, neuro exam WNL, vital signs stable. CT head/facial bones ordered which did reveal acute right maxillary sinus mildly displaced oblique fracture. Made attending aware and requested guidance on management plan. Attending to review CT findings and place orders as needed. Advised RN that attending is aware and will review findings.     Garrick West PA-C

## 2020-07-15 PROBLEM — M84.48XA PATHOLOGIC THORACIC FRACTURE, INITIAL ENCOUNTER: Status: ACTIVE | Noted: 2020-07-15

## 2020-07-15 LAB
CULTURE, BLOOD 2: NORMAL
GLUCOSE BLD-MCNC: 145 MG/DL (ref 70–99)
GLUCOSE BLD-MCNC: 145 MG/DL (ref 70–99)
GLUCOSE BLD-MCNC: 167 MG/DL (ref 70–99)
GLUCOSE BLD-MCNC: 174 MG/DL (ref 70–99)
HCT VFR BLD CALC: 22.9 % (ref 36–48)
HCT VFR BLD CALC: 25.3 % (ref 36–48)
HEMOGLOBIN: 7.3 G/DL (ref 12–16)
HEMOGLOBIN: 8 G/DL (ref 12–16)
PERFORMED ON: ABNORMAL

## 2020-07-15 PROCEDURE — 85014 HEMATOCRIT: CPT

## 2020-07-15 PROCEDURE — 6370000000 HC RX 637 (ALT 250 FOR IP): Performed by: INTERNAL MEDICINE

## 2020-07-15 PROCEDURE — 97530 THERAPEUTIC ACTIVITIES: CPT

## 2020-07-15 PROCEDURE — G0378 HOSPITAL OBSERVATION PER HR: HCPCS

## 2020-07-15 PROCEDURE — 97162 PT EVAL MOD COMPLEX 30 MIN: CPT

## 2020-07-15 PROCEDURE — 2580000003 HC RX 258: Performed by: INTERNAL MEDICINE

## 2020-07-15 PROCEDURE — 1200000000 HC SEMI PRIVATE

## 2020-07-15 PROCEDURE — 85018 HEMOGLOBIN: CPT

## 2020-07-15 PROCEDURE — 97166 OT EVAL MOD COMPLEX 45 MIN: CPT

## 2020-07-15 PROCEDURE — 6370000000 HC RX 637 (ALT 250 FOR IP): Performed by: HOSPITALIST

## 2020-07-15 PROCEDURE — 36415 COLL VENOUS BLD VENIPUNCTURE: CPT

## 2020-07-15 RX ADMIN — INSULIN LISPRO 2 UNITS: 100 INJECTION, SOLUTION INTRAVENOUS; SUBCUTANEOUS at 13:13

## 2020-07-15 RX ADMIN — DILTIAZEM HYDROCHLORIDE 60 MG: 60 CAPSULE, EXTENDED RELEASE ORAL at 08:31

## 2020-07-15 RX ADMIN — Medication 10 ML: at 08:32

## 2020-07-15 RX ADMIN — OXYCODONE 5 MG: 5 TABLET ORAL at 17:32

## 2020-07-15 RX ADMIN — OXYCODONE 5 MG: 5 TABLET ORAL at 13:19

## 2020-07-15 RX ADMIN — INSULIN LISPRO 2 UNITS: 100 INJECTION, SOLUTION INTRAVENOUS; SUBCUTANEOUS at 17:33

## 2020-07-15 RX ADMIN — Medication 10 ML: at 21:09

## 2020-07-15 RX ADMIN — METOPROLOL SUCCINATE 25 MG: 25 TABLET, FILM COATED, EXTENDED RELEASE ORAL at 08:31

## 2020-07-15 RX ADMIN — OXYCODONE 5 MG: 5 TABLET ORAL at 09:13

## 2020-07-15 RX ADMIN — INSULIN GLARGINE 5 UNITS: 100 INJECTION, SOLUTION SUBCUTANEOUS at 21:10

## 2020-07-15 RX ADMIN — INSULIN LISPRO 1 UNITS: 100 INJECTION, SOLUTION INTRAVENOUS; SUBCUTANEOUS at 21:09

## 2020-07-15 RX ADMIN — PANTOPRAZOLE SODIUM 40 MG: 40 TABLET, DELAYED RELEASE ORAL at 05:10

## 2020-07-15 RX ADMIN — DILTIAZEM HYDROCHLORIDE 60 MG: 60 CAPSULE, EXTENDED RELEASE ORAL at 21:09

## 2020-07-15 ASSESSMENT — PAIN - FUNCTIONAL ASSESSMENT: PAIN_FUNCTIONAL_ASSESSMENT: PREVENTS OR INTERFERES SOME ACTIVE ACTIVITIES AND ADLS

## 2020-07-15 ASSESSMENT — PAIN SCALES - GENERAL
PAINLEVEL_OUTOF10: 4
PAINLEVEL_OUTOF10: 6
PAINLEVEL_OUTOF10: 7
PAINLEVEL_OUTOF10: 0
PAINLEVEL_OUTOF10: 7
PAINLEVEL_OUTOF10: 6
PAINLEVEL_OUTOF10: 0
PAINLEVEL_OUTOF10: 5

## 2020-07-15 ASSESSMENT — PAIN DESCRIPTION - ONSET: ONSET: ON-GOING

## 2020-07-15 ASSESSMENT — PAIN DESCRIPTION - ORIENTATION
ORIENTATION: RIGHT;LEFT
ORIENTATION: LOWER

## 2020-07-15 ASSESSMENT — PAIN DESCRIPTION - LOCATION
LOCATION: BACK
LOCATION: BACK

## 2020-07-15 ASSESSMENT — PAIN DESCRIPTION - PAIN TYPE
TYPE: ACUTE PAIN
TYPE: CHRONIC PAIN

## 2020-07-15 ASSESSMENT — PAIN DESCRIPTION - DESCRIPTORS
DESCRIPTORS: ACHING
DESCRIPTORS: ACHING

## 2020-07-15 ASSESSMENT — PAIN DESCRIPTION - FREQUENCY: FREQUENCY: CONTINUOUS

## 2020-07-15 ASSESSMENT — PAIN DESCRIPTION - PROGRESSION: CLINICAL_PROGRESSION: NOT CHANGED

## 2020-07-15 NOTE — PROGRESS NOTES
Occupational Therapy   Occupational Therapy Initial Assessment  Date: 7/15/2020   Patient Name: Ryder Gilliam  MRN: [id number]     : 1940    Date of Service: 7/15/2020    Discharge Recommendations:Kenya Multani scored a 15/24 on the AM-PAC ADL Inpatient form. Current research shows that an AM-PAC score of 17 or less is typically not associated with a discharge to the patient's home setting. Based on the patient's AM-PAC score and their current ADL deficits, it is recommended that the patient have 3-5 sessions per week of Occupational Therapy at d/c to increase the patient's independence. Please see assessment section for further patient specific details. If patient discharges prior to next session this note will serve as a discharge summary. Please see below for the latest assessment towards goals. OT Equipment Recommendations  Equipment Needed: No    Assessment   Performance deficits / Impairments: Decreased functional mobility ; Decreased ADL status; Decreased safe awareness;Decreased cognition;Decreased endurance;Decreased balance  Assessment: Patient presents with the above deficits, which are below baseline, and would benefit from continued skilled OT to address  Treatment Diagnosis: Decreased ADLs, IADLs, transfers,mobility associated with Back Pain  Prognosis: Good  Decision Making: Medium Complexity  History: COPD, Lung CA, MI, recent lumbar fx  Exam: as above  OT Education: OT Role;Plan of Care  Patient Education: Eval, discharge recommendations-patient is not independent in learning, will require repetition and family education  REQUIRES OT FOLLOW UP: Yes  Activity Tolerance  Activity Tolerance: Patient limited by fatigue;Patient limited by pain;Treatment limited secondary to decreased cognition  Safety Devices  Safety Devices in place: Yes  Type of devices: All fall risk precautions in place;Call light within reach; Chair alarm in place; Left in chair;Patient at risk for falls;Nurse notified           Patient Diagnosis(es): The primary encounter diagnosis was Acute hypoxemic respiratory failure ([facility] [address] Diagnoses of Pneumonia due to organism, Closed wedge fracture of lumbar vertebra, unspecified lumbar vertebral level, initial encounter ([facility] [address] [facility] Chronic kidney disease, unspecified CKD stage, and Generalized weakness were also pertinent to this visit. has a past medical history of Arthritis, CAD (coronary artery disease), Carpal tunnel syndrome, COPD (chronic obstructive pulmonary disease) ([facility] [address] Coronary stent, depression, Diabetes mellitus ([facility] [address] [facility] Diastolic heart failure ([facility] [address] [facility] GERD (gastroesophageal reflux disease), Hyperlipidemia, Hypertension, Lung cancer ([facility] [address] MI (myocardial infarction) ([facility] [address] LIZETT (obstructive sleep apnea), PONV (postoperative nausea and vomiting), and stress incontinence. has a past surgical history that includes Coronary angioplasty with stent (2000, 2001); back surgery (2000, 2001); bronchoscopy (12/04/2018); pr [facility] incl fluor gdnce dx w/cell washg spx (N/A, 12/4/2018); Cholecystectomy, laparoscopic (N/A, 12/19/2018); Upper gastrointestinal endoscopy (N/A, 2/25/2019); Colonoscopy (N/A, 2/25/2019); Colonoscopy (2/25/2019); bronchoscopy (N/A, 2/27/2019); bronchoscopy (2/27/2019); bronchoscopy (2/27/2019); bronchoscopy (N/A, 8/6/2019); Upper gastrointestinal endoscopy (N/A, 8/7/2019); bronchoscopy (N/A, 9/27/2019); bronchoscopy (9/27/2019); and bronchoscopy (9/27/2019). Treatment Diagnosis: Decreased ADLs, IADLs, transfers,mobility associated with Back Pain      Restrictions  Restrictions/Precautions  Restrictions/Precautions: Fall Risk(High fall risk)  Position Activity Restriction  Other position/activity restrictions: 70 y.o. female who presents because of back pain. She has a history of COPD, coronary artery disease, diabetes, heart failure, hypertension, lung cancer, pneumonia. She developed the pain yesterday. She denies any known injury.   It hurts worse if she moves certain ways. She has not had a cough or fever. She denies any chest or abdominal pain. She is O2 dependent at home with 1 L of oxygen. She required more oxygen when evaluated by EMS and she came to the hospital by ambulance. She lives alone. She denies any new numbness, tingling. She has chronic neuropathy. She denies any loss of bowel or bladder control. She denies any dysuria. She was hospitalized last month for acute kidney injury.   She is anticoagulated with Eliquis    Subjective   General  Chart Reviewed: Yes  Family / Caregiver Present: No  Diagnosis: Back pain  Subjective  Subjective: Patient supine in bed, agreeable to evaluation  Patient Currently in Pain: Yes  Pain Assessment  Pain Assessment: 0-10  Pain Level: 6  Pain Type: Chronic pain  Pain Location: Back  Pain Orientation: Lower  Pain Descriptors: Aching  Response to Pain Intervention: Patient Satisfied  Pre Treatment Pain Screening  Intervention List: Patient able to continue with treatment  Vital Signs  Temp: 97.3 °F (36.3 °C)  Temp Source: Oral  Pulse: 97  Heart Rate Source: Monitor  Resp: 18  BP: 125/74  BP Location: Left upper arm  BP Upper/Lower: Upper  Patient Position: Sitting  Level of Consciousness: Alert  Patient Currently in Pain: Yes  Oxygen Therapy  SpO2: 94 %  O2 Device: Nasal cannula  O2 Flow Rate (L/min): 1 L/min  Social/Functional History  Social/Functional History  Lives With: Alone(daughters check in a couple time per week)  Type of Home: House  Home Layout: Two level, Able to Live on Main level with bedroom/bathroom  Home Access: Stairs to enter with rails  Entrance Stairs - Number of Steps: 3 KATI  Entrance Stairs - Rails: Both  Bathroom Shower/Tub: Walk-in shower  Bathroom Toilet: Handicap height  Bathroom Equipment: Grab bars in shower, Shower chair, Hand-held shower  Home Equipment: 4 wheeled walker, Wheelchair-manual(BSC)  Receives Help From: Family(pt had recenty completed home hospice care; daughters assist as needed)  ADL Assistance: Needs assistance(prior to May/Danae, pt was independent with ADLs and pt currently stating \"I can't do any of it\" now)  Homemaking Assistance: (daughters do homemaking)  Homemaking Responsibilities: No  Ambulation Assistance: Independent(with 4ww prior to May/Danae)  Transfer Assistance: Independent  Active : No  Occupation: Retired  Additional Comments: Per chart review and last therapy eval 6/3: daughter reports significant decline in 2-3 weeks, reports 6+ falls in last 6 months, daughter reports 4 falls last week of May. Daughter reports she has been spending most of her time in bed. Daughter reports patient wants family to do things for her even with hospice care is present. Objective   Vision: Impaired  Vision Exceptions: Wears glasses for reading  Hearing: Within functional limits    Orientation  Overall Orientation Status: Impaired  Orientation Level: Oriented to person;Oriented to place;Oriented to situation     Balance  Standing Balance: Dependent/Total(maxA of 2 sit > stand, Mary of 2 stand step with RW bed>chair)  Wheelchair Bed Transfers  Wheelchair/Bed - Technique: Stand step  Equipment Used: Bed;Other  Level of Asssistance: 2 Person assistance(Mary of 2)  ADL  Feeding: None(Patient refused breakfast)  Grooming: Setup  LE Dressing: Maximum assistance  Tone RUE  RUE Tone: Normotonic  Tone LUE  LUE Tone: Normotonic  Coordination  Movements Are Fluid And Coordinated: Yes     Bed mobility  Rolling to Left: Maximum assistance  Supine to Sit: Maximum assistance  Scooting: Stand by assistance  Transfers  Sit to stand: 2 Person assistance(maxA of 2)  Stand to sit: 2 Person assistance(maxA of 2)  Vision - Basic Assessment  Patient Visual Report: No visual complaint reported. Cognition  Overall Cognitive Status: Exceptions  Arousal/Alertness: Delayed responses to stimuli  Following Commands: Follows one step commands with increased time; Follows one step commands with repetition  Attention Span: Attends with cues to redirect  Memory: Decreased recall of precautions;Decreased recall of recent events  Safety Judgement: Decreased awareness of need for assistance;Decreased awareness of need for safety  Problem Solving: Decreased awareness of errors  Insights: Decreased awareness of deficits  Initiation: Requires cues for some  Sequencing: Requires cues for some        Sensation  Overall Sensation Status: WFL        LUE AROM (degrees)  LUE AROM : WFL  RUE AROM (degrees)  RUE AROM : WFL  LUE Strength  L Hand General: 4/5  RUE Strength  R Hand General: 4/5                   Plan   Plan  Times per week: 3-5  Times per day: Daily  Current Treatment Recommendations: Strengthening, Balance Training, Functional Mobility Training, Endurance Training, Patient/Caregiver Education & Training, Cognitive Reorientation, Self-Care / ADL, Equipment Evaluation, Education, & procurement, Safety Education & Training, Home Management Training    AM-PAC Score        AM-[facility] Inpatient Daily Activity Raw Score: 15 (07/15/20 1405)  AM-PAC Inpatient ADL T-Scale Score : 34.69 (07/15/20 1405)  ADL Inpatient CMS 0-100% Score: 56.46 (07/15/20 1405)  ADL Inpatient CMS G-Code Modifier : CK (07/15/20 1405)    Goals  Short term goals  Time Frame for Short term goals: Discharge  Short term goal 1: Bed mobility Mary  Short term goal 2: Functional ADL transfers Mary  Short term goal 3: UB ADLs setup, LB ADLs Mary  Short term goal 4: Functional mobility with appropriate AD and Mary  Long term goals  Time Frame for Long term goals : LTG=STG  Patient Goals   Patient goals : Home       Therapy Time   Individual Concurrent Group Co-treatment   Time In 3332         Time Out 0902         Minutes 25              Timed Code Treatment Minutes:   10    Total Treatment Minutes:  [address] [employer] [place], OTR/L [id number]

## 2020-07-15 NOTE — PROGRESS NOTES
Physical Therapy    Facility/Department: [address] ONCOLOGY  Initial Assessment    NAME: Meggan Romero  : 1940  MRN: [id number]    Date of Service: 7/15/2020    Discharge Recommendations: Meggan Romero scored a 8/24 on the AM-PAC short mobility form. Current research shows that an AM-PAC score of 17 or less is typically not associated with a discharge to the patient's home setting. Based on the patient's AM-PAC score and their current functional mobility deficits, it is recommended that the patient have 3-5 sessions per week of Physical Therapy at d/c to increase the patient's independence. Please see assessment section for further patient specific details. If patient discharges prior to next session this note will serve as a discharge summary. Please see below for the latest assessment towards goals. PT Equipment Recommendations  Equipment Needed: No    Assessment   Body structures, Functions, Activity limitations: Decreased functional mobility ; Decreased strength;Decreased endurance;Decreased balance; Increased pain;Decreased cognition  Assessment: Pt presents with impaired functional strength and endurance and decreased standing balance and increased pain levels impairing her ability to perform functional mobility safely and independently. Pt would benefit from acute PT services to address deficits and facilitate return to PLOF.   Treatment Diagnosis: impaired gait, transfers, and balance  Prognosis: Fair;Good  Decision Making: Medium Complexity  Clinical Presentation: evolving  PT Education: Goals;PT Role;Plan of Care;Precautions;Transfer Training;Orientation;General Safety;Gait Training;Functional Mobility Training  Patient Education: d/c recommendations, spinal precautions--pt verbalizing understanding, will require reinforcement  Barriers to Learning: physical  REQUIRES PT FOLLOW UP: Yes  Activity Tolerance  Activity Tolerance: Patient limited by pain       Patient Diagnosis(es): The primary encounter diagnosis was Acute hypoxemic respiratory failure ([facility] [address] Diagnoses of Pneumonia due to organism, Closed wedge fracture of lumbar vertebra, unspecified lumbar vertebral level, initial encounter ([facility] [address] Chronic kidney disease, unspecified CKD stage, and Generalized weakness were also pertinent to this visit. has a past medical history of Arthritis, CAD (coronary artery disease), Carpal tunnel syndrome, COPD (chronic obstructive pulmonary disease) ([address] Coronary stent, depression, Diabetes mellitus ([address] Diastolic heart failure ([address] GERD (gastroesophageal reflux disease), Hyperlipidemia, Hypertension, Lung cancer ([address] MI (myocardial infarction) ([address] LIZETT (obstructive sleep apnea), PONV (postoperative nausea and vomiting), and stress incontinence. has a past surgical history that includes Coronary angioplasty with stent (2000, 2001); back surgery (2000, 2001); bronchoscopy (12/04/2018); [facility] gd[facility] dx w/cell washg spx (N/A, 12/4/2018); Cholecystectomy, laparoscopic (N/A, 12/19/2018); Upper gastrointestinal endoscopy (N/A, 2/25/2019); Colonoscopy (N/A, 2/25/2019); Colonoscopy (2/25/2019); bronchoscopy (N/A, 2/27/2019); bronchoscopy (2/27/2019); bronchoscopy (2/27/2019); bronchoscopy (N/A, 8/6/2019); Upper gastrointestinal endoscopy (N/A, 8/7/2019); bronchoscopy (N/A, 9/27/2019); bronchoscopy (9/27/2019); and bronchoscopy (9/27/2019). Restrictions  Restrictions/Precautions  Restrictions/Precautions: Fall Risk(High fall risk)  Required Braces or Orthoses? : (per neurosx CNP note: should pt not prefer brace, pt can performed modified activity)  Position Activity Restriction  Spinal Precautions: No Bending, No Lifting, No Twisting  Other position/activity restrictions: 70 y.o. female who presents because of back pain. She has a history of COPD, coronary artery disease, diabetes, heart failure, hypertension, lung cancer, pneumonia. She developed the pain yesterday.   She denies any known injury. It hurts worse if she moves certain ways. She has not had a cough or fever. She denies any chest or abdominal pain. She is O2 dependent at home with 1 L of oxygen. She required more oxygen when evaluated by EMS and she came to the hospital by ambulance. She lives alone. She denies any new numbness, tingling. She has chronic neuropathy. She denies any loss of bowel or bladder control. She denies any dysuria. She was hospitalized last month for acute kidney injury. She is anticoagulated with Eliquis     Vision/Hearing  Vision: Impaired  Vision Exceptions: Wears glasses for reading  Hearing: Within functional limits       Subjective  General  Chart Reviewed: Yes  Response To Previous Treatment: Not applicable  Family / Caregiver Present: No  Diagnosis: back pain  Follows Commands: Within Functional Limits  General Comment  Comments: Pt supine in bed upon arrival.  Subjective  Subjective: Pt reporting 9/10 pain in back. RN aware.  Pt agreeable to PT/OT eval.  Pain Screening  Patient Currently in Pain: Yes  Vital Signs  Patient Currently in Pain: Yes       Orientation  Orientation  Overall Orientation Status: Impaired  Orientation Level: Oriented to place;Oriented to situation;Oriented to person;Disoriented to time     Social/Functional History  Social/Functional History  Lives With: Alone(daughters check in a couple time per week)  Type of Home: House  Home Layout: Two level, Able to Live on Main level with bedroom/bathroom  Home Access: Stairs to enter with rails  Entrance Stairs - Number of Steps: 3 KATI  Entrance Stairs - Rails: Both  Bathroom Shower/Tub: Walk-in shower  Bathroom Toilet: Handicap height  Bathroom Equipment: Grab bars in shower, Shower chair, Hand-held shower  Home Equipment: 4 wheeled walker, Wheelchair-manual(BSC)  Receives Help From: Family(pt had recenty completed home hospice care; daughters assist as needed)  ADL Assistance: Needs assistance(prior to May/Danae, pt was independent with ADLs and pt currently stating \"I can't do any of it\" now)  Homemaking Assistance: (daughters do homemaking)  Homemaking Responsibilities: No  Ambulation Assistance: Independent(with 4ww prior to May/Danae)  Transfer Assistance: Independent  Active : No  Occupation: Retired  Additional Comments: Per chart review and last therapy eval 6/3: daughter reports significant decline in 2-3 weeks, reports 6+ falls in last 6 months, daughter reports 4 falls last week of May. Daughter reports she has been spending most of her time in bed. Daughter reports patient wants family to do things for her even with hospice care is present. Cognition   Cognition  Overall Cognitive Status: Exceptions  Arousal/Alertness: Delayed responses to stimuli  Following Commands: Follows one step commands with increased time; Follows one step commands with repetition  Attention Span: Attends with cues to redirect  Memory: Decreased recall of precautions;Decreased recall of recent events  Safety Judgement: Decreased awareness of need for assistance;Decreased awareness of need for safety  Problem Solving: Decreased awareness of errors  Insights: Decreased awareness of deficits  Initiation: Requires cues for some  Sequencing: Requires cues for some    Objective  AROM RLE (degrees)  RLE AROM: WFL  AROM LLE (degrees)  LLE AROM : WFL  Strength RLE  Strength RLE: Exception  Comment: grossly 3/5  Strength LLE  Strength LLE: Exception  Comment: grossly 3/5  Tone RLE  RLE Tone: Normotonic  Tone LLE  LLE Tone: Normotonic  Motor Control  Gross Motor?: WFL  Sensation  Overall Sensation Status: WFL  Bed mobility  Rolling to Left: Maximum assistance  Supine to Sit: Maximum assistance  Scooting: Stand by assistance  Comment: increased time required, max VC for log roll technique  Transfers  Sit to Stand: Dependent/Total  Stand to sit: Dependent/Total  Bed to Chair: Dependent/Total  Comment: Pt required max Ax2 for STS from EOB and Min Ax2

## 2020-07-15 NOTE — PROGRESS NOTES
Neurosurgery consulted. TLSO brace advised but patient could not tolerate. PT OT consulted. Needs placement. Continue pain medications. 2.h/o Non-small cell lung cancer. Status post chemo and radiation (completed 4/2019). Patient indicates she wants no further workup or treatment for lung cancer . Oncology on board. 3.  Chronic iron deficiency anemia: Hemoglobin trending down. Patient had scopes in 2019. Holding Eliquis at this time. Hemoglobin relatively stable around 7.5 and now. Continue to monitor H&H every 12. Type and screen. If needed, will transfuse. 4. COPD on home oxygen:. Hx COPD with chronic respiratory failure. No evidence of acute exacerbation. Entered hospice care secondary to COPD (7/2019) . O2 sats stable c/w trelegy ellipta.     5. H/o Coronary artery disease status post PCI and stenting. 6.Type 2 diabetes. A1c 7.9.  Sugars reasonably controlled. Continue current regimen. 7.Diastolic congestive heart failure. stable  8. Hypertension. C/w metoprolol   9. PAF.currently on sinus rhythm with PVCs -discussed with her cardiologist Dr. Cori Key. Stop her Eliquis due to high bleeding risk along with her anemia. 10.  CKD stage III: Stable.   11.  Right maxillary sinus mildly displaced fracture: May need outpatient ENT follow-up     Diet: DIET GENERAL;  Code:DNR-CC  DVT PPX - SCD's    ptot consulted     Disp: inpt       Diane Krause MD   7/15/2020 11:04 AM

## 2020-07-16 LAB
BLOOD BANK DISPENSE STATUS: NORMAL
BLOOD BANK PRODUCT CODE: NORMAL
BPU ID: NORMAL
DESCRIPTION BLOOD BANK: NORMAL
GLUCOSE BLD-MCNC: 113 MG/DL (ref 70–99)
GLUCOSE BLD-MCNC: 123 MG/DL (ref 70–99)
GLUCOSE BLD-MCNC: 125 MG/DL (ref 70–99)
GLUCOSE BLD-MCNC: 144 MG/DL (ref 70–99)
GLUCOSE BLD-MCNC: 146 MG/DL (ref 70–99)
HCT VFR BLD CALC: 22.2 % (ref 36–48)
HCT VFR BLD CALC: 27.7 % (ref 36–48)
HEMOGLOBIN: 7.1 G/DL (ref 12–16)
HEMOGLOBIN: 9 G/DL (ref 12–16)
PERFORMED ON: ABNORMAL

## 2020-07-16 PROCEDURE — 6370000000 HC RX 637 (ALT 250 FOR IP): Performed by: HOSPITALIST

## 2020-07-16 PROCEDURE — 2580000003 HC RX 258

## 2020-07-16 PROCEDURE — 36415 COLL VENOUS BLD VENIPUNCTURE: CPT

## 2020-07-16 PROCEDURE — 2580000003 HC RX 258: Performed by: INTERNAL MEDICINE

## 2020-07-16 PROCEDURE — 6370000000 HC RX 637 (ALT 250 FOR IP): Performed by: INTERNAL MEDICINE

## 2020-07-16 PROCEDURE — 6360000002 HC RX W HCPCS: Performed by: INTERNAL MEDICINE

## 2020-07-16 PROCEDURE — 36430 TRANSFUSION BLD/BLD COMPNT: CPT

## 2020-07-16 PROCEDURE — 85014 HEMATOCRIT: CPT

## 2020-07-16 PROCEDURE — 85018 HEMOGLOBIN: CPT

## 2020-07-16 PROCEDURE — 1200000000 HC SEMI PRIVATE

## 2020-07-16 RX ORDER — 0.9 % SODIUM CHLORIDE 0.9 %
20 INTRAVENOUS SOLUTION INTRAVENOUS ONCE
Status: COMPLETED | OUTPATIENT
Start: 2020-07-16 | End: 2020-07-16

## 2020-07-16 RX ORDER — SODIUM CHLORIDE 9 MG/ML
INJECTION, SOLUTION INTRAVENOUS
Status: COMPLETED
Start: 2020-07-16 | End: 2020-07-16

## 2020-07-16 RX ADMIN — CYCLOBENZAPRINE 10 MG: 10 TABLET, FILM COATED ORAL at 04:14

## 2020-07-16 RX ADMIN — DILTIAZEM HYDROCHLORIDE 60 MG: 60 CAPSULE, EXTENDED RELEASE ORAL at 08:26

## 2020-07-16 RX ADMIN — SODIUM CHLORIDE 200 MG: 9 INJECTION, SOLUTION INTRAVENOUS at 09:42

## 2020-07-16 RX ADMIN — Medication 10 ML: at 08:26

## 2020-07-16 RX ADMIN — SODIUM CHLORIDE 20 ML: 9 INJECTION, SOLUTION INTRAVENOUS at 11:28

## 2020-07-16 RX ADMIN — INSULIN LISPRO 1 UNITS: 100 INJECTION, SOLUTION INTRAVENOUS; SUBCUTANEOUS at 21:58

## 2020-07-16 RX ADMIN — OXYCODONE 5 MG: 5 TABLET ORAL at 04:14

## 2020-07-16 RX ADMIN — OXYCODONE 5 MG: 5 TABLET ORAL at 09:58

## 2020-07-16 RX ADMIN — SODIUM CHLORIDE 500 ML: 9 INJECTION, SOLUTION INTRAVENOUS at 11:29

## 2020-07-16 RX ADMIN — OXYCODONE 5 MG: 5 TABLET ORAL at 15:41

## 2020-07-16 RX ADMIN — INSULIN GLARGINE 5 UNITS: 100 INJECTION, SOLUTION SUBCUTANEOUS at 21:58

## 2020-07-16 RX ADMIN — DILTIAZEM HYDROCHLORIDE 60 MG: 60 CAPSULE, EXTENDED RELEASE ORAL at 21:58

## 2020-07-16 RX ADMIN — Medication 10 ML: at 21:58

## 2020-07-16 RX ADMIN — PANTOPRAZOLE SODIUM 40 MG: 40 TABLET, DELAYED RELEASE ORAL at 06:35

## 2020-07-16 RX ADMIN — INSULIN LISPRO 2 UNITS: 100 INJECTION, SOLUTION INTRAVENOUS; SUBCUTANEOUS at 09:42

## 2020-07-16 RX ADMIN — METOPROLOL SUCCINATE 25 MG: 25 TABLET, FILM COATED, EXTENDED RELEASE ORAL at 08:26

## 2020-07-16 ASSESSMENT — PAIN SCALES - GENERAL
PAINLEVEL_OUTOF10: 7
PAINLEVEL_OUTOF10: 9
PAINLEVEL_OUTOF10: 6
PAINLEVEL_OUTOF10: 9
PAINLEVEL_OUTOF10: 8
PAINLEVEL_OUTOF10: 0
PAINLEVEL_OUTOF10: 6
PAINLEVEL_OUTOF10: 9
PAINLEVEL_OUTOF10: 7
PAINLEVEL_OUTOF10: 7
PAINLEVEL_OUTOF10: 6

## 2020-07-16 ASSESSMENT — PAIN DESCRIPTION - LOCATION: LOCATION: BACK

## 2020-07-16 ASSESSMENT — PAIN DESCRIPTION - PAIN TYPE: TYPE: CHRONIC PAIN

## 2020-07-16 NOTE — PROGRESS NOTES
Called and spoke with Epi Conway, patients daughter. I updated her on patients status and told her I was very sorry for not calling her the other night when the patient fell. I explained that the patients safety was my first priority and that I have been privileged to care for her all the nights I have. The daughter expressed thankfulness and said she would be in to visit today.

## 2020-07-16 NOTE — CARE COORDINATION
Discharge Planning Assessment  Discharge Planning Assessment  RN/SW discharge planner met with patient/ (and family member) to discuss reason for admission, current living situation, and potential needs at the time of discharge completed with daughter Keven Jett     Demographics/Insurance verified Yes     Current type of dwellin story home     Patient from ECF/SW confirmed with:    Living arrangements:Alone     Level of function/Support:pt has a lot of pain and daughters are supportive and daughter Hang Kinds states pt has been sleeping on a couch and denies falling at home     PCP:Dr Bibi Souza     Last Visit to PCP:saw pt in office after last d/c but not until a week after pt was home     DME: Oxygen through [facility] pt uses 1 liter at home. [facility] per daughter is delivering bed tomorrow    Active with any community resources/agencies/skilled home care: [facility] for RN and PT. States referral was made to [facility] but pt does not qualify     Medication compliance issues:home care RN places in [place] for pt to take     Financial issues that could impact healthcare:none         Tentative discharge plan:snf   Discussed and provided facilities of choice if transition to a skilled nursing facility is required at the time of discharge yes daughter wants referrals to [address] ([facility])     Discussed with patient and/or family that on the day of discharge home tentative time of discharge will be between 10 AM and noon.     Transportation at the time of discharge:transportation service     Tashia Tellez RN, BSN  [phone]

## 2020-07-16 NOTE — PROGRESS NOTES
[facility] PROGRESS NOTE    7/16/2020 3:17 PM        Name: Pam Murillo . Admitted: 7/11/2020  Primary Care Provider: Geoffrey Falcon (Tel: [phone])                        Hospital course:  Patient is a 71 yo female with hx adenocarcinoma of the lung, CAD/stent, COPD, chronic anemia, chronic dCHF, DM, HTN, HLD, LIZETT (untreated), chronic AC (on Eliquis). recently discharged from the hospital about a month ago after being admitted here for acute renal insufficiency, presents to the hospital again with chief complaints of 2-3 day history of what she describes as subacute onset of gradually progressive increasing back pain. Patient had a fall  While in hospital. CT scans were done which showed right maxillary sinus mildly displaced oblique fracture. .     Subjective:     Continues to have back pain. Weak overall.       Reviewed interval ancillary notes    Current Medications  iron sucrose (VENOFER) 200 mg in sodium chloride 0.9 % 100 mL IVPB, Q24H  insulin glargine (LANTUS;BASAGLAR) injection pen 5 Units, Nightly  insulin lispro (1 Unit Dial) 2 Units, TID WC  insulin lispro (1 Unit Dial) 0-12 Units, TID WC  insulin lispro (1 Unit Dial) 0-6 Units, Nightly  glucose (GLUTOSE) 40 % oral gel 15 g, PRN  dextrose 50 % IV solution, PRN  glucagon (rDNA) injection 1 mg, PRN  dextrose 5 % solution, PRN  oxyCODONE (ROXICODONE) immediate release tablet 5 mg, Q4H PRN  dilTIAZem (CARDIZEM 12 HR) extended release capsule 60 mg, BID  fluticasone-umeclidin-vilant (TRELEGY ELLIPTA) 100-62.5-25 MCG/INH inhaler 1 puff - PATIENT SUPPLIED, Daily  metoprolol succinate (TOPROL XL) extended release tablet 25 mg, Daily  pantoprazole (PROTONIX) tablet 40 mg, QAM AC  sodium chloride flush 0.9 % injection 10 mL, 2 times per day  sodium chloride flush 0.9 % injection 10 mL, PRN  acetaminophen (TYLENOL) Problems:    Back pain    Pathologic thoracic fracture, initial encounter  Resolved Problems:    * No resolved hospital problems. *     Assessment & Plan:     1. T3 and T10 compression fracture:  Neurosurgery consulted. TLSO brace advised but patient could not tolerate. PT OT consulted. Needs placement. Continue pain medications. 2.h/o Non-small cell lung cancer. Status post chemo and radiation (completed 4/2019). Patient indicates she wants no further workup or treatment for lung cancer . Oncology on board. 3.  Chronic iron deficiency anemia: Hemoglobin trending down. Patient had scopes in 2019. Stop Eliquis after discussing with cardiology. Hemoglobin now at 7.1. Transfuse 1 unit today. Start on Venofer IV. Continue to monitor H&H every 12. Type and screen. 4. COPD on home oxygen:. Hx COPD with chronic respiratory failure. No evidence of acute exacerbation. Entered hospice care secondary to COPD (7/2019) . O2 sats stable c/w trelegy ellipta.       5. H/o Coronary artery disease status post PCI and stenting. 6.Type 2 diabetes. A1c 7.9.  Sugars reasonably controlled. Continue current regimen. 7.Diastolic congestive heart failure. stable    8. Hypertension. C/w metoprolol     9. PAF.currently on sinus rhythm with PVCs -discussed with her cardiologist Dr. Lacey Payne. Stop her Eliquis due to high bleeding risk along with her anemia. 10.  CKD stage III: Stable. 11.  Right maxillary sinus mildly displaced fracture: May need outpatient ENT follow-up     Diet: DIET GENERAL;  Code:DNR-CC  DVT PPX - SCD's    ptot consulted - snf      Overall poor prognosis. Get palliative care on board. Patient is appropriate for hospice.   Disp: inpt       Ailin Mcnulty MD   7/16/2020 3:17 PM

## 2020-07-16 NOTE — CARE COORDINATION
CM after talking with daughter Irma Sheikh sent right fax referrals to: [facility] and [facility] [address] [place] [address].      Barbie Mcfarland RN, BSN  [phone]

## 2020-07-16 NOTE — CARE COORDINATION
Pt's daughter Josselin Abdi called CM back and states after talking to her sister they want a referral sent to [facility]. She state her sister said [facility] change therapy and now is not as good. Cm made right fax referral to [facility].      Carmen Caballero RN, BSN  [phone]

## 2020-07-16 NOTE — CARE COORDINATION
CM received call back from [place] [id number] at [facility] and they are able to accept pt and will start pre cert. This was daughter's first choice. CM called daughter Gerard Cao and left vm updating.      Melinda Werner RN, BSN  [phone]

## 2020-07-17 ENCOUNTER — APPOINTMENT (OUTPATIENT)
Dept: GENERAL RADIOLOGY | Age: 80
DRG: 477 | End: 2020-07-17
Payer: MEDICARE

## 2020-07-17 LAB
ANION GAP SERPL CALCULATED.3IONS-SCNC: 14 MMOL/L (ref 3–16)
BUN BLDV-MCNC: 22 MG/DL (ref 7–20)
CALCIUM SERPL-MCNC: 9.6 MG/DL (ref 8.3–10.6)
CHLORIDE BLD-SCNC: 98 MMOL/L (ref 99–110)
CO2: 25 MMOL/L (ref 21–32)
CREAT SERPL-MCNC: 1.2 MG/DL (ref 0.6–1.2)
GFR AFRICAN AMERICAN: 53
GFR NON-AFRICAN AMERICAN: 43
GLUCOSE BLD-MCNC: 103 MG/DL (ref 70–99)
GLUCOSE BLD-MCNC: 104 MG/DL (ref 70–99)
GLUCOSE BLD-MCNC: 110 MG/DL (ref 70–99)
GLUCOSE BLD-MCNC: 117 MG/DL (ref 70–99)
GLUCOSE BLD-MCNC: 93 MG/DL (ref 70–99)
PERFORMED ON: ABNORMAL
POTASSIUM REFLEX MAGNESIUM: 4 MMOL/L (ref 3.5–5.1)
SODIUM BLD-SCNC: 137 MMOL/L (ref 136–145)

## 2020-07-17 PROCEDURE — 6370000000 HC RX 637 (ALT 250 FOR IP): Performed by: INTERNAL MEDICINE

## 2020-07-17 PROCEDURE — 6360000002 HC RX W HCPCS: Performed by: INTERNAL MEDICINE

## 2020-07-17 PROCEDURE — 80048 BASIC METABOLIC PNL TOTAL CA: CPT

## 2020-07-17 PROCEDURE — 97530 THERAPEUTIC ACTIVITIES: CPT

## 2020-07-17 PROCEDURE — 2700000000 HC OXYGEN THERAPY PER DAY

## 2020-07-17 PROCEDURE — 71045 X-RAY EXAM CHEST 1 VIEW: CPT

## 2020-07-17 PROCEDURE — 1200000000 HC SEMI PRIVATE

## 2020-07-17 PROCEDURE — 2580000003 HC RX 258: Performed by: INTERNAL MEDICINE

## 2020-07-17 PROCEDURE — 94760 N-INVAS EAR/PLS OXIMETRY 1: CPT

## 2020-07-17 PROCEDURE — 36415 COLL VENOUS BLD VENIPUNCTURE: CPT

## 2020-07-17 PROCEDURE — 6370000000 HC RX 637 (ALT 250 FOR IP): Performed by: HOSPITALIST

## 2020-07-17 RX ORDER — TRAMADOL HYDROCHLORIDE 50 MG/1
50 TABLET ORAL EVERY 6 HOURS PRN
Status: DISCONTINUED | OUTPATIENT
Start: 2020-07-17 | End: 2020-07-18

## 2020-07-17 RX ORDER — FUROSEMIDE 10 MG/ML
20 INJECTION INTRAMUSCULAR; INTRAVENOUS ONCE
Status: COMPLETED | OUTPATIENT
Start: 2020-07-17 | End: 2020-07-17

## 2020-07-17 RX ADMIN — Medication 10 ML: at 23:14

## 2020-07-17 RX ADMIN — TRAMADOL HYDROCHLORIDE 50 MG: 50 TABLET, FILM COATED ORAL at 16:24

## 2020-07-17 RX ADMIN — INSULIN LISPRO 2 UNITS: 100 INJECTION, SOLUTION INTRAVENOUS; SUBCUTANEOUS at 18:22

## 2020-07-17 RX ADMIN — INSULIN GLARGINE 5 UNITS: 100 INJECTION, SOLUTION SUBCUTANEOUS at 23:03

## 2020-07-17 RX ADMIN — DILTIAZEM HYDROCHLORIDE 60 MG: 60 CAPSULE, EXTENDED RELEASE ORAL at 23:04

## 2020-07-17 RX ADMIN — Medication 10 ML: at 08:34

## 2020-07-17 RX ADMIN — OXYCODONE 5 MG: 5 TABLET ORAL at 04:04

## 2020-07-17 RX ADMIN — SODIUM CHLORIDE 200 MG: 9 INJECTION, SOLUTION INTRAVENOUS at 08:34

## 2020-07-17 RX ADMIN — INSULIN LISPRO 2 UNITS: 100 INJECTION, SOLUTION INTRAVENOUS; SUBCUTANEOUS at 12:06

## 2020-07-17 RX ADMIN — METOPROLOL SUCCINATE 25 MG: 25 TABLET, FILM COATED, EXTENDED RELEASE ORAL at 08:34

## 2020-07-17 RX ADMIN — FUROSEMIDE 20 MG: 10 INJECTION, SOLUTION INTRAMUSCULAR; INTRAVENOUS at 14:51

## 2020-07-17 RX ADMIN — PANTOPRAZOLE SODIUM 40 MG: 40 TABLET, DELAYED RELEASE ORAL at 05:00

## 2020-07-17 RX ADMIN — INSULIN LISPRO 2 UNITS: 100 INJECTION, SOLUTION INTRAVENOUS; SUBCUTANEOUS at 08:36

## 2020-07-17 RX ADMIN — DILTIAZEM HYDROCHLORIDE 60 MG: 60 CAPSULE, EXTENDED RELEASE ORAL at 08:34

## 2020-07-17 RX ADMIN — CYCLOBENZAPRINE 10 MG: 10 TABLET, FILM COATED ORAL at 04:03

## 2020-07-17 ASSESSMENT — PAIN DESCRIPTION - PAIN TYPE
TYPE: CHRONIC PAIN
TYPE: CHRONIC PAIN

## 2020-07-17 ASSESSMENT — PAIN DESCRIPTION - LOCATION
LOCATION: BACK
LOCATION: BACK

## 2020-07-17 ASSESSMENT — PAIN DESCRIPTION - ORIENTATION: ORIENTATION: LOWER

## 2020-07-17 ASSESSMENT — PAIN SCALES - GENERAL
PAINLEVEL_OUTOF10: 6
PAINLEVEL_OUTOF10: 10
PAINLEVEL_OUTOF10: 10

## 2020-07-17 NOTE — CARE COORDINATION
CM called [facility] [phone] and spoke to Miriam and she states pt was discharged from services on 5/31. CM called Maggie at [address] and given information d/t insurance was requesting it. Also reviewed chart with her for last admission therapy notes d/t insurance wanting to know how far pt had been ambulating previously and informed Maggie at that time home care was recommended and now skilled is recommended. She will update insurance with information. JAYCOB will continue to follow for d/c planning.      Angelica Kwong RN, BSN  [phone]

## 2020-07-17 NOTE — PROGRESS NOTES
[facility] PROGRESS NOTE    7/17/2020 3:05 PM        Name: Shanika Ty . Admitted: 7/11/2020  Primary Care Provider: Chase Cooper (Tel: [phone])                        Hospital course:  Patient is a 69 yo female with hx adenocarcinoma of the lung, CAD/stent, COPD, chronic anemia, chronic dCHF, DM, HTN, HLD, LIZETT (untreated), chronic AC (on Eliquis). recently discharged from the hospital about a month ago after being admitted here for acute renal insufficiency, presents to the hospital again with chief complaints of 2-3 day history of what she describes as subacute onset of gradually progressive increasing back pain. Patient had a fall  While in hospital. CT scans were done which showed right maxillary sinus mildly displaced oblique fracture. .     Subjective:     Still has some back pain. No new complaints.     Reviewed interval ancillary notes    Current Medications  traMADol (ULTRAM) tablet 50 mg, Q6H PRN  iron sucrose (VENOFER) 200 mg in sodium chloride 0.9 % 100 mL IVPB, Q24H  insulin glargine (LANTUS;BASAGLAR) injection pen 5 Units, Nightly  insulin lispro (1 Unit Dial) 2 Units, TID WC  insulin lispro (1 Unit Dial) 0-12 Units, TID WC  insulin lispro (1 Unit Dial) 0-6 Units, Nightly  glucose (GLUTOSE) 40 % oral gel 15 g, PRN  dextrose 50 % IV solution, PRN  glucagon (rDNA) injection 1 mg, PRN  dextrose 5 % solution, PRN  dilTIAZem (CARDIZEM 12 HR) extended release capsule 60 mg, BID  fluticasone-umeclidin-vilant (TRELEGY ELLIPTA) 100-62.5-25 MCG/INH inhaler 1 puff - PATIENT SUPPLIED, Daily  metoprolol succinate (TOPROL XL) extended release tablet 25 mg, Daily  pantoprazole (PROTONIX) tablet 40 mg, QAM AC  sodium chloride flush 0.9 % injection 10 mL, 2 times per day  sodium chloride flush 0.9 % injection 10 mL, PRN  acetaminophen (TYLENOL) tablet 650 mg, Q6H advised but patient could not tolerate. PT OT consulted. Needs placement. Continue pain medications. 2.h/o Non-small cell lung cancer. Status post chemo and radiation (completed 4/2019). Patient indicates she wants no further workup or treatment for lung cancer . Oncology on board. Repeat chest x-ray today showed persistent right sided infiltrate. COVID-19 was negative on admission. Give a dose of Lasix and monitor. 3.  Chronic iron deficiency anemia: Hemoglobin trending down. Patient had scopes in 2019. Stopped Eliquis after discussing with cardiology. Started on Venofer. Hemoglobin is now at 9. Continue to monitor H&H every 12.     4. COPD on home oxygen:. Hx COPD with chronic respiratory failure. No evidence of acute exacerbation. was in hospice care secondary to COPD (7/2019) . O2 sats stable c/w trelegy ellipta.       5. H/o Coronary artery disease status post PCI and stenting. 6.Type 2 diabetes. A1c 7.9.  Sugars reasonably controlled. Continue current regimen. 7.Diastolic congestive heart failure. stable    8. Hypertension. C/w metoprolol     9. PAF.currently on sinus rhythm with PVCs -discussed with her cardiologist Dr. Artemio Davis. Stopped her Eliquis due to high bleeding risk along with her anemia. 10.  CKD stage III: Stable. 11.  Right maxillary sinus mildly displaced fracture: May need outpatient ENT follow-up     Diet: DIET GENERAL;  Dietary Nutrition Supplements: Standard High Calorie Oral Supplement  Code:DNR-CC  DVT PPX - SCD's    ptot consulted - snf      Overall poor prognosis. Get palliative care on board. Patient is appropriate for hospice.     Disp: inpt ; await skilled nursing facility placement      Yesenia Mead MD   7/17/2020 3:05 PM

## 2020-07-17 NOTE — PROGRESS NOTES
Routine vitals stable. Scheduled medications given. Pt denies any further needs at this time. Chair alarm on. Call light within reach. Will continue to monitor.    Vitals:    07/17/20 1621   BP: (!) 147/77   Pulse: 90   Resp: 16   Temp: 98.4 °F (36.9 °C)   SpO2: 93%

## 2020-07-17 NOTE — PROGRESS NOTES
Occupational Therapy  Facility/Department: [address] ONCOLOGY  Daily Treatment Note  NAME: Phi Moser  : 1940  MRN: [id number]    Date of Service: 2020    Discharge Recommendations:       Phi Moser scored a 15/24 on the AM-PAC ADL Inpatient form. Current research shows that an AM-PAC score of 17 or less is typically not associated with a discharge to the patient's home setting. Based on the patient's AM-PAC score and their current ADL deficits, it is recommended that the patient have 3-5 sessions per week of Occupational Therapy at d/c to increase the patient's independence. Please see assessment section for further patient specific details. If patient discharges prior to next session this note will serve as a discharge summary. Please see below for the latest assessment towards goals. Assessment   Performance deficits / Impairments: Decreased functional mobility ; Decreased ADL status; Decreased safe awareness;Decreased high-level IADLs  Treatment Diagnosis: decreased independence with ADL related to late affects of back pain due to lumbar fx  Prognosis: Good  Assistance / Modification: rw  OT Education: OT Role;Plan of Care  Patient Education: Eval, discharge recommendations-patient is not independent in learning, will require repetition and family education  REQUIRES OT FOLLOW UP: Yes  Activity Tolerance  Activity Tolerance: Patient limited by fatigue;Patient limited by pain;Treatment limited secondary to decreased cognition  Safety Devices  Safety Devices in place: Yes  Type of devices: All fall risk precautions in place;Call light within reach; Chair alarm in place; Left in chair;Patient at risk for falls;Nurse notified         Patient Diagnosis(es): The primary encounter diagnosis was Acute hypoxemic respiratory failure ([facility] [address]  Diagnoses of Pneumonia due to organism, Closed wedge fracture of lumbar vertebra, unspecified lumbar vertebral level, initial encounter ([address] Chronic kidney disease, unspecified CKD stage, and Generalized weakness were also pertinent to this visit. has a past medical history of Arthritis, CAD (coronary artery disease), Carpal tunnel syndrome, COPD (chronic obstructive pulmonary disease) ([address] Coronary stent, depression, Diabetes mellitus ([address] Diastolic heart failure ([address] GERD (gastroesophageal reflux disease), Hyperlipidemia, Hypertension, Lung cancer ([address] MI (myocardial infarction) ([address] LIZETT (obstructive sleep apnea), PONV (postoperative nausea and vomiting), and stress incontinence. has a past surgical history that includes Coronary angioplasty with stent (2000, 2001); back surgery (2000, 2001); bronchoscopy (12/04/2018); pr [facility] incl fluor gdnce dx w/cell washg spx (N/A, 12/4/2018); Cholecystectomy, laparoscopic (N/A, 12/19/2018); Upper gastrointestinal endoscopy (N/A, 2/25/2019); Colonoscopy (N/A, 2/25/2019); Colonoscopy (2/25/2019); bronchoscopy (N/A, 2/27/2019); bronchoscopy (2/27/2019); bronchoscopy (2/27/2019); bronchoscopy (N/A, 8/6/2019); Upper gastrointestinal endoscopy (N/A, 8/7/2019); bronchoscopy (N/A, 9/27/2019); bronchoscopy (9/27/2019); and bronchoscopy (9/27/2019). Restrictions  Restrictions/Precautions  Restrictions/Precautions: Fall Risk(High fall risk)  Required Braces or Orthoses? : (per neurosx CNP note: should pt not prefer brace, pt can performed modified activity)  Position Activity Restriction  Spinal Precautions: No Bending, No Lifting, No Twisting  Other position/activity restrictions: 70 y.o. female who presents because of back pain. She has a history of COPD, coronary artery disease, diabetes, heart failure, hypertension, lung cancer, pneumonia. She developed the pain yesterday. She denies any known injury. It hurts worse if she moves certain ways. She has not had a cough or fever. She denies any chest or abdominal pain. She is O2 dependent at home with 1 L of oxygen.   She required more oxygen when evaluated by EMS and she came to the hospital by ambulance. She lives alone. She denies any new numbness, tingling. She has chronic neuropathy. She denies any loss of bowel or bladder control. She denies any dysuria. She was hospitalized last month for acute kidney injury. She is anticoagulated with Eliquis  Subjective   General  Chart Reviewed: Yes  Family / Caregiver Present: No  Diagnosis: Back pain  Subjective  Subjective: Patient supine in bed, agreeable to evaluation      Orientation  Orientation  Orientation Level: Oriented to person  Objective             Balance  Sitting Balance: Supervision  Standing Balance: Stand by assistance  Standing Balance  Time: less than 2 minutes  Activity: stand pivot with rw to chair  Functional Mobility  Functional - Mobility Device: Rolling Walker  Wheelchair Bed Transfers  Wheelchair/Bed - Technique: Stand pivot  Level of Asssistance: Contact guard assistance  Bed mobility  Supine to Sit: Stand by assistance  Scooting: Stand by assistance  Transfers  Stand Step Transfers: Contact guard assistance  Sit to stand: Contact guard assistance  Stand to sit: Contact guard assistance                       Cognition  Arousal/Alertness: Appropriate responses to stimuli  Following Commands:  Follows one step commands consistently  Attention Span: Attends with cues to redirect  Memory: Decreased recall of biographical Information;Decreased recall of recent events;Decreased short term memory  Safety Judgement: Decreased awareness of need for safety  Problem Solving: Assistance required to generate solutions  Insights: Decreased awareness of deficits  Initiation: Requires cues for some                                         Plan   Plan  Times per week: 3-5  Times per day: Daily  Current Treatment Recommendations: Strengthening, Balance Training, Functional Mobility Training, Endurance Training, Patient/Caregiver Education & Training, Cognitive Reorientation, Self-Care / ADL, Equipment Evaluation, Education, & procurement, Safety Education & Training, Home Management Training  G-Code     OutComes Score                                                  AM-PAC Score             Goals  Short term goals  Time Frame for Short term goals: Discharge  Short term goal 1: Bed mobility Mary contact guard, progress to supervison  Short term goal 2: Functional ADL transfers Mary contact guard, progress to supervision  Short term goal 3: UB ADLs setup, LB ADLs Mary set up  Short term goal 4: Functional mobility with appropriate AD and Mary min assist, progress to supervision  Long term goals  Time Frame for Long term goals : LTG=STG  Patient Goals   Patient goals : Home       Therapy Time   Individual Concurrent Group Co-treatment   Time In 0905         Time Out 0925         Minutes Cedric 52, OT

## 2020-07-17 NOTE — PROGRESS NOTES
Physical Therapy  Facility/Department: [address] ONCOLOGY  Daily Treatment Note  NAME: Desi Angulo  : 1940  MRN: [id number]    Date of Service: 2020    Discharge Recommendations:    Desi Angulo scored a 15/24 on the AM-PAC short mobility form. Current research shows that an AM-PAC score of 17 or less is typically not associated with a discharge to the patient's home setting. Based on the patient's AM-PAC score and their current functional mobility deficits, it is recommended that the patient have 3-5 sessions per week of Physical Therapy at d/c to increase the patient's independence. Please see assessment section for further patient specific details. If patient discharges prior to next session this note will serve as a discharge summary. Please see below for the latest assessment towards goals. PT Equipment Recommendations  Equipment Needed: No  Other: defer to next level of care    Assessment   Body structures, Functions, Activity limitations: Decreased functional mobility ; Decreased strength;Decreased endurance;Decreased balance; Increased pain;Decreased cognition  Assessment: pt demonstrated significant improvement with mobility but continues to be limited by pain with transitional movements  Treatment Diagnosis: impaired gait, transfers, and balance  Prognosis: Good  PT Education: Goals;PT Role;Plan of Care;Precautions;Transfer Training;Orientation;General Safety;Gait Training;Functional Mobility Training  Patient Education: d/c recommendations, spinal precautions--pt verbalizing understanding, will require reinforcement  Barriers to Learning: physical  REQUIRES PT FOLLOW UP: Yes  Activity Tolerance  Activity Tolerance: Patient Tolerated treatment well;Patient limited by pain     Patient Diagnosis(es): The primary encounter diagnosis was Acute hypoxemic respiratory failure ([facility] [address]  Diagnoses of Pneumonia due to organism, Closed wedge fracture of lumbar vertebra, unspecified lumbar vertebral level, initial encounter ([facility] [address] Chronic kidney disease, unspecified CKD stage, and Generalized weakness were also pertinent to this visit. has a past medical history of Arthritis, CAD (coronary artery disease), Carpal tunnel syndrome, COPD (chronic obstructive pulmonary disease) ([facility] [address] Coronary stent, depression, Diabetes mellitus ([facility] [address] Diastolic heart failure ([facility] [address] GERD (gastroesophageal reflux disease), Hyperlipidemia, Hypertension, Lung cancer ([facility] [address] MI (myocardial infarction) ([facility] [address] LIZETT (obstructive sleep apnea), PONV (postoperative nausea and vomiting), and stress incontinence. has a past surgical history that includes Coronary angioplasty with stent (2000, 2001); back surgery (2000, 2001); bronchoscopy (12/04/2018); pr [facility] incl fluor gdnce dx w/cell washg spx (N/A, 12/4/2018); Cholecystectomy, laparoscopic (N/A, 12/19/2018); Upper gastrointestinal endoscopy (N/A, 2/25/2019); Colonoscopy (N/A, 2/25/2019); Colonoscopy (2/25/2019); bronchoscopy (N/A, 2/27/2019); bronchoscopy (2/27/2019); bronchoscopy (2/27/2019); bronchoscopy (N/A, 8/6/2019); Upper gastrointestinal endoscopy (N/A, 8/7/2019); bronchoscopy (N/A, 9/27/2019); bronchoscopy (9/27/2019); and bronchoscopy (9/27/2019). Restrictions  Restrictions/Precautions  Restrictions/Precautions: Fall Risk(High fall risk)  Required Braces or Orthoses? : (per neurosx CNP note: should pt not prefer brace, pt can performed modified activity)  Position Activity Restriction  Spinal Precautions: No Bending, No Lifting, No Twisting  Other position/activity restrictions: 70 y.o. female who presents because of back pain. She has a history of COPD, coronary artery disease, diabetes, heart failure, hypertension, lung cancer, pneumonia. She developed the pain yesterday. She denies any known injury. It hurts worse if she moves certain ways. She has not had a cough or fever. She denies any chest or abdominal pain.   She is O2 dependent at home with 1 L of oxygen. She required more oxygen when evaluated by EMS and she came to the hospital by ambulance. She lives alone. She denies any new numbness, tingling. She has chronic neuropathy. She denies any loss of bowel or bladder control. She denies any dysuria. She was hospitalized last month for acute kidney injury. She is anticoagulated with Eliquis     Appropriate PPE donned prior to entering patients room this date:  gown, gloves, N-95 and face shield. Subjective   General  Chart Reviewed: Yes  Response To Previous Treatment: Patient with no complaints from previous session.   Family / Caregiver Present: No  Subjective  Subjective: pt supine at start of session, reports back pain but wanting to get up to chair  Pain Screening  Patient Currently in Pain: Yes  Pain Assessment  Pain Assessment: 0-10  Pain Level: 10--RN aware  Pain Type: Chronic pain  Pain Location: Back  Vital Signs  Patient Currently in Pain: Yes       Orientation  Orientation  Overall Orientation Status: Impaired  Orientation Level: Oriented to place;Oriented to person;Disoriented to time;Disoriented to situation    Objective   Bed mobility  Supine to Sit: Stand by assistance  Sit to Supine: Unable to assess(pt sitting up at end of session)  Scooting: Stand by assistance  Transfers  Sit to Stand: Minimal Assistance  Stand to sit: Minimal Assistance  Ambulation  Ambulation?: Yes  Ambulation 1  Surface: level tile  Device: Rolling Walker  Assistance: Minimal assistance  Quality of Gait: narrow FERDINAND, flexed trunk, assist to navigate RW, unsteady  Distance: 5-6 steps bed to chair  Stairs/Curb  Stairs?: No     Balance  Posture: Fair  Sitting - Static: Good  Sitting - Dynamic: Good  Standing - Static: Fair  Standing - Dynamic: Fair        AM-PAC Score  AM-PAC Inpatient Mobility Raw Score : 15 (07/17/20 1309)  AM-PAC Inpatient T-Scale Score : 39.45 (07/17/20 1309)  Mobility Inpatient CMS 0-100% Score: 57.7 (07/17/20 1309)  Mobility Inpatient CMS G-Code Modifier : CK (07/17/20 4961)          Goals  Short term goals  Time Frame for Short term goals: upon d/c  Short term goal 1: Pt will perform bed mobility with mod Ax1--met  Short term goal 2: Pt will perform transfers with LRAD and mod Ax1--met  Short term goal 3: Pt will ambulate 22' with LRAD and min Ax1  Short term goal 4: pt will complete bed mobility MOD I  Short term goal 5: pt will complete sit to/from stand MOD I  Patient Goals   Patient goals : pt did not state  2 goals met    Plan    Plan  Times per week: 3-5x  Times per day: Daily  Current Treatment Recommendations: Strengthening, Balance Training, Functional Mobility Training, Transfer Training, Endurance Training, Gait Training, Neuromuscular Re-education, Safety Education & Training, Modalities, Equipment Evaluation, Education, & procurement, Patient/Caregiver Education & Training  Safety Devices  Type of devices:  All fall risk precautions in place, Call light within reach, Chair alarm in place, Left in chair, Nurse notified  Restraints  Initially in place: No     Therapy Time   Individual Concurrent Group Co-treatment   Time In 0910         Time Out 0925         Minutes 15         Timed Code Treatment Minutes: Miguel Fischer, [facility]

## 2020-07-17 NOTE — PROGRESS NOTES
[facility]   Progress Note      SUBJECTIVE:      Patient is doing okay. No new complaints. No chest pain. She has some shortness of breath. No external bleeding. OBJECTIVE    Physical  VITALS:  /77   Pulse 94   Temp 97.9 °F (36.6 °C) (Oral)   Resp 16   Ht 5' 5\" (1.651 m)   Wt 147 lb 4.8 oz (66.8 kg)   SpO2 92%   BMI 24.51 kg/m²   TEMPERATURE:  Current - Temp: 97.9 °F (36.6 °C); Max - Temp  Av.9 °F (36.6 °C)  Min: 97.8 °F (36.6 °C)  Max: 98.2 °F (36.8 °C)  BLOOD PRESSURE RANGE:  Systolic (09DSF), MTK:503 , Min:135 , JCV:001   ; Diastolic (14KDO), GYE:61, Min:72, Max:77    24HR INTAKE/OUTPUT:      Intake/Output Summary (Last 24 hours) at 2020 1346  Last data filed at 2020 0859  Gross per 24 hour   Intake 420 ml   Output --   Net 420 ml       Conscious alert oriented. Appears comfortable. No neck fullness. Respiratory efforts are normal.    Abdomen is not distended. No leg edema    No focal deficits.     Data  Labs:  General Labs:  CBC with Differential:    Lab Results   Component Value Date    WBC 7.2 2020    RBC 2.78 2020    HGB 9.0 2020    HCT 27.7 2020     2020    MCV 85.2 2020    MCH 26.9 2020    MCHC 31.6 2020    RDW 21.4 2020    SEGSPCT 50.2 10/29/2012    BANDSPCT 2 10/03/2019    METASPCT 1 2019    LYMPHOPCT 14.0 2020    MONOPCT 5.0 2020    MYELOPCT 1 2020    BASOPCT 1.0 2020    MONOSABS 0.6 2020    LYMPHSABS 1.6 2020    EOSABS 0.6 2020    BASOSABS 0.1 2020    DIFFTYPE Auto 10/29/2012     BMP:    Lab Results   Component Value Date     2020    K 3.8 2020    K 5.7 2020     2020    CO2 26 2020    BUN 36 2020    LABALBU 3.3 2020    CREATININE 1.6 2020    CALCIUM 9.3 2020    GFRAA 38 2020    GFRAA >60 10/29/2012    LABGLOM 31 2020    GLUCOSE 194 2020     Hepatic

## 2020-07-18 LAB
BASOPHILS ABSOLUTE: 0 K/UL (ref 0–0.2)
BASOPHILS RELATIVE PERCENT: 0.5 %
EOSINOPHILS ABSOLUTE: 1.3 K/UL (ref 0–0.6)
EOSINOPHILS RELATIVE PERCENT: 14.1 %
GLUCOSE BLD-MCNC: 113 MG/DL (ref 70–99)
GLUCOSE BLD-MCNC: 129 MG/DL (ref 70–99)
GLUCOSE BLD-MCNC: 79 MG/DL (ref 70–99)
GLUCOSE BLD-MCNC: 94 MG/DL (ref 70–99)
HCT VFR BLD CALC: 26.4 % (ref 36–48)
HEMOGLOBIN: 8.7 G/DL (ref 12–16)
LYMPHOCYTES ABSOLUTE: 1.6 K/UL (ref 1–5.1)
LYMPHOCYTES RELATIVE PERCENT: 18 %
MCH RBC QN AUTO: 28.8 PG (ref 26–34)
MCHC RBC AUTO-ENTMCNC: 33.1 G/DL (ref 31–36)
MCV RBC AUTO: 86.9 FL (ref 80–100)
MONOCYTES ABSOLUTE: 0.9 K/UL (ref 0–1.3)
MONOCYTES RELATIVE PERCENT: 9.8 %
NEUTROPHILS ABSOLUTE: 5.2 K/UL (ref 1.7–7.7)
NEUTROPHILS RELATIVE PERCENT: 57.6 %
PDW BLD-RTO: 19.5 % (ref 12.4–15.4)
PERFORMED ON: ABNORMAL
PERFORMED ON: ABNORMAL
PERFORMED ON: NORMAL
PERFORMED ON: NORMAL
PLATELET # BLD: 344 K/UL (ref 135–450)
PMV BLD AUTO: 6.7 FL (ref 5–10.5)
PRO-BNP: 3368 PG/ML (ref 0–449)
PROCALCITONIN: 0.2 NG/ML (ref 0–0.15)
RBC # BLD: 3.03 M/UL (ref 4–5.2)
WBC # BLD: 9 K/UL (ref 4–11)

## 2020-07-18 PROCEDURE — 36415 COLL VENOUS BLD VENIPUNCTURE: CPT

## 2020-07-18 PROCEDURE — 94150 VITAL CAPACITY TEST: CPT

## 2020-07-18 PROCEDURE — 84145 PROCALCITONIN (PCT): CPT

## 2020-07-18 PROCEDURE — 85025 COMPLETE CBC W/AUTO DIFF WBC: CPT

## 2020-07-18 PROCEDURE — 83880 ASSAY OF NATRIURETIC PEPTIDE: CPT

## 2020-07-18 PROCEDURE — 2580000003 HC RX 258: Performed by: INTERNAL MEDICINE

## 2020-07-18 PROCEDURE — 2700000000 HC OXYGEN THERAPY PER DAY

## 2020-07-18 PROCEDURE — 6370000000 HC RX 637 (ALT 250 FOR IP): Performed by: INTERNAL MEDICINE

## 2020-07-18 PROCEDURE — 1200000000 HC SEMI PRIVATE

## 2020-07-18 PROCEDURE — 94760 N-INVAS EAR/PLS OXIMETRY 1: CPT

## 2020-07-18 PROCEDURE — 6360000002 HC RX W HCPCS: Performed by: INTERNAL MEDICINE

## 2020-07-18 RX ORDER — HYDROCODONE BITARTRATE AND ACETAMINOPHEN 5; 325 MG/1; MG/1
1 TABLET ORAL EVERY 6 HOURS PRN
Status: DISCONTINUED | OUTPATIENT
Start: 2020-07-18 | End: 2020-07-24 | Stop reason: HOSPADM

## 2020-07-18 RX ORDER — AZITHROMYCIN 250 MG/1
250 TABLET, FILM COATED ORAL DAILY
Status: DISCONTINUED | OUTPATIENT
Start: 2020-07-19 | End: 2020-07-19

## 2020-07-18 RX ORDER — AZITHROMYCIN 250 MG/1
500 TABLET, FILM COATED ORAL DAILY
Status: COMPLETED | OUTPATIENT
Start: 2020-07-18 | End: 2020-07-18

## 2020-07-18 RX ORDER — LIDOCAINE 4 G/G
1 PATCH TOPICAL DAILY
Status: DISCONTINUED | OUTPATIENT
Start: 2020-07-18 | End: 2020-07-24 | Stop reason: HOSPADM

## 2020-07-18 RX ADMIN — ACETAMINOPHEN 650 MG: 325 TABLET, FILM COATED ORAL at 10:32

## 2020-07-18 RX ADMIN — METOPROLOL SUCCINATE 25 MG: 25 TABLET, FILM COATED, EXTENDED RELEASE ORAL at 08:47

## 2020-07-18 RX ADMIN — INSULIN LISPRO 2 UNITS: 100 INJECTION, SOLUTION INTRAVENOUS; SUBCUTANEOUS at 13:00

## 2020-07-18 RX ADMIN — Medication 10 ML: at 23:03

## 2020-07-18 RX ADMIN — AZITHROMYCIN 500 MG: 250 TABLET, FILM COATED ORAL at 16:35

## 2020-07-18 RX ADMIN — DILTIAZEM HYDROCHLORIDE 60 MG: 60 CAPSULE, EXTENDED RELEASE ORAL at 23:02

## 2020-07-18 RX ADMIN — SODIUM CHLORIDE 200 MG: 9 INJECTION, SOLUTION INTRAVENOUS at 10:28

## 2020-07-18 RX ADMIN — INSULIN LISPRO 2 UNITS: 100 INJECTION, SOLUTION INTRAVENOUS; SUBCUTANEOUS at 17:51

## 2020-07-18 RX ADMIN — INSULIN GLARGINE 5 UNITS: 100 INJECTION, SOLUTION SUBCUTANEOUS at 23:02

## 2020-07-18 RX ADMIN — DILTIAZEM HYDROCHLORIDE 60 MG: 60 CAPSULE, EXTENDED RELEASE ORAL at 08:47

## 2020-07-18 RX ADMIN — PANTOPRAZOLE SODIUM 40 MG: 40 TABLET, DELAYED RELEASE ORAL at 06:32

## 2020-07-18 RX ADMIN — INSULIN LISPRO 2 UNITS: 100 INJECTION, SOLUTION INTRAVENOUS; SUBCUTANEOUS at 08:50

## 2020-07-18 ASSESSMENT — PAIN SCALES - GENERAL
PAINLEVEL_OUTOF10: 0
PAINLEVEL_OUTOF10: 5
PAINLEVEL_OUTOF10: 4

## 2020-07-18 NOTE — PROGRESS NOTES
Shift assessment completed. VSS. A&O. Denies pain. Respirations even and unlabored. Positive bowel sounds in all 4 quadrants. No c/o of n/v/d or constipation. Palpable pedal pulses noted, Cap refill brisk. Pt does c/o dizziness even while sitting. Reminded pt to call nurse before getting OOB. POC discussed with pt. Pt denies any further needs. Call light within reach. Bed alarm engaged.

## 2020-07-18 NOTE — PROGRESS NOTES
[facility] PROGRESS NOTE    7/18/2020 1:57 PM        Name: Mauro Beck . Admitted: 7/11/2020  Primary Care Provider: Anders Parks (Tel: [phone])                        Hospital course:  Patient is a 71 yo female with hx adenocarcinoma of the lung, CAD/stent, COPD, chronic anemia, chronic dCHF, DM, HTN, HLD, LIZETT (untreated), chronic AC (on Eliquis). recently discharged from the hospital about a month ago after being admitted here for acute renal insufficiency, presents to the hospital again with chief complaints of 2-3 day history of what she describes as subacute onset of gradually progressive increasing back pain. Patient had a fall  While in hospital. CT scans were done which showed right maxillary sinus mildly displaced oblique fracture. .     Subjective:     Started on tramadol yesterday due to her daughter's concerns of confusion with oxycodone. Patient complains today that her pain is not controlled well.     Reviewed interval ancillary notes    Current Medications  HYDROcodone-acetaminophen (NORCO) 5-325 MG per tablet 1 tablet, Q6H PRN  lidocaine 4 % external patch 1 patch, Daily  insulin glargine (LANTUS;BASAGLAR) injection pen 5 Units, Nightly  insulin lispro (1 Unit Dial) 2 Units, TID WC  insulin lispro (1 Unit Dial) 0-12 Units, TID WC  insulin lispro (1 Unit Dial) 0-6 Units, Nightly  glucose (GLUTOSE) 40 % oral gel 15 g, PRN  dextrose 50 % IV solution, PRN  glucagon (rDNA) injection 1 mg, PRN  dextrose 5 % solution, PRN  dilTIAZem (CARDIZEM 12 HR) extended release capsule 60 mg, BID  fluticasone-umeclidin-vilant (TRELEGY ELLIPTA) 100-62.5-25 MCG/INH inhaler 1 puff - PATIENT SUPPLIED, Daily  metoprolol succinate (TOPROL XL) extended release tablet 25 mg, Daily  pantoprazole (PROTONIX) tablet 40 mg, QAM AC  sodium chloride flush 0.9 % injection 10 mL, 2 times per day  sodium chloride flush 0.9 % injection 10 mL, PRN  acetaminophen (TYLENOL) tablet 650 mg, Q6H PRN    Or  acetaminophen (TYLENOL) suppository 650 mg, Q6H PRN  promethazine (PHENERGAN) tablet 12.5 mg, Q6H PRN    Or  ondansetron (ZOFRAN) injection 4 mg, Q6H PRN  cyclobenzaprine (FLEXERIL) tablet 10 mg, TID PRN        Objective:  /76   Pulse 85   Temp 98.6 °F (37 °C) (Oral)   Resp 16   Ht 5' 5\" (1.651 m)   Wt 147 lb 4.8 oz (66.8 kg)   SpO2 93%   BMI 24.51 kg/m²     Intake/Output Summary (Last 24 hours) at 7/18/2020 1357  Last data filed at 7/17/2020 1656  Gross per 24 hour   Intake 120 ml   Output --   Net 120 ml      Wt Readings from Last 3 Encounters:   07/17/20 147 lb 4.8 oz (66.8 kg)   06/14/20 140 lb 8 oz (63.7 kg)   10/08/19 142 lb (64.4 kg)       General appearance:  Appears comfortable  Eyes: Sclera clear. Pupils equal.  ENT: Moist oral mucosa. Trachea midline, no adenopathy. Cardiovascular: Regular rhythm, normal S1, S2. No murmur. No edema in lower extremities  Respiratory: Not using accessory muscles. Good inspiratory effort. Clear to auscultation bilaterally, no wheeze or crackles. GI: Abdomen soft, no tenderness, not distended, normal bowel sounds  Musculoskeletal: No cyanosis in digits, neck supple; back tenderness on palpation. Neurology: CN 2-12 grossly intact. No speech or motor deficits  Psych: Normal affect. Alert and oriented in time, place and person  Skin: Warm, dry, normal turgor    Labs and Tests:  CBC:   Recent Labs     07/16/20  0833 07/16/20  1545 07/18/20  0447   WBC  --   --  9.0   HGB 7.1* 9.0* 8.7*   HCT 22.2* 27.7* 26.4*   MCV  --   --  86.9   PLT  --   --  344     BMP:    Recent Labs     07/17/20  1313      K 4.0   CL 98*   CO2 25   BUN 22*   CREATININE 1.2   GLUCOSE 93     Hepatic:   No results for input(s): AST, ALT, ALB, BILITOT, ALKPHOS in the last 72 hours.       Problem List  Active Problems:    Back pain    Pathologic thoracic fracture, initial encounter  Resolved Problems:    * No resolved hospital problems. *     Assessment & Plan:     1. T3 and T10 compression fracture:  Neurosurgery consulted. TLSO brace advised but patient could not tolerate. PT OT consulted. Needs placement. Stop tramadol. Start on hydrocodone and lidocaine patches    2.h/o Non-small cell lung cancer. Status post chemo and radiation (completed 4/2019). Patient indicates she wants no further workup or treatment for lung cancer . Oncology on board. Repeat chest x-ray today showed persistent right sided infiltrate. COVID-19 was negative on admission. Given a dose of Lasix yesterday. Pro-Caleb at 0.2. Start on azithromycin. IS.  BNP at this time is lower than her baseline. 3.  Chronic iron deficiency anemia: Hemoglobin trending down. Patient had scopes in 2019. Stopped Eliquis after discussing with cardiology. Started on Venofer. Hemoglobin improved and around 9.    4. COPD on home oxygen:. Hx COPD with chronic respiratory failure. No evidence of acute exacerbation. was in hospice care secondary to COPD (7/2019) . O2 sats stable c/w trelegy ellipta.       5. H/o Coronary artery disease status post PCI and stenting. 6.Type 2 diabetes. A1c 7.9.  Sugars reasonably controlled. Continue current regimen. 7.Diastolic congestive heart failure. stable    8. Hypertension. C/w metoprolol     9. PAF.currently on sinus rhythm with PVCs -discussed with her cardiologist Dr. Jaime Garcia. Stopped her Eliquis due to high bleeding risk along with her anemia. 10.  CKD stage III: Stable. 11.  Right maxillary sinus mildly displaced fracture: May need outpatient ENT follow-up     Diet: DIET GENERAL;  Dietary Nutrition Supplements: Standard High Calorie Oral Supplement  Code:DNR-CC  DVT PPX - SCD's    ptot consulted - snf      Overall poor prognosis. palliative care on board. Patient is appropriate for hospice.     Disp: inpt await skilled nursing facility placement      [facility] Sofie Latif MD   7/18/2020 1:57 PM

## 2020-07-18 NOTE — PROGRESS NOTES
Incentive Spirometry education and demonstration completed by Respiratory Therapy Yes      Response to education: Poor     Teaching Time: 5minutes    Minimum Predicted Vital Capacity - 570 mL. Patient's Actual Vital Capacity - 0 mL.  Turning over to Nursing for routine follow-up No.    Comments: Pt unable to move device with good effort, but will still continue to  pt     Electronically signed by Radha Leary RCP on 7/18/2020 at 4:53 PM

## 2020-07-19 LAB
GLUCOSE BLD-MCNC: 114 MG/DL (ref 70–99)
GLUCOSE BLD-MCNC: 128 MG/DL (ref 70–99)
GLUCOSE BLD-MCNC: 164 MG/DL (ref 70–99)
GLUCOSE BLD-MCNC: 210 MG/DL (ref 70–99)
HCT VFR BLD CALC: 27.8 % (ref 36–48)
HEMOGLOBIN: 9 G/DL (ref 12–16)
PERFORMED ON: ABNORMAL

## 2020-07-19 PROCEDURE — 85018 HEMOGLOBIN: CPT

## 2020-07-19 PROCEDURE — 2580000003 HC RX 258: Performed by: INTERNAL MEDICINE

## 2020-07-19 PROCEDURE — 94761 N-INVAS EAR/PLS OXIMETRY MLT: CPT

## 2020-07-19 PROCEDURE — 2700000000 HC OXYGEN THERAPY PER DAY

## 2020-07-19 PROCEDURE — 36415 COLL VENOUS BLD VENIPUNCTURE: CPT

## 2020-07-19 PROCEDURE — 94760 N-INVAS EAR/PLS OXIMETRY 1: CPT

## 2020-07-19 PROCEDURE — 6370000000 HC RX 637 (ALT 250 FOR IP): Performed by: INTERNAL MEDICINE

## 2020-07-19 PROCEDURE — 1200000000 HC SEMI PRIVATE

## 2020-07-19 PROCEDURE — 6360000002 HC RX W HCPCS: Performed by: INTERNAL MEDICINE

## 2020-07-19 PROCEDURE — 85014 HEMATOCRIT: CPT

## 2020-07-19 PROCEDURE — 94640 AIRWAY INHALATION TREATMENT: CPT

## 2020-07-19 RX ORDER — ALBUTEROL SULFATE 90 UG/1
2 AEROSOL, METERED RESPIRATORY (INHALATION) 4 TIMES DAILY
Status: DISCONTINUED | OUTPATIENT
Start: 2020-07-19 | End: 2020-07-19

## 2020-07-19 RX ORDER — PREDNISONE 10 MG/1
10 TABLET ORAL DAILY
Status: DISCONTINUED | OUTPATIENT
Start: 2020-07-19 | End: 2020-07-24 | Stop reason: HOSPADM

## 2020-07-19 RX ORDER — ALBUTEROL SULFATE 90 UG/1
2 AEROSOL, METERED RESPIRATORY (INHALATION) EVERY 4 HOURS PRN
Status: DISCONTINUED | OUTPATIENT
Start: 2020-07-19 | End: 2020-07-24 | Stop reason: HOSPADM

## 2020-07-19 RX ORDER — ALBUTEROL SULFATE 2.5 MG/3ML
2.5 SOLUTION RESPIRATORY (INHALATION) 4 TIMES DAILY
Status: DISCONTINUED | OUTPATIENT
Start: 2020-07-19 | End: 2020-07-24 | Stop reason: HOSPADM

## 2020-07-19 RX ORDER — GUAIFENESIN 600 MG/1
600 TABLET, EXTENDED RELEASE ORAL 2 TIMES DAILY
Status: DISCONTINUED | OUTPATIENT
Start: 2020-07-19 | End: 2020-07-24 | Stop reason: HOSPADM

## 2020-07-19 RX ORDER — POLYETHYLENE GLYCOL 3350 17 G/17G
17 POWDER, FOR SOLUTION ORAL DAILY
Status: DISCONTINUED | OUTPATIENT
Start: 2020-07-19 | End: 2020-07-24 | Stop reason: HOSPADM

## 2020-07-19 RX ORDER — AZITHROMYCIN 250 MG/1
250 TABLET, FILM COATED ORAL DAILY
Status: DISCONTINUED | OUTPATIENT
Start: 2020-07-20 | End: 2020-07-22

## 2020-07-19 RX ADMIN — INSULIN GLARGINE 5 UNITS: 100 INJECTION, SOLUTION SUBCUTANEOUS at 21:45

## 2020-07-19 RX ADMIN — ALBUTEROL SULFATE 2.5 MG: 2.5 SOLUTION RESPIRATORY (INHALATION) at 20:18

## 2020-07-19 RX ADMIN — DILTIAZEM HYDROCHLORIDE 60 MG: 60 CAPSULE, EXTENDED RELEASE ORAL at 21:44

## 2020-07-19 RX ADMIN — Medication 10 ML: at 21:59

## 2020-07-19 RX ADMIN — HYDROCODONE BITARTRATE AND ACETAMINOPHEN 1 TABLET: 5; 325 TABLET ORAL at 04:18

## 2020-07-19 RX ADMIN — METOPROLOL SUCCINATE 25 MG: 25 TABLET, FILM COATED, EXTENDED RELEASE ORAL at 08:09

## 2020-07-19 RX ADMIN — INSULIN LISPRO 2 UNITS: 100 INJECTION, SOLUTION INTRAVENOUS; SUBCUTANEOUS at 12:07

## 2020-07-19 RX ADMIN — GUAIFENESIN 600 MG: 600 TABLET, EXTENDED RELEASE ORAL at 12:07

## 2020-07-19 RX ADMIN — PREDNISONE 10 MG: 10 TABLET ORAL at 17:17

## 2020-07-19 RX ADMIN — AZITHROMYCIN 250 MG: 250 TABLET, FILM COATED ORAL at 08:09

## 2020-07-19 RX ADMIN — INSULIN LISPRO 2 UNITS: 100 INJECTION, SOLUTION INTRAVENOUS; SUBCUTANEOUS at 21:46

## 2020-07-19 RX ADMIN — INSULIN LISPRO 2 UNITS: 100 INJECTION, SOLUTION INTRAVENOUS; SUBCUTANEOUS at 09:16

## 2020-07-19 RX ADMIN — POLYETHYLENE GLYCOL 3350 17 G: 17 POWDER, FOR SOLUTION ORAL at 17:17

## 2020-07-19 RX ADMIN — GUAIFENESIN 600 MG: 600 TABLET, EXTENDED RELEASE ORAL at 21:44

## 2020-07-19 RX ADMIN — PANTOPRAZOLE SODIUM 40 MG: 40 TABLET, DELAYED RELEASE ORAL at 04:18

## 2020-07-19 RX ADMIN — HYDROCODONE BITARTRATE AND ACETAMINOPHEN 1 TABLET: 5; 325 TABLET ORAL at 11:06

## 2020-07-19 RX ADMIN — DILTIAZEM HYDROCHLORIDE 60 MG: 60 CAPSULE, EXTENDED RELEASE ORAL at 08:09

## 2020-07-19 RX ADMIN — INSULIN LISPRO 2 UNITS: 100 INJECTION, SOLUTION INTRAVENOUS; SUBCUTANEOUS at 17:18

## 2020-07-19 RX ADMIN — Medication 10 ML: at 08:10

## 2020-07-19 RX ADMIN — Medication 2 PUFF: at 16:06

## 2020-07-19 ASSESSMENT — PAIN DESCRIPTION - PROGRESSION
CLINICAL_PROGRESSION: NOT CHANGED
CLINICAL_PROGRESSION: NOT CHANGED

## 2020-07-19 ASSESSMENT — PAIN SCALES - GENERAL
PAINLEVEL_OUTOF10: 8
PAINLEVEL_OUTOF10: 6
PAINLEVEL_OUTOF10: 6

## 2020-07-19 NOTE — PROGRESS NOTES
Shift assessment complete. VSS. Scheduled medications given. No signs of distress. Bed alarm on. Call light within reach. Will continue to monitor.

## 2020-07-19 NOTE — PROGRESS NOTES
Routine vitals stable. Scheduled medications given. Pt denies any further needs at this time. Bed alarm on. Daughter at bedside. Call light within reach. Will continue to monitor.

## 2020-07-19 NOTE — PROGRESS NOTES
[facility]    7/19/2020 1:39 PM        Name: Phi Moser . Admitted: 7/11/2020  Primary Care Provider: Johnna Penny (Tel: [phone])                        Hospital course:  Patient is a 71 yo female with hx adenocarcinoma of the lung, CAD/stent, COPD, chronic anemia, chronic dCHF, DM, HTN, HLD, LIZETT (untreated), chronic AC (on Eliquis). recently discharged from the hospital about a month ago after being admitted here for acute renal insufficiency, presents to the hospital again with chief complaints of 2-3 day history of what she describes as subacute onset of gradually progressive increasing back pain. Admitted with T3 and T10 compression fractures. Neurosurgery consulted. TLSO brace advised but patient could not tolerate. PT OT consulted. Needs skilled nursing facility placement. On pain medications PRN. Started on antibiotics for possible pneumonia      Subjective:     States her back pain is still bothering her. Has mild cough. On 1 L oxygen. Has not been using incentive spirometry as advised. Says she was wheezing this morning.     Reviewed interval ancillary notes    Current Medications  [START ON 7/20/2020] azithromycin (ZITHROMAX) tablet 250 mg, Daily  guaiFENesin (MUCINEX) extended release tablet 600 mg, BID  albuterol sulfate  (90 Base) MCG/ACT inhaler 2 puff, 4x daily  HYDROcodone-acetaminophen (NORCO) 5-325 MG per tablet 1 tablet, Q6H PRN  lidocaine 4 % external patch 1 patch, Daily  insulin glargine (LANTUS;BASAGLAR) injection pen 5 Units, Nightly  insulin lispro (1 Unit Dial) 2 Units, TID WC  insulin lispro (1 Unit Dial) 0-12 Units, TID WC  insulin lispro (1 Unit Dial) 0-6 Units, Nightly  glucose (GLUTOSE) 40 % oral gel 15 g, PRN  dextrose 50 % IV solution, PRN  glucagon (rDNA) injection 1 mg, PRN  dextrose 5 % solution, PRN  dilTIAZem (CARDIZEM 12 HR) extended release capsule 60 mg, BID  fluticasone-umeclidin-vilant (TRELEGY ELLIPTA) 100-62.5-25 MCG/INH inhaler 1 puff - PATIENT SUPPLIED, Daily  metoprolol succinate (TOPROL XL) extended release tablet 25 mg, Daily  pantoprazole (PROTONIX) tablet 40 mg, QAM AC  sodium chloride flush 0.9 % injection 10 mL, 2 times per day  sodium chloride flush 0.9 % injection 10 mL, PRN  acetaminophen (TYLENOL) tablet 650 mg, Q6H PRN    Or  acetaminophen (TYLENOL) suppository 650 mg, Q6H PRN  promethazine (PHENERGAN) tablet 12.5 mg, Q6H PRN    Or  ondansetron (ZOFRAN) injection 4 mg, Q6H PRN  cyclobenzaprine (FLEXERIL) tablet 10 mg, TID PRN        Objective:  BP (!) 148/76   Pulse 74   Temp 98 °F (36.7 °C) (Axillary)   Resp 18   Ht 5' 5\" (1.651 m)   Wt 150 lb 4.8 oz (68.2 kg)   SpO2 92%   BMI 25.01 kg/m²     Intake/Output Summary (Last 24 hours) at 7/19/2020 1339  Last data filed at 7/19/2020 1311  Gross per 24 hour   Intake 360 ml   Output --   Net 360 ml      Wt Readings from Last 3 Encounters:   07/19/20 150 lb 4.8 oz (68.2 kg)   06/14/20 140 lb 8 oz (63.7 kg)   10/08/19 142 lb (64.4 kg)       General appearance:  Appears comfortable  Eyes: Sclera clear. Pupils equal.  ENT: Moist oral mucosa. Trachea midline, no adenopathy. Cardiovascular: Regular rhythm, normal S1, S2. No murmur. No edema in lower extremities  Respiratory: Not using accessory muscles. Basilar crackles noted, more on the right side,  Posteriorly  GI: Abdomen soft, no tenderness, not distended, normal bowel sounds  Musculoskeletal: No cyanosis in digits, neck supple; back tenderness on palpation. Neurology: CN 2-12 grossly intact. No speech or motor deficits  Psych: Normal affect.  Alert and oriented in time, place and person  Skin: Warm, dry, normal turgor    Labs and Tests:  CBC:   Recent Labs     07/16/20  1545 07/18/20  0447 07/19/20  0537   WBC  --  9.0  --    HGB 9.0* 8.7* 9.0*   HCT 27.7* 26.4* 27.8*   MCV --  86.9  --    PLT  --  344  --      BMP:    Recent Labs     07/17/20  1313      K 4.0   CL 98*   CO2 25   BUN 22*   CREATININE 1.2   GLUCOSE 93     Hepatic:   No results for input(s): AST, ALT, ALB, BILITOT, ALKPHOS in the last 72 hours. Problem List  Active Problems:    Back pain    Pathologic thoracic fracture, initial encounter  Resolved Problems:    * No resolved hospital problems. *     Assessment & Plan:     1. T3 and T10 compression fracture:  Neurosurgery consulted. TLSO brace advised but patient could not tolerate. PT OT consulted. Needs SNF placement. cont  hydrocodone prn and lidocaine patches    2.h/o Non-small cell lung cancer. Status post chemo and radiation (completed 4/2019). Patient indicates she wants no further workup or treatment for lung cancer . Oncology on board. Repeat chest x-ray today showed persistent right sided infiltrate. COVID-19 was negative on admission. Pro-Caleb at 0.2. Cont azithromycin. IS. Add Mucinex and albuterol inhalers. Encouraged use of inspirometer. 3.  Chronic iron deficiency anemia: Hemoglobin trending down. Patient had scopes in 2019. Stopped Eliquis after discussing with cardiology. Given venofer. Hemoglobin improved and around 9. Needs iron supl at discharge     4. COPD/ chronic respiratory failure:    c/w trelegy ellipta. Albuterol as needed added.      5. H/o Coronary artery disease status post PCI and stenting. 6.Type 2 diabetes. A1c 7.9.  Sugars reasonably controlled. Continue current regimen. 7.Diastolic congestive heart failure. stable    8. Hypertension. C/w metoprolol     9. PAF.currently on sinus rhythm with PVCs -discussed with her cardiologist Dr. Montana File. Stopped her Eliquis due to high bleeding risk along with her anemia. 10.  CKD stage III: Stable.     11.  Right maxillary sinus mildly displaced fracture: May need outpatient ENT follow-up     Diet: DIET GENERAL;  Dietary Nutrition Supplements: Standard High Calorie Oral Supplement  Code:DNR-CC  DVT PPX - SCD's    ptot consulted - snf      Overall poor prognosis. palliative care on board.        Disp: inpt await skilled nursing facility placement      Ryan Jiménez MD   7/19/2020 1:39 PM

## 2020-07-20 PROBLEM — S02.401S: Status: ACTIVE | Noted: 2020-07-20

## 2020-07-20 PROBLEM — J18.9 PNA (PNEUMONIA): Status: ACTIVE | Noted: 2020-07-20

## 2020-07-20 PROBLEM — N17.9 ACUTE KIDNEY INJURY SUPERIMPOSED ON CKD (HCC): Status: ACTIVE | Noted: 2020-07-20

## 2020-07-20 PROBLEM — R13.10 DYSPHAGIA: Status: ACTIVE | Noted: 2020-07-20

## 2020-07-20 PROBLEM — N18.9 ACUTE KIDNEY INJURY SUPERIMPOSED ON CKD (HCC): Status: ACTIVE | Noted: 2020-07-20

## 2020-07-20 LAB
ANION GAP SERPL CALCULATED.3IONS-SCNC: 13 MMOL/L (ref 3–16)
BANDED NEUTROPHILS RELATIVE PERCENT: 1 % (ref 0–7)
BASOPHILS ABSOLUTE: 0 K/UL (ref 0–0.2)
BASOPHILS RELATIVE PERCENT: 0 %
BUN BLDV-MCNC: 19 MG/DL (ref 7–20)
CALCIUM SERPL-MCNC: 9.6 MG/DL (ref 8.3–10.6)
CHLORIDE BLD-SCNC: 98 MMOL/L (ref 99–110)
CO2: 27 MMOL/L (ref 21–32)
CREAT SERPL-MCNC: 1.3 MG/DL (ref 0.6–1.2)
EOSINOPHILS ABSOLUTE: 0.3 K/UL (ref 0–0.6)
EOSINOPHILS RELATIVE PERCENT: 4 %
GFR AFRICAN AMERICAN: 48
GFR NON-AFRICAN AMERICAN: 40
GLUCOSE BLD-MCNC: 133 MG/DL (ref 70–99)
GLUCOSE BLD-MCNC: 175 MG/DL (ref 70–99)
GLUCOSE BLD-MCNC: 176 MG/DL (ref 70–99)
GLUCOSE BLD-MCNC: 191 MG/DL (ref 70–99)
GLUCOSE BLD-MCNC: 337 MG/DL (ref 70–99)
HCT VFR BLD CALC: 27.4 % (ref 36–48)
HCT VFR BLD CALC: 28 % (ref 36–48)
HEMOGLOBIN: 8.9 G/DL (ref 12–16)
HEMOGLOBIN: 9 G/DL (ref 12–16)
LYMPHOCYTES ABSOLUTE: 0.9 K/UL (ref 1–5.1)
LYMPHOCYTES RELATIVE PERCENT: 12 %
MCH RBC QN AUTO: 27.9 PG (ref 26–34)
MCHC RBC AUTO-ENTMCNC: 32.2 G/DL (ref 31–36)
MCV RBC AUTO: 86.5 FL (ref 80–100)
MONOCYTES ABSOLUTE: 0.4 K/UL (ref 0–1.3)
MONOCYTES RELATIVE PERCENT: 5 %
NEUTROPHILS ABSOLUTE: 6.2 K/UL (ref 1.7–7.7)
NEUTROPHILS RELATIVE PERCENT: 78 %
PDW BLD-RTO: 19.7 % (ref 12.4–15.4)
PERFORMED ON: ABNORMAL
PLATELET # BLD: 417 K/UL (ref 135–450)
PLATELET SLIDE REVIEW: ADEQUATE
PMV BLD AUTO: 7.8 FL (ref 5–10.5)
POTASSIUM SERPL-SCNC: 4.8 MMOL/L (ref 3.5–5.1)
RBC # BLD: 3.23 M/UL (ref 4–5.2)
SLIDE REVIEW: ABNORMAL
SODIUM BLD-SCNC: 138 MMOL/L (ref 136–145)
WBC # BLD: 7.9 K/UL (ref 4–11)

## 2020-07-20 PROCEDURE — 6360000002 HC RX W HCPCS: Performed by: INTERNAL MEDICINE

## 2020-07-20 PROCEDURE — 2580000003 HC RX 258: Performed by: INTERNAL MEDICINE

## 2020-07-20 PROCEDURE — 1200000000 HC SEMI PRIVATE

## 2020-07-20 PROCEDURE — 85025 COMPLETE CBC W/AUTO DIFF WBC: CPT

## 2020-07-20 PROCEDURE — 85014 HEMATOCRIT: CPT

## 2020-07-20 PROCEDURE — 94760 N-INVAS EAR/PLS OXIMETRY 1: CPT

## 2020-07-20 PROCEDURE — 80048 BASIC METABOLIC PNL TOTAL CA: CPT

## 2020-07-20 PROCEDURE — 36415 COLL VENOUS BLD VENIPUNCTURE: CPT

## 2020-07-20 PROCEDURE — 6370000000 HC RX 637 (ALT 250 FOR IP): Performed by: INTERNAL MEDICINE

## 2020-07-20 PROCEDURE — 2700000000 HC OXYGEN THERAPY PER DAY

## 2020-07-20 PROCEDURE — 85018 HEMOGLOBIN: CPT

## 2020-07-20 PROCEDURE — 94640 AIRWAY INHALATION TREATMENT: CPT

## 2020-07-20 RX ADMIN — METOPROLOL SUCCINATE 25 MG: 25 TABLET, FILM COATED, EXTENDED RELEASE ORAL at 10:20

## 2020-07-20 RX ADMIN — GUAIFENESIN 600 MG: 600 TABLET, EXTENDED RELEASE ORAL at 22:04

## 2020-07-20 RX ADMIN — PREDNISONE 10 MG: 10 TABLET ORAL at 10:20

## 2020-07-20 RX ADMIN — INSULIN LISPRO 2 UNITS: 100 INJECTION, SOLUTION INTRAVENOUS; SUBCUTANEOUS at 10:24

## 2020-07-20 RX ADMIN — INSULIN GLARGINE 5 UNITS: 100 INJECTION, SOLUTION SUBCUTANEOUS at 22:03

## 2020-07-20 RX ADMIN — POLYETHYLENE GLYCOL 3350 17 G: 17 POWDER, FOR SOLUTION ORAL at 10:19

## 2020-07-20 RX ADMIN — INSULIN LISPRO 8 UNITS: 100 INJECTION, SOLUTION INTRAVENOUS; SUBCUTANEOUS at 17:12

## 2020-07-20 RX ADMIN — Medication 10 ML: at 10:19

## 2020-07-20 RX ADMIN — INSULIN LISPRO 2 UNITS: 100 INJECTION, SOLUTION INTRAVENOUS; SUBCUTANEOUS at 17:13

## 2020-07-20 RX ADMIN — GUAIFENESIN 600 MG: 600 TABLET, EXTENDED RELEASE ORAL at 10:20

## 2020-07-20 RX ADMIN — INSULIN LISPRO 1 UNITS: 100 INJECTION, SOLUTION INTRAVENOUS; SUBCUTANEOUS at 22:03

## 2020-07-20 RX ADMIN — ALBUTEROL SULFATE 2.5 MG: 2.5 SOLUTION RESPIRATORY (INHALATION) at 08:10

## 2020-07-20 RX ADMIN — AZITHROMYCIN 250 MG: 250 TABLET, FILM COATED ORAL at 10:20

## 2020-07-20 RX ADMIN — ALBUTEROL SULFATE 2.5 MG: 2.5 SOLUTION RESPIRATORY (INHALATION) at 11:48

## 2020-07-20 RX ADMIN — PANTOPRAZOLE SODIUM 40 MG: 40 TABLET, DELAYED RELEASE ORAL at 06:52

## 2020-07-20 RX ADMIN — INSULIN LISPRO 2 UNITS: 100 INJECTION, SOLUTION INTRAVENOUS; SUBCUTANEOUS at 13:04

## 2020-07-20 RX ADMIN — DILTIAZEM HYDROCHLORIDE 60 MG: 60 CAPSULE, EXTENDED RELEASE ORAL at 10:19

## 2020-07-20 RX ADMIN — ALBUTEROL SULFATE 2.5 MG: 2.5 SOLUTION RESPIRATORY (INHALATION) at 15:08

## 2020-07-20 RX ADMIN — DILTIAZEM HYDROCHLORIDE 60 MG: 60 CAPSULE, EXTENDED RELEASE ORAL at 22:04

## 2020-07-20 ASSESSMENT — PAIN DESCRIPTION - PROGRESSION
CLINICAL_PROGRESSION: NOT CHANGED

## 2020-07-20 NOTE — CARE COORDINATION
Chart reviewed for discharge planning. Barrier(s) to discharge-pt having further testing: MRI Brain, modified barium swallow, MRI thoracic spine   Tentative discharge plan-to [facility] on d/c   Tentative discharge date- when medically stable     *Case management will continue to follow progress and update discharge plan as needed.     Herman Parker RN, BSN  [phone]

## 2020-07-20 NOTE — ACP (ADVANCE CARE PLANNING)
Advanced Care Planning Note. Purpose of Encounter: Advanced care planning in light of lung adenocarcinoma  Parties In Attendance: Patient, daughter Nicolas Thompson on phone  Decisional Capacity: Limited  Subjective: Patient with back pain  Objective: Cr 1.2  Goals of Care Determination: Patient/POA want limited support (NO CPR, no vent, no HD, consider Surgery, no trach, no PEG)  Plan:  Check MRI Brain and MRI T spine. Abx. SLP eval  Code Status: DNR CC   Time spent on Advanced care Plannin minutes  Advanced Care Planning Documents: Completed advanced directives on chart, daughter is the POA.     Camila Dixon MD  2020 7:55 AM

## 2020-07-20 NOTE — CARE COORDINATION
CM returned call to Kiko Graham 141 at [facility] and pt has been approved to go to facility at d/c. Perfect serve message sent to Dr Godfrey Disla.      Carmen Caballero RN, BSN  [phone]

## 2020-07-20 NOTE — DISCHARGE INSTR - COC
Continuity of Care Form    Patient Name: Vasyl Nguyen   :  1940  MRN:  [id number]    Admit date:  2020  Discharge date:  2020      Code Status Order: [facility]   Advance Directives:   [facility] Documentation     Date/Time Healthcare Directive Type of Healthcare Directive Copy in [address] Agent's Name Healthcare Agent's Phone Number    [phone]  Yes, patient has an advance directive for healthcare treatment  Health care treatment directive  Yes, copy in chart  --  --  --          Admitting Physician:  Otilia Augustin MD  PCP: Geo Escudero    Discharging Nurse: Marybeth Peña 23 Unit/Room#: 9PO-[id number]/9320-25  Discharging Unit Phone Number: [phone]    Emergency Contact:   Extended Emergency Contact Information  Primary Emergency Contact: Neli Latoya States of [address] Phone: [phone]  Relation: Child  Secondary Emergency Contact: Santy Pastmariel States  [address]  Phone: [phone]  Relation: Child    Past Surgical History:  Past Surgical History:   Procedure Laterality Date    BACK SURGERY  ,     lumbar laminectomy    BRONCHOSCOPY  2018    BRONCHOSCOPY N/A 2019    BRONCHOSCOPY BIOPSY BRONCHUS performed by Tariq Gomez MD at  [facility]  2019    BRONCHOSCOPY/TRANSBRONCHIAL NEEDLE BIOPSY performed by Tariq Gomez MD at  [facility]  2019    BRONCHOSCOPY ULTRASOUND performed by Tariq Gomez MD at [address] [place] [address] 2019    BRONCHOSCOPY ALVEOLAR LAVAGE performed by Tariq Gomez MD at [address] [place] [address] 2019    BRONCHOSCOPY ALVEOLAR LAVAGE performed by Nadine Newell MD at  [facility]  2019    BRONCHOSCOPY BIOPSY BRONCHUS performed by Nadine Newell MD at [address] [place] [address] BRONCHOSCOPY  9/27/2019    BRONCHOSCOPY BRUSHINGS performed by Valeria White MD at [facility], LAPAROSCOPIC N/A 12/19/2018    LAPAROSCOPIC CHOLECYSTECTOMY WITH CHOLANGIOGRAM performed by Bianca Elliott MD at [facility] COLONOSCOPY N/A 2/25/2019    COLONOSCOPY WITH BIOPSY performed by Cristela Blevins MD at [facility] COLONOSCOPY  2/25/2019    COLONOSCOPY POLYPECTOMY SNARE/COLD BIOPSY performed by Cristela Blevins MD at [facility]  2000, 2001    [place] [address] INCL FLUOR GDNCE DX W/CELL [facility] N/A 12/4/2018    BRONCHOSCOPY WITH LAVAGE performed by Liberty Gauthier MD at [place] [address] N/A 2/25/2019    EGD BIOPSY performed by Cristela Blevins MD at [place] [address] N/A 8/7/2019    EGD DIAGNOSTIC ONLY performed by Nan Vasquez MD at [facility]       Immunization History:   Immunization History   Administered Date(s) Administered    Influenza Vaccine, unspecified formulation 10/02/2018    Influenza Virus Vaccine 10/30/2013, 10/15/2014, 04/20/2017    Influenza, Quadv, IM, PF (6 mo and older Fluzone, Flulaval, Fluarix, and 3 yrs and older Afluria) 10/01/2019    Pneumococcal Conjugate 13-valent ([id number]) 06/05/2015    Pneumococcal Polysaccharide ([id number]) 08/30/2013       Active Problems:  Patient Active Problem List   Diagnosis Code    DM (diabetes mellitus), secondary, uncontrolled, w/neurologic complic ([facility] [address] H67.38, F92.34    Dyslipidemia E78.5    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    HTN (hypertension), benign I10    Coronary atherosclerosis of native coronary artery I25.10    Obstructive sleep apnea G47.33    Chest pain R07.9    Carpal tunnel syndrome G56.00    SOB (shortness of breath) R06.02    COPD, 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    Influenza J11.1    Paroxysmal atrial fibrillation (HCC) I48.0    Chronic cough R05    Hilar mass R91.8    Acute cholecystitis K81.0    Gallstones and inflammation of gallbladder without obstruction K80.00    Atrial fibrillation with rapid ventricular response (HCC) I48.91    Acute respiratory failure with hypoxia (HCC) J96.01    Centrilobular emphysema (HCC) J43.2    Ileus following gastrointestinal surgery ([facility]) K91.89, K56.7    Mediastinal adenopathy R59.0    COPD exacerbation (HCC) J44.1    Adenocarcinoma of left lung (HCC) C34.92    Mild malnutrition (HCC) E44.1    Loss of consciousness ([facility]) R40.20    Syncope and collapse R55    Subacute pulmonary embolism (HCC) I26.99    Other pulmonary embolism without acute cor pulmonale ([facility]) I26.99    Closed compression fracture of thoracic vertebra ([facility]) S22.000A    Chronic deep vein thrombosis (DVT) of both lower extremities ([facility]) I82.503    Ataxia R27.0    Recurrent falls R29.6    Diabetic peripheral neuropathy ([facility]) E11.42    Severe malnutrition ([facility]) E43    Pneumonia of left lower lobe due to infectious organism ([facility]) J18.1    Abnormal CT of the chest R93.89    Chronic diastolic heart failure ([facility]) I50.32    Failure to thrive (0-17) R62.51    Acute on chronic respiratory failure with hypoxia (HCC) J96.21    Chronic respiratory failure with hypoxia (HCC) J96.11    Lung infiltrate on CT R91.8    Non-small cell cancer of left lung (HCC) C34.92    Acute encephalopathy G93.40    Weakness R53.1    Radiation pneumonitis (HCC) J70.0    Hypokalemia E87.6    Gastroparesis K31.84    Infection requiring airborne isolation precautions B99.9    Malignant neoplasm of left lung (HCC) C34.92    Chronic obstructive pulmonary disease with acute lower respiratory infection (HCC) J44.0    Acid fast bacillus A31.9    Poorly controlled type 2 diabetes mellitus (HCC) E11.65    History of depression Z86.59    SIRS (systemic inflammatory response syndrome) ([facility]) R65.10    Diabetes education, encounter for Z71.89    Depression F32.9   

## 2020-07-20 NOTE — PROGRESS NOTES
[facility]    7/20/2020 7:55 AM        Name: John Adan . Admitted: 7/11/2020  Primary Care Provider: Angelica Christine (Tel: [phone])    Brief Course:  71 yo F with history of severe COPD, chronic respiratory failure with hypoxia on 2 L NC at all times, Afib/h/o PE/h/o BL DVT on Eliquis, recurrent falls, left lung adenocarcinoma s/p chemo and radiation, LIZETT, chronic D CHF, HTN, h/o cardiac arrest with possible hypoxic brain injury, gastroparesis, CKD 3, CAD, DM 2 was brought to ER for back pain. Followed by Oncology and Neurosurgery. Patient transfused 1 U PRBC on 7/16. COVID 19 negative on 7/11. Started on Abx for PNA. NS recommends TLSO, patient could not tolerate. Found to have T3 and T10 fractures. Ordered for MRI Brain and T spine to exclude metastatic disease. SLP eval requested to evaluate dysphagia to r/o recurrent aspiration PNA. Had fall in hospital on 7/14 with mildly displaced acute R maxillary sinus fracture. CC:  Back pain    Subjective:  . Patient is anxious. No CP, SOB, HA or fevers. Has back pain.     Reviewed interval ancillary notes    Current Medications  azithromycin (ZITHROMAX) tablet 250 mg, Daily  guaiFENesin (MUCINEX) extended release tablet 600 mg, BID  albuterol sulfate  (90 Base) MCG/ACT inhaler 2 puff, Q4H PRN  albuterol (PROVENTIL) nebulizer solution 2.5 mg, 4x daily  predniSONE (DELTASONE) tablet 10 mg, Daily  polyethylene glycol (GLYCOLAX) packet 17 g, Daily  HYDROcodone-acetaminophen (NORCO) 5-325 MG per tablet 1 tablet, Q6H PRN  lidocaine 4 % external patch 1 patch, Daily  insulin glargine (LANTUS;BASAGLAR) injection pen 5 Units, Nightly  insulin lispro (1 Unit Dial) 2 Units, TID WC  insulin lispro (1 Unit Dial) 0-12 Units, TID WC  insulin lispro (1 Unit Dial) 0-6 Units, Nightly  glucose (GLUTOSE) 40 % oral gel 15 g, PRN  dextrose 50 % IV solution, PRN  glucagon (rDNA) injection 1 mg, PRN  dextrose 5 % solution, PRN  dilTIAZem (CARDIZEM 12 HR) extended release capsule 60 mg, BID  fluticasone-umeclidin-vilant (TRELEGY ELLIPTA) 100-62.5-25 MCG/INH inhaler 1 puff - PATIENT SUPPLIED, Daily  metoprolol succinate (TOPROL XL) extended release tablet 25 mg, Daily  pantoprazole (PROTONIX) tablet 40 mg, QAM AC  sodium chloride flush 0.9 % injection 10 mL, 2 times per day  sodium chloride flush 0.9 % injection 10 mL, PRN  acetaminophen (TYLENOL) tablet 650 mg, Q6H PRN    Or  acetaminophen (TYLENOL) suppository 650 mg, Q6H PRN  promethazine (PHENERGAN) tablet 12.5 mg, Q6H PRN    Or  ondansetron (ZOFRAN) injection 4 mg, Q6H PRN  cyclobenzaprine (FLEXERIL) tablet 10 mg, TID PRN        Objective:  BP (!) 155/86   Pulse 95   Temp 98.1 °F (36.7 °C) (Oral)   Resp 18   Ht 5' 5\" (1.651 m)   Wt 147 lb 9.6 oz (67 kg)   SpO2 94%   BMI 24.56 kg/m²     Intake/Output Summary (Last 24 hours) at 7/20/2020 0755  Last data filed at 7/19/2020 1311  Gross per 24 hour   Intake 360 ml   Output --   Net 360 ml      Wt Readings from Last 3 Encounters:   07/20/20 147 lb 9.6 oz (67 kg)   06/14/20 140 lb 8 oz (63.7 kg)   10/08/19 142 lb (64.4 kg)       General appearance:  Appears comfortable, sitting in bed, pleasant  Eyes: Sclera clear. Pupils equal.  ENT: Moist oral mucosa. Trachea midline, no adenopathy. Cardiovascular: Regular rhythm, normal S1, S2. No murmur. No edema in lower extremities  Respiratory: Very poor AE BL.  L sided rhonchi. No wheezing or rales appreciated  GI: Abdomen soft, no tenderness, not distended, normal bowel sounds  Musculoskeletal: No cyanosis in digits, neck supple  Neurology: Grossly intact. No speech or motor deficits  Psych: Depressed affect. Alert and oriented in time, place and person  Skin: Warm, dry, normal turgor. R maxillary contusion  Extremity exam shows brisk capillary refill.   Peripheral pulses are palpable in lower extremities A lymphoproliferative disorder such   as lymphoma cannot be excluded. 6. No obstruction, perforation, or abscess. 7. The T3 and T10 vertebral bodies demonstrate subtle new height loss or   superior endplate concavity which may represent insufficiency versus   compression fractures. If clinically indicated this could be further   evaluated with MRI of the thoracic spine. XR CHEST PORTABLE   Final Result   New focal opacity in the right lower lung. Linear opacities in the right   upper lung and left perihilar region otherwise stable. Given history of lung   cancer would recommend further evaluation with CT. Problem List  Principal Problem:    Intractable back pain  Active Problems:    Dyslipidemia    HTN (hypertension), benign    Coronary atherosclerosis of native coronary artery    DM (diabetes mellitus), secondary, uncontrolled, w/neurologic complic (HCC)    Mitral and aortic valve disease    Obstructive sleep apnea    COPD, severe (HCC)    Paroxysmal atrial fibrillation (HCC)    Adenocarcinoma of left lung (HCC)    Closed compression fracture of thoracic vertebra (HCC)    Chronic deep vein thrombosis (DVT) of both lower extremities (HCC)    Chronic diastolic heart failure (HCC)    Acute on chronic respiratory failure with hypoxia (HCC)    Depression    Pathologic thoracic fracture, initial encounter    Maxillary sinus fracture, sequela (HCC)  Resolved Problems:    * No resolved hospital problems. *       Assessment & Plan:   1. Check MRI Brain and T spine to r/o metastatic disease  2. SLP eval for dysphagia  3. Cont Zithromax Day 3/5  4. Off Eliquis at this time due to anemia and fall  5. Check MBS for dysphagia  6. Cont Trelegy and Albuterol  7.  Cont Prednisone      IV Access: Peripheral  Means: No  Diet: DIET GENERAL;  Dietary Nutrition Supplements: Standard High Calorie Oral Supplement  Code:DNR-CC  DVT PPX SCD  Disposition [facility]    Discussed with patient, nursing and CM.    D/W Poonam Roxanas (daughter who is RN) in detail. Patient has been in hospice care for COPD. Has been at home with daughter as primary care taker. Will f/u MRI Brain and T spine. Consider resuming anticoagulation if Hg stable. SNF hopefully in next 48-72 hrs.       Guy Sanders MD   7/20/2020 7:55 AM

## 2020-07-21 ENCOUNTER — APPOINTMENT (OUTPATIENT)
Dept: MRI IMAGING | Age: 80
DRG: 477 | End: 2020-07-21
Payer: MEDICARE

## 2020-07-21 ENCOUNTER — APPOINTMENT (OUTPATIENT)
Dept: GENERAL RADIOLOGY | Age: 80
DRG: 477 | End: 2020-07-21
Payer: MEDICARE

## 2020-07-21 LAB
ANION GAP SERPL CALCULATED.3IONS-SCNC: 12 MMOL/L (ref 3–16)
BASOPHILS ABSOLUTE: 0.1 K/UL (ref 0–0.2)
BASOPHILS RELATIVE PERCENT: 0.8 %
BUN BLDV-MCNC: 24 MG/DL (ref 7–20)
CALCIUM SERPL-MCNC: 9.5 MG/DL (ref 8.3–10.6)
CHLORIDE BLD-SCNC: 98 MMOL/L (ref 99–110)
CO2: 30 MMOL/L (ref 21–32)
CREAT SERPL-MCNC: 1.3 MG/DL (ref 0.6–1.2)
EOSINOPHILS ABSOLUTE: 1.5 K/UL (ref 0–0.6)
EOSINOPHILS RELATIVE PERCENT: 14.4 %
GFR AFRICAN AMERICAN: 48
GFR NON-AFRICAN AMERICAN: 40
GLUCOSE BLD-MCNC: 155 MG/DL (ref 70–99)
GLUCOSE BLD-MCNC: 172 MG/DL (ref 70–99)
GLUCOSE BLD-MCNC: 173 MG/DL (ref 70–99)
GLUCOSE BLD-MCNC: 270 MG/DL (ref 70–99)
GLUCOSE BLD-MCNC: 292 MG/DL (ref 70–99)
GLUCOSE BLD-MCNC: 302 MG/DL (ref 70–99)
HCT VFR BLD CALC: 26.7 % (ref 36–48)
HEMOGLOBIN: 8.8 G/DL (ref 12–16)
LYMPHOCYTES ABSOLUTE: 1.4 K/UL (ref 1–5.1)
LYMPHOCYTES RELATIVE PERCENT: 13.6 %
MCH RBC QN AUTO: 29.2 PG (ref 26–34)
MCHC RBC AUTO-ENTMCNC: 33 G/DL (ref 31–36)
MCV RBC AUTO: 88.5 FL (ref 80–100)
MONOCYTES ABSOLUTE: 0.8 K/UL (ref 0–1.3)
MONOCYTES RELATIVE PERCENT: 7.6 %
NEUTROPHILS ABSOLUTE: 6.7 K/UL (ref 1.7–7.7)
NEUTROPHILS RELATIVE PERCENT: 63.6 %
PDW BLD-RTO: 20 % (ref 12.4–15.4)
PERFORMED ON: ABNORMAL
PLATELET # BLD: 411 K/UL (ref 135–450)
PMV BLD AUTO: 7.2 FL (ref 5–10.5)
POTASSIUM SERPL-SCNC: 4.1 MMOL/L (ref 3.5–5.1)
RBC # BLD: 3.02 M/UL (ref 4–5.2)
SODIUM BLD-SCNC: 140 MMOL/L (ref 136–145)
WBC # BLD: 10.5 K/UL (ref 4–11)

## 2020-07-21 PROCEDURE — 36415 COLL VENOUS BLD VENIPUNCTURE: CPT

## 2020-07-21 PROCEDURE — 97535 SELF CARE MNGMENT TRAINING: CPT

## 2020-07-21 PROCEDURE — 92523 SPEECH SOUND LANG COMPREHEN: CPT

## 2020-07-21 PROCEDURE — 92611 MOTION FLUOROSCOPY/SWALLOW: CPT

## 2020-07-21 PROCEDURE — 80048 BASIC METABOLIC PNL TOTAL CA: CPT

## 2020-07-21 PROCEDURE — 2580000003 HC RX 258: Performed by: INTERNAL MEDICINE

## 2020-07-21 PROCEDURE — 2700000000 HC OXYGEN THERAPY PER DAY

## 2020-07-21 PROCEDURE — 97116 GAIT TRAINING THERAPY: CPT

## 2020-07-21 PROCEDURE — 70551 MRI BRAIN STEM W/O DYE: CPT

## 2020-07-21 PROCEDURE — 94640 AIRWAY INHALATION TREATMENT: CPT

## 2020-07-21 PROCEDURE — 74230 X-RAY XM SWLNG FUNCJ C+: CPT

## 2020-07-21 PROCEDURE — 6370000000 HC RX 637 (ALT 250 FOR IP): Performed by: INTERNAL MEDICINE

## 2020-07-21 PROCEDURE — 6360000002 HC RX W HCPCS: Performed by: INTERNAL MEDICINE

## 2020-07-21 PROCEDURE — 72146 MRI CHEST SPINE W/O DYE: CPT

## 2020-07-21 PROCEDURE — 85025 COMPLETE CBC W/AUTO DIFF WBC: CPT

## 2020-07-21 PROCEDURE — 1200000000 HC SEMI PRIVATE

## 2020-07-21 PROCEDURE — 92526 ORAL FUNCTION THERAPY: CPT

## 2020-07-21 PROCEDURE — 94761 N-INVAS EAR/PLS OXIMETRY MLT: CPT

## 2020-07-21 PROCEDURE — 97530 THERAPEUTIC ACTIVITIES: CPT

## 2020-07-21 PROCEDURE — 92610 EVALUATE SWALLOWING FUNCTION: CPT

## 2020-07-21 RX ORDER — ACETAMINOPHEN 325 MG/1
650 TABLET ORAL EVERY 6 HOURS
Status: DISCONTINUED | OUTPATIENT
Start: 2020-07-21 | End: 2020-07-24 | Stop reason: HOSPADM

## 2020-07-21 RX ADMIN — INSULIN LISPRO 2 UNITS: 100 INJECTION, SOLUTION INTRAVENOUS; SUBCUTANEOUS at 09:29

## 2020-07-21 RX ADMIN — ALBUTEROL SULFATE 2.5 MG: 2.5 SOLUTION RESPIRATORY (INHALATION) at 15:45

## 2020-07-21 RX ADMIN — DILTIAZEM HYDROCHLORIDE 60 MG: 60 CAPSULE, EXTENDED RELEASE ORAL at 08:30

## 2020-07-21 RX ADMIN — INSULIN LISPRO 6 UNITS: 100 INJECTION, SOLUTION INTRAVENOUS; SUBCUTANEOUS at 16:38

## 2020-07-21 RX ADMIN — Medication 10 ML: at 08:31

## 2020-07-21 RX ADMIN — ALBUTEROL SULFATE 2.5 MG: 2.5 SOLUTION RESPIRATORY (INHALATION) at 19:50

## 2020-07-21 RX ADMIN — INSULIN LISPRO 2 UNITS: 100 INJECTION, SOLUTION INTRAVENOUS; SUBCUTANEOUS at 12:57

## 2020-07-21 RX ADMIN — AZITHROMYCIN 250 MG: 250 TABLET, FILM COATED ORAL at 08:30

## 2020-07-21 RX ADMIN — INSULIN GLARGINE 5 UNITS: 100 INJECTION, SOLUTION SUBCUTANEOUS at 21:45

## 2020-07-21 RX ADMIN — DILTIAZEM HYDROCHLORIDE 60 MG: 60 CAPSULE, EXTENDED RELEASE ORAL at 21:50

## 2020-07-21 RX ADMIN — INSULIN LISPRO 2 UNITS: 100 INJECTION, SOLUTION INTRAVENOUS; SUBCUTANEOUS at 16:38

## 2020-07-21 RX ADMIN — ALBUTEROL SULFATE 2.5 MG: 2.5 SOLUTION RESPIRATORY (INHALATION) at 11:39

## 2020-07-21 RX ADMIN — PREDNISONE 10 MG: 10 TABLET ORAL at 08:30

## 2020-07-21 RX ADMIN — INSULIN LISPRO 3 UNITS: 100 INJECTION, SOLUTION INTRAVENOUS; SUBCUTANEOUS at 21:45

## 2020-07-21 RX ADMIN — GUAIFENESIN 600 MG: 600 TABLET, EXTENDED RELEASE ORAL at 08:30

## 2020-07-21 RX ADMIN — METOPROLOL SUCCINATE 25 MG: 25 TABLET, FILM COATED, EXTENDED RELEASE ORAL at 08:31

## 2020-07-21 RX ADMIN — PANTOPRAZOLE SODIUM 40 MG: 40 TABLET, DELAYED RELEASE ORAL at 05:46

## 2020-07-21 RX ADMIN — GUAIFENESIN 600 MG: 600 TABLET, EXTENDED RELEASE ORAL at 21:50

## 2020-07-21 RX ADMIN — HYDROCODONE BITARTRATE AND ACETAMINOPHEN 1 TABLET: 5; 325 TABLET ORAL at 10:59

## 2020-07-21 RX ADMIN — POLYETHYLENE GLYCOL 3350 17 G: 17 POWDER, FOR SOLUTION ORAL at 08:30

## 2020-07-21 RX ADMIN — ACETAMINOPHEN 650 MG: 325 TABLET, FILM COATED ORAL at 12:59

## 2020-07-21 RX ADMIN — ALBUTEROL SULFATE 2.5 MG: 2.5 SOLUTION RESPIRATORY (INHALATION) at 08:06

## 2020-07-21 RX ADMIN — HYDROCODONE BITARTRATE AND ACETAMINOPHEN 1 TABLET: 5; 325 TABLET ORAL at 21:50

## 2020-07-21 RX ADMIN — CYCLOBENZAPRINE 10 MG: 10 TABLET, FILM COATED ORAL at 21:50

## 2020-07-21 ASSESSMENT — PAIN DESCRIPTION - LOCATION: LOCATION: BACK;HEAD

## 2020-07-21 ASSESSMENT — PAIN DESCRIPTION - ORIENTATION: ORIENTATION: LOWER

## 2020-07-21 ASSESSMENT — PAIN DESCRIPTION - PAIN TYPE: TYPE: CHRONIC PAIN

## 2020-07-21 ASSESSMENT — PAIN SCALES - GENERAL
PAINLEVEL_OUTOF10: 6
PAINLEVEL_OUTOF10: 7
PAINLEVEL_OUTOF10: 3
PAINLEVEL_OUTOF10: 5

## 2020-07-21 ASSESSMENT — PAIN DESCRIPTION - DESCRIPTORS: DESCRIPTORS: ACHING;HEADACHE

## 2020-07-21 ASSESSMENT — PAIN DESCRIPTION - FREQUENCY: FREQUENCY: CONTINUOUS

## 2020-07-21 NOTE — PROGRESS NOTES
Physician Progress Note      [facility] #:                  [id number]  :                       1940  ADMIT DATE:       2020 2:26 PM  [address] [place] DATE:  RESPONDING  PROVIDER #:        Yudith Marcus MD          QUERY TEXT:    Pt admitted with T3 and T10 compression fracture. Pt noted to have right sided   infiltrate on CXR . If possible, please document in the progress notes and   discharge summary if you are evaluating and/or treating any of the following:  [Pneumonia was ruled out::This patient does not have pneumonia.]]    The medical record reflects the following:  Risk Factors: Patient presents with c/o back pain and cough  Clinical Indicators: Repeat chest x-ray today showed persistent right sided   infiltrate. Per ED: PNA; WBC 11.2 on admission  Treatment: CXR, CT scan, Zithromax  Options provided:  -- Pneumonia  -- Bacterial pneumonia  -- Other - I will add my own diagnosis  -- Dismiss - Clinically unable to determine / Unknown  -- Refer to Clinical Documentation Reviewer    PROVIDER RESPONSE TEXT:    This patient has pneumonia.     Query created by: Maggei Flores on 2020 3:38 PM      Electronically signed by:  Yudith Marcus MD 2020 9:58 PM

## 2020-07-21 NOTE — PROGRESS NOTES
Speech Language/Pathology   SPEECH LANGUAGE AND CLINICAL BEDSIDE SWALLOWING EVALUATION   Speech Therapy Department     Patient Name:  Kenya Flores  :  1940  Pain level:Denies   Medical Diagnosis:  Back pain [M54.9]  Back pain [M54.9]  Pathologic thoracic fracture, initial encounter [M84.48XA]    HPI: \"73 yo F with history of severe COPD, chronic respiratory failure with hypoxia on 2 L NC at all times, Afib/h/o PE/h/o BL DVT on Eliquis, recurrent falls, left lung adenocarcinoma s/p chemo and radiation, LIZETT, chronic D CHF, HTN, h/o cardiac arrest with possible hypoxic brain injury, gastroparesis, CKD 3, CAD, DM 2 was brought to ER for back pain. Followed by Oncology and Neurosurgery. Patient transfused 1 U PRBC on . COVID 19 negative on . Started on Abx for PNA. NS recommends TLSO, patient could not tolerate. Found to have T3 and T10 fractures. Ordered for MRI Brain and T spine to exclude metastatic disease. SLP eval requested to evaluate dysphagia to r/o recurrent aspiration PNA. Had fall in hospital on  with mildly displaced acute R maxillary sinus fracture. \"  CXR revealed:  Impression    Worsening opacity medially in the right lung base.  No significant change    otherwise. SLP eval and treat and Modified Barium Swallow orders received. Per chart, pt has had recurrent PNA with infiltrate noted on CXR. There is concern for potential dysphagia and aspiration. MRI of the brain has been ordered for pt. Pt known to us from previous admissions. Previous assessment has revealed minimal oropharyngeal dysphagia. CLINICAL SWALLOW EVALUATION:  Dysphagia Treatment Diagnosis: Oropharyngeal Dysphagia     Impressions: Pt was positioned upright in chair on O2 via nasal cannula. Dentures were in place for assessment. Oral mechanism examination revealed ROM and coordination to be grossly intact. Various consistency trials were provided to assess swallow function.  Pt demonstrated adequate mastication and bolus propulsion. Pharyngeal pooling was suspected with minimally delayed swallow initiation. No overt clinical s/s of aspiration/penetration were assessed however, pt intermittently demonstrated slight \"gurgly\" quality after the swallow therefore recommend completion of Modified Barium Swallow to further assess pharyngeal phase of swallow and to assess for silent aspiration. Dietary Recommendations:   Regular texture diet with thin liquids, pending results of Modified Barium Swallow     Strategies: 90 degree positioning with all p.o. intake; small bites/sips; alternate textures through meal; reduce rate of intake; No straws     Dysphagia Treatment/Goals: Speech therapy for dysphagia tx 3-5 times per week. ST.) Pt will tolerate recommended diet without s/s of aspiration   2.) Pt will tolerate MBS to r/o aspiration and determine appropriate diet/liquid level. SPEECH LANGUAGE EVALUATION:  Speech Language Treatment Diagnosis:  Cognitive Deficits     Speech Language Impressions: Pt upright in chair, awake and alert. She endorses some confusion and forgetfulness at home. Per pt she resides at home with assistance for bill and medication management from her daughter. See below.      Oral Motor exam/Motor Speech:   -Grossly WFL with no dysarthria noted      Verbal Expression:   -Appeared grossly WFL for communication of wants/needs and at the conversational level      Auditory Comprehension:   -Appeared grossly WFL for questions/commands and at the conversational level      Cognitive-Linguistic:   -Oriented to person and type of place   -Immediate recall of novel information 3/3   -Delayed recall of novel information 0/3, improving to 3/3 given category cue   -Mildly impaired recall of events of this hospitalization   -Pt reports some confusion and poor short term recall at home     Plan: Speech therapy 3-5 times a weeks for speech-language treatment, and dysphagia treatment     Discharge

## 2020-07-21 NOTE — PROCEDURES
[address] THERAPY  MODIFIED BARIUM SWALLOW EVALUATION    Patient's Name: Batsheva Hernandez. O.B: 1940  Medical Diagnosis: Back pain [M54.9]  Back pain [M54.9]  Pathologic thoracic fracture, initial encounter [K15.75UG]  Treatment Diagnosis: Dysphagia    Ordering MD: Dr. Anamaria Rivers   Radiologist: Dr. Jerry Harvey   Date of Evaluation: 7/21/2020  Type of Study: Modified Barium Swallowing Study (MBS)  Diet Prior to Study:  Regular texture diet with thin liquids   Pain Level: Denies     Impression:  Modified Barium Swallow evaluation completed on 7/21/2020. Results indicate mild oropharyngeal dysphagia. Only intermittent laryngeal penetration was assessed with thin liquids. No aspiration was viewed with any texture during this study. Oral phase was characterized by minimally prolonged mastication, reduced bolus control and minimally decreased bolus control. Oral clearing was effective. Pharyngeal phase was characterized by vallecular pooling, minimally decreased airway protection and delayed clearing. Decreased airway protection was due to decreased epiglottic inversion, decreased anterior hyoid excursion and reduced laryngeal elevation. Minimally delayed clearing was noted with liquids and appeared due to decreased UES opening. Only trace pharyngeal stasis was noted post swallows. Overall pt demonstrated gross tolerance of regular solids and thin liquids with mild increased risk for aspiration. Aspiration/Penetration Risk:  Mild     Recommendations:    Diet Level: Regular texture diet with thin liquids, avoid straws, meds as tolerated     Strategies:  Upright 90 degrees at meals; Avoid Straws;  Meds as tolerated; Small bites/sips  Treatments: Speech Therapy for dysphagia treatment  Goals:1)Pt will tolerate diet with no signs or symptoms of aspiration     Consistencies given: Thin, Mildly Thick (Nectar) Liquids,Puree, Soft solid, Solid    Oral Phase  -Reduced bolus control, loss to the underside of the

## 2020-07-21 NOTE — PROGRESS NOTES
Incentive Spirometry education and demonstration completed by Respiratory Therapy Yes      Response to education: Fair     Teaching Time: 5 minutes    Minimum Predicted Vital Capacity - 570 mL. Patient's Actual Vital Capacity - 20 mL.  Turning over to Nursing for routine follow-up No.    Comments: Unable to reach IS goal    Electronically signed by Higinio Sandifer, RCP on 7/21/2020 at 3:55 PM

## 2020-07-21 NOTE — PROGRESS NOTES
Occupational Therapy  Facility/Department: [address] ONCOLOGY  Daily Treatment Note  NAME: Isacc Patino  : 1940  MRN: [id number]    Date of Service: 2020    Discharge Recommendations: Isacc Patino scored a 17/24 on the AM-PAC ADL Inpatient form. Current research shows that an AM-PAC score of 17 or less is typically not associated with a discharge to the patient's home setting. Based on the patient's AM-PAC score and their current ADL deficits, it is recommended that the patient have 3-5 sessions per week of Occupational Therapy at d/c to increase the patient's independence. Please see assessment section for further patient specific details. If patient discharges prior to next session this note will serve as a discharge summary. Please see below for the latest assessment towards goals. OT Equipment Recommendations  Equipment Needed: No    Assessment   Performance deficits / Impairments: Decreased functional mobility ; Decreased ADL status; Decreased safe awareness;Decreased high-level IADLs  Assessment: Patient presents with the above deficits, which are below baseline, and would benefit from continued skilled OT to address  Treatment Diagnosis: decreased independence with ADL related to late affects of back pain due to lumbar fx  Prognosis: Good  Assistance / Modification: rw  OT Education: OT Role;Plan of Care  Patient Education: Eval, discharge recommendations-patient is not independent in learning, will require repetition and family education  REQUIRES OT FOLLOW UP: Yes  Activity Tolerance  Activity Tolerance: Patient limited by fatigue;Patient limited by pain;Treatment limited secondary to decreased cognition  Safety Devices  Safety Devices in place: Yes  Type of devices: All fall risk precautions in place;Call light within reach; Chair alarm in place; Left in chair;Patient at risk for falls;Nurse notified         Patient Diagnosis(es): The primary encounter diagnosis was Acute hypoxemic respiratory failure ([address] Diagnoses of Pneumonia due to organism, Closed wedge fracture of lumbar vertebra, unspecified lumbar vertebral level, initial encounter ([address] Chronic kidney disease, unspecified CKD stage, and Generalized weakness were also pertinent to this visit. has a past medical history of Arthritis, CAD (coronary artery disease), Carpal tunnel syndrome, COPD (chronic obstructive pulmonary disease) ([address] Coronary stent, depression, Diabetes mellitus ([address] Diastolic heart failure ([address] GERD (gastroesophageal reflux disease), Hyperlipidemia, Hypertension, Lung cancer ([address] MI (myocardial infarction) ([address] LIZETT (obstructive sleep apnea), PONV (postoperative nausea and vomiting), and stress incontinence. has a past surgical history that includes Coronary angioplasty with stent (2000, 2001); back surgery (2000, 2001); bronchoscopy (12/04/2018); [facility]/cell washg spx (N/A, 12/4/2018); Cholecystectomy, laparoscopic (N/A, 12/19/2018); Upper gastrointestinal endoscopy (N/A, 2/25/2019); Colonoscopy (N/A, 2/25/2019); Colonoscopy (2/25/2019); bronchoscopy (N/A, 2/27/2019); bronchoscopy (2/27/2019); bronchoscopy (2/27/2019); bronchoscopy (N/A, 8/6/2019); Upper gastrointestinal endoscopy (N/A, 8/7/2019); bronchoscopy (N/A, 9/27/2019); bronchoscopy (9/27/2019); and bronchoscopy (9/27/2019). Restrictions  Restrictions/Precautions  Restrictions/Precautions: Fall Risk(High fall risk)  Required Braces or Orthoses? : (per neurosx CNP note: should pt not prefer brace, pt can performed modified activity)  Position Activity Restriction  Spinal Precautions: No Bending, No Lifting, No Twisting  Other position/activity restrictions: 70 y.o. female who presents because of back pain. She has a history of COPD, coronary artery disease, diabetes, heart failure, hypertension, lung cancer, pneumonia. She developed the pain yesterday. She denies any known injury.   It hurts worse if she moves certain ways. She has not had a cough or fever. She denies any chest or abdominal pain. She is O2 dependent at home with 1 L of oxygen. She required more oxygen when evaluated by EMS and she came to the hospital by ambulance. She lives alone. She denies any new numbness, tingling. She has chronic neuropathy. She denies any loss of bowel or bladder control. She denies any dysuria. She was hospitalized last month for acute kidney injury.   She is anticoagulated with Eliquis  Subjective   General  Chart Reviewed: Yes  Family / Caregiver Present: No  Diagnosis: Back pain  Subjective  Subjective: Pt seated EOB upon arrival, just finished breakfast. Agreeable to tx and ADL  Pain Assessment  Pain Assessment: 0-10  Pre Treatment Pain Screening  Intervention List: Patient able to continue with treatment;Nurse/Physician notified  Vital Signs  Patient Currently in Pain: Yes(Reports soreness while seated EOB but does not rate, increased pain with movement - RN aware)        Objective    ADL  Grooming: Setup(oral hygiene, washing face, washing/drying/combing hair at sink)  UE Bathing: Stand by assistance;Setup  LE Bathing: Contact guard assistance;Setup  UE Dressing: Minimal assistance(gown change)  LE Dressing: Minimal assistance(assist to thread BLE through depends, pt doffs dirty depends and manages clean depends to waistline)        Balance  Sitting Balance: Supervision  Standing Balance: Stand by assistance  Standing Balance  Time: ~6 minutes total  Activity: stance at sink for grooming ADL, completes remainder of grooming seated d/t fatigue and increased pain in stance  Functional Mobility  Functional - Mobility Device: Rolling Walker  Activity: To/from bathroom  Assist Level: Contact guard assistance  Functional Mobility Comments: ~8' x2  Bed mobility  Supine to Sit: Unable to assess  Comment: Pt sitting up at EOB upon arrival, left in recliner at end of session  Transfers  Sit to stand: Contact guard assistance  Stand to sit: Contact guard assistance                       Cognition  Arousal/Alertness: Appropriate responses to stimuli  Following Commands:  Follows one step commands consistently  Attention Span: Attends with cues to redirect  Memory: Decreased recall of biographical Information;Decreased recall of recent events;Decreased short term memory  Safety Judgement: Decreased awareness of need for safety  Problem Solving: Assistance required to generate solutions  Insights: Decreased awareness of deficits  Initiation: Requires cues for some                                         Plan   Plan  Times per week: 3-5  Times per day: Daily  Current Treatment Recommendations: Strengthening, Balance Training, Functional Mobility Training, Endurance Training, Patient/Caregiver Education & Training, Cognitive Reorientation, Self-Care / ADL, Equipment Evaluation, Education, & procurement, Safety Education & Training, Home Management Training  G-Code     OutComes Score                                                  AM-PAC Score        AM-PAC Inpatient Daily Activity Raw Score: 17 (07/21/20 0930)  AM-PAC Inpatient ADL T-Scale Score : 37.26 (07/21/20 0930)  ADL Inpatient CMS 0-100% Score: 50.11 (07/21/20 0930)  ADL Inpatient CMS G-Code Modifier : CK (07/21/20 0930)    Goals  Short term goals  Time Frame for Short term goals: Discharge  Short term goal 1: Bed mobility Mary - Goal Met 7/17  Short term goal 2: Functional ADL transfers Mary - Goal Met 7/17  Short term goal 3: UB ADLs setup, LB ADLs Mary - Goal Met 7/21  Short term goal 4: Functional mobility with appropriate AD and Mary - Goal Met 7/21  Short term goal 5: Revised STG2: Fxl transfers supervision  Long term goals  Time Frame for Long term goals : LTG=STG  Long term goal 1: Revised STG3: LB ADL with AE and SBA  Long term goal 2: Revised STG4: Fxl mob SBA  Patient Goals   Patient goals : Home       Therapy Time   Individual Concurrent Group Co-treatment   Time

## 2020-07-21 NOTE — PROGRESS NOTES
Physical Therapy  Facility/Department: [address] ONCOLOGY  Daily Treatment Note  NAME: Gilda Montelongo  : 1940  MRN: [id number]    Date of Service: 2020    Discharge Recommendations:Kenya Lima scored a 16/24 on the AM-PAC short mobility form. Current research shows that an AM-PAC score of 17 or less is typically not associated with a discharge to the patient's home setting. Based on the patient's AM-PAC score and their current functional mobility deficits, it is recommended that the patient have 3-5 sessions per week of Physical Therapy at d/c to increase the patient's independence. Please see assessment section for further patient specific details. If patient discharges prior to next session this note will serve as a discharge summary. Please see below for the latest assessment towards goals. PT Equipment Recommendations  Equipment Needed: No  Other: defer to next level of care    Assessment   Body structures, Functions, Activity limitations: Decreased functional mobility ; Decreased strength;Decreased endurance;Decreased balance; Increased pain;Decreased cognition  Assessment: Pt continues to demonstrate improvement, primarily noted this date by improved tolerance to ambulating and weightbearing. Pt's overall tolerance to activity remains decreased secondary to pain with mobility. PT would continue to benefit from skilled PT services to promote safe return to PLOF.   Treatment Diagnosis: impaired gait, transfers, and balance  Prognosis: Good  Clinical Presentation: evolving  PT Education: Goals;PT Role;Plan of Care;Precautions;Transfer Training;Orientation;General Safety;Gait Training;Functional Mobility Training  Patient Education: d/c recommendations, spinal precautions--pt verbalizing understanding, will require reinforcement  Barriers to Learning: physical  REQUIRES PT FOLLOW UP: Yes  Activity Tolerance  Activity Tolerance: Patient Tolerated treatment well;Patient limited by pain the pain yesterday. She denies any known injury. It hurts worse if she moves certain ways. She has not had a cough or fever. She denies any chest or abdominal pain. She is O2 dependent at home with 1 L of oxygen. She required more oxygen when evaluated by EMS and she came to the hospital by ambulance. She lives alone. She denies any new numbness, tingling. She has chronic neuropathy. She denies any loss of bowel or bladder control. She denies any dysuria. She was hospitalized last month for acute kidney injury. She is anticoagulated with Eliquis  Subjective   General  Chart Reviewed: Yes  Response To Previous Treatment: Patient with no complaints from previous session. Family / Caregiver Present: No  Subjective  Subjective: Pt sitting up at EOB upon arrival, with bed alarm on x2. Pt reporting soreness to back but agreeable to PT/OT treatment. Pain Screening  Patient Currently in Pain: Yes  Pain Assessment  Pain Assessment: 0-10  Pain Level: (reports \"sore\" but does not rate, able to complete therapy session without incident)  Vital Signs  Patient Currently in Pain: Yes       Orientation  Orientation  Overall Orientation Status: Impaired  Orientation Level: Oriented to place;Oriented to person;Disoriented to time;Disoriented to situation  Cognition      Objective   Bed mobility  Supine to Sit: Unable to assess  Comment: Pt sitting up at EOB upon arrival, left in recliner at end of session  Transfers  Sit to Stand: Contact guard assistance  Stand to sit: Contact guard assistance  Ambulation  Ambulation?: Yes  Ambulation 1  Surface: level tile  Device: Rolling Walker  Assistance: Contact guard assistance  Quality of Gait: narrow FERDINAND, flexed trunk, decreased step length and slowed catalina, no LOB, but mild unsteadiness noted.   Distance: ~15 ft+ ~6 ft  Stairs/Curb  Stairs?: No     Balance  Posture: Fair  Sitting - Static: Good  Sitting - Dynamic: Good  Standing - Static: Fair  Standing - Dynamic: Fair

## 2020-07-21 NOTE — PROGRESS NOTES
[facility] PROGRESS NOTE    7/21/2020 3:14 PM        Name: Charlie Ramirez . Admitted: 7/11/2020  Primary Care Provider: Dmitry Power (Tel: [phone])    Brief Course:  71 yo F with history of severe COPD, chronic respiratory failure with hypoxia on 2 L NC at all times, Afib/h/o PE/h/o BL DVT on Eliquis, recurrent falls, left lung adenocarcinoma s/p chemo and radiation, LIZETT, chronic D CHF, HTN, h/o cardiac arrest with possible hypoxic brain injury, gastroparesis, CKD 3, CAD, DM 2 was brought to ER for back pain. Followed by Oncology and Neurosurgery. Patient transfused 1 U PRBC on 7/16. COVID 19 negative on 7/11. Started on Abx for PNA. NS recommends TLSO, patient could not tolerate. Found to have T3 and T10 fractures. Ordered for MRI Brain and T spine to exclude metastatic disease. SLP eval requested to evaluate dysphagia to r/o recurrent aspiration PNA. Had fall in hospital on 7/14 with mildly displaced acute R maxillary sinus fracture. CC:  Back pain    Subjective:  . Patient is anxious. No CP, SOB, HA or fevers. Has back pain today.     Reviewed interval ancillary notes    Current Medications  acetaminophen (TYLENOL) tablet 650 mg, Q6H  azithromycin (ZITHROMAX) tablet 250 mg, Daily  guaiFENesin (MUCINEX) extended release tablet 600 mg, BID  albuterol sulfate  (90 Base) MCG/ACT inhaler 2 puff, Q4H PRN  albuterol (PROVENTIL) nebulizer solution 2.5 mg, 4x daily  predniSONE (DELTASONE) tablet 10 mg, Daily  polyethylene glycol (GLYCOLAX) packet 17 g, Daily  HYDROcodone-acetaminophen (NORCO) 5-325 MG per tablet 1 tablet, Q6H PRN  lidocaine 4 % external patch 1 patch, Daily  insulin glargine (LANTUS;BASAGLAR) injection pen 5 Units, Nightly  insulin lispro (1 Unit Dial) 2 Units, TID WC  insulin lispro (1 Unit Dial) 0-12 Units, TID WC  insulin lispro (1 Unit Dial) 0-6 Units, Nightly  glucose 07/19/20  0537 07/20/20  0502 07/21/20  0425   WBC  --  7.9 10.5   HGB 9.0* 9.0*  8.9* 8.8*   PLT  --  417 411     BMP:    Recent Labs     07/20/20  0502 07/21/20  0425    140   K 4.8 4.1   CL 98* 98*   CO2 27 30   BUN 19 24*   CREATININE 1.3* 1.3*   GLUCOSE 191* 173*     Hepatic: No results for input(s): AST, ALT, ALB, BILITOT, ALKPHOS in the last 72 hours. Fluoroscopy modified barium swallow with video   Final Result   Swallowing mechanism grossly within normal limits without evidence of   aspiration. Please see separate speech pathology report for full discussion of findings   and recommendations. XR CHEST PORTABLE   Final Result   Worsening opacity medially in the right lung base. No significant change   otherwise. CT FACIAL BONES WO CONTRAST   Final Result   1. Anterior right maxillary sinus mildly displaced oblique acute fracture. 2. Overlying right malar mild subcutaneous soft tissue contusion. 3. No evidence of acute intracranial trauma. CT HEAD WO CONTRAST   Final Result   1. Anterior right maxillary sinus mildly displaced oblique acute fracture. 2. Overlying right malar mild subcutaneous soft tissue contusion. 3. No evidence of acute intracranial trauma. CT CHEST ABDOMEN PELVIS WO CONTRAST   Final Result   1. Extensive coronary and aortic atherosclerosis with no aortic aneurysm or   periaortic hemorrhage. Stable mild cardiomegaly and stable pulmonary artery   hypertension. 2. COPD. 3. Compared with the prior exam there is overall improved pulmonary   consolidation with perihilar and lower lobe findings which may represent   chronic fibrotic changes and trace left pleural effusion. New multilobar   multifocal right lung consolidative changes which may represent acute   pneumonia or possible chronic pulmonary process such as vasculitis. 4. Indeterminate anterior left lung base clustered 0.3 cm solid nodules,   attention on follow-up exams. 5.  In the right lower quadrant there is new mesenteric fat induration and   mild lymphadenopathy. The appendix appears normal.  There is no colonic wall   thickening. This could relate to possible mild colitis, ascending colonic   diverticulitis, or mesenteric adenitis. A lymphoproliferative disorder such   as lymphoma cannot be excluded. 6. No obstruction, perforation, or abscess. 7. The T3 and T10 vertebral bodies demonstrate subtle new height loss or   superior endplate concavity which may represent insufficiency versus   compression fractures. If clinically indicated this could be further   evaluated with MRI of the thoracic spine. XR CHEST PORTABLE   Final Result   New focal opacity in the right lower lung. Linear opacities in the right   upper lung and left perihilar region otherwise stable. Given history of lung   cancer would recommend further evaluation with CT.         MRI THORACIC SPINE WO CONTRAST    (Results Pending)   MRI BRAIN WO CONTRAST    (Results Pending)         Problem List  Principal Problem:    Intractable back pain  Active Problems:    Dyslipidemia    HTN (hypertension), benign    Coronary atherosclerosis of native coronary artery    DM (diabetes mellitus), secondary, uncontrolled, w/neurologic complic (HCC)    Mitral and aortic valve disease    Obstructive sleep apnea    COPD, severe (HCC)    Paroxysmal atrial fibrillation (HCC)    Adenocarcinoma of left lung (HCC)    Closed compression fracture of thoracic vertebra (HCC)    Chronic deep vein thrombosis (DVT) of both lower extremities (HCC)    Chronic diastolic heart failure (HCC)    Acute on chronic respiratory failure with hypoxia (HCC)    Depression    Pathologic thoracic fracture, initial encounter    Maxillary sinus fracture, sequela (HCC)    PNA (pneumonia)    Dysphagia    Acute kidney injury superimposed on CKD ([facility] [address]  Resolved Problems:    * No resolved hospital problems. *       Assessment & Plan:   1.  F/U MRI Brain and T spine to r/o metastatic disease  2. SLP and MBS appreciated  3. Cont Zithromax Day 4/5  4. Hold Eliquis in case biopsy need for spine to r/o mets  5. Start Tylenol 650 mg q6h  6. Cont Trelegy and Albuterol  7. Cont Prednisone      IV Access: Peripheral  Means: No  Diet: DIET GENERAL;  Dietary Nutrition Supplements: Standard High Calorie Oral Supplement  Code:DNR-CC  DVT PPX SCD  Disposition [facility]    Discussed with patient, nursing and CM. F/U MRI Brain and T Spine. Might benefit from IR intervention of compression fracture vs biopsy if mets suspected.     Russ Hubbard MD   7/21/2020 3:14 PM

## 2020-07-22 LAB
ANION GAP SERPL CALCULATED.3IONS-SCNC: 11 MMOL/L (ref 3–16)
BANDED NEUTROPHILS RELATIVE PERCENT: 2 % (ref 0–7)
BASOPHILIC STIPPLING: ABNORMAL
BASOPHILS ABSOLUTE: 0 K/UL (ref 0–0.2)
BASOPHILS RELATIVE PERCENT: 0 %
BUN BLDV-MCNC: 30 MG/DL (ref 7–20)
CALCIUM SERPL-MCNC: 9.3 MG/DL (ref 8.3–10.6)
CHLORIDE BLD-SCNC: 99 MMOL/L (ref 99–110)
CO2: 28 MMOL/L (ref 21–32)
CREAT SERPL-MCNC: 1.4 MG/DL (ref 0.6–1.2)
EOSINOPHILS ABSOLUTE: 1.4 K/UL (ref 0–0.6)
EOSINOPHILS RELATIVE PERCENT: 12 %
GFR AFRICAN AMERICAN: 44
GFR NON-AFRICAN AMERICAN: 36
GLUCOSE BLD-MCNC: 116 MG/DL (ref 70–99)
GLUCOSE BLD-MCNC: 123 MG/DL (ref 70–99)
GLUCOSE BLD-MCNC: 276 MG/DL (ref 70–99)
GLUCOSE BLD-MCNC: 303 MG/DL (ref 70–99)
GLUCOSE BLD-MCNC: 338 MG/DL (ref 70–99)
HCT VFR BLD CALC: 26.6 % (ref 36–48)
HEMOGLOBIN: 8.5 G/DL (ref 12–16)
LYMPHOCYTES ABSOLUTE: 1 K/UL (ref 1–5.1)
LYMPHOCYTES RELATIVE PERCENT: 9 %
MCH RBC QN AUTO: 28.5 PG (ref 26–34)
MCHC RBC AUTO-ENTMCNC: 32.1 G/DL (ref 31–36)
MCV RBC AUTO: 88.7 FL (ref 80–100)
METAMYELOCYTES RELATIVE PERCENT: 3 %
MONOCYTES ABSOLUTE: 0.2 K/UL (ref 0–1.3)
MONOCYTES RELATIVE PERCENT: 2 %
NEUTROPHILS ABSOLUTE: 8.9 K/UL (ref 1.7–7.7)
NEUTROPHILS RELATIVE PERCENT: 72 %
OVALOCYTES: ABNORMAL
PDW BLD-RTO: 20 % (ref 12.4–15.4)
PERFORMED ON: ABNORMAL
PLATELET # BLD: 379 K/UL (ref 135–450)
PLATELET SLIDE REVIEW: ADEQUATE
PMV BLD AUTO: 6.7 FL (ref 5–10.5)
POLYCHROMASIA: ABNORMAL
POTASSIUM SERPL-SCNC: 4.1 MMOL/L (ref 3.5–5.1)
RBC # BLD: 2.99 M/UL (ref 4–5.2)
SLIDE REVIEW: ABNORMAL
SODIUM BLD-SCNC: 138 MMOL/L (ref 136–145)
WBC # BLD: 11.5 K/UL (ref 4–11)

## 2020-07-22 PROCEDURE — 36415 COLL VENOUS BLD VENIPUNCTURE: CPT

## 2020-07-22 PROCEDURE — 6370000000 HC RX 637 (ALT 250 FOR IP): Performed by: INTERNAL MEDICINE

## 2020-07-22 PROCEDURE — 2580000003 HC RX 258: Performed by: INTERNAL MEDICINE

## 2020-07-22 PROCEDURE — 85025 COMPLETE CBC W/AUTO DIFF WBC: CPT

## 2020-07-22 PROCEDURE — 2700000000 HC OXYGEN THERAPY PER DAY

## 2020-07-22 PROCEDURE — 97129 THER IVNTJ 1ST 15 MIN: CPT

## 2020-07-22 PROCEDURE — 94761 N-INVAS EAR/PLS OXIMETRY MLT: CPT

## 2020-07-22 PROCEDURE — 1200000000 HC SEMI PRIVATE

## 2020-07-22 PROCEDURE — 80048 BASIC METABOLIC PNL TOTAL CA: CPT

## 2020-07-22 PROCEDURE — 6360000002 HC RX W HCPCS: Performed by: INTERNAL MEDICINE

## 2020-07-22 PROCEDURE — 94640 AIRWAY INHALATION TREATMENT: CPT

## 2020-07-22 PROCEDURE — 92526 ORAL FUNCTION THERAPY: CPT

## 2020-07-22 RX ORDER — INSULIN LISPRO 100 [IU]/ML
5 INJECTION, SOLUTION INTRAVENOUS; SUBCUTANEOUS
Status: DISCONTINUED | OUTPATIENT
Start: 2020-07-22 | End: 2020-07-24 | Stop reason: HOSPADM

## 2020-07-22 RX ADMIN — ACETAMINOPHEN 650 MG: 325 TABLET, FILM COATED ORAL at 21:05

## 2020-07-22 RX ADMIN — INSULIN LISPRO 5 UNITS: 100 INJECTION, SOLUTION INTRAVENOUS; SUBCUTANEOUS at 18:46

## 2020-07-22 RX ADMIN — PANTOPRAZOLE SODIUM 40 MG: 40 TABLET, DELAYED RELEASE ORAL at 06:58

## 2020-07-22 RX ADMIN — METOPROLOL SUCCINATE 25 MG: 25 TABLET, FILM COATED, EXTENDED RELEASE ORAL at 09:19

## 2020-07-22 RX ADMIN — Medication 10 ML: at 09:24

## 2020-07-22 RX ADMIN — ALBUTEROL SULFATE 2.5 MG: 2.5 SOLUTION RESPIRATORY (INHALATION) at 08:14

## 2020-07-22 RX ADMIN — ACETAMINOPHEN 650 MG: 325 TABLET, FILM COATED ORAL at 13:35

## 2020-07-22 RX ADMIN — ALBUTEROL SULFATE 2.5 MG: 2.5 SOLUTION RESPIRATORY (INHALATION) at 13:08

## 2020-07-22 RX ADMIN — ACETAMINOPHEN 650 MG: 325 TABLET, FILM COATED ORAL at 09:22

## 2020-07-22 RX ADMIN — ALBUTEROL SULFATE 2.5 MG: 2.5 SOLUTION RESPIRATORY (INHALATION) at 20:15

## 2020-07-22 RX ADMIN — GUAIFENESIN 600 MG: 600 TABLET, EXTENDED RELEASE ORAL at 21:05

## 2020-07-22 RX ADMIN — GUAIFENESIN 600 MG: 600 TABLET, EXTENDED RELEASE ORAL at 09:19

## 2020-07-22 RX ADMIN — INSULIN LISPRO 6 UNITS: 100 INJECTION, SOLUTION INTRAVENOUS; SUBCUTANEOUS at 13:16

## 2020-07-22 RX ADMIN — INSULIN LISPRO 8 UNITS: 100 INJECTION, SOLUTION INTRAVENOUS; SUBCUTANEOUS at 18:46

## 2020-07-22 RX ADMIN — DILTIAZEM HYDROCHLORIDE 60 MG: 60 CAPSULE, EXTENDED RELEASE ORAL at 21:05

## 2020-07-22 RX ADMIN — PREDNISONE 10 MG: 10 TABLET ORAL at 09:19

## 2020-07-22 RX ADMIN — ALBUTEROL SULFATE 2.5 MG: 2.5 SOLUTION RESPIRATORY (INHALATION) at 16:58

## 2020-07-22 RX ADMIN — INSULIN GLARGINE 5 UNITS: 100 INJECTION, SOLUTION SUBCUTANEOUS at 21:06

## 2020-07-22 RX ADMIN — DILTIAZEM HYDROCHLORIDE 60 MG: 60 CAPSULE, EXTENDED RELEASE ORAL at 09:19

## 2020-07-22 RX ADMIN — POLYETHYLENE GLYCOL 3350 17 G: 17 POWDER, FOR SOLUTION ORAL at 09:19

## 2020-07-22 RX ADMIN — INSULIN LISPRO 2 UNITS: 100 INJECTION, SOLUTION INTRAVENOUS; SUBCUTANEOUS at 09:19

## 2020-07-22 RX ADMIN — INSULIN LISPRO 2 UNITS: 100 INJECTION, SOLUTION INTRAVENOUS; SUBCUTANEOUS at 13:16

## 2020-07-22 RX ADMIN — AZITHROMYCIN 250 MG: 250 TABLET, FILM COATED ORAL at 09:19

## 2020-07-22 RX ADMIN — INSULIN LISPRO 4 UNITS: 100 INJECTION, SOLUTION INTRAVENOUS; SUBCUTANEOUS at 21:06

## 2020-07-22 ASSESSMENT — PAIN DESCRIPTION - LOCATION
LOCATION: BACK

## 2020-07-22 ASSESSMENT — PAIN SCALES - GENERAL
PAINLEVEL_OUTOF10: 0
PAINLEVEL_OUTOF10: 5
PAINLEVEL_OUTOF10: 4
PAINLEVEL_OUTOF10: 0
PAINLEVEL_OUTOF10: 7
PAINLEVEL_OUTOF10: 0
PAINLEVEL_OUTOF10: 4
PAINLEVEL_OUTOF10: 4

## 2020-07-22 ASSESSMENT — PAIN DESCRIPTION - PAIN TYPE
TYPE: CHRONIC PAIN

## 2020-07-22 ASSESSMENT — PAIN DESCRIPTION - ONSET
ONSET: ON-GOING

## 2020-07-22 ASSESSMENT — PAIN DESCRIPTION - PROGRESSION
CLINICAL_PROGRESSION: NOT CHANGED

## 2020-07-22 ASSESSMENT — PAIN DESCRIPTION - FREQUENCY
FREQUENCY: CONTINUOUS

## 2020-07-22 ASSESSMENT — PAIN DESCRIPTION - DESCRIPTORS
DESCRIPTORS: ACHING

## 2020-07-22 ASSESSMENT — PAIN DESCRIPTION - ORIENTATION
ORIENTATION: MID;LOWER

## 2020-07-22 NOTE — CARE COORDINATION
JAYCOB notified Jacqulynn Lesch at [facility] that pt will not be discharging today per physician. She states that her  updated her that pre cert is good if she admits by Friday, after that another pre cert will be required.      Daniela Smith RN, BSN  [phone]

## 2020-07-22 NOTE — CARE COORDINATION
CM reviewed chart. Pt had MRI spine done and has acute fx's of T3,T8,T10. Spoke to [facility] with palliative care and she will be seeing pt. CM called Maggie at [facility] and updated. Pt's pre-cert will  today. CM will follow for d/c plan.      Tommy Steel RN, BSN  [phone]

## 2020-07-22 NOTE — CARE COORDINATION
Chart reviewed for discharge planning. Barrier(s) to discharge-MRI of thoracic spine shows acute fractures of T3,T8, and T10 vertebral bodies, pt to have kyphoplasty today  Tentative discharge plan- to [facility]   Tentative discharge date- tbd when pt is medically stable. *Case management will continue to follow progress and update discharge plan as needed.     Angelica Kwong RN, BSN  [phone]

## 2020-07-22 NOTE — FLOWSHEET NOTE
Called RN caring for pt today and discussed with her plan to do T8 and T10 kyphoplasty with biopsy tomorrow.   She verbalized understanding

## 2020-07-22 NOTE — PROGRESS NOTES
[facility]    7/22/2020 3:27 PM        Name: Pam Murillo . Admitted: 7/11/2020  Primary Care Provider: Geoffrey Falcon (Tel: [phone])    Brief Course:  71 yo F with history of severe COPD, chronic respiratory failure with hypoxia on 2 L NC at all times, Afib/h/o PE/h/o BL DVT on Eliquis, recurrent falls, left lung adenocarcinoma s/p chemo and radiation, LIZETT, chronic D CHF, HTN, h/o cardiac arrest with possible hypoxic brain injury, gastroparesis, CKD 3, CAD, DM 2 was brought to ER for back pain. Followed by Oncology and Neurosurgery. Patient transfused 1 U PRBC on 7/16. COVID 19 negative on 7/11. Started on Abx for PNA. NS recommends TLSO, patient could not tolerate. Found to have T3 and T10 fractures. Ordered for MRI Brain and T spine to exclude metastatic disease. SLP eval requested to evaluate dysphagia to r/o recurrent aspiration PNA. Had fall in hospital on 7/14 with mildly displaced acute R maxillary sinus fracture. MRI Thoracic w/o contrast with acute T3, T8 or T10. IR plans T8 and T10 kyphoplasty on 7/23. CC:  Back pain    Subjective:  . Patient is awake. No CP, SOB, HA or fevers. Has back pain still.     Reviewed interval ancillary notes    Current Medications  acetaminophen (TYLENOL) tablet 650 mg, Q6H  azithromycin (ZITHROMAX) tablet 250 mg, Daily  guaiFENesin (MUCINEX) extended release tablet 600 mg, BID  albuterol sulfate  (90 Base) MCG/ACT inhaler 2 puff, Q4H PRN  albuterol (PROVENTIL) nebulizer solution 2.5 mg, 4x daily  predniSONE (DELTASONE) tablet 10 mg, Daily  polyethylene glycol (GLYCOLAX) packet 17 g, Daily  HYDROcodone-acetaminophen (NORCO) 5-325 MG per tablet 1 tablet, Q6H PRN  lidocaine 4 % external patch 1 patch, Daily  insulin glargine (LANTUS;BASAGLAR) injection pen 5 Units, Nightly  insulin lispro (1 Unit Dial) 2 Units, TID WC  insulin lispro (1 Unit Dial) 0-12 Units, TID WC  insulin lispro (1 Unit Dial) 0-6 Units, Nightly  glucose (GLUTOSE) 40 % oral gel 15 g, PRN  dextrose 50 % IV solution, PRN  glucagon (rDNA) injection 1 mg, PRN  dextrose 5 % solution, PRN  dilTIAZem (CARDIZEM 12 HR) extended release capsule 60 mg, BID  fluticasone-umeclidin-vilant (TRELEGY ELLIPTA) 100-62.5-25 MCG/INH inhaler 1 puff - PATIENT SUPPLIED, Daily  metoprolol succinate (TOPROL XL) extended release tablet 25 mg, Daily  pantoprazole (PROTONIX) tablet 40 mg, QAM AC  sodium chloride flush 0.9 % injection 10 mL, 2 times per day  sodium chloride flush 0.9 % injection 10 mL, PRN  acetaminophen (TYLENOL) tablet 650 mg, Q6H PRN    Or  acetaminophen (TYLENOL) suppository 650 mg, Q6H PRN  promethazine (PHENERGAN) tablet 12.5 mg, Q6H PRN    Or  ondansetron (ZOFRAN) injection 4 mg, Q6H PRN  cyclobenzaprine (FLEXERIL) tablet 10 mg, TID PRN        Objective:  BP (!) 149/74   Pulse 87   Temp 97.7 °F (36.5 °C) (Oral)   Resp 18   Ht 5' 5\" (1.651 m)   Wt 144 lb 9.6 oz (65.6 kg)   SpO2 95%   BMI 24.06 kg/m²   No intake or output data in the 24 hours ending 07/22/20 1527   Wt Readings from Last 3 Encounters:   07/21/20 144 lb 9.6 oz (65.6 kg)   06/14/20 140 lb 8 oz (63.7 kg)   10/08/19 142 lb (64.4 kg)       General appearance:  Appears comfortable, sitting in bed, pleasant  Eyes: Sclera clear. Pupils equal.  ENT: Moist oral mucosa. Trachea midline, no adenopathy. Cardiovascular: Regular rhythm, normal S1, S2. No murmur. No edema in lower extremities  Respiratory: Very poor AE BL.  L sided rhonchi. No wheezing or rales appreciated  GI: Abdomen soft, no tenderness, not distended, normal bowel sounds  Musculoskeletal: Tender to palpation in T8 and T10 areas  Neurology: Grossly intact. No speech or motor deficits  Psych: Depressed affect. Alert and oriented in time, place and person  Skin: Warm, dry, normal turgor. R maxillary contusion  Extremity exam shows brisk capillary refill. deep vein thrombosis (DVT) of both lower extremities (HCC)    Chronic diastolic heart failure (HCC)    Acute on chronic respiratory failure with hypoxia (HCC)    Depression    Pathologic thoracic fracture, initial encounter    Maxillary sinus fracture, sequela (HCC)    PNA (pneumonia)    Dysphagia    Acute kidney injury superimposed on CKD ([facility] [address]  Resolved Problems:    * No resolved hospital problems. *       Assessment & Plan:   1. NPO p MN for IR kypho  2. SLP and MBS appreciated  3. Finish Zithromax Day 5/5  4. Hold Eliquis for kypho  5. Cont Tylenol 650 mg q6h  6. Cont Trelegy and Albuterol  7. Cont Prednisone  8. Repeat PT/OT eval after kyphoplasty  9. No mets noted on MRI Brain  10. Cont Lantus 5 U qhs, increase Humalog 5 U with meals       IV Access: Peripheral  Means: No  Diet: DIET GENERAL; Carb Control: 4 carb choices (60 gms)/meal  Diet NPO, After Midnight  Code:DNR-CC  DVT PPX SCD  Disposition [facility] vs home with home PT/OT/VNS/HHA S4    Discussed with patient, Dr Lynn Youngblood (IR), nursing and CM. D/w both daughters on phone.     They agree with IR Kypho and repeat PT/OT eval.      Corrine Brown MD   7/22/2020 3:27 PM

## 2020-07-22 NOTE — CONSULTS
Palliative Care:      28-year-old  female with history of oxygen dependent COPD, Stage III NSCLC, CAD, Diabetes, CHF  recently discharged from the hospital after being treated for acute renal insufficiency, presents to the ED with 2-3 day history of what she describes as subacute onset of gradually progressive increasing back pain. MRI of thoracic spine shows acute fractures of T3,T8, and T10 vertebral bodies, pt to have kyphoplasty today. Started on Antibiotics for Pneumonia. NS recommends TLSO, patient could not tolerate. Found to have T3 and T10 fractures. Ordered for MRI Brain and T spine to exclude metastatic disease. SLP eval requested to evaluate dysphagia to r/o recurrent aspiration PNA. Had fall in hospital on 7/14 with mildly displaced acute R maxillary sinus fracture. Oncology is following--In late July 2019 patient was enrolled in hospice for the diagnosis of COPD. CT scan of the chest done in 7/11/2020 did not show clear evidence of recurrence of her malignancy.    Patient does not want any further work-up for cancer. States that if she has recurrence, she will not undergo any kind of treatment.     Speech therapy is following.       ONE XRAY VIEW OF THE CHEST         7/11/2020 3:40 pm    FINDINGS:    Linear opacities in the upper lobes bilaterally are stable.  Stable cardiac    silhouette.  No pneumothorax or definite pleural effusion. Rosibel Trevor is a new    focal opacity in the right lower lung.  No overt pulmonary edema.  The    osseous structures are stable.              Impression    New focal opacity in the right lower lung.  Linear opacities in the right    upper lung and left perihilar region otherwise stable.  Given history of lung    cancer would recommend further evaluation with CT.            Narrative    EXAMINATION:    CT OF THE CHEST, ABDOMEN, AND PELVIS WITHOUT CONTRAST 7/11/2020 4:15 pm    FINDINGS:    CARDIOVASCULAR:  Mildly enlarged heart size with no significant pericardial    effusion. Brena Sovereign is extensive triple-vessel distribution coronary artery    calcifications.  Mild-moderate aortic valve annular calcifications. Extensive thoracic aortic atherosclerotic calcifications with no acute    hyperdense wall thickening or aneurysmal dilatation.  Diffuse pulmonary    artery enlargement.  Extensive abdominal aortic and iliac atherosclerotic    calcifications with no aneurysm formation or periaortic hemorrhage.         MEDIASTINUM:  The esophagus appears unremarkable.  No significant enlarged    mediastinal or hilar lymph nodes.         LUNGS/AIRWAYS:  The trachea is patent.  Emphysematous changes.  In the right    lung there is new multilobar multifocal subsegmental peribronchial    consolidation with internal bronchiectasis.  This is most intense in the    posteromedial right lower lobe.  There is no lobar consolidation.  No    effusion or pneumothorax.  In the left perihilar and paramediastinal region    there is consolidation and varicose bronchiectasis suggesting fibrotic    changes and chronic process.  The left lower lobe demonstrates volume loss    and consolidation with bronchiectasis which overall appears improved compared    to the prior exam.  There is a trace posterior basilar effusion with mild    pleural thickening.  Improved aeration of the left lower lobe base compared    to the prior exam with patchy subpleural opacities.  In the anterior left    lung base there are numerous clustered solid pulmonary nodular densities with    a representative component measuring 0.3 cm.         ABDOMEN:  Prior cholecystectomy.  No intrahepatic mass or biliary dilatation.     No evidence of portal venous gas.  The common bile duct, pancreas, spleen,    and adrenals are normal.  The kidneys demonstrate no hydronephrosis or    nephrolithiasis.  The bilateral ureters are normal.  There are numerous    bilateral renal cysts.  Left kidney superior pole rim-calcified hypodense mass which measures approximately 1.4 cm stable compared to the prior exam    with non-contrasted average Hounsfield units of 33.         The stomach, small bowel, and appendix are normal.  The colon demonstrates    moderate diffuse diverticulosis. Lesleigh Raman is no significant wall thickening. In the right lower quadrant there is new pericolonic ground-glass fat    induration.  There are numerous new enlarged right lower quadrant mesenteric    lymph nodes with a representative lymph node measuring 0.6 x 1.0 cm on axial    image 167.  There is no free peritoneal air or abscess.         PELVIS:  The bladder, uterus, and rectum are normal.  No ascites or abscess. No significant enlarged iliac chain lymph nodes.         MUSCULOSKELETAL STRUCTURES:  No significant enlarged axillary or inguinal    lymph nodes.         Normal thoracic and lumbar spine curvature and alignment.  The T1 vertebral    body demonstrates stable mild anterior wedge configuration.  The T3 vertebral    body demonstrates new mild anterior wedge configuration and superior endplate    concavity with approximately 15% loss of height.  No significant posterior    cortical displacement.  Redemonstration of T8 vertebral body hemangioma.  The    T10 vertebral body demonstrates subtle superior endplate concavity new since    the prior exam.  The lumbar bodies demonstrate normal height.  Multilevel    degenerative disc and joint disease.              Impression    1. Extensive coronary and aortic atherosclerosis with no aortic aneurysm or    periaortic hemorrhage.  Stable mild cardiomegaly and stable pulmonary artery    hypertension. 2. COPD.     3. Compared with the prior exam there is overall improved pulmonary    consolidation with perihilar and lower lobe findings which may represent    chronic fibrotic changes and trace left pleural effusion.  New multilobar    multifocal right lung consolidative changes which may represent acute    pneumonia or possible chronic pulmonary process such as vasculitis. 4. Indeterminate anterior left lung base clustered 0.3 cm solid nodules,    attention on follow-up exams. 5. In the right lower quadrant there is new mesenteric fat induration and    mild lymphadenopathy.  The appendix appears normal.  There is no colonic wall    thickening.  This could relate to possible mild colitis, ascending colonic    diverticulitis, or mesenteric adenitis. A lymphoproliferative disorder such    as lymphoma cannot be excluded. 6. No obstruction, perforation, or abscess. 7. The T3 and T10 vertebral bodies demonstrate subtle new height loss or    superior endplate concavity which may represent insufficiency versus    compression fractures.  If clinically indicated this could be further    evaluated with MRI of the thoracic spine.               [facility] [place] 7/21/20:  Impression:  Modified Barium Swallow evaluation completed on 7/21/2020. Results indicate mild oropharyngeal dysphagia. Only intermittent laryngeal penetration was assessed with thin liquids. No aspiration was viewed with any texture during this study. Oral phase was characterized by minimally prolonged mastication, reduced bolus control and minimally decreased bolus control. Oral clearing was effective. Pharyngeal phase was characterized by vallecular pooling, minimally decreased airway protection and delayed clearing. Decreased airway protection was due to decreased epiglottic inversion, decreased anterior hyoid excursion and reduced laryngeal elevation. Minimally delayed clearing was noted with liquids and appeared due to decreased UES opening. Only trace pharyngeal stasis was noted post swallows.  Overall pt demonstrated gross tolerance of regular solids and thin liquids with mild increased risk for aspiration.       Aspiration/Penetration Risk:  Mild        Past Medical History:   has a past medical history of Arthritis, CAD (coronary artery disease), Carpal tunnel syndrome, COPD (chronic obstructive pulmonary disease) ([facility] [address] Coronary stent, depression, Diabetes mellitus ([address] Diastolic heart failure ([address] GERD (gastroesophageal reflux disease), Hyperlipidemia, Hypertension, Lung cancer ([facility] [address] MI (myocardial infarction) ([address] LIZETT (obstructive sleep apnea), PONV (postoperative nausea and vomiting), and stress incontinence. Past Surgical History:   has a past surgical history that includes Coronary angioplasty with stent (2000, 2001); back surgery (2000, 2001); bronchoscopy (12/04/2018); pr [facility] incl fluor gdnce dx w/cell washg spx (N/A, 12/4/2018); Cholecystectomy, laparoscopic (N/A, 12/19/2018); Upper gastrointestinal endoscopy (N/A, 2/25/2019); Colonoscopy (N/A, 2/25/2019); Colonoscopy (2/25/2019); bronchoscopy (N/A, 2/27/2019); bronchoscopy (2/27/2019); bronchoscopy (2/27/2019); bronchoscopy (N/A, 8/6/2019); Upper gastrointestinal endoscopy (N/A, 8/7/2019); bronchoscopy (N/A, 9/27/2019); bronchoscopy (9/27/2019); and bronchoscopy (9/27/2019). Advance Directives:      Code is DNR CC    Problem Severity: Pain/Other Symptoms:      Reports back pain, kyphoplasty tomorrow. Has Norco available for pain as needed    Bed Mobility/Toileting/Transfer:      Tires easily. Independent with ADLs, needs to take rest periods. Performance Status:      Ambulates with walker    Symptom Assessment: Appetite/Nausea/Bowels/Fatigue:      Dietician is following. Appetite has been poor. Social History:   reports that she quit smoking about 18 years ago. She has never used smokeless tobacco. She reports that she does not drink alcohol or use drugs. Family History:  family history includes Cancer in her maternal uncle and sister; Diabetes in her brother and sister; Heart Disease in her father and mother. Psychological/Spiritual:       Patient is . She has two daughters. She is [facility].    Family Discussion:      Met with patient.  She and her daughters are known to palliative care from previous hospital admissions. Patient lives alone. States she has been miserable with back pain and is looking forward to Kyphoplasty. Goal of care is conservative medical management. Her daughters work full time but are supportive. She does not want resuscitation. Plans ECF upon discharge. Offered support.

## 2020-07-22 NOTE — FLOWSHEET NOTE
Shift assessment complete. VSS. Scheduled medications given.        07/22/20 0900   Vital Signs   Temp 98.1 °F (36.7 °C)   Temp Source Oral   Pulse 106   Heart Rate Source Monitor   Resp 18   BP (!) 148/78   BP Location Right upper arm   BP Upper/Lower Upper   MAP (mmHg) 103   Patient Position Turns self   Level of Consciousness 0   MEWS Score 2   Patient Currently in Pain Denies

## 2020-07-22 NOTE — PROGRESS NOTES
Speech  Language And Dysphagia Treatment Note    Name: Isacc Patino  : 1940  Medical Diagnosis: Back pain [M54.9]  Back pain [M54.9]  Pathologic thoracic fracture, initial encounter [V33.99YD]  Treatment Diagnosis: Oropharyngeal Dysphagia, Cognitive Deficits   Pain: Pt did not report pain    Patient's response to therapy:  Pt awake, alert and more verbally responsive this date. Dysphagia Treatment:  Current Diet Level: Regular texture diet with thin liquids, avoid straws, meds as tolerated   Tolerance of Current Diet Level: Per chart review, no noted difficulty with current diet     Assessment of Texture Tolerance:  -Impressions: Pt was seen sitting upright in bed, she was consuming her breakfast meal.  Pt reported no difficulty. Results of MBS completed 20 were discussed with Pt, Pt verbalized understanding. Trials of thin liquids and regular solids were provided. Thin liquids via cup revealed suspected premature bolus loss to pharynx with minimally delayed swallow initiation, no overt clinical s/s of aspiration/penetration were assessed. Largely functional mastication and oral clearance was achieved with regular solids. At this time, recommend continuation of current diet with ongoing use of aspiration precautions. General safe swallowing strategies were discussed with Pt. Dysphagia Goals, addressed this date:  1.) Pt will tolerate recommended diet without s/s of aspiration  (ongoing 2020)   2.) Pt will tolerate MBS to r/o aspiration and determine appropriate diet/liquid level. (goal met, 20)    Diet and Treatment Recommendations:  Continue current diet     Speech Language Treatment:  Impressions: Pt demonstrated mildly reduced orientation this date, Pt was oriented to self, facility, and current date. She initially stated it was August however did correct when informed it was not August.  Mildly reduced recall of detailed information was noted.      Speech Language STG, addressed this date: 1. Pt will improve orientation and short term recall via graded tasks to 80% (ongoing 7/22/2020)   2. Pt will participate in ongoing cognitive assessment as indicated (ongoing 7/22/2020)     Patient/Family Education:Education given to the Pt and nurse, who verbalized understanding    Assessment: Patient progressing toward goals    Plan: Continue as per plan of care    Discharge Recommendations: Pt will benefit from continued skilled Speech Therapy for Speech and Dysphagia services, prior to returning home. Treatment time  Timed Code Treatment Minutes: 12 minutes  Total Treatment time: 23 minutes    If patient discharges prior to next session this note will serve as a discharge summary.       Anson Ferrari M.A., [address] [place] [address]  Speech-Language Pathologist

## 2020-07-22 NOTE — PROGRESS NOTES
Daughter Ermias Comer called and updated on MRI results and plan for Kyphoplasty today and NPO status. All questions answered.

## 2020-07-22 NOTE — PLAN OF CARE
Nutrition Problem #1: Inadequate oral intake  Intervention: Food and/or Nutrient Delivery: Continue Current Diet, Continue Oral Nutrition Supplement  Nutritional Goals: Pt will consume at least 50% of meals and supplements

## 2020-07-22 NOTE — PROGRESS NOTES
Incentive Spirometry education and demonstration completed by Respiratory Therapy Yes      Response to education: Good     Teaching Time: 5 minutes    Minimum Predicted Vital Capacity - 570 mL. Patient's Actual Vital Capacity - 250 mL.  Turning over to Nursing for routine follow-up No.    Comments: Needs coaching    Electronically signed by Payton Given on 7/22/2020 at 5:08 PM

## 2020-07-22 NOTE — PROGRESS NOTES
Comprehensive Nutrition Assessment    Type and Reason for Visit:  Reassess    Nutrition Recommendations/Plan:   1. Continue to offer Ensure Enlive BID as diet resumes  2. Encourage PO intake   3. Document all PO intake in flow sheets     Nutrition Assessment:  Pt remains nutritionally compromised as evidenced by continued inadequate PO intake, consuming less than 50% of most meals. Unsure if pt has been taking Ensure supplements. Pt is NPO today for kyphoplasty. Will continue to monitor for improved PO intake and tolerance to supplement. Malnutrition Assessment:  Malnutrition Status:  No malnutrition      Estimated Daily Nutrient Needs:  Energy (kcal):  3399-0640; Weight Used for Energy Requirements:  Current(66 kg)     Protein (g):  79-99 grams; Weight Used for Protein Requirements:  Current(66 kg; 1.2-1.5 grams per kg)        Fluid (ml/day):  1 mL per kcal;     Nutrition Related Findings:  Oriented to person/time. Trace BLE edema. Wounds:  None       Current Nutrition Therapies:    No diet orders on file    Anthropometric Measures:  · Height: 5' 5\" (165.1 cm)  · Current Body Weight: 144 lb (65.3 kg)   · Admission Body Weight: 146 lb (66.2 kg)    · Ideal Body Weight: 125 lbs; % Ideal Body Weight     · BMI: 24  · BMI Categories: Normal Weight (BMI 18.5-24. 9)       Nutrition Diagnosis:   · Inadequate oral intake related to inadequate protein-energy intake as evidenced by intake 26-50%    Nutrition Interventions:   Food and/or Nutrient Delivery:  Continue Current Diet, Continue Oral Nutrition Supplement  Nutrition Education/Counseling:  Education not indicated   Coordination of Nutrition Care:  Continued Inpatient Monitoring    Goals:  Pt will consume at least 50% of meals and supplements       Nutrition Monitoring and Evaluation:   Food/Nutrient Intake Outcomes:  Food and Nutrient Intake, Supplement Intake    Discharge Planning:    Continue current diet     Electronically signed by Josh Vogel RD, LD on 7/22/20 at 1:00 PM EDT    Contact: [phone]

## 2020-07-23 ENCOUNTER — APPOINTMENT (OUTPATIENT)
Dept: INTERVENTIONAL RADIOLOGY/VASCULAR | Age: 80
DRG: 477 | End: 2020-07-23
Payer: MEDICARE

## 2020-07-23 LAB
ANION GAP SERPL CALCULATED.3IONS-SCNC: 11 MMOL/L (ref 3–16)
APTT: 29.4 SEC (ref 24.2–36.2)
BASOPHILS ABSOLUTE: 0.1 K/UL (ref 0–0.2)
BASOPHILS RELATIVE PERCENT: 0.8 %
BUN BLDV-MCNC: 27 MG/DL (ref 7–20)
CALCIUM SERPL-MCNC: 9.3 MG/DL (ref 8.3–10.6)
CHLORIDE BLD-SCNC: 99 MMOL/L (ref 99–110)
CO2: 28 MMOL/L (ref 21–32)
CREAT SERPL-MCNC: 1.3 MG/DL (ref 0.6–1.2)
EOSINOPHILS ABSOLUTE: 1.8 K/UL (ref 0–0.6)
EOSINOPHILS RELATIVE PERCENT: 16.5 %
GFR AFRICAN AMERICAN: 48
GFR NON-AFRICAN AMERICAN: 40
GLUCOSE BLD-MCNC: 117 MG/DL (ref 70–99)
GLUCOSE BLD-MCNC: 131 MG/DL (ref 70–99)
GLUCOSE BLD-MCNC: 148 MG/DL (ref 70–99)
GLUCOSE BLD-MCNC: 220 MG/DL (ref 70–99)
GLUCOSE BLD-MCNC: 290 MG/DL (ref 70–99)
HCT VFR BLD CALC: 27.8 % (ref 36–48)
HEMOGLOBIN: 8.9 G/DL (ref 12–16)
INR BLD: 0.99 (ref 0.86–1.14)
LYMPHOCYTES ABSOLUTE: 1.8 K/UL (ref 1–5.1)
LYMPHOCYTES RELATIVE PERCENT: 16.4 %
MCH RBC QN AUTO: 28.6 PG (ref 26–34)
MCHC RBC AUTO-ENTMCNC: 32.1 G/DL (ref 31–36)
MCV RBC AUTO: 89.2 FL (ref 80–100)
MONOCYTES ABSOLUTE: 0.9 K/UL (ref 0–1.3)
MONOCYTES RELATIVE PERCENT: 7.8 %
NEUTROPHILS ABSOLUTE: 6.5 K/UL (ref 1.7–7.7)
NEUTROPHILS RELATIVE PERCENT: 58.5 %
PDW BLD-RTO: 20 % (ref 12.4–15.4)
PERFORMED ON: ABNORMAL
PLATELET # BLD: 384 K/UL (ref 135–450)
PMV BLD AUTO: 6.7 FL (ref 5–10.5)
POTASSIUM SERPL-SCNC: 4.1 MMOL/L (ref 3.5–5.1)
PROTHROMBIN TIME: 11.5 SEC (ref 10–13.2)
RBC # BLD: 3.11 M/UL (ref 4–5.2)
SODIUM BLD-SCNC: 138 MMOL/L (ref 136–145)
WBC # BLD: 11 K/UL (ref 4–11)

## 2020-07-23 PROCEDURE — 6370000000 HC RX 637 (ALT 250 FOR IP): Performed by: INTERNAL MEDICINE

## 2020-07-23 PROCEDURE — 0PS43ZZ REPOSITION THORACIC VERTEBRA, PERCUTANEOUS APPROACH: ICD-10-PCS | Performed by: RADIOLOGY

## 2020-07-23 PROCEDURE — 80048 BASIC METABOLIC PNL TOTAL CA: CPT

## 2020-07-23 PROCEDURE — 85610 PROTHROMBIN TIME: CPT

## 2020-07-23 PROCEDURE — 6360000002 HC RX W HCPCS: Performed by: INTERNAL MEDICINE

## 2020-07-23 PROCEDURE — 6360000004 HC RX CONTRAST MEDICATION: Performed by: INTERNAL MEDICINE

## 2020-07-23 PROCEDURE — 88341 IMHCHEM/IMCYTCHM EA ADD ANTB: CPT

## 2020-07-23 PROCEDURE — 6360000002 HC RX W HCPCS: Performed by: RADIOLOGY

## 2020-07-23 PROCEDURE — 0P943ZX DRAINAGE OF THORACIC VERTEBRA, PERCUTANEOUS APPROACH, DIAGNOSTIC: ICD-10-PCS | Performed by: RADIOLOGY

## 2020-07-23 PROCEDURE — 85730 THROMBOPLASTIN TIME PARTIAL: CPT

## 2020-07-23 PROCEDURE — 99153 MOD SED SAME PHYS/QHP EA: CPT

## 2020-07-23 PROCEDURE — 88311 DECALCIFY TISSUE: CPT

## 2020-07-23 PROCEDURE — 99152 MOD SED SAME PHYS/QHP 5/>YRS: CPT

## 2020-07-23 PROCEDURE — 22515 PERQ VERTEBRAL AUGMENTATION: CPT

## 2020-07-23 PROCEDURE — 2580000003 HC RX 258: Performed by: INTERNAL MEDICINE

## 2020-07-23 PROCEDURE — 2500000003 HC RX 250 WO HCPCS: Performed by: RADIOLOGY

## 2020-07-23 PROCEDURE — 1200000000 HC SEMI PRIVATE

## 2020-07-23 PROCEDURE — 2500000003 HC RX 250 WO HCPCS: Performed by: INTERNAL MEDICINE

## 2020-07-23 PROCEDURE — C1713 ANCHOR/SCREW BN/BN,TIS/BN: HCPCS

## 2020-07-23 PROCEDURE — 0PU43JZ SUPPLEMENT THORACIC VERTEBRA WITH SYNTHETIC SUBSTITUTE, PERCUTANEOUS APPROACH: ICD-10-PCS | Performed by: RADIOLOGY

## 2020-07-23 PROCEDURE — 36415 COLL VENOUS BLD VENIPUNCTURE: CPT

## 2020-07-23 PROCEDURE — 88307 TISSUE EXAM BY PATHOLOGIST: CPT

## 2020-07-23 PROCEDURE — 22513 PERQ VERTEBRAL AUGMENTATION: CPT

## 2020-07-23 PROCEDURE — 85025 COMPLETE CBC W/AUTO DIFF WBC: CPT

## 2020-07-23 PROCEDURE — 94640 AIRWAY INHALATION TREATMENT: CPT

## 2020-07-23 PROCEDURE — 88342 IMHCHEM/IMCYTCHM 1ST ANTB: CPT

## 2020-07-23 RX ORDER — MIDAZOLAM HYDROCHLORIDE 1 MG/ML
INJECTION INTRAMUSCULAR; INTRAVENOUS
Status: COMPLETED | OUTPATIENT
Start: 2020-07-23 | End: 2020-07-23

## 2020-07-23 RX ORDER — MORPHINE SULFATE 2 MG/ML
2 INJECTION, SOLUTION INTRAMUSCULAR; INTRAVENOUS ONCE
Status: COMPLETED | OUTPATIENT
Start: 2020-07-23 | End: 2020-07-23

## 2020-07-23 RX ORDER — BUPIVACAINE HYDROCHLORIDE 5 MG/ML
10 INJECTION, SOLUTION EPIDURAL; INTRACAUDAL
Status: COMPLETED | OUTPATIENT
Start: 2020-07-23 | End: 2020-07-23

## 2020-07-23 RX ORDER — LIDOCAINE HYDROCHLORIDE 10 MG/ML
10 INJECTION, SOLUTION EPIDURAL; INFILTRATION; INTRACAUDAL; PERINEURAL ONCE
Status: COMPLETED | OUTPATIENT
Start: 2020-07-23 | End: 2020-07-23

## 2020-07-23 RX ORDER — ACETAMINOPHEN 325 MG/1
650 TABLET ORAL EVERY 4 HOURS PRN
Status: DISCONTINUED | OUTPATIENT
Start: 2020-07-23 | End: 2020-07-23 | Stop reason: SDUPTHER

## 2020-07-23 RX ORDER — HYDRALAZINE HYDROCHLORIDE 20 MG/ML
10 INJECTION INTRAMUSCULAR; INTRAVENOUS EVERY 4 HOURS PRN
Status: DISCONTINUED | OUTPATIENT
Start: 2020-07-23 | End: 2020-07-24 | Stop reason: HOSPADM

## 2020-07-23 RX ORDER — FENTANYL CITRATE 50 UG/ML
INJECTION, SOLUTION INTRAMUSCULAR; INTRAVENOUS
Status: COMPLETED | OUTPATIENT
Start: 2020-07-23 | End: 2020-07-23

## 2020-07-23 RX ORDER — DIPHENHYDRAMINE HYDROCHLORIDE 50 MG/ML
INJECTION INTRAMUSCULAR; INTRAVENOUS
Status: COMPLETED | OUTPATIENT
Start: 2020-07-23 | End: 2020-07-23

## 2020-07-23 RX ORDER — CLINDAMYCIN PHOSPHATE 600 MG/50ML
600 INJECTION INTRAVENOUS ONCE
Status: COMPLETED | OUTPATIENT
Start: 2020-07-23 | End: 2020-07-23

## 2020-07-23 RX ORDER — CYCLOBENZAPRINE HCL 10 MG
5 TABLET ORAL 3 TIMES DAILY PRN
Status: DISCONTINUED | OUTPATIENT
Start: 2020-07-23 | End: 2020-07-24 | Stop reason: HOSPADM

## 2020-07-23 RX ADMIN — FENTANYL CITRATE 50 MCG: 50 INJECTION, SOLUTION INTRAMUSCULAR; INTRAVENOUS at 10:22

## 2020-07-23 RX ADMIN — ACETAMINOPHEN 650 MG: 325 TABLET, FILM COATED ORAL at 21:04

## 2020-07-23 RX ADMIN — FENTANYL CITRATE 25 MCG: 50 INJECTION, SOLUTION INTRAMUSCULAR; INTRAVENOUS at 10:35

## 2020-07-23 RX ADMIN — MIDAZOLAM 0.5 MG: 1 INJECTION INTRAMUSCULAR; INTRAVENOUS at 10:13

## 2020-07-23 RX ADMIN — MIDAZOLAM 0.5 MG: 1 INJECTION INTRAMUSCULAR; INTRAVENOUS at 10:22

## 2020-07-23 RX ADMIN — DILTIAZEM HYDROCHLORIDE 60 MG: 60 CAPSULE, EXTENDED RELEASE ORAL at 13:14

## 2020-07-23 RX ADMIN — LIDOCAINE HYDROCHLORIDE 10 ML: 10 INJECTION, SOLUTION EPIDURAL; INFILTRATION; INTRACAUDAL; PERINEURAL at 10:58

## 2020-07-23 RX ADMIN — INSULIN LISPRO 4 UNITS: 100 INJECTION, SOLUTION INTRAVENOUS; SUBCUTANEOUS at 18:44

## 2020-07-23 RX ADMIN — DIPHENHYDRAMINE HYDROCHLORIDE 25 MG: 50 INJECTION, SOLUTION INTRAMUSCULAR; INTRAVENOUS at 10:16

## 2020-07-23 RX ADMIN — MIDAZOLAM 0.5 MG: 1 INJECTION INTRAMUSCULAR; INTRAVENOUS at 10:35

## 2020-07-23 RX ADMIN — FENTANYL CITRATE 25 MCG: 50 INJECTION, SOLUTION INTRAMUSCULAR; INTRAVENOUS at 10:04

## 2020-07-23 RX ADMIN — ALBUTEROL SULFATE 2.5 MG: 2.5 SOLUTION RESPIRATORY (INHALATION) at 16:26

## 2020-07-23 RX ADMIN — ALBUTEROL SULFATE 2.5 MG: 2.5 SOLUTION RESPIRATORY (INHALATION) at 09:32

## 2020-07-23 RX ADMIN — CLINDAMYCIN PHOSPHATE 600 MG: 12 INJECTION, SOLUTION INTRAMUSCULAR; INTRAVENOUS at 10:06

## 2020-07-23 RX ADMIN — HYDROCODONE BITARTRATE AND ACETAMINOPHEN 1 TABLET: 5; 325 TABLET ORAL at 15:21

## 2020-07-23 RX ADMIN — ALBUTEROL SULFATE 2.5 MG: 2.5 SOLUTION RESPIRATORY (INHALATION) at 20:43

## 2020-07-23 RX ADMIN — BUPIVACAINE HYDROCHLORIDE 50 MG: 5 INJECTION, SOLUTION EPIDURAL; INTRACAUDAL at 10:57

## 2020-07-23 RX ADMIN — MIDAZOLAM 0.5 MG: 1 INJECTION INTRAMUSCULAR; INTRAVENOUS at 10:08

## 2020-07-23 RX ADMIN — FENTANYL CITRATE 25 MCG: 50 INJECTION, SOLUTION INTRAMUSCULAR; INTRAVENOUS at 10:13

## 2020-07-23 RX ADMIN — Medication 10 ML: at 13:16

## 2020-07-23 RX ADMIN — INSULIN LISPRO 5 UNITS: 100 INJECTION, SOLUTION INTRAVENOUS; SUBCUTANEOUS at 18:45

## 2020-07-23 RX ADMIN — MIDAZOLAM 0.5 MG: 1 INJECTION INTRAMUSCULAR; INTRAVENOUS at 10:04

## 2020-07-23 RX ADMIN — Medication 10 ML: at 21:05

## 2020-07-23 RX ADMIN — PREDNISONE 10 MG: 10 TABLET ORAL at 13:13

## 2020-07-23 RX ADMIN — INSULIN LISPRO 3 UNITS: 100 INJECTION, SOLUTION INTRAVENOUS; SUBCUTANEOUS at 21:05

## 2020-07-23 RX ADMIN — MORPHINE SULFATE 2 MG: 2 INJECTION, SOLUTION INTRAMUSCULAR; INTRAVENOUS at 13:10

## 2020-07-23 RX ADMIN — IOPAMIDOL 20 ML: 408 INJECTION, SOLUTION INTRATHECAL at 10:58

## 2020-07-23 RX ADMIN — GUAIFENESIN 600 MG: 600 TABLET, EXTENDED RELEASE ORAL at 21:03

## 2020-07-23 RX ADMIN — METOPROLOL SUCCINATE 25 MG: 25 TABLET, FILM COATED, EXTENDED RELEASE ORAL at 13:14

## 2020-07-23 RX ADMIN — DILTIAZEM HYDROCHLORIDE 60 MG: 60 CAPSULE, EXTENDED RELEASE ORAL at 21:03

## 2020-07-23 RX ADMIN — FENTANYL CITRATE 25 MCG: 50 INJECTION, SOLUTION INTRAMUSCULAR; INTRAVENOUS at 10:08

## 2020-07-23 RX ADMIN — INSULIN GLARGINE 5 UNITS: 100 INJECTION, SOLUTION SUBCUTANEOUS at 21:05

## 2020-07-23 ASSESSMENT — PAIN SCALES - GENERAL
PAINLEVEL_OUTOF10: 5
PAINLEVEL_OUTOF10: 10
PAINLEVEL_OUTOF10: 4

## 2020-07-23 NOTE — PROGRESS NOTES
(1 Unit Dial) 0-12 Units, TID WC  insulin lispro (1 Unit Dial) 0-6 Units, Nightly  glucose (GLUTOSE) 40 % oral gel 15 g, PRN  dextrose 50 % IV solution, PRN  glucagon (rDNA) injection 1 mg, PRN  dextrose 5 % solution, PRN  dilTIAZem (CARDIZEM 12 HR) extended release capsule 60 mg, BID  fluticasone-umeclidin-vilant (TRELEGY ELLIPTA) 100-62.5-25 MCG/INH inhaler 1 puff - PATIENT SUPPLIED, Daily  metoprolol succinate (TOPROL XL) extended release tablet 25 mg, Daily  pantoprazole (PROTONIX) tablet 40 mg, QAM AC  sodium chloride flush 0.9 % injection 10 mL, 2 times per day  sodium chloride flush 0.9 % injection 10 mL, PRN  acetaminophen (TYLENOL) tablet 650 mg, Q6H PRN    Or  acetaminophen (TYLENOL) suppository 650 mg, Q6H PRN  promethazine (PHENERGAN) tablet 12.5 mg, Q6H PRN    Or  ondansetron (ZOFRAN) injection 4 mg, Q6H PRN  cyclobenzaprine (FLEXERIL) tablet 10 mg, TID PRN        Objective:  BP (!) 168/84   Pulse 102   Temp 97.9 °F (36.6 °C) (Oral)   Resp 28   Ht 5' 5\" (1.651 m)   Wt 142 lb 14.4 oz (64.8 kg)   SpO2 93%   BMI 23.78 kg/m²   No intake or output data in the 24 hours ending 07/23/20 1407   Wt Readings from Last 3 Encounters:   07/23/20 142 lb 14.4 oz (64.8 kg)   06/14/20 140 lb 8 oz (63.7 kg)   10/08/19 142 lb (64.4 kg)       General appearance:  Appears comfortable, laying in bed, pleasant  Eyes: Sclera clear. Pupils equal.  ENT: Moist oral mucosa. Trachea midline, no adenopathy. Cardiovascular: Regular rhythm, normal S1, S2. No murmur. No edema in lower extremities  Respiratory: Very poor AE BL.  L sided rhonchi. No wheezing or rales appreciated  GI: Abdomen soft, no tenderness, not distended, normal bowel sounds  Musculoskeletal: Remains tender to palpation in T8 and T10 areas  Neurology: Grossly intact. No speech or motor deficits  Psych: Depressed affect. Alert and oriented in time, place and person  Skin: Warm, dry, normal turgor.   R maxillary contusion  Extremity exam shows brisk capillary aortic atherosclerosis with no aortic aneurysm or   periaortic hemorrhage. Stable mild cardiomegaly and stable pulmonary artery   hypertension. 2. COPD. 3. Compared with the prior exam there is overall improved pulmonary   consolidation with perihilar and lower lobe findings which may represent   chronic fibrotic changes and trace left pleural effusion. New multilobar   multifocal right lung consolidative changes which may represent acute   pneumonia or possible chronic pulmonary process such as vasculitis. 4. Indeterminate anterior left lung base clustered 0.3 cm solid nodules,   attention on follow-up exams. 5. In the right lower quadrant there is new mesenteric fat induration and   mild lymphadenopathy. The appendix appears normal.  There is no colonic wall   thickening. This could relate to possible mild colitis, ascending colonic   diverticulitis, or mesenteric adenitis. A lymphoproliferative disorder such   as lymphoma cannot be excluded. 6. No obstruction, perforation, or abscess. 7. The T3 and T10 vertebral bodies demonstrate subtle new height loss or   superior endplate concavity which may represent insufficiency versus   compression fractures. If clinically indicated this could be further   evaluated with MRI of the thoracic spine. XR CHEST PORTABLE   Final Result   New focal opacity in the right lower lung. Linear opacities in the right   upper lung and left perihilar region otherwise stable. Given history of lung   cancer would recommend further evaluation with CT.                Problem List  Principal Problem:    Intractable back pain  Active Problems:    Dyslipidemia    HTN (hypertension), benign    Coronary atherosclerosis of native coronary artery    DM (diabetes mellitus), secondary, uncontrolled, w/neurologic complic (HCC)    Mitral and aortic valve disease    Obstructive sleep apnea    COPD, severe (HCC)    Paroxysmal atrial fibrillation (HCC)    Adenocarcinoma of left lung (HCC)    Closed compression fracture of thoracic vertebra (HCC)    Chronic deep vein thrombosis (DVT) of both lower extremities (HCC)    Chronic diastolic heart failure (HCC)    Acute on chronic respiratory failure with hypoxia (HCC)    Depression    Pathologic thoracic fracture, initial encounter    Maxillary sinus fracture, sequela (HCC)    PNA (pneumonia)    Dysphagia    Acute kidney injury superimposed on CKD ([facility] [address]  Resolved Problems:    * No resolved hospital problems. *       Assessment & Plan:   1. NPO for IR kypho, diet per IR  2. SLP and MBS appreciated  3. Finished Zithromax Day 5/5  4. Hold Eliquis for kypho today  5. Cont Tylenol 650 mg q6h  6. Cont Trelegy and Albuterol  7. Cont Prednisone  8. Repeat PT/OT eval after kyphoplasty  9. No mets noted on MRI Brain  10. Cont Lantus 5 U qhs, continue Humalog 5 U with meals  11. Hydralazine IV PRN       IV Access: Peripheral  Means: No  Diet: Diet NPO Effective Now Exceptions are: Sips with Meds  Code:DNR-CC  DVT PPX SCD  Disposition [facility] vs home with home PT/OT/VNS/HHA S4    Discussed with patient, Dr Carol Rivera (IR) and nursing. Avoid narcotics as possible. D/w daughter Elaina Floor) on phone. Hopefully IR Kristopher helps.   She needs IV access     Radha Dukes MD   7/23/2020 2:07 PM

## 2020-07-23 NOTE — PROGRESS NOTES
Speech Language Pathology  Attempt/Hold    [facility]  1940    Attempted to see pt for dysphagia follow-up. Per RN and chart, pt is currently NPO for kyphoplasty this date. Will hold and re-attempt as therapy schedule and pt availability allow.     Thanks,  LILLY Watson  Speech-Language Pathologist

## 2020-07-23 NOTE — PRE SEDATION
Sedation Pre-Procedure Note    Patient Name: Cory Lundberg   YOB: 1940  Room/Bed: [facility]-8392/1896-44  Medical Record Number: [id number]  Date: 7/23/2020   Time: 11:01 AM       Indication:  T8 and T10 compression fractures here for kyphoplasty and biopsy. Consent: I have discussed with the patient and/or the patient representative the indication, alternatives, and the possible risks and/or complications of the planned procedure and the anesthesia methods. The patient and/or patient representative appear to understand and agree to proceed. Vital Signs:   Vitals:    07/23/20 1045   BP: (!) 149/87   Pulse: 113   Resp: 27   Temp:    SpO2: 95%       Past Medical History:   has a past medical history of Arthritis, CAD (coronary artery disease), Carpal tunnel syndrome, COPD (chronic obstructive pulmonary disease) ([address] Coronary stent, depression, Diabetes mellitus ([address] Diastolic heart failure ([address] GERD (gastroesophageal reflux disease), Hyperlipidemia, Hypertension, Lung cancer ([address] MI (myocardial infarction) ([address] LIZETT (obstructive sleep apnea), PONV (postoperative nausea and vomiting), and stress incontinence. Past Surgical History:   has a past surgical history that includes Coronary angioplasty with stent (2000, 2001); back surgery (2000, 2001); bronchoscopy (12/04/2018); pr ncc incl fluor gdnce dx w/cell washg spx (N/A, 12/4/2018); Cholecystectomy, laparoscopic (N/A, 12/19/2018); Upper gastrointestinal endoscopy (N/A, 2/25/2019); Colonoscopy (N/A, 2/25/2019); Colonoscopy (2/25/2019); bronchoscopy (N/A, 2/27/2019); bronchoscopy (2/27/2019); bronchoscopy (2/27/2019); bronchoscopy (N/A, 8/6/2019); Upper gastrointestinal endoscopy (N/A, 8/7/2019); bronchoscopy (N/A, 9/27/2019); bronchoscopy (9/27/2019); and bronchoscopy (9/27/2019).     Medications:   Scheduled Meds:    insulin lispro  5 Units Subcutaneous TID WC    acetaminophen  650 mg Oral Q6H    guaiFENesin  600 mg Oral BID    albuterol  2.5 mg Nebulization 4x daily    predniSONE  10 mg Oral Daily    polyethylene glycol  17 g Oral Daily    lidocaine  1 patch Transdermal Daily    insulin glargine  5 Units Subcutaneous Nightly    insulin lispro  0-12 Units Subcutaneous TID WC    insulin lispro  0-6 Units Subcutaneous Nightly    dilTIAZem  60 mg Oral BID    fluticasone-umeclidin-vilant  1 puff Inhalation Daily    metoprolol succinate  25 mg Oral Daily    pantoprazole  40 mg Oral QAM AC    sodium chloride flush  10 mL Intravenous 2 times per day     Continuous Infusions:    dextrose       PRN Meds: albuterol sulfate HFA, HYDROcodone 5 mg - acetaminophen, glucose, dextrose, glucagon (rDNA), dextrose, sodium chloride flush, acetaminophen **OR** acetaminophen, promethazine **OR** ondansetron, cyclobenzaprine  Home Meds:   Prior to Admission medications    Medication Sig Start Date End Date Taking? Authorizing Provider   methocarbamol (ROBAXIN) 500 MG tablet Take 500 mg by mouth 4 times daily   Yes Historical Provider, MD   HYDROcodone-acetaminophen (NORCO) 5-325 MG per tablet Take 1 tablet by mouth every 6 hours as needed for Pain.    Yes Historical Provider, MD   predniSONE (DELTASONE) 10 MG tablet Take 1 tablet by mouth daily 6/15/20  Yes MARIA C Shane CNP   dilTIAZem (CARDIZEM 12 HR) 60 MG extended release capsule Take 1 capsule by mouth 2 times daily 6/15/20  Yes MARIA C Shane CNP   linagliptin (TRADJENTA) 5 MG tablet Take 1 tablet by mouth daily 6/15/20  Yes MARIA C Shane CNP   mirtazapine (REMERON) 15 MG tablet Take 1 tablet by mouth nightly 10/8/19  Yes Cruz Tapia MD   ipratropium-albuterol (DUONEB) 0.5-2.5 (3) MG/3ML SOLN nebulizer solution Inhale 3 mLs into the lungs every 4 hours (while awake) DX:COPD J44.9  Patient taking differently: Inhale 3 mLs into the lungs every 4 hours as needed DX:COPD J44.9 3/8/19  Yes Paulina Crum MD   Fluticasone-Umeclidin-Vilant (Patrice Frazier) 100-62.5-25 MCG/INH AEPB Inhale 1 puff into the lungs daily as needed    Yes Historical Provider, MD   acetaminophen 650 MG TABS Take 650 mg by mouth every 4 hours as needed for Pain (For mild pain level 1-3 or for fever > 100.5). 10/29/12  Yes Stewart Paz MD   pantoprazole (PROTONIX) 40 MG tablet Take 1 tablet by mouth every morning (before breakfast) 6/15/20   MARIA C Manuel CNP   metoprolol succinate (TOPROL XL) 25 MG extended release tablet Take 1 tablet by mouth daily 6/15/20   MARIA C Manuel CNP   apixaban (ELIQUIS) 2.5 MG TABS tablet Take 1 tablet by mouth 2 times daily 6/15/20   MARIA C Manuel CNP     Coumadin Use Last 7 Days:  no  Antiplatelet drug therapy use last 7 days: no  Other anticoagulant use last 7 days: no  Additional Medication Information:  n/a      Pre-Sedation Documentation and Exam:   I have reviewed the patient's history and review of systems.     Mallampati Airway Assessment:  Mallampati Class II - (soft palate, fauces & uvula are visible)    Prior History of Anesthesia Complications:   none    ASA Classification:  Class 2 - A normal healthy patient with mild systemic disease    Sedation/ Anesthesia Plan:   intravenous sedation    Medications Planned:   midazolam (Versed) intravenously and fentanyl intravenously    Patient is an appropriate candidate for plan of sedation: yes    Electronically signed by Amalia Hewitt MD on 7/23/2020 at 11:01 AM

## 2020-07-23 NOTE — BRIEF OP NOTE
Brief Postoperative Note    Gilda Montelonog  YOB: 1940  [id number]    Pre-operative Diagnosis: T8 and T10 compression fractures    Post-operative Diagnosis: Same    Procedure: T8 and T10 kyphoplasty and biopsy    Anesthesia: Moderate Sedation    Surgeons: Shilpa Reddy MD    Estimated Blood Loss: Less than 5 mL    Complications: None    Specimens: Was Obtained: bone biopsy specimens of T8 and T10    Findings: Successful biopsy and Kyphoplasty of T8 and T10.     Electronically signed by Shilpa Reddy MD on 7/23/2020 at 11:02 AM

## 2020-07-23 NOTE — CARE COORDINATION
CM reviewed chart and will get Kyphoplasty today and then will need evaluated again by therapy. CM will continue to follow for d/c planning.     Barbie Mcfarland RN, BSN  [phone]

## 2020-07-23 NOTE — ACP (ADVANCE CARE PLANNING)
Advanced Care Planning Note. Purpose of Encounter: Advanced care planning in light of lung adenocarcinoma  Parties In Attendance: Patient, daughter Soraida Richard on phone  Decisional Capacity: Limited  Subjective: Patient with back pain  Objective: Cr 1.3  Goals of Care Determination: Patient/POA want limited support (NO CPR, no vent, no HD, consider Surgery, no trach, no PEG)  Plan:  IR Kyphoplasty and biopsy. PT/OT eval.    Code Status: DNR CC   Time spent on Advanced care Plannin minutes  Advanced Care Planning Documents: Completed advanced directives on chart, daughter is the POA.     Judith Alexandra MD  2020 2:15 PM

## 2020-07-24 VITALS
TEMPERATURE: 97.5 F | HEART RATE: 83 BPM | WEIGHT: 142.7 LBS | DIASTOLIC BLOOD PRESSURE: 71 MMHG | HEIGHT: 65 IN | SYSTOLIC BLOOD PRESSURE: 124 MMHG | BODY MASS INDEX: 23.78 KG/M2 | RESPIRATION RATE: 18 BRPM | OXYGEN SATURATION: 93 %

## 2020-07-24 LAB
ANION GAP SERPL CALCULATED.3IONS-SCNC: 11 MMOL/L (ref 3–16)
BASOPHILS ABSOLUTE: 0.1 K/UL (ref 0–0.2)
BASOPHILS RELATIVE PERCENT: 0.9 %
BUN BLDV-MCNC: 29 MG/DL (ref 7–20)
CALCIUM SERPL-MCNC: 9.4 MG/DL (ref 8.3–10.6)
CHLORIDE BLD-SCNC: 101 MMOL/L (ref 99–110)
CO2: 26 MMOL/L (ref 21–32)
CREAT SERPL-MCNC: 1.1 MG/DL (ref 0.6–1.2)
EOSINOPHILS ABSOLUTE: 1.4 K/UL (ref 0–0.6)
EOSINOPHILS RELATIVE PERCENT: 13.4 %
GFR AFRICAN AMERICAN: 58
GFR NON-AFRICAN AMERICAN: 48
GLUCOSE BLD-MCNC: 124 MG/DL (ref 70–99)
GLUCOSE BLD-MCNC: 140 MG/DL (ref 70–99)
GLUCOSE BLD-MCNC: 141 MG/DL (ref 70–99)
GLUCOSE BLD-MCNC: 320 MG/DL (ref 70–99)
HCT VFR BLD CALC: 26.8 % (ref 36–48)
HEMOGLOBIN: 8.7 G/DL (ref 12–16)
LYMPHOCYTES ABSOLUTE: 1.4 K/UL (ref 1–5.1)
LYMPHOCYTES RELATIVE PERCENT: 13.8 %
MCH RBC QN AUTO: 28.9 PG (ref 26–34)
MCHC RBC AUTO-ENTMCNC: 32.7 G/DL (ref 31–36)
MCV RBC AUTO: 88.6 FL (ref 80–100)
MONOCYTES ABSOLUTE: 0.7 K/UL (ref 0–1.3)
MONOCYTES RELATIVE PERCENT: 6.9 %
NEUTROPHILS ABSOLUTE: 6.6 K/UL (ref 1.7–7.7)
NEUTROPHILS RELATIVE PERCENT: 65 %
PDW BLD-RTO: 20.9 % (ref 12.4–15.4)
PERFORMED ON: ABNORMAL
PLATELET # BLD: 392 K/UL (ref 135–450)
PMV BLD AUTO: 6.7 FL (ref 5–10.5)
POTASSIUM SERPL-SCNC: 4.4 MMOL/L (ref 3.5–5.1)
RBC # BLD: 3.02 M/UL (ref 4–5.2)
SODIUM BLD-SCNC: 138 MMOL/L (ref 136–145)
WBC # BLD: 10.1 K/UL (ref 4–11)

## 2020-07-24 PROCEDURE — 6360000002 HC RX W HCPCS: Performed by: INTERNAL MEDICINE

## 2020-07-24 PROCEDURE — 36415 COLL VENOUS BLD VENIPUNCTURE: CPT

## 2020-07-24 PROCEDURE — 6370000000 HC RX 637 (ALT 250 FOR IP): Performed by: INTERNAL MEDICINE

## 2020-07-24 PROCEDURE — 85025 COMPLETE CBC W/AUTO DIFF WBC: CPT

## 2020-07-24 PROCEDURE — 97116 GAIT TRAINING THERAPY: CPT

## 2020-07-24 PROCEDURE — 97530 THERAPEUTIC ACTIVITIES: CPT

## 2020-07-24 PROCEDURE — 2700000000 HC OXYGEN THERAPY PER DAY

## 2020-07-24 PROCEDURE — 80048 BASIC METABOLIC PNL TOTAL CA: CPT

## 2020-07-24 PROCEDURE — 2580000003 HC RX 258: Performed by: INTERNAL MEDICINE

## 2020-07-24 PROCEDURE — 92526 ORAL FUNCTION THERAPY: CPT

## 2020-07-24 PROCEDURE — 94761 N-INVAS EAR/PLS OXIMETRY MLT: CPT

## 2020-07-24 PROCEDURE — 94640 AIRWAY INHALATION TREATMENT: CPT

## 2020-07-24 RX ORDER — GUAIFENESIN 600 MG/1
600 TABLET, EXTENDED RELEASE ORAL 2 TIMES DAILY
Qty: 14 TABLET | Refills: 0
Start: 2020-07-24 | End: 2020-10-16 | Stop reason: ALTCHOICE

## 2020-07-24 RX ORDER — ALBUTEROL SULFATE 90 UG/1
2 AEROSOL, METERED RESPIRATORY (INHALATION) EVERY 4 HOURS PRN
Qty: 1 INHALER | Refills: 0
Start: 2020-07-24 | End: 2021-04-29 | Stop reason: SDUPTHER

## 2020-07-24 RX ORDER — LIDOCAINE 4 G/G
1 PATCH TOPICAL DAILY
Qty: 7 PATCH | Refills: 0 | Status: SHIPPED | OUTPATIENT
Start: 2020-07-25 | End: 2020-08-01

## 2020-07-24 RX ORDER — HYDROCODONE BITARTRATE AND ACETAMINOPHEN 5; 325 MG/1; MG/1
1 TABLET ORAL EVERY 12 HOURS PRN
Qty: 6 TABLET | Refills: 0 | Status: SHIPPED | OUTPATIENT
Start: 2020-07-24 | End: 2020-07-27

## 2020-07-24 RX ORDER — ACETAMINOPHEN 325 MG/1
650 TABLET ORAL EVERY 6 HOURS
Qty: 112 TABLET | Refills: 0
Start: 2020-07-24 | End: 2020-10-16 | Stop reason: ALTCHOICE

## 2020-07-24 RX ADMIN — INSULIN LISPRO 5 UNITS: 100 INJECTION, SOLUTION INTRAVENOUS; SUBCUTANEOUS at 13:32

## 2020-07-24 RX ADMIN — ACETAMINOPHEN 650 MG: 325 TABLET, FILM COATED ORAL at 15:54

## 2020-07-24 RX ADMIN — METOPROLOL SUCCINATE 25 MG: 25 TABLET, FILM COATED, EXTENDED RELEASE ORAL at 09:07

## 2020-07-24 RX ADMIN — ALBUTEROL SULFATE 2.5 MG: 2.5 SOLUTION RESPIRATORY (INHALATION) at 08:18

## 2020-07-24 RX ADMIN — HYDROCODONE BITARTRATE AND ACETAMINOPHEN 1 TABLET: 5; 325 TABLET ORAL at 17:45

## 2020-07-24 RX ADMIN — INSULIN LISPRO 2 UNITS: 100 INJECTION, SOLUTION INTRAVENOUS; SUBCUTANEOUS at 13:32

## 2020-07-24 RX ADMIN — ALBUTEROL SULFATE 2.5 MG: 2.5 SOLUTION RESPIRATORY (INHALATION) at 11:57

## 2020-07-24 RX ADMIN — GUAIFENESIN 600 MG: 600 TABLET, EXTENDED RELEASE ORAL at 09:07

## 2020-07-24 RX ADMIN — PANTOPRAZOLE SODIUM 40 MG: 40 TABLET, DELAYED RELEASE ORAL at 06:36

## 2020-07-24 RX ADMIN — PREDNISONE 10 MG: 10 TABLET ORAL at 09:07

## 2020-07-24 RX ADMIN — ACETAMINOPHEN 650 MG: 325 TABLET, FILM COATED ORAL at 09:07

## 2020-07-24 RX ADMIN — DILTIAZEM HYDROCHLORIDE 60 MG: 60 CAPSULE, EXTENDED RELEASE ORAL at 09:07

## 2020-07-24 RX ADMIN — Medication 10 ML: at 09:08

## 2020-07-24 RX ADMIN — ALBUTEROL SULFATE 2.5 MG: 2.5 SOLUTION RESPIRATORY (INHALATION) at 16:12

## 2020-07-24 RX ADMIN — POLYETHYLENE GLYCOL 3350 17 G: 17 POWDER, FOR SOLUTION ORAL at 09:09

## 2020-07-24 RX ADMIN — HYDROCODONE BITARTRATE AND ACETAMINOPHEN 1 TABLET: 5; 325 TABLET ORAL at 06:58

## 2020-07-24 RX ADMIN — ACETAMINOPHEN 650 MG: 325 TABLET, FILM COATED ORAL at 01:16

## 2020-07-24 RX ADMIN — APIXABAN 2.5 MG: 2.5 TABLET, FILM COATED ORAL at 15:54

## 2020-07-24 RX ADMIN — INSULIN LISPRO 5 UNITS: 100 INJECTION, SOLUTION INTRAVENOUS; SUBCUTANEOUS at 08:45

## 2020-07-24 ASSESSMENT — PAIN SCALES - GENERAL
PAINLEVEL_OUTOF10: 5
PAINLEVEL_OUTOF10: 0
PAINLEVEL_OUTOF10: 5
PAINLEVEL_OUTOF10: 0

## 2020-07-24 ASSESSMENT — PAIN DESCRIPTION - LOCATION
LOCATION: BACK
LOCATION: BACK

## 2020-07-24 ASSESSMENT — PAIN DESCRIPTION - ORIENTATION
ORIENTATION: MID
ORIENTATION: MID

## 2020-07-24 NOTE — ACP (ADVANCE CARE PLANNING)
Advanced Care Planning Note. Purpose of Encounter: Advanced care planning in light of lung adenocarcinoma  Parties In Attendance: Patient, daughter Joelle Kam on phone  Decisional Capacity: Limited  Subjective: Patient with back pain  Objective: Cr 1.1  Goals of Care Determination: Patient/POA want limited support (NO CPR, no vent, no HD, consider Surgery, no trach, no PEG)  Plan:  SNF placement  Code Status: DNR CC   Time spent on Advanced care Plannin minutes  Advanced Care Planning Documents: Completed advanced directives on chart, daughter is the POA.     Brett Abel MD  2020 3:12 PM

## 2020-07-24 NOTE — PLAN OF CARE
Problem: Nutrition  Goal: Optimal nutrition therapy  Outcome: Ongoing     Problem: Falls - Risk of:  Goal: Will remain free from falls  Description: Will remain free from falls  Outcome: Ongoing  Goal: Absence of physical injury  Description: Absence of physical injury  Outcome: Ongoing     Problem: Pain:  Goal: Pain level will decrease  Description: Pain level will decrease  Outcome: Ongoing  Goal: Control of acute pain  Description: Control of acute pain  Outcome: Ongoing  Goal: Control of chronic pain  Description: Control of chronic pain  Outcome: Ongoing

## 2020-07-24 NOTE — PROGRESS NOTES
Patient discharged to [place], IV removed, belongings sent with patient. Patient transported via squad.

## 2020-07-24 NOTE — PLAN OF CARE
Nutrition Problem #1: Inadequate oral intake  Intervention: Food and/or Nutrient Delivery: Continue Current Diet, Modify Oral Nutrition Supplement  Nutritional Goals: Pt will consume at least 50% of meals and supplements

## 2020-07-24 NOTE — PROGRESS NOTES
Physical Therapy  Facility/Department: [address] ONCOLOGY  Daily Treatment Note  NAME: Jana Garduno  : 1940  MRN: [id number]    Date of Service: 2020    Discharge Recommendations:Kenya Hernandez scored a 17/24 on the AM-PAC short mobility form. Current research shows that an AM-PAC score of 17 or less is typically not associated with a discharge to the patient's home setting. Based on the patient's AM-PAC score and their current functional mobility deficits, it is recommended that the patient have 3-5 sessions per week of Physical Therapy at d/c to increase the patient's independence. Please see assessment section for further patient specific details. If patient discharges prior to next session this note will serve as a discharge summary. Please see below for the latest assessment towards goals. PT Equipment Recommendations  Equipment Needed: No  Other: defer to next level of care    Assessment   Body structures, Functions, Activity limitations: Decreased functional mobility ; Decreased strength;Decreased endurance;Decreased balance; Increased pain;Decreased cognition  Assessment: Pt with most significant improvement to date in therapy, evidenced by improved ambulation distance and decreased need for assistance. Pt continues to be limited secondary to dizziness and nausea, occuring with increased distance ambulation. Pt would benefit from skilled PT services after discharge to promote safe return to PLOF.   Treatment Diagnosis: impaired gait, transfers, and balance  Prognosis: Good  Clinical Presentation: evolving  PT Education: Goals;PT Role;Plan of Care;Precautions;Transfer Training;Orientation;General Safety;Gait Training;Functional Mobility Training  Patient Education: d/c recommendations, spinal precautions--pt verbalizing understanding, will require reinforcement  REQUIRES PT FOLLOW UP: Yes  Activity Tolerance  Activity Tolerance: Patient Tolerated treatment well;Patient limited by pain;Other  Activity Tolerance: Pt reporting onset of nausea and dizziness following ambulation     Patient Diagnosis(es): The primary encounter diagnosis was Acute hypoxemic respiratory failure ([facility] [address] Diagnoses of Pneumonia due to organism, Closed wedge fracture of lumbar vertebra, unspecified lumbar vertebral level, initial encounter ([facility] [address] Chronic kidney disease, unspecified CKD stage, and Generalized weakness were also pertinent to this visit. has a past medical history of Arthritis, CAD (coronary artery disease), Carpal tunnel syndrome, COPD (chronic obstructive pulmonary disease) ([address] Coronary stent, depression, Diabetes mellitus ([address] Diastolic heart failure ([facility] [address] GERD (gastroesophageal reflux disease), Hyperlipidemia, Hypertension, Lung cancer ([facility] [address] MI (myocardial infarction) ([address] LIZETT (obstructive sleep apnea), PONV (postoperative nausea and vomiting), and stress incontinence. has a past surgical history that includes Coronary angioplasty with stent (2000, 2001); back surgery (2000, 2001); bronchoscopy (12/04/2018); pr brncc incl fluor gdnce dx w/cell washg spx (N/A, 12/4/2018); Cholecystectomy, laparoscopic (N/A, 12/19/2018); Upper gastrointestinal endoscopy (N/A, 2/25/2019); Colonoscopy (N/A, 2/25/2019); Colonoscopy (2/25/2019); bronchoscopy (N/A, 2/27/2019); bronchoscopy (2/27/2019); bronchoscopy (2/27/2019); bronchoscopy (N/A, 8/6/2019); Upper gastrointestinal endoscopy (N/A, 8/7/2019); bronchoscopy (N/A, 9/27/2019); bronchoscopy (9/27/2019); bronchoscopy (9/27/2019); and IR KYPHOPLASTY THORACIC 1 VERTEBRAL BODY (7/23/2020). Restrictions  Restrictions/Precautions  Restrictions/Precautions: Fall Risk(High fall risk)  Required Braces or Orthoses? : (per neurosx CNP note: should pt not prefer brace, pt can performed modified activity)  Position Activity Restriction  Spinal Precautions: No Bending, No Lifting, No Twisting  Other position/activity restrictions: 70 y.o. female who presents because of back pain. She has a history of COPD, coronary artery disease, diabetes, heart failure, hypertension, lung cancer, pneumonia. She developed the pain yesterday. She denies any known injury. It hurts worse if she moves certain ways. She has not had a cough or fever. She denies any chest or abdominal pain. She is O2 dependent at home with 1 L of oxygen. She required more oxygen when evaluated by EMS and she came to the hospital by ambulance. She lives alone. She denies any new numbness, tingling. She has chronic neuropathy. She denies any loss of bowel or bladder control. She denies any dysuria. She was hospitalized last month for acute kidney injury. She is anticoagulated with Eliquis. IR performed T8 and T10 kyphoplasty and biopsy on 7/23  Subjective   General  Chart Reviewed: Yes  Response To Previous Treatment: Patient with no complaints from previous session. Subjective  Subjective: Pt agreeable to PT/OT treatment. Reports already having completed bathing/grooming this date with RN. General Comment  Comments: Pt supine in bed upon arrival.  Pain Screening  Patient Currently in Pain: Yes(Simultaneous filing. User may not have seen previous data.)  Pain Assessment  Pain Assessment: 0-10(Simultaneous filing. User may not have seen previous data.)  Pain Level: 5(declined pain medication, reporting she had some recently Simultaneous filing. User may not have seen previous data.)  Vital Signs  Patient Currently in Pain: Yes(Simultaneous filing.  User may not have seen previous data.)       Orientation  Orientation  Overall Orientation Status: Impaired  Orientation Level: Oriented to place;Oriented to person;Disoriented to time;Disoriented to situation  Cognition      Objective   Bed mobility  Supine to Sit: Stand by assistance  Scooting: Stand by assistance  Transfers  Sit to Stand: Contact guard assistance  Stand to sit: Contact guard assistance  Ambulation  Ambulation?: Yes  Ambulation 1  Surface: level tile  Device: Rolling Walker  Assistance: Contact guard assistance  Quality of Gait: narrow FERDINAND, flexed trunk, decreased step length and slowed [place], no LOB, but mild unsteadiness noted. Pt reporting onset of dizziness which increased to nausea ~ detention through ambulation distance. Pt able to complete ambulation. Also reporting relief of symptoms near instantaeously after sitting.   Distance: ~60 ft  Comments: Pt reporting nausea recurring after sitting when leaning forward to remove gait belt  Stairs/Curb  Stairs?: No     Balance  Posture: Fair  Sitting - Static: Good  Sitting - Dynamic: Good  Standing - Static: Fair  Standing - Dynamic: Fair     G-Code     OutComes Score                                                     AM-PAC Score  AM-PAC Inpatient Mobility Raw Score : 17 (07/24/20 1113)  AM-PAC Inpatient T-Scale Score : 42.13 (07/24/20 1113)  Mobility Inpatient CMS 0-100% Score: 50.57 (07/24/20 1113)  Mobility Inpatient CMS G-Code Modifier : CK (07/24/20 1113)          Goals  Short term goals  Time Frame for Short term goals: upon d/c  Short term goal 1: Pt will perform bed mobility with mod Ax1--met  Short term goal 2: Pt will perform transfers with LRAD and mod Ax1--met  Short term goal 3: Pt will ambulate 22' with LRAD and min Ax1--met 7/24  Short term goal 4: pt will complete bed mobility MOD I  Short term goal 5: pt will complete sit to/from stand MOD I  Short term goal 6: Pt will ambulate 100 ft with LRAD and SBA  Patient Goals   Patient goals : pt did not state   **1 goal met this date    Plan    Plan  Times per week: 3-5x  Times per day: Daily  Current Treatment Recommendations: Strengthening, Balance Training, Functional Mobility Training, Transfer Training, Endurance Training, Gait Training, Neuromuscular Re-education, Safety Education & Training, Modalities, Equipment Evaluation, Education, & procurement, Patient/Caregiver Education & Training  Safety Devices  Type of devices: All fall risk precautions in place, Call light within reach, Chair alarm in place, Left in chair, Nurse notified, Roselind Cinnamon in use, Gait belt, Patient at risk for falls  Restraints  Initially in place: No     Therapy Time   Individual Concurrent Group Co-treatment   Time In       1046   Time Out       1106   Minutes       20   Timed Code Treatment Minutes: [address], PT   Paulette Feldman, PT, DPT, [id number]

## 2020-07-24 NOTE — DISCHARGE SUMMARY
Hospital Medicine Discharge Summary    Patient: Radha Ruiz     Gender: female  : 1940   Age: 70 y.o. MRN: [id number]    Admitting Physician: Edwardo Echeverria MD  Discharge Physician: Bernie Lara MD     Code Status: DNR-CC     Admit Date: 2020   Discharge Date:   20    Disposition:  [facility]    Discharge Diagnoses:     Active Hospital Problems    Diagnosis Date Noted    Dyslipidemia [E78.5] 2011     Priority: High    HTN (hypertension), benign [I10] 2011     Priority: High    Coronary atherosclerosis of native coronary artery [I25.10] 2011     Priority: High    Maxillary sinus fracture, sequela (HCC) [S02.401S] 2020    PNA (pneumonia) [J18.9] 2020    Dysphagia [R13.10] 2020    Acute kidney injury superimposed on CKD ([address] [N17.9, N18.9] 2020    Pathologic thoracic fracture, initial encounter [M84.48XA] 07/15/2020    Intractable back pain [M54.9] 2020    Depression [F32.9]     Acute on chronic respiratory failure with hypoxia (HCC) [J96.21] 2019    Chronic diastolic heart failure (HCC) [I50.32]     Chronic deep vein thrombosis (DVT) of both lower extremities (HCC) [I82.503] 2019    Closed compression fracture of thoracic vertebra ([facility] [S22.000A] 2019    Adenocarcinoma of left lung (HCC) [C34.92] 2019    Paroxysmal atrial fibrillation (HCC) [I48.0] 10/17/2016    COPD, severe ([address] [J44.9] 2013    DM (diabetes mellitus), secondary, uncontrolled, w/neurologic complic ([address] [Q34.73, I43.93] 2011    Mitral and aortic valve disease [I08.0] 2011    Obstructive sleep apnea [G47.33] 2011       Follow-up appointments:  one week    Outpatient to do list: F/U with PCP    Condition at Discharge:  Stable    Hospital Course:   69 yo F with history of severe COPD, chronic respiratory failure with hypoxia on 2 L NC at all times, Afib/h/o PE/h/o BL DVT on Eliquis, recurrent falls, left lung adenocarcinoma s/p chemo and radiation, LIZETT, chronic D CHF, HTN, h/o cardiac arrest with possible hypoxic brain injury, gastroparesis, CKD 3, CAD, DM 2 was brought to ER for back pain. Followed by Oncology and Neurosurgery. Patient transfused 1 U PRBC on 7/16. COVID 19 negative on 7/11. Started on Abx for PNA. NS recommends TLSO, patient could not tolerate. Found to have T3 and T10 fractures. Ordered for MRI Brain and T spine to exclude metastatic disease. SLP eval requested to evaluate dysphagia to r/o recurrent aspiration PNA. Had fall in hospital on 7/14 with mildly displaced acute R maxillary sinus fracture. MRI Thoracic w/o contrast with acute T3, T8 or T10. IR performed T8 and T10 kyphoplasty and biopsy on 7/23. Will be on Tylenol q6h X 14 days. MINIMIZE USE OF NARCOTICS AT SNF. Would stop Norco quickly. Avoid muscle relaxers, benzos, ambien, phenergan and Benadryl for this patient. She is very sensitive to neuro sedating medications. Code status is DNR-CC. Discharge Medications:   Current Discharge Medication List      START taking these medications    Details   !! acetaminophen (TYLENOL) 325 MG tablet Take 2 tablets by mouth every 6 hours for 14 days  Qty: 112 tablet, Refills: 0      albuterol sulfate  (90 Base) MCG/ACT inhaler Inhale 2 puffs into the lungs every 4 hours as needed for Wheezing  Qty: 1 Inhaler, Refills: 0      lidocaine 4 % external patch Place 1 patch onto the skin daily for 7 days  Qty: 7 patch, Refills: 0      guaiFENesin (MUCINEX) 600 MG extended release tablet Take 1 tablet by mouth 2 times daily  Qty: 14 tablet, Refills: 0       !! - Potential duplicate medications found. Please discuss with provider. Current Discharge Medication List      CONTINUE these medications which have CHANGED    Details   HYDROcodone-acetaminophen (NORCO) 5-325 MG per tablet Take 1 tablet by mouth every 12 hours as needed for Pain for up to 3 days.   Qty: 6 tablet, Refills: 0    Comments: Reduce doses taken as pain becomes manageable  Associated Diagnoses: Closed wedge fracture of lumbar vertebra, unspecified lumbar vertebral level, initial encounter ([facility]           Current Discharge Medication List      CONTINUE these medications which have NOT CHANGED    Details   predniSONE (DELTASONE) 10 MG tablet Take 1 tablet by mouth daily  Qty: 30 tablet, Refills: 0      dilTIAZem (CARDIZEM 12 HR) 60 MG extended release capsule Take 1 capsule by mouth 2 times daily  Qty: 60 capsule, Refills: 1      linagliptin (TRADJENTA) 5 MG tablet Take 1 tablet by mouth daily  Qty: 30 tablet, Refills: 1      mirtazapine (REMERON) 15 MG tablet Take 1 tablet by mouth nightly  Qty: 30 tablet, Refills: 3      ipratropium-albuterol (DUONEB) 0.5-2.5 (3) MG/3ML SOLN nebulizer solution Inhale 3 mLs into the lungs every 4 hours (while awake) DX:COPD J44.9  Qty: 360 mL, Refills: 11      Fluticasone-Umeclidin-Vilant (TRELEGY ELLIPTA) 100-62.5-25 MCG/INH AEPB Inhale 1 puff into the lungs daily as needed       !! acetaminophen 650 MG TABS Take 650 mg by mouth every 4 hours as needed for Pain (For mild pain level 1-3 or for fever > 100.5). Qty: 120 tablet, Refills: 0      pantoprazole (PROTONIX) 40 MG tablet Take 1 tablet by mouth every morning (before breakfast)  Qty: 30 tablet, Refills: 1      metoprolol succinate (TOPROL XL) 25 MG extended release tablet Take 1 tablet by mouth daily  Qty: 30 tablet, Refills: 1      apixaban (ELIQUIS) 2.5 MG TABS tablet Take 1 tablet by mouth 2 times daily  Qty: 60 tablet, Refills: 1       !! - Potential duplicate medications found. Please discuss with provider.         Current Discharge Medication List      STOP taking these medications       methocarbamol (ROBAXIN) 500 MG tablet Comments:   Reason for Stopping:               Discharge Exam:    BP (!) 142/78   Pulse 91   Temp 97.7 °F (36.5 °C) (Axillary)   Resp 18   Ht 5' 5\" (1.651 m)   Wt 142 lb 11.2 oz (64.7 kg)   SpO2 95%   BMI 23.75 kg/m²   General appearance:  Appears comfortable, laying in bed, pleasant  Eyes: Sclera clear. Pupils equal.  ENT: Moist oral mucosa. Trachea midline, no adenopathy. Cardiovascular: Regular rhythm, normal S1, S2. No murmur. No edema in lower extremities  Respiratory: Very poor AE BL.  L sided rhonchi.  No wheezing or rales appreciated  GI: Abdomen soft, no tenderness, not distended, normal bowel sounds  Musculoskeletal: Less tender to palpation in T8 and T10 areas  Neurology: Grossly intact. No speech or motor deficits  Psych: Depressed affect. Alert and oriented in time, place and person  Skin: Warm, dry, normal turgor. R maxillary contusion  Extremity exam shows brisk capillary refill.  Peripheral pulses are palpable in lower extremities     Labs:  For convenience and continuity at follow-up the following most recent labs are provided:    Lab Results   Component Value Date    WBC 10.1 07/24/2020    HGB 8.7 07/24/2020    HCT 26.8 07/24/2020    MCV 88.6 07/24/2020     07/24/2020     07/24/2020    K 4.4 07/24/2020    K 4.0 07/17/2020     07/24/2020    CO2 26 07/24/2020    BUN 29 07/24/2020    CREATININE 1.1 07/24/2020    CALCIUM 9.4 07/24/2020    PHOS 5.2 07/14/2020    ALKPHOS 104 07/11/2020    ALT 18 07/11/2020    AST 46 07/11/2020    BILITOT <0.2 07/11/2020    BILIDIR <0.2 07/11/2020    LABALBU 3.3 07/11/2020    LDLCALC 130 05/19/2016    TRIG 123 05/19/2016     Lab Results   Component Value Date    INR 0.99 07/23/2020    INR 1.12 07/11/2020    INR 1.72 (H) 05/31/2020       Radiology:  Ct Head Wo Contrast    Result Date: 7/14/2020  EXAMINATION: CT OF THE HEAD WITHOUT CONTRAST; CT OF THE FACE WITHOUT CONTRAST  7/14/2020 12:02 am; 7/14/2020 12:06 am TECHNIQUE: CT of the head was performed without the administration of intravenous contrast. Dose modulation, iterative reconstruction, and/or weight based adjustment of the mA/kV was utilized to reduce the radiation dose to as low as reasonably achievable.; CT of the face was performed without the administration of intravenous contrast. Multiplanar reformatted images are provided for review. Dose modulation, iterative reconstruction, and/or weight based adjustment of the mA/kV was utilized to reduce the radiation dose to as low as reasonably achievable. COMPARISON: CT head without contrast May 31, 2020. HISTORY: ORDERING SYSTEM PROVIDED HISTORY: fall, hit face, on [facility] TECHNOLOGIST PROVIDED HISTORY: Reason for exam:->fall, hit face, on [facility] Has a \"code stroke\" or \"stroke alert\" been called? ->No Reason for Exam: fall, hit face, on [facility] Acuity: Acute Type of Exam: Initial Relevant Medical/Surgical History: fall, hit face, on AC ; ORDERING SYSTEM PROVIDED HISTORY: fall, hit face, on [facility] TECHNOLOGIST PROVIDED HISTORY: Reason for exam:->fall, hit face, on [facility] Reason for Exam: fall, hit face, on [facility] Acuity: Acute Type of Exam: Initial Relevant Medical/Surgical History: fall, hit face, on AC FINDINGS: CT HEAD: BRAIN/VENTRICLES: There is no acute intracranial hemorrhage, mass effect or midline shift. No abnormal extra-axial fluid collection. The gray-white differentiation is maintained without evidence of an acute infarct. There is no evidence of hydrocephalus. Ill-defined hypoattenuation is present within cerebral white matter consistent with moderate microvascular ischemic change. ORBITS: The visualized portion of the orbits demonstrate no acute abnormality. SINUSES: The visualized paranasal sinuses and mastoid air cells demonstrate no acute abnormality. SOFT TISSUES/SKULL:  No acute abnormality of the visualized skull or soft tissues. CT FACIAL BONES: FACIAL BONES: Mildly displaced anterior wall right maxillary sinus acute fracture is identified with underlying sinus fluid/clot seen. .  The mandible is intact. The mandibular condyles are normally situated. The nasal bones and maxillary nasal processes are intact.   Severe bilateral temporomandibular joint degenerative changes are visualized. ORBITS: The globes appear intact. The extraocular muscles, optic nerve sheath complexes and lacrimal glands appear unremarkable. No retrobulbar hematoma or mass is seen. The orbital walls and rims are intact. SINUSES/MASTOIDS: The paranasal sinuses and mastoid air cells are well aerated. No acute fracture is seen. SOFT TISSUES: Overlying mild malar subcutaneous soft tissue contusion is present. 1. Anterior right maxillary sinus mildly displaced oblique acute fracture. 2. Overlying right malar mild subcutaneous soft tissue contusion. 3. No evidence of acute intracranial trauma. Ct Facial Bones Wo Contrast    Result Date: 7/14/2020  EXAMINATION: CT OF THE HEAD WITHOUT CONTRAST; CT OF THE FACE WITHOUT CONTRAST  7/14/2020 12:02 am; 7/14/2020 12:06 am TECHNIQUE: CT of the head was performed without the administration of intravenous contrast. Dose modulation, iterative reconstruction, and/or weight based adjustment of the mA/kV was utilized to reduce the radiation dose to as low as reasonably achievable.; CT of the face was performed without the administration of intravenous contrast. Multiplanar reformatted images are provided for review. Dose modulation, iterative reconstruction, and/or weight based adjustment of the mA/kV was utilized to reduce the radiation dose to as low as reasonably achievable. COMPARISON: CT head without contrast May 31, 2020. HISTORY: ORDERING SYSTEM PROVIDED HISTORY: fall, hit face, on [facility] TECHNOLOGIST PROVIDED HISTORY: Reason for exam:->fall, hit face, on [facility] Has a \"code stroke\" or \"stroke alert\" been called? ->No Reason for Exam: fall, hit face, on [facility] Acuity: Acute Type of Exam: Initial Relevant Medical/Surgical History: fall, hit face, on  ; ORDERING SYSTEM PROVIDED HISTORY: fall, hit face, on [facility] TECHNOLOGIST PROVIDED HISTORY: Reason for exam:->fall, hit face, on [facility] Reason for Exam: fall, hit face, on [facility] Acuity: Acute Type of Exam: Initial Relevant Medical/Surgical History: fall, hit face, on AC FINDINGS: CT HEAD: BRAIN/VENTRICLES: There is no acute intracranial hemorrhage, mass effect or midline shift. No abnormal extra-axial fluid collection. The gray-white differentiation is maintained without evidence of an acute infarct. There is no evidence of hydrocephalus. Ill-defined hypoattenuation is present within cerebral white matter consistent with moderate microvascular ischemic change. ORBITS: The visualized portion of the orbits demonstrate no acute abnormality. SINUSES: The visualized paranasal sinuses and mastoid air cells demonstrate no acute abnormality. SOFT TISSUES/SKULL:  No acute abnormality of the visualized skull or soft tissues. CT FACIAL BONES: FACIAL BONES: Mildly displaced anterior wall right maxillary sinus acute fracture is identified with underlying sinus fluid/clot seen. .  The mandible is intact. The mandibular condyles are normally situated. The nasal bones and maxillary nasal processes are intact. Severe bilateral temporomandibular joint degenerative changes are visualized. ORBITS: The globes appear intact. The extraocular muscles, optic nerve sheath complexes and lacrimal glands appear unremarkable. No retrobulbar hematoma or mass is seen. The orbital walls and rims are intact. SINUSES/MASTOIDS: The paranasal sinuses and mastoid air cells are well aerated. No acute fracture is seen. SOFT TISSUES: Overlying mild malar subcutaneous soft tissue contusion is present. 1. Anterior right maxillary sinus mildly displaced oblique acute fracture. 2. Overlying right malar mild subcutaneous soft tissue contusion. 3. No evidence of acute intracranial trauma.      Mri Thoracic Spine Wo Contrast    Result Date: 7/21/2020  EXAMINATION: MRI OF THE THORACIC SPINE WITHOUT CONTRAST  7/21/2020 6:36 pm TECHNIQUE: Multiplanar multisequence MRI of the thoracic spine was performed without the administration of intravenous contrast. opacities in the upper lobes bilaterally are stable. Stable cardiac silhouette. No pneumothorax or definite pleural effusion. There is a new focal opacity in the right lower lung. No overt pulmonary edema. The osseous structures are stable. New focal opacity in the right lower lung. Linear opacities in the right upper lung and left perihilar region otherwise stable. Given history of lung cancer would recommend further evaluation with CT. Ct Chest Abdomen Pelvis Wo Contrast    Result Date: 7/11/2020  EXAMINATION: CT OF THE CHEST, ABDOMEN, AND PELVIS WITHOUT CONTRAST 7/11/2020 4:15 pm TECHNIQUE: CT of the chest, abdomen and pelvis was performed without the administration of intravenous contrast. Multiplanar reformatted images are provided for review. Dose modulation, iterative reconstruction, and/or weight based adjustment of the mA/kV was utilized to reduce the radiation dose to as low as reasonably achievable. COMPARISON: CT chest without contrast 10/01/2019. CT abdomen/pelvis 08/04/2019. HISTORY: ORDERING SYSTEM PROVIDED HISTORY: back pain, hypoxia, abd tenderness TECHNOLOGIST PROVIDED HISTORY: Reason for exam:->back pain, hypoxia, abd tenderness Additional Contrast?->None Reason for Exam: Back Pain (pt brought in by [facility] from home. Pt c/o back pain x2 days. Denies injury. Hx of COPD, wears 2L NC. Pt moved up to 4L NC by squad due to sat 91%. 20g PIV in L AC by squad. Pt sts the pain is so bad shes not been able to get up to take her duonebs at home. Sts no SOB at this time. ) Acuity: Acute Type of Exam: Initial FINDINGS: CARDIOVASCULAR:  Mildly enlarged heart size with no significant pericardial effusion. There is extensive triple-vessel distribution coronary artery calcifications. Mild-moderate aortic valve annular calcifications. Extensive thoracic aortic atherosclerotic calcifications with no acute hyperdense wall thickening or aneurysmal dilatation. Diffuse pulmonary artery enlargement. Extensive abdominal aortic and iliac atherosclerotic calcifications with no aneurysm formation or periaortic hemorrhage. MEDIASTINUM:  The esophagus appears unremarkable. No significant enlarged mediastinal or hilar lymph nodes. LUNGS/AIRWAYS:  The trachea is patent. Emphysematous changes. In the right lung there is new multilobar multifocal subsegmental peribronchial consolidation with internal bronchiectasis. This is most intense in the posteromedial right lower lobe. There is no lobar consolidation. No effusion or pneumothorax. In the left perihilar and paramediastinal region there is consolidation and varicose bronchiectasis suggesting fibrotic changes and chronic process. The left lower lobe demonstrates volume loss and consolidation with bronchiectasis which overall appears improved compared to the prior exam.  There is a trace posterior basilar effusion with mild pleural thickening. Improved aeration of the left lower lobe base compared to the prior exam with patchy subpleural opacities. In the anterior left lung base there are numerous clustered solid pulmonary nodular densities with a representative component measuring 0.3 cm. ABDOMEN:  Prior cholecystectomy. No intrahepatic mass or biliary dilatation. No evidence of portal venous gas. The common bile duct, pancreas, spleen, and adrenals are normal.  The kidneys demonstrate no hydronephrosis or nephrolithiasis. The bilateral ureters are normal.  There are numerous bilateral renal cysts. Left kidney superior pole rim-calcified hypodense mass which measures approximately 1.4 cm stable compared to the prior exam with non-contrasted average Hounsfield units of 33. The stomach, small bowel, and appendix are normal.  The colon demonstrates moderate diffuse diverticulosis. There is no significant wall thickening. In the right lower quadrant there is new pericolonic ground-glass fat induration.   There are numerous new enlarged right lower quadrant mesenteric lymph nodes with a representative lymph node measuring 0.6 x 1.0 cm on axial image 167. There is no free peritoneal air or abscess. PELVIS:  The bladder, uterus, and rectum are normal.  No ascites or abscess. No significant enlarged iliac chain lymph nodes. MUSCULOSKELETAL STRUCTURES:  No significant enlarged axillary or inguinal lymph nodes. Normal thoracic and lumbar spine curvature and alignment. The T1 vertebral body demonstrates stable mild anterior wedge configuration. The T3 vertebral body demonstrates new mild anterior wedge configuration and superior endplate concavity with approximately 15% loss of height. No significant posterior cortical displacement. Redemonstration of T8 vertebral body hemangioma. The T10 vertebral body demonstrates subtle superior endplate concavity new since the prior exam.  The lumbar bodies demonstrate normal height. Multilevel degenerative disc and joint disease. 1. Extensive coronary and aortic atherosclerosis with no aortic aneurysm or periaortic hemorrhage. Stable mild cardiomegaly and stable pulmonary artery hypertension. 2. COPD. 3. Compared with the prior exam there is overall improved pulmonary consolidation with perihilar and lower lobe findings which may represent chronic fibrotic changes and trace left pleural effusion. New multilobar multifocal right lung consolidative changes which may represent acute pneumonia or possible chronic pulmonary process such as vasculitis. 4. Indeterminate anterior left lung base clustered 0.3 cm solid nodules, attention on follow-up exams. 5. In the right lower quadrant there is new mesenteric fat induration and mild lymphadenopathy. The appendix appears normal.  There is no colonic wall thickening. This could relate to possible mild colitis, ascending colonic diverticulitis, or mesenteric adenitis. A lymphoproliferative disorder such as lymphoma cannot be excluded. 6. No obstruction, perforation, or abscess.  7. barium product. FLUOROSCOPY DOSE AND TYPE OR TIME AND EXPOSURES: 1 minute 20 seconds of fluoroscopy was utilized. 9 fluoroscopic video loops were obtained. COMPARISON: None HISTORY: ORDERING SYSTEM PROVIDED HISTORY: Dysphagia TECHNOLOGIST PROVIDED HISTORY: Reason for exam:->Dysphagia Reason for Exam: trouble swallowing Acuity: Acute Type of Exam: Initial FINDINGS: Oral phase of swallowing was grossly within normal limits. There is sporadic laryngeal penetration without evidence of aspiration. Swallowing mechanism grossly within normal limits without evidence of aspiration. Please see separate speech pathology report for full discussion of findings and recommendations. Ir Kyphoplasty Thoracic 1 Vertebral Body    Result Date: 7/23/2020  EXAMINATION: FLUOROSCOPIC GUIDED IR KYPHOPLASTY OF T8 AND T10 MODERATE CONSCIOUS SEDATION 7/23/2020 9:33 am HISTORY: Severe back pain. osteoporotic compression fracture. ORDERING SYSTEM PROVIDED HISTORY: Kyphoplasty and biopsy TECHNOLOGIST PROVIDED HISTORY: Reason for exam:->Kyphoplasty and biopsy SEDATION: 2 mgversed and 100 mcg fentanyl were titrated intravenously for moderate sedation monitored under my direction. Total intraservice time of sedation was 40 minutes. The patient's vital signs were monitored throughout the procedure and recorded in the patient's medical record by the nurse. FLUOROSCOPY: Fluoro time: 6.5 minutes Cumulative air kerma: 189.53 mGy TECHNIQUE: The procedure was performed in the interventional suite following informed consent and time out. Patient was prone on the table. Fluoroscopy was used to select appropriate skin entry sites and the back was prepped and draped in sterile fashion. Local anesthesia was obtained with 1% lidocaine. The bone and deeper tissues were anesthetized with Marcaine.  Using fluoroscopic guidance 11 guage kyphoplasty needles were advanced into the posterior aspect of the T8 and T10 vertebral bodies via a transpedicular approach. biopsy was obtained at both levels. A bone drill was placed through the needles and advanced into the anterior third of the vertebral body bilaterally. Kyphoplasty balloons were then placed through the needles and inflated to form a cavity and reduce the fracture. During balloon dilation, there was fluoroscopic evidence of vertebral body expansion with fracture reduction of T10. After adequate inflation, the balloons were deflated and removed. Subsequently, contrast laden cement was then injected through the needles using continuous fluoroscopic guidance. No significant contrast extravasation or venous filling was present. The needles were removed and hemostasis was achieved using manual compression. The sites were dressed and bandaged in sterile fashion. The patient tolerated the procedure well without immediate complication. Estimated blood loss: Less than 5 cc FINDINGS: Final imaging demonstrates satisfactory filling of the vertebral bodies with no significant extravasation. Successful fluoroscopic guided kyphoplasty of T8 and T10 vertebral bodies. The patient was seen and examined on day of discharge and this discharge summary is in conjunction with any daily progress note from day of discharge. Time Spent on discharge is 45 minutes  in the examination, evaluation, counseling and review of medications and discharge plan. Note that more than 30 minutes was spent in preparing discharge papers, discussing discharge with patient, medication review, etc.       Signed:    Jeronimo De La Torre MD   7/24/2020      Thank you Juan Francisco Sosa for the opportunity to be involved in this patient's care.  If you have any questions or concerns please feel free to contact me at [address] [place] [address]

## 2020-07-24 NOTE — CARE COORDINATION
CM sent message to [facility] with therapy for update notes for pt.     Antonio Cantu RN, BSN  [phone]

## 2020-07-24 NOTE — PROGRESS NOTES
Speech  Language And Dysphagia Treatment Note    Name: Willy Caro  : 1940  Medical Diagnosis: Back pain [M54.9]  Back pain [M54.9]  Pathologic thoracic fracture, initial encounter [R33.64VX]  Treatment Diagnosis: Oropharyngeal Dysphagia, Cognitive Deficits   Pain: Pt did not report pain    Patient's response to therapy:  Pt awake, alert and verbally responsive this date. Dysphagia Treatment:  Current Diet Level: Regular texture diet with thin liquids, avoid straws, meds as tolerated   Tolerance of Current Diet Level: Per chart review, no noted difficulty with current diet     Assessment of Texture Tolerance:  -Impressions: Pt reclined in bed on 2L O2 via nasal cannula. She denied any difficulty with current diet level. Pt was able to recall some details from completion of Modified Barium Swallow and prior SLP assessment. Pt again educated on mildly increased risk for aspiration and safe swallow strategies. She politely declined trials this date. Dysphagia Goals, addressed this date:  1.) Pt will tolerate recommended diet without s/s of aspiration  (ongoing 2020)   2.) Pt will tolerate MBS to r/o aspiration and determine appropriate diet/liquid level. (goal met, 20)    Diet and Treatment Recommendations:  Continue current diet     Speech Language Treatment:  Impressions: Pt awake and alert. Goals not directly targeted this date. Upon questioning pt is oriented to place and day. She continues to state August for the month however, self corrected with min cues. Speech Language STG, addressed this date: 1. Pt will improve orientation and short term recall via graded tasks to 80% (ongoing 2020)   2.  Pt will participate in ongoing cognitive assessment as indicated (ongoing 2020)     Patient/Family Education:Education given to the Pt and nurse, who verbalized understanding    Assessment: Patient progressing toward goals    Plan: Continue as per plan of care    Discharge Recommendations: Pt will benefit from continued skilled Speech Therapy for Speech and Dysphagia services, prior to returning home. Treatment time  Timed Code Treatment Minutes: 0 minutes  Total Treatment time: 10 minutes    If patient discharges prior to next session this note will serve as a discharge summary.       Honey Abernathy, [facility]  Speech Language Pathologist

## 2020-07-24 NOTE — PROGRESS NOTES
Comprehensive Nutrition Assessment    Type and Reason for Visit:  Reassess    Nutrition Recommendations/Plan:   1. D/c Ensure Enlive  2. Offer Boost pudding BID  3. Encourage PO intake  4. Document all PO intake in flow sheets     Nutrition Assessment:  Pt remains nutritionally compromised. Pt was NPO 7/23 for kyphoplasty but 4 CCC diet has since been resumed. RN reports pt with good PO intake this morning, consuming about 75% of meal. Pt has quite a few Ensure in her room; will trial Boost pudding instead to promote adequate calories/protein as PO intake has been inconsistent throughout admission. Malnutrition Assessment:  Malnutrition Status:  No malnutrition      Estimated Daily Nutrient Needs:  Energy (kcal):  1225-5740; Weight Used for Energy Requirements:  Current(65 kg)     Protein (g):  78-98 grams; Weight Used for Protein Requirements:  Current(65 kg; 1.2-1.5 grams per kg)        Fluid (ml/day):  1 mL per kcal; Weight Used for Fluid Requirements:  Current      Nutrition Related Findings:  Trace BLE edema      Wounds:  None       Current Nutrition Therapies:    DIET CARB CONTROL;   Dietary Nutrition Supplements: Standard High Calorie Oral Supplement    Anthropometric Measures:  · Height: 5' 5\" (165.1 cm)  · Current Body Weight: 142 lb (64.4 kg)   · Admission Body Weight: 146 lb (66.2 kg)      · Ideal Body Weight: 125 lbs  · BMI: 23.6  · BMI Categories: Normal Weight (BMI 22.0 to 24.9) age over 72       Nutrition Diagnosis:   · Inadequate oral intake related to inadequate protein-energy intake as evidenced by intake 26-50%, intake 51-75%    Nutrition Interventions:   Food and/or Nutrient Delivery:  Continue Current Diet, Modify Oral Nutrition Supplement  Nutrition Education/Counseling:  Education not indicated   Coordination of Nutrition Care:  Continued Inpatient Monitoring    Goals:  Pt will consume at least 50% of meals and supplements       Nutrition Monitoring and Evaluation:   Food/Nutrient Intake Outcomes:  Food and Nutrient Intake, Supplement Intake    Discharge Planning:    No discharge needs at this time     Electronically signed by Pari Katz RD, ANDRES on 7/24/20 at 10:42 AM EDT    Contact: [phone]

## 2020-07-24 NOTE — CARE COORDINATION
CM received call from Vicki Grey pt's daughter who states she is concerned because she was on line and [facility] now has a 3 1/2 star rating. She asked if CM had tried the other facilities. CM looked back through notes and explained that [facility] was daughter Christo Pulido first choice when I spoke to her and the other facilities had not called to say they could accept pt. Daughter Vicki Grey states \" [facility] was suppose to call you back and tell you to look at other facilities. \" CM explained that [employer] required a pre cert that took days to obtain for pt to go there and also that transport is arranged for 5:30 pm. Daughter states pt can go to facility and says \" I'll see how it is for a few days but if it's half an hour for a call light to be answered that is not good, and she is prone to pneumonia. \" CM told daughter that I will call Harry Vega in admissions and make her aware of her concerns. CM did call Maggie and left vm of the above. Daughter states she will also call facility.      Barbie Mcfarland, RN, BSN  [phone]

## 2020-07-24 NOTE — PROGRESS NOTES
Shift assessment completed. Routine vitals stable. Scheduled medications given. Patient c/o pain 4/10 to the back, but states pain is much better than earlier today. Patient is awake, alert and oriented. Respirations are easy and unlabored. Patient does not appear to be in distress. Call light within reach.

## 2020-07-24 NOTE — PROGRESS NOTES
Patients chart was reviewed post Kyphoplasty procedure. No complications were noted post procedure. Patient noted some improvement with pain in back.

## 2020-07-24 NOTE — CARE COORDINATION
Cm spoke to [place] [id number] at [place] and pt is fine to come this evening.     Ana Emmanuel RN, BSN  [phone]

## 2020-07-24 NOTE — PROGRESS NOTES
Called [facility] several times to give report but no one answered. Left a message for the nursing staff and the admissions director.

## 2020-07-24 NOTE — CARE COORDINATION
CM spoke to pt and updated her that therapy will attempt to work with her today since having her Kyphoplasty to evaluate if she still needs snf vs home care. CM also informed pt that insurance approval for snf will end today and if she still qualifies for skilled services the facility would have to start approval process again on Monday if she is unable to go today. Pt verbalized understanding and had no questions. Cm also left vm with Abilio Capone at [facility] updating her. CM will continue to follow for d/c planning.     Crissy Toussaint RN, BSN  [phone]

## 2020-07-25 LAB
GLUCOSE BLD-MCNC: 183 MG/DL (ref 70–99)
PERFORMED ON: ABNORMAL

## 2020-08-07 ENCOUNTER — CARE COORDINATION (OUTPATIENT)
Dept: CASE MANAGEMENT | Age: 80
End: 2020-08-07

## 2020-08-14 ENCOUNTER — CARE COORDINATION (OUTPATIENT)
Dept: CASE MANAGEMENT | Age: 80
End: 2020-08-14

## 2020-08-14 NOTE — CARE COORDINATION
[facility] Transitions Follow Up Call    2020    Patient: Chivo Joshua  Patient : 1940   MRN: [id number]  Reason for Admission: hypoxia  Discharge Date: 20 RARS: Readmission Risk Score: 37    Called and unable to leave a message as mailbox ion both numbers was full. Jaquan Gavin [phone]       Spoke with: unable to reach    Care Transitions Subsequent and Final Call    Subsequent and Final Calls  Care Transitions Interventions  Other Interventions: Follow Up  No future appointments.     Jaquan Gavin RN

## 2020-08-20 ENCOUNTER — CARE COORDINATION (OUTPATIENT)
Dept: CASE MANAGEMENT | Age: 80
End: 2020-08-20

## 2020-08-20 NOTE — CARE COORDINATION
[facility] Transitions Follow Up Call    2020    Patient: Shanika Ty  Patient : 1940   MRN: [id number]  Reason for Admission: Intractable Back Pain, Acute Hypoxemic resp failure  Discharge Date: 20 RARS: Readmission Risk Score: 37  Unable to reach patient or LVM for a follow up d/t home number no vm set up and mobile number mailbox full. Will attempt again. GATO Mota RN  Care Transition Nurse [phone]                   Follow Up  No future appointments.     Logan Gross, RN

## 2020-08-21 ENCOUNTER — CARE COORDINATION (OUTPATIENT)
Dept: CASE MANAGEMENT | Age: 80
End: 2020-08-21

## 2020-08-21 NOTE — CARE COORDINATION
[facility] Transitions Follow Up Call    2020    Patient: Charlie Ramirez  Patient : 1940   MRN: [id number]  Reason for Admission:   Discharge Date: 20 RARS: Readmission Risk Score: 37  unable to reach       Darshana Yanes -[phone]     Spoke with: unable to reach    Care Transitions Subsequent and Final Call    Subsequent and Final Calls  Care Transitions Interventions  Other Interventions: Follow Up  No future appointments.     Darshana Yanes RN

## 2020-10-12 ENCOUNTER — CARE COORDINATION (OUTPATIENT)
Dept: CASE MANAGEMENT | Age: 80
End: 2020-10-12

## 2020-10-12 PROCEDURE — 1111F DSCHRG MED/CURRENT MED MERGE: CPT | Performed by: INTERNAL MEDICINE

## 2020-10-12 NOTE — CARE COORDINATION
[facility] Transitions Initial Follow Up Call    Call within 2 business days of discharge: Yes    Patient: Sophie Hughes Patient : 1940   MRN: [id number]  Reason for Admission: Pancolitis  Discharge Date: 20 RARS: Readmission Risk Score: 37      Last Discharge [facility]       Complaint Diagnosis Description Type Department Provider    20 Back Pain Acute hypoxemic respiratory failure ([facility] [address] . .. ED to Hosp-Admission (Discharged) (ADMITTED) Chris Gilliam MD; Cathrine Dandy . .. Challenges to be reviewed by the provider   Additional needs identified to be addressed with provider Yes  medications-Patient states her pain medication sandoval not help    Discussed COVID-19 related testing which was available at this time. Test results were negative. Patient informed of results, if available? Yes         Method of communication with provider : chart routing    Advance Care Planning:   Does patient have an Advance Directive:  reviewed and current. Was this a readmission? No  Patient stated reason for admission: Abdom  Patients top risk factors for readmission: medical condition and Intractable back pain    Care Transition Nurse (CTN) contacted the patient by telephone to perform post hospital discharge assessment. Verified name and  with patient as identifiers. Provided introduction to self, and explanation of the CTN role. CTN reviewed discharge instructions, medical action plan and red flags with patient who verbalized understanding. Patient given an opportunity to ask questions and does not have any further questions or concerns at this time. Were discharge instructions available to patient? Yes. Reviewed appropriate site of care based on symptoms and resources available to patient including: PCP. The patient agrees to contact the PCP office for questions related to their healthcare.      Medication reconciliation was performed with patient, who verbalizes understanding of administration of home medications. Advised obtaining a 90-day supply of all daily and as-needed medications. Covid Risk Education    Patient has following risk factors of: COPD and diabetes. Education provided regarding infection prevention, and signs and symptoms of COVID-19 and when to seek medical attention with patient who verbalized understanding. Discussed exposure protocols and quarantine From CDC: Are you at higher risk for severe illness?   and given an opportunity for questions and concerns. The patient agrees to contact the COVID-19 hotline [phone] or PCP office for questions related to COVID-19. For more information on steps you can take to protect yourself, see CDC's How to Protect Yourself     Patient/family/caregiver given information for GetWell Loop and agrees to enroll no  Patient's preferred e-mail: declines  Patient's preferred phone number: declines    Discussed follow-up appointments. If no appointment was previously scheduled, appointment scheduling offered: Yes. Is follow up appointment scheduled within 7 days of discharge? Yes  Non-[facility] follow up appointment(s):     Plan for follow-up call in 3-5 days based on severity of symptoms and risk factors. Plan for next call: symptom management-Back pain, COPD  CTN provided contact information for future needs. Patient admitted from [address] to [facility] for abdominal pain, Pancolitis from 9/14-9/19. Discharged back to [facility] until 10/9/20, then to home with [facility]. Patient states she has chronic back pain. She takes Oxycodone dosage unknown. States it does not help. Patient states the nurse puts her medication in a medi-set. Medication reviewed. Denies increased sob, cough, congestion, abdominal pain. O2 1L continuous. Patient states she ambulates using a walker. She is able to perform ADL's. [facility] nurse has made a visit. Patient states her daughters check on her.  PCP appointment scheduled for 10/14/20. Will follow up. GATO Ceja RN  Care Transition Nurse. Non-face-to-face services provided:      Care Transitions 24 Hour Call    Do you have a copy of your discharge instructions?:  Yes  Do you have all of your prescriptions and are they filled?:  Yes  Have you been contacted by a [address]?:  No  Have you scheduled your follow up appointment?:  Yes (Comment: 9/14/20)  Were you discharged with any Home Care or Post Acute Services:  Yes  Post Acute Services:  [address] [place] [address] Remi Lu (Comment: Spirit)  Do you feel like you have everything you need to keep you well at home?:  Yes  Care Transitions Interventions         Follow Up  No future appointments.     Arabella Najera RN

## 2020-10-16 ENCOUNTER — APPOINTMENT (OUTPATIENT)
Dept: CT IMAGING | Age: 80
DRG: 515 | End: 2020-10-16
Payer: MEDICARE

## 2020-10-16 ENCOUNTER — HOSPITAL ENCOUNTER (INPATIENT)
Age: 80
LOS: 6 days | Discharge: NURSING, REHAB OR LONG-TERM CARE | DRG: 515 | End: 2020-10-22
Attending: EMERGENCY MEDICINE | Admitting: INTERNAL MEDICINE
Payer: MEDICARE

## 2020-10-16 PROBLEM — S22.080A T12 COMPRESSION FRACTURE, INITIAL ENCOUNTER (HCC): Status: ACTIVE | Noted: 2020-10-16

## 2020-10-16 LAB
A/G RATIO: 1.1 (ref 1.1–2.2)
ALBUMIN SERPL-MCNC: 3.6 G/DL (ref 3.4–5)
ALP BLD-CCNC: 118 U/L (ref 40–129)
ALT SERPL-CCNC: 19 U/L (ref 10–40)
ANION GAP SERPL CALCULATED.3IONS-SCNC: 15 MMOL/L (ref 3–16)
AST SERPL-CCNC: 17 U/L (ref 15–37)
BASOPHILS ABSOLUTE: 0 K/UL (ref 0–0.2)
BASOPHILS RELATIVE PERCENT: 0.6 %
BILIRUB SERPL-MCNC: 0.3 MG/DL (ref 0–1)
BUN BLDV-MCNC: 27 MG/DL (ref 7–20)
CALCIUM SERPL-MCNC: 9.3 MG/DL (ref 8.3–10.6)
CHLORIDE BLD-SCNC: 104 MMOL/L (ref 99–110)
CO2: 22 MMOL/L (ref 21–32)
CREAT SERPL-MCNC: 1.4 MG/DL (ref 0.6–1.2)
EOSINOPHILS ABSOLUTE: 0.8 K/UL (ref 0–0.6)
EOSINOPHILS RELATIVE PERCENT: 9.7 %
GFR AFRICAN AMERICAN: 44
GFR NON-AFRICAN AMERICAN: 36
GLOBULIN: 3.3 G/DL
GLUCOSE BLD-MCNC: 169 MG/DL (ref 70–99)
GLUCOSE BLD-MCNC: 185 MG/DL (ref 70–99)
GLUCOSE BLD-MCNC: 287 MG/DL (ref 70–99)
HCT VFR BLD CALC: 33.8 % (ref 36–48)
HEMOGLOBIN: 11.1 G/DL (ref 12–16)
LYMPHOCYTES ABSOLUTE: 0.9 K/UL (ref 1–5.1)
LYMPHOCYTES RELATIVE PERCENT: 11 %
MCH RBC QN AUTO: 30.9 PG (ref 26–34)
MCHC RBC AUTO-ENTMCNC: 32.8 G/DL (ref 31–36)
MCV RBC AUTO: 94 FL (ref 80–100)
MONOCYTES ABSOLUTE: 0.6 K/UL (ref 0–1.3)
MONOCYTES RELATIVE PERCENT: 6.6 %
NEUTROPHILS ABSOLUTE: 6.2 K/UL (ref 1.7–7.7)
NEUTROPHILS RELATIVE PERCENT: 72.1 %
PDW BLD-RTO: 18.1 % (ref 12.4–15.4)
PERFORMED ON: ABNORMAL
PERFORMED ON: ABNORMAL
PLATELET # BLD: 255 K/UL (ref 135–450)
PMV BLD AUTO: 7.3 FL (ref 5–10.5)
POTASSIUM SERPL-SCNC: 3.9 MMOL/L (ref 3.5–5.1)
RBC # BLD: 3.59 M/UL (ref 4–5.2)
SODIUM BLD-SCNC: 141 MMOL/L (ref 136–145)
TOTAL PROTEIN: 6.9 G/DL (ref 6.4–8.2)
WBC # BLD: 8.6 K/UL (ref 4–11)

## 2020-10-16 PROCEDURE — 2700000000 HC OXYGEN THERAPY PER DAY

## 2020-10-16 PROCEDURE — 6360000002 HC RX W HCPCS: Performed by: PHYSICIAN ASSISTANT

## 2020-10-16 PROCEDURE — 85025 COMPLETE CBC W/AUTO DIFF WBC: CPT

## 2020-10-16 PROCEDURE — 36415 COLL VENOUS BLD VENIPUNCTURE: CPT

## 2020-10-16 PROCEDURE — 80053 COMPREHEN METABOLIC PANEL: CPT

## 2020-10-16 PROCEDURE — 99285 EMERGENCY DEPT VISIT HI MDM: CPT

## 2020-10-16 PROCEDURE — 72128 CT CHEST SPINE W/O DYE: CPT

## 2020-10-16 PROCEDURE — 6370000000 HC RX 637 (ALT 250 FOR IP): Performed by: INTERNAL MEDICINE

## 2020-10-16 PROCEDURE — 1200000000 HC SEMI PRIVATE

## 2020-10-16 PROCEDURE — 72131 CT LUMBAR SPINE W/O DYE: CPT

## 2020-10-16 PROCEDURE — 96374 THER/PROPH/DIAG INJ IV PUSH: CPT

## 2020-10-16 PROCEDURE — 6370000000 HC RX 637 (ALT 250 FOR IP): Performed by: PHYSICIAN ASSISTANT

## 2020-10-16 PROCEDURE — 2580000003 HC RX 258: Performed by: INTERNAL MEDICINE

## 2020-10-16 PROCEDURE — 6360000002 HC RX W HCPCS: Performed by: INTERNAL MEDICINE

## 2020-10-16 PROCEDURE — 94760 N-INVAS EAR/PLS OXIMETRY 1: CPT

## 2020-10-16 RX ORDER — IBUPROFEN 600 MG/1
600 TABLET ORAL EVERY 6 HOURS PRN
Status: ON HOLD | COMMUNITY
End: 2020-10-22 | Stop reason: HOSPADM

## 2020-10-16 RX ORDER — IPRATROPIUM BROMIDE AND ALBUTEROL SULFATE 2.5; .5 MG/3ML; MG/3ML
1 SOLUTION RESPIRATORY (INHALATION) EVERY 4 HOURS PRN
Status: DISCONTINUED | OUTPATIENT
Start: 2020-10-16 | End: 2020-10-22 | Stop reason: HOSPADM

## 2020-10-16 RX ORDER — ONDANSETRON 2 MG/ML
4 INJECTION INTRAMUSCULAR; INTRAVENOUS ONCE
Status: COMPLETED | OUTPATIENT
Start: 2020-10-16 | End: 2020-10-16

## 2020-10-16 RX ORDER — PROMETHAZINE HYDROCHLORIDE 25 MG/1
12.5 TABLET ORAL EVERY 6 HOURS PRN
Status: DISCONTINUED | OUTPATIENT
Start: 2020-10-16 | End: 2020-10-22 | Stop reason: HOSPADM

## 2020-10-16 RX ORDER — ACETAMINOPHEN 650 MG/1
650 SUPPOSITORY RECTAL EVERY 6 HOURS PRN
Status: DISCONTINUED | OUTPATIENT
Start: 2020-10-16 | End: 2020-10-22 | Stop reason: HOSPADM

## 2020-10-16 RX ORDER — FUROSEMIDE 20 MG/1
20 TABLET ORAL 2 TIMES DAILY
COMMUNITY
End: 2021-04-09 | Stop reason: CLARIF

## 2020-10-16 RX ORDER — SODIUM CHLORIDE 0.9 % (FLUSH) 0.9 %
10 SYRINGE (ML) INJECTION EVERY 12 HOURS SCHEDULED
Status: DISCONTINUED | OUTPATIENT
Start: 2020-10-16 | End: 2020-10-22 | Stop reason: HOSPADM

## 2020-10-16 RX ORDER — METOPROLOL SUCCINATE 25 MG/1
25 TABLET, EXTENDED RELEASE ORAL DAILY
Status: DISCONTINUED | OUTPATIENT
Start: 2020-10-17 | End: 2020-10-22 | Stop reason: HOSPADM

## 2020-10-16 RX ORDER — HYDROCODONE BITARTRATE AND ACETAMINOPHEN 5; 325 MG/1; MG/1
1 TABLET ORAL EVERY 4 HOURS PRN
Status: ON HOLD | COMMUNITY
End: 2020-10-22 | Stop reason: SDUPTHER

## 2020-10-16 RX ORDER — INSULIN LISPRO 100 [IU]/ML
0-6 INJECTION, SOLUTION INTRAVENOUS; SUBCUTANEOUS NIGHTLY
Status: DISCONTINUED | OUTPATIENT
Start: 2020-10-16 | End: 2020-10-22 | Stop reason: HOSPADM

## 2020-10-16 RX ORDER — ACETAMINOPHEN 325 MG/1
650 TABLET ORAL EVERY 6 HOURS PRN
Status: DISCONTINUED | OUTPATIENT
Start: 2020-10-16 | End: 2020-10-20 | Stop reason: ALTCHOICE

## 2020-10-16 RX ORDER — SODIUM CHLORIDE 0.9 % (FLUSH) 0.9 %
10 SYRINGE (ML) INJECTION PRN
Status: DISCONTINUED | OUTPATIENT
Start: 2020-10-16 | End: 2020-10-22 | Stop reason: HOSPADM

## 2020-10-16 RX ORDER — ONDANSETRON 2 MG/ML
4 INJECTION INTRAMUSCULAR; INTRAVENOUS EVERY 6 HOURS PRN
Status: DISCONTINUED | OUTPATIENT
Start: 2020-10-16 | End: 2020-10-22 | Stop reason: HOSPADM

## 2020-10-16 RX ORDER — LIDOCAINE 50 MG/G
1 PATCH TOPICAL DAILY
COMMUNITY
End: 2021-04-09 | Stop reason: ALTCHOICE

## 2020-10-16 RX ORDER — MORPHINE SULFATE 2 MG/ML
2 INJECTION, SOLUTION INTRAMUSCULAR; INTRAVENOUS EVERY 4 HOURS PRN
Status: DISCONTINUED | OUTPATIENT
Start: 2020-10-16 | End: 2020-10-18

## 2020-10-16 RX ORDER — ORPHENADRINE CITRATE 30 MG/ML
30 INJECTION INTRAMUSCULAR; INTRAVENOUS ONCE
Status: COMPLETED | OUTPATIENT
Start: 2020-10-16 | End: 2020-10-16

## 2020-10-16 RX ORDER — DEXTROSE MONOHYDRATE 50 MG/ML
100 INJECTION, SOLUTION INTRAVENOUS PRN
Status: DISCONTINUED | OUTPATIENT
Start: 2020-10-16 | End: 2020-10-22 | Stop reason: HOSPADM

## 2020-10-16 RX ORDER — MORPHINE SULFATE 4 MG/ML
4 INJECTION, SOLUTION INTRAMUSCULAR; INTRAVENOUS EVERY 30 MIN PRN
Status: DISCONTINUED | OUTPATIENT
Start: 2020-10-16 | End: 2020-10-16 | Stop reason: HOSPADM

## 2020-10-16 RX ORDER — INSULIN LISPRO 100 [IU]/ML
0-12 INJECTION, SOLUTION INTRAVENOUS; SUBCUTANEOUS
Status: DISCONTINUED | OUTPATIENT
Start: 2020-10-16 | End: 2020-10-22 | Stop reason: HOSPADM

## 2020-10-16 RX ORDER — DEXTROSE MONOHYDRATE 25 G/50ML
12.5 INJECTION, SOLUTION INTRAVENOUS PRN
Status: DISCONTINUED | OUTPATIENT
Start: 2020-10-16 | End: 2020-10-22 | Stop reason: HOSPADM

## 2020-10-16 RX ORDER — DILTIAZEM HYDROCHLORIDE 60 MG/1
60 CAPSULE, EXTENDED RELEASE ORAL 2 TIMES DAILY
Status: DISCONTINUED | OUTPATIENT
Start: 2020-10-16 | End: 2020-10-22 | Stop reason: HOSPADM

## 2020-10-16 RX ORDER — POLYETHYLENE GLYCOL 3350 17 G/17G
17 POWDER, FOR SOLUTION ORAL DAILY PRN
Status: DISCONTINUED | OUTPATIENT
Start: 2020-10-16 | End: 2020-10-22 | Stop reason: HOSPADM

## 2020-10-16 RX ORDER — NICOTINE POLACRILEX 4 MG
15 LOZENGE BUCCAL PRN
Status: DISCONTINUED | OUTPATIENT
Start: 2020-10-16 | End: 2020-10-22 | Stop reason: HOSPADM

## 2020-10-16 RX ORDER — OXYCODONE HYDROCHLORIDE AND ACETAMINOPHEN 5; 325 MG/1; MG/1
2 TABLET ORAL ONCE
Status: COMPLETED | OUTPATIENT
Start: 2020-10-16 | End: 2020-10-16

## 2020-10-16 RX ADMIN — MORPHINE SULFATE 2 MG: 2 INJECTION, SOLUTION INTRAMUSCULAR; INTRAVENOUS at 17:38

## 2020-10-16 RX ADMIN — INSULIN LISPRO 6 UNITS: 100 INJECTION, SOLUTION INTRAVENOUS; SUBCUTANEOUS at 17:38

## 2020-10-16 RX ADMIN — APIXABAN 2.5 MG: 2.5 TABLET, FILM COATED ORAL at 20:58

## 2020-10-16 RX ADMIN — ORPHENADRINE CITRATE 30 MG: 60 INJECTION INTRAMUSCULAR; INTRAVENOUS at 12:15

## 2020-10-16 RX ADMIN — ONDANSETRON 4 MG: 2 INJECTION INTRAMUSCULAR; INTRAVENOUS at 12:16

## 2020-10-16 RX ADMIN — INSULIN LISPRO 1 UNITS: 100 INJECTION, SOLUTION INTRAVENOUS; SUBCUTANEOUS at 21:02

## 2020-10-16 RX ADMIN — OXYCODONE HYDROCHLORIDE AND ACETAMINOPHEN 2 TABLET: 5; 325 TABLET ORAL at 12:07

## 2020-10-16 RX ADMIN — DILTIAZEM HYDROCHLORIDE 60 MG: 60 CAPSULE, EXTENDED RELEASE ORAL at 20:58

## 2020-10-16 RX ADMIN — Medication 10 ML: at 21:01

## 2020-10-16 ASSESSMENT — ENCOUNTER SYMPTOMS
COUGH: 0
NAUSEA: 0
VOMITING: 0
DIARRHEA: 0
SHORTNESS OF BREATH: 0
BACK PAIN: 1
RHINORRHEA: 0
ABDOMINAL PAIN: 0

## 2020-10-16 ASSESSMENT — PAIN SCALES - GENERAL
PAINLEVEL_OUTOF10: 10
PAINLEVEL_OUTOF10: 4
PAINLEVEL_OUTOF10: 8
PAINLEVEL_OUTOF10: 10
PAINLEVEL_OUTOF10: 4
PAINLEVEL_OUTOF10: 7
PAINLEVEL_OUTOF10: 9

## 2020-10-16 ASSESSMENT — PAIN DESCRIPTION - LOCATION
LOCATION: BACK
LOCATION: BACK

## 2020-10-16 ASSESSMENT — PAIN DESCRIPTION - PAIN TYPE
TYPE: ACUTE PAIN
TYPE: ACUTE PAIN

## 2020-10-16 ASSESSMENT — PAIN DESCRIPTION - DESCRIPTORS
DESCRIPTORS: ACHING
DESCRIPTORS: ACHING

## 2020-10-16 ASSESSMENT — PAIN DESCRIPTION - ORIENTATION: ORIENTATION: RIGHT

## 2020-10-16 NOTE — ED NOTES
Transferred to  without incident.   VS stable & alert at this time     James Tiwari RN  10/16/20 [id number]

## 2020-10-16 NOTE — PROGRESS NOTES
4 Eyes Skin Assessment     NAME:  Penny Crowley OF BIRTH:  1940  MEDICAL RECORD NUMBER:  [id number]    The patient is being assess for  Admission    I agree that 2 RN's have performed a thorough Head to Toe Skin Assessment on the patient. ALL assessment sites listed below have been assessed. Areas assessed by both nurses:    Head, Face, Ears, Shoulders, Back, Chest, Arms, Elbows, Hands, Sacrum. Buttock, Coccyx, Ischium and Legs. Feet and Heels        Does the Patient have a Wound?  No noted wound(s)       Navin Prevention initiated:  No   Wound Care Orders initiated:  No    Pressure Injury (Stage 3,4, Unstageable, DTI, NWPT, and Complex wounds) if present place consult order under [de-identified] No    New and Established Ostomies if present place consult order under : No      Nurse 1 eSignature: Electronically signed by Ryan Figueroa RN on 10/16/20 at 3:13 PM EDT    **SHARE this note so that the co-signing nurse is able to place an eSignature**    Nurse 2 eSignature: {Esignature:[id number]}

## 2020-10-16 NOTE — ED PROVIDER NOTES
[address] [facility] Emergency Department      Pt Name: Alma Rosa Cedeño  MRN: [id number]  [place] 1940  Date of evaluation: 10/16/2020  Provider: Tania Gregory MD  I independently performed a history and physical on Alma Rosa Cedeño. All diagnostic, treatment, and disposition decisions were made by myself in conjunction with the advanced practice provider. HPI: Alma Rosa Cedeño presented with   Chief Complaint   Patient presents with    Back Pain     Arrived by [facility] rt back pain lasting years in duration; worsening today. Pain is 10/10 to right thoracic area; tender to the touch. Denies falling or causitive event. Alma Rosa Cedeño has a past medical history of Arthritis, CAD (coronary artery disease), Carpal tunnel syndrome, COPD (chronic obstructive pulmonary disease) ([address] Coronary stent, depression, Diabetes mellitus ([address] Diastolic heart failure ([address] GERD (gastroesophageal reflux disease), Hyperlipidemia, Hypertension, Lung cancer ([facility] [address] MI (myocardial infarction) ([address] LIZETT (obstructive sleep apnea), PONV (postoperative nausea and vomiting), and stress incontinence. She has a past surgical history that includes Coronary angioplasty with stent (2000, 2001); back surgery (2000, 2001); bronchoscopy (12/04/2018); pr ncc incl fluor gdnce dx w/cell washg spx (N/A, 12/4/2018); Cholecystectomy, laparoscopic (N/A, 12/19/2018); Upper gastrointestinal endoscopy (N/A, 2/25/2019); Colonoscopy (N/A, 2/25/2019); Colonoscopy (2/25/2019); bronchoscopy (N/A, 2/27/2019); bronchoscopy (2/27/2019); bronchoscopy (2/27/2019); bronchoscopy (N/A, 8/6/2019); Upper gastrointestinal endoscopy (N/A, 8/7/2019); bronchoscopy (N/A, 9/27/2019); bronchoscopy (9/27/2019); bronchoscopy (9/27/2019); and IR KYPHOPLASTY THORACIC 1 VERTEBRAL BODY (7/23/2020). No current facility-administered medications on file prior to encounter.       Current Outpatient Medications on File Prior to Encounter   Medication Sig Dispense Refill 0.6 - 1.2 mg/dL Final     RADIOLOGY:     CT LUMBAR SPINE WO CONTRAST   Final Result   1. No acute finding in the lumbar spine. 2. Severe spinal canal stenosis is chronic at L4-5. Prior laminectomy at   L5-S1 decompressing the canal.   3. Probable biconcave compression fractures at T11 and T12 partially   evaluated on this lumbar spine CT. Recommend review of additional CT report   of the same date for findings. CT THORACIC SPINE WO CONTRAST   Preliminary Result   1. When compared to 07/21/2020, there are new compression fractures at T9,   T11, and T12. Minimal associated bony retropulsion at T9 and T11.   2. Persistent posterior left perihilar consolidation and tiny loculated   pleural effusion. Medications administered:  Medications   morphine injection 4 mg (4 mg Intravenous Not Given 10/16/20 1322)   orphenadrine (NORFLEX) injection 30 mg (30 mg Intravenous Given 10/16/20 1215)   oxyCODONE-acetaminophen (PERCOCET) 5-325 MG per tablet 2 tablet (2 tablets Oral Given 10/16/20 1207)   ondansetron (ZOFRAN) injection 4 mg (4 mg Intravenous Given 10/16/20 1216)     FINAL IMPRESSION:    1. Compression fracture of body of thoracic vertebra (HCC)    2.  Intractable pain       DISPOSITION Admitted 10/16/2020 01:16:31 PM         Evan Snell MD  10/16/20 [id number]

## 2020-10-16 NOTE — ED NOTES
Pharmacy Medication History Note      List of current medications patient is taking is complete. Source of information: Daughter Michelle Manzo. Jason Nalon 20 made to medication list:  Medications flagged for removal (include reason, ex. noncompliance):  none    Medications removed (include reason, ex. therapy complete or physician discontinued):  Acetaminophen-therapy complete  Guaifenesin- therapy complete  Trelegy- noncompliance    Medications added/doses adjusted:  Hydrocodone 5/325 Q4H PRN  Ibuprofen 600 mg PRN  Lasix 20 mg BID  Lidocaine patch daily    Other notes (ex. Recent course of antibiotics, Coumadin dosing):  Patient daughter reported the patient was suppose to be on insulin, but the patient reports not taking any insulin. Refill history is suggestive of taking insulin (Levemir 5 units in AM, 8 units PM and Humalog) after last admission at [facility] 8/2020. Suggestive of noncompliance. Denies use of other OTC or herbal medications. Last dose times updated. Marlene Gross, PharmD  PGY1 Pharmacy Resident  [id number]    Home Medicine Reconciliation:    No current facility-administered medications on file prior to encounter. Current Outpatient Medications on File Prior to Encounter   Medication Sig Dispense Refill    HYDROcodone-acetaminophen (NORCO) 5-325 MG per tablet Take 1 tablet by mouth every 4 hours as needed for Pain.       ibuprofen (ADVIL;MOTRIN) 600 MG tablet Take 600 mg by mouth every 6 hours as needed for Pain      furosemide (LASIX) 20 MG tablet Take 20 mg by mouth 2 times daily      lidocaine (LIDODERM) 5 % Place 1 patch onto the skin daily 12 hours on, 12 hours off.      albuterol sulfate  (90 Base) MCG/ACT inhaler Inhale 2 puffs into the lungs every 4 hours as needed for Wheezing 1 Inhaler 0    predniSONE (DELTASONE) 10 MG tablet Take 1 tablet by mouth daily 30 tablet 0    pantoprazole (PROTONIX) 40 MG tablet Take 1 tablet by mouth every morning (before breakfast) 30 tablet 1    dilTIAZem (CARDIZEM 12 HR) 60 MG extended release capsule Take 1 capsule by mouth 2 times daily 60 capsule 1    linagliptin (TRADJENTA) 5 MG tablet Take 1 tablet by mouth daily 30 tablet 1    metoprolol succinate (TOPROL XL) 25 MG extended release tablet Take 1 tablet by mouth daily 30 tablet 1    apixaban (ELIQUIS) 2.5 MG TABS tablet Take 1 tablet by mouth 2 times daily 60 tablet 1    mirtazapine (REMERON) 15 MG tablet Take 1 tablet by mouth nightly 30 tablet 3    ipratropium-albuterol (DUONEB) 0.5-2.5 (3) MG/3ML SOLN nebulizer solution Inhale 3 mLs into the lungs every 4 hours (while awake) DX:COPD J44.9 360 mL 11    [DISCONTINUED] acetaminophen (TYLENOL) 325 MG tablet Take 2 tablets by mouth every 6 hours for 14 days 112 tablet 0    [DISCONTINUED] guaiFENesin (MUCINEX) 600 MG extended release tablet Take 1 tablet by mouth 2 times daily 14 tablet 0    [DISCONTINUED] Fluticasone-Umeclidin-Vilant (TRELEGY ELLIPTA) 100-62.5-25 MCG/INH AEPB Inhale 1 puff into the lungs daily as needed       [DISCONTINUED] acetaminophen 650 MG TABS Take 650 mg by mouth every 4 hours as needed for Pain (For mild pain level 1-3 or for fever > 100.5).  120 tablet 0

## 2020-10-16 NOTE — CARE COORDINATION
SW attempted to meet with pt to complete initial assessment. Pt was transferred to the floor before assessment could be completed. SW to attempt at a later time.     Electronically signed by MADIE Jose, JOSHUA on 10/16/2020 at 2:32 PM

## 2020-10-16 NOTE — ED PROVIDER NOTES
[facility]        Pt Name: Gera Tran  MRN: [id number]  [place] 1940  Date of evaluation: 10/16/2020  Provider: Tamy Tapia PA-C  PCP: Fred Abad     I have seen and evaluated this patient with my supervising physician Raghav Webster MD    [facility]       Chief Complaint   Patient presents with    Back Pain     Arrived by  EMS rt back pain lasting years in duration; worsening today. Pain is 10/10 to right thoracic area; tender to the touch. Denies falling or causitive event. HISTORY OF PRESENT ILLNESS   (Location, Timing/Onset, Context/Setting, Quality, Duration, Modifying Factors, Severity, Associated Signs and Symptoms)  Note limiting factors. Gera Tran is a 70 y.o. female presents to the emergency department today with her daughter for evaluation for back pain. The daughter states that the patient has had ongoing back pain for months, she states that she has had fractures and has had kyphoplasty in several areas of the pain. She states that the patient has been in and out of hospitals over the past 3 months secondary to pain. She has Norco at home, and she has been taking this without much improvement. Over the past week, the patient has been complaining of increasing back pain, mostly to her upper back. The patient is currently rating her pain as a 10/10 her pain is constant, and seems to be worse with touch and certain movements. The patient denies falling or injuring herself in any way. She has no numbness, chilling or weakness. She denies any chest pain or shortness of breath. She is no fever chills per no cough or congestion. She denies any abdominal pain, nausea, vomiting or diarrhea. She denies any urinary symptoms. She otherwise has no complaints. Daughter states that they have been giving her Norco, but they have not been giving her any muscle relaxers.   The daughter states that the patient otherwise lives at home however the daughter and her sister go on to check on her. They have physical therapy as well as home health care nurse that come to assist the patient as well, as the patient does not want to be placed at this time. Nursing Notes were all reviewed and agreed with or any disagreements were addressed in the HPI. REVIEW OF SYSTEMS    (2-9 systems for level 4, 10 or more for level 5)     Review of Systems   Constitutional: Negative for activity change, appetite change, chills and fever. HENT: Negative for congestion and rhinorrhea. Respiratory: Negative for cough and shortness of breath. Cardiovascular: Negative for chest pain. Gastrointestinal: Negative for abdominal pain, diarrhea, nausea and vomiting. Genitourinary: Negative for difficulty urinating, dysuria and hematuria. Musculoskeletal: Positive for back pain. Negative for arthralgias, gait problem and joint swelling. Neurological: Negative for weakness and numbness. Positives and Pertinent negatives as per HPI. Except as noted above in the ROS, all other systems were reviewed and negative.        PAST MEDICAL HISTORY     Past Medical History:   Diagnosis Date    Arthritis     CAD (coronary artery disease)     Carpal tunnel syndrome     COPD (chronic obstructive pulmonary disease) ([address]     Coronary stent     depression     Diabetes mellitus ([address]     Diastolic heart failure ([address]     Per Dr Julito Hobbs 8/5/19    GERD (gastroesophageal reflux disease)     Hyperlipidemia     Hypertension     Lung cancer ([address]     Adenocarcinoma of Left Lung    MI (myocardial infarction) ([address]     LIZETT (obstructive sleep apnea)     does not use CPAP    PONV (postoperative nausea and vomiting)     stress incontinence          SURGICAL HISTORY     Past Surgical History:   Procedure Laterality Date    BACK SURGERY  2000, 2001    lumbar laminectomy    BRONCHOSCOPY  12/04/2018    BRONCHOSCOPY N/A 2/27/2019    BRONCHOSCOPY BIOPSY BRONCHUS performed by Kareen Valenzuela MD at [address] [place] [address]  2/27/2019    BRONCHOSCOPY/TRANSBRONCHIAL NEEDLE BIOPSY performed by Kareen Valenzuela MD at [address] [place] [address]  2/27/2019    BRONCHOSCOPY ULTRASOUND performed by Kareen Valenzuela MD at [address] [place] [address] N/A 8/6/2019    BRONCHOSCOPY ALVEOLAR LAVAGE performed by Kareen Valenzuela MD at [address] [place] [address] N/A 9/27/2019    BRONCHOSCOPY ALVEOLAR LAVAGE performed by Aydin Reynoso MD at [address] [place] [address]  9/27/2019    BRONCHOSCOPY BIOPSY BRONCHUS performed by Aydin Reynoso MD at [address] [place] [address]  9/27/2019    BRONCHOSCOPY BRUSHINGS performed by Aydin Reynoso MD at [address], LAPAROSCOPIC N/A 12/19/2018    LAPAROSCOPIC CHOLECYSTECTOMY WITH CHOLANGIOGRAM performed by Isabel Sesay MD at [place] [address] COLONOSCOPY N/A 2/25/2019    COLONOSCOPY WITH BIOPSY performed by Roopa Quinn MD at [facility] COLONOSCOPY  2/25/2019    COLONOSCOPY POLYPECTOMY SNARE/COLD BIOPSY performed by Roopa Quinn MD at [facility]  2000, 2001    IR KYPHOPLASTY THORACIC FIRST LEVEL  7/23/2020    IR KYPHOPLASTY THORACIC FIRST LEVEL 7/23/2020 [facility] SPECIAL PROCEDURES    NM [address] INCL FLUOR GDNCE DX W/CELL WASHG SPX N/A 12/4/2018    BRONCHOSCOPY WITH LAVAGE performed by Rena De Jesus MD at [address] 2/25/2019    EGD BIOPSY performed by Roopa Quinn MD at [address] [facility] 8/7/2019    EGD DIAGNOSTIC ONLY performed by Soraida Stewart MD at [address]       Previous Medications    ACETAMINOPHEN (TYLENOL) 325 MG TABLET    Take 2 tablets by mouth every 6 hours for 14 days    ACETAMINOPHEN 650 MG 10/16/20 1107 10/16/20 1107 -- 10/16/20 1107 10/16/20 1107 10/16/20 1107 10/16/20 1107   (!) 156/59 97.3 °F (36.3 °C) Oral  18 94 % 5' 5\" (1.651 m) 141 lb (64 kg)       Physical Exam  Vitals signs and nursing note reviewed. Constitutional:       Appearance: She is well-developed. She is not diaphoretic. HENT:      Head: Normocephalic and atraumatic. Right Ear: External ear normal.      Left Ear: External ear normal.      Nose: Nose normal.   Eyes:      General:         Right eye: No discharge. Left eye: No discharge. Neck:      Musculoskeletal: Normal range of motion and neck supple. Trachea: No tracheal deviation. Cardiovascular:      Rate and Rhythm: Normal rate and regular rhythm. Heart sounds: No murmur. Pulmonary:      Effort: Pulmonary effort is normal. No respiratory distress. Breath sounds: No wheezing or rales. Abdominal:      General: Bowel sounds are normal. There is no distension. Palpations: Abdomen is soft. Tenderness: There is no abdominal tenderness. There is no guarding. Musculoskeletal: Normal range of motion. Comments: Is to palpation to the mid thoracic spine. There is no tenderness over the lumbar spine or the cervical spine. Skin:     General: Skin is warm and dry. Neurological:      Mental Status: She is alert and oriented to person, place, and time. GCS: GCS eye subscore is 4. GCS verbal subscore is 5. GCS motor subscore is 6.    Psychiatric:         Behavior: Behavior normal.         DIAGNOSTIC RESULTS   LABS:    Labs Reviewed   CBC WITH AUTO DIFFERENTIAL - Abnormal; Notable for the following components:       Result Value    RBC 3.59 (*)     Hemoglobin 11.1 (*)     Hematocrit 33.8 (*)     RDW 18.1 (*)     Lymphocytes Absolute 0.9 (*)     Eosinophils Absolute 0.8 (*)     All other components within normal limits    Narrative:     Performed at:  [facility] [address], [place], [address] Phone [phone] METABOLIC PANEL - Abnormal; Notable for the following components:    Glucose 185 (*)     BUN 27 (*)     CREATININE 1.4 (*)     GFR Non- 36 (*)     GFR  44 (*)     All other components within normal limits    Narrative:     Performed at:  [facility] [facility],  [address], [address]   Phone [phone]   URINE RT REFLEX TO CULTURE       All other labs were within normal range or not returned as of this dictation. EKG: All EKG's are interpreted by the Emergency Department Physician in the absence of a cardiologist.  Please see their note for interpretation of EKG. RADIOLOGY:   Non-plain film images such as CT, Ultrasound and MRI are read by the radiologist. Plain radiographic images are visualized and preliminarily interpreted by the ED Provider with the below findings:        Interpretation per the Radiologist below, if available at the time of this note:    [facility]   Final Result   1. No acute finding in the lumbar spine. 2. Severe spinal canal stenosis is chronic at L4-5. Prior laminectomy at   L5-S1 decompressing the canal.   3. Probable biconcave compression fractures at T11 and T12 partially   evaluated on this lumbar spine CT. Recommend review of additional CT report   of the same date for findings. CT THORACIC SPINE WO CONTRAST   Preliminary Result   1. When compared to 07/21/2020, there are new compression fractures at T9,   T11, and T12. Minimal associated bony retropulsion at T9 and T11.   2. Persistent posterior left perihilar consolidation and tiny loculated   pleural effusion. No results found.         PROCEDURES   Unless otherwise noted below, none     Procedures    CRITICAL CARE TIME   N/A    CONSULTS:  IP CONSULT TO HOSPITALIST  IP CONSULT TO NEUROSURGERY      EMERGENCY DEPARTMENT COURSE and DIFFERENTIAL DIAGNOSIS/MDM:   Vitals:    Vitals:

## 2020-10-16 NOTE — ED NOTES
Bed: 02  Expected date:   Expected time:   Means of arrival:   Comments:  FF [address] Radha Mendoza, MEGAN  10/16/20 0227 no

## 2020-10-16 NOTE — PLAN OF CARE
Problem: Falls - Risk of:  Goal: Will remain free from falls  Description: Will remain free from falls  10/16/2020 1505 by Ryan Figueroa RN  Outcome: Ongoing  10/16/2020 1505 by Ryan Figueroa RN  Outcome: Ongoing  Goal: Absence of physical injury  Description: Absence of physical injury  10/16/2020 1505 by Ryan Figueroa RN  Outcome: Ongoing  10/16/2020 1505 by Ryan Figueroa RN  Outcome: Ongoing     Problem: Pain:  Goal: Pain level will decrease  Description: Pain level will decrease  10/16/2020 1505 by Ryan Figueroa RN  Outcome: Ongoing  10/16/2020 1505 by Ryan Figueroa RN  Outcome: Ongoing  Goal: Control of acute pain  Description: Control of acute pain  10/16/2020 1505 by Ryan Figueroa RN  Outcome: Ongoing  10/16/2020 1505 by Ryan Figueroa RN  Outcome: Ongoing  Goal: Control of chronic pain  Description: Control of chronic pain  10/16/2020 1505 by Ryan Figueroa RN  Outcome: Ongoing  10/16/2020 1505 by Ryan Figueroa RN  Outcome: Ongoing

## 2020-10-16 NOTE — PLAN OF CARE
Patient known from previous hospitalization in July. At that time she had acuteT3, T8 and T 10 compression fractures. She. was able to undergo IR T8 and T10 kyphoplasty. History is pertinent for lung cancer, A. Fib on eliquishypertension. Patient has been having ongoing back pain and also reports to a fall. Due to ongoing pain, her daughter brought her back to the ER. Noncontrast lumbar and thoracic CT shows new fractures at T9, T11 and subtle endplate changes of W55. A/P:  Recommend thoracic MRI confirm acuity and exclude significant retropulsion of bone  -Conservative treatment of pain per hospitalist  -Not sure if patient received a TLSO brace in July  If she has, would recommend trying brace when out of bed for comfort  -If MRI confirms acute fractures, then would recommend IR evaluation for kyphoplasty if she is deemed a medical candidate and once she has been off Eliquis request for the appropriate amount of time.

## 2020-10-17 ENCOUNTER — APPOINTMENT (OUTPATIENT)
Dept: MRI IMAGING | Age: 80
DRG: 515 | End: 2020-10-17
Payer: MEDICARE

## 2020-10-17 LAB
ANION GAP SERPL CALCULATED.3IONS-SCNC: 10 MMOL/L (ref 3–16)
ANISOCYTOSIS: ABNORMAL
BASOPHILS ABSOLUTE: 0 K/UL (ref 0–0.2)
BASOPHILS RELATIVE PERCENT: 0 %
BUN BLDV-MCNC: 30 MG/DL (ref 7–20)
CALCIUM SERPL-MCNC: 8.9 MG/DL (ref 8.3–10.6)
CHLORIDE BLD-SCNC: 106 MMOL/L (ref 99–110)
CO2: 26 MMOL/L (ref 21–32)
CREAT SERPL-MCNC: 1.5 MG/DL (ref 0.6–1.2)
EOSINOPHILS ABSOLUTE: 0.6 K/UL (ref 0–0.6)
EOSINOPHILS RELATIVE PERCENT: 8 %
GFR AFRICAN AMERICAN: 41
GFR NON-AFRICAN AMERICAN: 34
GLUCOSE BLD-MCNC: 131 MG/DL (ref 70–99)
GLUCOSE BLD-MCNC: 143 MG/DL (ref 70–99)
GLUCOSE BLD-MCNC: 143 MG/DL (ref 70–99)
GLUCOSE BLD-MCNC: 194 MG/DL (ref 70–99)
GLUCOSE BLD-MCNC: 199 MG/DL (ref 70–99)
HCT VFR BLD CALC: 30 % (ref 36–48)
HEMOGLOBIN: 9.7 G/DL (ref 12–16)
LYMPHOCYTES ABSOLUTE: 1.6 K/UL (ref 1–5.1)
LYMPHOCYTES RELATIVE PERCENT: 22 %
MCH RBC QN AUTO: 30.5 PG (ref 26–34)
MCHC RBC AUTO-ENTMCNC: 32.4 G/DL (ref 31–36)
MCV RBC AUTO: 94.3 FL (ref 80–100)
MONOCYTES ABSOLUTE: 0.3 K/UL (ref 0–1.3)
MONOCYTES RELATIVE PERCENT: 4 %
NEUTROPHILS ABSOLUTE: 4.7 K/UL (ref 1.7–7.7)
NEUTROPHILS RELATIVE PERCENT: 66 %
PDW BLD-RTO: 18.2 % (ref 12.4–15.4)
PERFORMED ON: ABNORMAL
PLATELET # BLD: 234 K/UL (ref 135–450)
PLATELET SLIDE REVIEW: ADEQUATE
PMV BLD AUTO: 6.8 FL (ref 5–10.5)
POTASSIUM REFLEX MAGNESIUM: 4.1 MMOL/L (ref 3.5–5.1)
RBC # BLD: 3.18 M/UL (ref 4–5.2)
SLIDE REVIEW: ABNORMAL
SODIUM BLD-SCNC: 142 MMOL/L (ref 136–145)
WBC # BLD: 7.1 K/UL (ref 4–11)

## 2020-10-17 PROCEDURE — 2580000003 HC RX 258: Performed by: INTERNAL MEDICINE

## 2020-10-17 PROCEDURE — 94760 N-INVAS EAR/PLS OXIMETRY 1: CPT

## 2020-10-17 PROCEDURE — 80048 BASIC METABOLIC PNL TOTAL CA: CPT

## 2020-10-17 PROCEDURE — 1200000000 HC SEMI PRIVATE

## 2020-10-17 PROCEDURE — 6360000002 HC RX W HCPCS: Performed by: INTERNAL MEDICINE

## 2020-10-17 PROCEDURE — 6370000000 HC RX 637 (ALT 250 FOR IP): Performed by: INTERNAL MEDICINE

## 2020-10-17 PROCEDURE — 85025 COMPLETE CBC W/AUTO DIFF WBC: CPT

## 2020-10-17 PROCEDURE — 72146 MRI CHEST SPINE W/O DYE: CPT

## 2020-10-17 RX ORDER — HYDROCHLOROTHIAZIDE 25 MG/1
25 TABLET ORAL DAILY
Status: DISCONTINUED | OUTPATIENT
Start: 2020-10-17 | End: 2020-10-19

## 2020-10-17 RX ADMIN — MORPHINE SULFATE 2 MG: 2 INJECTION, SOLUTION INTRAMUSCULAR; INTRAVENOUS at 18:18

## 2020-10-17 RX ADMIN — DILTIAZEM HYDROCHLORIDE 60 MG: 60 CAPSULE, EXTENDED RELEASE ORAL at 08:13

## 2020-10-17 RX ADMIN — MORPHINE SULFATE 2 MG: 2 INJECTION, SOLUTION INTRAMUSCULAR; INTRAVENOUS at 01:52

## 2020-10-17 RX ADMIN — INSULIN LISPRO 2 UNITS: 100 INJECTION, SOLUTION INTRAVENOUS; SUBCUTANEOUS at 12:33

## 2020-10-17 RX ADMIN — INSULIN LISPRO 2 UNITS: 100 INJECTION, SOLUTION INTRAVENOUS; SUBCUTANEOUS at 17:49

## 2020-10-17 RX ADMIN — APIXABAN 2.5 MG: 2.5 TABLET, FILM COATED ORAL at 08:13

## 2020-10-17 RX ADMIN — DILTIAZEM HYDROCHLORIDE 60 MG: 60 CAPSULE, EXTENDED RELEASE ORAL at 21:19

## 2020-10-17 RX ADMIN — MORPHINE SULFATE 2 MG: 2 INJECTION, SOLUTION INTRAMUSCULAR; INTRAVENOUS at 22:55

## 2020-10-17 RX ADMIN — Medication 10 ML: at 21:19

## 2020-10-17 RX ADMIN — INSULIN LISPRO 1 UNITS: 100 INJECTION, SOLUTION INTRAVENOUS; SUBCUTANEOUS at 21:19

## 2020-10-17 RX ADMIN — HYDROCHLOROTHIAZIDE 25 MG: 25 TABLET ORAL at 18:18

## 2020-10-17 RX ADMIN — MORPHINE SULFATE 2 MG: 2 INJECTION, SOLUTION INTRAMUSCULAR; INTRAVENOUS at 13:35

## 2020-10-17 RX ADMIN — METOPROLOL SUCCINATE 25 MG: 25 TABLET, EXTENDED RELEASE ORAL at 08:13

## 2020-10-17 RX ADMIN — ENOXAPARIN SODIUM 70 MG: 80 INJECTION SUBCUTANEOUS at 21:19

## 2020-10-17 ASSESSMENT — PAIN SCALES - GENERAL
PAINLEVEL_OUTOF10: 8
PAINLEVEL_OUTOF10: 7
PAINLEVEL_OUTOF10: 7
PAINLEVEL_OUTOF10: 9
PAINLEVEL_OUTOF10: 10

## 2020-10-17 ASSESSMENT — PAIN DESCRIPTION - PAIN TYPE: TYPE: ACUTE PAIN

## 2020-10-17 ASSESSMENT — PAIN DESCRIPTION - LOCATION: LOCATION: BACK

## 2020-10-17 NOTE — PROGRESS NOTES
[facility] PROGRESS NOTE    10/17/2020 3:42 PM        Name: Sophie Hughes . Admitted: 10/16/2020  Primary Care Provider: Tess Rodriguez (Tel: [phone])      Subjective:    Patient still having pain in the back controlled with pain meds no chest pain no shortness of breath no fevers  Reviewed interval ancillary notes    Current Medications  enoxaparin (LOVENOX) injection 70 mg, Nightly  ipratropium-albuterol (DUONEB) nebulizer solution 1 ampule, Q4H PRN  dilTIAZem (CARDIZEM 12 HR) extended release capsule 60 mg, BID  metoprolol succinate (TOPROL XL) extended release tablet 25 mg, Daily  sodium chloride flush 0.9 % injection 10 mL, 2 times per day  sodium chloride flush 0.9 % injection 10 mL, PRN  acetaminophen (TYLENOL) tablet 650 mg, Q6H PRN    Or  acetaminophen (TYLENOL) suppository 650 mg, Q6H PRN  polyethylene glycol (GLYCOLAX) packet 17 g, Daily PRN  promethazine (PHENERGAN) tablet 12.5 mg, Q6H PRN    Or  ondansetron (ZOFRAN) injection 4 mg, Q6H PRN  morphine (PF) injection 2 mg, Q4H PRN  insulin lispro (1 Unit Dial) 0-12 Units, TID WC  insulin lispro (1 Unit Dial) 0-6 Units, Nightly  glucose (GLUTOSE) 40 % oral gel 15 g, PRN  dextrose 50 % IV solution, PRN  glucagon (rDNA) injection 1 mg, PRN  dextrose 5 % solution, PRN        Objective:  BP (!) 155/68   Pulse 73   Temp 98.3 °F (36.8 °C) (Oral)   Resp 16   Ht 5' 5\" (1.651 m)   Wt 146 lb 3.2 oz (66.3 kg)   SpO2 100%   BMI 24.33 kg/m²     Intake/Output Summary (Last 24 hours) at 10/17/2020 1542  Last data filed at 10/17/2020 0951  Gross per 24 hour   Intake 240 ml   Output --   Net 240 ml      Wt Readings from Last 3 Encounters:   10/16/20 146 lb 3.2 oz (66.3 kg)   07/24/20 142 lb 11.2 oz (64.7 kg)   06/14/20 140 lb 8 oz (63.7 kg)       General appearance:  Appears comfortable.  AAOx3  HEENT: atraumatic, Pupils equal, muscous membranes moist, no masses appreciated  Cardiovascular: Regular rate and rhythm no murmurs appreciated  Respiratory: CTAB no wheezing  Gastrointestinal: Abdomen soft, non-tender, BS+  EXT: no edema  Neurology: no gross focal deficts  MSK:thoracic spine tenderness to palpation  Psychiatry: Appropriate affect. Not agitated  Skin: Warm, dry, no rashes appreciated    Labs and Tests:  CBC:   Recent Labs     10/16/20  1220 10/17/20  0703   WBC 8.6 7.1   HGB 11.1* 9.7*    234     BMP:    Recent Labs     10/16/20  1220 10/17/20  0702    142   K 3.9 4.1    106   CO2 22 26   BUN 27* 30*   CREATININE 1.4* 1.5*   GLUCOSE 185* 143*     Hepatic:   Recent Labs     10/16/20  1220   AST 17   ALT 19   BILITOT 0.3   ALKPHOS 118     MRI THORACIC SPINE WO CONTRAST   Final Result   Abnormal bone marrow signal in the T9, T11 and T12 vertebral bodies, with   20-50% height loss, likely related to acute compression fractures, new since   July 21, 2020. Subacute compression fractures of T8 and T10, status post vertebroplasty. Mild abnormal bone marrow signal at the superior endplate of T3, likely   related to subacute compression fracture with 30% height loss, stable since   July 21, 2020. Spinal canal narrowing, mild at T10-11 and T11-12. No significant foraminal narrowing. Airspace opacity in the left parahilar regions, likely related to atelectasis   versus pneumonia. CT LUMBAR SPINE WO CONTRAST   Final Result   1. No acute finding in the lumbar spine. 2. Severe spinal canal stenosis is chronic at L4-5. Prior laminectomy at   L5-S1 decompressing the canal.   3. Probable biconcave compression fractures at T11 and T12 partially   evaluated on this lumbar spine CT. Recommend review of additional CT report   of the same date for findings. CT THORACIC SPINE WO CONTRAST   Final Result   1. When compared to 07/21/2020, there are new compression fractures at T9,   T11, and T12.   Minimal associated bony retropulsion at T9 and T11.   2. Persistent posterior left perihilar consolidation and tiny loculated   pleural effusion. Recent imaging reviewed    Problem List  Active Problems:    T12 compression fracture, initial encounter ([facility]  Resolved Problems:    * No resolved hospital problems.  *     Assessment/Plan:   T9 T11 and  T12 compression fracute  - neurosurgery consult on board possible kyphoplasty  Pt ot     PAF: home meds will hold eliquis and covert to lovenox for now     Dm: SSI        DVT prophylaxis llovenox  Code status full code    Argelia Epstein MD   10/17/2020 3:42 PM

## 2020-10-17 NOTE — PROGRESS NOTES
Occupational /Physical [facility]    Patient on strict bedrest due to compression fractures. Will follow up after MRI and further surgical recommendations. Thank you,  Nidhi Davis.  Dimitrios Johnston, OTR/L -[id number]  [facility], [address] [place] [address], T [phone]

## 2020-10-17 NOTE — H&P
HOSPITALISTS HISTORY AND PHYSICAL    10/16/2020 10:37 PM    Patient Information:  Leticia Roy is a 70 y.o. female [id number]  PCP:  Marely Pena (Tel: [phone] )    Chief complaint:    Chief Complaint   Patient presents with    Back Pain     Arrived by  EMS rt back pain lasting years in duration; worsening today. Pain is 10/10 to right thoracic area; tender to the touch. Denies falling or causitive event. History of Present Illness:  Carla Abad is a 70 y.o. female who presented with who had T3 T8 T10 compression for fractures in July and underwent IR kyphoplasty for T8 T10. Patient had been in rehab and doing well. Patient left rehab and was having severe sharp 10 out of 10 back pain with no weakness in her legs numbness occultly with urination or passing stool. Patient denies any trauma to the region no recent falls. REVIEW OF SYSTEMS:   Constitutional: Negative for fever,chills or night sweats  ENT: Negative for rhinorrhea, epistaxis, hoarseness, sore throat. Respiratory: Negative for shortness of breath,wheezing  Cardiovascular: Negative for chest pain, palpitations   Gastrointestinal: Negative for nausea, vomiting, diarrhea  Genitourinary: Negative for polyuria, dysuria   Hematologic/Lymphatic: Negative for bleeding tendency, easy bruising  Musculoskeletal: see above  Neurologic: Negative for confusion,dysarthria. Skin: Negative for itching,rash  Psychiatric: Negative for depression,anxiety, agitation. Endocrine: Negative for polydipsia,polyuria,heat /cold intolerance.     Past Medical History:   has a past medical history of Arthritis, CAD (coronary artery disease), Carpal tunnel syndrome, COPD (chronic obstructive pulmonary disease) ([facility] Coronary stent, depression, Diabetes mellitus ([facility] Diastolic heart failure ([facility] GERD (gastroesophageal reflux disease), Hyperlipidemia, Hypertension, Lung cancer ([facility] MI (myocardial infarction) ([facility] LIZETT (obstructive sleep apnea), PONV (postoperative nausea and vomiting), and stress incontinence. Past Surgical History:   has a past surgical history that includes Coronary angioplasty with stent (2000, 2001); back surgery (2000, 2001); bronchoscopy (12/04/2018); pr [facility] incl fluor gdnce dx w/cell washg spx (N/A, 12/4/2018); Cholecystectomy, laparoscopic (N/A, 12/19/2018); Upper gastrointestinal endoscopy (N/A, 2/25/2019); Colonoscopy (N/A, 2/25/2019); Colonoscopy (2/25/2019); bronchoscopy (N/A, 2/27/2019); bronchoscopy (2/27/2019); bronchoscopy (2/27/2019); bronchoscopy (N/A, 8/6/2019); Upper gastrointestinal endoscopy (N/A, 8/7/2019); bronchoscopy (N/A, 9/27/2019); bronchoscopy (9/27/2019); bronchoscopy (9/27/2019); and IR KYPHOPLASTY THORACIC 1 VERTEBRAL BODY (7/23/2020). Medications:  No current facility-administered medications on file prior to encounter. Current Outpatient Medications on File Prior to Encounter   Medication Sig Dispense Refill    HYDROcodone-acetaminophen (NORCO) 5-325 MG per tablet Take 1 tablet by mouth every 4 hours as needed for Pain.  ibuprofen (ADVIL;MOTRIN) 600 MG tablet Take 600 mg by mouth every 6 hours as needed for Pain      furosemide (LASIX) 20 MG tablet Take 20 mg by mouth 2 times daily      lidocaine (LIDODERM) 5 % Place 1 patch onto the skin daily 12 hours on, 12 hours off.       albuterol sulfate  (90 Base) MCG/ACT inhaler Inhale 2 puffs into the lungs every 4 hours as needed for Wheezing 1 Inhaler 0    predniSONE (DELTASONE) 10 MG tablet Take 1 tablet by mouth daily 30 tablet 0    pantoprazole (PROTONIX) 40 MG tablet Take 1 tablet by mouth every morning (before breakfast) 30 tablet 1    dilTIAZem (CARDIZEM 12 HR) 60 MG extended release capsule Take 1 capsule by mouth 2 times daily 60 capsule 1    linagliptin (TRADJENTA) 5 MG tablet Take 1 tablet by mouth 10/16/2020    MCHC 32.8 10/16/2020    RDW 18.1 10/16/2020     10/16/2020    MPV 7.3 10/16/2020     BMP:    Lab Results   Component Value Date     10/16/2020    K 3.9 10/16/2020    K 4.0 07/17/2020     10/16/2020    CO2 22 10/16/2020    BUN 27 10/16/2020    CREATININE 1.4 10/16/2020    CALCIUM 9.3 10/16/2020    GFRAA 44 10/16/2020    GFRAA >60 10/29/2012    LABGLOM 36 10/16/2020    GLUCOSE 185 10/16/2020     CT LUMBAR SPINE WO CONTRAST   Final Result   1. No acute finding in the lumbar spine. 2. Severe spinal canal stenosis is chronic at L4-5. Prior laminectomy at   L5-S1 decompressing the canal.   3. Probable biconcave compression fractures at T11 and T12 partially   evaluated on this lumbar spine CT. Recommend review of additional CT report   of the same date for findings. CT THORACIC SPINE WO CONTRAST   Final Result   1. When compared to 07/21/2020, there are new compression fractures at T9,   T11, and T12. Minimal associated bony retropulsion at T9 and T11.   2. Persistent posterior left perihilar consolidation and tiny loculated   pleural effusion. MRI THORACIC SPINE WO CONTRAST    (Results Pending)       Recent imaging reviewed    Problem List  Active Problems:    T12 compression fracture, initial encounter ([facility]  Resolved Problems:    * No resolved hospital problems. *        Assessment/Plan:   T9 T11 and  T12 compression fracute  - morphine iv for pain prn  - neurosurgery consult pending  Pt ot    PAF: home meds eliquis    Dm: SSI      DVT prophylaxis eliquis  Code status full code        Admit as inpatient I anticipate hospitalization spanning more than two midnights for investigation and treatment of the above medically necessary diagnoses. Please note that some part of this chart was generated using Dragon dictation software.  Although every effort was made to ensure the accuracy of this automated transcription, some errors in transcription may have occurred

## 2020-10-18 LAB
BILIRUBIN URINE: NEGATIVE
BLOOD, URINE: NEGATIVE
CLARITY: CLEAR
COLOR: YELLOW
GLUCOSE BLD-MCNC: 148 MG/DL (ref 70–99)
GLUCOSE BLD-MCNC: 150 MG/DL (ref 70–99)
GLUCOSE BLD-MCNC: 153 MG/DL (ref 70–99)
GLUCOSE BLD-MCNC: 194 MG/DL (ref 70–99)
GLUCOSE BLD-MCNC: 214 MG/DL (ref 70–99)
GLUCOSE URINE: NEGATIVE MG/DL
KETONES, URINE: NEGATIVE MG/DL
LEUKOCYTE ESTERASE, URINE: NEGATIVE
MICROSCOPIC EXAMINATION: NORMAL
NITRITE, URINE: NEGATIVE
PERFORMED ON: ABNORMAL
PH UA: 5 (ref 5–8)
PROTEIN UA: NEGATIVE MG/DL
SPECIFIC GRAVITY UA: 1.01 (ref 1–1.03)
URINE REFLEX TO CULTURE: NORMAL
URINE TYPE: NORMAL
UROBILINOGEN, URINE: 0.2 E.U./DL

## 2020-10-18 PROCEDURE — 6360000002 HC RX W HCPCS: Performed by: INTERNAL MEDICINE

## 2020-10-18 PROCEDURE — 81003 URINALYSIS AUTO W/O SCOPE: CPT

## 2020-10-18 PROCEDURE — 6370000000 HC RX 637 (ALT 250 FOR IP): Performed by: INTERNAL MEDICINE

## 2020-10-18 PROCEDURE — 1200000000 HC SEMI PRIVATE

## 2020-10-18 PROCEDURE — 2580000003 HC RX 258: Performed by: INTERNAL MEDICINE

## 2020-10-18 RX ORDER — MORPHINE SULFATE 4 MG/ML
4 INJECTION, SOLUTION INTRAMUSCULAR; INTRAVENOUS EVERY 4 HOURS PRN
Status: DISCONTINUED | OUTPATIENT
Start: 2020-10-18 | End: 2020-10-22 | Stop reason: HOSPADM

## 2020-10-18 RX ORDER — CYCLOBENZAPRINE HCL 10 MG
5 TABLET ORAL ONCE
Status: COMPLETED | OUTPATIENT
Start: 2020-10-18 | End: 2020-10-18

## 2020-10-18 RX ORDER — PREDNISONE 20 MG/1
20 TABLET ORAL DAILY
Status: DISCONTINUED | OUTPATIENT
Start: 2020-10-18 | End: 2020-10-22 | Stop reason: HOSPADM

## 2020-10-18 RX ORDER — LIDOCAINE 4 G/G
1 PATCH TOPICAL DAILY
Status: DISCONTINUED | OUTPATIENT
Start: 2020-10-18 | End: 2020-10-22 | Stop reason: HOSPADM

## 2020-10-18 RX ADMIN — MORPHINE SULFATE 2 MG: 2 INJECTION, SOLUTION INTRAMUSCULAR; INTRAVENOUS at 03:54

## 2020-10-18 RX ADMIN — METOPROLOL SUCCINATE 25 MG: 25 TABLET, EXTENDED RELEASE ORAL at 08:37

## 2020-10-18 RX ADMIN — DILTIAZEM HYDROCHLORIDE 60 MG: 60 CAPSULE, EXTENDED RELEASE ORAL at 08:37

## 2020-10-18 RX ADMIN — HYDROCHLOROTHIAZIDE 25 MG: 25 TABLET ORAL at 08:37

## 2020-10-18 RX ADMIN — INSULIN LISPRO 2 UNITS: 100 INJECTION, SOLUTION INTRAVENOUS; SUBCUTANEOUS at 13:21

## 2020-10-18 RX ADMIN — ENOXAPARIN SODIUM 70 MG: 80 INJECTION SUBCUTANEOUS at 20:44

## 2020-10-18 RX ADMIN — INSULIN LISPRO 2 UNITS: 100 INJECTION, SOLUTION INTRAVENOUS; SUBCUTANEOUS at 20:44

## 2020-10-18 RX ADMIN — INSULIN LISPRO 2 UNITS: 100 INJECTION, SOLUTION INTRAVENOUS; SUBCUTANEOUS at 18:10

## 2020-10-18 RX ADMIN — PREDNISONE 20 MG: 20 TABLET ORAL at 13:21

## 2020-10-18 RX ADMIN — Medication 10 ML: at 08:37

## 2020-10-18 RX ADMIN — Medication 10 ML: at 20:44

## 2020-10-18 RX ADMIN — DILTIAZEM HYDROCHLORIDE 60 MG: 60 CAPSULE, EXTENDED RELEASE ORAL at 20:44

## 2020-10-18 RX ADMIN — INSULIN LISPRO 2 UNITS: 100 INJECTION, SOLUTION INTRAVENOUS; SUBCUTANEOUS at 08:38

## 2020-10-18 RX ADMIN — MORPHINE SULFATE 2 MG: 2 INJECTION, SOLUTION INTRAMUSCULAR; INTRAVENOUS at 11:00

## 2020-10-18 RX ADMIN — CYCLOBENZAPRINE 5 MG: 10 TABLET, FILM COATED ORAL at 13:22

## 2020-10-18 ASSESSMENT — PAIN SCALES - GENERAL
PAINLEVEL_OUTOF10: 6
PAINLEVEL_OUTOF10: 10
PAINLEVEL_OUTOF10: 5
PAINLEVEL_OUTOF10: 10
PAINLEVEL_OUTOF10: 0

## 2020-10-18 NOTE — PLAN OF CARE
Problem: Falls - Risk of:  Goal: Will remain free from falls  Description: Will remain free from falls  10/18/2020 1357 by Kristofer Martin RN  Outcome: Ongoing  10/18/2020 0620 by Alina Cisneros RN  Outcome: Ongoing  Goal: Absence of physical injury  Description: Absence of physical injury  10/18/2020 1357 by Kristofer Martin RN  Outcome: Ongoing  10/18/2020 0620 by Alina Cisneros RN  Outcome: Ongoing     Problem: Pain:  Goal: Pain level will decrease  Description: Pain level will decrease  10/18/2020 1357 by Kristofer Martin RN  Outcome: Ongoing  10/18/2020 0620 by Alina Cisneros RN  Outcome: Ongoing  Goal: Control of acute pain  Description: Control of acute pain  10/18/2020 1357 by Kristofer Martin RN  Outcome: Ongoing  10/18/2020 0620 by Alina Cisneros RN  Outcome: Ongoing  Goal: Control of chronic pain  Description: Control of chronic pain  10/18/2020 1357 by Kristofer Martin RN  Outcome: Ongoing  10/18/2020 0620 by Alina Cisneros RN  Outcome: Ongoing     Problem: Skin Integrity:  Goal: Will show no infection signs and symptoms  Description: Will show no infection signs and symptoms  10/18/2020 1357 by Kristofer Martin RN  Outcome: Ongoing  10/18/2020 0620 by Alina Cisneros RN  Outcome: Ongoing  Goal: Absence of new skin breakdown  Description: Absence of new skin breakdown  10/18/2020 1357 by Kristofer Martin RN  Outcome: Ongoing  10/18/2020 0620 by Alina Cisneros RN  Outcome: Ongoing

## 2020-10-18 NOTE — PROGRESS NOTES
Pt c/o pain, 10/10. PRN morphine given. BP elevated this AM, 170/76. BP last taken was 151/75. Pt repositioned in bed onto left side. Pt expresses no further needs at this time. Will continue to monitor.

## 2020-10-18 NOTE — PROGRESS NOTES
Pt c/o pain 10/10, tearful and screaming out when repositioned. BP elevated, 169/75. Lidocaine patch applied. Message sent to MD. Orders placed for Morphine 4mg, prednisone and flexeril.

## 2020-10-18 NOTE — PROGRESS NOTES
Physical/Occupational Therapy  Juan Mopietroing    Orders received for PT/OT evaluation. Chart reviewed, patient with \"Noncontrast lumbar and thoracic CT shows new fractures at T9, T11 and subtle endplate changes of A67.\" Per neurosurgery consult, TSLO of kyphoplasty pending acuity of fractures. Patient with significant pain with rolling in bed. Will hold therapy at this time and will follow up with pt pending further recommendation of neurosugery.  Thanks, Duglas Zacarias, PT, DPT [id number] and Patrice Bhakta, [facility], [facility]

## 2020-10-18 NOTE — PROGRESS NOTES
[facility] PROGRESS NOTE    10/18/2020 11:54 AM        Name: Gonzalo Buitrago .               Admitted: 10/16/2020  Primary Care Provider: Samantha Horta (Tel: [phone])      Subjective:    Still having significant back pain no vomiting no nausea vomiting chest pain or shortness of breath  Reviewed interval ancillary notes    Current Medications  lidocaine 4 % external patch 1 patch, Daily  enoxaparin (LOVENOX) injection 70 mg, Nightly  hydroCHLOROthiazide (HYDRODIURIL) tablet 25 mg, Daily  ipratropium-albuterol (DUONEB) nebulizer solution 1 ampule, Q4H PRN  dilTIAZem (CARDIZEM 12 HR) extended release capsule 60 mg, BID  metoprolol succinate (TOPROL XL) extended release tablet 25 mg, Daily  sodium chloride flush 0.9 % injection 10 mL, 2 times per day  sodium chloride flush 0.9 % injection 10 mL, PRN  acetaminophen (TYLENOL) tablet 650 mg, Q6H PRN    Or  acetaminophen (TYLENOL) suppository 650 mg, Q6H PRN  polyethylene glycol (GLYCOLAX) packet 17 g, Daily PRN  promethazine (PHENERGAN) tablet 12.5 mg, Q6H PRN    Or  ondansetron (ZOFRAN) injection 4 mg, Q6H PRN  morphine (PF) injection 2 mg, Q4H PRN  insulin lispro (1 Unit Dial) 0-12 Units, TID WC  insulin lispro (1 Unit Dial) 0-6 Units, Nightly  glucose (GLUTOSE) 40 % oral gel 15 g, PRN  dextrose 50 % IV solution, PRN  glucagon (rDNA) injection 1 mg, PRN  dextrose 5 % solution, PRN        Objective:  BP (!) 151/75   Pulse 82   Temp 98.6 °F (37 °C) (Oral)   Resp 18   Ht 5' 5\" (1.651 m)   Wt 141 lb 8 oz (64.2 kg)   SpO2 98%   BMI 23.55 kg/m²     Intake/Output Summary (Last 24 hours) at 10/18/2020 1154  Last data filed at 10/18/2020 0400  Gross per 24 hour   Intake 120 ml   Output 400 ml   Net -280 ml      Wt Readings from Last 3 Encounters:   10/18/20 141 lb 8 oz (64.2 kg)   07/24/20 142 lb 11.2 oz (64.7 kg)   06/14/20 140 lb 8 oz (63.7 kg)       General appearance:  Appears comfortable. AAOx3  HEENT: atraumatic, Pupils equal, muscous membranes moist, no masses appreciated  Cardiovascular: Regular rate and rhythm no murmurs appreciated  Respiratory: CTAB no wheezing  Gastrointestinal: Abdomen soft, non-tender, BS+  EXT: no edema  Neurology: no gross focal deficts  MSK:thoracic spine tenderness to palpation  Psychiatry: Appropriate affect. Not agitated  Skin: Warm, dry, no rashes appreciated    Labs and Tests:  CBC:   Recent Labs     10/16/20  1220 10/17/20  0703   WBC 8.6 7.1   HGB 11.1* 9.7*    234     BMP:    Recent Labs     10/16/20  1220 10/17/20  0702    142   K 3.9 4.1    106   CO2 22 26   BUN 27* 30*   CREATININE 1.4* 1.5*   GLUCOSE 185* 143*     Hepatic:   Recent Labs     10/16/20  1220   AST 17   ALT 19   BILITOT 0.3   ALKPHOS 118     MRI THORACIC SPINE WO CONTRAST   Final Result   Abnormal bone marrow signal in the T9, T11 and T12 vertebral bodies, with   20-50% height loss, likely related to acute compression fractures, new since   July 21, 2020. Subacute compression fractures of T8 and T10, status post vertebroplasty. Mild abnormal bone marrow signal at the superior endplate of T3, likely   related to subacute compression fracture with 30% height loss, stable since   July 21, 2020. Spinal canal narrowing, mild at T10-11 and T11-12. No significant foraminal narrowing. Airspace opacity in the left parahilar regions, likely related to atelectasis   versus pneumonia. CT LUMBAR SPINE WO CONTRAST   Final Result   1. No acute finding in the lumbar spine. 2. Severe spinal canal stenosis is chronic at L4-5. Prior laminectomy at   L5-S1 decompressing the canal.   3. Probable biconcave compression fractures at T11 and T12 partially   evaluated on this lumbar spine CT. Recommend review of additional CT report   of the same date for findings. CT THORACIC SPINE WO CONTRAST   Final Result   1.  When compared to 07/21/2020, there are new compression fractures at T9,   T11, and T12. Minimal associated bony retropulsion at T9 and T11.   2. Persistent posterior left perihilar consolidation and tiny loculated   pleural effusion. Recent imaging reviewed    Problem List  Active Problems:    T12 compression fracture, initial encounter ([facility] [address]  Resolved Problems:    * No resolved hospital problems.  *     Assessment/Plan:   T9 T11 and  T12 compression fracute  - neurosurgery consult , will get ir consult for possibel kyphoplasty  Pt ot    continu eiv morphine will add lidocaine patch     PAF: home meds will hold eliquis and covert to lovenox for now     Dm: SSI        DVT prophylaxis llovenox  Code status full code    Su Sousa MD   10/18/2020 11:54 AM

## 2020-10-18 NOTE — PROGRESS NOTES
Pt called out to use bedpan, episode of urinary incontinence. Bedpan and sheet changed, very painful for patient to turn and get on bedpan, purewick placed. C/o 8/10 pain, morphine will be given when due. Shift assessment completed. Routine vitals stable, except elevated BP. Scheduled medications given. Patient is awake, alert and oriented. Respirations are easy and unlabored. Call light within reach.

## 2020-10-19 LAB
ANION GAP SERPL CALCULATED.3IONS-SCNC: 10 MMOL/L (ref 3–16)
BUN BLDV-MCNC: 22 MG/DL (ref 7–20)
CALCIUM SERPL-MCNC: 9.3 MG/DL (ref 8.3–10.6)
CHLORIDE BLD-SCNC: 101 MMOL/L (ref 99–110)
CO2: 30 MMOL/L (ref 21–32)
CREAT SERPL-MCNC: 1.1 MG/DL (ref 0.6–1.2)
GFR AFRICAN AMERICAN: 58
GFR NON-AFRICAN AMERICAN: 48
GLUCOSE BLD-MCNC: 136 MG/DL (ref 70–99)
GLUCOSE BLD-MCNC: 140 MG/DL (ref 70–99)
GLUCOSE BLD-MCNC: 152 MG/DL (ref 70–99)
GLUCOSE BLD-MCNC: 255 MG/DL (ref 70–99)
GLUCOSE BLD-MCNC: 320 MG/DL (ref 70–99)
HCT VFR BLD CALC: 30.8 % (ref 36–48)
HEMOGLOBIN: 10 G/DL (ref 12–16)
INR BLD: 1.1 (ref 0.86–1.14)
MCH RBC QN AUTO: 29.9 PG (ref 26–34)
MCHC RBC AUTO-ENTMCNC: 32.4 G/DL (ref 31–36)
MCV RBC AUTO: 92.2 FL (ref 80–100)
PDW BLD-RTO: 17.2 % (ref 12.4–15.4)
PERFORMED ON: ABNORMAL
PLATELET # BLD: 264 K/UL (ref 135–450)
PMV BLD AUTO: 7 FL (ref 5–10.5)
POTASSIUM SERPL-SCNC: 3.8 MMOL/L (ref 3.5–5.1)
PROTHROMBIN TIME: 12.8 SEC (ref 10–13.2)
RBC # BLD: 3.34 M/UL (ref 4–5.2)
SODIUM BLD-SCNC: 141 MMOL/L (ref 136–145)
WBC # BLD: 7.6 K/UL (ref 4–11)

## 2020-10-19 PROCEDURE — 6370000000 HC RX 637 (ALT 250 FOR IP): Performed by: INTERNAL MEDICINE

## 2020-10-19 PROCEDURE — 85610 PROTHROMBIN TIME: CPT

## 2020-10-19 PROCEDURE — 1200000000 HC SEMI PRIVATE

## 2020-10-19 PROCEDURE — 6360000002 HC RX W HCPCS: Performed by: INTERNAL MEDICINE

## 2020-10-19 PROCEDURE — 80048 BASIC METABOLIC PNL TOTAL CA: CPT

## 2020-10-19 PROCEDURE — 2580000003 HC RX 258: Performed by: INTERNAL MEDICINE

## 2020-10-19 PROCEDURE — 85027 COMPLETE CBC AUTOMATED: CPT

## 2020-10-19 PROCEDURE — 94760 N-INVAS EAR/PLS OXIMETRY 1: CPT

## 2020-10-19 PROCEDURE — 36415 COLL VENOUS BLD VENIPUNCTURE: CPT

## 2020-10-19 PROCEDURE — 2700000000 HC OXYGEN THERAPY PER DAY

## 2020-10-19 RX ORDER — HYDROMORPHONE HYDROCHLORIDE 1 MG/ML
0.5 INJECTION, SOLUTION INTRAMUSCULAR; INTRAVENOUS; SUBCUTANEOUS
Status: DISCONTINUED | OUTPATIENT
Start: 2020-10-19 | End: 2020-10-22 | Stop reason: HOSPADM

## 2020-10-19 RX ADMIN — Medication 10 ML: at 20:14

## 2020-10-19 RX ADMIN — INSULIN LISPRO 8 UNITS: 100 INJECTION, SOLUTION INTRAVENOUS; SUBCUTANEOUS at 16:58

## 2020-10-19 RX ADMIN — METOPROLOL SUCCINATE 25 MG: 25 TABLET, EXTENDED RELEASE ORAL at 11:16

## 2020-10-19 RX ADMIN — ONDANSETRON 4 MG: 2 INJECTION INTRAMUSCULAR; INTRAVENOUS at 04:55

## 2020-10-19 RX ADMIN — PREDNISONE 20 MG: 20 TABLET ORAL at 11:16

## 2020-10-19 RX ADMIN — MORPHINE SULFATE 4 MG: 4 INJECTION INTRAVENOUS at 11:19

## 2020-10-19 RX ADMIN — Medication 10 ML: at 11:17

## 2020-10-19 RX ADMIN — DILTIAZEM HYDROCHLORIDE 60 MG: 60 CAPSULE, EXTENDED RELEASE ORAL at 11:16

## 2020-10-19 RX ADMIN — ENOXAPARIN SODIUM 70 MG: 80 INJECTION SUBCUTANEOUS at 20:15

## 2020-10-19 RX ADMIN — MORPHINE SULFATE 4 MG: 4 INJECTION INTRAVENOUS at 04:55

## 2020-10-19 RX ADMIN — DILTIAZEM HYDROCHLORIDE 60 MG: 60 CAPSULE, EXTENDED RELEASE ORAL at 20:16

## 2020-10-19 RX ADMIN — INSULIN LISPRO 3 UNITS: 100 INJECTION, SOLUTION INTRAVENOUS; SUBCUTANEOUS at 21:28

## 2020-10-19 RX ADMIN — INSULIN LISPRO 2 UNITS: 100 INJECTION, SOLUTION INTRAVENOUS; SUBCUTANEOUS at 13:10

## 2020-10-19 ASSESSMENT — PAIN SCALES - GENERAL
PAINLEVEL_OUTOF10: 8
PAINLEVEL_OUTOF10: 4
PAINLEVEL_OUTOF10: 7
PAINLEVEL_OUTOF10: 4
PAINLEVEL_OUTOF10: 0
PAINLEVEL_OUTOF10: 8

## 2020-10-19 ASSESSMENT — PAIN DESCRIPTION - FREQUENCY: FREQUENCY: CONTINUOUS

## 2020-10-19 ASSESSMENT — PAIN DESCRIPTION - PAIN TYPE: TYPE: ACUTE PAIN

## 2020-10-19 ASSESSMENT — PAIN DESCRIPTION - ORIENTATION: ORIENTATION: RIGHT

## 2020-10-19 ASSESSMENT — PAIN DESCRIPTION - DESCRIPTORS: DESCRIPTORS: SHOOTING

## 2020-10-19 ASSESSMENT — PAIN SCALES - WONG BAKER: WONGBAKER_NUMERICALRESPONSE: 0

## 2020-10-19 ASSESSMENT — PAIN DESCRIPTION - LOCATION: LOCATION: BACK

## 2020-10-19 NOTE — PROGRESS NOTES
Shift assessment completed. Routine vitals stable. Scheduled medications given. Patient is awake, alert and oriented but very drowsy. Respirations are easy and unlabored. Patient does not appear to be in distress, resting comfortably at this time. Call light within reach.

## 2020-10-19 NOTE — PLAN OF CARE
Problem: Falls - Risk of:  Goal: Will remain free from falls  Description: Will remain free from falls  10/19/2020 0123 by Mandeep Hills RN  Outcome: Ongoing  Goal: Absence of physical injury  Description: Absence of physical injury  10/19/2020 0123 by Mandeep Hills RN  Outcome: Ongoing  Problem: Pain:  Goal: Pain level will decrease  Description: Pain level will decrease  10/19/2020 0123 by Mandeep Hills RN  Outcome: Ongoing  Goal: Control of acute pain  Description: Control of acute pain  10/19/2020 0123 by Mandeep Hills RN  Outcome: Ongoing  Goal: Control of chronic pain  Description: Control of chronic pain  10/19/2020 0123 by Mandeep Hills RN  Outcome: Ongoing  Problem: Skin Integrity:  Goal: Will show no infection signs and symptoms  Description: Will show no infection signs and symptoms  10/19/2020 0123 by Mandeep Hills RN  Outcome: Ongoing  Goal: Absence of new skin breakdown  Description: Absence of new skin breakdown  10/19/2020 0123 by Mandeep Hills RN  Outcome: Ongoing

## 2020-10-19 NOTE — CONSULTS
Neurosurgery Consult Note    Reason for Consult: compression fractures  Requesting Provider: Dr. Lidia Bui:  [address] [facility]:  Robert Stratton is a 70 y.o. female patient being seen for complaints of worsening back pain. Patient with history of acute T3, T8 and T10 compression fractures in July. At that time she underwent IR T8 and T10 kyphoplasty. She states her back pain did improve initially. However the pain has been worsening. She denies any trauma or injury. She does admit that her memory is not perfect. Given her worsening symptoms, she was brought to the emergency room by her daughter. CT suggested new fractures at T9, T11 and T12. Therefore neurosurgical consultation requested. Overall her pain is better with lying down and medication but aggravated by movement.     Past Medical History:   Diagnosis Date    Arthritis     CAD (coronary artery disease)     Carpal tunnel syndrome     COPD (chronic obstructive pulmonary disease) (HCC)     Coronary stent     depression     Diabetes mellitus ([address]     Diastolic heart failure ([address]     Per Dr Guevara Arndt 8/5/19    GERD (gastroesophageal reflux disease)     Hyperlipidemia     Hypertension     Lung cancer ([address]     Adenocarcinoma of Left Lung    MI (myocardial infarction) ([address]     LIZETT (obstructive sleep apnea)     does not use CPAP    PONV (postoperative nausea and vomiting)     stress incontinence      Past Surgical History:   Procedure Laterality Date    BACK SURGERY  2000, 2001    lumbar laminectomy    BRONCHOSCOPY  12/04/2018    BRONCHOSCOPY N/A 2/27/2019    BRONCHOSCOPY BIOPSY BRONCHUS performed by River Early MD at [address]  2/27/2019    BRONCHOSCOPY/TRANSBRONCHIAL NEEDLE BIOPSY performed by River Early MD at [address]  2/27/2019    BRONCHOSCOPY ULTRASOUND performed by River Early MD at [address] N/A 8/6/2019    BRONCHOSCOPY ALVEOLAR LAVAGE performed by Harry Keys MD at [address] [place] [address] N/A 9/27/2019    BRONCHOSCOPY ALVEOLAR LAVAGE performed by Susie Hess MD at [address] [place] [address]  9/27/2019    BRONCHOSCOPY BIOPSY BRONCHUS performed by Susie Hess MD at [address] [place] [address]  9/27/2019    BRONCHOSCOPY BRUSHINGS performed by Susie Hess MD at [facility], LAPAROSCOPIC N/A 12/19/2018    LAPAROSCOPIC CHOLECYSTECTOMY WITH CHOLANGIOGRAM performed by Jane Aguirre MD at [facility] [address] COLONOSCOPY N/A 2/25/2019    COLONOSCOPY WITH BIOPSY performed by Pari Taylor MD at [address] COLONOSCOPY  2/25/2019    COLONOSCOPY POLYPECTOMY SNARE/COLD BIOPSY performed by Pari Taylor MD at [facility]  2000, 2001    IR KYPHOPLASTY THORACIC FIRST LEVEL  7/23/2020    IR KYPHOPLASTY THORACIC FIRST LEVEL 7/23/2020 [facility]    IN [address] INCL FLUOR GDNCE DX W/CELL WASHG SPX N/A 12/4/2018    BRONCHOSCOPY WITH LAVAGE performed by Dominga Tinoco MD at [place] [address] 2/25/2019    EGD BIOPSY performed by Pari Taylor MD at [place] [address] N/A 8/7/2019    EGD DIAGNOSTIC ONLY performed by Quang Durant MD at [address] [place] [address]     Statins;  Lyrica [pregabalin]; Cipro xr; Penicillins; and Sulfa antibiotics    Current Facility-Administered Medications:     lidocaine 4 % external patch 1 patch, 1 patch, Transdermal, Daily, Porter Hernadez MD, 1 patch at 10/18/20 1247    morphine injection 4 mg, 4 mg, Intravenous, Q4H PRN, Porter Hernadez MD, 4 mg at 10/19/20 0455    predniSONE (DELTASONE) tablet 20 mg, 20 mg, Oral, Daily, Porter Hernadez MD, 20 mg at 10/18/20 1321    enoxaparin (LOVENOX) injection 70 mg, 1 mg/kg, Subcutaneous, Nightly, Porter Hernadez MD, 70 mg at 10/18/20 2044    hydroCHLOROthiazide (HYDRODIURIL) tablet 25 mg, 25 mg, Oral, Daily, Patricia Pickering MD, 25 mg at 10/18/20 0837    ipratropium-albuterol (DUONEB) nebulizer solution 1 ampule, 1 ampule, Inhalation, Q4H PRN, Patricia Pickering MD    dilTIAZem (CARDIZEM 12 HR) extended release capsule 60 mg, 60 mg, Oral, BID, Patricia Pickering MD, 60 mg at 10/18/20 2044    metoprolol succinate (TOPROL XL) extended release tablet 25 mg, 25 mg, Oral, Daily, Patricia Pickering MD, 25 mg at 10/18/20 8311    sodium chloride flush 0.9 % injection 10 mL, 10 mL, Intravenous, 2 times per day, Patricia Pickering MD, 10 mL at 10/18/20 2044    sodium chloride flush 0.9 % injection 10 mL, 10 mL, Intravenous, PRN, Patricia Pickering MD    acetaminophen (TYLENOL) tablet 650 mg, 650 mg, Oral, Q6H PRN **OR** acetaminophen (TYLENOL) suppository 650 mg, 650 mg, Rectal, Q6H PRN, Patricia Pickering MD    polyethylene glycol (GLYCOLAX) packet 17 g, 17 g, Oral, Daily PRN, Patricia Pickering MD    promethazine (PHENERGAN) tablet 12.5 mg, 12.5 mg, Oral, Q6H PRN **OR** ondansetron (ZOFRAN) injection 4 mg, 4 mg, Intravenous, Q6H PRN, Patricia Pickering MD, 4 mg at 10/19/20 0455    insulin lispro (1 Unit Dial) 0-12 Units, 0-12 Units, Subcutaneous, TID WC, Patricia Pickering MD, 2 Units at 10/18/20 1810    insulin lispro (1 Unit Dial) 0-6 Units, 0-6 Units, Subcutaneous, Nightly, Patricia Pickering MD, 2 Units at 10/18/20 2044    glucose (GLUTOSE) 40 % oral gel 15 g, 15 g, Oral, PRN, Patricia Pickering MD    dextrose 50 % IV solution, 12.5 g, Intravenous, PRN, Patricia Pickering MD    glucagon (rDNA) injection 1 mg, 1 mg, Intramuscular, PRN, Patricia Pickering MD    dextrose 5 % solution, 100 mL/hr, Intravenous, PRN, Patricia Pickering MD  Social History     Socioeconomic History    Marital status:      Spouse name: Not on file    Number of children: Not on file    Years of education: Not on file    Highest education level: Not on file   Occupational History    Not on file   Social Needs    Financial resource strain: Not on file    Food insecurity     Worry: Not on file     Inability: Not on file    Transportation needs     Medical: Not on file     Non-medical: Not on file   Tobacco Use    Smoking status: Former Smoker     Last attempt to quit: 10/28/2001     Years since quittin.9    Smokeless tobacco: Never Used   Substance and Sexual Activity    Alcohol use: No    Drug use: No    Sexual activity: Yes     Partners: Male   Lifestyle    Physical activity     Days per week: Not on file     Minutes per session: Not on file    Stress: Not on file   Relationships    Social connections     Talks on phone: Not on file     Gets together: Not on file     Attends Jainism service: Not on file     Active member of club or organization: Not on file     Attends meetings of clubs or organizations: Not on file     Relationship status: Not on file    Intimate partner violence     Fear of current or ex partner: Not on file     Emotionally abused: Not on file     Physically abused: Not on file     Forced sexual activity: Not on file   Other Topics Concern    Not on file   Social History Narrative    Not on file     Family History   Problem Relation Age of Onset    Cancer Sister     Diabetes Sister     Diabetes Brother     Heart Disease Father     Heart Disease Mother     Cancer Maternal Uncle        ROS: Complete 10 point ROS was obtained with positives in HPI, otherwise:  Pt denies fevers, denies chills. Pt denies chest pain, denies SOB, denies nausea, denies vomiting, denies headache.       PHYSICAL EXAMINATION:  BP (!) 148/66   Pulse 90   Temp 98.8 °F (37.1 °C) (Oral)   Resp 16   Ht 5' 5\" (1.651 m)   Wt 142 lb 14.4 oz (64.8 kg)   SpO2 99%   BMI 23.78 kg/m²   GENERAL:  Elderly female lying in bed in no acute distress  HEENT:  sclera clear, oropharynx clear, mucus membranes moist, tympanic membranes clear bilaterally and neck supple  NEUROLOGIC:  Awake, alert, oriented to name, place and time. Cranial nerves II-XII are grossly intact. Motor is 5 out of 5 bilaterally.  Diffuse tenderness on spine on palpation stating she is sore throughout    LABORATORY DATA:   CBC with Differential:    Lab Results   Component Value Date    WBC 7.6 10/19/2020    RBC 3.34 10/19/2020    HGB 10.0 10/19/2020    HCT 30.8 10/19/2020     10/19/2020    MCV 92.2 10/19/2020    MCH 29.9 10/19/2020    MCHC 32.4 10/19/2020    RDW 17.2 10/19/2020    SEGSPCT 50.2 10/29/2012    BANDSPCT 2 07/22/2020    METASPCT 3 07/22/2020    LYMPHOPCT 22.0 10/17/2020    MONOPCT 4.0 10/17/2020    MYELOPCT 1 07/11/2020    BASOPCT 0.0 10/17/2020    MONOSABS 0.3 10/17/2020    LYMPHSABS 1.6 10/17/2020    EOSABS 0.6 10/17/2020    BASOSABS 0.0 10/17/2020    DIFFTYPE Auto 10/29/2012     CMP:    Lab Results   Component Value Date     10/19/2020    K 3.8 10/19/2020    K 4.1 10/17/2020     10/19/2020    CO2 30 10/19/2020    BUN 22 10/19/2020    CREATININE 1.1 10/19/2020    GFRAA 58 10/19/2020    GFRAA >60 10/29/2012    AGRATIO 1.1 10/16/2020    LABGLOM 48 10/19/2020    GLUCOSE 140 10/19/2020    PROT 6.9 10/16/2020    LABALBU 3.6 10/16/2020    CALCIUM 9.3 10/19/2020    BILITOT 0.3 10/16/2020    ALKPHOS 118 10/16/2020    AST 17 10/16/2020    ALT 19 10/16/2020     PT/INR:    Lab Results   Component Value Date    PROTIME 12.8 10/19/2020    INR 1.10 10/19/2020     PTT:    Lab Results   Component Value Date    APTT 29.4 07/23/2020   [APTT}     IMAGING STUDIES:   MRI of the Thoracic-Spine:  Date: 10/17/20; Result:   FINDINGS:    BONES/ALIGNMENT: There are subacute compression fractures of T8 and T10 with    up to 15% height loss, status post vertebroplasty.         There is abnormal bone marrow signal in the T9, T11 and T12 vertebral bodies,    with 20-50% height loss, likely related to acute compression fractures, new    since July 21, 2020.  There is mild abnormal bone marrow signal at the    superior endplate of T3, likely related to subacute compression fracture with    30% height loss, stable since July 21, 2020.  There is no significant    retropulsion of posterior cortex.         SPINAL CORD: No abnormal cord signal is seen.         SOFT TISSUES: No paraspinal mass identified. Suann Pillar is mild infiltration of    fat surrounding the lower thoracic vertebral bodies, likely related to    posttraumatic edema.  There is airspace opacity in the left parahilar    regions, likely related to atelectasis versus pneumonia.         DEGENERATIVE CHANGES: There is degenerative disc disease with mild endplate    changes.  The intervertebral disc heights are grossly maintained.  There is    spinal canal narrowing, mild at T10-11 and T11-12.  No significant foraminal    narrowing in the thoracic spine.              Impression    Abnormal bone marrow signal in the T9, T11 and T12 vertebral bodies, with    20-50% height loss, likely related to acute compression fractures, new since    July 21, 2020.         Subacute compression fractures of T8 and T10, status post vertebroplasty.         Mild abnormal bone marrow signal at the superior endplate of T3, likely    related to subacute compression fracture with 30% height loss, stable since    July 21, 2020.         Spinal canal narrowing, mild at T10-11 and T11-12.         No significant foraminal narrowing.         Airspace opacity in the left parahilar regions, likely related to atelectasis    versus pneumonia. IMPRESSION/PLAN:  Active Problems:  Acute T9, T11 and T12 compression fractures: discussed options of brace vs kyphoplasty. Pt declines brace and would like to proceed with IR kyphoplasty. Order placed for T9, T11, T12 kyphoplasty. Recommend treatment for osteoporosis    The patient's symptoms, exam, testing and plan of care have been discussed with Dr. Marilin Watkins. Thank you again for this consultation.   We will sign off    Ceasar Hawkins Emmie Sink  10/19/2020

## 2020-10-19 NOTE — PROGRESS NOTES
[facility] PROGRESS NOTE    10/19/2020 9:10 AM        Name: Bettie Heredia . Admitted: 10/16/2020  Primary Care Provider: John Winslow (Tel: [phone])      Subjective:    Still having significant back pain. States morphine not really helping. She had significant sedation with flexeril yesterday which has been discontinued.      Reviewed interval ancillary notes    Current Medications  HYDROmorphone HCl PF (DILAUDID) injection 0.5 mg, Q3H PRN  lidocaine 4 % external patch 1 patch, Daily  morphine injection 4 mg, Q4H PRN  predniSONE (DELTASONE) tablet 20 mg, Daily  enoxaparin (LOVENOX) injection 70 mg, Nightly  ipratropium-albuterol (DUONEB) nebulizer solution 1 ampule, Q4H PRN  dilTIAZem (CARDIZEM 12 HR) extended release capsule 60 mg, BID  metoprolol succinate (TOPROL XL) extended release tablet 25 mg, Daily  sodium chloride flush 0.9 % injection 10 mL, 2 times per day  sodium chloride flush 0.9 % injection 10 mL, PRN  acetaminophen (TYLENOL) tablet 650 mg, Q6H PRN    Or  acetaminophen (TYLENOL) suppository 650 mg, Q6H PRN  polyethylene glycol (GLYCOLAX) packet 17 g, Daily PRN  promethazine (PHENERGAN) tablet 12.5 mg, Q6H PRN    Or  ondansetron (ZOFRAN) injection 4 mg, Q6H PRN  insulin lispro (1 Unit Dial) 0-12 Units, TID WC  insulin lispro (1 Unit Dial) 0-6 Units, Nightly  glucose (GLUTOSE) 40 % oral gel 15 g, PRN  dextrose 50 % IV solution, PRN  glucagon (rDNA) injection 1 mg, PRN  dextrose 5 % solution, PRN        Objective:  BP (!) 148/66   Pulse 90   Temp 98.8 °F (37.1 °C) (Oral)   Resp 16   Ht 5' 5\" (1.651 m)   Wt 142 lb 14.4 oz (64.8 kg)   SpO2 99%   BMI 23.78 kg/m²     Intake/Output Summary (Last 24 hours) at 10/19/2020 0910  Last data filed at 10/19/2020 0415  Gross per 24 hour   Intake --   Output 750 ml   Net -750 ml      Wt Readings from Last 3 Encounters:   10/19/20 142 lb 14.4 oz (64.8 kg) 07/24/20 142 lb 11.2 oz (64.7 kg)   06/14/20 140 lb 8 oz (63.7 kg)       General appearance:  Appears comfortable. AAOx3  HEENT: atraumatic, Pupils equal, muscous membranes moist, no masses appreciated  Cardiovascular: Regular rate and rhythm no murmurs appreciated  Respiratory: CTAB no wheezing  Gastrointestinal: Abdomen soft, non-tender, BS+  EXT: no edema  Neurology: no gross focal deficts  MSK:thoracic spine tenderness to palpation  Psychiatry: Appropriate affect. Not agitated  Skin: Warm, dry, no rashes appreciated    Labs and Tests:  CBC:   Recent Labs     10/16/20  1220 10/17/20  0703 10/19/20  0657   WBC 8.6 7.1 7.6   HGB 11.1* 9.7* 10.0*    234 264     BMP:    Recent Labs     10/16/20  1220 10/17/20  0702 10/19/20  0657    142 141   K 3.9 4.1 3.8    106 101   CO2 22 26 30   BUN 27* 30* 22*   CREATININE 1.4* 1.5* 1.1   GLUCOSE 185* 143* 140*     Hepatic:   Recent Labs     10/16/20  1220   AST 17   ALT 19   BILITOT 0.3   ALKPHOS 118     MRI THORACIC SPINE WO CONTRAST   Final Result   Abnormal bone marrow signal in the T9, T11 and T12 vertebral bodies, with   20-50% height loss, likely related to acute compression fractures, new since   July 21, 2020. Subacute compression fractures of T8 and T10, status post vertebroplasty. Mild abnormal bone marrow signal at the superior endplate of T3, likely   related to subacute compression fracture with 30% height loss, stable since   July 21, 2020. Spinal canal narrowing, mild at T10-11 and T11-12. No significant foraminal narrowing. Airspace opacity in the left parahilar regions, likely related to atelectasis   versus pneumonia. CT LUMBAR SPINE WO CONTRAST   Final Result   1. No acute finding in the lumbar spine. 2. Severe spinal canal stenosis is chronic at L4-5.   Prior laminectomy at   L5-S1 decompressing the canal.   3. Probable biconcave compression fractures at T11 and T12 partially   evaluated on this lumbar spine CT. Recommend review of additional CT report   of the same date for findings. CT THORACIC SPINE WO CONTRAST   Final Result   1. When compared to 07/21/2020, there are new compression fractures at T9,   T11, and T12. Minimal associated bony retropulsion at T9 and T11.   2. Persistent posterior left perihilar consolidation and tiny loculated   pleural effusion. IR KYPHOPLASTY THORACIC 1 VERTEBRAL BODY    (Results Pending)   IR KYPHOPLASTY THORACIC/LUMBAR EACH ADDL VERTEBRAL BODY    (Results Pending)       Recent imaging reviewed    Problem List  Active Problems:    T12 compression fracture, initial encounter ([facility] [address]  Resolved Problems:    * No resolved hospital problems. *     Assessment/Plan:   1. T9 T11 and  T12 compression fracture-appreciate neurosurgery input. Discussed with Irene Rivera. IR consulted for kyphoplasty. Change morphine to dilaudid for now. She has been taking norco at home.      2. PAF: home meds will hold eliquis (last dose on the 17th). Currently on lovenox-had on evening of the 18th.      3. Dm: continue SSI. Check a1c.     4. LORRAINE-improved. BUN mildly elevated.  Hold hctz for now.      Discussed with daughter Song Finney.      DVT prophylaxis llovenox  Code status full code    Brittney Conway PA-C   10/19/2020 9:10 AM

## 2020-10-19 NOTE — PROGRESS NOTES
Messagesima BOYD \"Patient admitted on 16th for back pain. Now c/o bloating and nausea. Had first BM since admission and it was large, black, and tarry. No labs ordered for this morning, hgb dropped from 11.1 on Saturday to 9.7 yesterday. However prior to that Hgb trended around 8.5-9. Do you want to place any orders for labs? \"     CBC and BMP ordered

## 2020-10-19 NOTE — PROGRESS NOTES
Bedside rounding completed with Duane Lawson RN. Pt denies needs at this time. White board updated. Call light in hand and pt verbalizes correct use. All needs within reach. Bed alarm set.  Electronically signed by Dolores Shah RN on 10/19/2020 at 7:13 PM

## 2020-10-19 NOTE — PLAN OF CARE
Problem: Falls - Risk of:  Goal: Will remain free from falls  Description: Will remain free from falls  10/19/2020 1126 by Jayme Delacruz RN  Outcome: Ongoing  10/19/2020 0123 by Fidelina Garcia RN  Outcome: Ongoing  Goal: Absence of physical injury  Description: Absence of physical injury  10/19/2020 1126 by Jayme Delacruz RN  Outcome: Ongoing  10/19/2020 0123 by Fidelina Garcia RN  Outcome: Ongoing     Problem: Pain:  Goal: Pain level will decrease  Description: Pain level will decrease  10/19/2020 1126 by Jayme Delacruz RN  Outcome: Ongoing  10/19/2020 0123 by Fidelina Garcia RN  Outcome: Ongoing  Goal: Control of acute pain  Description: Control of acute pain  10/19/2020 1126 by Jayme Delacruz RN  Outcome: Ongoing  10/19/2020 0123 by Fidelina Garcia RN  Outcome: Ongoing  Goal: Control of chronic pain  Description: Control of chronic pain  10/19/2020 1126 by Jayme Delacruz RN  Outcome: Ongoing  10/19/2020 0123 by Fidelina Garcia RN  Outcome: Ongoing     Problem: Skin Integrity:  Goal: Will show no infection signs and symptoms  Description: Will show no infection signs and symptoms  10/19/2020 1126 by Jayme Delacruz RN  Outcome: Ongoing  10/19/2020 0123 by Fidelina Garcia RN  Outcome: Ongoing  Goal: Absence of new skin breakdown  Description: Absence of new skin breakdown  10/19/2020 1126 by Jamye Delacruz RN  Outcome: Ongoing  10/19/2020 0123 by Fidelina Garcia RN  Outcome: Ongoing

## 2020-10-19 NOTE — PROGRESS NOTES
Sx consent signed and placed on chart.  Electronically signed by Dolores Shah RN on 10/19/2020 at 5:47 PM

## 2020-10-19 NOTE — CONSULTS
Interventional Radiology  Consult Note - Vertebral Augmentation    Chief Complaint:  Mid back pain    Reason For Consult:  Osteoporotic compression fractures at T9, T11, T12    History Obtained From:  patient, electronic medical record    History Of Present Illness: The patient is a 70 y.o. female with significant past medical history of lung cancer who presents with intractable mid back pain. Per patient she reports no fall and this began 3d after her kyphoplasty in July. Per EMR the patient has had several falls. Pre-procedural Pain Level:  10 with movement    Imaging:  Ct Thoracic Spine Wo Contrast    Result Date: 10/16/2020  EXAMINATION: CT OF THE THORACIC SPINE WITHOUT CONTRAST,  10/16/2020 11:43 am TECHNIQUE: CT of the thoracic spine was performed without the administration of intravenous contrast. Multiplanar reformatted images are provided for review. Dose modulation, iterative reconstruction, and/or weight based adjustment of the mA/kV was utilized to reduce the radiation dose to as low as reasonably achievable. COMPARISON: MRI of the thoracic spine dated 07/21/2020 and CT chest dated 07/11/2020 HISTORY: ORDERING SYSTEM PROVIDED HISTORY: Back pain TECHNOLOGIST PROVIDED HISTORY: Reason for exam:-> Back pain Reason for Exam: Back pain; back pain Acuity: Acute Type of Exam: Initial Mechanism of Injury: Back pain; back pain FINDINGS: BONES/ALIGNMENT: There is minimal chronic anterior height loss at T1. Mild anterior height loss at T3 is unchanged. There is interval kyphoplasty cement at T8 and T10. Vertebral body heights at T8 and T10 are stable from previous imaging. There is a new superior endplate fracture with mild diffuse height loss at T9. There is also a new moderate compression fracture at T11. Minimal bony retropulsion is seen at both of these levels (T9 and T11), measuring approximately 0.2-0.3 cm. There is also a new superior endplate fracture with subtle depression at T12. DEGENERATIVE CHANGES: No gross spinal canal stenosis or bony neural foraminal narrowing of the thoracic spine. SOFT TISSUES: No paraspinal mass is seen. There is persistent posterior left perihilar consolidation and tiny loculated pleural effusion in the posterior left hemithorax, partially included in the field of view. 1. When compared to 07/21/2020, there are new compression fractures at T9, T11, and T12. Minimal associated bony retropulsion at T9 and T11. 2. Persistent posterior left perihilar consolidation and tiny loculated pleural effusion. Ct Lumbar Spine Wo Contrast    Result Date: 10/16/2020  EXAMINATION: CT OF THE LUMBAR SPINE WITHOUT CONTRAST  10/16/2020 TECHNIQUE: CT of the lumbar spine was performed without the administration of intravenous contrast. Multiplanar reformatted images are provided for review. Dose modulation, iterative reconstruction, and/or weight based adjustment of the mA/kV was utilized to reduce the radiation dose to as low as reasonably achievable. COMPARISON: 08/04/2019 CT HISTORY: ORDERING SYSTEM PROVIDED HISTORY: back pain TECHNOLOGIST PROVIDED HISTORY: Reason for exam:->back pain Reason for Exam: back pain; back pain Acuity: Acute Type of Exam: Initial Mechanism of Injury: back pain; back pain FINDINGS: BONES/ALIGNMENT: The vertebral body height and alignment is well maintained throughout the lumbar spine. Partial visualization of T11 and T12 shows suspected biconcave compression fractures of each of these vertebral bodies that is new since the prior CT. Please review the additional CT report of the thoracic spine on this same date. DEGENERATIVE CHANGES: In the lumbar spine, there is mild degenerative spondylosis. Diffuse disc bulge at L5-S1 that is decompressed by a prior laminectomy. A diffuse disc bulge and facet DJD contribute to severe spinal canal stenosis at L4-5.  SOFT TISSUES/RETROPERITONEUM: Paraspinal soft tissues are normal.     1. No acute finding in the lumbar spine. 2. Severe spinal canal stenosis is chronic at L4-5. Prior laminectomy at L5-S1 decompressing the canal. 3. Probable biconcave compression fractures at T11 and T12 partially evaluated on this lumbar spine CT. Recommend review of additional CT report of the same date for findings. Mri Thoracic Spine Wo Contrast    Result Date: 10/17/2020  EXAMINATION: MRI OF THE THORACIC SPINE WITHOUT CONTRAST  10/17/2020 8:24 am TECHNIQUE: Multiplanar multisequence MRI of the thoracic spine was performed without the administration of intravenous contrast. COMPARISON: MRI thoracic spine July 21, 2020, CT thoracic spine October 16, 2020 HISTORY: ORDERING SYSTEM PROVIDED HISTORY: thoracic fxs, back pain TECHNOLOGIST PROVIDED HISTORY: Reason for exam:->thoracic fxs, back pain Reason for Exam: Pt states back pain x 6 months Acuity: Chronic Type of Exam: Initial FINDINGS: BONES/ALIGNMENT: There are subacute compression fractures of T8 and T10 with up to 15% height loss, status post vertebroplasty. There is abnormal bone marrow signal in the T9, T11 and T12 vertebral bodies, with 20-50% height loss, likely related to acute compression fractures, new since July 21, 2020. There is mild abnormal bone marrow signal at the superior endplate of T3, likely related to subacute compression fracture with 30% height loss, stable since July 21, 2020. There is no significant retropulsion of posterior cortex. SPINAL CORD: No abnormal cord signal is seen. SOFT TISSUES: No paraspinal mass identified. There is mild infiltration of fat surrounding the lower thoracic vertebral bodies, likely related to posttraumatic edema. There is airspace opacity in the left parahilar regions, likely related to atelectasis versus pneumonia. DEGENERATIVE CHANGES: There is degenerative disc disease with mild endplate changes. The intervertebral disc heights are grossly maintained. There is spinal canal narrowing, mild at T10-11 and T11-12.   No significant foraminal narrowing in the thoracic spine. Abnormal bone marrow signal in the T9, T11 and T12 vertebral bodies, with 20-50% height loss, likely related to acute compression fractures, new since July 21, 2020. Subacute compression fractures of T8 and T10, status post vertebroplasty. Mild abnormal bone marrow signal at the superior endplate of T3, likely related to subacute compression fracture with 30% height loss, stable since July 21, 2020. Spinal canal narrowing, mild at T10-11 and T11-12. No significant foraminal narrowing. Airspace opacity in the left parahilar regions, likely related to atelectasis versus pneumonia. Impression/Recommendation:  Patient is a candidate for vertebral augmentation. Plan:  Proceed with vertebral augmentation T9, T11 and T12. The patient was on Eliquis, which is being held. Will plan on the procedure tomorrow (48hrs Eliquis hold) with moderate sedation.

## 2020-10-19 NOTE — CARE COORDINATION
DCPA attempted with daughter, daughter needs SW to call back as she is in work meeting. SW to follow up.     Maykel Goodman MSW, [address]

## 2020-10-19 NOTE — PROGRESS NOTES
Advanced Care Planning Note. Purpose of Encounter: Advanced care planning in light of compression fracture  Parties In Attendance: daughter  Decisional Capacity: Yes  Subjective: Patient admitted with 3 level compression fracture. Objective:   CT thoracic spine:  1. When compared to 07/21/2020, there are new compression fractures at T9,    T11, and T12.  Minimal associated bony retropulsion at T9 and T11.    2. Persistent posterior left perihilar consolidation and tiny loculated    pleural effusion. CT L spine  1. No acute finding in the lumbar spine. 2. Severe spinal canal stenosis is chronic at L4-5.  Prior laminectomy at    L5-S1 decompressing the canal.    3. Probable biconcave compression fractures at T11 and T12 partially    evaluated on this lumbar spine CT.  Recommend review of additional CT report    of the same date for findings. Goals of Care Determination: Patient wishes to continue aggressive care. Patient and family do not want prolonged ventilator support. They would not want her on the vent for more than one week  Plan:  Consult IR for kyphoplasty. PT/OT eval  Code Status: Full code   Time spent on Advanced care Planning: Navid. 49: Completed advanced directives on chart, daughter Pretty Wren is the [place].     Elisha Grullon PA-C  10/19/2020 9:14 AM

## 2020-10-20 ENCOUNTER — APPOINTMENT (OUTPATIENT)
Dept: INTERVENTIONAL RADIOLOGY/VASCULAR | Age: 80
DRG: 515 | End: 2020-10-20
Payer: MEDICARE

## 2020-10-20 LAB
ANION GAP SERPL CALCULATED.3IONS-SCNC: 9 MMOL/L (ref 3–16)
APTT: 33.3 SEC (ref 24.2–36.2)
BASOPHILS ABSOLUTE: 0 K/UL (ref 0–0.2)
BASOPHILS RELATIVE PERCENT: 0.4 %
BUN BLDV-MCNC: 38 MG/DL (ref 7–20)
CALCIUM SERPL-MCNC: 9.4 MG/DL (ref 8.3–10.6)
CHLORIDE BLD-SCNC: 99 MMOL/L (ref 99–110)
CO2: 30 MMOL/L (ref 21–32)
CREAT SERPL-MCNC: 1.6 MG/DL (ref 0.6–1.2)
EOSINOPHILS ABSOLUTE: 0.5 K/UL (ref 0–0.6)
EOSINOPHILS RELATIVE PERCENT: 5 %
GFR AFRICAN AMERICAN: 38
GFR NON-AFRICAN AMERICAN: 31
GLUCOSE BLD-MCNC: 123 MG/DL (ref 70–99)
GLUCOSE BLD-MCNC: 151 MG/DL (ref 70–99)
GLUCOSE BLD-MCNC: 174 MG/DL (ref 70–99)
GLUCOSE BLD-MCNC: 182 MG/DL (ref 70–99)
GLUCOSE BLD-MCNC: 202 MG/DL (ref 70–99)
HCT VFR BLD CALC: 29.5 % (ref 36–48)
HEMOGLOBIN: 9.8 G/DL (ref 12–16)
LYMPHOCYTES ABSOLUTE: 2.1 K/UL (ref 1–5.1)
LYMPHOCYTES RELATIVE PERCENT: 22.3 %
MCH RBC QN AUTO: 30.5 PG (ref 26–34)
MCHC RBC AUTO-ENTMCNC: 33.3 G/DL (ref 31–36)
MCV RBC AUTO: 91.6 FL (ref 80–100)
MONOCYTES ABSOLUTE: 0.8 K/UL (ref 0–1.3)
MONOCYTES RELATIVE PERCENT: 8.4 %
NEUTROPHILS ABSOLUTE: 5.9 K/UL (ref 1.7–7.7)
NEUTROPHILS RELATIVE PERCENT: 63.9 %
PDW BLD-RTO: 17.5 % (ref 12.4–15.4)
PERFORMED ON: ABNORMAL
PLATELET # BLD: 292 K/UL (ref 135–450)
PMV BLD AUTO: 7.2 FL (ref 5–10.5)
POTASSIUM SERPL-SCNC: 4.1 MMOL/L (ref 3.5–5.1)
RBC # BLD: 3.22 M/UL (ref 4–5.2)
SODIUM BLD-SCNC: 138 MMOL/L (ref 136–145)
WBC # BLD: 9.2 K/UL (ref 4–11)

## 2020-10-20 PROCEDURE — 6360000002 HC RX W HCPCS: Performed by: RADIOLOGY

## 2020-10-20 PROCEDURE — 0PS43ZZ REPOSITION THORACIC VERTEBRA, PERCUTANEOUS APPROACH: ICD-10-PCS | Performed by: RADIOLOGY

## 2020-10-20 PROCEDURE — 85025 COMPLETE CBC W/AUTO DIFF WBC: CPT

## 2020-10-20 PROCEDURE — 6370000000 HC RX 637 (ALT 250 FOR IP): Performed by: RADIOLOGY

## 2020-10-20 PROCEDURE — 6360000004 HC RX CONTRAST MEDICATION: Performed by: HOSPITALIST

## 2020-10-20 PROCEDURE — 6360000002 HC RX W HCPCS: Performed by: INTERNAL MEDICINE

## 2020-10-20 PROCEDURE — 22515 PERQ VERTEBRAL AUGMENTATION: CPT

## 2020-10-20 PROCEDURE — 99153 MOD SED SAME PHYS/QHP EA: CPT

## 2020-10-20 PROCEDURE — 0PU43JZ SUPPLEMENT THORACIC VERTEBRA WITH SYNTHETIC SUBSTITUTE, PERCUTANEOUS APPROACH: ICD-10-PCS | Performed by: RADIOLOGY

## 2020-10-20 PROCEDURE — 1200000000 HC SEMI PRIVATE

## 2020-10-20 PROCEDURE — C1713 ANCHOR/SCREW BN/BN,TIS/BN: HCPCS

## 2020-10-20 PROCEDURE — 2700000000 HC OXYGEN THERAPY PER DAY

## 2020-10-20 PROCEDURE — 2580000003 HC RX 258: Performed by: INTERNAL MEDICINE

## 2020-10-20 PROCEDURE — 22513 PERQ VERTEBRAL AUGMENTATION: CPT

## 2020-10-20 PROCEDURE — 94760 N-INVAS EAR/PLS OXIMETRY 1: CPT

## 2020-10-20 PROCEDURE — 2500000003 HC RX 250 WO HCPCS: Performed by: RADIOLOGY

## 2020-10-20 PROCEDURE — 85730 THROMBOPLASTIN TIME PARTIAL: CPT

## 2020-10-20 PROCEDURE — 2500000003 HC RX 250 WO HCPCS: Performed by: HOSPITALIST

## 2020-10-20 PROCEDURE — 80048 BASIC METABOLIC PNL TOTAL CA: CPT

## 2020-10-20 PROCEDURE — 99152 MOD SED SAME PHYS/QHP 5/>YRS: CPT

## 2020-10-20 PROCEDURE — 36415 COLL VENOUS BLD VENIPUNCTURE: CPT

## 2020-10-20 PROCEDURE — 6370000000 HC RX 637 (ALT 250 FOR IP): Performed by: INTERNAL MEDICINE

## 2020-10-20 RX ORDER — ACETAMINOPHEN 325 MG/1
650 TABLET ORAL EVERY 4 HOURS PRN
Status: DISCONTINUED | OUTPATIENT
Start: 2020-10-20 | End: 2020-10-22 | Stop reason: HOSPADM

## 2020-10-20 RX ORDER — BUPIVACAINE HYDROCHLORIDE 5 MG/ML
30 INJECTION, SOLUTION EPIDURAL; INTRACAUDAL ONCE
Status: COMPLETED | OUTPATIENT
Start: 2020-10-20 | End: 2020-10-20

## 2020-10-20 RX ORDER — FENTANYL CITRATE 50 UG/ML
INJECTION, SOLUTION INTRAMUSCULAR; INTRAVENOUS
Status: COMPLETED | OUTPATIENT
Start: 2020-10-20 | End: 2020-10-20

## 2020-10-20 RX ORDER — DIPHENHYDRAMINE HCL 25 MG
TABLET ORAL
Status: COMPLETED | OUTPATIENT
Start: 2020-10-20 | End: 2020-10-20

## 2020-10-20 RX ORDER — MIDAZOLAM HYDROCHLORIDE 1 MG/ML
INJECTION INTRAMUSCULAR; INTRAVENOUS
Status: COMPLETED | OUTPATIENT
Start: 2020-10-20 | End: 2020-10-20

## 2020-10-20 RX ORDER — CLINDAMYCIN PHOSPHATE 300 MG/50ML
INJECTION INTRAVENOUS CONTINUOUS PRN
Status: COMPLETED | OUTPATIENT
Start: 2020-10-20 | End: 2020-10-20

## 2020-10-20 RX ORDER — LIDOCAINE HYDROCHLORIDE 10 MG/ML
10 INJECTION, SOLUTION EPIDURAL; INFILTRATION; INTRACAUDAL; PERINEURAL ONCE
Status: COMPLETED | OUTPATIENT
Start: 2020-10-20 | End: 2020-10-20

## 2020-10-20 RX ORDER — DIPHENHYDRAMINE HYDROCHLORIDE 50 MG/ML
INJECTION INTRAMUSCULAR; INTRAVENOUS
Status: COMPLETED | OUTPATIENT
Start: 2020-10-20 | End: 2020-10-20

## 2020-10-20 RX ADMIN — Medication 10 ML: at 08:19

## 2020-10-20 RX ADMIN — DIPHENHYDRAMINE HYDROCHLORIDE 25 MG: 50 INJECTION, SOLUTION INTRAMUSCULAR; INTRAVENOUS at 11:15

## 2020-10-20 RX ADMIN — DILTIAZEM HYDROCHLORIDE 60 MG: 60 CAPSULE, EXTENDED RELEASE ORAL at 20:48

## 2020-10-20 RX ADMIN — ENOXAPARIN SODIUM 70 MG: 80 INJECTION SUBCUTANEOUS at 20:48

## 2020-10-20 RX ADMIN — BUPIVACAINE HYDROCHLORIDE 20 ML: 5 INJECTION, SOLUTION EPIDURAL; INTRACAUDAL at 12:36

## 2020-10-20 RX ADMIN — FENTANYL CITRATE 25 MCG: 50 INJECTION, SOLUTION INTRAMUSCULAR; INTRAVENOUS at 11:18

## 2020-10-20 RX ADMIN — DIPHENHYDRAMINE HCL 25 MG: 25 TABLET ORAL at 11:12

## 2020-10-20 RX ADMIN — FENTANYL CITRATE 25 MCG: 50 INJECTION, SOLUTION INTRAMUSCULAR; INTRAVENOUS at 11:05

## 2020-10-20 RX ADMIN — FENTANYL CITRATE 25 MCG: 50 INJECTION, SOLUTION INTRAMUSCULAR; INTRAVENOUS at 11:29

## 2020-10-20 RX ADMIN — INSULIN LISPRO 2 UNITS: 100 INJECTION, SOLUTION INTRAVENOUS; SUBCUTANEOUS at 13:52

## 2020-10-20 RX ADMIN — METOPROLOL SUCCINATE 25 MG: 25 TABLET, EXTENDED RELEASE ORAL at 08:19

## 2020-10-20 RX ADMIN — INSULIN LISPRO 4 UNITS: 100 INJECTION, SOLUTION INTRAVENOUS; SUBCUTANEOUS at 18:21

## 2020-10-20 RX ADMIN — IOPAMIDOL 60 ML: 408 INJECTION, SOLUTION INTRATHECAL at 12:36

## 2020-10-20 RX ADMIN — MORPHINE SULFATE 4 MG: 4 INJECTION INTRAVENOUS at 05:05

## 2020-10-20 RX ADMIN — LIDOCAINE HYDROCHLORIDE 20 ML: 10 INJECTION, SOLUTION EPIDURAL; INFILTRATION; INTRACAUDAL; PERINEURAL at 12:36

## 2020-10-20 RX ADMIN — MIDAZOLAM 0.5 MG: 1 INJECTION INTRAMUSCULAR; INTRAVENOUS at 11:29

## 2020-10-20 RX ADMIN — MIDAZOLAM 0.5 MG: 1 INJECTION INTRAMUSCULAR; INTRAVENOUS at 11:16

## 2020-10-20 RX ADMIN — MIDAZOLAM 0.5 MG: 1 INJECTION INTRAMUSCULAR; INTRAVENOUS at 11:05

## 2020-10-20 RX ADMIN — CLINDAMYCIN PHOSPHATE 600 MG: 300 INJECTION, SOLUTION INTRAVENOUS at 11:06

## 2020-10-20 RX ADMIN — Medication 10 ML: at 20:49

## 2020-10-20 ASSESSMENT — PAIN SCALES - GENERAL
PAINLEVEL_OUTOF10: 0
PAINLEVEL_OUTOF10: 8

## 2020-10-20 ASSESSMENT — PAIN SCALES - WONG BAKER: WONGBAKER_NUMERICALRESPONSE: 4

## 2020-10-20 NOTE — CARE COORDINATION
Discharge Planning Assessment     discharge planner met with patient to discuss reason for admission, current living situation, and potential needs at the time of discharge completed with daughter Dre Monday      Demographics/Insurance verified Yes      Current type of dwellin story home      Patient from ECF/SW confirmed with:     Living arrangements:Alone      Level of function/Support: Daughters are supportive and daughter Raghu Rodriguez states pt has been sleeping on a couch and denies falling at home      PCP:Dr Garcia      Last Visit to PCP: 10/20     DME: Oxygen through [address], walker and wheelchair.     Active with any community resources/agencies/skilled home care: [facility] for RN and PT. States referral was made to [facility] but pt does not qualify      Medication compliance issues: home     Financial issues that could impact healthcare:none            Tentative discharge plan:SNF- 1. [facility] and 2.  [facility], would like ARU if appropriate     Discussed and provided facilities of choice if transition to a skilled nursing facility is required at the time of discharge      Discussed with patient and/or family that on the day of discharge home tentative time of discharge will be between 10 AM and noon.     Transportation at the time of discharge:transportation service     Gissel Burton MSW, [address]

## 2020-10-20 NOTE — PROGRESS NOTES
Physical Therapy  Kadeem Latif  Patient will have kyphoplasty today per intervention radiology. PT will evaluate the patient after this procedure. PT will follow-up with this pt as the schedule allows. Thanks.   Alayna Mo, [facility] DPT [id number]

## 2020-10-20 NOTE — PROGRESS NOTES
[facility] PROGRESS NOTE    10/20/2020 4:36 PM        Name: Eve Acevedo . Admitted: 10/16/2020  Primary Care Provider: Christel Mueller (Tel: [phone])      Subjective:    Still having significant back pain. Slept better last night. Awaiting kyphoplasty.     Reviewed interval ancillary notes    Current Medications  acetaminophen (TYLENOL) tablet 650 mg, Q4H PRN  HYDROmorphone HCl PF (DILAUDID) injection 0.5 mg, Q3H PRN  lidocaine 4 % external patch 1 patch, Daily  morphine injection 4 mg, Q4H PRN  predniSONE (DELTASONE) tablet 20 mg, Daily  enoxaparin (LOVENOX) injection 70 mg, Nightly  ipratropium-albuterol (DUONEB) nebulizer solution 1 ampule, Q4H PRN  dilTIAZem (CARDIZEM 12 HR) extended release capsule 60 mg, BID  metoprolol succinate (TOPROL XL) extended release tablet 25 mg, Daily  sodium chloride flush 0.9 % injection 10 mL, 2 times per day  sodium chloride flush 0.9 % injection 10 mL, PRN  acetaminophen (TYLENOL) suppository 650 mg, Q6H PRN  polyethylene glycol (GLYCOLAX) packet 17 g, Daily PRN  promethazine (PHENERGAN) tablet 12.5 mg, Q6H PRN    Or  ondansetron (ZOFRAN) injection 4 mg, Q6H PRN  insulin lispro (1 Unit Dial) 0-12 Units, TID WC  insulin lispro (1 Unit Dial) 0-6 Units, Nightly  glucose (GLUTOSE) 40 % oral gel 15 g, PRN  dextrose 50 % IV solution, PRN  glucagon (rDNA) injection 1 mg, PRN  dextrose 5 % solution, PRN        Objective:  BP (!) 173/76   Pulse 80   Temp 98.2 °F (36.8 °C) (Oral)   Resp 20   Ht 5' 5\" (1.651 m)   Wt 149 lb 4.8 oz (67.7 kg)   SpO2 91%   BMI 24.84 kg/m²     Intake/Output Summary (Last 24 hours) at 10/20/2020 1636  Last data filed at 10/20/2020 0824  Gross per 24 hour   Intake 30 ml   Output 1500 ml   Net -1470 ml      Wt Readings from Last 3 Encounters:   10/20/20 149 lb 4.8 oz (67.7 kg)   07/24/20 142 lb 11.2 oz (64.7 kg)   06/14/20 140 lb 8 oz (63.7 kg) General appearance:  Appears comfortable. AAOx3  HEENT: atraumatic, Pupils equal, muscous membranes moist, no masses appreciated  Cardiovascular: Regular rate and rhythm no murmurs appreciated  Respiratory: CTAB no wheezing  Gastrointestinal: Abdomen soft, non-tender, BS+  EXT: no edema  Neurology: no gross focal deficts  MSK:thoracic spine tenderness to palpation  Psychiatry: Appropriate affect. Not agitated  Skin: Warm, dry, no rashes appreciated    Labs and Tests:  CBC:   Recent Labs     10/19/20  0657 10/20/20  0500   WBC 7.6 9.2   HGB 10.0* 9.8*    292     BMP:    Recent Labs     10/19/20  0657 10/20/20  0500    138   K 3.8 4.1    99   CO2 30 30   BUN 22* 38*   CREATININE 1.1 1.6*   GLUCOSE 140* 174*     Hepatic:   No results for input(s): AST, ALT, ALB, BILITOT, ALKPHOS in the last 72 hours. IR KYPHOPLASTY THORACIC 1 VERTEBRAL BODY   Final Result   Successful fluoroscopic guided kyphoplasty of T9, T11 and T12. MRI THORACIC SPINE WO CONTRAST   Final Result   Abnormal bone marrow signal in the T9, T11 and T12 vertebral bodies, with   20-50% height loss, likely related to acute compression fractures, new since   July 21, 2020. Subacute compression fractures of T8 and T10, status post vertebroplasty. Mild abnormal bone marrow signal at the superior endplate of T3, likely   related to subacute compression fracture with 30% height loss, stable since   July 21, 2020. Spinal canal narrowing, mild at T10-11 and T11-12. No significant foraminal narrowing. Airspace opacity in the left parahilar regions, likely related to atelectasis   versus pneumonia. CT LUMBAR SPINE WO CONTRAST   Final Result   1. No acute finding in the lumbar spine. 2. Severe spinal canal stenosis is chronic at L4-5. Prior laminectomy at   L5-S1 decompressing the canal.   3. Probable biconcave compression fractures at T11 and T12 partially   evaluated on this lumbar spine CT. Recommend review of additional CT report   of the same date for findings. CT THORACIC SPINE WO CONTRAST   Final Result   1. When compared to 07/21/2020, there are new compression fractures at T9,   T11, and T12. Minimal associated bony retropulsion at T9 and T11.   2. Persistent posterior left perihilar consolidation and tiny loculated   pleural effusion. Recent imaging reviewed    Problem List  Active Problems:    T12 compression fracture, initial encounter ([facility] [address]  Resolved Problems:    * No resolved hospital problems. *     Assessment/Plan:   1. T9 T11 and  T12 compression fracture-appreciate neurosurgery input. Going for  kyphoplasty today. Continue dilaudid for now. She has been taking norco at home. Needs PT/OT after that.      2. PAF: home meds continue to hold eliquis (last dose on the 17th). Currently on lovenox.     3. Dm: continue SSI. Check a1c.     4. LORRAINE-improved. BUN mildly elevated.  Hold hctz for now.           DVT prophylaxis llovenox  Code status full code    Lillian Rea PA-C   10/20/2020 4:36 PM

## 2020-10-20 NOTE — PROGRESS NOTES
Shift assessment complete. VSS. Betablocker given with sip of water. Pt resting in bed. Denies any further needs at this time.

## 2020-10-20 NOTE — BRIEF OP NOTE
Brief Postoperative Note    Sophie Hughes  YOB: 1940  [id number]    Pre-operative Diagnosis: T9, T11 and T12 compression fractures    Post-operative Diagnosis: Same    Procedure: Kyphoplasty of T9, T11 and T12 compression fractures    Anesthesia: Moderate Sedation    Surgeons: Gera Roberts MD    Estimated Blood Loss: Less than 5 mL    Complications: None    Specimens: Was Not Obtained    Findings: Successful kyphoplasty of T9, T11 and T12 compression fractures.     Electronically signed by Gera Roberts MD on 10/20/2020 at 12:12 PM

## 2020-10-20 NOTE — PROGRESS NOTES
Shift assessment completed. Routine vitals stable. Scheduled medications given. Patient is awake, alert with some confusion. Respirations are easy and unlabored. Denies pain/discomfort, Does not appear to be in distress. Call light within reach.

## 2020-10-20 NOTE — PRE SEDATION
Sedation Pre-Procedure Note    Patient Name: Georgina Rich   YOB: 1940  Room/Bed: 9QR-8008/1511-69  Medical Record Number: [id number]  Date: 10/20/2020   Time: 12:11 PM       Indication:  Acute compression fractures of T9, T11 and T12 here for kyphoplasty. Consent: I have discussed with the patient and/or the patient representative the indication, alternatives, and the possible risks and/or complications of the planned procedure and the anesthesia methods. The patient and/or patient representative appear to understand and agree to proceed. Vital Signs:   Vitals:    10/20/20 1206   BP: (!) 164/82   Pulse: 83   Resp: 22   Temp:    SpO2: 97%       Past Medical History:   has a past medical history of Arthritis, CAD (coronary artery disease), Carpal tunnel syndrome, COPD (chronic obstructive pulmonary disease) ([address] Coronary stent, depression, Diabetes mellitus ([address] Diastolic heart failure ([address] GERD (gastroesophageal reflux disease), Hyperlipidemia, Hypertension, Lung cancer ([address] MI (myocardial infarction) ([address] LIZETT (obstructive sleep apnea), PONV (postoperative nausea and vomiting), and stress incontinence. Past Surgical History:   has a past surgical history that includes Coronary angioplasty with stent (2000, 2001); back surgery (2000, 2001); bronchoscopy (12/04/2018); pr brncc incl fluor gdnce dx w/cell washg spx (N/A, 12/4/2018); Cholecystectomy, laparoscopic (N/A, 12/19/2018); Upper gastrointestinal endoscopy (N/A, 2/25/2019); Colonoscopy (N/A, 2/25/2019); Colonoscopy (2/25/2019); bronchoscopy (N/A, 2/27/2019); bronchoscopy (2/27/2019); bronchoscopy (2/27/2019); bronchoscopy (N/A, 8/6/2019); Upper gastrointestinal endoscopy (N/A, 8/7/2019); bronchoscopy (N/A, 9/27/2019); bronchoscopy (9/27/2019); bronchoscopy (9/27/2019); and IR KYPHOPLASTY THORACIC 1 VERTEBRAL BODY (7/23/2020).     Medications:   Scheduled Meds:    lidocaine  1 patch Transdermal Daily    predniSONE  20 mg Oral Daily    enoxaparin  1 mg/kg Subcutaneous Nightly    dilTIAZem  60 mg Oral BID    metoprolol succinate  25 mg Oral Daily    sodium chloride flush  10 mL Intravenous 2 times per day    insulin lispro  0-12 Units Subcutaneous TID     insulin lispro  0-6 Units Subcutaneous Nightly     Continuous Infusions:    dextrose       PRN Meds: HYDROmorphone, morphine, ipratropium-albuterol, sodium chloride flush, acetaminophen **OR** acetaminophen, polyethylene glycol, promethazine **OR** ondansetron, glucose, dextrose, glucagon (rDNA), dextrose  Home Meds:   Prior to Admission medications    Medication Sig Start Date End Date Taking? Authorizing Provider   HYDROcodone-acetaminophen (NORCO) 5-325 MG per tablet Take 1 tablet by mouth every 4 hours as needed for Pain. Yes Historical Provider, MD   ibuprofen (ADVIL;MOTRIN) 600 MG tablet Take 600 mg by mouth every 6 hours as needed for Pain   Yes Historical Provider, MD   furosemide (LASIX) 20 MG tablet Take 20 mg by mouth 2 times daily   Yes Historical Provider, MD   lidocaine (LIDODERM) 5 % Place 1 patch onto the skin daily 12 hours on, 12 hours off.    Yes Historical Provider, MD   albuterol sulfate  (90 Base) MCG/ACT inhaler Inhale 2 puffs into the lungs every 4 hours as needed for Wheezing 7/24/20  Yes Valentina Muñoz MD   predniSONE (DELTASONE) 10 MG tablet Take 1 tablet by mouth daily 6/15/20  Yes MARIA C Urbina CNP   pantoprazole (PROTONIX) 40 MG tablet Take 1 tablet by mouth every morning (before breakfast) 6/15/20  Yes MARIA C Urbina CNP   dilTIAZem (CARDIZEM 12 HR) 60 MG extended release capsule Take 1 capsule by mouth 2 times daily 6/15/20  Yes MARIA C Urbina CNP   linagliptin (TRADJENTA) 5 MG tablet Take 1 tablet by mouth daily 6/15/20  Yes MARIA C Urbina CNP   metoprolol succinate (TOPROL XL) 25 MG extended release tablet Take 1 tablet by mouth daily 6/15/20  Yes MARIA C Urbina CNP   apixaban

## 2020-10-21 LAB
ANION GAP SERPL CALCULATED.3IONS-SCNC: 12 MMOL/L (ref 3–16)
BASOPHILS ABSOLUTE: 0 K/UL (ref 0–0.2)
BASOPHILS RELATIVE PERCENT: 0.5 %
BUN BLDV-MCNC: 28 MG/DL (ref 7–20)
CALCIUM SERPL-MCNC: 9.5 MG/DL (ref 8.3–10.6)
CHLORIDE BLD-SCNC: 99 MMOL/L (ref 99–110)
CO2: 28 MMOL/L (ref 21–32)
CREAT SERPL-MCNC: 1.2 MG/DL (ref 0.6–1.2)
EOSINOPHILS ABSOLUTE: 0.8 K/UL (ref 0–0.6)
EOSINOPHILS RELATIVE PERCENT: 10.4 %
GFR AFRICAN AMERICAN: 53
GFR NON-AFRICAN AMERICAN: 43
GLUCOSE BLD-MCNC: 125 MG/DL (ref 70–99)
GLUCOSE BLD-MCNC: 131 MG/DL (ref 70–99)
GLUCOSE BLD-MCNC: 195 MG/DL (ref 70–99)
GLUCOSE BLD-MCNC: 354 MG/DL (ref 70–99)
GLUCOSE BLD-MCNC: 369 MG/DL (ref 70–99)
GLUCOSE BLD-MCNC: 387 MG/DL (ref 70–99)
HCT VFR BLD CALC: 31.7 % (ref 36–48)
HEMOGLOBIN: 10.3 G/DL (ref 12–16)
LYMPHOCYTES ABSOLUTE: 1.6 K/UL (ref 1–5.1)
LYMPHOCYTES RELATIVE PERCENT: 20 %
MCH RBC QN AUTO: 30.5 PG (ref 26–34)
MCHC RBC AUTO-ENTMCNC: 32.7 G/DL (ref 31–36)
MCV RBC AUTO: 93.4 FL (ref 80–100)
MONOCYTES ABSOLUTE: 0.7 K/UL (ref 0–1.3)
MONOCYTES RELATIVE PERCENT: 8.2 %
NEUTROPHILS ABSOLUTE: 4.9 K/UL (ref 1.7–7.7)
NEUTROPHILS RELATIVE PERCENT: 60.9 %
PDW BLD-RTO: 17.3 % (ref 12.4–15.4)
PERFORMED ON: ABNORMAL
PLATELET # BLD: 289 K/UL (ref 135–450)
PMV BLD AUTO: 6.8 FL (ref 5–10.5)
POTASSIUM REFLEX MAGNESIUM: 3.8 MMOL/L (ref 3.5–5.1)
RBC # BLD: 3.39 M/UL (ref 4–5.2)
SODIUM BLD-SCNC: 139 MMOL/L (ref 136–145)
WBC # BLD: 8.1 K/UL (ref 4–11)

## 2020-10-21 PROCEDURE — 6370000000 HC RX 637 (ALT 250 FOR IP): Performed by: INTERNAL MEDICINE

## 2020-10-21 PROCEDURE — 97530 THERAPEUTIC ACTIVITIES: CPT

## 2020-10-21 PROCEDURE — 80048 BASIC METABOLIC PNL TOTAL CA: CPT

## 2020-10-21 PROCEDURE — 97535 SELF CARE MNGMENT TRAINING: CPT

## 2020-10-21 PROCEDURE — 83036 HEMOGLOBIN GLYCOSYLATED A1C: CPT

## 2020-10-21 PROCEDURE — 6360000002 HC RX W HCPCS: Performed by: INTERNAL MEDICINE

## 2020-10-21 PROCEDURE — 94761 N-INVAS EAR/PLS OXIMETRY MLT: CPT

## 2020-10-21 PROCEDURE — U0003 INFECTIOUS AGENT DETECTION BY NUCLEIC ACID (DNA OR RNA); SEVERE ACUTE RESPIRATORY SYNDROME CORONAVIRUS 2 (SARS-COV-2) (CORONAVIRUS DISEASE [COVID-19]), AMPLIFIED PROBE TECHNIQUE, MAKING USE OF HIGH THROUGHPUT TECHNOLOGIES AS DESCRIBED BY CMS-2020-01-R: HCPCS

## 2020-10-21 PROCEDURE — 97162 PT EVAL MOD COMPLEX 30 MIN: CPT

## 2020-10-21 PROCEDURE — 36415 COLL VENOUS BLD VENIPUNCTURE: CPT

## 2020-10-21 PROCEDURE — 2580000003 HC RX 258: Performed by: INTERNAL MEDICINE

## 2020-10-21 PROCEDURE — 6370000000 HC RX 637 (ALT 250 FOR IP): Performed by: PHYSICIAN ASSISTANT

## 2020-10-21 PROCEDURE — 97165 OT EVAL LOW COMPLEX 30 MIN: CPT

## 2020-10-21 PROCEDURE — 2700000000 HC OXYGEN THERAPY PER DAY

## 2020-10-21 PROCEDURE — 97116 GAIT TRAINING THERAPY: CPT

## 2020-10-21 PROCEDURE — 85025 COMPLETE CBC W/AUTO DIFF WBC: CPT

## 2020-10-21 PROCEDURE — 1200000000 HC SEMI PRIVATE

## 2020-10-21 RX ORDER — HYDROCODONE BITARTRATE AND ACETAMINOPHEN 5; 325 MG/1; MG/1
1 TABLET ORAL EVERY 6 HOURS PRN
Status: DISCONTINUED | OUTPATIENT
Start: 2020-10-21 | End: 2020-10-22 | Stop reason: HOSPADM

## 2020-10-21 RX ADMIN — INSULIN LISPRO 10 UNITS: 100 INJECTION, SOLUTION INTRAVENOUS; SUBCUTANEOUS at 17:39

## 2020-10-21 RX ADMIN — Medication 10 ML: at 20:01

## 2020-10-21 RX ADMIN — DILTIAZEM HYDROCHLORIDE 60 MG: 60 CAPSULE, EXTENDED RELEASE ORAL at 20:01

## 2020-10-21 RX ADMIN — Medication 10 ML: at 09:36

## 2020-10-21 RX ADMIN — INSULIN LISPRO 5 UNITS: 100 INJECTION, SOLUTION INTRAVENOUS; SUBCUTANEOUS at 19:55

## 2020-10-21 RX ADMIN — ONDANSETRON 4 MG: 2 INJECTION INTRAMUSCULAR; INTRAVENOUS at 09:35

## 2020-10-21 RX ADMIN — MORPHINE SULFATE 4 MG: 4 INJECTION INTRAVENOUS at 04:40

## 2020-10-21 RX ADMIN — HYDROCODONE BITARTRATE AND ACETAMINOPHEN 1 TABLET: 5; 325 TABLET ORAL at 17:42

## 2020-10-21 RX ADMIN — PREDNISONE 20 MG: 20 TABLET ORAL at 09:33

## 2020-10-21 RX ADMIN — DILTIAZEM HYDROCHLORIDE 60 MG: 60 CAPSULE, EXTENDED RELEASE ORAL at 09:33

## 2020-10-21 RX ADMIN — INSULIN LISPRO 2 UNITS: 100 INJECTION, SOLUTION INTRAVENOUS; SUBCUTANEOUS at 14:26

## 2020-10-21 RX ADMIN — MORPHINE SULFATE 4 MG: 4 INJECTION INTRAVENOUS at 11:05

## 2020-10-21 RX ADMIN — METOPROLOL SUCCINATE 25 MG: 25 TABLET, EXTENDED RELEASE ORAL at 09:33

## 2020-10-21 RX ADMIN — ENOXAPARIN SODIUM 70 MG: 80 INJECTION SUBCUTANEOUS at 20:01

## 2020-10-21 ASSESSMENT — PAIN DESCRIPTION - PAIN TYPE
TYPE: CHRONIC PAIN
TYPE: ACUTE PAIN

## 2020-10-21 ASSESSMENT — PAIN SCALES - GENERAL
PAINLEVEL_OUTOF10: 9
PAINLEVEL_OUTOF10: 10
PAINLEVEL_OUTOF10: 7
PAINLEVEL_OUTOF10: 0
PAINLEVEL_OUTOF10: 10
PAINLEVEL_OUTOF10: 0
PAINLEVEL_OUTOF10: 9

## 2020-10-21 ASSESSMENT — PAIN DESCRIPTION - LOCATION
LOCATION: BACK
LOCATION: BACK

## 2020-10-21 ASSESSMENT — PAIN DESCRIPTION - DESCRIPTORS: DESCRIPTORS: TENDER

## 2020-10-21 ASSESSMENT — PAIN SCALES - WONG BAKER: WONGBAKER_NUMERICALRESPONSE: 10

## 2020-10-21 NOTE — PROGRESS NOTES
[facility] PROGRESS NOTE    10/21/2020 1:56 PM        Name: Shyann Crane . Admitted: 10/16/2020  Primary Care Provider: Rashid Camargo (Tel: [phone])      Subjective: Had kyphoplasty yesterday. Still with back pain although hard to determine her baseline.      Reviewed interval ancillary notes    Current Medications  HYDROcodone-acetaminophen (NORCO) 5-325 MG per tablet 1 tablet, Q6H PRN  acetaminophen (TYLENOL) tablet 650 mg, Q4H PRN  HYDROmorphone HCl PF (DILAUDID) injection 0.5 mg, Q3H PRN  lidocaine 4 % external patch 1 patch, Daily  morphine injection 4 mg, Q4H PRN  predniSONE (DELTASONE) tablet 20 mg, Daily  enoxaparin (LOVENOX) injection 70 mg, Nightly  ipratropium-albuterol (DUONEB) nebulizer solution 1 ampule, Q4H PRN  dilTIAZem (CARDIZEM 12 HR) extended release capsule 60 mg, BID  metoprolol succinate (TOPROL XL) extended release tablet 25 mg, Daily  sodium chloride flush 0.9 % injection 10 mL, 2 times per day  sodium chloride flush 0.9 % injection 10 mL, PRN  acetaminophen (TYLENOL) suppository 650 mg, Q6H PRN  polyethylene glycol (GLYCOLAX) packet 17 g, Daily PRN  promethazine (PHENERGAN) tablet 12.5 mg, Q6H PRN    Or  ondansetron (ZOFRAN) injection 4 mg, Q6H PRN  insulin lispro (1 Unit Dial) 0-12 Units, TID WC  insulin lispro (1 Unit Dial) 0-6 Units, Nightly  glucose (GLUTOSE) 40 % oral gel 15 g, PRN  dextrose 50 % IV solution, PRN  glucagon (rDNA) injection 1 mg, PRN  dextrose 5 % solution, PRN        Objective:  BP (!) 176/82   Pulse 93   Temp 98.3 °F (36.8 °C) (Oral)   Resp 16   Ht 5' 5\" (1.651 m)   Wt 144 lb 11.2 oz (65.6 kg)   SpO2 95%   BMI 24.08 kg/m²   No intake or output data in the 24 hours ending 10/21/20 1356   Wt Readings from Last 3 Encounters:   10/21/20 144 lb 11.2 oz (65.6 kg)   07/24/20 142 lb 11.2 oz (64.7 kg)   06/14/20 140 lb 8 oz (63.7 kg)       General appearance: partially   evaluated on this lumbar spine CT. Recommend review of additional CT report   of the same date for findings. CT THORACIC SPINE WO CONTRAST   Final Result   1. When compared to 07/21/2020, there are new compression fractures at T9,   T11, and T12. Minimal associated bony retropulsion at T9 and T11.   2. Persistent posterior left perihilar consolidation and tiny loculated   pleural effusion. Recent imaging reviewed    Problem List  Active Problems:    T12 compression fracture, initial encounter ([facility] [address]  Resolved Problems:    * No resolved hospital problems. *     Assessment/Plan:   1. T9 T11 and  T12 compression fracture-appreciate neurosurgery input. Had kyphoplasty Tuesday. PT/OT eval pending. Will need snf. Add norco.      2. PAF: home meds continue to hold eliquis (last dose on the 17th). Currently on lovenox.     3. Dm: continue SSI. Check a1c.     4. LORRAINE-resolved.  Continue to hold hctz for now.           DVT prophylaxis llovenox  Code status full code    Desiree Cohen PA-C   10/21/2020 1:56 PM

## 2020-10-21 NOTE — CARE COORDINATION
Call from [place] [facility] who states they can accept, pending PT/OT and med list.  [place] [phone] or [phone].     Andrew Mora MSTAMEKA, [address]

## 2020-10-21 NOTE — PROGRESS NOTES
Physician Progress Note      PATIENT:               Tatiana Harris  CSN #:                  [id number]  :                       1940  ADMIT DATE:       10/16/2020 11:03 AM  DISCH DATE:  RESPONDING  PROVIDER #:        Jonatan Ryan MD          QUERY TEXT:    Pt admitted with back pain. Pt noted to have oxygen requirement during   admission. If possible, please document in the progress notes and discharge   summary if you are evaluating and/or treating any of the following: The medical record reflects the following:  Risk Factors:  70 y.o. female with significant past medical history of lung   cancer who presents with intractable mid back pain  Clinical Indicators: History of COPD, Adenocarcinoma of left lung, former   smoker,  Treatment: Oxygen 1-2LNC, continuous pulse ox monitoring, DME order for home   O2. Thank you. Please contact me if you have any questions @ [email or web address],   Master Vyas RN, CDS  Options provided:  -- Acute respiratory failure with hypoxia  -- Acute respiratory failure with hypercapnia  -- Chronic respiratory failure with hypoxia  -- Chronic respiratory failure with hypercapnia  -- Acute on chronic respiratory failure with hypoxia  -- Acute on chronic respiratory failure with hypercapnia  -- Other - I will add my own diagnosis  -- Disagree - Not applicable / Not valid  -- Disagree - Clinically unable to determine / Unknown  -- Refer to Clinical Documentation Reviewer    PROVIDER RESPONSE TEXT:    This patient is in acute respiratory failure with hypoxia.     Query created by: Lizabeth Eng on 10/21/2020 1:07 PM      Electronically signed by:  Jonatan Ryan MD 10/21/2020 5:05 PM

## 2020-10-21 NOTE — CARE COORDINATION
Therapy team paged for completion of OT note in chart. SW faxed PT and incomplete note to [facility] for precert needs. SW to follow for discharge planning.       Addendum:  Completed OT note faxed to Albert Merchant MSW, [address]

## 2020-10-21 NOTE — PROGRESS NOTES
Patients chart was reviewed post Kyphoplasty procedure. No complications were noted post procedure. Patient still having back pain but hard to determine effectiveness at this time since we cannot assess baseline.

## 2020-10-21 NOTE — PROGRESS NOTES
Occupational Therapy   Occupational Therapy Initial Assessment  Date: 10/21/2020   Patient Name: Robert Stratton  MRN: [id number]     : 1940    Date of Service: 10/21/2020    Discharge Recommendations:  Robert Stratton scored a 15/24 on the AM-PAC ADL Inpatient form. Current research shows that an AM-PAC score of 17 or less is typically not associated with a discharge to the patient's home setting. Based on the patient's AM-PAC score and their current ADL deficits, it is recommended that the patient have 3-5 sessions per week of Occupational Therapy at d/c to increase the patient's independence. Please see assessment section for further patient specific details. If patient discharges prior to next session this note will serve as a discharge summary. Please see below for the latest assessment towards goals. OT Equipment Recommendations  Equipment Needed: No    Assessment   Performance deficits / Impairments: Decreased ADL status; Decreased endurance;Decreased safe awareness;Decreased functional mobility   Assessment: Pt below baseline level of occupational function; pt would benefit from acute OT services to address above deficits  Treatment Diagnosis: Decreased functional mobility, ADL status, endurance and safety awareness associated w/ s/p kyphoplasty of T9,11,12  Prognosis: Good  Decision Making: Low Complexity  History: Pt 71 y/o F; lives alone; IND ADLs and IADLs; no recent falls. PMH: lung cancer, HTN, DM, COPD, kyphoplasty  Exam: 6-clicks, 4 functional performance deficits, stable status  Assistance / Modification:  Mod A of 2 for functional mobility w/RW  OT Education: OT Role;Plan of Care;Precautions;Transfer Training;Energy Conservation  Patient Education: discharge; pt able to ID 2/3 (B+L) precautions, educated on Twisting precaution and able to repeat 3/3 back to OT; pt verbalized understanding but may require reinforcement  Barriers to Learning: cognition  REQUIRES OT FOLLOW UP: Yes  Activity Tolerance  Activity Tolerance: Patient Tolerated treatment well;Patient limited by pain  Activity Tolerance: pt tolerated treatment but participation was limited by pain  Safety Devices  Safety Devices in place: Yes  Type of devices: Patient at risk for falls; Left in bed;Bed alarm in place;Call light within reach;Nurse notified;Gait belt  Restraints  Initially in place: No           Patient Diagnosis(es): The primary encounter diagnosis was Compression fracture of body of thoracic vertebra ([address] A diagnosis of Intractable pain was also pertinent to this visit. has a past medical history of Arthritis, CAD (coronary artery disease), Carpal tunnel syndrome, COPD (chronic obstructive pulmonary disease) ([address] Coronary stent, depression, Diabetes mellitus ([address] Diastolic heart failure ([address] GERD (gastroesophageal reflux disease), Hyperlipidemia, Hypertension, Lung cancer ([address] MI (myocardial infarction) ([address] LIZETT (obstructive sleep apnea), PONV (postoperative nausea and vomiting), and stress incontinence. has a past surgical history that includes Coronary angioplasty with stent (2000, 2001); back surgery (2000, 2001); bronchoscopy (12/04/2018); pr brncc incl fluor gdnce dx w/cell washg spx (N/A, 12/4/2018); Cholecystectomy, laparoscopic (N/A, 12/19/2018); Upper gastrointestinal endoscopy (N/A, 2/25/2019); Colonoscopy (N/A, 2/25/2019); Colonoscopy (2/25/2019); bronchoscopy (N/A, 2/27/2019); bronchoscopy (2/27/2019); bronchoscopy (2/27/2019); bronchoscopy (N/A, 8/6/2019); Upper gastrointestinal endoscopy (N/A, 8/7/2019); bronchoscopy (N/A, 9/27/2019); bronchoscopy (9/27/2019); bronchoscopy (9/27/2019); IR KYPHOPLASTY THORACIC 1 VERTEBRAL BODY (7/23/2020); and IR KYPHOPLASTY THORACIC 1 VERTEBRAL BODY (10/20/2020).     Treatment Diagnosis: Decreased functional mobility, ADL status, endurance and safety awareness associated w/ s/p kyphoplasty of M3,25,20      Restrictions  Restrictions/Precautions  Restrictions/Precautions: Fall Risk(high fall risk)  Required Braces or Orthoses?: No  Position Activity Restriction  Spinal Precautions: No Bending, No Lifting, No Twisting  Other position/activity restrictions: Fiona Poole is a 70 y.o. female presents to the emergency department today with her daughter for evaluation for back pain. Over the past week, the patient has been complaining of increasing back pain, mostly to her upper back. The patient is currently rating her pain as a 10/10 her pain is constant, and seems to be worse with touch and certain movements. She otherwise has no complaints. The daughter states that the patient otherwise lives at home however the daughter and her sister go on to check on her. They have physical therapy as well as home health care nurse that come to assist the patient as well, as the patient does not want to be placed at this time; s/p thoracic kyphoplasty T9, 11, 12 10/20    Subjective   General  Chart Reviewed: Yes  Patient assessed for rehabilitation services?: Yes  Family / Caregiver Present: No  Diagnosis: s/p kyphoplasty T9,11, 12  Subjective  Subjective: Pt supine in bed upon arrival; agreeable to OT eval; pt states pain 9/10 in back; RN notified of pain.   General Comment  Comments: Pt constantly moaning in pain throughout session w/anxiety associated w/ moving d/t pain  Patient Currently in Pain: Yes  Pain Assessment  Pain Assessment: 0-10  Pain Level: 9  Pre Treatment Pain Screening  Intervention List: Patient able to continue with treatment;Nurse/Physician notified  Patient Currently in Pain: Yes  Social/Functional History  Social/Functional History  Lives With: Alone  Type of Home: House  Home Layout: Two level  Home Access: Stairs to enter with rails  Entrance Stairs - Number of Steps: 3  Entrance Stairs - Rails: Both  Bathroom Shower/Tub: Walk-in shower  Bathroom Toilet: Handicap height  Bathroom Equipment: Grab bars in shower, Grab bars around toilet, Commode  Home Equipment: Hospital bed, 4 wheeled walker, Oxygen, Alert Button, Quad cane, [employer]  ADL Assistance: Independent  Homemaking Assistance: Independent  Homemaking Responsibilities: Yes  Ambulation Assistance: Independent  Transfer Assistance: Independent  Active : Yes  Occupation: Retired  Additional Comments: no recent falls; pt questionable historian       Objective   Vision: Impaired  Vision Exceptions: Wears glasses for reading  H[facility]  Hearing Exceptions: Bilateral hearing aid    Orientation  Overall Orientation Status: Impaired  Orientation Level: Oriented to situation;Oriented to place;Oriented to person;Disoriented to time  Observation/Palpation  Posture: Fair  Observation: Pt is kyphotic with rounded shoulders. Scar: Incisions in low back covered in bandages. Balance  Sitting Balance: Contact guard assistance  Standing Balance: Contact guard assistance  Standing Balance  Time: ~1 min; ~2 min  Activity: functional mobility to BSC, functional mobility to EOB  Comment: pt stating \"I can't do this\" when standing or attempting mobility; constant moaning in pain; no LOB  Functional Mobility  Functional - Mobility Device: Rolling Walker  Activity: Other  Assist Level: Dependent/Total(min A of 2 progessing to mod A of 2 by end of session)  Functional Mobility Comments: Pt stating \"I can't do this\" during all mobility attempting to sit instanting from [facility] to EOB; bed moved closer to pt to decrease distance for functional mobility; pt required verbal and tactile cues for walker managment and safety awareness  Toilet Transfers  Toilet - Technique: Ambulating; To right  Equipment Used: Standard bedside commode  Toilet Transfer: Minimal assistance(vc and tactile cues for hand placement, walker management and safety during transfer)  ADL  UE Bathing: Maximum assistance(pt w/minimal participate d/t pain; seated on BSC)  LE Plan   Plan  Times per week: 5-7  Times per day: Daily  Current Treatment Recommendations: Balance Training, Functional Mobility Training, Endurance Training, Safety Education & Training, Self-Care / ADL, Equipment Evaluation, Education, & procurement, Patient/Caregiver Education & Training    AM-PAC Score        AM-PAC Inpatient Daily Activity Raw Score: 15 (10/21/20 1219)  AM-PAC Inpatient ADL T-Scale Score : 34.69 (10/21/20 1219)  ADL Inpatient CMS 0-100% Score: 56.46 (10/21/20 1219)  ADL Inpatient CMS G-Code Modifier : CK (10/21/20 1219)    Goals  Short term goals  Time Frame for Short term goals: discharge  Short term goal 1: Min A for bed mobility  Short term goal 2: Min A for functional mobility for ADL tasks w/RW  Short term goal 3: SBA for functional transfers to ADL surfaces w/RW  Short term goal 4: CGA for LB dressing/bathing w/AE as needed  Short term goal 5: Min A for toileting       Therapy Time   Individual Concurrent Group Co-treatment   Time In 0844         Time Out 0931         Minutes 47              Timed Code Treatment Minutes:  32 Minutes    Total Treatment Minutes:  52 min    C/ [address], OT     Rhonda Dow, S/OT  I have directly observed this treatment and have read and approve this note.    Mateo Ram., OTR/L, [id number]

## 2020-10-21 NOTE — CARE COORDINATION
VM to admissions at [facility] reporting PT note placed and awaiting OT.     Adelfo Steel MSW, [address]

## 2020-10-21 NOTE — PROGRESS NOTES
Physical Therapy    Facility/Department: [address] ONCOLOGY  Initial Assessment    NAME: Sundar Thrasher  : 1940  MRN: [id number]    Date of Service: 10/21/2020    Discharge Recommendations:  Sundar Thrasher scored a  on the AM-PAC short mobility form. Current research shows that an AM-PAC score of 17 or less is typically not associated with a discharge to the patient's home setting. Based on the patient's AM-PAC score and their current functional mobility deficits, it is recommended that the patient have 3-5 sessions per week of Physical Therapy at d/c to increase the patient's independence. Please see assessment section for further patient specific details. Pt is unaware of safety concerns and demonstrates concerning characteristics such as urgency when ambulating that can create signficantly unsafe situations without assistance. It is recommended that she receive 24 hour supervision until pain in under control and she demonstrates better understanding of safety awareness. She will benefit from skilled therapy intervention as well to address mobility concerns prior to returning to home environment with stairs that have to be ambulated. If patient discharges prior to next session this note will serve as a discharge summary. Please see below for the latest assessment towards goals. 3-5 sessions per week, Patient would benefit from continued therapy after discharge, 24 hour supervision or assist   PT Equipment Recommendations  Equipment Needed: No    Assessment   Body structures, Functions, Activity limitations: Decreased functional mobility ; Decreased safe awareness;Decreased balance  Assessment: Pt is not currently at baseline function due to decreased ability to tolerate functional activities with excruciating pain. Pain felt has lead to urgency and lack of safety awareness with movement.  Pt also demonstrated decreased balance with activities and required more assistance than baseline to complete them. Pt will benefit from skilled therapy for these deficits. Treatment Diagnosis: Decreased functional mobility, decreased safety awareness, decreased balance  Prognosis: Fair  Decision Making: Medium Complexity  Clinical Presentation: evolving - pain with mobility  PT Education: PT Role;Plan of Care  Patient Education: Pt educated on DC plan, verbalized understanding  Barriers to Learning: none  REQUIRES PT FOLLOW UP: Yes  Activity Tolerance  Activity Tolerance: Patient limited by pain  Activity Tolerance: Pt was greatly limited by pain throughout session that created urgency to BSC/bed and unsafe actions from pt such as not properly advancing walker that had to be corrected by PT. She moaned and exhaled loudly throughout session, even with instructions for deep breathing. Patient Diagnosis(es): The primary encounter diagnosis was Compression fracture of body of thoracic vertebra ([address] A diagnosis of Intractable pain was also pertinent to this visit. has a past medical history of Arthritis, CAD (coronary artery disease), Carpal tunnel syndrome, COPD (chronic obstructive pulmonary disease) ([address] Coronary stent, depression, Diabetes mellitus ([address] Diastolic heart failure ([address] GERD (gastroesophageal reflux disease), Hyperlipidemia, Hypertension, Lung cancer ([address] MI (myocardial infarction) ([address] LIZETT (obstructive sleep apnea), PONV (postoperative nausea and vomiting), and stress incontinence. has a past surgical history that includes Coronary angioplasty with stent (2000, 2001); back surgery (2000, 2001); bronchoscopy (12/04/2018); pr [facility] incl fluor gdnce dx w/cell washg spx (N/A, 12/4/2018); Cholecystectomy, laparoscopic (N/A, 12/19/2018); Upper gastrointestinal endoscopy (N/A, 2/25/2019); Colonoscopy (N/A, 2/25/2019); Colonoscopy (2/25/2019); bronchoscopy (N/A, 2/27/2019); bronchoscopy (2/27/2019); bronchoscopy (2/27/2019); bronchoscopy (N/A, 8/6/2019);  Upper gastrointestinal endoscopy (N/A, 8/7/2019); bronchoscopy (N/A, 9/27/2019); bronchoscopy (9/27/2019); bronchoscopy (9/27/2019); IR KYPHOPLASTY THORACIC 1 VERTEBRAL BODY (7/23/2020); and IR KYPHOPLASTY THORACIC 1 VERTEBRAL BODY (10/20/2020). Restrictions  Restrictions/Precautions  Restrictions/Precautions: Fall Risk(high fall risk)  Required Braces or Orthoses?: No  Position Activity Restriction  Spinal Precautions: No Bending, No Lifting, No Twisting  Other position/activity restrictions: Gonzalo Buitrago is a 70 y.o. female presents to the emergency department today with her daughter for evaluation for back pain. Over the past week, the patient has been complaining of increasing back pain, mostly to her upper back. The patient is currently rating her pain as a 10/10 her pain is constant, and seems to be worse with touch and certain movements. She otherwise has no complaints. The daughter states that the patient otherwise lives at home however the daughter and her sister go on to check on her. They have physical therapy as well as home health care nurse that come to assist the patient as well, as the patient does not want to be placed at this time; s/p thoracic kyphoplasty T9, 11, 12 10/20  Vision/Hearing  Vision: Impaired  Vision Exceptions: Wears glasses for reading  Hearing: Exceptions to [facility]     Subjective  General  Chart Reviewed: Yes  Patient assessed for rehabilitation services?: Yes  Additional Pertinent Hx: Past kyphoplasties to T8, T10 in July 2020 and T3 in past years. PMH of COPD, lung cancer, DM, and HTN. Family / Caregiver Present: No  Diagnosis: s/p thoracic kyphoplasty T9, 11, 12 10/20  Follows Commands: Within Functional Limits  General Comment  Comments: Pt found laying in bed with alarm on  Subjective  Subjective: Pt reports pain 9/10 in back. Nursing notified and pt was agreeable to therapy.   Pain Screening  Patient Currently in Pain: Yes  Pain Assessment  Pain Assessment: 0-10  Pain Level: 9  Vital Signs  Patient Currently in Pain: Yes     Orientation  Orientation  Overall Orientation Status: Impaired  Orientation Level: Disoriented to time;Oriented to person;Oriented to situation;Oriented to place  Social/Functional History  Social/Functional History  Lives With: Alone  Type of Home: House  Home Layout: Two level  Home Access: Stairs to enter with rails  Entrance Stairs - Number of Steps: 3  Entrance Stairs - Rails: Both  Bathroom Shower/Tub: Walk-in shower  Bathroom Toilet: Handicap height  Bathroom Equipment: Grab bars in shower, Grab bars around toilet, Commode  Home Equipment: Hospital bed, 4 wheeled walker, Oxygen, Alert Button, Quad cane, [employer]  ADL Assistance: Independent  Homemaking Assistance: Independent  Homemaking Responsibilities: Yes  Ambulation Assistance: Independent  Transfer Assistance: Independent  Active : Yes  Occupation: Retired  Additional Comments: no recent falls; pt questionable historian  Cognition     Objective  Observation/Palpation  Posture: Fair  Observation: Pt is kyphotic with rounded shoulders. Scar: Incisions on thoracic spine covered in bandages.     AROM RLE (degrees)  RLE AROM: Exceptions  RLE General AROM: Demonstrated full knee extension and dorsiflexion, did not demonstrate hip flexion due to pain in back  AROM LLE (degrees)  LLE AROM : Exceptions  LLE General AROM: Demonstrated full knee extension and dorsiflexion, did not demonstrate hip flexion due to pain in back  Strength RLE  Strength RLE: Exception  Comment: Not able to formally assess secondary to pain in back with movement  Strength LLE  Strength LLE: Exception  Comment: Not able to formally assess secondary to pain in back with movement     Sensation  Overall Sensation Status: WFL  Bed mobility  Rolling to Left: Stand by assistance(Utilized hand rail for rolling self)  Supine to Sit: Maximum assistance  Sit to Supine: Maximum assistance  Scooting: Maximal assistance  Comment: Pt was able to roll well in the bed, but had great difficulty sitting up EOB secondary to pain. Bed was lowered to flat for better assistance. Pt was tactile and verbally cued to sit up. She cried out in pain with a big exacerbation in back pain until sitting EOB. Sat EOB for 8 min with Mary to upper body for support. C/o massive back pain 10/10, and moaned/exhaled loudly continuously. Reported mild SOB and dizziness, but SPO2 monitor was not functioning properly to measure. SOB and dizziness decreased with time EOB. Transfers  Sit to Stand: Contact guard assistance(x1 EOB, x2 BSC)  Stand to sit: Contact guard assistance(x2 BSC, x1 EOB)  Comment: Pt was able to stand from bed adnd [facility] without musch assistance, but would cry out in excruciating pain during so. Needed several verbal and tactile cues to appropriately and safety stand and sit not utilizing the RW in front of her. Ambulation  Ambulation?: Yes  More Ambulation?: Yes  Ambulation 1  Surface: level tile  Device: Rolling Walker  Other Apparatus: O2(1L O2)  Assistance: Moderate assistance;2 Person assistance;Minimal assistance(Mary of 2 for initial ambulation from EOB to [facility], modA of 2 for ambulation from [facility] to EOB)  Quality of Gait: Pt demonstrated poor stepping gait pattern with small, almost shuffling steps and difficulty clearing her feet with each step. Most likely due to fear of pain exacerbation with much movement. Did not show consistent appropriate advancement of RW and needed VC and tactile cues. Gait Deviations: Decreased step height;Slow Liliam;Decreased step length  Distance: 6 feet from EOB to BSC, 6 feet from BSC to EOB  Comments: Pt was moaning and crying out in pain with ambulation, very fearful to move with pain felt. She showed urgency to get to [facility] to toilet and needed assistance with RW for safety. BS brought to patient as patient unable to ambulate to bathroom. After sitting on BSC for about 10 minutes.  She did a STS, able to stand ~2 minutes for pericare and brief placement and needed to return to seated position for rest break. 1 minute rest break given before returning back to bed. Pt would not listen to commands well and cried that that she needs to sit group home back to bed, but pt was told she has to back up before sitting. This was done sucessfully with significant tactile cuing since pt. Bed pulled up behind patient as she was no longer able to take steps. Pt continued to moan and cry out in pain throughout entire ambulation. Balance  Posture: Fair  Sitting - Static: Fair(Mary for balance sitting EOB for 8 min)  Sitting - Dynamic: Fair(Mary for balance sitting EOB for 8 min)  Standing - Static: Fair(RW and CGA)  Standing - Dynamic: Fair(RW and CGA)      Plan   Plan  Times per week: 5-7  Times per day: Daily  Current Treatment Recommendations: Functional Mobility Training, Transfer Training, Endurance Training, Gait Training, Safety Education & Training, Balance Training  Safety Devices  Type of devices: All fall risk precautions in place, Bed alarm in place, Left in bed, Patient at risk for falls, Nurse notified    AM-PAC Score  AM-PAC Inpatient Mobility Raw Score : 12 (10/21/20 1220)  AM-PAC Inpatient T-Scale Score : 35.33 (10/21/20 1220)  Mobility Inpatient CMS 0-100% Score: 68.66 (10/21/20 1220)  Mobility Inpatient CMS G-Code Modifier : CL (10/21/20 1220)     Goals  Short term goals  Time Frame for Short term goals: at discharge  Short term goal 1: Bed mobility with Mary  Short term goal 2: Sit to stand transfer with LRAD and SBA  Short term goal 3: Ambulation of 12 feet with LRAD and min A  Patient Goals   Patient goals : none stated     Therapy Time   Individual Concurrent Group Co-treatment   Time In 0844         Time Out 0931         Minutes 47         Timed Code Treatment Minutes: [address], SPT  Tara Tran, PT   Therapist observed and directed the above evaluation.  Thanks, Tara Tran, [address] [facility], DPT [id number]

## 2020-10-21 NOTE — PROGRESS NOTES
Shift assessment completed. elevated BP. Scheduled medications given. Pt's awake, alert and oriented. Check & change,  pt repositioned. Does not appear to be in distress. Call light within reach.      Vitals:    10/20/20 2033   BP: (!) 161/82   Pulse: 90   Resp: 16   Temp: 98.7 °F (37.1 °C)   SpO2: 96%

## 2020-10-22 VITALS
HEART RATE: 68 BPM | DIASTOLIC BLOOD PRESSURE: 69 MMHG | SYSTOLIC BLOOD PRESSURE: 174 MMHG | BODY MASS INDEX: 24.47 KG/M2 | WEIGHT: 146.9 LBS | RESPIRATION RATE: 16 BRPM | TEMPERATURE: 98.3 F | OXYGEN SATURATION: 91 % | HEIGHT: 65 IN

## 2020-10-22 LAB
ANION GAP SERPL CALCULATED.3IONS-SCNC: 12 MMOL/L (ref 3–16)
ANISOCYTOSIS: ABNORMAL
BANDED NEUTROPHILS RELATIVE PERCENT: 2 % (ref 0–7)
BASOPHILS ABSOLUTE: 0 K/UL (ref 0–0.2)
BASOPHILS RELATIVE PERCENT: 0 %
BUN BLDV-MCNC: 35 MG/DL (ref 7–20)
CALCIUM SERPL-MCNC: 9.3 MG/DL (ref 8.3–10.6)
CHLORIDE BLD-SCNC: 99 MMOL/L (ref 99–110)
CO2: 29 MMOL/L (ref 21–32)
CREAT SERPL-MCNC: 1.2 MG/DL (ref 0.6–1.2)
EOSINOPHILS ABSOLUTE: 0 K/UL (ref 0–0.6)
EOSINOPHILS RELATIVE PERCENT: 0 %
ESTIMATED AVERAGE GLUCOSE: 151.3 MG/DL
GFR AFRICAN AMERICAN: 53
GFR NON-AFRICAN AMERICAN: 43
GLUCOSE BLD-MCNC: 159 MG/DL (ref 70–99)
GLUCOSE BLD-MCNC: 258 MG/DL (ref 70–99)
GLUCOSE BLD-MCNC: 268 MG/DL (ref 70–99)
GLUCOSE BLD-MCNC: 351 MG/DL (ref 70–99)
HBA1C MFR BLD: 6.9 %
HCT VFR BLD CALC: 29.2 % (ref 36–48)
HEMOGLOBIN: 9.5 G/DL (ref 12–16)
LYMPHOCYTES ABSOLUTE: 0.2 K/UL (ref 1–5.1)
LYMPHOCYTES RELATIVE PERCENT: 2 %
MCH RBC QN AUTO: 30.2 PG (ref 26–34)
MCHC RBC AUTO-ENTMCNC: 32.6 G/DL (ref 31–36)
MCV RBC AUTO: 92.5 FL (ref 80–100)
METAMYELOCYTES RELATIVE PERCENT: 3 %
MONOCYTES ABSOLUTE: 0.4 K/UL (ref 0–1.3)
MONOCYTES RELATIVE PERCENT: 4 %
NEUTROPHILS ABSOLUTE: 8.3 K/UL (ref 1.7–7.7)
NEUTROPHILS RELATIVE PERCENT: 89 %
PDW BLD-RTO: 17.3 % (ref 12.4–15.4)
PERFORMED ON: ABNORMAL
PLATELET # BLD: 315 K/UL (ref 135–450)
PMV BLD AUTO: 7.6 FL (ref 5–10.5)
POTASSIUM SERPL-SCNC: 3.6 MMOL/L (ref 3.5–5.1)
RBC # BLD: 3.16 M/UL (ref 4–5.2)
SLIDE REVIEW: ABNORMAL
SODIUM BLD-SCNC: 140 MMOL/L (ref 136–145)
WBC # BLD: 8.8 K/UL (ref 4–11)

## 2020-10-22 PROCEDURE — 2700000000 HC OXYGEN THERAPY PER DAY

## 2020-10-22 PROCEDURE — 85025 COMPLETE CBC W/AUTO DIFF WBC: CPT

## 2020-10-22 PROCEDURE — 97116 GAIT TRAINING THERAPY: CPT

## 2020-10-22 PROCEDURE — 6370000000 HC RX 637 (ALT 250 FOR IP): Performed by: INTERNAL MEDICINE

## 2020-10-22 PROCEDURE — 2580000003 HC RX 258: Performed by: INTERNAL MEDICINE

## 2020-10-22 PROCEDURE — 97530 THERAPEUTIC ACTIVITIES: CPT

## 2020-10-22 PROCEDURE — 80048 BASIC METABOLIC PNL TOTAL CA: CPT

## 2020-10-22 PROCEDURE — 97535 SELF CARE MNGMENT TRAINING: CPT

## 2020-10-22 PROCEDURE — 94760 N-INVAS EAR/PLS OXIMETRY 1: CPT

## 2020-10-22 PROCEDURE — 6370000000 HC RX 637 (ALT 250 FOR IP): Performed by: PHYSICIAN ASSISTANT

## 2020-10-22 PROCEDURE — 36415 COLL VENOUS BLD VENIPUNCTURE: CPT

## 2020-10-22 PROCEDURE — 6360000002 HC RX W HCPCS: Performed by: PHYSICIAN ASSISTANT

## 2020-10-22 RX ORDER — HYDROCODONE BITARTRATE AND ACETAMINOPHEN 5; 325 MG/1; MG/1
1 TABLET ORAL EVERY 4 HOURS PRN
Qty: 12 TABLET | Refills: 0 | Status: SHIPPED | OUTPATIENT
Start: 2020-10-22 | End: 2020-10-25

## 2020-10-22 RX ADMIN — Medication 10 ML: at 08:25

## 2020-10-22 RX ADMIN — HYDROMORPHONE HYDROCHLORIDE 0.5 MG: 1 INJECTION, SOLUTION INTRAMUSCULAR; INTRAVENOUS; SUBCUTANEOUS at 10:15

## 2020-10-22 RX ADMIN — METOPROLOL SUCCINATE 25 MG: 25 TABLET, EXTENDED RELEASE ORAL at 08:02

## 2020-10-22 RX ADMIN — HYDROCODONE BITARTRATE AND ACETAMINOPHEN 1 TABLET: 5; 325 TABLET ORAL at 08:02

## 2020-10-22 RX ADMIN — DILTIAZEM HYDROCHLORIDE 60 MG: 60 CAPSULE, EXTENDED RELEASE ORAL at 08:02

## 2020-10-22 RX ADMIN — INSULIN LISPRO 2 UNITS: 100 INJECTION, SOLUTION INTRAVENOUS; SUBCUTANEOUS at 08:02

## 2020-10-22 RX ADMIN — INSULIN LISPRO 10 UNITS: 100 INJECTION, SOLUTION INTRAVENOUS; SUBCUTANEOUS at 17:10

## 2020-10-22 RX ADMIN — HYDROCODONE BITARTRATE AND ACETAMINOPHEN 1 TABLET: 5; 325 TABLET ORAL at 17:08

## 2020-10-22 RX ADMIN — INSULIN LISPRO 6 UNITS: 100 INJECTION, SOLUTION INTRAVENOUS; SUBCUTANEOUS at 11:48

## 2020-10-22 RX ADMIN — PREDNISONE 20 MG: 20 TABLET ORAL at 08:02

## 2020-10-22 ASSESSMENT — PAIN SCALES - GENERAL
PAINLEVEL_OUTOF10: 7
PAINLEVEL_OUTOF10: 10
PAINLEVEL_OUTOF10: 9
PAINLEVEL_OUTOF10: 7
PAINLEVEL_OUTOF10: 0

## 2020-10-22 NOTE — PROGRESS NOTES
Occupational Therapy  Facility/Department: [address] ONCOLOGY  Daily Treatment Note  NAME: Alexis Lucas  : 1940  MRN: [id number]    Date of Service: 10/22/2020    Discharge Recommendations:      Alexis Lucas scored a 13/24 on the AM-PAC ADL Inpatient form. Current research shows that an AM-PAC score of 17 or less is typically not associated with a discharge to the patient's home setting. Based on the patient's AM-PAC score and their current ADL deficits, it is recommended that the patient have 3-5 sessions per week of Occupational Therapy at d/c to increase the patient's independence. Please see assessment section for further patient specific details. If patient discharges prior to next session this note will serve as a discharge summary. Please see below for the latest assessment towards goals. OT Equipment Recommendations  Equipment Needed: No  Other: defer to next level of care    Assessment   Performance deficits / Impairments: Decreased ADL status; Decreased endurance;Decreased safe awareness;Decreased functional mobility   Assessment: Pt below baseline level of occupational function; pt would benefit from acute OT services to address above deficits  Treatment Diagnosis: Decreased functional mobility, ADL status, endurance and safety awareness associated w/ s/p kyphoplasty of T9,11,12  Prognosis: Good  Decision Making: Low Complexity  OT Education: OT Role;Plan of Care;Precautions;Transfer Training;Energy Conservation; ADL Adaptive Strategies  Patient Education: Pt unable to verbalize understanding of spinal precautions and applying them to maintain during ADL tasks-will require reinforcement for carryover  REQUIRES OT FOLLOW UP: Yes  Activity Tolerance  Activity Tolerance: Patient Tolerated treatment well;Patient limited by pain  Activity Tolerance: verbal cuing for pursed lip breathing technique throughout session  Safety Devices  Safety Devices in place: Yes  Type of devices:  All fall risk precautions in place;Call light within reach; Chair alarm in place;Gait belt;Patient at risk for falls; Left in chair;Nurse notified         Patient Diagnosis(es): The primary encounter diagnosis was Compression fracture of body of thoracic vertebra ([address] A diagnosis of Intractable pain was also pertinent to this visit. has a past medical history of Arthritis, CAD (coronary artery disease), Carpal tunnel syndrome, COPD (chronic obstructive pulmonary disease) ([address] Coronary stent, depression, Diabetes mellitus ([address] Diastolic heart failure ([address] GERD (gastroesophageal reflux disease), Hyperlipidemia, Hypertension, Lung cancer ([address] MI (myocardial infarction) ([address] LZIETT (obstructive sleep apnea), PONV (postoperative nausea and vomiting), and stress incontinence. has a past surgical history that includes Coronary angioplasty with stent (2000, 2001); back surgery (2000, 2001); bronchoscopy (12/04/2018); pr [facility] incl fluor gdnce dx w/cell washg spx (N/A, 12/4/2018); Cholecystectomy, laparoscopic (N/A, 12/19/2018); Upper gastrointestinal endoscopy (N/A, 2/25/2019); Colonoscopy (N/A, 2/25/2019); Colonoscopy (2/25/2019); bronchoscopy (N/A, 2/27/2019); bronchoscopy (2/27/2019); bronchoscopy (2/27/2019); bronchoscopy (N/A, 8/6/2019); Upper gastrointestinal endoscopy (N/A, 8/7/2019); bronchoscopy (N/A, 9/27/2019); bronchoscopy (9/27/2019); bronchoscopy (9/27/2019); IR KYPHOPLASTY THORACIC 1 VERTEBRAL BODY (7/23/2020); and IR KYPHOPLASTY THORACIC 1 VERTEBRAL BODY (10/20/2020). Restrictions  Restrictions/Precautions  Restrictions/Precautions: Fall Risk(high fall risk)  Required Braces or Orthoses?: No  Position Activity Restriction  Spinal Precautions: No Bending, No Lifting, No Twisting  Other position/activity restrictions: Carla Abad is a 70 y.o. female presents to the emergency department today with her daughter for evaluation for back pain.  Over the past week, the patient has been complaining of increasing back pain, mostly to her upper back. The patient is currently rating her pain as a 10/10 her pain is constant, and seems to be worse with touch and certain movements. She otherwise has no complaints. The daughter states that the patient otherwise lives at home however the daughter and her sister go on to check on her. They have physical therapy as well as home health care nurse that come to assist the patient as well, as the patient does not want to be placed at this time; s/p thoracic kyphoplasty T9, 11, 12 10/20  Subjective   General  Chart Reviewed: Yes  Patient assessed for rehabilitation services?: Yes  Response to previous treatment: Patient reporting fatigue but able to participate  Family / Caregiver Present: No  Diagnosis: s/p kyphoplasty T9,11, 12  Subjective  Subjective: Pt supine in bed upon arrival and agreeable to therapy session. Reporting 6/10 pain in back. Pain medications were administered prior to arrival. RN aware and pt able to continue with session  General Comment  Comments: . Orientation  Orientation  Overall Orientation Status: Impaired  Orientation Level: Oriented to situation;Oriented to place;Oriented to person;Disoriented to time  Objective    ADL  Grooming: Setup;Minimal assistance(Pt performed combing hair in stance at sink, min A for thoroughness combing hair. Pt performed oral care seated in chair d/t fatigue/back pain)  UE Bathing:  Moderate assistance(A for thoroughness)  LE Bathing: Maximum assistance(A to wash to thigh level, A for thoroughness below thigh level)  UE Dressing: Minimal assistance;Setup(A for gown change)  LE Dressing: Dependent/Total(A to doff depends in stance, A to thread B LEs into depends and to perform LB clothing managemnt over hips once in stance) pt limited d/t c/o pain  Toileting: Dependent/Total(pt performed gulshan care seated on toilet at set up, total A for clothing management-pt unable to void on toilet)  Additional Comments: Pt performed functional mobility to restroom and transferred to toilet and performed the above toileting/LB tasks seated/in stance at toilet. Grooming tasks completed in stance at sink/seated in chair when fatigued. ADL participation limited d/t anxiety and pain, verbal cuing throughout to maintain spinal precautions        Balance  Sitting Balance: Stand by assistance  Standing Balance: Contact guard assistance  Standing Balance  Time: ~7 minutes  Activity: functional mobility to/from restroom, stance for ADL completion, functional transfers  Comment: with use of rw, no LOB  Functional Mobility  Functional - Mobility Device: Rolling Walker  Activity: To/from bathroom  Assist Level: Contact guard assistance  Functional Mobility Comments: increased time and effort to perform d/t increased pain with mobility  Toilet Transfers  Toilet - Technique: Ambulating  Equipment Used: Standard bedside commode(placed over toilet for increased height)  Toilet Transfer: Contact guard assistance  Bed mobility  Rolling to Right: Contact guard assistance  Supine to Sit: Contact guard assistance  Scooting: Contact guard assistance  Comment: flat bed, verbal and tactile cuing to initiate log roll technique and sequence, increased time and effort to perform  Transfers  Sit to stand: Contact guard assistance  Stand to sit: Contact guard assistance     Cognition  Overall Cognitive Status: Exceptions  Arousal/Alertness: Appropriate responses to stimuli  Following Commands: Follows one step commands with increased time; Follows one step commands with repetition  Attention Span: Attends with cues to redirect  Memory: Decreased recall of precautions;Decreased short term memory  Safety Judgement: Decreased awareness of need for safety  Problem Solving: Assistance required to generate solutions;Assistance required to identify errors made  Insights: Decreased awareness of deficits  Initiation: Requires cues for some  Sequencing: Requires cues for some     Perception  Overall Perceptual Status: [facility]       Plan   Plan  Times per week: 5-7  Times per day: Daily  Current Treatment Recommendations: Balance Training, Functional Mobility Training, Endurance Training, Safety Education & Training, Self-Care / ADL, Equipment Evaluation, Education, & procurement, Patient/Caregiver Education & Training    AM-PAC Score        AM-PAC Inpatient Daily Activity Raw Score: 13 (10/22/20 1017)  AM-PAC Inpatient ADL T-Scale Score : 32.03 (10/22/20 1017)  ADL Inpatient CMS 0-100% Score: 63.03 (10/22/20 1017)  ADL Inpatient CMS G-Code Modifier : CL (10/22/20 1017)    Goals  Short term goals  Time Frame for Short term goals: discharge  Short term goal 1: Min A for bed mobility  Short term goal 2: Min A for functional mobility for ADL tasks w/RW-CGA 10/22--GOAL MET  Short term goal 3: SBA for functional transfers to ADL surfaces w/RW-CGA 10/22  Short term goal 4: CGA for LB dressing/bathing w/AE as needed-dependent 10/22  Short term goal 5: Min A for toileting-dependent 10/22  Short term goal 6: REVISED STG 2: SBA for functional mobility for ADL tasks w/RW       Therapy Time   Individual Concurrent Group Co-treatment   Time In       0812   Time Out       0906   Minutes       54      Timed Code Treatment Minutes:  54 Minutes  Total Treatment Minutes:  [address] [facility]: I have read and approve of this note. Ava Guevara M.Ed. ,OTR/L, [id number]

## 2020-10-22 NOTE — PROGRESS NOTES
Physical Therapy  Facility/Department: [address] ONCOLOGY  Daily Treatment Note  NAME: Anjelica Booth  : 1940  MRN: [id number]    Date of Service: 10/22/2020    Discharge Recommendations:  Anjelica Booth scored a 16/24 on the AM-PAC short mobility form. Current research shows that an AM-PAC score of 17 or less is typically not associated with a discharge to the patient's home setting. Based on the patient's AM-PAC score and their current functional mobility deficits, it is recommended that the patient have 3-5 sessions per week of Physical Therapy at d/c to increase the patient's independence. Please see assessment section for further patient specific details. If patient discharges prior to next session this note will serve as a discharge summary. Please see below for the latest assessment towards goals. 3-5 sessions per week, Patient would benefit from continued therapy after discharge, 24 hour supervision or assist   PT Equipment Recommendations  Equipment Needed: No    Assessment   Body structures, Functions, Activity limitations: Decreased functional mobility ; Decreased safe awareness;Decreased balance; Increased pain  Assessment: Pt is not currently at baseline function due to decreased ability to tolerate functional activities with excruciating pain, however over all mobility required decreased assistance. Pt also demonstrated decreased balance with activities and required more assistance than baseline to complete them. Pt will benefit from skilled therapy for these deficits.   Treatment Diagnosis: Decreased functional mobility, decreased safety awareness, decreased balance  Prognosis: Fair  Decision Making: Medium Complexity  Clinical Presentation: evolving - pain with mobility  PT Education: PT Role;Plan of Care;General Safety;Precautions;Weight-bearing Education;Orientation;Gait Training  Patient Education: Pt educated on DC plan, verbalized understanding  Barriers to Learning: none  REQUIRES PT FOLLOW UP: Yes  Activity Tolerance  Activity Tolerance: Patient Tolerated treatment well;Patient limited by pain  Activity Tolerance: Limited by pain but required padmini assistance for all mobility     Patient Diagnosis(es): The primary encounter diagnosis was Compression fracture of body of thoracic vertebra ([address] A diagnosis of Intractable pain was also pertinent to this visit. has a past medical history of Arthritis, CAD (coronary artery disease), Carpal tunnel syndrome, COPD (chronic obstructive pulmonary disease) ([address] Coronary stent, depression, Diabetes mellitus ([address] Diastolic heart failure ([address] GERD (gastroesophageal reflux disease), Hyperlipidemia, Hypertension, Lung cancer ([address] MI (myocardial infarction) ([address] LIZETT (obstructive sleep apnea), PONV (postoperative nausea and vomiting), and stress incontinence. has a past surgical history that includes Coronary angioplasty with stent (2000, 2001); back surgery (2000, 2001); bronchoscopy (12/04/2018); pr brncc incl fluor gdnce dx w/cell washg spx (N/A, 12/4/2018); Cholecystectomy, laparoscopic (N/A, 12/19/2018); Upper gastrointestinal endoscopy (N/A, 2/25/2019); Colonoscopy (N/A, 2/25/2019); Colonoscopy (2/25/2019); bronchoscopy (N/A, 2/27/2019); bronchoscopy (2/27/2019); bronchoscopy (2/27/2019); bronchoscopy (N/A, 8/6/2019); Upper gastrointestinal endoscopy (N/A, 8/7/2019); bronchoscopy (N/A, 9/27/2019); bronchoscopy (9/27/2019); bronchoscopy (9/27/2019); IR KYPHOPLASTY THORACIC 1 VERTEBRAL BODY (7/23/2020); and IR KYPHOPLASTY THORACIC 1 VERTEBRAL BODY (10/20/2020).     Restrictions  Restrictions/Precautions  Restrictions/Precautions: Fall Risk(high fall risk)  Required Braces or Orthoses?: No  Position Activity Restriction  Spinal Precautions: No Bending, No Lifting, No Twisting  Other position/activity restrictions: Evgeny Velasquez is a 70 y.o. female presents to the emergency department today with her daughter for evaluation for back pain. Over the past week, the patient has been complaining of increasing back pain, mostly to her upper back. The patient is currently rating her pain as a 10/10 her pain is constant, and seems to be worse with touch and certain movements. She otherwise has no complaints. The daughter states that the patient otherwise lives at home however the daughter and her sister go on to check on her. They have physical therapy as well as home health care nurse that come to assist the patient as well, as the patient does not want to be placed at this time; s/p thoracic kyphoplasty T9, 11, 12 10/20  Subjective   General  Chart Reviewed: Yes  Additional Pertinent Hx: Past kyphoplasties to T8, T10 in July 2020 and T3 in past years. PMH of COPD, lung cancer, DM, and HTN. Family / Caregiver Present: No  Subjective  Subjective: Pt reports pain 9/10 in back. Nursing aware. Initially pt unaware of restrictions (no BLT). General Comment  Comments: Pt supine in bed upon arrival. Agreeable to PT.            Orientation  Orientation  Overall Orientation Status: Within Functional Limits(required minor clues to re-orient)  Orientation Level: Oriented to time;Oriented to situation;Oriented to person;Oriented to place  Cognition      Objective   Bed mobility  Rolling to Left: Stand by assistance;Contact guard assistance  Supine to Sit: Contact guard assistance  Scooting: Stand by assistance  Comment: flat bed. max VC for breathing and log rolling technique and sequence; increase time required  Transfers  Sit to Stand: Contact guard assistance  Stand to sit: Contact guard assistance  Ambulation  Ambulation?: Yes  Ambulation 1  Surface: level tile  Device: Rolling Walker  Other Apparatus: O2(1L)  Assistance: Contact guard assistance  Quality of Gait: step to - step through pattern, varies with FERDINAND  Gait Deviations: Decreased step height;Slow Liliam;Decreased step length  Distance: 9' x2     Balance  Posture: Fair  Sitting - Static: Fair;+  Sitting - Dynamic: Fair  Standing - Static: Fair(with RW)  Standing - Dynamic: Fair(with RW)  Comments: sat EOB and on toilet SBA for ADL's - increased time required and pursed lip breathing demonstrated and insructed frequently  Exercises  Heelslides: x7 B  Gluteal Sets: x10 B  Knee Long Arc Quad: 2 x10 B  Ankle Pumps: x20 B                        G-Code     OutComes Score                                                     AM-PAC Score  AM-PAC Inpatient Mobility Raw Score : 16 (10/22/20 0938)  AM-PAC Inpatient T-Scale Score : 40.78 (10/22/20 0721)  Mobility Inpatient CMS 0-100% Score: 54.16 (10/22/20 0938)  Mobility Inpatient CMS G-Code Modifier : CK (10/22/20 9940)          Goals  Short term goals  Time Frame for Short term goals: at discharge  Short term goal 1: Bed mobility with Mary--MET 10/22/2020  Short term goal 2: Sit to stand transfer with LRAD and SBA  Short term goal 3: Ambulation of 12 feet with LRAD and min A  Short term goal 4: Pt will perform bed mobility with supervision  Patient Goals   Patient goals : none stated  1 GOAL MET THIS [address]  Times per week: 5-7  Times per day: Daily  Current Treatment Recommendations: Functional Mobility Training, Transfer Training, Endurance Training, Gait Training, Safety Education & Training, Balance Training  Safety Devices  Type of devices: All fall risk precautions in place, Patient at risk for falls, Nurse notified, Left in chair, Chair alarm in place     Therapy Time   Individual Concurrent Group Co-treatment   Time In       0812   Time Out       0906   Minutes       54   Timed Code Treatment Minutes: [address] [facility], [address]      I agree with the note above. Goals addressed by PT this session.    [address] [place], [place] [id number]

## 2020-10-22 NOTE — CARE COORDINATION
CM left a voice message with Nelda/ admissions at [facility] to advise of availability of therapy notes for pre-cert. Covid screening pends. CM will follow for discharge planning needs.     ANGELIQUE Bhatti ResN, CCM, RN  [facility]  [phone]

## 2020-10-22 NOTE — PROGRESS NOTES
Head to toe assessment complete. Vital signs obtained. Pt resting in bed at this time. Nightly medication administered, and insulin given for elevated blood sugar. Pt tolerated well. Pt denies pain. Pt denies further needs at this time. Call light in hand. Pt verbalizes correct use. Bed alarm set. Will continue to monitor.  Electronically signed by Kimberly Gatica RN on 10/21/2020 at 10:06 PM

## 2020-10-22 NOTE — DISCHARGE SUMMARY
[facility]    Patient Demographics    Patient. Aracelis Stokes  Date of Birth. 1940  MRN. [id number]     Primary care provider. Rickey Oakley  (Tel: [phone])    Admit date: 10/16/2020    Discharge date (blank if same as Note Date): Note Date: 10/22/2020     Reason for Hospitalization. Chief Complaint   Patient presents with    Back Pain     Arrived by [facility] rt back pain lasting years in duration; worsening today. Pain is 10/10 to right thoracic area; tender to the touch. Denies falling or causitive event. Significant Findings. Active Problems:    T12 compression fracture, initial encounter [address] [address] [facility]       Problems and results from this hospitalization that need follow up. 1. Compression fractures  2. COPD    Significant test results and incidental findings. CT LUMBAR SPINE WO CONTRAST   Final Result   1. No acute finding in the lumbar spine. 2. Severe spinal canal stenosis is chronic at L4-5. Prior laminectomy at   L5-S1 decompressing the canal.   3. Probable biconcave compression fractures at T11 and T12 partially   evaluated on this lumbar spine CT. Recommend review of additional CT report   of the same date for findings.           CT THORACIC SPINE WO CONTRAST   Final Result   1. When compared to 07/21/2020, there are new compression fractures at T9,   T11, and T12. Minimal associated bony retropulsion at T9 and T11.   2. Persistent posterior left perihilar consolidation and tiny loculated   pleural effusion.          MRI L, T, spine  Abnormal bone marrow signal in the T9, T11 and T12 vertebral bodies, with    20-50% height loss, likely related to acute compression fractures, new since    July 21, 2020.         Subacute compression fractures of T8 and T10, status post vertebroplasty.         Mild abnormal bone marrow signal at the superior endplate of T3, likely    related to subacute compression fracture with 30% height loss, stable since    July 21, 2020.         Spinal canal narrowing, mild at T10-11 and T11-12.         No significant foraminal narrowing.         Airspace opacity in the left parahilar regions, likely related to atelectasis    versus pneumonia. Invasive procedures and treatments. Kyphoplasty of T9, T11, and T12 by the interventional radiologist on October 20    [address]. Patient is 29-year-old female who presented with T3 T8 T10 compression fractures back in July she underwent kyphoplasty on the T8 T10 fracture. She had been going to rehab and doing well but then having increasing back pain. She had no weakness. Urgency room she had a CT CT of the thoracic spine revealing a new compression fracture of T9, T11, T12. She was admitted and given pain medications. She was seen by neurosurgery as well as interventional radiology. Patient elected to proceed with kyphoplasty of T9, T11, T12 by the interventional radiologist on October 20. She tolerated the procedure well and had significant improvement of her pain following procedure. She was seen by PT OT who recommended SNF. She was transferred to the skilled nursing facility in stable condition. Is on chronic prednisone for COPD. Family has been interested in trying to decrease or stop this. She can follow-up with pulmonary regarding this. Consults. IP CONSULT TO HOSPITALIST  IP CONSULT TO NEUROSURGERY  IP CONSULT TO RADIOLOGY    Physical examination on discharge day. BP (!) 174/69   Pulse 68   Temp 98.3 °F (36.8 °C) (Oral)   Resp 16   Ht 5' 5\" (1.651 m)   Wt 146 lb 14.4 oz (66.6 kg)   SpO2 91%   BMI 24.45 kg/m²   General appearance. Alert. Looks comfortable. HEENT. Sclera clear. Moist mucus membranes. Cardiovascular. Regular rate and rhythm, normal S1, S2. No murmur. Respiratory. Not using accessory muscles. Clear to auscultation bilaterally, no wheeze. Gastrointestinal. Abdomen soft, non-tender, not distended, normal bowel sounds  Neurology. Facial symmetry. No speech deficits. Moving all extremities equally. Extremities. No edema in lower extremities. Skin. Warm, dry, normal turgor    Condition at time of discharge stable    Medication instructions provided to patient at discharge. Medication List      CONTINUE taking these medications    albuterol sulfate  (90 Base) MCG/ACT inhaler  Inhale 2 puffs into the lungs every 4 hours as needed for Wheezing     apixaban 2.5 MG Tabs tablet  Commonly known as:  ELIQUIS  Take 1 tablet by mouth 2 times daily     dilTIAZem 60 MG extended release capsule  Commonly known as:  CARDIZEM 12 HR  Take 1 capsule by mouth 2 times daily     furosemide 20 MG tablet  Commonly known as:  LASIX     HYDROcodone-acetaminophen 5-325 MG per tablet  Commonly known as:  NORCO  Take 1 tablet by mouth every 4 hours as needed for Pain for up to 3 days.      ipratropium-albuterol 0.5-2.5 (3) MG/3ML Soln nebulizer solution  Commonly known as:  DUONEB  Inhale 3 mLs into the lungs every 4 hours (while awake) DX:COPD J44.9     lidocaine 5 %  Commonly known as:  LIDODERM     linagliptin 5 MG tablet  Commonly known as:  TRADJENTA  Take 1 tablet by mouth daily     metoprolol succinate 25 MG extended release tablet  Commonly known as:  TOPROL XL  Take 1 tablet by mouth daily     mirtazapine 15 MG tablet  Commonly known as:  REMERON  Take 1 tablet by mouth nightly     pantoprazole 40 MG tablet  Commonly known as:  PROTONIX  Take 1 tablet by mouth every morning (before breakfast)     predniSONE 10 MG tablet  Commonly known as:  DELTASONE        STOP taking these medications    acetaminophen 325 MG tablet  Commonly known as:  TYLENOL     Acetaminophen 650 MG Tabs     guaiFENesin 600 MG extended release tablet  Commonly known as:  MUCINEX     ibuprofen 600 MG tablet  Commonly known as:  ADVIL;MOTRIN     Trelegy Ellipta 100-62.5-25 MCG/INH Aepb  Generic drug:  fluticasone-umeclidin-vilant           Where to Get Your Medications      You can get these medications from any pharmacy    Bring a paper prescription for each of these medications  · HYDROcodone-acetaminophen 5-325 MG per tablet         Discharge recommendations given to patient. Follow Up. PCP in 1 week   Disposition. SNF  Activity. activity as tolerated  Diet: DIET CARB CONTROL;      Spent 40 minutes in discharge process. Signed:   Claudean Bogus, PA-C     10/22/2020 2:54 PM

## 2020-10-22 NOTE — PROGRESS NOTES
Report received from [facility] at 0730. Patients alarm is engaged. Patients assessment completed by Harpreet Hackett RN. Shift assessment completed. Routine vitals stable. Scheduled medications given. Patient is awake, alert and oriented. Respirations are easy and unlabored. Patient does not appear to be in distress, resting comfortably at this time. Call light within reach. I agree with her charting. Pain medication given per pain scale. Therapy working to get patient up right now at [phone], will continue to monitor.      Electronically signed by Sabrina Santos RN on 10/22/20 at 8:23 AM EDT

## 2020-10-22 NOTE — PROGRESS NOTES
Report called by my Destiny Fontanez RN to [facility] to [employer]. I agree with the report she gave. Shift assessment completed. Routine vitals stable. Scheduled medications given. Patient is awake, alert and oriented. Respirations are easy and unlabored. Patient does not appear to be in distress, resting comfortably at this time. Call light within reach.

## 2020-10-22 NOTE — PROGRESS NOTES
Physician Progress Note      PATIENT:               Carson Ram  CSN #:                  [id number]  :                       1940  ADMIT DATE:       10/16/2020 11:03 AM  DISCH DATE:  RESPONDING  PROVIDER #:        Mehrdad Wong PA-C          QUERY TEXT:    Pt admitted with back pain and acute T9, T11 and T12 compression fractures. Noted documentation of fractures to be osteoporotic on Interventional   Radiology consultation of 10/19/20. If possible, please document in progress   notes and discharge summary:    The medical record reflects the following:  Risk Factors: age 70, female, PMH of lung cancer, recent kyphoplasty  Clinical Indicators: No recent falls, increased pain to intractable level   after recent kyphoplasty, history of falls previously, MRI indicated anterior   height loss at T1 and T3 and T9, new moderate compression fractures  Treatment: Neurosurgery consult, IR consult, possibility of brace versus   kyphoplasty; recommended treatment for osteoporosis    For questions, please contact [address] [place] [address] [email or web address]  Options provided:  -- Cause of compression fractures of T9, T11 and T12 due to osteoporosis  -- Cause of compression fractures not due to osteoporosis  -- Defer to Interventional Radiologist consultant documentation regarding   cause of compression fractures  -- Other - I will add my own diagnosis  -- Disagree - Not applicable / Not valid  -- Disagree - Clinically unable to determine / Unknown  -- Refer to Clinical Documentation Reviewer    PROVIDER RESPONSE TEXT:    Cause of compression fractures of T9, T11 and T12 due to osteoporosis    Query created by: Mai Councilman on 10/22/2020 8:55 AM      Electronically signed by:  Mehrdad Wong PA-C 10/22/2020 1:49 PM

## 2020-10-22 NOTE — DISCHARGE INSTR - COC
Jenelle Gallo MD at [facility]  9/27/2019    BRONCHOSCOPY BIOPSY BRONCHUS performed by Ines Pearce MD at [facility]  9/27/2019    BRONCHOSCOPY BRUSHINGS performed by Ines Pearce MD at [facility], LAPAROSCOPIC N/A 12/19/2018    LAPAROSCOPIC CHOLECYSTECTOMY WITH CHOLANGIOGRAM performed by Yue Baker MD at [address] N/A 2/25/2019    COLONOSCOPY WITH BIOPSY performed by Chester Limon MD at [facility]  2/25/2019    COLONOSCOPY POLYPECTOMY SNARE/COLD BIOPSY performed by Chester Limon MD at [address] [place] [address], [address]  2000, 2001    IR KYPHOPLASTY THORACIC FIRST LEVEL  7/23/2020    IR KYPHOPLASTY THORACIC FIRST LEVEL 7/23/2020 [facility]    IR KYPHOPLASTY THORACIC FIRST LEVEL  10/20/2020    IR KYPHOPLASTY THORACIC FIRST LEVEL 10/20/2020 [facility]    [place] [address] INCL FLUOR GDNCE DX W/CELL WASHG SPX N/A 12/4/2018    BRONCHOSCOPY WITH LAVAGE performed by Ioan Rebolledo MD at [address] [place] [address] N/A 2/25/2019    EGD BIOPSY performed by Chester Limon MD at [address] [place] [address] N/A 8/7/2019    EGD DIAGNOSTIC ONLY performed by Alba Lagos MD at [address] [place] [address]       Immunization History:   Immunization History   Administered Date(s) Administered    Influenza Vaccine, unspecified formulation 10/02/2018    Influenza Virus Vaccine 10/30/2013, 10/15/2014, 04/20/2017    Influenza, Quadv, IM, PF (6 mo and older Fluzone, Flulaval, Fluarix, and 3 yrs and older Afluria) 10/01/2019    Pneumococcal Conjugate 13-valent ([id number]) 06/05/2015    Pneumococcal Polysaccharide ([id number]) 08/30/2013       Active Problems:  Patient Active Problem List   Diagnosis Code    DM (diabetes mellitus), secondary, uncontrolled, w/neurologic complic ([facility] M26.11, O50.00    Dyslipidemia E78.5    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    HTN (hypertension), benign I10    Coronary atherosclerosis of native coronary artery I25.10    Obstructive sleep apnea G47.33    Chest pain R07.9    Carpal tunnel syndrome G56.00    SOB (shortness of breath) R06.02    COPD, 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    Influenza J11.1    Paroxysmal atrial fibrillation (HCC) I48.0    Chronic cough R05    Hilar mass R91.8    Acute cholecystitis K81.0    Gallstones and inflammation of gallbladder without obstruction K80.00    Atrial fibrillation with rapid ventricular response (HCC) I48.91    Acute respiratory failure with hypoxia (HCC) J96.01    Centrilobular emphysema (HCC) J43.2    Ileus following gastrointestinal surgery ([facility] K91.89, K56.7    Mediastinal adenopathy R59.0    COPD exacerbation (HCC) J44.1    Adenocarcinoma of left lung (HCC) C34.92    Mild malnutrition (HCC) E44.1    Loss of consciousness ([facility] R40.20    Syncope and collapse R55    Subacute pulmonary embolism (HCC) I26.99    Other pulmonary embolism without acute cor pulmonale (HCC) I26.99    Closed compression fracture of thoracic vertebra ([facility]) S22.000A    Chronic deep vein thrombosis (DVT) of both lower extremities ([facility]) I82.503    Ataxia R27.0    Recurrent falls R29.6    Diabetic peripheral neuropathy (HCC) E11.42    Severe malnutrition (HCC) E43    Pneumonia of left lower lobe due to infectious organism J18.9    Abnormal CT of the chest R93.89    Chronic diastolic heart failure (HCC) I50.32    Failure to thrive (0-17) R62.51    Acute on chronic respiratory failure with hypoxia (HCC) J96.21    Chronic respiratory failure with hypoxia (HCC) J96.11    Lung infiltrate on CT R91.8    Non-small cell cancer of left lung (HCC) C34.92    Acute encephalopathy G93.40    Weakness R53.1    Radiation pneumonitis (HCC) J70.0    Hypokalemia E87.6    Gastroparesis K31.84    Infection requiring airborne isolation precautions B99.9    Malignant neoplasm of left lung (HCC) C34.92    Chronic obstructive pulmonary disease with acute lower respiratory infection ([facility] J44.0    Acid fast bacillus A31.9    Poorly controlled type 2 diabetes mellitus ([facility] E11.65    History of depression Z86.59    SIRS (systemic inflammatory response syndrome) ([facility] R65.10    Diabetes education, encounter for Z71.89    Depression F32.9    Anxiety disorder F41.9    Delirium R41.0    Renal failure N19    Intractable back pain M54.9    Pathologic thoracic fracture, initial encounter M84.48XA    Maxillary sinus fracture, sequela ([facility] S02.401S    PNA (pneumonia) J18.9    Dysphagia R13.10    Acute kidney injury superimposed on CKD ([facility] N17.9, N18.9    T12 compression fracture, initial encounter ([facility] S22.080A       Isolation/Infection:   Isolation            No Isolation          Patient Infection Status       Infection Onset Added Last Indicated Last Indicated By Review Planned Expiration Resolved Resolved By    COVID-19 Rule Out 10/21/20 10/21/20 10/21/20 COVID-19 (Ordered) 10/28/20 11/04/20      Resolved    COVID-19 Rule Out 07/11/20 07/11/20 07/11/20 COVID-19 (Ordered)   07/13/20 Rule-Out Test Resulted    COVID-19 Rule Out 06/01/20 06/01/20 06/01/20 COVID-19 (Ordered)   06/03/20 Bharati Oconnell RN    C-diff Rule Out 05/31/20 05/31/20 06/01/20 Gastrointestinal Panel, Molecular (Ordered)   06/04/20 Mariajose Bonilla RN    AFB 09/27/19 10/09/19 09/27/19 Acid Fast Culture With Smear   06/01/20 Mariajose Bonilla RN    AFB 08/06/19 09/10/19 08/06/19 Acid Fast Culture With Smear   09/30/19 Osvaldo Larson RN            Nurse Assessment:  Last Vital Signs: BP (!) 174/69   Pulse 68   Temp 98.3 °F (36.8 °C) (Oral)   Resp 16   Ht 5' 5\" (1.651 m)   Wt 146 lb 14.4 oz (66.6 kg)   SpO2 91%   BMI 24.45 kg/m²     Last documented pain score (0-10 scale): Pain Level: 10  Last Weight:   Wt Readings from Last 1 Encounters:   10/22/20 146 lb 14.4 oz (66.6 kg)     Mental Status:  disoriented and alert    IV Access:  - None    Nursing Mobility/ADLs:  Walking Assisted  Transfer  Assisted  Bathing  Assisted  Dressing  Assisted  Toileting  Assisted  Feeding  Assisted  Med Admin  Assisted  Med Delivery   whole    Wound Care Documentation and Therapy:        Elimination:  Continence: Bowel: Yes  Bladder: Yes  Urinary Catheter: None   Colostomy/Ileostomy/Ileal Conduit: No       Date of Last BM: 10/20/2020    Intake/Output Summary (Last 24 hours) at 10/22/2020 1329  Last data filed at 10/22/2020 0743  Gross per 24 hour   Intake --   Output 350 ml   Net -350 ml     No intake/output data recorded. Safety Concerns:      At Risk for Falls   Hx of falls    Impairments/Disabilities:      Vision, Hearing, Orientation status(periods of confusion)    Nutrition Therapy:  Current Nutrition Therapy:   - Oral Diet:  General    Routes of Feeding: Oral  Liquids: No Restrictions  Daily Fluid Restriction: no  Last Modified Barium Swallow with Video (Video Swallowing Test): not done    Treatments at the Time of Hospital Discharge:   Respiratory Treatments: yes  Oxygen Therapy:  Is on 1L of home oxygen  Ventilator:    - No ventilator support    Rehab Therapies: Physical Therapy and Occupational Therapy  Weight Bearing Status/Restrictions: No weight bearing restirctions  Other Medical Equipment (for information only, NOT a DME order):  walker  Other Treatments: none    Patient's personal belongings (please select all that are sent with patient):  Glasses, implanted dentures lower, pant, shirt    RN SIGNATURE:  Electronically signed by Jessica Moreno RN on 10/22/20 at 3:15 PM EDT    CASE MANAGEMENT/SOCIAL WORK SECTION    Inpatient Status Date:10/16/2020    Readmission Risk Assessment Score:  Readmission Risk              Risk of Unplanned Readmission:        26           Discharging to Facility/ Agency   Name: [address]  Phone: [phone]  Fax: [phone]    Dialysis Facility (if applicable)   Name:  Address:  Dialysis Schedule:  Phone:  Fax:    Case Manager/ signature: ANGELIQUE VelasquezN, CCM, RN  [facility]  [phone]  PHYSICIAN SECTION    Prognosis: Fair    Condition at Discharge: Stable    Rehab Potential (if transferring to Rehab): Fair    Recommended Labs or Other Treatments After Discharge: Followup with PCP in one week    Physician Certification: I certify the above information and transfer of Yung Oglesby  is necessary for the continuing treatment of the diagnosis listed and that she requires [place] for greater 30 days.      Update Admission H&P: No change in H&P    PHYSICIAN SIGNATURE:  Electronically signed by Desiree Cohen PA-C on 10/22/20 at 3:44 PM EDT

## 2020-10-23 LAB — SARS-COV-2, PCR: NOT DETECTED

## 2020-11-03 ENCOUNTER — FOLLOWUP TELEPHONE ENCOUNTER (OUTPATIENT)
Dept: INTERVENTIONAL RADIOLOGY/VASCULAR | Age: 80
End: 2020-11-03

## 2020-11-03 PROBLEM — J18.9 PNEUMONIA OF LEFT LOWER LOBE DUE TO INFECTIOUS ORGANISM: Status: RESOLVED | Noted: 2020-11-03 | Resolved: 2020-11-03

## 2020-11-03 NOTE — PROGRESS NOTES
Patients chart was reviewed 1 week post Kyphoplasty procedure. No complications were noted post procedure.

## 2020-12-01 ENCOUNTER — HOSPITAL ENCOUNTER (OUTPATIENT)
Dept: MRI IMAGING | Age: 80
Discharge: HOME | End: 2020-12-01
Payer: MEDICARE

## 2020-12-01 PROCEDURE — 72148 MRI LUMBAR SPINE W/O DYE: CPT

## 2020-12-08 ENCOUNTER — HOSPITAL ENCOUNTER (OUTPATIENT)
Dept: INTERVENTIONAL RADIOLOGY/VASCULAR | Age: 80
Discharge: HOME | End: 2020-12-08
Payer: MEDICARE

## 2020-12-08 VITALS
HEIGHT: 66 IN | WEIGHT: 144 LBS | BODY MASS INDEX: 23.14 KG/M2 | TEMPERATURE: 98 F | SYSTOLIC BLOOD PRESSURE: 158 MMHG | OXYGEN SATURATION: 100 % | DIASTOLIC BLOOD PRESSURE: 80 MMHG | RESPIRATION RATE: 14 BRPM | HEART RATE: 85 BPM

## 2020-12-08 LAB
ANION GAP SERPL CALCULATED.3IONS-SCNC: 10 MMOL/L (ref 3–16)
APTT: 27.6 SEC (ref 24.2–36.2)
BUN BLDV-MCNC: 32 MG/DL (ref 7–20)
CALCIUM SERPL-MCNC: 9.4 MG/DL (ref 8.3–10.6)
CHLORIDE BLD-SCNC: 101 MMOL/L (ref 99–110)
CO2: 30 MMOL/L (ref 21–32)
CREAT SERPL-MCNC: 1.1 MG/DL (ref 0.6–1.2)
GFR AFRICAN AMERICAN: 58
GFR NON-AFRICAN AMERICAN: 48
GLUCOSE BLD-MCNC: 162 MG/DL (ref 70–99)
HCT VFR BLD CALC: 32.2 % (ref 36–48)
HEMOGLOBIN: 10.2 G/DL (ref 12–16)
INR BLD: 0.9 (ref 0.86–1.14)
MCH RBC QN AUTO: 30.4 PG (ref 26–34)
MCHC RBC AUTO-ENTMCNC: 31.8 G/DL (ref 31–36)
MCV RBC AUTO: 95.7 FL (ref 80–100)
PDW BLD-RTO: 17.2 % (ref 12.4–15.4)
PLATELET # BLD: 308 K/UL (ref 135–450)
PMV BLD AUTO: 7.5 FL (ref 5–10.5)
POTASSIUM SERPL-SCNC: 3.9 MMOL/L (ref 3.5–5.1)
PROTHROMBIN TIME: 10.4 SEC (ref 10–13.2)
RBC # BLD: 3.36 M/UL (ref 4–5.2)
SODIUM BLD-SCNC: 141 MMOL/L (ref 136–145)
WBC # BLD: 10.3 K/UL (ref 4–11)

## 2020-12-08 PROCEDURE — 22514 PERQ VERTEBRAL AUGMENTATION: CPT

## 2020-12-08 PROCEDURE — 2500000003 HC RX 250 WO HCPCS: Performed by: RADIOLOGY

## 2020-12-08 PROCEDURE — 85027 COMPLETE CBC AUTOMATED: CPT

## 2020-12-08 PROCEDURE — 99152 MOD SED SAME PHYS/QHP 5/>YRS: CPT

## 2020-12-08 PROCEDURE — 6360000002 HC RX W HCPCS: Performed by: RADIOLOGY

## 2020-12-08 PROCEDURE — 85610 PROTHROMBIN TIME: CPT

## 2020-12-08 PROCEDURE — 85730 THROMBOPLASTIN TIME PARTIAL: CPT

## 2020-12-08 PROCEDURE — 7100000011 HC PHASE II RECOVERY - ADDTL 15 MIN

## 2020-12-08 PROCEDURE — 80048 BASIC METABOLIC PNL TOTAL CA: CPT

## 2020-12-08 PROCEDURE — 36415 COLL VENOUS BLD VENIPUNCTURE: CPT

## 2020-12-08 PROCEDURE — 7100000010 HC PHASE II RECOVERY - FIRST 15 MIN

## 2020-12-08 PROCEDURE — 6370000000 HC RX 637 (ALT 250 FOR IP): Performed by: RADIOLOGY

## 2020-12-08 PROCEDURE — C1894 INTRO/SHEATH, NON-LASER: HCPCS

## 2020-12-08 RX ORDER — LIDOCAINE HYDROCHLORIDE 10 MG/ML
20 INJECTION, SOLUTION EPIDURAL; INFILTRATION; INTRACAUDAL; PERINEURAL ONCE
Status: COMPLETED | OUTPATIENT
Start: 2020-12-08 | End: 2020-12-08

## 2020-12-08 RX ORDER — CLINDAMYCIN PHOSPHATE 600 MG/50ML
INJECTION INTRAVENOUS CONTINUOUS PRN
Status: COMPLETED | OUTPATIENT
Start: 2020-12-08 | End: 2020-12-08

## 2020-12-08 RX ORDER — MIDAZOLAM HYDROCHLORIDE 1 MG/ML
INJECTION INTRAMUSCULAR; INTRAVENOUS
Status: COMPLETED | OUTPATIENT
Start: 2020-12-08 | End: 2020-12-08

## 2020-12-08 RX ORDER — DIPHENHYDRAMINE HCL 25 MG
TABLET ORAL
Status: COMPLETED | OUTPATIENT
Start: 2020-12-08 | End: 2020-12-08

## 2020-12-08 RX ORDER — OXYCODONE HYDROCHLORIDE AND ACETAMINOPHEN 5; 325 MG/1; MG/1
1 TABLET ORAL EVERY 4 HOURS PRN
Status: DISCONTINUED | OUTPATIENT
Start: 2020-12-08 | End: 2020-12-09 | Stop reason: HOSPADM

## 2020-12-08 RX ORDER — FENTANYL CITRATE 50 UG/ML
INJECTION, SOLUTION INTRAMUSCULAR; INTRAVENOUS
Status: COMPLETED | OUTPATIENT
Start: 2020-12-08 | End: 2020-12-08

## 2020-12-08 RX ORDER — KETOROLAC TROMETHAMINE 30 MG/ML
INJECTION, SOLUTION INTRAMUSCULAR; INTRAVENOUS
Status: COMPLETED | OUTPATIENT
Start: 2020-12-08 | End: 2020-12-08

## 2020-12-08 RX ORDER — FENTANYL 50 UG/H
1 PATCH TRANSDERMAL
COMMUNITY
End: 2021-04-09 | Stop reason: ALTCHOICE

## 2020-12-08 RX ORDER — METHOCARBAMOL 500 MG/1
500 TABLET, FILM COATED ORAL 3 TIMES DAILY
Status: ON HOLD | COMMUNITY
End: 2021-06-05

## 2020-12-08 RX ORDER — BUPIVACAINE HYDROCHLORIDE 5 MG/ML
30 INJECTION, SOLUTION EPIDURAL; INTRACAUDAL ONCE
Status: COMPLETED | OUTPATIENT
Start: 2020-12-08 | End: 2020-12-08

## 2020-12-08 RX ORDER — ACETAMINOPHEN 325 MG/1
650 TABLET ORAL EVERY 4 HOURS PRN
Status: DISCONTINUED | OUTPATIENT
Start: 2020-12-08 | End: 2020-12-09 | Stop reason: HOSPADM

## 2020-12-08 RX ORDER — OXYCODONE HYDROCHLORIDE AND ACETAMINOPHEN 5; 325 MG/1; MG/1
2 TABLET ORAL EVERY 4 HOURS PRN
Status: DISCONTINUED | OUTPATIENT
Start: 2020-12-08 | End: 2020-12-09 | Stop reason: HOSPADM

## 2020-12-08 RX ORDER — HYDROCODONE BITARTRATE AND ACETAMINOPHEN 5; 325 MG/1; MG/1
1 TABLET ORAL EVERY 6 HOURS PRN
COMMUNITY
End: 2021-05-10

## 2020-12-08 RX ADMIN — FENTANYL CITRATE 25 MCG: 50 INJECTION INTRAMUSCULAR; INTRAVENOUS at 11:04

## 2020-12-08 RX ADMIN — DIPHENHYDRAMINE HCL 25 MG: 25 TABLET ORAL at 10:56

## 2020-12-08 RX ADMIN — KETOROLAC TROMETHAMINE 30 MG: 30 INJECTION, SOLUTION INTRAMUSCULAR; INTRAVENOUS at 11:02

## 2020-12-08 RX ADMIN — FENTANYL CITRATE 25 MCG: 50 INJECTION INTRAMUSCULAR; INTRAVENOUS at 10:56

## 2020-12-08 RX ADMIN — LIDOCAINE HYDROCHLORIDE 8 ML: 10 INJECTION, SOLUTION EPIDURAL; INFILTRATION; INTRACAUDAL; PERINEURAL at 10:50

## 2020-12-08 RX ADMIN — BUPIVACAINE HYDROCHLORIDE 50 MG: 5 INJECTION, SOLUTION EPIDURAL; INTRACAUDAL at 10:45

## 2020-12-08 RX ADMIN — CLINDAMYCIN PHOSPHATE 600 MG: 12 INJECTION, SOLUTION INTRAMUSCULAR; INTRAVENOUS at 10:57

## 2020-12-08 RX ADMIN — MIDAZOLAM 1 MG: 1 INJECTION INTRAMUSCULAR; INTRAVENOUS at 11:02

## 2020-12-08 RX ADMIN — MIDAZOLAM 1 MG: 1 INJECTION INTRAMUSCULAR; INTRAVENOUS at 10:56

## 2020-12-08 ASSESSMENT — PAIN SCALES - GENERAL
PAINLEVEL_OUTOF10: 0
PAINLEVEL_OUTOF10: 3
PAINLEVEL_OUTOF10: 6

## 2020-12-08 ASSESSMENT — PAIN - FUNCTIONAL ASSESSMENT: PAIN_FUNCTIONAL_ASSESSMENT: 0-10

## 2020-12-08 NOTE — BRIEF OP NOTE
Brief Postoperative Note          Patient: Georgina Rich MRN: [id number]     YOB: 1940  Age: 70 y.o. Sex: female        DATE OF PROCEDURE: 12/8/2020     PROCEDURE PERFORMED: L1 kyphoplasty    SURGEON: Cody Elliott MD    ANESTHESIA:  Moderate Sedation, Versed and Fentanyl    Estimated Blood Loss: None    Complications: None. Device implanted: None.            Specimen: none    Electronically signed by Cody Elliott MD on 12/8/2020 at 11:29 AM

## 2020-12-08 NOTE — PRE SEDATION
Sedation Pre-Procedure Note    Patient Name: Adilia Deras   YOB: 1940  Room/Bed: Room/bed info not found  Medical Record Number: [id number]  Date: 12/8/2020   Time: 11:28 AM       Indication:  L1 kyphoplasty    Consent: I have discussed with the patient and/or the patient representative the indication, alternatives, and the possible risks and/or complications of the planned procedure and the anesthesia methods. The patient and/or patient representative appear to understand and agree to proceed. Vital Signs:   Vitals:    12/08/20 1125   BP: (!) 170/80   Pulse: 82   Resp: 11   Temp: 98 °F (36.7 °C)   SpO2:        Past Medical History:   has a past medical history of Arthritis, CAD (coronary artery disease), Carpal tunnel syndrome, COPD (chronic obstructive pulmonary disease) ([address] Coronary stent, depression, Diabetes mellitus ([address] Diastolic heart failure ([address] GERD (gastroesophageal reflux disease), Hyperlipidemia, Hypertension, Lung cancer ([address] MI (myocardial infarction) ([address] LIZETT (obstructive sleep apnea), PONV (postoperative nausea and vomiting), and stress incontinence. Past Surgical History:   has a past surgical history that includes Coronary angioplasty with stent (2000, 2001); back surgery (2000, 2001); bronchoscopy (12/04/2018); pr ncc incl fluor gdnce dx w/cell washg spx (N/A, 12/4/2018); Cholecystectomy, laparoscopic (N/A, 12/19/2018); Upper gastrointestinal endoscopy (N/A, 2/25/2019); Colonoscopy (N/A, 2/25/2019); Colonoscopy (2/25/2019); bronchoscopy (N/A, 2/27/2019); bronchoscopy (2/27/2019); bronchoscopy (2/27/2019); bronchoscopy (N/A, 8/6/2019); Upper gastrointestinal endoscopy (N/A, 8/7/2019); bronchoscopy (N/A, 9/27/2019); bronchoscopy (9/27/2019); bronchoscopy (9/27/2019); IR KYPHOPLASTY THORACIC 1 VERTEBRAL BODY (7/23/2020); and IR KYPHOPLASTY THORACIC 1 VERTEBRAL BODY (10/20/2020).     Medications:   Scheduled Meds:   Continuous Infusions:   PRN Meds:   Home Meds: Prior to Admission medications    Medication Sig Start Date End Date Taking? Authorizing Provider   methocarbamol (ROBAXIN) 500 MG tablet Take 500 mg by mouth 4 times daily   Yes Historical Provider, MD   fentaNYL (DURAGESIC) 50 MCG/HR Place 1 patch onto the skin every 72 hours. Yes Historical Provider, MD   HYDROcodone-acetaminophen (NORCO) 5-325 MG per tablet Take 1 tablet by mouth every 6 hours as needed for Pain. Yes Historical Provider, MD   furosemide (LASIX) 20 MG tablet Take 20 mg by mouth 2 times daily   Yes Historical Provider, MD   albuterol sulfate  (90 Base) MCG/ACT inhaler Inhale 2 puffs into the lungs every 4 hours as needed for Wheezing 7/24/20  Yes Logan Wilson MD   predniSONE (DELTASONE) 10 MG tablet Take 1 tablet by mouth daily 6/15/20  Yes MARIA C Farias CNP   pantoprazole (PROTONIX) 40 MG tablet Take 1 tablet by mouth every morning (before breakfast) 6/15/20  Yes MARIA C Farias CNP   dilTIAZem (CARDIZEM 12 HR) 60 MG extended release capsule Take 1 capsule by mouth 2 times daily 6/15/20  Yes MARIA C Farias CNP   linagliptin (TRADJENTA) 5 MG tablet Take 1 tablet by mouth daily 6/15/20  Yes MARIA C Farias CNP   metoprolol succinate (TOPROL XL) 25 MG extended release tablet Take 1 tablet by mouth daily 6/15/20  Yes MARIA C Farias CNP   apixaban (ELIQUIS) 2.5 MG TABS tablet Take 1 tablet by mouth 2 times daily 6/15/20  Yes MARIA C Farias CNP   mirtazapine (REMERON) 15 MG tablet Take 1 tablet by mouth nightly 10/8/19  Yes Jasvir Wilkins MD   ipratropium-albuterol (DUONEB) 0.5-2.5 (3) MG/3ML SOLN nebulizer solution Inhale 3 mLs into the lungs every 4 hours (while awake) DX:COPD J44.9 3/8/19  Yes Luz Marie MD   lidocaine (LIDODERM) 5 % Place 1 patch onto the skin daily 12 hours on, 12 hours off.     Historical Provider, MD     Coumadin Use Last 7 Days:  no   Antiplatelet drug therapy use last 7 days: no  Other anticoagulant use last 7 days: no  Additional Medication Information:  none      Pre-Sedation Documentation and Exam:   I have reviewed the patient's history and review of systems. Mallampati Airway Assessment:  Mallampati Class II     Prior History of Anesthesia Complications:   none    ASA Classification:  Class 2     Sedation/ Anesthesia Plan:    Moderate Sedation, IV versed and fentanyl    Medications Planned:   Versed and fentanyl    Patient is an appropriate candidate for plan of sedation: yes

## 2021-04-05 NOTE — PROGRESS NOTES
[facility], [place], operated by [facility]   Cardiac Follow up  No visits in several years    Referring Provider:  Hung Álvarez     Chief Complaint   Patient presents with    Follow-Up from Hospital     SOB     Shortness of Breath    Hypertension    chronic a. Fib/SVT    History of Present Illness:              Courtney Lofton is seen today for  follow up. She has a history of CAD, PAF, Hypertension, Hyperlipidemia, MR and AI, carotid artery stenosis type II DM, and COPD. She has venous insuff bilaterally, leading to varicosities. HX  lung cancer. She was recently in the hospital 3/19-3/26/21   Hospital Course:Hospital Course: The patient is a(n) 68year old female who was admitted for symptomatic anemia in the setting of chronic anticoagulation. Course was complicated by pneumonia, chest pain  1. Severe anemia-felt to be multifactorial component of anemia of chronic disease/inflammation along with chronic microscopic blood loss. Hemoglobin stabilized after receiving packed red blood cells. Underwent endoscopy without active bleeding, only some antral gastritis. Anticoagulation was resumed while here, hemoglobin was stable. 2. Pneumonia-treated with antibiotics, completed adequate course while here on room air on the date of discharge  3. Episode of chest pain and discomfort. EKG reassuring, troponins negative. Echo without any wall motion abnormalities. Echo was concern for the possibility of a dissection however CTA ruled this out. She will continue with medical management of likely underlying cardiac disease and follow-up with her primary cardiologist.  4. COPD stable  5. History of lung cancer will follow up with Dr. Artis Miguel of [facility]. Today she states she continues to feel tired and weak, recently d/c from the hospital. She is not able to do much due to shortness of breath, she lives alone but has help from family and home health. She is to have a bone marrow biopsy soon.  Risks vs benefits of anticoagulation discussed,  discussed  Watchman, she is unsure if she would like to proceed with invasive procedures at this time. Has had mild swelling in her feet/legs. She is in Afib today. Past Medical History:   has a past medical history of Arthritis, CAD (coronary artery disease), Carpal tunnel syndrome, COPD (chronic obstructive pulmonary disease) ([address] Coronary stent, depression, Diabetes mellitus ([address] Diastolic heart failure ([address] GERD (gastroesophageal reflux disease), Hyperlipidemia, Hypertension, Lung cancer ([address] MI (myocardial infarction) ([address] LIZETT (obstructive sleep apnea), PONV (postoperative nausea and vomiting), and stress incontinence. Surgical History:   has a past surgical history that includes Coronary angioplasty with stent (2000, 2001); back surgery (2000, 2001); bronchoscopy (12/04/2018); pr brncc incl fluor gdnce dx w/cell washg spx (N/A, 12/4/2018); Cholecystectomy, laparoscopic (N/A, 12/19/2018); Upper gastrointestinal endoscopy (N/A, 2/25/2019); Colonoscopy (N/A, 2/25/2019); Colonoscopy (2/25/2019); bronchoscopy (N/A, 2/27/2019); bronchoscopy (2/27/2019); bronchoscopy (2/27/2019); bronchoscopy (N/A, 8/6/2019); Upper gastrointestinal endoscopy (N/A, 8/7/2019); bronchoscopy (N/A, 9/27/2019); bronchoscopy (9/27/2019); bronchoscopy (9/27/2019); IR KYPHOPLASTY THORACIC 1 VERTEBRAL BODY (7/23/2020); IR KYPHOPLASTY THORACIC 1 VERTEBRAL BODY (10/20/2020); and IR KYPHOPLASTY LUMBAR 1 VERTEBRAL BODY (12/8/2020). Social History:   reports that she quit smoking about 19 years ago. Her smoking use included cigarettes. She quit after 10.00 years of use. She has never used smokeless tobacco. She reports that she does not drink alcohol or use drugs. Family History:  family history includes Cancer in her maternal uncle and sister; Diabetes in her brother and sister; Heart Disease in her father and mother.      Home Medications:  Prior to Admission medications    Medication Sig Start Date End Date Taking? Authorizing Provider   glimepiride (AMARYL) 2 MG tablet Take 2 mg by mouth every morning (before breakfast)   Yes Historical Provider, MD   linaclotide (LINZESS) 145 MCG capsule Take 145 mcg by mouth every morning (before breakfast) 3/17/21  Yes Historical Provider, MD   methocarbamol (ROBAXIN) 500 MG tablet Take 500 mg by mouth 3 times daily    Yes Historical Provider, MD   HYDROcodone-acetaminophen (NORCO) 5-325 MG per tablet Take 1 tablet by mouth every 6 hours as needed for Pain.    Yes Historical Provider, MD   albuterol sulfate  (90 Base) MCG/ACT inhaler Inhale 2 puffs into the lungs every 4 hours as needed for Wheezing 7/24/20  Yes Susie Terrazas MD   predniSONE (DELTASONE) 10 MG tablet Take 1 tablet by mouth daily 6/15/20  Yes Jinny Condon APRN - CNP   pantoprazole (PROTONIX) 40 MG tablet Take 1 tablet by mouth every morning (before breakfast) 6/15/20  Yes Jinny Condon APRN - JAYMIE   dilTIAZem (CARDIZEM 12 HR) 60 MG extended release capsule Take 1 capsule by mouth 2 times daily 6/15/20  Yes Jinny Condon, APRN - CNP   linagliptin (TRADJENTA) 5 MG tablet Take 1 tablet by mouth daily 6/15/20  Yes Jinny Condon APRN - CNP   metoprolol succinate (TOPROL XL) 25 MG extended release tablet Take 1 tablet by mouth daily 6/15/20  Yes Jinny Condon, APRN - CNP   apixaban (ELIQUIS) 2.5 MG TABS tablet Take 1 tablet by mouth 2 times daily 6/15/20  Yes Jinny Condon APRN - CNP   mirtazapine (REMERON) 15 MG tablet Take 1 tablet by mouth nightly  Patient taking differently: Take 30 mg by mouth nightly  10/8/19  Yes Stanley Hugo MD   ipratropium-albuterol (Colten Johnsonurning) 0.5-2.5 (3) MG/3ML SOLN nebulizer solution Inhale 3 mLs into the lungs every 4 hours (while awake) DX:COPD J44.9 3/8/19  Yes Linda Yanes MD        Allergies:  Statins, Lyrica [pregabalin], Cipro xr, Penicillins, and Sulfa antibiotics     Review of Systems:   · Constitutional: there has been no unanticipated weight loss. There's been no change in , sleep pattern, or activity level.   +fatique   · Eyes: No visual changes or diplopia. No scleral icterus. · ENT: No Headaches, hearing loss or vertigo. No mouth sores or sore throat. · Cardiovascular: Reviewed in HPI  · Respiratory: No cough or wheezing, no sputum production. No hematemesis. Chronic shortness of breath  · Gastrointestinal: No abdominal pain, appetite loss, blood in stools. No change in bowel or bladder habits. · Genitourinary: No dysuria, trouble voiding, or hematuria. · Musculoskeletal:  No gait disturbance, weakness or joint complaints. · Integumentary: No rash or pruritis. · Neurological: No headache, diplopia, change in muscle strength, numbness or tingling. No change in gait, balance, coordination, mood, affect, memory, mentation, behavior. · Psychiatric: No anxiety, no depression. + stress  · Endocrine: No malaise, fatigue or temperature intolerance. No excessive thirst, fluid intake, or urination. No tremor. · Hematologic/Lymphatic: No abnormal bruising or bleeding, blood clots or swollen lymph nodes. · Allergic/Immunologic: No nasal congestion or hives. Physical Examination:    Vitals:    04/09/21 1244   BP: 122/70   Pulse: 97   SpO2: 94%        Constitutional and General Appearance: NAD, appears stated age, well developed. Skin:good turgor,intact without lesions  HEENT: EOMI ,normal  Neck:no JVD     Respiratory:  · Normal excursion and expansion without use of accessory muscles  · Resp Auscultation: Normal breath sounds without dullness  Cardiovascular:  · The apical impulses not displaced  · Heart tones are crisp and normal  · Cervical veins are not engorged  · The carotid upstroke is normal in amplitude and contour without delay or bruit  · Regular rate and rhythm. S1S2 without murmur, gallop, or rub  · Peripheral pulses are symmetrical and full  · There is no clubbing, cyanosis of the extremities.   · No edema  · Femoral Arteries: 2+ and equal  · Pedal Pulses: 2+ and equal   Abdomen:  · No masses or tenderness  · Liver/Spleen: No Abnormalities Noted  Neurological/Psychiatric:  · Alert and oriented in all spheres  · Moves all extremities well  · Exhibits normal gait balance and coordination  · No abnormalities of mood, affect, memory, mentation, or behavior are noted  ECHO: 12/18  Summary   -Normal left ventricle size and systolic function with an estimated ejection   fraction of 55-60%. No regional wall motion abnormalities are seen. Mild   concentric left ventricular hypertrophy is present.   -Indeterminate diastolic function.   -Aortic valve appears sclerotic but opens adequately. Mild aortic   regurgitation is present.   -There is trivial tricuspid regurgitation with RVSP estimated at 37 mmHg.   -Mild to moderate mitral regurgitation is present. 8/16/17  Echo  -Normal left ventricle size and systolic function with an estimated ejection   fraction of 55-60%.  -No regional wall motion abnormalities are seen.   -Mild concentric left ventricular hypertrophy is present.   -There is reversal of E/A inflow velocities across the mitral valve.   -Diastolic filling parameters suggests grade I diastolic dysfunction . E/e'=   13.65 .   -Mild to moderate mitral regurgitation is present.   -Mitral annular calcification is present.   -Aortic valve appears sclerotic but opens adequately. Mild aortic regurgitation is present.   -There is trivial tricuspid regurgitation with RVSP estimated at 28 mmHg. Assessment/Plan:    1. Coronary artery disease involving native coronary artery of native heart without angina pectoris    2. Essential hypertension    3. Atrial fibrillation, unspecified type ([address]    4. Dyslipidemia    5. Mitral and aortic valve disease        1. Coronary atherosclerosis of native coronary artery:  2006  LHC > RCA and circ stent patent, EF 45%,  10/31/16: Myoview GXT - normal perfusion, EF 49%.       Intolerant to ace-i due to cough; is on cozaar, BB  No exertional chest pain, but has chronic shortness of breath  stable   2. PAF--persistent a. Fib with rate very difficult to control  10/14  Event monitor showed 11 minutes of AF  April-May 2017 MCOT showed AF burden < 1% with longest episode lasting 9 minutes. now on metoprolol, eliquis 2.5mg   - Risks vs benefits of anticoagulation discussed- discussed  watchman- she is unsure if she would like to pursue invasive procedures   - Start- Lanoxin M-W-F   3. Essential hypertension, benign -stable- Blood pressure 122/70, pulse 97, height 5' 5\" (1.651 m), weight 151 lb 12.8 oz (68.9 kg), SpO2 94 %        4. Other and unspecified hyperlipidemia -intolerant to statins due to muscle aches    5. Unspecified mitral and aortic valve diseases   8/17 echo >  Mild to moderate MR, mild AI with normal EF. 6. SOB--multifactorial including,  severe COPD - lung CA - stable/unchanged. Plan:.  Stop metOLazone (ZAROXOLYN) 2.5 MG tablet  Start Lasix 40mg daily   Start-Lanoxin M-W-F  Follow up in 1 month    Have Renal, CBC, Digoxin level drawn Tuesday -in 2 weeks       I appreciate the opportunity of cooperating in the care of this individual.    Iza Stearns M.D., [facility] [place]    Patient's problem list, medications, allergies, past medical, surgical, social and family histories were reviewed and updated as appropriate. Scribe's attestation: This note was scribed in the presence of Dr. Rosalind Stearns MD, by Chad Cornejo RN. The scribe's documentation has been prepared under my direction and personally reviewed by me in its entirety. I confirm that the note above accurately reflects all work, treatment, procedures, and medical decision making performed by me.

## 2021-04-09 ENCOUNTER — OFFICE VISIT (OUTPATIENT)
Dept: CARDIOLOGY CLINIC | Age: 81
End: 2021-04-09
Payer: MEDICARE

## 2021-04-09 VITALS
WEIGHT: 151.8 LBS | SYSTOLIC BLOOD PRESSURE: 122 MMHG | HEART RATE: 97 BPM | OXYGEN SATURATION: 94 % | DIASTOLIC BLOOD PRESSURE: 70 MMHG | HEIGHT: 65 IN | BODY MASS INDEX: 25.29 KG/M2

## 2021-04-09 DIAGNOSIS — I10 ESSENTIAL HYPERTENSION: ICD-10-CM

## 2021-04-09 DIAGNOSIS — I08.0 MITRAL AND AORTIC VALVE DISEASE: ICD-10-CM

## 2021-04-09 DIAGNOSIS — I25.10 CORONARY ARTERY DISEASE INVOLVING NATIVE CORONARY ARTERY OF NATIVE HEART WITHOUT ANGINA PECTORIS: Primary | ICD-10-CM

## 2021-04-09 DIAGNOSIS — E78.5 DYSLIPIDEMIA: ICD-10-CM

## 2021-04-09 DIAGNOSIS — I48.91 ATRIAL FIBRILLATION, UNSPECIFIED TYPE (HCC): ICD-10-CM

## 2021-04-09 PROCEDURE — 99203 OFFICE O/P NEW LOW 30 MIN: CPT | Performed by: INTERNAL MEDICINE

## 2021-04-09 RX ORDER — METOLAZONE 2.5 MG/1
2.5-5 TABLET ORAL DAILY
COMMUNITY
Start: 2021-02-28 | End: 2021-04-09 | Stop reason: ALTCHOICE

## 2021-04-09 RX ORDER — DIGOXIN 125 MCG
TABLET ORAL
Qty: 90 TABLET | Refills: 1 | Status: SHIPPED | OUTPATIENT
Start: 2021-04-09 | End: 2021-04-12 | Stop reason: SDUPTHER

## 2021-04-09 RX ORDER — FUROSEMIDE 40 MG/1
40 TABLET ORAL DAILY
Qty: 90 TABLET | Refills: 3 | Status: SHIPPED | OUTPATIENT
Start: 2021-04-09 | End: 2021-06-17

## 2021-04-09 RX ORDER — GLIMEPIRIDE 2 MG/1
2 TABLET ORAL
COMMUNITY

## 2021-04-09 NOTE — PATIENT INSTRUCTIONS
Stop metOLazone (ZAROXOLYN) 2.5 MG tablet    Start Lasix 40mg daily   Start-Take Lanoxin M-W-F  Follow up in 1 month        Have Labs drawn Tuesday -in 2 weeks

## 2021-04-12 ENCOUNTER — TELEPHONE (OUTPATIENT)
Dept: CARDIOLOGY CLINIC | Age: 81
End: 2021-04-12

## 2021-04-12 RX ORDER — DIGOXIN 125 MCG
TABLET ORAL
Qty: 90 TABLET | Refills: 1 | Status: SHIPPED | OUTPATIENT
Start: 2021-04-12

## 2021-04-12 NOTE — TELEPHONE ENCOUNTER
Daughter states digoxin cost $ 118.00 is there alternative. Also pharmastist said the pt's lasix contains sulfa and pt is allergic to sulfa, but pt has never had any reactions.  Please call to advise

## 2021-04-16 ENCOUNTER — HOSPITAL ENCOUNTER (OUTPATIENT)
Dept: GENERAL RADIOLOGY | Age: 81
Discharge: HOME | End: 2021-04-16
Payer: MEDICARE

## 2021-04-16 ENCOUNTER — HOSPITAL ENCOUNTER (OUTPATIENT)
Age: 81
Discharge: HOME | End: 2021-04-16
Payer: MEDICARE

## 2021-04-16 DIAGNOSIS — R52 PAIN: ICD-10-CM

## 2021-04-16 PROCEDURE — 73030 X-RAY EXAM OF SHOULDER: CPT

## 2021-04-16 PROCEDURE — 72040 X-RAY EXAM NECK SPINE 2-3 VW: CPT

## 2021-04-20 ENCOUNTER — HOSPITAL ENCOUNTER (OUTPATIENT)
Age: 81
Discharge: HOME | End: 2021-04-20
Payer: MEDICARE

## 2021-04-20 DIAGNOSIS — I48.91 ATRIAL FIBRILLATION, UNSPECIFIED TYPE (HCC): ICD-10-CM

## 2021-04-20 DIAGNOSIS — I25.10 CORONARY ARTERY DISEASE INVOLVING NATIVE CORONARY ARTERY OF NATIVE HEART WITHOUT ANGINA PECTORIS: ICD-10-CM

## 2021-04-20 DIAGNOSIS — I10 ESSENTIAL HYPERTENSION: ICD-10-CM

## 2021-04-20 LAB
ALBUMIN SERPL-MCNC: 3.7 G/DL (ref 3.4–5)
ANION GAP SERPL CALCULATED.3IONS-SCNC: 13 MMOL/L (ref 3–16)
BASOPHILS ABSOLUTE: 0.1 K/UL (ref 0–0.2)
BASOPHILS RELATIVE PERCENT: 0.5 %
BUN BLDV-MCNC: 41 MG/DL (ref 7–20)
CALCIUM SERPL-MCNC: 8.6 MG/DL (ref 8.3–10.6)
CHLORIDE BLD-SCNC: 103 MMOL/L (ref 99–110)
CO2: 26 MMOL/L (ref 21–32)
CREAT SERPL-MCNC: 1.7 MG/DL (ref 0.6–1.2)
DIGOXIN LEVEL: 0.9 NG/ML (ref 0.8–2)
EOSINOPHILS ABSOLUTE: 0.8 K/UL (ref 0–0.6)
EOSINOPHILS RELATIVE PERCENT: 8.1 %
GFR AFRICAN AMERICAN: 35
GFR NON-AFRICAN AMERICAN: 29
GLUCOSE BLD-MCNC: 222 MG/DL (ref 70–99)
HCT VFR BLD CALC: 29.1 % (ref 36–48)
HEMOGLOBIN: 9.3 G/DL (ref 12–16)
LYMPHOCYTES ABSOLUTE: 0.9 K/UL (ref 1–5.1)
LYMPHOCYTES RELATIVE PERCENT: 9 %
MCH RBC QN AUTO: 29.8 PG (ref 26–34)
MCHC RBC AUTO-ENTMCNC: 32 G/DL (ref 31–36)
MCV RBC AUTO: 93.2 FL (ref 80–100)
MONOCYTES ABSOLUTE: 0.4 K/UL (ref 0–1.3)
MONOCYTES RELATIVE PERCENT: 3.4 %
NEUTROPHILS ABSOLUTE: 8.1 K/UL (ref 1.7–7.7)
NEUTROPHILS RELATIVE PERCENT: 79 %
PDW BLD-RTO: 25.9 % (ref 12.4–15.4)
PHOSPHORUS: 3.5 MG/DL (ref 2.5–4.9)
PLATELET # BLD: 243 K/UL (ref 135–450)
PMV BLD AUTO: 7.8 FL (ref 5–10.5)
POTASSIUM SERPL-SCNC: 5.1 MMOL/L (ref 3.5–5.1)
RBC # BLD: 3.12 M/UL (ref 4–5.2)
SODIUM BLD-SCNC: 142 MMOL/L (ref 136–145)
WBC # BLD: 10.3 K/UL (ref 4–11)

## 2021-04-20 PROCEDURE — 85025 COMPLETE CBC W/AUTO DIFF WBC: CPT

## 2021-04-20 PROCEDURE — 80069 RENAL FUNCTION PANEL: CPT

## 2021-04-20 PROCEDURE — 36415 COLL VENOUS BLD VENIPUNCTURE: CPT

## 2021-04-20 PROCEDURE — 80162 ASSAY OF DIGOXIN TOTAL: CPT

## 2021-04-23 ENCOUNTER — TELEPHONE (OUTPATIENT)
Dept: CARDIOLOGY CLINIC | Age: 81
End: 2021-04-23

## 2021-04-29 ENCOUNTER — OFFICE VISIT (OUTPATIENT)
Dept: PRIMARY CARE CLINIC | Age: 81
End: 2021-04-29
Payer: MEDICARE

## 2021-04-29 VITALS
HEART RATE: 94 BPM | DIASTOLIC BLOOD PRESSURE: 68 MMHG | WEIGHT: 158 LBS | BODY MASS INDEX: 26.33 KG/M2 | HEIGHT: 65 IN | SYSTOLIC BLOOD PRESSURE: 136 MMHG

## 2021-04-29 DIAGNOSIS — I48.0 PAROXYSMAL ATRIAL FIBRILLATION (HCC): ICD-10-CM

## 2021-04-29 DIAGNOSIS — J44.9 COPD, SEVERE (HCC): ICD-10-CM

## 2021-04-29 DIAGNOSIS — Z76.89 ESTABLISHING CARE WITH NEW DOCTOR, ENCOUNTER FOR: ICD-10-CM

## 2021-04-29 DIAGNOSIS — E78.5 DYSLIPIDEMIA: Primary | ICD-10-CM

## 2021-04-29 PROCEDURE — 99205 OFFICE O/P NEW HI 60 MIN: CPT | Performed by: INTERNAL MEDICINE

## 2021-04-29 RX ORDER — FLASH GLUCOSE SCANNING READER
EACH MISCELLANEOUS
Qty: 1 DEVICE | Refills: 0 | Status: SHIPPED | OUTPATIENT
Start: 2021-04-29

## 2021-04-29 RX ORDER — ALBUTEROL SULFATE 90 UG/1
2 AEROSOL, METERED RESPIRATORY (INHALATION) EVERY 4 HOURS PRN
Qty: 1 INHALER | Refills: 5 | Status: SHIPPED | OUTPATIENT
Start: 2021-04-29

## 2021-04-29 RX ORDER — FLASH GLUCOSE SENSOR
KIT MISCELLANEOUS
Qty: 2 EACH | Refills: 5 | Status: SHIPPED | OUTPATIENT
Start: 2021-04-29

## 2021-04-29 RX ORDER — FLASH GLUCOSE SCANNING READER
EACH MISCELLANEOUS
Qty: 1 EACH | Refills: 0 | Status: SHIPPED | OUTPATIENT
Start: 2021-04-29

## 2021-04-29 RX ORDER — IPRATROPIUM BROMIDE AND ALBUTEROL SULFATE 2.5; .5 MG/3ML; MG/3ML
3 SOLUTION RESPIRATORY (INHALATION)
Qty: 360 ML | Refills: 11 | Status: SHIPPED | OUTPATIENT
Start: 2021-04-29

## 2021-04-29 NOTE — PROGRESS NOTES
SUBJECTIVE:  4/29/2021   Anh [facility]  1940    Past Medical History:   Diagnosis Date    Arthritis     CAD (coronary artery disease)     Carpal tunnel syndrome     COPD (chronic obstructive pulmonary disease) ([address]     Coronary stent     depression     Diabetes mellitus ([address]     Diastolic heart failure ([address]     Per Dr Gemma Rodríguez 8/5/19    GERD (gastroesophageal reflux disease)     Hyperlipidemia     Hypertension     Lung cancer ([address]     Adenocarcinoma of Left Lung    MI (myocardial infarction) ([address]     LIZETT (obstructive sleep apnea)     does not use CPAP    PONV (postoperative nausea and vomiting)     stress incontinence      Past Surgical History:   Procedure Laterality Date    BACK SURGERY  2000, 2001    lumbar laminectomy    BRONCHOSCOPY  12/04/2018    BRONCHOSCOPY N/A 2/27/2019    BRONCHOSCOPY BIOPSY BRONCHUS performed by Kaylyn Gold MD at [address]  2/27/2019    BRONCHOSCOPY/TRANSBRONCHIAL NEEDLE BIOPSY performed by Kaylyn Gold MD at [address]  2/27/2019    BRONCHOSCOPY ULTRASOUND performed by Kaylyn Gold MD at [address] N/A 8/6/2019    BRONCHOSCOPY ALVEOLAR LAVAGE performed by Kaylyn Gold MD at [address] N/A 9/27/2019    BRONCHOSCOPY ALVEOLAR LAVAGE performed by Keyanna Carey MD at [address]  9/27/2019    BRONCHOSCOPY BIOPSY BRONCHUS performed by Keyanna Carey MD at [address]  9/27/2019    BRONCHOSCOPY BRUSHINGS performed by Keyanna Carey MD at [address] [facility]"" N/A 12/19/2018    LAPAROSCOPIC CHOLECYSTECTOMY WITH CHOLANGIOGRAM performed by Prabhakar Jorgensen MD at [place] [address] COLONOSCOPY N/A 2/25/2019    COLONOSCOPY WITH BIOPSY performed by Esther Martinez MD at [address] COLONOSCOPY  2/25/2019    COLONOSCOPY POLYPECTOMY SNARE/COLD BIOPSY performed by Moody Barr MD at [facility]  2001    IR KYPHOPLASTY LUMBAR FIRST LEVEL  2020    IR KYPHOPLASTY LUMBAR FIRST LEVEL 2020 [facility] SPECIAL PROCEDURES    IR KYPHOPLASTY THORACIC FIRST LEVEL  2020    IR KYPHOPLASTY THORACIC FIRST LEVEL 2020 [facility] SPECIAL PROCEDURES    IR KYPHOPLASTY THORACIC FIRST LEVEL  10/20/2020    IR KYPHOPLASTY THORACIC FIRST LEVEL 10/20/2020 [facility] SPECIAL PROCEDURES    [address] INCL FLUOR GDNCE DX W/CELL WASHG SPX N/A 2018    BRONCHOSCOPY WITH LAVAGE performed by Artis Aguilera MD at [address] N/A 2019    EGD BIOPSY performed by Moody Barr MD at [address] N/A 2019    EGD DIAGNOSTIC ONLY performed by Hussain Henriquez MD at [address] [place] [address]      Family History   Problem Relation Age of Onset    Cancer Sister     Diabetes Sister     Diabetes Brother     Heart Disease Father     Heart Disease Mother     Cancer Maternal Uncle       Social History     Socioeconomic History    Marital status:      Spouse name: Not on file    Number of children: Not on file    Years of education: Not on file    Highest education level: Not on file   Occupational History    Not on file   Social Needs    Financial resource strain: Not on file    Food insecurity     Worry: Not on file     Inability: Not on file    Transportation needs     Medical: Not on file     Non-medical: Not on file   Tobacco Use    Smoking status: Former Smoker     Years: 10.00     Types: Cigarettes     Quit date: 10/28/2001     Years since quittin.5    Smokeless tobacco: Never Used   Substance and Sexual Activity    Alcohol use: No    Drug use: No    Sexual activity: Yes     Partners: Male   Lifestyle    Physical activity     Days per week: Not on file     Minutes per session: Not on file    Stress: Not on file   Relationships    Social connections     Talks on phone: Not on file     Gets together: Not on file     Attends Holiness service: Not on file     Active member of club or organization: Not on file     Attends meetings of clubs or organizations: Not on file     Relationship status: Not on file    Intimate partner violence     Fear of current or ex partner: Not on file     Emotionally abused: Not on file     Physically abused: Not on file     Forced sexual activity: Not on file   Other Topics Concern    Not on file   Social History Narrative    Not on file      Allergies   Allergen Reactions    Statins Other (See Comments)     Other reaction(s): Myalgias (Muscle Pain)      Lyrica [Pregabalin] Other (See Comments)     Shaking, swelling, rash    Cipro Xr Rash     Swelling, rash    Penicillins Rash     Swelling, rash    Sulfa Antibiotics Rash     Swelling, rash      Current Outpatient Medications   Medication Sig Dispense Refill    digoxin (LANOXIN) 125 MCG tablet Take 1 tab by mouth on Monday -Wednesday-Friday 90 tablet 1    glimepiride (AMARYL) 2 MG tablet Take 2 mg by mouth every morning (before breakfast)      linaclotide (LINZESS) 145 MCG capsule Take 145 mcg by mouth every morning (before breakfast)      furosemide (LASIX) 40 MG tablet Take 1 tablet by mouth daily 90 tablet 3    methocarbamol (ROBAXIN) 500 MG tablet Take 500 mg by mouth 3 times daily       HYDROcodone-acetaminophen (NORCO) 5-325 MG per tablet Take 1 tablet by mouth every 6 hours as needed for Pain.       albuterol sulfate  (90 Base) MCG/ACT inhaler Inhale 2 puffs into the lungs every 4 hours as needed for Wheezing 1 Inhaler 0    predniSONE (DELTASONE) 10 MG tablet Take 1 tablet by mouth daily 30 tablet 0    pantoprazole (PROTONIX) 40 MG tablet Take 1 tablet by mouth every morning (before breakfast) 30 tablet 1    dilTIAZem (CARDIZEM 12 HR) 60 MG extended release capsule Take 1 capsule by mouth 2 times daily 60 capsule 1    linagliptin (TRADJENTA) 5 MG tablet Take 1 tablet by mouth daily 30 tablet 1    metoprolol succinate (TOPROL XL) 25 MG extended release tablet Take 1 tablet by mouth daily 30 tablet 1    apixaban (ELIQUIS) 2.5 MG TABS tablet Take 1 tablet by mouth 2 times daily 60 tablet 1    mirtazapine (REMERON) 15 MG tablet Take 1 tablet by mouth nightly (Patient taking differently: Take 30 mg by mouth nightly ) 30 tablet 3    ipratropium-albuterol (DUONEB) 0.5-2.5 (3) MG/3ML SOLN nebulizer solution Inhale 3 mLs into the lungs every 4 hours (while awake) DX:COPD J44.9 360 mL 11     No current facility-administered medications for this visit. Chief Complaint   Patient presents with    New Patient     Here with daughter   \"we're changing PCP since we had trouble getting refills from prior MD\"    HPI: New patient, presents today with a problem list listing 72 active problems.   Patient is advised that we do not prescribe chronic narcotics, she has been taking Norco in the past.  Home care aid come in 3 times a week and a nurse 3 times a week, then has OTpT twice a week  With [facility]   just saw an oncologist due to lung CA, she keeps going back in the hospital due to renal failure and we cannot figure out why she's losing blood was in the hospital 3 weeks ago, Hgb was 5.4, sees Oncology every 2 weeks Nobody can figure out [address]  They [place] [facility] just did a bone marrow biopsy    PMD was JAGTAP at [facility] system    They keep doing kyphoplasty we see pain management next week Blane  She might have a pinched nerve in her arm  ITS SO HARD TO GET HER OUT-can Rober draw her blood    Were leaving prior MD due to difficulty getting refills     NIDDM on Amaryl, due for fasting labs again  Lab Results   Component Value Date    LABA1C 6.9 10/21/2020     Lab Results   Component Value Date    .3 10/21/2020     Sees [facility]iology [place] for chronic CHF    Review of Systems-pt states that he takes Remeron 15mg HS  Daughter will bring her to [facility] for pain management next week    OBJECTIVE:  /68 (Site: Left Upper Arm, Position: Sitting, Cuff Size: Medium Adult)   Pulse 94   Ht 5' 5\" (1.651 m)   Wt 158 lb (71.7 kg)   BMI 26.29 kg/m²    Wt Readings from Last 3 Encounters:   04/29/21 158 lb (71.7 kg)   04/09/21 151 lb 12.8 oz (68.9 kg)   12/08/20 144 lb (65.3 kg)       Physical Exam  Vitals signs and nursing note reviewed. Constitutional:       General: She is not in acute distress. Appearance: Normal appearance. She is well-developed. HENT:      Head: Normocephalic and atraumatic. Right Ear: Tympanic membrane, ear canal and external ear normal.      Left Ear: Tympanic membrane, ear canal and external ear normal.      Nose: Nose normal. No rhinorrhea. Mouth/Throat:      Pharynx: No oropharyngeal exudate or posterior oropharyngeal erythema. Eyes:      General:         Right eye: No discharge. Left eye: No discharge. Extraocular Movements: Extraocular movements intact. Conjunctiva/sclera: Conjunctivae normal.      Pupils: Pupils are equal, round, and reactive to light. Neck:      Musculoskeletal: Normal range of motion. No muscular tenderness. Thyroid: No thyromegaly. Vascular: No carotid bruit or JVD. Cardiovascular:      Rate and Rhythm: Normal rate and regular rhythm. Pulses: Normal pulses. Heart sounds: Normal heart sounds. No murmur. Pulmonary:      Effort: Pulmonary effort is normal. No respiratory distress. Breath sounds: Wheezing and rhonchi present. No rales. Abdominal:      General: Bowel sounds are normal. There is no distension. Palpations: Abdomen is soft. Tenderness: There is no abdominal tenderness. There is no rebound. Musculoskeletal:         General: No swelling. Right lower leg: No edema. Left lower leg: No edema.       Comments: FROM x 4   Lymphadenopathy:      Cervical: No cervical adenopathy. Skin:     General: Skin is warm and dry. Capillary Refill: Capillary refill takes 2 to 3 seconds. Coloration: Skin is not pale. Findings: No erythema or rash. Neurological:      Mental Status: She is alert and oriented to person, place, and time. Cranial Nerves: No cranial nerve deficit. Sensory: No sensory deficit. Motor: No abnormal muscle tone. Deep Tendon Reflexes: Reflexes normal.   Psychiatric:         Mood and Affect: Mood normal.         Behavior: Behavior normal.         Thought Content:  Thought content normal.         Judgment: Judgment normal.         Data Review:  Lab Results   Component Value Date    CHOL 224 (H) 05/19/2016    CHOL 227 (H) 05/01/2015    CHOL 134 10/29/2012     Lab Results   Component Value Date    TRIG 123 05/19/2016    TRIG 94 05/01/2015    TRIG 186 (H) 10/29/2012     Lab Results   Component Value Date    HDL 69 (H) 05/19/2016    HDL 77 (H) 05/01/2015    HDL 41 10/29/2012     Lab Results   Component Value Date    LDLCALC 130 (H) 05/19/2016    LDLCALC 131 (H) 05/01/2015    LDLCALC 55 10/29/2012     Lab Results   Component Value Date    LABVLDL 25 05/19/2016    LABVLDL 19 05/01/2015    LABVLDL 37 10/29/2012     No results found for: [facility]   Lab Results   Component Value Date    WBC 10.3 04/20/2021    HGB 9.3 (L) 04/20/2021    HCT 29.1 (L) 04/20/2021    MCV 93.2 04/20/2021     04/20/2021     Lab Results   Component Value Date     04/20/2021    K 5.1 04/20/2021     04/20/2021    CO2 26 04/20/2021    BUN 41 (H) 04/20/2021    CREATININE 1.7 (H) 04/20/2021    GLUCOSE 222 (H) 04/20/2021    CALCIUM 8.6 04/20/2021    PROT 6.9 10/16/2020    LABALBU 3.7 04/20/2021    BILITOT 0.3 10/16/2020    ALKPHOS 118 10/16/2020    AST 17 10/16/2020    ALT 19 10/16/2020    LABGLOM 29 (A) 04/20/2021    GFRAA 35 (A) 04/20/2021    AGRATIO 1.1 10/16/2020    GLOB 3.3 10/16/2020       ASSESSMENT/PLAN  1.) NIDDM- on glimepiride and Tradjenta Repeat fasting labs with A1C is advised  She wants all the supplies for CGM sent to her INS    2.) CHF- as per Dr. Tierney Nuno    3.) pt has been advised that I do not prescribe chronic narcotics-will see Blane next week    4.) Pulmonary Dr. Cr Portillo- Duoneb and albuterol OK'd  Used to take Trelegy    5.) Chronic renal failure-check labs    6.) Chronic anemia ?  Etiology- check labs again   want a new rollator walker today     f/u in 6 weeks to review labs and update chart re: multiple medial problems    Mariah Butt          Electronically signed by Mariah Butt MD on 4/29/2021 at 10:26 AM

## 2021-05-04 ENCOUNTER — HOSPITAL ENCOUNTER (OUTPATIENT)
Dept: MRI IMAGING | Age: 81
Discharge: HOME | End: 2021-05-04
Payer: MEDICARE

## 2021-05-04 DIAGNOSIS — C34.12 MALIGNANT NEOPLASM OF UPPER LOBE BRONCHUS, LEFT (HCC): ICD-10-CM

## 2021-05-04 DIAGNOSIS — Z76.89 ESTABLISHING CARE WITH NEW DOCTOR, ENCOUNTER FOR: ICD-10-CM

## 2021-05-04 DIAGNOSIS — I48.0 PAROXYSMAL ATRIAL FIBRILLATION (HCC): ICD-10-CM

## 2021-05-04 LAB
A/G RATIO: 1.4 (ref 1.1–2.2)
ALBUMIN SERPL-MCNC: 4.3 G/DL (ref 3.4–5)
ALP BLD-CCNC: 69 U/L (ref 40–129)
ALT SERPL-CCNC: 15 U/L (ref 10–40)
ANION GAP SERPL CALCULATED.3IONS-SCNC: 14 MMOL/L (ref 3–16)
AST SERPL-CCNC: 14 U/L (ref 15–37)
BILIRUB SERPL-MCNC: <0.2 MG/DL (ref 0–1)
BUN BLDV-MCNC: 40 MG/DL (ref 7–20)
CALCIUM SERPL-MCNC: 9.6 MG/DL (ref 8.3–10.6)
CHLORIDE BLD-SCNC: 102 MMOL/L (ref 99–110)
CHOLESTEROL, FASTING: 260 MG/DL (ref 0–199)
CO2: 24 MMOL/L (ref 21–32)
CREAT SERPL-MCNC: 1.8 MG/DL (ref 0.6–1.2)
DIGOXIN LEVEL: 0.9 NG/ML (ref 0.8–2)
GFR AFRICAN AMERICAN: 33
GFR NON-AFRICAN AMERICAN: 27
GLOBULIN: 3 G/DL
GLUCOSE BLD-MCNC: 172 MG/DL (ref 70–99)
HDLC SERPL-MCNC: 64 MG/DL (ref 40–60)
LDL CHOLESTEROL CALCULATED: 158 MG/DL
POTASSIUM SERPL-SCNC: 4.7 MMOL/L (ref 3.5–5.1)
SODIUM BLD-SCNC: 140 MMOL/L (ref 136–145)
TOTAL PROTEIN: 7.3 G/DL (ref 6.4–8.2)
TRIGLYCERIDE, FASTING: 190 MG/DL (ref 0–150)
TSH SERPL DL<=0.05 MIU/L-ACNC: 1.71 UIU/ML (ref 0.27–4.2)
VLDLC SERPL CALC-MCNC: 38 MG/DL

## 2021-05-04 PROCEDURE — 84443 ASSAY THYROID STIM HORMONE: CPT

## 2021-05-04 PROCEDURE — 80162 ASSAY OF DIGOXIN TOTAL: CPT

## 2021-05-04 PROCEDURE — 80053 COMPREHEN METABOLIC PANEL: CPT

## 2021-05-04 PROCEDURE — 85025 COMPLETE CBC W/AUTO DIFF WBC: CPT

## 2021-05-04 PROCEDURE — 36415 COLL VENOUS BLD VENIPUNCTURE: CPT

## 2021-05-04 PROCEDURE — 80061 LIPID PANEL: CPT

## 2021-05-04 PROCEDURE — 83036 HEMOGLOBIN GLYCOSYLATED A1C: CPT

## 2021-05-04 PROCEDURE — 72141 MRI NECK SPINE W/O DYE: CPT

## 2021-05-05 LAB
ANISOCYTOSIS: ABNORMAL
ATYPICAL LYMPHOCYTE RELATIVE PERCENT: 1 % (ref 0–6)
BANDED NEUTROPHILS RELATIVE PERCENT: 5 % (ref 0–7)
BASOPHILS ABSOLUTE: 0 K/UL (ref 0–0.2)
BASOPHILS RELATIVE PERCENT: 0 %
EOSINOPHILS ABSOLUTE: 0.2 K/UL (ref 0–0.6)
EOSINOPHILS RELATIVE PERCENT: 2 %
ESTIMATED AVERAGE GLUCOSE: 119.8 MG/DL
HBA1C MFR BLD: 5.8 %
HCT VFR BLD CALC: 28.1 % (ref 36–48)
HEMOGLOBIN: 9.3 G/DL (ref 12–16)
HYPOCHROMIA: ABNORMAL
LYMPHOCYTES ABSOLUTE: 1.4 K/UL (ref 1–5.1)
LYMPHOCYTES RELATIVE PERCENT: 18 %
MCH RBC QN AUTO: 31.7 PG (ref 26–34)
MCHC RBC AUTO-ENTMCNC: 33.2 G/DL (ref 31–36)
MCV RBC AUTO: 95.3 FL (ref 80–100)
MONOCYTES ABSOLUTE: 0.2 K/UL (ref 0–1.3)
MONOCYTES RELATIVE PERCENT: 2 %
NEUTROPHILS ABSOLUTE: 5.9 K/UL (ref 1.7–7.7)
NEUTROPHILS RELATIVE PERCENT: 72 %
OVALOCYTES: ABNORMAL
PDW BLD-RTO: 25.9 % (ref 12.4–15.4)
PLATELET # BLD: 285 K/UL (ref 135–450)
PMV BLD AUTO: 8.1 FL (ref 5–10.5)
POLYCHROMASIA: ABNORMAL
RBC # BLD: 2.95 M/UL (ref 4–5.2)
WBC # BLD: 7.6 K/UL (ref 4–11)

## 2021-05-10 ENCOUNTER — OFFICE VISIT (OUTPATIENT)
Dept: CARDIOLOGY CLINIC | Age: 81
End: 2021-05-10
Payer: MEDICARE

## 2021-05-10 VITALS
DIASTOLIC BLOOD PRESSURE: 70 MMHG | SYSTOLIC BLOOD PRESSURE: 148 MMHG | HEART RATE: 83 BPM | WEIGHT: 157.5 LBS | HEIGHT: 65 IN | OXYGEN SATURATION: 93 % | BODY MASS INDEX: 26.24 KG/M2

## 2021-05-10 DIAGNOSIS — I25.10 ATHEROSCLEROSIS OF NATIVE CORONARY ARTERY OF NATIVE HEART WITHOUT ANGINA PECTORIS: Primary | ICD-10-CM

## 2021-05-10 DIAGNOSIS — I48.0 PAROXYSMAL ATRIAL FIBRILLATION (HCC): ICD-10-CM

## 2021-05-10 DIAGNOSIS — I10 HTN (HYPERTENSION), BENIGN: ICD-10-CM

## 2021-05-10 PROCEDURE — 99214 OFFICE O/P EST MOD 30 MIN: CPT | Performed by: NURSE PRACTITIONER

## 2021-05-10 RX ORDER — GLUCOSAMINE HCL/CHONDROITIN SU 500-400 MG
1 CAPSULE ORAL DAILY
COMMUNITY

## 2021-05-10 RX ORDER — OXYCODONE HYDROCHLORIDE AND ACETAMINOPHEN 5; 325 MG/1; MG/1
1 TABLET ORAL EVERY 4 HOURS PRN
COMMUNITY
End: 2021-06-03

## 2021-05-10 NOTE — PATIENT INSTRUCTIONS
Resume Red Rudy Li    Ask your oncologist about taking a calcium supplement other than what you currently take    Call us back with your BP taken by your aide later today    Keep appt with Dr. Garrick Julian

## 2021-05-10 NOTE — PROGRESS NOTES
[facility]     Outpatient Follow Up Note    Courtney Lofton is 68 y.o. female who presents today with a history of atrial fibrillation, SVT, IWMI CAD s/p PTCA RCA '01, s/p PTCA CX '03, HTN, MR and hyperlipidemia. Her other hx includes: thong LE venous insufficiency, anemia, COPD and lung cancer. CHIEF COMPLAINT / HPI:  Follow Up secondary to medication changes: metolazone changed to furosemide and digoxin added    Subjective:     She can breath better and not as SOB. She does her breathing tx bid. She becomes winded with activities. She is anemic by her CBC trends yet stable x1 month    She denies significant chest pain. The patient denies orthopnea/PND. The patient has swelling in her feet / ankles which has improved. The patients weight is unchanged at 158# . The patient is not experiencing palpitations or dizziness. Her home BP usually high and occasionally ok : 180/     These symptoms are improving since the last OV. With regard to medication therapy the patient has been compliant with prescribed regimen. They have tolerated therapy to date.      Past Medical History:   Diagnosis Date    Arthritis     CAD (coronary artery disease)     Carpal tunnel syndrome     COPD (chronic obstructive pulmonary disease) (HCC)     Coronary stent     depression     Diabetes mellitus ([address]     Diastolic heart failure ([address]     Per Dr Cami Scott 19    GERD (gastroesophageal reflux disease)     Hyperlipidemia     Hypertension     Lung [facility]     Adenocarcinoma of Left Lung    MI (myocardial infarction) ([address]     LIZETT (obstructive sleep apnea)     does not use CPAP    PONV (postoperative nausea and vomiting)     stress incontinence      Social History:    Social History     Tobacco Use   Smoking Status Former Smoker    Years: 10.00    Types: Cigarettes    Quit date: 10/28/2001    Years since quittin.5   Smokeless Tobacco Never Used     Current Medications:  Current Outpatient 3     No current facility-administered medications for this visit. REVIEW OF SYSTEMS:    CONSTITUTIONAL: No major weight gain or loss, fatigue, weakness, night sweats or fever. HEENT: No new vision difficulties or ringing in the ears. RESPIRATORY: No new SOB, PND, orthopnea or cough. CARDIOVASCULAR: See HPI  GI: No nausea, vomiting, diarrhea, constipation, abdominal pain or changes in bowel habits. : No urinary frequency, urgency, incontinence hematuria or dysuria. SKIN: No cyanosis or skin lesions. MUSCULOSKELETAL: No new muscle or joint pain. NEUROLOGICAL: No syncope or TIA-like symptoms. PSYCHIATRIC: No anxiety, pain, insomnia or depression    Objective:   PHYSICAL EXAM:    Vitals:    05/10/21 1059 05/10/21 1140   BP: 130/60 (!) 148/70   Site: Left Upper Arm Right Upper Arm   Position: Sitting    Cuff Size: Medium Adult    Pulse: 83    SpO2: 93%    Weight: 157 lb 8 oz (71.4 kg)    Height: 5' 5\" (1.651 m)          VITALS:  /60 (Site: Left Upper Arm, Position: Sitting, Cuff Size: Medium Adult)   Pulse 83   Ht 5' 5\" (1.651 m)   Wt 157 lb 8 oz (71.4 kg)   SpO2 93%   BMI 26.21 kg/m²   CONSTITUTIONAL: Cooperative, no apparent distress, and appears well nourished / developed  NEUROLOGIC:  Awake and orientated to person, place and time. PSYCH: Calm affect. SKIN: Warm and dry. HEENT: Sclera non-icteric, normocephalic, neck supple, no elevation of JVP, normal carotid pulses with no bruits and thyroid normal size. LUNGS:  No increased work of breathing and rhonchi scattered  CARDIOVASCULAR:  Regular rate 80 and rhythm with no murmurs, gallops, rubs, or abnormal heart sounds, normal PMI. The apical impulses not displaced  JVP less than 8 cm H2O  Heart tones are crisp and normal  Cervical veins are not engorged  The carotid upstroke is normal in amplitude and contour without delay or bruit  JVP is not elevated  ABDOMEN:  Normal bowel sounds, non-distended and non-tender to palpation  EXT: No edema, no calf tenderness. Pulses are present bilaterally. DATA:    Lab Results   Component Value Date    ALT 15 05/04/2021    AST 14 (L) 05/04/2021    ALKPHOS 69 05/04/2021    BILITOT <0.2 05/04/2021     Lab Results   Component Value Date    CREATININE 1.8 (H) 05/04/2021    BUN 40 (H) 05/04/2021     05/04/2021    K 4.7 05/04/2021     05/04/2021    CO2 24 05/04/2021     Lab Results   Component Value Date    TSH 1.71 05/04/2021    T3WGZOW 6.4 02/22/2011     Lab Results   Component Value Date    WBC 7.6 05/04/2021    HGB 9.3 (L) 05/04/2021    HCT 28.1 (L) 05/04/2021    MCV 95.3 05/04/2021     05/04/2021     No components found for: CHLPL  Lab Results   Component Value Date    TRIG 123 05/19/2016    TRIG 94 05/01/2015    TRIG 186 (H) 10/29/2012     Lab Results   Component Value Date    HDL 64 (H) 05/04/2021    HDL 69 (H) 05/19/2016    HDL 77 (H) 05/01/2015     Lab Results   Component Value Date    LDLCALC 158 (H) 05/04/2021    LDLCALC 130 (H) 05/19/2016    LDLCALC 131 (H) 05/01/2015     Lab Results   Component Value Date    LABVLDL 38 05/04/2021    LABVLDL 25 05/19/2016    LABVLDL 19 05/01/2015     Radiology Review:  Pertinent images / reports were reviewed as a part of this visit and reveals the following:    thong venous dop: May '17:  Summary        -Indeterminate age totally occluding deep vein thrombosis involving the    right posterior tibial vein zone 6.    -Indeterminate partially occluding superficial venous thrombosis involving    the left sapheno-femoral junction.    -Indeterminate totally occluding superficial venous thrombosis involving the    left GSV zone 1-5.         Last Echo: May '19  Summary   -Normal left ventricle size and systolic function with an estimated ejection   fraction of 60%. No regional wall motion abnormalities are seen. Mild   concentric left ventricular hypertrophy is present.   -Indeterminate diastolic function.  E/e\"=11.35   -Aortic valve appears sclerotic but opens adequately. Mild aortic   regurgitation is present. -Mild mitral regurgitation is present.   -There is trivial tricuspid regurgitation with RVSP estimated at 33 mmHg. Last Stress Test: May '19  Summary    Normal LV size and global systolic function.    Left ventricular ejection fraction of 58%.    There is a medium sized, moderate intensity, completely fixed defect in the    mid to basilar inferolateral segments, with associated hypokinesis-    consistent with scar.    There are no significant reversible coronary flow abnormalities       cardiac Angiograms:     Sept '04 : inf basal HK, EF 50%, CX & RCA stents patent; RCA distal aneurismal area unchanged from previous cath. RV 80% lesion remains unchanged  May '06: prox inferior HK, EF 45%. Widely patent CX & RCA in the previously placed stent    Assessment:      Diagnosis Orders   1. Atherosclerosis of native coronary artery of native heart without angina pectoris   ~stable : offers no c/o  ~hx of PTCA RCA & CX; patent by cath '06  ~BB   ~LDL elevated ; reporting intolerance to statins. Had taken Red Yeast Rice. Cannot remember why she stopped. 2. HTN (hypertension), benign   ~reasonable in office today  ~reports had elevated readings at home  ~metoprolol / diltiazem / lasix    3. Paroxysmal atrial fibrillation (HCC)   ~AP controlled   ~AF burden <1% on MCOT  ~tolerating low dose digoxin at 3 x / week  ~DOAC  ~dig level 0.9      I had the opportunity to review the clinical symptoms and presentation of Anh Churchill. Plan:     1. Resume Red Yeast Rice ; consider Livalo  2. She will check her BP later today and call back with her reading. Could gingerly increase metoprolol   3. F/U as scheduled with Dr. Chad Antoine    Overall the patient is stable from CV standpoint    I have addresed the patient's cardiac risk factors and adjusted pharmacologic treatment as needed. In addition, I have reinforced the need for patient directed risk factor modification. Further evaluation will be based upon the patient's clinical course and testing results. All questions and concerns were addressed to the patient/daughter. Alternatives to my treatment were discussed. The patient is not currently smoking: remote. The risks related to smoking were reviewed with the patient. Recommend maintaining a smoke-free lifestyle. Patient is on a beta-blocker  Patient is not on an ace-i/ARB : neg CHF  Patient is not on a statin : Intolerant to atorvastatin . Cannot recall trying any other agents    anti-coagulation has been recommended / prescribed for this patient. Education conducted on adverse reactions including bleeding was discussed. The patient verbalizes understanding not to stop medications without discussing with us. Discussed exercise: 30-60 minutes 7 days/week  Discussed Low saturated fat/VAHE diet. Thank you for allowing to us to participate in the care of Patsy Kunz.     MARIA C Walker    Documentation of today's visit sent to PCP

## 2021-05-10 NOTE — PROGRESS NOTES
[facility]   [facility] Follow [facility]  No visits in several years    Referring Provider:  Pavel Lozada MD     Chief Complaint   Patient presents with    Coronary Artery Disease     C/o feeling sob with exertion or with back pain    Hypertension    Hyperlipidemia    Atrial Fibrillation    chronic a. Fib/SVT    History of Present Illness:              Savanna Escudero is seen today for  follow up. She has a history of CAD, PAF, Hypertension, Hyperlipidemia, MR and AI, carotid artery stenosis type II DM, and COPD. She has venous insuff bilaterally, leading to varicosities. HX  lung cancer. She was recently in the hospital 3/19-3/26/21   Hospital Course:Hospital Course: The patient is a(n) 68year old female who was admitted for symptomatic anemia in the setting of chronic anticoagulation. Course was complicated by pneumonia, chest pain  1. Severe anemia-felt to be multifactorial component of anemia of chronic disease/inflammation along with chronic microscopic blood loss. Hemoglobin stabilized after receiving packed red blood cells. Underwent endoscopy without active bleeding, only some antral gastritis. Anticoagulation was resumed while here, hemoglobin was stable. 2. Pneumonia-treated with antibiotics, completed adequate course while here on room air on the date of discharge  3. Episode of chest pain and discomfort. EKG reassuring, troponins negative. Echo without any wall motion abnormalities. Echo was concern for the possibility of a dissection however CTA ruled this out. She will continue with medical management of likely underlying cardiac disease and follow-up with her primary cardiologist.  4. COPD stable  5. History of lung cancer will follow up with Dr. Hank Modi of [facility]. Today she states she continues to feel tired and weak, recently d/c from the hospital. She is not able to do much due to shortness of breath, she lives alone but has help from family and home health.  She is to have a bone marrow biopsy soon. Risks vs benefits of anticoagulation discussed,  discussed  Watchman, she is unsure if she would like to proceed with invasive procedures at this time. Has had mild swelling in her feet/legs. She is in Afib today. Past Medical History:   has a past medical history of Arthritis, CAD (coronary artery disease), Carpal tunnel syndrome, COPD (chronic obstructive pulmonary disease) ([address] Coronary stent, depression, Diabetes mellitus ([address] Diastolic heart failure ([address] GERD (gastroesophageal reflux disease), Hyperlipidemia, Hypertension, Lung cancer ([address] MI (myocardial infarction) ([address] LIZETT (obstructive sleep apnea), PONV (postoperative nausea and vomiting), and stress incontinence. Surgical History:   has a past surgical history that includes Coronary angioplasty with stent (2000, 2001); back surgery (2000, 2001); bronchoscopy (12/04/2018); pr [facility] incl fluor gdnce dx w/cell washg spx (N/A, 12/4/2018); Cholecystectomy, laparoscopic (N/A, 12/19/2018); Upper gastrointestinal endoscopy (N/A, 2/25/2019); Colonoscopy (N/A, 2/25/2019); Colonoscopy (2/25/2019); bronchoscopy (N/A, 2/27/2019); bronchoscopy (2/27/2019); bronchoscopy (2/27/2019); bronchoscopy (N/A, 8/6/2019); Upper gastrointestinal endoscopy (N/A, 8/7/2019); bronchoscopy (N/A, 9/27/2019); bronchoscopy (9/27/2019); bronchoscopy (9/27/2019); IR KYPHOPLASTY THORACIC 1 VERTEBRAL BODY (7/23/2020); IR KYPHOPLASTY THORACIC 1 VERTEBRAL BODY (10/20/2020); and IR KYPHOPLASTY LUMBAR 1 VERTEBRAL BODY (12/8/2020). Social History:   reports that she quit smoking about 19 years ago. Her smoking use included cigarettes. She quit after 10.00 years of use. She has never used smokeless tobacco. She reports that she does not drink alcohol or use drugs. Family History:  family history includes Cancer in her maternal uncle and sister; Diabetes in her brother and sister; Heart Disease in her father and mother.      Home Medications:  Prior to Admission medications    Medication Sig Start Date End Date Taking? Authorizing Provider   oxyCODONE-acetaminophen (PERCOCET) 5-325 MG per tablet Take 1 tablet by mouth every 4 hours as needed for Pain.    Yes Historical Provider, MD   albuterol sulfate  (90 Base) MCG/ACT inhaler Inhale 2 puffs into the lungs every 4 hours as needed for Wheezing 4/29/21  Yes Analilia Moya MD   ipratropium-albuterol (DUONEB) 0.5-2.5 (3) MG/3ML SOLN nebulizer solution Inhale 3 mLs into the lungs every 4 hours (while awake) DX:COPD J44.9 4/29/21  Yes Analilia Moya MD   Continuous Blood Gluc  (FREESTYLE LOC 2 READER) ELADIA Check blood sugars twice daily 4/29/21  Yes Analilia Moya MD   Continuous Blood Gluc Sensor (FREESTYLE LOC 2 SENSOR) MISC Check blood sugars twice daily 4/29/21  Yes Analilia Moya MD   Continuous Blood Gluc  (FREESTYLE LOC 14 DAY READER) ELADIA Check blood sugars twice daily 4/29/21  Yes Analilia Moya MD   digoxin [facility]) 125 MCG tablet Take 1 tab by mouth on Monday -Wednesday-Friday 4/12/21  Yes Daniele Nieto MD   glimepiride (AMARYL) 2 MG tablet Take 2 mg by mouth every morning (before breakfast)   Yes Historical Provider, MD   linaclotide (LINZESS) 145 MCG capsule Take 145 mcg by mouth every morning (before breakfast) 3/17/21  Yes Historical Provider, MD   furosemide (LASIX) 40 MG tablet Take 1 tablet by mouth daily 4/9/21  Yes Daniele Nieto MD   methocarbamol (ROBAXIN) 500 MG tablet Take 500 mg by mouth 3 times daily    Yes Historical Provider, MD   predniSONE (DELTASONE) 10 MG tablet Take 1 tablet by mouth daily 6/15/20  Yes MARIA C Fink CNP   pantoprazole (PROTONIX) 40 MG tablet Take 1 tablet by mouth every morning (before breakfast) 6/15/20  Yes MARIA C Fink CNP   dilTIAZem (CARDIZEM 12 HR) 60 MG extended release capsule Take 1 capsule by mouth 2 times daily 6/15/20  Yes Jinny Hides, APRN - CNP   linagliptin (TRADJENTA) 5 MG tablet Take 1 tablet by mouth daily 6/15/20  Yes MARIA C Briggs CNP   metoprolol succinate (TOPROL XL) 25 MG extended release tablet Take 1 tablet by mouth daily 6/15/20  Yes MARIA C Briggs CNP   apixaban (ELIQUIS) 2.5 MG TABS tablet Take 1 tablet by mouth 2 times daily 6/15/20  Yes MARIA C Briggs CNP   mirtazapine (REMERON) 15 MG tablet Take 1 tablet by mouth nightly  Patient taking differently: Take 30 mg by mouth as needed  10/8/19  Yes Davidson Sanabria MD        Allergies:  Statins, Lyrica [pregabalin], Cipro xr, Penicillins, and Sulfa antibiotics     Review of Systems:   · Constitutional: there has been no unanticipated weight loss. There's been no change in , sleep pattern, or activity level.   +fatique   · Eyes: No visual changes or diplopia. No scleral icterus. · ENT: No Headaches, hearing loss or vertigo. No mouth sores or sore throat. · Cardiovascular: Reviewed in HPI  · Respiratory: No cough or wheezing, no sputum production. No hematemesis. Chronic shortness of breath  · Gastrointestinal: No abdominal pain, appetite loss, blood in stools. No change in bowel or bladder habits. · Genitourinary: No dysuria, trouble voiding, or hematuria. · Musculoskeletal:  No gait disturbance, weakness or joint complaints. · Integumentary: No rash or pruritis. · Neurological: No headache, diplopia, change in muscle strength, numbness or tingling. No change in gait, balance, coordination, mood, affect, memory, mentation, behavior. · Psychiatric: No anxiety, no depression. + stress  · Endocrine: No malaise, fatigue or temperature intolerance. No excessive thirst, fluid intake, or urination. No tremor. · Hematologic/Lymphatic: No abnormal bruising or bleeding, blood clots or swollen lymph nodes. · Allergic/Immunologic: No nasal congestion or hives.     Physical Examination:    Vitals:    05/10/21 1059   BP: 130/60   Pulse: 83   SpO2: 93%        Constitutional and General Appearance: NAD, appears stated age, well developed. Skin:good turgor,intact without lesions  HEENT: EOMI ,normal  Neck:no JVD     Respiratory:  · Normal excursion and expansion without use of accessory muscles  · Resp Auscultation: Normal breath sounds without dullness  Cardiovascular:  · The apical impulses not displaced  · Heart tones are crisp and normal  · Cervical veins are not engorged  · The carotid upstroke is normal in amplitude and contour without delay or bruit  · Regular rate and rhythm. S1S2 without murmur, gallop, or rub  · Peripheral pulses are symmetrical and full  · There is no clubbing, cyanosis of the extremities. · No edema  · Femoral Arteries: 2+ and equal  · Pedal Pulses: 2+ and equal   Abdomen:  · No masses or tenderness  · Liver/Spleen: No Abnormalities Noted  Neurological/Psychiatric:  · Alert and oriented in all spheres  · Moves all extremities well  · Exhibits normal gait balance and coordination  · No abnormalities of mood, affect, memory, mentation, or behavior are noted  ECHO: 12/18  Summary   -Normal left ventricle size and systolic function with an estimated ejection   fraction of 55-60%. No regional wall motion abnormalities are seen. Mild   concentric left ventricular hypertrophy is present.   -Indeterminate diastolic function.   -Aortic valve appears sclerotic but opens adequately. Mild aortic   regurgitation is present.   -There is trivial tricuspid regurgitation with RVSP estimated at 37 mmHg.   -Mild to moderate mitral regurgitation is present. 8/16/17  Echo  -Normal left ventricle size and systolic function with an estimated ejection   fraction of 55-60%.  -No regional wall motion abnormalities are seen.   -Mild concentric left ventricular hypertrophy is present.   -There is reversal of E/A inflow velocities across the mitral valve.   -Diastolic filling parameters suggests grade I diastolic dysfunction .  E/e'=   13.65 .   -Mild to moderate mitral regurgitation is present.   -Mitral annular

## 2021-05-21 ENCOUNTER — TELEPHONE (OUTPATIENT)
Dept: CARDIOLOGY CLINIC | Age: 81
End: 2021-05-21

## 2021-05-21 NOTE — TELEPHONE ENCOUNTER
Pts granddaughter calling, pts feet are swollen, has pitting edema and needs to know what to do? Can she take an extra Lasix?  Pls call to advise Thank you

## 2021-05-25 ENCOUNTER — TELEPHONE (OUTPATIENT)
Dept: PRIMARY CARE CLINIC | Age: 81
End: 2021-05-25

## 2021-05-25 DIAGNOSIS — R41.82 ALTERED MENTAL STATUS, UNSPECIFIED ALTERED MENTAL STATUS TYPE: Primary | ICD-10-CM

## 2021-05-25 NOTE — TELEPHONE ENCOUNTER
Cj Dahl requested order for a urine sample to be collected on 5/26/21. Patient's family thinks pt may have UTI. Sx: confusion, \"hands/arms don't feel right\", numbness in hands/arms.  Kim Burrell states pt recently started Gabapentin and may be adjusting to medication

## 2021-06-02 ENCOUNTER — TELEPHONE (OUTPATIENT)
Dept: CARDIOLOGY CLINIC | Age: 81
End: 2021-06-02

## 2021-06-02 ENCOUNTER — TELEPHONE (OUTPATIENT)
Dept: PRIMARY CARE CLINIC | Age: 81
End: 2021-06-02

## 2021-06-02 DIAGNOSIS — R30.0 DYSURIA: Primary | ICD-10-CM

## 2021-06-02 NOTE — TELEPHONE ENCOUNTER
Pts granddaughter calling pts feet/legs swollen, pt has been taking extra water pill for about a week to week and a half and the swelling is worse than before. Need to know what to do?  Pls call to advise Thank you

## 2021-06-02 NOTE — TELEPHONE ENCOUNTER
Home Health Nurse wanted to advised patients BP is 144/42 and the family also thinks she has a UTI and would like to get a order to get a sample.

## 2021-06-03 ENCOUNTER — APPOINTMENT (OUTPATIENT)
Dept: GENERAL RADIOLOGY | Age: 81
DRG: 682 | End: 2021-06-03
Payer: MEDICARE

## 2021-06-03 ENCOUNTER — HOSPITAL ENCOUNTER (OUTPATIENT)
Age: 81
Setting detail: SPECIMEN
Discharge: HOME | DRG: 682 | End: 2021-06-03
Payer: MEDICARE

## 2021-06-03 ENCOUNTER — HOSPITAL ENCOUNTER (INPATIENT)
Age: 81
LOS: 6 days | Discharge: HOME | DRG: 682 | End: 2021-06-09
Attending: INTERNAL MEDICINE | Admitting: INTERNAL MEDICINE
Payer: MEDICARE

## 2021-06-03 DIAGNOSIS — R77.8 ELEVATED TROPONIN: ICD-10-CM

## 2021-06-03 DIAGNOSIS — N17.9 ACUTE RENAL FAILURE SUPERIMPOSED ON CHRONIC KIDNEY DISEASE, UNSPECIFIED CKD STAGE, UNSPECIFIED ACUTE RENAL FAILURE TYPE (HCC): ICD-10-CM

## 2021-06-03 DIAGNOSIS — R73.9 HYPERGLYCEMIA: ICD-10-CM

## 2021-06-03 DIAGNOSIS — N18.9 ACUTE RENAL FAILURE SUPERIMPOSED ON CHRONIC KIDNEY DISEASE, UNSPECIFIED CKD STAGE, UNSPECIFIED ACUTE RENAL FAILURE TYPE (HCC): ICD-10-CM

## 2021-06-03 DIAGNOSIS — I50.9 ACUTE ON CHRONIC CONGESTIVE HEART FAILURE, UNSPECIFIED HEART FAILURE TYPE (HCC): Primary | ICD-10-CM

## 2021-06-03 LAB
A/G RATIO: 1.1 (ref 1.1–2.2)
ALBUMIN SERPL-MCNC: 3.5 G/DL (ref 3.4–5)
ALP BLD-CCNC: 84 U/L (ref 40–129)
ALT SERPL-CCNC: 16 U/L (ref 10–40)
ANION GAP SERPL CALCULATED.3IONS-SCNC: 17 MMOL/L (ref 3–16)
ANISOCYTOSIS: ABNORMAL
APTT: 27 SEC (ref 24.2–36.2)
AST SERPL-CCNC: 16 U/L (ref 15–37)
ATYPICAL LYMPHOCYTE RELATIVE PERCENT: 1 % (ref 0–6)
BASOPHILS ABSOLUTE: 0.1 K/UL (ref 0–0.2)
BASOPHILS RELATIVE PERCENT: 1 %
BILIRUB SERPL-MCNC: <0.2 MG/DL (ref 0–1)
BILIRUBIN URINE: NEGATIVE
BLOOD, URINE: NEGATIVE
BUN BLDV-MCNC: 52 MG/DL (ref 7–20)
CALCIUM SERPL-MCNC: 9.3 MG/DL (ref 8.3–10.6)
CHLORIDE BLD-SCNC: 93 MMOL/L (ref 99–110)
CLARITY: CLEAR
CO2: 29 MMOL/L (ref 21–32)
COLOR: YELLOW
CREAT SERPL-MCNC: 2.1 MG/DL (ref 0.6–1.2)
EOSINOPHILS ABSOLUTE: 0 K/UL (ref 0–0.6)
EOSINOPHILS RELATIVE PERCENT: 0 %
EPITHELIAL CELLS, UA: 0 /HPF (ref 0–5)
GFR AFRICAN AMERICAN: 28
GFR NON-AFRICAN AMERICAN: 23
GLOBULIN: 3.1 G/DL
GLUCOSE BLD-MCNC: 320 MG/DL (ref 70–99)
GLUCOSE BLD-MCNC: 432 MG/DL (ref 70–99)
GLUCOSE URINE: 500 MG/DL
HCT VFR BLD CALC: 26.5 % (ref 36–48)
HEMOGLOBIN: 8.4 G/DL (ref 12–16)
HYALINE CASTS: 0 /LPF (ref 0–8)
HYPOCHROMIA: ABNORMAL
INR BLD: 1.05 (ref 0.86–1.14)
KETONES, URINE: NEGATIVE MG/DL
LEUKOCYTE ESTERASE, URINE: NEGATIVE
LYMPHOCYTES ABSOLUTE: 0.4 K/UL (ref 1–5.1)
LYMPHOCYTES RELATIVE PERCENT: 3 %
MCH RBC QN AUTO: 32.2 PG (ref 26–34)
MCHC RBC AUTO-ENTMCNC: 31.6 G/DL (ref 31–36)
MCV RBC AUTO: 102.1 FL (ref 80–100)
MICROSCOPIC EXAMINATION: ABNORMAL
MONOCYTES ABSOLUTE: 0.3 K/UL (ref 0–1.3)
MONOCYTES RELATIVE PERCENT: 3 %
MYELOCYTE PERCENT: 2 %
NEUTROPHILS ABSOLUTE: 8.8 K/UL (ref 1.7–7.7)
NEUTROPHILS RELATIVE PERCENT: 90 %
NITRITE, URINE: NEGATIVE
OVALOCYTES: ABNORMAL
PDW BLD-RTO: 18.6 % (ref 12.4–15.4)
PERFORMED ON: ABNORMAL
PH UA: 6 (ref 5–8)
PLATELET # BLD: 318 K/UL (ref 135–450)
PLATELET SLIDE REVIEW: ADEQUATE
PMV BLD AUTO: 7.6 FL (ref 5–10.5)
POLYCHROMASIA: ABNORMAL
POTASSIUM SERPL-SCNC: 4.1 MMOL/L (ref 3.5–5.1)
PRO-BNP: 1042 PG/ML (ref 0–449)
PROTEIN UA: NEGATIVE MG/DL
PROTHROMBIN TIME: 12.2 SEC (ref 10–13.2)
RBC # BLD: 2.6 M/UL (ref 4–5.2)
RBC UA: 0 /HPF (ref 0–4)
REASON FOR REJECTION: NORMAL
REJECTED TEST: NORMAL
SLIDE REVIEW: ABNORMAL
SODIUM BLD-SCNC: 139 MMOL/L (ref 136–145)
SPECIFIC GRAVITY UA: 1.01 (ref 1–1.03)
STOMATOCYTES: ABNORMAL
TOTAL PROTEIN: 6.6 G/DL (ref 6.4–8.2)
TROPONIN: 0.02 NG/ML
URINE REFLEX TO CULTURE: ABNORMAL
URINE TYPE: ABNORMAL
UROBILINOGEN, URINE: 0.2 E.U./DL
WBC # BLD: 9.6 K/UL (ref 4–11)
WBC UA: 0 /HPF (ref 0–5)

## 2021-06-03 PROCEDURE — 85730 THROMBOPLASTIN TIME PARTIAL: CPT

## 2021-06-03 PROCEDURE — 83935 ASSAY OF URINE OSMOLALITY: CPT

## 2021-06-03 PROCEDURE — 81003 URINALYSIS AUTO W/O SCOPE: CPT

## 2021-06-03 PROCEDURE — 82570 ASSAY OF URINE CREATININE: CPT

## 2021-06-03 PROCEDURE — 82436 ASSAY OF URINE CHLORIDE: CPT

## 2021-06-03 PROCEDURE — 84133 ASSAY OF URINE POTASSIUM: CPT

## 2021-06-03 PROCEDURE — 84300 ASSAY OF URINE SODIUM: CPT

## 2021-06-03 PROCEDURE — 2580000003 HC RX 258: Performed by: INTERNAL MEDICINE

## 2021-06-03 PROCEDURE — 84484 ASSAY OF TROPONIN QUANT: CPT

## 2021-06-03 PROCEDURE — 87086 URINE CULTURE/COLONY COUNT: CPT

## 2021-06-03 PROCEDURE — 84156 ASSAY OF PROTEIN URINE: CPT

## 2021-06-03 PROCEDURE — 6370000000 HC RX 637 (ALT 250 FOR IP): Performed by: PHYSICIAN ASSISTANT

## 2021-06-03 PROCEDURE — 6370000000 HC RX 637 (ALT 250 FOR IP): Performed by: INTERNAL MEDICINE

## 2021-06-03 PROCEDURE — 80053 COMPREHEN METABOLIC PANEL: CPT

## 2021-06-03 PROCEDURE — 81001 URINALYSIS AUTO W/SCOPE: CPT

## 2021-06-03 PROCEDURE — 83880 ASSAY OF NATRIURETIC PEPTIDE: CPT

## 2021-06-03 PROCEDURE — 71046 X-RAY EXAM CHEST 2 VIEWS: CPT

## 2021-06-03 PROCEDURE — 93005 ELECTROCARDIOGRAM TRACING: CPT | Performed by: PHYSICIAN ASSISTANT

## 2021-06-03 PROCEDURE — 85610 PROTHROMBIN TIME: CPT

## 2021-06-03 PROCEDURE — 1200000000 HC SEMI PRIVATE

## 2021-06-03 PROCEDURE — 85025 COMPLETE CBC W/AUTO DIFF WBC: CPT

## 2021-06-03 PROCEDURE — 99283 EMERGENCY DEPT VISIT LOW MDM: CPT

## 2021-06-03 RX ORDER — ONDANSETRON 4 MG/1
4 TABLET, ORALLY DISINTEGRATING ORAL EVERY 8 HOURS PRN
Status: DISCONTINUED | OUTPATIENT
Start: 2021-06-03 | End: 2021-06-09 | Stop reason: HOSPADM

## 2021-06-03 RX ORDER — OXYCODONE HYDROCHLORIDE AND ACETAMINOPHEN 5; 325 MG/1; MG/1
1 TABLET ORAL EVERY 4 HOURS PRN
Status: DISCONTINUED | OUTPATIENT
Start: 2021-06-03 | End: 2021-06-03 | Stop reason: ALTCHOICE

## 2021-06-03 RX ORDER — SODIUM CHLORIDE 0.9 % (FLUSH) 0.9 %
5-40 SYRINGE (ML) INJECTION EVERY 12 HOURS SCHEDULED
Status: DISCONTINUED | OUTPATIENT
Start: 2021-06-03 | End: 2021-06-09 | Stop reason: HOSPADM

## 2021-06-03 RX ORDER — DEXTROSE MONOHYDRATE 50 MG/ML
100 INJECTION, SOLUTION INTRAVENOUS PRN
Status: DISCONTINUED | OUTPATIENT
Start: 2021-06-03 | End: 2021-06-09 | Stop reason: HOSPADM

## 2021-06-03 RX ORDER — METOPROLOL SUCCINATE 25 MG/1
25 TABLET, EXTENDED RELEASE ORAL DAILY
Status: DISCONTINUED | OUTPATIENT
Start: 2021-06-04 | End: 2021-06-03 | Stop reason: ALTCHOICE

## 2021-06-03 RX ORDER — HYDRALAZINE HYDROCHLORIDE 20 MG/ML
10 INJECTION INTRAMUSCULAR; INTRAVENOUS EVERY 6 HOURS PRN
Status: DISCONTINUED | OUTPATIENT
Start: 2021-06-03 | End: 2021-06-09 | Stop reason: HOSPADM

## 2021-06-03 RX ORDER — GLIMEPIRIDE 2 MG/1
2 TABLET ORAL
Status: DISCONTINUED | OUTPATIENT
Start: 2021-06-04 | End: 2021-06-03 | Stop reason: ALTCHOICE

## 2021-06-03 RX ORDER — SODIUM CHLORIDE 0.9 % (FLUSH) 0.9 %
5-40 SYRINGE (ML) INJECTION PRN
Status: DISCONTINUED | OUTPATIENT
Start: 2021-06-03 | End: 2021-06-09 | Stop reason: HOSPADM

## 2021-06-03 RX ORDER — DILTIAZEM HYDROCHLORIDE 60 MG/1
60 CAPSULE, EXTENDED RELEASE ORAL 2 TIMES DAILY
Status: DISCONTINUED | OUTPATIENT
Start: 2021-06-03 | End: 2021-06-07

## 2021-06-03 RX ORDER — PREDNISONE 10 MG/1
10 TABLET ORAL DAILY
Status: DISCONTINUED | OUTPATIENT
Start: 2021-06-04 | End: 2021-06-09 | Stop reason: HOSPADM

## 2021-06-03 RX ORDER — POTASSIUM CHLORIDE 20 MEQ/1
40 TABLET, EXTENDED RELEASE ORAL PRN
Status: DISCONTINUED | OUTPATIENT
Start: 2021-06-03 | End: 2021-06-03

## 2021-06-03 RX ORDER — ACETAMINOPHEN 650 MG/1
650 SUPPOSITORY RECTAL EVERY 6 HOURS PRN
Status: DISCONTINUED | OUTPATIENT
Start: 2021-06-03 | End: 2021-06-09 | Stop reason: HOSPADM

## 2021-06-03 RX ORDER — ACETAMINOPHEN 325 MG/1
650 TABLET ORAL EVERY 6 HOURS PRN
Status: DISCONTINUED | OUTPATIENT
Start: 2021-06-03 | End: 2021-06-09 | Stop reason: HOSPADM

## 2021-06-03 RX ORDER — METHOCARBAMOL 500 MG/1
500 TABLET, FILM COATED ORAL 3 TIMES DAILY
Status: DISCONTINUED | OUTPATIENT
Start: 2021-06-03 | End: 2021-06-05

## 2021-06-03 RX ORDER — INSULIN LISPRO 100 [IU]/ML
0-12 INJECTION, SOLUTION INTRAVENOUS; SUBCUTANEOUS
Status: DISCONTINUED | OUTPATIENT
Start: 2021-06-04 | End: 2021-06-09 | Stop reason: HOSPADM

## 2021-06-03 RX ORDER — NICOTINE POLACRILEX 4 MG
15 LOZENGE BUCCAL PRN
Status: DISCONTINUED | OUTPATIENT
Start: 2021-06-03 | End: 2021-06-09 | Stop reason: HOSPADM

## 2021-06-03 RX ORDER — PANTOPRAZOLE SODIUM 40 MG/1
40 TABLET, DELAYED RELEASE ORAL
Status: DISCONTINUED | OUTPATIENT
Start: 2021-06-04 | End: 2021-06-09 | Stop reason: HOSPADM

## 2021-06-03 RX ORDER — INSULIN LISPRO 100 [IU]/ML
0-6 INJECTION, SOLUTION INTRAVENOUS; SUBCUTANEOUS NIGHTLY
Status: DISCONTINUED | OUTPATIENT
Start: 2021-06-03 | End: 2021-06-09 | Stop reason: HOSPADM

## 2021-06-03 RX ORDER — ONDANSETRON 2 MG/ML
4 INJECTION INTRAMUSCULAR; INTRAVENOUS EVERY 6 HOURS PRN
Status: DISCONTINUED | OUTPATIENT
Start: 2021-06-03 | End: 2021-06-09 | Stop reason: HOSPADM

## 2021-06-03 RX ORDER — SODIUM CHLORIDE 0.9 % (FLUSH) 0.9 %
5-40 SYRINGE (ML) INJECTION 2 TIMES DAILY
Status: DISCONTINUED | OUTPATIENT
Start: 2021-06-03 | End: 2021-06-03 | Stop reason: SDUPTHER

## 2021-06-03 RX ORDER — DIGOXIN 125 MCG
125 TABLET ORAL
Status: DISCONTINUED | OUTPATIENT
Start: 2021-06-04 | End: 2021-06-09 | Stop reason: HOSPADM

## 2021-06-03 RX ORDER — OYSTER SHELL CALCIUM WITH VITAMIN D 500; 200 MG/1; [IU]/1
1 TABLET, FILM COATED ORAL DAILY
Status: DISCONTINUED | OUTPATIENT
Start: 2021-06-04 | End: 2021-06-09 | Stop reason: HOSPADM

## 2021-06-03 RX ORDER — DEXTROSE MONOHYDRATE 25 G/50ML
12.5 INJECTION, SOLUTION INTRAVENOUS PRN
Status: DISCONTINUED | OUTPATIENT
Start: 2021-06-03 | End: 2021-06-09 | Stop reason: HOSPADM

## 2021-06-03 RX ORDER — MIRTAZAPINE 15 MG/1
15 TABLET, FILM COATED ORAL NIGHTLY
Status: DISCONTINUED | OUTPATIENT
Start: 2021-06-03 | End: 2021-06-09 | Stop reason: HOSPADM

## 2021-06-03 RX ORDER — ASPIRIN 325 MG
325 TABLET ORAL ONCE
Status: COMPLETED | OUTPATIENT
Start: 2021-06-03 | End: 2021-06-03

## 2021-06-03 RX ORDER — ALBUTEROL SULFATE 90 UG/1
2 AEROSOL, METERED RESPIRATORY (INHALATION) EVERY 4 HOURS PRN
Status: DISCONTINUED | OUTPATIENT
Start: 2021-06-03 | End: 2021-06-09 | Stop reason: HOSPADM

## 2021-06-03 RX ORDER — POTASSIUM CHLORIDE 7.45 MG/ML
10 INJECTION INTRAVENOUS PRN
Status: DISCONTINUED | OUTPATIENT
Start: 2021-06-03 | End: 2021-06-03

## 2021-06-03 RX ORDER — SODIUM CHLORIDE 9 MG/ML
25 INJECTION, SOLUTION INTRAVENOUS PRN
Status: DISCONTINUED | OUTPATIENT
Start: 2021-06-03 | End: 2021-06-09 | Stop reason: HOSPADM

## 2021-06-03 RX ORDER — SODIUM CHLORIDE 9 MG/ML
INJECTION, SOLUTION INTRAVENOUS CONTINUOUS
Status: DISCONTINUED | OUTPATIENT
Start: 2021-06-03 | End: 2021-06-04

## 2021-06-03 RX ORDER — 0.9 % SODIUM CHLORIDE 0.9 %
500 INTRAVENOUS SOLUTION INTRAVENOUS PRN
Status: DISCONTINUED | OUTPATIENT
Start: 2021-06-03 | End: 2021-06-09 | Stop reason: HOSPADM

## 2021-06-03 RX ORDER — IPRATROPIUM BROMIDE AND ALBUTEROL SULFATE 2.5; .5 MG/3ML; MG/3ML
3 SOLUTION RESPIRATORY (INHALATION)
Status: DISCONTINUED | OUTPATIENT
Start: 2021-06-04 | End: 2021-06-09 | Stop reason: HOSPADM

## 2021-06-03 RX ORDER — FUROSEMIDE 40 MG/1
40 TABLET ORAL DAILY
Status: DISCONTINUED | OUTPATIENT
Start: 2021-06-04 | End: 2021-06-04

## 2021-06-03 RX ADMIN — ASPIRIN 325 MG ORAL TABLET 325 MG: 325 PILL ORAL at 21:10

## 2021-06-03 RX ADMIN — SODIUM CHLORIDE: 9 INJECTION, SOLUTION INTRAVENOUS at 23:30

## 2021-06-03 RX ADMIN — INSULIN LISPRO 4 UNITS: 100 INJECTION, SOLUTION INTRAVENOUS; SUBCUTANEOUS at 23:35

## 2021-06-03 RX ADMIN — Medication 10 ML: at 23:30

## 2021-06-03 ASSESSMENT — PAIN SCALES - GENERAL
PAINLEVEL_OUTOF10: 0
PAINLEVEL_OUTOF10: 0

## 2021-06-03 ASSESSMENT — ENCOUNTER SYMPTOMS
VOMITING: 0
DIARRHEA: 0
WHEEZING: 0
NAUSEA: 0
SHORTNESS OF BREATH: 1
RHINORRHEA: 0
COUGH: 0
ABDOMINAL PAIN: 0

## 2021-06-03 NOTE — TELEPHONE ENCOUNTER
I spoke to wolfgang and tried to convince her to go to ER.  I will see her at [phone] tomorrow if she does not go to ER

## 2021-06-03 NOTE — ED NOTES
Bed: [address]-01  Expected date:   Expected time:   Means of arrival:   Comments:  Sob checking in      [place]  06/03/21 [id number]

## 2021-06-03 NOTE — ED PROVIDER NOTES
[facility]        Pt Name: Margo Zelaya  MRN: [id number]  [place] 1940  Date of evaluation: 6/3/2021  Provider: Brea Guido PA-C  PCP: Todd Cruz MD  Note Started: 5:16 PM EDT        I have seen and evaluated this patient with my supervising physician Rekha Ratliff MD.    [address] [place] [address]       Chief Complaint   Patient presents with    Shortness of Breath     sent by [facility] for SOB and retaining fluid       HISTORY OF PRESENT ILLNESS   (Location, Timing/Onset, Context/Setting, Quality, Duration, Modifying Factors, Severity, Associated Signs and Symptoms)  Note limiting factors. Margo Zelaya is a 68 y.o. female who presents with a Chief Complaint of leg swelling/fluid retention increasing shortness of breath. Patient states that this is been going on for about a month. She was seen and evaluated by her cardiologist today who sent her to the ED for further evaluation management, likely admission. Patient denies cough congestion runny nose. No fevers or chills. No dizziness/lightheadedness, focal weakness, visual disturbances or syncope. States that her abdomen is also distended but nontender. No nausea vomiting or diarrhea. No urinary complaints. She is no prior oxygen requirement but does report history of CHF. She reports medication compliance. She has no other complaints or concerns at this time. Nursing Notes were all reviewed and agreed with or any disagreements were addressed in the HPI. REVIEW OF SYSTEMS    (2-9 systems for level 4, 10 or more for level 5)     Review of Systems   Constitutional: Negative for appetite change, chills and fever. HENT: Negative for congestion and rhinorrhea. Eyes: Negative for visual disturbance. Respiratory: Positive for shortness of breath. Negative for cough and wheezing. Cardiovascular: Positive for leg swelling. Negative for chest pain. performed by Chaya Patel MD at [address]  9/27/2019    BRONCHOSCOPY BRUSHINGS performed by Chaya Patel MD at [facility], LAPAROSCOPIC N/A 12/19/2018    LAPAROSCOPIC CHOLECYSTECTOMY WITH CHOLANGIOGRAM performed by Mark Valle MD at [facility] COLONOSCOPY N/A 2/25/2019    COLONOSCOPY WITH BIOPSY performed by Ady Park MD at [address] COLONOSCOPY  2/25/2019    COLONOSCOPY POLYPECTOMY SNARE/COLD BIOPSY performed by Ady Park MD at [facility]  2000, 2001    IR KYPHOPLASTY LUMBAR FIRST LEVEL  12/8/2020    IR KYPHOPLASTY LUMBAR FIRST LEVEL 12/8/2020 [facility]    IR KYPHOPLASTY THORACIC FIRST LEVEL  7/23/2020    IR KYPHOPLASTY THORACIC FIRST LEVEL 7/23/2020 [facility]    IR KYPHOPLASTY THORACIC FIRST LEVEL  10/20/2020    IR KYPHOPLASTY THORACIC FIRST LEVEL 10/20/2020 [facility]    [address] INCL FLUOR GDNCE DX W/CELL WASHG SPX N/A 12/4/2018    BRONCHOSCOPY WITH LAVAGE performed by Noemi Walsh MD at [facility] 2/25/2019    EGD BIOPSY performed by Ady Park MD at [facility] N/A 8/7/2019    EGD DIAGNOSTIC ONLY performed by Pepe Rosario MD at [address]       Previous Medications    ALBUTEROL SULFATE  (90 BASE) MCG/ACT INHALER    Inhale 2 puffs into the lungs every 4 hours as needed for Wheezing    APIXABAN (ELIQUIS) 2.5 MG TABS TABLET    Take 1 tablet by mouth 2 times daily    CALCIUM CARB-CHOLECALCIFEROL (CALCIUM+D3) 500-600 MG-UNIT TABS    Take 1 tablet by mouth daily    CONTINUOUS BLOOD GLUC  (FREESTYLE LOC 14 DAY READER) [facility]IA    Check blood sugars twice daily    CONTINUOUS BLOOD GLUC  (FREESTYLE LOC 2 READER) [facility]    Check blood sugars twice daily    CONTINUOUS BLOOD GLUC SENSOR (FREESTYLE LOC 2 SENSOR) [facility]    Check blood sugars twice daily    DIGOXIN (LANOXIN) 125 MCG TABLET    Take 1 tab by mouth on Monday -Wednesday-Friday    DILTIAZEM (CARDIZEM 12 HR) 60 MG EXTENDED RELEASE CAPSULE    Take 1 capsule by mouth 2 times daily    FUROSEMIDE (LASIX) 40 MG TABLET    Take 1 tablet by mouth daily    GLIMEPIRIDE (AMARYL) 2 MG TABLET    Take 2 mg by mouth every morning (before breakfast)    IPRATROPIUM-ALBUTEROL (DUONEB) 0.5-2.5 (3) MG/3ML SOLN NEBULIZER SOLUTION    Inhale 3 mLs into the lungs every 4 hours (while awake) DX:COPD J44.9    LINACLOTIDE (LINZESS) 145 MCG CAPSULE    Take 145 mcg by mouth every morning (before breakfast)    LINAGLIPTIN (TRADJENTA) 5 MG TABLET    Take 1 tablet by mouth daily    METHOCARBAMOL (ROBAXIN) 500 MG TABLET    Take 500 mg by mouth 3 times daily     METOPROLOL SUCCINATE (TOPROL XL) 25 MG EXTENDED RELEASE TABLET    Take 1 tablet by mouth daily    MIRTAZAPINE (REMERON) 15 MG TABLET    Take 1 tablet by mouth nightly    OXYCODONE-ACETAMINOPHEN (PERCOCET) 5-325 MG PER TABLET    Take 1 tablet by mouth every 4 hours as needed for Pain.     PANTOPRAZOLE (PROTONIX) 40 MG TABLET    Take 1 tablet by mouth every morning (before breakfast)    PREDNISONE (DELTASONE) 10 MG TABLET    Take 1 tablet by mouth daily         ALLERGIES     Statins, Lyrica [pregabalin], Cipro xr, Penicillins, and Sulfa antibiotics    FAMILYHISTORY       Family History   Problem Relation Age of Onset    Cancer Sister     Diabetes Sister     Diabetes Brother     Heart Disease Father     Heart Disease Mother     Cancer Maternal Uncle           SOCIAL HISTORY       Social History     Tobacco Use    Smoking status: Former Smoker     Years: 10.00     Types: Cigarettes     Quit date: 10/28/2001     Years since quittin.6    Smokeless tobacco: Never Used   Vaping Use    Vaping Use: Never used   Substance Use Topics    Alcohol use: No    Drug use: No       SCREENINGS             PHYSICAL EXAM (up to 7 for level 4, 8 or more for level 5)     ED Triage Vitals [06/03/21 1715]   BP Temp Temp src Pulse Resp SpO2 Height Weight   (!) 140/58 97 °F (36.1 °C) -- 75 16 95 % -- --       Physical Exam  Vitals and nursing note reviewed. Constitutional:       General: She is not in acute distress. Appearance: She is well-developed. She is not ill-appearing, toxic-appearing or diaphoretic. HENT:      Head: Normocephalic and atraumatic. Right Ear: External ear normal.      Left Ear: External ear normal.      Nose: Nose normal.   Eyes:      General:         Right eye: No discharge. Left eye: No discharge. Cardiovascular:      Rate and Rhythm: Normal rate and regular rhythm. Heart sounds: Normal heart sounds. Pulmonary:      Effort: Pulmonary effort is normal. No respiratory distress. Breath sounds: Decreased breath sounds and rales present. Abdominal:      General: There is no distension. Palpations: Abdomen is soft. Tenderness: There is no abdominal tenderness. Musculoskeletal:         General: Normal range of motion. Cervical back: Normal range of motion and neck supple. Skin:     General: Skin is warm and dry. Neurological:      Mental Status: She is alert and oriented to person, place, and time.    Psychiatric:         Behavior: Behavior normal.         DIAGNOSTIC RESULTS   LABS:    Labs Reviewed   CBC WITH AUTO DIFFERENTIAL - Abnormal; Notable for the following components:       Result Value    RBC 2.60 (*)     Hemoglobin 8.4 (*)     Hematocrit 26.5 (*)     .1 (*)     RDW 18.6 (*)     Neutrophils Absolute 8.8 (*)     Lymphocytes Absolute 0.4 (*)     Myelocyte Percent 2 (*)     Anisocytosis 1+ (*)     Polychromasia 1+ (*)     Hypochromia 1+ (*)     Ovalocytes Occasional (*)     Stomatocytes Occasional (*)     All other components within normal limits    Narrative:     CALL  Doty  SFERF tel. [phone],  Rejected coags and chems Name/Called to: Phu interpretation of EKG. RADIOLOGY:   Non-plain film images such as CT, Ultrasound and MRI are read by the radiologist. Plain radiographic images are visualized and preliminarily interpreted by the ED Provider with the below findings:        Interpretation per the Radiologist below, if available at the time of this note:    XR CHEST (2 VW)   Preliminary Result   1. No acute cardiopulmonary disease. 2. Chronic pleural and parenchymal scarring within the left perihilar space. No results found. PROCEDURES   Unless otherwise noted below, none     Procedures    CRITICAL CARE TIME   N/A    CONSULTS:  IP CONSULT TO INTERNAL MEDICINE      EMERGENCY DEPARTMENT COURSE and DIFFERENTIAL DIAGNOSIS/MDM:   Vitals:    Vitals:    06/03/21 1715   BP: (!) 140/58   Pulse: 75   Resp: 16   Temp: 97 °F (36.1 °C)   SpO2: 95%       Patient was given the following medications:  Medications   aspirin tablet 325 mg (has no administration in time range)           Patient presents for evaluation of increasing shortness of breath and weight gain. On exam, she appears a little short of breath, chronically ill-appearing but no acute distress and nontoxic.   she is initially slightly hypoxic and 91% on room air. Vitals otherwise stable and she is afebrile. She has decreased aeration noted bilaterally, significantly so to the left with some coarse breath sounds. No obvious wheezing. Chest is nontender. Abdomen is distended but nontender. No pitting. She has symmetric 2+ nonpitting edema to bilateral lower extremities. No erythema or warmth. Please see attending note for EKG interpretation. CBC and CMP are remarkable for hyperglycemia and acute on chronic kidney disease with a creatinine of 2.1 and BUN of 52. No evidence of diabetic ketoacidosis. She does have mild elevation of troponin at 0.02 and BNP of 1042. Coags are within normal limits. Chest x-ray shows no acute cardiopulmonary disease.   I do believe patient warrants admission for further evaluation management of suspected CHF exacerbation. Clinically she is fluid overloaded, however, have concern for prerenal azotemia likely from increasing Lasix use. Troponins will be trended. I spoke with the admitting physician, Dr. Jamee Trinidad, who is agreeable to this plan will resume care the patient at this time. Patient family were informed and agreeable. She is stable for admission. FINAL IMPRESSION      1. Acute on chronic congestive heart failure, unspecified heart failure type ([address]    2. Acute renal failure superimposed on chronic kidney disease, unspecified CKD stage, unspecified acute renal failure type ([address]    3. Hyperglycemia    4. Elevated troponin          DISPOSITION/PLAN   DISPOSITION Decision To Admit 06/03/2021 08:22:07 PM      PATIENT REFERRED TO:  No follow-up provider specified.     DISCHARGE MEDICATIONS:  New Prescriptions    No medications on file       DISCONTINUED MEDICATIONS:  Discontinued Medications    No medications on file              (Please note that portions of this note were completed with a voice recognition program.  Efforts were made to edit the dictations but occasionally words are mis-transcribed.)    Leah Carias PA-C (electronically signed)           Elis Ariza PA-C  06/03/21 2025

## 2021-06-03 NOTE — TELEPHONE ENCOUNTER
Called patient wolfgang with instructions and she states that she has been taking lasix 40 mg bid already and that its not helping. Is there     Anywhere on your schedule that we can schedule her to be seen?  Thanks

## 2021-06-04 PROBLEM — D64.9 ANEMIA: Status: ACTIVE | Noted: 2021-06-04

## 2021-06-04 LAB
ALBUMIN SERPL-MCNC: 3.4 G/DL (ref 3.4–5)
ANION GAP SERPL CALCULATED.3IONS-SCNC: 7 MMOL/L (ref 3–16)
ANISOCYTOSIS: ABNORMAL
BASE EXCESS ARTERIAL: 8.1 MMOL/L (ref -3–3)
BASOPHILS ABSOLUTE: 0 K/UL (ref 0–0.2)
BASOPHILS RELATIVE PERCENT: 0 %
BUN BLDV-MCNC: 52 MG/DL (ref 7–20)
CALCIUM SERPL-MCNC: 9.2 MG/DL (ref 8.3–10.6)
CARBOXYHEMOGLOBIN ARTERIAL: 1.9 % (ref 0–1.5)
CHLORIDE BLD-SCNC: 103 MMOL/L (ref 99–110)
CHLORIDE URINE RANDOM: 59 MMOL/L
CO2: 35 MMOL/L (ref 21–32)
CREAT SERPL-MCNC: 1.8 MG/DL (ref 0.6–1.2)
CREATININE URINE: 38.5 MG/DL (ref 28–259)
EKG ATRIAL RATE: 84 BPM
EKG DIAGNOSIS: NORMAL
EKG P AXIS: 31 DEGREES
EKG P-R INTERVAL: 178 MS
EKG Q-T INTERVAL: 354 MS
EKG QRS DURATION: 94 MS
EKG QTC CALCULATION (BAZETT): 418 MS
EKG R AXIS: -3 DEGREES
EKG T AXIS: 52 DEGREES
EKG VENTRICULAR RATE: 84 BPM
EOSINOPHILS ABSOLUTE: 0.8 K/UL (ref 0–0.6)
EOSINOPHILS RELATIVE PERCENT: 10 %
GFR AFRICAN AMERICAN: 33
GFR NON-AFRICAN AMERICAN: 27
GLUCOSE BLD-MCNC: 123 MG/DL (ref 70–99)
GLUCOSE BLD-MCNC: 126 MG/DL (ref 70–99)
GLUCOSE BLD-MCNC: 164 MG/DL (ref 70–99)
GLUCOSE BLD-MCNC: 186 MG/DL (ref 70–99)
GLUCOSE BLD-MCNC: 327 MG/DL (ref 70–99)
HCO3 ARTERIAL: 33.1 MMOL/L (ref 21–29)
HCT VFR BLD CALC: 22.7 % (ref 36–48)
HEMOGLOBIN, ART, EXTENDED: 7.5 G/DL (ref 12–16)
HEMOGLOBIN: 7.3 G/DL (ref 12–16)
LYMPHOCYTES ABSOLUTE: 2.1 K/UL (ref 1–5.1)
LYMPHOCYTES RELATIVE PERCENT: 28 %
MCH RBC QN AUTO: 32.2 PG (ref 26–34)
MCHC RBC AUTO-ENTMCNC: 32 G/DL (ref 31–36)
MCV RBC AUTO: 100.5 FL (ref 80–100)
METHEMOGLOBIN ARTERIAL: 0 %
MONOCYTES ABSOLUTE: 0.2 K/UL (ref 0–1.3)
MONOCYTES RELATIVE PERCENT: 2 %
NEUTROPHILS ABSOLUTE: 4.5 K/UL (ref 1.7–7.7)
NEUTROPHILS RELATIVE PERCENT: 60 %
NUCLEATED RED BLOOD CELLS: 1 /100 WBC
O2 CONTENT ARTERIAL: 11 ML/DL
O2 SAT, ARTERIAL: 99.7 %
O2 THERAPY: ABNORMAL
OSMOLALITY URINE: 393 MOSM/KG (ref 390–1070)
PCO2 ARTERIAL: 48.6 MMHG (ref 35–45)
PDW BLD-RTO: 18.4 % (ref 12.4–15.4)
PERFORMED ON: ABNORMAL
PH ARTERIAL: 7.44 (ref 7.35–7.45)
PHOSPHORUS: 3.1 MG/DL (ref 2.5–4.9)
PLATELET # BLD: 291 K/UL (ref 135–450)
PMV BLD AUTO: 7.4 FL (ref 5–10.5)
PO2 ARTERIAL: 104 MMHG (ref 75–108)
POIKILOCYTES: ABNORMAL
POLYCHROMASIA: ABNORMAL
POTASSIUM SERPL-SCNC: 3.3 MMOL/L (ref 3.5–5.1)
POTASSIUM, UR: 27.2 MMOL/L
PROTEIN PROTEIN: <4 MG/DL
RBC # BLD: 2.26 M/UL (ref 4–5.2)
SLIDE REVIEW: ABNORMAL
SODIUM BLD-SCNC: 145 MMOL/L (ref 136–145)
SODIUM URINE: 72 MMOL/L
TCO2 ARTERIAL: 77.4 MMOL/L
URINE CULTURE, ROUTINE: NORMAL
WBC # BLD: 7.5 K/UL (ref 4–11)

## 2021-06-04 PROCEDURE — 97535 SELF CARE MNGMENT TRAINING: CPT

## 2021-06-04 PROCEDURE — 6360000002 HC RX W HCPCS: Performed by: INTERNAL MEDICINE

## 2021-06-04 PROCEDURE — 94640 AIRWAY INHALATION TREATMENT: CPT

## 2021-06-04 PROCEDURE — 51702 INSERT TEMP BLADDER CATH: CPT

## 2021-06-04 PROCEDURE — 1200000000 HC SEMI PRIVATE

## 2021-06-04 PROCEDURE — 6370000000 HC RX 637 (ALT 250 FOR IP): Performed by: INTERNAL MEDICINE

## 2021-06-04 PROCEDURE — 36600 WITHDRAWAL OF ARTERIAL BLOOD: CPT

## 2021-06-04 PROCEDURE — 94761 N-INVAS EAR/PLS OXIMETRY MLT: CPT

## 2021-06-04 PROCEDURE — 85025 COMPLETE CBC W/AUTO DIFF WBC: CPT

## 2021-06-04 PROCEDURE — 97166 OT EVAL MOD COMPLEX 45 MIN: CPT

## 2021-06-04 PROCEDURE — 99223 1ST HOSP IP/OBS HIGH 75: CPT | Performed by: INTERNAL MEDICINE

## 2021-06-04 PROCEDURE — 97530 THERAPEUTIC ACTIVITIES: CPT

## 2021-06-04 PROCEDURE — 2700000000 HC OXYGEN THERAPY PER DAY

## 2021-06-04 PROCEDURE — 93010 ELECTROCARDIOGRAM REPORT: CPT | Performed by: INTERNAL MEDICINE

## 2021-06-04 PROCEDURE — 82803 BLOOD GASES ANY COMBINATION: CPT

## 2021-06-04 PROCEDURE — 2580000003 HC RX 258: Performed by: INTERNAL MEDICINE

## 2021-06-04 PROCEDURE — 97162 PT EVAL MOD COMPLEX 30 MIN: CPT

## 2021-06-04 PROCEDURE — 36415 COLL VENOUS BLD VENIPUNCTURE: CPT

## 2021-06-04 PROCEDURE — 80069 RENAL FUNCTION PANEL: CPT

## 2021-06-04 RX ORDER — POTASSIUM CHLORIDE 20 MEQ/1
40 TABLET, EXTENDED RELEASE ORAL ONCE
Status: COMPLETED | OUTPATIENT
Start: 2021-06-04 | End: 2021-06-04

## 2021-06-04 RX ORDER — FUROSEMIDE 10 MG/ML
20 INJECTION INTRAMUSCULAR; INTRAVENOUS 2 TIMES DAILY
Status: COMPLETED | OUTPATIENT
Start: 2021-06-04 | End: 2021-06-05

## 2021-06-04 RX ADMIN — METHOCARBAMOL 500 MG: 500 TABLET ORAL at 21:42

## 2021-06-04 RX ADMIN — EPOETIN ALFA-EPBX 7000 UNITS: 10000 INJECTION, SOLUTION INTRAVENOUS; SUBCUTANEOUS at 15:23

## 2021-06-04 RX ADMIN — FUROSEMIDE 20 MG: 10 INJECTION, SOLUTION INTRAMUSCULAR; INTRAVENOUS at 17:26

## 2021-06-04 RX ADMIN — METHOCARBAMOL 500 MG: 500 TABLET ORAL at 09:51

## 2021-06-04 RX ADMIN — IPRATROPIUM BROMIDE AND ALBUTEROL SULFATE 3 ML: .5; 3 SOLUTION RESPIRATORY (INHALATION) at 17:09

## 2021-06-04 RX ADMIN — DILTIAZEM HYDROCHLORIDE 60 MG: 60 CAPSULE, EXTENDED RELEASE ORAL at 21:42

## 2021-06-04 RX ADMIN — FUROSEMIDE 20 MG: 10 INJECTION, SOLUTION INTRAMUSCULAR; INTRAVENOUS at 12:18

## 2021-06-04 RX ADMIN — DIGOXIN 125 MCG: 125 TABLET ORAL at 09:52

## 2021-06-04 RX ADMIN — FUROSEMIDE 40 MG: 40 TABLET ORAL at 09:52

## 2021-06-04 RX ADMIN — PREDNISONE 10 MG: 10 TABLET ORAL at 09:52

## 2021-06-04 RX ADMIN — APIXABAN 2.5 MG: 5 TABLET, FILM COATED ORAL at 09:51

## 2021-06-04 RX ADMIN — POTASSIUM CHLORIDE 40 MEQ: 1500 TABLET, EXTENDED RELEASE ORAL at 12:17

## 2021-06-04 RX ADMIN — MIRTAZAPINE 15 MG: 15 TABLET, FILM COATED ORAL at 21:42

## 2021-06-04 RX ADMIN — CALCIUM CARBONATE-VITAMIN D TAB 500 MG-200 UNIT 1 TABLET: 500-200 TAB at 09:51

## 2021-06-04 RX ADMIN — Medication 10 ML: at 21:42

## 2021-06-04 RX ADMIN — INSULIN LISPRO 1 UNITS: 100 INJECTION, SOLUTION INTRAVENOUS; SUBCUTANEOUS at 21:49

## 2021-06-04 RX ADMIN — INSULIN LISPRO 8 UNITS: 100 INJECTION, SOLUTION INTRAVENOUS; SUBCUTANEOUS at 17:26

## 2021-06-04 RX ADMIN — Medication 10 ML: at 09:52

## 2021-06-04 RX ADMIN — IPRATROPIUM BROMIDE AND ALBUTEROL SULFATE 3 ML: .5; 3 SOLUTION RESPIRATORY (INHALATION) at 12:11

## 2021-06-04 RX ADMIN — INSULIN LISPRO 2 UNITS: 100 INJECTION, SOLUTION INTRAVENOUS; SUBCUTANEOUS at 12:18

## 2021-06-04 RX ADMIN — PANTOPRAZOLE SODIUM 40 MG: 40 TABLET, DELAYED RELEASE ORAL at 06:01

## 2021-06-04 RX ADMIN — LINAGLIPTIN 5 MG: 5 TABLET, FILM COATED ORAL at 09:51

## 2021-06-04 RX ADMIN — METHOCARBAMOL 500 MG: 500 TABLET ORAL at 15:23

## 2021-06-04 RX ADMIN — LINACLOTIDE 145 MCG: 145 CAPSULE, GELATIN COATED ORAL at 06:01

## 2021-06-04 RX ADMIN — DILTIAZEM HYDROCHLORIDE 60 MG: 60 CAPSULE, EXTENDED RELEASE ORAL at 09:51

## 2021-06-04 ASSESSMENT — PAIN SCALES - GENERAL
PAINLEVEL_OUTOF10: 0
PAINLEVEL_OUTOF10: 4
PAINLEVEL_OUTOF10: 5
PAINLEVEL_OUTOF10: 0

## 2021-06-04 ASSESSMENT — ENCOUNTER SYMPTOMS
EYE REDNESS: 0
VOMITING: 0
EYE PAIN: 0
NAUSEA: 0
SHORTNESS OF BREATH: 1
ABDOMINAL DISTENTION: 1
COUGH: 0

## 2021-06-04 NOTE — PROGRESS NOTES
Progress Note - Dr. Jamee Trinidad - Internal Medicine  PCP: Debbra Merlin, MD . Ofelia ENGLISHceliafernando Healy / Rachael Ricks [phone]    Hospital Day: 1  Code Status: Full Code  Current Diet: ADULT DIET; Regular; 4 carb choices (60 gm/meal)        CC: follow up on medical issues    Subjective:   Phuong Oliver is a 68 y.o. female. She denies problems    Sleeping this am  Does not offer complaints when awoken  BNP not yet resulted, creat better 1.8      hgb lower, 7.3 - likely dilutional      She denies chest pain, denies shortness of breath, denies nausea,  denies emesis. 10 system Review of Systems is reviewed with patient, and pertinent positives are noted in HPI above . Otherwise, Review of systems is negative. I have reviewed the patient's medical and social history in detail and updated the computerized patient record. To recap: She  has a past medical history of Arthritis, CAD (coronary artery disease), Carpal tunnel syndrome, COPD (chronic obstructive pulmonary disease) ([address] Coronary stent, depression, Diabetes mellitus ([address] Diastolic heart failure ([address] GERD (gastroesophageal reflux disease), Hyperlipidemia, Hypertension, Lung cancer ([address] MI (myocardial infarction) ([address] LIZETT (obstructive sleep apnea), PONV (postoperative nausea and vomiting), and stress incontinence. . She  has a past surgical history that includes Coronary angioplasty with stent (2000, 2001); back surgery (2000, 2001); bronchoscopy (12/04/2018); pr brncc incl fluor gdnce dx w/cell washg spx (N/A, 12/4/2018); Cholecystectomy, laparoscopic (N/A, 12/19/2018); Upper gastrointestinal endoscopy (N/A, 2/25/2019); Colonoscopy (N/A, 2/25/2019); Colonoscopy (2/25/2019); bronchoscopy (N/A, 2/27/2019); bronchoscopy (2/27/2019); bronchoscopy (2/27/2019); bronchoscopy (N/A, 8/6/2019); Upper gastrointestinal endoscopy (N/A, 8/7/2019); bronchoscopy (N/A, 9/27/2019); bronchoscopy (9/27/2019); bronchoscopy (9/27/2019);  IR KYPHOPLASTY THORACIC 1 VERTEBRAL BODY (7/23/2020); IR KYPHOPLASTY THORACIC 1 VERTEBRAL BODY (10/20/2020); and IR KYPHOPLASTY LUMBAR 1 VERTEBRAL BODY (12/8/2020). . She  reports that she quit smoking about 19 years ago. Her smoking use included cigarettes. She quit after 10.00 years of use. She has never used smokeless tobacco. She reports that she does not drink alcohol and does not use drugs. .        Active Hospital Problems    Diagnosis Date Noted    HTN (hypertension), benign [I10] 08/08/2011     Priority: High    Anemia [D64.9] 06/04/2021    ARF (acute renal failure) (HCC) [N17.9] 06/03/2021    Acute kidney injury superimposed on CKD ([facility] [N17.9, N18.9] 07/20/2020    Anxiety disorder [F41.9]     Depression [F32.9]     Poorly controlled type 2 diabetes mellitus (HCC) [E11.65]     Chronic obstructive pulmonary disease with acute lower respiratory infection (HCC) [J44.0]        Current Facility-Administered Medications: mirtazapine (REMERON) tablet 15 mg, 15 mg, Oral, Nightly  predniSONE (DELTASONE) tablet 10 mg, 10 mg, Oral, Daily  pantoprazole (PROTONIX) tablet 40 mg, 40 mg, Oral, QAM AC  dilTIAZem (CARDIZEM 12 HR) extended release capsule 60 mg, 60 mg, Oral, BID  linagliptin (TRADJENTA) tablet 5 mg, 5 mg, Oral, Daily  methocarbamol (ROBAXIN) tablet 500 mg, 500 mg, Oral, TID  linaclotide (LINZESS) capsule 145 mcg, 145 mcg, Oral, QAM AC  furosemide (LASIX) tablet 40 mg, 40 mg, Oral, Daily  digoxin (LANOXIN) tablet 125 mcg, 125 mcg, Oral, Q MWF  albuterol sulfate  (90 Base) MCG/ACT inhaler 2 puff, 2 puff, Inhalation, Q4H PRN  ipratropium-albuterol (DUONEB) nebulizer solution 3 mL, 3 mL, Inhalation, Q4H WA  apixaban (ELIQUIS) tablet 2.5 mg, 2.5 mg, Oral, BID  calcium-vitamin D (OSCAL-500) 500-200 MG-UNIT per tablet 1 tablet, 1 tablet, Oral, Daily  sodium chloride flush 0.9 % injection 5-40 mL, 5-40 mL, Intravenous, 2 times per day  sodium chloride flush 0.9 % injection 5-40 mL, 5-40 mL, Intravenous, PRN  0.9 % sodium chloride infusion, 25 mL, Intravenous, PRN  ondansetron (ZOFRAN-ODT) disintegrating tablet 4 mg, 4 mg, Oral, Q8H PRN **OR** ondansetron (ZOFRAN) injection 4 mg, 4 mg, Intravenous, Q6H PRN  acetaminophen (TYLENOL) tablet 650 mg, 650 mg, Oral, Q6H PRN **OR** acetaminophen (TYLENOL) suppository 650 mg, 650 mg, Rectal, Q6H PRN  0.9 % sodium chloride infusion, , Intravenous, Continuous  hydrALAZINE (APRESOLINE) injection 10 mg, 10 mg, Intravenous, Q6H PRN  0.9 % sodium chloride bolus, 500 mL, Intravenous, PRN  insulin lispro (1 Unit Dial) 0-12 Units, 0-12 Units, Subcutaneous, TID WC  insulin lispro (1 Unit Dial) 0-6 Units, 0-6 Units, Subcutaneous, Nightly  glucose (GLUTOSE) 40 % oral gel 15 g, 15 g, Oral, PRN  dextrose 50 % IV solution, 12.5 g, Intravenous, PRN  glucagon (rDNA) injection 1 mg, 1 mg, Intramuscular, PRN  dextrose 5 % solution, 100 mL/hr, Intravenous, PRN         Objective:  BP (!) 144/64   Pulse 86   Temp 98.3 °F (36.8 °C) (Oral)   Resp 16   Ht 5' 5\" (1.651 m)   Wt 161 lb (73 kg)   SpO2 95%   BMI 26.79 kg/m²      Patient Vitals for the past 24 hrs:   BP Temp Temp src Pulse Resp SpO2 Height Weight   06/04/21 0603 -- -- -- -- -- 95 % -- --   06/04/21 0601 -- -- -- 86 -- -- -- --   06/04/21 0404 -- -- -- -- -- 93 % -- --   06/04/21 0401 (!) 144/64 98.3 °F (36.8 °C) Oral 89 16 (!) 88 % -- 161 lb (73 kg)   06/04/21 0203 -- -- -- 86 -- -- -- --   06/03/21 2330 -- -- -- 91 -- -- -- --   06/03/21 2245 -- -- -- -- -- -- 5' 5\" (1.651 m) --   06/03/21 2236 (!) 162/70 97.9 °F (36.6 °C) Oral 97 18 92 % -- 161 lb 11.2 oz (73.3 kg)   06/03/21 2045 (!) 149/58 -- -- 90 21 95 % -- --   06/03/21 2030 (!) 149/56 -- -- 91 16 94 % -- --   06/03/21 2015 (!) 132/56 -- -- 85 17 95 % -- --   06/03/21 2000 (!) 129/54 -- -- 84 16 95 % -- --   06/03/21 1945 138/79 -- -- 83 17 95 % -- --   06/03/21 1930 (!) 144/57 -- -- 84 21 94 % -- --   06/03/21 1915 (!) 129/57 -- -- 83 20 92 % -- --   06/03/21 1900 (!) 129/58 -- -- 87 20 93 % -- --   06/03/21 1845 (!) 130/52 -- -- 86 20 93 % -- --   06/03/21 1830 (!) 127/50 -- -- 83 19 91 % -- --   06/03/21 1815 (!) 127/53 -- -- 89 23 95 % -- --   06/03/21 1800 (!) 183/57 -- -- 91 25 (!) 88 % -- --   06/03/21 1715 (!) 140/58 97 °F (36.1 °C) -- 75 16 95 % -- --     Patient Vitals for the past 96 hrs (Last 3 readings):   Weight   06/04/21 0401 161 lb (73 kg)   06/03/21 2236 161 lb 11.2 oz (73.3 kg)           Intake/Output Summary (Last 24 hours) at 6/4/2021 0758  Last data filed at 6/4/2021 0549  Gross per 24 hour   Intake 450.12 ml   Output --   Net 450.12 ml         Physical Exam:   BP (!) 144/64   Pulse 86   Temp 98.3 °F (36.8 °C) (Oral)   Resp 16   Ht 5' 5\" (1.651 m)   Wt 161 lb (73 kg)   SpO2 95%   BMI 26.79 kg/m²   General appearance: alert, appears stated age and cooperative  Head: Normocephalic, without obvious abnormality, atraumatic  Lungs: faint crackles in bases  Heart: regular rate and rhythm, S1, S2 normal, no murmur, click, rub or gallop  Abdomen: soft, non-tender; bowel sounds normal; no masses,  no organomegaly  Extremities: 1+ edema bilat    Labs:  Lab Results   Component Value Date    WBC 7.5 06/04/2021    HGB 7.3 (L) 06/04/2021    HCT 22.7 (L) 06/04/2021     06/04/2021    CHOL 224 (H) 05/19/2016    TRIG 123 05/19/2016    HDL 64 (H) 05/04/2021    ALT 16 06/03/2021    AST 16 06/03/2021     06/04/2021    K 3.3 (L) 06/04/2021     06/04/2021    CREATININE 1.8 (H) 06/04/2021    BUN 52 (H) 06/04/2021    CO2 35 (H) 06/04/2021    TSH 1.71 05/04/2021    INR 1.05 06/03/2021    LABA1C 5.8 05/04/2021    LABMICR Not Indicated 06/03/2021     Lab Results   Component Value Date    CKTOTAL 26 05/31/2020    TROPONINI 0.02 (H) 06/03/2021       Recent Imaging Results are Reviewed:  XR CHEST (2 VW)    Result Date: 6/3/2021  EXAMINATION: TWO XRAY VIEWS OF THE CHEST 6/3/2021 5:42 pm COMPARISON: 7/17/2020 HISTORY: ORDERING SYSTEM PROVIDED HISTORY: sob TECHNOLOGIST PROVIDED HISTORY:

## 2021-06-04 NOTE — CARE COORDINATION
Discharge Planning Assessment  RN discharge planner spoke with  patient's daughter- Sheridan Keller    discussed reason for admission, current living situation, and potential needs at the time of discharge    Demographics/Insurance verified Yes- [employer]  Medicare    Current type of dwellin yassine house . Has a hospital bed in the the family room . Patient from ECF/ confirmed with:  N/A    Living arrangements:  Lives alone. But her two daughters and granddaughter  See her everyday. Level of function/Support: Needs assistance . Has a supportive family and hhc aids  From [facility] agency    PCP:  Dr Faina Soliman    Last Visit to PCP:  2 weeks a week    DME:  Rolling walker with a seat, cane. Had home oxygen through [address] but she does not use/need oxygen currently. Still has the concentrator in her home    Active with any community resources/agencies/skilled home care: Active with [facility] agency for skilled Nursing, PT & OT  and home Aids  x2 a week. Were referred to [address] , still reviewing patient's  Finances. Medication compliance issues: Family sets in pill box  , assists with the meds administration    Financial issues that could impact healthcare:  No        Tentative discharge plan: home with [place] [id number] services. Discussed and provided facilities of choice if transition to a skilled nursing facility is required at the time of discharge- Declines SNF- would prefer her going home with c services    Has been to [facility] and [place]  before. Discussed with patient and/or family that on the day of discharge home tentative time of discharge will be between 10 AM and noon. Transportation at the time of discharge: one of the daughters will transport if it will be safe for pt to transport in  a car. Otherwise may need assistance with transport.

## 2021-06-04 NOTE — CONSULTS
[address]                     [facility], [address]                                  CONSULTATION    PATIENT NAME: Dino Giraldo                     :        1940  MED REC NO:   [id number]                          ROOM:       9643  ACCOUNT NO:   [de-identified]                           ADMIT DATE: 2021  PROVIDER:     Jeanne Estes MD    RENAL CONSULTATION    CONSULT DATE:  2021    CONSULTING PHYSICIAN:  Jeanne Estes MD    REFERRING PROVIDER:  Bryant Herrera MD    REASON FOR CONSULTATION:  CKD III, congestive heart failure, shortness  of breath. HISTORY OF PRESENT ILLNESS:  The patient is a 77-year-old female with  history of CKD III, baseline creatinine of 1.7 to 1.8, diastolic CHF,  coronary artery disease, COPD, diabetes mellitus, lung cancer,  obstructive sleep apnea, who presented to the hospital yesterday  secondary to shortness of breath and fluid retention. The patient is  not fully able to cooperate with history taking since there are episodes  of confusion. She does use oxygen at home at 1 liter nasal cannula  which is the same as what she has at this time. Reviewing her previous  weights in 2020 she was 144 pounds, in 2020 she was 157, in  2021 she was 157 and currently she is at 161 pounds. She had an  episode of systolic blood pressure up to 180s. I am currently asked to  see the patient with regards to her underlying renal impairment. Her  creatinine is better at 1.8 from 2.1 yesterday. She is currently on  Lasix 40 mg p.o. daily. She denies any nausea, vomiting or diarrhea. She is on furosemide 40 mg daily as an outpatient also. Her weight is  similar today from yesterday. No other complaints. PAST MEDICAL HISTORY:  Includes CKD III, diastolic CHF, coronary artery  disease, COPD, diabetes mellitus, GERD, hyperlipidemia, hypertension,  adenocarcinoma of the left lung, obstructive sleep apnea.     ALLERGIES: Includes STATINS, LYRICA, CIPRO, PENICILLIN, SULFA. MEDICATIONS PRIOR TO ADMISSION:  Includes apixaban, calcium with vitamin  D3, oxycodone plus acetaminophen, albuterol, ipratropium plus albuterol,  digoxin, glimepiride, linaclotide, furosemide, methocarbamol,  prednisone, pantoprazole, diltiazem, linagliptin, metoprolol XL,  mirtazapine. SOCIAL HISTORY:  Former smoker. No alcohol or drug abuse. FAMILY HISTORY:  Includes cancer, diabetes, heart disease. REVIEW OF SYSTEMS:  GENERAL:  Denies any malaise. SKIN:  No skin rash, itching or discharge. NEUROLOGIC:  No headaches or focal deficits. CARDIOVASCULAR:  No chest pain or palpitations. PULMONARY:  Positive shortness of breath. No coughing, no hemoptysis. GI:  No abdominal pain. No nausea, no vomiting, no diarrhea or  constipation. RENAL:  Denies any gross hematuria or change in urine output. EXTREMITIES:  Shows 1+ edema. LABORATORY DATA:  Sodium 145, potassium 3.3, chloride 103, bicarb 35,  BUN 52, creatinine 1.8, glucose 123, calcium 9.2. ProBNP 1042. Troponin 0.02. Albumin 3.4. WBC 7.5, hemoglobin 7.3, hematocrit 22.7,  platelet count 220,513. Urinalysis, specific gravity 1.014, pH is 6,  protein is negative, blood is negative, glucose is 500. DIAGNOSTIC DATA:  Chest x-ray shows no acute cardiopulmonary disease,  chronic pleural and parenchymal scarring within the left perihilar  space. ASSESSMENT AND PLAN:  1. Acute kidney injury on CKD III to IV, baseline creatinine 1.7 to  1.8. Renal function is currently around baseline. The patient has been  gaining weight compared to the past two months. She is probably about  10 pounds over. I will put her on Lasix 20 mg IV b.i.d. for now. Follow creatinine. Stop maintenance IV fluid. 2.  Hypokalemia. We will replace. 3.  Elevated serum bicarbonate. Probably maintained by hypokalemia. Check venous blood gas. Potassium will be replaced. 4.  History of hypertension. Follow with attempts for diuresis. 5.  Anemia. Check iron studies. We will give RAKESH subcu. MCV is  elevated. Check B12 and folate levels. 6.  History of diabetes mellitus with proteinuria. May be etiology of  CKD. Check serologies. Quantify urine protein. 7.  We will check SPEP and UPEP with anemia and her age. 8.  Diastolic CHF. We will attempt to diurese. Follows with Dr. Kiya Murry. 9.  History of lung cancer. Thank you very much for this consult. We will follow with you.         Cierra Law MD    D: 06/04/2021 10:31:37       T: 06/04/2021 10:41:04     [email or web address]  Job#: [id number]     Doc#: [id number]    CC:

## 2021-06-04 NOTE — PROGRESS NOTES
Physical Therapy    Facility/Department: [address] NURSING  Initial Assessment    NAME: Jose Keys  : 1940  MRN: [id number]    Date of Service: 2021    Discharge Recommendations:  Jose Keys scored a 17/24 on the AM-PAC short mobility form. Current research shows that an AM-PAC score of 17 or less is typically not associated with a discharge to the patient's home setting. Based on the patient's AM-PAC score and their current functional mobility deficits, it is recommended that the patient have 3-5 sessions per week of Physical Therapy at d/c to increase the patient's independence. Please see assessment section for further patient specific details. If patient discharges prior to next session this note will serve as a discharge summary. Please see below for the latest assessment towards goals. 3-5 sessions per week   PT Equipment Recommendations  Equipment Needed: No    Assessment   Body structures, Functions, Activity limitations: Decreased functional mobility ; Decreased safe awareness;Decreased balance;Decreased coordination;Decreased endurance;Decreased strength;Decreased posture  Assessment: Pt presents with dx of acute renal failure and the above listed impairments and limitations contributing to their inability to perform at baseline functional level. Pt will benefit from additional skill therapy services in order to improve above limitations and help assist with return to baseline. Treatment Diagnosis: decreased functional mobility, decreased baance, decreased coordination  Prognosis: Good  Decision Making: Medium Complexity  Clinical Presentation: evolving (new onset of dizziness)  PT Education: Goals;PT Role;Plan of Care  Patient Education: d/c recommendations - patient verbalized understanding. due to patients cognitive status may require reinforcement.   Barriers to Learning: cognitive  REQUIRES PT FOLLOW UP: Yes  Activity Tolerance  Activity Tolerance: Other (patient limited due to symptom provocation of \"dizziness and lightheadedness\". pt questionable historian but stated during session \"i am like this a lot so I move slow\")       Patient Diagnosis(es): The primary encounter diagnosis was Acute on chronic congestive heart failure, unspecified heart failure type ([address] Diagnoses of Acute renal failure superimposed on chronic kidney disease, unspecified CKD stage, unspecified acute renal failure type ([address] Hyperglycemia, and Elevated troponin were also pertinent to this visit. has a past medical history of Arthritis, CAD (coronary artery disease), Carpal tunnel syndrome, COPD (chronic obstructive pulmonary disease) ([address] Coronary stent, depression, Diabetes mellitus ([address] Diastolic heart failure ([address] GERD (gastroesophageal reflux disease), Hyperlipidemia, Hypertension, Lung cancer ([address] MI (myocardial infarction) ([address] LIZETT (obstructive sleep apnea), PONV (postoperative nausea and vomiting), and stress incontinence. has a past surgical history that includes Coronary angioplasty with stent (2000, 2001); back surgery (2000, 2001); bronchoscopy (12/04/2018); pr ncc incl fluor gdnce dx w/cell washg spx (N/A, 12/4/2018); Cholecystectomy, laparoscopic (N/A, 12/19/2018); Upper gastrointestinal endoscopy (N/A, 2/25/2019); Colonoscopy (N/A, 2/25/2019); Colonoscopy (2/25/2019); bronchoscopy (N/A, 2/27/2019); bronchoscopy (2/27/2019); bronchoscopy (2/27/2019); bronchoscopy (N/A, 8/6/2019); Upper gastrointestinal endoscopy (N/A, 8/7/2019); bronchoscopy (N/A, 9/27/2019); bronchoscopy (9/27/2019); bronchoscopy (9/27/2019); IR KYPHOPLASTY THORACIC 1 VERTEBRAL BODY (7/23/2020); IR KYPHOPLASTY THORACIC 1 VERTEBRAL BODY (10/20/2020); and IR KYPHOPLASTY LUMBAR 1 VERTEBRAL BODY (12/8/2020). Restrictions  Restrictions/Precautions  Restrictions/Precautions: Fall Risk, Modified Diet (high fall risk, ADULT DIET;  Regular; 4 carb choices (60 gm/meal): General starting at 06/03 2300)  Required Walk-in shower  Bathroom Toilet: Handicap height (higher toilet)  Bathroom Equipment: Grab bars in shower, Built-in shower seat  Bathroom Accessibility: Not accessible  Home Equipment: 4 wheeled walker, Hospital bed, Oxygen, Wheelchair-manual, Alert Button, Quad cane  Receives Help From: Family, Home health  ADL Assistance: Needs assistance (Pt reports an aide comes 3 X/wk to help with shower, mostly for LEs and helping her don shoes. A nurse comes 3X/wk and performs med [employer])  Homemaking Assistance: Needs assistance (daughter sometimes helps w/cleaning & laundry. 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 patient is a questionable historian. Pt initially stated that dtr works FT, then stated that dtr is retired. Unclear daughter' availability, though she is supportive. Cognition   Cognition  Overall Cognitive Status: Exceptions  Arousal/Alertness: Appropriate responses to stimuli  Following Commands: Follows one step commands with increased time; Follows one step commands with repetition; Follows multistep commands with increased time; Follows multistep commands with repitition  Attention Span: Appears intact  Memory: Decreased recall of biographical Information;Decreased recall of recent events;Decreased short term memory;Decreased long term memory  Safety Judgement: Decreased awareness of need for safety  Problem Solving: Assistance required to generate solutions;Assistance required to implement solutions;Decreased awareness of errors  Insights: Decreased awareness of deficits  Initiation: Requires cues for some  Sequencing: Requires cues for some    Objective     Observation/Palpation  Posture: Fair  Observation: VOR x 1 conducted with patient, no presence of nystagmus but patient expressed increase in symptoms of \"dizziness and lightheadedness\" with moving head both horiztonally and vertically    AROM RLE (degrees)  RLE AROM: WFL  AROM LLE (degrees)  LLE AROM : WFL  Strength RLE  Strength RLE: WFL  Comment: grossly 3+/5  Strength LLE  Strength LLE: WFL  Comment: grossly 3+/5     Sensation  Overall Sensation Status: WNL  Bed mobility  Supine to Sit: Stand by assistance (supine to sit x 1 with SBA)  Sit to Supine: Stand by assistance (sit to supine with SBA x 1)  Scooting: Stand by assistance (patient able to scoot to EOB with SBA x 1)  Comment: patient states she was feeling \"dizzy and lightheaded\" with changes in position. BP while seated at EOB read 142/70. SPO2 ranged from 94-97% while at EOB. at EOS patient BP read 157/75. nursing aware.   Transfers  Sit to Stand: Contact guard assistance (sit to stand from EOB to standing x 1 w CGA, sit to stand from commode to standing with RW x 1 with CGA, sit to stand from commode to standing with RW x 1 with CGA, sit to stand from chair to standing with RW x 1 with CGA)  Stand to sit: Contact guard assistance (stand to sit from standing to seated in commode x 1 with CGA, stand to sit from standing with RW to seated on commode x 1 with CGA, stand to sitting in chair x 1 with CGA, stand to sitting at EOB x 1 with CGA)  Stand Pivot Transfers: Contact guard assistance (stand pivot transfer from EOB to seated on commode x 1 with CGA)  Ambulation  Ambulation?: Yes  WB Status: FWB  More Ambulation?: Yes  Ambulation 1  Surface: level tile  Device: Rolling Walker  Assistance: Contact guard assistance  Gait Deviations: Slow Liliam;Decreased step length;Decreased step height;Shuffles  Distance: 5' x 1  Comments: pt ambulated from commode to chair  Ambulation 2  Surface - 2: level tile  Device 2: [facility] 2: Contact guard assistance  Gait Deviations: Slow and CGA  Short term goal 4: patient will stand statically for 2 minutes with LRAD and CGA  Patient Goals   Patient goals : no stated       Therapy Time   Individual Concurrent Group Co-treatment   Time In Luige Grabiel 10         Time Out 0955         Minutes 72         Timed Code Treatment Minutes: 60687 Clotilde Yates, SPT   Josefina Galeana, PT Therapist observed and directed the above evaluation.  Thanks, Josefina Galeana, [address] [facility], DPT [id number]

## 2021-06-04 NOTE — DISCHARGE INSTR - COC
Continuity of Care Form    Patient Name: Tadeo Collado   :  1940  MRN:  [id number]    Admit date:  6/3/2021  Discharge date:  2021    Code Status Order: Full Code   Advance Directives:   Advance Care Flowsheet Documentation       Date/Time Healthcare Directive Type of Healthcare Directive Copy in [address] Agent's Name Healthcare Agent's Phone Number    [phone]  No, patient does not have an advance directive for healthcare treatment -- -- -- -- --            Admitting Physician:  Sebastian Carlson MD  PCP: Jayda Silverman MD    Discharging Nurse: Casey Raymond Unit/Room#: 5ZW-5765/5754-82  Discharging Unit Phone Number: [phone]    Emergency Contact:   Extended Emergency Contact Information  Primary Emergency Contact: Kendal Shellie [address] Phone: [phone]  Relation: Child  Secondary Emergency Contact: [address] Phone: [phone]  Relation: Child    Past Surgical History:  Past Surgical History:   Procedure Laterality Date    BACK SURGERY  ,     lumbar laminectomy    BRONCHOSCOPY  2018    BRONCHOSCOPY N/A 2019    BRONCHOSCOPY BIOPSY BRONCHUS performed by Chris Morton MD at [place] [address]  2019    BRONCHOSCOPY/TRANSBRONCHIAL NEEDLE BIOPSY performed by Chris Morton MD at [place] [address]  2019    BRONCHOSCOPY ULTRASOUND performed by Chris Morton MD at [place] [address] N/A 2019    BRONCHOSCOPY ALVEOLAR LAVAGE performed by Chris Morton MD at [address] [place] [address] 2019    BRONCHOSCOPY ALVEOLAR LAVAGE performed by Sangeetha Wylie MD at [place] [address]  2019    BRONCHOSCOPY BIOPSY BRONCHUS performed by Sangeetha Wylie MD at [place] [address]  2019    BRONCHOSCOPY BRUSHINGS performed by Nara Carter MD at [facility], LAPAROSCOPIC N/A 12/19/2018    LAPAROSCOPIC CHOLECYSTECTOMY WITH CHOLANGIOGRAM performed by Charmayne Nares, MD at [facility] COLONOSCOPY N/A 2/25/2019    COLONOSCOPY WITH BIOPSY performed by Mouna Garcia MD at [facility] COLONOSCOPY  2/25/2019    COLONOSCOPY POLYPECTOMY SNARE/COLD BIOPSY performed by Mouna Garcia MD at [facility]  2000, 2001    IR KYPHOPLASTY LUMBAR FIRST LEVEL  12/8/2020    IR KYPHOPLASTY LUMBAR FIRST LEVEL 12/8/2020 [facility]    IR KYPHOPLASTY THORACIC FIRST LEVEL  7/23/2020    IR KYPHOPLASTY THORACIC FIRST LEVEL 7/23/2020 [facility]    IR KYPHOPLASTY THORACIC FIRST LEVEL  10/20/2020    IR KYPHOPLASTY THORACIC FIRST LEVEL 10/20/2020 [facility]    GA [address] INCL FLUOR GDNCE DX W/CELL WASHG SPX N/A 12/4/2018    BRONCHOSCOPY WITH LAVAGE performed by Vita Knowles MD at [facility] N/A 2/25/2019    EGD BIOPSY performed by Mouna Garcia MD at [facility] N/A 8/7/2019    EGD DIAGNOSTIC ONLY performed by Wood Jean MD at [address]       Immunization History:   Immunization History   Administered Date(s) Administered    Influenza Vaccine, unspecified formulation 10/02/2018    Influenza Virus Vaccine 10/30/2013, 10/15/2014, 04/20/2017    Influenza, Quadv, IM, PF (6 mo and older Fluzone, Flulaval, Fluarix, and 3 yrs and older Afluria) 10/01/2019    Pneumococcal Conjugate 13-valent ([id number]) 06/05/2015    Pneumococcal Polysaccharide ([id number]) 08/30/2013       Active Problems:  Patient Active Problem List   Diagnosis Code    DM (diabetes mellitus), secondary, uncontrolled, w/neurologic complic (HCC) Q74.93, Q76.98    Dyslipidemia E78.5    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    HTN (hypertension), benign I10    Coronary atherosclerosis of native coronary artery I25.10    Obstructive sleep apnea G47.33    COPD, 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    Paroxysmal atrial fibrillation (HCC) I48.0    Mild malnutrition (HCC) E44.1    Closed compression fracture of thoracic vertebra ([facility]) S22.000A    Chronic deep vein thrombosis (DVT) of both lower extremities ([facility]) I82.503    Ataxia R27.0    Recurrent falls R29.6    Diabetic peripheral neuropathy (HCC) E11.42    Chronic diastolic heart failure (HCC) I50.32    Malignant neoplasm of left lung ([facility]) C34.92    Chronic obstructive pulmonary disease with acute lower respiratory infection ([facility]) J44.0    Poorly controlled type 2 diabetes mellitus (HCC) E11.65    History of depression Z86.59    Diabetes education, encounter for Z71.89    Depression F32.9    Anxiety disorder F41.9    Acute kidney injury superimposed on CKD (HCC) N17.9, N18.9    ARF (acute renal failure) ([facility]) N17.9    Anemia D64.9    History of lung cancer Z85. 118    Acute on chronic congestive heart failure (HCC) I50.9       Isolation/Infection:   Isolation            No Isolation          Patient Infection Status       Infection Onset Added Last Indicated Last Indicated By Review Planned Expiration Resolved Resolved By    None active    Resolved    COVID-19 Rule Out 10/21/20 10/21/20 10/21/20 COVID-19 (Ordered)   10/23/20 Rule-Out Test Resulted    COVID-19 Rule Out 07/11/20 07/11/20 07/11/20 COVID-19 (Ordered)   07/13/20 Rule-Out Test Resulted    COVID-19 Rule Out 06/01/20 06/01/20 06/01/20 COVID-19 (Ordered)   06/03/20 Karin Stover RN    C-diff Rule Out 05/31/20 05/31/20 06/01/20 Gastrointestinal Panel, Molecular (Ordered)   06/04/20 Louisaiant MEGAN Rmairez    AFB 09/27/19 10/09/19 09/27/19 Acid Fast Culture With Smear   06/01/20 Valiant Ashley, MEGAN    AFB 08/06/19 09/10/19 08/06/19 Acid Fast Culture With Smear   09/30/19 Casa Watkins RN            Nurse Assessment:  Last Vital Signs: BP (!) 145/72 Pulse 88   Temp 98.1 °F (36.7 °C) (Oral)   Resp 16   Ht 5' 5\" (1.651 m)   Wt 162 lb (73.5 kg)   SpO2 95%   BMI 26.96 kg/m²     Last documented pain score (0-10 scale): Pain Level: 5  Last Weight:   Wt Readings from Last 1 Encounters:   06/09/21 162 lb (73.5 kg)     Mental Status:  oriented and alert    IV Access:  - None    Nursing Mobility/ADLs:  Walking   Independent  Transfer  Independent  Bathing  Assisted  Dressing  Assisted  Toileting  Independent  Feeding  [address]  Independent  Med Delivery   whole    Wound Care Documentation and Therapy:        Elimination:  Continence:   · Bowel: Yes  · Bladder: Yes  Urinary Catheter: None   Colostomy/Ileostomy/Ileal Conduit: No       Date of Last BM: unknown    Intake/Output Summary (Last 24 hours) at 6/9/2021 0917  Last data filed at 6/8/2021 1724  Gross per 24 hour   Intake 720 ml   Output 350 ml   Net 370 ml     I/O last 3 completed shifts: In: 5 [P.O.:720]  Out: 350 [Urine:350]    Safety Concerns: At Risk for Falls    Impairments/Disabilities:      Hearing    Nutrition Therapy:  Current Nutrition Therapy:   - Oral Diet:  General and Carb Control 4 carbs/meal (1800kcals/day)    Routes of Feeding: Oral  Liquids: Thin Liquids  Daily Fluid Restriction: no  Last Modified Barium Swallow with Video (Video Swallowing Test): not done    Treatments at the Time of Hospital Discharge:   Respiratory Treatments: none  Oxygen Therapy:  is on oxygen at 2L L/min per nasal cannula.   Ventilator:    - No ventilator support    Rehab Therapies: Physical Therapy and Occupational Therapy  Weight Bearing Status/Restrictions: No weight bearing restirctions  Other Medical Equipment (for information only, NOT a DME order):  none  Other Treatments: none    Patient's personal belongings (please select all that are sent with patient):  None    RN SIGNATURE:  Electronically signed by Pamela Gomez RN on 6/9/21 at 7:59 PM EDT    CASE MANAGEMENT/SOCIAL WORK SECTION    Inpatient Status Date: 6-3-21    Readmission Risk Assessment Score:  Readmission Risk              Risk of Unplanned Readmission:  40           Discharging to Facility/ Agency    Name:   [facility] Phone [phone]  · Fax [phone]  · Address:  · Phone:  · Fax:    Dialysis Facility (if applicable)   · Name:    / signature: Electronically signed by Laxmi Arce RN on 6/4/21 at 12:59 PM EDT    PHYSICIAN SECTION    Prognosis: Guarded    Condition at Discharge: Stable    Rehab Potential (if transferring to Rehab): Good    Recommended Labs or Other Treatments After Discharge: ***    Physician Certification: I certify the above information and transfer of Brittany Mcfarland  is necessary for the continuing treatment of the diagnosis listed and that she requires Home Care for greater 30 days.      Update Admission H&P: No change in H&P    PHYSICIAN SIGNATURE:  Electronically signed by Ronnie Edgar MD on 6/9/21 at 11:15 AM EDT

## 2021-06-04 NOTE — PROGRESS NOTES
Occupational Therapy   Occupational Therapy Initial Assessment  Date: 2021   Patient Name: Daksha Broussard  MRN: [id number]     : 1940    Date of Service: 2021    Discharge Recommendations:  Daksha Broussard scored a 15/24 on the AM-PAC ADL Inpatient form. Current research shows that an AM-PAC score of 17 or less is typically not associated with a discharge to the patient's home setting. Based on the patient's AM-PAC score and their current ADL deficits, it is recommended that the patient have 3-5 sessions per week of Occupational Therapy at d/c to increase the patient's independence. Please see assessment section for further patient specific details. If patient discharges prior to next session this note will serve as a discharge summary. Please see below for the latest assessment towards goals. OT Equipment Recommendations  Equipment Needed: No (TBD next level of care)    Assessment   Performance deficits / Impairments: Decreased functional mobility ; Decreased ADL status; Decreased endurance;Decreased balance;Decreased safe awareness  Assessment: Pt is below her baseline level of occupational function, based on the above deficits associated with acute renal failure. Pt would benefit from continued skilled acite OT services to address these deficits. Treatment Diagnosis: Decreased ADL status, functional mobility, endurance, balance, and safety awareness associated with acute renal failure  Prognosis: Good  Decision Making: Medium Complexity  History: Pt 69 yo, lives alone, has [place] aide & Miguel Todd RN and daughter assist. Pt gets assist with ADLs & IADLs, possibly some falls; questionable historian.  PMH: COPD, CAD, HTN, DM, Lung Ca, CTS, coronary stent, multiple bronchoscopies, kyphoplasty X 2 in   Exam: ROM, MMT, 6 clicks, 5 performance deficits/impairments, evolving presentation  Assistance / Modification: CGA transfers, Max A toileting, Max A LB dressing  OT Education: OT Role;Plan of Care;Transfer Training;Orientation  Patient Education: Pt is not an independent learner. Needs reinforcement. Barriers to Learning: Cognition  REQUIRES OT FOLLOW UP: Yes  Activity Tolerance  Activity Tolerance: Treatment limited secondary to medical complications (free text)  Activity Tolerance: Pt states she was feeling \"dizzy and lightheaded\" with changes in position. BP while seated at EOB read 142/70. SPO2 ranged from 94-97% while at EOB. At end of session, /75 seated on EOB. RN aware. Safety Devices  Safety Devices in place: Yes  Type of devices: All fall risk precautions in place; Left in bed;Bed alarm in place;Nurse notified;Call light within reach;Gait belt;Patient at risk for falls           Patient Diagnosis(es): The primary encounter diagnosis was Acute on chronic congestive heart failure, unspecified heart failure type ([facility] [address] Diagnoses of Acute renal failure superimposed on chronic kidney disease, unspecified CKD stage, unspecified acute renal failure type ([address] Hyperglycemia, and Elevated troponin were also pertinent to this visit. has a past medical history of Arthritis, CAD (coronary artery disease), Carpal tunnel syndrome, COPD (chronic obstructive pulmonary disease) ([address] Coronary stent, depression, Diabetes mellitus ([address] Diastolic heart failure ([address] GERD (gastroesophageal reflux disease), Hyperlipidemia, Hypertension, Lung cancer ([address] MI (myocardial infarction) ([address] LIZETT (obstructive sleep apnea), PONV (postoperative nausea and vomiting), and stress incontinence. has a past surgical history that includes Coronary angioplasty with stent (2000, 2001); back surgery (2000, 2001); bronchoscopy (12/04/2018); [facility]/cell washg spx (N/A, 12/4/2018); Cholecystectomy, laparoscopic (N/A, 12/19/2018); Upper gastrointestinal endoscopy (N/A, 2/25/2019); Colonoscopy (N/A, 2/25/2019);  Colonoscopy (2/25/2019); bronchoscopy (N/A, 2/27/2019); bronchoscopy (2/27/2019); level, Laundry in basement, Able to Live on Main level with bedroom/bathroom  Home Access: Stairs to enter with rails  Entrance Stairs - Number of Steps: 2 KATI  Entrance Stairs - Rails: Right  Bathroom Shower/Tub: Walk-in shower  Bathroom Toilet: Handicap height (higher toilet)  Bathroom Equipment: Grab bars in shower, Built-in shower seat  Bathroom Accessibility: Not accessible  Home Equipment: 4 wheeled walker, Hospital bed, Oxygen, Wheelchair-manual, Alert Button, Quad cane  Receives Help From: Family, Home health  ADL Assistance: Needs assistance (Pt reports an aide comes 3 X/wk to help with shower, mostly for LEs and helping her don shoes. A nurse comes 3X/wk and performs med [employer])  Homemaking Assistance: Needs assistance (daughter sometimes helps w/cleaning & laundry. 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 patient is a questionable historian. Pt initially stated that dtr works FT, then stated that dtr is retired. Unclear daughter' availability, though she is supportive.        Objective   Vision: Impaired  Vision Exceptions: Wears glasses for reading  Hearing: Within functional limits    Orientation  Overall Orientation Status: Impaired  Orientation Level: Oriented to person;Disoriented to situation;Disoriented to time;Disoriented to place (Pt kept asking where she was, had no idea she was in a hospital, does not recall coming in to the hospital, states month is July, doesn't know year)  Observation/Palpation  Posture: Fair  Observation: VOR x 1 conducted with patient, no presence of nystagmus but patient expressed increase in symptoms of \"dizziness and lightheadedness\" with moving head both horiztonally and vertically  Balance  Sitting Balance: Minimal assistance (CGA/Min A EOB balance w/several small LOB)  Standing Balance: Contact guard assistance  Standing Balance  Time: ~10 sec, ~30 sec  Activity: transfer EOB to [facility] & clothing mgt; toileting, [facility] mgt & [facility]  Functional Mobility  Functional - Mobility Device: Rolling Walker  Activity: Other (stand step transfer BS to recliner)  Assist Level: Contact guard assistance  Toilet Transfers  Toilet - Technique: To left  Equipment Used: Standard bedside commode  Toilet Transfer: Contact guard assistance  ADL  Grooming: Setup (wash face)  UE Bathing: Setup;Supervision  LE Bathing: Contact guard assistance (Mod A gulshan hygiene only)  UE Dressing: Moderate assistance (gown)  LE Dressing: Maximum assistance (Mod A socks d/t dizziness; Max A clothing mgt)  Toileting: Maximum assistance (d/t dizziness)  Additional Comments: Pt transferred to [facility] for toileting, then to chair for UB bathing, and back to bed, d/t fatigue and dizziness. Tone RUE  RUE Tone: Normotonic  Tone LUE  LUE Tone: Normotonic  Coordination  Movements Are Fluid And Coordinated: Yes     Bed mobility  Supine to Sit: Stand by assistance (X 2 trials)  Sit to Supine: Stand by assistance  Scooting: Stand by assistance  Transfers  Stand Step Transfers: Contact guard assistance (w/RW BSC to recliner)  Stand Pivot Transfers: Contact guard assistance (EOB to [facility], no AD)  Sit to stand: Contact guard assistance  Stand to sit: Contact guard assistance  Vision - Basic Assessment  Prior Vision: Wears glasses only for reading  Visual History: No significant visual history  Patient Visual Report: No visual complaint reported. Oculo Motor Control:  WNL  Vision Comments: VOR x 1 conducted with patient, no presence of nystagmus but patient expressed increase in symptoms of \"dizziness and lightheadedness\" with moving head both horiztonally and vertically  Cognition  Overall Cognitive Status: Exceptions  Arousal/Alertness: Appropriate responses to stimuli  Following Commands: Follows one step commands with increased time; Follows one step commands with repetition; Follows multistep commands with increased time; Follows multistep commands with repitition  Attention Span: Appears intact  Memory: Decreased recall of biographical Information;Decreased recall of recent events;Decreased short term memory;Decreased long term memory  Safety Judgement: Decreased awareness of need for safety  Problem Solving: Assistance required to generate solutions;Assistance required to implement solutions;Decreased awareness of errors  Insights: Decreased awareness of deficits  Initiation: Requires cues for some  Sequencing: Requires cues for some        Sensation  Overall Sensation Status: WNL        LUE AROM (degrees)  LUE AROM : Exceptions  LUE General AROM: shoulder flexion ~90 degrees, elbow to wrist WFL  Left Hand AROM (degrees)  Left Hand AROM: WFL  RUE AROM (degrees)  RUE AROM : Exceptions  RUE General AROM: shoulder flexion ~90 degrees, elbow to wrist WFL  Right Hand AROM (degrees)  Right Hand AROM: WFL  LUE Strength  Gross LUE Strength: WFL  L Hand General: NT  RUE Strength  Gross RUE Strength: WFL  R Hand General: NT                   Plan   Plan  Times per week: 3-5  Times per day: Daily  Current Treatment Recommendations: Safety Education & Training, Self-Care / ADL, Endurance Training, Functional Mobility Training, Cognitive Reorientation, Balance Training    -PAC Score        -[facility] Inpatient Daily Activity Raw Score: 15 (06/04/21 1239)  AM-PAC Inpatient ADL T-Scale Score : 34.69 (06/04/21 1239)  ADL Inpatient CMS 0-100% Score: 56.46 (06/04/21 [facility]9)  ADL Inpatient CMS G-Code Modifier : CK (06/04/21 [facility]9)    Goals  Short term goals  Time Frame for Short term goals: Discharge  Short term goal 1: SBA for functional transfers to ADL surfaces w/RW  Short term goal 2: SBA for functional mobility w/RW for ADL activity  Short term goal 3: S/U for UB ADLs  Short term goal 4: Min A for LB ADLs  Short term goal 5: Min A for toileting       Therapy Time   Individual Concurrent Group Co-treatment   Time In 0843         Time Out 0955         Minutes 72               Timed Code Treatment Minutes:  57 Minutes    Total Treatment Minutes:  72 min    C/ [address], OT   Svitlana Lacy, Cristhian Rendon., OTR/L, [id number]

## 2021-06-04 NOTE — ED PROVIDER NOTES
I independently performed a history and physical on [facility]. All diagnostic, treatment, and disposition decisions were made by myself in conjunction with the advanced practice provider. Briefly, this is a 68 y.o. female here for shortness of breath. Also having some chest discomfort. Has had worsening leg swelling. Has been increasing her Lasix dose without improvement. Has had decreased urine output. Has been working with Dr. Grace So without improvement despite medication adjustments. He recommended she come to the emergency room. Denies any fevers. .    On exam,   General: Patient is in no acute distress  Skin: No cyanosis  HEENT: Moist mucous membranes  Heart: Regular rate, regular rhythm. 1+ pitting edema to bilateral calves  Lung: No respiratory distress. Bilateral wheezes/rales  Abdomen: Soft, nontender  Neuro: Moving all extremities, no facial droop, no slurred speech, answers questions appropriately        EKG  The Ekg interpreted by me in the absence of a cardiologist shows. Normal sinus rhythm  No acute ST changes   HR 84  No significant EKG changes compared to study in 6/20  Multiple PACs  Old q waves inferior leads      Screenings            MDM  Briefly, this is a 68 y.o. female here for weakness, shortness of breath, chest discomfort, worsening leg swelling despite increasing Lasix dose. Likely due to CHF exacerbation. Will give Lasix. Since she is refractory to outpatient medication adjustment, will admit patient to hospitalist service for diuresis and cardiology evaluation. Found to have LORRAINE. Possibly intrinsic from increasing Lasix dose, cardiorenal, UTI, or prerenal.  Will obtain urinalysis. Has elevated troponin, likely secondary to LORRAINE. Will trend this. Less likely having pulmonary embolism since she has been taking Eliquis. Patient admitted to hospitalist service. Patient Referrals:  No follow-up provider specified.     Discharge Medications:  New Prescriptions No medications on file       FINAL IMPRESSION  1. Acute on chronic congestive heart failure, unspecified heart failure type ([facility] [address]    2. Acute renal failure superimposed on chronic kidney disease, unspecified CKD stage, unspecified acute renal failure type ([facility]    3. Hyperglycemia    4. Elevated troponin        Blood pressure (!) 149/56, pulse 91, temperature 97 °F (36.1 °C), resp. rate 16, SpO2 94 %, not currently breastfeeding. For further details of [facility] emergency department encounter, please see documentation by advanced practice provider, Bridgett Linn.         Vero Tejeda MD  06/03/21 9309

## 2021-06-04 NOTE — ED NOTES
MD segal for daughter to give patient her night time meds (2100 meds)    Gabapentin  Oxycodone  Flexeril  senekot  eliquis  mady Peterson RN  06/03/21 0986

## 2021-06-04 NOTE — H&P
History and Physical  Dr. Danyell Granados  6/3/2021    PCP: Cristi Agnel MD    Cc:   Chief Complaint   Patient presents with    Shortness of Breath     sent by [facility] for SOB and retaining fluid       HPI:  Wyona Bosworth is a 68 y.o. female who has a past medical history of Arthritis, CAD (coronary artery disease), Carpal tunnel syndrome, COPD (chronic obstructive pulmonary disease) ([address] Coronary stent, depression, Diabetes mellitus ([address] Diastolic heart failure ([address] GERD (gastroesophageal reflux disease), Hyperlipidemia, Hypertension, Lung cancer ([address] MI (myocardial infarction) ([address] LIZETT (obstructive sleep apnea), PONV (postoperative nausea and vomiting), and stress incontinence. Patient presents with Acute kidney injury superimposed on CKD ([address] HPI     68 y.o. female who presents with a Chief Complaint of leg swelling/fluid retention increasing shortness of breath. Patient states that this is been going on for about a month. She was seen and evaluated by her cardiologist today who sent her to the ED for further evaluation management, likely admission. Patient denies cough congestion runny nose. No fevers or chills. No dizziness/lightheadedness, focal weakness, visual disturbances or syncope. States that her abdomen is also distended but nontender. No nausea vomiting or diarrhea. No urinary complaints. She is no prior oxygen requirement but does report history of CHF. She reports medication compliance. Creat elevated on admit, 2.1  Review of old chart shows baseline 1.1 on 12/8/2020    BNP mildly elevaeted 1000      Problem list of hospitalization thus far:   Active Hospital Problems    Diagnosis     ARF (acute renal failure) (HCC) [N17.9]     Acute kidney injury superimposed on CKD ([address] [N17.9, N18.9]     Anxiety disorder [F41.9]     Depression [F32.9]     Poorly controlled type 2 diabetes mellitus ([address] [E11.65]     Chronic obstructive pulmonary disease with acute lower respiratory infection ([facility] [J44.0]          Review of Systems: (1 system for EPF, 2-9 for detailed, 10+ for comprehensive)  Review of Systems   Constitutional: Negative for chills and fever. HENT: Negative for ear pain and hearing loss. Eyes: Negative for pain and redness. Respiratory: Positive for shortness of breath. Negative for cough. Cardiovascular: Negative for chest pain and leg swelling. Gastrointestinal: Positive for abdominal distention. Negative for nausea and vomiting. Endocrine: Negative for polydipsia and polyphagia. Genitourinary: Negative for frequency and urgency. Musculoskeletal: Negative for gait problem and neck pain. Allergic/Immunologic: Negative for food allergies. Neurological: Negative for dizziness and light-headedness. Hematological: Negative for adenopathy. Psychiatric/Behavioral: Negative for agitation and dysphoric mood.            Past Medical History:   Past Medical History:   Diagnosis Date    Arthritis     CAD (coronary artery disease)     Carpal tunnel syndrome     COPD (chronic obstructive pulmonary disease) (HCC)     Coronary stent     depression     Diabetes mellitus ([facility]     Diastolic heart failure ([facility]     Per Dr Cristin Caba 8/5/19    GERD (gastroesophageal reflux disease)     Hyperlipidemia     Hypertension     Lung cancer ([address]     Adenocarcinoma of Left Lung    MI (myocardial infarction) ([address]     LIZETT (obstructive sleep apnea)     does not use CPAP    PONV (postoperative nausea and vomiting)     stress incontinence        Past Surgical History:   Past Surgical History:   Procedure Laterality Date    BACK SURGERY  2000, 2001    lumbar laminectomy    BRONCHOSCOPY  12/04/2018    BRONCHOSCOPY N/A 2/27/2019    BRONCHOSCOPY BIOPSY BRONCHUS performed by Doreen Cao MD at [facility]  2/27/2019    BRONCHOSCOPY/TRANSBRONCHIAL NEEDLE BIOPSY performed by Doreen Cao MD at [address] [facility] Type  Cigarettes    Smokeless Tobacco Use  Never Used          Alcohol History     Alcohol Use Status  No          Drug Use     Drug Use Status  No          Sexual Activity     Sexually Active  Yes Partners  Male                Fam History:   Family History   Problem Relation Age of Onset    Cancer Sister     Diabetes Sister     Diabetes Brother     Heart Disease Father     Heart Disease Mother     Cancer Maternal Uncle        PFSH: The above PMHx, PSHx, SocHx, FamHx has been reviewed by myself. (1 area for detailed, 2-3 for comprehensive)      Code Status: Prior    Meds - following list of home medications fromelectronic chart has been reviewed by myself  Prior to Admission medications    Medication Sig Start Date End Date Taking? Authorizing Provider   oxyCODONE-acetaminophen (PERCOCET) 5-325 MG per tablet Take 1 tablet by mouth every 4 hours as needed for Pain.     Historical Provider, MD   apixaban (ELIQUIS) 2.5 MG TABS tablet Take 1 tablet by mouth 2 times daily 5/10/21   MARIA C Gallardo - CNP   Calcium Carb-Cholecalciferol (CALCIUM+D3) 500-600 MG-UNIT TABS Take 1 tablet by mouth daily    Historical Provider, MD   albuterol sulfate  (90 Base) MCG/ACT inhaler Inhale 2 puffs into the lungs every 4 hours as needed for Wheezing 4/29/21   Radha Babb MD   ipratropium-albuterol (DUONEB) 0.5-2.5 (3) MG/3ML SOLN nebulizer solution Inhale 3 mLs into the lungs every 4 hours (while awake) DX:COPD J44.9 4/29/21   Radha Babb MD   Continuous Blood Gluc  (FREESTYLE LOC 2 READER) ELADIA Check blood sugars twice daily 4/29/21   Radha Babb MD   Continuous Blood Gluc Sensor (FREESTYLE LOC 2 SENSOR) MISC Check blood sugars twice daily 4/29/21   Radha Babb MD   Continuous Blood Gluc  (FREESTYLE LOC 14 DAY READER) ELADIA Check blood sugars twice daily 4/29/21   Radha Babb MD   digoxin Marin Hay) 125 MCG tablet Take 1 tab by mouth on Monday -Wednesday-Friday 4/12/21   Chrystine Child Pardeep Pereira MD   glimepiride (AMARYL) 2 MG tablet Take 2 mg by mouth every morning (before breakfast)    Historical Provider, MD   linaclotide (LINZESS) 145 MCG capsule Take 145 mcg by mouth every morning (before breakfast) 3/17/21   Historical Provider, MD   furosemide (LASIX) 40 MG tablet Take 1 tablet by mouth daily 4/9/21   Robert Malone MD   methocarbamol (ROBAXIN) 500 MG tablet Take 500 mg by mouth 3 times daily     Historical Provider, MD   predniSONE (DELTASONE) 10 MG tablet Take 1 tablet by mouth daily 6/15/20   MARIA C Crooks CNP   pantoprazole (PROTONIX) 40 MG tablet Take 1 tablet by mouth every morning (before breakfast) 6/15/20   MARIA C Crooks CNP   dilTIAZem (CARDIZEM 12 HR) 60 MG extended release capsule Take 1 capsule by mouth 2 times daily 6/15/20   MARIA C Crooks CNP   linagliptin (TRADJENTA) 5 MG tablet Take 1 tablet by mouth daily 6/15/20   MARIA C Crooks CNP   metoprolol succinate (TOPROL XL) 25 MG extended release tablet Take 1 tablet by mouth daily 6/15/20   MARIA C Crooks CNP   mirtazapine (REMERON) 15 MG tablet Take 1 tablet by mouth nightly  Patient taking differently: Take 30 mg by mouth as needed  10/8/19   Delma Fuller MD         Allergies   Allergen Reactions    Statins Other (See Comments)     Other reaction(s): Myalgias (Muscle Pain)      Lyrica [Pregabalin] Other (See Comments)     Shaking, swelling, rash    Cipro Xr Rash     Swelling, rash    Penicillins Rash     Swelling, rash    Sulfa Antibiotics Rash     Swelling, rash             EXAM: (2-7 system for EPF/Detailed, ?8 for Comprehensive)  BP (!) 149/56   Pulse 91   Temp 97 °F (36.1 °C)   Resp 16   SpO2 94%   Constitutional: vitals as above: alert, appears stated age and cooperative  Psychiatric: normal insight and judgment, oriented to person, place, time, and general circumstances  Head: Normocephalic, without obvious abnormality, atraumatic  Eyes:lids and lashes normal, conjunctivae and sclerae normal and pupils equal, round, reactive to light and accomodation  EMNT: external ears normal, lips normal  Neck: no adenopathy, supple, symmetrical, trachea midline and thyroid not enlarged, symmetric, no tenderness/mass/nodules   Respiratory: faint crackles in bases  Cardiovascular: normal rate, regular rhythm, normal S1 and S2, intact distal pulses and no carotid bruits  Gastrointestinal: mildly distended, nontender, bs+  Lymphatic:   Extremities: trace edema  Skin:No rashes or nodules noted.   Neurologic:negative    LABS:  Labs Reviewed   CBC WITH AUTO DIFFERENTIAL - Abnormal; Notable for the following components:       Result Value    RBC 2.60 (*)     Hemoglobin 8.4 (*)     Hematocrit 26.5 (*)     .1 (*)     RDW 18.6 (*)     Neutrophils Absolute 8.8 (*)     Lymphocytes Absolute 0.4 (*)     Myelocyte Percent 2 (*)     Anisocytosis 1+ (*)     Polychromasia 1+ (*)     Hypochromia 1+ (*)     Ovalocytes Occasional (*)     Stomatocytes Occasional (*)     All other components within normal limits    Narrative:     CALL  [facility] tel. [phone],  Rejected coags and chems Name/Called to: Harpreet Carr, 44/21/4851 17:59,  by [facility]  Performed at:  [facility] [address], [place]   Phone 21  - Abnormal; Notable for the following components:    Pro-BNP 1,042 (*)     All other components within normal limits    Narrative:     Performed at:  [facility] [address],  [place] [place]   Phone [phone]   TROPONIN - Abnormal; Notable for the following components:    Troponin 0.02 (*)     All other components within normal limits    Narrative:     Performed at:  [facility] [address]   Phone [phone]   COMPREHENSIVE METABOLIC PANEL - Abnormal; Notable for the following components: lungs are otherwise clear, without evidence of acute airspace consolidation, pneumothorax, pleural effusion, or pulmonary edema. 1. No acute cardiopulmonary disease. 2. Chronic pleural and parenchymal scarring within the left perihilar space. EKG: from ER, interpreted by self, normal sinus rhythm at 84. No acute st elevation noted, no twi seen. MEDICAL DECISION MAKING:    Active Problems:    Chronic obstructive pulmonary disease with acute lower respiratory infection ([facility] -Established problem. Stable. Plan: Continue present orders/plan. Poorly controlled type 2 diabetes mellitus ([address] -Established problem. Stable. FSBS 123 on admit. Plan: Patient placed on controlled carbohydrate diet. Fingerstick sugars to be checked to monitor for both hypoglycemia as well as hyperglycemia. Sliding scale insulin ordered. Glucagon and dextrose ordered for hypoglycemia. Patient will be continued on home medications. Hemoglobin a1c to be ordered to assess efficacy of therapy. Depression -Established problem. Stable. Plan: Continue present orders/plan. Anxiety disorder    Acute kidney injury superimposed on CKD ([facility] -New Problem to me. Plan: at this time, will gently hydrate. Ask renal to see. ARF (acute renal failure) ([facility]          Diagnoses as listed above, designated as new or established and plan outlined for each. Data Reviewed:   (1) Lab tests were reviewed or ordered. (1) Radiology tests were reviewed or ordered. (1) Medical test (Echo, EKG, PFT/steph) were ordered. (1)History was not obtained from someone other than patient  (1) Old records  were reviewed - see HPI/MDM for pertinent details if review done. (2) Case wasdiscussed with another health care provider: [facility] LISA BRANNON  (2) Imaging was viewed by myself. (2) EKG  was viewed by myself.        The patient isbeing placed in inpatient status with the expectation of requiring a hospital stay spanning at least two midnights for care and treatment of the problems noted in the problem list.  This determination is also based on thepatients comorbidities and past medical history, the severity and timing of the signs and symptoms upon presentation.         Electronically signed by: Florian Perez MD 6/3/2021

## 2021-06-04 NOTE — CONSULTS
Renal Consult [id number]  A/P  1. 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 Similar O2 requirement. Weight from 12/2020 was 144, 4/2021 was 151, 5/2021 was 157 and now at 161lbs. Will give Lasix 20mg IV BID. Strict I&O. Stop maintenance IVF. 2.  Hypokalemia- replace  3. Elevated HCO3-replace K. Check VBG. 4.  H/O HTN  5.  DM  6. Anemia- elevated MCV. check B12, folate, retic count, SPEP and UPEP. RAKESH subQ.   7.  H/O Diastolic CHF-follows with Dr. Rosalind Stearns  8. H/O CAD  9. H/O COPD  10. H/O Lung CA  11.   H/O DM    Thank you

## 2021-06-04 NOTE — PROGRESS NOTES
Pt admitted to room 3316 from ED. VSS. Pt A&Ox4. POC updated with pt, all questions answered. Oriented pt to room and call light. Call light and bedside table within reach. Instructed to call out with any needs, v/u. Will monitor.

## 2021-06-04 NOTE — PLAN OF CARE
Problem: Falls - Risk of:  Goal: Will remain free from falls  Description: Will remain free from falls  Outcome: Met This Shift  Note: Pt is wearing the fall bracelet and yellow nonskid socks. Bed is in lowest position, locked, side rails up 2/4, and call light is within reach. Pt informed of fall risks, verbalizes understanding, and agrees to ask for help to ambulate. Will monitor. Problem: FLUID AND ELECTROLYTE IMBALANCE  Goal: Fluid and electrolyte balance are achieved/maintained  Outcome: Ongoing  Note: +2 pitting edema in BLE. Receiving PO Lasix qd. I&O recorded. Pt c/o FALCON.

## 2021-06-05 LAB
24HR URINE VOLUME (ML): 2300 ML
ANION GAP SERPL CALCULATED.3IONS-SCNC: 12 MMOL/L (ref 3–16)
ANISOCYTOSIS: ABNORMAL
BANDED NEUTROPHILS RELATIVE PERCENT: 2 % (ref 0–7)
BASE EXCESS VENOUS: 13.8 MMOL/L (ref -3–3)
BASOPHILS ABSOLUTE: 0 K/UL (ref 0–0.2)
BASOPHILS RELATIVE PERCENT: 0 %
BUN BLDV-MCNC: 44 MG/DL (ref 7–20)
CALCIUM SERPL-MCNC: 9.5 MG/DL (ref 8.3–10.6)
CARBOXYHEMOGLOBIN: 6 % (ref 0–1.5)
CHLORIDE BLD-SCNC: 99 MMOL/L (ref 99–110)
CO2: 34 MMOL/L (ref 21–32)
CREAT SERPL-MCNC: 2 MG/DL (ref 0.6–1.2)
EOSINOPHILS ABSOLUTE: 0.5 K/UL (ref 0–0.6)
EOSINOPHILS RELATIVE PERCENT: 6 %
FERRITIN: 47.1 NG/ML (ref 15–150)
FOLATE: 11.94 NG/ML (ref 4.78–24.2)
GFR AFRICAN AMERICAN: 29
GFR NON-AFRICAN AMERICAN: 24
GLUCOSE BLD-MCNC: 118 MG/DL (ref 70–99)
GLUCOSE BLD-MCNC: 222 MG/DL (ref 70–99)
GLUCOSE BLD-MCNC: 245 MG/DL (ref 70–99)
GLUCOSE BLD-MCNC: 374 MG/DL (ref 70–99)
GLUCOSE BLD-MCNC: 407 MG/DL (ref 70–99)
HCO3 VENOUS: 36.5 MMOL/L (ref 23–29)
HCT VFR BLD CALC: 23.9 % (ref 36–48)
HEMOGLOBIN: 7.7 G/DL (ref 12–16)
HYPOCHROMIA: ABNORMAL
IMMATURE RETIC FRACT: 0.65 (ref 0.21–0.37)
IRON SATURATION: 15 % (ref 15–50)
IRON: 41 UG/DL (ref 37–145)
LYMPHOCYTES ABSOLUTE: 1.1 K/UL (ref 1–5.1)
LYMPHOCYTES RELATIVE PERCENT: 14 %
MCH RBC QN AUTO: 32.3 PG (ref 26–34)
MCHC RBC AUTO-ENTMCNC: 32.3 G/DL (ref 31–36)
MCV RBC AUTO: 100 FL (ref 80–100)
METHEMOGLOBIN VENOUS: 0.4 %
MONOCYTES ABSOLUTE: 0.5 K/UL (ref 0–1.3)
MONOCYTES RELATIVE PERCENT: 6 %
NEUTROPHILS ABSOLUTE: 5.6 K/UL (ref 1.7–7.7)
NEUTROPHILS RELATIVE PERCENT: 72 %
O2 CONTENT, VEN: 10 VOL %
O2 SAT, VEN: 100 %
O2 THERAPY: ABNORMAL
OVALOCYTES: ABNORMAL
PCO2, VEN: 37 MMHG (ref 40–50)
PDW BLD-RTO: 18.5 % (ref 12.4–15.4)
PERFORMED ON: ABNORMAL
PH VENOUS: 7.6 (ref 7.35–7.45)
PLATELET # BLD: 313 K/UL (ref 135–450)
PLATELET SLIDE REVIEW: ADEQUATE
PMV BLD AUTO: 8 FL (ref 5–10.5)
PO2, VEN: 177 MMHG (ref 25–40)
POIKILOCYTES: ABNORMAL
POLYCHROMASIA: ABNORMAL
POTASSIUM SERPL-SCNC: 3.7 MMOL/L (ref 3.5–5.1)
PRO-BNP: 1008 PG/ML (ref 0–449)
PROTEIN 24 HOUR URINE: 0.09 G/24HR
RBC # BLD: 2.39 M/UL (ref 4–5.2)
RETICULOCYTE ABSOLUTE COUNT: 0.08 M/UL (ref 0.02–0.1)
RETICULOCYTE COUNT PCT: 3.23 % (ref 0.5–2.18)
SLIDE REVIEW: ABNORMAL
SODIUM BLD-SCNC: 145 MMOL/L (ref 136–145)
STOMATOCYTES: ABNORMAL
TARGET CELLS: ABNORMAL
TCO2 CALC VENOUS: 84 MMOL/L
TOTAL IRON BINDING CAPACITY: 280 UG/DL (ref 260–445)
VITAMIN B-12: 311 PG/ML (ref 211–911)
WBC # BLD: 7.6 K/UL (ref 4–11)

## 2021-06-05 PROCEDURE — 6360000002 HC RX W HCPCS: Performed by: INTERNAL MEDICINE

## 2021-06-05 PROCEDURE — 82607 VITAMIN B-12: CPT

## 2021-06-05 PROCEDURE — 94761 N-INVAS EAR/PLS OXIMETRY MLT: CPT

## 2021-06-05 PROCEDURE — 85025 COMPLETE CBC W/AUTO DIFF WBC: CPT

## 2021-06-05 PROCEDURE — 6370000000 HC RX 637 (ALT 250 FOR IP): Performed by: INTERNAL MEDICINE

## 2021-06-05 PROCEDURE — 85045 AUTOMATED RETICULOCYTE COUNT: CPT

## 2021-06-05 PROCEDURE — 82803 BLOOD GASES ANY COMBINATION: CPT

## 2021-06-05 PROCEDURE — 82570 ASSAY OF URINE CREATININE: CPT

## 2021-06-05 PROCEDURE — 2580000003 HC RX 258: Performed by: INTERNAL MEDICINE

## 2021-06-05 PROCEDURE — 84156 ASSAY OF PROTEIN URINE: CPT

## 2021-06-05 PROCEDURE — 82746 ASSAY OF FOLIC ACID SERUM: CPT

## 2021-06-05 PROCEDURE — 84155 ASSAY OF PROTEIN SERUM: CPT

## 2021-06-05 PROCEDURE — 82728 ASSAY OF FERRITIN: CPT

## 2021-06-05 PROCEDURE — 83550 IRON BINDING TEST: CPT

## 2021-06-05 PROCEDURE — 2700000000 HC OXYGEN THERAPY PER DAY

## 2021-06-05 PROCEDURE — 83880 ASSAY OF NATRIURETIC PEPTIDE: CPT

## 2021-06-05 PROCEDURE — 1200000000 HC SEMI PRIVATE

## 2021-06-05 PROCEDURE — 83540 ASSAY OF IRON: CPT

## 2021-06-05 PROCEDURE — 84166 PROTEIN E-PHORESIS/URINE/CSF: CPT

## 2021-06-05 PROCEDURE — 99233 SBSQ HOSP IP/OBS HIGH 50: CPT | Performed by: NURSE PRACTITIONER

## 2021-06-05 PROCEDURE — 94640 AIRWAY INHALATION TREATMENT: CPT

## 2021-06-05 PROCEDURE — 84165 PROTEIN E-PHORESIS SERUM: CPT

## 2021-06-05 PROCEDURE — 36415 COLL VENOUS BLD VENIPUNCTURE: CPT

## 2021-06-05 PROCEDURE — 80048 BASIC METABOLIC PNL TOTAL CA: CPT

## 2021-06-05 RX ORDER — GABAPENTIN 100 MG/1
100 CAPSULE ORAL NIGHTLY
COMMUNITY

## 2021-06-05 RX ORDER — CYCLOBENZAPRINE HCL 10 MG
10 TABLET ORAL 3 TIMES DAILY PRN
COMMUNITY

## 2021-06-05 RX ORDER — CYCLOBENZAPRINE HCL 10 MG
10 TABLET ORAL 3 TIMES DAILY PRN
Status: DISCONTINUED | OUTPATIENT
Start: 2021-06-05 | End: 2021-06-09 | Stop reason: HOSPADM

## 2021-06-05 RX ORDER — LIDOCAINE 4 G/G
2 PATCH TOPICAL DAILY
Status: DISCONTINUED | OUTPATIENT
Start: 2021-06-05 | End: 2021-06-09 | Stop reason: HOSPADM

## 2021-06-05 RX ORDER — POLYETHYLENE GLYCOL 3350 17 G/17G
17 POWDER, FOR SOLUTION ORAL DAILY
Status: DISCONTINUED | OUTPATIENT
Start: 2021-06-05 | End: 2021-06-09 | Stop reason: HOSPADM

## 2021-06-05 RX ORDER — GABAPENTIN 100 MG/1
100 CAPSULE ORAL NIGHTLY
Status: DISCONTINUED | OUTPATIENT
Start: 2021-06-05 | End: 2021-06-09 | Stop reason: HOSPADM

## 2021-06-05 RX ORDER — OXYCODONE HYDROCHLORIDE 5 MG/1
5 TABLET ORAL EVERY 6 HOURS PRN
Status: DISCONTINUED | OUTPATIENT
Start: 2021-06-05 | End: 2021-06-09 | Stop reason: HOSPADM

## 2021-06-05 RX ORDER — LIDOCAINE 50 MG/G
2 PATCH TOPICAL DAILY
COMMUNITY

## 2021-06-05 RX ORDER — SENNA AND DOCUSATE SODIUM 50; 8.6 MG/1; MG/1
2 TABLET, FILM COATED ORAL DAILY
Status: DISCONTINUED | OUTPATIENT
Start: 2021-06-05 | End: 2021-06-09 | Stop reason: HOSPADM

## 2021-06-05 RX ORDER — OXYCODONE HYDROCHLORIDE 5 MG/1
5 CAPSULE ORAL EVERY 6 HOURS PRN
COMMUNITY
End: 2021-06-28 | Stop reason: ALTCHOICE

## 2021-06-05 RX ADMIN — IPRATROPIUM BROMIDE AND ALBUTEROL SULFATE 3 ML: .5; 3 SOLUTION RESPIRATORY (INHALATION) at 11:29

## 2021-06-05 RX ADMIN — INSULIN LISPRO 10 UNITS: 100 INJECTION, SOLUTION INTRAVENOUS; SUBCUTANEOUS at 16:59

## 2021-06-05 RX ADMIN — GABAPENTIN 100 MG: 100 CAPSULE ORAL at 21:01

## 2021-06-05 RX ADMIN — LINAGLIPTIN 5 MG: 5 TABLET, FILM COATED ORAL at 09:30

## 2021-06-05 RX ADMIN — CALCIUM CARBONATE-VITAMIN D TAB 500 MG-200 UNIT 1 TABLET: 500-200 TAB at 09:30

## 2021-06-05 RX ADMIN — Medication 10 ML: at 09:30

## 2021-06-05 RX ADMIN — METHOCARBAMOL 500 MG: 500 TABLET ORAL at 09:30

## 2021-06-05 RX ADMIN — LINACLOTIDE 145 MCG: 145 CAPSULE, GELATIN COATED ORAL at 07:05

## 2021-06-05 RX ADMIN — MIRTAZAPINE 15 MG: 15 TABLET, FILM COATED ORAL at 21:01

## 2021-06-05 RX ADMIN — FUROSEMIDE 20 MG: 10 INJECTION, SOLUTION INTRAMUSCULAR; INTRAVENOUS at 09:30

## 2021-06-05 RX ADMIN — Medication 10 ML: at 18:20

## 2021-06-05 RX ADMIN — FUROSEMIDE 20 MG: 10 INJECTION, SOLUTION INTRAMUSCULAR; INTRAVENOUS at 18:20

## 2021-06-05 RX ADMIN — IPRATROPIUM BROMIDE AND ALBUTEROL SULFATE 3 ML: .5; 3 SOLUTION RESPIRATORY (INHALATION) at 07:43

## 2021-06-05 RX ADMIN — POLYETHYLENE GLYCOL 3350 17 G: 17 POWDER, FOR SOLUTION ORAL at 15:02

## 2021-06-05 RX ADMIN — IPRATROPIUM BROMIDE AND ALBUTEROL SULFATE 3 ML: .5; 3 SOLUTION RESPIRATORY (INHALATION) at 15:21

## 2021-06-05 RX ADMIN — INSULIN LISPRO 4 UNITS: 100 INJECTION, SOLUTION INTRAVENOUS; SUBCUTANEOUS at 12:12

## 2021-06-05 RX ADMIN — PANTOPRAZOLE SODIUM 40 MG: 40 TABLET, DELAYED RELEASE ORAL at 07:05

## 2021-06-05 RX ADMIN — Medication 10 ML: at 21:02

## 2021-06-05 RX ADMIN — DILTIAZEM HYDROCHLORIDE 60 MG: 60 CAPSULE, EXTENDED RELEASE ORAL at 09:30

## 2021-06-05 RX ADMIN — ACETAMINOPHEN 650 MG: 325 TABLET ORAL at 09:29

## 2021-06-05 RX ADMIN — OXYCODONE 5 MG: 5 TABLET ORAL at 12:09

## 2021-06-05 RX ADMIN — IPRATROPIUM BROMIDE AND ALBUTEROL SULFATE 3 ML: .5; 3 SOLUTION RESPIRATORY (INHALATION) at 20:19

## 2021-06-05 RX ADMIN — CYCLOBENZAPRINE 10 MG: 10 TABLET, FILM COATED ORAL at 12:09

## 2021-06-05 RX ADMIN — PREDNISONE 10 MG: 10 TABLET ORAL at 09:30

## 2021-06-05 RX ADMIN — DILTIAZEM HYDROCHLORIDE 60 MG: 60 CAPSULE, EXTENDED RELEASE ORAL at 21:01

## 2021-06-05 RX ADMIN — LINACLOTIDE 145 MCG: 145 CAPSULE, GELATIN COATED ORAL at 16:59

## 2021-06-05 RX ADMIN — INSULIN LISPRO 2 UNITS: 100 INJECTION, SOLUTION INTRAVENOUS; SUBCUTANEOUS at 21:03

## 2021-06-05 RX ADMIN — STANDARDIZED SENNA CONCENTRATE AND DOCUSATE SODIUM 2 TABLET: 8.6; 5 TABLET ORAL at 15:02

## 2021-06-05 RX ADMIN — OXYCODONE 5 MG: 5 TABLET ORAL at 18:20

## 2021-06-05 ASSESSMENT — PAIN SCALES - GENERAL
PAINLEVEL_OUTOF10: 0
PAINLEVEL_OUTOF10: 10
PAINLEVEL_OUTOF10: 7
PAINLEVEL_OUTOF10: 10
PAINLEVEL_OUTOF10: 0
PAINLEVEL_OUTOF10: 3
PAINLEVEL_OUTOF10: 5

## 2021-06-05 ASSESSMENT — PAIN DESCRIPTION - LOCATION: LOCATION: BACK

## 2021-06-05 ASSESSMENT — PAIN DESCRIPTION - ORIENTATION: ORIENTATION: LOWER

## 2021-06-05 ASSESSMENT — PAIN DESCRIPTION - PAIN TYPE: TYPE: CHRONIC PAIN

## 2021-06-05 NOTE — PROGRESS NOTES
[facility]   Cardiology Progress Note     Date: 6/5/2021  Admit Date: 6/3/2021     Reason for consultation: CHF    Chief Complaint:   Chief Complaint   Patient presents with    Shortness of Breath     sent by Katelin Cordero for SOB and retaining fluid       History of Present Illness: History obtained from patient and medical record. Wilder Garcia is a 68 y.o. female who has  been followed by our practice for many years. She has longstanding  coronary artery disease, paroxysmal atrial fibrillation and history of  hypertension. She has recurrent atrial fib and supraventricular  tachycardia. She has type 2 diabetes, chronic obstructive pulmonary  disease. She also has a history of lung cancer supposedly in remission. She was recently hospitalized at [facility] in 03/2021 for  symptomatic anemia related to chronic anticoagulation. Hospital course  was complicated by pneumonia and chest pain. She did undergo endoscopy  without active bleeding, some mild antral gastritis. Eliquis was  resumed. She has pneumonia treated with antibiotics. She is on  low-dose prednisone for chronic obstructive pulmonary disease, steroid  dependency. She also had an abnormal echocardiogram concerning for  possible aortic dissection, but CT angiogram was negative for aortic  dissection. She is followed by Dr. Julian Zelaya for her lung cancer. She  has had worsening leg edema, worsening short of breath. She was brought  to the emergency room. She was found to have a proBNP of 1000 and to  again be markedly anemic. Initial hemoglobin was 8.4, then down to 7.3  after some fluids overnight. She was also found to have renal  insufficiency at her stable state, perhaps slightly increased creatinine  to 2.1 and after overnight fluids, now 1.8. Interval Hx: Today, she reports feeling better. Her pedal edema has improved. She currently is on 2 L of oxygen (wears 1 to 2 L at home as needed).   Admits to chronic generalized pain mostly in her back also has some in her low chest that worsens when she takes a deep breath in. Not exertional.  Anginal equivalent is chest heaviness, no recurrence. Patient thinks her abdomin continues to feel distended. Net output 1.1 L. Patient seen and examined. Clinical notes reviewed. Telemetry reviewed. No new complaints today. No major events overnight. Denies having chest pain, palpitations, shortness of breath, orthopnea/PND, cough, or dizziness at the time of this visit. Past Medical History:  Past Medical History:   Diagnosis Date    Arthritis     CAD (coronary artery disease)     Carpal tunnel syndrome     COPD (chronic obstructive pulmonary disease) ([address]     Coronary stent     depression     Diabetes mellitus ([address]     Diastolic heart failure ([address]     Per Dr Tatiana Soto 8/5/19    GERD (gastroesophageal reflux disease)     Hyperlipidemia     Hypertension     Lung [facility] ([facility]     Adenocarcinoma of Left Lung    MI (myocardial infarction) ([facility]     LIZETT (obstructive sleep apnea)     does not use CPAP    PONV (postoperative nausea and vomiting)     stress incontinence         Past Surgical History:    has a past surgical history that includes Coronary angioplasty with stent (2000, 2001); back surgery (2000, 2001); bronchoscopy (12/04/2018); pr brncc incl fluor gdnce dx w/cell washg spx (N/A, 12/4/2018); Cholecystectomy, laparoscopic (N/A, 12/19/2018); Upper gastrointestinal endoscopy (N/A, 2/25/2019); Colonoscopy (N/A, 2/25/2019); Colonoscopy (2/25/2019); bronchoscopy (N/A, 2/27/2019); bronchoscopy (2/27/2019); bronchoscopy (2/27/2019); bronchoscopy (N/A, 8/6/2019); Upper gastrointestinal endoscopy (N/A, 8/7/2019); bronchoscopy (N/A, 9/27/2019); bronchoscopy (9/27/2019); bronchoscopy (9/27/2019); IR KYPHOPLASTY THORACIC 1 VERTEBRAL BODY (7/23/2020); IR KYPHOPLASTY THORACIC 1 VERTEBRAL BODY (10/20/2020); and IR KYPHOPLASTY LUMBAR 1 VERTEBRAL BODY (12/8/2020).      Social capsule Take 145 mcg by mouth every morning (before breakfast) Yes Historical Provider, MD   furosemide (LASIX) 40 MG tablet Take 1 tablet by mouth daily Yes Myrtle Rosario MD   methocarbamol (ROBAXIN) 500 MG tablet Take 500 mg by mouth 3 times daily  Yes Historical Provider, MD   predniSONE (DELTASONE) 10 MG tablet Take 1 tablet by mouth daily Yes MARIA C Briggs CNP   pantoprazole (PROTONIX) 40 MG tablet Take 1 tablet by mouth every morning (before breakfast) Yes MARIA C Briggs CNP   dilTIAZem (CARDIZEM 12 HR) 60 MG extended release capsule Take 1 capsule by mouth 2 times daily Yes MARIA C Briggs CNP   mirtazapine (REMERON) 15 MG tablet Take 1 tablet by mouth nightly  Patient taking differently: Take 30 mg by mouth as needed  Yes Davidson Sanabria MD   glimepiride (AMARYL) 2 MG tablet Take 2 mg by mouth every morning (before breakfast)  Historical Provider, MD   linagliptin (TRADJENTA) 5 MG tablet Take 1 tablet by mouth daily  MARIA C Briggs CNP   metoprolol succinate (TOPROL XL) 25 MG extended release tablet Take 1 tablet by mouth daily  MARIA C Briggs CNP        Scheduled Meds:   furosemide  20 mg Intravenous BID    mirtazapine  15 mg Oral Nightly    predniSONE  10 mg Oral Daily    pantoprazole  40 mg Oral QAM AC    dilTIAZem  60 mg Oral BID    linagliptin  5 mg Oral Daily    methocarbamol  500 mg Oral TID    linaclotide  145 mcg Oral QAM AC    digoxin  125 mcg Oral Q MWF    ipratropium-albuterol  3 mL Inhalation Q4H WA    calcium-vitamin D  1 tablet Oral Daily    sodium chloride flush  5-40 mL Intravenous 2 times per day    insulin lispro  0-12 Units Subcutaneous TID WC    insulin lispro  0-6 Units Subcutaneous Nightly     Continuous Infusions:   sodium chloride      dextrose       PRN Meds:albuterol sulfate HFA, sodium chloride flush, sodium chloride, ondansetron **OR** ondansetron, acetaminophen **OR** acetaminophen, hydrALAZINE, sodium chloride, glucose, dextrose, glucagon (rDNA), dextrose     Review of Systems:  · Constitutional: Negative for fever, night sweats, chills,  · Skin: Negative for rash, pruritus, bleeding, or bruising    · HEENT: Negative for vision changes, ringing in the ears, dysphagia  · Respiratory: Reviewed in HPI  · Cardiovascular: Reviewed in HPI  · Gastrointestinal: Negative for abdominal pain, N/V/D, constipation, or black/tarry stools  · Genito-Urinary: Negative for dysuria, incontinence, or hematuria  · Musculoskeletal: Negative for joint swelling, muscle pain, or injuries  · Neurological/Psych: Negative for confusion, seizures, headaches, or TIA-like symptoms. No anxiety or depression. Physical Examination:  Vitals:    06/05/21 0912   BP: (!) 154/62   Pulse: 69   Resp: 18   Temp: 97.4 °F (36.3 °C)   SpO2: 92%      In: 380 [P.O.:360; I.V.:20]  Out: 2700    Wt Readings from Last 3 Encounters:   06/05/21 161 lb 3.2 oz (73.1 kg)   05/10/21 157 lb 8 oz (71.4 kg)   04/29/21 158 lb (71.7 kg)       Intake/Output Summary (Last 24 hours) at 6/5/2021 1030  Last data filed at 6/5/2021 0935  Gross per 24 hour   Intake 1055.77 ml   Output 2700 ml   Net -1644.23 ml       Telemetry: Personally Reviewed  - Sinus rhythm, PVCs, triplet    · Constitutional: Cooperative and in no apparent distress, and appears well nourished  · Skin: Warm and pink; no pallor, cyanosis, clubbing, or bruising   · HEENT: Symmetric and normocephalic. PERRL. Conjunctiva pink with clear sclera. Mucus membranes moist.   · Cardiovascular: Regular rate and rhythm. S1/S2 present without murmurs, no rubs or gallops. Peripheral pulses 2+, capillary refill < 3 seconds. No elevation of JVP. +1 peripheral edema  · Respiratory: Respirations symmetric and unlabored. Lungs expiratory wheezes  · Gastrointestinal: Abdomen soft and round. Bowel sounds active without tenderness.   · Musculoskeletal: Bilateral upper and lower extremity strength with generalized weakness · Neurologic/Psych: Awake and orientated to person, place and time. Calm affect, appropriate mood    Pertinent labs, diagnostic, device, and imaging results reviewed as a part of this visit    Labs:    BMP:   Recent Labs     21  1812 21  0502 21  0442    145 145   K 4.1 3.3* 3.7   CL 93* 103 99   CO2 29 35* 34*   PHOS  --  3.1  --    BUN 52* 52* 44*   CREATININE 2.1* 1.8* 2.0*     Estimated Creatinine Clearance: 23 mL/min (A) (based on SCr of 2 mg/dL (H)). CBC:   Recent Labs     21  1748 21  0502 212   WBC 9.6 7.5 7.6   HGB 8.4* 7.3* 7.7*   HCT 26.5* 22.7* 23.9*   .1* 100.5* 100.0    291 313     Thyroid:   Lab Results   Component Value Date    TSH 1.71 2021    H9VFEOE 6.4 2011     Lipids:   Lab Results   Component Value Date    CHOL 224 2016    HDL 64 2021    TRIG 123 2016     LFTS:   Lab Results   Component Value Date    ALT 16 2021    AST 16 2021    ALKPHOS 84 2021    PROT 6.6 2021    AGRATIO 1.1 2021    BILITOT <0.2 2021     Cardiac Enzymes:   Lab Results   Component Value Date    CKTOTAL 26 2020    TROPONINI 0.02 2021    TROPONINI <0.01 2020    TROPONINI <0.01 2020     Coags:   Lab Results   Component Value Date    PROTIME 12.2 2021    INR 1.05 2021     EC/3/21  Sinus rhythm with Premature atrial complexes with Aberrant conduction  Minimal voltage criteria for LVH, may be normal variant  Inferior infarct , age undetermined  Cannot rule out Anterior infarct , age undetermined    ECHO:  3/2021 ([facility])  Summary:   There is mild concentric left ventricular hypertrophy. Small (<1 cm) pericardial effusion. The Aortic Valve leaflets appear sclerotic. Linear echoes with the aorta are suggestive of dissection of the ascending   aorta.    Recommend chest CTA for assessment of possible thoracic dissection   Overall left ventricular ejection fraction is estimated to be 50-55%. The left ventricular function is low normal.   There is borderline global hypokinesis of the left ventricle. The mitral valve leaflets are mildly thickened in appearance. The mitral valve chordae are thickened and/or calcified. The jet is centrally directed. The diastolic function is impaired and classified as Grade 1 (impaired   relaxation). There is mild to moderate mitral regurgitation. Moderate aortic regurgitation. There is borderline right ventricular hypertrophy. Stress Test: 5/20/2019  Summary    Normal LV size and global systolic function.    Left ventricular ejection fraction of 58%.    There is a medium sized, moderate intensity, completely fixed defect in the    mid to basilar inferolateral segments, with associated hypokinesis-    consistent with scar.    There are no significant reversible coronary flow abnormalities. Cath:  Sept '04 : inf basal HK, EF 50%, CX & RCA stents patent; RCA distal aneurismal area unchanged from previous cath. RV 80% lesion remains unchanged  May '06: prox inferior HK, EF 45%. Widely patent CX & RCA in the previously placed stent    CXR: 6/3  1. No acute cardiopulmonary disease. 2. Chronic pleural and parenchymal scarring within the left perihilar space.      Problem List:   Patient Active Problem List    Diagnosis Date Noted    Dyslipidemia 08/08/2011    HTN (hypertension), benign 08/08/2011    Coronary atherosclerosis of native coronary artery 08/08/2011    Anemia 06/04/2021    ARF (acute renal failure) ([address] 06/03/2021    Establishing care with new doctor, encounter for 04/29/2021    T12 compression fracture, initial encounter ([address] 10/16/2020    Maxillary sinus fracture, sequela ([address] 07/20/2020    PNA (pneumonia) 07/20/2020    Dysphagia 07/20/2020    Acute kidney injury superimposed on CKD ([address] 07/20/2020    Pathologic thoracic fracture, initial encounter 07/15/2020    Intractable back pain 07/11/2020    Renal failure 05/31/2020    Diabetes education, encounter for     Depression     Anxiety disorder     Delirium     SIRS (systemic inflammatory response syndrome) (HCC)     Infection requiring airborne isolation precautions 09/27/2019    Malignant neoplasm of left lung (HCC)     Chronic obstructive pulmonary disease with acute lower respiratory infection (HCC)     Acid fast bacillus     Poorly controlled type 2 diabetes mellitus ([address]     History of depression     Hypokalemia 08/09/2019    Gastroparesis 08/09/2019    Radiation pneumonitis (HCC)     Chronic respiratory failure with hypoxia (HCC)     Lung infiltrate on CT     Non-small cell cancer of left lung (HCC)     Acute encephalopathy     Weakness     Severe malnutrition ([address] 08/05/2019    Acute on chronic respiratory failure with hypoxia (HCC) 08/05/2019    Abnormal CT of the chest     Chronic diastolic heart failure (HCC)     Failure to thrive (0-17)     Closed compression fracture of thoracic vertebra (HCC) 05/20/2019    Chronic deep vein thrombosis (DVT) of both lower extremities ([address] 05/20/2019    Ataxia     Recurrent falls     Diabetic peripheral neuropathy ([address]     Other pulmonary embolism without acute cor pulmonale (HCC)     Syncope and collapse     Subacute pulmonary embolism (HCC)     Loss of consciousness ([address] 05/16/2019    Mild malnutrition ([address] 05/02/2019    Adenocarcinoma of left lung ([address] 03/08/2019    COPD exacerbation (HCC)     Mediastinal adenopathy 01/02/2019    Ileus following gastrointestinal surgery ([address] 12/30/2018    Atrial fibrillation with rapid ventricular response (HCC)     Acute respiratory failure with hypoxia (HCC)     Centrilobular emphysema ([address]     Gallstones and inflammation of gallbladder without obstruction 12/18/2018    Acute cholecystitis     Hilar mass 07/18/2017    Chronic cough 06/14/2017    Paroxysmal atrial fibrillation ([address] 10/17/2016    Influenza coordinating care. I reviewed interval history, physical exam, review of data including labs, imaging, development and implementation of treatment plan and coordination of complex care. Counseled on risk factor modifications. Meliza LOMBARDI-JAYMIE SHAIKHEleanor [facility]    Office: [phone]    NOTE:  This report was transcribed using voice recognition software. Every effort was made to ensure accuracy; however, inadvertent computerized transcription errors may be present.

## 2021-06-05 NOTE — PLAN OF CARE
Problem: Falls - Risk of:  Goal: Will remain free from falls  Description: Will remain free from falls  Outcome: Ongoing     Problem: OXYGENATION/RESPIRATORY FUNCTION  Goal: Patient will maintain patent airway  Outcome: Ongoing  Goal: Patient will achieve/maintain normal respiratory rate/effort  Description: Respiratory rate and effort will be within normal limits for the patient  Outcome: Ongoing     Problem: HEMODYNAMIC STATUS  Goal: Patient has stable vital signs and fluid balance  Outcome: Ongoing     Problem: Pain:  Goal: Pain level will decrease  Description: Pain level will decrease  Outcome: Ongoing     Vitals:    06/04/21 2025   BP: (!) 150/66   Pulse: 86   Resp: 18   Temp: 98 °F (36.7 °C)   SpO2: 99%     Pt asleep at shift change, awake with RN at bedside. VSS at 2025 with /66. Respirations easy & equal with oxygenation saturations 99% wnl on 2L n/c. Pt denies pain. Pt is a high fall risk. Pt remains free from falls, throughout night. Bed alarm remains in place, door open. Pt encouraged to use call light for needs throughout night; call light is within reach. Bed lock is in lowest position. Will continue to monitor throughout night.

## 2021-06-05 NOTE — PROGRESS NOTES
Syncope and collapse    Subacute pulmonary embolism (HCC)    Other pulmonary embolism without acute cor pulmonale (HCC)    Closed compression fracture of thoracic vertebra (HCC)    Chronic deep vein thrombosis (DVT) of both lower extremities (HCC)    Ataxia    Recurrent falls    Diabetic peripheral neuropathy (HCC)    Severe malnutrition (HCC)    Abnormal CT of the chest    Chronic diastolic heart failure (HCC)    Failure to thrive (0-17)    Acute on chronic respiratory failure with hypoxia (HCC)    Chronic respiratory failure with hypoxia (HCC)    Lung infiltrate on CT    Non-small cell cancer of left lung (HCC)    Acute encephalopathy    Weakness    Radiation pneumonitis (HCC)    Hypokalemia    Gastroparesis    Infection requiring airborne isolation precautions    Malignant neoplasm of left lung (HCC)    Chronic obstructive pulmonary disease with acute lower respiratory infection (HCC)    Acid fast bacillus    Poorly controlled type 2 diabetes mellitus ([address]    History of depression    SIRS (systemic inflammatory response syndrome) ([address]    Diabetes education, encounter for    Depression    Anxiety disorder    Delirium    Renal failure    Intractable back pain    Pathologic thoracic fracture, initial encounter    Maxillary sinus fracture, sequela (HCC)    PNA (pneumonia)    Dysphagia    Acute kidney injury superimposed on CKD ([address]    T12 compression fracture, initial encounter ([address]    Establishing care with new doctor, encounter for    ARF (acute renal failure) ([address]    Anemia       : other supportive care :   - Check daily renal function panel with electrolytes-phosphorus  - Strict monitoring of I/Os, daily weight  - Renal feeds/diet  - Current medications reviewed. - Nephrotoxic medications have been discontinued. - Dose adjusted and appropriate. - Dose meds for eGFR <15 mL/min/1.73m2.  during LORRAINE    - Avoid heavy opioids due to renal failure - may use very low dose dilaudid / fentanyl with close monitoring of CNS and respiratory depression. Please refer to the orders. High Complexity. Multiple complex problems. Discussed with patient,  and treatment team-   Thank you for allowing me to participate in this patient's care. Please do not hesitate to contact me with any questions/concerns. We will follow along with you. Shahram Bonilla MD, MD   [facility] [id number] [place] [address] [place]: [phone] or Via [employer]  Fax: [phone]      ================================================  ================================================      SUBJECTIVE:   - Patient was seen comfortably sitting up in the bed,   Reported no active complaints,   Renal function stable.   - ROS reported: as mentioned in Subjective, HPI, CC, and all other 10 systems' review negative,   - PMH, [address], Social history: reviewed   - MEDICATIONS:  Prior to Admission Medications:  Medications Prior to Admission: apixaban (ELIQUIS) 2.5 MG TABS tablet, Take 1 tablet by mouth 2 times daily  Calcium Carb-Cholecalciferol (CALCIUM+D3) 500-600 MG-UNIT TABS, Take 1 tablet by mouth daily  albuterol sulfate  (90 Base) MCG/ACT inhaler, Inhale 2 puffs into the lungs every 4 hours as needed for Wheezing  ipratropium-albuterol (DUONEB) 0.5-2.5 (3) MG/3ML SOLN nebulizer solution, Inhale 3 mLs into the lungs every 4 hours (while awake) DX:COPD J44.9  Continuous Blood Gluc  (FREESTYLE LOC 2 READER) ELADIA, Check blood sugars twice daily  Continuous Blood Gluc Sensor (FREESTYLE LOC 2 SENSOR) MISC, Check blood sugars twice daily  Continuous Blood Gluc  (FREESTYLE LOC 14 DAY READER) ELADIA, Check blood sugars twice daily  digoxin (LANOXIN) 125 MCG tablet, Take 1 tab by mouth on Monday -Wednesday-Friday  linaclotide (LINZESS) 145 MCG capsule, Take 145 mcg by mouth every morning (before breakfast)  furosemide (LASIX) 40 MG tablet, Take 1 tablet by mouth daily  methocarbamol (ROBAXIN) 500 MG tablet, Take 500 mg by mouth 3 times daily   predniSONE (DELTASONE) 10 MG tablet, Take 1 tablet by mouth daily  pantoprazole (PROTONIX) 40 MG tablet, Take 1 tablet by mouth every morning (before breakfast)  dilTIAZem (CARDIZEM 12 HR) 60 MG extended release capsule, Take 1 capsule by mouth 2 times daily  mirtazapine (REMERON) 15 MG tablet, Take 1 tablet by mouth nightly (Patient taking differently: Take 30 mg by mouth as needed )  glimepiride (AMARYL) 2 MG tablet, Take 2 mg by mouth every morning (before breakfast)  linagliptin (TRADJENTA) 5 MG tablet, Take 1 tablet by mouth daily  metoprolol succinate (TOPROL XL) 25 MG extended release tablet, Take 1 tablet by mouth daily     Scheduled Meds:   furosemide  20 mg Intravenous BID    mirtazapine  15 mg Oral Nightly    predniSONE  10 mg Oral Daily    pantoprazole  40 mg Oral QAM AC    dilTIAZem  60 mg Oral BID    linagliptin  5 mg Oral Daily    methocarbamol  500 mg Oral TID    linaclotide  145 mcg Oral QAM AC    digoxin  125 mcg Oral Q MWF    ipratropium-albuterol  3 mL Inhalation Q4H WA    calcium-vitamin D  1 tablet Oral Daily    sodium chloride flush  5-40 mL Intravenous 2 times per day    insulin lispro  0-12 Units Subcutaneous TID WC    insulin lispro  0-6 Units Subcutaneous Nightly     Continuous Infusions:   sodium chloride      dextrose       PRN Meds:.albuterol sulfate HFA, sodium chloride flush, sodium chloride, ondansetron **OR** ondansetron, acetaminophen **OR** acetaminophen, hydrALAZINE, sodium chloride, glucose, dextrose, glucagon (rDNA), dextrose        OBJECTIVE:   PHYSICAL EXAM:  Patient Vitals for the past 24 hrs:   BP Temp Temp src Pulse Resp SpO2 Weight   06/05/21 0912 (!) 154/62 97.4 °F (36.3 °C) Oral 69 18 92 % --   06/05/21 0748 -- -- -- -- -- 99 % --   06/05/21 0430 (!) 155/70 98 °F (36.7 °C) Oral 77 18 100 % 161 lb 3.2 oz (73.1 kg)   06/05/21 0030 130/69 98.2 °F (36.8 °C) Oral 79 18 98 % --   06/04/21 0359 -- -- -- 84 -- -- --   06/04/21 2025 (!) 150/66 98 °F (36.7 °C) Oral 86 18 99 % --   06/04/21 1709 -- -- -- -- 18 98 % --   06/04/21 1650 (!) 153/72 98.4 °F (36.9 °C) Oral 88 18 97 % --   06/04/21 1243 137/71 98.2 °F (36.8 °C) Oral 93 18 96 % --   06/04/21 1213 -- -- -- -- 18 -- --       Intake/Output Summary (Last 24 hours) at 6/5/2021 0955  Last data filed at 6/5/2021 0935  Gross per 24 hour   Intake 1055.77 ml   Output 2700 ml   Net -1644.23 ml       General: Awake, Alert,   HENT: Atraumatic, normocephalic   Eyes: Normal conjunctiva, Non-incteral sclera. Neck: Supple, JVD not visible. CVS:  Heart sounds are normal. No murmurs. .  RS: Normal respiratory efforts,  Lung fields are clear. Abd: Soft , bowel sounds are normal, and no tenderness to palpation. Skin: No rash ,    CNS: Awake Oriented , No focal.   Extremities/MSK:  Edema, no cyanosis. DATA:  Diagnostic tests reviewed for today's visit:    Recent Labs     06/03/21  1748 06/04/21  0502 06/05/21  0442   WBC 9.6 7.5 7.6   HCT 26.5* 22.7* 23.9*    291 313     Iron Saturation:  No components found for: PERCENTFE  FERRITIN:    Lab Results   Component Value Date    FERRITIN 42.7 06/09/2020     IRON:    Lab Results   Component Value Date    IRON 36 06/09/2020     TIBC:    Lab Results   Component Value Date    TIBC 245 06/09/2020       Recent Labs     06/03/21  1812 06/04/21  0502 06/05/21  0442    145 145   K 4.1 3.3* 3.7   CL 93* 103 99   CO2 29 35* 34*   BUN 52* 52* 44*   CREATININE 2.1* 1.8* 2.0*     Recent Labs     06/03/21  1812 06/04/21  0502 06/05/21  0442   CALCIUM 9.3 9.2 9.5   PHOS  --  3.1  --      No results for input(s): PH, PCO2, PO2 in the last 72 hours.     Invalid input(s): Toshia Munoz    ABG:  No results found for: PH, PCO2, PO2, HCO3, BE, THGB, TCO2, O2SAT  VBG:    Lab Results   Component Value Date    PHVEN 7.603 06/05/2021    SCW7XWR 37.0 06/05/2021    BEVEN 13.8 06/05/2021    X9ISRXZY 100 06/05/2021       LDH: No results found for: LDH  Uric Acid:    Lab Results   Component Value Date    LABURIC 4.1 12/25/2018       PT/INR:    Lab Results   Component Value Date    PROTIME 12.2 06/03/2021    INR 1.05 06/03/2021     Warfarin PT/INR:  No components found for: Ilana Nicolas  PTT:    Lab Results   Component Value Date    APTT 27.0 06/03/2021   [APTT}    Last 3 Troponin:    Lab Results   Component Value Date    TROPONINI 0.02 06/03/2021    TROPONINI <0.01 07/11/2020    TROPONINI <0.01 06/04/2020       U/A:    Lab Results   Component Value Date    COLORU YELLOW 06/03/2021    PROTEINU Negative 06/03/2021    PHUR 6.0 06/03/2021    WBCUA 0 06/03/2021    RBCUA 0 06/03/2021    YEAST Present 08/04/2019    BACTERIA 2+ 05/31/2020    CLARITYU Clear 06/03/2021    SPECGRAV 1.014 06/03/2021    LEUKOCYTESUR Negative 06/03/2021    UROBILINOGEN 0.2 06/03/2021    BILIRUBINUR Negative 06/03/2021    BLOODU Negative 06/03/2021    GLUCOSEU 500 06/03/2021     Microalbumen/Creatinine ratio:  No components found for: RUCREAT  24 Hour Urine for Protein:  No components found for: RAWUPRO, UHRS3, OSHS85KU, UTV3  24 Hour Urine for Creatinine Clearance:  No components found for: CREAT4, UHRS10, UTV10  Urine Toxicology: No components found for: IAMMENTA, IBARBIT, IBENZO, ICOCAINE, IMARTHC, IOPIATES, IPHENCYC    HgBA1c:    Lab Results   Component Value Date    LABA1C 5.8 05/04/2021     RPR:  No results found for: RPR  HIV:  No results found for: HIV  ROXANNE:  No results found for: ANATITER, ROXANNE  RF:  No results found for: RF  DSDNA:  No components found for: DNA  AMYLASE:  No results found for: AMYLASE  LIPASE:    Lab Results   Component Value Date    LIPASE 46.0 07/11/2020     Fibrinogen Level:  No components found for: FIB      BELOW MENTIONED RADIOLOGY STUDIES REVIEWED BY ME:    XR CHEST (2 VW)    Result Date: 6/3/2021  EXAMINATION: TWO XRAY VIEWS OF THE CHEST 6/3/2021 5:42 pm COMPARISON: 7/17/2020 HISTORY: ORDERING SYSTEM PROVIDED HISTORY: sob TECHNOLOGIST PROVIDED HISTORY: Reason for exam:->sob Reason for Exam: Shortness of Breath (sent by [facility] for SOB and retaining fluid) Acuity: Unknown Type of Exam: Unknown FINDINGS: Frontal and lateral views of the chest were performed. There is no acute skeletal abnormality. There are chronic healed left rib fractures. There are multiple chronic wedge compression fractures of multiple lower thoracic and upper lumbar vertebral bodies, augmented by vertebroplasty cement. The heart size and mediastinal contours are stable and within normal limits. There is a stable focus of pleural-parenchymal scarring noted within the left perihilar space, unchanged from prior exams. There is stable mild chronic biapical pleural-parenchymal scarring. The lungs are otherwise clear, without evidence of acute airspace consolidation, pneumothorax, pleural effusion, or pulmonary edema. 1. No acute cardiopulmonary disease. 2. Chronic pleural and parenchymal scarring within the left perihilar space.

## 2021-06-05 NOTE — PLAN OF CARE
Problem: Falls - Risk of:  Goal: Will remain free from falls  Description: Will remain free from falls  6/5/2021 0643 by Flori Cardenas RN  Outcome: Ongoing  Note: Remains free from falls this shift. Problem: OXYGENATION/RESPIRATORY FUNCTION  Goal: Patient will maintain patent airway  6/5/2021 0643 by Flori Cardenas RN  Outcome: Ongoing  Note: O2 sat maintained on 2L O2 per NC this shift. Problem: HEMODYNAMIC STATUS  Goal: Patient has stable vital signs and fluid balance  6/5/2021 0643 by Flori Cardenas RN  Outcome: Ongoing  Note: VSS this shift, remains SR on telemetry. Problem: FLUID AND ELECTROLYTE IMBALANCE  Goal: Fluid and electrolyte balance are achieved/maintained  Outcome: Ongoing  Note: Potassium up to 3.7 this am.     Problem: Pain:  Goal: Pain level will decrease  Description: Pain level will decrease  6/5/2021 0643 by Flori Cardenas RN  Outcome: Ongoing  Note: No report of pain this shift.

## 2021-06-05 NOTE — CONSULTS
[facility]                     [facility], [address]                                  CONSULTATION    PATIENT NAME: Janette Ascencio                     :        1940  MED REC NO:   [id number]                          ROOM:       [id number]  ACCOUNT NO:   [de-identified]                           ADMIT DATE: 2021  PROVIDER:     Deborah Shields MD    CONSULT DATE:  2021    REASON FOR CONSULTATION:  Request by Dr. Danyell Granados to see for congestive heart  failure. HISTORY OF PRESENT ILLNESS:  The patient is a 79-year-old lady who has  been followed by our practice for many years. She has longstanding  coronary artery disease, paroxysmal atrial fibrillation and history of  hypertension. She has recurrent atrial fib and supraventricular  tachycardia. She has type 2 diabetes, chronic obstructive pulmonary  disease. She also has a history of lung cancer supposedly in remission. She was recently hospitalized at [facility] in 2021 for  symptomatic anemia related to chronic anticoagulation. Hospital course  was complicated by pneumonia and chest pain. She did undergo endoscopy  without active bleeding, some mild antral gastritis. Eliquis was  resumed. She has pneumonia treated with antibiotics. She is on  low-dose prednisone for chronic obstructive pulmonary disease, steroid  dependency. She also had an abnormal echocardiogram concerning for  possible aortic dissection, but CT angiogram was negative for aortic  dissection. She is followed by Dr. Ninoska Foley for her lung cancer. She  has had worsening leg edema, worsening short of breath. She was brought  to the emergency room. She was found to have a proBNP of 1000 and to  again be markedly anemic. Initial hemoglobin was 8.4, then down to 7.3  after some fluids overnight.   She was also found to have renal  insufficiency at her stable state, perhaps slightly increased creatinine  to 2.1 and after been as low as 88%  on room air. She is afebrile. HEENT:  Sclerae are clear. Posterior pharynx is clear. SKIN:  She appears quite pale. LUNGS:  Has few crackles at the lung bases. CARDIAC:  She has a systolic ejection murmur. ABDOMEN:  Abdomen is nontender. No masses are felt. EXTREMITIES:  Show 2+ edema from the knees down, dramatically improved  from when I last saw her. NEUROLOGIC:  She seems alert and oriented with some confusion over  questioning. Moves all extremities well. Cranial nerves II through XII  are intact. LABORATORY DATA:  Labs on admission show potassium 4.1, down to 3.3  today. Glucose was 432 on admission, down to 126 today. Hemoglobin on  admission was lower than her baseline at 8.4, down to 7.3 today. EKG  surprisingly shows that she is in normal sinus rhythm. ProBNP is 1000. IMPRESSION:  Chronic diastolic heart failure, likely not far from her  baseline; leg edema, much improved with overnight stay in the hospital;  paroxysmal atrial fibrillation with her currently being in sinus rhythm. She is doing poorly with Eliquis therapy. When it was discussed the  issue of possibly doing a WATCHMAN procedure, the patient was not at all  interested in pursuing that. History of lung cancer with questionable  status. RECOMMENDATIONS:  Very concerned about symptomatic anemia, so it would  be in her best interest to keep hemoglobin at 8 or 8.5 or above. Perhaps, if she is not willing to pursue WATCHMAN procedure, then it  becomes contraindicated to keep her on Eliquis due to the recurring  symptomatic anemia. I feel her diastolic heart failure is adequately  controlled, _____ to keep creatinine in 1.7 to 2 range to optimize her  hemodynamics. Also need to investigate the issue of chronic prednisone  therapy and if that is truly indicated in this patient.   Also pain  management is currently quite difficult with the patient currently  denying that she is experiencing any pain

## 2021-06-05 NOTE — PROGRESS NOTES
Critical result of pH 7.603 reported to Dr. Claudell Rattler via [employer], read, no new orders received.

## 2021-06-05 NOTE — PROGRESS NOTES
Message sent to Dr. Amelie Philippe    Pt here with acute renal failure, anemia; Pt has chronic pain in her back and left shoulder to which she sees pain management at . I just fixed her medications. We have Robaxin ordered, but she takes flexeril 10 mg TID PRN; She is also supposed to have oxycodone 5 mg every 6 hours PRN. Gabapentin 100 mg at bedtime. Lidoderm patches x 2 for her back and shoulder. Thanks. Please review.

## 2021-06-06 PROBLEM — Z76.89 ESTABLISHING CARE WITH NEW DOCTOR, ENCOUNTER FOR: Status: RESOLVED | Noted: 2021-04-29 | Resolved: 2021-06-06

## 2021-06-06 PROBLEM — K80.00 GALLSTONES AND INFLAMMATION OF GALLBLADDER WITHOUT OBSTRUCTION: Status: RESOLVED | Noted: 2018-12-18 | Resolved: 2021-06-06

## 2021-06-06 PROBLEM — R13.10 DYSPHAGIA: Status: RESOLVED | Noted: 2020-07-20 | Resolved: 2021-06-06

## 2021-06-06 PROBLEM — M54.9 INTRACTABLE BACK PAIN: Status: RESOLVED | Noted: 2020-07-11 | Resolved: 2021-06-06

## 2021-06-06 PROBLEM — R40.20 LOSS OF CONSCIOUSNESS (HCC): Status: RESOLVED | Noted: 2019-05-16 | Resolved: 2021-06-06

## 2021-06-06 PROBLEM — R59.0 MEDIASTINAL ADENOPATHY: Status: RESOLVED | Noted: 2019-01-02 | Resolved: 2021-06-06

## 2021-06-06 PROBLEM — K91.89 ILEUS FOLLOWING GASTROINTESTINAL SURGERY (HCC): Status: RESOLVED | Noted: 2018-12-30 | Resolved: 2021-06-06

## 2021-06-06 PROBLEM — N19 RENAL FAILURE: Status: RESOLVED | Noted: 2020-05-31 | Resolved: 2021-06-06

## 2021-06-06 PROBLEM — S22.080A T12 COMPRESSION FRACTURE, INITIAL ENCOUNTER (HCC): Status: RESOLVED | Noted: 2020-10-16 | Resolved: 2021-06-06

## 2021-06-06 PROBLEM — S02.401S: Status: RESOLVED | Noted: 2020-07-20 | Resolved: 2021-06-06

## 2021-06-06 PROBLEM — M84.48XA PATHOLOGIC THORACIC FRACTURE, INITIAL ENCOUNTER: Status: RESOLVED | Noted: 2020-07-15 | Resolved: 2021-06-06

## 2021-06-06 PROBLEM — K56.7 ILEUS FOLLOWING GASTROINTESTINAL SURGERY (HCC): Status: RESOLVED | Noted: 2018-12-30 | Resolved: 2021-06-06

## 2021-06-06 PROBLEM — R05.3 CHRONIC COUGH: Status: RESOLVED | Noted: 2017-06-14 | Resolved: 2021-06-06

## 2021-06-06 PROBLEM — E43 SEVERE MALNUTRITION (HCC): Status: RESOLVED | Noted: 2019-08-05 | Resolved: 2021-06-06

## 2021-06-06 PROBLEM — K31.84 GASTROPARESIS: Status: RESOLVED | Noted: 2019-08-09 | Resolved: 2021-06-06

## 2021-06-06 PROBLEM — E87.6 HYPOKALEMIA: Status: RESOLVED | Noted: 2019-08-09 | Resolved: 2021-06-06

## 2021-06-06 PROBLEM — R91.8 HILAR MASS: Status: RESOLVED | Noted: 2017-07-18 | Resolved: 2021-06-06

## 2021-06-06 PROBLEM — C34.92 ADENOCARCINOMA OF LEFT LUNG (HCC): Status: RESOLVED | Noted: 2019-03-08 | Resolved: 2021-06-06

## 2021-06-06 PROBLEM — J96.21 ACUTE ON CHRONIC RESPIRATORY FAILURE WITH HYPOXIA (HCC): Status: RESOLVED | Noted: 2019-08-05 | Resolved: 2021-06-06

## 2021-06-06 PROBLEM — J18.9 PNA (PNEUMONIA): Status: RESOLVED | Noted: 2020-07-20 | Resolved: 2021-06-06

## 2021-06-06 PROBLEM — Z85.118 HISTORY OF LUNG CANCER: Status: ACTIVE | Noted: 2021-06-06

## 2021-06-06 PROBLEM — B99.9 INFECTION REQUIRING AIRBORNE ISOLATION PRECAUTIONS: Status: RESOLVED | Noted: 2019-09-27 | Resolved: 2021-06-06

## 2021-06-06 LAB
ALBUMIN SERPL-MCNC: 3.3 G/DL (ref 3.4–5)
ANION GAP SERPL CALCULATED.3IONS-SCNC: 12 MMOL/L (ref 3–16)
BASOPHILS ABSOLUTE: 0.1 K/UL (ref 0–0.2)
BASOPHILS RELATIVE PERCENT: 0.8 %
BUN BLDV-MCNC: 47 MG/DL (ref 7–20)
CALCIUM SERPL-MCNC: 9.3 MG/DL (ref 8.3–10.6)
CHLORIDE BLD-SCNC: 97 MMOL/L (ref 99–110)
CO2: 35 MMOL/L (ref 21–32)
CREAT SERPL-MCNC: 2 MG/DL (ref 0.6–1.2)
CREATININE 24 HOUR URINE: 0.9 G/24HR (ref 0.6–1.5)
EOSINOPHILS ABSOLUTE: 0.7 K/UL (ref 0–0.6)
EOSINOPHILS RELATIVE PERCENT: 8.6 %
GFR AFRICAN AMERICAN: 29
GFR NON-AFRICAN AMERICAN: 24
GLUCOSE BLD-MCNC: 167 MG/DL (ref 70–99)
GLUCOSE BLD-MCNC: 172 MG/DL (ref 70–99)
GLUCOSE BLD-MCNC: 273 MG/DL (ref 70–99)
GLUCOSE BLD-MCNC: 286 MG/DL (ref 70–99)
GLUCOSE BLD-MCNC: 319 MG/DL (ref 70–99)
HCT VFR BLD CALC: 23.2 % (ref 36–48)
HEMOGLOBIN: 7.5 G/DL (ref 12–16)
LYMPHOCYTES ABSOLUTE: 1.5 K/UL (ref 1–5.1)
LYMPHOCYTES RELATIVE PERCENT: 18.5 %
Lab: 24 HOURS
MCH RBC QN AUTO: 32.4 PG (ref 26–34)
MCHC RBC AUTO-ENTMCNC: 32.2 G/DL (ref 31–36)
MCV RBC AUTO: 100.7 FL (ref 80–100)
MONOCYTES ABSOLUTE: 0.6 K/UL (ref 0–1.3)
MONOCYTES RELATIVE PERCENT: 7.4 %
NEUTROPHILS ABSOLUTE: 5.1 K/UL (ref 1.7–7.7)
NEUTROPHILS RELATIVE PERCENT: 64.7 %
PDW BLD-RTO: 18 % (ref 12.4–15.4)
PERFORMED ON: ABNORMAL
PHOSPHORUS: 4 MG/DL (ref 2.5–4.9)
PLATELET # BLD: 294 K/UL (ref 135–450)
PMV BLD AUTO: 7.5 FL (ref 5–10.5)
POTASSIUM SERPL-SCNC: 3.8 MMOL/L (ref 3.5–5.1)
RBC # BLD: 2.31 M/UL (ref 4–5.2)
SODIUM BLD-SCNC: 144 MMOL/L (ref 136–145)
WBC # BLD: 7.9 K/UL (ref 4–11)

## 2021-06-06 PROCEDURE — 6370000000 HC RX 637 (ALT 250 FOR IP): Performed by: INTERNAL MEDICINE

## 2021-06-06 PROCEDURE — 94640 AIRWAY INHALATION TREATMENT: CPT

## 2021-06-06 PROCEDURE — 80069 RENAL FUNCTION PANEL: CPT

## 2021-06-06 PROCEDURE — 36415 COLL VENOUS BLD VENIPUNCTURE: CPT

## 2021-06-06 PROCEDURE — 2580000003 HC RX 258: Performed by: INTERNAL MEDICINE

## 2021-06-06 PROCEDURE — 94761 N-INVAS EAR/PLS OXIMETRY MLT: CPT

## 2021-06-06 PROCEDURE — 99232 SBSQ HOSP IP/OBS MODERATE 35: CPT | Performed by: NURSE PRACTITIONER

## 2021-06-06 PROCEDURE — 1200000000 HC SEMI PRIVATE

## 2021-06-06 PROCEDURE — 85025 COMPLETE CBC W/AUTO DIFF WBC: CPT

## 2021-06-06 PROCEDURE — 6360000002 HC RX W HCPCS: Performed by: INTERNAL MEDICINE

## 2021-06-06 PROCEDURE — 2700000000 HC OXYGEN THERAPY PER DAY

## 2021-06-06 RX ORDER — BUDESONIDE AND FORMOTEROL FUMARATE DIHYDRATE 160; 4.5 UG/1; UG/1
2 AEROSOL RESPIRATORY (INHALATION) 2 TIMES DAILY
Status: DISCONTINUED | OUTPATIENT
Start: 2021-06-06 | End: 2021-06-09 | Stop reason: HOSPADM

## 2021-06-06 RX ORDER — FUROSEMIDE 10 MG/ML
20 INJECTION INTRAMUSCULAR; INTRAVENOUS 2 TIMES DAILY
Status: COMPLETED | OUTPATIENT
Start: 2021-06-06 | End: 2021-06-07

## 2021-06-06 RX ADMIN — IPRATROPIUM BROMIDE AND ALBUTEROL SULFATE 3 ML: .5; 3 SOLUTION RESPIRATORY (INHALATION) at 15:32

## 2021-06-06 RX ADMIN — PANTOPRAZOLE SODIUM 40 MG: 40 TABLET, DELAYED RELEASE ORAL at 05:59

## 2021-06-06 RX ADMIN — MIRTAZAPINE 15 MG: 15 TABLET, FILM COATED ORAL at 21:51

## 2021-06-06 RX ADMIN — IPRATROPIUM BROMIDE AND ALBUTEROL SULFATE 3 ML: .5; 3 SOLUTION RESPIRATORY (INHALATION) at 20:41

## 2021-06-06 RX ADMIN — DILTIAZEM HYDROCHLORIDE 60 MG: 60 CAPSULE, EXTENDED RELEASE ORAL at 21:51

## 2021-06-06 RX ADMIN — GABAPENTIN 100 MG: 100 CAPSULE ORAL at 21:51

## 2021-06-06 RX ADMIN — STANDARDIZED SENNA CONCENTRATE AND DOCUSATE SODIUM 2 TABLET: 8.6; 5 TABLET ORAL at 08:54

## 2021-06-06 RX ADMIN — FUROSEMIDE 20 MG: 10 INJECTION, SOLUTION INTRAMUSCULAR; INTRAVENOUS at 10:25

## 2021-06-06 RX ADMIN — IPRATROPIUM BROMIDE AND ALBUTEROL SULFATE 3 ML: .5; 3 SOLUTION RESPIRATORY (INHALATION) at 12:11

## 2021-06-06 RX ADMIN — INSULIN LISPRO 8 UNITS: 100 INJECTION, SOLUTION INTRAVENOUS; SUBCUTANEOUS at 12:20

## 2021-06-06 RX ADMIN — CALCIUM CARBONATE-VITAMIN D TAB 500 MG-200 UNIT 1 TABLET: 500-200 TAB at 08:54

## 2021-06-06 RX ADMIN — Medication 10 ML: at 10:25

## 2021-06-06 RX ADMIN — INSULIN LISPRO 2 UNITS: 100 INJECTION, SOLUTION INTRAVENOUS; SUBCUTANEOUS at 08:57

## 2021-06-06 RX ADMIN — OXYCODONE 5 MG: 5 TABLET ORAL at 12:19

## 2021-06-06 RX ADMIN — DILTIAZEM HYDROCHLORIDE 60 MG: 60 CAPSULE, EXTENDED RELEASE ORAL at 08:54

## 2021-06-06 RX ADMIN — LINAGLIPTIN 5 MG: 5 TABLET, FILM COATED ORAL at 08:54

## 2021-06-06 RX ADMIN — OXYCODONE 5 MG: 5 TABLET ORAL at 18:29

## 2021-06-06 RX ADMIN — LINACLOTIDE 145 MCG: 145 CAPSULE, GELATIN COATED ORAL at 06:00

## 2021-06-06 RX ADMIN — PREDNISONE 10 MG: 10 TABLET ORAL at 08:54

## 2021-06-06 RX ADMIN — INSULIN LISPRO 6 UNITS: 100 INJECTION, SOLUTION INTRAVENOUS; SUBCUTANEOUS at 17:19

## 2021-06-06 RX ADMIN — INSULIN LISPRO 3 UNITS: 100 INJECTION, SOLUTION INTRAVENOUS; SUBCUTANEOUS at 21:52

## 2021-06-06 RX ADMIN — Medication 10 ML: at 08:55

## 2021-06-06 RX ADMIN — POLYETHYLENE GLYCOL 3350 17 G: 17 POWDER, FOR SOLUTION ORAL at 08:54

## 2021-06-06 RX ADMIN — IPRATROPIUM BROMIDE AND ALBUTEROL SULFATE 3 ML: .5; 3 SOLUTION RESPIRATORY (INHALATION) at 08:32

## 2021-06-06 RX ADMIN — Medication 10 ML: at 21:51

## 2021-06-06 RX ADMIN — Medication 2 PUFF: at 20:41

## 2021-06-06 RX ADMIN — FUROSEMIDE 20 MG: 10 INJECTION, SOLUTION INTRAMUSCULAR; INTRAVENOUS at 17:22

## 2021-06-06 ASSESSMENT — PAIN SCALES - GENERAL
PAINLEVEL_OUTOF10: 0
PAINLEVEL_OUTOF10: 5
PAINLEVEL_OUTOF10: 3
PAINLEVEL_OUTOF10: 5
PAINLEVEL_OUTOF10: 5
PAINLEVEL_OUTOF10: 0

## 2021-06-06 ASSESSMENT — PAIN DESCRIPTION - PROGRESSION
CLINICAL_PROGRESSION: NOT CHANGED
CLINICAL_PROGRESSION: NOT CHANGED

## 2021-06-06 ASSESSMENT — PAIN DESCRIPTION - LOCATION
LOCATION: BACK

## 2021-06-06 ASSESSMENT — PAIN DESCRIPTION - PAIN TYPE
TYPE: CHRONIC PAIN

## 2021-06-06 ASSESSMENT — PAIN DESCRIPTION - DIRECTION
RADIATING_TOWARDS: SHOULDER
RADIATING_TOWARDS: SHOULDER

## 2021-06-06 ASSESSMENT — PAIN DESCRIPTION - DESCRIPTORS
DESCRIPTORS: CONSTANT
DESCRIPTORS: CONSTANT

## 2021-06-06 ASSESSMENT — PAIN DESCRIPTION - FREQUENCY
FREQUENCY: CONTINUOUS
FREQUENCY: CONTINUOUS

## 2021-06-06 ASSESSMENT — PAIN DESCRIPTION - ORIENTATION
ORIENTATION: LOWER

## 2021-06-06 ASSESSMENT — PAIN DESCRIPTION - ONSET
ONSET: ON-GOING
ONSET: ON-GOING

## 2021-06-06 NOTE — PROGRESS NOTES
Department of Internal Medicine  General Internal Medicine   Progress Note      SUBJECTIVE: moderate SOB , only worse on exertion ,   Denies fever or chills appetite fair  Denies angina     History obtained from chart review and the patient  General ROS: positive for  - fatigue and malaise  negative for - chills, fever or night sweats  Psychological ROS: negative for - disorientation, hallucinations, irritability or memory difficulties  Ophthalmic ROS: negative  ENT ROS: negative  Allergy and Immunology ROS: negative  Respiratory ROS: positive for - cough, shortness of breath and tachypnea  negative for - wheezing  Cardiovascular ROS: positive for - dyspnea on exertion  negative for - chest pain  Gastrointestinal ROS: no abdominal pain, change in bowel habits, or black or bloody stools  Genito-Urinary ROS: no dysuria, trouble voiding, or hematuria  Musculoskeletal ROS: chronic pain   Neurological ROS: no TIA or stroke symptoms    OBJECTIVE      Medications      Current Facility-Administered Medications: furosemide (LASIX) injection 20 mg, 20 mg, Intravenous, BID  cyclobenzaprine (FLEXERIL) tablet 10 mg, 10 mg, Oral, TID PRN  oxyCODONE (ROXICODONE) immediate release tablet 5 mg, 5 mg, Oral, Q6H PRN  lidocaine 4 % external patch 2 patch, 2 patch, Transdermal, Daily  gabapentin (NEURONTIN) capsule 100 mg, 100 mg, Oral, Nightly  sennosides-docusate sodium (SENOKOT-S) 8.6-50 MG tablet 2 tablet, 2 tablet, Oral, Daily  polyethylene glycol (GLYCOLAX) packet 17 g, 17 g, Oral, Daily  linaclotide (LINZESS) capsule 145 mcg, 145 mcg, Oral, QAM AC  mirtazapine (REMERON) tablet 15 mg, 15 mg, Oral, Nightly  predniSONE (DELTASONE) tablet 10 mg, 10 mg, Oral, Daily  pantoprazole (PROTONIX) tablet 40 mg, 40 mg, Oral, QAM AC  dilTIAZem (CARDIZEM 12 HR) extended release capsule 60 mg, 60 mg, Oral, BID  linagliptin (TRADJENTA) tablet 5 mg, 5 mg, Oral, Daily  digoxin (LANOXIN) tablet 125 mcg, 125 mcg, Oral, Q MWF  albuterol sulfate  (90 Base) MCG/ACT inhaler 2 puff, 2 puff, Inhalation, Q4H PRN  ipratropium-albuterol (DUONEB) nebulizer solution 3 mL, 3 mL, Inhalation, Q4H WA  calcium-vitamin D (OSCAL-500) 500-200 MG-UNIT per tablet 1 tablet, 1 tablet, Oral, Daily  sodium chloride flush 0.9 % injection 5-40 mL, 5-40 mL, Intravenous, 2 times per day  sodium chloride flush 0.9 % injection 5-40 mL, 5-40 mL, Intravenous, PRN  0.9 % sodium chloride infusion, 25 mL, Intravenous, PRN  ondansetron (ZOFRAN-ODT) disintegrating tablet 4 mg, 4 mg, Oral, Q8H PRN **OR** ondansetron (ZOFRAN) injection 4 mg, 4 mg, Intravenous, Q6H PRN  acetaminophen (TYLENOL) tablet 650 mg, 650 mg, Oral, Q6H PRN **OR** acetaminophen (TYLENOL) suppository 650 mg, 650 mg, Rectal, Q6H PRN  hydrALAZINE (APRESOLINE) injection 10 mg, 10 mg, Intravenous, Q6H PRN  0.9 % sodium chloride bolus, 500 mL, Intravenous, PRN  insulin lispro (1 Unit Dial) 0-12 Units, 0-12 Units, Subcutaneous, TID WC  insulin lispro (1 Unit Dial) 0-6 Units, 0-6 Units, Subcutaneous, Nightly  glucose (GLUTOSE) 40 % oral gel 15 g, 15 g, Oral, PRN  dextrose 50 % IV solution, 12.5 g, Intravenous, PRN  glucagon (rDNA) injection 1 mg, 1 mg, Intramuscular, PRN  dextrose 5 % solution, 100 mL/hr, Intravenous, PRN    Physical      VITALS:  /76   Pulse 83   Temp 98.3 °F (36.8 °C) (Oral)   Resp 18   Ht 5' 5\" (1.651 m)   Wt 171 lb (77.6 kg)   SpO2 98%   BMI 28.46 kg/m²   TEMPERATURE:  Current - Temp: 98.3 °F (36.8 °C);  Max - Temp  Av.2 °F (36.8 °C)  Min: 97.8 °F (36.6 °C)  Max: 98.5 °F (36.9 °C)  RESPIRATIONS RANGE: Resp  Av.9  Min: 16  Max: 18  PULSE RANGE: Pulse  Av.2  Min: 83  Max: 99  BLOOD PRESSURE RANGE:  Systolic (38ECD), :218 , Min:128 , RQW:552   ; Diastolic (02RWU), RRX:91, Min:62, Max:76    PULSE OXIMETRY RANGE: SpO2  Av.9 %  Min: 89 %  Max: 100 %  24HR INTAKE/OUTPUT:      Intake/Output Summary (Last 24 hours) at 2021 1051  Last data filed at 2021 1021  Gross per 24 hour Intake 980 ml   Output 1500 ml   Net -520 ml     CONSTITUTIONAL:  fatigued, alert, cooperative, mild distress and appears stated age  EYES:  Unremarkable   NECK:  Mild JVD  and supple, symmetrical, trachea midline  BACK:  Mild Kyphotic and symmetric  LUNGS:  Mild respiratory distress, good air exchange, no retractions and no crackles or wheezing  CARDIOVASCULAR:  normal apical pulses, regular rate and rhythm with ectopic beats, normal S1 and S2 and no S3  ABDOMEN:  Soft , Mild distention BS hypoactive   MUSCULOSKELETAL:  Trace edema   NEUROLOGIC:  No acute focal findings     Data      Lab Results   Component Value Date    PHART 7.441 06/04/2021    PO2ART 104.0 06/04/2021    CRX6LXT 48.6 06/04/2021    KZR1MKC 33.1 06/04/2021    BEART 8.1 06/04/2021    M9DEFRJB 99.7 06/04/2021       Lab Results   Component Value Date     06/06/2021    K 3.8 06/06/2021    K 3.8 10/21/2020    CL 97 06/06/2021    CO2 35 06/06/2021    BUN 47 06/06/2021    CREATININE 2.0 06/06/2021    GLUCOSE 167 06/06/2021    CALCIUM 9.3 06/06/2021     Lab Results   Component Value Date    WBC 7.9 06/06/2021    HGB 7.5 06/06/2021    HCT 23.2 06/06/2021    .7 06/06/2021     06/06/2021         Lab Results   Component Value Date    INR 1.05 06/03/2021    PROTIME 12.2 06/03/2021       ASSESSMENT AND PLAN     Active Hospital Problems    Diagnosis Date Noted    HTN (hypertension), benign [I10] 08/08/2011     Priority: High    Dyslipidemia [E78.5] 08/08/2011     Priority: High    Coronary atherosclerosis of native coronary artery [I25.10] 08/08/2011     Priority: High    History of lung cancer [Z85.118] 06/06/2021    Anemia [D64.9] 06/04/2021    ARF (acute renal failure) ([facility] [N17.9] 06/03/2021    Acute kidney injury superimposed on CKD ([facility] [N17.9, N18.9] 07/20/2020    Anxiety disorder [F41.9]     Depression [F32.9]     Poorly controlled type 2 diabetes mellitus (HCC) [E11.65]     Chronic obstructive pulmonary disease with

## 2021-06-06 NOTE — PLAN OF CARE
Problem: Falls - Risk of:  Goal: Will remain free from falls  Description: Will remain free from falls  Outcome: Ongoing  Note: Remains free from falls this shift. Problem: OXYGENATION/RESPIRATORY FUNCTION  Goal: Patient will maintain patent airway  Outcome: Ongoing  Note: O2 sat maintained on 1L O2 per NC this shift. Problem: HEMODYNAMIC STATUS  Goal: Patient has stable vital signs and fluid balance  Outcome: Ongoing  Note: VSS this shift, remains SR w/PACs, PVCs on telemetry. Problem: Pain:  Goal: Pain level will decrease  Description: Pain level will decrease  Outcome: Ongoing  Note: Pain adequately controlled with scheduled meds this shift, slept when undisturbed.

## 2021-06-06 NOTE — PROGRESS NOTES
[address]   Cardiology Progress Note     Date: 6/6/2021  Admit Date: 6/3/2021     Reason for consultation: CHF    Chief Complaint:   Chief Complaint   Patient presents with    Shortness of Breath     sent by [facility] for SOB and retaining fluid       History of Present Illness: History obtained from patient and medical record. Javy Ruiz is a 68 y.o. female who has  been followed by our practice for many years. She has longstanding  coronary artery disease, paroxysmal atrial fibrillation and history of  hypertension. She has recurrent atrial fib and supraventricular  tachycardia. She has type 2 diabetes, chronic obstructive pulmonary  disease. She also has a history of lung cancer supposedly in remission. [place] [facility] [place] for  symptomatic anemia related to chronic anticoagulation. Hospital course  was complicated by pneumonia and chest pain. She did undergo endoscopy  without active bleeding, some mild antral gastritis. Eliquis was  resumed. She has pneumonia treated with antibiotics. She is on  low-dose prednisone for chronic obstructive pulmonary disease, steroid  dependency. She also had an abnormal echocardiogram concerning for  possible aortic dissection, but CT angiogram was negative for aortic  dissection. She is followed by Dr. Chante Arriaga for her lung cancer. She  has had worsening leg edema, worsening short of breath. She was brought  to the emergency room. She was found to have a proBNP of 1000 and to  again be markedly anemic. Initial hemoglobin was 8.4, then down to 7.3  after some fluids overnight. She was also found to have renal  insufficiency at her stable state, perhaps slightly increased creatinine  to 2.1 and after overnight fluids, now 1.8. Interval Hx: Today, she reports feeling better. Her breathing feels back to baseline. She currently is on 1 L of oxygen (wears 1 to 2 L at home as needed). Denies any chest pain. Pt is eager to discharge home. Patient seen and examined. Clinical notes reviewed. Telemetry reviewed. No new complaints today. No major events overnight. Denies having chest pain, palpitations, shortness of breath, orthopnea/PND, cough, or dizziness at the time of this visit. Past Medical History:  Past Medical History:   Diagnosis Date    Arthritis     CAD (coronary artery disease)     Carpal tunnel syndrome     [facility]     Coronary stent     depression     Diabetes mellitus ([facility]     Diastolic heart failure ([facility]     Per Dr Eneida Perez 8/5/19    GERD (gastroesophageal reflux disease)     Hyperlipidemia     Hypertension     Lung cancer ([facility]     Adenocarcinoma of Left Lung    MI (myocardial infarction) ([facility]     LIZETT (obstructive sleep apnea)     does not use CPAP    PONV (postoperative nausea and vomiting)     stress incontinence         Past Surgical History:    has a past surgical history that includes Coronary angioplasty with stent (2000, 2001); back surgery (2000, 2001); bronchoscopy (12/04/2018); [facility]cell washg [facility] (N/A, 12/4/2018); Cholecystectomy, laparoscopic (N/A, 12/19/2018); Upper gastrointestinal endoscopy (N/A, 2/25/2019); Colonoscopy (N/A, 2/25/2019); Colonoscopy (2/25/2019); bronchoscopy (N/A, 2/27/2019); bronchoscopy (2/27/2019); bronchoscopy (2/27/2019); bronchoscopy (N/A, 8/6/2019); Upper gastrointestinal endoscopy (N/A, 8/7/2019); bronchoscopy (N/A, 9/27/2019); bronchoscopy (9/27/2019); bronchoscopy (9/27/2019); IR KYPHOPLASTY THORACIC 1 VERTEBRAL BODY (7/23/2020); IR KYPHOPLASTY THORACIC 1 VERTEBRAL BODY (10/20/2020); and IR KYPHOPLASTY LUMBAR 1 VERTEBRAL BODY (12/8/2020). Social History:  Reviewed. reports that she quit smoking about 19 years ago. Her smoking use included cigarettes. She quit after 10.00 years of use.  She has never used smokeless tobacco. She reports that she does not drink alcohol and does not use drugs. Allergies: Allergies   Allergen Reactions    Statins Other (See Comments)     Other reaction(s): Myalgias (Muscle Pain)      Lyrica [Pregabalin] Other (See Comments)     Shaking, swelling, rash    Cipro Xr Rash     Swelling, rash    Penicillins Rash     Swelling, rash    Sulfa Antibiotics Rash     Swelling, rash       Family History:  Reviewed. family history includes Cancer in her maternal uncle and sister; Diabetes in her brother and sister; Heart Disease in her father and mother. Denies family history of sudden cardiac death, arrhythmia, premature CAD    Home Meds:  Prior to Visit Medications    Medication Sig Taking? Authorizing Provider   gabapentin (NEURONTIN) 100 MG capsule Take 100 mg by mouth nightly. Yes Historical Provider, MD   cyclobenzaprine (FLEXERIL) 10 MG tablet Take 10 mg by mouth 3 times daily as needed for Muscle spasms Yes Historical Provider, MD   oxyCODONE 5 MG capsule Take 5 mg by mouth every 6 hours as needed for Pain. Yes Historical Provider, MD   lidocaine (LIDODERM) 5 % Place 2 patches onto the skin daily Indications: To lower back and left shoulder/arm 12 hours on, 12 hours off.  Yes Historical Provider, MD   apixaban (ELIQUIS) 2.5 MG TABS tablet Take 1 tablet by mouth 2 times daily Yes MARIA C Howard - CNP   Calcium Carb-Cholecalciferol (CALCIUM+D3) 500-600 MG-UNIT TABS Take 1 tablet by mouth daily Yes Historical Provider, MD   albuterol sulfate  (90 Base) MCG/ACT inhaler Inhale 2 puffs into the lungs every 4 hours as needed for Wheezing Yes Cathye Gowers, MD   ipratropium-albuterol (DUONEB) 0.5-2.5 (3) MG/3ML SOLN nebulizer solution Inhale 3 mLs into the lungs every 4 hours (while awake) DX:COPD J44.9 Yes Cathye Gowers, MD   Continuous Blood Gluc  (FREESTYLE LOC 2 READER) ELADIA Check blood sugars twice daily Yes Cathye Gowers, MD   Continuous Blood Gluc Sensor (FREESTYLE LOC 2 SENSOR) MISC Check blood sugars twice daily Yes Cristi Angel MD   Continuous Blood Gluc  (FREESTYLE LOC 14 DAY READER) ELADIA Check blood sugars twice daily Yes Cristi Angel MD   digoxin [facility]) 125 MCG tablet Take 1 tab by mouth on Monday -Wednesday-Friday Yes Fabienne Miller MD   linaclotide [facility]) 145 MCG capsule Take 145 mcg by mouth every morning (before breakfast) Yes Historical Provider, MD   furosemide (LASIX) 40 MG tablet Take 1 tablet by mouth daily Yes Fabienne Miller MD   predniSONE (DELTASONE) 10 MG tablet Take 1 tablet by mouth daily Yes MARIA C Saldana CNP   pantoprazole (PROTONIX) 40 MG tablet Take 1 tablet by mouth every morning (before breakfast) Yes MARIA C Saldana CNP   dilTIAZem (CARDIZEM 12 HR) 60 MG extended release capsule Take 1 capsule by mouth 2 times daily Yes MARIA C Saldana CNP   mirtazapine (REMERON) 15 MG tablet Take 1 tablet by mouth nightly  Patient taking differently: Take 30 mg by mouth as needed  Yes Nila Mckinley MD   glimepiride (AMARYL) 2 MG tablet Take 2 mg by mouth every morning (before breakfast)  Historical Provider, MD   linagliptin (TRADJENTA) 5 MG tablet Take 1 tablet by mouth daily  MARIA C Saldana CNP   metoprolol succinate (TOPROL XL) 25 MG extended release tablet Take 1 tablet by mouth daily  MARIA C Saldana CNP        Scheduled Meds:   lidocaine  2 patch Transdermal Daily    gabapentin  100 mg Oral Nightly    sennosides-docusate sodium  2 tablet Oral Daily    polyethylene glycol  17 g Oral Daily    linaclotide  145 mcg Oral QAM AC    mirtazapine  15 mg Oral Nightly    predniSONE  10 mg Oral Daily    pantoprazole  40 mg Oral QAM AC    dilTIAZem  60 mg Oral BID    linagliptin  5 mg Oral Daily    digoxin  125 mcg Oral Q MWF    ipratropium-albuterol  3 mL Inhalation Q4H WA    calcium-vitamin D  1 tablet Oral Daily    sodium chloride flush  5-40 mL Intravenous 2 times per day    insulin lispro  0-12 Units Subcutaneous TID WC    insulin lispro  0-6 Units Subcutaneous Nightly     Continuous Infusions:   sodium chloride      dextrose       PRN Meds:cyclobenzaprine, oxyCODONE, albuterol sulfate HFA, sodium chloride flush, sodium chloride, ondansetron **OR** ondansetron, acetaminophen **OR** acetaminophen, hydrALAZINE, sodium chloride, glucose, dextrose, glucagon (rDNA), dextrose     Review of Systems:  · Constitutional: Negative for fever, night sweats, chills,  · Skin: Negative for rash, pruritus, bleeding, or bruising    · HEENT: Negative for vision changes, ringing in the ears, dysphagia  · Respiratory: Reviewed in HPI  · Cardiovascular: Reviewed in HPI  · Gastrointestinal: Negative for abdominal pain, N/V/D, constipation, or black/tarry stools  · Genito-Urinary: Negative for dysuria, incontinence, or hematuria  · Musculoskeletal: Negative for joint swelling, muscle pain, or injuries  · Neurological/Psych: Negative for confusion, seizures, headaches, or TIA-like symptoms. No anxiety or depression. Physical Examination:  Vitals:    06/06/21 0832   BP: 130/76   Pulse: 83   Resp: 18   Temp: 98.3 °F (36.8 °C)   SpO2: 98%      In: 250 [P.O.:240; I.V.:10]  Out: 1100    Wt Readings from Last 3 Encounters:   06/05/21 161 lb 3.2 oz (73.1 kg)   05/10/21 157 lb 8 oz (71.4 kg)   04/29/21 158 lb (71.7 kg)       Intake/Output Summary (Last 24 hours) at 6/6/2021 0855  Last data filed at 6/6/2021 0600  Gross per 24 hour   Intake 860 ml   Output 1500 ml   Net -640 ml       Telemetry: Personally Reviewed  - SR with PACs, PAF, short AT run    · Constitutional: Cooperative and in no apparent distress, and appears well nourished  · Skin: Warm and pink; no pallor, cyanosis, clubbing, or bruising   · HEENT: Symmetric and normocephalic. PERRL. Conjunctiva pink with clear sclera. Mucus membranes moist.   · Cardiovascular: IRRegular rate and rhythm. S1/S2 present without murmurs, no rubs or gallops. Peripheral pulses 2+, capillary refill < 3 seconds.  No initial encounter ([address] 10/16/2020    Maxillary sinus fracture, sequela ([address] 07/20/2020    PNA (pneumonia) 07/20/2020    Dysphagia 07/20/2020    Acute kidney injury superimposed on CKD ([address] 07/20/2020    Pathologic thoracic fracture, initial encounter 07/15/2020    Intractable back pain 07/11/2020    Renal failure 05/31/2020    Diabetes education, encounter for     Depression     Anxiety disorder     Delirium     SIRS (systemic inflammatory response syndrome) ([address]     Infection requiring airborne isolation precautions 09/27/2019    Malignant neoplasm of left lung (HCC)     Chronic obstructive pulmonary disease with acute lower respiratory infection (HCC)     Acid fast bacillus     Poorly controlled type 2 diabetes mellitus ([address]     History of depression     Hypokalemia 08/09/2019    Gastroparesis 08/09/2019    Radiation pneumonitis (HCC)     Chronic respiratory failure with hypoxia (HCC)     Lung infiltrate on CT     Non-small cell cancer of left lung (HCC)     Acute encephalopathy     Weakness     Severe malnutrition ([address] 08/05/2019    Acute on chronic respiratory failure with hypoxia (HCC) 08/05/2019    Abnormal CT of the chest     Chronic diastolic heart failure (HCC)     Failure to thrive (0-17)     Closed compression fracture of thoracic vertebra ([address] 05/20/2019    Chronic deep vein thrombosis (DVT) of both lower extremities ([address] 05/20/2019    Ataxia     Recurrent falls     Diabetic peripheral neuropathy ([address]     Other pulmonary embolism without acute cor pulmonale (HCC)     Syncope and collapse     Subacute pulmonary embolism (HCC)     Loss of consciousness ([address] 05/16/2019    Mild malnutrition ([address] 05/02/2019    Adenocarcinoma of left lung ([address] 03/08/2019    COPD exacerbation (HCC)     Mediastinal adenopathy 01/02/2019    Ileus following gastrointestinal surgery ([address] 12/30/2018    Atrial fibrillation with rapid ventricular response ([address]     Acute decompensation. All pertinent information and plan of care discussed with the physician. All questions and concerns were addressed to the patient. Alternatives to my treatment were discussed. I have discussed the above stated plan with patient and the nurse. The patient verbalized understanding and agreed with the plan. Thank you for allowing to us to participate in the care of Anne Marie Kendall. Total visit time > 35 minutes; > 50% spend counseling / coordinating care. I reviewed interval history, physical exam, review of data including labs, imaging, development and implementation of treatment plan and coordination of complex care. Counseled on risk factor modifications. Karson LOMBARDI-[facility]  [address]   Office: [phone]    NOTE:  This report was transcribed using voice recognition software. Every effort was made to ensure accuracy; however, inadvertent computerized transcription errors may be present.

## 2021-06-06 NOTE — PROGRESS NOTES
Awakened for VS which are stable, Lidoderm patches removed from left shoulder, lower back. Denies needs @present, lights out for sleep.

## 2021-06-06 NOTE — PROGRESS NOTES
Clarence Dresser, MD Zane Libman, MD Karol Risen, MD               Office: [phone]                      Fax: [phone]          NEPHROLOGY PROGRESS NOTE:     PATIENT NAME: Swetha Pennington  : 1940  MRN: [id number]      IMPRESSION / RECOMMENDATIONS:    Admitted with:  ARF (acute renal failure) ([facility] [N17.9]    1. 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 Similar O2 requirement. Weight from 2020 was 144, 2021 was 151, 2021 was 157 and now Weight change:     order daily - Lasix 20mg IV BID. Strict I&O. Stoped maintenance IVF. 2.  Hypokalemia- replaced  3. Elevated HCO3-replace K.  follow   4. H/O HTN- no need for tight control   5. DM- ISS per medical team   6. Anemia- elevated MCV.  f/u retic count, SPEP and UPEP. ** 24 hr done on : 2.3L: protein 0.092,   F/u UPEP , SPEP - in process    RAKESH subQ.   7.  H/O Diastolic CHF-follows with Dr. Mark Barry, cardiology follow here. 8.  H/O CAD  9. H/O COPD  10. H/O Lung CA  11. H/O DM    Would need close f/u outpt - w/ Dr Harshil Potter, will arrange.        Other problems:     Patient Active Problem List   Diagnosis    DM (diabetes mellitus), secondary, uncontrolled, w/neurologic complic ([facility]    Dyslipidemia    Mitral and aortic valve disease    HTN (hypertension), benign    Coronary atherosclerosis of native coronary artery    Obstructive sleep apnea    Chest pain    Carpal tunnel syndrome    SOB (shortness of breath)    COPD, severe (HCC)    Influenza    Paroxysmal atrial fibrillation (HCC)    Chronic cough    Hilar mass    Acute cholecystitis    Gallstones and inflammation of gallbladder without obstruction    Atrial fibrillation with rapid ventricular response (HCC)    Acute respiratory failure with hypoxia (HCC)    Centrilobular emphysema (HCC)    Ileus following gastrointestinal surgery (HCC)    Mediastinal adenopathy    COPD exacerbation ([facility]    Adenocarcinoma of left lung ([facility] - Avoid heavy opioids due to renal failure - may use very low dose dilaudid / fentanyl with close monitoring of CNS and respiratory depression. Please refer to the orders. High Complexity. Multiple complex problems. Discussed with patient,  and treatment team-   Thank you for allowing me to participate in this patient's care. Please do not hesitate to contact me with any questions/concerns. We will follow along with you. Ney Ny MD,    [facility] [address] [place]: [phone] or Via [email or web address]  Fax: [phone]      ================================================  ================================================      SUBJECTIVE:   Resting in bed  no active complaints,   Renal function stable. 24 hr urine done   - ROS reported: as mentioned in Subjective, HPI, CC, and all other 10 systems' review negative,   - PMH, [address], Social history: reviewed   - MEDICATIONS:  Prior to Admission Medications:  Medications Prior to Admission: gabapentin (NEURONTIN) 100 MG capsule, Take 100 mg by mouth nightly. cyclobenzaprine (FLEXERIL) 10 MG tablet, Take 10 mg by mouth 3 times daily as needed for Muscle spasms  oxyCODONE 5 MG capsule, Take 5 mg by mouth every 6 hours as needed for Pain.  lidocaine (LIDODERM) 5 %, Place 2 patches onto the skin daily Indications:  To lower back and left shoulder/arm 12 hours on, 12 hours off.  apixaban (ELIQUIS) 2.5 MG TABS tablet, Take 1 tablet by mouth 2 times daily  Calcium Carb-Cholecalciferol (CALCIUM+D3) 500-600 MG-UNIT TABS, Take 1 tablet by mouth daily  albuterol sulfate  (90 Base) MCG/ACT inhaler, Inhale 2 puffs into the lungs every 4 hours as needed for Wheezing  ipratropium-albuterol (DUONEB) 0.5-2.5 (3) MG/3ML SOLN nebulizer solution, Inhale 3 mLs into the lungs every 4 hours (while awake) DX:COPD J44.9  Continuous Blood Gluc  (FREESTYLE LOC 2 READER) ELADIA, Check blood sugars twice daily  Continuous Blood Gluc Sensor (FREESTYLE LOC 2 SENSOR) [facility], Check blood sugars twice daily  Continuous Blood Gluc  (FREESTYLE LOC 14 DAY READER) ELADIA, Check blood sugars twice daily  digoxin (LANOXIN) 125 MCG tablet, Take 1 tab by mouth on Monday -Wednesday-Friday  linaclotide (LINZESS) 145 MCG capsule, Take 145 mcg by mouth every morning (before breakfast)  furosemide (LASIX) 40 MG tablet, Take 1 tablet by mouth daily  predniSONE (DELTASONE) 10 MG tablet, Take 1 tablet by mouth daily  pantoprazole (PROTONIX) 40 MG tablet, Take 1 tablet by mouth every morning (before breakfast)  dilTIAZem (CARDIZEM 12 HR) 60 MG extended release capsule, Take 1 capsule by mouth 2 times daily  mirtazapine (REMERON) 15 MG tablet, Take 1 tablet by mouth nightly (Patient taking differently: Take 30 mg by mouth as needed )  glimepiride (AMARYL) 2 MG tablet, Take 2 mg by mouth every morning (before breakfast)  [DISCONTINUED] methocarbamol (ROBAXIN) 500 MG tablet, Take 500 mg by mouth 3 times daily   linagliptin (TRADJENTA) 5 MG tablet, Take 1 tablet by mouth daily  metoprolol succinate (TOPROL XL) 25 MG extended release tablet, Take 1 tablet by mouth daily     Scheduled Meds:   lidocaine  2 patch Transdermal Daily    gabapentin  100 mg Oral Nightly    sennosides-docusate sodium  2 tablet Oral Daily    polyethylene glycol  17 g Oral Daily    linaclotide  145 mcg Oral QAM AC    mirtazapine  15 mg Oral Nightly    predniSONE  10 mg Oral Daily    pantoprazole  40 mg Oral QAM AC    dilTIAZem  60 mg Oral BID    linagliptin  5 mg Oral Daily    digoxin  125 mcg Oral Q MWF    ipratropium-albuterol  3 mL Inhalation Q4H WA    calcium-vitamin D  1 tablet Oral Daily    sodium chloride flush  5-40 mL Intravenous 2 times per day    insulin lispro  0-12 Units Subcutaneous TID WC    insulin lispro  0-6 Units Subcutaneous Nightly     Continuous Infusions:   sodium chloride      dextrose       PRN Meds:.cyclobenzaprine, oxyCODONE, albuterol sulfate HFA, sodium chloride flush, sodium chloride, ondansetron **OR** ondansetron, acetaminophen **OR** acetaminophen, hydrALAZINE, sodium chloride, glucose, dextrose, glucagon (rDNA), dextrose        OBJECTIVE:   PHYSICAL EXAM:  Patient Vitals for the past 24 hrs:   BP Temp Temp src Pulse Resp SpO2 Weight   06/06/21 0855 -- -- -- -- -- -- 171 lb (77.6 kg)   06/06/21 0832 130/76 98.3 °F (36.8 °C) Oral 83 18 98 % --   06/06/21 0441 133/68 98.5 °F (36.9 °C) Oral 99 18 96 % --   06/06/21 0049 (!) 146/62 98.2 °F (36.8 °C) Oral 96 18 97 % --   06/05/21 2101 (!) 146/67 98.2 °F (36.8 °C) Oral 96 16 96 % --   06/05/21 2020 -- -- -- -- 16 100 % --   06/05/21 1651 (!) 149/63 97.8 °F (36.6 °C) Oral 88 16 99 % --   06/05/21 1650 -- -- -- -- -- (!) 89 % --   06/05/21 1200 128/68 98 °F (36.7 °C) Oral 85 16 99 % --   06/05/21 1131 -- -- -- -- -- 98 % --       Intake/Output Summary (Last 24 hours) at 6/6/2021 1011  Last data filed at 6/6/2021 0600  Gross per 24 hour   Intake 490 ml   Output 1500 ml   Net -1010 ml       General: Awake, Alert,   HENT: Atraumatic, normocephalic   Eyes: Normal conjunctiva, Non-incteral sclera. Neck: Supple, JVD not visible. CVS:  Heart sounds are normal. No murmurs. .  RS: Normal respiratory efforts,  Lung fields are clear. Abd: Soft , bowel sounds are normal, and no tenderness to palpation. Skin: No rash ,    CNS: Awake Oriented , No focal.   Extremities/MSK:  Edema, no cyanosis.         DATA:  Diagnostic tests reviewed for today's visit:    Recent Labs     06/03/21  0888 06/04/21  0502 06/05/21  0442 06/06/21  0424   WBC 9.6 7.5 7.6 7.9   HCT 26.5* 22.7* 23.9* 23.2*    291 313 294     Iron Saturation:  No components found for: PERCENTFE  FERRITIN:    Lab Results   Component Value Date    FERRITIN 47.1 06/05/2021     IRON:    Lab Results   Component Value Date    IRON 41 06/05/2021     TIBC:    Lab Results   Component Value Date    TIBC 280 06/05/2021       Recent Labs     06/03/21  1812 06/04/21  0502 06/05/21  0442 06/06/21  0424    145 145 144   K 4.1 3.3* 3.7 3.8   CL 93* 103 99 97*   CO2 29 35* 34* 35*   BUN 52* 52* 44* 47*   CREATININE 2.1* 1.8* 2.0* 2.0*     Recent Labs     06/03/21  1812 06/04/21  0502 06/05/21  0442 06/06/21  0424   CALCIUM 9.3 9.2 9.5 9.3   PHOS  --  3.1  --  4.0     No results for input(s): PH, PCO2, PO2 in the last 72 hours.     Invalid input(s): Duong Bernardo    ABG:  No results found for: PH, PCO2, PO2, HCO3, BE, THGB, TCO2, O2SAT  VBG:    Lab Results   Component Value Date    PHVEN 7.603 06/05/2021    CQU6YIH 37.0 06/05/2021    BEVEN 13.8 06/05/2021    U9JQWPTK 100 06/05/2021       LDH:  No results found for: LDH  Uric Acid:    Lab Results   Component Value Date    LABURIC 4.1 12/25/2018       PT/INR:    Lab Results   Component Value Date    PROTIME 12.2 06/03/2021    INR 1.05 06/03/2021     Warfarin PT/INR:  No components found for: PTPATWAR, PTINRWAR  PTT:    Lab Results   Component Value Date    APTT 27.0 06/03/2021   [APTT}    Last 3 Troponin:    Lab Results   Component Value Date    TROPONINI 0.02 06/03/2021    TROPONINI <0.01 07/11/2020    TROPONINI <0.01 06/04/2020       U/A:    Lab Results   Component Value Date    COLORU YELLOW 06/03/2021    PROTEINU Negative 06/03/2021    PHUR 6.0 06/03/2021    WBCUA 0 06/03/2021    RBCUA 0 06/03/2021    YEAST Present 08/04/2019    BACTERIA 2+ 05/31/2020    CLARITYU Clear 06/03/2021    SPECGRAV 1.014 06/03/2021    LEUKOCYTESUR Negative 06/03/2021    UROBILINOGEN 0.2 06/03/2021    BILIRUBINUR Negative 06/03/2021    BLOODU Negative 06/03/2021    GLUCOSEU 500 06/03/2021     Microalbumen/Creatinine ratio:  No components found for: RUCREAT  24 Hour Urine for Protein:  No components found for: RAWUPRO, UHRS3, IOMC57QY, UTV3  24 Hour Urine for Creatinine Clearance:  No components found for: CREAT4, UHRS10, UTV10  Urine Toxicology: No components found for: IAMMENTA, IBARBIT, IBENZO, ICOCAINE, IMARTHC, IOPIATES, [facility]    HgBA1c:    Lab Results   Component Value Date    LABA1C 5.8 05/04/2021     RPR:  No results found for: RPR  HIV:  No results found for: HIV  ROXANNE:  No results found for: ANATITER, ROXANNE  RF:  No results found for: RF  DSDNA:  No components found for: DNA  AMYLASE:  No results found for: AMYLASE  LIPASE:    Lab Results   Component Value Date    LIPASE 46.0 07/11/2020     Fibrinogen Level:  No components found for: FIB      BELOW MENTIONED RADIOLOGY STUDIES REVIEWED BY ME:    XR CHEST (2 VW)    Result Date: 6/3/2021  EXAMINATION: TWO XRAY VIEWS OF THE CHEST 6/3/2021 5:42 pm COMPARISON: 7/17/2020 HISTORY: ORDERING SYSTEM PROVIDED HISTORY: sob TECHNOLOGIST PROVIDED HISTORY: Reason for exam:->sob Reason for Exam: Shortness of Breath (sent by Katelin Cordero for SOB and retaining fluid) Acuity: Unknown Type of Exam: Unknown FINDINGS: Frontal and lateral views of the chest were performed. There is no acute skeletal abnormality. There are chronic healed left rib fractures. There are multiple chronic wedge compression fractures of multiple lower thoracic and upper lumbar vertebral bodies, augmented by vertebroplasty cement. The heart size and mediastinal contours are stable and within normal limits. There is a stable focus of pleural-parenchymal scarring noted within the left perihilar space, unchanged from prior exams. There is stable mild chronic biapical pleural-parenchymal scarring. The lungs are otherwise clear, without evidence of acute airspace consolidation, pneumothorax, pleural effusion, or pulmonary edema. 1. No acute cardiopulmonary disease. 2. Chronic pleural and parenchymal scarring within the left perihilar space.

## 2021-06-07 LAB
ALBUMIN SERPL-MCNC: 2.9 G/DL (ref 3.1–4.9)
ALBUMIN SERPL-MCNC: 3.5 G/DL (ref 3.4–5)
ALPHA-1-GLOBULIN: 0.2 G/DL (ref 0.2–0.4)
ALPHA-2-GLOBULIN: 1.2 G/DL (ref 0.4–1.1)
ANION GAP SERPL CALCULATED.3IONS-SCNC: 11 MMOL/L (ref 3–16)
ANISOCYTOSIS: ABNORMAL
BANDED NEUTROPHILS RELATIVE PERCENT: 3 % (ref 0–7)
BASOPHILS ABSOLUTE: 0.1 K/UL (ref 0–0.2)
BASOPHILS RELATIVE PERCENT: 1 %
BETA GLOBULIN: 1 G/DL (ref 0.9–1.6)
BUN BLDV-MCNC: 44 MG/DL (ref 7–20)
CALCIUM SERPL-MCNC: 9.4 MG/DL (ref 8.3–10.6)
CHLORIDE BLD-SCNC: 95 MMOL/L (ref 99–110)
CO2: 35 MMOL/L (ref 21–32)
CREAT SERPL-MCNC: 2.1 MG/DL (ref 0.6–1.2)
EOSINOPHILS ABSOLUTE: 0.8 K/UL (ref 0–0.6)
EOSINOPHILS RELATIVE PERCENT: 9 %
GAMMA GLOBULIN: 0.8 G/DL (ref 0.6–1.8)
GFR AFRICAN AMERICAN: 28
GFR NON-AFRICAN AMERICAN: 23
GLUCOSE BLD-MCNC: 178 MG/DL (ref 70–99)
GLUCOSE BLD-MCNC: 185 MG/DL (ref 70–99)
GLUCOSE BLD-MCNC: 259 MG/DL (ref 70–99)
GLUCOSE BLD-MCNC: 261 MG/DL (ref 70–99)
GLUCOSE BLD-MCNC: 302 MG/DL (ref 70–99)
HCT VFR BLD CALC: 23.9 % (ref 36–48)
HEMOGLOBIN: 7.8 G/DL (ref 12–16)
HYPOCHROMIA: ABNORMAL
LV EF: 65 %
LVEF MODALITY: NORMAL
LYMPHOCYTES ABSOLUTE: 1.6 K/UL (ref 1–5.1)
LYMPHOCYTES RELATIVE PERCENT: 18 %
MCH RBC QN AUTO: 32.6 PG (ref 26–34)
MCHC RBC AUTO-ENTMCNC: 32.8 G/DL (ref 31–36)
MCV RBC AUTO: 99.4 FL (ref 80–100)
MONOCYTES ABSOLUTE: 0.4 K/UL (ref 0–1.3)
MONOCYTES RELATIVE PERCENT: 5 %
NEUTROPHILS ABSOLUTE: 6 K/UL (ref 1.7–7.7)
NEUTROPHILS RELATIVE PERCENT: 64 %
PDW BLD-RTO: 18.5 % (ref 12.4–15.4)
PERFORMED ON: ABNORMAL
PHOSPHORUS: 3.9 MG/DL (ref 2.5–4.9)
PLATELET # BLD: 324 K/UL (ref 135–450)
PLATELET SLIDE REVIEW: ADEQUATE
PMV BLD AUTO: 7.7 FL (ref 5–10.5)
POLYCHROMASIA: ABNORMAL
POTASSIUM SERPL-SCNC: 3.6 MMOL/L (ref 3.5–5.1)
RBC # BLD: 2.41 M/UL (ref 4–5.2)
SLIDE REVIEW: ABNORMAL
SODIUM BLD-SCNC: 141 MMOL/L (ref 136–145)
SPE/IFE INTERPRETATION: NORMAL
STOMATOCYTES: ABNORMAL
TOTAL PROTEIN: 6 G/DL (ref 6.4–8.2)
URINE ELECTROPHORESIS INTERP: NORMAL
WBC # BLD: 8.9 K/UL (ref 4–11)

## 2021-06-07 PROCEDURE — 6370000000 HC RX 637 (ALT 250 FOR IP): Performed by: INTERNAL MEDICINE

## 2021-06-07 PROCEDURE — 99223 1ST HOSP IP/OBS HIGH 75: CPT | Performed by: INTERNAL MEDICINE

## 2021-06-07 PROCEDURE — 6360000002 HC RX W HCPCS: Performed by: INTERNAL MEDICINE

## 2021-06-07 PROCEDURE — 93005 ELECTROCARDIOGRAM TRACING: CPT | Performed by: INTERNAL MEDICINE

## 2021-06-07 PROCEDURE — 36415 COLL VENOUS BLD VENIPUNCTURE: CPT

## 2021-06-07 PROCEDURE — 85025 COMPLETE CBC W/AUTO DIFF WBC: CPT

## 2021-06-07 PROCEDURE — 93306 TTE W/DOPPLER COMPLETE: CPT

## 2021-06-07 PROCEDURE — 2700000000 HC OXYGEN THERAPY PER DAY

## 2021-06-07 PROCEDURE — 80069 RENAL FUNCTION PANEL: CPT

## 2021-06-07 PROCEDURE — 1200000000 HC SEMI PRIVATE

## 2021-06-07 PROCEDURE — 2580000003 HC RX 258: Performed by: INTERNAL MEDICINE

## 2021-06-07 PROCEDURE — 94640 AIRWAY INHALATION TREATMENT: CPT

## 2021-06-07 PROCEDURE — 94761 N-INVAS EAR/PLS OXIMETRY MLT: CPT

## 2021-06-07 RX ORDER — AMIODARONE HYDROCHLORIDE 200 MG/1
200 TABLET ORAL DAILY
Status: DISCONTINUED | OUTPATIENT
Start: 2021-06-07 | End: 2021-06-09 | Stop reason: HOSPADM

## 2021-06-07 RX ORDER — DILTIAZEM HYDROCHLORIDE 90 MG/1
90 CAPSULE, EXTENDED RELEASE ORAL 2 TIMES DAILY
Status: DISCONTINUED | OUTPATIENT
Start: 2021-06-07 | End: 2021-06-09 | Stop reason: HOSPADM

## 2021-06-07 RX ADMIN — OXYCODONE 5 MG: 5 TABLET ORAL at 21:29

## 2021-06-07 RX ADMIN — DIGOXIN 125 MCG: 125 TABLET ORAL at 10:00

## 2021-06-07 RX ADMIN — INSULIN LISPRO 8 UNITS: 100 INJECTION, SOLUTION INTRAVENOUS; SUBCUTANEOUS at 18:01

## 2021-06-07 RX ADMIN — POLYETHYLENE GLYCOL 3350 17 G: 17 POWDER, FOR SOLUTION ORAL at 10:00

## 2021-06-07 RX ADMIN — Medication 10 ML: at 12:02

## 2021-06-07 RX ADMIN — CALCIUM CARBONATE-VITAMIN D TAB 500 MG-200 UNIT 1 TABLET: 500-200 TAB at 10:00

## 2021-06-07 RX ADMIN — AMIODARONE HYDROCHLORIDE 200 MG: 200 TABLET ORAL at 19:03

## 2021-06-07 RX ADMIN — MIRTAZAPINE 15 MG: 15 TABLET, FILM COATED ORAL at 21:23

## 2021-06-07 RX ADMIN — INSULIN LISPRO 3 UNITS: 100 INJECTION, SOLUTION INTRAVENOUS; SUBCUTANEOUS at 21:24

## 2021-06-07 RX ADMIN — IPRATROPIUM BROMIDE AND ALBUTEROL SULFATE 3 ML: .5; 3 SOLUTION RESPIRATORY (INHALATION) at 08:59

## 2021-06-07 RX ADMIN — INSULIN LISPRO 2 UNITS: 100 INJECTION, SOLUTION INTRAVENOUS; SUBCUTANEOUS at 09:56

## 2021-06-07 RX ADMIN — PANTOPRAZOLE SODIUM 40 MG: 40 TABLET, DELAYED RELEASE ORAL at 06:32

## 2021-06-07 RX ADMIN — LINACLOTIDE 145 MCG: 145 CAPSULE, GELATIN COATED ORAL at 06:32

## 2021-06-07 RX ADMIN — Medication 2 PUFF: at 20:36

## 2021-06-07 RX ADMIN — Medication 10 ML: at 21:30

## 2021-06-07 RX ADMIN — GABAPENTIN 100 MG: 100 CAPSULE ORAL at 21:23

## 2021-06-07 RX ADMIN — FUROSEMIDE 20 MG: 10 INJECTION, SOLUTION INTRAMUSCULAR; INTRAVENOUS at 10:00

## 2021-06-07 RX ADMIN — DILTIAZEM HYDROCHLORIDE 90 MG: 90 CAPSULE, EXTENDED RELEASE ORAL at 21:23

## 2021-06-07 RX ADMIN — STANDARDIZED SENNA CONCENTRATE AND DOCUSATE SODIUM 2 TABLET: 8.6; 5 TABLET ORAL at 10:00

## 2021-06-07 RX ADMIN — PREDNISONE 10 MG: 10 TABLET ORAL at 10:00

## 2021-06-07 RX ADMIN — LINAGLIPTIN 5 MG: 5 TABLET, FILM COATED ORAL at 10:00

## 2021-06-07 RX ADMIN — DILTIAZEM HYDROCHLORIDE 60 MG: 60 CAPSULE, EXTENDED RELEASE ORAL at 10:00

## 2021-06-07 RX ADMIN — OXYCODONE 5 MG: 5 TABLET ORAL at 10:00

## 2021-06-07 RX ADMIN — Medication 2 PUFF: at 08:59

## 2021-06-07 RX ADMIN — FUROSEMIDE 20 MG: 10 INJECTION, SOLUTION INTRAMUSCULAR; INTRAVENOUS at 18:01

## 2021-06-07 RX ADMIN — IPRATROPIUM BROMIDE AND ALBUTEROL SULFATE 3 ML: .5; 3 SOLUTION RESPIRATORY (INHALATION) at 20:36

## 2021-06-07 RX ADMIN — INSULIN LISPRO 6 UNITS: 100 INJECTION, SOLUTION INTRAVENOUS; SUBCUTANEOUS at 13:09

## 2021-06-07 RX ADMIN — IPRATROPIUM BROMIDE AND ALBUTEROL SULFATE 3 ML: .5; 3 SOLUTION RESPIRATORY (INHALATION) at 12:54

## 2021-06-07 ASSESSMENT — PAIN SCALES - GENERAL
PAINLEVEL_OUTOF10: 8
PAINLEVEL_OUTOF10: 5
PAINLEVEL_OUTOF10: 0

## 2021-06-07 NOTE — CONSULTS
[facility] [address] ENDOSCOPY    BRONCHOSCOPY N/A 9/27/2019    BRONCHOSCOPY ALVEOLAR LAVAGE performed by [id number], MD at [address]  9/27/2019    BRONCHOSCOPY BIOPSY BRONCHUS performed by [id number], MD at [address]  9/27/2019    BRONCHOSCOPY BRUSHINGS performed by [id number], MD at [address] [facility], LAPAROSCOPIC N/A 12/19/2018    LAPAROSCOPIC CHOLECYSTECTOMY WITH CHOLANGIOGRAM performed by Tete Almendarez MD at [address] COLONOSCOPY N/A 2/25/2019    COLONOSCOPY WITH BIOPSY performed by Russell Willams MD at [address] COLONOSCOPY  2/25/2019    COLONOSCOPY POLYPECTOMY SNARE/COLD BIOPSY performed by Russell Willams MD at [address] [facility]  2000, 2001    IR KYPHOPLASTY LUMBAR FIRST LEVEL  12/8/2020    IR KYPHOPLASTY LUMBAR FIRST LEVEL 12/8/2020 [facility]    IR KYPHOPLASTY THORACIC FIRST LEVEL  7/23/2020    IR KYPHOPLASTY THORACIC FIRST LEVEL 7/23/2020 [facility]    IR KYPHOPLASTY THORACIC FIRST LEVEL  10/20/2020    IR KYPHOPLASTY THORACIC FIRST LEVEL 10/20/2020 [facility]    VT [address] INCL FLUOR GDNCE DX W/CELL WASHG SPX N/A 12/4/2018    BRONCHOSCOPY WITH LAVAGE performed by Polly Marcus MD at [address] [place] [address] N/A 2/25/2019    EGD BIOPSY performed by Russell Willams MD at [place] [address] N/A 8/7/2019    EGD DIAGNOSTIC ONLY performed by Bonnie Liang MD at [address] [place] [address]   Allergen Reactions    Statins Other (See Comments)     Other reaction(s): Myalgias (Muscle Pain)      Lyrica [Pregabalin] Other (See Comments)     Shaking, swelling, rash    Cipro Xr Rash     Swelling, rash    Penicillins Rash     Swelling, rash    Sulfa Antibiotics Rash     Swelling, rash       Social History:  Reviewed. reports that she quit smoking about 19 years ago.  Her Conjunctivae normal. EOM are normal.   · Neck: Neck supple. No rigidity. No JVD present. · Cardiovascular: Normal rate, regular rhythm, S1&S2. · Pulmonary/Chest: Bilateral respiratory sounds. No wheezes, No rhonchi. · Abdominal: Soft. Bowel sounds present. No distension, No tenderness. · Musculoskeletal: No tenderness. No edema    · Lymphadenopathy: Has no cervical adenopathy. · Neurological: Alert and oriented. Cranial nerve appears intact, No Gross deficit   · Skin: Skin is warm and dry. No rash noted. · Psychiatric: Has a normal behavior     Labs, diagnostic and imaging results reviewed. Reviewed. Recent Labs     06/05/21 0442 06/06/21  0424 06/07/21  0434    144 141   K 3.7 3.8 3.6   CL 99 97* 95*   CO2 34* 35* 35*   PHOS  --  4.0 3.9   BUN 44* 47* 44*   CREATININE 2.0* 2.0* 2.1*     Recent Labs     06/05/21 0442 06/06/21 0424 06/07/21  0435   WBC 7.6 7.9 8.9   HGB 7.7* 7.5* 7.8*   HCT 23.9* 23.2* 23.9*   .0 100.7* 99.4    294 324     Lab Results   Component Value Date    CKTOTAL 26 05/31/2020    TROPONINI 0.02 06/03/2021     No results found for: BNP  Lab Results   Component Value Date    PROTIME 12.2 06/03/2021    PROTIME 10.4 12/08/2020    PROTIME 12.8 10/19/2020    INR 1.05 06/03/2021    INR 0.90 12/08/2020    INR 1.10 10/19/2020     Lab Results   Component Value Date    CHOL 224 05/19/2016    HDL 64 05/04/2021    TRIG 123 05/19/2016       ECG: Atrial fibrillation with rapid ventricular responseMinimal voltage criteria for LVH  Echo: 3/2021 ([facility])  Summary:   There is mild concentric left ventricular hypertrophy. Small (<1 cm) pericardial effusion. The Aortic Valve leaflets appear sclerotic. Linear echoes with the aorta are suggestive of dissection of the ascending   aorta. Recommend chest CTA for assessment of possible thoracic dissection   Overall left ventricular ejection fraction is estimated to be 50-55%.    The left ventricular function is low normal.   There is borderline global hypokinesis of the left ventricle. The mitral valve leaflets are mildly thickened in appearance. The mitral valve chordae are thickened and/or calcified. The jet is centrally directed. The diastolic function is impaired and classified as Grade 1 (impaired   relaxation). There is mild to moderate mitral regurgitation. Moderate aortic regurgitation. There is borderline right ventricular hypertrophy.      Cath: Sept '04 : inf basal HK, EF 50%, CX & RCA stents patent; RCA distal aneurismal area unchanged from previous cath. RV 80% lesion remains unchanged  May '06: prox inferior HK, EF 45%.  Widely patent CX & RCA in the previously placed stent   Stress Test: 5/20/2019  Summary    Normal LV size and global systolic function.    Left ventricular ejection fraction of 58%.    There is a medium sized, moderate intensity, completely fixed defect in the    mid to basilar inferolateral segments, with associated hypokinesis-    consistent with scar.    There are no significant reversible coronary flow abnormalities.          Scheduled Meds:   budesonide-formoterol  2 puff Inhalation BID    lidocaine  2 patch Transdermal Daily    gabapentin  100 mg Oral Nightly    sennosides-docusate sodium  2 tablet Oral Daily    polyethylene glycol  17 g Oral Daily    linaclotide  145 mcg Oral QAM AC    mirtazapine  15 mg Oral Nightly    predniSONE  10 mg Oral Daily    pantoprazole  40 mg Oral QAM AC    dilTIAZem  60 mg Oral BID    linagliptin  5 mg Oral Daily    digoxin  125 mcg Oral Q MWF    ipratropium-albuterol  3 mL Inhalation Q4H WA    calcium-vitamin D  1 tablet Oral Daily    sodium chloride flush  5-40 mL Intravenous 2 times per day    insulin lispro  0-12 Units Subcutaneous TID WC    insulin lispro  0-6 Units Subcutaneous Nightly     Continuous Infusions:   sodium chloride      dextrose       PRN Meds:.perflutren lipid microspheres, cyclobenzaprine, oxyCODONE, albuterol sulfate HFA, sodium chloride flush, sodium chloride, ondansetron **OR** ondansetron, acetaminophen **OR** acetaminophen, hydrALAZINE, sodium chloride, glucose, dextrose, glucagon (rDNA), dextrose     Patient Active Problem List    Diagnosis Date Noted    Dyslipidemia 08/08/2011    HTN (hypertension), benign 08/08/2011    Coronary atherosclerosis of native coronary artery 08/08/2011    History of lung cancer 06/06/2021    Anemia 06/04/2021    [facility] 06/03/2021    Acute kidney injury superimposed on CKD ([facility] 07/20/2020    Diabetes education, encounter for     Depression     Anxiety disorder     Malignant neoplasm of left lung (HCC)     Chronic obstructive pulmonary disease with acute lower respiratory infection [facility]     Poorly controlled type 2 diabetes mellitus ([facility]     History of depression     Chronic diastolic heart failure (HCC)     Closed compression fracture of thoracic vertebra (HCC) 05/20/2019    Chronic deep vein thrombosis (DVT) of both lower extremities ([facility] 05/20/2019    Ataxia     Recurrent falls     Diabetic peripheral neuropathy (HCC)     Mild malnutrition ([facility] 05/02/2019    Paroxysmal atrial fibrillation ([facility] 10/17/2016    COPD, severe ([facility] 08/30/2013    DM (diabetes mellitus), secondary, uncontrolled, w/neurologic complic ([facility] 84/27/5981    Mitral and aortic valve disease 08/08/2011    Obstructive sleep apnea 08/08/2011      Active Hospital Problems    Diagnosis Date Noted    HTN (hypertension), benign [I10] 08/08/2011     Priority: High    Dyslipidemia [E78.5] 08/08/2011     Priority: High    Coronary atherosclerosis of native coronary artery [I25.10] 08/08/2011     Priority: High    History of lung cancer [Z85.118] 06/06/2021    Anemia [D64.9] 06/04/2021    ARF (acute renal failure) ([facility] [N17.9] 06/03/2021    Acute kidney injury superimposed on CKD ([facility] [N17.9, N18.9] 07/20/2020    Anxiety disorder [F41.9]     Depression [F32.9]     Poorly controlled type 2 diabetes mellitus ([facility] [E11.65]     Chronic obstructive pulmonary disease with acute lower respiratory infection ([facility] [J44.0]     Malignant neoplasm of left lung (HCC) [C34.92]     Chronic diastolic heart failure (HCC) [I50.32]     Chronic deep vein thrombosis (DVT) of both lower extremities (HCC) [I82.503] 05/20/2019    Ataxia [R27.0]     Recurrent falls [R29.6]     Diabetic peripheral neuropathy ([facility] [E11.42]     Mild malnutrition ([facility] [E44.1] 05/02/2019    Paroxysmal atrial fibrillation (HCC) [I48.0] 10/17/2016    COPD, severe ([facility] [J44.9] 08/30/2013    DM (diabetes mellitus), secondary, uncontrolled, w/neurologic complic ([facility] [C25.15, O53.53] 08/08/2011    Mitral and aortic valve disease [I08.0] 08/08/2011    Obstructive sleep apnea [G47.33] 08/08/2011       Assessment:       Plan:    -Paroxysmal atrial fibrillation    She has episodes of atrial runs in atrial fibrillation. At times her heart rate is fast.  She has also chronic anemia. I had a detailed discussion with her and her daughter. Although she has COPD I think low-dose amiodarone for a while can be helpful in maintaining sinus rhythm and avoiding some of the shortness of breath associated with A. fib episodes. We will also reduce the risk of rebleeding if she has to come off blood thinners. I will ALSO increase the dose of diltiazem to 90 mg twice daily    From the leading standpoint and anemia, I discussed the watchman device with her and her daughter. They will think about it and decide whether they want to proceed in that case I will refer him to my colleague for evaluation. I think that would be a good option if she can tolerate the procedure from the lung standpoint which I think is possible although with high risk.      -Anemia. Her hemoglobin is ranging between 7-8.   She will benefit from infusion of iron and also blood transfusion if hemoglobin down below

## 2021-06-07 NOTE — PROGRESS NOTES
Physical Therapy      ANDREW for testing. Will follow-up as schedule permits. Thanks, Mary Alice Norris PT, DPT [id number]  Boston Bai OTR/L [id number], 6/7/2021, 11:12 AM    Pt remains off the floor this afternoon. Will follow up as schedule permits.   Sandro Stallings, [address] [place] [address]

## 2021-06-07 NOTE — PROGRESS NOTES
Progress Note - Dr. Lex Duarte - Internal Medicine  PCP: Isela Oleary MD [address] Day: 4  Code Status: Full Code  Current Diet: ADULT DIET; Regular; 4 carb choices (60 gm/meal)        CC: follow up on medical issues    Subjective:   Radha Chaudhari is a 68 y.o. female. She denies problems    Doing ok  Awaiting ECHO it appears  Pt sleeping fairly soundly, hard to awaken, and when she does, she offers no complaints and falls back asleep quickly    She denies chest pain, denies shortness of breath, denies nausea,  denies emesis. 10 system Review of Systems is reviewed with patient, and pertinent positives are noted in HPI above . Otherwise, Review of systems is negative. I have reviewed the patient's medical and social history in detail and updated the computerized patient record. To recap: She  has a past medical history of Arthritis, CAD (coronary artery disease), Carpal tunnel syndrome, COPD (chronic obstructive pulmonary disease) ([address] Coronary stent, depression, Diabetes mellitus ([address] Diastolic heart failure ([address] GERD (gastroesophageal reflux disease), Hyperlipidemia, Hypertension, Lung cancer ([address] MI (myocardial infarction) ([address] LIZETT (obstructive sleep apnea), PONV (postoperative nausea and vomiting), and stress incontinence. . She  has a past surgical history that includes Coronary angioplasty with stent (2000, 2001); back surgery (2000, 2001); bronchoscopy (12/04/2018); pr brncc incl fluor gdnce dx w/cell washg spx (N/A, 12/4/2018); Cholecystectomy, laparoscopic (N/A, 12/19/2018); Upper gastrointestinal endoscopy (N/A, 2/25/2019); Colonoscopy (N/A, 2/25/2019); Colonoscopy (2/25/2019); bronchoscopy (N/A, 2/27/2019); bronchoscopy (2/27/2019); bronchoscopy (2/27/2019); bronchoscopy (N/A, 8/6/2019); Upper gastrointestinal endoscopy (N/A, 8/7/2019); bronchoscopy (N/A, 9/27/2019); bronchoscopy (9/27/2019); bronchoscopy (9/27/2019);  IR KYPHOPLASTY THORACIC 1 VERTEBRAL BODY (7/23/2020); IR KYPHOPLASTY THORACIC 1 VERTEBRAL BODY (10/20/2020); and IR KYPHOPLASTY LUMBAR 1 VERTEBRAL BODY (12/8/2020). . She  reports that she quit smoking about 19 years ago. Her smoking use included cigarettes. She quit after 10.00 years of use. She has never used smokeless tobacco. She reports that she does not drink alcohol and does not use drugs. .        Active Hospital Problems    Diagnosis Date Noted    HTN (hypertension), benign [I10] 08/08/2011     Priority: High    Dyslipidemia [E78.5] 08/08/2011     Priority: High    Coronary atherosclerosis of native coronary artery [I25.10] 08/08/2011     Priority: High    History of lung cancer [Z85.118] 06/06/2021    Anemia [D64.9] 06/04/2021    ARF (acute renal failure) ([facility] [N17.9] 06/03/2021    Acute kidney injury superimposed on CKD ([facility] [N17.9, N18.9] 07/20/2020    Anxiety disorder [F41.9]     Depression [F32.9]     Poorly controlled type 2 diabetes mellitus (HCC) [E11.65]     Chronic obstructive pulmonary disease with acute lower respiratory infection (HCC) [J44.0]     Malignant neoplasm of left lung (HCC) [C34.92]     Chronic diastolic heart failure (HCC) [I50.32]     Chronic deep vein thrombosis (DVT) of both lower extremities (HCC) [I82.503] 05/20/2019    Ataxia [R27.0]     Recurrent falls [R29.6]     Diabetic peripheral neuropathy ([facility] [E11.42]     Mild malnutrition ([facility] [E44.1] 05/02/2019    Paroxysmal atrial fibrillation (HCC) [I48.0] 10/17/2016    COPD, severe ([address] [J44.9] 08/30/2013    DM (diabetes mellitus), secondary, uncontrolled, w/neurologic complic ([facility] [O19.14, B23.41] 08/08/2011    Mitral and aortic valve disease [I08.0] 08/08/2011    Obstructive sleep apnea [G47.33] 08/08/2011       Current Facility-Administered Medications: furosemide (LASIX) injection 20 mg, 20 mg, Intravenous, BID  budesonide-formoterol (SYMBICORT) 160-4.5 MCG/ACT inhaler 2 puff, 2 puff, Inhalation, BID  perflutren lipid microspheres (DEFINITY) injection 1.65 mg, 1.5 mL, Intravenous, ONCE PRN  cyclobenzaprine (FLEXERIL) tablet 10 mg, 10 mg, Oral, TID PRN  oxyCODONE (ROXICODONE) immediate release tablet 5 mg, 5 mg, Oral, Q6H PRN  lidocaine 4 % external patch 2 patch, 2 patch, Transdermal, Daily  gabapentin (NEURONTIN) capsule 100 mg, 100 mg, Oral, Nightly  sennosides-docusate sodium (SENOKOT-S) 8.6-50 MG tablet 2 tablet, 2 tablet, Oral, Daily  polyethylene glycol (GLYCOLAX) packet 17 g, 17 g, Oral, Daily  linaclotide (LINZESS) capsule 145 mcg, 145 mcg, Oral, QAM AC  mirtazapine (REMERON) tablet 15 mg, 15 mg, Oral, Nightly  predniSONE (DELTASONE) tablet 10 mg, 10 mg, Oral, Daily  pantoprazole (PROTONIX) tablet 40 mg, 40 mg, Oral, QAM AC  dilTIAZem (CARDIZEM 12 HR) extended release capsule 60 mg, 60 mg, Oral, BID  linagliptin (TRADJENTA) tablet 5 mg, 5 mg, Oral, Daily  digoxin (LANOXIN) tablet 125 mcg, 125 mcg, Oral, Q MWF  albuterol sulfate  (90 Base) MCG/ACT inhaler 2 puff, 2 puff, Inhalation, Q4H PRN  ipratropium-albuterol (DUONEB) nebulizer solution 3 mL, 3 mL, Inhalation, Q4H WA  calcium-vitamin D (OSCAL-500) 500-200 MG-UNIT per tablet 1 tablet, 1 tablet, Oral, Daily  sodium chloride flush 0.9 % injection 5-40 mL, 5-40 mL, Intravenous, 2 times per day  sodium chloride flush 0.9 % injection 5-40 mL, 5-40 mL, Intravenous, PRN  0.9 % sodium chloride infusion, 25 mL, Intravenous, PRN  ondansetron (ZOFRAN-ODT) disintegrating tablet 4 mg, 4 mg, Oral, Q8H PRN **OR** ondansetron (ZOFRAN) injection 4 mg, 4 mg, Intravenous, Q6H PRN  acetaminophen (TYLENOL) tablet 650 mg, 650 mg, Oral, Q6H PRN **OR** acetaminophen (TYLENOL) suppository 650 mg, 650 mg, Rectal, Q6H PRN  hydrALAZINE (APRESOLINE) injection 10 mg, 10 mg, Intravenous, Q6H PRN  0.9 % sodium chloride bolus, 500 mL, Intravenous, PRN  insulin lispro (1 Unit Dial) 0-12 Units, 0-12 Units, Subcutaneous, TID WC  insulin lispro (1 Unit Dial) 0-6 Units, 0-6 Units, Subcutaneous, Nightly  glucose (GLUTOSE) 40 % oral gel 15 g, 15 g, Oral, PRN  dextrose 50 % IV solution, 12.5 g, Intravenous, PRN  glucagon (rDNA) injection 1 mg, 1 mg, Intramuscular, PRN  dextrose 5 % solution, 100 mL/hr, Intravenous, PRN         Objective:  BP (!) 148/78   Pulse 79   Temp 98.4 °F (36.9 °C) (Oral)   Resp 18   Ht 5' 5\" (1.651 m)   Wt 165 lb (74.8 kg)   SpO2 94%   BMI 27.46 kg/m²      Patient Vitals for the past 24 hrs:   BP Temp Temp src Pulse Resp SpO2 Weight   06/07/21 0800 (!) 148/78 98.4 °F (36.9 °C) Oral 79 18 94 % 165 lb (74.8 kg)   06/07/21 0429 (!) 152/77 98.3 °F (36.8 °C) Oral 78 16 92 % --   06/07/21 0030 138/66 98.3 °F (36.8 °C) Oral 102 16 92 % --   06/06/21 2155 -- -- -- -- -- 92 % --   06/06/21 2153 -- -- -- -- -- (!) 88 % --   06/06/21 2151 (!) 148/64 98.4 °F (36.9 °C) Oral 106 18 (!) 85 % --   06/06/21 2044 -- -- -- -- -- 94 % --   06/06/21 1623 -- -- -- -- -- 94 % --   06/06/21 1622 -- -- -- -- -- (!) 89 % --   06/06/21 1618 119/67 98 °F (36.7 °C) Oral 99 16 93 % --   06/06/21 1533 -- -- -- -- 16 96 % --   06/06/21 1214 (!) 160/73 98.4 °F (36.9 °C) Oral 96 16 97 % --   06/06/21 0855 -- -- -- -- -- -- 171 lb (77.6 kg)     Patient Vitals for the past 96 hrs (Last 3 readings):   Weight   06/07/21 0800 165 lb (74.8 kg)   06/06/21 0855 171 lb (77.6 kg)   06/05/21 0430 161 lb 3.2 oz (73.1 kg)           Intake/Output Summary (Last 24 hours) at 6/7/2021 0834  Last data filed at 6/7/2021 3803  Gross per 24 hour   Intake 1090 ml   Output 1175 ml   Net -85 ml         Physical Exam:   BP (!) 148/78   Pulse 79   Temp 98.4 °F (36.9 °C) (Oral)   Resp 18   Ht 5' 5\" (1.651 m)   Wt 165 lb (74.8 kg)   SpO2 94%   BMI 27.46 kg/m²   General appearance: alert, appears stated age and cooperative  Head: Normocephalic, without obvious abnormality, atraumatic  Lungs: clear to auscultation bilaterally  Heart: regular rate and rhythm, S1, S2 normal, no murmur, click, rub or gallop  Abdomen: Plan:  Principal Problem:    Acute kidney injury superimposed on CKD ([address] -Established problem. Stable. Creat 2.1  Plan: appreciate renal eval  Active Problems:    Dyslipidemia -Established problem. Stable. .  Plan: Continue present orders/plan. HTN (hypertension), benign -Established problem. Stable. 148/78  Plan: stay on same meds    Coronary atherosclerosis of native coronary artery    COPD, severe ([facility]    Paroxysmal atrial fibrillation ([address] -Established problem. Stable. In sinus  Plan: Continue present orders/plan. Recurrent falls -Established problem. Stable. Plan: pt/ot rec snf    Diabetic peripheral neuropathy ([facility]    Chronic diastolic heart failure (HCC)    Malignant neoplasm of left lung ([facility]    Poorly controlled type 2 diabetes mellitus ([facility] -Established problem. Stable.     Plan: cont ccc diet, sliding scale, home meds    Depression    Anxiety disorder    Disp - awaiting completion of cardiac workup          Saskia Crane MD  6/7/2021

## 2021-06-07 NOTE — PLAN OF CARE
Problem: Falls - Risk of:  Goal: Will remain free from falls  Description: Will remain free from falls  Outcome: Ongoing  Note: Remains free from falls this shift. Problem: OXYGENATION/RESPIRATORY FUNCTION  Goal: Patient will maintain patent airway  Outcome: Ongoing  Note: Found on RA @start of shift, O2 sat 85%. Required 1.5 L O2 per NC to maintain sat >90%. Problem: HEMODYNAMIC STATUS  Goal: Patient has stable vital signs and fluid balance  Outcome: Ongoing  Note: VSS, remains SR w/PACs, PVCs on telemetry. Problem: FLUID AND ELECTROLYTE IMBALANCE  Goal: Fluid and electrolyte balance are achieved/maintained  Outcome: Ongoing  Note: Potassium 3.6 this am.     Problem: Pain:  Goal: Pain level will decrease  Description: Pain level will decrease  Outcome: Ongoing  Note: Medicated with scheduled meds only, sleeps when undisturbed.

## 2021-06-07 NOTE — PROGRESS NOTES
Department of Internal Medicine  General Internal Medicine   Progress Note      SUBJECTIVE: breathing easier denies chest pain , wants to go home , leg swelling down , Grand daughter who is a nurse present in room     History obtained from chart review and the patient  General ROS: positive for  - fatigue and malaise  negative for - chills, fever or night sweats  Psychological ROS: negative for - disorientation, hallucinations, irritability or memory difficulties  Ophthalmic ROS: negative  ENT ROS: negative  Allergy and Immunology ROS: negative  Respiratory ROS: positive for - cough, shortness of breath and tachypnea  negative for - wheezing  Cardiovascular ROS: positive for - dyspnea on exertion  negative for - chest pain  Gastrointestinal ROS: no abdominal pain, change in bowel habits, or black or bloody stools  Genito-Urinary ROS: no dysuria, trouble voiding, or hematuria  Musculoskeletal ROS: chronic pain   Neurological ROS: no TIA or stroke symptoms    OBJECTIVE      Medications      Current Facility-Administered Medications: furosemide (LASIX) injection 20 mg, 20 mg, Intravenous, BID  budesonide-formoterol (SYMBICORT) 160-4.5 MCG/ACT inhaler 2 puff, 2 puff, Inhalation, BID  perflutren lipid microspheres (DEFINITY) injection 1.65 mg, 1.5 mL, Intravenous, ONCE PRN  cyclobenzaprine (FLEXERIL) tablet 10 mg, 10 mg, Oral, TID PRN  oxyCODONE (ROXICODONE) immediate release tablet 5 mg, 5 mg, Oral, Q6H PRN  lidocaine 4 % external patch 2 patch, 2 patch, Transdermal, Daily  gabapentin (NEURONTIN) capsule 100 mg, 100 mg, Oral, Nightly  sennosides-docusate sodium (SENOKOT-S) 8.6-50 MG tablet 2 tablet, 2 tablet, Oral, Daily  polyethylene glycol (GLYCOLAX) packet 17 g, 17 g, Oral, Daily  linaclotide (LINZESS) capsule 145 mcg, 145 mcg, Oral, QAM AC  mirtazapine (REMERON) tablet 15 mg, 15 mg, Oral, Nightly  predniSONE (DELTASONE) tablet 10 mg, 10 mg, Oral, Daily  pantoprazole (PROTONIX) tablet 40 mg, 40 mg, Oral, QAM AC  dilTIAZem (CARDIZEM 12 HR) extended release capsule 60 mg, 60 mg, Oral, BID  linagliptin (TRADJENTA) tablet 5 mg, 5 mg, Oral, Daily  digoxin (LANOXIN) tablet 125 mcg, 125 mcg, Oral, Q MWF  albuterol sulfate  (90 Base) MCG/ACT inhaler 2 puff, 2 puff, Inhalation, Q4H PRN  ipratropium-albuterol (DUONEB) nebulizer solution 3 mL, 3 mL, Inhalation, Q4H WA  calcium-vitamin D (OSCAL-500) 500-200 MG-UNIT per tablet 1 tablet, 1 tablet, Oral, Daily  sodium chloride flush 0.9 % injection 5-40 mL, 5-40 mL, Intravenous, 2 times per day  sodium chloride flush 0.9 % injection 5-40 mL, 5-40 mL, Intravenous, PRN  0.9 % sodium chloride infusion, 25 mL, Intravenous, PRN  ondansetron (ZOFRAN-ODT) disintegrating tablet 4 mg, 4 mg, Oral, Q8H PRN **OR** ondansetron (ZOFRAN) injection 4 mg, 4 mg, Intravenous, Q6H PRN  acetaminophen (TYLENOL) tablet 650 mg, 650 mg, Oral, Q6H PRN **OR** acetaminophen (TYLENOL) suppository 650 mg, 650 mg, Rectal, Q6H PRN  hydrALAZINE (APRESOLINE) injection 10 mg, 10 mg, Intravenous, Q6H PRN  0.9 % sodium chloride bolus, 500 mL, Intravenous, PRN  insulin lispro (1 Unit Dial) 0-12 Units, 0-12 Units, Subcutaneous, TID WC  insulin lispro (1 Unit Dial) 0-6 Units, 0-6 Units, Subcutaneous, Nightly  glucose (GLUTOSE) 40 % oral gel 15 g, 15 g, Oral, PRN  dextrose 50 % IV solution, 12.5 g, Intravenous, PRN  glucagon (rDNA) injection 1 mg, 1 mg, Intramuscular, PRN  dextrose 5 % solution, 100 mL/hr, Intravenous, PRN    Physical      VITALS:  BP (!) 148/78   Pulse 79   Temp 98.4 °F (36.9 °C) (Oral)   Resp 18   Ht 5' 5\" (1.651 m)   Wt 165 lb (74.8 kg)   SpO2 94%   BMI 27.46 kg/m²   TEMPERATURE:  Current - Temp: 98.4 °F (36.9 °C);  Max - Temp  Av.3 °F (36.8 °C)  Min: 98 °F (36.7 °C)  Max: 98.4 °F (36.9 °C)  RESPIRATIONS RANGE: Resp  Av.6  Min: 16  Max: 18  PULSE RANGE: Pulse  Av.3  Min: 78  Max: 106  BLOOD PRESSURE RANGE:  Systolic (62CAB), KHH:139 , Min:119 , CPS:015   ; Diastolic (56BJD), Av, Min:64, Max:78    PULSE OXIMETRY RANGE: SpO2  Av.2 %  Min: 85 %  Max: 97 %  24HR INTAKE/OUTPUT:      Intake/Output Summary (Last 24 hours) at 2021 0855  Last data filed at 2021 4808  Gross per 24 hour   Intake 1090 ml   Output 1175 ml   Net -85 ml     CONSTITUTIONAL:  fatigued, alert, cooperative, mild distress and appears stated age  EYES:  Unremarkable   NECK:  Mild JVD  and supple, symmetrical, trachea midline  BACK:  Mild Kyphotic and symmetric  LUNGS:  Mild respiratory distress, good air exchange, no retractions and no crackles or wheezing  CARDIOVASCULAR:  normal apical pulses, regular rate and rhythm with ectopic beats, normal S1 and S2 and no S3  ABDOMEN:  Soft , Mild distention BS hypoactive   MUSCULOSKELETAL:  Trace edema   NEUROLOGIC:  No acute focal findings     Data      Lab Results   Component Value Date    PHART 7.441 2021    PO2ART 104.0 2021    LUE9CHO 48.6 2021    BBJ7UVW 33.1 2021    BEART 8.1 2021    B0GGBABC 99.7 2021       Lab Results   Component Value Date     2021    K 3.6 2021    K 3.8 10/21/2020    CL 95 2021    CO2 35 2021    BUN 44 2021    CREATININE 2.1 2021    GLUCOSE 178 2021    CALCIUM 9.4 2021     Lab Results   Component Value Date    WBC 8.9 2021    HGB 7.8 2021    HCT 23.9 2021    MCV 99.4 2021     2021         Lab Results   Component Value Date    INR 1.05 2021    PROTIME 12.2 2021       ASSESSMENT AND PLAN     Active Hospital Problems    Diagnosis Date Noted    HTN (hypertension), benign [I10] 2011     Priority: High    Dyslipidemia [E78.5] 2011     Priority: High    Coronary atherosclerosis of native coronary artery [I25.10] 2011     Priority: High    History of lung cancer [Z85.118] 2021    Anemia [D64.9] 2021    ARF (acute renal failure) (HCC) [N17.9] 2021    Acute kidney injury superimposed on CKD ([facility] [N17.9, N18.9] 07/20/2020    Anxiety disorder [F41.9]     Depression [F32.9]     Poorly controlled type [facility] diabetes mellitus ([facility] [E11.65]     Chronic obstructive pulmonary disease with acute lower respiratory infection ([facility] [J44.0]     Malignant neoplasm of left lung (HCC) [C34.92]     Chronic diastolic heart failure (HCC) [I50.32]     Chronic deep vein thrombosis (DVT) of both lower extremities (HCC) [I82.503] 05/20/2019    Ataxia [R27.0]     Recurrent falls [R29.6]     Diabetic peripheral neuropathy ([facility] [E11.42]     Mild malnutrition ([facility] [E44.1] 05/02/2019    Paroxysmal atrial fibrillation (HCC) [I48.0] 10/17/2016    COPD, severe ([facility] [J44.9] 08/30/2013    DM (diabetes mellitus), secondary, uncontrolled, w/neurologic complic ([facility] [G63.40, J30.07] 08/08/2011    Mitral and aortic valve disease [I08.0] 08/08/2011    Obstructive sleep apnea [G47.33] 08/08/2011       Ct management of COPD  And diabetes    Monitor  Renal function    discussed with grand daughter , answered all questions and addressed all concerns    renal function atr a new baseline it seems    may switch to PO Furosemide in am

## 2021-06-07 NOTE — PROGRESS NOTES
VSS, found on RA, sat 85%. Placed on 0.5L, sat only came up to 88%. Increased to 1L, then 1.5L to maintain sat >90%, assessment as charted. Meds administered including correction insulin for FSBS 273, denies other needs @present.

## 2021-06-07 NOTE — PROGRESS NOTES
Patient in and out of afib HR 130s-140s while off floor for Echo. Echo now on hold. EKG STAT ordered. Dr Franca Tamayo aware, Informed cardiology per his request.   Pt BP stable, Resp 24-O2 Sats 90-91. Placed on 2L per NC. Nay Palumbo Bedside RN aware. RN called Richelle Cardiology RN at this time, she is informing Dr Jacob Mckee.

## 2021-06-07 NOTE — PROGRESS NOTES
Obstructive sleep apnea    COPD, severe (HCC)    Paroxysmal atrial fibrillation (HCC)    Mild malnutrition (HCC)    Closed compression fracture of thoracic vertebra (HCC)    Chronic deep vein thrombosis (DVT) of both lower extremities (HCC)    Ataxia    Recurrent falls    Diabetic peripheral neuropathy (HCC)    Chronic diastolic heart failure (HCC)    Malignant neoplasm of left lung (HCC)    Chronic obstructive pulmonary disease with acute lower respiratory infection ([facility] [address]    Poorly controlled type 2 diabetes mellitus ([facility]    History of depression    Diabetes education, encounter for    Depression    Anxiety disorder    Acute kidney injury superimposed on CKD ([facility] [address] [facility]    ARF (acute renal failure) ([facility] [facility]    Anemia    History of lung cancer       : other supportive care :   - Check daily renal function panel with electrolytes-phosphorus  - Strict monitoring of I/Os, daily weight  - Renal feeds/diet  - Current medications reviewed. - Nephrotoxic medications have been discontinued. - Dose adjusted and appropriate. - Dose meds for eGFR <15 mL/min/1.73m2. during LORRAINE    - Avoid heavy opioids due to renal failure - may use very low dose dilaudid / fentanyl with close monitoring of CNS and respiratory depression. Please refer to the orders. High Complexity. Multiple complex problems. Discussed with patient,  and treatment team-   Thank you for allowing me to participate in this patient's care. Please do not hesitate to contact me with any questions/concerns. We will follow along with you. Sia Portillo MD,    [facility] [address] [place] [address] [place]: [phone] or [facility] [employer]  Fax: [phone]      ================================================  ================================================      SUBJECTIVE:   Resting in bed  no active complaints,   Renal function stable.   24 hr urine done   - ROS reported: as mentioned in Subjective, HPI, CC, and all other 10 systems' review negative,   - PMH, [address], Social history: reviewed   - MEDICATIONS:  Prior to Admission Medications:  Medications Prior to Admission: gabapentin (NEURONTIN) 100 MG capsule, Take 100 mg by mouth nightly. cyclobenzaprine (FLEXERIL) 10 MG tablet, Take 10 mg by mouth 3 times daily as needed for Muscle spasms  oxyCODONE 5 MG capsule, Take 5 mg by mouth every 6 hours as needed for Pain.  lidocaine (LIDODERM) 5 %, Place 2 patches onto the skin daily Indications:  To lower back and left shoulder/arm 12 hours on, 12 hours off.  apixaban (ELIQUIS) 2.5 MG TABS tablet, Take 1 tablet by mouth 2 times daily  Calcium Carb-Cholecalciferol (CALCIUM+D3) 500-600 MG-UNIT TABS, Take 1 tablet by mouth daily  albuterol sulfate  (90 Base) MCG/ACT inhaler, Inhale 2 puffs into the lungs every 4 hours as needed for Wheezing  ipratropium-albuterol (DUONEB) 0.5-2.5 (3) MG/3ML SOLN nebulizer solution, Inhale 3 mLs into the lungs every 4 hours (while awake) DX:COPD J44.9  Continuous Blood Gluc  (FREESTYLE LOC 2 READER) ELADIA, Check blood sugars twice daily  Continuous Blood Gluc Sensor (FREESTYLE LOC 2 SENSOR) MISC, Check blood sugars twice daily  Continuous Blood Gluc  (FREESTYLE LOC 14 DAY READER) ELADIA, Check blood sugars twice daily  digoxin (LANOXIN) 125 MCG tablet, Take 1 tab by mouth on Monday -Wednesday-Friday  linaclotide (LINZESS) 145 MCG capsule, Take 145 mcg by mouth every morning (before breakfast)  furosemide (LASIX) 40 MG tablet, Take 1 tablet by mouth daily  predniSONE (DELTASONE) 10 MG tablet, Take 1 tablet by mouth daily  pantoprazole (PROTONIX) 40 MG tablet, Take 1 tablet by mouth every morning (before breakfast)  dilTIAZem (CARDIZEM 12 HR) 60 MG extended release capsule, Take 1 capsule by mouth 2 times daily  mirtazapine (REMERON) 15 MG tablet, Take 1 tablet by mouth nightly (Patient taking differently: Take 30 mg by mouth as needed )  glimepiride (AMARYL) 2 MG tablet, Take 2 mg by mouth every morning (before breakfast)  [DISCONTINUED] methocarbamol (ROBAXIN) 500 MG tablet, Take 500 mg by mouth 3 times daily   linagliptin (TRADJENTA) 5 MG tablet, Take 1 tablet by mouth daily  metoprolol succinate (TOPROL XL) 25 MG extended release tablet, Take 1 tablet by mouth daily     Scheduled Meds:   furosemide  20 mg Intravenous BID    budesonide-formoterol  2 puff Inhalation BID    lidocaine  2 patch Transdermal Daily    gabapentin  100 mg Oral Nightly    sennosides-docusate sodium  2 tablet Oral Daily    polyethylene glycol  17 g Oral Daily    linaclotide  145 mcg Oral QAM AC    mirtazapine  15 mg Oral Nightly    predniSONE  10 mg Oral Daily    pantoprazole  40 mg Oral QAM AC    dilTIAZem  60 mg Oral BID    linagliptin  5 mg Oral Daily    digoxin  125 mcg Oral Q MWF    ipratropium-albuterol  3 mL Inhalation Q4H WA    calcium-vitamin D  1 tablet Oral Daily    sodium chloride flush  5-40 mL Intravenous 2 times per day    insulin lispro  0-12 Units Subcutaneous TID WC    insulin lispro  0-6 Units Subcutaneous Nightly     Continuous Infusions:   sodium chloride      dextrose       PRN Meds:.perflutren lipid microspheres, cyclobenzaprine, oxyCODONE, albuterol sulfate HFA, sodium chloride flush, sodium chloride, ondansetron **OR** ondansetron, acetaminophen **OR** acetaminophen, hydrALAZINE, sodium chloride, glucose, dextrose, glucagon (rDNA), dextrose        OBJECTIVE:   PHYSICAL EXAM:  Patient Vitals for the past 24 hrs:   BP Temp Temp src Pulse Resp SpO2 Weight   06/07/21 1139 -- -- -- -- -- 94 % --   06/07/21 1130 128/77 -- -- 130 24 90 % --   06/07/21 0903 -- -- -- -- 22 91 % --   06/07/21 0902 -- -- -- -- 22 91 % --   06/07/21 0800 (!) 148/78 98.4 °F (36.9 °C) Oral 79 18 94 % 165 lb (74.8 kg)   06/07/21 0429 (!) 152/77 98.3 °F (36.8 °C) Oral 78 16 92 % --   06/07/21 0030 138/66 98.3 °F (36.8 °C) Oral 102 16 92 % --   06/06/21 2155 -- -- -- -- -- 92 % --   06/06/21 2153 -- -- -- -- -- (!) 88 % --   06/06/21 2151 (!) 148/64 98.4 °F (36.9 °C) Oral 106 18 (!) 85 % --   06/06/21 2044 -- -- -- -- -- 94 % --   06/06/21 1623 -- -- -- -- -- 94 % --   06/06/21 1622 -- -- -- -- -- (!) 89 % --   06/06/21 1618 119/67 98 °F (36.7 °C) Oral 99 16 93 % --   06/06/21 1533 -- -- -- -- 16 96 % --   06/06/21 1214 (!) 160/73 98.4 °F (36.9 °C) Oral 96 16 97 % --       Intake/Output Summary (Last 24 hours) at 6/7/2021 1205  Last data filed at 6/7/2021 2724  Gross per 24 hour   Intake 600 ml   Output 1175 ml   Net -575 ml       General: Awake, Alert,   HENT: Atraumatic, normocephalic   Eyes: Normal conjunctiva, Non-incteral sclera. Neck: Supple, JVD not visible. CVS:  Heart sounds are normal. No murmurs. .  RS: Normal respiratory efforts,  Lung fields are clear. Abd: Soft , bowel sounds are normal, and no tenderness to palpation. Skin: No rash ,    CNS: Awake Oriented , No focal.   Extremities/MSK:  Edema, no cyanosis. DATA:  Diagnostic tests reviewed for today's visit:    Recent Labs     06/05/21 0442 06/06/21 0424 06/07/21  0435   WBC 7.6 7.9 8.9   HCT 23.9* 23.2* 23.9*    294 324     Iron Saturation:  No components found for: PERCENTFE  FERRITIN:    Lab Results   Component Value Date    FERRITIN 47.1 06/05/2021     IRON:    Lab Results   Component Value Date    IRON 41 06/05/2021     TIBC:    Lab Results   Component Value Date    TIBC 280 06/05/2021       Recent Labs     06/05/21 0442 06/06/21 0424 06/07/21  0434    144 141   K 3.7 3.8 3.6   CL 99 97* 95*   CO2 34* 35* 35*   BUN 44* 47* 44*   CREATININE 2.0* 2.0* 2.1*     Recent Labs     06/05/21  0442 06/06/21  0424 06/07/21  0434   CALCIUM 9.5 9.3 9.4   PHOS  --  4.0 3.9     No results for input(s): PH, PCO2, PO2 in the last 72 hours.     Invalid input(s): Dayday Gorman    ABG:  No results found for: PH, PCO2, PO2, HCO3, BE, THGB, TCO2, O2SAT  VBG:    Lab Results   Component Value Date    PHVEN 7.603 06/05/2021    GIW2DYB 37.0 06/05/2021    BEVEN 13.8 06/05/2021    Q8YQNBRE 100 06/05/2021       LDH:  No results found for: LDH  Uric Acid:    Lab Results   Component Value Date    LABURIC 4.1 12/25/2018       PT/INR:    Lab Results   Component Value Date    PROTIME 12.2 06/03/2021    INR 1.05 06/03/2021     Warfarin PT/INR:  No components found for: Gordon Milks  PTT:    Lab Results   Component Value Date    APTT 27.0 06/03/2021   [APTT}    Last 3 Troponin:    Lab Results   Component Value Date    TROPONINI 0.02 06/03/2021    TROPONINI <0.01 07/11/2020    TROPONINI <0.01 06/04/2020       U/A:    Lab Results   Component Value Date    COLORU YELLOW 06/03/2021    PROTEINU Negative 06/03/2021    PHUR 6.0 06/03/2021    WBCUA 0 06/03/2021    RBCUA 0 06/03/2021    YEAST Present 08/04/2019    BACTERIA 2+ 05/31/2020    CLARITYU Clear 06/03/2021    SPECGRAV 1.014 06/03/2021    LEUKOCYTESUR Negative 06/03/2021    UROBILINOGEN 0.2 06/03/2021    BILIRUBINUR Negative 06/03/2021    BLOODU Negative 06/03/2021    GLUCOSEU 500 06/03/2021     Microalbumen/Creatinine ratio:  No components found for: RUCREAT  24 Hour Urine for Protein:  No components found for: RAWUPRO, UHRS3, WRQL22HA, UTV3  24 Hour Urine for Creatinine Clearance:  No components found for: CREAT4, UHRS10, UTV10  Urine Toxicology: No components found for: IAMMENTA, IBARBIT, IBENZO, ICOCAINE, IMARTHC, IOPIATES, IPHENCYC    HgBA1c:    Lab Results   Component Value Date    LABA1C 5.8 05/04/2021     RPR:  No results found for: RPR  HIV:  No results found for: HIV  ROXANNE:  No results found for: ANATITER, ROXANNE  RF:  No results found for: RF  DSDNA:  No components found for: DNA  AMYLASE:  No results found for: AMYLASE  LIPASE:    Lab Results   Component Value Date    LIPASE 46.0 07/11/2020     Fibrinogen Level:  No components found for: FIB      BELOW MENTIONED RADIOLOGY STUDIES REVIEWED BY ME:    XR CHEST (2 VW)    Result Date: 6/3/2021  EXAMINATION: TWO XRAY VIEWS OF THE CHEST 6/3/2021 5:42

## 2021-06-08 LAB
ALBUMIN SERPL-MCNC: 3.5 G/DL (ref 3.4–5)
ANION GAP SERPL CALCULATED.3IONS-SCNC: 12 MMOL/L (ref 3–16)
BASOPHILS ABSOLUTE: 0.1 K/UL (ref 0–0.2)
BASOPHILS RELATIVE PERCENT: 0.8 %
BUN BLDV-MCNC: 49 MG/DL (ref 7–20)
CALCIUM SERPL-MCNC: 9.3 MG/DL (ref 8.3–10.6)
CHLORIDE BLD-SCNC: 96 MMOL/L (ref 99–110)
CO2: 33 MMOL/L (ref 21–32)
CREAT SERPL-MCNC: 2.3 MG/DL (ref 0.6–1.2)
EOSINOPHILS ABSOLUTE: 0.8 K/UL (ref 0–0.6)
EOSINOPHILS RELATIVE PERCENT: 8.2 %
GFR AFRICAN AMERICAN: 25
GFR NON-AFRICAN AMERICAN: 20
GLUCOSE BLD-MCNC: 219 MG/DL (ref 70–99)
GLUCOSE BLD-MCNC: 227 MG/DL (ref 70–99)
GLUCOSE BLD-MCNC: 292 MG/DL (ref 70–99)
GLUCOSE BLD-MCNC: 328 MG/DL (ref 70–99)
GLUCOSE BLD-MCNC: 384 MG/DL (ref 70–99)
HCT VFR BLD CALC: 24.3 % (ref 36–48)
HEMOGLOBIN: 7.8 G/DL (ref 12–16)
LYMPHOCYTES ABSOLUTE: 1.5 K/UL (ref 1–5.1)
LYMPHOCYTES RELATIVE PERCENT: 16.2 %
MCH RBC QN AUTO: 32.2 PG (ref 26–34)
MCHC RBC AUTO-ENTMCNC: 32.2 G/DL (ref 31–36)
MCV RBC AUTO: 99.9 FL (ref 80–100)
MONOCYTES ABSOLUTE: 0.6 K/UL (ref 0–1.3)
MONOCYTES RELATIVE PERCENT: 6.9 %
NEUTROPHILS ABSOLUTE: 6.3 K/UL (ref 1.7–7.7)
NEUTROPHILS RELATIVE PERCENT: 67.9 %
PDW BLD-RTO: 18.1 % (ref 12.4–15.4)
PERFORMED ON: ABNORMAL
PHOSPHORUS: 4.9 MG/DL (ref 2.5–4.9)
PLATELET # BLD: 328 K/UL (ref 135–450)
PMV BLD AUTO: 7.9 FL (ref 5–10.5)
POTASSIUM SERPL-SCNC: 3.7 MMOL/L (ref 3.5–5.1)
PRO-BNP: 1265 PG/ML (ref 0–449)
RBC # BLD: 2.43 M/UL (ref 4–5.2)
SODIUM BLD-SCNC: 141 MMOL/L (ref 136–145)
WBC # BLD: 9.3 K/UL (ref 4–11)

## 2021-06-08 PROCEDURE — 2580000003 HC RX 258: Performed by: INTERNAL MEDICINE

## 2021-06-08 PROCEDURE — 97530 THERAPEUTIC ACTIVITIES: CPT

## 2021-06-08 PROCEDURE — 6370000000 HC RX 637 (ALT 250 FOR IP): Performed by: INTERNAL MEDICINE

## 2021-06-08 PROCEDURE — 80069 RENAL FUNCTION PANEL: CPT

## 2021-06-08 PROCEDURE — 94761 N-INVAS EAR/PLS OXIMETRY MLT: CPT

## 2021-06-08 PROCEDURE — 99233 SBSQ HOSP IP/OBS HIGH 50: CPT | Performed by: NURSE PRACTITIONER

## 2021-06-08 PROCEDURE — 83880 ASSAY OF NATRIURETIC PEPTIDE: CPT

## 2021-06-08 PROCEDURE — 97535 SELF CARE MNGMENT TRAINING: CPT

## 2021-06-08 PROCEDURE — 36415 COLL VENOUS BLD VENIPUNCTURE: CPT

## 2021-06-08 PROCEDURE — 1200000000 HC SEMI PRIVATE

## 2021-06-08 PROCEDURE — 85025 COMPLETE CBC W/AUTO DIFF WBC: CPT

## 2021-06-08 PROCEDURE — 99232 SBSQ HOSP IP/OBS MODERATE 35: CPT | Performed by: INTERNAL MEDICINE

## 2021-06-08 PROCEDURE — 2700000000 HC OXYGEN THERAPY PER DAY

## 2021-06-08 PROCEDURE — 94640 AIRWAY INHALATION TREATMENT: CPT

## 2021-06-08 PROCEDURE — 84165 PROTEIN E-PHORESIS SERUM: CPT

## 2021-06-08 PROCEDURE — 84155 ASSAY OF PROTEIN SERUM: CPT

## 2021-06-08 RX ORDER — FERROUS SULFATE 325(65) MG
325 TABLET ORAL
Status: DISCONTINUED | OUTPATIENT
Start: 2021-06-09 | End: 2021-06-09 | Stop reason: HOSPADM

## 2021-06-08 RX ORDER — FUROSEMIDE 40 MG/1
40 TABLET ORAL DAILY
Status: DISCONTINUED | OUTPATIENT
Start: 2021-06-09 | End: 2021-06-09 | Stop reason: HOSPADM

## 2021-06-08 RX ADMIN — MIRTAZAPINE 15 MG: 15 TABLET, FILM COATED ORAL at 20:13

## 2021-06-08 RX ADMIN — INSULIN LISPRO 4 UNITS: 100 INJECTION, SOLUTION INTRAVENOUS; SUBCUTANEOUS at 20:19

## 2021-06-08 RX ADMIN — IPRATROPIUM BROMIDE AND ALBUTEROL SULFATE 3 ML: .5; 3 SOLUTION RESPIRATORY (INHALATION) at 16:55

## 2021-06-08 RX ADMIN — DILTIAZEM HYDROCHLORIDE 90 MG: 90 CAPSULE, EXTENDED RELEASE ORAL at 09:58

## 2021-06-08 RX ADMIN — LINAGLIPTIN 5 MG: 5 TABLET, FILM COATED ORAL at 09:58

## 2021-06-08 RX ADMIN — LINACLOTIDE 145 MCG: 145 CAPSULE, GELATIN COATED ORAL at 10:09

## 2021-06-08 RX ADMIN — PANTOPRAZOLE SODIUM 40 MG: 40 TABLET, DELAYED RELEASE ORAL at 09:59

## 2021-06-08 RX ADMIN — PREDNISONE 10 MG: 10 TABLET ORAL at 09:58

## 2021-06-08 RX ADMIN — INSULIN LISPRO 6 UNITS: 100 INJECTION, SOLUTION INTRAVENOUS; SUBCUTANEOUS at 12:46

## 2021-06-08 RX ADMIN — Medication 10 ML: at 20:13

## 2021-06-08 RX ADMIN — INSULIN LISPRO 4 UNITS: 100 INJECTION, SOLUTION INTRAVENOUS; SUBCUTANEOUS at 10:19

## 2021-06-08 RX ADMIN — IPRATROPIUM BROMIDE AND ALBUTEROL SULFATE 3 ML: .5; 3 SOLUTION RESPIRATORY (INHALATION) at 20:42

## 2021-06-08 RX ADMIN — IPRATROPIUM BROMIDE AND ALBUTEROL SULFATE 3 ML: .5; 3 SOLUTION RESPIRATORY (INHALATION) at 08:17

## 2021-06-08 RX ADMIN — GABAPENTIN 100 MG: 100 CAPSULE ORAL at 20:12

## 2021-06-08 RX ADMIN — INSULIN LISPRO 10 UNITS: 100 INJECTION, SOLUTION INTRAVENOUS; SUBCUTANEOUS at 17:28

## 2021-06-08 RX ADMIN — IPRATROPIUM BROMIDE AND ALBUTEROL SULFATE 3 ML: .5; 3 SOLUTION RESPIRATORY (INHALATION) at 12:15

## 2021-06-08 RX ADMIN — OXYCODONE 5 MG: 5 TABLET ORAL at 20:13

## 2021-06-08 RX ADMIN — CALCIUM CARBONATE-VITAMIN D TAB 500 MG-200 UNIT 1 TABLET: 500-200 TAB at 09:58

## 2021-06-08 RX ADMIN — STANDARDIZED SENNA CONCENTRATE AND DOCUSATE SODIUM 2 TABLET: 8.6; 5 TABLET ORAL at 09:58

## 2021-06-08 RX ADMIN — DILTIAZEM HYDROCHLORIDE 90 MG: 90 CAPSULE, EXTENDED RELEASE ORAL at 20:12

## 2021-06-08 RX ADMIN — POLYETHYLENE GLYCOL 3350 17 G: 17 POWDER, FOR SOLUTION ORAL at 09:59

## 2021-06-08 RX ADMIN — Medication 2 PUFF: at 08:17

## 2021-06-08 RX ADMIN — Medication 10 ML: at 10:27

## 2021-06-08 RX ADMIN — AMIODARONE HYDROCHLORIDE 200 MG: 200 TABLET ORAL at 09:58

## 2021-06-08 RX ADMIN — Medication 2 PUFF: at 20:42

## 2021-06-08 ASSESSMENT — PAIN DESCRIPTION - PROGRESSION
CLINICAL_PROGRESSION: NOT CHANGED

## 2021-06-08 ASSESSMENT — PAIN SCALES - GENERAL
PAINLEVEL_OUTOF10: 4
PAINLEVEL_OUTOF10: 0

## 2021-06-08 ASSESSMENT — PAIN DESCRIPTION - PAIN TYPE: TYPE: CHRONIC PAIN

## 2021-06-08 ASSESSMENT — PAIN DESCRIPTION - LOCATION: LOCATION: BACK

## 2021-06-08 NOTE — PROGRESS NOTES
[address]   Progress Note  Cardiology    Chief Complaint   Patient presents with    Shortness of Breath     sent by [facility] for SOB and retaining fluid     HPI: up in the chair today and wants to go home. She was seen by EP yesterday and was placed on amio and dilt for the AF. Watchman device was discussed. Creat is stable. On minimal O2 and she has this at home. No AF overnight. Medications/Labs all Reviewed    Recent Labs     06/08/21  0436   WBC 9.3   HGB 7.8*   HCT 24.3*   MCV 99.9        Recent Labs     06/08/21  0436   CREATININE 2.3*   BUN 49*      K 3.7   CL 96*   CO2 33*     No results for input(s): INR, PROTIME in the last 72 hours.      MEDICATIONS:    dilTIAZem  90 mg Oral BID    amiodarone  200 mg Oral Daily    budesonide-formoterol  2 puff Inhalation BID    lidocaine  2 patch Transdermal Daily    gabapentin  100 mg Oral Nightly    sennosides-docusate sodium  2 tablet Oral Daily    polyethylene glycol  17 g Oral Daily    linaclotide  145 mcg Oral QAM AC    mirtazapine  15 mg Oral Nightly    predniSONE  10 mg Oral Daily    pantoprazole  40 mg Oral QAM AC    linagliptin  5 mg Oral Daily    digoxin  125 mcg Oral Q MWF    ipratropium-albuterol  3 mL Inhalation Q4H WA    calcium-vitamin D  1 tablet Oral Daily    sodium chloride flush  5-40 mL Intravenous 2 times per day    insulin lispro  0-12 Units Subcutaneous TID WC    insulin lispro  0-6 Units Subcutaneous Nightly      sodium chloride      dextrose         Physical Examination:    /75   Pulse 97   Temp 98 °F (36.7 °C) (Oral)   Resp 18   Ht 5' 5\" (1.651 m)   Wt 157 lb 14.4 oz (71.6 kg)   SpO2 96%   BMI 26.28 kg/m²     Respiratory:  · Resp Assessment: Normal respiratory effort  · Resp Auscultation:expiratory wheezing   Cardiovascular:  · Auscultation: regular rate and rhythm, normal S1S2, no murmur, rub or gallop  · Palpation:  Nl PMI  · JVP:  normal  · Extremities:trace

## 2021-06-08 NOTE — PLAN OF CARE
Problem: Falls - Risk of:  Goal: Will remain free from falls  Description: Will remain free from falls  6/8/2021 1726 by Sandy Mccord RN  Outcome: Ongoing     Problem: OXYGENATION/RESPIRATORY FUNCTION  Goal: Patient will maintain patent airway  6/8/2021 1726 by Sandy Mccord RN  Outcome: Ongoing     Problem: HEMODYNAMIC STATUS  Goal: Patient has stable vital signs and fluid balance  6/8/2021 1726 by Sandy Mccord RN  Outcome: Ongoing     Problem: FLUID AND ELECTROLYTE IMBALANCE  Goal: Fluid and electrolyte balance are achieved/maintained  6/8/2021 1726 by Sandy Mccord RN  Outcome: Ongoing

## 2021-06-08 NOTE — PROGRESS NOTES
Physical Therapy  Facility/Department: [address]  Daily Treatment Note  NAME: Jose Keys  : 1940  MRN: [id number]    Date of Service: 2021    Discharge Recommendations:  Jose Keys scored a 18/24 on the AM-PAC short mobility form. Current research shows that an AM-PAC score of 17 or less is typically not associated with a discharge to the patient's home setting. Based on the patient's AM-PAC score and their current functional mobility deficits, it is recommended that the patient have 3-5 sessions per week of Physical Therapy at d/c to increase the patient's independence. Please see assessment section for further patient specific details. If patient discharges prior to next session this note will serve as a discharge summary. Please see below for the latest assessment towards goals. If declined and discharges home would recommend HHPT and 24 hours supervision assist.   HOME HEALTH CARE: LEVEL 3 SAFETY     - Initial home health evaluation to occur within 24-48 hours, in patient home   - Therapy evaluations in home within 24-48 hours of discharge; including DME and home safety   - Frontload therapy 5 days, then 3x a week   - Therapy to evaluate if patient has [address] needs for personal care   -  evaluation within 24-48 hours, includes evaluation of resources and insurance to determine [place], [place], LTC, and Medicaid options      3-5 sessions per week   PT Equipment Recommendations  Equipment Needed: No    Assessment   Body structures, Functions, Activity limitations: Decreased functional mobility ; Decreased safe awareness;Decreased balance;Decreased coordination;Decreased endurance;Decreased strength;Decreased posture  Assessment: Pt with low endurance for functional mobility. Dizzy and SOB with short distance ambulation. Continued skilled PT to promote return towards PLOF.   Treatment Diagnosis: decreased functional mobility, decreased baance, decreased coordination  Prognosis: Good  PT Education: Goals;PT Role;Plan of Care  Patient Education: d/c recommendations - patient verbalized understanding. due to patients cognitive status may require reinforcement. Barriers to Learning: cognitive  REQUIRES PT FOLLOW UP: Yes  Activity Tolerance  Activity Tolerance: Patient limited by endurance     Patient Diagnosis(es): The primary encounter diagnosis was Acute on chronic congestive heart failure, unspecified heart failure type ([facility] [address] Diagnoses of Acute renal failure superimposed on chronic kidney disease, unspecified CKD stage, unspecified acute renal failure type ([address] Hyperglycemia, and Elevated troponin were also pertinent to this visit. has a past medical history of Arthritis, CAD (coronary artery disease), Carpal tunnel syndrome, COPD (chronic obstructive pulmonary disease) ([address] Coronary stent, depression, Diabetes mellitus ([address] Diastolic heart failure ([address] GERD (gastroesophageal reflux disease), Hyperlipidemia, Hypertension, Lung cancer ([facility] [address] MI (myocardial infarction) ([facility] [address] LIZETT (obstructive sleep apnea), PONV (postoperative nausea and vomiting), and stress incontinence. has a past surgical history that includes Coronary angioplasty with stent (2000, 2001); back surgery (2000, 2001); bronchoscopy (12/04/2018); pr brncc incl fluor gdnce dx w/cell washg spx (N/A, 12/4/2018); Cholecystectomy, laparoscopic (N/A, 12/19/2018); Upper gastrointestinal endoscopy (N/A, 2/25/2019); Colonoscopy (N/A, 2/25/2019); Colonoscopy (2/25/2019); bronchoscopy (N/A, 2/27/2019); bronchoscopy (2/27/2019); bronchoscopy (2/27/2019); bronchoscopy (N/A, 8/6/2019); Upper gastrointestinal endoscopy (N/A, 8/7/2019); bronchoscopy (N/A, 9/27/2019); bronchoscopy (9/27/2019); bronchoscopy (9/27/2019); IR KYPHOPLASTY THORACIC 1 VERTEBRAL BODY (7/23/2020);  IR KYPHOPLASTY THORACIC 1 VERTEBRAL BODY (10/20/2020); and IR KYPHOPLASTY LUMBAR 1 VERTEBRAL BODY (12/8/2020). Restrictions  Restrictions/Precautions  Restrictions/Precautions: Fall Risk, Modified Diet (high fall risk, ADULT DIET; Regular; 4 carb choices (60 gm/meal): General starting at 06/03 2300)  Required Braces or Orthoses?: No  Position Activity Restriction  Other position/activity restrictions: Javy Ruiz is a 68 y.o. female who presents with a Chief Complaint of leg swelling/fluid retention increasing shortness of breath. Patient states that this is been going on for about a month. She was seen and evaluated by her cardiologist today who sent her to the ED for further evaluation management, likely admission. States that her abdomen is also distended but nontender. Subjective   General  Chart Reviewed: Yes  Family / Caregiver Present: No  Subjective  Subjective: agreed to therapy session, states she is tired  General Comment  Comments: patient supine in bed upon arrival with bed alarm. noted to have incontinence of stool once seated EOB  Pain Screening  Patient Currently in Pain: Denies  Vital Signs  Patient Currently in Pain: Denies       Orientation     Cognition      Objective   Bed mobility  Supine to Sit: Supervision (HOB elevated)  Scooting: Supervision  Transfers  Sit to Stand: Contact guard assistance (from EOB, chair, and commode)  Stand to sit: Contact guard assistance  Ambulation  Ambulation?: Yes  Ambulation 1  Surface: level tile  Device: Rolling Walker  Other Apparatus: O2 (1L)  Assistance: Contact guard assistance  Distance: 10 ft x 2 (to/from bathroom),  30 ft in room  Comments: O2 at rest and with mobility 93% but noticeably SOB with mobility. also reporting some dizziness     Balance  Posture: Fair  Sitting - Static: Good  Sitting - Dynamic: Fair;+  Standing - Static: Fair  Standing - Dynamic: Fair            Comment: Stood at [employer] with SBA for pericare performed by PT as well as for matthew fuller.           AM-PAC Score  AM-PAC Inpatient Mobility Raw Score : 18 (06/08/21

## 2021-06-08 NOTE — PLAN OF CARE
Problem: Falls - Risk of:  Goal: Will remain free from falls  6/8/2021 0354 by Shawn Harvey  Outcome: Ongoing     Problem: OXYGENATION/RESPIRATORY FUNCTION  Goal: Patient will maintain patent airway  6/8/2021 0354 by Shawn Harvey  Outcome: Ongoing     Problem: HEMODYNAMIC STATUS  Goal: Patient has stable vital signs and fluid balance  6/8/2021 0354 by Shawn Harvey  Outcome: Ongoing     Problem: FLUID AND ELECTROLYTE IMBALANCE  Goal: Fluid and electrolyte balance are achieved/maintained  6/8/2021 0354 by Shawn Harvey  Outcome: Ongoing     Problem: ACTIVITY INTOLERANCE/IMPAIRED MOBILITY  Goal: Mobility/activity is maintained at optimum level for patient  6/8/2021 0354 by Shawn Harvey  Outcome: Ongoing     Problem: Pain:  Goal: Pain level will decrease  6/8/2021 0354 by Shawn Harvey  Outcome: Ongoing     Problem: Pain:  Goal: Control of acute pain  6/8/2021 0354 by Shawn Harvey  Outcome: Ongoing

## 2021-06-08 NOTE — PROGRESS NOTES
LOC 14 DAY READER) ELADIA Check blood sugars twice daily Yes Storm Cervantes MD   digoxin [facility] TRANSPLANT [facility]) 125 MCG tablet Take 1 tab by mouth on Monday -Wednesday-Friday Yes Irma Yang MD   linaclotide [facility]) 145 MCG capsule Take 145 mcg by mouth every morning (before breakfast) Yes Historical Provider, MD   furosemide (LASIX) 40 MG tablet Take 1 tablet by mouth daily Yes Irma Yang MD   predniSONE (DELTASONE) 10 MG tablet Take 1 tablet by mouth daily Yes MARIA C Yee CNP   pantoprazole (PROTONIX) 40 MG tablet Take 1 tablet by mouth every morning (before breakfast) Yes MARIA C Yee CNP   dilTIAZem (CARDIZEM 12 HR) 60 MG extended release capsule Take 1 capsule by mouth 2 times daily Yes MARIA C Yee CNP   mirtazapine (REMERON) 15 MG tablet Take 1 tablet by mouth nightly  Patient taking differently: Take 30 mg by mouth as needed  Yes Yvrose Bruner MD   glimepiride (AMARYL) 2 MG tablet Take 2 mg by mouth every morning (before breakfast)  Historical Provider, MD   linagliptin (TRADJENTA) 5 MG tablet Take 1 tablet by mouth daily  MARIA C Yee CNP   metoprolol succinate (TOPROL XL) 25 MG extended release tablet Take 1 tablet by mouth daily  MARIA C Yee CNP      Scheduled Meds:   dilTIAZem  90 mg Oral BID    amiodarone  200 mg Oral Daily    budesonide-formoterol  2 puff Inhalation BID    lidocaine  2 patch Transdermal Daily    gabapentin  100 mg Oral Nightly    sennosides-docusate sodium  2 tablet Oral Daily    polyethylene glycol  17 g Oral Daily    linaclotide  145 mcg Oral QAM AC    mirtazapine  15 mg Oral Nightly    predniSONE  10 mg Oral Daily    pantoprazole  40 mg Oral QAM AC    linagliptin  5 mg Oral Daily    digoxin  125 mcg Oral Q MWF    ipratropium-albuterol  3 mL Inhalation Q4H WA    calcium-vitamin D  1 tablet Oral Daily    sodium chloride flush  5-40 mL Intravenous 2 times per day    insulin lispro  0-12 Units Subcutaneous TID WC    insulin lispro  0-6 Units Subcutaneous Nightly     Continuous Infusions:   sodium chloride      dextrose       PRN Meds:perflutren lipid microspheres, cyclobenzaprine, oxyCODONE, albuterol sulfate HFA, sodium chloride flush, sodium chloride, ondansetron **OR** ondansetron, acetaminophen **OR** acetaminophen, hydrALAZINE, sodium chloride, glucose, dextrose, glucagon (rDNA), dextrose   Past Medical History:  Past Medical History:   Diagnosis Date    Arthritis     CAD (coronary artery disease)     Carpal tunnel syndrome     COPD [facility]ronic obstructive pulmonary disease) [facility] [address]     Coronary stent     depression     Diabetes mellitus ([facility] [address]     Diastolic heart failure ([facility] [address]     Per Dr Elier Hargrove 8/5/19    GERD (gastroesophageal reflux disease)     Hyperlipidemia     Hypertension     Lung cancer ([facility] [address]     Adenocarcinoma of Left Lung    MI (myocardial infarction) ([facility] [address]     LIZETT (obstructive sleep apnea)     does not use CPAP    PONV (postoperative nausea and vomiting)     stress incontinence         Past Surgical History:    has a past surgical history that includes Coronary angioplasty with stent (2000, 2001); back surgery (2000, 2001); bronchoscopy (12/04/2018); pr brnchsc incl fluor gdnce dx w/cell washg spx (N/A, 12/4/2018); Cholecystectomy, laparoscopic (N/A, 12/19/2018); Upper gastrointestinal endoscopy (N/A, 2/25/2019); Colonoscopy (N/A, 2/25/2019); Colonoscopy (2/25/2019); bronchoscopy (N/A, 2/27/2019); bronchoscopy (2/27/2019); bronchoscopy (2/27/2019); bronchoscopy (N/A, 8/6/2019); Upper gastrointestinal endoscopy (N/A, 8/7/2019); bronchoscopy (N/A, 9/27/2019); bronchoscopy (9/27/2019); bronchoscopy (9/27/2019); IR KYPHOPLASTY THORACIC 1 VERTEBRAL BODY (7/23/2020); IR KYPHOPLASTY THORACIC 1 VERTEBRAL BODY (10/20/2020); and IR KYPHOPLASTY LUMBAR 1 VERTEBRAL BODY (12/8/2020). Social History:  Reviewed. reports that she quit smoking about 19 years ago.  Her smoking use included cigarettes. She quit after 10.00 years of use. She has never used smokeless tobacco. She reports that she does not drink alcohol and does not use drugs. Family History:  Reviewed. family history includes Cancer in her maternal uncle and sister; Diabetes in her brother and sister; Heart Disease in her father and mother. Denies family history of sudden cardiac death, arrhythmia, premature CAD    Review of Systems:  · Constitutional: Negative for fever, weight changes, or weakness  · Skin: Negative for bruising or changes in skin pigment  · HEENT: Negative for vision changes or dysphagia  · Respiratory: Reviewed in HPI  · Cardiovascular: Reviewed in HPI  · Gastrointestinal: Negative for abdominal pain, N/V/D, constipation  · Genito-Urinary: Negative for hematuria  · Musculoskeletal: No focal weakness  · Neurological/Psych: Negative for confusion or TIA-like symptoms. No anxiety, depression, or insomnia    Physical Examination:  Vitals:    06/08/21 0930   BP: (!) 153/61   Pulse: 80   Resp: 18   Temp: 98 °F (36.7 °C)   SpO2: 94%      In: 240 [P.O.:240]  Out: 1175    Wt Readings from Last 3 Encounters:   06/08/21 157 lb 14.4 oz (71.6 kg)   05/10/21 157 lb 8 oz (71.4 kg)   04/29/21 158 lb (71.7 kg)       Intake/Output Summary (Last 24 hours) at 6/8/2021 1144  Last data filed at 6/8/2021 1018  Gross per 24 hour   Intake 240 ml   Output 1175 ml   Net -935 ml     Telemetry: Personally Reviewed Atrial fibrillation   · Constitutional: Cooperative and in no apparent distress, and appears well nourished  · Skin: Warm and pink; no cyanosis, bruising, or clubbing  · HEENT: Symmetric and normocephalic. Conjunctiva pink with clear sclera. Mucus membranes pink and moist.   · Cardiovascular: irregular and rhythm. S1 & S2, negative for murmurs. Peripheral pulses 2+, capillary refill < 3 seconds. negative elevation of JVP. Trace edema  · Respiratory: Respirations symmetric and unlabored.  Lungs to auscultation bilaterally, no crackles, or rhonchi + wheezes, O2  · Gastrointestinal: Abdomen soft and round. Bowel sounds normoactive in all quadrants. · Musculoskeletal: No focal weakness. · Neurologic/Psych: Awake and orientated to person, place and time. Calm affect, appropriate mood    Pertinent labs, diagnostic, device, and imaging results reviewed as a part of this visit    Labs:    BMP:   Recent Labs     21  0424 21  04321  0436    141 141   K 3.8 3.6 3.7   CL 97* 95* 96*   CO2 35* 35* 33*   PHOS 4.0 3.9 4.9   BUN 47* 44* 49*   CREATININE 2.0* 2.1* 2.3*     Estimated Creatinine Clearance: 20 mL/min (A) (based on SCr of 2.3 mg/dL (H)). CBC:   Recent Labs     21   WBC 7.9 8.9 9.3   HGB 7.5* 7.8* 7.8*   HCT 23.2* 23.9* 24.3*   .7* 99.4 99.9    324 328     Thyroid:   Lab Results   Component Value Date    TSH 1.71 2021    M8HWNPP 6.4 2011     Lipids:   Lab Results   Component Value Date    CHOL 224 2016    HDL 64 2021    TRIG 123 2016     LFTS:   Lab Results   Component Value Date    ALT 16 2021    AST 16 2021    ALKPHOS 84 2021    PROT 6.0 2021    AGRATIO 1.1 2021    BILITOT <0.2 2021     Cardiac Enzymes:   Lab Results   Component Value Date    CKTOTAL 26 2020    TROPONINI 0.02 2021    TROPONINI <0.01 2020    TROPONINI <0.01 2020     Coags:   Lab Results   Component Value Date    PROTIME 12.2 2021    INR 1.05 2021     EC2021: Afib RVR    ECHO:  2021  Summary   Left ventricular cavity size is normal with moderate -severe concentric   hypertrophy. Overall left ventricular systolic function appears normal.   Ejection fraction is visually estimated to be 65%. No regional wall motion abnormalities are noted. Grade I diastolic dysfunction with normal LV filling pressures. Mild mitral annular calcification.    Mitral valve leaflets appear mildly thickened. Trivial mitral regurgitation. Mild aortic valve calcification without any evidence of aortic stenosis. Mild aortic regurgitation. Stress Test:   Summary    Normal LV size and global systolic function.    Left ventricular ejection fraction of 58%.    There is a medium sized, moderate intensity, completely fixed defect in the    mid to basilar inferolateral segments, with associated hypokinesis-    consistent with scar.    There are no significant reversible coronary flow abnormalities.         All questions and concerns were addressed to the patient. Alternatives to my treatment were discussed. I have discussed the above stated plan with patient and the nurse. The patient verbalized understanding and agreed with the plan. Thank you for allowing to us to participate in the care of Pipe Clements. MARIA C Greer-CNP  [address]   Office: [phone]    I have spent 35 minutes of face to face time with the patient with more than 50% spent counseling and coordinating care for Pipe Clements.

## 2021-06-08 NOTE — PROGRESS NOTES
Occupational Therapy  Facility/Department: [address]  Daily Treatment Note  NAME: Naina Acosta  : 1940  MRN: [id number]    Date of Service: 2021    Discharge Recommendations:  24 hour supervision or assist     Naina Acosta scored a 19/24 on the AM-PAC ADL Inpatient form. Current research shows that an AM-PAC score of 18 or greater is typically associated with a discharge to the patient's home setting. Based on the patient's AM-PAC score, and their current ADL deficits, it is recommended that the patient have 2-3 sessions per week of Occupational Therapy at d/c to increase the patient's independence. At this time, this patient demonstrates the endurance and safety to discharge home with (home  services) and a follow up treatment frequency of 2-3x/wk. Please see assessment section for further patient specific details. If patient discharges prior to next session this note will serve as a discharge summary. Please see below for the latest assessment towards goals. Assessment   Performance deficits / Impairments: Decreased functional mobility ; Decreased ADL status; Decreased endurance;Decreased balance;Decreased safe awareness  Assessment: Pt is below her baseline level of occupational function, based on the above deficits associated with acute renal failure. Pt would benefit from continued skilled acite OT services to address these deficits. OT Education: OT Role;Plan of Care;Transfer Training;Orientation; ADL Adaptive Strategies; Energy Conservation  Patient Education: Pt is not an independent learner. Needs reinforcement. REQUIRES OT FOLLOW UP: Yes  Activity Tolerance  Activity Tolerance: Patient Tolerated treatment well  Activity Tolerance: Pt denied dizziness during session; Pt SOB with minimal exertion; SpO2 above 90% throughout session  Safety Devices  Type of devices:  All fall risk precautions in place;Nurse notified;Call light within reach;Gait belt;Patient at risk for falls; Left in chair;Chair alarm in place         Patient Diagnosis(es): The primary encounter diagnosis was Acute on chronic congestive heart failure, unspecified heart failure type ([facility] [address] Diagnoses of Acute renal failure superimposed on chronic kidney disease, unspecified CKD stage, unspecified acute renal failure type ([address] Hyperglycemia, and Elevated troponin were also pertinent to this visit. has a past medical history of Arthritis, CAD (coronary artery disease), Carpal tunnel syndrome, COPD (chronic obstructive pulmonary disease) ([address] Coronary stent, depression, Diabetes mellitus ([address] Diastolic heart failure ([address] GERD (gastroesophageal reflux disease), Hyperlipidemia, Hypertension, Lung cancer ([address] MI (myocardial infarction) ([address] LIZETT (obstructive sleep apnea), PONV (postoperative nausea and vomiting), and stress incontinence. has a past surgical history that includes Coronary angioplasty with stent (2000, 2001); back surgery (2000, 2001); bronchoscopy (12/04/2018); pr brncc incl fluor gdnce dx w/cell washg spx (N/A, 12/4/2018); Cholecystectomy, laparoscopic (N/A, 12/19/2018); Upper gastrointestinal endoscopy (N/A, 2/25/2019); Colonoscopy (N/A, 2/25/2019); Colonoscopy (2/25/2019); bronchoscopy (N/A, 2/27/2019); bronchoscopy (2/27/2019); bronchoscopy (2/27/2019); bronchoscopy (N/A, 8/6/2019); Upper gastrointestinal endoscopy (N/A, 8/7/2019); bronchoscopy (N/A, 9/27/2019); bronchoscopy (9/27/2019); bronchoscopy (9/27/2019); IR KYPHOPLASTY THORACIC 1 VERTEBRAL BODY (7/23/2020); IR KYPHOPLASTY THORACIC 1 VERTEBRAL BODY (10/20/2020); and IR KYPHOPLASTY LUMBAR 1 VERTEBRAL BODY (12/8/2020). Restrictions  Restrictions/Precautions  Restrictions/Precautions: Fall Risk, Modified Diet (high fall risk, ADULT DIET;  Regular; 4 carb choices (60 gm/meal): General starting at 06/03 2300)  Required Braces or Orthoses?: No  Position Activity Restriction  Other position/activity restrictions: Mary Alves is a 68 y.o. female who presents with a Chief Complaint of leg swelling/fluid retention increasing shortness of breath. Patient states that this is been going on for about a month. She was seen and evaluated by her cardiologist today who sent her to the ED for further evaluation management, likely admission. States that her abdomen is also distended but nontender. Subjective   General  Chart Reviewed: Yes  Patient assessed for rehabilitation services?: Yes  Family / Caregiver Present: No  Referring Practitioner: Darrius Levin MD, for d/c planning  Diagnosis: Acute renal failure  Subjective  Subjective: Pt up in chair, agreeable to OT tx session. Pt denied dizziness throughout session. Vital Signs  Patient Currently in Pain: Denies   Orientation  Orientation  Overall Orientation Status: Within Functional Limits  Objective    ADL  Feeding: Independent  Grooming: Supervision (oral and hand hygiene standing at sink)  UE Bathing: Setup;Supervision  LE Bathing: Supervision;Setup  LE Dressing: Supervision  Toileting: Supervision  Additional Comments: Pt demonstrated significant improvements in ability to complete ADLs this date. Pt continues to be limited by decreased endurance requiring rest breaks and 1L of O2        Balance  Sitting Balance: Independent  Standing Balance: Stand by assistance  Standing Balance  Activity: functional mobility into bathroom, sponge bathing, grooming  Functional Mobility  Functional - Mobility Device: Rolling Walker  Activity: To/from bathroom; Other  Assist Level: Stand by assistance  Functional Mobility Comments: verbal cues for managing O2;OT managaing oxygen line for majority of session  Toilet Transfers  Toilet - Technique: Ambulating  Equipment Used: Standard toilet  Toilet Transfer: Supervision  Toilet Transfers Comments: + grab bar     Transfers  Sit to stand: Supervision  Stand to sit: Supervision  Transfer Comments: verbal cues for safety of hand placement Cognition  Overall Cognitive Status: Exceptions  Arousal/Alertness: Appropriate responses to stimuli  Following Commands: Follows multistep commands with increased time; Follows multistep commands with repitition  Attention Span: Appears intact  Safety Judgement: Decreased awareness of need for safety  Insights: Decreased awareness of deficits  Initiation: Requires cues for some                                         Plan   Plan  Times per week: 3-5  Times per day: Daily  Current Treatment Recommendations: Safety Education & Training, Self-Care / ADL, Endurance Training, Functional Mobility Training, Cognitive Reorientation, Balance Training, Strengthening, Patient/Caregiver Education & Training    AM-PAC Score        AM-[facility] Inpatient Daily Activity Raw Score: 19 (06/08/21 1053)  AM-PAC Inpatient ADL T-Scale Score : 40.22 (06/08/21 1053)  ADL Inpatient CMS 0-100% Score: 42.8 (06/08/21 1053)  ADL Inpatient CMS G-Code Modifier : CK (06/08/21 1053)    Goals  Short term goals  Time Frame for Short term goals: Discharge  Short term goal 1: SBA for functional transfers to ADL surfaces w/RW- GOAL MET  Short term goal 2: SBA for functional mobility w/RW for ADL activity  Short term goal 3: S/U for UB ADLs  Short term goal 4: Min A for LB ADLs- GOAL MET, upgraded to Mod I  Short term goal 5: Min A for toileting- GOAL MET; upgraded to mod I       Therapy Time   Individual Concurrent Group Co-treatment   Time In 0932         Time Out 1040         Minutes 68         Timed Code Treatment Minutes: 68 Minutes       Ivana Soliman OTR/L

## 2021-06-08 NOTE — PROGRESS NOTES
Progress Note - Dr. Tristan Ross - Internal Medicine  PCP: MD Arnoldo Jiang. [address] / [facility] Deisy Day: 5  Code Status: Full Code  Current Diet: ADULT DIET; Regular; 4 carb choices (60 gm/meal)        CC: follow up on medical issues    Subjective:   Anh Montanez is a 68 y.o. female. She denies problems    Doing ok  SPEP/UPEP neg    ECHO pending    She denies chest pain, denies shortness of breath, denies nausea,  denies emesis. 10 system Review of Systems is reviewed with patient, and pertinent positives are noted in HPI above . Otherwise, Review of systems is negative. I have reviewed the patient's medical and social history in detail and updated the computerized patient record. To recap: She  has a past medical history of Arthritis, CAD (coronary artery disease), Carpal tunnel syndrome, COPD (chronic obstructive pulmonary disease) ([address] Coronary stent, depression, Diabetes mellitus ([address] Diastolic heart failure ([address] GERD (gastroesophageal reflux disease), Hyperlipidemia, Hypertension, Lung cancer ([address] MI (myocardial infarction) ([address] LIZETT (obstructive sleep apnea), PONV (postoperative nausea and vomiting), and stress incontinence. . She  has a past surgical history that includes Coronary angioplasty with stent (2000, 2001); back surgery (2000, 2001); bronchoscopy (12/04/2018); pr brncc incl fluor gdnce dx w/cell washg spx (N/A, 12/4/2018); Cholecystectomy, laparoscopic (N/A, 12/19/2018); Upper gastrointestinal endoscopy (N/A, 2/25/2019); Colonoscopy (N/A, 2/25/2019); Colonoscopy (2/25/2019); bronchoscopy (N/A, 2/27/2019); bronchoscopy (2/27/2019); bronchoscopy (2/27/2019); bronchoscopy (N/A, 8/6/2019); Upper gastrointestinal endoscopy (N/A, 8/7/2019); bronchoscopy (N/A, 9/27/2019); bronchoscopy (9/27/2019); bronchoscopy (9/27/2019); IR KYPHOPLASTY THORACIC 1 VERTEBRAL BODY (7/23/2020);  IR KYPHOPLASTY THORACIC 1 VERTEBRAL BODY (10/20/2020); and IR KYPHOPLASTY LUMBAR 1 VERTEBRAL BODY (12/8/2020). . She  reports that she quit smoking about 19 years ago. Her smoking use included cigarettes. She quit after 10.00 years of use. She has never used smokeless tobacco. She reports that she does not drink alcohol and does not use drugs. .        Active Hospital Problems    Diagnosis Date Noted    HTN (hypertension), benign [I10] 08/08/2011     Priority: High    Dyslipidemia [E78.5] 08/08/2011     Priority: High    Coronary atherosclerosis of native coronary artery [I25.10] 08/08/2011     Priority: High    Acute on chronic congestive heart failure (HCC) [I50.9]     History of lung cancer [Z85.118] 06/06/2021    Anemia [D64.9] 06/04/2021    ARF (acute renal failure) ([facility] [N17.9] 06/03/2021    Acute kidney injury superimposed on CKD ([facility] [N17.9, N18.9] 07/20/2020    Anxiety disorder [F41.9]     Depression [F32.9]     Poorly controlled type 2 diabetes mellitus (HCC) [E11.65]     Chronic obstructive pulmonary disease with acute lower respiratory infection (HCC) [J44.0]     Malignant neoplasm of left lung (HCC) [C34.92]     Chronic diastolic heart failure (HCC) [I50.32]     Chronic deep vein thrombosis (DVT) of both lower extremities (HCC) [I82.503] 05/20/2019    Ataxia [R27.0]     Recurrent falls [R29.6]     Diabetic peripheral neuropathy ([facility] [E11.42]     Mild malnutrition ([facility] [E44.1] 05/02/2019    Paroxysmal atrial fibrillation (HCC) [I48.0] 10/17/2016    COPD, severe ([address] [J44.9] 08/30/2013    DM (diabetes mellitus), secondary, uncontrolled, w/neurologic complic ([address] [L97.04, K91.06] 08/08/2011    Mitral and aortic valve disease [I08.0] 08/08/2011    Obstructive sleep apnea [G47.33] 08/08/2011       Current Facility-Administered Medications: dilTIAZem (CARDIZEM 12 HR) extended release capsule 90 mg, 90 mg, Oral, BID  amiodarone (CORDARONE) tablet 200 mg, 200 mg, Oral, Daily  budesonide-formoterol (SYMBICORT) 160-4.5 MCG/ACT inhaler 2 puff, 2 15 g, 15 g, Oral, PRN  dextrose 50 % IV solution, 12.5 g, Intravenous, PRN  glucagon (rDNA) injection 1 mg, 1 mg, Intramuscular, PRN  dextrose 5 % solution, 100 mL/hr, Intravenous, PRN         Objective:  /75   Pulse 97   Temp 98 °F (36.7 °C) (Oral)   Resp 18   Ht 5' 5\" (1.651 m)   Wt 157 lb 14.4 oz (71.6 kg)   SpO2 96%   BMI 26.28 kg/m²      Patient Vitals for the past 24 hrs:   BP Temp Temp src Pulse Resp SpO2 Weight   06/08/21 0818 -- -- -- -- 18 96 % --   06/08/21 0433 129/75 98 °F (36.7 °C) Oral 97 18 96 % 157 lb 14.4 oz (71.6 kg)   06/08/21 0106 130/64 98.2 °F (36.8 °C) Oral 100 18 96 % --   06/07/21 2036 -- -- -- -- 20 96 % --   06/07/21 2008 129/80 98.3 °F (36.8 °C) Oral 91 20 96 % --   06/07/21 1610 118/78 98.1 °F (36.7 °C) Oral 88 18 95 % --   06/07/21 1257 -- -- -- -- 20 96 % --   06/07/21 1139 -- -- -- -- -- 94 % --   06/07/21 1130 128/77 98.3 °F (36.8 °C) Oral 130 24 90 % --   06/07/21 0903 -- -- -- -- 22 91 % --   06/07/21 0902 -- -- -- -- 22 91 % --     Patient Vitals for the past 96 hrs (Last 3 readings):   Weight   06/08/21 0433 157 lb 14.4 oz (71.6 kg)   06/07/21 0800 165 lb (74.8 kg)   06/06/21 0855 171 lb (77.6 kg)           Intake/Output Summary (Last 24 hours) at 6/8/2021 0819  Last data filed at 6/7/2021 2142  Gross per 24 hour   Intake --   Output 825 ml   Net -825 ml         Physical Exam:   /75   Pulse 97   Temp 98 °F (36.7 °C) (Oral)   Resp 18   Ht 5' 5\" (1.651 m)   Wt 157 lb 14.4 oz (71.6 kg)   SpO2 96%   BMI 26.28 kg/m²   General appearance: alert, appears stated age and cooperative  Head: Normocephalic, without obvious abnormality, atraumatic  Lungs: clear to auscultation bilaterally  Heart: regular rate and rhythm, S1, S2 normal, no murmur, click, rub or gallop  Abdomen: soft, non-tender; bowel sounds normal; no masses,  no organomegaly  Extremities: trace edema    Labs:  Lab Results   Component Value Date    WBC 9.3 06/08/2021    HGB 7.8 (L) 06/08/2021    HCT 24.3 (L) 06/08/2021     06/08/2021    CHOL 224 (H) 05/19/2016    TRIG 123 05/19/2016    HDL 64 (H) 05/04/2021    ALT 16 06/03/2021    AST 16 06/03/2021     06/08/2021    K 3.7 06/08/2021    CL 96 (L) 06/08/2021    CREATININE 2.3 (H) 06/08/2021    BUN 49 (H) 06/08/2021    CO2 33 (H) 06/08/2021    TSH 1.71 05/04/2021    INR 1.05 06/03/2021    LABA1C 5.8 05/04/2021    LABMICR Not Indicated 06/03/2021     Lab Results   Component Value Date    CKTOTAL 26 05/31/2020    TROPONINI 0.02 (H) 06/03/2021       Recent Imaging Results are Reviewed:  XR CHEST (2 VW)    Result Date: 6/3/2021  EXAMINATION: TWO XRAY VIEWS OF THE CHEST 6/3/2021 5:42 pm COMPARISON: 7/17/2020 HISTORY: ORDERING SYSTEM PROVIDED HISTORY: sob TECHNOLOGIST PROVIDED HISTORY: Reason for exam:->sob Reason for Exam: Shortness of Breath (sent by [facility] for SOB and retaining fluid) Acuity: Unknown Type of Exam: Unknown FINDINGS: Frontal and lateral views of the chest were performed. There is no acute skeletal abnormality. There are chronic healed left rib fractures. There are multiple chronic wedge compression fractures of multiple lower thoracic and upper lumbar vertebral bodies, augmented by vertebroplasty cement. The heart size and mediastinal contours are stable and within normal limits. There is a stable focus of pleural-parenchymal scarring noted within the left perihilar space, unchanged from prior exams. There is stable mild chronic biapical pleural-parenchymal scarring. The lungs are otherwise clear, without evidence of acute airspace consolidation, pneumothorax, pleural effusion, or pulmonary edema. 1. No acute cardiopulmonary disease. 2. Chronic pleural and parenchymal scarring within the left perihilar space. Assessment and Plan:  Principal Problem:    Acute kidney injury superimposed on CKD ([address] -Established problem.  Elevated from yest. Creat 2.3  Plan: appreciate renal eval  Active Problems:    Dyslipidemia -Established problem. Stable. .  Plan: Continue present orders/plan. HTN (hypertension), benign -Established problem. Stable. 129/75  Plan: stay on same meds    Coronary atherosclerosis of native coronary artery    COPD, severe ([facility]    Paroxysmal atrial fibrillation ([address] -Established problem. Stable. In sinus  Plan: Continue present orders/plan. Recurrent falls -Established problem. Stable. Plan: pt/ot rec snf    Diabetic peripheral neuropathy ([facility]    Chronic diastolic heart failure (HCC)    Malignant neoplasm of left lung ([facility]    Poorly controlled type 2 diabetes mellitus ([facility] -Established problem. Stable. Plan: cont ccc diet, sliding scale, home meds    Depression    Anxiety disorder    Disp - awaiting completion of cardiac workup, stabilization of renal fxn. Hopefully to home in [id number]?   (Pt/Family declines SNF)          Kenrick Miranda MD  6/8/2021

## 2021-06-09 VITALS
HEART RATE: 100 BPM | DIASTOLIC BLOOD PRESSURE: 65 MMHG | TEMPERATURE: 97.7 F | OXYGEN SATURATION: 96 % | WEIGHT: 162 LBS | RESPIRATION RATE: 18 BRPM | SYSTOLIC BLOOD PRESSURE: 150 MMHG | HEIGHT: 65 IN | BODY MASS INDEX: 26.99 KG/M2

## 2021-06-09 LAB
ALBUMIN SERPL-MCNC: 2.6 G/DL (ref 3.1–4.9)
ALBUMIN SERPL-MCNC: 3.5 G/DL (ref 3.4–5)
ALPHA-1-GLOBULIN: 0.3 G/DL (ref 0.2–0.4)
ALPHA-2-GLOBULIN: 1.4 G/DL (ref 0.4–1.1)
ANION GAP SERPL CALCULATED.3IONS-SCNC: 11 MMOL/L (ref 3–16)
ANISOCYTOSIS: ABNORMAL
ATYPICAL LYMPHOCYTE RELATIVE PERCENT: 1 % (ref 0–6)
BANDED NEUTROPHILS RELATIVE PERCENT: 2 % (ref 0–7)
BASOPHILS ABSOLUTE: 0 K/UL (ref 0–0.2)
BASOPHILS RELATIVE PERCENT: 0 %
BETA GLOBULIN: 1.1 G/DL (ref 0.9–1.6)
BUN BLDV-MCNC: 46 MG/DL (ref 7–20)
CALCIUM SERPL-MCNC: 9.2 MG/DL (ref 8.3–10.6)
CHLORIDE BLD-SCNC: 97 MMOL/L (ref 99–110)
CO2: 32 MMOL/L (ref 21–32)
CREAT SERPL-MCNC: 2.1 MG/DL (ref 0.6–1.2)
EKG ATRIAL RATE: 99 BPM
EKG DIAGNOSIS: NORMAL
EKG Q-T INTERVAL: 308 MS
EKG QRS DURATION: 94 MS
EKG QTC CALCULATION (BAZETT): 435 MS
EKG R AXIS: -18 DEGREES
EKG T AXIS: 226 DEGREES
EKG VENTRICULAR RATE: 120 BPM
EOSINOPHILS ABSOLUTE: 0.4 K/UL (ref 0–0.6)
EOSINOPHILS RELATIVE PERCENT: 5 %
GAMMA GLOBULIN: 0.9 G/DL (ref 0.6–1.8)
GFR AFRICAN AMERICAN: 28
GFR NON-AFRICAN AMERICAN: 23
GLUCOSE BLD-MCNC: 214 MG/DL (ref 70–99)
GLUCOSE BLD-MCNC: 229 MG/DL (ref 70–99)
GLUCOSE BLD-MCNC: 295 MG/DL (ref 70–99)
GLUCOSE BLD-MCNC: 296 MG/DL (ref 70–99)
HCT VFR BLD CALC: 21.9 % (ref 36–48)
HEMOGLOBIN: 7.2 G/DL (ref 12–16)
HYPOCHROMIA: ABNORMAL
LYMPHOCYTES ABSOLUTE: 0.8 K/UL (ref 1–5.1)
LYMPHOCYTES RELATIVE PERCENT: 8 %
MCH RBC QN AUTO: 32.4 PG (ref 26–34)
MCHC RBC AUTO-ENTMCNC: 32.6 G/DL (ref 31–36)
MCV RBC AUTO: 99.3 FL (ref 80–100)
MONOCYTES ABSOLUTE: 0.3 K/UL (ref 0–1.3)
MONOCYTES RELATIVE PERCENT: 4 %
NEUTROPHILS ABSOLUTE: 6.9 K/UL (ref 1.7–7.7)
NEUTROPHILS RELATIVE PERCENT: 80 %
NUCLEATED RED BLOOD CELLS: 1 /100 WBC
OVALOCYTES: ABNORMAL
PDW BLD-RTO: 17.9 % (ref 12.4–15.4)
PERFORMED ON: ABNORMAL
PHOSPHORUS: 4.1 MG/DL (ref 2.5–4.9)
PLATELET # BLD: 300 K/UL (ref 135–450)
PLATELET SLIDE REVIEW: ADEQUATE
PMV BLD AUTO: 7.7 FL (ref 5–10.5)
POIKILOCYTES: ABNORMAL
POLYCHROMASIA: ABNORMAL
POTASSIUM SERPL-SCNC: 3.6 MMOL/L (ref 3.5–5.1)
RBC # BLD: 2.21 M/UL (ref 4–5.2)
SLIDE REVIEW: ABNORMAL
SODIUM BLD-SCNC: 140 MMOL/L (ref 136–145)
STOMATOCYTES: ABNORMAL
TOTAL PROTEIN: 6.2 G/DL (ref 6.4–8.2)
WBC # BLD: 8.4 K/UL (ref 4–11)

## 2021-06-09 PROCEDURE — 6370000000 HC RX 637 (ALT 250 FOR IP): Performed by: INTERNAL MEDICINE

## 2021-06-09 PROCEDURE — 2700000000 HC OXYGEN THERAPY PER DAY

## 2021-06-09 PROCEDURE — 85025 COMPLETE CBC W/AUTO DIFF WBC: CPT

## 2021-06-09 PROCEDURE — 94640 AIRWAY INHALATION TREATMENT: CPT

## 2021-06-09 PROCEDURE — 97116 GAIT TRAINING THERAPY: CPT

## 2021-06-09 PROCEDURE — 6360000002 HC RX W HCPCS: Performed by: INTERNAL MEDICINE

## 2021-06-09 PROCEDURE — 97530 THERAPEUTIC ACTIVITIES: CPT

## 2021-06-09 PROCEDURE — 99233 SBSQ HOSP IP/OBS HIGH 50: CPT | Performed by: NURSE PRACTITIONER

## 2021-06-09 PROCEDURE — 36415 COLL VENOUS BLD VENIPUNCTURE: CPT

## 2021-06-09 PROCEDURE — 80069 RENAL FUNCTION PANEL: CPT

## 2021-06-09 PROCEDURE — 2580000003 HC RX 258: Performed by: INTERNAL MEDICINE

## 2021-06-09 PROCEDURE — 97535 SELF CARE MNGMENT TRAINING: CPT

## 2021-06-09 PROCEDURE — 93010 ELECTROCARDIOGRAM REPORT: CPT | Performed by: INTERNAL MEDICINE

## 2021-06-09 PROCEDURE — 94761 N-INVAS EAR/PLS OXIMETRY MLT: CPT

## 2021-06-09 RX ORDER — AMIODARONE HYDROCHLORIDE 200 MG/1
200 TABLET ORAL DAILY
Qty: 30 TABLET | Refills: 1 | Status: SHIPPED | OUTPATIENT
Start: 2021-06-09

## 2021-06-09 RX ORDER — FERROUS SULFATE 325(65) MG
325 TABLET ORAL
Qty: 30 TABLET | Refills: 3 | Status: SHIPPED | OUTPATIENT
Start: 2021-06-09

## 2021-06-09 RX ORDER — DILTIAZEM HYDROCHLORIDE 90 MG/1
90 CAPSULE, EXTENDED RELEASE ORAL 2 TIMES DAILY
Qty: 60 CAPSULE | Refills: 1 | Status: SHIPPED | OUTPATIENT
Start: 2021-06-09

## 2021-06-09 RX ADMIN — PANTOPRAZOLE SODIUM 40 MG: 40 TABLET, DELAYED RELEASE ORAL at 05:11

## 2021-06-09 RX ADMIN — LINACLOTIDE 145 MCG: 145 CAPSULE, GELATIN COATED ORAL at 05:11

## 2021-06-09 RX ADMIN — EPOETIN ALFA-EPBX 3000 UNITS: 10000 INJECTION, SOLUTION INTRAVENOUS; SUBCUTANEOUS at 13:47

## 2021-06-09 RX ADMIN — INSULIN LISPRO 6 UNITS: 100 INJECTION, SOLUTION INTRAVENOUS; SUBCUTANEOUS at 13:48

## 2021-06-09 RX ADMIN — CALCIUM CARBONATE-VITAMIN D TAB 500 MG-200 UNIT 1 TABLET: 500-200 TAB at 10:18

## 2021-06-09 RX ADMIN — IPRATROPIUM BROMIDE AND ALBUTEROL SULFATE 3 ML: .5; 3 SOLUTION RESPIRATORY (INHALATION) at 11:13

## 2021-06-09 RX ADMIN — IPRATROPIUM BROMIDE AND ALBUTEROL SULFATE 3 ML: .5; 3 SOLUTION RESPIRATORY (INHALATION) at 15:24

## 2021-06-09 RX ADMIN — PREDNISONE 10 MG: 10 TABLET ORAL at 10:19

## 2021-06-09 RX ADMIN — INSULIN LISPRO 6 UNITS: 100 INJECTION, SOLUTION INTRAVENOUS; SUBCUTANEOUS at 18:03

## 2021-06-09 RX ADMIN — OXYCODONE 5 MG: 5 TABLET ORAL at 05:11

## 2021-06-09 RX ADMIN — Medication 10 ML: at 10:20

## 2021-06-09 RX ADMIN — Medication 2 PUFF: at 09:04

## 2021-06-09 RX ADMIN — LINAGLIPTIN 5 MG: 5 TABLET, FILM COATED ORAL at 10:18

## 2021-06-09 RX ADMIN — DIGOXIN 125 MCG: 125 TABLET ORAL at 10:19

## 2021-06-09 RX ADMIN — INSULIN LISPRO 4 UNITS: 100 INJECTION, SOLUTION INTRAVENOUS; SUBCUTANEOUS at 10:21

## 2021-06-09 RX ADMIN — AMIODARONE HYDROCHLORIDE 200 MG: 200 TABLET ORAL at 10:19

## 2021-06-09 RX ADMIN — POLYETHYLENE GLYCOL 3350 17 G: 17 POWDER, FOR SOLUTION ORAL at 10:19

## 2021-06-09 RX ADMIN — FERROUS SULFATE TAB 325 MG (65 MG ELEMENTAL FE) 325 MG: 325 (65 FE) TAB at 10:18

## 2021-06-09 RX ADMIN — DILTIAZEM HYDROCHLORIDE 90 MG: 90 CAPSULE, EXTENDED RELEASE ORAL at 10:17

## 2021-06-09 RX ADMIN — FUROSEMIDE 40 MG: 40 TABLET ORAL at 10:18

## 2021-06-09 RX ADMIN — IPRATROPIUM BROMIDE AND ALBUTEROL SULFATE 3 ML: .5; 3 SOLUTION RESPIRATORY (INHALATION) at 09:04

## 2021-06-09 RX ADMIN — STANDARDIZED SENNA CONCENTRATE AND DOCUSATE SODIUM 2 TABLET: 8.6; 5 TABLET ORAL at 10:18

## 2021-06-09 ASSESSMENT — PAIN DESCRIPTION - PROGRESSION
CLINICAL_PROGRESSION: NOT CHANGED

## 2021-06-09 ASSESSMENT — PAIN SCALES - GENERAL
PAINLEVEL_OUTOF10: 5
PAINLEVEL_OUTOF10: 0

## 2021-06-09 NOTE — DISCHARGE SUMMARY
[facility] -- Physician Discharge Summary     Aliya Conti  1940  MRN: [id number]    Admit Date: 6/3/2021  Discharge Date: No discharge date for patient encounter. Attending MD: Violette Rodriguez MD  Discharging MD: Violette Rodriguez MD  PCP: Cathye Gowers, MD . Ofelia Jama  / [place] [address] [phone]    Admission Diagnosis: ARF (acute renal failure) ([facility] [N17.9]  DISCHARGE DIAGNOSIS: same    Full Hospital Problem List:  Active Hospital Problems    Diagnosis Date Noted    HTN (hypertension), benign [I10] 08/08/2011     Priority: High    Dyslipidemia [E78.5] 08/08/2011     Priority: High    Coronary atherosclerosis of native coronary artery [I25.10] 08/08/2011     Priority: High    Acute on chronic congestive heart failure ([address] [I50.9]     History of lung cancer [Z85.118] 06/06/2021    Anemia [D64.9] 06/04/2021    ARF (acute renal failure) ([address] [N17.9] 06/03/2021    Acute kidney injury superimposed on CKD ([address] [N17.9, N18.9] 07/20/2020    Anxiety disorder [F41.9]     Depression [F32.9]     Poorly controlled type 2 diabetes mellitus ([address] [E11.65]     Chronic obstructive pulmonary disease with acute lower respiratory infection ([address] [J44.0]     Malignant neoplasm of left lung (HCC) [C34.92]     Chronic diastolic heart failure (HCC) [I50.32]     Chronic deep vein thrombosis (DVT) of both lower extremities ([address] [I82.503] 05/20/2019    Ataxia [R27.0]     Recurrent falls [R29.6]     Diabetic peripheral neuropathy ([address] [E11.42]     Mild malnutrition ([address] [E44.1] 05/02/2019    Paroxysmal atrial fibrillation ([address] [I48.0] 10/17/2016    COPD, severe ([address] [J44.9] 08/30/2013    DM (diabetes mellitus), secondary, uncontrolled, w/neurologic complic ([facility] [E26.47, F73.62] 08/08/2011    Mitral and aortic valve disease [I08.0] 08/08/2011    Obstructive sleep apnea [G47.33] 08/08/2011           Hospital Course:  68 y. o. female who presents with a Chief limited due to CAD and COPD, discussion with patient and family regarding short-term use of amiodarone and patient opted to proceed. Not a candidate for CV or ablation if she cannot tolerate AC. Use amiodarone to suppress arrhythmia and monitor COPD clinically for worsening. Pt and family decline SNF   Pt to be discharged home with home care  To see Cards in office in 7-10d    Consults made during Hospitalization:  IP CONSULT TO INTERNAL MEDICINE  IP CONSULT TO NEPHROLOGY  IP CONSULT TO CARDIOLOGY  IP CONSULT TO [id number] [address]  N NEEDS    Treatment team at time of Discharge: Treatment Team: Attending Provider: Tori Lee MD; Consulting Physician: Tori Lee MD; Consulting Physician: Jelani Weir MD; Consulting Physician: Irving Kocher, MD; : Maria Teresa Sims RN; Consulting Physician: Susanna Potts MD; Respiratory Therapist (Day): Raeann Edwards RCP; Registered Nurse: Eh Avila RN; Respiratory Therapist (Night): Stephanie Gates RCP    Imaging Results:  Echo Complete    Result Date: 6/8/2021  Transthoracic Echocardiography Report (TTE)  Demographics   Patient Name       Beau Persons   Date of Study      06/07/2021        Gender              Female   Patient Number     [id number]        Date of Birth       1940   Visit Number       [id number]         Age                 68 year(s)   Accession Number   [id number]        Room Number         6730   Corporate ID       [id number]          Sonographer         Hakeem Elise RVT, BS   Ordering Physician Lila Payan MD                                       Physician  Procedure Type of Study   TTE procedure:ECHOCARDIOGRAM COMPLETE 2D W DOPPLER W COLOR.   Procedure Date Date: 06/07/2021 Start: 02:16 PM Study Location: [facility] - Echo Lab Technical Quality: Adequate visualization Indications:Congestive heart failure, diastolic dysfunction. Patient Status: Routine Height: 65 inches Weight: 171 pounds BSA: 1.85 m2 BMI: 28.46 kg/m2 HR: 92 bpm BP: 152/77 mmHg  Conclusions   Summary  Left ventricular cavity size is normal with moderate -severe concentric  hypertrophy. Overall left ventricular systolic function appears normal.  Ejection fraction is visually estimated to be 65%. No regional wall motion abnormalities are noted. Grade I diastolic dysfunction with normal LV filling pressures. Mild mitral annular calcification. Mitral valve leaflets appear mildly thickened. Trivial mitral regurgitation. Mild aortic valve calcification without any evidence of aortic stenosis. Mild aortic regurgitation. Signature   ------------------------------------------------------------------  Electronically signed by Erwin Morgan MD (Interpreting  physician) on 06/08/2021 at 08:32 AM  ------------------------------------------------------------------   Findings   Left Ventricle  Left ventricular cavity size is normal with moderate -severe left  ventricular hypertrophy . Overall left ventricular systolic function appears normal.  Ejection fraction is visually estimated to be 65%. No regional wall motion abnormalities are noted. Average E/e': 9.8. Grade I diastolic dysfunction with normal LV filling pressures. Mitral Valve  Mild mitral annular calcification. Mitral valve leaflets appear mildly thickened. Trivial mitral regurgitation. Left Atrium  The left atrium is normal in size. Aortic Valve  Mild aortic regurgitation. Tricuspid aortic valve. Mild aortic valve calcification without any evidence of aortic stenosis. Aorta  The aortic root is normal in size. The ascending aorta is mildly dilated. Right Ventricle  The right ventricle is normal in size and function. TAPSE: 1.6 cm. RV s' velocity: 17.3 cm/s.    Tricuspid Valve  The tricuspid valve is normal in structure and function. There is no  significant tricuspid valve regurgitation or stenosis. Right Atrium  The right atrial size is normal.   Pulmonic Valve  The pulmonic valve is not well visualized. There is no evidence of pulmonic valve regurgitation or stenosis. Pericardial Effusion  No pericardial effusion noted. Pleural Effusion  No pleural effusion. Miscellaneous  IVC not well visualized. Unable to estimate pulmonary artery pressure secondary to incomplete TR jet  envelope. M-Mode/2D Measurements (cm)   LV Diastolic Dimension: 3.27 cm LV Systolic Dimension: 6.21 cm  LV Septum Diastolic: 7.16 cm  LV PW Diastolic: 1.5 cm         AO Root Dimension: 3.3 cm                                  LA Dimension: 4.1 cm                                  LA Area: 19.1 cm2  LVOT: 2 cm                      LA volume/Index: 49.67 ml /27 ml/m2  Doppler Measurements   AV Peak Velocity: 176 cm/s     MV Peak E-Wave: 66.7 cm/s  AV Peak Gradient: 12.39 mmHg   MV Peak A-Wave: 117 cm/s  AV Mean Gradient: 6 mmHg       MV E/A Ratio: 0.57  LVOT Peak Velocity: 140 cm/s  AV Area (Continuity):2.39 cm2   E' Septal Velocity: 5.87 cm/s  E' Lateral Velocity: 8.24 cm/s  PV Peak Velocity: 129 cm/s  PV Peak Gradient: 6.66 mmHg   Aortic Valve   Peak Velocity: 176 cm/s     Mean Velocity: 114 cm/s  Peak Gradient: 12.39 mmHg   Mean Gradient: 6 mmHg  Area (continuity): 2.39 cm2  AV VTI: 32.3 cm  Aorta   Aortic Root: 3.3 cm  Ascending Aorta: 4 cm  LVOT Diameter: 2 cm      XR CHEST (2 VW)    Result Date: 6/7/2021  EXAMINATION: TWO XRAY VIEWS OF THE CHEST 6/3/2021 5:42 pm COMPARISON: 7/17/2020 HISTORY: ORDERING SYSTEM PROVIDED HISTORY: sob TECHNOLOGIST PROVIDED HISTORY: Reason for exam:->sob Reason for Exam: Shortness of Breath (sent by [facility] for SOB and retaining fluid) Acuity: Unknown Type of Exam: Unknown FINDINGS: Frontal and lateral views of the chest were performed. There is no acute skeletal abnormality.   There are chronic healed left rib fractures. There are multiple chronic wedge compression fractures of multiple lower thoracic and upper lumbar vertebral bodies, augmented by vertebroplasty cement. The heart size and mediastinal contours are stable and within normal limits. There is a stable focus of pleural-parenchymal scarring noted within the left perihilar space, unchanged from prior exams. There is stable mild chronic biapical pleural-parenchymal scarring. The lungs are otherwise clear, without evidence of acute airspace consolidation, pneumothorax, pleural effusion, or pulmonary edema. 1. No acute cardiopulmonary disease. 2. Chronic pleural and parenchymal scarring within the left perihilar space. Discharge Exam:  BP (!) 150/65   Pulse 100   Temp 97.7 °F (36.5 °C) (Temporal)   Resp 18   Ht 5' 5\" (1.651 m)   Wt 162 lb (73.5 kg)   SpO2 96%   BMI 26.96 kg/m²   General appearance: alert, appears stated age and cooperative  Head: Normocephalic, without obvious abnormality, atraumatic  Lungs: clear to auscultation bilaterally  Heart: regular rate and rhythm, S1, S2 normal, no murmur, click, rub or gallop  Abdomen: soft, non-tender; bowel sounds normal; no masses,  no organomegaly  Extremities: extremities normal, atraumatic, no cyanosis or edema    Disposition: home    Condition: stable    Discharge Medications:    Maria Luisa Sickle   Home Medication Instructions PNS:[id number]    Printed on:06/09/21 3600   Medication Information                      albuterol sulfate  (90 Base) MCG/ACT inhaler  Inhale 2 puffs into the lungs every 4 hours as needed for Wheezing             amiodarone (CORDARONE) 200 MG tablet  Take 1 tablet by mouth daily             Calcium Carb-Cholecalciferol (CALCIUM+D3) 500-600 MG-UNIT TABS  Take 1 tablet by mouth daily             Continuous Blood Gluc  (FREESTYLE LOC 14 DAY READER) ELADIA  Check blood sugars twice daily             Continuous Blood Gluc  (FREESTYLE LOC 2 READER) ELADIA  Check blood sugars twice daily             Continuous Blood Gluc Sensor (FREESTYLE LOC 2 SENSOR) MISC  Check blood sugars twice daily             cyclobenzaprine (FLEXERIL) 10 MG tablet  Take 10 mg by mouth 3 times daily as needed for Muscle spasms             digoxin (LANOXIN) 125 MCG tablet  Take 1 tab by mouth on Monday -Wednesday-Friday             dilTIAZem (CARDIZEM 12 HR) 90 MG extended release capsule  Take 1 capsule by mouth 2 times daily             ferrous sulfate (IRON 325) 325 (65 Fe) MG tablet  Take 1 tablet by mouth daily (with breakfast)             furosemide (LASIX) 40 MG tablet  Take 1 tablet by mouth daily             gabapentin (NEURONTIN) 100 MG capsule  Take 100 mg by mouth nightly. glimepiride (AMARYL) 2 MG tablet  Take 2 mg by mouth every morning (before breakfast)             ipratropium-albuterol (DUONEB) 0.5-2.5 (3) MG/3ML SOLN nebulizer solution  Inhale 3 mLs into the lungs every 4 hours (while awake) DX:COPD J44.9             lidocaine (LIDODERM) 5 %  Place 2 patches onto the skin daily Indications: To lower back and left shoulder/arm 12 hours on, 12 hours off.             linaclotide (LINZESS) 145 MCG capsule  Take 145 mcg by mouth every morning (before breakfast)             linagliptin (TRADJENTA) 5 MG tablet  Take 1 tablet by mouth daily             mirtazapine (REMERON) 15 MG tablet  Take 1 tablet by mouth nightly             oxyCODONE 5 MG capsule  Take 5 mg by mouth every 6 hours as needed for Pain.             pantoprazole (PROTONIX) 40 MG tablet  Take 1 tablet by mouth every morning (before breakfast)             predniSONE (DELTASONE) 10 MG tablet  Take 1 tablet by mouth daily                 Allergies:   Allergies   Allergen Reactions    Statins Other (See Comments)     Other reaction(s): Myalgias (Muscle Pain)      Lyrica [Pregabalin] Other (See Comments)     Shaking, swelling, rash    Cipro Xr Rash     Swelling, rash    Penicillins Rash     Swelling, rash    Sulfa Antibiotics Rash     Swelling, rash       Follow up Instructions: Follow-up with PCP: Thalia Boston MD in 2 wk .       Total time spent on day of discharge including face-to-face visit, examination, documentation, counseling, preparation of discharge plans and followup, and discharge medicine reconciliation and presciptions is 34 minutes    Signed:  Nu Kennedy MD  6/9/2021

## 2021-06-09 NOTE — PROGRESS NOTES
benefit from increased (A) upon d/c home, near 24 hour (A) as patient lives alone. Patient will continue to benefit from additional skilled PT intervention to facilitate safe mobility and to optimize (I) to promote return to prior level of function. Treatment Diagnosis: impaired functional mobility  Prognosis: Good  Patient Education: Patient educated on role of PT, use of call light, and PT recommendations - patient verbalizes understanding but will benefit from additional reinforcement. Barriers to Learning: cognitive  REQUIRES PT FOLLOW UP: Yes  Activity Tolerance  Activity Tolerance: Patient limited by endurance     Patient Diagnosis(es): The primary encounter diagnosis was Acute on chronic congestive heart failure, unspecified heart failure type ([address] Diagnoses of Acute renal failure superimposed on chronic kidney disease, unspecified CKD stage, unspecified acute renal failure type ([address] Hyperglycemia, and Elevated troponin were also pertinent to this visit. has a past medical history of Arthritis, CAD (coronary artery disease), Carpal tunnel syndrome, COPD (chronic obstructive pulmonary disease) ([address] Coronary stent, depression, Diabetes mellitus ([address] Diastolic heart failure ([address] GERD (gastroesophageal reflux disease), Hyperlipidemia, Hypertension, Lung cancer ([address] MI (myocardial infarction) ([address] LIZETT (obstructive sleep apnea), PONV (postoperative nausea and vomiting), and stress incontinence. has a past surgical history that includes Coronary angioplasty with stent (2000, 2001); back surgery (2000, 2001); bronchoscopy (12/04/2018); [facility] dx w/cell washg spx (N/A, 12/4/2018); Cholecystectomy, laparoscopic (N/A, 12/19/2018); Upper gastrointestinal endoscopy (N/A, 2/25/2019); Colonoscopy (N/A, 2/25/2019); Colonoscopy (2/25/2019); bronchoscopy (N/A, 2/27/2019); bronchoscopy (2/27/2019); bronchoscopy (2/27/2019); bronchoscopy (N/A, 8/6/2019);  Upper gastrointestinal endoscopy (N/A, 8/7/2019); bronchoscopy (N/A, 9/27/2019); bronchoscopy (9/27/2019); bronchoscopy (9/27/2019); IR KYPHOPLASTY THORACIC 1 VERTEBRAL BODY (7/23/2020); IR KYPHOPLASTY THORACIC 1 VERTEBRAL BODY (10/20/2020); and IR KYPHOPLASTY LUMBAR 1 VERTEBRAL BODY (12/8/2020). Restrictions  Restrictions/Precautions  Restrictions/Precautions: Fall Risk (high fall risk, up as tolerated, strict I&O, adult diet regular - 4 carb choices)  Required Braces or Orthoses?: No  Position Activity Restriction  Other position/activity restrictions: Per ED: Aysha Ashton is a 68 y.o. female who presents with a Chief Complaint of leg swelling/fluid retention increasing shortness of breath. Patient states that this is been going on for about a month. She was seen and evaluated by her cardiologist today who sent her to the ED for further evaluation management, likely admission. States that her abdomen is also distended but nontender. Subjective   General  Chart Reviewed: Yes  Family / Caregiver Present: No  Subjective  Subjective: Patient denies pain, eager to d/c home as soon as she can. General Comment  Comments: Patient seated in recliner upon arrival - agreeable to PT. RN present - O2 on 1L via nasal cannula - O2 saturation varying widely even seated 86-97%, 95% during gait.           Orientation  Orientation  Overall Orientation Status: Impaired  Orientation Level: Oriented X4 (with increased time for processing)    Objective      Transfers  Sit to Stand: Stand by assistance (to RW)  Stand to sit: Stand by assistance (from RW)  Stand Pivot Transfers: Stand by assistance (with RW)  Ambulation  Ambulation?: Yes  Ambulation 1  Surface: level tile  Device: Rolling Walker  Other Apparatus: O2 (1L via nasal cannula)  Assistance: Stand by assistance  Quality of Gait: forward flexed, decreased (B) step length, foot clearance, and heel strike; no loss of balance; limited by endurance and c/o SOB - O2 saturation 95% on 1L  Distance: 60ft x 2 - seated rest in between trials     Balance  Posture: Fair (forward flexed, rounded shoulders)  Sitting - Static: Good  Sitting - Dynamic: Good  Standing - Static: Fair;+  Standing - Dynamic: Fair  Comments: SBA with RW for standing balance during mobility        AM-PAC Score  AM-PAC Inpatient Mobility Raw Score : 19 (06/09/21 1125)  AM-PAC Inpatient T-Scale Score : 45.44 (06/09/21 1125)  Mobility Inpatient CMS 0-100% Score: 41.77 (06/09/21 1125)  Mobility Inpatient CMS G-Code Modifier : CK (06/09/21 1125)          Goals  Short term goals  Time Frame for Short term goals: by discharge - all goals ongoing 6/9  Short term goal 1: patient will perform bed mobility with supervision-- goal met 6/8 - updated 6/9: Pt. will demonstrate (I) bed mobility  Short term goal 2: patient will perform sit to stand/stand to sit with LRAD and supervision  Short term goal 3: patien essie ambulate 48' with LRAD and CGA - goal met 6/9, updated: Pt. will ambulate >/= 100ft with LRAD and (S)  Short term goal 4: patient will stand statically for 2 minutes with LRAD and CGA - goal met 6/9  Short term goal 5: Updated 6/9: Pt. will negotiate >/= 2 stairs with 1 rail and LRAD with CGA to enter/exit home  Patient Goals   Patient goals : \"to go home, hopefully today\"    Plan    Plan  Times per week: 3-5  Times per day: Daily  Plan weeks: till discharge  Current Treatment Recommendations: Strengthening, Transfer Training, Endurance Training, Balance Training, Gait Training, Functional Mobility Training, Stair training, Safety Education & Training, Home Exercise Program, Patient/Caregiver Education & Training, Equipment Evaluation, Education, & procurement  Safety Devices  Type of devices: Call light within reach, Chair alarm in place, Gait belt, Left in chair, Nurse notified  Restraints  Initially in place: No     Therapy Time   Individual Concurrent Group Co-treatment   Time In 1003         Time Out 1036         Minutes 33

## 2021-06-09 NOTE — PROGRESS NOTES
Progress Note - Dr. Keith Valadez - Internal Medicine  PCP: Elio Ram MD [address] / [id number] [facility],[address] [facility] Day: 6  Code Status: Full Code  Current Diet: ADULT DIET; Regular; 4 carb choices (60 gm/meal)        CC: follow up on medical issues    Subjective:   Robby Yanez is a 68 y.o. female. She denies problems    Doing ok  SPEP/UPEP neg  hgb 7.2 (was 7.8 yest)    ECHO    Summary   Left ventricular cavity size is normal with moderate -severe concentric   hypertrophy. Overall left ventricular systolic function appears normal.   Ejection fraction is visually estimated to be 65%. No regional wall motion abnormalities are noted. Grade I diastolic dysfunction with normal LV filling pressures. Mild mitral annular calcification. Mitral valve leaflets appear mildly thickened. Trivial mitral regurgitation. Mild aortic valve calcification without any evidence of aortic stenosis. Mild aortic regurgitation. Pt very anxious to go home    She denies chest pain, denies shortness of breath, denies nausea,  denies emesis. 10 system Review of Systems is reviewed with patient, and pertinent positives are noted in HPI above . Otherwise, Review of systems is negative. I have reviewed the patient's medical and social history in detail and updated the computerized patient record. To recap: She  has a past medical history of Arthritis, CAD (coronary artery disease), Carpal tunnel syndrome, COPD (chronic obstructive pulmonary disease) ([address] Coronary stent, depression, Diabetes mellitus ([address] Diastolic heart failure ([address] GERD (gastroesophageal reflux disease), Hyperlipidemia, Hypertension, Lung cancer ([address] MI (myocardial infarction) ([address] LIZETT (obstructive sleep apnea), PONV (postoperative nausea and vomiting), and stress incontinence. . She  has a past surgical history that includes Coronary angioplasty with stent (2000, 2001); back surgery (2000, 2001); bronchoscopy (12/04/2018); pr [facility] incl fluor gdnce dx w/cell washg spx (N/A, 12/4/2018); Cholecystectomy, laparoscopic (N/A, 12/19/2018); Upper gastrointestinal endoscopy (N/A, 2/25/2019); Colonoscopy (N/A, 2/25/2019); Colonoscopy (2/25/2019); bronchoscopy (N/A, 2/27/2019); bronchoscopy (2/27/2019); bronchoscopy (2/27/2019); bronchoscopy (N/A, 8/6/2019); Upper gastrointestinal endoscopy (N/A, 8/7/2019); bronchoscopy (N/A, 9/27/2019); bronchoscopy (9/27/2019); bronchoscopy (9/27/2019); IR KYPHOPLASTY THORACIC 1 VERTEBRAL BODY (7/23/2020); IR KYPHOPLASTY THORACIC 1 VERTEBRAL BODY (10/20/2020); and IR KYPHOPLASTY LUMBAR 1 VERTEBRAL BODY (12/8/2020). . She  reports that she quit smoking about 19 years ago. Her smoking use included cigarettes. She quit after 10.00 years of use. She has never used smokeless tobacco. She reports that she does not drink alcohol and does not use drugs. .        Active Hospital Problems    Diagnosis Date Noted    HTN (hypertension), benign [I10] 08/08/2011     Priority: High    Dyslipidemia [E78.5] 08/08/2011     Priority: High    Coronary atherosclerosis of native coronary artery [I25.10] 08/08/2011     Priority: High    Acute on chronic congestive heart failure (HCC) [I50.9]     History of lung cancer [Z85.118] 06/06/2021    Anemia [D64.9] 06/04/2021    ARF (acute renal failure) ([address] [N17.9] 06/03/2021    Acute kidney injury superimposed on CKD ([address] [N17.9, N18.9] 07/20/2020    Anxiety disorder [F41.9]     Depression [F32.9]     Poorly controlled type 2 diabetes mellitus (HCC) [E11.65]     Chronic obstructive pulmonary disease with acute lower respiratory infection (HCC) [J44.0]     Malignant neoplasm of left lung (HCC) [C34.92]     Chronic diastolic heart failure (HCC) [I50.32]     Chronic deep vein thrombosis (DVT) of both lower extremities (HCC) [I82.503] 05/20/2019    Ataxia [R27.0]     Recurrent falls [R29.6]     Diabetic peripheral neuropathy (HCC) [E11.42]     Mild malnutrition ([place] [id number] [E44.1] 05/02/2019    Paroxysmal atrial fibrillation (HCC) [I48.0] 10/17/2016    COPD, severe ([place] [address] [J44.9] 08/30/2013    DM (diabetes mellitus), secondary, uncontrolled, w/neurologic complic ([place] [address] [H02.24, I11.20] 08/08/2011    Mitral and aortic valve disease [I08.0] 08/08/2011    Obstructive sleep apnea [G47.33] 08/08/2011       Current Facility-Administered Medications: ferrous sulfate (IRON 325) tablet 325 mg, 325 mg, Oral, Daily with breakfast  epoetin ariel-epbx (RETACRIT) injection 3,000 Units, 3,000 Units, Subcutaneous, Once  furosemide (LASIX) tablet 40 mg, 40 mg, Oral, Daily  dilTIAZem (CARDIZEM 12 HR) extended release capsule 90 mg, 90 mg, Oral, BID  amiodarone (CORDARONE) tablet 200 mg, 200 mg, Oral, Daily  budesonide-formoterol (SYMBICORT) 160-4.5 MCG/ACT inhaler 2 puff, 2 puff, Inhalation, BID  perflutren lipid microspheres (DEFINITY) injection 1.65 mg, 1.5 mL, Intravenous, ONCE PRN  cyclobenzaprine (FLEXERIL) tablet 10 mg, 10 mg, Oral, TID PRN  oxyCODONE (ROXICODONE) immediate release tablet 5 mg, 5 mg, Oral, Q6H PRN  lidocaine 4 % external patch 2 patch, 2 patch, Transdermal, Daily  gabapentin (NEURONTIN) capsule 100 mg, 100 mg, Oral, Nightly  sennosides-docusate sodium (SENOKOT-S) 8.6-50 MG tablet 2 tablet, 2 tablet, Oral, Daily  polyethylene glycol (GLYCOLAX) packet 17 g, 17 g, Oral, Daily  linaclotide (LINZESS) capsule 145 mcg, 145 mcg, Oral, QAM AC  mirtazapine (REMERON) tablet 15 mg, 15 mg, Oral, Nightly  predniSONE (DELTASONE) tablet 10 mg, 10 mg, Oral, Daily  pantoprazole (PROTONIX) tablet 40 mg, 40 mg, Oral, QAM AC  linagliptin (TRADJENTA) tablet 5 mg, 5 mg, Oral, Daily  digoxin (LANOXIN) tablet 125 mcg, 125 mcg, Oral, Q MWF  albuterol sulfate  (90 Base) MCG/ACT inhaler 2 puff, 2 puff, Inhalation, Q4H PRN  ipratropium-albuterol (DUONEB) nebulizer solution 3 mL, 3 mL, Inhalation, Q4H WA  calcium-vitamin D (OSCAL-500) 500-200 MG-UNIT per tablet 1 tablet, 1 tablet, Oral, Daily  sodium chloride flush 0.9 % injection 5-40 mL, 5-40 mL, Intravenous, 2 times per day  sodium chloride flush 0.9 % injection 5-40 mL, 5-40 mL, Intravenous, PRN  0.9 % sodium chloride infusion, 25 mL, Intravenous, PRN  ondansetron (ZOFRAN-ODT) disintegrating tablet 4 mg, 4 mg, Oral, Q8H PRN **OR** ondansetron (ZOFRAN) injection 4 mg, 4 mg, Intravenous, Q6H PRN  acetaminophen (TYLENOL) tablet 650 mg, 650 mg, Oral, Q6H PRN **OR** acetaminophen (TYLENOL) suppository 650 mg, 650 mg, Rectal, Q6H PRN  hydrALAZINE (APRESOLINE) injection 10 mg, 10 mg, Intravenous, Q6H PRN  0.9 % sodium chloride bolus, 500 mL, Intravenous, PRN  insulin lispro (1 Unit Dial) 0-12 Units, 0-12 Units, Subcutaneous, TID WC  insulin lispro (1 Unit Dial) 0-6 Units, 0-6 Units, Subcutaneous, Nightly  glucose (GLUTOSE) 40 % oral gel 15 g, 15 g, Oral, PRN  dextrose 50 % IV solution, 12.5 g, Intravenous, PRN  glucagon (rDNA) injection 1 mg, 1 mg, Intramuscular, PRN  dextrose 5 % solution, 100 mL/hr, Intravenous, PRN         Objective:  BP (!) 145/72   Pulse 88   Temp 98.1 °F (36.7 °C) (Oral)   Resp 16   Ht 5' 5\" (1.651 m)   Wt 162 lb (73.5 kg)   SpO2 94%   BMI 26.96 kg/m²      Patient Vitals for the past 24 hrs:   BP Temp Temp src Pulse Resp SpO2 Weight   06/09/21 0406 (!) 145/72 98.1 °F (36.7 °C) Oral 88 16 94 % 162 lb (73.5 kg)   06/09/21 0010 128/77 97.7 °F (36.5 °C) Oral 81 16 96 % --   06/08/21 2057 -- -- -- -- 16 95 % --   06/08/21 2043 -- -- -- -- 16 99 % --   06/08/21 2013 130/70 98 °F (36.7 °C) Oral 85 18 -- --   06/08/21 1715 136/68 99 °F (37.2 °C) Oral 86 18 99 % --   06/08/21 1244 (!) 160/70 98 °F (36.7 °C) Oral 84 18 96 % --   06/08/21 0930 (!) 153/61 98 °F (36.7 °C) Oral 80 18 94 % --     Patient Vitals for the past 96 hrs (Last 3 readings):   Weight   06/09/21 0406 162 lb (73.5 kg)   06/08/21 0433 157 lb 14.4 oz (71.6 kg)   06/07/21 0800 165 lb (74.8 kg)           Intake/Output Summary (Last 24 hours) at 6/9/2021 0047  Last data filed at 6/8/2021 1724  Gross per 24 hour   Intake 720 ml   Output 350 ml   Net 370 ml         Physical Exam:   BP (!) 145/72   Pulse 88   Temp 98.1 °F (36.7 °C) (Oral)   Resp 16   Ht 5' 5\" (1.651 m)   Wt 162 lb (73.5 kg)   SpO2 94%   BMI 26.96 kg/m²   General appearance: alert, appears stated age and cooperative  Head: Normocephalic, without obvious abnormality, atraumatic  Lungs: clear to auscultation bilaterally  Heart: regular rate and rhythm, S1, S2 normal, no murmur, click, rub or gallop  Abdomen: soft, non-tender; bowel sounds normal; no masses,  no organomegaly  Extremities: trace edema    Labs:  Lab Results   Component Value Date    WBC 8.4 06/09/2021    HGB 7.2 (L) 06/09/2021    HCT 21.9 (L) 06/09/2021     06/09/2021    CHOL 224 (H) 05/19/2016    TRIG 123 05/19/2016    HDL 64 (H) 05/04/2021    ALT 16 06/03/2021    AST 16 06/03/2021     06/09/2021    K 3.6 06/09/2021    CL 97 (L) 06/09/2021    CREATININE 2.1 (H) 06/09/2021    BUN 46 (H) 06/09/2021    CO2 32 06/09/2021    TSH 1.71 05/04/2021    INR 1.05 06/03/2021    LABA1C 5.8 05/04/2021    LABMICR Not Indicated 06/03/2021     Lab Results   Component Value Date    CKTOTAL 26 05/31/2020    TROPONINI 0.02 (H) 06/03/2021       Recent Imaging Results are Reviewed:  XR CHEST (2 VW)    Result Date: 6/3/2021  EXAMINATION: TWO XRAY VIEWS OF THE CHEST 6/3/2021 5:42 pm COMPARISON: 7/17/2020 HISTORY: ORDERING SYSTEM PROVIDED HISTORY: sob TECHNOLOGIST PROVIDED HISTORY: Reason for exam:->sob Reason for Exam: Shortness of Breath (sent by [facility] for SOB and retaining fluid) Acuity: Unknown Type of Exam: Unknown FINDINGS: Frontal and lateral views of the chest were performed. There is no acute skeletal abnormality. There are chronic healed left rib fractures. There are multiple chronic wedge compression fractures of multiple lower thoracic and upper lumbar vertebral bodies, augmented by vertebroplasty cement.   The heart size and mediastinal contours are stable and within normal limits. There is a stable focus of pleural-parenchymal scarring noted within the left perihilar space, unchanged from prior exams. There is stable mild chronic biapical pleural-parenchymal scarring. The lungs are otherwise clear, without evidence of acute airspace consolidation, pneumothorax, pleural effusion, or pulmonary edema. 1. No acute cardiopulmonary disease. 2. Chronic pleural and parenchymal scarring within the left perihilar space. Assessment and Plan:  Principal Problem:    Acute kidney injury superimposed on CKD ([address] -Established problem. Better from yest. Creat 2.1  Plan: appreciate renal eval  Active Problems:    Dyslipidemia -Established problem. Stable. .  Plan: Continue present orders/plan. HTN (hypertension), benign -Established problem. Stable. 145/72  Plan: stay on same meds    Coronary atherosclerosis of native coronary artery    COPD, severe ([address]    Paroxysmal atrial fibrillation ([address] -Established problem. Stable. In sinus  Plan: Continue present orders/plan. Recurrent falls -Established problem. Stable. Plan: pt/ot rec snf    Diabetic peripheral neuropathy ([address]    Chronic diastolic heart failure (HCC)    Malignant neoplasm of left lung ([address]    Poorly controlled type 2 diabetes mellitus ([address] -Established problem. Stable. Plan: cont ccc diet, sliding scale, home meds    Depression    Anxiety disorder    Disp - awaiting completion of cardiac workup, stabilization of renal fxn. Hopefully to home today/tomorrow?    (Pt/Family declines SNF)          Stephon Kunz MD  6/9/2021

## 2021-06-09 NOTE — PROGRESS NOTES
MD Artis Phillips MD Sharene Butler, MD               Office: [phone]                      Fax: [phone]          NEPHROLOGY PROGRESS NOTE:     PATIENT NAME: Keena Ortega  : 1940  MRN: [id number]      Treatment plan update. Patient feels good and is requesting discharge. Mild worsening of kidney function. Repeat creatinine is 2.3. Mild hypotension noted but improved. IV Lasix discontinued. Need to restart Lasix 40 mg once daily. Hemoglobin stable 7.8. [facility] TSAT-15-mild iron deficiency. Start ferrous sulfate 325 mg once daily. Begin Aranesp 60 mcg subcut once every 15 days. SPEP and UPEP are negative and no abnormal proteinuria noted. Hopefully can discharge tomorrow, renal functions are stable with close follow-up                Acute kidney injury-improving-stable. -Repeat creatinine is 2.1. Chronic kidney disease-stage III-baseline creatinine 1.8. Multiple electrolyte imbalance-slow improvement.  -Potassium ptehig-xbvgypvdt-sovdut potassium 3.6. -.. Anemia-stable-hemoglobin 7.8.-  Check  iron levels    -Needs SPEP UPEP. Medications reviewed. Diuretics reviewed and appropriate. Received Lasix IV 20 mg twice daily. Switch to Lasix 40 mg once daily tomorrow. Not ready for discharge today. Discussed patient. Discussed with RN at bedside. IMPRESSION / RECOMMENDATIONS:    Admitted with:  ARF (acute renal failure) ([facility] [address] [N17.9]    1. 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 Similar O2 requirement. Weight from 2020 was 144, 2021 was 151, 2021 was 157 and now Weight change: -6 lb (-2.722 kg)    order daily - Lasix 20mg IV BID. Strict I&O. Stoped maintenance IVF. 2.  Hypokalemia- replaced  3. Elevated HCO3-replace K.  follow   4. H/O HTN- no need for tight control   5. DM- ISS per medical team   6. Anemia- elevated MCV.  f/u retic count, SPEP and UPEP.  ** 24 hr done on 6/5: 2.3L: protein 0.092,   F/u UPEP , SPEP - in process    RAKESH subQ.   7.  H/O Diastolic CHF-follows with Dr. Garrick Julian, cardiology follow here. 8.  H/O CAD  9. H/O COPD  10. H/O Lung CA  11. H/O DM    Would need close f/u outpt - w/ Dr Red Hammonds, will arrange. Other problems:     Patient Active Problem List   Diagnosis    DM (diabetes mellitus), secondary, uncontrolled, w/neurologic complic ([address]    Dyslipidemia    Mitral and aortic valve disease    HTN (hypertension), benign    Coronary atherosclerosis of native coronary artery    Obstructive sleep apnea    COPD, severe ([address]    Paroxysmal atrial fibrillation (HCC)    Mild malnutrition (HCC)    Closed compression fracture of thoracic vertebra (HCC)    Chronic deep vein thrombosis (DVT) of both lower extremities (HCC)    Ataxia    Recurrent falls    Diabetic peripheral neuropathy (HCC)    Chronic diastolic heart failure (HCC)    Malignant neoplasm of left lung (HCC)    Chronic obstructive pulmonary disease with acute lower respiratory infection ([address]    Poorly controlled type 2 diabetes mellitus ([address]    History of depression    Diabetes education, encounter for    Depression    Anxiety disorder    Acute kidney injury superimposed on CKD ([address]    ARF (acute renal failure) ([address]    Anemia    History of lung cancer    Acute on chronic congestive heart failure ([address]       : other supportive care :   - Check daily renal function panel with electrolytes-phosphorus  - Strict monitoring of I/Os, daily weight  - Renal feeds/diet  - Current medications reviewed. - Nephrotoxic medications have been discontinued. - Dose adjusted and appropriate. - Dose meds for eGFR <15 mL/min/1.73m2. during LORRAINE    - Avoid heavy opioids due to renal failure - may use very low dose dilaudid / fentanyl with close monitoring of CNS and respiratory depression. Please refer to the orders. High Complexity. Multiple complex problems.   Discussed with patient,  and treatment team-   Thank you for allowing me to participate in this patient's care. Please do not hesitate to contact me with any questions/concerns. We will follow along with you. Sara Linton MD,    [facility] [address] [place]: [phone] or [address]  Fax: [phone]      ================================================  ================================================      SUBJECTIVE:   Resting in bed  no active complaints,   Renal function stable. 24 hr urine done   - ROS reported: as mentioned in Subjective, HPI, CC, and all other 10 systems' review negative,   - PMH, [address], Social history: reviewed   - MEDICATIONS:  Prior to Admission Medications:  Medications Prior to Admission: gabapentin (NEURONTIN) 100 MG capsule, Take 100 mg by mouth nightly. cyclobenzaprine (FLEXERIL) 10 MG tablet, Take 10 mg by mouth 3 times daily as needed for Muscle spasms  oxyCODONE 5 MG capsule, Take 5 mg by mouth every 6 hours as needed for Pain.  lidocaine (LIDODERM) 5 %, Place 2 patches onto the skin daily Indications:  To lower back and left shoulder/arm 12 hours on, 12 hours off.  apixaban (ELIQUIS) 2.5 MG TABS tablet, Take 1 tablet by mouth 2 times daily  Calcium Carb-Cholecalciferol (CALCIUM+D3) 500-600 MG-UNIT TABS, Take 1 tablet by mouth daily  albuterol sulfate  (90 Base) MCG/ACT inhaler, Inhale 2 puffs into the lungs every 4 hours as needed for Wheezing  ipratropium-albuterol (DUONEB) 0.5-2.5 (3) MG/3ML SOLN nebulizer solution, Inhale 3 mLs into the lungs every 4 hours (while awake) DX:COPD J44.9  Continuous Blood Gluc  (FREESTYLE LOC 2 READER) ELADIA, Check blood sugars twice daily  Continuous Blood Gluc Sensor (FREESTYLE LOC 2 SENSOR) MISC, Check blood sugars twice daily  Continuous Blood Gluc  ([email or web address].comYLE LOC 14 DAY READER) ELADIA, Check blood sugars twice daily  digoxin (LANOXIN) 125 MCG tablet, Take 1 tab by mouth on Monday -Wednesday-Friday  linaclotide (LINZESS) 145 MCG capsule, Take 145 mcg by mouth every morning (before breakfast)  furosemide (LASIX) 40 MG tablet, Take 1 tablet by mouth daily  predniSONE (DELTASONE) 10 MG tablet, Take 1 tablet by mouth daily  pantoprazole (PROTONIX) 40 MG tablet, Take 1 tablet by mouth every morning (before breakfast)  dilTIAZem (CARDIZEM 12 HR) 60 MG extended release capsule, Take 1 capsule by mouth 2 times daily  mirtazapine (REMERON) 15 MG tablet, Take 1 tablet by mouth nightly (Patient taking differently: Take 30 mg by mouth as needed )  glimepiride (AMARYL) 2 MG tablet, Take 2 mg by mouth every morning (before breakfast)  [DISCONTINUED] methocarbamol (ROBAXIN) 500 MG tablet, Take 500 mg by mouth 3 times daily   linagliptin (TRADJENTA) 5 MG tablet, Take 1 tablet by mouth daily  metoprolol succinate (TOPROL XL) 25 MG extended release tablet, Take 1 tablet by mouth daily     Scheduled Meds:   dilTIAZem  90 mg Oral BID    amiodarone  200 mg Oral Daily    budesonide-formoterol  2 puff Inhalation BID    lidocaine  2 patch Transdermal Daily    gabapentin  100 mg Oral Nightly    sennosides-docusate sodium  2 tablet Oral Daily    polyethylene glycol  17 g Oral Daily    linaclotide  145 mcg Oral QAM AC    mirtazapine  15 mg Oral Nightly    predniSONE  10 mg Oral Daily    pantoprazole  40 mg Oral QAM AC    linagliptin  5 mg Oral Daily    digoxin  125 mcg Oral Q MWF    ipratropium-albuterol  3 mL Inhalation Q4H WA    calcium-vitamin D  1 tablet Oral Daily    sodium chloride flush  5-40 mL Intravenous 2 times per day    insulin lispro  0-12 Units Subcutaneous TID     insulin lispro  0-6 Units Subcutaneous Nightly     Continuous Infusions:   sodium chloride      dextrose       PRN Meds:.perflutren lipid microspheres, cyclobenzaprine, oxyCODONE, albuterol sulfate HFA, sodium chloride flush, sodium chloride, ondansetron **OR** ondansetron, acetaminophen **OR** acetaminophen, hydrALAZINE, sodium chloride, glucose, dextrose, glucagon (rDNA), dextrose        OBJECTIVE:   PHYSICAL EXAM:  Patient Vitals for the past 24 hrs:   BP Temp Temp src Pulse Resp SpO2 Weight   06/08/21 2057 -- -- -- -- 16 95 % --   06/08/21 2043 -- -- -- -- 16 99 % --   06/08/21 2013 130/70 98 °F (36.7 °C) Oral 85 18 -- --   06/08/21 1715 136/68 99 °F (37.2 °C) Oral 86 18 99 % --   06/08/21 1244 (!) 160/70 98 °F (36.7 °C) Oral 84 18 96 % --   06/08/21 0930 (!) 153/61 98 °F (36.7 °C) Oral 80 18 94 % --   06/08/21 0818 -- -- -- -- 18 96 % --   06/08/21 0433 129/75 98 °F (36.7 °C) Oral 97 18 96 % 157 lb 14.4 oz (71.6 kg)   06/08/21 0106 130/64 98.2 °F (36.8 °C) Oral 100 18 96 % --       Intake/Output Summary (Last 24 hours) at 6/8/2021 2149  Last data filed at 6/8/2021 1724  Gross per 24 hour   Intake 720 ml   Output 350 ml   Net 370 ml       General: Awake, Alert,   HENT: Atraumatic, normocephalic   Eyes: Normal conjunctiva, Non-incteral sclera. Neck: Supple, JVD not visible. CVS:  Heart sounds are normal. No murmurs. .  RS: Normal respiratory efforts,  Lung fields are clear. Abd: Soft , bowel sounds are normal, and no tenderness to palpation. Skin: No rash ,    CNS: Awake Oriented , No focal.   Extremities/MSK:  Edema, no cyanosis.         DATA:  Diagnostic tests reviewed for today's visit:    Recent Labs     06/06/21  0424 06/07/21  0435 06/08/21  0436   WBC 7.9 8.9 9.3   HCT 23.2* 23.9* 24.3*    324 328     Iron Saturation:  No components found for: PERCENTFE  FERRITIN:    Lab Results   Component Value Date    FERRITIN 47.1 06/05/2021     IRON:    Lab Results   Component Value Date    IRON 41 06/05/2021     TIBC:    Lab Results   Component Value Date    TIBC 280 06/05/2021       Recent Labs     06/06/21  0424 06/07/21  0434 06/08/21  0436    141 141   K 3.8 3.6 3.7   CL 97* 95* 96*   CO2 35* 35* 33*   BUN 47* 44* 49*   CREATININE 2.0* 2.1* 2.3*     Recent Labs     06/06/21  0424 06/07/21  0434 06/08/21  4311 CALCIUM 9.3 9.4 9.3   PHOS 4.0 3.9 4.9     No results for input(s): PH, PCO2, PO2 in the last 72 hours.     Invalid input(s): Breanna Jimenez    ABG:  No results found for: PH, PCO2, PO2, HCO3, BE, THGB, TCO2, O2SAT  VBG:    Lab Results   Component Value Date    PHVEN 7.603 06/05/2021    RHS7TLY 37.0 06/05/2021    BEVEN 13.8 06/05/2021    T4IQOIKX 100 06/05/2021       LDH:  No results found for: LDH  Uric Acid:    Lab Results   Component Value Date    LABURIC 4.1 12/25/2018       PT/INR:    Lab Results   Component Value Date    PROTIME 12.2 06/03/2021    INR 1.05 06/03/2021     Warfarin PT/INR:  No components found for: PTPATWAR, PTINRWAR  PTT:    Lab Results   Component Value Date    APTT 27.0 06/03/2021   [APTT}    Last 3 Troponin:    Lab Results   Component Value Date    TROPONINI 0.02 06/03/2021    TROPONINI <0.01 07/11/2020    TROPONINI <0.01 06/04/2020       U/A:    Lab Results   Component Value Date    COLORU YELLOW 06/03/2021    PROTEINU Negative 06/03/2021    PHUR 6.0 06/03/2021    WBCUA 0 06/03/2021    RBCUA 0 06/03/2021    YEAST Present 08/04/2019    BACTERIA 2+ 05/31/2020    CLARITYU Clear 06/03/2021    SPECGRAV 1.014 06/03/2021    LEUKOCYTESUR Negative 06/03/2021    UROBILINOGEN 0.2 06/03/2021    BILIRUBINUR Negative 06/03/2021    BLOODU Negative 06/03/2021    GLUCOSEU 500 06/03/2021     Microalbumen/Creatinine ratio:  No components found for: RUCREAT  24 Hour Urine for Protein:  No components found for: RAWUPRO, UHRS3, KPIX63FM, UTV3  24 Hour Urine for Creatinine Clearance:  No components found for: CREAT4, UHRS10, UTV10  Urine Toxicology: No components found for: IAMMENTA, IBARBIT, IBENZO, ICOCAINE, IMARTHC, IOPIATES, IPHENCYC    HgBA1c:    Lab Results   Component Value Date    LABA1C 5.8 05/04/2021     RPR:  No results found for: RPR  HIV:  No results found for: HIV  ROXANNE:  No results found for: ANATITER, ROXANNE  RF:  No results found for: RF  DSDNA:  No components found for: DNA  AMYLASE:  No results found for: AMYLASE  LIPASE:    Lab Results   Component Value Date    LIPASE 46.0 07/11/2020     Fibrinogen Level:  No components found for: FIB      BELOW MENTIONED RADIOLOGY STUDIES REVIEWED BY ME:    XR CHEST (2 VW)    Result Date: 6/3/2021  EXAMINATION: TWO XRAY VIEWS OF THE CHEST 6/3/2021 5:42 pm COMPARISON: 7/17/2020 HISTORY: ORDERING SYSTEM PROVIDED HISTORY: sob TECHNOLOGIST PROVIDED HISTORY: Reason for exam:->sob Reason for Exam: Shortness of Breath (sent by [facility] for SOB and retaining fluid) Acuity: Unknown Type of Exam: Unknown FINDINGS: Frontal and lateral views of the chest were performed. There is no acute skeletal abnormality. There are chronic healed left rib fractures. There are multiple chronic wedge compression fractures of multiple lower thoracic and upper lumbar vertebral bodies, augmented by vertebroplasty cement. The heart size and mediastinal contours are stable and within normal limits. There is a stable focus of pleural-parenchymal scarring noted within the left perihilar space, unchanged from prior exams. There is stable mild chronic biapical pleural-parenchymal scarring. The lungs are otherwise clear, without evidence of acute airspace consolidation, pneumothorax, pleural effusion, or pulmonary edema. 1. No acute cardiopulmonary disease. 2. Chronic pleural and parenchymal scarring within the left perihilar space.

## 2021-06-09 NOTE — PROGRESS NOTES
[facility]   Electrophysiology Progress Note   Date: 6/9/2021  Admit Date: 6/3/2021     Reason for follow up: Atrial Fibrillation RVR    Chief Complaint:   Chief Complaint   Patient presents with    Shortness of Breath     sent by [facility] for SOB and retaining fluid     History of Present Illness: History obtained from patient and medical record. Leonor Stallings is a 68 y.o. female with a past medical history of hypertension, hyperlipidemia, DM, CAD, LIZETT, HFpEF, recurrent bleeding and PAF. Hx of lung CA in remission. Presented with SOB and anemia. She has had recurrent acute anemia requiring hospitalization and blood transfusions. Hospitalized 3/2021 for acute anemia and s/p endoscopy with no bleed identified. Interval Hx: Today, she is being seen for follow up. SR with PACs on telemetry. Hgb trending stable, but trending down 7.8-->7.2   No new complaints today. No major events overnight. Denies having chest pain, palpitations, shortness of breath, orthopnea/PND, cough, or dizziness. Patient seen and examined. Clinical notes reviewed. Telemetry reviewed.      Problem List:   Patient Active Problem List    Diagnosis Date Noted    Dyslipidemia 08/08/2011    HTN (hypertension), benign 08/08/2011    Coronary atherosclerosis of native coronary artery 08/08/2011    Acute on chronic congestive heart failure ([address]     History of lung cancer 06/06/2021    Anemia 06/04/2021    ARF (acute renal failure) ([address] 06/03/2021    Acute kidney injury superimposed on CKD ([address] 07/20/2020    Diabetes education, encounter for     Depression     Anxiety disorder     Malignant neoplasm of left lung ([address]     Chronic obstructive pulmonary disease with acute lower respiratory infection ([address]     Poorly controlled type 2 diabetes mellitus ([address]     History of depression     Chronic diastolic heart failure (HCC)     Closed compression fracture of thoracic vertebra (HCC) 05/20/2019    Chronic deep vein thrombosis (DVT) of both lower extremities ([facility] 05/20/2019    Ataxia     Recurrent falls     Diabetic peripheral neuropathy (HCC)     Mild malnutrition ([facility] 05/02/2019    Paroxysmal atrial fibrillation ([facility] 10/17/2016    COPD, severe ([facility] 08/30/2013    DM (diabetes mellitus), secondary, uncontrolled, w/neurologic complic ([facility] 67/67/4535    Mitral and aortic valve disease 08/08/2011    Obstructive sleep apnea 08/08/2011      Assessment and Plan:  Paroxysmal Atrial Fibrillation  - Remains in atrial fibrillation rates   - On amiodarone and diltiazem  - [id number] score: 9 (age, gender, hypertension, DM, CAD, CHF) ; [id number] Vasc score and anticoagulation discussed. High risk for stroke and thromboembolism.  ~ Anticoagulation stopped due to severe and recurrent symptomatic anemia. Currently not a candidate for long-term AC  ~ Watchman has been discussed with patient, handout given, will have her evaluated as OP by Dr. Taryn Olvera  - Afib risk factors including age, HTN, obesity, inactivity and LIZETT were discussed with patient. Risk factor modification recommended   ~ TSH 1.71     - Treatment options including cardioversion, rate control strategy, antiarrhythmics, anticoagulation and possible ablation were discussed with patient. Risks, benefits and alternative of each treatment options were explained. All questions answered    ~ AAD therapy limited due to CAD and COPD, discussion with patient and family regarding short-term use of amiodarone and patient opted to proceed. Not a candidate for CV or ablation if she cannot tolerate AC. Use amiodarone to suppress arrhythmia and monitor COPD clinically for worsening.     Recurrent acute Anemia   - Worsening today, no plans to resume AC at this time   - She will be considered for Watchman, which may be limited by lung disease, will set up appt for consultation with Dr. Taryn Olvera, also I have provided a handout for review  Chronic diastolic CHF   - Exacerbated bu anemia   - Appears compensated   - Follow up has been scheduled as OP     - OK for discharge from EP standpoint  - Handout regarding Watchman given and discussed, no follow up questions  - Long-term AC is not recommended at this time  - Follow up with RMM has been aranged    All pertinent information and plan of care discussed with the EP physician. Multiple medical conditions with risk of decompensation. Allergies: Allergies   Allergen Reactions    Statins Other (See Comments)     Other reaction(s): Myalgias (Muscle Pain)      Lyrica [Pregabalin] Other (See Comments)     Shaking, swelling, rash    Cipro Xr Rash     Swelling, rash    Penicillins Rash     Swelling, rash    Sulfa Antibiotics Rash     Swelling, rash       Home Meds:  Prior to Visit Medications    Medication Sig Taking? Authorizing Provider   amiodarone (CORDARONE) 200 MG tablet Take 1 tablet by mouth daily Yes Dung Boyd MD   dilTIAZem (CARDIZEM 12 HR) 90 MG extended release capsule Take 1 capsule by mouth 2 times daily Yes Dung Boyd MD   ferrous sulfate (IRON 325) 325 (65 Fe) MG tablet Take 1 tablet by mouth daily (with breakfast) Yes Dung Boyd MD   gabapentin (NEURONTIN) 100 MG capsule Take 100 mg by mouth nightly. Yes Historical Provider, MD   cyclobenzaprine (FLEXERIL) 10 MG tablet Take 10 mg by mouth 3 times daily as needed for Muscle spasms Yes Historical Provider, MD   oxyCODONE 5 MG capsule Take 5 mg by mouth every 6 hours as needed for Pain. Yes Historical Provider, MD   lidocaine (LIDODERM) 5 % Place 2 patches onto the skin daily Indications: To lower back and left shoulder/arm 12 hours on, 12 hours off.  Yes Historical Provider, MD   Calcium Carb-Cholecalciferol (CALCIUM+D3) 500-600 MG-UNIT TABS Take 1 tablet by mouth daily Yes Historical Provider, MD   albuterol sulfate  (90 Base) MCG/ACT inhaler Inhale 2 puffs into the lungs every 4 hours as needed for Wheezing Yes Cristi Angel MD mg Oral QAM AC    linagliptin  5 mg Oral Daily    digoxin  125 mcg Oral Q MWF    ipratropium-albuterol  3 mL Inhalation Q4H WA    calcium-vitamin D  1 tablet Oral Daily    sodium chloride flush  5-40 mL Intravenous 2 times per day    insulin lispro  0-12 Units Subcutaneous TID WC    insulin lispro  0-6 Units Subcutaneous Nightly     Continuous Infusions:   sodium chloride      dextrose       PRN Meds:perflutren lipid microspheres, cyclobenzaprine, oxyCODONE, albuterol sulfate HFA, sodium chloride flush, sodium chloride, ondansetron **OR** ondansetron, acetaminophen **OR** acetaminophen, hydrALAZINE, sodium chloride, glucose, dextrose, glucagon (rDNA), dextrose   Past Medical History:  Past Medical History:   Diagnosis Date    Arthritis     CAD (coronary artery disease)     Carpal tunnel syndrome     [facility]) [facility]     Coronary stent     depression     Diabetes mellitus ([facility]     Diastolic heart failure ([facility]     Per Dr Kassidy Mosley 8/5/19    GERD (gastroesophageal reflux disease)     Hyperlipidemia     Hypertension     Lung cancer ([facility]     Adenocarcinoma of Left Lung    MI (myocardial infarction) ([facility]     LIZETT (obstructive sleep apnea)     does not use CPAP    PONV (postoperative nausea and vomiting)     stress incontinence         Past Surgical History:    has a past surgical history that includes Coronary angioplasty with stent (2000, 2001); back surgery (2000, 2001); bronchoscopy (12/04/2018); pr brncc incl fluor gdnce dx w/cell washg spx (N/A, 12/4/2018); Cholecystectomy, laparoscopic (N/A, 12/19/2018); Upper gastrointestinal endoscopy (N/A, 2/25/2019); Colonoscopy (N/A, 2/25/2019); Colonoscopy (2/25/2019); bronchoscopy (N/A, 2/27/2019); bronchoscopy (2/27/2019); bronchoscopy (2/27/2019); bronchoscopy (N/A, 8/6/2019); Upper gastrointestinal endoscopy (N/A, 8/7/2019); bronchoscopy (N/A, 9/27/2019); bronchoscopy (9/27/2019); bronchoscopy (9/27/2019);  IR KYPHOPLASTY THORACIC 1 VERTEBRAL BODY (7/23/2020); IR KYPHOPLASTY THORACIC 1 VERTEBRAL BODY (10/20/2020); and IR KYPHOPLASTY LUMBAR 1 VERTEBRAL BODY (12/8/2020). Social History:  Reviewed. reports that she quit smoking about 19 years ago. Her smoking use included cigarettes. She quit after 10.00 years of use. She has never used smokeless tobacco. She reports that she does not drink alcohol and does not use drugs. Family History:  Reviewed. family history includes Cancer in her maternal uncle and sister; Diabetes in her brother and sister; Heart Disease in her father and mother. Denies family history of sudden cardiac death, arrhythmia, premature CAD    Review of Systems:  · Constitutional: Negative for fever, weight changes, or weakness  · Skin: Negative for bruising or changes in skin pigment  · HEENT: Negative for vision changes or dysphagia  · Respiratory: Reviewed in HPI  · Cardiovascular: Reviewed in HPI  · Gastrointestinal: Negative for abdominal pain, N/V/D, constipation  · Genito-Urinary: Negative for hematuria  · Musculoskeletal: No focal weakness  · Neurological/Psych: Negative for confusion or TIA-like symptoms. No anxiety, depression, or insomnia    Physical Examination:  Vitals:    06/09/21 1526   BP:    Pulse:    Resp: 18   Temp:    SpO2: 100%      In: 480 [P.O.:480]  Out: -    Wt Readings from Last 3 Encounters:   06/09/21 162 lb (73.5 kg)   05/10/21 157 lb 8 oz (71.4 kg)   04/29/21 158 lb (71.7 kg)       Intake/Output Summary (Last 24 hours) at 6/9/2021 1555  Last data filed at 6/9/2021 1142  Gross per 24 hour   Intake 720 ml   Output --   Net 720 ml     Telemetry: Personally Reviewed Atrial fibrillation   · Constitutional: Cooperative and in no apparent distress, and appears well nourished  · Skin: Warm and pink; no cyanosis, bruising, or clubbing  · HEENT: Symmetric and normocephalic. Conjunctiva pink with clear sclera.  Mucus membranes pink and moist.   · Cardiovascular: irregular and rhythm. S1 & S2, negative for murmurs. Peripheral pulses 2+, capillary refill < 3 seconds. negative elevation of JVP. Trace edema  · Respiratory: Respirations symmetric and unlabored. Lungs to auscultation bilaterally, no crackles, or rhonchi + wheezes, O2  · Gastrointestinal: Abdomen soft and round. Bowel sounds normoactive in all quadrants. · Musculoskeletal: No focal weakness. · Neurologic/Psych: Awake and orientated to person, place and time. Calm affect, appropriate mood    Pertinent labs, diagnostic, device, and imaging results reviewed as a part of this visit    Labs:    BMP:   Recent Labs     21  04321  0436 21  0454    141 140   K 3.6 3.7 3.6   CL 95* 96* 97*   CO2 35* 33* 32   PHOS 3.9 4.9 4.1   BUN 44* 49* 46*   CREATININE 2.1* 2.3* 2.1*     Estimated Creatinine Clearance: 22 mL/min (A) (based on SCr of 2.1 mg/dL (H)). CBC:   Recent Labs     215 216 21  0454   WBC 8.9 9.3 8.4   HGB 7.8* 7.8* 7.2*   HCT 23.9* 24.3* 21.9*   MCV 99.4 99.9 99.3    328 300     Thyroid:   Lab Results   Component Value Date    TSH 1.71 2021    [id number] 6.4 2011     Lipids:   Lab Results   Component Value Date    CHOL 224 2016    HDL 64 2021    TRIG 123 2016     LFTS:   Lab Results   Component Value Date    ALT 16 2021    AST 16 2021    ALKPHOS 84 2021    PROT 6.2 2021    AGRATIO 1.1 2021    BILITOT <0.2 2021     Cardiac Enzymes:   Lab Results   Component Value Date    CKTOTAL 26 2020    TROPONINI 0.02 2021    TROPONINI <0.01 2020    TROPONINI <0.01 2020     Coags:   Lab Results   Component Value Date    PROTIME 12.2 2021    INR 1.05 2021     EC2021: Afib RVR    ECHO:  2021  Summary   Left ventricular cavity size is normal with moderate -severe concentric   hypertrophy.    Overall left ventricular systolic function appears normal.   Ejection fraction is

## 2021-06-09 NOTE — PROGRESS NOTES
Occupational Therapy  Facility/Department: [address]  Daily Treatment Note  NAME: Swetha Pennington  : 1940  MRN: [id number]    Date of Service: 2021    Discharge Recommendations: Swetha Pennington scored a 19/24 on the AM-PAC ADL Inpatient form. Current research shows that an AM-PAC score of 18 or greater is typically associated with a discharge to the patient's home setting. Based on the patient's AM-PAC score, and their current ADL deficits, it is recommended that the patient have 2-3 sessions per week of Occupational Therapy at d/c to increase the patient's independence. At this time, this patient demonstrates the endurance and safety to discharge home with home OT and a follow up treatment frequency of 2-3x/wk. Please see assessment section for further patient specific details. If patient discharges prior to next session this note will serve as a discharge summary. Please see below for the latest assessment towards goals. HOME HEALTH CARE: LEVEL 1 STANDARD    - Initial home health evaluation to occur within 24-48 hours, in patient home   - Therapy to evaluate with goal of regaining prior level of functioning   - Therapy to evaluate if patient has [id number] needs for personal care      24 hour supervision or assist  OT Equipment Recommendations  Equipment Needed: No    Assessment   Performance deficits / Impairments: Decreased functional mobility ; Decreased ADL status; Decreased endurance;Decreased balance;Decreased safe awareness  Assessment: Pt is below her baseline level of occupational function, based on the above deficits associated with acute renal failure. Pt would benefit from continued skilled acite OT services to address these deficits.   Treatment Diagnosis: Decreased ADL status, functional mobility, endurance, balance, and safety awareness associated with acute renal failure  Decision Making: Medium Complexity  History: Pt 69 yo, lives alone, has [facility] aide & [facility] RN and mobility  Functional Mobility  Functional - Mobility Device: Rolling Walker  Activity: To/from bathroom  Assist Level: Supervision  Toilet Transfers  Toilet - Technique: Ambulating  Equipment Used: Grab bars  Toilet Transfer: Supervision  Bed mobility  Supine to Sit: Unable to assess  Sit to Supine: Unable to assess  Comment: pt met in bathroom, left in recliner. Transfers  Sit to stand: Supervision  Stand to sit: Supervision  Transfer Comments: verbal cues for safety of hand placement                       Cognition  Overall Cognitive Status: Exceptions  Arousal/Alertness: Appropriate responses to stimuli  Following Commands: Follows multistep commands with increased time; Follows multistep commands with repitition  Attention Span: Appears intact  Memory: Appears intact  Safety Judgement: Decreased awareness of need for safety  Problem Solving: Assistance required to generate solutions;Assistance required to implement solutions  Insights: Decreased awareness of deficits  Initiation: Does not require cues  Sequencing: Does not require cues                                         Plan   Plan  Times per week: 3-5  Times per day: Daily  Current Treatment Recommendations: Safety Education & Training, Self-Care / ADL, Endurance Training, Functional Mobility Training, Cognitive Reorientation, Balance Training, Strengthening, Patient/Caregiver Education & Training  G-Code     OutComes Score                                                  -PAC Score        -[facility] Inpatient Daily Activity Raw Score: 19 (06/09/21 0943)  AM-PAC Inpatient ADL T-Scale Score : 40.22 (06/09/21 0943)  ADL Inpatient CMS 0-100% Score: 42.8 (06/09/21 0943)  ADL Inpatient CMS G-Code Modifier : CK (06/09/21 0943)    Goals  Short term goals  Time Frame for Short term goals: Discharge  Short term goal 1: SBA for functional transfers to ADL surfaces w/RW- GOAL MET  Short term goal 2: SBA for functional mobility w/RW for ADL activity- MET  Short term

## 2021-06-09 NOTE — PROGRESS NOTES
Clarence Dresser, MD Zane Libman, MD Karol Risen, MD               Office: [phone]                      Fax: [phone]          NEPHROLOGY PROGRESS NOTE:     PATIENT NAME: Swetha Pennington  : 1940  MRN: [id number]      Treatment plan update. Patient feels good and is requesting discharge. Improved creatinine to 2.1.  -Almost at her baseline level. Anemia levels are stable-hemoglobin is 7.2.  -Has been stable at this range for the past few days. No need for blood transfusion, however needs close monitoring    TSAT-15-mild iron deficiency. Start ferrous sulfate 325 mg once daily. Begin Aranesp 60 mcg subcut once every 15 days. Medications reviewed. Continue Lasix 40 mg once daily    SPEP and UPEP are negative and no abnormal proteinuria noted. Stable from renal for discharge              Acute kidney injury-improving-stable. -Repeat creatinine is 2.1. Chronic kidney disease-stage III-baseline creatinine 1.8. Multiple electrolyte imbalance-slow improvement.  -Potassium uhtpuy-zlsvouuoj-wkodfs potassium 3.6. -.. Anemia-stable-hemoglobin 7.8.-  Check  iron levels    -Needs SPEP UPEP. Medications reviewed. Diuretics reviewed and appropriate. Received Lasix IV 20 mg twice daily. Switch to Lasix 40 mg once daily tomorrow. Not ready for discharge today. Discussed patient. Discussed with RN at bedside. IMPRESSION / RECOMMENDATIONS:    Admitted with:  ARF (acute renal failure) ([facility] [address] [N17.9]    1. 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 Similar O2 requirement. Weight from 2020 was 144, 2021 was 151, 2021 was 157 and now Weight change: -3 lb (-1.361 kg)    order daily - Lasix 20mg IV BID. Strict I&O. Stoped maintenance IVF. 2.  Hypokalemia- replaced  3. Elevated HCO3-replace K.  follow   4. H/O HTN- no need for tight control   5. DM- ISS per medical team   6.   Anemia- elevated MCV.  f/u retic count, SPEP and UPEP. ** 24 hr done on 6/5: 2.3L: protein 0.092,   F/u UPEP , SPEP - in process    RAKESH subQ.   7.  H/O Diastolic CHF-follows with Dr. H[facility]COMB[facility], cardiology follow here. 8.  H/O CAD  9. H/O COPD  10. H/O Lung CA  11. H/O DM    Would need close f/u outpt - w/ Dr Trice Luke, will arrange. Other problems:     Patient Active Problem List   Diagnosis    DM (diabetes mellitus), secondary, uncontrolled, w/neurologic complic ([address]    Dyslipidemia    Mitral and aortic valve disease    HTN (hypertension), benign    Coronary atherosclerosis of native coronary artery    Obstructive sleep apnea    COPD, severe ([address]    Paroxysmal atrial fibrillation (HCC)    Mild malnutrition (HCC)    Closed compression fracture of thoracic vertebra (HCC)    Chronic deep vein thrombosis (DVT) of both lower extremities (HCC)    Ataxia    Recurrent falls    Diabetic peripheral neuropathy (HCC)    Chronic diastolic heart failure (HCC)    Malignant neoplasm of left lung (HCC)    Chronic obstructive pulmonary disease with acute lower respiratory infection ([address]    Poorly controlled type 2 diabetes mellitus ([address]    History of depression    Diabetes education, encounter for    Depression    Anxiety disorder    Acute kidney injury superimposed on CKD ([address]    ARF (acute renal failure) ([address]    Anemia    History of lung cancer    Acute on chronic congestive heart failure ([address]       : other supportive care :   - Check daily renal function panel with electrolytes-phosphorus  - Strict monitoring of I/Os, daily weight  - Renal feeds/diet  - Current medications reviewed. - Nephrotoxic medications have been discontinued. - Dose adjusted and appropriate. - Dose meds for eGFR <15 mL/min/1.73m2. during LORRAINE    - Avoid heavy opioids due to renal failure - may use very low dose dilaudid / fentanyl with close monitoring of CNS and respiratory depression. Please refer to the orders. High Complexity. Multiple complex problems. Discussed with patient,  and treatment team-   Thank you for allowing me to participate in this patient's care. Please do not hesitate to contact me with any questions/concerns. We will follow along with you. Brock Kahn MD,    [facility] [id number] [place] [address]: [phone] or [address]  Fax: [phone]      ================================================  ================================================      SUBJECTIVE:   Resting in bed  no active complaints,   Renal function stable. 24 hr urine done   - ROS reported: as mentioned in Subjective, HPI, CC, and all other 10 systems' review negative,   - PMH, [address], Social history: reviewed   - MEDICATIONS:  Prior to Admission Medications:  Medications Prior to Admission: gabapentin (NEURONTIN) 100 MG capsule, Take 100 mg by mouth nightly. cyclobenzaprine (FLEXERIL) 10 MG tablet, Take 10 mg by mouth 3 times daily as needed for Muscle spasms  oxyCODONE 5 MG capsule, Take 5 mg by mouth every 6 hours as needed for Pain.  lidocaine (LIDODERM) 5 %, Place 2 patches onto the skin daily Indications: To lower back and left shoulder/arm 12 hours on, 12 hours off.   Calcium Carb-Cholecalciferol (CALCIUM+D3) 500-600 MG-UNIT TABS, Take 1 tablet by mouth daily  albuterol sulfate  (90 Base) MCG/ACT inhaler, Inhale 2 puffs into the lungs every 4 hours as needed for Wheezing  ipratropium-albuterol (DUONEB) 0.5-2.5 (3) MG/3ML SOLN nebulizer solution, Inhale 3 mLs into the lungs every 4 hours (while awake) DX:COPD J44.9  Continuous Blood Gluc  (FREESTYLE LOC 2 READER) ELADIA, Check blood sugars twice daily  Continuous Blood Gluc Sensor (FREESTYLE LOC 2 SENSOR) MISC, Check blood sugars twice daily  Continuous Blood Gluc  (FREESTYLE OLC 14 DAY READER) ELADIA, Check blood sugars twice daily  digoxin (LANOXIN) 125 MCG tablet, Take 1 tab by mouth on Monday -Wednesday-Friday  linaclotide (LINZESS) 145 MCG capsule, Take 145 mcg by mouth every morning (before breakfast)  furosemide (LASIX) 40 MG tablet, Take 1 tablet by mouth daily  predniSONE (DELTASONE) 10 MG tablet, Take 1 tablet by mouth daily  pantoprazole (PROTONIX) 40 MG tablet, Take 1 tablet by mouth every morning (before breakfast)  mirtazapine (REMERON) 15 MG tablet, Take 1 tablet by mouth nightly (Patient taking differently: Take 30 mg by mouth as needed )  [DISCONTINUED] apixaban (ELIQUIS) 2.5 MG TABS tablet, Take 1 tablet by mouth 2 times daily  glimepiride (AMARYL) 2 MG tablet, Take 2 mg by mouth every morning (before breakfast)  [DISCONTINUED] methocarbamol (ROBAXIN) 500 MG tablet, Take 500 mg by mouth 3 times daily   linagliptin (TRADJENTA) 5 MG tablet, Take 1 tablet by mouth daily  [DISCONTINUED] dilTIAZem (CARDIZEM 12 HR) 60 MG extended release capsule, Take 1 capsule by mouth 2 times daily  [DISCONTINUED] metoprolol succinate (TOPROL XL) 25 MG extended release tablet, Take 1 tablet by mouth daily     Scheduled Meds:   ferrous sulfate  325 mg Oral Daily with breakfast    epoetin ariel-epbx  3,000 Units Subcutaneous Once    furosemide  40 mg Oral Daily    dilTIAZem  90 mg Oral BID    amiodarone  200 mg Oral Daily    budesonide-formoterol  2 puff Inhalation BID    lidocaine  2 patch Transdermal Daily    gabapentin  100 mg Oral Nightly    sennosides-docusate sodium  2 tablet Oral Daily    polyethylene glycol  17 g Oral Daily    linaclotide  145 mcg Oral QAM AC    mirtazapine  15 mg Oral Nightly    predniSONE  10 mg Oral Daily    pantoprazole  40 mg Oral QAM AC    linagliptin  5 mg Oral Daily    digoxin  125 mcg Oral Q MWF    ipratropium-albuterol  3 mL Inhalation Q4H WA    calcium-vitamin D  1 tablet Oral Daily    sodium chloride flush  5-40 mL Intravenous 2 times per day    insulin lispro  0-12 Units Subcutaneous TID WC    insulin lispro  0-6 Units Subcutaneous Nightly     Continuous Infusions:   sodium chloride      dextrose 06/07/21  0434 06/08/21  0436 06/09/21  0454    141 140   K 3.6 3.7 3.6   CL 95* 96* 97*   CO2 35* 33* 32   BUN 44* 49* 46*   CREATININE 2.1* 2.3* 2.1*     Recent Labs     06/07/21  0434 06/08/21  0436 06/09/21  0454   CALCIUM 9.4 9.3 9.2   PHOS 3.9 4.9 4.1     No results for input(s): PH, PCO2, PO2 in the last 72 hours.     Invalid input(s): Meghana Keene    ABG:  No results found for: PH, PCO2, PO2, HCO3, BE, THGB, TCO2, O2SAT  VBG:    Lab Results   Component Value Date    PHVEN 7.603 06/05/2021    TUZ4PPN 37.0 06/05/2021    BEVEN 13.8 06/05/2021    Y9PROUVD 100 06/05/2021       LDH:  No results found for: LDH  Uric Acid:    Lab Results   Component Value Date    LABURIC 4.1 12/25/2018       PT/INR:    Lab Results   Component Value Date    PROTIME 12.2 06/03/2021    INR 1.05 06/03/2021     Warfarin PT/INR:  No components found for: PTPATWAR, PTINRWAR  PTT:    Lab Results   Component Value Date    APTT 27.0 06/03/2021   [APTT}    Last 3 Troponin:    Lab Results   Component Value Date    TROPONINI 0.02 06/03/2021    TROPONINI <0.01 07/11/2020    TROPONINI <0.01 06/04/2020       U/A:    Lab Results   Component Value Date    COLORU YELLOW 06/03/2021    PROTEINU Negative 06/03/2021    PHUR 6.0 06/03/2021    WBCUA 0 06/03/2021    RBCUA 0 06/03/2021    YEAST Present 08/04/2019    BACTERIA 2+ 05/31/2020    CLARITYU Clear 06/03/2021    SPECGRAV 1.014 06/03/2021    LEUKOCYTESUR Negative 06/03/2021    UROBILINOGEN 0.2 06/03/2021    BILIRUBINUR Negative 06/03/2021    BLOODU Negative 06/03/2021    GLUCOSEU 500 06/03/2021     Microalbumen/Creatinine ratio:  No components found for: RUCREAT  24 Hour Urine for Protein:  No components found for: RAWUPRO, UHRS3, MAZQ98AX, UTV3  24 Hour Urine for Creatinine Clearance:  No components found for: CREAT4, UHRS10, UTV10  Urine Toxicology: No components found for: IAMMENTA, IBARBIT, IBENZO, ICOCAINE, IMARTHC, IOPIATES, IPHENCYC    HgBA1c:    Lab Results   Component Value Date    LABA1C 5.8 05/04/2021     RPR:  No results found for: RPR  HIV:  No results found for: HIV  ROXANNE:  No results found for: ANATITER, ROXANNE  RF:  No results found for: RF  DSDNA:  No components found for: DNA  AMYLASE:  No results found for: AMYLASE  LIPASE:    Lab Results   Component Value Date    LIPASE 46.0 07/11/2020     Fibrinogen Level:  No components found for: FIB      BELOW MENTIONED RADIOLOGY STUDIES REVIEWED BY ME:    XR CHEST (2 VW)    Result Date: 6/3/2021  EXAMINATION: TWO XRAY VIEWS OF THE CHEST 6/3/2021 5:42 pm COMPARISON: 7/17/2020 HISTORY: ORDERING SYSTEM PROVIDED HISTORY: sob TECHNOLOGIST PROVIDED HISTORY: Reason for exam:->sob Reason for Exam: Shortness of Breath (sent by Phil Adams for SOB and retaining fluid) Acuity: Unknown Type of Exam: Unknown FINDINGS: Frontal and lateral views of the chest were performed. There is no acute skeletal abnormality. There are chronic healed left rib fractures. There are multiple chronic wedge compression fractures of multiple lower thoracic and upper lumbar vertebral bodies, augmented by vertebroplasty cement. The heart size and mediastinal contours are stable and within normal limits. There is a stable focus of pleural-parenchymal scarring noted within the left perihilar space, unchanged from prior exams. There is stable mild chronic biapical pleural-parenchymal scarring. The lungs are otherwise clear, without evidence of acute airspace consolidation, pneumothorax, pleural effusion, or pulmonary edema. 1. No acute cardiopulmonary disease. 2. Chronic pleural and parenchymal scarring within the left perihilar space.

## 2021-06-09 NOTE — CARE COORDINATION
Patient discharged  6-9-21   MD notified via [facility]   [place] with [facility], pending a [facility]. order for PT/OT,   [phone]         Fax: [phone]        Addendum: 5:02pm, called faxed home care order to [facility]  All discharge needs met per case management

## 2021-06-10 ENCOUNTER — CARE COORDINATION (OUTPATIENT)
Dept: CASE MANAGEMENT | Age: 81
End: 2021-06-10

## 2021-06-10 DIAGNOSIS — N17.9 ACUTE RENAL FAILURE, UNSPECIFIED ACUTE RENAL FAILURE TYPE (HCC): Primary | ICD-10-CM

## 2021-06-10 LAB — SPE/IFE INTERPRETATION: NORMAL

## 2021-06-10 PROCEDURE — 1111F DSCHRG MED/CURRENT MED MERGE: CPT | Performed by: INTERNAL MEDICINE

## 2021-06-10 NOTE — DISCHARGE SUMMARY
Patient discharging to home with [facility] home care services. IV and tele removed. Paperwork explained and given to pt. All belongings gathered and given to patient. Spent 20 minutes of CHF teaching including signs and symptoms, weighing self, med and fluid intake and when to call their HCP. Patient voiced understanding.

## 2021-06-10 NOTE — CARE COORDINATION
Jose C [facility] Transitions Initial Follow Up Call:  Patient states that she did not sleep well last night, she is afebrile with no other symptoms to report. Discussed discharge instructions and reviewed medications, 1111F completed. She has a follow up with PCP on Monday. She denies any questions or concerns at this time. CTN will continue with outreach follow up calls. Call within 2 business days of discharge: Yes    Patient: Savanna Escudero Patient : 1940   MRN: [id number]  Reason for Admission: renal failure  Discharge Date: 21 RARS: Readmission Risk Score: 38      Last Discharge [address] [address]       Complaint Diagnosis Description Type Department Provider    6/3/21 Shortness of Breath Acute on chronic congestive heart failure, unspecified heart failure type ([facility] [address] . .. ED to Hosp-Admission (Discharged) (ADMITTED) Toby Gandhi MD           Spoke with: Savanna Escudero      Transitions of Care Initial Call    Was this an external facility discharge? No Discharge Facility:     Challenges to be reviewed by the provider   Additional needs identified to be addressed with provider: No  none             Method of communication with provider : none      Advance Care Planning:   Does patient have an Advance Directive:  reviewed and current. Was this a readmission? no  Patient stated reason for admission: kidneys  Patients top risk factors for readmission: medical condition-.    Care Transition Nurse (CTN) contacted the patient by telephone to perform post hospital discharge assessment. Verified name and  with patient as identifiers. Provided introduction to self, and explanation of the CTN role. CTN reviewed discharge instructions, medical action plan and red flags with patient who verbalized understanding. Patient given an opportunity to ask questions and does not have any further questions or concerns at this time. Were discharge instructions available to patient?  Yes. Reviewed appropriate site of care based on symptoms and resources available to patient including: PCP, Urgent care clinics, Home health and When to call 911. The patient agrees to contact the PCP office for questions related to their healthcare. Medication reconciliation was performed with patient, who verbalizes understanding of administration of home medications. Advised obtaining a 90-day supply of all daily and as-needed medications. Was patient discharged with a pulse oximeter? No     CTN provided contact information. Plan for follow-up call in 5-7 days based on severity of symptoms and risk factors.           Non-face-to-face services provided:  Obtained and reviewed discharge summary and/or continuity of care documents    Care Transitions 24 Hour Call    Do you have any ongoing symptoms?: No  Do you have a copy of your discharge instructions?: Yes  Do you have all of your prescriptions and are they filled?: No  Have you been contacted by a [address]?: No  Have you scheduled your follow up appointment?: Yes  How are you going to get to your appointment?: Car - family or friend to transport  Were you discharged with any Home Care or Post Acute Services: Yes  Post Acute Services: Home Health (Comment: [facility])  Do you feel like you have everything you need to keep you well at home?: Yes  Care Transitions Interventions         Follow Up  Future Appointments   Date Time Provider Ramo [place]   6/14/2021  1:45 PM Cathye Gowers, MD [facility] [place] [facility]   6/16/2021  3:00 PM MARIA C Rizo - CNP  [facility]   7/8/2021 10:45 AM Georgie Johnston MD  [facility]   7/27/2021  8:45 AM Jonatan King MD  [facility] Won, RN

## 2021-06-14 ENCOUNTER — TELEPHONE (OUTPATIENT)
Dept: PRIMARY CARE CLINIC | Age: 81
End: 2021-06-14

## 2021-06-14 ENCOUNTER — HOSPITAL ENCOUNTER (OUTPATIENT)
Age: 81
Discharge: HOME | End: 2021-06-14
Payer: MEDICARE

## 2021-06-14 ENCOUNTER — OFFICE VISIT (OUTPATIENT)
Dept: PRIMARY CARE CLINIC | Age: 81
End: 2021-06-14
Payer: MEDICARE

## 2021-06-14 ENCOUNTER — HOSPITAL ENCOUNTER (OUTPATIENT)
Dept: GENERAL RADIOLOGY | Age: 81
Discharge: HOME | End: 2021-06-14
Payer: MEDICARE

## 2021-06-14 VITALS
SYSTOLIC BLOOD PRESSURE: 132 MMHG | WEIGHT: 162 LBS | BODY MASS INDEX: 26.99 KG/M2 | HEART RATE: 86 BPM | HEIGHT: 65 IN | DIASTOLIC BLOOD PRESSURE: 56 MMHG | OXYGEN SATURATION: 90 %

## 2021-06-14 DIAGNOSIS — I50.9 CHRONIC CONGESTIVE HEART FAILURE, UNSPECIFIED HEART FAILURE TYPE (HCC): ICD-10-CM

## 2021-06-14 DIAGNOSIS — N28.9 RENAL INSUFFICIENCY: ICD-10-CM

## 2021-06-14 DIAGNOSIS — D64.9 ANEMIA, UNSPECIFIED TYPE: ICD-10-CM

## 2021-06-14 DIAGNOSIS — D64.9 ANEMIA, UNSPECIFIED TYPE: Primary | ICD-10-CM

## 2021-06-14 LAB
A/G RATIO: 1.3 (ref 1.1–2.2)
ALBUMIN SERPL-MCNC: 3.8 G/DL (ref 3.4–5)
ALP BLD-CCNC: 84 U/L (ref 40–129)
ALT SERPL-CCNC: 17 U/L (ref 10–40)
ANION GAP SERPL CALCULATED.3IONS-SCNC: 18 MMOL/L (ref 3–16)
AST SERPL-CCNC: 15 U/L (ref 15–37)
BASOPHILS ABSOLUTE: 0.1 K/UL (ref 0–0.2)
BASOPHILS RELATIVE PERCENT: 0.7 %
BILIRUB SERPL-MCNC: <0.2 MG/DL (ref 0–1)
BUN BLDV-MCNC: 36 MG/DL (ref 7–20)
CALCIUM SERPL-MCNC: 10.1 MG/DL (ref 8.3–10.6)
CHLORIDE BLD-SCNC: 91 MMOL/L (ref 99–110)
CO2: 26 MMOL/L (ref 21–32)
CREAT SERPL-MCNC: 2.1 MG/DL (ref 0.6–1.2)
EOSINOPHILS ABSOLUTE: 0.7 K/UL (ref 0–0.6)
EOSINOPHILS RELATIVE PERCENT: 6 %
GFR AFRICAN AMERICAN: 28
GFR NON-AFRICAN AMERICAN: 23
GLOBULIN: 3 G/DL
GLUCOSE BLD-MCNC: 347 MG/DL (ref 70–99)
HCT VFR BLD CALC: 25.6 % (ref 36–48)
HEMOGLOBIN: 8.2 G/DL (ref 12–16)
IRON SATURATION: 54 % (ref 15–50)
IRON: 151 UG/DL (ref 37–145)
LYMPHOCYTES ABSOLUTE: 0.8 K/UL (ref 1–5.1)
LYMPHOCYTES RELATIVE PERCENT: 6.4 %
MCH RBC QN AUTO: 31.7 PG (ref 26–34)
MCHC RBC AUTO-ENTMCNC: 31.9 G/DL (ref 31–36)
MCV RBC AUTO: 99.3 FL (ref 80–100)
MONOCYTES ABSOLUTE: 0.8 K/UL (ref 0–1.3)
MONOCYTES RELATIVE PERCENT: 6.7 %
NEUTROPHILS ABSOLUTE: 9.9 K/UL (ref 1.7–7.7)
NEUTROPHILS RELATIVE PERCENT: 80.2 %
PDW BLD-RTO: 18.2 % (ref 12.4–15.4)
PLATELET # BLD: 292 K/UL (ref 135–450)
PMV BLD AUTO: 8.3 FL (ref 5–10.5)
POTASSIUM SERPL-SCNC: 3.5 MMOL/L (ref 3.5–5.1)
RBC # BLD: 2.58 M/UL (ref 4–5.2)
SODIUM BLD-SCNC: 135 MMOL/L (ref 136–145)
TOTAL IRON BINDING CAPACITY: 280 UG/DL (ref 260–445)
TOTAL PROTEIN: 6.8 G/DL (ref 6.4–8.2)
WBC # BLD: 12.4 K/UL (ref 4–11)

## 2021-06-14 PROCEDURE — 83550 IRON BINDING TEST: CPT

## 2021-06-14 PROCEDURE — 99214 OFFICE O/P EST MOD 30 MIN: CPT | Performed by: INTERNAL MEDICINE

## 2021-06-14 PROCEDURE — 85025 COMPLETE CBC W/AUTO DIFF WBC: CPT

## 2021-06-14 PROCEDURE — 83036 HEMOGLOBIN GLYCOSYLATED A1C: CPT

## 2021-06-14 PROCEDURE — 71046 X-RAY EXAM CHEST 2 VIEWS: CPT

## 2021-06-14 PROCEDURE — 36415 COLL VENOUS BLD VENIPUNCTURE: CPT

## 2021-06-14 PROCEDURE — 80053 COMPREHEN METABOLIC PANEL: CPT

## 2021-06-14 PROCEDURE — 83540 ASSAY OF IRON: CPT

## 2021-06-14 NOTE — PROGRESS NOTES
morning (before breakfast)      furosemide (LASIX) 40 MG tablet Take 1 tablet by mouth daily 90 tablet 3    predniSONE (DELTASONE) 10 MG tablet Take 1 tablet by mouth daily 30 tablet 0    pantoprazole (PROTONIX) 40 MG tablet Take 1 tablet by mouth every morning (before breakfast) 30 tablet 1    linagliptin (TRADJENTA) 5 MG tablet Take 1 tablet by mouth daily 30 tablet 1    mirtazapine (REMERON) 15 MG tablet Take 1 tablet by mouth nightly (Patient taking differently: Take 30 mg by mouth as needed ) 30 tablet 3     No current facility-administered medications on file prior to visit. Chief Complaint   Patient presents with    Follow-Up from Hospital         HPI: 6-week follow-up after her initial visit here. As noted previously, she had 65 active problems on her problem list.  Since her last visit here she now states that she is taking the Remeron 30 mg as needed. Was hospitalized 10 days ago for CHF, Echo showed no major valvular issues and LVEF 65%   Cr 2.1, anemia 7.2/21.9  Blood sugars totally uncontrolled  Hematology has her on iron and gets labs done there    Sees Cardiology on Wed    she's seeing them all, has a nephrology now    They stopped Eliquis and increased Cardizem    We called 911 this AM  THIS IS EXACTLY WHAT SHE ACTS LIKE WHEN SHE GETS PNEUMONIA    Review of Systems-generally positive    OBJECTIVE:  Ht 5' 5\" (1.651 m)   Wt 162 lb (73.5 kg)   BMI 26.96 kg/m²    Ht 5' 5\" (1.651 m)   Wt 162 lb (73.5 kg)   BMI 26.96 kg/m²   BP (!) 132/56 (Site: Left Upper Arm, Position: Sitting, Cuff Size: Medium Adult)   Pulse 86   Ht 5' 5\" (1.651 m)   Wt 162 lb (73.5 kg)   SpO2 90% Comment: 3L o2  BMI 26.96 kg/m²   Wt Readings from Last 3 Encounters:   06/14/21 162 lb (73.5 kg)   06/09/21 162 lb (73.5 kg)   05/10/21 157 lb 8 oz (71.4 kg)       Physical Exam  Vitals and nursing note reviewed. Exam conducted with a chaperone present (daughter).    Constitutional:       General: She is not in acute distress. Appearance: Normal appearance. She is well-developed. HENT:      Head: Normocephalic and atraumatic. Right Ear: Tympanic membrane, ear canal and external ear normal.      Left Ear: Tympanic membrane, ear canal and external ear normal.      Nose: Nose normal. No rhinorrhea. Mouth/Throat:      Pharynx: No oropharyngeal exudate or posterior oropharyngeal erythema. Eyes:      General:         Right eye: No discharge. Left eye: No discharge. Extraocular Movements: Extraocular movements intact. Conjunctiva/sclera: Conjunctivae normal.      Pupils: Pupils are equal, round, and reactive to light. Neck:      Thyroid: No thyromegaly. Vascular: No carotid bruit or JVD. Cardiovascular:      Rate and Rhythm: Normal rate and regular rhythm. Pulses: Normal pulses. Heart sounds: Normal heart sounds. No murmur heard. Pulmonary:      Effort: Pulmonary effort is normal. No respiratory distress. Breath sounds: Normal breath sounds. No wheezing, rhonchi or rales. Abdominal:      General: Bowel sounds are normal. There is no distension. Palpations: Abdomen is soft. Tenderness: There is no abdominal tenderness. There is no rebound. Musculoskeletal:         General: No swelling. Cervical back: Normal range of motion. No muscular tenderness. Right lower leg: No edema. Left lower leg: No edema. Comments: FROM x 4   Lymphadenopathy:      Cervical: No cervical adenopathy. Skin:     General: Skin is warm and dry. Capillary Refill: Capillary refill takes 2 to 3 seconds. Coloration: Skin is not pale. Findings: No erythema or rash. Neurological:      Mental Status: She is alert and oriented to person, place, and time. Cranial Nerves: No cranial nerve deficit. Sensory: No sensory deficit. Motor: No abnormal muscle tone.       Deep Tendon Reflexes: Reflexes normal.   Psychiatric:         Mood and Affect: Mood normal.         Behavior: Behavior normal.         Thought Content:  Thought content normal.         Judgment: Judgment normal.         Data Review:   CBC:   Lab Results   Component Value Date    WBC 8.4 06/09/2021    WBC 9.3 06/08/2021    WBC 8.9 06/07/2021    HGB 7.2 06/09/2021    HGB 7.8 06/08/2021    HGB 7.8 06/07/2021    HCT 21.9 06/09/2021    HCT 24.3 06/08/2021    HCT 23.9 06/07/2021    MCV 99.3 06/09/2021    MCV 99.9 06/08/2021    MCV 99.4 06/07/2021     06/09/2021     06/08/2021     06/07/2021     Chemistry:   Lab Results   Component Value Date     06/09/2021     06/08/2021     06/07/2021    K 3.6 06/09/2021    K 3.7 06/08/2021    K 3.6 06/07/2021    K 3.8 10/21/2020    K 4.1 10/17/2020    K 4.0 07/17/2020    CL 97 06/09/2021    CL 96 06/08/2021    CL 95 06/07/2021    CO2 32 06/09/2021    CO2 33 06/08/2021    CO2 35 06/07/2021    PHOS 4.1 06/09/2021    PHOS 4.9 06/08/2021    PHOS 3.9 06/07/2021    BUN 46 06/09/2021    BUN 49 06/08/2021    BUN 44 06/07/2021    CREATININE 2.1 06/09/2021    CREATININE 2.3 06/08/2021    CREATININE 2.1 06/07/2021     Hepatic Function:   Lab Results   Component Value Date    AST 16 06/03/2021    AST 14 05/04/2021    AST 17 10/16/2020    ALT 16 06/03/2021    ALT 15 05/04/2021    ALT 19 10/16/2020    BILIDIR <0.2 07/11/2020    BILIDIR <0.2 12/23/2018    BILITOT <0.2 06/03/2021    BILITOT <0.2 05/04/2021    BILITOT 0.3 10/16/2020    ALKPHOS 84 06/03/2021    ALKPHOS 69 05/04/2021    ALKPHOS 118 10/16/2020     Lab Results   Component Value Date    LIPASE 46.0 07/11/2020    LIPASE 26.0 02/22/2019    LIPASE 26.0 12/20/2018     Lipids:   Lab Results   Component Value Date    CHOL 224 05/19/2016    HDL 64 05/04/2021    TRIG 123 05/19/2016       ASSESSMENT/PLAN  1.) Anemia-check stool FIT  Check iron levels with repeat CBc    2.) Chronic renal insufficiency-check renal panel today  Sees Nephrology    3.) uncontrolled NIDDM-on Trulicity-check C9I with today's labs  Has CGM so wants a sliding scale for insulin coverage    4.) CHF with essentially normal Echo findings  Sees Cardiology   f/u in 2 weeks    Mis Roy MD    Electronically signed by Mis Roy MD on 6/14/2021 at 1:52 PM

## 2021-06-15 LAB
ESTIMATED AVERAGE GLUCOSE: 145.6 MG/DL
HBA1C MFR BLD: 6.7 %

## 2021-06-17 ENCOUNTER — HOSPITAL ENCOUNTER (OUTPATIENT)
Age: 81
Discharge: HOME | End: 2021-06-17
Payer: MEDICARE

## 2021-06-17 ENCOUNTER — NURSE ONLY (OUTPATIENT)
Dept: PRIMARY CARE CLINIC | Age: 81
End: 2021-06-17
Payer: MEDICARE

## 2021-06-17 ENCOUNTER — OFFICE VISIT (OUTPATIENT)
Dept: CARDIOLOGY CLINIC | Age: 81
End: 2021-06-17
Payer: MEDICARE

## 2021-06-17 VITALS
BODY MASS INDEX: 27.49 KG/M2 | OXYGEN SATURATION: 95 % | SYSTOLIC BLOOD PRESSURE: 150 MMHG | HEIGHT: 65 IN | WEIGHT: 165 LBS | DIASTOLIC BLOOD PRESSURE: 50 MMHG | HEART RATE: 93 BPM

## 2021-06-17 DIAGNOSIS — I48.0 PAROXYSMAL ATRIAL FIBRILLATION (HCC): ICD-10-CM

## 2021-06-17 DIAGNOSIS — R30.0 DYSURIA: ICD-10-CM

## 2021-06-17 DIAGNOSIS — R06.02 SOB (SHORTNESS OF BREATH): ICD-10-CM

## 2021-06-17 DIAGNOSIS — I50.33 ACUTE ON CHRONIC DIASTOLIC CONGESTIVE HEART FAILURE (HCC): Primary | ICD-10-CM

## 2021-06-17 DIAGNOSIS — D64.9 ANEMIA, UNSPECIFIED TYPE: ICD-10-CM

## 2021-06-17 DIAGNOSIS — N18.9 ACUTE KIDNEY INJURY SUPERIMPOSED ON CKD (HCC): ICD-10-CM

## 2021-06-17 DIAGNOSIS — Z76.89 ESTABLISHING CARE WITH NEW DOCTOR, ENCOUNTER FOR: ICD-10-CM

## 2021-06-17 DIAGNOSIS — I10 HTN (HYPERTENSION), BENIGN: ICD-10-CM

## 2021-06-17 DIAGNOSIS — I50.33 ACUTE ON CHRONIC DIASTOLIC CONGESTIVE HEART FAILURE (HCC): ICD-10-CM

## 2021-06-17 DIAGNOSIS — N17.9 ACUTE KIDNEY INJURY SUPERIMPOSED ON CKD (HCC): ICD-10-CM

## 2021-06-17 LAB
ALBUMIN SERPL-MCNC: 3.7 G/DL (ref 3.4–5)
ANION GAP SERPL CALCULATED.3IONS-SCNC: 16 MMOL/L (ref 3–16)
BUN BLDV-MCNC: 36 MG/DL (ref 7–20)
CALCIUM SERPL-MCNC: 9.3 MG/DL (ref 8.3–10.6)
CHLORIDE BLD-SCNC: 96 MMOL/L (ref 99–110)
CO2: 28 MMOL/L (ref 21–32)
CONTROL: POSITIVE
CREAT SERPL-MCNC: 2 MG/DL (ref 0.6–1.2)
GFR AFRICAN AMERICAN: 29
GFR NON-AFRICAN AMERICAN: 24
GLUCOSE BLD-MCNC: 386 MG/DL (ref 70–99)
HCT VFR BLD CALC: 25.4 % (ref 36–48)
HEMOCCULT STL QL: POSITIVE
HEMOGLOBIN: 8.1 G/DL (ref 12–16)
MCH RBC QN AUTO: 32.2 PG (ref 26–34)
MCHC RBC AUTO-ENTMCNC: 32 G/DL (ref 31–36)
MCV RBC AUTO: 100.7 FL (ref 80–100)
PDW BLD-RTO: 18.6 % (ref 12.4–15.4)
PHOSPHORUS: 2.5 MG/DL (ref 2.5–4.9)
PLATELET # BLD: 240 K/UL (ref 135–450)
PMV BLD AUTO: 8.5 FL (ref 5–10.5)
POTASSIUM SERPL-SCNC: 3.6 MMOL/L (ref 3.5–5.1)
PRO-BNP: 1323 PG/ML (ref 0–449)
RBC # BLD: 2.52 M/UL (ref 4–5.2)
SODIUM BLD-SCNC: 140 MMOL/L (ref 136–145)
WBC # BLD: 10.3 K/UL (ref 4–11)

## 2021-06-17 PROCEDURE — 80053 COMPREHEN METABOLIC PANEL: CPT

## 2021-06-17 PROCEDURE — 99214 OFFICE O/P EST MOD 30 MIN: CPT | Performed by: NURSE PRACTITIONER

## 2021-06-17 PROCEDURE — 83880 ASSAY OF NATRIURETIC PEPTIDE: CPT

## 2021-06-17 PROCEDURE — 82274 ASSAY TEST FOR BLOOD FECAL: CPT | Performed by: INTERNAL MEDICINE

## 2021-06-17 PROCEDURE — 85027 COMPLETE CBC AUTOMATED: CPT

## 2021-06-17 PROCEDURE — 36415 COLL VENOUS BLD VENIPUNCTURE: CPT

## 2021-06-17 RX ORDER — FUROSEMIDE 40 MG/1
40 TABLET ORAL 2 TIMES DAILY
Qty: 180 TABLET | Refills: 3 | Status: SHIPPED | OUTPATIENT
Start: 2021-06-17

## 2021-06-17 ASSESSMENT — ENCOUNTER SYMPTOMS: SHORTNESS OF BREATH: 1

## 2021-06-17 NOTE — PROGRESS NOTES
[address]   Congestive Heart Failure    Primary Care Doctor:  Pavel Lozada MD  Primary Cardiologist: Abdulaziz Hensley    Last/Next OV: July 2021    Chief Complaint:  SOB    History of Present Illness:  Savanna Escudero is a 68 y.o. female with PMH HTN, HLD, DM, CAD, LIZETT, HFpEF, PAF, lung Cancer, anemia who presents today for hospital f/u. Savanna Escudero was admitted 6/3/21 and discharged 6/14/21. Hospital course: presents with a Chief Complaint of leg swelling/fluid retention increasing shortness of breath for about a month.  She was seen and evaluated by her cardiologist  who sent her to the ED. Creat elevated on admit, 2.1  Review of old chart shows baseline 1.1 on 12/8/2020  Placed on lasix per renal (cards also involved)  Renal fxn stabilizes  Workup for myeloma done; SPEP an UPEP negative for protein  Pt with PAfib - anticoagulation stopped due to anemia, watchman discussed       Since hospitalization she reports increased dyspnea and edema, chronic orthopnea, fatigue, and dizziness. She has not been weighing but her wt here is 3lb over hosp d/c wt. She denies chest pain, palpitations, PND, exertional chest pressure/discomfort, early saiety, syncope.       hospital weight on d/c was 162lb and discharge home weight was elena Pham today      Baseline Weight: 162  Wt Readings from Last 3 Encounters:   06/17/21 165 lb (74.8 kg)   06/14/21 162 lb (73.5 kg)   06/09/21 162 lb (73.5 kg)      Admit BNP: 1042   Discharge BNP: 1265      EF: 65%  Cardiac Imaging:  ECHO:  6/7/2021  Summary   Left ventricular cavity size is normal with moderate -severe concentric   hypertrophy.   Overall left ventricular systolic function appears normal.   Ejection fraction is visually estimated to be 65%.   No regional wall motion abnormalities are noted.   Grade I diastolic dysfunction with normal LV filling pressures.   Mild mitral annular calcification.   Mitral valve leaflets appear mildly thickened.   Trivial mitral regurgitation.   Mild aortic valve calcification without any evidence of aortic stenosis.   Mild aortic regurgitation. Stress Test:   Summary    Normal LV size and global systolic function.    Left ventricular ejection fraction of 58%.    There is a medium sized, moderate intensity, completely fixed defect in the    mid to basilar inferolateral segments, with associated hypokinesis-    consistent with scar.    There are no significant reversible coronary flow abnormalities. Activity: below baseline  Can you walk 1-2 blocks or do a moderate amount of house/yard work? No  Cardiac Rehab Referral: No     NYHA Class: III       Sodium Restrictions: 3g  Fluid Restrictions: 48-64 oz/day  Sodium and fluid restriction compliance: poor, discussed today    Pt Education: The patient has received education on the following topics: dietary sodium restriction, heart failure medications, the importance of physical activity, symptom management and weight monitoring          Past Medical History:   has a past medical history of Arthritis, CAD (coronary artery disease), Carpal tunnel syndrome, COPD (chronic obstructive pulmonary disease) ([address] Coronary stent, depression, Diabetes mellitus ([address] Diastolic heart failure ([address] GERD (gastroesophageal reflux disease), Hyperlipidemia, Hypertension, Lung cancer ([address] MI (myocardial infarction) ([address] LIZETT (obstructive sleep apnea), PONV (postoperative nausea and vomiting), and stress incontinence. Surgical History:   has a past surgical history that includes Coronary angioplasty with stent (2000, 2001); back surgery (2000, 2001); bronchoscopy (12/04/2018); pr brnchsc incl fluor gdnce dx w/cell washg spx (N/A, 12/4/2018); Cholecystectomy, laparoscopic (N/A, 12/19/2018); Upper gastrointestinal endoscopy (N/A, 2/25/2019); Colonoscopy (N/A, 2/25/2019);  Colonoscopy (2/25/2019); bronchoscopy (N/A, 2/27/2019); bronchoscopy (2/27/2019); bronchoscopy (2/27/2019); bronchoscopy (N/A, 8/6/2019); Upper gastrointestinal endoscopy (N/A, 8/7/2019); bronchoscopy (N/A, 9/27/2019); bronchoscopy (9/27/2019); bronchoscopy (9/27/2019); IR KYPHOPLASTY THORACIC 1 VERTEBRAL BODY (7/23/2020); IR KYPHOPLASTY THORACIC 1 VERTEBRAL BODY (10/20/2020); and IR KYPHOPLASTY LUMBAR 1 VERTEBRAL BODY (12/8/2020). Social History:   reports that she quit smoking about 19 years ago. Her smoking use included cigarettes. She quit after 10.00 years of use. She has never used smokeless tobacco. She reports that she does not drink alcohol and does not use drugs. Family History:   Family History   Problem Relation Age of Onset    Cancer Sister     Diabetes Sister     Diabetes Brother     Heart Disease Father     Heart Disease Mother     Cancer Maternal Uncle        Home Medications:  Prior to Admission medications    Medication Sig Start Date End Date Taking? Authorizing Provider   insulin lispro (HUMALOG) 100 UNIT/ML injection vial Insulin as per sliding scale 6/14/21   Genia Oden MD   amiodarone (CORDARONE) 200 MG tablet Take 1 tablet by mouth daily 6/9/21   Kizzy Curtis MD   dilTIAZem (CARDIZEM 12 HR) 90 MG extended release capsule Take 1 capsule by mouth 2 times daily 6/9/21   Kizzy Curtis MD   ferrous sulfate (IRON 325) 325 (65 Fe) MG tablet Take 1 tablet by mouth daily (with breakfast) 6/9/21   Kizzy Curtis MD   gabapentin (NEURONTIN) 100 MG capsule Take 100 mg by mouth nightly. Historical Provider, MD   cyclobenzaprine (FLEXERIL) 10 MG tablet Take 10 mg by mouth 3 times daily as needed for Muscle spasms    Historical Provider, MD   oxyCODONE 5 MG capsule Take 5 mg by mouth every 6 hours as needed for Pain. Historical Provider, MD   lidocaine (LIDODERM) 5 % Place 2 patches onto the skin daily Indications: To lower back and left shoulder/arm 12 hours on, 12 hours off.     Historical Provider, MD   Calcium Carb-Cholecalciferol (CALCIUM+D3) 500-600 MG-UNIT TABS Take 1 tablet by mouth daily Historical Provider, MD   albuterol sulfate  (90 Base) MCG/ACT inhaler Inhale 2 puffs into the lungs every 4 hours as needed for Wheezing 4/29/21   Storm Cervantes MD   ipratropium-albuterol (DUONEB) 0.5-2.5 (3) MG/3ML SOLN nebulizer solution Inhale 3 mLs into the lungs every 4 hours (while awake) DX:COPD J44.9 4/29/21   Storm Cervantes MD   Continuous Blood Gluc  (FREESTYLE LOC 2 READER) ELADIA Check blood sugars twice daily 4/29/21   Storm Cervantes MD   Continuous Blood Gluc Sensor (FREESTYLE LOC 2 SENSOR) MISC Check blood sugars twice daily 4/29/21   Storm Cervantes MD   Continuous Blood Gluc  (FREESTYLE LOC 14 DAY READER) ELADIA Check blood sugars twice daily 4/29/21   Storm Cervantes MD   digoxin Dmitri Craballo) 125 MCG tablet Take 1 tab by mouth on Monday -Wednesday-Friday 4/12/21   Irma Yang MD   glimepiride (AMARYL) 2 MG tablet Take 2 mg by mouth every morning (before breakfast)    Historical Provider, MD   linaclotide Donold Leaks) 145 MCG capsule Take 145 mcg by mouth every morning (before breakfast) 3/17/21   Historical Provider, MD   furosemide (LASIX) 40 MG tablet Take 1 tablet by mouth daily 4/9/21   Irma Yang MD   predniSONE (DELTASONE) 10 MG tablet Take 1 tablet by mouth daily 6/15/20   MARIA C Yee CNP   pantoprazole (PROTONIX) 40 MG tablet Take 1 tablet by mouth every morning (before breakfast) 6/15/20   MARIA C Yee CNP   linagliptin (TRADJENTA) 5 MG tablet Take 1 tablet by mouth daily 6/15/20   MARIA C Yee CNP   mirtazapine (REMERON) 15 MG tablet Take 1 tablet by mouth nightly  Patient taking differently: Take 30 mg by mouth as needed  10/8/19   Yvrose Bruner MD        Allergies:  Statins, Lyrica [pregabalin], Cipro xr, Penicillins, and Sulfa antibiotics     ROS:   Review of Systems   Constitutional: Positive for fatigue. Respiratory: Positive for shortness of breath. Cardiovascular: Positive for leg swelling.    Genitourinary: Negative. Musculoskeletal: Negative. Skin: Negative. Neurological: Positive for dizziness. Hematological: Negative. Psychiatric/Behavioral: Negative. Physical Examination:    Vitals:    06/17/21 1329   BP: (!) 150/50   Pulse: 93   SpO2: 95%   Weight: 165 lb (74.8 kg)   Height: 5' 5\" (1.651 m)           Physical Exam  Vitals reviewed. Constitutional:       Appearance: She is ill-appearing. HENT:      Head: Normocephalic and atraumatic. Eyes:      Extraocular Movements: Extraocular movements intact. Cardiovascular:      Rate and Rhythm: Normal rate. Rhythm irregular. Pulses: Normal pulses. Heart sounds: Murmur heard. Pulmonary:      Breath sounds: Wheezing and rales present. Abdominal:      Palpations: Abdomen is soft. Musculoskeletal:      Cervical back: Normal range of motion and neck supple. Right lower leg: Edema present. Left lower leg: Edema present. Comments: 2+ pitting   Skin:     General: Skin is warm and dry. Neurological:      General: No focal deficit present. Mental Status: She is alert and oriented to person, place, and time. Mental status is at baseline. Psychiatric:         Mood and Affect: Mood normal.         Behavior: Behavior normal.         Thought Content:  Thought content normal.         Judgment: Judgment normal.         Lab Data:    CBC:   Lab Results   Component Value Date    WBC 12.4 06/14/2021    WBC 8.4 06/09/2021    WBC 9.3 06/08/2021    RBC 2.58 06/14/2021    RBC 2.21 06/09/2021    RBC 2.43 06/08/2021    HGB 8.2 06/14/2021    HGB 7.2 06/09/2021    HGB 7.8 06/08/2021    HCT 25.6 06/14/2021    HCT 21.9 06/09/2021    HCT 24.3 06/08/2021    MCV 99.3 06/14/2021    MCV 99.3 06/09/2021    MCV 99.9 06/08/2021    RDW 18.2 06/14/2021    RDW 17.9 06/09/2021    RDW 18.1 06/08/2021     06/14/2021     06/09/2021     06/08/2021     BMP:  Lab Results   Component Value Date     06/14/2021     06/09/2021 interpreting results and communicating to family and coordination of patient care. Instructions:   1. Medications: increase lasix to one pill twice a day, take on an empty stomach morning and mid afternoon. Weigh daily and call next week with your wt so we can determine how long to take higher dose of lasix. 2. Labs: today  3. Lifestyle Recommendations: Weigh yourself every day in the morning after urination, call Antonia Gavin if wt increases 2-3lb in one day or 5lb in one week, Limit sodium to 2000mg/day and fluids to 2L or 64oz/day. 4. Follow up:  Dr. Yosi Tee early July      [place] CHF Resource Line: [phone]    Heart Failure Websites:   www.heart. org  Www.cardiosmart. org    Websites with Low Sodium Recipes:   www.mayoclinic.org/healthy-lifestyle/recipes/low-sodium-recipes  www.iFlipd/recipes    Smartphone magda's that can help you keep track of symptoms, weights, and medications:  HF Storylines  HF Path  My HF  CareZone  Point of Care Heart Failure  WOWME  H2O Overload    Nutrition Magda to track calories, carbohydrates and sodium content of food:   CalorieKing  MyFitnessPal    Low Sodium Meal Delivery    Top  Meals - offers senior discount  Meal Pro  Magic Kitchen  Mom's Meals - some insurance pays for  [place] for 50  and over, AARP 10% discount     Balance for under 50  Metabolic Meals         I appreciate the opportunity of cooperating in the care of this individual.    Mariella Damon, MARIA C - CNP, CNP, 6/17/2021, 8:49 AM    QUALITY MEASURES  1. Tobacco Cessation Counseling: NA  2. Retake of BP if >140/90:   Yes  3. Documentation to PCP/referring for new patient:  Sent to PCP at close of office visit  4. CAD patient on anti-platelet: NA  5. CAD patient on STATIN therapy:  NA  6. Patient with CHF and aFib on anticoagulation:  No, anemia  7. Patient Education:  Yes   8. BB for LVSD :  NA   9. ACE/ARB for LVSD:  NA   10.  Left Ventricular Ejection Fraction (LVEF) Assessment:  Yes

## 2021-06-17 NOTE — PATIENT INSTRUCTIONS
Instructions:   1. Medications: increase lasix to one pill twice a day, take on an empty stomach morning and mid afternoon. Weigh daily and call next week with your wt so we can determine how long to take higher dose of lasix. 2. Labs: today  3. Lifestyle Recommendations: Weigh yourself every day in the morning after urination, call Bhavin Dunlap if wt increases 2-3lb in one day or 5lb in one week, Limit sodium to 2000mg/day and fluids to 2L or 64oz/day.    4. Follow up:  Dr. Cleve Ramirez early July      [address]: [phone]

## 2021-06-18 DIAGNOSIS — D50.0 IRON DEFICIENCY ANEMIA DUE TO CHRONIC BLOOD LOSS: Primary | ICD-10-CM

## 2021-06-18 LAB
A/G RATIO: 1.1 (ref 1.1–2.2)
ALP BLD-CCNC: 87 U/L (ref 40–129)
ALT SERPL-CCNC: 15 U/L (ref 10–40)
AST SERPL-CCNC: 14 U/L (ref 15–37)
BILIRUB SERPL-MCNC: <0.2 MG/DL (ref 0–1)
GLOBULIN: 3.3 G/DL
GLUCOSE FASTING: ABNORMAL MG/DL (ref 70–99)
TOTAL PROTEIN: 7 G/DL (ref 6.4–8.2)

## 2021-06-21 ENCOUNTER — CARE COORDINATION (OUTPATIENT)
Dept: CASE MANAGEMENT | Age: 81
End: 2021-06-21

## 2021-06-21 NOTE — CARE COORDINATION
[facility] Transitions Follow Up Call    2021    Patient: Radha Chaudhari  Patient : 1940   MRN: [id number]  Reason for Admission: renal failure, HPTN  Discharge Date: 21 RARS: Readmission Risk Score: 38    Follow up outreach call attempt, no answer. CTN left  with contact information and request for return call. CTN will continue with outreach call attempts.       Sandra Wilson RN

## 2021-06-22 ENCOUNTER — CARE COORDINATION (OUTPATIENT)
Dept: CASE MANAGEMENT | Age: 81
End: 2021-06-22

## 2021-06-22 ENCOUNTER — TELEPHONE (OUTPATIENT)
Dept: CARDIOLOGY CLINIC | Age: 81
End: 2021-06-22

## 2021-06-22 NOTE — TELEPHONE ENCOUNTER
----- Message from MARIA C Mccormack CNP sent at 6/21/2021  9:46 AM EDT -----  Labs still show extra fluid, has he lost wt with increased dose lasix?  Leonardo Maloney

## 2021-06-23 ENCOUNTER — CARE COORDINATION (OUTPATIENT)
Dept: CASE MANAGEMENT | Age: 81
End: 2021-06-23

## 2021-06-23 NOTE — CARE COORDINATION
[address] [facility] Transitions Follow Up Call    2021    Patient: Leah Grimes  Patient : 1940   MRN: <[id number]>  Reason for Admission: renal failure  Discharge Date: 21 RARS: Readmission Risk Score: 45         Spoke with: daughterMeagan, HIPAA verified    Care Transitions Follow Up Call    Needs to be reviewed by the provider   Additional needs identified to be addressed with provider: No  none             Method of communication with provider : phone      Care Transition Nurse (CTN) contacted the family by telephone to follow up after admission. Verified name and  with family as identifiers. Addressed changes since last contact: medications-lasix dosing changed  Discussed follow-up appointments. If no appointment was previously scheduled, appointment scheduling offered: Yes. Is follow up appointment scheduled within 7 days of discharge? Yes. Advance Care Planning:   Does patient have an Advance Directive:  reviewed and current. CTN reviewed discharge instructions, medical action plan and red flags with family and discussed any barriers to care and/or understanding of plan of care after discharge. Discussed appropriate site of care based on symptoms and resources available to patient including: PCP and Specialist. The family agrees to contact the PCP office for questions related to their healthcare. Patients top risk factors for readmission: medical condition-UTI, sycope & collapse, HPTN  Interventions to address risk factors: Scheduled appointment with PCP-reviewed upcoming appt     Non-[facility] follow up appointment(s): NA    Patient daughter, Meagan Barbosa (HIPAA verified), answered the phone and verified patient . Pleasant and agreeable to transition call. States patient is feeling well. Had appt with cardiology, PCP, and pain management last week. States only medication change is Lasix 40mg BID instead of QD. Denies pain.  Weight 161 this AM. Next PCP visit

## 2021-06-25 ENCOUNTER — TELEPHONE (OUTPATIENT)
Dept: PRIMARY CARE CLINIC | Age: 81
End: 2021-06-25

## 2021-06-25 ENCOUNTER — TELEPHONE (OUTPATIENT)
Dept: CARDIOLOGY CLINIC | Age: 81
End: 2021-06-25

## 2021-06-25 RX ORDER — PEN NEEDLE, DIABETIC 29 GAUGE
1 NEEDLE, DISPOSABLE MISCELLANEOUS DAILY
Qty: 100 EACH | Refills: 3 | Status: SHIPPED | OUTPATIENT
Start: 2021-06-25 | End: 2021-06-28 | Stop reason: SDUPTHER

## 2021-06-25 NOTE — TELEPHONE ENCOUNTER
Ander wanted to report pt only had 2 quarter size bites of a [place] today, but glucose level was 374  Pt hasn't been able to use insulin yet, Rx for insulin was picked up by pt, but she doesn't have pens needles.

## 2021-06-25 NOTE — TELEPHONE ENCOUNTER
Home care states pt's weight was 161 on Wed, 165.2 on Thur and 163.1 today. Pt has 4 +pitted edema  in feet along with swelling in face,arms and abdomen. /67. Pt has crackling in thong lungs. Pt having sob oxygen 90 without oxygen on     With o2 on level went up to 93 pt is on 2 liter. Asking if pt can  increase lasix.  Please call to advise

## 2021-06-25 NOTE — TELEPHONE ENCOUNTER
Patient's daughter and Jhon Calles with [address] [facility] notified of message from [facility]. verbalized understanding  Patient is not taking anything for BP, no hydralazine. Would like to know what time duration she should take the lasix. Please advise.  [phone]

## 2021-06-25 NOTE — TELEPHONE ENCOUNTER
Covering as NPKV off  Patient is on several medications which can cause fluid retention neurontin, cardizem, prednisone     Is he on anything for his BP? Hydralazine?   It is ok to take an extra 40 mg of lasix for 3 days  Wrap lower legs and keep elevated

## 2021-06-28 ENCOUNTER — OFFICE VISIT (OUTPATIENT)
Dept: PRIMARY CARE CLINIC | Age: 81
End: 2021-06-28
Payer: MEDICARE

## 2021-06-28 VITALS
SYSTOLIC BLOOD PRESSURE: 134 MMHG | WEIGHT: 160.5 LBS | DIASTOLIC BLOOD PRESSURE: 62 MMHG | HEART RATE: 86 BPM | BODY MASS INDEX: 26.74 KG/M2 | HEIGHT: 65 IN

## 2021-06-28 DIAGNOSIS — D64.9 ANEMIA, UNSPECIFIED TYPE: Primary | ICD-10-CM

## 2021-06-28 DIAGNOSIS — Z23 NEED FOR TDAP VACCINATION: ICD-10-CM

## 2021-06-28 DIAGNOSIS — Z23 NEED FOR PROPHYLACTIC VACCINATION AGAINST DIPHTHERIA-TETANUS-PERTUSSIS (DTP): ICD-10-CM

## 2021-06-28 DIAGNOSIS — Z23 NEED FOR SHINGLES VACCINE: ICD-10-CM

## 2021-06-28 DIAGNOSIS — Z23 NEED FOR PROPHYLACTIC VACCINATION AND INOCULATION AGAINST VARICELLA: ICD-10-CM

## 2021-06-28 DIAGNOSIS — Z85.118 HISTORY OF LUNG CANCER: ICD-10-CM

## 2021-06-28 DIAGNOSIS — Z00.00 ROUTINE GENERAL MEDICAL EXAMINATION AT A HEALTH CARE FACILITY: ICD-10-CM

## 2021-06-28 PROCEDURE — G0438 PPPS, INITIAL VISIT: HCPCS | Performed by: INTERNAL MEDICINE

## 2021-06-28 RX ORDER — SYRING-NEEDL,DISP,INSUL,0.3 ML 30 GX5/16"
1 SYRINGE, EMPTY DISPOSABLE MISCELLANEOUS ONCE
Qty: 100 DEVICE | Refills: 0 | Status: SHIPPED | OUTPATIENT
Start: 2021-06-28 | End: 2021-06-28

## 2021-06-28 RX ORDER — PREDNISONE 10 MG/1
10 TABLET ORAL DAILY
Qty: 30 TABLET | Refills: 5 | Status: SHIPPED | OUTPATIENT
Start: 2021-06-28

## 2021-06-28 RX ORDER — PEN NEEDLE, DIABETIC 29 GAUGE
1 NEEDLE, DISPOSABLE MISCELLANEOUS DAILY
Qty: 100 EACH | Refills: 3 | Status: SHIPPED | OUTPATIENT
Start: 2021-06-28

## 2021-06-28 SDOH — ECONOMIC STABILITY: FOOD INSECURITY: WITHIN THE PAST 12 MONTHS, YOU WORRIED THAT YOUR FOOD WOULD RUN OUT BEFORE YOU GOT MONEY TO BUY MORE.: NEVER TRUE

## 2021-06-28 SDOH — ECONOMIC STABILITY: FOOD INSECURITY: WITHIN THE PAST 12 MONTHS, THE FOOD YOU BOUGHT JUST DIDN'T LAST AND YOU DIDN'T HAVE MONEY TO GET MORE.: NEVER TRUE

## 2021-06-28 ASSESSMENT — SOCIAL DETERMINANTS OF HEALTH (SDOH): HOW HARD IS IT FOR YOU TO PAY FOR THE VERY BASICS LIKE FOOD, HOUSING, MEDICAL CARE, AND HEATING?: NOT HARD AT ALL

## 2021-06-28 ASSESSMENT — PATIENT HEALTH QUESTIONNAIRE - PHQ9
SUM OF ALL RESPONSES TO PHQ9 QUESTIONS 1 & 2: 0
SUM OF ALL RESPONSES TO PHQ QUESTIONS 1-9: 0
2. FEELING DOWN, DEPRESSED OR HOPELESS: 0
1. LITTLE INTEREST OR PLEASURE IN DOING THINGS: 0
SUM OF ALL RESPONSES TO PHQ QUESTIONS 1-9: 0
SUM OF ALL RESPONSES TO PHQ QUESTIONS 1-9: 0

## 2021-06-28 ASSESSMENT — LIFESTYLE VARIABLES: HOW OFTEN DO YOU HAVE A DRINK CONTAINING ALCOHOL: 0

## 2021-06-28 NOTE — TELEPHONE ENCOUNTER
Pt has PCP visit today, may need more diuretic but at least continue higher dose I gave her last week.  [facility]

## 2021-06-28 NOTE — PATIENT INSTRUCTIONS
Advance Directives: Care Instructions  Overview  An advance directive is a legal way to state your wishes at the end of your life. It tells your family and your doctor what to do if you can't say what you want. There are two main types of advance directives. You can change them any time your wishes change. Living will. This form tells your family and your doctor your wishes about life support and other treatment. The form is also called a declaration. Medical power of . This form lets you name a person to make treatment decisions for you when you can't speak for yourself. This person is called a health care agent (health care proxy, health care surrogate). The form is also called a durable power of  for health care. If you do not have an advance directive, decisions about your medical care may be made by a family member, or by a doctor or a  who doesn't know you. It may help to think of an advance directive as a gift to the people who care for you. If you have one, they won't have to make tough decisions by themselves. Follow-up care is a key part of your treatment and safety. Be sure to make and go to all appointments, and call your doctor if you are having problems. It's also a good idea to know your test results and keep a list of the medicines you take. What should you include in an advance directive? Many states have a unique advance directive form. (It may ask you to address specific issues.) Or you might use a universal form that's approved by many states. If your form doesn't tell you what to address, it may be hard to know what to include in your advance directive. Use the questions below to help you get started. · Who do you want to make decisions about your medical care if you are not able to? · What life-support measures do you want if you have a serious illness that gets worse over time or can't be cured? · What are you most afraid of that might happen? (Maybe you're afraid of having pain, losing your independence, or being kept alive by machines.)  · Where would you prefer to die? (Your home? A hospital? A nursing home?)  · Do you want to donate your organs when you die? · Do you want certain Yazdanism practices performed before you die? When should you call for help? Be sure to contact your doctor if you have any questions. Where can you learn more? Go to [email or web address] and sign in to your [employer] account. Enter R264 in the Membersuite box to learn more about \"Advance Directives: Care Instructions. \"     If you do not have an account, please click on the \"Sign Up Now\" link. Current as of: March 17, 2021               Content Version: 12.9  © 2006-2021 Healthwise, [employer]. Care instructions adapted under license by [facility] ([facility]). If you have questions about a medical condition or this instruction, always ask your healthcare professional. [place] [address] any warranty or liability for your use of this information. Learning About Medical Power of   What is a medical power of ? A medical power of , also called a durable power of  for health care, is one type of the legal forms called advance directives. It lets you name the person you want to make treatment decisions for you if you can't speak or decide for yourself. The person you choose is called your health care agent. This person is also called a health care proxy or health care surrogate. A medical power of  may be called something else in your state. How do you choose a health care agent? Choose your health care agent carefully. This person may or may not be a family member. Talk to the person before you make your final decision. Make sure he or she is comfortable with this responsibility. It's a good idea to choose someone who:  · Is at least 25years old.   · Knows you well and understands what makes life meaningful for you. · Understands your Religion and moral values. · Will do what you want, not what he or she wants. · Will be able to make difficult choices at a stressful time. · Will be able to refuse or stop treatment, if that is what you would want, even if you could die. · Will be firm and confident with health professionals if needed. · Will ask questions to get needed information. · Lives near you or agrees to travel to you if needed. Your family may help you make medical decisions while you can still be part of that process. But it's important to choose one person to be your health care agent in case you aren't able to make decisions for yourself. If you don't fill out the legal form and name a health care agent, the decisions your family can make may be limited. A health care agent may be called something else in your state. Who will make decisions for you if you don't have a health care agent? If you don't have a health care agent or a living will, you may not get the care you want. Decisions may be made by family members who disagree about your medical care. Or decisions may be made by a medical professional who doesn't know you well. In some cases, a  makes the decisions. When you name a health care agent, it is very clear who has the power to make health decisions for you. How do you name a health care agent? You name your health care agent on a legal form. This form is usually called a medical power of . Ask your hospital, state bar association, or office on aging where to find these forms. You must sign the form to make it legal. Some states require you to get the form notarized. This means that a person called a  watches you sign the form and then he or she signs the form. Some states also require that two or more witnesses sign the form. Be sure to tell your family members and doctors who your health care agent is.   Where can you learn more? Go to [email or web address]://[email or web address] org and sign in to your [employer] account. Enter [id number] in the [facility] box to learn more about \"Learning About [address]. \"     If you do not have an account, please click on the \"Sign Up Now\" link. Current as of: March 17, 2021               Content Version: 12.9  © 2006-2021 Healthwise, [employer]. Care instructions adapted under license by [facility] ([facility]). If you have questions about a medical condition or this instruction, always ask your healthcare professional. [address] any warranty or liability for your use of this information. Learning About Living Perroy  What is a living will? A living will, also called a declaration, is a legal form. It tells your family and your doctor your wishes when you can't speak for yourself. It's used by the health professionals who will treat you as you near the end of your life or if you get seriously hurt or ill. If you put your wishes in writing, your loved ones and others will know what kind of care you want. They won't need to guess. This can ease your mind and be helpful to others. And you can change or cancel your living will at any time. A living will is not the same as an estate or property will. An estate will explains what you want to happen with your money and property after you die. How do you use it? A living will is used to describe the kinds of treatment or life support you want as you near the end of your life or if you get seriously hurt or ill. Keep these facts in mind about living pineda. · Your living will is used only if you can't speak or make decisions for yourself. Most often, one or more doctors must certify that you can't speak or decide for yourself before your living will takes effect. · If you get better and can speak for yourself again, you can accept or refuse any treatment.  It doesn't matter what you said in your can't breathe on your own, your heart stops, or you can't swallow. · What things would you still want to be able to do after you receive life-support methods? Would you want to be able to walk? To speak? To eat on your own? To live without the help of machines? · Do you want certain Amish practices performed if you become very ill? · If you have a choice, where do you want to be cared for? In your home? At a hospital or nursing home? · If you have a choice at the end of your life, where would you prefer to die? At home? In a hospital or nursing home? Somewhere else? · Would you prefer to be buried or cremated? · Do you want your organs to be donated after you die? What should you do with your living will? · Make sure that your family members and your health care agent have copies of your living will (also called a declaration). · Give your doctor a copy of your living will. Ask him or her to keep it as part of your medical record. If you have more than one doctor, make sure that each one has a copy. · Put a copy of your living will where it can be easily found. For example, some people may put a copy on their refrigerator door. If you are using a digital copy, be sure your doctor, family members, and health care agent know how to find and access it. Where can you learn more? Go to [email or web address]://[email or web address] and sign in to your [employer] account. Enter R913 in the [facility] box to learn more about \"Learning About Living Perroapril. \"     If you do not have an account, please click on the \"Sign Up Now\" link. Current as of: March 17, 2021               Content Version: 12.9  © 9847-6725 Healthwise, Incorporated. Care instructions adapted under license by [facility] ([facility]).  If you have questions about a medical condition or this instruction, always ask your healthcare professional. [address] any warranty or liability for your use of this information. Personalized Preventive Plan for Nancy Marino - 6/28/2021  Medicare offers a range of preventive health benefits. Some of the tests and screenings are paid in full while other may be subject to a deductible, co-insurance, and/or copay. Some of these benefits include a comprehensive review of your medical history including lifestyle, illnesses that may run in your family, and various assessments and screenings as appropriate. After reviewing your medical record and screening and assessments performed today your provider may have ordered immunizations, labs, imaging, and/or referrals for you. A list of these orders (if applicable) as well as your Preventive Care list are included within your After Visit Summary for your review. Other Preventive Recommendations:    · A preventive eye exam performed by an eye specialist is recommended every 1-2 years to screen for glaucoma; cataracts, macular degeneration, and other eye disorders. · A preventive dental visit is recommended every 6 months. · Try to get at least 150 minutes of exercise per week or 10,000 steps per day on a pedometer . · Order or download the FREE \"Exercise & Physical Activity: Your Everyday Guide\" from [employer]. Call 8-912.966.1319 or search [employer] online. · You need 4031-6497 mg of calcium and 9078-7946 IU of vitamin D per day. It is possible to meet your calcium requirement with diet alone, but a vitamin D supplement is usually necessary to meet this goal.  · When exposed to the sun, use a sunscreen that protects against both UVA and UVB radiation with an SPF of 30 or greater. Reapply every 2 to 3 hours or after sweating, drying off with a towel, or swimming. · Always wear a seat belt when traveling in a car. Always wear a helmet when riding a bicycle or motorcycle.

## 2021-06-28 NOTE — PROGRESS NOTES
Wants an Annual Medicare Wellness exam done today  \"has 65 active problems on her problem list\"  SHE WAS REAL SWOLLEN BUT SHE'S A HECK OF A LOT BETTER TODAY THAN SHE WAS  Wants a colonoscopy due to POSITIVE STOOL TEST SHE HAD BLOOD IN HER STOOL THEN THEY THINK SHE'S BLEEDING AND NOBODY CAN FIND ANYTHING 2 MONTHS AGO  She was [facility] somebody in the hospital    Wants GI referral-Rosa for possible pill endoscopy, H/H 8.1/25.4, just started iron supplements    , we got some insulin we got syringes    BUN/Cr 36/2.0 sees Nephrology on Wed Jerome    BNP 1300    Arms still goes to sleep all the time    Medicare Annual Wellness Visit  Name: Sergio Toro Date: 2021   MRN: [id number] Sex: Female   Age: 68 y.o. Ethnicity: Non-/Non    : 1940 Race: Wayne Esquivel is here for Medicare AWV and Anemia    Screenings for behavioral, psychosocial and functional/safety risks, and cognitive dysfunction are all negative except as indicated below. These results, as well as other patient data from the [address] form, are documented in Flowsheets linked to this Encounter. Allergies   Allergen Reactions    Statins Other (See Comments)     Other reaction(s): Myalgias (Muscle Pain)      Lyrica [Pregabalin] Other (See Comments)     Shaking, swelling, rash    Cipro Xr Rash     Swelling, rash    Penicillins Rash     Swelling, rash    Sulfa Antibiotics Rash     Swelling, rash       Prior to Visit Medications    Medication Sig Taking?  Authorizing Provider   Insulin Pen Needle ([employer] PEN NEEDLES 29G) 29G X 12MM MISC 1 each by Does not apply route daily Yes Samantha Barnard MD   furosemide (LASIX) 40 MG tablet Take 1 tablet by mouth 2 times daily Yes Keely Gar, APRN - CNP   insulin lispro (HUMALOG) 100 UNIT/ML injection vial Insulin as per sliding scale Yes Samantha Barnard MD   amiodarone (CORDARONE) 200 MG tablet Take 1 tablet by mouth daily Yes Nereyda Caldwell MD dilTIAZem (CARDIZEM 12 HR) 90 MG extended release capsule Take 1 capsule by mouth 2 times daily Yes Stephon Kunz MD   ferrous sulfate (IRON 325) 325 (65 Fe) MG tablet Take 1 tablet by mouth daily (with breakfast) Yes Stephon Kunz MD   gabapentin (NEURONTIN) 100 MG capsule Take 100 mg by mouth nightly. Yes Historical Provider, MD   cyclobenzaprine (FLEXERIL) 10 MG tablet Take 10 mg by mouth 3 times daily as needed for Muscle spasms Yes Historical Provider, MD   oxyCODONE 5 MG capsule Take 5 mg by mouth every 6 hours as needed for Pain. Yes Historical Provider, MD   lidocaine (LIDODERM) 5 % Place 2 patches onto the skin daily Indications: To lower back and left shoulder/arm 12 hours on, 12 hours off.  Yes Historical Provider, MD   Calcium Carb-Cholecalciferol (CALCIUM+D3) 500-600 MG-UNIT TABS Take 1 tablet by mouth daily Yes Historical Provider, MD   albuterol sulfate  (90 Base) MCG/ACT inhaler Inhale 2 puffs into the lungs every 4 hours as needed for Wheezing Yes Radha Babb MD   ipratropium-albuterol (DUONEB) 0.5-2.5 (3) MG/3ML SOLN nebulizer solution Inhale 3 mLs into the lungs every 4 hours (while awake) DX:COPD J44.9 Yes Radha Babb MD   Continuous Blood Gluc  (FREESTYLE LOC 2 READER) ELADIA Check blood sugars twice daily Yes Radha Babb MD   Continuous Blood Gluc Sensor (FREESTYLE LOC 2 SENSOR) [facility] Check blood sugars twice daily Yes Radha Babb MD   Continuous Blood Gluc  (FREESTYLE LOC 14 DAY READER) ELADIA Check blood sugars twice daily Yes Radha Babb MD   digoxin (LANOXIN) 125 MCG tablet Take 1 tab by mouth on Monday -Wednesday-Friday Yes Sophia Thayer MD   glimepiride (AMARYL) 2 MG tablet Take 2 mg by mouth every morning (before breakfast) Yes Historical Provider, MD   linaclotide (LINZESS) 145 MCG capsule Take 145 mcg by mouth every morning (before breakfast) Yes Historical Provider, MD   predniSONE (DELTASONE) 10 MG tablet Take 1 tablet by mouth daily Yes MARIA C Saldana CNP   pantoprazole (PROTONIX) 40 MG tablet Take 1 tablet by mouth every morning (before breakfast) Yes MARIA C Saldana CNP   linagliptin (TRADJENTA) 5 MG tablet Take 1 tablet by mouth daily Yes MARIA C Saldana CNP   mirtazapine (REMERON) 15 MG tablet Take 1 tablet by mouth nightly  Patient taking differently: Take 30 mg by mouth daily  Yes Nila Mckinley MD       Past Medical History:   Diagnosis Date    Arthritis     CAD (coronary artery disease)     Carpal tunnel syndrome     [facility]     COPD (chronic obstructive pulmonary disease) [facility]     Coronary stent     depression     Diabetes mellitus ([facility]     Diastolic heart failure ([facility]     Per Dr Kassidy Mosley 8/5/19    GERD (gastroesophageal reflux disease)     Hyperlipidemia     Hypertension     Lung cancer ([facility]     Adenocarcinoma of Left Lung    MI (myocardial infarction) ([facility]     LIZETT (obstructive sleep apnea)     does not use CPAP    PONV (postoperative nausea and vomiting)     stress incontinence        Past Surgical History:   Procedure Laterality Date    BACK SURGERY  2000, 2001    lumbar laminectomy    BRONCHOSCOPY  12/04/2018    BRONCHOSCOPY N/A 2/27/2019    BRONCHOSCOPY BIOPSY BRONCHUS performed by Rafi Pittman MD at [address] [place] [address]  2/27/2019    BRONCHOSCOPY/TRANSBRONCHIAL NEEDLE BIOPSY performed by Rafi Pittman MD at [address] [place] [address]  2/27/2019    BRONCHOSCOPY ULTRASOUND performed by Rafi Pittman MD at [address] [place] [address] N/A 8/6/2019    BRONCHOSCOPY ALVEOLAR LAVAGE performed by Rafi Pittman MD at [address] [place] [address] N/A 9/27/2019    BRONCHOSCOPY ALVEOLAR LAVAGE performed by Wyatt Yan MD at [address] [place] [address]  9/27/2019    BRONCHOSCOPY BIOPSY BRONCHUS performed by Wyatt Yan MD at [address] [place] [address] 9/27/2019    BRONCHOSCOPY BRUSHINGS performed by James Levy MD at [address] [facility], LAPAROSCOPIC N/A 12/19/2018    LAPAROSCOPIC CHOLECYSTECTOMY WITH CHOLANGIOGRAM performed by Dustin Cleary MD at [address] COLONOSCOPY N/A 2/25/2019    COLONOSCOPY WITH BIOPSY performed by Elaine Carey MD at [address] COLONOSCOPY  2/25/2019    COLONOSCOPY POLYPECTOMY SNARE/COLD BIOPSY performed by Elaine Carey MD at [place]  2000, 2001    IR KYPHOPLASTY LUMBAR FIRST LEVEL  12/8/2020    IR KYPHOPLASTY LUMBAR FIRST LEVEL 12/8/2020 [facility]    IR KYPHOPLASTY THORACIC FIRST LEVEL  7/23/2020    IR KYPHOPLASTY THORACIC FIRST LEVEL 7/23/2020 [facility]    IR KYPHOPLASTY THORACIC FIRST LEVEL  10/20/2020    IR KYPHOPLASTY THORACIC FIRST LEVEL 10/20/2020 [facility]    [place] [address] INCL FLUOR GDNCE DX W/CELL WASHG SPX N/A 12/4/2018    BRONCHOSCOPY WITH LAVAGE performed by Barrett Talavera MD at [address] N/A 2/25/2019    EGD BIOPSY performed by Elaine Carey MD at [address] N/A 8/7/2019    EGD DIAGNOSTIC ONLY performed by Lawyer Smith MD at [facility]       Family History   Problem Relation Age of Onset    Cancer Sister     Diabetes Sister     Diabetes Brother     Heart Disease Father     Heart Disease Mother     Cancer Maternal Uncle        CareTeam (Including outside providers/suppliers regularly involved in providing care):   Patient Care Team:  Kelvin Sommer MD as PCP - General (Internal Medicine)  Kelvin Sommer MD as PCP - [facility]  MARIA C Earl - CNP as Nurse Practitioner (Nurse Practitioner)  John Mcfarland MD as Consulting Physician (Cardiology)  Barrett Talavera MD (Pulmonary Disease)  Dustin Cleary MD as Surgeon (General Surgery)  Telma Mclean RN as Care Transitions Nurse    Wt Readings from Last 3 Encounters:   06/28/21 160 lb 8 oz (72.8 kg)   06/17/21 165 lb (74.8 kg)   06/14/21 162 lb (73.5 kg)     Vitals:    06/28/21 1345   BP: 134/62   Site: Left Upper Arm   Position: Sitting   Cuff Size: Medium Adult   Pulse: 86   Weight: 160 lb 8 oz (72.8 kg)   Height: 5' 5\" (1.651 m)     Body mass index is 26.71 kg/m². Based upon direct observation of the patient, evaluation of cognition reveals remote memory intact, recent memory impaired. General Appearance: alert and oriented to person, place and time, well developed and well- nourished, in no acute distress  Skin: warm and dry, no rash or erythema  Head: normocephalic and atraumatic  Eyes: pupils equal, round, and reactive to light, extraocular eye movements intact, conjunctivae normal  ENT: tympanic membrane, external ear and ear canal normal bilaterally, nose without deformity, nasal mucosa and turbinates normal without polyps  Neck: supple and non-tender without mass, no thyromegaly or thyroid nodules, no cervical lymphadenopathy  Pulmonary/Chest: clear to auscultation bilaterally- no wheezes, rales or rhonchi, normal air movement, no respiratory distress  Cardiovascular: normal rate, regular rhythm, normal S1 and S2, no murmurs, rubs, clicks, or gallops, distal pulses intact, no carotid bruits  Abdomen: soft, non-tender, non-distended, normal bowel sounds, no masses or organomegaly  Extremities: no cyanosis, clubbing or edema  Musculoskeletal: normal range of motion, no joint swelling, deformity or tenderness  Neurologic: reflexes normal and symmetric, no cranial nerve deficit, gait, coordination and speech normal    Patient's complete Health Risk Assessment and screening values have been reviewed and are found in Flowsheets. The following problems were reviewed today and where indicated follow up appointments were made and/or referrals ordered.     Positive Risk Factor Screenings with Interventions: Cognitive: Words recalled: 0 Words Recalled  Clock Drawing Test (CDT) Score: (!) Abnormal  Total Score Interpretation: Positive Mini-Cog  Did the patient refuse to take the cognition test?: No  Cognitive Impairment Interventions:  · Patient advised to follow-up in this office for further evaluation and treatment within 5 week(s)       General Health and ACP:  General  In general, how would you say your health is?: Fair  In the past 7 days, have you experienced any of the following? New or Increased Pain, New or Increased Fatigue, Loneliness, Social Isolation, Stress or Anger?: None of These  Do you get the social and emotional support that you need?: Yes  Do you have a Living Will?: Yes  Advance Directives     Power of [address] [place] [address] Will ACP-Advance Directive ACP-Power of     Not on File Not on File Not on File Not on File      General Health Risk Interventions:  · Pain issues: home exercises provided    Health Habits/Nutrition:  Health Habits/Nutrition  Do you exercise for at least 20 minutes 2-3 times per week?: (!) No  Have you lost any weight without trying in the past 3 months?: No  Do you eat only one meal per day?: No  Have you seen the dentist within the past year?: Yes  Body mass index: (!) 26.71  Health Habits/Nutrition Interventions:  · Inadequate physical activity:  patient is not ready to increase his/her physical activity level at this time    Hearing/Vision:  No exam data present  Hearing/Vision  Do you or your family notice any trouble with your hearing that hasn't been managed with hearing aids?: No  Do you have difficulty driving, watching TV, or doing any of your daily activities because of your eyesight?: No  Have you had an eye exam within the past year?: (!) No  Hearing/Vision Interventions:  · no issues     ADL:  ADLs  In the past 7 days, did you need help from others to perform any of the following everyday activities?  Eating, dressing, grooming, bathing, toileting, or walking/balance?: (!) Bathing, Toileting  In the past 7 days, did you need help from others to take care of any of the following? Laundry, housekeeping, banking/finances, shopping, telephone use, food preparation, transportation, or taking medications?: (!) Transportation, Food Preparation, Shopping, Housekeeping, Laundry, Taking Medications  ADL Interventions:  · Patient declines any further evaluation/treatment for this issue    Personalized Preventive Plan   Current Health Maintenance Status  Immunization History   Administered Date(s) Administered    Influenza Vaccine, unspecified formulation 10/02/2018    Influenza Virus Vaccine 10/30/2013, 10/15/2014, 04/20/2017    Influenza, Quadv, IM, PF (6 mo and older Fluzone, Flulaval, Fluarix, and 3 yrs and older Afluria) 10/01/2019    Pneumococcal Conjugate 13-valent ([id number]) 06/05/2015    Pneumococcal Polysaccharide ([id number]) 08/30/2013        Health Maintenance   Topic Date Due    DTaP/Tdap/Td vaccine (1 - Tdap) Never done    Shingles Vaccine (2 of 3) 11/10/2014    Annual Wellness Visit (AWV)  Never done    TSH testing  05/04/2022    Potassium monitoring  06/17/2022    Creatinine monitoring  06/17/2022    DEXA (modify frequency per FRAX score)  Completed    Flu vaccine  Completed    Pneumococcal 65+ years Vaccine  Completed    COVID-19 Vaccine  Completed    Hepatitis C screen  Completed    Hepatitis A vaccine  Aged Out    Hib vaccine  Aged Out    Meningococcal (ACWY) vaccine  Aged Out     Recommendations for [employer] Due: see orders and patient instructions/AVS.  . Recommended screening schedule for the next 5-10 years is provided to the patient in written form: see Patient Instructions/AVS.    Pat Quiros was seen today for medicare awv and anemia. Diagnoses and all orders for this visit:    Anemia, unspecified type           Want her prednisone REFILLED!       Advance Care Planning   Advanced Care Planning: Discussed the patients

## 2021-06-30 ENCOUNTER — CARE COORDINATION (OUTPATIENT)
Dept: CASE MANAGEMENT | Age: 81
End: 2021-06-30

## 2021-06-30 RX ORDER — BLOOD-GLUCOSE METER
1 KIT MISCELLANEOUS 3 TIMES DAILY
Qty: 300 EACH | Refills: 3 | Status: SHIPPED | OUTPATIENT
Start: 2021-06-30

## 2021-06-30 NOTE — CARE COORDINATION
Jose C 45 Transitions Follow Up Call    2021    Patient: Scott Jamison  Patient : 1940   MRN: [id number]  Reason for Admission: renal failure, HPTN  Discharge Date: 21 RARS: Readmission Risk Score: 45         Spoke with: [address] Transitions Subsequent and Final Call    Subsequent and Final Calls  Do you have any ongoing symptoms?: No  Have your medications changed?: No  Do you have any questions related to your medications?: No  Do you currently have any active services?: Yes  Are you currently active with any services?: Home Health  Do you have any needs or concerns that I can assist you with?: No  Identified Barriers: None  Care Transitions Interventions  Other Interventions: Follow Up: Patient reports that she is \"very weak\". CTN attempted to inquire about other symptoms and she asked that CTN speak with granddaughter. Granddaughter reports that she has some lower extremity swelling and a 3.7# weight gain since yesterday. CTN encouraged her to call cardiology office today and let cardiologist know. Granddaughter will place the call now, CTN confirmed that she had contact number. CTN will continue with outreach follow up calls.     Future Appointments   Date Time Provider Ramo [place]   2021 10:45 AM Abiel Saldaña MD  Cardio [facility]   2021  8:45 AM Ryder Perez MD  Cardio [facility]   2021  1:45 PM MD LUIS DANIEL Rucker   2021  3:30 PM [address] [place], [place] [address]MD SUSIEO BHAVIN AFL NASO   2022  1:45 PM MD LUIS DANIEL Rucker [place] - CAROLINA Trammell RN

## 2021-07-02 ENCOUNTER — TELEPHONE (OUTPATIENT)
Dept: PRIMARY CARE CLINIC | Age: 81
End: 2021-07-02

## 2021-07-02 ENCOUNTER — CARE COORDINATION (OUTPATIENT)
Dept: CASE MANAGEMENT | Age: 81
End: 2021-07-02

## 2021-07-02 DIAGNOSIS — F44.89 CONFUSION STATE: Primary | ICD-10-CM

## 2021-07-02 DIAGNOSIS — R35.0 FREQUENCY OF URINATION: ICD-10-CM

## 2021-07-02 NOTE — CARE COORDINATION
Jose C 45 Transitions Follow Up Call    2021    Patient: Shyann Crane  Patient : 1940   MRN: <[id number]>  Reason for Admission: renal failure, HPTN  Discharge Date: 21 RARS: Readmission Risk Score: 45    Spoke with: Angel Thompson (Daughter) who reports that patient is not to good today. Patient refuse to go to hospital. Reports that patient did not have O2 on when neighbor came in this am to check on her and when checked O2 it was 75%. Alex Estrada reports that she increased O2 from 1.5 l to 2 l and now O2 is 89% to 92% and she is checking every 1/2hr. Daughter has notified Dr Karina Dixon office of patient's current condition to also include confusion and urination frequency. UA was order and home care nurse is due in to see patient sometime between 12 - 2 pm. Daughter reports that patient has been taking all medications as ordered. Breathing tx giving this morning /69,  but the was just after peanut butter toast. Has not weighed today but wt yesterday was 162.6. Patient has some edema in BLE but it has gone down a lot and left leg almost back to normal. Daughter will continue to monitor. Next outreach in 3 to 5 days. Care Transitions Subsequent and Final Call    Subsequent and Final Calls  Care Transitions Interventions  Other Interventions:            Follow Up  Future Appointments   Date Time Provider Ramo [place]   2021 10:45 AM Perry Hein MD [facility]   2021  8:45 AM Sonya Krishnamurthy MD [facility]   2021  1:45 PM MD LUIS DANIEL Henry   2021  3:30 PM Carola Saba MD AFL NASO BHAVIN AFL NASO   2022  1:45 PM MD LUIS DANIEL Henry LPN

## 2021-07-02 NOTE — TELEPHONE ENCOUNTER
Pt daughter, Ivan Liang is requesting order for UA be sent to [facility]. Ivan Liang states she has never seen pt so confused. Pt is more oxygen dependant, oxygen level stat around 75% without oxygen assistance, hallucinating, she has tick in left arm, glucose level is good.

## 2021-07-02 NOTE — TELEPHONE ENCOUNTER
Spoke with Clair Benavides at [phone], verbal order for UA given. Will complete and fax results to us.

## 2021-07-06 ENCOUNTER — TELEPHONE (OUTPATIENT)
Dept: PRIMARY CARE CLINIC | Age: 81
End: 2021-07-06

## 2021-07-07 ENCOUNTER — TELEPHONE (OUTPATIENT)
Dept: CARDIOLOGY CLINIC | Age: 81
End: 2021-07-07

## 2021-07-07 NOTE — TELEPHONE ENCOUNTER
KARL. Jarett  Jarett  Calling to cxl appt tomorrow with LES because patient was admitted to [facility] with pneumonia and UTI

## 2021-07-12 ENCOUNTER — TELEPHONE (OUTPATIENT)
Dept: PRIMARY CARE CLINIC | Age: 81
End: 2021-07-12

## 2021-07-12 ENCOUNTER — TELEPHONE (OUTPATIENT)
Dept: CARDIOLOGY CLINIC | Age: 81
End: 2021-07-12

## 2021-07-12 NOTE — TELEPHONE ENCOUNTER
Spoke with daughter    Continue cardizem,lanoxin and amiodarone  Use roxanal to deal with agitation  Concurred with hospice care

## 2021-07-12 NOTE — TELEPHONE ENCOUNTER
Pt daughter called stating pt was dc from [facility], she is with Hospice now at home because she is declining so fast.  they took her off all her medications they want know if Dr Geneva Meckel can look at her records to see why they would do that.      pls advise thank you  Liberty Johnson   [phone]

## 2021-08-23 NOTE — PROGRESS NOTES
Daughter Renate Stiles called to update on plan for kyphoplasty tomorrow. Message left. Partial Purse String (Simple) Text: Given the location of the defect and the characteristics of the surrounding skin a simple purse string closure was deemed most appropriate.  Undermining was performed circumfirentially around the surgical defect.  A purse string suture was then placed and tightened. Wound tension only allowed a partial closure of the circular defect.

## 2022-06-14 NOTE — PROGRESS NOTES
Pt is calling to check on status of request    Transferred caller to [facility] que      Patient Diagnosis(es): The primary encounter diagnosis was Acute hypoxemic respiratory failure ([facility] Diagnoses of Pneumonia due to organism, Closed wedge fracture of lumbar vertebra, unspecified lumbar vertebral level, initial encounter ([facility] Chronic kidney disease, unspecified CKD stage, and Generalized weakness were also pertinent to this visit. has a past medical history of Arthritis, CAD (coronary artery disease), Carpal tunnel syndrome, COPD (chronic obstructive pulmonary disease) ([address] Coronary stent, depression, Diabetes mellitus ([address] Diastolic heart failure ([address] GERD (gastroesophageal reflux disease), Hyperlipidemia, Hypertension, Lung cancer ([facility] MI (myocardial infarction) ([address] LIZETT (obstructive sleep apnea), PONV (postoperative nausea and vomiting), and stress incontinence. has a past surgical history that includes Coronary angioplasty with stent (2000, 2001); back surgery (2000, 2001); bronchoscopy (12/04/2018); pr brncc incl fluor gdnce dx w/cell washg spx (N/A, 12/4/2018); Cholecystectomy, laparoscopic (N/A, 12/19/2018); Upper gastrointestinal endoscopy (N/A, 2/25/2019); Colonoscopy (N/A, 2/25/2019); Colonoscopy (2/25/2019); bronchoscopy (N/A, 2/27/2019); bronchoscopy (2/27/2019); bronchoscopy (2/27/2019); bronchoscopy (N/A, 8/6/2019); Upper gastrointestinal endoscopy (N/A, 8/7/2019); bronchoscopy (N/A, 9/27/2019); bronchoscopy (9/27/2019); bronchoscopy (9/27/2019); and IR KYPHOPLASTY THORACIC 1 VERTEBRAL BODY (7/23/2020). Restrictions  Restrictions/Precautions  Restrictions/Precautions: Fall Risk(High fall risk)  Required Braces or Orthoses? : (per neurosx CNP note: should pt not prefer brace, pt can performed modified activity)  Position Activity Restriction  Spinal Precautions: No Bending, No Lifting, No Twisting  Other position/activity restrictions: 70 y.o. female who presents because of back pain.   She has a history of COPD, coronary artery disease, diabetes, heart failure, hypertension, lung cancer, pneumonia. She developed the pain yesterday. She denies any known injury. It hurts worse if she moves certain ways. She has not had a cough or fever. She denies any chest or abdominal pain. She is O2 dependent at home with 1 L of oxygen. She required more oxygen when evaluated by EMS and she came to the hospital by ambulance. She lives alone. She denies any new numbness, tingling. She has chronic neuropathy. She denies any loss of bowel or bladder control. She denies any dysuria. She was hospitalized last month for acute kidney injury. She is anticoagulated with Eliquis. IR performed T8 and T10 kyphoplasty and biopsy on 7/23  Subjective   General  Chart Reviewed: Yes  Family / Caregiver Present: No  Diagnosis: Back pain  Subjective  Subjective: Pt supine in bed upon arrival, agreeable to tx.  Recently given pain medication for 5/10 back pain  Pre Treatment Pain Screening  Intervention List: Patient able to continue with treatment;Nurse/Physician notified   Orientation  Orientation  Orientation Level: Oriented to person;Oriented to place;Oriented to situation  Objective    ADL  Additional Comments: Declines all ADL        Balance  Sitting Balance: Supervision  Standing Balance: Contact guard assistance  Standing Balance  Time: ~4-5 minutes total  Activity: transfers, ambulation with RW  Functional Mobility  Functional - Mobility Device: Rolling Walker  Activity: Other  Assist Level: Contact guard assistance  Functional Mobility Comments: ~60' in hallway, slow catalina and reporting mild dizziness/nausea with increased distance, able to ambulate from hallway to chair in room where dizziness and nausea resolves once seated - RN aware  Bed mobility  Supine to Sit: Stand by assistance  Scooting: Stand by assistance  Transfers  Sit to stand: Contact guard assistance  Stand to sit: Contact guard assistance                       Cognition  Arousal/Alertness: Appropriate Minutes:  18 minutes    Haja Deluca OTR/L [id number]    Inez Pappas, OT

## 2022-07-19 NOTE — CARE COORDINATION
Notified MD via perfect serve that [address] denied precert to [facility]. Kasandra agreed to MD LACKEY until 4:30 today.   Please  Call [phone] to appeal the denial. ambulate

## 2022-09-27 NOTE — ED NOTES
Arrived by [facility] rt back pain lasting years in duration; began worsening yesterday. Pain is 10/10 to right thoracic area; tender to the touch. Denies falling or causitive event  Took norco at home without relief. Monitors applied & family at bedside.       Tessa Schuster RN  10/16/20 7939 DISPLAY PLAN FREE TEXT

## 2022-12-06 NOTE — PROGRESS NOTES
Scot Mulligan, MD Hedy Harden, MD Sharlot Rubinstein, MD                                  Office: [phone]                 Fax: [phone]          [facility]                     NEPHROLOGY IN PATIENT PROGRESS NOTE:     PATIENT NAME: Radha Ruiz  : 1940  MRN: [id number]            Subjective:       Continued improvement. Hypotension improved. Assessment and Plan    Assessment:     Acute kidney injury-slow improvement-nonoliguric  Anemia   History of lung cancer. Generalized weakness. Plan:       Continued improvement in renal function. Repeat creatinine is 1.4. Electrolytes are stable. Potassium levels improved. Anemia levels are stable. Medications reviewed. Stable from renal for discharge        EXAM  Vitals:    20 0743   BP:    Pulse:    Resp: 16   Temp:    SpO2: 97%       Intake/Output Summary (Last 24 hours) at 2020 0859  Last data filed at 2020 0509  Gross per 24 hour   Intake --   Output 1100 ml   Net -1100 ml         Neck. JVD not visible. No lymph nodes palpable. CVS.  Heart sounds are normal.Palpation of the heart is normal. No murmurs. No pericardial rub.  RS.dullness on percussion of the lower chest wall. Bilateral Basal rales. PA soft , bowel sounds are normal no distension and no tenderness to palpation. Skin No rash , No palpable nodules  Musculoskeletal: Normal range of motion. 1+ edema and no tenderness. Active Problems:    Renal failure  Resolved Problems:    * No resolved hospital problems.  *        Medications Reviewed by me   dilTIAZem  60 mg Oral BID    insulin lispro  5 Units Subcutaneous TID WC    insulin lispro  0-6 Units Subcutaneous TID WC    insulin lispro  0-3 Units Subcutaneous Nightly    metoprolol succinate  25 mg Oral Daily    apixaban  2.5 mg Oral BID    pantoprazole  40 mg Oral QAM AC    ipratropium-albuterol  3 mL Inhalation Q4H WA    mirtazapine  15 mg Oral Nightly    sodium chloride Subjective:       Patient ID: Eva Lane is a 49 y.o. female.    Chief Complaint: Follow-up (Hospital follow up for seizures)    Patient is known, to me and presents with   Chief Complaint   Patient presents with    Follow-up     Hospital follow up for seizures   .  Denies chest pain and shortness of breath.  Patient presents with hospital follow up seizures. She stopped taking her seizure meds and had a seizure at work. Was transferred to [facility]. She stayed one night. She is back on her meds and needs refills. She is also open to see [facility]. No sihi noted. States that she is taking 2 seroquels for sleep so needs new script.    Follow-up  Pertinent negatives include no arthralgias, chest pain, congestion, coughing, fatigue, fever, headaches, joint swelling, neck pain, numbness or weakness.   Review of Systems   Constitutional:  Negative for activity change, appetite change, fatigue, fever and unexpected weight change.   HENT:  Negative for congestion, ear discharge, ear pain, hearing loss, postnasal drip and tinnitus.    Eyes:  Negative for photophobia, pain and visual disturbance.   Respiratory:  Negative for cough, shortness of breath, wheezing and stridor.    Cardiovascular:  Negative for chest pain, palpitations and leg swelling.   Gastrointestinal:  Negative for abdominal distention.   Genitourinary:  Negative for difficulty urinating, dysuria, frequency, hematuria and urgency.   Musculoskeletal:  Negative for arthralgias, back pain, gait problem, joint swelling and neck pain.   Skin: Negative.    Neurological:  Positive for seizures. Negative for dizziness, syncope, weakness, light-headedness, numbness and headaches.   Hematological:  Negative for adenopathy. Does not bruise/bleed easily.   Psychiatric/Behavioral:  Positive for sleep disturbance. Negative for agitation, behavioral problems, confusion, decreased concentration, dysphoric mood, hallucinations, self-injury and  suicidal ideas. The patient is nervous/anxious. The patient is not hyperactive.      Objective:      Physical Exam  Constitutional:       General: She is not in acute distress.     Appearance: She is well-developed.   HENT:      Head: Normocephalic and atraumatic.      Right Ear: Tympanic membrane and external ear normal.      Left Ear: Tympanic membrane and external ear normal.      Nose: Nose normal.      Mouth/Throat:      Mouth: Mucous membranes are moist.      Pharynx: No oropharyngeal exudate.   Eyes:      General: No scleral icterus.        Right eye: No discharge.         Left eye: No discharge.      Conjunctiva/sclera: Conjunctivae normal.      Pupils: Pupils are equal, round, and reactive to light.   Neck:      Thyroid: No thyromegaly.      Vascular: No JVD.   Cardiovascular:      Rate and Rhythm: Normal rate and regular rhythm.      Heart sounds: Normal heart sounds. No murmur heard.    No friction rub. No gallop.   Pulmonary:      Effort: Pulmonary effort is normal. No respiratory distress.      Breath sounds: Normal breath sounds. No stridor. No wheezing or rales.   Chest:      Chest wall: No tenderness.   Abdominal:      General: Bowel sounds are normal. There is no distension.      Palpations: Abdomen is soft. There is no mass.      Tenderness: There is no abdominal tenderness. There is no right CVA tenderness, left CVA tenderness, guarding or rebound.      Hernia: No hernia is present.   Musculoskeletal:         General: No swelling, tenderness, deformity or signs of injury. Normal range of motion.      Cervical back: Normal range of motion and neck supple.      Right lower leg: No edema.      Left lower leg: No edema.   Lymphadenopathy:      Cervical: No cervical adenopathy.   Skin:     General: Skin is warm and dry.      Capillary Refill: Capillary refill takes less than 2 seconds.      Coloration: Skin is not jaundiced or pale.      Findings: No bruising, erythema, lesion or rash.   Neurological:  "     General: No focal deficit present.      Mental Status: She is alert and oriented to person, place, and time.      Cranial Nerves: No cranial nerve deficit.      Sensory: No sensory deficit.      Motor: No weakness or abnormal muscle tone.      Coordination: Coordination normal.      Gait: Gait normal.      Deep Tendon Reflexes: Reflexes are normal and symmetric. Reflexes normal.   Psychiatric:         Mood and Affect: Mood normal.         Behavior: Behavior normal.         Thought Content: Thought content normal.         Judgment: Judgment normal.      Comments: Negative sihi noted       Assessment:       1. Bipolar 2 disorder    2. Seizure-like activity          Plan:   1. Bipolar 2 disorder  -     Ambulatory referral/consult to Psychiatry; Future; Expected date: 12/13/2022  -     QUEtiapine (SEROQUEL) 400 MG tablet; Take 1 tablet (400 mg total) by mouth 2 (two) times daily.  Dispense: 60 tablet; Refill: 2    2. Seizure-like activity  Overview:  4 "seizures" with residual R sided numbness.  Chart lists conversion d/o so this is in DDx.  Not a stroke intervention candidate.  Rec transfer to [facility] for further evaluation/treatment.    Orders:  -     divalproex 500 MG Tb24; Take 1 tablet (500 mg total) by mouth once daily.  Dispense: 30 tablet; Refill: 5  "This note will not be shared with the patient."   Will send to [facility]   Continue with plan of care and make sure to take all meds as prescribed  Her  drives her   Rtc as scheduled   " Signed: Lilly Witt MD 6/6/2020 8:59 AM

## 2023-09-14 NOTE — ANESTHESIA PRE PROCEDURE
Department of Anesthesiology  Preprocedure Note       Name:  Audrey Barajas   Age:  70 y.o.  :  1940                                          MRN:  [id number]         Date:  2019      Surgeon: Anay Bailey):  Beata Johnston MD    Procedure: EGD DIAGNOSTIC ONLY (N/A Abdomen)    Medications prior to admission:   Prior to Admission medications    Medication Sig Start Date End Date Taking? Authorizing Provider   metoclopramide (REGLAN) 5 MG tablet Take 5 mg by mouth 3 times daily   Yes Historical Provider, MD   dronabinol (MARINOL) 2.5 MG capsule Take 2.5 mg by mouth 2 times daily (before meals).    Yes Historical Provider, MD   zinc gluconate 50 MG tablet Take 50 mg by mouth daily   Yes Historical Provider, MD   ondansetron (ZOFRAN) 4 MG tablet Take 4 mg by mouth every 8 hours as needed for Nausea or Vomiting   Yes Historical Provider, MD   vitamin B-12 (CYANOCOBALAMIN) 100 MCG tablet Take 50 mcg by mouth daily   Yes Historical Provider, MD   apixaban (ELIQUIS) 5 MG TABS tablet Take 1 tablet by mouth 2 times daily 19  Yes Jordan West MD   flecainide (TAMBOCOR) 100 MG tablet Take 1 tablet by mouth 2 times daily 19  Yes Jordan West MD   ipratropium-albuterol (DUONEB) 0.5-2.5 (3) MG/3ML SOLN nebulizer solution Inhale 3 mLs into the lungs every 4 hours (while awake) DX:COPD J44.9 3/8/19  Yes Helen Crawford MD   potassium chloride (KLOR-CON M) 20 MEQ extended release tablet Take 1 tablet by mouth daily 19  Yes Rebel Landaverde MD   omeprazole (PRILOSEC) 20 MG delayed release capsule Take 20 mg by mouth daily   Yes Historical Provider, MD   aspirin 81 MG tablet Take 1 tablet by mouth daily 18  Yes Rebel Landaverde MD   citalopram (CELEXA) 40 MG tablet Take 0.5 tablets by mouth every morning 18  Yes Rebel Landaverde MD   metFORMIN (GLUCOPHAGE) 1000 MG tablet Take 0.5 tablets by mouth 2 times daily (with meals) 18  Yes Rebel Landaverde MD   Fluticasone-Umeclidin-Vilant at 08/07/19 0628    sodium chloride flush 0.9 % injection 10 mL  10 mL Intravenous 2 times per day Savage Cordova PA-C   10 mL at 08/06/19 2112    sodium chloride flush 0.9 % injection 10 mL  10 mL Intravenous PRN Savage Cordova PA-C   10 mL at 08/05/19 1915    magnesium hydroxide (MILK OF MAGNESIA) 400 MG/5ML suspension 30 mL  30 mL Oral Daily PRN Savage Cordova PA-C        acetaminophen (TYLENOL) tablet 650 mg  650 mg Oral Q4H PRN Savage Cordova PA-C   650 mg at 08/04/19 2218    albuterol (PROVENTIL) nebulizer solution 2.5 mg  2.5 mg Nebulization Q2H PRN Savage Cordova PA-C        ipratropium-albuterol (DUONEB) nebulizer solution 1 ampule  1 ampule Inhalation Q4H WA Savage Cordova PA-C   1 ampule at 08/06/19 2056    cefepime (MAXIPIME) 2 g IVPB minibag  2 g Intravenous Q8H Savage Cordova PA-C 100 mL/hr at 08/07/19 0629 2 g at 08/07/19 0629    glucose (GLUTOSE) 40 % oral gel 15 g  15 g Oral PRN Savage Cordova PA-C        dextrose 50 % IV solution  12.5 g Intravenous PRN Savage Cordvoa PA-C        glucagon (rDNA) injection 1 mg  1 mg Intramuscular PRN Savage Cordova PA-C        dextrose 5 % solution  100 mL/hr Intravenous PRN Savage Cordova PA-C        mometasone-formoterol [facility]) 200-5 MCG/ACT inhaler 2 puff  2 puff Inhalation BID Savage Cordova PA-C   2 puff at 08/06/19 2056    furosemide (LASIX) injection 40 mg  40 mg Intravenous BID Savage Cordova PA-C   40 mg at 08/06/19 1854    vancomycin (VANCOCIN) 1000 mg in dextrose 5% 200 mL IVPB  1,000 mg Intravenous Q12H Jadiel Gonzalez MD   Stopped at 08/06/19 2113    citalopram (CELEXA) tablet 40 mg  40 mg Oral QAM Jadiel Gonzalez MD   40 mg at 08/06/19 0945    carvedilol (COREG) tablet 12.5 mg  12.5 mg Oral BID Jadiel Gonzalez MD   12.5 mg at 08/06/19 2111    flecainide (TAMBOCOR) tablet 50 mg  50 mg Oral BID Jadiel Gonzalez MD   50 mg at 08/06/19 2111       Allergies:     Allergies   Allergen Reactions    to thrive (0-17) R62.51    Chronic atrial fibrillation (HCC) I48.2    Acute on chronic respiratory failure with hypoxia (HCC) J96.21    Chronic respiratory failure with hypoxia (HCC) J96.11    Lung infiltrate on CT R91.8    Non-small cell cancer of left lung (HCC) C34.92    Acute encephalopathy G93.40    Weakness R53.1       Past Medical History:        Diagnosis Date    Arthritis     CAD (coronary artery disease)     Carpal tunnel syndrome     COPD (chronic obstructive pulmonary disease) (HCC)     Coronary stent     depression     Diabetes mellitus ([address]     Diastolic heart failure ([address]     Per Dr Corry Colunga 8/5/19    GERD (gastroesophageal reflux disease)     Hyperlipidemia     Hypertension     Lung cancer ([address]     Adenocarcinoma of Left Lung    MI (myocardial infarction) ([address]     LIZETT (obstructive sleep apnea)     does not use CPAP    PONV (postoperative nausea and vomiting)     stress incontinence        Past Surgical History:        Procedure Laterality Date    BACK SURGERY  2000, 2001    lumbar laminectomy    BRONCHOSCOPY  12/04/2018    BRONCHOSCOPY N/A 2/27/2019    BRONCHOSCOPY BIOPSY BRONCHUS performed by Shauna Prasad MD at [place] [address]  2/27/2019    BRONCHOSCOPY/TRANSBRONCHIAL NEEDLE BIOPSY performed by Shauna Prasad MD at [place] [address]  2/27/2019    BRONCHOSCOPY ULTRASOUND performed by Shauna Prasad MD at [facility] N/A 8/6/2019    BRONCHOSCOPY ALVEOLAR LAVAGE performed by Shauna Prasad MD at [facility], LAPAROSCOPIC N/A 12/19/2018    LAPAROSCOPIC CHOLECYSTECTOMY WITH CHOLANGIOGRAM performed by Kyle Zazueta MD at [address] [place] [address] COLONOSCOPY N/A 2/25/2019    COLONOSCOPY WITH BIOPSY performed by Anthony Hall MD at [address] [facility] COLONOSCOPY  2/25/2019    COLONOSCOPY POLYPECTOMY SNARE/COLD BIOPSY performed by Anthony Hall MD at Adult failure to thrive

## 2024-06-25 NOTE — PROGRESS NOTES
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    Weight as of this encounter: 63 kg (139 lb). Body surface area is 1.7 meters squared.  No Pain (0) Comment: Data Unavailable   No LMP recorded. Patient has had a hysterectomy.  Allergies reviewed: Yes  Medications reviewed: Yes    Medications: Medication refills not needed today.  Pharmacy name entered into EPIC: Data Unavailable    Frailty Screening:   Is the patient here for a new oncology consult visit in cancer care? 1. Yes. Over the past month, have you experienced difficulty or required a caregiver to assist with:   1. Balance, walking or general mobility (including any falls)? NO  2. Completion of self-care tasks such as bathing, dressing, toileting, grooming/hygiene?  NO  3. Concentration or memory that affects your daily life?  NO       Clinical concerns:        Shirley Rodriguez CMA              " VSS, assessment as charted. States her pain is \"ok\", rates it a level 5 in her lower back currently. Meds administered including correction insulin for FSBS 245. Bernstein emptied of 750 ml of clear yellow urine, bernstein care done with green wipes. Patient reports pain to touch during bernstein care, seems fairly severe beyond what would be expected, no skin breakdown noted on inspection. States she is still uncomfortable from being constipated earlier. BLE edema improved, O2 sat acceptable on 1L O2 per NC, expiratory wheezes persist despite HHN given @2019. Denies other needs @present.

## 2024-08-14 NOTE — TELEPHONE ENCOUNTER
Caller: Maura Goodman    Relationship: Self    Best call back number: [phone]    What was the call regarding: PATIENT HAS GOTTEN HER INSURANCE REINSTATED AND NEEDS TO RESUME HER TREATMENT SOONER THAN LATER. WE GOT HER THE FIRST AVAILABLE APPT BUT THAT IS NOT UNTIL JANUARY AND SHE NEEDS HELP BEFORE THEN. PLEASE CALL PATIENT.    Is it okay if the provider responds through MyChart: NO   UA and urine culture ordered    Maeve Perez MD

## 2024-08-21 NOTE — PROGRESS NOTES
Pre-op Diagnosis: hematochezia    The above referenced H&P was reviewed by Vita Adams MD on 8/21/2024, the patient was examined and no significant changes have occurred in the patient's condition since the H&P was performed.  I discussed with the patient and/or legal representative the potential benefits, risks and side effects of this procedure; the likelihood of the patient achieving goals; and potential problems that might occur during recuperation.  I discussed reasonable alternatives to the procedure, including risks, benefits and side effects related to the alternatives and risks related to not receiving this procedure.  We will proceed with procedure as planned.       Pt is having back pain. PRN flexeril given see mar. Phone and call light in reach. The care plan and education has been reviewed and mutually agreed upon with the patient. Patient remains free from falls. All fall precautions in place. Yellow blanket at bedside, yellow bracelet on patient. SAFE sign on door. Bed and chair alarms being used. Bed in lowest position. Will monitor.    Willow Jacquess

## (undated) DEVICE — 60 ML SYRINGE,REGULAR TIP: Brand: MONOJECT

## (undated) DEVICE — CONMED DISPOSABLE MICROBIOLOGY BRUSH, Ø1 MM, 1.8 MM WORKING DIAMETER, 110 CM LENGTH: Brand: CONMED

## (undated) DEVICE — COVER LT HNDL BLU PLAS

## (undated) DEVICE — SOLUTION IV IRRIG WATER 500ML POUR BRL ST 2F7113

## (undated) DEVICE — PROCEDURE KIT ENDOSCP CUST

## (undated) DEVICE — SINGLE USE SUCTION VALVE MAJ-209: Brand: SINGLE USE SUCTION VALVE (STERILE)

## (undated) DEVICE — FORCEPS BX L100CM JAW DIA1.8MM CHN 2MM PULM W/ NDL DISP

## (undated) DEVICE — Device

## (undated) DEVICE — SYRINGE MED 10ML POLYPR LUERSLIP TIP FLAT TOP W/O SFTY DISP

## (undated) DEVICE — SET VLV 3 PC AWS DISPOSABLE GRDIAN SCOPEVALET

## (undated) DEVICE — CONMED CHANNEL MASTER PULMONARY AND PEDIATRIC CLEANING BRUSH, 160 CM X 2.0 MM: Brand: CONMED

## (undated) DEVICE — ADAPTER TBNG DIA15MM SWVL FBROPT BRONCHSCP TERM 2 AXIS PEEP

## (undated) DEVICE — SPECIMEN TRAP: Brand: ARGYLE

## (undated) DEVICE — SUTURE MCRYL SZ 4-0 L27IN ABSRB UD L19MM PS-2 1/2 CIR PRIM Y426H

## (undated) DEVICE — CHLORAPREP 26ML ORANGE

## (undated) DEVICE — GLOVE SURG SZ 6.5 L11.2IN FNGR THK9.8MIL STRW LTX POLYMER

## (undated) DEVICE — SINGLE USE BIOPSY VALVE MAJ-210: Brand: SINGLE USE BIOPSY VALVE (STERILE)

## (undated) DEVICE — CORD ES L10FT MPLR LAP

## (undated) DEVICE — Z DISCONTINUED NO SUB IDED BAG SPEC RETRV M C240ML MOUTH 7.3MM L17CM SHFT 10MM NYL EZEE

## (undated) DEVICE — Device: Brand: BALLOON

## (undated) DEVICE — AIRWAY PHGEAL SZ 9 9CM PNK AD ORAL FBROPT INTUB PLAS DISP

## (undated) DEVICE — KIT OR ROOM TURNOVER W/STRAP

## (undated) DEVICE — ADAPTER BRONCHSCP FOR USE W/ OLY EDGE

## (undated) DEVICE — BW-412T DISP COMBO CLEANING BRUSH: Brand: SINGLE USE COMBINATION CLEANING BRUSH

## (undated) DEVICE — SYRINGE MED 10ML TRNSLUC BRL PLUNG BLK MRK POLYPR CTRL

## (undated) DEVICE — Device: Brand: MEDEX

## (undated) DEVICE — SKIN AFFIX SURG ADHESIVE 72/CS 0.55ML: Brand: MEDLINE

## (undated) DEVICE — PATCH SENS PT FOR ELECTROMAGNETIC NAVIGATION BRONCHSCP SYS

## (undated) DEVICE — LAPAROSCOPY PK

## (undated) DEVICE — GOWN AURORA NONREINF LG: Brand: MEDLINE INDUSTRIES, INC.

## (undated) DEVICE — 35 ML SYRINGE LUER-LOCK TIP: Brand: MONOJECT

## (undated) DEVICE — MACROBORE EXTN SET W/ 1 SMRTSITE NEEDLE-FREE VLV PRT

## (undated) DEVICE — INDICATED FOR USE DURING OPEN AND LAPAROSCOPIC CHOLECYSTECTOMY PROCEDURES TO INJECT RADIOPAQUE MEDIA THROUGH THE CYSTIC DUCT INTO THE BILIARY TREE.: Brand: AEROSTAT®

## (undated) DEVICE — HYPODERMIC SAFETY NEEDLE: Brand: MAGELLAN

## (undated) DEVICE — NEEDLE ASPIR 19GA HISTOLOGY FLX VIZISHOT 2

## (undated) DEVICE — Device: Brand: DISPOSABLE ELECTROSURGICAL SNARE

## (undated) DEVICE — SOLUTION IV IRRIG POUR BRL 0.9% SODIUM CHL 2F7124

## (undated) DEVICE — FORCEPS BX L240CM WRK CHN 2.8MM STD CAP W/ NDL MIC MESH

## (undated) DEVICE — MOUTHPIECE ENDOSCP L CTRL OPN AND SIDE PORTS DISP

## (undated) DEVICE — DRAPE,LAP,CHOLE,W/TROUGHS,STERILE: Brand: MEDLINE

## (undated) DEVICE — TROCAR ENDOSCP L100MM DIA5MM BLDELSS STBL SL OBT RADLUC

## (undated) DEVICE — APPLIER CLP M/L SHFT DIA5MM 15 LIG LIGAMAX 5

## (undated) DEVICE — TRAP SPEC RETRV CLR PLAS POLYP IN LN SUCT QUIK CTCH

## (undated) DEVICE — DRAPE C ARM UNIV W41XL74IN CLR PLAS XR VELC CLSR POLY STRP

## (undated) DEVICE — SHEET,DRAPE,53X77,STERILE: Brand: MEDLINE